# Patient Record
Sex: FEMALE | Race: WHITE | Employment: OTHER | URBAN - METROPOLITAN AREA
[De-identification: names, ages, dates, MRNs, and addresses within clinical notes are randomized per-mention and may not be internally consistent; named-entity substitution may affect disease eponyms.]

---

## 2017-01-11 ENCOUNTER — HOSPITAL ENCOUNTER (OUTPATIENT)
Dept: GENERAL RADIOLOGY | Age: 72
Discharge: HOME | End: 2017-01-11
Admitting: INTERNAL MEDICINE

## 2017-01-11 DIAGNOSIS — Z13.820 ENCOUNTER FOR IMAGING TO ASSESS OSTEOPOROSIS: ICD-10-CM

## 2017-01-11 DIAGNOSIS — Z13.820 ENCOUNTER FOR SCREENING FOR OSTEOPOROSIS: ICD-10-CM

## 2017-05-17 ENCOUNTER — OFFICE VISIT (OUTPATIENT)
Dept: INTERNAL MEDICINE CLINIC | Age: 72
End: 2017-05-17

## 2017-05-17 VITALS
WEIGHT: 293 LBS | HEIGHT: 66 IN | BODY MASS INDEX: 47.09 KG/M2 | RESPIRATION RATE: 16 BRPM | HEART RATE: 80 BPM | SYSTOLIC BLOOD PRESSURE: 124 MMHG | DIASTOLIC BLOOD PRESSURE: 76 MMHG

## 2017-05-17 DIAGNOSIS — I48.91 ATRIAL FIBRILLATION, UNSPECIFIED TYPE (HCC): ICD-10-CM

## 2017-05-17 DIAGNOSIS — M19.90 ARTHRITIS: ICD-10-CM

## 2017-05-17 DIAGNOSIS — I10 ESSENTIAL HYPERTENSION: Primary | ICD-10-CM

## 2017-05-17 LAB
A/G RATIO: 1.6 (ref 1.1–2.2)
ALBUMIN SERPL-MCNC: 4 G/DL (ref 3.4–5)
ALP BLD-CCNC: 120 U/L (ref 40–129)
ALT SERPL-CCNC: 7 U/L (ref 10–40)
ANION GAP SERPL CALCULATED.3IONS-SCNC: 13 MMOL/L (ref 3–16)
AST SERPL-CCNC: 10 U/L (ref 15–37)
BASOPHILS ABSOLUTE: 0.1 K/UL (ref 0–0.2)
BASOPHILS RELATIVE PERCENT: 0.9 %
BILIRUB SERPL-MCNC: 0.5 MG/DL (ref 0–1)
BUN BLDV-MCNC: 22 MG/DL (ref 7–20)
CALCIUM SERPL-MCNC: 9.3 MG/DL (ref 8.3–10.6)
CHLORIDE BLD-SCNC: 104 MMOL/L (ref 99–110)
CO2: 28 MMOL/L (ref 21–32)
CREAT SERPL-MCNC: 1.4 MG/DL (ref 0.6–1.2)
EOSINOPHILS ABSOLUTE: 0.3 K/UL (ref 0–0.6)
EOSINOPHILS RELATIVE PERCENT: 3.7 %
GFR AFRICAN AMERICAN: 45
GFR NON-AFRICAN AMERICAN: 37
GLOBULIN: 2.5 G/DL
GLUCOSE BLD-MCNC: 89 MG/DL (ref 70–99)
HCT VFR BLD CALC: 39.9 % (ref 36–48)
HEMOGLOBIN: 12.4 G/DL (ref 12–16)
LYMPHOCYTES ABSOLUTE: 1.4 K/UL (ref 1–5.1)
LYMPHOCYTES RELATIVE PERCENT: 18.2 %
MCH RBC QN AUTO: 29.3 PG (ref 26–34)
MCHC RBC AUTO-ENTMCNC: 31.1 G/DL (ref 31–36)
MCV RBC AUTO: 94.3 FL (ref 80–100)
MONOCYTES ABSOLUTE: 0.9 K/UL (ref 0–1.3)
MONOCYTES RELATIVE PERCENT: 11 %
NEUTROPHILS ABSOLUTE: 5.2 K/UL (ref 1.7–7.7)
NEUTROPHILS RELATIVE PERCENT: 66.2 %
PDW BLD-RTO: 15.5 % (ref 12.4–15.4)
PLATELET # BLD: 204 K/UL (ref 135–450)
PMV BLD AUTO: 11.5 FL (ref 5–10.5)
POTASSIUM SERPL-SCNC: 4.7 MMOL/L (ref 3.5–5.1)
RBC # BLD: 4.23 M/UL (ref 4–5.2)
SODIUM BLD-SCNC: 145 MMOL/L (ref 136–145)
TOTAL PROTEIN: 6.5 G/DL (ref 6.4–8.2)
TSH SERPL DL<=0.05 MIU/L-ACNC: 3.81 UIU/ML (ref 0.27–4.2)
VITAMIN D 25-HYDROXY: 32 NG/ML
WBC # BLD: 7.8 K/UL (ref 4–11)

## 2017-05-17 PROCEDURE — 99204 OFFICE O/P NEW MOD 45 MIN: CPT | Performed by: INTERNAL MEDICINE

## 2017-05-28 ASSESSMENT — ENCOUNTER SYMPTOMS
ALLERGIC/IMMUNOLOGIC NEGATIVE: 1
RESPIRATORY NEGATIVE: 1
EYES NEGATIVE: 1
GASTROINTESTINAL NEGATIVE: 1

## 2017-09-08 ENCOUNTER — OFFICE VISIT (OUTPATIENT)
Dept: INTERNAL MEDICINE CLINIC | Age: 72
End: 2017-09-08

## 2017-09-08 VITALS
HEART RATE: 94 BPM | SYSTOLIC BLOOD PRESSURE: 128 MMHG | BODY MASS INDEX: 49.68 KG/M2 | RESPIRATION RATE: 14 BRPM | WEIGHT: 293 LBS | DIASTOLIC BLOOD PRESSURE: 78 MMHG

## 2017-09-08 DIAGNOSIS — R07.89 OTHER CHEST PAIN: ICD-10-CM

## 2017-09-08 DIAGNOSIS — I10 ESSENTIAL HYPERTENSION: ICD-10-CM

## 2017-09-08 DIAGNOSIS — R52 PAIN: Primary | ICD-10-CM

## 2017-09-08 DIAGNOSIS — I48.91 ATRIAL FIBRILLATION, UNSPECIFIED TYPE (HCC): ICD-10-CM

## 2017-09-08 LAB
A/G RATIO: 1.4 (ref 1.1–2.2)
ALBUMIN SERPL-MCNC: 3.7 G/DL (ref 3.4–5)
ALP BLD-CCNC: 141 U/L (ref 40–129)
ALT SERPL-CCNC: 7 U/L (ref 10–40)
ANION GAP SERPL CALCULATED.3IONS-SCNC: 12 MMOL/L (ref 3–16)
AST SERPL-CCNC: 11 U/L (ref 15–37)
BASOPHILS ABSOLUTE: 0 K/UL (ref 0–0.2)
BASOPHILS RELATIVE PERCENT: 0.5 %
BILIRUB SERPL-MCNC: 0.6 MG/DL (ref 0–1)
BUN BLDV-MCNC: 22 MG/DL (ref 7–20)
CALCIUM SERPL-MCNC: 9.4 MG/DL (ref 8.3–10.6)
CHLORIDE BLD-SCNC: 104 MMOL/L (ref 99–110)
CHOLESTEROL, TOTAL: 159 MG/DL (ref 0–199)
CO2: 28 MMOL/L (ref 21–32)
CREAT SERPL-MCNC: 1.2 MG/DL (ref 0.6–1.2)
EOSINOPHILS ABSOLUTE: 0.3 K/UL (ref 0–0.6)
EOSINOPHILS RELATIVE PERCENT: 3.4 %
GFR AFRICAN AMERICAN: 54
GFR NON-AFRICAN AMERICAN: 44
GLOBULIN: 2.7 G/DL
GLUCOSE BLD-MCNC: 91 MG/DL (ref 70–99)
HCT VFR BLD CALC: 40.4 % (ref 36–48)
HDLC SERPL-MCNC: 57 MG/DL (ref 40–60)
HEMOGLOBIN: 13.1 G/DL (ref 12–16)
LDL CHOLESTEROL CALCULATED: 90 MG/DL
LYMPHOCYTES ABSOLUTE: 1.2 K/UL (ref 1–5.1)
LYMPHOCYTES RELATIVE PERCENT: 16.3 %
MCH RBC QN AUTO: 30.7 PG (ref 26–34)
MCHC RBC AUTO-ENTMCNC: 32.4 G/DL (ref 31–36)
MCV RBC AUTO: 94.5 FL (ref 80–100)
MONOCYTES ABSOLUTE: 0.7 K/UL (ref 0–1.3)
MONOCYTES RELATIVE PERCENT: 9.3 %
NEUTROPHILS ABSOLUTE: 5.4 K/UL (ref 1.7–7.7)
NEUTROPHILS RELATIVE PERCENT: 70.5 %
PDW BLD-RTO: 15.3 % (ref 12.4–15.4)
PLATELET # BLD: 167 K/UL (ref 135–450)
PMV BLD AUTO: 11.4 FL (ref 5–10.5)
POTASSIUM SERPL-SCNC: 4.7 MMOL/L (ref 3.5–5.1)
RBC # BLD: 4.27 M/UL (ref 4–5.2)
SODIUM BLD-SCNC: 144 MMOL/L (ref 136–145)
TOTAL PROTEIN: 6.4 G/DL (ref 6.4–8.2)
TRIGL SERPL-MCNC: 58 MG/DL (ref 0–150)
VLDLC SERPL CALC-MCNC: 12 MG/DL
WBC # BLD: 7.6 K/UL (ref 4–11)

## 2017-09-08 PROCEDURE — 93000 ELECTROCARDIOGRAM COMPLETE: CPT | Performed by: INTERNAL MEDICINE

## 2017-09-08 PROCEDURE — 99214 OFFICE O/P EST MOD 30 MIN: CPT | Performed by: INTERNAL MEDICINE

## 2017-09-08 RX ORDER — BIOTIN 1 MG
TABLET ORAL
Status: ON HOLD | COMMUNITY
End: 2022-09-13

## 2017-09-08 RX ORDER — TRAMADOL HYDROCHLORIDE 50 MG/1
50 TABLET ORAL EVERY 6 HOURS PRN
Qty: 90 TABLET | Refills: 1 | Status: CANCELLED | OUTPATIENT
Start: 2017-09-08

## 2017-09-08 RX ORDER — LANOLIN ALCOHOL/MO/W.PET/CERES
325 CREAM (GRAM) TOPICAL
Status: ON HOLD | COMMUNITY
End: 2022-09-13

## 2017-09-08 RX ORDER — WARFARIN SODIUM 5 MG/1
TABLET ORAL
Qty: 30 TABLET | Refills: 3 | Status: SHIPPED | OUTPATIENT
Start: 2017-09-08 | End: 2018-01-23 | Stop reason: SDUPTHER

## 2017-09-08 RX ORDER — FUROSEMIDE 20 MG/1
20 TABLET ORAL PRN
COMMUNITY
End: 2018-11-08

## 2017-09-08 RX ORDER — TRAMADOL HYDROCHLORIDE 50 MG/1
50 TABLET ORAL EVERY 6 HOURS PRN
Qty: 120 TABLET | Refills: 2 | Status: SHIPPED | OUTPATIENT
Start: 2017-09-08 | End: 2018-07-10 | Stop reason: SDUPTHER

## 2017-09-09 LAB
ESTIMATED AVERAGE GLUCOSE: 108.3 MG/DL
HBA1C MFR BLD: 5.4 %

## 2017-09-15 DIAGNOSIS — I48.91 ATRIAL FIBRILLATION, UNSPECIFIED TYPE (HCC): Primary | Chronic | ICD-10-CM

## 2017-09-15 LAB
INR BLD: 1.82 (ref 0.85–1.15)
PROTHROMBIN TIME: 20.6 SEC (ref 9.6–13)

## 2017-09-29 RX ORDER — CLONIDINE HYDROCHLORIDE 0.1 MG/1
TABLET ORAL
Qty: 90 TABLET | Refills: 0 | Status: SHIPPED | OUTPATIENT
Start: 2017-09-29 | End: 2018-02-09

## 2017-10-04 ENCOUNTER — TELEPHONE (OUTPATIENT)
Dept: RHEUMATOLOGY | Age: 72
End: 2017-10-04

## 2017-10-04 DIAGNOSIS — Z79.01 ANTICOAGULANT LONG-TERM USE: ICD-10-CM

## 2017-10-04 DIAGNOSIS — I48.91 ATRIAL FIBRILLATION, UNSPECIFIED TYPE (HCC): Primary | Chronic | ICD-10-CM

## 2017-10-04 NOTE — TELEPHONE ENCOUNTER
Pt called the office in response to a letter she received about her protime results. Spoke with  and he advised to come in for a repeat PT INR. The results need to be updated in order to advised on her coumadin dose. She would like to come to the office tomorrow to repeat the lab. She states that if the office is unable to contact her. Please call her Daughter Gal Moyer.  Contact info is in demographic info

## 2017-10-06 DIAGNOSIS — I48.91 ATRIAL FIBRILLATION, UNSPECIFIED TYPE (HCC): Chronic | ICD-10-CM

## 2017-10-06 DIAGNOSIS — Z79.01 ANTICOAGULANT LONG-TERM USE: ICD-10-CM

## 2017-10-06 LAB
INR BLD: 1.52 (ref 0.85–1.15)
PROTHROMBIN TIME: 17.2 SEC (ref 9.6–13)

## 2017-10-30 DIAGNOSIS — I48.91 ATRIAL FIBRILLATION, UNSPECIFIED TYPE (HCC): Chronic | ICD-10-CM

## 2017-10-30 DIAGNOSIS — Z79.01 ANTICOAGULANT LONG-TERM USE: ICD-10-CM

## 2017-10-30 LAB
INR BLD: 1.36 (ref 0.85–1.15)
PROTHROMBIN TIME: 15.4 SEC (ref 9.6–13)

## 2017-11-10 ENCOUNTER — OFFICE VISIT (OUTPATIENT)
Dept: INTERNAL MEDICINE CLINIC | Age: 72
End: 2017-11-10

## 2017-11-10 VITALS
RESPIRATION RATE: 14 BRPM | BODY MASS INDEX: 48.91 KG/M2 | SYSTOLIC BLOOD PRESSURE: 138 MMHG | DIASTOLIC BLOOD PRESSURE: 78 MMHG | OXYGEN SATURATION: 96 % | HEART RATE: 90 BPM | WEIGHT: 293 LBS

## 2017-11-10 DIAGNOSIS — I48.91 ATRIAL FIBRILLATION, UNSPECIFIED TYPE (HCC): Primary | ICD-10-CM

## 2017-11-10 DIAGNOSIS — I10 ESSENTIAL HYPERTENSION: ICD-10-CM

## 2017-11-10 LAB
INR BLD: 1.25 (ref 0.85–1.15)
PROTHROMBIN TIME: 14.1 SEC (ref 9.6–13)

## 2017-11-10 PROCEDURE — 99213 OFFICE O/P EST LOW 20 MIN: CPT | Performed by: INTERNAL MEDICINE

## 2017-11-10 RX ORDER — CHOLECALCIFEROL (VITAMIN D3) 1250 MCG
1 CAPSULE ORAL WEEKLY
Qty: 5 CAPSULE | Refills: 5 | Status: SHIPPED | OUTPATIENT
Start: 2017-11-10 | End: 2018-07-23 | Stop reason: SDUPTHER

## 2017-11-10 RX ORDER — DILTIAZEM HYDROCHLORIDE 240 MG/1
240 CAPSULE, COATED, EXTENDED RELEASE ORAL DAILY
Qty: 30 CAPSULE | Refills: 5 | Status: SHIPPED | OUTPATIENT
Start: 2017-11-10 | End: 2018-05-25 | Stop reason: SDUPTHER

## 2017-11-20 NOTE — PROGRESS NOTES
visit:    Atrial fibrillation, unspecified type ([facility]  -     diltiazem (CARDIZEM CD) 240 MG extended release capsule; Take 1 capsule by mouth daily  -     PROTIME-INR    Essential hypertension    Other orders  -     Cholecalciferol (VITAMIN D3) 75513 units CAPS;  Take 1 capsule by mouth once a week

## 2018-01-16 RX ORDER — DULOXETIN HYDROCHLORIDE 60 MG/1
60 CAPSULE, DELAYED RELEASE ORAL DAILY
Qty: 30 CAPSULE | Refills: 0 | Status: SHIPPED | OUTPATIENT
Start: 2018-01-16 | End: 2018-02-09 | Stop reason: SDUPTHER

## 2018-01-16 RX ORDER — CLONIDINE HYDROCHLORIDE 0.2 MG/1
0.2 TABLET ORAL NIGHTLY
Qty: 30 TABLET | Refills: 0 | Status: SHIPPED | OUTPATIENT
Start: 2018-01-16 | End: 2018-02-09 | Stop reason: SDUPTHER

## 2018-01-16 NOTE — TELEPHONE ENCOUNTER
Patient is a Dr Luciano Humphreys transfer requesting a refill on Clonidine and Cymbalta. She has an appointment next month.

## 2018-01-23 ENCOUNTER — TELEPHONE (OUTPATIENT)
Dept: RHEUMATOLOGY | Age: 73
End: 2018-01-23

## 2018-01-23 DIAGNOSIS — I48.91 ATRIAL FIBRILLATION, UNSPECIFIED TYPE (HCC): ICD-10-CM

## 2018-01-23 RX ORDER — WARFARIN SODIUM 5 MG/1
TABLET ORAL
Qty: 30 TABLET | Refills: 0 | Status: SHIPPED | OUTPATIENT
Start: 2018-01-23 | End: 2018-02-09 | Stop reason: SDUPTHER

## 2018-01-23 RX ORDER — WARFARIN SODIUM 5 MG/1
TABLET ORAL
Qty: 30 TABLET | Refills: 1 | Status: CANCELLED | OUTPATIENT
Start: 2018-01-23

## 2018-02-09 ENCOUNTER — OFFICE VISIT (OUTPATIENT)
Dept: INTERNAL MEDICINE CLINIC | Age: 73
End: 2018-02-09

## 2018-02-09 VITALS
BODY MASS INDEX: 47.09 KG/M2 | HEIGHT: 66 IN | WEIGHT: 293 LBS | DIASTOLIC BLOOD PRESSURE: 84 MMHG | SYSTOLIC BLOOD PRESSURE: 118 MMHG | HEART RATE: 80 BPM

## 2018-02-09 DIAGNOSIS — I10 ESSENTIAL HYPERTENSION: Chronic | ICD-10-CM

## 2018-02-09 DIAGNOSIS — I48.92 ATRIAL FIBRILLATION AND FLUTTER (HCC): ICD-10-CM

## 2018-02-09 DIAGNOSIS — F33.2 SEVERE EPISODE OF RECURRENT MAJOR DEPRESSIVE DISORDER, WITHOUT PSYCHOTIC FEATURES (HCC): Primary | ICD-10-CM

## 2018-02-09 DIAGNOSIS — Z12.11 COLON CANCER SCREENING: ICD-10-CM

## 2018-02-09 DIAGNOSIS — Z79.01 ANTICOAGULATED ON COUMADIN: ICD-10-CM

## 2018-02-09 DIAGNOSIS — Z12.39 BREAST CANCER SCREENING: ICD-10-CM

## 2018-02-09 DIAGNOSIS — I48.91 ATRIAL FIBRILLATION AND FLUTTER (HCC): ICD-10-CM

## 2018-02-09 DIAGNOSIS — F33.8 SEASONAL AFFECTIVE DISORDER (HCC): ICD-10-CM

## 2018-02-09 LAB
INTERNATIONAL NORMALIZATION RATIO, POC: 1.7
PROTHROMBIN TIME, POC: NORMAL

## 2018-02-09 PROCEDURE — 85610 PROTHROMBIN TIME: CPT | Performed by: NURSE PRACTITIONER

## 2018-02-09 PROCEDURE — 99214 OFFICE O/P EST MOD 30 MIN: CPT | Performed by: NURSE PRACTITIONER

## 2018-02-09 RX ORDER — WARFARIN SODIUM 5 MG/1
TABLET ORAL
Qty: 32 TABLET | Refills: 3 | Status: SHIPPED | OUTPATIENT
Start: 2018-02-09 | End: 2018-07-10 | Stop reason: SDUPTHER

## 2018-02-09 RX ORDER — DULOXETIN HYDROCHLORIDE 60 MG/1
60 CAPSULE, DELAYED RELEASE ORAL DAILY
Qty: 30 CAPSULE | Refills: 5 | Status: SHIPPED | OUTPATIENT
Start: 2018-02-09 | End: 2018-07-10 | Stop reason: SDUPTHER

## 2018-02-09 RX ORDER — BUPROPION HYDROCHLORIDE 75 MG/1
75 TABLET ORAL 2 TIMES DAILY
Qty: 60 TABLET | Refills: 3 | Status: SHIPPED | OUTPATIENT
Start: 2018-02-09 | End: 2018-07-10 | Stop reason: SDUPTHER

## 2018-02-09 RX ORDER — CLONIDINE HYDROCHLORIDE 0.2 MG/1
0.2 TABLET ORAL NIGHTLY
Qty: 30 TABLET | Refills: 5 | Status: SHIPPED | OUTPATIENT
Start: 2018-02-09 | End: 2018-02-23 | Stop reason: SDUPTHER

## 2018-02-11 PROBLEM — Z79.01 ANTICOAGULATED ON COUMADIN: Status: ACTIVE | Noted: 2018-02-11

## 2018-02-11 PROBLEM — F33.8 SEASONAL AFFECTIVE DISORDER (HCC): Status: ACTIVE | Noted: 2018-02-11

## 2018-02-11 PROBLEM — F33.2 SEVERE EPISODE OF RECURRENT MAJOR DEPRESSIVE DISORDER, WITHOUT PSYCHOTIC FEATURES (HCC): Status: ACTIVE | Noted: 2018-02-11

## 2018-02-11 ASSESSMENT — ENCOUNTER SYMPTOMS
GASTROINTESTINAL NEGATIVE: 1
RESPIRATORY NEGATIVE: 1
SHORTNESS OF BREATH: 0

## 2018-02-11 NOTE — PROGRESS NOTES
Subjective:      Patient ID: Ulysses Dixon is a 81 y.o. female. CC: Establish new PCP, atrial fibrillation on warfarin, hypertension, depression    HPI: The patient presents to the office today to establish with a new PCP after hers left the office. She has a number of chronic medical problems and is concerned about worsening depression. She has a history of atrial fibrillation and takes warfarin. Her INR is low today at 1.7. Her last INR was some time ago. She tells me she was unaware this should be checked monthly. She has history of hypertension. She denies any chest pain or dyspnea. She is compliant with her medication regimen. She has history of depression. She feels this has been poorly controlled lately with poor mood, not enjoying doing things she used to enjoy. She wonders if weather is playing a role and admits this is not uncommon for her in the winter. She has history of arthritis and chronic back pain. She uses Tramadol occasionally for this. We discussed the risks of this medication and she is agreeable to only using periodically.         Past Medical History:   Diagnosis Date    Arthritis     Hypertension     Kidney disease     Pneumonia     Sleep apnea     no c-pap       Current Outpatient Prescriptions   Medication Sig Dispense Refill    warfarin (COUMADIN) 5 MG tablet One pill daily except Friday take 1.5 tablets 32 tablet 3    DULoxetine (CYMBALTA) 60 MG extended release capsule Take 1 capsule by mouth daily 30 capsule 5    cloNIDine (CATAPRES) 0.2 MG tablet Take 1 tablet by mouth nightly 30 tablet 5    buPROPion (WELLBUTRIN) 75 MG tablet Take 1 tablet by mouth 2 times daily 60 tablet 3    diltiazem (CARDIZEM CD) 240 MG extended release capsule Take 1 capsule by mouth daily 30 capsule 5    Cholecalciferol (VITAMIN D3) 46454 units CAPS Take 1 capsule by mouth once a week 5 capsule 5    Biotin 1000 MCG TABS Take by mouth      furosemide (LASIX) 20 MG tablet

## 2018-02-22 ENCOUNTER — TELEPHONE (OUTPATIENT)
Dept: INTERNAL MEDICINE CLINIC | Age: 73
End: 2018-02-22

## 2018-02-23 DIAGNOSIS — I10 ESSENTIAL HYPERTENSION: Chronic | ICD-10-CM

## 2018-02-23 RX ORDER — CLONIDINE HYDROCHLORIDE 0.2 MG/1
0.2 TABLET ORAL 2 TIMES DAILY
Qty: 60 TABLET | Refills: 5 | Status: SHIPPED | OUTPATIENT
Start: 2018-02-23 | End: 2018-07-10 | Stop reason: SDUPTHER

## 2018-03-09 ENCOUNTER — OFFICE VISIT (OUTPATIENT)
Dept: INTERNAL MEDICINE CLINIC | Age: 73
End: 2018-03-09

## 2018-03-09 VITALS
BODY MASS INDEX: 46.77 KG/M2 | DIASTOLIC BLOOD PRESSURE: 82 MMHG | WEIGHT: 291 LBS | HEART RATE: 68 BPM | SYSTOLIC BLOOD PRESSURE: 114 MMHG | HEIGHT: 66 IN

## 2018-03-09 DIAGNOSIS — I48.91 ATRIAL FIBRILLATION AND FLUTTER (HCC): Chronic | ICD-10-CM

## 2018-03-09 DIAGNOSIS — F33.2 SEVERE EPISODE OF RECURRENT MAJOR DEPRESSIVE DISORDER, WITHOUT PSYCHOTIC FEATURES (HCC): Primary | ICD-10-CM

## 2018-03-09 DIAGNOSIS — Z79.01 ANTICOAGULATED ON COUMADIN: ICD-10-CM

## 2018-03-09 DIAGNOSIS — I48.92 ATRIAL FIBRILLATION AND FLUTTER (HCC): Chronic | ICD-10-CM

## 2018-03-09 LAB
INTERNATIONAL NORMALIZATION RATIO, POC: 3.1
PROTHROMBIN TIME, POC: NORMAL

## 2018-03-09 PROCEDURE — 85610 PROTHROMBIN TIME: CPT | Performed by: NURSE PRACTITIONER

## 2018-03-09 PROCEDURE — 99213 OFFICE O/P EST LOW 20 MIN: CPT | Performed by: NURSE PRACTITIONER

## 2018-03-12 ASSESSMENT — ENCOUNTER SYMPTOMS
RESPIRATORY NEGATIVE: 1
GASTROINTESTINAL NEGATIVE: 1

## 2018-03-12 NOTE — PROGRESS NOTES
well-nourished. No distress. HENT:   Head: Normocephalic and atraumatic. Eyes: Conjunctivae are normal. No scleral icterus. Pulmonary/Chest: Effort normal. No respiratory distress. Neurological: She is alert and oriented to person, place, and time. Skin: Skin is warm and dry. She is not diaphoretic. /82   Pulse 68   Ht 5' 6\" (1.676 m)   Wt 291 lb (132 kg)   BMI 46.97 kg/m²          ASSESSMENT/PLAN:  Franklyn Ambrose was seen today for office visit for anticoagulation management.   Diagnoses and all orders for this visit:    Severe episode of recurrent major depressive disorder, without psychotic features ([address]  - Stable  - Continue current regimen    Atrial fibrillation and flutter ([address]  - Continue anticoagulation    Anticoagulated on Coumadin  - INR 3.1  - She was previously a little low, asked to take 7.5 once but continued this weekly  - Return to 5 mg QD  -     POCT INR        Nicolasa Farfan, CNP

## 2018-04-09 ENCOUNTER — OFFICE VISIT (OUTPATIENT)
Dept: INTERNAL MEDICINE CLINIC | Age: 73
End: 2018-04-09

## 2018-04-09 VITALS
BODY MASS INDEX: 47.09 KG/M2 | SYSTOLIC BLOOD PRESSURE: 130 MMHG | DIASTOLIC BLOOD PRESSURE: 82 MMHG | HEART RATE: 120 BPM | HEIGHT: 66 IN | WEIGHT: 293 LBS

## 2018-04-09 DIAGNOSIS — I48.91 ATRIAL FIBRILLATION AND FLUTTER (HCC): Primary | Chronic | ICD-10-CM

## 2018-04-09 DIAGNOSIS — Z79.01 ANTICOAGULATED ON COUMADIN: ICD-10-CM

## 2018-04-09 DIAGNOSIS — I48.92 ATRIAL FIBRILLATION AND FLUTTER (HCC): Primary | Chronic | ICD-10-CM

## 2018-04-09 DIAGNOSIS — Z23 NEED FOR SHINGLES VACCINE: ICD-10-CM

## 2018-04-09 LAB
INTERNATIONAL NORMALIZATION RATIO, POC: 2.5
PROTHROMBIN TIME, POC: NORMAL

## 2018-04-09 PROCEDURE — 85610 PROTHROMBIN TIME: CPT | Performed by: NURSE PRACTITIONER

## 2018-04-09 PROCEDURE — 99212 OFFICE O/P EST SF 10 MIN: CPT | Performed by: NURSE PRACTITIONER

## 2018-04-10 ASSESSMENT — ENCOUNTER SYMPTOMS
RESPIRATORY NEGATIVE: 1
GASTROINTESTINAL NEGATIVE: 1

## 2018-05-07 ENCOUNTER — OFFICE VISIT (OUTPATIENT)
Dept: INTERNAL MEDICINE CLINIC | Age: 73
End: 2018-05-07

## 2018-05-07 VITALS
HEART RATE: 72 BPM | HEIGHT: 66 IN | WEIGHT: 293 LBS | BODY MASS INDEX: 47.09 KG/M2 | DIASTOLIC BLOOD PRESSURE: 72 MMHG | SYSTOLIC BLOOD PRESSURE: 114 MMHG

## 2018-05-07 DIAGNOSIS — I48.92 ATRIAL FIBRILLATION AND FLUTTER (HCC): Primary | Chronic | ICD-10-CM

## 2018-05-07 DIAGNOSIS — I48.91 ATRIAL FIBRILLATION AND FLUTTER (HCC): Primary | Chronic | ICD-10-CM

## 2018-05-07 DIAGNOSIS — Z79.01 ANTICOAGULATED ON COUMADIN: ICD-10-CM

## 2018-05-07 LAB
INTERNATIONAL NORMALIZATION RATIO, POC: 2.5
PROTHROMBIN TIME, POC: NORMAL

## 2018-05-07 PROCEDURE — 99212 OFFICE O/P EST SF 10 MIN: CPT | Performed by: NURSE PRACTITIONER

## 2018-05-07 PROCEDURE — 85610 PROTHROMBIN TIME: CPT | Performed by: NURSE PRACTITIONER

## 2018-05-07 ASSESSMENT — ENCOUNTER SYMPTOMS
RESPIRATORY NEGATIVE: 1
GASTROINTESTINAL NEGATIVE: 1

## 2018-05-25 DIAGNOSIS — I48.91 ATRIAL FIBRILLATION, UNSPECIFIED TYPE (HCC): ICD-10-CM

## 2018-05-25 RX ORDER — DILTIAZEM HYDROCHLORIDE 240 MG/1
240 CAPSULE, COATED, EXTENDED RELEASE ORAL DAILY
Qty: 90 CAPSULE | Refills: 1 | Status: SHIPPED | OUTPATIENT
Start: 2018-05-25 | End: 2018-12-06 | Stop reason: SDUPTHER

## 2018-06-05 ENCOUNTER — OFFICE VISIT (OUTPATIENT)
Dept: INTERNAL MEDICINE CLINIC | Age: 73
End: 2018-06-05

## 2018-06-05 VITALS
HEART RATE: 84 BPM | BODY MASS INDEX: 47.45 KG/M2 | SYSTOLIC BLOOD PRESSURE: 132 MMHG | WEIGHT: 293 LBS | DIASTOLIC BLOOD PRESSURE: 72 MMHG

## 2018-06-05 DIAGNOSIS — Z79.01 ANTICOAGULATED ON COUMADIN: ICD-10-CM

## 2018-06-05 DIAGNOSIS — I48.92 ATRIAL FIBRILLATION AND FLUTTER (HCC): Primary | ICD-10-CM

## 2018-06-05 DIAGNOSIS — I48.91 ATRIAL FIBRILLATION AND FLUTTER (HCC): Primary | ICD-10-CM

## 2018-06-05 LAB
INTERNATIONAL NORMALIZATION RATIO, POC: 2
PROTHROMBIN TIME, POC: NORMAL

## 2018-06-05 PROCEDURE — 85610 PROTHROMBIN TIME: CPT | Performed by: NURSE PRACTITIONER

## 2018-06-05 PROCEDURE — 99212 OFFICE O/P EST SF 10 MIN: CPT | Performed by: NURSE PRACTITIONER

## 2018-06-05 ASSESSMENT — PATIENT HEALTH QUESTIONNAIRE - PHQ9
1. LITTLE INTEREST OR PLEASURE IN DOING THINGS: 0
SUM OF ALL RESPONSES TO PHQ QUESTIONS 1-9: 1
SUM OF ALL RESPONSES TO PHQ9 QUESTIONS 1 & 2: 1
2. FEELING DOWN, DEPRESSED OR HOPELESS: 1

## 2018-06-05 ASSESSMENT — ENCOUNTER SYMPTOMS
GASTROINTESTINAL NEGATIVE: 1
RESPIRATORY NEGATIVE: 1

## 2018-07-10 ENCOUNTER — OFFICE VISIT (OUTPATIENT)
Dept: INTERNAL MEDICINE CLINIC | Age: 73
End: 2018-07-10

## 2018-07-10 VITALS
SYSTOLIC BLOOD PRESSURE: 120 MMHG | DIASTOLIC BLOOD PRESSURE: 76 MMHG | HEIGHT: 66 IN | BODY MASS INDEX: 47.09 KG/M2 | HEART RATE: 68 BPM | WEIGHT: 293 LBS

## 2018-07-10 DIAGNOSIS — I10 ESSENTIAL HYPERTENSION: Chronic | ICD-10-CM

## 2018-07-10 DIAGNOSIS — Z79.01 ANTICOAGULATED ON COUMADIN: ICD-10-CM

## 2018-07-10 DIAGNOSIS — I48.91 ATRIAL FIBRILLATION AND FLUTTER (HCC): Primary | ICD-10-CM

## 2018-07-10 DIAGNOSIS — I48.92 ATRIAL FIBRILLATION AND FLUTTER (HCC): Primary | ICD-10-CM

## 2018-07-10 DIAGNOSIS — F33.8 SEASONAL AFFECTIVE DISORDER (HCC): ICD-10-CM

## 2018-07-10 DIAGNOSIS — F33.2 SEVERE EPISODE OF RECURRENT MAJOR DEPRESSIVE DISORDER, WITHOUT PSYCHOTIC FEATURES (HCC): ICD-10-CM

## 2018-07-10 DIAGNOSIS — M15.9 PRIMARY OSTEOARTHRITIS INVOLVING MULTIPLE JOINTS: ICD-10-CM

## 2018-07-10 LAB
INTERNATIONAL NORMALIZATION RATIO, POC: 2.9
PROTHROMBIN TIME, POC: NORMAL

## 2018-07-10 PROCEDURE — 85610 PROTHROMBIN TIME: CPT | Performed by: NURSE PRACTITIONER

## 2018-07-10 PROCEDURE — 99214 OFFICE O/P EST MOD 30 MIN: CPT | Performed by: NURSE PRACTITIONER

## 2018-07-10 RX ORDER — BUPROPION HYDROCHLORIDE 75 MG/1
75 TABLET ORAL 2 TIMES DAILY
Qty: 60 TABLET | Refills: 3 | Status: SHIPPED | OUTPATIENT
Start: 2018-07-10 | End: 2018-11-08

## 2018-07-10 RX ORDER — DULOXETIN HYDROCHLORIDE 60 MG/1
60 CAPSULE, DELAYED RELEASE ORAL DAILY
Qty: 30 CAPSULE | Refills: 5 | Status: SHIPPED | OUTPATIENT
Start: 2018-07-10 | End: 2019-03-04 | Stop reason: SDUPTHER

## 2018-07-10 RX ORDER — CLONIDINE HYDROCHLORIDE 0.2 MG/1
0.2 TABLET ORAL 2 TIMES DAILY
Qty: 60 TABLET | Refills: 5 | Status: SHIPPED | OUTPATIENT
Start: 2018-07-10 | End: 2019-03-04 | Stop reason: SDUPTHER

## 2018-07-10 RX ORDER — WARFARIN SODIUM 5 MG/1
5 TABLET ORAL DAILY
Qty: 90 TABLET | Refills: 1 | Status: SHIPPED | OUTPATIENT
Start: 2018-07-10 | End: 2019-02-14 | Stop reason: SDUPTHER

## 2018-07-10 RX ORDER — TRAMADOL HYDROCHLORIDE 50 MG/1
50 TABLET ORAL DAILY PRN
Qty: 30 TABLET | Refills: 2 | Status: SHIPPED | OUTPATIENT
Start: 2018-07-10 | End: 2019-06-10 | Stop reason: SDUPTHER

## 2018-07-13 ASSESSMENT — ENCOUNTER SYMPTOMS
RESPIRATORY NEGATIVE: 1
GASTROINTESTINAL NEGATIVE: 1

## 2018-07-13 NOTE — PROGRESS NOTES
10-14 = Moderate depression, 15-19 = Moderately severe depression, 20-27 = Severe depression        ASSESSMENT/PLAN:  Catherine Cortez was seen today for office visit for anticoagulation management and medication refill. Diagnoses and all orders for this visit:    Atrial fibrillation and flutter (HCC)  -     metoprolol tartrate (LOPRESSOR) 25 MG tablet; Take 2 tablets by mouth 2 times daily  -     warfarin (COUMADIN) 5 MG tablet; Take 1 tablet by mouth daily    Anticoagulated on Coumadin  - INR 2.9  - Conitnue current regimen, see flow sheets  -     warfarin (COUMADIN) 5 MG tablet; Take 1 tablet by mouth daily  -     POCT INR    Primary osteoarthritis involving multiple joints  - Limited choices given chronic renal insufficiency and Tylenol failure  - She has used tramadol in the past chronically. She weaned this down to no more than one daily. -     traMADol (ULTRAM) 50 MG tablet; Take 1 tablet by mouth daily as needed for Pain for up to 30 days. .    Essential hypertension  - Normotensive  - she has met JNC standards.  - Continue current regimen. -     metoprolol tartrate (LOPRESSOR) 25 MG tablet; Take 2 tablets by mouth 2 times daily  -     cloNIDine (CATAPRES) 0.2 MG tablet; Take 1 tablet by mouth 2 times daily    Severe episode of recurrent major depressive disorder, without psychotic features (HCC)  -     buPROPion (WELLBUTRIN) 75 MG tablet; Take 1 tablet by mouth 2 times daily  -     DULoxetine (CYMBALTA) 60 MG extended release capsule; Take 1 capsule by mouth daily    Seasonal affective disorder (HCC)  -     buPROPion (WELLBUTRIN) 75 MG tablet; Take 1 tablet by mouth 2 times daily  -     DULoxetine (CYMBALTA) 60 MG extended release capsule;  Take 1 capsule by mouth daily      MARILEE Lee - CNP

## 2018-07-23 ENCOUNTER — OFFICE VISIT (OUTPATIENT)
Dept: INTERNAL MEDICINE CLINIC | Age: 73
End: 2018-07-23

## 2018-07-23 VITALS
WEIGHT: 286.6 LBS | RESPIRATION RATE: 12 BRPM | SYSTOLIC BLOOD PRESSURE: 130 MMHG | BODY MASS INDEX: 46.26 KG/M2 | TEMPERATURE: 98.4 F | HEART RATE: 110 BPM | DIASTOLIC BLOOD PRESSURE: 74 MMHG

## 2018-07-23 DIAGNOSIS — M79.602 BILATERAL ARM PAIN: ICD-10-CM

## 2018-07-23 DIAGNOSIS — N30.01 ACUTE CYSTITIS WITH HEMATURIA: ICD-10-CM

## 2018-07-23 DIAGNOSIS — R10.9 ABDOMINAL PAIN, UNSPECIFIED ABDOMINAL LOCATION: Primary | ICD-10-CM

## 2018-07-23 DIAGNOSIS — R14.2 BELCHING: ICD-10-CM

## 2018-07-23 DIAGNOSIS — Z91.81 AT HIGH RISK FOR FALLS: ICD-10-CM

## 2018-07-23 DIAGNOSIS — R26.81 UNSTEADY GAIT: ICD-10-CM

## 2018-07-23 DIAGNOSIS — M79.601 BILATERAL ARM PAIN: ICD-10-CM

## 2018-07-23 LAB
BILIRUBIN, POC: ABNORMAL
BLOOD URINE, POC: ABNORMAL
CLARITY, POC: ABNORMAL
COLOR, POC: YELLOW
GLUCOSE URINE, POC: NEGATIVE
KETONES, POC: NEGATIVE
LEUKOCYTE EST, POC: ABNORMAL
NITRITE, POC: NEGATIVE
PH, POC: 5.5
PROTEIN, POC: ABNORMAL
SPECIFIC GRAVITY, POC: >=1.03
UROBILINOGEN, POC: 1

## 2018-07-23 PROCEDURE — 99214 OFFICE O/P EST MOD 30 MIN: CPT | Performed by: INTERNAL MEDICINE

## 2018-07-23 PROCEDURE — 81002 URINALYSIS NONAUTO W/O SCOPE: CPT | Performed by: INTERNAL MEDICINE

## 2018-07-23 RX ORDER — AMOXICILLIN 875 MG/1
875 TABLET, COATED ORAL 2 TIMES DAILY
Qty: 14 TABLET | Refills: 0 | Status: SHIPPED | OUTPATIENT
Start: 2018-07-23 | End: 2018-07-30

## 2018-07-23 RX ORDER — RANITIDINE 150 MG/1
150 TABLET ORAL DAILY
Qty: 30 TABLET | Refills: 0 | Status: SHIPPED | OUTPATIENT
Start: 2018-07-23 | End: 2018-08-10 | Stop reason: SDUPTHER

## 2018-07-23 RX ORDER — CHOLECALCIFEROL (VITAMIN D3) 1250 MCG
1 CAPSULE ORAL WEEKLY
Qty: 5 CAPSULE | Refills: 5 | Status: SHIPPED | OUTPATIENT
Start: 2018-07-23 | End: 2019-03-04 | Stop reason: SDUPTHER

## 2018-07-23 RX ORDER — PHENAZOPYRIDINE HYDROCHLORIDE 200 MG/1
200 TABLET, FILM COATED ORAL 3 TIMES DAILY PRN
Qty: 10 TABLET | Refills: 0 | Status: SHIPPED | OUTPATIENT
Start: 2018-07-23 | End: 2018-07-26

## 2018-07-23 ASSESSMENT — ENCOUNTER SYMPTOMS
BLOOD IN STOOL: 0
DIARRHEA: 0
SHORTNESS OF BREATH: 0
CHEST TIGHTNESS: 0
VOMITING: 0
BACK PAIN: 1
CONSTIPATION: 0
ABDOMINAL PAIN: 1

## 2018-07-23 NOTE — PATIENT INSTRUCTIONS
intake.)  · Avoid drinks that are carbonated or have caffeine. They can irritate the bladder. · Urinate often. Try to empty your bladder each time. · To relieve pain, take a hot bath or lay a heating pad set on low over your lower belly or genital area. Never go to sleep with a heating pad in place. To prevent UTIs  · Drink plenty of water each day. This helps you urinate often, which clears bacteria from your system. (If you have kidney, heart, or liver disease and have to limit fluids, talk with your doctor before you increase your fluid intake.)  · Urinate when you need to. · Urinate right after you have sex. · Change sanitary pads often. · Avoid douches, bubble baths, feminine hygiene sprays, and other feminine hygiene products that have deodorants. · After going to the bathroom, wipe from front to back. When should you call for help? Call your doctor now or seek immediate medical care if:    · Symptoms such as fever, chills, nausea, or vomiting get worse or appear for the first time.     · You have new pain in your back just below your rib cage. This is called flank pain.     · There is new blood or pus in your urine.     · You have any problems with your antibiotic medicine.    Watch closely for changes in your health, and be sure to contact your doctor if:    · You are not getting better after taking an antibiotic for 2 days.     · Your symptoms go away but then come back. Where can you learn more? Go to [email or web address] and sign in to your [employer] account. Enter Y327 in the [facility] box to learn more about \"Urinary Tract Infection in Women: Care Instructions. \"     If you do not have an account, please click on the \"Sign Up Now\" link. Current as of: May 12, 2017  Content Version: 11.6  © 0049-8179 [facility], [employer]. Care instructions adapted under license by [address]  If you have questions about a medical condition or this instruction, always ask

## 2018-07-23 NOTE — PROGRESS NOTES
EC tablet Take 325 mg by mouth 3 times daily (with meals)      aspirin 81 MG tablet Take 81 mg by mouth daily      zoster recombinant adjuvanted vaccine (SHINGRIX) 50 MCG SUSR injection Inject 0.5 mLs into the muscle every 3 months for 2 doses 0.5 mL 1    Cholecalciferol (VITAMIN D3) 83723 units CAPS Take 1 capsule by mouth once a week 5 capsule 5     No facility-administered medications prior to visit. Patient's past medical history, surgical history, family history, medications,  and allergies  were all reviewed and updated as appropriate today. Review of Systems   Constitutional: Negative for appetite change, fatigue and fever. Respiratory: Negative for chest tightness and shortness of breath. Cardiovascular: Negative for chest pain. Gastrointestinal: Positive for abdominal pain. Negative for blood in stool, constipation, diarrhea and vomiting. Genitourinary: Negative for dysuria and frequency. Musculoskeletal: Positive for back pain and gait problem. Skin: Negative for rash. /74 (Site: Left Arm)   Pulse 110   Temp 98.4 °F (36.9 °C) (Oral)   Resp 12   Wt 286 lb 9.6 oz (130 kg)   BMI 46.26 kg/m²   Physical Exam   Constitutional: She appears well-developed and well-nourished. She is cooperative. Non-toxic appearance. HENT:   Head: Normocephalic. Right Ear: Tympanic membrane, external ear and ear canal normal.   Left Ear: Tympanic membrane, external ear and ear canal normal.   Nose: Nose normal.   Mouth/Throat: Oropharynx is clear and moist and mucous membranes are normal.   Eyes: Conjunctivae are normal. Right eye exhibits no discharge. Left eye exhibits no discharge. No scleral icterus. Neck: Carotid bruit is not present. No thyroid mass and no thyromegaly present. Cardiovascular: Normal rate, regular rhythm, normal heart sounds and intact distal pulses. No murmur heard. Pulses:       Dorsalis pedis pulses are 2+ on the right side, and 2+ on the left side. Unsteady gait     Patient has a very unsteady gait. She states that this relates to arthritis in her ankles from an ankle fracture 38 years ago. She does have a cane and a scooter. Declines physical therapy, Rolator walker, or need for assistance for help with falling despite increasing falls. Other Visit Diagnoses     At high risk for falls              Current Outpatient Prescriptions   Medication Sig Dispense Refill    Cholecalciferol (VITAMIN D3) 22067 units CAPS Take 1 capsule by mouth once a week 5 capsule 5    ranitidine (ZANTAC) 150 MG tablet Take 1 tablet by mouth daily 30 tablet 0    amoxicillin (AMOXIL) 875 MG tablet Take 1 tablet by mouth 2 times daily for 7 days 14 tablet 0    phenazopyridine (PYRIDIUM) 200 MG tablet Take 1 tablet by mouth 3 times daily as needed for Pain (abdomina cramping) 10 tablet 0    metoprolol tartrate (LOPRESSOR) 25 MG tablet Take 2 tablets by mouth 2 times daily 120 tablet 5    buPROPion (WELLBUTRIN) 75 MG tablet Take 1 tablet by mouth 2 times daily 60 tablet 3    cloNIDine (CATAPRES) 0.2 MG tablet Take 1 tablet by mouth 2 times daily 60 tablet 5    warfarin (COUMADIN) 5 MG tablet Take 1 tablet by mouth daily 90 tablet 1    DULoxetine (CYMBALTA) 60 MG extended release capsule Take 1 capsule by mouth daily 30 capsule 5    traMADol (ULTRAM) 50 MG tablet Take 1 tablet by mouth daily as needed for Pain for up to 30 days. . 30 tablet 2    diltiazem (CARDIZEM CD) 240 MG extended release capsule Take 1 capsule by mouth daily 90 capsule 1    Biotin 1000 MCG TABS Take by mouth      furosemide (LASIX) 20 MG tablet Take 20 mg by mouth as needed      ferrous sulfate 325 (65 Fe) MG EC tablet Take 325 mg by mouth 3 times daily (with meals)      aspirin 81 MG tablet Take 81 mg by mouth daily       No current facility-administered medications for this visit. Return if symptoms worsen or fail to improve.     On the basis of positive falls risk screening,

## 2018-07-23 NOTE — ASSESSMENT & PLAN NOTE
Likely musculoskeletal related to fall. Exam is unremarkable. Patient reassured. It sounds to be improving.

## 2018-07-23 NOTE — ASSESSMENT & PLAN NOTE
Likely the cause of her lower abdominal pain. Will treat with amoxicillin 875 mg 1 p.o. b.i.d. for 7 days. Will call back if no better within a few days.

## 2018-07-25 LAB — URINE CULTURE, ROUTINE: NORMAL

## 2018-08-06 ENCOUNTER — TELEPHONE (OUTPATIENT)
Dept: INTERNAL MEDICINE CLINIC | Age: 73
End: 2018-08-06

## 2018-08-10 ENCOUNTER — OFFICE VISIT (OUTPATIENT)
Dept: INTERNAL MEDICINE CLINIC | Age: 73
End: 2018-08-10

## 2018-08-10 VITALS
WEIGHT: 290 LBS | SYSTOLIC BLOOD PRESSURE: 132 MMHG | HEART RATE: 64 BPM | HEIGHT: 66 IN | DIASTOLIC BLOOD PRESSURE: 80 MMHG | BODY MASS INDEX: 46.61 KG/M2

## 2018-08-10 DIAGNOSIS — R10.13 DYSPEPSIA: ICD-10-CM

## 2018-08-10 DIAGNOSIS — Z79.01 ANTICOAGULATED ON COUMADIN: ICD-10-CM

## 2018-08-10 DIAGNOSIS — I48.91 ATRIAL FIBRILLATION AND FLUTTER (HCC): Primary | Chronic | ICD-10-CM

## 2018-08-10 DIAGNOSIS — I48.92 ATRIAL FIBRILLATION AND FLUTTER (HCC): Primary | Chronic | ICD-10-CM

## 2018-08-10 DIAGNOSIS — R14.2 BELCHING: ICD-10-CM

## 2018-08-10 LAB
INTERNATIONAL NORMALIZATION RATIO, POC: 2.1
PROTHROMBIN TIME, POC: NORMAL

## 2018-08-10 PROCEDURE — 85610 PROTHROMBIN TIME: CPT | Performed by: NURSE PRACTITIONER

## 2018-08-10 PROCEDURE — 99213 OFFICE O/P EST LOW 20 MIN: CPT | Performed by: NURSE PRACTITIONER

## 2018-08-10 RX ORDER — RANITIDINE 150 MG/1
150 TABLET ORAL DAILY
Qty: 30 TABLET | Refills: 5 | Status: SHIPPED | OUTPATIENT
Start: 2018-08-10 | End: 2019-03-04 | Stop reason: SDUPTHER

## 2018-08-13 ASSESSMENT — ENCOUNTER SYMPTOMS
RESPIRATORY NEGATIVE: 1
BLOOD IN STOOL: 0
ABDOMINAL DISTENTION: 1
NAUSEA: 1
VOMITING: 1
ABDOMINAL PAIN: 1

## 2018-09-07 ENCOUNTER — OFFICE VISIT (OUTPATIENT)
Dept: INTERNAL MEDICINE CLINIC | Age: 73
End: 2018-09-07

## 2018-09-07 VITALS
BODY MASS INDEX: 46.96 KG/M2 | DIASTOLIC BLOOD PRESSURE: 86 MMHG | WEIGHT: 292.2 LBS | SYSTOLIC BLOOD PRESSURE: 110 MMHG | HEIGHT: 66 IN | HEART RATE: 76 BPM

## 2018-09-07 DIAGNOSIS — I10 ESSENTIAL HYPERTENSION: Chronic | ICD-10-CM

## 2018-09-07 DIAGNOSIS — Z79.01 ANTICOAGULATED ON COUMADIN: ICD-10-CM

## 2018-09-07 DIAGNOSIS — R10.13 DYSPEPSIA: ICD-10-CM

## 2018-09-07 DIAGNOSIS — I48.92 ATRIAL FIBRILLATION AND FLUTTER (HCC): Primary | Chronic | ICD-10-CM

## 2018-09-07 DIAGNOSIS — I48.91 ATRIAL FIBRILLATION AND FLUTTER (HCC): Primary | Chronic | ICD-10-CM

## 2018-09-07 DIAGNOSIS — Z12.11 COLON CANCER SCREENING: ICD-10-CM

## 2018-09-07 PROBLEM — N30.01 ACUTE CYSTITIS WITH HEMATURIA: Status: RESOLVED | Noted: 2018-07-23 | Resolved: 2018-09-07

## 2018-09-07 LAB
A/G RATIO: 1.4 (ref 1.1–2.2)
ALBUMIN SERPL-MCNC: 3.9 G/DL (ref 3.4–5)
ALP BLD-CCNC: 114 U/L (ref 40–129)
ALT SERPL-CCNC: 6 U/L (ref 10–40)
ANION GAP SERPL CALCULATED.3IONS-SCNC: 11 MMOL/L (ref 3–16)
AST SERPL-CCNC: 11 U/L (ref 15–37)
BILIRUB SERPL-MCNC: 0.4 MG/DL (ref 0–1)
BUN BLDV-MCNC: 19 MG/DL (ref 7–20)
CALCIUM SERPL-MCNC: 9.2 MG/DL (ref 8.3–10.6)
CHLORIDE BLD-SCNC: 102 MMOL/L (ref 99–110)
CHOLESTEROL, TOTAL: 157 MG/DL (ref 0–199)
CO2: 26 MMOL/L (ref 21–32)
CREAT SERPL-MCNC: 1.5 MG/DL (ref 0.6–1.2)
GFR AFRICAN AMERICAN: 41
GFR NON-AFRICAN AMERICAN: 34
GLOBULIN: 2.7 G/DL
GLUCOSE BLD-MCNC: 101 MG/DL (ref 70–99)
HDLC SERPL-MCNC: 51 MG/DL (ref 40–60)
INTERNATIONAL NORMALIZATION RATIO, POC: 1.6
LDL CHOLESTEROL CALCULATED: 92 MG/DL
POTASSIUM SERPL-SCNC: 5 MMOL/L (ref 3.5–5.1)
PROTHROMBIN TIME, POC: ABNORMAL
SODIUM BLD-SCNC: 139 MMOL/L (ref 136–145)
TOTAL PROTEIN: 6.6 G/DL (ref 6.4–8.2)
TRIGL SERPL-MCNC: 71 MG/DL (ref 0–150)
VLDLC SERPL CALC-MCNC: 14 MG/DL

## 2018-09-07 PROCEDURE — 99213 OFFICE O/P EST LOW 20 MIN: CPT | Performed by: NURSE PRACTITIONER

## 2018-09-07 PROCEDURE — 85610 PROTHROMBIN TIME: CPT | Performed by: NURSE PRACTITIONER

## 2018-09-11 ASSESSMENT — ENCOUNTER SYMPTOMS
TROUBLE SWALLOWING: 1
BLOOD IN STOOL: 0
RESPIRATORY NEGATIVE: 1

## 2018-09-11 NOTE — PROGRESS NOTES
Pt informed through VM.
Negative for blood in stool. Dyspepsia   Genitourinary: Negative. OBJECTIVE:  Physical Exam   Constitutional: She is oriented to person, place, and time. She appears well-developed and well-nourished. No distress. HENT:   Head: Normocephalic and atraumatic. Eyes: Conjunctivae are normal. No scleral icterus. Pulmonary/Chest: Effort normal. No respiratory distress. Neurological: She is alert and oriented to person, place, and time. Skin: Skin is warm and dry. She is not diaphoretic. /86 (Site: Left Upper Arm, Position: Sitting)   Pulse 76   Ht 5' 6\" (1.676 m)   Wt 292 lb 3.2 oz (132.5 kg)   BMI 47.16 kg/m²        Assessment/Plan:  Conor Rodrigues was seen today for 1 month follow-up. Diagnoses and all orders for this visit:    Atrial fibrillation and flutter (HCC)  - Continue anticoagulation    Anticoagulated on Coumadin  - INR low today at 1.6  - See flowsheets  -     POCT INR    Essential hypertension  - Normotensive  - she has met JNC standards.  - Continue current regimen.   -     Comprehensive Metabolic Panel  -     LIPID PANEL    Dyspepsia  - Food feels like it is catching, dyspepsia  - She is due for colonoscopy so EGD at the same time make sense  -     EGD    Colon cancer screening  -     COLONOSCOPY (Screening)      MARILEE Puente - CNP

## 2018-10-08 ENCOUNTER — OFFICE VISIT (OUTPATIENT)
Dept: INTERNAL MEDICINE CLINIC | Age: 73
End: 2018-10-08
Payer: COMMERCIAL

## 2018-10-08 VITALS
DIASTOLIC BLOOD PRESSURE: 84 MMHG | HEART RATE: 68 BPM | WEIGHT: 293 LBS | SYSTOLIC BLOOD PRESSURE: 132 MMHG | HEIGHT: 66 IN | BODY MASS INDEX: 47.09 KG/M2

## 2018-10-08 DIAGNOSIS — I48.92 ATRIAL FIBRILLATION AND FLUTTER (HCC): Primary | Chronic | ICD-10-CM

## 2018-10-08 DIAGNOSIS — Z12.39 BREAST CANCER SCREENING: ICD-10-CM

## 2018-10-08 DIAGNOSIS — Z79.01 ANTICOAGULATED ON COUMADIN: ICD-10-CM

## 2018-10-08 DIAGNOSIS — Z23 NEED FOR INFLUENZA VACCINATION: ICD-10-CM

## 2018-10-08 DIAGNOSIS — I48.91 ATRIAL FIBRILLATION AND FLUTTER (HCC): Primary | Chronic | ICD-10-CM

## 2018-10-08 DIAGNOSIS — Z12.11 COLON CANCER SCREENING: ICD-10-CM

## 2018-10-08 DIAGNOSIS — R13.10 DYSPHAGIA, UNSPECIFIED TYPE: ICD-10-CM

## 2018-10-08 DIAGNOSIS — R11.11 NON-INTRACTABLE VOMITING WITHOUT NAUSEA, UNSPECIFIED VOMITING TYPE: ICD-10-CM

## 2018-10-08 LAB
INTERNATIONAL NORMALIZATION RATIO, POC: 1.7
PROTHROMBIN TIME, POC: NORMAL

## 2018-10-08 PROCEDURE — 99213 OFFICE O/P EST LOW 20 MIN: CPT | Performed by: NURSE PRACTITIONER

## 2018-10-08 PROCEDURE — 90662 IIV NO PRSV INCREASED AG IM: CPT | Performed by: NURSE PRACTITIONER

## 2018-10-08 PROCEDURE — 85610 PROTHROMBIN TIME: CPT | Performed by: NURSE PRACTITIONER

## 2018-10-08 PROCEDURE — G0008 ADMIN INFLUENZA VIRUS VAC: HCPCS | Performed by: NURSE PRACTITIONER

## 2018-10-10 ASSESSMENT — ENCOUNTER SYMPTOMS
ABDOMINAL PAIN: 0
TROUBLE SWALLOWING: 1
BLOOD IN STOOL: 0
RESPIRATORY NEGATIVE: 1
VOMITING: 1

## 2018-10-11 NOTE — PROGRESS NOTES
aspirin 81 MG tablet Take 81 mg by mouth daily       No current facility-administered medications for this visit. Review of Systems   Constitutional: Negative for chills and fever. HENT: Positive for trouble swallowing. Respiratory: Negative. Cardiovascular: Negative. Gastrointestinal: Positive for vomiting. Negative for abdominal pain and blood in stool. Genitourinary: Negative. Skin: Negative. All other systems reviewed and are negative. OBJECTIVE:  Physical Exam   Constitutional: She is oriented to person, place, and time. She appears well-developed and well-nourished. No distress. HENT:   Head: Normocephalic and atraumatic. Eyes: Conjunctivae are normal. No scleral icterus. Neck: Neck supple. No JVD present. Cardiovascular: Normal rate and regular rhythm. Pulmonary/Chest: Effort normal and breath sounds normal. No respiratory distress. She has no wheezes. Abdominal: Soft. She exhibits no distension. There is no tenderness. There is no rebound and no guarding. Musculoskeletal: Normal range of motion. She exhibits no edema. Neurological: She is alert and oriented to person, place, and time. Skin: Skin is warm and dry. She is not diaphoretic. Psychiatric: She has a normal mood and affect. Her behavior is normal. Thought content normal.      /84   Pulse 68   Ht 5' 6\" (1.676 m)   Wt 296 lb (134.3 kg)   BMI 47.78 kg/m²      PHQ Scores 6/5/2018   PHQ2 Score 1   PHQ9 Score 1     Interpretation of Total Score Depression Severity: 1-4 = Minimal depression, 5-9 = Mild depression, 10-14 = Moderate depression, 15-19 = Moderately severe depression, 20-27 =Severe depression      ASSESSMENT/PLAN:  Joan Benites was seen today for office visit for anticoagulation management.   Diagnoses and all orders for this visit:    Atrial fibrillation and flutter ([address]  - Continue antcoagulation    Anticoagulated on Coumadin  - INR 1.7  - Increased warfarin, see flowsheets  -

## 2018-11-06 ENCOUNTER — ANESTHESIA EVENT (OUTPATIENT)
Dept: ENDOSCOPY | Age: 73
End: 2018-11-06
Payer: COMMERCIAL

## 2018-11-06 DIAGNOSIS — F33.2 SEVERE EPISODE OF RECURRENT MAJOR DEPRESSIVE DISORDER, WITHOUT PSYCHOTIC FEATURES (HCC): ICD-10-CM

## 2018-11-06 DIAGNOSIS — F33.8 SEASONAL AFFECTIVE DISORDER (HCC): ICD-10-CM

## 2018-11-06 RX ORDER — BUPROPION HYDROCHLORIDE 75 MG/1
TABLET ORAL
Qty: 60 TABLET | Refills: 2 | Status: SHIPPED | OUTPATIENT
Start: 2018-11-06 | End: 2019-06-01 | Stop reason: SDUPTHER

## 2018-11-08 ENCOUNTER — OFFICE VISIT (OUTPATIENT)
Dept: INTERNAL MEDICINE CLINIC | Age: 73
End: 2018-11-08
Payer: COMMERCIAL

## 2018-11-08 VITALS
DIASTOLIC BLOOD PRESSURE: 62 MMHG | BODY MASS INDEX: 48.1 KG/M2 | WEIGHT: 293 LBS | HEART RATE: 68 BPM | SYSTOLIC BLOOD PRESSURE: 106 MMHG

## 2018-11-08 DIAGNOSIS — I48.92 ATRIAL FIBRILLATION AND FLUTTER (HCC): Primary | Chronic | ICD-10-CM

## 2018-11-08 DIAGNOSIS — I48.91 ATRIAL FIBRILLATION AND FLUTTER (HCC): Primary | Chronic | ICD-10-CM

## 2018-11-08 DIAGNOSIS — Z79.01 ANTICOAGULATED ON COUMADIN: ICD-10-CM

## 2018-11-08 LAB
INTERNATIONAL NORMALIZATION RATIO, POC: 2.6
PROTHROMBIN TIME, POC: NORMAL

## 2018-11-08 PROCEDURE — 99212 OFFICE O/P EST SF 10 MIN: CPT | Performed by: NURSE PRACTITIONER

## 2018-11-08 PROCEDURE — 85610 PROTHROMBIN TIME: CPT | Performed by: NURSE PRACTITIONER

## 2018-11-08 RX ORDER — TRAMADOL HYDROCHLORIDE 50 MG/1
50 TABLET ORAL EVERY 6 HOURS PRN
COMMUNITY
End: 2019-07-09

## 2018-11-11 ASSESSMENT — ENCOUNTER SYMPTOMS
GASTROINTESTINAL NEGATIVE: 1
RESPIRATORY NEGATIVE: 1

## 2018-11-11 NOTE — PROGRESS NOTES
Normocephalic and atraumatic. Eyes: Conjunctivae are normal. No scleral icterus. Pulmonary/Chest: Effort normal. No respiratory distress. Neurological: She is alert and oriented to person, place, and time. Skin: Skin is warm and dry. She is not diaphoretic. /62   Pulse 68   Wt 298 lb (135.2 kg)   BMI 48.10 kg/m²          ASSESSMENT/PLAN:  Lula Smiley was seen today for office visit for anticoagulation management.   Diagnoses and all orders for this visit:    Atrial fibrillation and flutter (HCC)  - Continue anticoagulation    Anticoagulated on Coumadin  - INR 2.6  - Continue current regimen, see flowsheets  -     POCT INR      MARILEE Sharp - CNP

## 2018-11-20 ENCOUNTER — ANESTHESIA (OUTPATIENT)
Dept: ENDOSCOPY | Age: 73
End: 2018-11-20
Payer: COMMERCIAL

## 2018-11-20 ENCOUNTER — HOSPITAL ENCOUNTER (OUTPATIENT)
Age: 73
Setting detail: OUTPATIENT SURGERY
Discharge: HOME | End: 2018-11-20
Attending: INTERNAL MEDICINE | Admitting: INTERNAL MEDICINE
Payer: COMMERCIAL

## 2018-11-20 VITALS
SYSTOLIC BLOOD PRESSURE: 142 MMHG | BODY MASS INDEX: 43.95 KG/M2 | TEMPERATURE: 98.6 F | WEIGHT: 290 LBS | DIASTOLIC BLOOD PRESSURE: 93 MMHG | OXYGEN SATURATION: 98 % | RESPIRATION RATE: 16 BRPM | HEIGHT: 68 IN | HEART RATE: 109 BPM

## 2018-11-20 VITALS
DIASTOLIC BLOOD PRESSURE: 105 MMHG | RESPIRATION RATE: 18 BRPM | SYSTOLIC BLOOD PRESSURE: 139 MMHG | OXYGEN SATURATION: 96 %

## 2018-11-20 LAB
INR BLD: 1.68 (ref 0.86–1.14)
PROTHROMBIN TIME: 19.1 SEC (ref 9.8–13)

## 2018-11-20 PROCEDURE — 6370000000 HC RX 637 (ALT 250 FOR IP): Performed by: INTERNAL MEDICINE

## 2018-11-20 PROCEDURE — 7100000011 HC PHASE II RECOVERY - ADDTL 15 MIN: Performed by: INTERNAL MEDICINE

## 2018-11-20 PROCEDURE — 2500000003 HC RX 250 WO HCPCS: Performed by: NURSE ANESTHETIST, CERTIFIED REGISTERED

## 2018-11-20 PROCEDURE — 6360000002 HC RX W HCPCS: Performed by: NURSE ANESTHETIST, CERTIFIED REGISTERED

## 2018-11-20 PROCEDURE — 3609012400 HC EGD TRANSORAL BIOPSY SINGLE/MULTIPLE: Performed by: INTERNAL MEDICINE

## 2018-11-20 PROCEDURE — 2500000003 HC RX 250 WO HCPCS: Performed by: INTERNAL MEDICINE

## 2018-11-20 PROCEDURE — 2580000003 HC RX 258: Performed by: FAMILY MEDICINE

## 2018-11-20 PROCEDURE — 3609010600 HC COLONOSCOPY POLYPECTOMY SNARE/COLD BIOPSY: Performed by: INTERNAL MEDICINE

## 2018-11-20 PROCEDURE — 88305 TISSUE EXAM BY PATHOLOGIST: CPT

## 2018-11-20 PROCEDURE — 2580000003 HC RX 258: Performed by: NURSE ANESTHETIST, CERTIFIED REGISTERED

## 2018-11-20 PROCEDURE — 85610 PROTHROMBIN TIME: CPT

## 2018-11-20 PROCEDURE — 3700000001 HC ADD 15 MINUTES (ANESTHESIA): Performed by: INTERNAL MEDICINE

## 2018-11-20 PROCEDURE — 6360000002 HC RX W HCPCS: Performed by: ANESTHESIOLOGY

## 2018-11-20 PROCEDURE — 7100000010 HC PHASE II RECOVERY - FIRST 15 MIN: Performed by: INTERNAL MEDICINE

## 2018-11-20 PROCEDURE — 36415 COLL VENOUS BLD VENIPUNCTURE: CPT

## 2018-11-20 PROCEDURE — 3700000000 HC ANESTHESIA ATTENDED CARE: Performed by: INTERNAL MEDICINE

## 2018-11-20 PROCEDURE — 2709999900 HC NON-CHARGEABLE SUPPLY: Performed by: INTERNAL MEDICINE

## 2018-11-20 PROCEDURE — 88342 IMHCHEM/IMCYTCHM 1ST ANTB: CPT

## 2018-11-20 RX ORDER — ESMOLOL HYDROCHLORIDE 10 MG/ML
INJECTION INTRAVENOUS PRN
Status: DISCONTINUED | OUTPATIENT
Start: 2018-11-20 | End: 2018-11-20 | Stop reason: SDUPTHER

## 2018-11-20 RX ORDER — LIDOCAINE HYDROCHLORIDE 20 MG/ML
INJECTION, SOLUTION EPIDURAL; INFILTRATION; INTRACAUDAL; PERINEURAL PRN
Status: DISCONTINUED | OUTPATIENT
Start: 2018-11-20 | End: 2018-11-20 | Stop reason: SDUPTHER

## 2018-11-20 RX ORDER — PROPOFOL 10 MG/ML
INJECTION, EMULSION INTRAVENOUS PRN
Status: DISCONTINUED | OUTPATIENT
Start: 2018-11-20 | End: 2018-11-20 | Stop reason: SDUPTHER

## 2018-11-20 RX ORDER — SODIUM CHLORIDE 0.9 % (FLUSH) 0.9 %
10 SYRINGE (ML) INJECTION EVERY 12 HOURS SCHEDULED
Status: DISCONTINUED | OUTPATIENT
Start: 2018-11-20 | End: 2018-11-20 | Stop reason: HOSPADM

## 2018-11-20 RX ORDER — SODIUM CHLORIDE 9 MG/ML
INJECTION, SOLUTION INTRAVENOUS CONTINUOUS PRN
Status: DISCONTINUED | OUTPATIENT
Start: 2018-11-20 | End: 2018-11-20 | Stop reason: SDUPTHER

## 2018-11-20 RX ORDER — ONDANSETRON 2 MG/ML
4 INJECTION INTRAMUSCULAR; INTRAVENOUS EVERY 6 HOURS PRN
Status: DISCONTINUED | OUTPATIENT
Start: 2018-11-20 | End: 2018-11-20 | Stop reason: HOSPADM

## 2018-11-20 RX ORDER — SODIUM CHLORIDE 0.9 % (FLUSH) 0.9 %
10 SYRINGE (ML) INJECTION PRN
Status: DISCONTINUED | OUTPATIENT
Start: 2018-11-20 | End: 2018-11-20 | Stop reason: HOSPADM

## 2018-11-20 RX ORDER — SODIUM CHLORIDE 9 MG/ML
INJECTION, SOLUTION INTRAVENOUS CONTINUOUS
Status: DISCONTINUED | OUTPATIENT
Start: 2018-11-20 | End: 2018-11-20 | Stop reason: HOSPADM

## 2018-11-20 RX ADMIN — ONDANSETRON HYDROCHLORIDE 4 MG: 2 INJECTION, SOLUTION INTRAMUSCULAR; INTRAVENOUS at 08:27

## 2018-11-20 RX ADMIN — PROPOFOL 200 MG: 10 INJECTION, EMULSION INTRAVENOUS at 09:25

## 2018-11-20 RX ADMIN — ESMOLOL HYDROCHLORIDE 10 MG: 10 INJECTION, SOLUTION INTRAVENOUS at 09:45

## 2018-11-20 RX ADMIN — ESMOLOL HYDROCHLORIDE 10 MG: 10 INJECTION, SOLUTION INTRAVENOUS at 09:43

## 2018-11-20 RX ADMIN — LIDOCAINE HYDROCHLORIDE 25 MG: 20 INJECTION, SOLUTION EPIDURAL; INFILTRATION; INTRACAUDAL; PERINEURAL at 09:25

## 2018-11-20 RX ADMIN — SODIUM CHLORIDE: 9 INJECTION, SOLUTION INTRAVENOUS at 09:27

## 2018-11-20 RX ADMIN — SODIUM CHLORIDE: 9 INJECTION, SOLUTION INTRAVENOUS at 08:27

## 2018-11-20 RX ADMIN — ESMOLOL HYDROCHLORIDE 10 MG: 10 INJECTION, SOLUTION INTRAVENOUS at 09:53

## 2018-11-20 ASSESSMENT — PAIN - FUNCTIONAL ASSESSMENT: PAIN_FUNCTIONAL_ASSESSMENT: 0-10

## 2018-11-20 ASSESSMENT — PAIN SCALES - GENERAL: PAINLEVEL_OUTOF10: 0

## 2018-11-20 NOTE — PROGRESS NOTES
Name:  Kim Wilder  Age:  72 y.o.  CSN:  [id number]            Past Medical History:        Diagnosis Date    Arthritis     Hypertension     Kidney disease     Pneumonia     Sleep apnea     no c-pap       Past Surgical History:      Procedure Laterality Date    FRACTURE SURGERY      ankle rt has pins and rods, and rt elbow    OTHER SURGICAL HISTORY      weight loss surgery    TOTAL KNEE ARTHROPLASTY Bilateral 12/19/2012    TUBAL LIGATION      tubes tied       Medications Prior to Admission:  Prescriptions Prior to Admission: buPROPion (WELLBUTRIN) 75 MG tablet, TAKE 1 TABLET BY MOUTH TWICE DAILY  ranitidine (ZANTAC) 150 MG tablet, Take 1 tablet by mouth daily  Cholecalciferol (VITAMIN D3) 35234 units CAPS, Take 1 capsule by mouth once a week  metoprolol tartrate (LOPRESSOR) 25 MG tablet, Take 2 tablets by mouth 2 times daily  cloNIDine (CATAPRES) 0.2 MG tablet, Take 1 tablet by mouth 2 times daily  DULoxetine (CYMBALTA) 60 MG extended release capsule, Take 1 capsule by mouth daily  diltiazem (CARDIZEM CD) 240 MG extended release capsule, Take 1 capsule by mouth daily  Biotin 1000 MCG TABS, Take by mouth  aspirin 81 MG tablet, Take 81 mg by mouth daily  traMADol (ULTRAM) 50 MG tablet, Take 50 mg by mouth every 6 hours as needed for Pain. .  warfarin (COUMADIN) 5 MG tablet, Take 1 tablet by mouth daily  ferrous sulfate 325 (65 Fe) MG EC tablet, Take 325 mg by mouth daily (with breakfast)     Allergies:  Codeine; Shellfish-derived products;  Shrimp flavor; and Dilaudid [hydromorphone]    Social History:  Social History     Social History    Marital status:      Spouse name: N/A    Number of children: N/A    Years of education: N/A     Occupational History    retired       Social History Main Topics    Smoking status: Never Smoker    Smokeless tobacco: Never Used    Alcohol use No    Drug use: No    Sexual activity: Not on file     Other Topics Concern    Not on file     Social History Narrative    No narrative on file       Family History:  Family History   Problem Relation Age of Onset    Cancer Father         stomach cancer

## 2018-11-20 NOTE — PROGRESS NOTES
Patient present for upper and lower endoscopy for nausea and dysphagia. Teaching / education initiated regarding perioperative experience, expectations, and pain management during stay. Patient verbalized understanding.

## 2018-12-06 ENCOUNTER — HOSPITAL ENCOUNTER (OUTPATIENT)
Dept: WOMENS IMAGING | Age: 73
Discharge: HOME | End: 2018-12-06
Payer: COMMERCIAL

## 2018-12-06 DIAGNOSIS — I48.91 ATRIAL FIBRILLATION, UNSPECIFIED TYPE (HCC): ICD-10-CM

## 2018-12-06 DIAGNOSIS — Z12.31 ENCOUNTER FOR SCREENING MAMMOGRAM FOR BREAST CANCER: ICD-10-CM

## 2018-12-06 PROCEDURE — 77063 BREAST TOMOSYNTHESIS BI: CPT

## 2018-12-07 ENCOUNTER — OFFICE VISIT (OUTPATIENT)
Dept: INTERNAL MEDICINE CLINIC | Age: 73
End: 2018-12-07
Payer: COMMERCIAL

## 2018-12-07 VITALS
WEIGHT: 293 LBS | HEART RATE: 60 BPM | SYSTOLIC BLOOD PRESSURE: 128 MMHG | HEIGHT: 68 IN | BODY MASS INDEX: 44.41 KG/M2 | DIASTOLIC BLOOD PRESSURE: 80 MMHG

## 2018-12-07 DIAGNOSIS — I48.92 ATRIAL FIBRILLATION AND FLUTTER (HCC): Primary | Chronic | ICD-10-CM

## 2018-12-07 DIAGNOSIS — Z79.01 ANTICOAGULATED ON COUMADIN: ICD-10-CM

## 2018-12-07 DIAGNOSIS — I48.91 ATRIAL FIBRILLATION AND FLUTTER (HCC): Primary | Chronic | ICD-10-CM

## 2018-12-07 LAB
INTERNATIONAL NORMALIZATION RATIO, POC: 3.5
PROTHROMBIN TIME, POC: NORMAL

## 2018-12-07 PROCEDURE — 99212 OFFICE O/P EST SF 10 MIN: CPT | Performed by: NURSE PRACTITIONER

## 2018-12-07 PROCEDURE — 85610 PROTHROMBIN TIME: CPT | Performed by: NURSE PRACTITIONER

## 2018-12-07 RX ORDER — DILTIAZEM HYDROCHLORIDE 240 MG/1
240 CAPSULE, EXTENDED RELEASE ORAL DAILY
Qty: 90 CAPSULE | Refills: 1 | Status: SHIPPED | OUTPATIENT
Start: 2018-12-07 | End: 2019-07-01 | Stop reason: SDUPTHER

## 2018-12-07 RX ORDER — PANTOPRAZOLE SODIUM 40 MG/1
40 TABLET, DELAYED RELEASE ORAL 2 TIMES DAILY
COMMUNITY
End: 2019-12-26 | Stop reason: ALTCHOICE

## 2018-12-07 ASSESSMENT — PATIENT HEALTH QUESTIONNAIRE - PHQ9
SUM OF ALL RESPONSES TO PHQ9 QUESTIONS 1 & 2: 2
2. FEELING DOWN, DEPRESSED OR HOPELESS: 0
1. LITTLE INTEREST OR PLEASURE IN DOING THINGS: 2
SUM OF ALL RESPONSES TO PHQ QUESTIONS 1-9: 2
SUM OF ALL RESPONSES TO PHQ QUESTIONS 1-9: 2

## 2019-01-04 ENCOUNTER — OFFICE VISIT (OUTPATIENT)
Dept: INTERNAL MEDICINE CLINIC | Age: 74
End: 2019-01-04
Payer: COMMERCIAL

## 2019-01-04 VITALS
BODY MASS INDEX: 45.68 KG/M2 | DIASTOLIC BLOOD PRESSURE: 86 MMHG | HEART RATE: 68 BPM | SYSTOLIC BLOOD PRESSURE: 132 MMHG | WEIGHT: 293 LBS

## 2019-01-04 DIAGNOSIS — Z79.01 ANTICOAGULATED ON COUMADIN: ICD-10-CM

## 2019-01-04 DIAGNOSIS — I48.91 ATRIAL FIBRILLATION AND FLUTTER (HCC): Primary | Chronic | ICD-10-CM

## 2019-01-04 DIAGNOSIS — I48.92 ATRIAL FIBRILLATION AND FLUTTER (HCC): Primary | Chronic | ICD-10-CM

## 2019-01-04 LAB
INTERNATIONAL NORMALIZATION RATIO, POC: 3
PROTHROMBIN TIME, POC: NORMAL

## 2019-01-04 PROCEDURE — 99212 OFFICE O/P EST SF 10 MIN: CPT | Performed by: NURSE PRACTITIONER

## 2019-01-04 PROCEDURE — 85610 PROTHROMBIN TIME: CPT | Performed by: NURSE PRACTITIONER

## 2019-01-04 ASSESSMENT — ENCOUNTER SYMPTOMS
GASTROINTESTINAL NEGATIVE: 1
RESPIRATORY NEGATIVE: 1

## 2019-01-06 ENCOUNTER — APPOINTMENT (OUTPATIENT)
Dept: GENERAL RADIOLOGY | Age: 74
DRG: 871 | End: 2019-01-06
Payer: COMMERCIAL

## 2019-01-06 ENCOUNTER — HOSPITAL ENCOUNTER (INPATIENT)
Age: 74
LOS: 5 days | Discharge: HOME | DRG: 871 | End: 2019-01-11
Attending: EMERGENCY MEDICINE | Admitting: FAMILY MEDICINE
Payer: COMMERCIAL

## 2019-01-06 DIAGNOSIS — L03.115 CELLULITIS OF RIGHT LOWER EXTREMITY: ICD-10-CM

## 2019-01-06 DIAGNOSIS — Z79.01 ANTICOAGULATED: ICD-10-CM

## 2019-01-06 DIAGNOSIS — R70.0 ELEVATED SED RATE: ICD-10-CM

## 2019-01-06 DIAGNOSIS — Z79.01 ANTICOAGULATED ON COUMADIN: ICD-10-CM

## 2019-01-06 DIAGNOSIS — I48.91 ATRIAL FIBRILLATION, UNSPECIFIED TYPE (HCC): ICD-10-CM

## 2019-01-06 DIAGNOSIS — D72.829 LEUKOCYTOSIS, UNSPECIFIED TYPE: ICD-10-CM

## 2019-01-06 DIAGNOSIS — I48.91 ATRIAL FIBRILLATION AND FLUTTER (HCC): ICD-10-CM

## 2019-01-06 DIAGNOSIS — M25.571 ACUTE RIGHT ANKLE PAIN: ICD-10-CM

## 2019-01-06 DIAGNOSIS — R93.89 ABNORMAL X-RAY: ICD-10-CM

## 2019-01-06 DIAGNOSIS — I48.92 ATRIAL FIBRILLATION AND FLUTTER (HCC): ICD-10-CM

## 2019-01-06 DIAGNOSIS — A41.9 SEPTICEMIA (HCC): Primary | ICD-10-CM

## 2019-01-06 PROBLEM — L03.90 CELLULITIS: Status: ACTIVE | Noted: 2019-01-06

## 2019-01-06 LAB
A/G RATIO: 0.9 (ref 1.1–2.2)
ALBUMIN SERPL-MCNC: 3.6 G/DL (ref 3.4–5)
ALP BLD-CCNC: 108 U/L (ref 40–129)
ALT SERPL-CCNC: 7 U/L (ref 10–40)
ANION GAP SERPL CALCULATED.3IONS-SCNC: 11 MMOL/L (ref 3–16)
AST SERPL-CCNC: 12 U/L (ref 15–37)
BASOPHILS ABSOLUTE: 0 K/UL (ref 0–0.2)
BASOPHILS RELATIVE PERCENT: 0 %
BILIRUB SERPL-MCNC: 1 MG/DL (ref 0–1)
BUN BLDV-MCNC: 14 MG/DL (ref 7–20)
CALCIUM SERPL-MCNC: 9.2 MG/DL (ref 8.3–10.6)
CHLORIDE BLD-SCNC: 102 MMOL/L (ref 99–110)
CO2: 26 MMOL/L (ref 21–32)
CREAT SERPL-MCNC: 1.3 MG/DL (ref 0.6–1.2)
EOSINOPHILS ABSOLUTE: 0 K/UL (ref 0–0.6)
EOSINOPHILS RELATIVE PERCENT: 0 %
GFR AFRICAN AMERICAN: 49
GFR NON-AFRICAN AMERICAN: 40
GLOBULIN: 4 G/DL
GLUCOSE BLD-MCNC: 117 MG/DL (ref 70–99)
HCT VFR BLD CALC: 40.7 % (ref 36–48)
HEMOGLOBIN: 13 G/DL (ref 12–16)
INR BLD: 2.55 (ref 0.86–1.14)
LACTIC ACID, SEPSIS: 0.8 MMOL/L (ref 0.4–1.9)
LYMPHOCYTES ABSOLUTE: 0.8 K/UL (ref 1–5.1)
LYMPHOCYTES RELATIVE PERCENT: 7 %
MCH RBC QN AUTO: 29.3 PG (ref 26–34)
MCHC RBC AUTO-ENTMCNC: 32 G/DL (ref 31–36)
MCV RBC AUTO: 91.7 FL (ref 80–100)
MONOCYTES ABSOLUTE: 1.2 K/UL (ref 0–1.3)
MONOCYTES RELATIVE PERCENT: 10 %
NEUTROPHILS ABSOLUTE: 9.5 K/UL (ref 1.7–7.7)
NEUTROPHILS RELATIVE PERCENT: 83 %
PDW BLD-RTO: 15.5 % (ref 12.4–15.4)
PLATELET # BLD: 164 K/UL (ref 135–450)
PLATELET SLIDE REVIEW: ADEQUATE
PMV BLD AUTO: 11.2 FL (ref 5–10.5)
POTASSIUM REFLEX MAGNESIUM: 4.1 MMOL/L (ref 3.5–5.1)
PROTHROMBIN TIME: 29.1 SEC (ref 9.8–13)
RBC # BLD: 4.44 M/UL (ref 4–5.2)
RBC # BLD: NORMAL 10*6/UL
SEDIMENTATION RATE, ERYTHROCYTE: 75 MM/HR (ref 0–30)
SLIDE REVIEW: ABNORMAL
SODIUM BLD-SCNC: 139 MMOL/L (ref 136–145)
TOTAL PROTEIN: 7.6 G/DL (ref 6.4–8.2)
URIC ACID, SERUM: 5.7 MG/DL (ref 2.6–6)
WBC # BLD: 11.5 K/UL (ref 4–11)

## 2019-01-06 PROCEDURE — 83605 ASSAY OF LACTIC ACID: CPT

## 2019-01-06 PROCEDURE — 85652 RBC SED RATE AUTOMATED: CPT

## 2019-01-06 PROCEDURE — 86140 C-REACTIVE PROTEIN: CPT

## 2019-01-06 PROCEDURE — 85610 PROTHROMBIN TIME: CPT

## 2019-01-06 PROCEDURE — S0028 INJECTION, FAMOTIDINE, 20 MG: HCPCS | Performed by: PHYSICIAN ASSISTANT

## 2019-01-06 PROCEDURE — 96365 THER/PROPH/DIAG IV INF INIT: CPT

## 2019-01-06 PROCEDURE — 93005 ELECTROCARDIOGRAM TRACING: CPT | Performed by: PHYSICIAN ASSISTANT

## 2019-01-06 PROCEDURE — 6370000000 HC RX 637 (ALT 250 FOR IP): Performed by: FAMILY MEDICINE

## 2019-01-06 PROCEDURE — 1200000000 HC SEMI PRIVATE

## 2019-01-06 PROCEDURE — 84550 ASSAY OF BLOOD/URIC ACID: CPT

## 2019-01-06 PROCEDURE — 2580000003 HC RX 258: Performed by: PHYSICIAN ASSISTANT

## 2019-01-06 PROCEDURE — 6370000000 HC RX 637 (ALT 250 FOR IP): Performed by: PHYSICIAN ASSISTANT

## 2019-01-06 PROCEDURE — 96375 TX/PRO/DX INJ NEW DRUG ADDON: CPT

## 2019-01-06 PROCEDURE — 80053 COMPREHEN METABOLIC PANEL: CPT

## 2019-01-06 PROCEDURE — 73610 X-RAY EXAM OF ANKLE: CPT

## 2019-01-06 PROCEDURE — 2580000003 HC RX 258: Performed by: FAMILY MEDICINE

## 2019-01-06 PROCEDURE — 87086 URINE CULTURE/COLONY COUNT: CPT

## 2019-01-06 PROCEDURE — 85025 COMPLETE CBC W/AUTO DIFF WBC: CPT

## 2019-01-06 PROCEDURE — 87040 BLOOD CULTURE FOR BACTERIA: CPT

## 2019-01-06 PROCEDURE — 6360000002 HC RX W HCPCS: Performed by: PHYSICIAN ASSISTANT

## 2019-01-06 PROCEDURE — 6360000002 HC RX W HCPCS: Performed by: FAMILY MEDICINE

## 2019-01-06 PROCEDURE — 71045 X-RAY EXAM CHEST 1 VIEW: CPT

## 2019-01-06 PROCEDURE — 99285 EMERGENCY DEPT VISIT HI MDM: CPT

## 2019-01-06 RX ORDER — SODIUM CHLORIDE 9 MG/ML
INJECTION, SOLUTION INTRAVENOUS CONTINUOUS
Status: DISCONTINUED | OUTPATIENT
Start: 2019-01-06 | End: 2019-01-08

## 2019-01-06 RX ORDER — TRAMADOL HYDROCHLORIDE 50 MG/1
50 TABLET ORAL EVERY 4 HOURS PRN
Status: DISCONTINUED | OUTPATIENT
Start: 2019-01-06 | End: 2019-01-11 | Stop reason: HOSPADM

## 2019-01-06 RX ORDER — LINEZOLID 2 MG/ML
600 INJECTION, SOLUTION INTRAVENOUS EVERY 12 HOURS
Status: DISCONTINUED | OUTPATIENT
Start: 2019-01-06 | End: 2019-01-08

## 2019-01-06 RX ORDER — MORPHINE SULFATE 4 MG/ML
4 INJECTION, SOLUTION INTRAMUSCULAR; INTRAVENOUS ONCE
Status: COMPLETED | OUTPATIENT
Start: 2019-01-06 | End: 2019-01-06

## 2019-01-06 RX ORDER — ACETAMINOPHEN 500 MG
1000 TABLET ORAL ONCE
Status: COMPLETED | OUTPATIENT
Start: 2019-01-06 | End: 2019-01-06

## 2019-01-06 RX ORDER — DULOXETIN HYDROCHLORIDE 60 MG/1
60 CAPSULE, DELAYED RELEASE ORAL NIGHTLY
Status: DISCONTINUED | OUTPATIENT
Start: 2019-01-06 | End: 2019-01-11 | Stop reason: HOSPADM

## 2019-01-06 RX ORDER — DIPHENHYDRAMINE HYDROCHLORIDE 50 MG/ML
25 INJECTION INTRAMUSCULAR; INTRAVENOUS ONCE
Status: COMPLETED | OUTPATIENT
Start: 2019-01-06 | End: 2019-01-06

## 2019-01-06 RX ORDER — CLONIDINE HYDROCHLORIDE 0.1 MG/1
0.2 TABLET ORAL 2 TIMES DAILY
Status: DISCONTINUED | OUTPATIENT
Start: 2019-01-06 | End: 2019-01-11 | Stop reason: HOSPADM

## 2019-01-06 RX ORDER — SODIUM CHLORIDE 0.9 % (FLUSH) 0.9 %
10 SYRINGE (ML) INJECTION PRN
Status: DISCONTINUED | OUTPATIENT
Start: 2019-01-06 | End: 2019-01-11 | Stop reason: HOSPADM

## 2019-01-06 RX ORDER — METHYLPREDNISOLONE SODIUM SUCCINATE 125 MG/2ML
125 INJECTION, POWDER, LYOPHILIZED, FOR SOLUTION INTRAMUSCULAR; INTRAVENOUS ONCE
Status: COMPLETED | OUTPATIENT
Start: 2019-01-06 | End: 2019-01-06

## 2019-01-06 RX ORDER — ASPIRIN 81 MG/1
81 TABLET, CHEWABLE ORAL DAILY
Status: DISCONTINUED | OUTPATIENT
Start: 2019-01-06 | End: 2019-01-11 | Stop reason: HOSPADM

## 2019-01-06 RX ORDER — 0.9 % SODIUM CHLORIDE 0.9 %
30 INTRAVENOUS SOLUTION INTRAVENOUS ONCE
Status: COMPLETED | OUTPATIENT
Start: 2019-01-06 | End: 2019-01-06

## 2019-01-06 RX ORDER — BUPROPION HYDROCHLORIDE 75 MG/1
75 TABLET ORAL 2 TIMES DAILY
Status: DISCONTINUED | OUTPATIENT
Start: 2019-01-06 | End: 2019-01-11 | Stop reason: HOSPADM

## 2019-01-06 RX ORDER — WARFARIN SODIUM 5 MG/1
5 TABLET ORAL DAILY
Status: DISCONTINUED | OUTPATIENT
Start: 2019-01-06 | End: 2019-01-08 | Stop reason: CLARIF

## 2019-01-06 RX ORDER — METOPROLOL TARTRATE 50 MG/1
50 TABLET, FILM COATED ORAL 2 TIMES DAILY
Status: DISCONTINUED | OUTPATIENT
Start: 2019-01-06 | End: 2019-01-11 | Stop reason: HOSPADM

## 2019-01-06 RX ORDER — MORPHINE SULFATE 2 MG/ML
2 INJECTION, SOLUTION INTRAMUSCULAR; INTRAVENOUS EVERY 4 HOURS PRN
Status: DISCONTINUED | OUTPATIENT
Start: 2019-01-06 | End: 2019-01-11 | Stop reason: HOSPADM

## 2019-01-06 RX ORDER — ONDANSETRON 2 MG/ML
4 INJECTION INTRAMUSCULAR; INTRAVENOUS EVERY 6 HOURS PRN
Status: DISCONTINUED | OUTPATIENT
Start: 2019-01-06 | End: 2019-01-11 | Stop reason: HOSPADM

## 2019-01-06 RX ORDER — SODIUM CHLORIDE 0.9 % (FLUSH) 0.9 %
10 SYRINGE (ML) INJECTION EVERY 12 HOURS SCHEDULED
Status: DISCONTINUED | OUTPATIENT
Start: 2019-01-06 | End: 2019-01-11 | Stop reason: HOSPADM

## 2019-01-06 RX ORDER — ONDANSETRON 2 MG/ML
4 INJECTION INTRAMUSCULAR; INTRAVENOUS ONCE
Status: COMPLETED | OUTPATIENT
Start: 2019-01-06 | End: 2019-01-06

## 2019-01-06 RX ORDER — PANTOPRAZOLE SODIUM 40 MG/1
40 TABLET, DELAYED RELEASE ORAL 2 TIMES DAILY
Status: DISCONTINUED | OUTPATIENT
Start: 2019-01-06 | End: 2019-01-11 | Stop reason: HOSPADM

## 2019-01-06 RX ORDER — DILTIAZEM HYDROCHLORIDE 240 MG/1
240 CAPSULE, COATED, EXTENDED RELEASE ORAL NIGHTLY
Status: DISCONTINUED | OUTPATIENT
Start: 2019-01-06 | End: 2019-01-11 | Stop reason: HOSPADM

## 2019-01-06 RX ORDER — TRAMADOL HYDROCHLORIDE 50 MG/1
50 TABLET ORAL EVERY 6 HOURS PRN
Status: DISCONTINUED | OUTPATIENT
Start: 2019-01-06 | End: 2019-01-06 | Stop reason: SDUPTHER

## 2019-01-06 RX ADMIN — FAMOTIDINE 20 MG: 10 INJECTION, SOLUTION INTRAVENOUS at 17:54

## 2019-01-06 RX ADMIN — LINEZOLID 600 MG: 600 INJECTION, SOLUTION INTRAVENOUS at 20:41

## 2019-01-06 RX ADMIN — SODIUM CHLORIDE 1848 ML: 9 INJECTION, SOLUTION INTRAVENOUS at 15:55

## 2019-01-06 RX ADMIN — CEFEPIME HYDROCHLORIDE 2 G: 2 INJECTION, POWDER, FOR SOLUTION INTRAVENOUS at 20:09

## 2019-01-06 RX ADMIN — METHYLPREDNISOLONE SODIUM SUCCINATE 125 MG: 125 INJECTION, POWDER, FOR SOLUTION INTRAMUSCULAR; INTRAVENOUS at 17:54

## 2019-01-06 RX ADMIN — BUPROPION HYDROCHLORIDE 75 MG: 75 TABLET, FILM COATED ORAL at 20:15

## 2019-01-06 RX ADMIN — ONDANSETRON 4 MG: 2 INJECTION INTRAMUSCULAR; INTRAVENOUS at 15:55

## 2019-01-06 RX ADMIN — ACETAMINOPHEN 1000 MG: 500 TABLET, FILM COATED ORAL at 15:55

## 2019-01-06 RX ADMIN — WARFARIN SODIUM 5 MG: 5 TABLET ORAL at 20:15

## 2019-01-06 RX ADMIN — SODIUM CHLORIDE: 9 INJECTION, SOLUTION INTRAVENOUS at 22:02

## 2019-01-06 RX ADMIN — MORPHINE SULFATE 4 MG: 4 INJECTION INTRAVENOUS at 15:55

## 2019-01-06 RX ADMIN — VANCOMYCIN HYDROCHLORIDE 1500 MG: 10 INJECTION, POWDER, LYOPHILIZED, FOR SOLUTION INTRAVENOUS at 16:47

## 2019-01-06 RX ADMIN — METOPROLOL TARTRATE 50 MG: 50 TABLET, FILM COATED ORAL at 20:10

## 2019-01-06 RX ADMIN — DILTIAZEM HYDROCHLORIDE 240 MG: 240 CAPSULE, COATED, EXTENDED RELEASE ORAL at 20:12

## 2019-01-06 RX ADMIN — PANTOPRAZOLE SODIUM 40 MG: 40 TABLET, DELAYED RELEASE ORAL at 20:10

## 2019-01-06 RX ADMIN — DIPHENHYDRAMINE HYDROCHLORIDE 25 MG: 50 INJECTION, SOLUTION INTRAMUSCULAR; INTRAVENOUS at 17:53

## 2019-01-06 RX ADMIN — CLONIDINE HYDROCHLORIDE 0.2 MG: 0.1 TABLET ORAL at 20:12

## 2019-01-06 RX ADMIN — ASPIRIN 81 MG CHEWABLE TABLET 81 MG: 81 TABLET CHEWABLE at 20:12

## 2019-01-06 RX ADMIN — DULOXETINE HYDROCHLORIDE 60 MG: 60 CAPSULE, DELAYED RELEASE ORAL at 20:10

## 2019-01-06 ASSESSMENT — PAIN SCALES - GENERAL
PAINLEVEL_OUTOF10: 0
PAINLEVEL_OUTOF10: 7

## 2019-01-06 ASSESSMENT — ENCOUNTER SYMPTOMS
RESPIRATORY NEGATIVE: 1
VOMITING: 0
BACK PAIN: 0
NAUSEA: 0
CONSTIPATION: 0
ABDOMINAL PAIN: 0
COUGH: 0
COLOR CHANGE: 1
SHORTNESS OF BREATH: 0
DIARRHEA: 0

## 2019-01-06 ASSESSMENT — PAIN DESCRIPTION - PAIN TYPE: TYPE: ACUTE PAIN

## 2019-01-06 ASSESSMENT — PAIN DESCRIPTION - LOCATION: LOCATION: FOOT

## 2019-01-06 ASSESSMENT — PAIN DESCRIPTION - ORIENTATION: ORIENTATION: RIGHT

## 2019-01-07 ENCOUNTER — APPOINTMENT (OUTPATIENT)
Dept: MRI IMAGING | Age: 74
DRG: 871 | End: 2019-01-07
Payer: COMMERCIAL

## 2019-01-07 LAB
BACTERIA: ABNORMAL /HPF
BILIRUBIN URINE: NEGATIVE
BLOOD, URINE: NEGATIVE
C-REACTIVE PROTEIN: 48.7 MG/L (ref 0–5.1)
CLARITY: ABNORMAL
COLOR: ABNORMAL
COMMENT UA: ABNORMAL
EKG ATRIAL RATE: 288 BPM
EKG DIAGNOSIS: NORMAL
EKG Q-T INTERVAL: 278 MS
EKG QRS DURATION: 78 MS
EKG QTC CALCULATION (BAZETT): 383 MS
EKG R AXIS: 56 DEGREES
EKG T AXIS: 64 DEGREES
EKG VENTRICULAR RATE: 114 BPM
EPITHELIAL CELLS, UA: 1 /HPF (ref 0–5)
GLUCOSE URINE: 100 MG/DL
HYALINE CASTS: 2 /LPF (ref 0–8)
INR BLD: 2.68 (ref 0.86–1.14)
KETONES, URINE: NEGATIVE MG/DL
LEUKOCYTE ESTERASE, URINE: ABNORMAL
MICROSCOPIC EXAMINATION: YES
NITRITE, URINE: NEGATIVE
PH UA: 5.5
PROTEIN UA: NEGATIVE MG/DL
PROTHROMBIN TIME: 30.6 SEC (ref 9.8–13)
RBC UA: ABNORMAL /HPF (ref 0–2)
SPECIFIC GRAVITY UA: 1.01
URINE REFLEX TO CULTURE: YES
URINE TYPE: ABNORMAL
UROBILINOGEN, URINE: 1 E.U./DL
WBC UA: 37 /HPF (ref 0–5)

## 2019-01-07 PROCEDURE — A9579 GAD-BASE MR CONTRAST NOS,1ML: HCPCS | Performed by: ORTHOPAEDIC SURGERY

## 2019-01-07 PROCEDURE — 99222 1ST HOSP IP/OBS MODERATE 55: CPT | Performed by: NURSE PRACTITIONER

## 2019-01-07 PROCEDURE — 93971 EXTREMITY STUDY: CPT

## 2019-01-07 PROCEDURE — 1200000000 HC SEMI PRIVATE

## 2019-01-07 PROCEDURE — 85610 PROTHROMBIN TIME: CPT

## 2019-01-07 PROCEDURE — 6360000004 HC RX CONTRAST MEDICATION: Performed by: ORTHOPAEDIC SURGERY

## 2019-01-07 PROCEDURE — 36415 COLL VENOUS BLD VENIPUNCTURE: CPT

## 2019-01-07 PROCEDURE — 81001 URINALYSIS AUTO W/SCOPE: CPT

## 2019-01-07 PROCEDURE — 73723 MRI JOINT LWR EXTR W/O&W/DYE: CPT

## 2019-01-07 PROCEDURE — 2580000003 HC RX 258: Performed by: FAMILY MEDICINE

## 2019-01-07 PROCEDURE — 6370000000 HC RX 637 (ALT 250 FOR IP): Performed by: FAMILY MEDICINE

## 2019-01-07 PROCEDURE — 6360000002 HC RX W HCPCS: Performed by: FAMILY MEDICINE

## 2019-01-07 PROCEDURE — 2580000003 HC RX 258: Performed by: ORTHOPAEDIC SURGERY

## 2019-01-07 PROCEDURE — 93010 ELECTROCARDIOGRAM REPORT: CPT | Performed by: INTERNAL MEDICINE

## 2019-01-07 RX ORDER — SODIUM CHLORIDE 0.9 % (FLUSH) 0.9 %
10 SYRINGE (ML) INJECTION ONCE
Status: COMPLETED | OUTPATIENT
Start: 2019-01-07 | End: 2019-01-07

## 2019-01-07 RX ADMIN — WARFARIN SODIUM 5 MG: 5 TABLET ORAL at 17:39

## 2019-01-07 RX ADMIN — METOPROLOL TARTRATE 50 MG: 50 TABLET, FILM COATED ORAL at 22:44

## 2019-01-07 RX ADMIN — METOPROLOL TARTRATE 50 MG: 50 TABLET, FILM COATED ORAL at 10:28

## 2019-01-07 RX ADMIN — BUPROPION HYDROCHLORIDE 75 MG: 75 TABLET, FILM COATED ORAL at 22:48

## 2019-01-07 RX ADMIN — LINEZOLID 600 MG: 600 INJECTION, SOLUTION INTRAVENOUS at 22:48

## 2019-01-07 RX ADMIN — PANTOPRAZOLE SODIUM 40 MG: 40 TABLET, DELAYED RELEASE ORAL at 22:44

## 2019-01-07 RX ADMIN — LINEZOLID 600 MG: 600 INJECTION, SOLUTION INTRAVENOUS at 11:45

## 2019-01-07 RX ADMIN — CLONIDINE HYDROCHLORIDE 0.2 MG: 0.1 TABLET ORAL at 10:28

## 2019-01-07 RX ADMIN — Medication 10 ML: at 21:15

## 2019-01-07 RX ADMIN — SODIUM CHLORIDE: 9 INJECTION, SOLUTION INTRAVENOUS at 13:51

## 2019-01-07 RX ADMIN — GADOTERIDOL 26 ML: 279.3 INJECTION, SOLUTION INTRAVENOUS at 21:15

## 2019-01-07 RX ADMIN — BUPROPION HYDROCHLORIDE 75 MG: 75 TABLET, FILM COATED ORAL at 10:28

## 2019-01-07 RX ADMIN — PANTOPRAZOLE SODIUM 40 MG: 40 TABLET, DELAYED RELEASE ORAL at 10:28

## 2019-01-07 RX ADMIN — DILTIAZEM HYDROCHLORIDE 240 MG: 240 CAPSULE, COATED, EXTENDED RELEASE ORAL at 22:44

## 2019-01-07 RX ADMIN — DULOXETINE HYDROCHLORIDE 60 MG: 60 CAPSULE, DELAYED RELEASE ORAL at 22:43

## 2019-01-07 RX ADMIN — CLONIDINE HYDROCHLORIDE 0.2 MG: 0.1 TABLET ORAL at 22:44

## 2019-01-07 RX ADMIN — CEFEPIME HYDROCHLORIDE 2 G: 2 INJECTION, POWDER, FOR SOLUTION INTRAVENOUS at 06:12

## 2019-01-07 RX ADMIN — CEFEPIME HYDROCHLORIDE 2 G: 2 INJECTION, POWDER, FOR SOLUTION INTRAVENOUS at 18:44

## 2019-01-07 RX ADMIN — ASPIRIN 81 MG CHEWABLE TABLET 81 MG: 81 TABLET CHEWABLE at 10:28

## 2019-01-07 RX ADMIN — Medication 10 ML: at 22:02

## 2019-01-07 ASSESSMENT — PAIN SCALES - GENERAL
PAINLEVEL_OUTOF10: 0
PAINLEVEL_OUTOF10: 0

## 2019-01-08 LAB
ANION GAP SERPL CALCULATED.3IONS-SCNC: 10 MMOL/L (ref 3–16)
BASOPHILS ABSOLUTE: 0.1 K/UL (ref 0–0.2)
BASOPHILS RELATIVE PERCENT: 0.5 %
BUN BLDV-MCNC: 23 MG/DL (ref 7–20)
CALCIUM SERPL-MCNC: 8.5 MG/DL (ref 8.3–10.6)
CHLORIDE BLD-SCNC: 106 MMOL/L (ref 99–110)
CO2: 25 MMOL/L (ref 21–32)
CREAT SERPL-MCNC: 1.5 MG/DL (ref 0.6–1.2)
EOSINOPHILS ABSOLUTE: 0 K/UL (ref 0–0.6)
EOSINOPHILS RELATIVE PERCENT: 0.1 %
GFR AFRICAN AMERICAN: 41
GFR NON-AFRICAN AMERICAN: 34
GLUCOSE BLD-MCNC: 139 MG/DL (ref 70–99)
HCT VFR BLD CALC: 36.8 % (ref 36–48)
HEMOGLOBIN: 11.5 G/DL (ref 12–16)
INR BLD: 4.12 (ref 0.86–1.14)
LYMPHOCYTES ABSOLUTE: 1 K/UL (ref 1–5.1)
LYMPHOCYTES RELATIVE PERCENT: 8.1 %
MCH RBC QN AUTO: 28.8 PG (ref 26–34)
MCHC RBC AUTO-ENTMCNC: 31.2 G/DL (ref 31–36)
MCV RBC AUTO: 92.3 FL (ref 80–100)
MONOCYTES ABSOLUTE: 1.1 K/UL (ref 0–1.3)
MONOCYTES RELATIVE PERCENT: 8.9 %
NEUTROPHILS ABSOLUTE: 9.9 K/UL (ref 1.7–7.7)
NEUTROPHILS RELATIVE PERCENT: 82.4 %
PDW BLD-RTO: 15.7 % (ref 12.4–15.4)
PLATELET # BLD: 170 K/UL (ref 135–450)
PMV BLD AUTO: 10.7 FL (ref 5–10.5)
POTASSIUM SERPL-SCNC: 4.9 MMOL/L (ref 3.5–5.1)
PROTHROMBIN TIME: 47 SEC (ref 9.8–13)
RBC # BLD: 3.99 M/UL (ref 4–5.2)
SODIUM BLD-SCNC: 141 MMOL/L (ref 136–145)
URINE CULTURE, ROUTINE: NORMAL
WBC # BLD: 12 K/UL (ref 4–11)

## 2019-01-08 PROCEDURE — 2580000003 HC RX 258: Performed by: FAMILY MEDICINE

## 2019-01-08 PROCEDURE — 6370000000 HC RX 637 (ALT 250 FOR IP): Performed by: FAMILY MEDICINE

## 2019-01-08 PROCEDURE — 6360000002 HC RX W HCPCS: Performed by: INTERNAL MEDICINE

## 2019-01-08 PROCEDURE — 85610 PROTHROMBIN TIME: CPT

## 2019-01-08 PROCEDURE — 99223 1ST HOSP IP/OBS HIGH 75: CPT | Performed by: INTERNAL MEDICINE

## 2019-01-08 PROCEDURE — 2580000003 HC RX 258: Performed by: INTERNAL MEDICINE

## 2019-01-08 PROCEDURE — 99024 POSTOP FOLLOW-UP VISIT: CPT | Performed by: NURSE PRACTITIONER

## 2019-01-08 PROCEDURE — 85025 COMPLETE CBC W/AUTO DIFF WBC: CPT

## 2019-01-08 PROCEDURE — 80048 BASIC METABOLIC PNL TOTAL CA: CPT

## 2019-01-08 PROCEDURE — 36415 COLL VENOUS BLD VENIPUNCTURE: CPT

## 2019-01-08 PROCEDURE — 1200000000 HC SEMI PRIVATE

## 2019-01-08 PROCEDURE — 6360000002 HC RX W HCPCS: Performed by: FAMILY MEDICINE

## 2019-01-08 RX ORDER — SODIUM CHLORIDE 9 MG/ML
INJECTION, SOLUTION INTRAVENOUS CONTINUOUS
Status: DISCONTINUED | OUTPATIENT
Start: 2019-01-08 | End: 2019-01-11

## 2019-01-08 RX ORDER — CEFAZOLIN SODIUM 2 G/100ML
2 INJECTION, SOLUTION INTRAVENOUS EVERY 8 HOURS
Status: DISCONTINUED | OUTPATIENT
Start: 2019-01-08 | End: 2019-01-09

## 2019-01-08 RX ADMIN — PANTOPRAZOLE SODIUM 40 MG: 40 TABLET, DELAYED RELEASE ORAL at 23:17

## 2019-01-08 RX ADMIN — PANTOPRAZOLE SODIUM 40 MG: 40 TABLET, DELAYED RELEASE ORAL at 09:57

## 2019-01-08 RX ADMIN — SODIUM CHLORIDE: 9 INJECTION, SOLUTION INTRAVENOUS at 23:52

## 2019-01-08 RX ADMIN — CEFAZOLIN SODIUM 2 G: 2 INJECTION, SOLUTION INTRAVENOUS at 17:27

## 2019-01-08 RX ADMIN — METOPROLOL TARTRATE 50 MG: 50 TABLET, FILM COATED ORAL at 23:16

## 2019-01-08 RX ADMIN — BUPROPION HYDROCHLORIDE 75 MG: 75 TABLET, FILM COATED ORAL at 09:57

## 2019-01-08 RX ADMIN — BUPROPION HYDROCHLORIDE 75 MG: 75 TABLET, FILM COATED ORAL at 23:51

## 2019-01-08 RX ADMIN — DILTIAZEM HYDROCHLORIDE 240 MG: 240 CAPSULE, COATED, EXTENDED RELEASE ORAL at 23:17

## 2019-01-08 RX ADMIN — SODIUM CHLORIDE: 9 INJECTION, SOLUTION INTRAVENOUS at 06:51

## 2019-01-08 RX ADMIN — ASPIRIN 81 MG CHEWABLE TABLET 81 MG: 81 TABLET CHEWABLE at 09:58

## 2019-01-08 RX ADMIN — CLONIDINE HYDROCHLORIDE 0.2 MG: 0.1 TABLET ORAL at 23:17

## 2019-01-08 RX ADMIN — CLONIDINE HYDROCHLORIDE 0.2 MG: 0.1 TABLET ORAL at 09:57

## 2019-01-08 RX ADMIN — Medication 10 ML: at 23:18

## 2019-01-08 RX ADMIN — METOPROLOL TARTRATE 50 MG: 50 TABLET, FILM COATED ORAL at 09:57

## 2019-01-08 RX ADMIN — DULOXETINE HYDROCHLORIDE 60 MG: 60 CAPSULE, DELAYED RELEASE ORAL at 23:18

## 2019-01-08 RX ADMIN — LINEZOLID 600 MG: 600 INJECTION, SOLUTION INTRAVENOUS at 09:58

## 2019-01-08 RX ADMIN — CEFEPIME HYDROCHLORIDE 2 G: 2 INJECTION, POWDER, FOR SOLUTION INTRAVENOUS at 07:21

## 2019-01-08 ASSESSMENT — ENCOUNTER SYMPTOMS
ABDOMINAL PAIN: 0
COUGH: 0
BLOOD IN STOOL: 0
DIARRHEA: 0
TROUBLE SWALLOWING: 0
EYE DISCHARGE: 0
CHEST TIGHTNESS: 0
NAUSEA: 0
PHOTOPHOBIA: 0
STRIDOR: 0
CHOKING: 0
EYE REDNESS: 0
APNEA: 0
COLOR CHANGE: 0
RHINORRHEA: 0
SHORTNESS OF BREATH: 0
FACIAL SWELLING: 0

## 2019-01-08 ASSESSMENT — PAIN SCALES - GENERAL
PAINLEVEL_OUTOF10: 6
PAINLEVEL_OUTOF10: 0

## 2019-01-08 ASSESSMENT — PAIN DESCRIPTION - DESCRIPTORS: DESCRIPTORS: HEADACHE

## 2019-01-08 ASSESSMENT — PAIN DESCRIPTION - PAIN TYPE: TYPE: ACUTE PAIN

## 2019-01-08 ASSESSMENT — PAIN DESCRIPTION - LOCATION: LOCATION: HEAD

## 2019-01-09 LAB
ANION GAP SERPL CALCULATED.3IONS-SCNC: 9 MMOL/L (ref 3–16)
BASOPHILS ABSOLUTE: 0.1 K/UL (ref 0–0.2)
BASOPHILS RELATIVE PERCENT: 0.9 %
BILIRUBIN URINE: NEGATIVE
BLOOD, URINE: NEGATIVE
BUN BLDV-MCNC: 18 MG/DL (ref 7–20)
CALCIUM SERPL-MCNC: 8.8 MG/DL (ref 8.3–10.6)
CHLORIDE BLD-SCNC: 106 MMOL/L (ref 99–110)
CLARITY: CLEAR
CO2: 27 MMOL/L (ref 21–32)
COLOR: YELLOW
CREAT SERPL-MCNC: 1.6 MG/DL (ref 0.6–1.2)
CREATININE URINE: 39.9 MG/DL (ref 28–259)
EOSINOPHILS ABSOLUTE: 0.1 K/UL (ref 0–0.6)
EOSINOPHILS RELATIVE PERCENT: 1.5 %
GFR AFRICAN AMERICAN: 38
GFR NON-AFRICAN AMERICAN: 32
GLUCOSE BLD-MCNC: 108 MG/DL (ref 70–99)
GLUCOSE URINE: NEGATIVE MG/DL
HCT VFR BLD CALC: 36.6 % (ref 36–48)
HEMOGLOBIN: 11.7 G/DL (ref 12–16)
INR BLD: 5.27 (ref 0.86–1.14)
KETONES, URINE: NEGATIVE MG/DL
LEUKOCYTE ESTERASE, URINE: NEGATIVE
LYMPHOCYTES ABSOLUTE: 1.4 K/UL (ref 1–5.1)
LYMPHOCYTES RELATIVE PERCENT: 15.3 %
MCH RBC QN AUTO: 29.5 PG (ref 26–34)
MCHC RBC AUTO-ENTMCNC: 32 G/DL (ref 31–36)
MCV RBC AUTO: 92.2 FL (ref 80–100)
MICROSCOPIC EXAMINATION: NORMAL
MONOCYTES ABSOLUTE: 0.9 K/UL (ref 0–1.3)
MONOCYTES RELATIVE PERCENT: 10.5 %
NEUTROPHILS ABSOLUTE: 6.4 K/UL (ref 1.7–7.7)
NEUTROPHILS RELATIVE PERCENT: 71.8 %
NITRITE, URINE: NEGATIVE
PDW BLD-RTO: 15.9 % (ref 12.4–15.4)
PH UA: 5
PLATELET # BLD: 186 K/UL (ref 135–450)
PMV BLD AUTO: 11.1 FL (ref 5–10.5)
POTASSIUM SERPL-SCNC: 4.5 MMOL/L (ref 3.5–5.1)
POTASSIUM, UR: 9.9 MMOL/L
PROTEIN PROTEIN: 6 MG/DL
PROTEIN UA: NEGATIVE MG/DL
PROTHROMBIN TIME: 60.1 SEC (ref 9.8–13)
RBC # BLD: 3.97 M/UL (ref 4–5.2)
SODIUM BLD-SCNC: 142 MMOL/L (ref 136–145)
SODIUM URINE: 70 MMOL/L
SPECIFIC GRAVITY UA: 1.01
URINE TYPE: NORMAL
UROBILINOGEN, URINE: 0.2 E.U./DL
WBC # BLD: 8.9 K/UL (ref 4–11)

## 2019-01-09 PROCEDURE — 84156 ASSAY OF PROTEIN URINE: CPT

## 2019-01-09 PROCEDURE — 6360000002 HC RX W HCPCS: Performed by: INTERNAL MEDICINE

## 2019-01-09 PROCEDURE — 1200000000 HC SEMI PRIVATE

## 2019-01-09 PROCEDURE — 99233 SBSQ HOSP IP/OBS HIGH 50: CPT | Performed by: INTERNAL MEDICINE

## 2019-01-09 PROCEDURE — 2580000003 HC RX 258: Performed by: HOSPITALIST

## 2019-01-09 PROCEDURE — 85025 COMPLETE CBC W/AUTO DIFF WBC: CPT

## 2019-01-09 PROCEDURE — 6370000000 HC RX 637 (ALT 250 FOR IP): Performed by: INTERNAL MEDICINE

## 2019-01-09 PROCEDURE — 85610 PROTHROMBIN TIME: CPT

## 2019-01-09 PROCEDURE — 84133 ASSAY OF URINE POTASSIUM: CPT

## 2019-01-09 PROCEDURE — 80048 BASIC METABOLIC PNL TOTAL CA: CPT

## 2019-01-09 PROCEDURE — 36415 COLL VENOUS BLD VENIPUNCTURE: CPT

## 2019-01-09 PROCEDURE — 81003 URINALYSIS AUTO W/O SCOPE: CPT

## 2019-01-09 PROCEDURE — 82570 ASSAY OF URINE CREATININE: CPT

## 2019-01-09 PROCEDURE — 83935 ASSAY OF URINE OSMOLALITY: CPT

## 2019-01-09 PROCEDURE — 6370000000 HC RX 637 (ALT 250 FOR IP): Performed by: FAMILY MEDICINE

## 2019-01-09 PROCEDURE — 84300 ASSAY OF URINE SODIUM: CPT

## 2019-01-09 RX ORDER — CEPHALEXIN 250 MG/1
250 CAPSULE ORAL EVERY 6 HOURS SCHEDULED
Status: DISCONTINUED | OUTPATIENT
Start: 2019-01-09 | End: 2019-01-11 | Stop reason: HOSPADM

## 2019-01-09 RX ADMIN — BUPROPION HYDROCHLORIDE 75 MG: 75 TABLET, FILM COATED ORAL at 20:22

## 2019-01-09 RX ADMIN — CEFAZOLIN SODIUM 2 G: 2 INJECTION, SOLUTION INTRAVENOUS at 01:35

## 2019-01-09 RX ADMIN — TRAMADOL HYDROCHLORIDE 50 MG: 50 TABLET, FILM COATED ORAL at 20:22

## 2019-01-09 RX ADMIN — PANTOPRAZOLE SODIUM 40 MG: 40 TABLET, DELAYED RELEASE ORAL at 20:22

## 2019-01-09 RX ADMIN — DILTIAZEM HYDROCHLORIDE 240 MG: 240 CAPSULE, COATED, EXTENDED RELEASE ORAL at 20:22

## 2019-01-09 RX ADMIN — CEFAZOLIN SODIUM 2 G: 2 INJECTION, SOLUTION INTRAVENOUS at 10:13

## 2019-01-09 RX ADMIN — CLONIDINE HYDROCHLORIDE 0.2 MG: 0.1 TABLET ORAL at 20:17

## 2019-01-09 RX ADMIN — SODIUM CHLORIDE: 9 INJECTION, SOLUTION INTRAVENOUS at 16:42

## 2019-01-09 RX ADMIN — CEPHALEXIN 250 MG: 250 CAPSULE ORAL at 23:34

## 2019-01-09 RX ADMIN — METOPROLOL TARTRATE 50 MG: 50 TABLET, FILM COATED ORAL at 20:22

## 2019-01-09 RX ADMIN — CEPHALEXIN 250 MG: 250 CAPSULE ORAL at 16:39

## 2019-01-09 RX ADMIN — PANTOPRAZOLE SODIUM 40 MG: 40 TABLET, DELAYED RELEASE ORAL at 10:14

## 2019-01-09 RX ADMIN — BUPROPION HYDROCHLORIDE 75 MG: 75 TABLET, FILM COATED ORAL at 10:13

## 2019-01-09 RX ADMIN — CLONIDINE HYDROCHLORIDE 0.2 MG: 0.1 TABLET ORAL at 10:14

## 2019-01-09 RX ADMIN — DULOXETINE HYDROCHLORIDE 60 MG: 60 CAPSULE, DELAYED RELEASE ORAL at 20:22

## 2019-01-09 RX ADMIN — ASPIRIN 81 MG CHEWABLE TABLET 81 MG: 81 TABLET CHEWABLE at 10:14

## 2019-01-09 RX ADMIN — METOPROLOL TARTRATE 50 MG: 50 TABLET, FILM COATED ORAL at 10:13

## 2019-01-09 ASSESSMENT — ENCOUNTER SYMPTOMS
ABDOMINAL PAIN: 0
EYE DISCHARGE: 0
APNEA: 0
TROUBLE SWALLOWING: 0
SHORTNESS OF BREATH: 0
RHINORRHEA: 0
FACIAL SWELLING: 0
NAUSEA: 0
COUGH: 0
BLOOD IN STOOL: 0
COLOR CHANGE: 0
CHOKING: 0
STRIDOR: 0
DIARRHEA: 0
CHEST TIGHTNESS: 0
EYE REDNESS: 0
PHOTOPHOBIA: 0

## 2019-01-09 ASSESSMENT — PAIN SCALES - GENERAL
PAINLEVEL_OUTOF10: 8
PAINLEVEL_OUTOF10: 0
PAINLEVEL_OUTOF10: 7

## 2019-01-09 ASSESSMENT — PAIN DESCRIPTION - DESCRIPTORS: DESCRIPTORS: DULL;CRAMPING

## 2019-01-09 ASSESSMENT — PAIN DESCRIPTION - PAIN TYPE: TYPE: ACUTE PAIN

## 2019-01-09 ASSESSMENT — PAIN DESCRIPTION - LOCATION: LOCATION: ABDOMEN

## 2019-01-09 ASSESSMENT — PAIN DESCRIPTION - ORIENTATION: ORIENTATION: RIGHT

## 2019-01-10 ENCOUNTER — APPOINTMENT (OUTPATIENT)
Dept: CT IMAGING | Age: 74
DRG: 871 | End: 2019-01-10
Payer: COMMERCIAL

## 2019-01-10 LAB
ALBUMIN SERPL-MCNC: 3.5 G/DL (ref 3.4–5)
ALP BLD-CCNC: 111 U/L (ref 40–129)
ALT SERPL-CCNC: 18 U/L (ref 10–40)
ANION GAP SERPL CALCULATED.3IONS-SCNC: 8 MMOL/L (ref 3–16)
AST SERPL-CCNC: 13 U/L (ref 15–37)
BASOPHILS ABSOLUTE: 0.1 K/UL (ref 0–0.2)
BASOPHILS RELATIVE PERCENT: 0.8 %
BILIRUB SERPL-MCNC: 0.5 MG/DL (ref 0–1)
BILIRUBIN DIRECT: <0.2 MG/DL (ref 0–0.3)
BILIRUBIN, INDIRECT: ABNORMAL MG/DL (ref 0–1)
BUN BLDV-MCNC: 15 MG/DL (ref 7–20)
CALCIUM SERPL-MCNC: 9 MG/DL (ref 8.3–10.6)
CHLORIDE BLD-SCNC: 106 MMOL/L (ref 99–110)
CO2: 27 MMOL/L (ref 21–32)
CREAT SERPL-MCNC: 1.4 MG/DL (ref 0.6–1.2)
EOSINOPHILS ABSOLUTE: 0.2 K/UL (ref 0–0.6)
EOSINOPHILS RELATIVE PERCENT: 1.9 %
GFR AFRICAN AMERICAN: 45
GFR NON-AFRICAN AMERICAN: 37
GLUCOSE BLD-MCNC: 157 MG/DL (ref 70–99)
HCT VFR BLD CALC: 35.6 % (ref 36–48)
HEMOGLOBIN: 11.6 G/DL (ref 12–16)
INR BLD: 3.79 (ref 0.86–1.14)
LIPASE: 23 U/L (ref 13–60)
LYMPHOCYTES ABSOLUTE: 1.1 K/UL (ref 1–5.1)
LYMPHOCYTES RELATIVE PERCENT: 13 %
MCH RBC QN AUTO: 29.8 PG (ref 26–34)
MCHC RBC AUTO-ENTMCNC: 32.5 G/DL (ref 31–36)
MCV RBC AUTO: 91.7 FL (ref 80–100)
MONOCYTES ABSOLUTE: 1 K/UL (ref 0–1.3)
MONOCYTES RELATIVE PERCENT: 11.3 %
NEUTROPHILS ABSOLUTE: 6.2 K/UL (ref 1.7–7.7)
NEUTROPHILS RELATIVE PERCENT: 73 %
OSMOLALITY URINE: 285 MOSM/KG (ref 390–1070)
PDW BLD-RTO: 15.5 % (ref 12.4–15.4)
PLATELET # BLD: 187 K/UL (ref 135–450)
PMV BLD AUTO: 10.7 FL (ref 5–10.5)
POTASSIUM SERPL-SCNC: 4.4 MMOL/L (ref 3.5–5.1)
PROTHROMBIN TIME: 43.2 SEC (ref 9.8–13)
RBC # BLD: 3.89 M/UL (ref 4–5.2)
SODIUM BLD-SCNC: 141 MMOL/L (ref 136–145)
TOTAL PROTEIN: 6.7 G/DL (ref 6.4–8.2)
WBC # BLD: 8.5 K/UL (ref 4–11)

## 2019-01-10 PROCEDURE — 2580000003 HC RX 258: Performed by: HOSPITALIST

## 2019-01-10 PROCEDURE — 80076 HEPATIC FUNCTION PANEL: CPT

## 2019-01-10 PROCEDURE — 6370000000 HC RX 637 (ALT 250 FOR IP): Performed by: FAMILY MEDICINE

## 2019-01-10 PROCEDURE — 80048 BASIC METABOLIC PNL TOTAL CA: CPT

## 2019-01-10 PROCEDURE — 99222 1ST HOSP IP/OBS MODERATE 55: CPT | Performed by: SURGERY

## 2019-01-10 PROCEDURE — 99232 SBSQ HOSP IP/OBS MODERATE 35: CPT | Performed by: INTERNAL MEDICINE

## 2019-01-10 PROCEDURE — 6360000004 HC RX CONTRAST MEDICATION

## 2019-01-10 PROCEDURE — 2580000003 HC RX 258: Performed by: FAMILY MEDICINE

## 2019-01-10 PROCEDURE — 36415 COLL VENOUS BLD VENIPUNCTURE: CPT

## 2019-01-10 PROCEDURE — 6370000000 HC RX 637 (ALT 250 FOR IP): Performed by: INTERNAL MEDICINE

## 2019-01-10 PROCEDURE — 1200000000 HC SEMI PRIVATE

## 2019-01-10 PROCEDURE — 85610 PROTHROMBIN TIME: CPT

## 2019-01-10 PROCEDURE — 83690 ASSAY OF LIPASE: CPT

## 2019-01-10 PROCEDURE — 74176 CT ABD & PELVIS W/O CONTRAST: CPT

## 2019-01-10 PROCEDURE — 85025 COMPLETE CBC W/AUTO DIFF WBC: CPT

## 2019-01-10 RX ADMIN — DILTIAZEM HYDROCHLORIDE 240 MG: 240 CAPSULE, COATED, EXTENDED RELEASE ORAL at 22:09

## 2019-01-10 RX ADMIN — CEPHALEXIN 250 MG: 250 CAPSULE ORAL at 11:47

## 2019-01-10 RX ADMIN — BUPROPION HYDROCHLORIDE 75 MG: 75 TABLET, FILM COATED ORAL at 22:09

## 2019-01-10 RX ADMIN — TRAMADOL HYDROCHLORIDE 50 MG: 50 TABLET, FILM COATED ORAL at 22:09

## 2019-01-10 RX ADMIN — TRAMADOL HYDROCHLORIDE 50 MG: 50 TABLET, FILM COATED ORAL at 04:31

## 2019-01-10 RX ADMIN — CEPHALEXIN 250 MG: 250 CAPSULE ORAL at 06:13

## 2019-01-10 RX ADMIN — CLONIDINE HYDROCHLORIDE 0.2 MG: 0.1 TABLET ORAL at 08:11

## 2019-01-10 RX ADMIN — SODIUM CHLORIDE: 9 INJECTION, SOLUTION INTRAVENOUS at 22:08

## 2019-01-10 RX ADMIN — CLONIDINE HYDROCHLORIDE 0.2 MG: 0.1 TABLET ORAL at 22:10

## 2019-01-10 RX ADMIN — IOHEXOL 50 ML: 240 INJECTION, SOLUTION INTRATHECAL; INTRAVASCULAR; INTRAVENOUS; ORAL at 12:17

## 2019-01-10 RX ADMIN — METOPROLOL TARTRATE 50 MG: 50 TABLET, FILM COATED ORAL at 22:09

## 2019-01-10 RX ADMIN — BUPROPION HYDROCHLORIDE 75 MG: 75 TABLET, FILM COATED ORAL at 08:11

## 2019-01-10 RX ADMIN — CEPHALEXIN 250 MG: 250 CAPSULE ORAL at 18:23

## 2019-01-10 RX ADMIN — PANTOPRAZOLE SODIUM 40 MG: 40 TABLET, DELAYED RELEASE ORAL at 08:11

## 2019-01-10 RX ADMIN — Medication 10 ML: at 22:09

## 2019-01-10 RX ADMIN — DULOXETINE HYDROCHLORIDE 60 MG: 60 CAPSULE, DELAYED RELEASE ORAL at 22:09

## 2019-01-10 RX ADMIN — PANTOPRAZOLE SODIUM 40 MG: 40 TABLET, DELAYED RELEASE ORAL at 22:10

## 2019-01-10 RX ADMIN — ASPIRIN 81 MG CHEWABLE TABLET 81 MG: 81 TABLET CHEWABLE at 08:11

## 2019-01-10 RX ADMIN — Medication 10 ML: at 08:12

## 2019-01-10 RX ADMIN — METOPROLOL TARTRATE 50 MG: 50 TABLET, FILM COATED ORAL at 08:11

## 2019-01-10 ASSESSMENT — PAIN DESCRIPTION - DESCRIPTORS
DESCRIPTORS: DULL;CRAMPING

## 2019-01-10 ASSESSMENT — ENCOUNTER SYMPTOMS
PHOTOPHOBIA: 0
DIARRHEA: 0
APNEA: 0
SHORTNESS OF BREATH: 0
EYE REDNESS: 0
FACIAL SWELLING: 0
STRIDOR: 0
NAUSEA: 0
COUGH: 0
RHINORRHEA: 0
TROUBLE SWALLOWING: 0
CHOKING: 0
COLOR CHANGE: 0
EYE DISCHARGE: 0
CHEST TIGHTNESS: 0
BLOOD IN STOOL: 0
ABDOMINAL PAIN: 1

## 2019-01-10 ASSESSMENT — PAIN DESCRIPTION - FREQUENCY: FREQUENCY: CONTINUOUS

## 2019-01-10 ASSESSMENT — PAIN DESCRIPTION - PAIN TYPE
TYPE: ACUTE PAIN

## 2019-01-10 ASSESSMENT — PAIN DESCRIPTION - LOCATION
LOCATION: ABDOMEN

## 2019-01-10 ASSESSMENT — PAIN SCALES - GENERAL
PAINLEVEL_OUTOF10: 0
PAINLEVEL_OUTOF10: 0
PAINLEVEL_OUTOF10: 4
PAINLEVEL_OUTOF10: 3
PAINLEVEL_OUTOF10: 0
PAINLEVEL_OUTOF10: 4
PAINLEVEL_OUTOF10: 2
PAINLEVEL_OUTOF10: 9

## 2019-01-10 ASSESSMENT — PAIN DESCRIPTION - ORIENTATION
ORIENTATION: RIGHT
ORIENTATION: RIGHT
ORIENTATION: MID
ORIENTATION: RIGHT

## 2019-01-10 ASSESSMENT — PAIN DESCRIPTION - PROGRESSION: CLINICAL_PROGRESSION: GRADUALLY WORSENING

## 2019-01-11 VITALS
SYSTOLIC BLOOD PRESSURE: 149 MMHG | BODY MASS INDEX: 45.99 KG/M2 | DIASTOLIC BLOOD PRESSURE: 64 MMHG | HEART RATE: 104 BPM | OXYGEN SATURATION: 95 % | WEIGHT: 293 LBS | HEIGHT: 67 IN | TEMPERATURE: 97.8 F | RESPIRATION RATE: 18 BRPM

## 2019-01-11 LAB
ANION GAP SERPL CALCULATED.3IONS-SCNC: 7 MMOL/L (ref 3–16)
BASOPHILS ABSOLUTE: 0 K/UL (ref 0–0.2)
BASOPHILS RELATIVE PERCENT: 0 %
BLOOD CULTURE, ROUTINE: NORMAL
BUN BLDV-MCNC: 11 MG/DL (ref 7–20)
CALCIUM SERPL-MCNC: 9 MG/DL (ref 8.3–10.6)
CHLORIDE BLD-SCNC: 104 MMOL/L (ref 99–110)
CO2: 28 MMOL/L (ref 21–32)
CREAT SERPL-MCNC: 1.1 MG/DL (ref 0.6–1.2)
CULTURE, BLOOD 2: NORMAL
EOSINOPHILS ABSOLUTE: 0 K/UL (ref 0–0.6)
EOSINOPHILS RELATIVE PERCENT: 0 %
GFR AFRICAN AMERICAN: 59
GFR NON-AFRICAN AMERICAN: 49
GLUCOSE BLD-MCNC: 131 MG/DL (ref 70–99)
HCT VFR BLD CALC: 31.3 % (ref 36–48)
HEMOGLOBIN: 10.3 G/DL (ref 12–16)
INR BLD: 2.84 (ref 0.86–1.14)
LYMPHOCYTES ABSOLUTE: 1.4 K/UL (ref 1–5.1)
LYMPHOCYTES RELATIVE PERCENT: 13 %
MCH RBC QN AUTO: 29.9 PG (ref 26–34)
MCHC RBC AUTO-ENTMCNC: 32.9 G/DL (ref 31–36)
MCV RBC AUTO: 90.8 FL (ref 80–100)
MONOCYTES ABSOLUTE: 1.4 K/UL (ref 0–1.3)
MONOCYTES RELATIVE PERCENT: 13 %
NEUTROPHILS ABSOLUTE: 7.9 K/UL (ref 1.7–7.7)
NEUTROPHILS RELATIVE PERCENT: 74 %
PDW BLD-RTO: 15.3 % (ref 12.4–15.4)
PLATELET # BLD: 191 K/UL (ref 135–450)
PLATELET SLIDE REVIEW: ADEQUATE
PMV BLD AUTO: 10.4 FL (ref 5–10.5)
POTASSIUM SERPL-SCNC: 4.2 MMOL/L (ref 3.5–5.1)
PROTHROMBIN TIME: 32.4 SEC (ref 9.8–13)
RBC # BLD: 3.45 M/UL (ref 4–5.2)
RBC # BLD: NORMAL 10*6/UL
SLIDE REVIEW: ABNORMAL
SODIUM BLD-SCNC: 139 MMOL/L (ref 136–145)
WBC # BLD: 10.7 K/UL (ref 4–11)

## 2019-01-11 PROCEDURE — 2580000003 HC RX 258: Performed by: FAMILY MEDICINE

## 2019-01-11 PROCEDURE — 6370000000 HC RX 637 (ALT 250 FOR IP): Performed by: INTERNAL MEDICINE

## 2019-01-11 PROCEDURE — 36415 COLL VENOUS BLD VENIPUNCTURE: CPT

## 2019-01-11 PROCEDURE — APPNB30 APP NON BILLABLE TIME 0-30 MINS: Performed by: NURSE PRACTITIONER

## 2019-01-11 PROCEDURE — APPSS15 APP SPLIT SHARED TIME 0-15 MINUTES: Performed by: NURSE PRACTITIONER

## 2019-01-11 PROCEDURE — 6370000000 HC RX 637 (ALT 250 FOR IP): Performed by: FAMILY MEDICINE

## 2019-01-11 PROCEDURE — 85025 COMPLETE CBC W/AUTO DIFF WBC: CPT

## 2019-01-11 PROCEDURE — 80048 BASIC METABOLIC PNL TOTAL CA: CPT

## 2019-01-11 PROCEDURE — 85610 PROTHROMBIN TIME: CPT

## 2019-01-11 RX ORDER — CEPHALEXIN 250 MG/1
250 CAPSULE ORAL 4 TIMES DAILY
Qty: 23 CAPSULE | Refills: 0 | Status: SHIPPED | OUTPATIENT
Start: 2019-01-11 | End: 2019-01-16

## 2019-01-11 RX ORDER — WARFARIN SODIUM 5 MG/1
5 TABLET ORAL DAILY
Status: DISCONTINUED | OUTPATIENT
Start: 2019-01-11 | End: 2019-01-11

## 2019-01-11 RX ADMIN — PANTOPRAZOLE SODIUM 40 MG: 40 TABLET, DELAYED RELEASE ORAL at 08:38

## 2019-01-11 RX ADMIN — CEPHALEXIN 250 MG: 250 CAPSULE ORAL at 00:25

## 2019-01-11 RX ADMIN — CEPHALEXIN 250 MG: 250 CAPSULE ORAL at 06:32

## 2019-01-11 RX ADMIN — CLONIDINE HYDROCHLORIDE 0.2 MG: 0.1 TABLET ORAL at 08:37

## 2019-01-11 RX ADMIN — ASPIRIN 81 MG CHEWABLE TABLET 81 MG: 81 TABLET CHEWABLE at 08:37

## 2019-01-11 RX ADMIN — BUPROPION HYDROCHLORIDE 75 MG: 75 TABLET, FILM COATED ORAL at 08:40

## 2019-01-11 RX ADMIN — Medication 10 ML: at 08:38

## 2019-01-11 RX ADMIN — METOPROLOL TARTRATE 50 MG: 50 TABLET, FILM COATED ORAL at 08:38

## 2019-01-11 RX ADMIN — CEPHALEXIN 250 MG: 250 CAPSULE ORAL at 11:05

## 2019-01-14 ENCOUNTER — TELEPHONE (OUTPATIENT)
Dept: INTERNAL MEDICINE CLINIC | Age: 74
End: 2019-01-14

## 2019-01-15 ENCOUNTER — OFFICE VISIT (OUTPATIENT)
Dept: INTERNAL MEDICINE CLINIC | Age: 74
End: 2019-01-15
Payer: COMMERCIAL

## 2019-01-15 VITALS
BODY MASS INDEX: 47.3 KG/M2 | HEART RATE: 64 BPM | DIASTOLIC BLOOD PRESSURE: 86 MMHG | SYSTOLIC BLOOD PRESSURE: 132 MMHG | WEIGHT: 293 LBS

## 2019-01-15 DIAGNOSIS — N17.9 ACUTE KIDNEY INJURY (HCC): ICD-10-CM

## 2019-01-15 DIAGNOSIS — Z09 HOSPITAL DISCHARGE FOLLOW-UP: Primary | ICD-10-CM

## 2019-01-15 DIAGNOSIS — S30.1XXA HEMATOMA OF RECTUS SHEATH, INITIAL ENCOUNTER: ICD-10-CM

## 2019-01-15 DIAGNOSIS — L03.115 CELLULITIS OF RIGHT LEG: ICD-10-CM

## 2019-01-15 PROCEDURE — 99496 TRANSJ CARE MGMT HIGH F2F 7D: CPT | Performed by: NURSE PRACTITIONER

## 2019-02-04 ENCOUNTER — OFFICE VISIT (OUTPATIENT)
Dept: INTERNAL MEDICINE CLINIC | Age: 74
End: 2019-02-04
Payer: COMMERCIAL

## 2019-02-04 VITALS
DIASTOLIC BLOOD PRESSURE: 86 MMHG | HEIGHT: 67 IN | WEIGHT: 289 LBS | HEART RATE: 120 BPM | SYSTOLIC BLOOD PRESSURE: 138 MMHG | BODY MASS INDEX: 45.36 KG/M2

## 2019-02-04 DIAGNOSIS — I48.91 ATRIAL FIBRILLATION, UNSPECIFIED TYPE (HCC): Primary | Chronic | ICD-10-CM

## 2019-02-04 DIAGNOSIS — Z79.01 ANTICOAGULATED ON COUMADIN: ICD-10-CM

## 2019-02-04 DIAGNOSIS — S30.1XXD HEMATOMA OF RECTUS SHEATH, SUBSEQUENT ENCOUNTER: ICD-10-CM

## 2019-02-04 LAB
INTERNATIONAL NORMALIZATION RATIO, POC: 1.1
PROTHROMBIN TIME, POC: NORMAL

## 2019-02-04 PROCEDURE — 85610 PROTHROMBIN TIME: CPT | Performed by: NURSE PRACTITIONER

## 2019-02-04 PROCEDURE — 99212 OFFICE O/P EST SF 10 MIN: CPT | Performed by: NURSE PRACTITIONER

## 2019-02-04 ASSESSMENT — ENCOUNTER SYMPTOMS
GASTROINTESTINAL NEGATIVE: 1
RESPIRATORY NEGATIVE: 1

## 2019-02-14 ENCOUNTER — OFFICE VISIT (OUTPATIENT)
Dept: INTERNAL MEDICINE CLINIC | Age: 74
End: 2019-02-14
Payer: COMMERCIAL

## 2019-02-14 VITALS
DIASTOLIC BLOOD PRESSURE: 78 MMHG | HEART RATE: 68 BPM | BODY MASS INDEX: 45.36 KG/M2 | WEIGHT: 289 LBS | HEIGHT: 67 IN | SYSTOLIC BLOOD PRESSURE: 128 MMHG

## 2019-02-14 DIAGNOSIS — I48.91 ATRIAL FIBRILLATION AND FLUTTER (HCC): Primary | ICD-10-CM

## 2019-02-14 DIAGNOSIS — I48.92 ATRIAL FIBRILLATION AND FLUTTER (HCC): Primary | ICD-10-CM

## 2019-02-14 DIAGNOSIS — Z79.01 ANTICOAGULATED ON COUMADIN: ICD-10-CM

## 2019-02-14 LAB
INTERNATIONAL NORMALIZATION RATIO, POC: 1.3
PROTHROMBIN TIME, POC: NORMAL

## 2019-02-14 PROCEDURE — 85610 PROTHROMBIN TIME: CPT | Performed by: NURSE PRACTITIONER

## 2019-02-14 PROCEDURE — 99212 OFFICE O/P EST SF 10 MIN: CPT | Performed by: NURSE PRACTITIONER

## 2019-02-14 RX ORDER — WARFARIN SODIUM 5 MG/1
5 TABLET ORAL DAILY
Qty: 90 TABLET | Refills: 3 | Status: SHIPPED | OUTPATIENT
Start: 2019-02-14 | End: 2020-02-10

## 2019-02-17 ASSESSMENT — ENCOUNTER SYMPTOMS
RESPIRATORY NEGATIVE: 1
GASTROINTESTINAL NEGATIVE: 1

## 2019-03-04 ENCOUNTER — OFFICE VISIT (OUTPATIENT)
Dept: INTERNAL MEDICINE CLINIC | Age: 74
End: 2019-03-04
Payer: COMMERCIAL

## 2019-03-04 VITALS
SYSTOLIC BLOOD PRESSURE: 132 MMHG | BODY MASS INDEX: 45.52 KG/M2 | DIASTOLIC BLOOD PRESSURE: 84 MMHG | HEART RATE: 64 BPM | HEIGHT: 67 IN | WEIGHT: 290 LBS

## 2019-03-04 DIAGNOSIS — F33.8 SEASONAL AFFECTIVE DISORDER (HCC): ICD-10-CM

## 2019-03-04 DIAGNOSIS — I48.92 ATRIAL FIBRILLATION AND FLUTTER (HCC): Primary | ICD-10-CM

## 2019-03-04 DIAGNOSIS — I10 ESSENTIAL HYPERTENSION: Chronic | ICD-10-CM

## 2019-03-04 DIAGNOSIS — I48.91 ATRIAL FIBRILLATION AND FLUTTER (HCC): Primary | ICD-10-CM

## 2019-03-04 DIAGNOSIS — Z79.01 ANTICOAGULATED ON COUMADIN: ICD-10-CM

## 2019-03-04 DIAGNOSIS — F33.2 SEVERE EPISODE OF RECURRENT MAJOR DEPRESSIVE DISORDER, WITHOUT PSYCHOTIC FEATURES (HCC): ICD-10-CM

## 2019-03-04 DIAGNOSIS — R14.2 BELCHING: ICD-10-CM

## 2019-03-04 LAB
INTERNATIONAL NORMALIZATION RATIO, POC: 2.7
PROTHROMBIN TIME, POC: NORMAL

## 2019-03-04 PROCEDURE — 85610 PROTHROMBIN TIME: CPT | Performed by: NURSE PRACTITIONER

## 2019-03-04 PROCEDURE — 99213 OFFICE O/P EST LOW 20 MIN: CPT | Performed by: NURSE PRACTITIONER

## 2019-03-04 RX ORDER — CHOLECALCIFEROL (VITAMIN D3) 1250 MCG
1 CAPSULE ORAL WEEKLY
Qty: 5 CAPSULE | Refills: 5 | Status: SHIPPED | OUTPATIENT
Start: 2019-03-04 | End: 2019-12-26 | Stop reason: SDUPTHER

## 2019-03-04 RX ORDER — CLONIDINE HYDROCHLORIDE 0.2 MG/1
0.2 TABLET ORAL 2 TIMES DAILY
Qty: 60 TABLET | Refills: 5 | Status: SHIPPED | OUTPATIENT
Start: 2019-03-04 | End: 2019-06-01 | Stop reason: SDUPTHER

## 2019-03-04 RX ORDER — RANITIDINE 150 MG/1
150 TABLET ORAL DAILY
Qty: 30 TABLET | Refills: 5 | Status: SHIPPED | OUTPATIENT
Start: 2019-03-04 | End: 2019-12-26 | Stop reason: ALTCHOICE

## 2019-03-04 RX ORDER — DULOXETIN HYDROCHLORIDE 60 MG/1
60 CAPSULE, DELAYED RELEASE ORAL DAILY
Qty: 30 CAPSULE | Refills: 5 | Status: SHIPPED | OUTPATIENT
Start: 2019-03-04 | End: 2019-06-01 | Stop reason: SDUPTHER

## 2019-03-05 ASSESSMENT — ENCOUNTER SYMPTOMS
RESPIRATORY NEGATIVE: 1
GASTROINTESTINAL NEGATIVE: 1

## 2019-03-21 ENCOUNTER — APPOINTMENT (OUTPATIENT)
Dept: GENERAL RADIOLOGY | Age: 74
End: 2019-03-21
Payer: COMMERCIAL

## 2019-03-21 ENCOUNTER — HOSPITAL ENCOUNTER (OUTPATIENT)
Age: 74
Setting detail: OBSERVATION
Discharge: HOME | End: 2019-03-23
Attending: EMERGENCY MEDICINE | Admitting: INTERNAL MEDICINE
Payer: COMMERCIAL

## 2019-03-21 ENCOUNTER — APPOINTMENT (OUTPATIENT)
Dept: CT IMAGING | Age: 74
End: 2019-03-21
Payer: COMMERCIAL

## 2019-03-21 DIAGNOSIS — R55 SYNCOPE AND COLLAPSE: Primary | ICD-10-CM

## 2019-03-21 LAB
A/G RATIO: 1.1 (ref 1.1–2.2)
ALBUMIN SERPL-MCNC: 3.5 G/DL (ref 3.4–5)
ALP BLD-CCNC: 95 U/L (ref 40–129)
ALT SERPL-CCNC: 6 U/L (ref 10–40)
ANION GAP SERPL CALCULATED.3IONS-SCNC: 11 MMOL/L (ref 3–16)
APTT: 32.9 SEC (ref 26–36)
AST SERPL-CCNC: 12 U/L (ref 15–37)
BASOPHILS ABSOLUTE: 0.1 K/UL (ref 0–0.2)
BASOPHILS RELATIVE PERCENT: 0.9 %
BILIRUB SERPL-MCNC: 0.4 MG/DL (ref 0–1)
BUN BLDV-MCNC: 16 MG/DL (ref 7–20)
CALCIUM SERPL-MCNC: 8.9 MG/DL (ref 8.3–10.6)
CHLORIDE BLD-SCNC: 104 MMOL/L (ref 99–110)
CO2: 23 MMOL/L (ref 21–32)
CREAT SERPL-MCNC: 1.3 MG/DL (ref 0.6–1.2)
EOSINOPHILS ABSOLUTE: 0.2 K/UL (ref 0–0.6)
EOSINOPHILS RELATIVE PERCENT: 2.8 %
GFR AFRICAN AMERICAN: 49
GFR NON-AFRICAN AMERICAN: 40
GLOBULIN: 3.1 G/DL
GLUCOSE BLD-MCNC: 95 MG/DL (ref 70–99)
HCT VFR BLD CALC: 40.5 % (ref 36–48)
HEMOGLOBIN: 12.7 G/DL (ref 12–16)
INR BLD: 1.64 (ref 0.86–1.14)
LYMPHOCYTES ABSOLUTE: 1.2 K/UL (ref 1–5.1)
LYMPHOCYTES RELATIVE PERCENT: 17.4 %
MCH RBC QN AUTO: 29.4 PG (ref 26–34)
MCHC RBC AUTO-ENTMCNC: 31.3 G/DL (ref 31–36)
MCV RBC AUTO: 94 FL (ref 80–100)
MONOCYTES ABSOLUTE: 0.7 K/UL (ref 0–1.3)
MONOCYTES RELATIVE PERCENT: 10.4 %
NEUTROPHILS ABSOLUTE: 4.8 K/UL (ref 1.7–7.7)
NEUTROPHILS RELATIVE PERCENT: 68.5 %
PDW BLD-RTO: 16.1 % (ref 12.4–15.4)
PLATELET # BLD: 171 K/UL (ref 135–450)
PMV BLD AUTO: 10.6 FL (ref 5–10.5)
POTASSIUM SERPL-SCNC: 4.5 MMOL/L (ref 3.5–5.1)
PRO-BNP: 1118 PG/ML (ref 0–124)
PROTHROMBIN TIME: 18.7 SEC (ref 9.8–13)
RBC # BLD: 4.31 M/UL (ref 4–5.2)
SODIUM BLD-SCNC: 138 MMOL/L (ref 136–145)
TOTAL PROTEIN: 6.6 G/DL (ref 6.4–8.2)
TROPONIN: <0.01 NG/ML
WBC # BLD: 7 K/UL (ref 4–11)

## 2019-03-21 PROCEDURE — 70450 CT HEAD/BRAIN W/O DYE: CPT

## 2019-03-21 PROCEDURE — G0378 HOSPITAL OBSERVATION PER HR: HCPCS

## 2019-03-21 PROCEDURE — 83880 ASSAY OF NATRIURETIC PEPTIDE: CPT

## 2019-03-21 PROCEDURE — 93005 ELECTROCARDIOGRAM TRACING: CPT | Performed by: EMERGENCY MEDICINE

## 2019-03-21 PROCEDURE — 85610 PROTHROMBIN TIME: CPT

## 2019-03-21 PROCEDURE — 6370000000 HC RX 637 (ALT 250 FOR IP): Performed by: INTERNAL MEDICINE

## 2019-03-21 PROCEDURE — 85730 THROMBOPLASTIN TIME PARTIAL: CPT

## 2019-03-21 PROCEDURE — 85025 COMPLETE CBC W/AUTO DIFF WBC: CPT

## 2019-03-21 PROCEDURE — 99285 EMERGENCY DEPT VISIT HI MDM: CPT

## 2019-03-21 PROCEDURE — 71046 X-RAY EXAM CHEST 2 VIEWS: CPT

## 2019-03-21 PROCEDURE — 84484 ASSAY OF TROPONIN QUANT: CPT

## 2019-03-21 PROCEDURE — 80053 COMPREHEN METABOLIC PANEL: CPT

## 2019-03-21 RX ORDER — WARFARIN SODIUM 5 MG/1
10 TABLET ORAL
Status: COMPLETED | OUTPATIENT
Start: 2019-03-21 | End: 2019-03-22

## 2019-03-21 RX ORDER — ASPIRIN 81 MG/1
81 TABLET ORAL DAILY
Status: DISCONTINUED | OUTPATIENT
Start: 2019-03-22 | End: 2019-03-23 | Stop reason: HOSPADM

## 2019-03-21 RX ORDER — DULOXETIN HYDROCHLORIDE 60 MG/1
60 CAPSULE, DELAYED RELEASE ORAL DAILY
Status: DISCONTINUED | OUTPATIENT
Start: 2019-03-22 | End: 2019-03-23 | Stop reason: HOSPADM

## 2019-03-21 RX ORDER — PANTOPRAZOLE SODIUM 40 MG/1
40 TABLET, DELAYED RELEASE ORAL
Status: DISCONTINUED | OUTPATIENT
Start: 2019-03-22 | End: 2019-03-23 | Stop reason: HOSPADM

## 2019-03-21 RX ORDER — POTASSIUM CHLORIDE 7.45 MG/ML
10 INJECTION INTRAVENOUS PRN
Status: DISCONTINUED | OUTPATIENT
Start: 2019-03-21 | End: 2019-03-23 | Stop reason: HOSPADM

## 2019-03-21 RX ORDER — SODIUM CHLORIDE 0.9 % (FLUSH) 0.9 %
10 SYRINGE (ML) INJECTION PRN
Status: DISCONTINUED | OUTPATIENT
Start: 2019-03-21 | End: 2019-03-23 | Stop reason: HOSPADM

## 2019-03-21 RX ORDER — SODIUM CHLORIDE 0.9 % (FLUSH) 0.9 %
10 SYRINGE (ML) INJECTION EVERY 12 HOURS SCHEDULED
Status: DISCONTINUED | OUTPATIENT
Start: 2019-03-21 | End: 2019-03-23 | Stop reason: HOSPADM

## 2019-03-21 RX ORDER — BUPROPION HYDROCHLORIDE 75 MG/1
75 TABLET ORAL 2 TIMES DAILY
Status: DISCONTINUED | OUTPATIENT
Start: 2019-03-21 | End: 2019-03-23 | Stop reason: HOSPADM

## 2019-03-21 RX ORDER — DILTIAZEM HYDROCHLORIDE 240 MG/1
240 CAPSULE, COATED, EXTENDED RELEASE ORAL DAILY
Status: DISCONTINUED | OUTPATIENT
Start: 2019-03-22 | End: 2019-03-23 | Stop reason: HOSPADM

## 2019-03-21 RX ORDER — CLONIDINE HYDROCHLORIDE 0.1 MG/1
0.2 TABLET ORAL 2 TIMES DAILY
Status: DISCONTINUED | OUTPATIENT
Start: 2019-03-21 | End: 2019-03-23 | Stop reason: HOSPADM

## 2019-03-21 RX ORDER — ONDANSETRON 2 MG/ML
4 INJECTION INTRAMUSCULAR; INTRAVENOUS EVERY 6 HOURS PRN
Status: DISCONTINUED | OUTPATIENT
Start: 2019-03-21 | End: 2019-03-23 | Stop reason: HOSPADM

## 2019-03-21 RX ORDER — METOPROLOL TARTRATE 50 MG/1
50 TABLET, FILM COATED ORAL 2 TIMES DAILY
Status: DISCONTINUED | OUTPATIENT
Start: 2019-03-21 | End: 2019-03-23 | Stop reason: HOSPADM

## 2019-03-21 RX ORDER — WARFARIN SODIUM 5 MG/1
5 TABLET ORAL DAILY
Status: DISCONTINUED | OUTPATIENT
Start: 2019-03-22 | End: 2019-03-23

## 2019-03-21 RX ORDER — MAGNESIUM SULFATE 1 G/100ML
1 INJECTION INTRAVENOUS PRN
Status: DISCONTINUED | OUTPATIENT
Start: 2019-03-21 | End: 2019-03-23 | Stop reason: HOSPADM

## 2019-03-21 RX ADMIN — METOPROLOL TARTRATE 50 MG: 50 TABLET, FILM COATED ORAL at 23:57

## 2019-03-21 ASSESSMENT — ENCOUNTER SYMPTOMS
ABDOMINAL PAIN: 0
DIARRHEA: 0
NAUSEA: 0
SHORTNESS OF BREATH: 0
COUGH: 0
RHINORRHEA: 0
VOMITING: 0

## 2019-03-21 ASSESSMENT — PAIN SCALES - GENERAL: PAINLEVEL_OUTOF10: 0

## 2019-03-22 DIAGNOSIS — I48.0 PAROXYSMAL ATRIAL FIBRILLATION (HCC): Primary | Chronic | ICD-10-CM

## 2019-03-22 LAB
EKG ATRIAL RATE: 241 BPM
EKG DIAGNOSIS: NORMAL
EKG P AXIS: 260 DEGREES
EKG Q-T INTERVAL: 406 MS
EKG QRS DURATION: 80 MS
EKG QTC CALCULATION (BAZETT): 412 MS
EKG R AXIS: 4 DEGREES
EKG T AXIS: 45 DEGREES
EKG VENTRICULAR RATE: 62 BPM
INR BLD: 1.8 (ref 0.86–1.14)
LV EF: 58 %
LVEF MODALITY: NORMAL
PROTHROMBIN TIME: 20.5 SEC (ref 9.8–13)
TSH REFLEX: 1.45 UIU/ML (ref 0.27–4.2)

## 2019-03-22 PROCEDURE — 93306 TTE W/DOPPLER COMPLETE: CPT

## 2019-03-22 PROCEDURE — 6370000000 HC RX 637 (ALT 250 FOR IP): Performed by: INTERNAL MEDICINE

## 2019-03-22 PROCEDURE — G0378 HOSPITAL OBSERVATION PER HR: HCPCS

## 2019-03-22 PROCEDURE — 99223 1ST HOSP IP/OBS HIGH 75: CPT | Performed by: INTERNAL MEDICINE

## 2019-03-22 PROCEDURE — 36415 COLL VENOUS BLD VENIPUNCTURE: CPT

## 2019-03-22 PROCEDURE — 93010 ELECTROCARDIOGRAM REPORT: CPT | Performed by: INTERNAL MEDICINE

## 2019-03-22 PROCEDURE — 85610 PROTHROMBIN TIME: CPT

## 2019-03-22 PROCEDURE — 2580000003 HC RX 258: Performed by: INTERNAL MEDICINE

## 2019-03-22 PROCEDURE — APPNB60 APP NON BILLABLE TIME 46-60 MINS: Performed by: NURSE PRACTITIONER

## 2019-03-22 PROCEDURE — 84443 ASSAY THYROID STIM HORMONE: CPT

## 2019-03-22 RX ADMIN — METOPROLOL TARTRATE 50 MG: 50 TABLET, FILM COATED ORAL at 09:42

## 2019-03-22 RX ADMIN — Medication 10 ML: at 21:41

## 2019-03-22 RX ADMIN — BUPROPION HYDROCHLORIDE 75 MG: 75 TABLET, FILM COATED ORAL at 09:42

## 2019-03-22 RX ADMIN — DULOXETINE HYDROCHLORIDE 60 MG: 60 CAPSULE, DELAYED RELEASE ORAL at 09:42

## 2019-03-22 RX ADMIN — WARFARIN SODIUM 10 MG: 5 TABLET ORAL at 00:01

## 2019-03-22 RX ADMIN — DILTIAZEM HYDROCHLORIDE 240 MG: 240 CAPSULE, COATED, EXTENDED RELEASE ORAL at 09:42

## 2019-03-22 RX ADMIN — Medication 10 ML: at 00:06

## 2019-03-22 RX ADMIN — BUPROPION HYDROCHLORIDE 75 MG: 75 TABLET, FILM COATED ORAL at 21:40

## 2019-03-22 RX ADMIN — BUPROPION HYDROCHLORIDE 75 MG: 75 TABLET, FILM COATED ORAL at 00:01

## 2019-03-22 RX ADMIN — CLONIDINE HYDROCHLORIDE 0.2 MG: 0.1 TABLET ORAL at 09:42

## 2019-03-22 RX ADMIN — WARFARIN SODIUM 5 MG: 5 TABLET ORAL at 18:09

## 2019-03-22 RX ADMIN — PANTOPRAZOLE SODIUM 40 MG: 40 TABLET, DELAYED RELEASE ORAL at 16:37

## 2019-03-22 RX ADMIN — CLONIDINE HYDROCHLORIDE 0.2 MG: 0.1 TABLET ORAL at 00:01

## 2019-03-22 RX ADMIN — Medication 10 ML: at 09:43

## 2019-03-22 RX ADMIN — PANTOPRAZOLE SODIUM 40 MG: 40 TABLET, DELAYED RELEASE ORAL at 06:05

## 2019-03-22 RX ADMIN — ASPIRIN 81 MG: 81 TABLET, COATED ORAL at 09:42

## 2019-03-22 ASSESSMENT — PAIN SCALES - GENERAL
PAINLEVEL_OUTOF10: 0

## 2019-03-23 VITALS
WEIGHT: 281.2 LBS | DIASTOLIC BLOOD PRESSURE: 80 MMHG | RESPIRATION RATE: 16 BRPM | TEMPERATURE: 97 F | HEART RATE: 87 BPM | OXYGEN SATURATION: 95 % | BODY MASS INDEX: 44.13 KG/M2 | SYSTOLIC BLOOD PRESSURE: 130 MMHG | HEIGHT: 67 IN

## 2019-03-23 LAB
INR BLD: 2.91 (ref 0.86–1.14)
PROTHROMBIN TIME: 33.2 SEC (ref 9.8–13)

## 2019-03-23 PROCEDURE — G0378 HOSPITAL OBSERVATION PER HR: HCPCS

## 2019-03-23 PROCEDURE — 2580000003 HC RX 258: Performed by: INTERNAL MEDICINE

## 2019-03-23 PROCEDURE — 6370000000 HC RX 637 (ALT 250 FOR IP): Performed by: INTERNAL MEDICINE

## 2019-03-23 PROCEDURE — 36415 COLL VENOUS BLD VENIPUNCTURE: CPT

## 2019-03-23 PROCEDURE — 85610 PROTHROMBIN TIME: CPT

## 2019-03-23 RX ORDER — WARFARIN SODIUM 5 MG/1
5 TABLET ORAL DAILY
Status: DISCONTINUED | OUTPATIENT
Start: 2019-03-24 | End: 2019-03-23 | Stop reason: HOSPADM

## 2019-03-23 RX ORDER — WARFARIN SODIUM 2.5 MG/1
2.5 TABLET ORAL
Status: DISCONTINUED | OUTPATIENT
Start: 2019-03-23 | End: 2019-03-23 | Stop reason: HOSPADM

## 2019-03-23 RX ADMIN — ASPIRIN 81 MG: 81 TABLET, COATED ORAL at 09:35

## 2019-03-23 RX ADMIN — DILTIAZEM HYDROCHLORIDE 240 MG: 240 CAPSULE, COATED, EXTENDED RELEASE ORAL at 09:36

## 2019-03-23 RX ADMIN — METOPROLOL TARTRATE 50 MG: 50 TABLET, FILM COATED ORAL at 09:36

## 2019-03-23 RX ADMIN — CLONIDINE HYDROCHLORIDE 0.2 MG: 0.1 TABLET ORAL at 09:35

## 2019-03-23 RX ADMIN — DULOXETINE HYDROCHLORIDE 60 MG: 60 CAPSULE, DELAYED RELEASE ORAL at 09:36

## 2019-03-23 RX ADMIN — PANTOPRAZOLE SODIUM 40 MG: 40 TABLET, DELAYED RELEASE ORAL at 08:28

## 2019-03-23 RX ADMIN — Medication 10 ML: at 09:35

## 2019-03-23 RX ADMIN — BUPROPION HYDROCHLORIDE 75 MG: 75 TABLET, FILM COATED ORAL at 09:36

## 2019-03-23 ASSESSMENT — PAIN SCALES - GENERAL: PAINLEVEL_OUTOF10: 0

## 2019-04-04 ENCOUNTER — OFFICE VISIT (OUTPATIENT)
Dept: INTERNAL MEDICINE CLINIC | Age: 74
End: 2019-04-04
Payer: COMMERCIAL

## 2019-04-04 VITALS
SYSTOLIC BLOOD PRESSURE: 116 MMHG | HEIGHT: 67 IN | BODY MASS INDEX: 44.57 KG/M2 | HEART RATE: 64 BPM | DIASTOLIC BLOOD PRESSURE: 82 MMHG | WEIGHT: 284 LBS

## 2019-04-04 DIAGNOSIS — I48.92 ATRIAL FIBRILLATION AND FLUTTER (HCC): ICD-10-CM

## 2019-04-04 DIAGNOSIS — M25.561 ACUTE PAIN OF RIGHT KNEE: ICD-10-CM

## 2019-04-04 DIAGNOSIS — Z79.01 ANTICOAGULATED ON COUMADIN: ICD-10-CM

## 2019-04-04 DIAGNOSIS — Z09 HOSPITAL DISCHARGE FOLLOW-UP: Primary | ICD-10-CM

## 2019-04-04 DIAGNOSIS — I48.91 ATRIAL FIBRILLATION AND FLUTTER (HCC): ICD-10-CM

## 2019-04-04 LAB
INTERNATIONAL NORMALIZATION RATIO, POC: 2.6
PROTHROMBIN TIME, POC: NORMAL

## 2019-04-04 PROCEDURE — 99214 OFFICE O/P EST MOD 30 MIN: CPT | Performed by: NURSE PRACTITIONER

## 2019-04-04 PROCEDURE — 85610 PROTHROMBIN TIME: CPT | Performed by: NURSE PRACTITIONER

## 2019-04-04 NOTE — PROGRESS NOTES
kg/m². Wt Readings from Last 3 Encounters:   04/04/19 284 lb (128.8 kg)   03/23/19 281 lb 3.2 oz (127.6 kg)   03/04/19 290 lb (131.5 kg)     BP Readings from Last 3 Encounters:   04/04/19 116/82   03/23/19 130/80   03/04/19 132/84        Patient was admitted to [facility] from 3/21/19 to 3/23/19 for dizziness, arrhyhtmia. Inpatient course: Discharge summary reviewed- see chart. Current status: Chronic dizziness persists. No syncope. Right knee pain and swelling since falling, some better. Review of Systems:  A comprehensive review of systems was negative except for: Musculoskeletal: positive for arthralgias    Physical Exam:  Gen: WN/WD, no acute distress  Head: NC/AT  Eyes: no icterus, no conjunctival erythema  ENT: Moist mucous membranes  Neck: supple, no cervical lymphadenopathy  CV: RRR, no murmurs, rubs, gallops  Resp: Nl effort, clear to auscultation bilaterally  Abd: +BS, soft, non tender to palpation  MSK: distal extremities warm, no peripheral edema  Skin: warm, dry  Neuro:Alert, oriented, CN intact  Psych: normal mood, affect        Assessment/Plan:  Brandon Alas was seen today for office visit for anticoagulation management.   Diagnoses and all orders for this visit:    Hospital discharge follow-up  [facility] records reviewed    Atrial fibrillation and flutter ([facility]  - Sounds regular  - Holter in place  - continue anticoagulation    Anticoagulated on Coumadin  - INR therapeutic  - Continue current regimen  -     POCT INR    Acute pain of right knee  - Improving  - Tylenol, heat, ice  - XR if not improving        Diagnostic test results reviewed: inpatient labs    Patient risk of morbidity and mortality: high    Medical Decision Making: high complexity

## 2019-04-26 DIAGNOSIS — I48.0 PAROXYSMAL ATRIAL FIBRILLATION (HCC): Chronic | ICD-10-CM

## 2019-04-26 PROCEDURE — 93268 ECG RECORD/REVIEW: CPT | Performed by: INTERNAL MEDICINE

## 2019-05-09 ENCOUNTER — OFFICE VISIT (OUTPATIENT)
Dept: INTERNAL MEDICINE CLINIC | Age: 74
End: 2019-05-09
Payer: COMMERCIAL

## 2019-05-09 VITALS
BODY MASS INDEX: 45.52 KG/M2 | DIASTOLIC BLOOD PRESSURE: 82 MMHG | HEIGHT: 67 IN | SYSTOLIC BLOOD PRESSURE: 132 MMHG | HEART RATE: 64 BPM | WEIGHT: 290 LBS

## 2019-05-09 DIAGNOSIS — I48.91 ATRIAL FIBRILLATION AND FLUTTER (HCC): Primary | ICD-10-CM

## 2019-05-09 DIAGNOSIS — Z79.01 ANTICOAGULATED ON COUMADIN: ICD-10-CM

## 2019-05-09 DIAGNOSIS — I48.92 ATRIAL FIBRILLATION AND FLUTTER (HCC): Primary | ICD-10-CM

## 2019-05-09 LAB
INTERNATIONAL NORMALIZATION RATIO, POC: 2.2
PROTHROMBIN TIME, POC: NORMAL

## 2019-05-09 PROCEDURE — 85610 PROTHROMBIN TIME: CPT | Performed by: NURSE PRACTITIONER

## 2019-05-09 PROCEDURE — 99212 OFFICE O/P EST SF 10 MIN: CPT | Performed by: NURSE PRACTITIONER

## 2019-05-11 ASSESSMENT — ENCOUNTER SYMPTOMS
GASTROINTESTINAL NEGATIVE: 1
RESPIRATORY NEGATIVE: 1

## 2019-05-11 NOTE — PROGRESS NOTES
SUBJECTIVE:    Patient ID: Frandy Oconnor is a 72 y.o. female. CC: Follow-up atrial flutter, INR mgmt    HPI: The patient presents to the office today for follow-up of atrial fibrillation and for INR mgmt. She has a history of atrial fibrillation. She denies any chest pain or shortness of breath. She is here for INR mgmt. she reports she is taking her medication as directed. Her INR is therapeutic today at 2.2. Current Outpatient Medications   Medication Sig Dispense Refill    ranitidine (ZANTAC) 150 MG tablet Take 1 tablet by mouth daily 30 tablet 5    Cholecalciferol (VITAMIN D3) 74034 units CAPS Take 1 capsule by mouth once a week (Patient taking differently: Take 1 capsule by mouth once a week Indications: Takes on Sunday ) 5 capsule 5    metoprolol tartrate (LOPRESSOR) 25 MG tablet Take 2 tablets by mouth 2 times daily 120 tablet 5    cloNIDine (CATAPRES) 0.2 MG tablet Take 1 tablet by mouth 2 times daily 60 tablet 5    DULoxetine (CYMBALTA) 60 MG extended release capsule Take 1 capsule by mouth daily 30 capsule 5    warfarin (COUMADIN) 5 MG tablet Take 1 tablet by mouth daily 90 tablet 3    CARTIA  MG extended release capsule TAKE 1 CAPSULE BY MOUTH DAILY 90 capsule 1    pantoprazole (PROTONIX) 40 MG tablet Take 40 mg by mouth 2 times daily      traMADol (ULTRAM) 50 MG tablet Take 50 mg by mouth every 6 hours as needed for Pain. Ozell Lung buPROPion (WELLBUTRIN) 75 MG tablet TAKE 1 TABLET BY MOUTH TWICE DAILY 60 tablet 2    Biotin 1000 MCG TABS Take by mouth      ferrous sulfate 325 (65 Fe) MG EC tablet Take 325 mg by mouth daily (with breakfast)       aspirin 81 MG tablet Take 81 mg by mouth daily       No current facility-administered medications for this visit. Review of Systems   Constitutional: Negative for chills and fever. Respiratory: Negative. Cardiovascular: Negative. Gastrointestinal: Negative. Genitourinary: Negative.

## 2019-06-01 DIAGNOSIS — F33.2 SEVERE EPISODE OF RECURRENT MAJOR DEPRESSIVE DISORDER, WITHOUT PSYCHOTIC FEATURES (HCC): ICD-10-CM

## 2019-06-01 DIAGNOSIS — F33.8 SEASONAL AFFECTIVE DISORDER (HCC): ICD-10-CM

## 2019-06-01 DIAGNOSIS — I10 ESSENTIAL HYPERTENSION: Chronic | ICD-10-CM

## 2019-06-03 RX ORDER — CLONIDINE HYDROCHLORIDE 0.2 MG/1
TABLET ORAL
Qty: 180 TABLET | Refills: 5 | Status: SHIPPED | OUTPATIENT
Start: 2019-06-03 | End: 2020-08-25

## 2019-06-03 RX ORDER — DULOXETIN HYDROCHLORIDE 60 MG/1
60 CAPSULE, DELAYED RELEASE ORAL DAILY
Qty: 90 CAPSULE | Refills: 5 | Status: SHIPPED | OUTPATIENT
Start: 2019-06-03 | End: 2020-07-06

## 2019-06-03 RX ORDER — BUPROPION HYDROCHLORIDE 75 MG/1
TABLET ORAL
Qty: 180 TABLET | Refills: 0 | Status: SHIPPED | OUTPATIENT
Start: 2019-06-03 | End: 2019-09-23

## 2019-06-10 ENCOUNTER — OFFICE VISIT (OUTPATIENT)
Dept: INTERNAL MEDICINE CLINIC | Age: 74
End: 2019-06-10
Payer: COMMERCIAL

## 2019-06-10 VITALS
BODY MASS INDEX: 44.73 KG/M2 | DIASTOLIC BLOOD PRESSURE: 78 MMHG | WEIGHT: 285 LBS | SYSTOLIC BLOOD PRESSURE: 116 MMHG | HEART RATE: 56 BPM | HEIGHT: 67 IN

## 2019-06-10 DIAGNOSIS — E66.01 MORBID OBESITY WITH BMI OF 40.0-44.9, ADULT (HCC): ICD-10-CM

## 2019-06-10 DIAGNOSIS — I48.91 ATRIAL FIBRILLATION AND FLUTTER (HCC): Primary | ICD-10-CM

## 2019-06-10 DIAGNOSIS — M15.9 PRIMARY OSTEOARTHRITIS INVOLVING MULTIPLE JOINTS: ICD-10-CM

## 2019-06-10 DIAGNOSIS — I48.92 ATRIAL FIBRILLATION AND FLUTTER (HCC): Primary | ICD-10-CM

## 2019-06-10 DIAGNOSIS — Z79.01 ANTICOAGULATED ON COUMADIN: ICD-10-CM

## 2019-06-10 LAB
INTERNATIONAL NORMALIZATION RATIO, POC: 2.2
PROTHROMBIN TIME, POC: NORMAL

## 2019-06-10 PROCEDURE — 99213 OFFICE O/P EST LOW 20 MIN: CPT | Performed by: NURSE PRACTITIONER

## 2019-06-10 PROCEDURE — 85610 PROTHROMBIN TIME: CPT | Performed by: NURSE PRACTITIONER

## 2019-06-10 RX ORDER — TRAMADOL HYDROCHLORIDE 50 MG/1
50 TABLET ORAL DAILY PRN
Qty: 30 TABLET | Refills: 0 | Status: SHIPPED | OUTPATIENT
Start: 2019-06-10 | End: 2020-04-09 | Stop reason: SDUPTHER

## 2019-06-12 ASSESSMENT — ENCOUNTER SYMPTOMS
RESPIRATORY NEGATIVE: 1
GASTROINTESTINAL NEGATIVE: 1

## 2019-06-13 NOTE — PROGRESS NOTES
SUBJECTIVE:    Patient ID: Ghislaine Farley is a 72 y.o. female. CC: Follow-up atrial flutter, INR mgmt, arthritis     HPI: The patient presents to the office today for follow-up of atrial fibrillation and for INR mgmt. She has a history of atrial fibrillation. She denies any chest pain or shortness of breath. She is here for INR mgmt. she reports she is taking her medication as directed. Her INR is therapeutic today at 2.2. She has history of arthritis. More joint pain of late. No known injuries or traumas. She uses Tramadol for breakthrough pain as she is unable to use NSAID. Current Outpatient Medications   Medication Sig Dispense Refill    traMADol (ULTRAM) 50 MG tablet Take 1 tablet by mouth daily as needed for Pain for up to 30 days. 30 tablet 0    DULoxetine (CYMBALTA) 60 MG extended release capsule TAKE 1 CAPSULE BY MOUTH DAILY 90 capsule 5    buPROPion (WELLBUTRIN) 75 MG tablet TAKE 1 TABLET BY MOUTH TWICE DAILY 180 tablet 0    cloNIDine (CATAPRES) 0.2 MG tablet TAKE 1 TABLET BY MOUTH TWICE DAILY 180 tablet 5    ranitidine (ZANTAC) 150 MG tablet Take 1 tablet by mouth daily 30 tablet 5    Cholecalciferol (VITAMIN D3) 05802 units CAPS Take 1 capsule by mouth once a week (Patient taking differently: Take 1 capsule by mouth once a week Indications: Takes on Sunday ) 5 capsule 5    metoprolol tartrate (LOPRESSOR) 25 MG tablet Take 2 tablets by mouth 2 times daily 120 tablet 5    warfarin (COUMADIN) 5 MG tablet Take 1 tablet by mouth daily 90 tablet 3    CARTIA  MG extended release capsule TAKE 1 CAPSULE BY MOUTH DAILY 90 capsule 1    pantoprazole (PROTONIX) 40 MG tablet Take 40 mg by mouth 2 times daily      traMADol (ULTRAM) 50 MG tablet Take 50 mg by mouth every 6 hours as needed for Pain. Angelo Garcia Biotin 1000 MCG TABS Take by mouth      ferrous sulfate 325 (65 Fe) MG EC tablet Take 325 mg by mouth daily (with breakfast)       aspirin 81 MG tablet Take 81 mg by mouth

## 2019-06-25 DIAGNOSIS — I10 ESSENTIAL HYPERTENSION: Chronic | ICD-10-CM

## 2019-06-25 DIAGNOSIS — I48.92 ATRIAL FIBRILLATION AND FLUTTER (HCC): ICD-10-CM

## 2019-06-25 DIAGNOSIS — I48.91 ATRIAL FIBRILLATION AND FLUTTER (HCC): ICD-10-CM

## 2019-07-01 DIAGNOSIS — I48.91 ATRIAL FIBRILLATION, UNSPECIFIED TYPE (HCC): ICD-10-CM

## 2019-07-01 RX ORDER — DILTIAZEM HYDROCHLORIDE 240 MG/1
240 CAPSULE, EXTENDED RELEASE ORAL DAILY
Qty: 90 CAPSULE | Refills: 3 | Status: SHIPPED | OUTPATIENT
Start: 2019-07-01 | End: 2020-07-06

## 2019-07-09 ENCOUNTER — OFFICE VISIT (OUTPATIENT)
Dept: INTERNAL MEDICINE CLINIC | Age: 74
End: 2019-07-09
Payer: COMMERCIAL

## 2019-07-09 VITALS
SYSTOLIC BLOOD PRESSURE: 122 MMHG | HEIGHT: 67 IN | HEART RATE: 72 BPM | WEIGHT: 286 LBS | DIASTOLIC BLOOD PRESSURE: 82 MMHG | BODY MASS INDEX: 44.89 KG/M2

## 2019-07-09 DIAGNOSIS — Z79.01 ANTICOAGULATED ON COUMADIN: ICD-10-CM

## 2019-07-09 DIAGNOSIS — I48.91 ATRIAL FIBRILLATION AND FLUTTER (HCC): Primary | ICD-10-CM

## 2019-07-09 DIAGNOSIS — I48.92 ATRIAL FIBRILLATION AND FLUTTER (HCC): Primary | ICD-10-CM

## 2019-07-09 LAB
INTERNATIONAL NORMALIZATION RATIO, POC: 2
PROTHROMBIN TIME, POC: NORMAL

## 2019-07-09 PROCEDURE — 3288F FALL RISK ASSESSMENT DOCD: CPT | Performed by: NURSE PRACTITIONER

## 2019-07-09 PROCEDURE — 85610 PROTHROMBIN TIME: CPT | Performed by: NURSE PRACTITIONER

## 2019-07-09 PROCEDURE — 99212 OFFICE O/P EST SF 10 MIN: CPT | Performed by: NURSE PRACTITIONER

## 2019-07-09 ASSESSMENT — ENCOUNTER SYMPTOMS
GASTROINTESTINAL NEGATIVE: 1
RESPIRATORY NEGATIVE: 1

## 2019-07-09 NOTE — PROGRESS NOTES
SUBJECTIVE:    Patient ID: Kobe Bryant is a 72 y.o. female. CC: Follow-up atrial flutter, INR mgmt    HPI: The patient presents to the office today for follow-up of atrial fibrillation and for INR mgmt. She has a history of atrial fibrillation. She denies any chest pain or shortness of breath. She is here for INR mgmt. she reports she is taking her medication as directed. Her INR is therapeutic today at 2.0. Current Outpatient Medications   Medication Sig Dispense Refill    CARTIA  MG extended release capsule TAKE 1 CAPSULE BY MOUTH DAILY 90 capsule 3    metoprolol tartrate (LOPRESSOR) 25 MG tablet TAKE 2 TABLETS BY MOUTH TWICE DAILY 360 tablet 3    traMADol (ULTRAM) 50 MG tablet Take 1 tablet by mouth daily as needed for Pain for up to 30 days. 30 tablet 0    DULoxetine (CYMBALTA) 60 MG extended release capsule TAKE 1 CAPSULE BY MOUTH DAILY 90 capsule 5    buPROPion (WELLBUTRIN) 75 MG tablet TAKE 1 TABLET BY MOUTH TWICE DAILY 180 tablet 0    cloNIDine (CATAPRES) 0.2 MG tablet TAKE 1 TABLET BY MOUTH TWICE DAILY 180 tablet 5    ranitidine (ZANTAC) 150 MG tablet Take 1 tablet by mouth daily 30 tablet 5    Cholecalciferol (VITAMIN D3) 67630 units CAPS Take 1 capsule by mouth once a week (Patient taking differently: Take 1 capsule by mouth once a week Indications: Takes on Sunday ) 5 capsule 5    metoprolol tartrate (LOPRESSOR) 25 MG tablet Take 2 tablets by mouth 2 times daily 120 tablet 5    warfarin (COUMADIN) 5 MG tablet Take 1 tablet by mouth daily 90 tablet 3    pantoprazole (PROTONIX) 40 MG tablet Take 40 mg by mouth 2 times daily      Biotin 1000 MCG TABS Take by mouth      ferrous sulfate 325 (65 Fe) MG EC tablet Take 325 mg by mouth daily (with breakfast)       aspirin 81 MG tablet Take 81 mg by mouth daily       No current facility-administered medications for this visit. Review of Systems   Constitutional: Negative for chills and fever.    Respiratory:

## 2019-08-09 ENCOUNTER — OFFICE VISIT (OUTPATIENT)
Dept: INTERNAL MEDICINE CLINIC | Age: 74
End: 2019-08-09
Payer: COMMERCIAL

## 2019-08-09 VITALS
SYSTOLIC BLOOD PRESSURE: 136 MMHG | HEART RATE: 112 BPM | BODY MASS INDEX: 44.42 KG/M2 | DIASTOLIC BLOOD PRESSURE: 88 MMHG | WEIGHT: 283 LBS | HEIGHT: 67 IN

## 2019-08-09 DIAGNOSIS — I48.92 ATRIAL FIBRILLATION AND FLUTTER (HCC): Primary | ICD-10-CM

## 2019-08-09 DIAGNOSIS — I48.91 ATRIAL FIBRILLATION AND FLUTTER (HCC): Primary | ICD-10-CM

## 2019-08-09 DIAGNOSIS — Z79.01 ANTICOAGULATED ON COUMADIN: ICD-10-CM

## 2019-08-09 LAB
INTERNATIONAL NORMALIZATION RATIO, POC: 1.5
PROTHROMBIN TIME, POC: NORMAL

## 2019-08-09 PROCEDURE — 99212 OFFICE O/P EST SF 10 MIN: CPT | Performed by: NURSE PRACTITIONER

## 2019-08-09 PROCEDURE — 85610 PROTHROMBIN TIME: CPT | Performed by: NURSE PRACTITIONER

## 2019-08-09 ASSESSMENT — ENCOUNTER SYMPTOMS
RESPIRATORY NEGATIVE: 1
GASTROINTESTINAL NEGATIVE: 1

## 2019-08-23 ENCOUNTER — OFFICE VISIT (OUTPATIENT)
Dept: INTERNAL MEDICINE CLINIC | Age: 74
End: 2019-08-23
Payer: COMMERCIAL

## 2019-08-23 VITALS
BODY MASS INDEX: 44.57 KG/M2 | HEIGHT: 67 IN | DIASTOLIC BLOOD PRESSURE: 84 MMHG | HEART RATE: 64 BPM | WEIGHT: 284 LBS | SYSTOLIC BLOOD PRESSURE: 118 MMHG

## 2019-08-23 DIAGNOSIS — I48.92 ATRIAL FIBRILLATION AND FLUTTER (HCC): Primary | ICD-10-CM

## 2019-08-23 DIAGNOSIS — Z79.01 ANTICOAGULATED ON COUMADIN: ICD-10-CM

## 2019-08-23 DIAGNOSIS — I48.91 ATRIAL FIBRILLATION AND FLUTTER (HCC): Primary | ICD-10-CM

## 2019-08-23 LAB
INTERNATIONAL NORMALIZATION RATIO, POC: 2.4
PROTHROMBIN TIME, POC: NORMAL

## 2019-08-23 PROCEDURE — 99212 OFFICE O/P EST SF 10 MIN: CPT | Performed by: NURSE PRACTITIONER

## 2019-08-23 PROCEDURE — 85610 PROTHROMBIN TIME: CPT | Performed by: NURSE PRACTITIONER

## 2019-08-23 ASSESSMENT — ENCOUNTER SYMPTOMS
RESPIRATORY NEGATIVE: 1
GASTROINTESTINAL NEGATIVE: 1

## 2019-08-31 DIAGNOSIS — I10 ESSENTIAL HYPERTENSION: Chronic | ICD-10-CM

## 2019-08-31 DIAGNOSIS — I48.91 ATRIAL FIBRILLATION AND FLUTTER (HCC): ICD-10-CM

## 2019-08-31 DIAGNOSIS — I48.92 ATRIAL FIBRILLATION AND FLUTTER (HCC): ICD-10-CM

## 2019-09-23 ENCOUNTER — OFFICE VISIT (OUTPATIENT)
Dept: INTERNAL MEDICINE CLINIC | Age: 74
End: 2019-09-23
Payer: COMMERCIAL

## 2019-09-23 VITALS
HEART RATE: 68 BPM | DIASTOLIC BLOOD PRESSURE: 72 MMHG | HEIGHT: 67 IN | SYSTOLIC BLOOD PRESSURE: 114 MMHG | WEIGHT: 286 LBS | BODY MASS INDEX: 44.89 KG/M2

## 2019-09-23 DIAGNOSIS — Z79.01 ANTICOAGULATED ON COUMADIN: ICD-10-CM

## 2019-09-23 DIAGNOSIS — I48.91 ATRIAL FIBRILLATION AND FLUTTER (HCC): ICD-10-CM

## 2019-09-23 DIAGNOSIS — Z00.00 ROUTINE GENERAL MEDICAL EXAMINATION AT A HEALTH CARE FACILITY: Primary | ICD-10-CM

## 2019-09-23 DIAGNOSIS — F33.2 SEVERE EPISODE OF RECURRENT MAJOR DEPRESSIVE DISORDER, WITHOUT PSYCHOTIC FEATURES (HCC): ICD-10-CM

## 2019-09-23 DIAGNOSIS — I48.92 ATRIAL FIBRILLATION AND FLUTTER (HCC): ICD-10-CM

## 2019-09-23 DIAGNOSIS — F33.8 SEASONAL AFFECTIVE DISORDER (HCC): ICD-10-CM

## 2019-09-23 LAB
INTERNATIONAL NORMALIZATION RATIO, POC: 2.6
PROTHROMBIN TIME, POC: NORMAL

## 2019-09-23 PROCEDURE — 85610 PROTHROMBIN TIME: CPT | Performed by: NURSE PRACTITIONER

## 2019-09-23 PROCEDURE — G0438 PPPS, INITIAL VISIT: HCPCS | Performed by: NURSE PRACTITIONER

## 2019-09-23 RX ORDER — BUPROPION HYDROCHLORIDE 75 MG/1
75 TABLET ORAL 3 TIMES DAILY
Qty: 270 TABLET | Refills: 3 | Status: SHIPPED | OUTPATIENT
Start: 2019-09-23 | End: 2020-01-28 | Stop reason: DRUGHIGH

## 2019-09-23 ASSESSMENT — PATIENT HEALTH QUESTIONNAIRE - PHQ9
SUM OF ALL RESPONSES TO PHQ QUESTIONS 1-9: 2
SUM OF ALL RESPONSES TO PHQ QUESTIONS 1-9: 2

## 2019-09-23 ASSESSMENT — LIFESTYLE VARIABLES: HOW OFTEN DO YOU HAVE A DRINK CONTAINING ALCOHOL: 0

## 2019-09-23 NOTE — PATIENT INSTRUCTIONS
Personalized Preventive Plan for Marlyn Williamson - 9/23/2019  Medicare offers a range of preventive health benefits. Some of the tests and screenings are paid in full while other may be subject to a deductible, co-insurance, and/or copay. Some of these benefits include a comprehensive review of your medical history including lifestyle, illnesses that may run in your family, and various assessments and screenings as appropriate. After reviewing your medical record and screening and assessments performed today your provider may have ordered immunizations, labs, imaging, and/or referrals for you. A list of these orders (if applicable) as well as your Preventive Care list are included within your After Visit Summary for your review. Other Preventive Recommendations:    · A preventive eye exam performed by an eye specialist is recommended every 1-2 years to screen for glaucoma; cataracts, macular degeneration, and other eye disorders. · A preventive dental visit is recommended every 6 months. · Try to get at least 150 minutes of exercise per week or 10,000 steps per day on a pedometer . · Order or download the FREE \"Exercise & Physical Activity: Your Everyday Guide\" from [employer]. Call 4-481.351.9054 or search [employer] online. · You need 8962-7689 mg of calcium and 0114-7061 IU of vitamin D per day. It is possible to meet your calcium requirement with diet alone, but a vitamin D supplement is usually necessary to meet this goal.  · When exposed to the sun, use a sunscreen that protects against both UVA and UVB radiation with an SPF of 30 or greater. Reapply every 2 to 3 hours or after sweating, drying off with a towel, or swimming. · Always wear a seat belt when traveling in a car. Always wear a helmet when riding a bicycle or motorcycle.

## 2019-10-21 ENCOUNTER — OFFICE VISIT (OUTPATIENT)
Dept: INTERNAL MEDICINE CLINIC | Age: 74
End: 2019-10-21
Payer: COMMERCIAL

## 2019-10-21 VITALS
DIASTOLIC BLOOD PRESSURE: 64 MMHG | BODY MASS INDEX: 44.95 KG/M2 | SYSTOLIC BLOOD PRESSURE: 126 MMHG | WEIGHT: 287 LBS | HEART RATE: 84 BPM

## 2019-10-21 DIAGNOSIS — I48.92 ATRIAL FIBRILLATION AND FLUTTER (HCC): Primary | ICD-10-CM

## 2019-10-21 DIAGNOSIS — Z23 NEED FOR INFLUENZA VACCINATION: ICD-10-CM

## 2019-10-21 DIAGNOSIS — I48.91 ATRIAL FIBRILLATION AND FLUTTER (HCC): Primary | ICD-10-CM

## 2019-10-21 DIAGNOSIS — Z79.01 ANTICOAGULATED ON COUMADIN: ICD-10-CM

## 2019-10-21 LAB
INTERNATIONAL NORMALIZATION RATIO, POC: 2.1
PROTHROMBIN TIME, POC: NORMAL

## 2019-10-21 PROCEDURE — 85610 PROTHROMBIN TIME: CPT | Performed by: NURSE PRACTITIONER

## 2019-10-21 PROCEDURE — G0008 ADMIN INFLUENZA VIRUS VAC: HCPCS | Performed by: NURSE PRACTITIONER

## 2019-10-21 PROCEDURE — 90653 IIV ADJUVANT VACCINE IM: CPT | Performed by: NURSE PRACTITIONER

## 2019-10-21 PROCEDURE — 99212 OFFICE O/P EST SF 10 MIN: CPT | Performed by: NURSE PRACTITIONER

## 2019-10-21 ASSESSMENT — ENCOUNTER SYMPTOMS
GASTROINTESTINAL NEGATIVE: 1
RESPIRATORY NEGATIVE: 1

## 2019-11-19 ENCOUNTER — OFFICE VISIT (OUTPATIENT)
Dept: INTERNAL MEDICINE CLINIC | Age: 74
End: 2019-11-19
Payer: COMMERCIAL

## 2019-11-19 VITALS
WEIGHT: 285 LBS | DIASTOLIC BLOOD PRESSURE: 96 MMHG | HEIGHT: 67 IN | SYSTOLIC BLOOD PRESSURE: 146 MMHG | HEART RATE: 72 BPM | BODY MASS INDEX: 44.73 KG/M2

## 2019-11-19 DIAGNOSIS — I48.91 ATRIAL FIBRILLATION AND FLUTTER (HCC): Primary | ICD-10-CM

## 2019-11-19 DIAGNOSIS — I48.92 ATRIAL FIBRILLATION AND FLUTTER (HCC): Primary | ICD-10-CM

## 2019-11-19 DIAGNOSIS — Z79.01 ANTICOAGULATED ON COUMADIN: ICD-10-CM

## 2019-11-19 LAB
INTERNATIONAL NORMALIZATION RATIO, POC: 2
PROTHROMBIN TIME, POC: NORMAL

## 2019-11-19 PROCEDURE — 99212 OFFICE O/P EST SF 10 MIN: CPT | Performed by: NURSE PRACTITIONER

## 2019-11-19 PROCEDURE — 85610 PROTHROMBIN TIME: CPT | Performed by: NURSE PRACTITIONER

## 2019-11-19 ASSESSMENT — ENCOUNTER SYMPTOMS
GASTROINTESTINAL NEGATIVE: 1
RESPIRATORY NEGATIVE: 1

## 2019-12-13 ENCOUNTER — NURSE TRIAGE (OUTPATIENT)
Dept: OTHER | Facility: CLINIC | Age: 74
End: 2019-12-13

## 2019-12-13 ENCOUNTER — OFFICE VISIT (OUTPATIENT)
Dept: INTERNAL MEDICINE CLINIC | Age: 74
End: 2019-12-13
Payer: COMMERCIAL

## 2019-12-13 VITALS
OXYGEN SATURATION: 93 % | SYSTOLIC BLOOD PRESSURE: 150 MMHG | BODY MASS INDEX: 43.7 KG/M2 | DIASTOLIC BLOOD PRESSURE: 90 MMHG | TEMPERATURE: 98.5 F | WEIGHT: 279 LBS | HEART RATE: 81 BPM

## 2019-12-13 DIAGNOSIS — R05.9 COUGH: Primary | ICD-10-CM

## 2019-12-13 DIAGNOSIS — R06.2 WHEEZING: ICD-10-CM

## 2019-12-13 PROCEDURE — 94640 AIRWAY INHALATION TREATMENT: CPT | Performed by: NURSE PRACTITIONER

## 2019-12-13 PROCEDURE — 99213 OFFICE O/P EST LOW 20 MIN: CPT | Performed by: NURSE PRACTITIONER

## 2019-12-13 RX ORDER — AMOXICILLIN AND CLAVULANATE POTASSIUM 875; 125 MG/1; MG/1
1 TABLET, FILM COATED ORAL 2 TIMES DAILY
Qty: 14 TABLET | Refills: 0 | Status: SHIPPED | OUTPATIENT
Start: 2019-12-13 | End: 2019-12-20

## 2019-12-13 RX ORDER — ALBUTEROL SULFATE 2.5 MG/3ML
2.5 SOLUTION RESPIRATORY (INHALATION) ONCE
Status: COMPLETED | OUTPATIENT
Start: 2019-12-13 | End: 2019-12-13

## 2019-12-13 RX ADMIN — ALBUTEROL SULFATE 2.5 MG: 2.5 SOLUTION RESPIRATORY (INHALATION) at 15:03

## 2019-12-13 SDOH — ECONOMIC STABILITY: TRANSPORTATION INSECURITY
IN THE PAST 12 MONTHS, HAS LACK OF TRANSPORTATION KEPT YOU FROM MEETINGS, WORK, OR FROM GETTING THINGS NEEDED FOR DAILY LIVING?: NO

## 2019-12-13 SDOH — ECONOMIC STABILITY: FOOD INSECURITY: WITHIN THE PAST 12 MONTHS, YOU WORRIED THAT YOUR FOOD WOULD RUN OUT BEFORE YOU GOT MONEY TO BUY MORE.: NEVER TRUE

## 2019-12-13 SDOH — ECONOMIC STABILITY: INCOME INSECURITY: HOW HARD IS IT FOR YOU TO PAY FOR THE VERY BASICS LIKE FOOD, HOUSING, MEDICAL CARE, AND HEATING?: NOT HARD AT ALL

## 2019-12-13 SDOH — ECONOMIC STABILITY: TRANSPORTATION INSECURITY
IN THE PAST 12 MONTHS, HAS THE LACK OF TRANSPORTATION KEPT YOU FROM MEDICAL APPOINTMENTS OR FROM GETTING MEDICATIONS?: NO

## 2019-12-13 SDOH — ECONOMIC STABILITY: FOOD INSECURITY: WITHIN THE PAST 12 MONTHS, THE FOOD YOU BOUGHT JUST DIDN'T LAST AND YOU DIDN'T HAVE MONEY TO GET MORE.: NEVER TRUE

## 2019-12-13 ASSESSMENT — ENCOUNTER SYMPTOMS
PHOTOPHOBIA: 0
VOMITING: 0
ABDOMINAL PAIN: 0
RHINORRHEA: 1
TROUBLE SWALLOWING: 0
CONSTIPATION: 0
EYE PAIN: 0
COUGH: 1
SINUS PRESSURE: 1
SINUS PAIN: 0
DIARRHEA: 0
NAUSEA: 0
EYE REDNESS: 0
EYE ITCHING: 0
SHORTNESS OF BREATH: 1
EYE DISCHARGE: 0
WHEEZING: 1
SORE THROAT: 1

## 2019-12-18 ENCOUNTER — HOSPITAL ENCOUNTER (OUTPATIENT)
Dept: MAMMOGRAPHY | Age: 74
Discharge: HOME | End: 2019-12-22
Payer: COMMERCIAL

## 2019-12-18 DIAGNOSIS — Z12.31 VISIT FOR SCREENING MAMMOGRAM: ICD-10-CM

## 2019-12-26 ENCOUNTER — OFFICE VISIT (OUTPATIENT)
Dept: INTERNAL MEDICINE CLINIC | Age: 74
End: 2019-12-26
Payer: COMMERCIAL

## 2019-12-26 VITALS
DIASTOLIC BLOOD PRESSURE: 84 MMHG | SYSTOLIC BLOOD PRESSURE: 132 MMHG | BODY MASS INDEX: 45.04 KG/M2 | WEIGHT: 287 LBS | HEART RATE: 76 BPM | HEIGHT: 67 IN

## 2019-12-26 DIAGNOSIS — I48.92 ATRIAL FIBRILLATION AND FLUTTER (HCC): Primary | ICD-10-CM

## 2019-12-26 DIAGNOSIS — I48.91 ATRIAL FIBRILLATION AND FLUTTER (HCC): Primary | ICD-10-CM

## 2019-12-26 DIAGNOSIS — Z79.01 ANTICOAGULATED ON COUMADIN: ICD-10-CM

## 2019-12-26 LAB
INTERNATIONAL NORMALIZATION RATIO, POC: 1
PROTHROMBIN TIME, POC: ABNORMAL

## 2019-12-26 PROCEDURE — 85610 PROTHROMBIN TIME: CPT | Performed by: NURSE PRACTITIONER

## 2019-12-26 PROCEDURE — 99212 OFFICE O/P EST SF 10 MIN: CPT | Performed by: NURSE PRACTITIONER

## 2019-12-26 RX ORDER — CHOLECALCIFEROL (VITAMIN D3) 1250 MCG
1 CAPSULE ORAL WEEKLY
Qty: 3 CAPSULE | Refills: 3 | Status: SHIPPED | OUTPATIENT
Start: 2019-12-26 | End: 2020-04-20

## 2019-12-26 ASSESSMENT — ENCOUNTER SYMPTOMS
GASTROINTESTINAL NEGATIVE: 1
COUGH: 1

## 2020-01-28 ENCOUNTER — OFFICE VISIT (OUTPATIENT)
Dept: INTERNAL MEDICINE CLINIC | Age: 75
End: 2020-01-28
Payer: COMMERCIAL

## 2020-01-28 VITALS — HEART RATE: 88 BPM | SYSTOLIC BLOOD PRESSURE: 160 MMHG | DIASTOLIC BLOOD PRESSURE: 92 MMHG

## 2020-01-28 LAB
INTERNATIONAL NORMALIZATION RATIO, POC: 1.5
PROTHROMBIN TIME, POC: NORMAL

## 2020-01-28 PROCEDURE — 99214 OFFICE O/P EST MOD 30 MIN: CPT | Performed by: NURSE PRACTITIONER

## 2020-01-28 PROCEDURE — 85610 PROTHROMBIN TIME: CPT | Performed by: NURSE PRACTITIONER

## 2020-01-28 RX ORDER — BUPROPION HYDROCHLORIDE 150 MG/1
150 TABLET, EXTENDED RELEASE ORAL 2 TIMES DAILY
Qty: 60 TABLET | Refills: 3 | Status: SHIPPED | OUTPATIENT
Start: 2020-01-28 | End: 2021-01-05 | Stop reason: SDUPTHER

## 2020-01-28 ASSESSMENT — ENCOUNTER SYMPTOMS
GASTROINTESTINAL NEGATIVE: 1
VOICE CHANGE: 1
RESPIRATORY NEGATIVE: 1

## 2020-01-28 NOTE — PROGRESS NOTES
SUBJECTIVE:    Patient ID: Kelsey Kelsey is a 69 y.o. female. CC: Atrial fibrillation, chronic anticoagulation, hoarseness, depression    HPI: The patient presents to the office today for management of atrial fibrillation and chronic anticoagulation. She also has acute and chronic concerns to discuss today. Atrial fibrillation: She denies chest pain or shortness of breath. She is compliant with her medication regimen. Chronic anticoagulation: She is compliant with warfarin therapy. Her INR is low today at 1.5. It was previously low which was felt to be related to a course of antibiotics she had taken and had her warfarin adjusted while she was taking this. She denies any missed doses. Hoarseness: Patient is noticeably hoarse. She states that this is been present for at least 4-6 weeks. Seems to worsen as the day goes on. She otherwise reports resolution of her respiratory symptoms following antibiotic. Depression: She has significant worsening her depression. She attributes this to family issues. She states her daughter \"is doing drugs and was arrested. \"  She recently bailed her daughter out of [employer]. Because of the issues, she has been raising her 80-year-old granddaughter who is trying to learn how to drive. She has been taking Cymbalta and Wellbutrin.       Past Medical History:   Diagnosis Date    Arthritis     Hypertension     Kidney disease     Pneumonia     Sleep apnea     no c-pap        Current Outpatient Medications   Medication Sig Dispense Refill    buPROPion (WELLBUTRIN SR) 150 MG extended release tablet Take 1 tablet by mouth 2 times daily 60 tablet 3    Cholecalciferol (VITAMIN D3) 1.25 MG (44284 UT) CAPS Take 1 capsule by mouth once a week Indications: Takes on Sunday 3 capsule 3    metoprolol tartrate (LOPRESSOR) 25 MG tablet TAKE 2 TABLETS BY MOUTH TWICE DAILY 360 tablet 3    CARTIA  MG extended release capsule TAKE 1 CAPSULE BY MOUTH DAILY 90 capsule 3  DULoxetine (CYMBALTA) 60 MG extended release capsule TAKE 1 CAPSULE BY MOUTH DAILY 90 capsule 5    cloNIDine (CATAPRES) 0.2 MG tablet TAKE 1 TABLET BY MOUTH TWICE DAILY 180 tablet 5    warfarin (COUMADIN) 5 MG tablet Take 1 tablet by mouth daily 90 tablet 3    Biotin 1000 MCG TABS Take by mouth      ferrous sulfate 325 (65 Fe) MG EC tablet Take 325 mg by mouth daily (with breakfast)       aspirin 81 MG tablet Take 81 mg by mouth daily       No current facility-administered medications for this visit. Review of Systems   Constitutional: Negative for chills. HENT: Positive for voice change. Respiratory: Negative. Cardiovascular: Negative. Gastrointestinal: Negative. Genitourinary: Negative. Musculoskeletal: Negative. Skin: Negative. Psychiatric/Behavioral: Positive for dysphoric mood. OBJECTIVE:  Physical Exam  Vitals signs reviewed. Constitutional:       General: She is not in acute distress. Appearance: She is well-developed. She is not diaphoretic. HENT:      Head: Normocephalic and atraumatic. Eyes:      General: No scleral icterus. Conjunctiva/sclera: Conjunctivae normal.   Neck:      Musculoskeletal: Neck supple. Vascular: No JVD. Cardiovascular:      Rate and Rhythm: Normal rate and regular rhythm. Pulmonary:      Effort: Pulmonary effort is normal. No respiratory distress. Breath sounds: Normal breath sounds. No wheezing. Abdominal:      General: There is no distension. Palpations: Abdomen is soft. Tenderness: There is no abdominal tenderness. There is no guarding or rebound. Musculoskeletal: Normal range of motion. Skin:     General: Skin is warm and dry. Neurological:      Mental Status: She is alert and oriented to person, place, and time. Psychiatric:         Mood and Affect: Mood is depressed. Affect is tearful. Behavior: Behavior normal.         Thought Content:  Thought content normal.        BP (!) 160/92   Pulse 88      PHQ Scores 9/23/2019 12/7/2018 6/5/2018   PHQ2 Score 2 2 1   PHQ9 Score 2 2 1     Interpretation of Total Score Depression Severity: 1-4 = Minimal depression, 5-9 = Mild depression, 10-14 = Moderate depression, 15-19 = Moderately severe depression, 20-27 =Severe depression        ASSESSMENT/PLAN:  Bhavani Ramirez was seen today for office visit for anticoagulation management. Diagnoses and all orders for this visit:    Atrial fibrillation and flutter (HCC)  -Stable  -Continue anticoagulation    Anticoagulated on Coumadin  - INR 1.5  - Extra warfarin today, raise Tuesday dose, see flowsheet  -     POCT INR    Chronic hoarseness  - 4 to 6 weeks of hoarseness  -     Bria Uriarte MD, Otolaryngology, [facility]    Severe episode of recurrent major depressive disorder, without psychotic features ([facility] [address]  - Worsening due to family circumstances. - We can try an increase in her Wellbutrin but we discussed that medication may not be the answer to her family situation. Recommend she see psychology for therapy to discuss her life stressors. -     Ambulatory referral to Psychology  -     buPROPion [facility] SR) 150 MG extended release tablet;  Take 1 tablet by mouth 2 times daily        MARILEE Gray - CNP

## 2020-01-29 ENCOUNTER — OFFICE VISIT (OUTPATIENT)
Dept: PSYCHOLOGY | Age: 75
End: 2020-01-29
Payer: COMMERCIAL

## 2020-01-29 PROCEDURE — 90791 PSYCH DIAGNOSTIC EVALUATION: CPT | Performed by: PSYCHOLOGIST

## 2020-01-29 ASSESSMENT — PATIENT HEALTH QUESTIONNAIRE - PHQ9
5. POOR APPETITE OR OVEREATING: 1
4. FEELING TIRED OR HAVING LITTLE ENERGY: 3
3. TROUBLE FALLING OR STAYING ASLEEP: 3
10. IF YOU CHECKED OFF ANY PROBLEMS, HOW DIFFICULT HAVE THESE PROBLEMS MADE IT FOR YOU TO DO YOUR WORK, TAKE CARE OF THINGS AT HOME, OR GET ALONG WITH OTHER PEOPLE: 2
SUM OF ALL RESPONSES TO PHQ QUESTIONS 1-9: 20
SUM OF ALL RESPONSES TO PHQ9 QUESTIONS 1 & 2: 6
9. THOUGHTS THAT YOU WOULD BE BETTER OFF DEAD, OR OF HURTING YOURSELF: 1
7. TROUBLE CONCENTRATING ON THINGS, SUCH AS READING THE NEWSPAPER OR WATCHING TELEVISION: 1
1. LITTLE INTEREST OR PLEASURE IN DOING THINGS: 3
6. FEELING BAD ABOUT YOURSELF - OR THAT YOU ARE A FAILURE OR HAVE LET YOURSELF OR YOUR FAMILY DOWN: 2
2. FEELING DOWN, DEPRESSED OR HOPELESS: 3
8. MOVING OR SPEAKING SO SLOWLY THAT OTHER PEOPLE COULD HAVE NOTICED. OR THE OPPOSITE, BEING SO FIGETY OR RESTLESS THAT YOU HAVE BEEN MOVING AROUND A LOT MORE THAN USUAL: 3
SUM OF ALL RESPONSES TO PHQ QUESTIONS 1-9: 20

## 2020-01-29 NOTE — PROGRESS NOTES
Behavioral Health Consultation  Nuha Nelson M.A. Behavioral Health Consultant  Psychology Trainee  Maverick Hewitt, Ph.D.  Psychology Supervisor  1/29/2020  11:09 AM      Time spent with Patient: 40 minutes  This is patient's first [facility] appointment. Reason for Consult:  Depression    Pt provided informed consent for the behavioral health program and nature of supervision. Discussed with patient model of service to include the limits of confidentiality (i.e. abuse reporting, suicide intervention, etc.) and short-term intervention focused approach. Pt indicated understanding. Feedback given to PCP. S:  Pt seen per PCP re: depression. Patient reported depressive symptoms, including depressed mood, deactivation, anhedonia, poor self-worth, increased appetite, hypersomnia, fatigue, and poor concentration/focus. Symptoms have been present since November and are exacerbated by family stressors. Pt reports she \"doesn't want to do anything\" and \"doesn't want to leave the house\". Stressors include her daughter who has had recent legal problems with drugs. Lives with daughter, two grandkids (16 & 32), and \"foster\" grandson (32). Gets along well with household; daughter is causing the most stress. Pt has two close friends who encourage her to get out of the house; sees them at least once a week and talks to them daily. Would like to do more activities with friends but financially cannot always do this.         O:  MSE:    Appearance    alert, cooperative  Impulsive behavior No  Speech    spontaneous, normal rate, normal volume, well articulated and high productivity  Mood    Depressed  Affect    depressed affect  Thought Content    intact  Thought Process    linear, goal directed, coherent and tangential  Associations    logical connections  Insight    Fair  Judgment    Intact  Orientation    oriented to person, place, time, and general circumstances  Memory    recent and remote memory intact  Attention/Concentration intact  Morbid ideation Yes  Suicide Assessment    no suicidal ideation    History:    Social History:   Social History     Socioeconomic History    Marital status:      Spouse name: Not on file    Number of children: Not on file    Years of education: Not on file    Highest education level: Not on file   Occupational History    Occupation: retired    Social Needs    Financial resource strain: Not hard at all   Nora-Kenzie insecurity:     Worry: Never true     Inability: Never true    Transportation needs:     Medical: No     Non-medical: No   Tobacco Use    Smoking status: Never Smoker    Smokeless tobacco: Never Used   Substance and Sexual Activity    Alcohol use: No    Drug use: No    Sexual activity: Not on file   Lifestyle    Physical activity:     Days per week: Not on file     Minutes per session: Not on file    Stress: Not on file   Relationships    Social connections:     Talks on phone: Not on file     Gets together: Not on file     Attends Mandaeism service: Not on file     Active member of club or organization: Not on file     Attends meetings of clubs or organizations: Not on file     Relationship status: Not on file    Intimate partner violence:     Fear of current or ex partner: Not on file     Emotionally abused: Not on file     Physically abused: Not on file     Forced sexual activity: Not on file   Other Topics Concern    Not on file   Social History Narrative    Not on file     TOBACCO:   reports that she has never smoked. She has never used smokeless tobacco.  ETOH:   reports no history of alcohol use. A:  Administered PHQ-9 (see below). Patient endorses severe symptoms of depression. Denies SI/HI.    PHQ Scores 1/29/2020 9/23/2019 12/7/2018 6/5/2018   PHQ2 Score 6 2 2 1   PHQ9 Score 20 2 2 1     Interpretation of Total Score Depression Severity: 1-4 = Minimal depression, 5-9 = Mild depression, 10-14 = Moderate depression, 15-19 = Moderately severe depression, 20-27 = Severe depression      Diagnosis:    Major depressive disorder; recurrent, severe and without psychotic features    Plan:  Pt interventions:  Discussed and set plan for behavioral activation, Discussed self-care (sleep, nutrition, rewarding activities, social support, exercise), Established rapport, Supportive techniques and Emphasized self-care as important for managing overall health    Documentation was done using voice recognition dragon software. Every effort was made to ensure accuracy; however, inadvertent, unintentional computerized transcription errors may be present.

## 2020-02-27 ENCOUNTER — OFFICE VISIT (OUTPATIENT)
Dept: INTERNAL MEDICINE CLINIC | Age: 75
End: 2020-02-27
Payer: COMMERCIAL

## 2020-02-27 VITALS
HEART RATE: 66 BPM | BODY MASS INDEX: 44.95 KG/M2 | WEIGHT: 287 LBS | SYSTOLIC BLOOD PRESSURE: 136 MMHG | DIASTOLIC BLOOD PRESSURE: 84 MMHG

## 2020-02-27 LAB
INTERNATIONAL NORMALIZATION RATIO, POC: 1.8
PROTHROMBIN TIME, POC: NORMAL

## 2020-02-27 PROCEDURE — 85610 PROTHROMBIN TIME: CPT | Performed by: NURSE PRACTITIONER

## 2020-02-27 PROCEDURE — 99212 OFFICE O/P EST SF 10 MIN: CPT | Performed by: NURSE PRACTITIONER

## 2020-02-28 ASSESSMENT — ENCOUNTER SYMPTOMS: GASTROINTESTINAL NEGATIVE: 1

## 2020-02-28 NOTE — PROGRESS NOTES
SUBJECTIVE:    Patient ID: Dalia Freire is a 69 y.o. female. CC: Follow-up atrial flutter, INR mgmt    HPI: The patient presents to the office today for follow-up of atrial fibrillation and for INR mgmt. She has a history of atrial fibrillation. She denies any chest pain or shortness of breath. She is here for INR mgmt. she was recently diagnosed with a respiratory infection and given antibiotics. Because of her antibiotic use, she was advised to take one half her normal warfarin dose. Her INR is subtherapeutic today at 1.0. Current Outpatient Medications   Medication Sig Dispense Refill    warfarin (COUMADIN) 5 MG tablet 7.5 mg (5 mg x 1.5) every Tue, Fri; 5 mg (5 mg x 1) all other days 103 tablet 3    buPROPion (WELLBUTRIN SR) 150 MG extended release tablet Take 1 tablet by mouth 2 times daily 60 tablet 3    Cholecalciferol (VITAMIN D3) 1.25 MG (55701 UT) CAPS Take 1 capsule by mouth once a week Indications: Takes on Sunday 3 capsule 3    metoprolol tartrate (LOPRESSOR) 25 MG tablet TAKE 2 TABLETS BY MOUTH TWICE DAILY 360 tablet 3    CARTIA  MG extended release capsule TAKE 1 CAPSULE BY MOUTH DAILY 90 capsule 3    DULoxetine (CYMBALTA) 60 MG extended release capsule TAKE 1 CAPSULE BY MOUTH DAILY 90 capsule 5    cloNIDine (CATAPRES) 0.2 MG tablet TAKE 1 TABLET BY MOUTH TWICE DAILY 180 tablet 5    Biotin 1000 MCG TABS Take by mouth      ferrous sulfate 325 (65 Fe) MG EC tablet Take 325 mg by mouth daily (with breakfast)       aspirin 81 MG tablet Take 81 mg by mouth daily       No current facility-administered medications for this visit. Review of Systems   Constitutional: Negative for chills and fever. Cardiovascular: Negative. Gastrointestinal: Negative. Genitourinary: Negative. OBJECTIVE:  Physical Exam   Constitutional: She is oriented to person, place, and time. She appears well-developed and well-nourished. No distress.    HENT:   Head: Normocephalic and atraumatic. Eyes: Conjunctivae are normal. No scleral icterus. Pulmonary/Chest: Effort normal. No respiratory distress. Neurological: She is alert and oriented to person, place, and time. Skin: Skin is warm and dry. She is not diaphoretic. /84   Pulse 66   Wt 287 lb (130.2 kg)   BMI 44.95 kg/m²          ASSESSMENT/PLAN:  Carmel Goodwin was seen today for office visit for anticoagulation management.   Diagnoses and all orders for this visit:    Atrial fibrillation and flutter (HCC)  - Continue anticoagulation    Anticoagulated on Coumadin  - INR 1.0 --> 1.5 --> 1.8  - Improving, increase warfarin, see flowsheet  -     POCT INR      Logan Lozano, APRN - CNP

## 2020-03-13 ENCOUNTER — OFFICE VISIT (OUTPATIENT)
Dept: INTERNAL MEDICINE CLINIC | Age: 75
End: 2020-03-13
Payer: COMMERCIAL

## 2020-03-13 VITALS — SYSTOLIC BLOOD PRESSURE: 118 MMHG | HEART RATE: 66 BPM | DIASTOLIC BLOOD PRESSURE: 88 MMHG

## 2020-03-13 PROBLEM — R55 SYNCOPE AND COLLAPSE: Status: RESOLVED | Noted: 2019-03-21 | Resolved: 2020-03-13

## 2020-03-13 PROBLEM — L03.90 CELLULITIS: Status: RESOLVED | Noted: 2019-01-06 | Resolved: 2020-03-13

## 2020-03-13 LAB
A/G RATIO: 1.4 (ref 1.1–2.2)
ALBUMIN SERPL-MCNC: 3.8 G/DL (ref 3.4–5)
ALP BLD-CCNC: 119 U/L (ref 40–129)
ALT SERPL-CCNC: 6 U/L (ref 10–40)
ANION GAP SERPL CALCULATED.3IONS-SCNC: 10 MMOL/L (ref 3–16)
AST SERPL-CCNC: 11 U/L (ref 15–37)
BASOPHILS ABSOLUTE: 0 K/UL (ref 0–0.2)
BASOPHILS RELATIVE PERCENT: 0.6 %
BILIRUB SERPL-MCNC: 0.3 MG/DL (ref 0–1)
BUN BLDV-MCNC: 26 MG/DL (ref 7–20)
CALCIUM SERPL-MCNC: 9.4 MG/DL (ref 8.3–10.6)
CHLORIDE BLD-SCNC: 107 MMOL/L (ref 99–110)
CO2: 27 MMOL/L (ref 21–32)
CREAT SERPL-MCNC: 1.5 MG/DL (ref 0.6–1.2)
EOSINOPHILS ABSOLUTE: 0.3 K/UL (ref 0–0.6)
EOSINOPHILS RELATIVE PERCENT: 4.9 %
GFR AFRICAN AMERICAN: 41
GFR NON-AFRICAN AMERICAN: 34
GLOBULIN: 2.7 G/DL
GLUCOSE BLD-MCNC: 93 MG/DL (ref 70–99)
HCT VFR BLD CALC: 36.3 % (ref 36–48)
HEMOGLOBIN: 11.5 G/DL (ref 12–16)
LYMPHOCYTES ABSOLUTE: 1.2 K/UL (ref 1–5.1)
LYMPHOCYTES RELATIVE PERCENT: 17.3 %
MCH RBC QN AUTO: 28.2 PG (ref 26–34)
MCHC RBC AUTO-ENTMCNC: 31.8 G/DL (ref 31–36)
MCV RBC AUTO: 88.6 FL (ref 80–100)
MONOCYTES ABSOLUTE: 0.7 K/UL (ref 0–1.3)
MONOCYTES RELATIVE PERCENT: 10.5 %
NEUTROPHILS ABSOLUTE: 4.7 K/UL (ref 1.7–7.7)
NEUTROPHILS RELATIVE PERCENT: 66.7 %
PDW BLD-RTO: 17 % (ref 12.4–15.4)
PLATELET # BLD: 213 K/UL (ref 135–450)
PMV BLD AUTO: 10.8 FL (ref 5–10.5)
POTASSIUM SERPL-SCNC: 4.6 MMOL/L (ref 3.5–5.1)
RBC # BLD: 4.1 M/UL (ref 4–5.2)
SODIUM BLD-SCNC: 144 MMOL/L (ref 136–145)
TOTAL PROTEIN: 6.5 G/DL (ref 6.4–8.2)
WBC # BLD: 7 K/UL (ref 4–11)

## 2020-03-13 PROCEDURE — 99214 OFFICE O/P EST MOD 30 MIN: CPT | Performed by: NURSE PRACTITIONER

## 2020-03-13 NOTE — PROGRESS NOTES
planned surgery.       Dorinda Chairez, [address] [place] [address],[address] Internal Medicine and Rheumatology  [phone]

## 2020-03-30 ENCOUNTER — OFFICE VISIT (OUTPATIENT)
Dept: INTERNAL MEDICINE CLINIC | Age: 75
End: 2020-03-30
Payer: COMMERCIAL

## 2020-03-30 LAB
INTERNATIONAL NORMALIZATION RATIO, POC: 2.2
PROTHROMBIN TIME, POC: NORMAL

## 2020-03-30 PROCEDURE — 99212 OFFICE O/P EST SF 10 MIN: CPT | Performed by: NURSE PRACTITIONER

## 2020-03-30 PROCEDURE — 85610 PROTHROMBIN TIME: CPT | Performed by: NURSE PRACTITIONER

## 2020-04-09 RX ORDER — TRAMADOL HYDROCHLORIDE 50 MG/1
50 TABLET ORAL DAILY PRN
Qty: 30 TABLET | Refills: 0 | Status: SHIPPED | OUTPATIENT
Start: 2020-04-09 | End: 2020-07-09 | Stop reason: SDUPTHER

## 2020-04-20 RX ORDER — CHOLECALCIFEROL (VITAMIN D3) 1250 MCG
CAPSULE ORAL
Qty: 3 CAPSULE | Refills: 3 | Status: SHIPPED | OUTPATIENT
Start: 2020-04-20 | End: 2020-08-20

## 2020-04-30 ENCOUNTER — OFFICE VISIT (OUTPATIENT)
Dept: INTERNAL MEDICINE CLINIC | Age: 75
End: 2020-04-30
Payer: COMMERCIAL

## 2020-04-30 LAB
INTERNATIONAL NORMALIZATION RATIO, POC: 2
PROTHROMBIN TIME, POC: NORMAL

## 2020-04-30 PROCEDURE — 85610 PROTHROMBIN TIME: CPT | Performed by: NURSE PRACTITIONER

## 2020-04-30 PROCEDURE — 99212 OFFICE O/P EST SF 10 MIN: CPT | Performed by: NURSE PRACTITIONER

## 2020-04-30 NOTE — PROGRESS NOTES
SUBJECTIVE:    Patient ID: Tam Barahona is a 69 y.o. female. CC: Atrial flutter, Coumadin management    HPI: The patient presents to the office today for follow-up of atrial flutter and for INR management. Due to the current coronavirus crisis, the patient presented to the office but did not come into the waiting room. She called upon her arrival and medical staff went outside to check her INR. Her papers were then delivered to her. She never entered the office. She has a history of atrial flutter. She denies any chest pain or shortness of breath. INR today is therapeutic at 2.0. Past Medical History:   Diagnosis Date    Arthritis     Hypertension     Kidney disease     Pneumonia     Sleep apnea     no c-pap        Current Outpatient Medications   Medication Sig Dispense Refill    Cholecalciferol (VITAMIN D3) 1.25 MG (38334 UT) CAPS TAKE ONE CAPSULE BY MOUTH ONCE A WEEK ON SUNDAYS 3 capsule 3    traMADol (ULTRAM) 50 MG tablet Take 1 tablet by mouth daily as needed for Pain for up to 30 days. 30 tablet 0    warfarin (COUMADIN) 5 MG tablet 7.5 mg (5 mg x 1.5) every Tue, Fri; 5 mg (5 mg x 1) all other days 103 tablet 3    buPROPion (WELLBUTRIN SR) 150 MG extended release tablet Take 1 tablet by mouth 2 times daily 60 tablet 3    metoprolol tartrate (LOPRESSOR) 25 MG tablet TAKE 2 TABLETS BY MOUTH TWICE DAILY 360 tablet 3    CARTIA  MG extended release capsule TAKE 1 CAPSULE BY MOUTH DAILY 90 capsule 3    DULoxetine (CYMBALTA) 60 MG extended release capsule TAKE 1 CAPSULE BY MOUTH DAILY 90 capsule 5    cloNIDine (CATAPRES) 0.2 MG tablet TAKE 1 TABLET BY MOUTH TWICE DAILY 180 tablet 5    Biotin 1000 MCG TABS Take by mouth      ferrous sulfate 325 (65 Fe) MG EC tablet Take 325 mg by mouth daily (with breakfast)       aspirin 81 MG tablet Take 81 mg by mouth daily       No current facility-administered medications for this visit.             Review of Systems   All other systems reviewed and are negative. OBJECTIVE:  Physical Exam  Constitutional:       Appearance: Normal appearance. HENT:      Head: Normocephalic and atraumatic. Pulmonary:      Effort: Pulmonary effort is normal. No respiratory distress. Skin:     General: Skin is warm and dry. Neurological:      General: No focal deficit present. Mental Status: She is alert and oriented to person, place, and time. Psychiatric:         Mood and Affect: Mood normal.         Behavior: Behavior normal.        There were no vitals taken for this visit. PHQ Scores 1/29/2020 9/23/2019 12/7/2018 6/5/2018   PHQ2 Score 6 2 2 1   PHQ9 Score 20 2 2 1     Interpretation of Total Score Depression Severity: 1-4 = Minimal depression, 5-9 = Mild depression, 10-14 = Moderate depression, 15-19 = Moderately severe depression, 20-27 =Severe depression        ASSESSMENT/PLAN:  Viviana Martinez was seen today for office visit for anticoagulation management.   Diagnoses and all orders for this visit:    Atrial fibrillation and flutter (HCC)  -Chronic, stable  -Continue anticoagulation    Anticoagulated on Coumadin  - INR 2.0  - Continue current regimen, see flow sheets  -     POCT INR        Wolfgang Antis, APRN - CNP

## 2020-05-04 ENCOUNTER — OFFICE VISIT (OUTPATIENT)
Dept: INTERNAL MEDICINE CLINIC | Age: 75
End: 2020-05-04
Payer: COMMERCIAL

## 2020-05-04 VITALS
BODY MASS INDEX: 44.01 KG/M2 | DIASTOLIC BLOOD PRESSURE: 66 MMHG | HEART RATE: 70 BPM | TEMPERATURE: 98.2 F | WEIGHT: 281 LBS | SYSTOLIC BLOOD PRESSURE: 126 MMHG

## 2020-05-04 PROCEDURE — 99214 OFFICE O/P EST MOD 30 MIN: CPT | Performed by: NURSE PRACTITIONER

## 2020-05-04 PROCEDURE — 93000 ELECTROCARDIOGRAM COMPLETE: CPT | Performed by: NURSE PRACTITIONER

## 2020-05-04 NOTE — PROGRESS NOTES
extended release capsule TAKE 1 CAPSULE BY MOUTH DAILY 90 capsule 5    cloNIDine (CATAPRES) 0.2 MG tablet TAKE 1 TABLET BY MOUTH TWICE DAILY 180 tablet 5    ferrous sulfate 325 (65 Fe) MG EC tablet Take 325 mg by mouth daily (with breakfast)       aspirin 81 MG tablet Take 81 mg by mouth daily      Biotin 1000 MCG TABS Take by mouth       No current facility-administered medications for this visit. Patient Active Problem List   Diagnosis    Atrial fibrillation ([facility] [address]    Essential hypertension    Severe episode of recurrent major depressive disorder, without psychotic features ([facility] [address]    Anticoagulated on Coumadin    Seasonal affective disorder (HCC)    Class 3 obesity due to excess calories with serious comorbidity and body mass index (BMI) of 45.0 to 49.9 in adult    Elevated sed rate    Leukocytosis    Elevated C-reactive protein (CRP)    Obstructive sleep apnea    Morbid obesity ([facility] [address]    Weight loss counseling, encounter for    [facility]       Past Medical History:   Diagnosis Date    Arthritis     Hypertension     Kidney disease     Pneumonia     Sleep apnea     no c-pap       Past Surgical History:   Procedure Laterality Date    COLONOSCOPY N/A 11/20/2018    COLONOSCOPY POLYPECTOMY SNARE/COLD BIOPSY performed by Erick Mendez MD at [address] [place] [address]      ankle rt has pins and rods, and rt elbow    OTHER SURGICAL HISTORY      weight loss surgery    TOTAL KNEE ARTHROPLASTY Bilateral 12/19/2012    TUBAL LIGATION      tubes tied    UPPER GASTROINTESTINAL ENDOSCOPY N/A 11/20/2018    EGD BIOPSY performed by Erick Mendez MD at [address] [place] [address]       Family History   Problem Relation Age of Onset    Cancer Father         stomach cancer         Review of Systems  A comprehensive review of systems was negative except for what was noted in the HPI.        Physical Exam   Vitals:    05/04/20 1636   BP: 126/66   Pulse: 70   Temp: 98.2 °F (36.8 °C) TempSrc: Oral   Weight: 281 lb (127.5 kg)        Constitutional: She is oriented to person, place, and time. She appears well-developed and well-nourished. No distress. HENT:   Head: Normocephalic and atraumatic. Mouth/Throat: Uvula is midline, oropharynx is clear and moist and mucous membranes are normal.   Eyes: Conjunctivae and EOM are normal.   Neck: Trachea normal and normal range of motion. Neck supple. Cardiovascular: Normal rate, regular rhythm, normal heart sounds and intact distal pulses. Pulmonary/Chest: Effort normal and breath sounds normal. No respiratory distress. She has no wheezes. She has no rales. Abdominal: Soft, non-tender. Bowel sounds are normal.   Musculoskeletal: She exhibits no edema and no tenderness. Neurological: She is alert and oriented to person, place, and time. Skin: Skin is warm and dry. No rash noted. No erythema. Psychiatric: She has a normal mood and affect. Her behavior is normal.       EKG Interpretation:  EKG Interpretation:  Atrial flutter 4:1 conduction, unchanged from previous tracings. Lab Review:  Lab Results   Component Value Date     03/13/2020     03/21/2019     01/11/2019    K 4.6 03/13/2020    K 4.5 03/21/2019    K 4.2 01/11/2019    K 4.1 01/06/2019    CO2 27 03/13/2020    CO2 23 03/21/2019    CO2 28 01/11/2019    BUN 26 03/13/2020    BUN 16 03/21/2019    BUN 11 01/11/2019    CREATININE 1.5 03/13/2020    CREATININE 1.3 03/21/2019    CREATININE 1.1 01/11/2019    GLUCOSE 93 03/13/2020    GLUCOSE 95 03/21/2019    GLUCOSE 131 01/11/2019    CALCIUM 9.4 03/13/2020    CALCIUM 8.9 03/21/2019    CALCIUM 9.0 01/11/2019     Lab Results   Component Value Date    WBC 7.0 03/13/2020    HGB 11.5 03/13/2020    HCT 36.3 03/13/2020    MCV 88.6 03/13/2020     03/13/2020         Assessment:     69 y.o. patient with planned surgery as above.     Known risk factors for perioperative complications: Arrhythmia, Bleeding concerns- warfarin, Hypertension, Obstructive sleep apnea     Plan:     1. Preoperative workup as follows: none  2. Change in medication regimen before surgery: Warfarin management- Discontinue warfarin 5 days before surgery and resume ASAP following  3. Prophylaxis for cardiac events with perioperative beta-blockers: Currently taking  metoprolol  4. Deep vein thrombosis prophylaxis: regimen to be chosen by surgical team  5.  No contraindications to planned surgery      Chapin Ferrer, [address] [place] [address],[address] Internal Medicine and Rheumatology  [phone]

## 2020-07-06 ENCOUNTER — TELEPHONE (OUTPATIENT)
Dept: INTERNAL MEDICINE CLINIC | Age: 75
End: 2020-07-06

## 2020-07-09 ENCOUNTER — OFFICE VISIT (OUTPATIENT)
Dept: INTERNAL MEDICINE CLINIC | Age: 75
End: 2020-07-09
Payer: COMMERCIAL

## 2020-07-09 LAB
INTERNATIONAL NORMALIZATION RATIO, POC: 2.2
PROTHROMBIN TIME, POC: NORMAL

## 2020-07-09 PROCEDURE — 85610 PROTHROMBIN TIME: CPT | Performed by: NURSE PRACTITIONER

## 2020-07-09 PROCEDURE — 99213 OFFICE O/P EST LOW 20 MIN: CPT | Performed by: NURSE PRACTITIONER

## 2020-07-09 RX ORDER — TRAMADOL HYDROCHLORIDE 50 MG/1
50 TABLET ORAL DAILY PRN
Qty: 30 TABLET | Refills: 0 | Status: SHIPPED | OUTPATIENT
Start: 2020-07-09 | End: 2020-08-08

## 2020-07-09 ASSESSMENT — ENCOUNTER SYMPTOMS
RESPIRATORY NEGATIVE: 1
GASTROINTESTINAL NEGATIVE: 1

## 2020-07-09 NOTE — PROGRESS NOTES
SUBJECTIVE:    Patient ID: Yolanda Moore is a 69 y.o. female. CC: Atrial flutter, Coumadin management    HPI: The patient presents to the office today for follow-up of atrial flutter and for INR management. Due to the current coronavirus crisis, the patient presented to the office but did not come into the waiting room. She called upon her arrival and medical staff went outside to check her INR. Her papers were then delivered to her. She never entered the office. She has a history of atrial flutter. She denies any chest pain or shortness of breath. INR today is therapeutic at 2.2. Past Medical History:   Diagnosis Date    Arthritis     Hypertension     Kidney disease     Pneumonia     Sleep apnea     no c-pap        Current Outpatient Medications   Medication Sig Dispense Refill    DULoxetine (CYMBALTA) 60 MG extended release capsule TAKE ONE CAPSULE BY MOUTH DAILY 90 capsule 3    CARTIA  MG extended release capsule TAKE 1 CAPSULE BY MOUTH DAILY 90 capsule 3    Cholecalciferol (VITAMIN D3) 1.25 MG (36754 UT) CAPS TAKE ONE CAPSULE BY MOUTH ONCE A WEEK ON SUNDAYS 3 capsule 3    warfarin (COUMADIN) 5 MG tablet 7.5 mg (5 mg x 1.5) every Tue, Fri; 5 mg (5 mg x 1) all other days 103 tablet 3    buPROPion (WELLBUTRIN SR) 150 MG extended release tablet Take 1 tablet by mouth 2 times daily 60 tablet 3    metoprolol tartrate (LOPRESSOR) 25 MG tablet TAKE 2 TABLETS BY MOUTH TWICE DAILY 360 tablet 3    cloNIDine (CATAPRES) 0.2 MG tablet TAKE 1 TABLET BY MOUTH TWICE DAILY 180 tablet 5    Biotin 1000 MCG TABS Take by mouth      ferrous sulfate 325 (65 Fe) MG EC tablet Take 325 mg by mouth daily (with breakfast)       aspirin 81 MG tablet Take 81 mg by mouth daily       No current facility-administered medications for this visit. Review of Systems   Constitutional: Negative. Respiratory: Negative. Cardiovascular: Negative. Gastrointestinal: Negative. Genitourinary: Negative. Musculoskeletal: Negative. All other systems reviewed and are negative. OBJECTIVE:  Physical Exam  Constitutional:       Appearance: Normal appearance. HENT:      Head: Normocephalic and atraumatic. Pulmonary:      Effort: Pulmonary effort is normal. No respiratory distress. Skin:     General: Skin is warm and dry. Neurological:      General: No focal deficit present. Mental Status: She is alert and oriented to person, place, and time. Psychiatric:         Mood and Affect: Mood normal.         Behavior: Behavior normal.        There were no vitals taken for this visit. PHQ Scores 1/29/2020 9/23/2019 12/7/2018 6/5/2018   PHQ2 Score 6 2 2 1   PHQ9 Score 20 2 2 1     Interpretation of Total Score Depression Severity: 1-4 = Minimal depression, 5-9 = Mild depression, 10-14 = Moderate depression, 15-19 = Moderately severe depression, 20-27 =Severe depression        ASSESSMENT/PLAN:  Moshe Klein was seen today for office visit for anticoagulation management. Diagnoses and all orders for this visit:    Atrial fibrillation and flutter (HCC)  -Chronic, stable  -Continue anticoagulation    Anticoagulated on Coumadin  - INR 2.2  - Continue current regimen, see flow sheets  -     POCT INR    Morbid obesity with BMI of 40.0-44.9, adult (HCC)  - Chronic  - Encouraged activity    Addendum: Patient recently fell on her knees. Knees are bruised but otherwise doing okay. She is taking Tylenol without relief. She is used tramadol in the past and is requesting a refill of this given her inability to use anti-inflammatories due to warfarin. Acute pain of both knees  -     traMADol (ULTRAM) 50 MG tablet; Take 1 tablet by mouth daily as needed for Pain for up to 30 days. Primary osteoarthritis involving multiple joints  -     traMADol (ULTRAM) 50 MG tablet; Take 1 tablet by mouth daily as needed for Pain for up to 30 days.     Controlled Substance Monitoring:  Acute and Chronic Pain Monitoring:   RX Monitoring 7/9/2020   Attestation -   Periodic Controlled Substance Monitoring Assessed functional status. ;Obtaining appropriate analgesic effect of treatment. ;No signs of potential drug abuse or diversion identified.    Chronic Pain > 80 MEDD -         MARILEE Spence - CNP

## 2020-08-10 ENCOUNTER — OFFICE VISIT (OUTPATIENT)
Dept: INTERNAL MEDICINE CLINIC | Age: 75
End: 2020-08-10
Payer: COMMERCIAL

## 2020-08-10 LAB
INTERNATIONAL NORMALIZATION RATIO, POC: 3.8
PROTHROMBIN TIME, POC: NORMAL

## 2020-08-10 PROCEDURE — 99212 OFFICE O/P EST SF 10 MIN: CPT | Performed by: NURSE PRACTITIONER

## 2020-08-10 PROCEDURE — 85610 PROTHROMBIN TIME: CPT | Performed by: NURSE PRACTITIONER

## 2020-08-10 ASSESSMENT — ENCOUNTER SYMPTOMS
GASTROINTESTINAL NEGATIVE: 1
RESPIRATORY NEGATIVE: 1

## 2020-08-10 NOTE — PROGRESS NOTES
SUBJECTIVE:    Patient ID: Ifrah Baker is a 69 y.o. female. CC: Atrial flutter, Coumadin management    HPI: The patient presents to the office today for follow-up of atrial flutter and for INR management. Due to the current coronavirus crisis, the patient presented to the office but did not come into the waiting room. She called upon her arrival and medical staff went outside to check her INR. Her papers were then delivered to her. She never entered the office. She has a history of atrial flutter. She denies any chest pain or shortness of breath. INR today is supratherapeutic at 3.8 which is up from previous 2.2. Past Medical History:   Diagnosis Date    Arthritis     Hypertension     Kidney disease     Pneumonia     Sleep apnea     no c-pap        Current Outpatient Medications   Medication Sig Dispense Refill    DULoxetine (CYMBALTA) 60 MG extended release capsule TAKE ONE CAPSULE BY MOUTH DAILY 90 capsule 3    CARTIA  MG extended release capsule TAKE 1 CAPSULE BY MOUTH DAILY 90 capsule 3    Cholecalciferol (VITAMIN D3) 1.25 MG (70039 UT) CAPS TAKE ONE CAPSULE BY MOUTH ONCE A WEEK ON SUNDAYS 3 capsule 3    warfarin (COUMADIN) 5 MG tablet 7.5 mg (5 mg x 1.5) every Tue, Fri; 5 mg (5 mg x 1) all other days 103 tablet 3    buPROPion (WELLBUTRIN SR) 150 MG extended release tablet Take 1 tablet by mouth 2 times daily 60 tablet 3    metoprolol tartrate (LOPRESSOR) 25 MG tablet TAKE 2 TABLETS BY MOUTH TWICE DAILY 360 tablet 3    cloNIDine (CATAPRES) 0.2 MG tablet TAKE 1 TABLET BY MOUTH TWICE DAILY 180 tablet 5    Biotin 1000 MCG TABS Take by mouth      ferrous sulfate 325 (65 Fe) MG EC tablet Take 325 mg by mouth daily (with breakfast)       aspirin 81 MG tablet Take 81 mg by mouth daily       No current facility-administered medications for this visit. Review of Systems   Constitutional: Negative. Respiratory: Negative. Cardiovascular: Negative.

## 2020-08-20 RX ORDER — CHOLECALCIFEROL (VITAMIN D3) 1250 MCG
CAPSULE ORAL
Qty: 3 CAPSULE | Refills: 3 | Status: SHIPPED | OUTPATIENT
Start: 2020-08-20 | End: 2020-09-21 | Stop reason: SDUPTHER

## 2020-08-25 RX ORDER — CLONIDINE HYDROCHLORIDE 0.2 MG/1
TABLET ORAL
Qty: 180 TABLET | Refills: 3 | Status: SHIPPED | OUTPATIENT
Start: 2020-08-25 | End: 2021-11-02

## 2020-09-15 ENCOUNTER — TELEPHONE (OUTPATIENT)
Dept: INTERNAL MEDICINE CLINIC | Age: 75
End: 2020-09-15

## 2020-09-15 NOTE — TELEPHONE ENCOUNTER
----- Message from Darra Scheuermann sent at 9/15/2020  4:49 PM EDT -----  Subject: Appointment Request    Reason for Call: Routine Pre-Op    QUESTIONS  Type of Appointment? Established Patient  Reason for appointment request? Available appointments did not meet   patient need  Additional Information for Provider? Patient needs to have pre-op physical   completed before 10/07/2020   also would like to completed missed INR   please advise  ---------------------------------------------------------------------------  --------------  CALL BACK INFO  What is the best way for the office to contact you? OK to leave message on   voicemail  Preferred Call Back Phone Number? [phone]  ---------------------------------------------------------------------------  --------------  SCRIPT ANSWERS  Relationship to Patient? Self  Appointment reason? Symptomatic  Select script based on patient symptoms? Adult Pre-Op  Do you have question for your provider that need to be answered prior to   scheduling your pre-op appointment? No  Have you been diagnosed with   tested for   or told that you are suspected of having COVID-19 (Coronavirus)? No  Have you had a fever or taken medication to treat a fever within the past   3 days? No  Have you had a cough   shortness of breath or flu-like symptoms within the past 3 days? No  Do you currently have flu-like symptoms including fever or chills   cough   shortness of breath   or difficulty breathing   or new loss of taste or smell? No  (Service Expert  click yes below to proceed with [employer] As Usual   Scheduling)?  Yes

## 2020-09-21 ENCOUNTER — OFFICE VISIT (OUTPATIENT)
Dept: INTERNAL MEDICINE CLINIC | Age: 75
End: 2020-09-21
Payer: COMMERCIAL

## 2020-09-21 VITALS
WEIGHT: 276 LBS | DIASTOLIC BLOOD PRESSURE: 76 MMHG | TEMPERATURE: 97 F | SYSTOLIC BLOOD PRESSURE: 124 MMHG | BODY MASS INDEX: 43.32 KG/M2 | HEIGHT: 67 IN | HEART RATE: 64 BPM

## 2020-09-21 LAB
INTERNATIONAL NORMALIZATION RATIO, POC: 1.3
PROTHROMBIN TIME, POC: NORMAL

## 2020-09-21 PROCEDURE — 99214 OFFICE O/P EST MOD 30 MIN: CPT | Performed by: NURSE PRACTITIONER

## 2020-09-21 PROCEDURE — 85610 PROTHROMBIN TIME: CPT | Performed by: NURSE PRACTITIONER

## 2020-09-21 RX ORDER — METOPROLOL TARTRATE 50 MG/1
50 TABLET, FILM COATED ORAL 2 TIMES DAILY
Qty: 180 TABLET | Refills: 1 | Status: SHIPPED | OUTPATIENT
Start: 2020-09-21 | End: 2022-01-25

## 2020-09-21 RX ORDER — CHOLECALCIFEROL (VITAMIN D3) 1250 MCG
1 CAPSULE ORAL
Qty: 4 CAPSULE | Refills: 3 | Status: SHIPPED | OUTPATIENT
Start: 2020-09-21 | End: 2021-01-05 | Stop reason: SDUPTHER

## 2020-09-21 NOTE — PROGRESS NOTES
Preoperative Consultation      [address]  YOB: 1945    Date of Service:  9/21/2020    This patient presents today at the request of the surgeon for a preoperative consultation.      Surgeon: Dr. Monica Bundy  Indication for surgery: vocal cord paralysis  Scheduled procedure: thyroplasty  Date of surgery: 10/7/20  Location of surgery: [facility]  Indicated/required preoperative testing: H&P  Smoker: No  Previous anesthesia complications: Yes - slot to awaken  Family history of anesthesia complications: No  Loose, capped or false teeth: No  Recent chest pain or SOB: No  Known Bleeding Risk: Anticoagulant therapy- warfarin  Personal or FH of DVT/PE: Yes - PE    Patient objection to receiving blood products: No    Allergies   Allergen Reactions    Codeine Other (See Comments) and Hives     B/P DROPS PASSES OUT  Passed out    Fentanyl And Related Hives     Other reaction(s): Dizziness    Shellfish-Derived Products Other (See Comments)     Syncope/seizures    Shrimp Flavor      Passes out      Bee Venom Swelling    Hydromorphone Rash, Hives and Other (See Comments)     Full rash spreading across chest and both arms from IV hydromorphone  Passed out    Vancomycin Rash       Current Outpatient Medications   Medication Sig Dispense Refill    cloNIDine (CATAPRES) 0.2 MG tablet TAKE 1 TABLET BY MOUTH TWICE DAILY 180 tablet 3    Cholecalciferol (VITAMIN D3) 1.25 MG (05877 UT) CAPS TAKE 1 CAPSULE BY MOUTH ONCE A WEEK ON SUNDAYS 3 capsule 3    DULoxetine (CYMBALTA) 60 MG extended release capsule TAKE ONE CAPSULE BY MOUTH DAILY 90 capsule 3    CARTIA  MG extended release capsule TAKE 1 CAPSULE BY MOUTH DAILY 90 capsule 3    warfarin (COUMADIN) 5 MG tablet 7.5 mg (5 mg x 1.5) every Tue, Fri; 5 mg (5 mg x 1) all other days 103 tablet 3    buPROPion (WELLBUTRIN SR) 150 MG extended release tablet Take 1 tablet by mouth 2 times daily 60 tablet 3    metoprolol tartrate (LOPRESSOR) 25 MG tablet TAKE 2 TABLETS BY MOUTH TWICE DAILY 360 tablet 3    Biotin 1000 MCG TABS Take by mouth      ferrous sulfate 325 (65 Fe) MG EC tablet Take 325 mg by mouth daily (with breakfast)       aspirin 81 MG tablet Take 81 mg by mouth daily       No current facility-administered medications for this visit. Patient Active Problem List   Diagnosis    [facility]    Essential hypertension    Severe episode of recurrent major depressive disorder, without psychotic features ([facility]    Anticoagulated on Coumadin    Seasonal affective disorder (HCC)    Class 3 obesity due to excess calories with serious comorbidity and body mass index (BMI) of 45.0 to 49.9 in adult    Elevated sed rate    Leukocytosis    Elevated C-reactive protein (CRP)    Obstructive sleep apnea    Morbid obesity ([facility]    Weight loss counseling, encounter for    Atypical atrial flutter ([facility]       Past Medical History:   Diagnosis Date    Arthritis     Hypertension     Kidney disease     Pneumonia     Sleep apnea     no c-pap       Past Surgical History:   Procedure Laterality Date    COLONOSCOPY N/A 11/20/2018    COLONOSCOPY POLYPECTOMY SNARE/COLD BIOPSY performed by Mik Jay MD at [address]      ankle rt has pins and rods, and rt elbow    OTHER SURGICAL HISTORY      weight loss surgery    TOTAL KNEE ARTHROPLASTY Bilateral 12/19/2012    TUBAL LIGATION      tubes tied    UPPER GASTROINTESTINAL ENDOSCOPY N/A 11/20/2018    EGD BIOPSY performed by Mik [facility] MD at [facility]       Family History   Problem Relation Age of Onset    Cancer Father         stomach cancer         Review of Systems  A comprehensive review of systems was negative except for what was noted in the HPI.        Physical Exam   Vitals:    09/21/20 1624   BP: 124/76   Pulse: 64   Temp: 97 °F (36.1 °C)   TempSrc: Temporal   Weight: 276 lb (125.2 kg)   Height: 5' 7\" (1.702 m)        Constitutional: She is oriented to person, place, and time. She appears well-developed and well-nourished. No distress. HENT:   Head: Normocephalic and atraumatic. Mouth/Throat: Uvula is midline, oropharynx is clear and moist and mucous membranes are normal.   Eyes: Conjunctivae and EOM are normal.   Neck: Trachea normal and normal range of motion. Neck supple. Cardiovascular: Normal rate, regular rhythm, normal heart sounds and intact distal pulses. Pulmonary/Chest: Effort normal and breath sounds normal. No respiratory distress. She has no wheezes. She has no rales. Abdominal: Soft, non-tender. Bowel sounds are normal.   Musculoskeletal: She exhibits no edema and no tenderness. Neurological: She is alert and oriented to person, place, and time. Skin: Skin is warm and dry. No rash noted. No erythema. Psychiatric: She has a normal mood and affect. Her behavior is normal.       EKG Interpretation:  EKG Interpretation:  Atrial flutter 4:1 conduction, unchanged from previous tracings. Lab Review:  Lab Results   Component Value Date     03/13/2020     03/21/2019     01/11/2019    K 4.6 03/13/2020    K 4.5 03/21/2019    K 4.2 01/11/2019    K 4.1 01/06/2019    CO2 27 03/13/2020    CO2 23 03/21/2019    CO2 28 01/11/2019    BUN 26 03/13/2020    BUN 16 03/21/2019    BUN 11 01/11/2019    CREATININE 1.5 03/13/2020    CREATININE 1.3 03/21/2019    CREATININE 1.1 01/11/2019    GLUCOSE 93 03/13/2020    GLUCOSE 95 03/21/2019    GLUCOSE 131 01/11/2019    CALCIUM 9.4 03/13/2020    CALCIUM 8.9 03/21/2019    CALCIUM 9.0 01/11/2019     Lab Results   Component Value Date    WBC 7.0 03/13/2020    HGB 11.5 03/13/2020    HCT 36.3 03/13/2020    MCV 88.6 03/13/2020     03/13/2020         Assessment:     69 y.o. patient with planned surgery as above. Known risk factors for perioperative complications: Arrhythmia, Bleeding concerns- warfarin, Hypertension, Obstructive sleep apnea     Plan:     1. Preoperative workup as follows: none  2. Change in medication regimen before surgery: Warfarin management- Discontinue warfarin 5 days before surgery and resume ASAP following  3. Prophylaxis for cardiac events with perioperative beta-blockers: Currently taking  metoprolol  4. Deep vein thrombosis prophylaxis: regimen to be chosen by surgical team  5.  No contraindications to planned surgery      Lexis Saunders, [address] [place] [address],[address] Internal Medicine and Rheumatology  [phone]

## 2020-10-01 ENCOUNTER — OFFICE VISIT (OUTPATIENT)
Dept: PRIMARY CARE CLINIC | Age: 75
End: 2020-10-01
Payer: COMMERCIAL

## 2020-10-01 PROCEDURE — 99211 OFF/OP EST MAY X REQ PHY/QHP: CPT | Performed by: NURSE PRACTITIONER

## 2020-10-01 NOTE — PROGRESS NOTES
Modesto Lizarraga received a viral test for COVID-19. They were educated on isolation and quarantine as appropriate. For any symptoms, they were directed to seek care from their PCP, given contact information to establish with a doctor, directed to an urgent care or the emergency room.

## 2020-10-01 NOTE — PATIENT INSTRUCTIONS

## 2020-10-02 ENCOUNTER — TELEPHONE (OUTPATIENT)
Dept: INTERNAL MEDICINE CLINIC | Age: 75
End: 2020-10-02

## 2020-10-02 LAB — SARS-COV-2, NAA: NOT DETECTED

## 2020-10-21 ENCOUNTER — TELEPHONE (OUTPATIENT)
Dept: INTERNAL MEDICINE CLINIC | Age: 75
End: 2020-10-21

## 2020-10-21 NOTE — TELEPHONE ENCOUNTER
----- Message from Ksenia Brittany sent at 10/21/2020  1:24 PM EDT -----  Subject: Hospital Follow Up    QUESTIONS  What hospital was the Patient Discharged from? [facility]  Date of Discharge? 2020-10-19  Discharge Location? Home  Reason for hospitalization as patient stated? complications from throat   surgery on 7th   abscess needing drained and bp high  What question does the patient have   if applicable? Pt has restarted taking warfin as per existing   instructions but wants to follow up with Dr. Raheem Loyd regarding adjusting   dosage as they discussed prior to her surgery. Pt says VM box is full   please keep trying.  ---------------------------------------------------------------------------  --------------  CALL BACK INFO  What is the best way for the office to contact you? OK to leave message on   voicemail  Preferred Call Back Phone Number? [phone]  ---------------------------------------------------------------------------  --------------  SCRIPT ANSWERS  Relationship to Patient? Self  Appointment reason? Well Care/Follow Ups  Select a Well Care/Follow Ups appointment reason? Adult Hospital Follow Up   [facility] Discharge]  (Patient requests to see provider urgently. )? No  (Has the patient been discharged from the hospital within 2 business days   AND does not have a Telephone Encounter  Follow Up From [address] [place] [address]   documented in [address]?)? Yes  Do you have any questions for your primary care provider that need to be   answered prior to your appointment? (Use RN Triage if question pertains to   anything on the red flag list)? No  (Patient needs follow up visit after hospital discharge) Book first   available appointment within 7 days OF DISCHARGE   if no appt   proceed to book the next available time slot within 14 days OF DISCHARGE   AND Send Message to Provider. [address] Follow Up appointment cannot be   booked beyond 14 Days and should result in a Message to Provider. ?  Yes

## 2020-10-27 ENCOUNTER — OFFICE VISIT (OUTPATIENT)
Dept: INTERNAL MEDICINE CLINIC | Age: 75
End: 2020-10-27
Payer: COMMERCIAL

## 2020-10-27 VITALS
SYSTOLIC BLOOD PRESSURE: 120 MMHG | WEIGHT: 268 LBS | DIASTOLIC BLOOD PRESSURE: 72 MMHG | HEART RATE: 60 BPM | BODY MASS INDEX: 41.97 KG/M2 | TEMPERATURE: 96.3 F

## 2020-10-27 LAB
INTERNATIONAL NORMALIZATION RATIO, POC: 1.3
PROTHROMBIN TIME, POC: NORMAL

## 2020-10-27 RX ORDER — PANTOPRAZOLE SODIUM 40 MG/1
40 TABLET, DELAYED RELEASE ORAL DAILY
Status: ON HOLD | COMMUNITY
Start: 2020-10-19 | End: 2022-04-01 | Stop reason: HOSPADM

## 2020-10-27 NOTE — PROGRESS NOTES
(121.6 kg)   09/21/20 276 lb (125.2 kg)   05/04/20 281 lb (127.5 kg)     BP Readings from Last 3 Encounters:   10/27/20 120/72   09/21/20 124/76   05/04/20 126/66        Patient was admitted to [facility] from 10/17/20 to 10/19/20 for abscess of neck. Inpatient course: Discharge summary reviewed:    S/p reconstructive laryngoplasty for vocal cord paralysis 10/7/20   CT 10/17 Fluid air collection seen within the anterior aspect of the neck just superficial to the hyoid bone. Findings could be related to patient's known seroma with postprocedural change. Abscess alternative consideration given stranding and internal air. Bedside aspiration attempted by ENT however no fluid localized. Suspected soft tissue swelling without abscess. Patient to complete medrol dose pack and doxycycline (upset stomach with omnicef) and f/u with ENT in 1 week.     Atrial fibrillation  Presenting with RVR which resolved with diltiazem gtt  Resume warfarin per ENT  Resumed home metoprolol and diltiazem with good control of HR.     CT chest on arrival obtained for dyspnea. It showed no evidence of PE but did demonstrate diffuse scattered groundglass opacities similar to prior study possible chronic interstitial lung disease (seen on prior 02/2020 CT chest).   Pulmonary consulted and obtained bedside PFTs and workup for ILD. She should f/u with pulmonary as outpatient to discuss lab results. Current status:     Review of Systems:  A comprehensive review of systems was negative except for what was noted in the HPI. Physical Exam:  Gen: NAD  Eyes: no icterus, no conjunctival erythema  CV: RRR, no mrgs  Resp: CTAB  Abd: soft, NTTP  Neuro: alert, oriented, answers questions appropriately  MSK: no peripheral edema  Skin: warm, dry  Psych: Normal mood, affect      Initial post-discharge communication occurred between medical assistant and patient on 10/21/20- see documentation in chart: telephone encounter.       Assessment/Plan:  Jodelle Harada was seen today for follow-up from hospital and office visit for anticoagulation management. Diagnoses and all orders for this visit:    Hospital discharge follow-up  -     [place] DISCHARGE MEDS RECONCILED W/ CURRENT OUTPATIENT MED LIST    Abscess of neck  - Resolved  - Continue follow-up ENT    Atrial fibrillation and flutter ([facility]  - Continue anticoagulation    Anticoagulated on Coumadin  - INR 1.3  - Likely low due to recent surgery, hospital asmission  - Extra warfarin today, see flow sheets. - Recheck 2 weeks  -     POCT INR    ILD (interstitial lung disease) ([facility]  -     Abdirahman Craven MD, Pulmonary, [facility]    Abnormal CT of the chest  -     Abdirahman Craven MD, Pulmonary, [facility]    Need for influenza vaccination  -     INFLUENZA, QUADV, ADJUVANTED, 72 YRS =, IM, PF, PREFILL SYR, 0.5ML (FLUAD)        Diagnostic test results reviewed: chest x-ray, CT-neck and CT angiogram-chest    CTA chest:     No evidence of acute pulmonary emboli.         Diffuse scattered groundglass opacities similar to previous study.     Differential diagnosis is vast including chronic interstitial disease and infectious etiology,    drug reaction/hypersensitivity pneumonitis          Patient risk of morbidity and mortality: high    Medical Decision Making: high complexity

## 2020-10-30 ENCOUNTER — TELEPHONE (OUTPATIENT)
Dept: INTERNAL MEDICINE CLINIC | Age: 75
End: 2020-10-30

## 2020-10-30 NOTE — TELEPHONE ENCOUNTER
Patient is requesting a letter/prescription for disability parking placard. She states it is a renewal.  Patient asked if this can be done today.

## 2020-10-30 NOTE — TELEPHONE ENCOUNTER
----- Message from Lisset Garcia sent at 10/30/2020  4:43 PM EDT -----  Subject: Message to Provider    QUESTIONS  Information for Provider? Pt stated needs a new handicap placard   prescription mailed to her. She went to [facility] 10/30 to renew it and [facility] told   her that provider did not sign. Pt will need a new placard script sent to   her again.   ---------------------------------------------------------------------------  --------------  CALL BACK INFO  What is the best way for the office to contact you? OK to leave message on   voicemail  Preferred Call Back Phone Number? [phone]  ---------------------------------------------------------------------------  --------------  SCRIPT ANSWERS  Relationship to Patient?  Self

## 2020-10-30 NOTE — TELEPHONE ENCOUNTER
Called pt and was not able to speak to her or leave vm. I have another script at the front office for pt to .

## 2020-11-03 ENCOUNTER — OFFICE VISIT (OUTPATIENT)
Dept: INTERNAL MEDICINE CLINIC | Age: 75
End: 2020-11-03
Payer: COMMERCIAL

## 2020-11-03 VITALS
BODY MASS INDEX: 43.07 KG/M2 | TEMPERATURE: 97.8 F | SYSTOLIC BLOOD PRESSURE: 116 MMHG | HEART RATE: 74 BPM | WEIGHT: 275 LBS | DIASTOLIC BLOOD PRESSURE: 70 MMHG

## 2020-11-03 LAB
INTERNATIONAL NORMALIZATION RATIO, POC: 1.8
PROTHROMBIN TIME, POC: NORMAL

## 2020-11-03 PROCEDURE — G0439 PPPS, SUBSEQ VISIT: HCPCS | Performed by: NURSE PRACTITIONER

## 2020-11-03 PROCEDURE — 85610 PROTHROMBIN TIME: CPT | Performed by: NURSE PRACTITIONER

## 2020-11-03 ASSESSMENT — PATIENT HEALTH QUESTIONNAIRE - PHQ9
SUM OF ALL RESPONSES TO PHQ QUESTIONS 1-9: 0
SUM OF ALL RESPONSES TO PHQ QUESTIONS 1-9: 0
1. LITTLE INTEREST OR PLEASURE IN DOING THINGS: 0
2. FEELING DOWN, DEPRESSED OR HOPELESS: 0
SUM OF ALL RESPONSES TO PHQ QUESTIONS 1-9: 0
SUM OF ALL RESPONSES TO PHQ9 QUESTIONS 1 & 2: 0

## 2020-11-03 ASSESSMENT — LIFESTYLE VARIABLES: HOW OFTEN DO YOU HAVE A DRINK CONTAINING ALCOHOL: 0

## 2020-11-03 NOTE — PROGRESS NOTES
Medicare Annual Wellness Visit  Name: Vani Mohr Date: 11/3/2020   MRN: [id number] Sex: Female   Age: 70 y.o. Ethnicity: Non-/Non    : 1945 Race: Margy Ward is here for Medicare AWV and Office Visit for Anticoagulation Management (5 mg every Sun, Tue, Thu; 7.5 mg all )    Screenings for behavioral, psychosocial and functional/safety risks, and cognitive dysfunction are all negative except as indicated below. These results, as well as other patient data from the [address] form, are documented in Flowsheets linked to this Encounter. Allergies   Allergen Reactions    Codeine Other (See Comments) and Hives     B/P DROPS PASSES OUT  Passed out    Fentanyl And Related Hives     Other reaction(s): Dizziness    Shellfish-Derived Products Other (See Comments)     Syncope/seizures    Shrimp Flavor      Passes out      Bee Venom Swelling    Hydromorphone Rash, Hives and Other (See Comments)     Full rash spreading across chest and both arms from IV hydromorphone  Passed out    Vancomycin Rash       Prior to Visit Medications    Medication Sig Taking?  Authorizing Provider   pantoprazole (PROTONIX) 40 MG tablet Take 40 mg by mouth daily Yes Historical Provider, MD   metoprolol tartrate (LOPRESSOR) 50 MG tablet Take 1 tablet by mouth 2 times daily Yes MARILEE Gu CNP   Cholecalciferol (VITAMIN D3) 1.25 MG (63819 UT) CAPS Take 1 capsule by mouth every 7 days Yes MARILEE Gu CNP   cloNIDine (CATAPRES) 0.2 MG tablet TAKE 1 TABLET BY MOUTH TWICE DAILY Yes MARILEE Gu CNP   DULoxetine (CYMBALTA) 60 MG extended release capsule TAKE ONE CAPSULE BY MOUTH DAILY Yes MARILEE Gu CNP   CARTIA  MG extended release capsule TAKE 1 CAPSULE BY MOUTH DAILY Yes MARILEE Gu CNP   warfarin (COUMADIN) 5 MG tablet 7.5 mg (5 mg x 1.5) every Tue, Fri; 5 mg (5 mg x 1) all other days Yes Matt So APRN - CNP   buPROPion (WELLBUTRIN SR) 150 MG extended release tablet Take 1 tablet by mouth 2 times daily Yes MARILEE Williamson CNP   Biotin 1000 MCG TABS Take by mouth Yes Historical Provider, MD   ferrous sulfate 325 (65 Fe) MG EC tablet Take 325 mg by mouth daily (with breakfast)  Yes Historical Provider, MD   aspirin 81 MG tablet Take 81 mg by mouth daily Yes Historical Provider, MD       Past Medical History:   Diagnosis Date    Arthritis     Hypertension     Kidney disease     Pneumonia     Sleep apnea     no c-pap       Past Surgical History:   Procedure Laterality Date    COLONOSCOPY N/A 11/20/2018    COLONOSCOPY POLYPECTOMY SNARE/COLD BIOPSY performed by Iesha Khan MD at [address]      ankle rt has pins and rods, and rt elbow    OTHER SURGICAL HISTORY      weight loss surgery    TOTAL KNEE ARTHROPLASTY Bilateral 12/19/2012    TUBAL LIGATION      tubes tied    UPPER GASTROINTESTINAL ENDOSCOPY N/A 11/20/2018    EGD BIOPSY performed by Iesha Khan MD at [facility] [address] History   Problem Relation Age of Onset    Cancer Father         stomach cancer       CareTeam (Including outside providers/suppliers regularly involved in providing care):   Patient Care Team:  MARILEE Williamson CNP as PCP - General (Nurse Practitioner)  MARILEE Williamson CNP as PCP - [facility] Empaneled Provider  Dionne Hernandez MD as Consulting Physician (Sleep Medicine)    Wt Readings from Last 3 Encounters:   11/03/20 275 lb (124.7 kg)   10/27/20 268 lb (121.6 kg)   09/21/20 276 lb (125.2 kg)     Vitals:    11/03/20 1339   BP: 116/70   Pulse: 74   Temp: 97.8 °F (36.6 °C)   TempSrc: Temporal   Weight: 275 lb (124.7 kg)     Body mass index is 43.07 kg/m². Based upon direct observation of the patient, evaluation of cognition reveals recent and remote memory intact.         Patient's complete Health Risk Assessment and screening values have been reviewed and are found in Flowsheets. The following problems were reviewed today and where indicated follow up appointments were made and/or referrals ordered. Positive Risk Factor Screenings with Interventions:     Fall Risk:  2 or more falls in past year?: (!) yes  Fall with injury in past year?: no  Fall Risk Interventions:    · Home safety tips provided    Health Habits/Nutrition:  Health Habits/Nutrition  Do you exercise for at least 20 minutes 2-3 times per week?: (!) No  Have you lost any weight without trying in the past 3 months?: (!) Yes  Do you eat fewer than 2 meals per day?: No  Have you seen a dentist within the past year?: (!) No     Health Habits/Nutrition Interventions:  · Inadequate physical activity:  patient is not ready to increase his/her physical activity level at this time  · Dental exam overdue:  patient encouraged to make appointment with his/her dentist    Hearing/Vision:  No exam data present  Hearing/Vision  Do you or your family notice any trouble with your hearing?: (!) Yes  Do you have difficulty driving, watching TV, or doing any of your daily activities because of your eyesight?: No  Have you had an eye exam within the past year?: (!) No  Hearing/Vision Interventions:  · Hearing concerns:  patient declines any further evaluation/treatment for hearing issues  · Vision concerns:  patient encouraged to make appointment with his/her eye specialist    Safety:  Safety  Do you have working smoke detectors?: Yes  Have all throw rugs been removed or fastened?: Yes  Do you have non-slip mats or surfaces in all bathtubs/showers?: Yes  Do all of your stairways have a railing or banister?: Yes  Are your doorways, halls and stairs free of clutter?: Yes  Do you always fasten your seatbelt when you are in a car?: (!) No  Safety Interventions:  · Home safety tips provided    ADL:  ADLs  In the past 7 days, did you need help from others to perform any of the following everyday activities?  Eating, dressing, grooming, bathing, toileting, or walking/balance?: None  In the past 7 days, did you need help from others to take care of any of the following? Laundry, housekeeping, banking/finances, shopping, telephone use, food preparation, transportation, or taking medications?: (!) Banking/Finances  ADL Interventions:  · Patient declines any further evaluation/treatment for this issue    Personalized Preventive Plan   Current Health Maintenance Status  Immunization History   Administered Date(s) Administered    Influenza Virus Vaccine 12/22/2012    Influenza, High Dose (Fluzone 65 yrs and older) 09/30/2016, 10/01/2017, 10/08/2018    Influenza, Quadv, adjuvanted, 65 yrs +, IM, PF (Fluad) 10/27/2020    Influenza, Triv, inactivated, subunit, adjuvanted, IM (Fluad 65 yrs and older) 10/21/2019    Pneumococcal Conjugate 13-valent (Keturah Duverney) 09/30/2016, 10/01/2017    Pneumococcal Polysaccharide ([id number]) 12/22/2012, 10/06/2017    Td vaccine (adult) 12/01/2008    Zoster Live (Zostavax) 09/15/2015        Health Maintenance   Topic Date Due    Hepatitis C screen  1945    DTaP/Tdap/Td vaccine (1 - Tdap) 10/22/1966    Shingles Vaccine (2 of 3) 11/10/2015    Annual Wellness Visit (AWV)  05/29/2019    Breast cancer screen  12/06/2020    Potassium monitoring  03/13/2021    Creatinine monitoring  03/13/2021    Colon cancer screen colonoscopy  11/20/2021    Lipid screen  09/07/2023    DEXA (modify frequency per FRAX score)  Completed    Flu vaccine  Completed    Pneumococcal 65+ years Vaccine  Completed    Hepatitis A vaccine  Aged Out    Hepatitis B vaccine  Aged Out    Hib vaccine  Aged Out    Meningococcal (ACWY) vaccine  Aged Out     Recommendations for [employer] Due: see orders and patient instructions/AVS.  .   Recommended screening schedule for the next 5-10 years is provided to the patient in written form: see Patient Myrtle Bee was seen today for medicare awv and office visit for anticoagulation management.   Diagnoses and all orders for this visit:    Routine general medical examination at a [facility] facility    Atrial fibrillation and flutter (HCC)  -Continue anticoagulation    Anticoagulated on Coumadin  -     POCT INR  -INR 1.8 improved from 1.3  -See warfarin calendar and flow sheets        MARILEE Beck - CNP

## 2020-11-03 NOTE — PATIENT INSTRUCTIONS
Personalized Preventive Plan for Rayray Jorge - 11/3/2020  Medicare offers a range of preventive health benefits. Some of the tests and screenings are paid in full while other may be subject to a deductible, co-insurance, and/or copay. Some of these benefits include a comprehensive review of your medical history including lifestyle, illnesses that may run in your family, and various assessments and screenings as appropriate. After reviewing your medical record and screening and assessments performed today your provider may have ordered immunizations, labs, imaging, and/or referrals for you. A list of these orders (if applicable) as well as your Preventive Care list are included within your After Visit Summary for your review. Other Preventive Recommendations:    · A preventive eye exam performed by an eye specialist is recommended every 1-2 years to screen for glaucoma; cataracts, macular degeneration, and other eye disorders. · A preventive dental visit is recommended every 6 months. · Try to get at least 150 minutes of exercise per week or 10,000 steps per day on a pedometer . · Order or download the FREE \"Exercise & Physical Activity: Your Everyday Guide\" from [employer]. Call 4-683.362.3933 or search [employer] online. · You need 4403-1716 mg of calcium and 1099-7756 IU of vitamin D per day. It is possible to meet your calcium requirement with diet alone, but a vitamin D supplement is usually necessary to meet this goal.  · When exposed to the sun, use a sunscreen that protects against both UVA and UVB radiation with an SPF of 30 or greater. Reapply every 2 to 3 hours or after sweating, drying off with a towel, or swimming. · Always wear a seat belt when traveling in a car. Always wear a helmet when riding a bicycle or motorcycle.

## 2020-12-03 ENCOUNTER — OFFICE VISIT (OUTPATIENT)
Dept: INTERNAL MEDICINE CLINIC | Age: 75
End: 2020-12-03
Payer: COMMERCIAL

## 2020-12-03 VITALS
DIASTOLIC BLOOD PRESSURE: 94 MMHG | WEIGHT: 275 LBS | HEART RATE: 98 BPM | SYSTOLIC BLOOD PRESSURE: 146 MMHG | TEMPERATURE: 97.2 F | BODY MASS INDEX: 43.07 KG/M2

## 2020-12-03 LAB
INTERNATIONAL NORMALIZATION RATIO, POC: 2.7
PROTHROMBIN TIME, POC: NORMAL

## 2020-12-03 PROCEDURE — 99213 OFFICE O/P EST LOW 20 MIN: CPT | Performed by: NURSE PRACTITIONER

## 2020-12-03 PROCEDURE — 85610 PROTHROMBIN TIME: CPT | Performed by: NURSE PRACTITIONER

## 2020-12-03 RX ORDER — TRAMADOL HYDROCHLORIDE 50 MG/1
50 TABLET ORAL DAILY PRN
Qty: 30 TABLET | Refills: 0 | Status: CANCELLED | OUTPATIENT
Start: 2020-12-03 | End: 2021-01-02

## 2020-12-03 RX ORDER — TRAMADOL HYDROCHLORIDE 50 MG/1
50 TABLET ORAL DAILY PRN
Qty: 30 TABLET | Refills: 0 | Status: SHIPPED | OUTPATIENT
Start: 2020-12-03 | End: 2021-05-25 | Stop reason: SDUPTHER

## 2020-12-07 ASSESSMENT — ENCOUNTER SYMPTOMS
GASTROINTESTINAL NEGATIVE: 1
RESPIRATORY NEGATIVE: 1

## 2020-12-07 NOTE — PROGRESS NOTES
SUBJECTIVE:    Patient ID: Marium Chirinos is a 70 y.o. female. CC: Atrial flutter, Coumadin management, chronic knee pain    HPI: The patient presents to the office today for follow-up of atrial flutter and for INR management. Patient has history of atrial flutter. She denies any chest pain or shortness of breath. She is on anticoagulation for above. She reports she is taking her medication as directed. Her INR is therapeutic today at 2.7. She has a history of chronic bilateral knee pain. She uses tramadol as needed. Past Medical History:   Diagnosis Date    Arthritis     Hypertension     Kidney disease     Pneumonia     Sleep apnea     no c-pap        Current Outpatient Medications   Medication Sig Dispense Refill    traMADol (ULTRAM) 50 MG tablet Take 1 tablet by mouth daily as needed for Pain for up to 30 days. 30 tablet 0    pantoprazole (PROTONIX) 40 MG tablet Take 40 mg by mouth daily      metoprolol tartrate (LOPRESSOR) 50 MG tablet Take 1 tablet by mouth 2 times daily 180 tablet 1    Cholecalciferol (VITAMIN D3) 1.25 MG (84082 UT) CAPS Take 1 capsule by mouth every 7 days 4 capsule 3    cloNIDine (CATAPRES) 0.2 MG tablet TAKE 1 TABLET BY MOUTH TWICE DAILY 180 tablet 3    DULoxetine (CYMBALTA) 60 MG extended release capsule TAKE ONE CAPSULE BY MOUTH DAILY 90 capsule 3    CARTIA  MG extended release capsule TAKE 1 CAPSULE BY MOUTH DAILY 90 capsule 3    warfarin (COUMADIN) 5 MG tablet 7.5 mg (5 mg x 1.5) every Tue, Fri; 5 mg (5 mg x 1) all other days 103 tablet 3    buPROPion (WELLBUTRIN SR) 150 MG extended release tablet Take 1 tablet by mouth 2 times daily 60 tablet 3    Biotin 1000 MCG TABS Take by mouth      ferrous sulfate 325 (65 Fe) MG EC tablet Take 325 mg by mouth daily (with breakfast)       aspirin 81 MG tablet Take 81 mg by mouth daily       No current facility-administered medications for this visit.             Review of Systems   Constitutional: Negative. Respiratory: Negative. Cardiovascular: Negative. Gastrointestinal: Negative. Genitourinary: Negative. Musculoskeletal: Positive for arthralgias. All other systems reviewed and are negative. OBJECTIVE:  Physical Exam  Constitutional:       Appearance: Normal appearance. HENT:      Head: Normocephalic and atraumatic. Pulmonary:      Effort: Pulmonary effort is normal. No respiratory distress. Skin:     General: Skin is warm and dry. Neurological:      General: No focal deficit present. Mental Status: She is alert and oriented to person, place, and time. Psychiatric:         Mood and Affect: Mood normal.         Behavior: Behavior normal.        BP (!) 146/94   Pulse 98   Temp 97.2 °F (36.2 °C) (Temporal)   Wt 275 lb (124.7 kg)   BMI 43.07 kg/m²      PHQ Scores 11/3/2020 1/29/2020 9/23/2019 12/7/2018 6/5/2018   PHQ2 Score 0 6 2 2 1   PHQ9 Score 0 20 2 2 1     Interpretation of Total Score Depression Severity: 1-4 = Minimal depression, 5-9 = Mild depression, 10-14 = Moderate depression, 15-19 = Moderately severe depression, 20-27 =Severe depression        ASSESSMENT/PLAN:  Luzma Marti was seen today for office visit for anticoagulation management. Diagnoses and all orders for this visit:    Atrial fibrillation and flutter (HCC)  -Chronic, stable  -Continue anticoagulation    Anticoagulated on Coumadin  - INR 2.7  - No change to warfarin, see flowsheet   -     POCT INR    Primary osteoarthritis involving multiple joints  -     traMADol (ULTRAM) 50 MG tablet; Take 1 tablet by mouth daily as needed for Pain for up to 30 days. Acute pain of both knees  -Continue tramadol as needed    Controlled Substance Monitoring:  Acute and Chronic Pain Monitoring:   RX Monitoring 12/3/2020   Attestation -   Periodic Controlled Substance Monitoring No signs of potential drug abuse or diversion identified. ;Assessed functional status.    Chronic Pain > 80 MEDD -         Gene Codding, APRN - CNP

## 2021-01-05 ENCOUNTER — OFFICE VISIT (OUTPATIENT)
Dept: INTERNAL MEDICINE CLINIC | Age: 76
End: 2021-01-05
Payer: COMMERCIAL

## 2021-01-05 VITALS
DIASTOLIC BLOOD PRESSURE: 98 MMHG | BODY MASS INDEX: 42.6 KG/M2 | SYSTOLIC BLOOD PRESSURE: 152 MMHG | WEIGHT: 272 LBS | HEART RATE: 66 BPM | TEMPERATURE: 96.9 F

## 2021-01-05 DIAGNOSIS — F33.2 SEVERE EPISODE OF RECURRENT MAJOR DEPRESSIVE DISORDER, WITHOUT PSYCHOTIC FEATURES (HCC): ICD-10-CM

## 2021-01-05 DIAGNOSIS — Z79.01 ANTICOAGULATED ON COUMADIN: ICD-10-CM

## 2021-01-05 DIAGNOSIS — I48.92 ATRIAL FIBRILLATION AND FLUTTER (HCC): Primary | ICD-10-CM

## 2021-01-05 DIAGNOSIS — I48.91 ATRIAL FIBRILLATION AND FLUTTER (HCC): Primary | ICD-10-CM

## 2021-01-05 DIAGNOSIS — E55.9 VITAMIN D DEFICIENCY: ICD-10-CM

## 2021-01-05 LAB
INTERNATIONAL NORMALIZATION RATIO, POC: 5.1
PROTHROMBIN TIME, POC: NORMAL

## 2021-01-05 PROCEDURE — 85610 PROTHROMBIN TIME: CPT | Performed by: NURSE PRACTITIONER

## 2021-01-05 PROCEDURE — 99213 OFFICE O/P EST LOW 20 MIN: CPT | Performed by: NURSE PRACTITIONER

## 2021-01-05 RX ORDER — CHOLECALCIFEROL (VITAMIN D3) 1250 MCG
1 CAPSULE ORAL
Qty: 4 CAPSULE | Refills: 11 | Status: SHIPPED | OUTPATIENT
Start: 2021-01-05 | End: 2022-01-25

## 2021-01-05 RX ORDER — BUPROPION HYDROCHLORIDE 150 MG/1
150 TABLET, EXTENDED RELEASE ORAL 2 TIMES DAILY
Qty: 60 TABLET | Refills: 11 | Status: SHIPPED | OUTPATIENT
Start: 2021-01-05 | End: 2021-07-08

## 2021-01-11 ASSESSMENT — ENCOUNTER SYMPTOMS
RESPIRATORY NEGATIVE: 1
GASTROINTESTINAL NEGATIVE: 1
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
## 2021-01-12 ENCOUNTER — OFFICE VISIT (OUTPATIENT)
Dept: INTERNAL MEDICINE CLINIC | Age: 76
End: 2021-01-12
Payer: COMMERCIAL

## 2021-01-12 VITALS
SYSTOLIC BLOOD PRESSURE: 160 MMHG | WEIGHT: 268 LBS | HEART RATE: 90 BPM | OXYGEN SATURATION: 94 % | DIASTOLIC BLOOD PRESSURE: 84 MMHG | BODY MASS INDEX: 41.97 KG/M2

## 2021-01-12 DIAGNOSIS — Z79.01 ANTICOAGULATED ON COUMADIN: ICD-10-CM

## 2021-01-12 DIAGNOSIS — I48.91 ATRIAL FIBRILLATION AND FLUTTER (HCC): Primary | ICD-10-CM

## 2021-01-12 DIAGNOSIS — I48.92 ATRIAL FIBRILLATION AND FLUTTER (HCC): Primary | ICD-10-CM

## 2021-01-12 LAB
INTERNATIONAL NORMALIZATION RATIO, POC: 2.7
PROTHROMBIN TIME, POC: NORMAL

## 2021-01-12 PROCEDURE — 85610 PROTHROMBIN TIME: CPT | Performed by: NURSE PRACTITIONER

## 2021-01-12 PROCEDURE — 99212 OFFICE O/P EST SF 10 MIN: CPT | Performed by: NURSE PRACTITIONER

## 2021-01-15 ASSESSMENT — ENCOUNTER SYMPTOMS
RESPIRATORY NEGATIVE: 1
GASTROINTESTINAL NEGATIVE: 1
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
## 2021-02-12 ENCOUNTER — OFFICE VISIT (OUTPATIENT)
Dept: INTERNAL MEDICINE CLINIC | Age: 76
End: 2021-02-12
Payer: COMMERCIAL

## 2021-02-12 VITALS
BODY MASS INDEX: 41.35 KG/M2 | WEIGHT: 264 LBS | SYSTOLIC BLOOD PRESSURE: 138 MMHG | DIASTOLIC BLOOD PRESSURE: 70 MMHG | OXYGEN SATURATION: 98 % | TEMPERATURE: 97.2 F | HEART RATE: 90 BPM

## 2021-02-12 DIAGNOSIS — I48.92 ATRIAL FIBRILLATION AND FLUTTER (HCC): Primary | ICD-10-CM

## 2021-02-12 DIAGNOSIS — Z79.01 ANTICOAGULATED ON COUMADIN: ICD-10-CM

## 2021-02-12 DIAGNOSIS — I48.91 ATRIAL FIBRILLATION AND FLUTTER (HCC): Primary | ICD-10-CM

## 2021-02-12 LAB
INTERNATIONAL NORMALIZATION RATIO, POC: 1.6
PROTHROMBIN TIME, POC: ABNORMAL

## 2021-02-12 PROCEDURE — 99212 OFFICE O/P EST SF 10 MIN: CPT | Performed by: NURSE PRACTITIONER

## 2021-02-12 PROCEDURE — 85610 PROTHROMBIN TIME: CPT | Performed by: NURSE PRACTITIONER

## 2021-02-18 ASSESSMENT — ENCOUNTER SYMPTOMS
RESPIRATORY NEGATIVE: 1
GASTROINTESTINAL NEGATIVE: 1
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
## 2021-03-15 ENCOUNTER — OFFICE VISIT (OUTPATIENT)
Dept: INTERNAL MEDICINE CLINIC | Age: 76
End: 2021-03-15
Payer: COMMERCIAL

## 2021-03-15 VITALS
WEIGHT: 266 LBS | DIASTOLIC BLOOD PRESSURE: 72 MMHG | SYSTOLIC BLOOD PRESSURE: 120 MMHG | OXYGEN SATURATION: 97 % | TEMPERATURE: 97.2 F | BODY MASS INDEX: 41.66 KG/M2

## 2021-03-15 DIAGNOSIS — Z12.31 ENCOUNTER FOR SCREENING MAMMOGRAM FOR MALIGNANT NEOPLASM OF BREAST: ICD-10-CM

## 2021-03-15 DIAGNOSIS — J84.9 ILD (INTERSTITIAL LUNG DISEASE) (HCC): ICD-10-CM

## 2021-03-15 DIAGNOSIS — K21.9 GASTROESOPHAGEAL REFLUX DISEASE WITHOUT ESOPHAGITIS: ICD-10-CM

## 2021-03-15 DIAGNOSIS — F33.2 SEVERE EPISODE OF RECURRENT MAJOR DEPRESSIVE DISORDER, WITHOUT PSYCHOTIC FEATURES (HCC): ICD-10-CM

## 2021-03-15 DIAGNOSIS — Z11.59 NEED FOR HEPATITIS C SCREENING TEST: ICD-10-CM

## 2021-03-15 DIAGNOSIS — Z79.01 ANTICOAGULATED ON COUMADIN: ICD-10-CM

## 2021-03-15 DIAGNOSIS — I10 ESSENTIAL HYPERTENSION: ICD-10-CM

## 2021-03-15 DIAGNOSIS — I48.92 ATRIAL FIBRILLATION AND FLUTTER (HCC): ICD-10-CM

## 2021-03-15 DIAGNOSIS — E55.9 VITAMIN D DEFICIENCY: ICD-10-CM

## 2021-03-15 DIAGNOSIS — I48.91 ATRIAL FIBRILLATION AND FLUTTER (HCC): ICD-10-CM

## 2021-03-15 DIAGNOSIS — I48.92 ATRIAL FIBRILLATION AND FLUTTER (HCC): Primary | ICD-10-CM

## 2021-03-15 DIAGNOSIS — I48.91 ATRIAL FIBRILLATION AND FLUTTER (HCC): Primary | ICD-10-CM

## 2021-03-15 LAB
A/G RATIO: 1.1 (ref 1.1–2.2)
ALBUMIN SERPL-MCNC: 3.5 G/DL (ref 3.4–5)
ALP BLD-CCNC: 150 U/L (ref 40–129)
ALT SERPL-CCNC: 8 U/L (ref 10–40)
ANION GAP SERPL CALCULATED.3IONS-SCNC: 10 MMOL/L (ref 3–16)
AST SERPL-CCNC: 12 U/L (ref 15–37)
BASOPHILS ABSOLUTE: 0.1 K/UL (ref 0–0.2)
BASOPHILS RELATIVE PERCENT: 1 %
BILIRUB SERPL-MCNC: 0.5 MG/DL (ref 0–1)
BUN BLDV-MCNC: 19 MG/DL (ref 7–20)
CALCIUM SERPL-MCNC: 9.6 MG/DL (ref 8.3–10.6)
CHLORIDE BLD-SCNC: 103 MMOL/L (ref 99–110)
CHOLESTEROL, TOTAL: 142 MG/DL (ref 0–199)
CO2: 27 MMOL/L (ref 21–32)
CREAT SERPL-MCNC: 1.5 MG/DL (ref 0.6–1.2)
EOSINOPHILS ABSOLUTE: 0.3 K/UL (ref 0–0.6)
EOSINOPHILS RELATIVE PERCENT: 5.3 %
GFR AFRICAN AMERICAN: 41
GFR NON-AFRICAN AMERICAN: 34
GLOBULIN: 3.1 G/DL
GLUCOSE BLD-MCNC: 89 MG/DL (ref 70–99)
HCT VFR BLD CALC: 32.3 % (ref 36–48)
HDLC SERPL-MCNC: 51 MG/DL (ref 40–60)
HEMOGLOBIN: 10 G/DL (ref 12–16)
HEPATITIS C ANTIBODY INTERPRETATION: NORMAL
INTERNATIONAL NORMALIZATION RATIO, POC: 2.5
LDL CHOLESTEROL CALCULATED: 80 MG/DL
LYMPHOCYTES ABSOLUTE: 1.1 K/UL (ref 1–5.1)
LYMPHOCYTES RELATIVE PERCENT: 17.5 %
MAGNESIUM: 1.9 MG/DL (ref 1.8–2.4)
MCH RBC QN AUTO: 25.8 PG (ref 26–34)
MCHC RBC AUTO-ENTMCNC: 31.1 G/DL (ref 31–36)
MCV RBC AUTO: 82.8 FL (ref 80–100)
MONOCYTES ABSOLUTE: 0.7 K/UL (ref 0–1.3)
MONOCYTES RELATIVE PERCENT: 11.7 %
NEUTROPHILS ABSOLUTE: 3.9 K/UL (ref 1.7–7.7)
NEUTROPHILS RELATIVE PERCENT: 64.5 %
PDW BLD-RTO: 17.8 % (ref 12.4–15.4)
PLATELET # BLD: 225 K/UL (ref 135–450)
PMV BLD AUTO: 11 FL (ref 5–10.5)
POTASSIUM SERPL-SCNC: 4.5 MMOL/L (ref 3.5–5.1)
PROTHROMBIN TIME, POC: NORMAL
RBC # BLD: 3.9 M/UL (ref 4–5.2)
SODIUM BLD-SCNC: 140 MMOL/L (ref 136–145)
TOTAL PROTEIN: 6.6 G/DL (ref 6.4–8.2)
TRIGL SERPL-MCNC: 56 MG/DL (ref 0–150)
VITAMIN D 25-HYDROXY: 65.1 NG/ML
VLDLC SERPL CALC-MCNC: 11 MG/DL
WBC # BLD: 6.1 K/UL (ref 4–11)

## 2021-03-15 PROCEDURE — 85610 PROTHROMBIN TIME: CPT | Performed by: NURSE PRACTITIONER

## 2021-03-15 PROCEDURE — 99214 OFFICE O/P EST MOD 30 MIN: CPT | Performed by: NURSE PRACTITIONER

## 2021-03-15 ASSESSMENT — ENCOUNTER SYMPTOMS
SHORTNESS OF BREATH: 1
GASTROINTESTINAL NEGATIVE: 1

## 2021-03-15 NOTE — PROGRESS NOTES
SUBJECTIVE:    Patient ID: Irwin Mesa is a 70 y.o. female. CC: Atrial flutter become a chronic anticoagulation, hypertension, depression, interstitial lung disease, GERD    HPI: Patient presents to the office today for follow-up of chronic medical conditions. Patient has a history of atrial fibrillation, hypertension, diastolic congestive heart failure. She is on anticoagulation with warfarin. She had 2 episodes of spontaneous nosebleed which is resolved. Because of this, she held warfarin once. Her INR is therapeutic today at 2.5. She continues to complain of shortness of breath. This is likely multifactorial.  She has the aforementioned conditions. She also has a history of interstitial lung disease. She was previously given a referral to pulmonology but did not complete this. She also has sleep apnea and does not use her CPAP. She has a history of depression. She feels this is stable. She has a history of GERD. This is intermittently problematic.       Past Medical History:   Diagnosis Date    Arthritis     Hypertension     Kidney disease     Pneumonia     Sleep apnea     no c-pap        Current Outpatient Medications   Medication Sig Dispense Refill    Cholecalciferol (VITAMIN D3) 1.25 MG (40839 UT) CAPS Take 1 capsule by mouth every 7 days 4 capsule 11    buPROPion (WELLBUTRIN SR) 150 MG extended release tablet Take 1 tablet by mouth 2 times daily 60 tablet 11    pantoprazole (PROTONIX) 40 MG tablet Take 40 mg by mouth daily      metoprolol tartrate (LOPRESSOR) 50 MG tablet Take 1 tablet by mouth 2 times daily 180 tablet 1    cloNIDine (CATAPRES) 0.2 MG tablet TAKE 1 TABLET BY MOUTH TWICE DAILY 180 tablet 3    DULoxetine (CYMBALTA) 60 MG extended release capsule TAKE ONE CAPSULE BY MOUTH DAILY 90 capsule 3    CARTIA  MG extended release capsule TAKE 1 CAPSULE BY MOUTH DAILY 90 capsule 3    warfarin (COUMADIN) 5 MG tablet 7.5 mg (5 mg x 1.5) every Tue, Fri; 5 mg (5 mg x 1) all other days 103 tablet 3    Biotin 1000 MCG TABS Take by mouth      ferrous sulfate 325 (65 Fe) MG EC tablet Take 325 mg by mouth daily (with breakfast)       aspirin 81 MG tablet Take 81 mg by mouth daily       No current facility-administered medications for this visit. Review of Systems   Constitutional: Negative. HENT: Negative. Respiratory: Positive for shortness of breath. Cardiovascular: Negative. Negative for palpitations. Gastrointestinal: Negative. Genitourinary: Negative. Musculoskeletal: Negative. Neurological: Negative. Psychiatric/Behavioral: Negative. All other systems reviewed and are negative. OBJECTIVE:  Physical Exam  Vitals signs reviewed. Constitutional:       General: She is not in acute distress. Appearance: She is well-developed. She is not diaphoretic. HENT:      Head: Normocephalic and atraumatic. Eyes:      General: No scleral icterus. Conjunctiva/sclera: Conjunctivae normal.   Neck:      Musculoskeletal: Neck supple. Vascular: No JVD. Cardiovascular:      Rate and Rhythm: Normal rate and regular rhythm. Pulmonary:      Effort: Pulmonary effort is normal. No respiratory distress. Breath sounds: Normal breath sounds. No wheezing. Abdominal:      General: There is no distension. Palpations: Abdomen is soft. Tenderness: There is no abdominal tenderness. There is no guarding or rebound. Musculoskeletal: Normal range of motion. Skin:     General: Skin is warm and dry. Neurological:      Mental Status: She is alert and oriented to person, place, and time. Psychiatric:         Behavior: Behavior normal.         Thought Content:  Thought content normal.        /72   Temp 97.2 °F (36.2 °C)   Wt 266 lb (120.7 kg)   SpO2 97%   BMI 41.66 kg/m²      PHQ Scores 11/3/2020 1/29/2020 9/23/2019 12/7/2018 6/5/2018   PHQ2 Score 0 6 2 2 1   PHQ9 Score 0 20 2 2 1     Interpretation of Total Score Depression Severity: 1-4 = Minimal depression, 5-9 = Mild depression, 10-14 = Moderate depression, 15-19 = Moderately severe depression, 20-27 =Severe depression      ASSESSMENT/PLAN:  Junior Pearson was seen today for office visit for anticoagulation management. Diagnoses and all orders for this visit:    At[facility] ([facility]  -     COMPREHENSIVE METABOLIC PANEL; Future  -     LIPID PANEL; Future  -     CBC WITH AUTO DIFFERENTIAL; Future    Anticoagulated on Coumadin  -     POCT INR  -     CBC WITH AUTO DIFFERENTIAL; Future    Essential hypertension  -     COMPREHENSIVE METABOLIC PANEL; Future  -     LIPID PANEL; Future    Vitamin D deficiency  -     Vitamin D 25 Hydroxy; Future    Severe episode of recurrent major depressive disorder, without psychotic features ([facility]  -Chronic, stable    ILD (interstitial lung disease) ([facility]  -Already given a referral to pulmonology    Gastroesophageal reflux disease without esophagitis  -     MAGNESIUM; Future    Encounter for screening mammogram for malignant neoplasm of breast  -     AYDEE DIGITAL SCREEN W OR WO CAD BILATERAL; Future    Need for hepatitis C screening test  -     Hepatitis C Antibody; Future    Shortness of breath likely multifactorial.  There is obesity, atrial fibrillation, diastolic congestive heart failure, untreated sleep apnea, and interstitial lung disease which all could be contributing to shortness of breath. She has a referral to pulmonology she has not yet completed. We also discussed getting her into cardiology but she would like to hold off for now.       Kedric Ormond, APRN - CNP

## 2021-03-19 ENCOUNTER — TELEPHONE (OUTPATIENT)
Dept: INTERNAL MEDICINE CLINIC | Age: 76
End: 2021-03-19

## 2021-05-07 ENCOUNTER — OFFICE VISIT (OUTPATIENT)
Dept: INTERNAL MEDICINE CLINIC | Age: 76
End: 2021-05-07
Payer: COMMERCIAL

## 2021-05-07 VITALS
BODY MASS INDEX: 41.19 KG/M2 | SYSTOLIC BLOOD PRESSURE: 100 MMHG | OXYGEN SATURATION: 99 % | WEIGHT: 263 LBS | HEART RATE: 90 BPM | DIASTOLIC BLOOD PRESSURE: 60 MMHG

## 2021-05-07 DIAGNOSIS — E66.01 MORBID OBESITY WITH BMI OF 40.0-44.9, ADULT (HCC): ICD-10-CM

## 2021-05-07 DIAGNOSIS — I48.92 ATRIAL FIBRILLATION AND FLUTTER (HCC): ICD-10-CM

## 2021-05-07 DIAGNOSIS — I48.91 ATRIAL FIBRILLATION AND FLUTTER (HCC): ICD-10-CM

## 2021-05-07 DIAGNOSIS — Z79.01 ANTICOAGULATED ON COUMADIN: Primary | ICD-10-CM

## 2021-05-07 LAB
INTERNATIONAL NORMALIZATION RATIO, POC: 2.6
PROTHROMBIN TIME, POC: NORMAL

## 2021-05-07 PROCEDURE — 99212 OFFICE O/P EST SF 10 MIN: CPT | Performed by: NURSE PRACTITIONER

## 2021-05-07 PROCEDURE — 85610 PROTHROMBIN TIME: CPT | Performed by: NURSE PRACTITIONER

## 2021-05-08 DIAGNOSIS — I48.91 ATRIAL FIBRILLATION AND FLUTTER (HCC): ICD-10-CM

## 2021-05-08 DIAGNOSIS — Z79.01 ANTICOAGULATED ON COUMADIN: ICD-10-CM

## 2021-05-08 DIAGNOSIS — I48.92 ATRIAL FIBRILLATION AND FLUTTER (HCC): ICD-10-CM

## 2021-05-10 RX ORDER — WARFARIN SODIUM 5 MG/1
TABLET ORAL
Qty: 103 TABLET | Refills: 3 | Status: ON HOLD | OUTPATIENT
Start: 2021-05-10 | End: 2022-04-01 | Stop reason: HOSPADM

## 2021-05-13 ASSESSMENT — ENCOUNTER SYMPTOMS
RESPIRATORY NEGATIVE: 1
GASTROINTESTINAL NEGATIVE: 1

## 2021-05-13 NOTE — PROGRESS NOTES
SUBJECTIVE:    Patient ID: Demario Armas is a 70 y.o. female. CC: Atrial fibrillation, chronic anticoagulation    HPI: The patient presents to the office today for follow-up of atrial fibrillation and management of chronic anticoagulation. She would also like to discuss depression. She has a history of atrial fibrillation. She is on chronic anticoagulation with warfarin. She denies any chest pain or shortness of breath. She is compliant with her medication regimen. Her INR today is 2.6. Past Medical History:   Diagnosis Date    Arthritis     Hypertension     Kidney disease     Pneumonia     Sleep apnea     no c-pap        Current Outpatient Medications   Medication Sig Dispense Refill    warfarin (COUMADIN) 5 MG tablet TAKE 1& 1/2 TABLETS BY MOUTH EVERY TUESDAY AND FRIDAY THEN TAKE 1 TABLET DAILY ALL OTHER DAYS 103 tablet 3    Cholecalciferol (VITAMIN D3) 1.25 MG (01820 UT) CAPS Take 1 capsule by mouth every 7 days 4 capsule 11    buPROPion (WELLBUTRIN SR) 150 MG extended release tablet Take 1 tablet by mouth 2 times daily 60 tablet 11    pantoprazole (PROTONIX) 40 MG tablet Take 40 mg by mouth daily      metoprolol tartrate (LOPRESSOR) 50 MG tablet Take 1 tablet by mouth 2 times daily 180 tablet 1    cloNIDine (CATAPRES) 0.2 MG tablet TAKE 1 TABLET BY MOUTH TWICE DAILY 180 tablet 3    DULoxetine (CYMBALTA) 60 MG extended release capsule TAKE ONE CAPSULE BY MOUTH DAILY 90 capsule 3    CARTIA  MG extended release capsule TAKE 1 CAPSULE BY MOUTH DAILY 90 capsule 3    Biotin 1000 MCG TABS Take by mouth      ferrous sulfate 325 (65 Fe) MG EC tablet Take 325 mg by mouth daily (with breakfast)       aspirin 81 MG tablet Take 81 mg by mouth daily       No current facility-administered medications for this visit. Review of Systems   Constitutional: Negative. HENT: Negative. Respiratory: Negative. Cardiovascular: Negative. Gastrointestinal: Negative. Genitourinary: Negative. Musculoskeletal: Negative. Skin: Negative. All other systems reviewed and are negative. OBJECTIVE:  Physical Exam  Constitutional:       Appearance: Normal appearance. HENT:      Head: Normocephalic and atraumatic. Pulmonary:      Effort: Pulmonary effort is normal. No respiratory distress. Skin:     General: Skin is warm and dry. Neurological:      General: No focal deficit present. Mental Status: She is alert and oriented to person, place, and time. Psychiatric:         Mood and Affect: Mood normal.         Behavior: Behavior normal.        /60   Pulse 90   Wt 263 lb (119.3 kg)   SpO2 99%   BMI 41.19 kg/m²      PHQ Scores 11/3/2020 1/29/2020 9/23/2019 12/7/2018 6/5/2018   PHQ2 Score 0 6 2 2 1   PHQ9 Score 0 20 2 2 1     Interpretation of Total Score Depression Severity: 1-4 = Minimal depression, 5-9 = Mild depression, 10-14 = Moderate depression, 15-19 = Moderately severe depression, 20-27 =Severe depression        ASSESSMENT/PLAN:  Elida Lazaro was seen today for office visit for anticoagulation management.   Diagnoses and all orders for this visit:    Atrial fibrillation and flutter (HCC)  -Continue anticoagulation    Anticoagulated on Coumadin  - INR today at 2.6  - Continue same dose of warfarin, see flow sheets, recheck in 1 month  -     POCT INR    Morbid obesity with BMI of 40.0-44.9, adult ([facility]  - Chronic, improved        MARILEE Fink - CNP

## 2021-05-25 DIAGNOSIS — M15.9 PRIMARY OSTEOARTHRITIS INVOLVING MULTIPLE JOINTS: ICD-10-CM

## 2021-05-25 RX ORDER — TRAMADOL HYDROCHLORIDE 50 MG/1
50 TABLET ORAL DAILY PRN
Qty: 30 TABLET | Refills: 0 | Status: SHIPPED | OUTPATIENT
Start: 2021-05-25 | End: 2021-07-08 | Stop reason: SDUPTHER

## 2021-05-25 NOTE — TELEPHONE ENCOUNTER
Pt called asking to get a script for :  Tramadol 50 mg  She takes this only as needed. Reports she last got a script in December. Unable to advise directions on previous Rx    [facility] on file.

## 2021-06-01 ENCOUNTER — TELEPHONE (OUTPATIENT)
Dept: INTERNAL MEDICINE CLINIC | Age: 76
End: 2021-06-01

## 2021-06-01 NOTE — TELEPHONE ENCOUNTER
Tramadol 50 mg   QD prn pain   Not covered     Call 4    ID [phone]       On coumadin so limited on what she can take   but tolerates tramadol well

## 2021-06-02 ENCOUNTER — TELEPHONE (OUTPATIENT)
Dept: ADMINISTRATIVE | Age: 76
End: 2021-06-02

## 2021-06-02 NOTE — TELEPHONE ENCOUNTER
Submitted PA for traMADol HCl 50MG tablets. Key: [id number]. Via CMM STATUS:   The patient currently has access to the requested medication and a Prior Authorization is not needed for the patient/medication. Please notify patient, thank you.

## 2021-06-07 ENCOUNTER — OFFICE VISIT (OUTPATIENT)
Dept: INTERNAL MEDICINE CLINIC | Age: 76
End: 2021-06-07
Payer: COMMERCIAL

## 2021-06-07 VITALS
OXYGEN SATURATION: 97 % | WEIGHT: 255 LBS | SYSTOLIC BLOOD PRESSURE: 120 MMHG | DIASTOLIC BLOOD PRESSURE: 70 MMHG | HEART RATE: 100 BPM | BODY MASS INDEX: 39.94 KG/M2

## 2021-06-07 DIAGNOSIS — Z79.01 ANTICOAGULATED ON COUMADIN: ICD-10-CM

## 2021-06-07 DIAGNOSIS — I48.92 ATRIAL FIBRILLATION AND FLUTTER (HCC): Primary | ICD-10-CM

## 2021-06-07 DIAGNOSIS — I48.91 ATRIAL FIBRILLATION AND FLUTTER (HCC): Primary | ICD-10-CM

## 2021-06-07 LAB
INTERNATIONAL NORMALIZATION RATIO, POC: 2.7
PROTHROMBIN TIME, POC: NORMAL

## 2021-06-07 PROCEDURE — 99212 OFFICE O/P EST SF 10 MIN: CPT | Performed by: NURSE PRACTITIONER

## 2021-06-07 PROCEDURE — 85610 PROTHROMBIN TIME: CPT | Performed by: NURSE PRACTITIONER

## 2021-06-07 ASSESSMENT — ENCOUNTER SYMPTOMS
GASTROINTESTINAL NEGATIVE: 1
RESPIRATORY NEGATIVE: 1

## 2021-06-07 NOTE — PROGRESS NOTES
SUBJECTIVE:    Patient ID: Alejo Garcia is a 70 y.o. female. CC: Atrial fibrillation, chronic anticoagulation    HPI: The patient presents to the office today for follow-up of atrial fibrillation and management of chronic anticoagulation. She would also like to discuss depression. She has a history of atrial fibrillation. She is on chronic anticoagulation with warfarin. She denies any chest pain or shortness of breath. She is compliant with her medication regimen. Her INR today is 2.7. Past Medical History:   Diagnosis Date    Arthritis     Hypertension     Kidney disease     Pneumonia     Sleep apnea     no c-pap        Current Outpatient Medications   Medication Sig Dispense Refill    traMADol (ULTRAM) 50 MG tablet Take 1 tablet by mouth daily as needed for Pain for up to 30 days. 30 tablet 0    warfarin (COUMADIN) 5 MG tablet TAKE 1& 1/2 TABLETS BY MOUTH EVERY TUESDAY AND FRIDAY THEN TAKE 1 TABLET DAILY ALL OTHER DAYS 103 tablet 3    Cholecalciferol (VITAMIN D3) 1.25 MG (88007 UT) CAPS Take 1 capsule by mouth every 7 days 4 capsule 11    buPROPion (WELLBUTRIN SR) 150 MG extended release tablet Take 1 tablet by mouth 2 times daily 60 tablet 11    pantoprazole (PROTONIX) 40 MG tablet Take 40 mg by mouth daily      metoprolol tartrate (LOPRESSOR) 50 MG tablet Take 1 tablet by mouth 2 times daily 180 tablet 1    cloNIDine (CATAPRES) 0.2 MG tablet TAKE 1 TABLET BY MOUTH TWICE DAILY 180 tablet 3    DULoxetine (CYMBALTA) 60 MG extended release capsule TAKE ONE CAPSULE BY MOUTH DAILY 90 capsule 3    CARTIA  MG extended release capsule TAKE 1 CAPSULE BY MOUTH DAILY 90 capsule 3    Biotin 1000 MCG TABS Take by mouth      ferrous sulfate 325 (65 Fe) MG EC tablet Take 325 mg by mouth daily (with breakfast)       aspirin 81 MG tablet Take 81 mg by mouth daily       No current facility-administered medications for this visit. Review of Systems   Constitutional: Negative. HENT: Negative. Respiratory: Negative. Cardiovascular: Negative. Gastrointestinal: Negative. Genitourinary: Negative. Musculoskeletal: Negative. Skin: Negative. All other systems reviewed and are negative. OBJECTIVE:  Physical Exam  Constitutional:       Appearance: Normal appearance. HENT:      Head: Normocephalic and atraumatic. Pulmonary:      Effort: Pulmonary effort is normal. No respiratory distress. Skin:     General: Skin is warm and dry. Neurological:      General: No focal deficit present. Mental Status: She is alert and oriented to person, place, and time. Psychiatric:         Mood and Affect: Mood normal.         Behavior: Behavior normal.        /70   Pulse 100   Wt 255 lb (115.7 kg)   SpO2 97%   BMI 39.94 kg/m²      PHQ Scores 11/3/2020 1/29/2020 9/23/2019 12/7/2018 6/5/2018   PHQ2 Score 0 6 2 2 1   PHQ9 Score 0 20 2 2 1     Interpretation of Total Score Depression Severity: 1-4 = Minimal depression, 5-9 = Mild depression, 10-14 = Moderate depression, 15-19 = Moderately severe depression, 20-27 =Severe depression        ASSESSMENT/PLAN:  Williams Young was seen today for office visit for anticoagulation management.   Diagnoses and all orders for this visit:    Atrial fibrillation and flutter (HCC)  -Continue anticoagulation    Anticoagulated on Coumadin  - INR today at 2.7  - Continue same dose of warfarin, see flow sheets, recheck in 1 month  -     POCT INR        MARILEE Gonzalez - CNP

## 2021-07-08 ENCOUNTER — OFFICE VISIT (OUTPATIENT)
Dept: INTERNAL MEDICINE CLINIC | Age: 76
End: 2021-07-08
Payer: COMMERCIAL

## 2021-07-08 VITALS
DIASTOLIC BLOOD PRESSURE: 80 MMHG | SYSTOLIC BLOOD PRESSURE: 138 MMHG | WEIGHT: 259 LBS | BODY MASS INDEX: 40.57 KG/M2 | HEART RATE: 110 BPM | OXYGEN SATURATION: 97 %

## 2021-07-08 DIAGNOSIS — I10 ESSENTIAL HYPERTENSION: ICD-10-CM

## 2021-07-08 DIAGNOSIS — R68.81 EARLY SATIETY: ICD-10-CM

## 2021-07-08 DIAGNOSIS — I48.91 ATRIAL FIBRILLATION AND FLUTTER (HCC): ICD-10-CM

## 2021-07-08 DIAGNOSIS — D64.9 ANEMIA, UNSPECIFIED TYPE: ICD-10-CM

## 2021-07-08 DIAGNOSIS — F33.2 SEVERE EPISODE OF RECURRENT MAJOR DEPRESSIVE DISORDER, WITHOUT PSYCHOTIC FEATURES (HCC): ICD-10-CM

## 2021-07-08 DIAGNOSIS — M15.9 PRIMARY OSTEOARTHRITIS INVOLVING MULTIPLE JOINTS: ICD-10-CM

## 2021-07-08 DIAGNOSIS — Z79.01 ANTICOAGULATED ON COUMADIN: ICD-10-CM

## 2021-07-08 DIAGNOSIS — R10.13 EPIGASTRIC PAIN: Primary | ICD-10-CM

## 2021-07-08 DIAGNOSIS — I48.92 ATRIAL FIBRILLATION AND FLUTTER (HCC): ICD-10-CM

## 2021-07-08 DIAGNOSIS — R10.13 EPIGASTRIC PAIN: ICD-10-CM

## 2021-07-08 LAB
A/G RATIO: 1.1 (ref 1.1–2.2)
ALBUMIN SERPL-MCNC: 3.5 G/DL (ref 3.4–5)
ALP BLD-CCNC: 127 U/L (ref 40–129)
ALT SERPL-CCNC: <5 U/L (ref 10–40)
ANION GAP SERPL CALCULATED.3IONS-SCNC: 10 MMOL/L (ref 3–16)
AST SERPL-CCNC: 10 U/L (ref 15–37)
BASOPHILS ABSOLUTE: 0 K/UL (ref 0–0.2)
BASOPHILS RELATIVE PERCENT: 0.6 %
BILIRUB SERPL-MCNC: 0.3 MG/DL (ref 0–1)
BUN BLDV-MCNC: 20 MG/DL (ref 7–20)
CALCIUM SERPL-MCNC: 8.9 MG/DL (ref 8.3–10.6)
CHLORIDE BLD-SCNC: 107 MMOL/L (ref 99–110)
CO2: 26 MMOL/L (ref 21–32)
CREAT SERPL-MCNC: 1.6 MG/DL (ref 0.6–1.2)
EOSINOPHILS ABSOLUTE: 0.2 K/UL (ref 0–0.6)
EOSINOPHILS RELATIVE PERCENT: 3.2 %
FERRITIN: 16.6 NG/ML (ref 15–150)
GFR AFRICAN AMERICAN: 38
GFR NON-AFRICAN AMERICAN: 32
GLOBULIN: 3.1 G/DL
GLUCOSE BLD-MCNC: 97 MG/DL (ref 70–99)
HCT VFR BLD CALC: 30 % (ref 36–48)
HEMOGLOBIN: 9.5 G/DL (ref 12–16)
INTERNATIONAL NORMALIZATION RATIO, POC: 1.6
IRON SATURATION: 7 % (ref 15–50)
IRON: 30 UG/DL (ref 37–145)
LIPASE: 33 U/L (ref 13–60)
LYMPHOCYTES ABSOLUTE: 1.2 K/UL (ref 1–5.1)
LYMPHOCYTES RELATIVE PERCENT: 19.9 %
MCH RBC QN AUTO: 25.4 PG (ref 26–34)
MCHC RBC AUTO-ENTMCNC: 31.6 G/DL (ref 31–36)
MCV RBC AUTO: 80.1 FL (ref 80–100)
MONOCYTES ABSOLUTE: 0.8 K/UL (ref 0–1.3)
MONOCYTES RELATIVE PERCENT: 12.3 %
NEUTROPHILS ABSOLUTE: 4 K/UL (ref 1.7–7.7)
NEUTROPHILS RELATIVE PERCENT: 64 %
PDW BLD-RTO: 19.2 % (ref 12.4–15.4)
PLATELET # BLD: 202 K/UL (ref 135–450)
PMV BLD AUTO: 10.6 FL (ref 5–10.5)
POTASSIUM SERPL-SCNC: 4.3 MMOL/L (ref 3.5–5.1)
PROTHROMBIN TIME, POC: ABNORMAL
RBC # BLD: 3.74 M/UL (ref 4–5.2)
SODIUM BLD-SCNC: 143 MMOL/L (ref 136–145)
TOTAL IRON BINDING CAPACITY: 407 UG/DL (ref 260–445)
TOTAL PROTEIN: 6.6 G/DL (ref 6.4–8.2)
WBC # BLD: 6.2 K/UL (ref 4–11)

## 2021-07-08 PROCEDURE — 85610 PROTHROMBIN TIME: CPT | Performed by: NURSE PRACTITIONER

## 2021-07-08 PROCEDURE — 99214 OFFICE O/P EST MOD 30 MIN: CPT | Performed by: NURSE PRACTITIONER

## 2021-07-08 RX ORDER — BUPROPION HYDROCHLORIDE 200 MG/1
200 TABLET, EXTENDED RELEASE ORAL 2 TIMES DAILY
Qty: 60 TABLET | Refills: 5 | Status: SHIPPED | OUTPATIENT
Start: 2021-07-08 | End: 2022-09-21

## 2021-07-08 RX ORDER — TRAMADOL HYDROCHLORIDE 50 MG/1
50 TABLET ORAL DAILY PRN
Qty: 30 TABLET | Refills: 0 | Status: SHIPPED | OUTPATIENT
Start: 2021-07-08 | End: 2022-02-21 | Stop reason: SDUPTHER

## 2021-07-08 NOTE — PROGRESS NOTES
SUBJECTIVE:    Patient ID: Gaby Peña is a 70 y.o. female. CC: Atrial fib, INR management, abdominal pain, low blood pressure    HPI: The patient presents today for follow-up of atrial fibrillation and management of chronic anticoagulation but also has other concerns. She reports epigastric abdominal pain. She reports associated early fullness. No changes to bowel or bladder. Symptoms are intermittent. She reports low blood pressure at times. Denies lightheadedness, syncope. INR is low today and she admits she missed dose of warfarin recently. Past Medical History:   Diagnosis Date    Arthritis     Hypertension     Kidney disease     Pneumonia     Sleep apnea     no c-pap        Current Outpatient Medications   Medication Sig Dispense Refill    warfarin (COUMADIN) 5 MG tablet TAKE 1& 1/2 TABLETS BY MOUTH EVERY TUESDAY AND FRIDAY THEN TAKE 1 TABLET DAILY ALL OTHER DAYS 103 tablet 3    Cholecalciferol (VITAMIN D3) 1.25 MG (00023 UT) CAPS Take 1 capsule by mouth every 7 days 4 capsule 11    buPROPion (WELLBUTRIN SR) 150 MG extended release tablet Take 1 tablet by mouth 2 times daily 60 tablet 11    pantoprazole (PROTONIX) 40 MG tablet Take 40 mg by mouth daily      metoprolol tartrate (LOPRESSOR) 50 MG tablet Take 1 tablet by mouth 2 times daily 180 tablet 1    cloNIDine (CATAPRES) 0.2 MG tablet TAKE 1 TABLET BY MOUTH TWICE DAILY 180 tablet 3    DULoxetine (CYMBALTA) 60 MG extended release capsule TAKE ONE CAPSULE BY MOUTH DAILY 90 capsule 3    CARTIA  MG extended release capsule TAKE 1 CAPSULE BY MOUTH DAILY 90 capsule 3    Biotin 1000 MCG TABS Take by mouth      ferrous sulfate 325 (65 Fe) MG EC tablet Take 325 mg by mouth daily (with breakfast)       aspirin 81 MG tablet Take 81 mg by mouth daily       No current facility-administered medications for this visit. Review of Systems   Constitutional: Positive for appetite change. Negative for fever. Respiratory: Negative for shortness of breath. Cardiovascular: Negative. Negative for chest pain. Gastrointestinal: Positive for abdominal pain. Negative for blood in stool, constipation, diarrhea, nausea and vomiting. Genitourinary: Negative. Neurological: Negative for dizziness, syncope and light-headedness. Psychiatric/Behavioral: Positive for dysphoric mood. OBJECTIVE:  Physical Exam  Vitals reviewed. Constitutional:       General: She is not in acute distress. Appearance: She is well-developed. She is not diaphoretic. HENT:      Head: Normocephalic and atraumatic. Eyes:      General: No scleral icterus. Conjunctiva/sclera: Conjunctivae normal.   Neck:      Vascular: No JVD. Cardiovascular:      Rate and Rhythm: Normal rate and regular rhythm. Pulmonary:      Effort: Pulmonary effort is normal. No respiratory distress. Breath sounds: Normal breath sounds. No wheezing. Abdominal:      General: There is no distension. Palpations: Abdomen is soft. Tenderness: There is no abdominal tenderness. There is no guarding or rebound. Musculoskeletal:         General: Normal range of motion. Cervical back: Neck supple. Skin:     General: Skin is warm and dry. Neurological:      Mental Status: She is alert and oriented to person, place, and time. Psychiatric:         Behavior: Behavior normal.         Thought Content: Thought content normal.        /80   Pulse 110   Wt 259 lb (117.5 kg)   SpO2 97%   BMI 40.57 kg/m²      PHQ Scores 11/3/2020 1/29/2020 9/23/2019 12/7/2018 6/5/2018   PHQ2 Score 0 6 2 2 1   PHQ9 Score 0 20 2 2 1     Interpretation of Total Score Depression Severity: 1-4 = Minimal depression, 5-9 = Mild depression, 10-14 = Moderate depression, 15-19 = Moderately severe depression, 20-27 =Severe depression      ASSESSMENT/PLAN:  Yann Barnes was seen today for office visit for anticoagulation management.     Diagnoses and all orders for

## 2021-07-16 ASSESSMENT — ENCOUNTER SYMPTOMS
SHORTNESS OF BREATH: 0
VOMITING: 0
CONSTIPATION: 0
BLOOD IN STOOL: 0
ABDOMINAL PAIN: 1
NAUSEA: 0
DIARRHEA: 0

## 2021-07-19 ENCOUNTER — APPOINTMENT (OUTPATIENT)
Dept: GENERAL RADIOLOGY | Age: 76
End: 2021-07-19
Payer: COMMERCIAL

## 2021-07-19 ENCOUNTER — HOSPITAL ENCOUNTER (EMERGENCY)
Age: 76
Discharge: HOME | End: 2021-07-19
Payer: COMMERCIAL

## 2021-07-19 VITALS
HEART RATE: 65 BPM | SYSTOLIC BLOOD PRESSURE: 157 MMHG | WEIGHT: 260 LBS | RESPIRATION RATE: 16 BRPM | DIASTOLIC BLOOD PRESSURE: 69 MMHG | BODY MASS INDEX: 40.72 KG/M2 | OXYGEN SATURATION: 94 % | TEMPERATURE: 98.2 F

## 2021-07-19 DIAGNOSIS — S93.602A SPRAIN OF LEFT FOOT, INITIAL ENCOUNTER: ICD-10-CM

## 2021-07-19 DIAGNOSIS — S96.912A MUSCLE STRAIN OF LEFT FOOT, INITIAL ENCOUNTER: Primary | ICD-10-CM

## 2021-07-19 PROCEDURE — 73610 X-RAY EXAM OF ANKLE: CPT

## 2021-07-19 PROCEDURE — 73630 X-RAY EXAM OF FOOT: CPT

## 2021-07-19 PROCEDURE — 99283 EMERGENCY DEPT VISIT LOW MDM: CPT

## 2021-07-19 ASSESSMENT — PAIN SCALES - GENERAL: PAINLEVEL_OUTOF10: 5

## 2021-07-19 NOTE — ED NOTES
Reviewed discharge instructions with patient. No questions at this time. No sign of distress. AOx3. Pt taken to ER exit via wheelchair.         Jayson Negron RN  07/19/21 2302

## 2021-07-19 NOTE — ED PROVIDER NOTES
HISTORY     Past Surgical History:   Procedure Laterality Date    COLONOSCOPY N/A 11/20/2018    COLONOSCOPY POLYPECTOMY SNARE/COLD BIOPSY performed by Tameka You MD at [address]      ankle rt has pins and rods, and rt elbow    OTHER SURGICAL HISTORY      weight loss surgery    TOTAL KNEE ARTHROPLASTY Bilateral 12/19/2012    TUBAL LIGATION      tubes tied    UPPER GASTROINTESTINAL ENDOSCOPY N/A 11/20/2018    EGD BIOPSY performed by Tameka You MD at [address] [facility]       Discharge Medication List as of 7/19/2021  9:42 AM      CONTINUE these medications which have NOT CHANGED    Details   buPROPion (WELLBUTRIN SR) 200 MG extended release tablet Take 1 tablet by mouth 2 times daily, Disp-60 tablet, R-5Normal      traMADol (ULTRAM) 50 MG tablet Take 1 tablet by mouth daily as needed for Pain for up to 30 days. , Disp-30 tablet, R-0Normal      warfarin (COUMADIN) 5 MG tablet TAKE 1& 1/2 TABLETS BY MOUTH EVERY TUESDAY AND FRIDAY THEN TAKE 1 TABLET DAILY ALL OTHER DAYS, Disp-103 tablet, R-3Normal      Cholecalciferol (VITAMIN D3) 1.25 MG (61710 UT) CAPS Take 1 capsule by mouth every 7 days, Disp-4 capsule, R-11Normal      pantoprazole (PROTONIX) 40 MG tablet Take 40 mg by mouth dailyHistorical Med      metoprolol tartrate (LOPRESSOR) 50 MG tablet Take 1 tablet by mouth 2 times daily, Disp-180 tablet,R-1**Patient requests 90 days supply**Normal      cloNIDine (CATAPRES) 0.2 MG tablet TAKE 1 TABLET BY MOUTH TWICE DAILY, Disp-180 tablet,R-3Normal      DULoxetine (CYMBALTA) 60 MG extended release capsule TAKE ONE CAPSULE BY MOUTH DAILY, Disp-90 capsule, R-3Normal      CARTIA  MG extended release capsule TAKE 1 CAPSULE BY MOUTH DAILY, Disp-90 capsule, R-3Normal      Biotin 1000 MCG TABS Take by mouthHistorical Med      ferrous sulfate 325 (65 Fe) MG EC tablet Take 325 mg by mouth daily (with breakfast) Historical Med      aspirin 81 MG tablet Take 81 mg by mouth daily               ALLERGIES     Codeine, Fentanyl and related, Shellfish-derived products, Shrimp flavor, Bee venom, Hydromorphone, and Vancomycin    FAMILYHISTORY       Family History   Problem Relation Age of Onset    Cancer Father         stomach cancer          SOCIAL HISTORY       Social History     Tobacco Use    Smoking status: Never Smoker    Smokeless tobacco: Never Used   Substance Use Topics    Alcohol use: No    Drug use: No       SCREENINGS             PHYSICAL EXAM    (up to 7 for level 4, 8 or more for level 5)     ED Triage Vitals   BP Temp Temp src Pulse Resp SpO2 Height Weight   -- -- -- -- -- -- -- --       Physical Exam  Vitals and nursing note reviewed. Constitutional:       Appearance: Normal appearance. She is well-developed. She is obese. Comments: Patient currently in wheelchair. HENT:      Head: Normocephalic and atraumatic. Right Ear: External ear normal.      Left Ear: External ear normal.   Eyes:      General: No scleral icterus. Right eye: No discharge. Left eye: No discharge. Conjunctiva/sclera: Conjunctivae normal.   Cardiovascular:      Rate and Rhythm: Normal rate. Pulmonary:      Effort: Pulmonary effort is normal.   Musculoskeletal:         General: Swelling and tenderness present. No signs of injury. Cervical back: Normal range of motion and neck supple. Comments: Patient with swelling noted. No discoloration. Tenderness mostly about the lateral and dorsal midfoot. Skin:     General: Skin is warm and dry. Neurological:      General: No focal deficit present. Mental Status: She is alert and oriented to person, place, and time. Mental status is at baseline. Psychiatric:         Mood and Affect: Mood normal.         Behavior: Behavior normal.         Thought Content:  Thought content normal.         Judgment: Judgment normal.         DIAGNOSTIC RESULTS   LABS:    Labs Reviewed - No data to display    When ordered only abnormal lab results are displayed. All other labs were within normal range or not returned as of this dictation. EKG: When ordered, EKG's are interpreted by the Emergency Department Physician in the absence of a cardiologist.  Please see their note for interpretation of EKG. RADIOLOGY:   Non-plain film images such as CT, Ultrasound and MRI are read by the radiologist. Plain radiographic images are visualized and preliminarily interpreted by the ED Provider with the below findings:        Interpretation per the Radiologist below, if available at the time of this note:    XR FOOT LEFT (MIN 3 VIEWS)   Final Result   Soft tissue swelling of the left ankle and foot. No underlying skeletal   injury. XR ANKLE LEFT (MIN 3 VIEWS)   Final Result   Soft tissue swelling of the left ankle and foot. No underlying skeletal   injury. No results found. PROCEDURES   Unless otherwise noted below, none     Procedures    CRITICAL CARE TIME   N/A    CONSULTS:  None      EMERGENCY DEPARTMENT COURSE and DIFFERENTIAL DIAGNOSIS/MDM:   Vitals:    Vitals:    07/19/21 0913   BP: (!) 157/69   Pulse: 65   Resp: 16   Temp: 98.2 °F (36.8 °C)   SpO2: 94%   Weight: 260 lb (117.9 kg)       Patient was given the following medications:  Medications - No data to display        Patient with previous injury. X-ray of the foot and ankle are negative today. Orthotic boot placed for support and comfort. This may help her pain. Patient not able to use NSAIDs. I did recommend rest, elevate and ice application. Tylenol 1000 mg 3 times daily can be used for primary pain control. I have asked her to follow-up with podiatry later this week. Information given. The patient did express understanding of her diagnosis and the treatment plan. FINAL IMPRESSION      1. Muscle strain of left foot, initial encounter    2.  Sprain of left foot, initial encounter          DISPOSITION/PLAN   DISPOSITION Decision To Discharge

## 2021-07-28 NOTE — TELEPHONE ENCOUNTER
She is on a very unconventional blood pressure regimen. Clonidine should be twice a day not once a day. Aldomet and clonidine together is not recommended   She's not on some of the most common BP medications (lisinopril, losartan, etc). I suggest she stop Aldomet. Change clonidine to twice daily. MAR changed. If blood pressure running high, will consider adding ACE/ARB and watching renal function cloesly. About her being upset, I suggest she setup her home voice mail or sign up for [employer] to aide with passing messages back and forth more timely. Detail Level: Zone

## 2021-08-10 ENCOUNTER — OFFICE VISIT (OUTPATIENT)
Dept: INTERNAL MEDICINE CLINIC | Age: 76
End: 2021-08-10
Payer: COMMERCIAL

## 2021-08-10 VITALS
SYSTOLIC BLOOD PRESSURE: 138 MMHG | DIASTOLIC BLOOD PRESSURE: 80 MMHG | BODY MASS INDEX: 41.35 KG/M2 | OXYGEN SATURATION: 98 % | HEART RATE: 68 BPM | WEIGHT: 264 LBS

## 2021-08-10 DIAGNOSIS — Z79.01 ANTICOAGULATED ON COUMADIN: ICD-10-CM

## 2021-08-10 DIAGNOSIS — I48.92 ATRIAL FIBRILLATION AND FLUTTER (HCC): Primary | ICD-10-CM

## 2021-08-10 DIAGNOSIS — I48.91 ATRIAL FIBRILLATION AND FLUTTER (HCC): Primary | ICD-10-CM

## 2021-08-10 LAB
INTERNATIONAL NORMALIZATION RATIO, POC: 1.9
PROTHROMBIN TIME, POC: ABNORMAL

## 2021-08-10 PROCEDURE — 85610 PROTHROMBIN TIME: CPT | Performed by: NURSE PRACTITIONER

## 2021-08-10 PROCEDURE — 99212 OFFICE O/P EST SF 10 MIN: CPT | Performed by: NURSE PRACTITIONER

## 2021-09-01 DIAGNOSIS — I48.91 ATRIAL FIBRILLATION, UNSPECIFIED TYPE (HCC): ICD-10-CM

## 2021-09-02 RX ORDER — DILTIAZEM HYDROCHLORIDE 240 MG/1
CAPSULE, COATED, EXTENDED RELEASE ORAL
Qty: 90 CAPSULE | Refills: 3 | Status: SHIPPED | OUTPATIENT
Start: 2021-09-02

## 2021-09-06 DIAGNOSIS — F33.8 SEASONAL AFFECTIVE DISORDER (HCC): ICD-10-CM

## 2021-09-06 DIAGNOSIS — F33.2 SEVERE EPISODE OF RECURRENT MAJOR DEPRESSIVE DISORDER, WITHOUT PSYCHOTIC FEATURES (HCC): ICD-10-CM

## 2021-09-07 RX ORDER — DULOXETIN HYDROCHLORIDE 60 MG/1
CAPSULE, DELAYED RELEASE ORAL
Qty: 90 CAPSULE | Refills: 3 | Status: SHIPPED | OUTPATIENT
Start: 2021-09-07

## 2021-09-17 ENCOUNTER — OFFICE VISIT (OUTPATIENT)
Dept: INTERNAL MEDICINE CLINIC | Age: 76
End: 2021-09-17
Payer: COMMERCIAL

## 2021-09-17 VITALS
DIASTOLIC BLOOD PRESSURE: 70 MMHG | SYSTOLIC BLOOD PRESSURE: 120 MMHG | HEART RATE: 72 BPM | BODY MASS INDEX: 39.78 KG/M2 | OXYGEN SATURATION: 98 % | WEIGHT: 254 LBS

## 2021-09-17 DIAGNOSIS — I48.91 ATRIAL FIBRILLATION, UNSPECIFIED TYPE (HCC): Primary | ICD-10-CM

## 2021-09-17 LAB
INTERNATIONAL NORMALIZATION RATIO, POC: 3.7
PROTHROMBIN TIME, POC: ABNORMAL

## 2021-09-17 PROCEDURE — 85610 PROTHROMBIN TIME: CPT | Performed by: NURSE PRACTITIONER

## 2021-09-17 PROCEDURE — 99212 OFFICE O/P EST SF 10 MIN: CPT | Performed by: NURSE PRACTITIONER

## 2021-09-17 NOTE — PROGRESS NOTES
SUBJECTIVE:    Patient ID: Ernesto Chavez is a 70 y.o. female. CC: Atrial fib, INR management     HPI: The patient presents today for follow-up of atrial fibrillation and management of chronic anticoagulation but also has other concerns. She has a history of atrial fibrillation and is on chronic anticoagulation with warfarin. Previous INR was low at 1.9 so she was asked to take a one-time warfarin increase. Today, INR is elevated at 3.7. She denies any bleeding. Past Medical History:   Diagnosis Date    Arthritis     Hypertension     Kidney disease     Pneumonia     Sleep apnea     no c-pap        Current Outpatient Medications   Medication Sig Dispense Refill    DULoxetine (CYMBALTA) 60 MG extended release capsule TAKE 1 CAPSULE BY MOUTH DAILY 90 capsule 3    dilTIAZem (CARDIZEM CD) 240 MG extended release capsule TAKE 1 CAPSULE BY MOUTH DAILY 90 capsule 3    buPROPion (WELLBUTRIN SR) 200 MG extended release tablet Take 1 tablet by mouth 2 times daily 60 tablet 5    warfarin (COUMADIN) 5 MG tablet TAKE 1& 1/2 TABLETS BY MOUTH EVERY TUESDAY AND FRIDAY THEN TAKE 1 TABLET DAILY ALL OTHER DAYS 103 tablet 3    Cholecalciferol (VITAMIN D3) 1.25 MG (94680 UT) CAPS Take 1 capsule by mouth every 7 days 4 capsule 11    pantoprazole (PROTONIX) 40 MG tablet Take 40 mg by mouth daily      metoprolol tartrate (LOPRESSOR) 50 MG tablet Take 1 tablet by mouth 2 times daily 180 tablet 1    cloNIDine (CATAPRES) 0.2 MG tablet TAKE 1 TABLET BY MOUTH TWICE DAILY 180 tablet 3    Biotin 1000 MCG TABS Take by mouth      ferrous sulfate 325 (65 Fe) MG EC tablet Take 325 mg by mouth daily (with breakfast)       aspirin 81 MG tablet Take 81 mg by mouth daily       No current facility-administered medications for this visit. Review of Systems   Cardiovascular: Negative. Genitourinary: Negative. Musculoskeletal: Negative. Neurological: Negative. Psychiatric/Behavioral: Negative. OBJECTIVE:  Physical Exam  Vitals reviewed. Constitutional:       General: She is not in acute distress. Appearance: She is well-developed. She is not diaphoretic. HENT:      Head: Normocephalic and atraumatic. Neck:      Vascular: No JVD. Pulmonary:      Effort: Pulmonary effort is normal. No respiratory distress. Abdominal:      Palpations: Abdomen is soft. Musculoskeletal:         General: Normal range of motion. Skin:     General: Skin is warm and dry. Neurological:      Mental Status: She is alert and oriented to person, place, and time. Psychiatric:         Behavior: Behavior normal.         Thought Content: Thought content normal.        /70   Pulse 72   Wt 254 lb (115.2 kg)   SpO2 98%   BMI 39.78 kg/m²      PHQ Scores 11/3/2020 1/29/2020 9/23/2019 12/7/2018 6/5/2018   PHQ2 Score 0 6 2 2 1   PHQ9 Score 0 20 2 2 1     Interpretation of Total Score Depression Severity: 1-4 = Minimal depression, 5-9 = Mild depression, 10-14 = Moderate depression, 15-19 = Moderately severe depression, 20-27 =Severe depression      Assessment/Plan:  Silke Henriquez was seen today for office visit for anticoagulation management.   Diagnoses and all orders for this visit:    Atrial fibrillation and flutter (HCC)  - Continue anticoagulation  -     POCT INR    Anticoagulated on Coumadin  - INR 1.9 --> 3.7  - Hold warfarin x2 days, then resume normal dose, see warfarin flow sheets  -     POCT INR      MARILEE Stroud - CNP

## 2021-10-18 ENCOUNTER — OFFICE VISIT (OUTPATIENT)
Dept: INTERNAL MEDICINE CLINIC | Age: 76
End: 2021-10-18
Payer: COMMERCIAL

## 2021-10-18 VITALS
WEIGHT: 257 LBS | HEART RATE: 66 BPM | OXYGEN SATURATION: 98 % | SYSTOLIC BLOOD PRESSURE: 130 MMHG | BODY MASS INDEX: 40.34 KG/M2 | DIASTOLIC BLOOD PRESSURE: 62 MMHG | HEIGHT: 67 IN

## 2021-10-18 DIAGNOSIS — Z79.01 ANTICOAGULATED ON COUMADIN: Primary | ICD-10-CM

## 2021-10-18 LAB
INTERNATIONAL NORMALIZATION RATIO, POC: 5
PROTHROMBIN TIME, POC: ABNORMAL

## 2021-10-18 PROCEDURE — 99212 OFFICE O/P EST SF 10 MIN: CPT | Performed by: NURSE PRACTITIONER

## 2021-10-18 PROCEDURE — 85610 PROTHROMBIN TIME: CPT | Performed by: NURSE PRACTITIONER

## 2021-10-25 NOTE — PROGRESS NOTES
SUBJECTIVE:    Patient ID: Shea Diaz is a 78 y.o. female. CC: Atrial fib, INR management     HPI: The patient presents today for follow-up of atrial fibrillation and management of chronic anticoagulation but also has other concerns. She has a history of atrial fibrillation and is on chronic anticoagulation with warfarin. Today, INR is elevated at 5.0, up from 3.7 despite holding warfarin. She denies any bleeding. Past Medical History:   Diagnosis Date    Arthritis     Hypertension     Kidney disease     Pneumonia     Sleep apnea     no c-pap        Current Outpatient Medications   Medication Sig Dispense Refill    DULoxetine (CYMBALTA) 60 MG extended release capsule TAKE 1 CAPSULE BY MOUTH DAILY 90 capsule 3    dilTIAZem (CARDIZEM CD) 240 MG extended release capsule TAKE 1 CAPSULE BY MOUTH DAILY 90 capsule 3    buPROPion (WELLBUTRIN SR) 200 MG extended release tablet Take 1 tablet by mouth 2 times daily 60 tablet 5    warfarin (COUMADIN) 5 MG tablet TAKE 1& 1/2 TABLETS BY MOUTH EVERY TUESDAY AND FRIDAY THEN TAKE 1 TABLET DAILY ALL OTHER DAYS 103 tablet 3    Cholecalciferol (VITAMIN D3) 1.25 MG (49993 UT) CAPS Take 1 capsule by mouth every 7 days 4 capsule 11    pantoprazole (PROTONIX) 40 MG tablet Take 40 mg by mouth daily      metoprolol tartrate (LOPRESSOR) 50 MG tablet Take 1 tablet by mouth 2 times daily 180 tablet 1    cloNIDine (CATAPRES) 0.2 MG tablet TAKE 1 TABLET BY MOUTH TWICE DAILY 180 tablet 3    Biotin 1000 MCG TABS Take by mouth      ferrous sulfate 325 (65 Fe) MG EC tablet Take 325 mg by mouth daily (with breakfast)       aspirin 81 MG tablet Take 81 mg by mouth daily       No current facility-administered medications for this visit. Review of Systems   Cardiovascular: Negative. Genitourinary: Negative. Musculoskeletal: Negative. Neurological: Negative. Psychiatric/Behavioral: Negative. OBJECTIVE:  Physical Exam  Vitals reviewed. Constitutional:       General: She is not in acute distress. Appearance: She is well-developed. She is not diaphoretic. HENT:      Head: Normocephalic and atraumatic. Neck:      Vascular: No JVD. Pulmonary:      Effort: Pulmonary effort is normal. No respiratory distress. Abdominal:      Palpations: Abdomen is soft. Musculoskeletal:         General: Normal range of motion. Skin:     General: Skin is warm and dry. Neurological:      Mental Status: She is alert and oriented to person, place, and time. Psychiatric:         Behavior: Behavior normal.         Thought Content: Thought content normal.        /62   Pulse 66   Ht 5' 7\" (1.702 m)   Wt 257 lb (116.6 kg)   SpO2 98%   BMI 40.25 kg/m²      PHQ Scores 11/3/2020 1/29/2020 9/23/2019 12/7/2018 6/5/2018   PHQ2 Score 0 6 2 2 1   PHQ9 Score 0 20 2 2 1     Interpretation of Total Score Depression Severity: 1-4 = Minimal depression, 5-9 = Mild depression, 10-14 = Moderate depression, 15-19 = Moderately severe depression, 20-27 =Severe depression      Assessment/Plan:  Tracee Del Toro was seen today for office visit for anticoagulation management.   Diagnoses and all orders for this visit:    Atrial fibrillation and flutter (HCC)  - Continue anticoagulation  -     POCT INR    Anticoagulated on Coumadin  - INR 1.9 --> 3.7 --> 5.0  - Hold warfarin x2 days, reduce weekly dose, see warfarin flow sheets  -     POCT INR      MARILEE Stover - CNP 75% of the time

## 2021-11-02 DIAGNOSIS — I10 ESSENTIAL HYPERTENSION: Chronic | ICD-10-CM

## 2021-11-02 RX ORDER — CLONIDINE HYDROCHLORIDE 0.2 MG/1
TABLET ORAL
Qty: 180 TABLET | Refills: 3 | Status: ON HOLD | OUTPATIENT
Start: 2021-11-02 | End: 2022-09-20 | Stop reason: HOSPADM

## 2021-12-03 ENCOUNTER — TELEPHONE (OUTPATIENT)
Dept: INTERNAL MEDICINE CLINIC | Age: 76
End: 2021-12-03

## 2021-12-03 ENCOUNTER — OFFICE VISIT (OUTPATIENT)
Dept: INTERNAL MEDICINE CLINIC | Age: 76
End: 2021-12-03
Payer: COMMERCIAL

## 2021-12-03 VITALS
WEIGHT: 254 LBS | SYSTOLIC BLOOD PRESSURE: 138 MMHG | DIASTOLIC BLOOD PRESSURE: 80 MMHG | OXYGEN SATURATION: 94 % | HEART RATE: 100 BPM | BODY MASS INDEX: 39.78 KG/M2

## 2021-12-03 DIAGNOSIS — Z79.01 ANTICOAGULATED ON COUMADIN: ICD-10-CM

## 2021-12-03 DIAGNOSIS — I48.91 ATRIAL FIBRILLATION, UNSPECIFIED TYPE (HCC): Primary | ICD-10-CM

## 2021-12-03 LAB
INTERNATIONAL NORMALIZATION RATIO, POC: 1.6
PROTHROMBIN TIME, POC: NORMAL

## 2021-12-03 PROCEDURE — 99212 OFFICE O/P EST SF 10 MIN: CPT | Performed by: NURSE PRACTITIONER

## 2021-12-03 PROCEDURE — 85610 PROTHROMBIN TIME: CPT | Performed by: NURSE PRACTITIONER

## 2021-12-03 NOTE — PROGRESS NOTES
SUBJECTIVE:    Patient ID: Simba Maxwell is a 78 y.o. female. CC: Atrial fib, INR management     HPI: The patient presents today for follow-up of atrial fibrillation and management of chronic anticoagulation but also has other concerns. She has a history of atrial fibrillation and is on chronic anticoagulation with warfarin. Today, INR is low at 1.6. She was previous elevated at 5.0. She was post return for an early recheck given the coagulopathy but missed her follow-up appointment. She has been taking the new dose of Coumadin is now slightly low. Past Medical History:   Diagnosis Date    Arthritis     Hypertension     Kidney disease     Pneumonia     Sleep apnea     no c-pap        Current Outpatient Medications   Medication Sig Dispense Refill    cloNIDine (CATAPRES) 0.2 MG tablet TAKE 1 TABLET BY MOUTH TWICE DAILY 180 tablet 3    DULoxetine (CYMBALTA) 60 MG extended release capsule TAKE 1 CAPSULE BY MOUTH DAILY 90 capsule 3    dilTIAZem (CARDIZEM CD) 240 MG extended release capsule TAKE 1 CAPSULE BY MOUTH DAILY 90 capsule 3    buPROPion (WELLBUTRIN SR) 200 MG extended release tablet Take 1 tablet by mouth 2 times daily 60 tablet 5    warfarin (COUMADIN) 5 MG tablet TAKE 1& 1/2 TABLETS BY MOUTH EVERY TUESDAY AND FRIDAY THEN TAKE 1 TABLET DAILY ALL OTHER DAYS 103 tablet 3    Cholecalciferol (VITAMIN D3) 1.25 MG (99994 UT) CAPS Take 1 capsule by mouth every 7 days 4 capsule 11    pantoprazole (PROTONIX) 40 MG tablet Take 40 mg by mouth daily      metoprolol tartrate (LOPRESSOR) 50 MG tablet Take 1 tablet by mouth 2 times daily 180 tablet 1    Biotin 1000 MCG TABS Take by mouth      ferrous sulfate 325 (65 Fe) MG EC tablet Take 325 mg by mouth daily (with breakfast)       aspirin 81 MG tablet Take 81 mg by mouth daily       No current facility-administered medications for this visit. Review of Systems   Cardiovascular: Negative. Genitourinary: Negative. Musculoskeletal: Negative. Neurological: Negative. Psychiatric/Behavioral: Negative. OBJECTIVE:  Physical Exam  Vitals reviewed. Constitutional:       General: She is not in acute distress. Appearance: She is well-developed. She is not diaphoretic. HENT:      Head: Normocephalic and atraumatic. Neck:      Vascular: No JVD. Pulmonary:      Effort: Pulmonary effort is normal. No respiratory distress. Abdominal:      Palpations: Abdomen is soft. Musculoskeletal:         General: Normal range of motion. Skin:     General: Skin is warm and dry. Neurological:      Mental Status: She is alert and oriented to person, place, and time. Psychiatric:         Behavior: Behavior normal.         Thought Content: Thought content normal.        /80   Pulse 100   Wt 254 lb (115.2 kg)   SpO2 94%   BMI 39.78 kg/m²      PHQ Scores 11/3/2020 1/29/2020 9/23/2019 12/7/2018 6/5/2018   PHQ2 Score 0 6 2 2 1   PHQ9 Score 0 20 2 2 1     Interpretation of Total Score Depression Severity: 1-4 = Minimal depression, 5-9 = Mild depression, 10-14 = Moderate depression, 15-19 = Moderately severe depression, 20-27 =Severe depression      Assessment/Plan:  Marlene Dugan was seen today for office visit for anticoagulation management. Diagnoses and all orders for this visit:    Atrial fibrillation and flutter (HCC)  - Continue anticoagulation  -     POCT INR    Anticoagulated on Coumadin  - INR 1.9 --> 3.7 --> 5.0 --> 1.6  - Likely overcorrection from previous coagulapathy, she missed short follow-up  -  Add back 1/2 tab weekly.   Recheck in 2 weeks  -     POCT INR      MARILEE Tracy - CNP

## 2021-12-16 ENCOUNTER — NURSE ONLY (OUTPATIENT)
Dept: INTERNAL MEDICINE CLINIC | Age: 76
End: 2021-12-16
Payer: COMMERCIAL

## 2021-12-16 ENCOUNTER — OFFICE VISIT (OUTPATIENT)
Dept: INTERNAL MEDICINE CLINIC | Age: 76
End: 2021-12-16
Payer: COMMERCIAL

## 2021-12-16 VITALS
HEART RATE: 100 BPM | DIASTOLIC BLOOD PRESSURE: 80 MMHG | SYSTOLIC BLOOD PRESSURE: 120 MMHG | BODY MASS INDEX: 39.78 KG/M2 | OXYGEN SATURATION: 98 % | WEIGHT: 254 LBS

## 2021-12-16 VITALS
BODY MASS INDEX: 39.78 KG/M2 | OXYGEN SATURATION: 98 % | HEART RATE: 100 BPM | WEIGHT: 254 LBS | DIASTOLIC BLOOD PRESSURE: 80 MMHG | SYSTOLIC BLOOD PRESSURE: 120 MMHG

## 2021-12-16 DIAGNOSIS — Z79.01 ANTICOAGULATED ON COUMADIN: Primary | ICD-10-CM

## 2021-12-16 DIAGNOSIS — I48.91 ATRIAL FIBRILLATION, UNSPECIFIED TYPE (HCC): Primary | ICD-10-CM

## 2021-12-16 DIAGNOSIS — Z79.01 ANTICOAGULATED ON COUMADIN: ICD-10-CM

## 2021-12-16 LAB
INTERNATIONAL NORMALIZATION RATIO, POC: 1.6
PROTHROMBIN TIME, POC: NORMAL

## 2021-12-16 PROCEDURE — 99212 OFFICE O/P EST SF 10 MIN: CPT | Performed by: NURSE PRACTITIONER

## 2021-12-16 PROCEDURE — 85610 PROTHROMBIN TIME: CPT | Performed by: NURSE PRACTITIONER

## 2021-12-17 NOTE — PROGRESS NOTES
SUBJECTIVE:    Patient ID: Yair Kathleen is a 78 y.o. female. CC: Atrial fib, INR management     HPI: The patient presents today for follow-up of atrial fibrillation and management of chronic anticoagulation but also has other concerns. She arrived quite late for her appointment today due to an issue with transportation but we were able to fit her in. She has a history of atrial fibrillation and is on chronic anticoagulation with warfarin. Today, INR is low at 1.6. She was previous elevated at 5.0. She was post return for an early recheck given the coagulopathy but missed her follow-up appointment. She has been taking the new dose of Coumadin is now slightly low.       Past Medical History:   Diagnosis Date    Arthritis     Hypertension     Kidney disease     Pneumonia     Sleep apnea     no c-pap        Current Outpatient Medications   Medication Sig Dispense Refill    cloNIDine (CATAPRES) 0.2 MG tablet TAKE 1 TABLET BY MOUTH TWICE DAILY 180 tablet 3    DULoxetine (CYMBALTA) 60 MG extended release capsule TAKE 1 CAPSULE BY MOUTH DAILY 90 capsule 3    dilTIAZem (CARDIZEM CD) 240 MG extended release capsule TAKE 1 CAPSULE BY MOUTH DAILY 90 capsule 3    buPROPion (WELLBUTRIN SR) 200 MG extended release tablet Take 1 tablet by mouth 2 times daily 60 tablet 5    warfarin (COUMADIN) 5 MG tablet TAKE 1& 1/2 TABLETS BY MOUTH EVERY TUESDAY AND FRIDAY THEN TAKE 1 TABLET DAILY ALL OTHER DAYS 103 tablet 3    Cholecalciferol (VITAMIN D3) 1.25 MG (80432 UT) CAPS Take 1 capsule by mouth every 7 days 4 capsule 11    pantoprazole (PROTONIX) 40 MG tablet Take 40 mg by mouth daily      metoprolol tartrate (LOPRESSOR) 50 MG tablet Take 1 tablet by mouth 2 times daily 180 tablet 1    Biotin 1000 MCG TABS Take by mouth      ferrous sulfate 325 (65 Fe) MG EC tablet Take 325 mg by mouth daily (with breakfast)       aspirin 81 MG tablet Take 81 mg by mouth daily       No current facility-administered medications for this visit. Review of Systems   Cardiovascular: Negative. Genitourinary: Negative. Musculoskeletal: Negative. Neurological: Negative. Psychiatric/Behavioral: Negative. OBJECTIVE:  Physical Exam  Vitals reviewed. Constitutional:       General: She is not in acute distress. Appearance: She is well-developed. She is not diaphoretic. HENT:      Head: Normocephalic and atraumatic. Neck:      Vascular: No JVD. Pulmonary:      Effort: Pulmonary effort is normal. No respiratory distress. Abdominal:      Palpations: Abdomen is soft. Musculoskeletal:         General: Normal range of motion. Skin:     General: Skin is warm and dry. Neurological:      Mental Status: She is alert and oriented to person, place, and time. Psychiatric:         Behavior: Behavior normal.         Thought Content: Thought content normal.        /80   Pulse 100   Wt 254 lb (115.2 kg)   SpO2 98%   BMI 39.78 kg/m²      PHQ Scores 11/3/2020 1/29/2020 9/23/2019 12/7/2018 6/5/2018   PHQ2 Score 0 6 2 2 1   PHQ9 Score 0 20 2 2 1     Interpretation of Total Score Depression Severity: 1-4 = Minimal depression, 5-9 = Mild depression, 10-14 = Moderate depression, 15-19 = Moderately severe depression, 20-27 =Severe depression      Assessment/Plan:  Manisha Kruse was seen today for office visit for anticoagulation management. Diagnoses and all orders for this visit:    Atrial fibrillation and flutter (HCC)  - Continue anticoagulation  -     POCT INR    Anticoagulated on Coumadin  - INR 1.9 --> 3.7 --> 5.0 --> 1.6 --> 1.6  - Likely overcorrection from previous coagulapathy, she missed short follow-up  -  Add back another 1/2 tab weekly.   Recheck in 4 weeks  -     POCT INR      MARILEE Morton - CNP

## 2022-01-07 ENCOUNTER — OFFICE VISIT (OUTPATIENT)
Dept: INTERNAL MEDICINE CLINIC | Age: 77
End: 2022-01-07
Payer: COMMERCIAL

## 2022-01-07 VITALS
OXYGEN SATURATION: 98 % | HEART RATE: 84 BPM | BODY MASS INDEX: 38.84 KG/M2 | DIASTOLIC BLOOD PRESSURE: 60 MMHG | SYSTOLIC BLOOD PRESSURE: 120 MMHG | WEIGHT: 248 LBS

## 2022-01-07 DIAGNOSIS — Z79.01 ANTICOAGULATED ON COUMADIN: Primary | ICD-10-CM

## 2022-01-07 LAB
INTERNATIONAL NORMALIZATION RATIO, POC: 2.5
PROTHROMBIN TIME, POC: NORMAL

## 2022-01-07 PROCEDURE — 99212 OFFICE O/P EST SF 10 MIN: CPT | Performed by: NURSE PRACTITIONER

## 2022-01-07 PROCEDURE — 85610 PROTHROMBIN TIME: CPT | Performed by: NURSE PRACTITIONER

## 2022-01-07 ASSESSMENT — PATIENT HEALTH QUESTIONNAIRE - PHQ9
SUM OF ALL RESPONSES TO PHQ9 QUESTIONS 1 & 2: 0
SUM OF ALL RESPONSES TO PHQ QUESTIONS 1-9: 0
2. FEELING DOWN, DEPRESSED OR HOPELESS: 0
1. LITTLE INTEREST OR PLEASURE IN DOING THINGS: 0
SUM OF ALL RESPONSES TO PHQ QUESTIONS 1-9: 0

## 2022-01-20 NOTE — PROGRESS NOTES
SUBJECTIVE:    Patient ID: Aundrea Campos is a 78 y.o. female. CC: Atrial fib, INR management     HPI: The patient presents today for follow-up of atrial fibrillation and management of chronic anticoagulation but also has other concerns. She has a history of atrial fibrillation and is on chronic anticoagulation with warfarin. Today, INR is therapeutic at 2.5. Past Medical History:   Diagnosis Date    Arthritis     Hypertension     Kidney disease     Pneumonia     Sleep apnea     no c-pap        Current Outpatient Medications   Medication Sig Dispense Refill    cloNIDine (CATAPRES) 0.2 MG tablet TAKE 1 TABLET BY MOUTH TWICE DAILY 180 tablet 3    DULoxetine (CYMBALTA) 60 MG extended release capsule TAKE 1 CAPSULE BY MOUTH DAILY 90 capsule 3    dilTIAZem (CARDIZEM CD) 240 MG extended release capsule TAKE 1 CAPSULE BY MOUTH DAILY 90 capsule 3    buPROPion (WELLBUTRIN SR) 200 MG extended release tablet Take 1 tablet by mouth 2 times daily 60 tablet 5    warfarin (COUMADIN) 5 MG tablet TAKE 1& 1/2 TABLETS BY MOUTH EVERY TUESDAY AND FRIDAY THEN TAKE 1 TABLET DAILY ALL OTHER DAYS 103 tablet 3    Cholecalciferol (VITAMIN D3) 1.25 MG (09889 UT) CAPS Take 1 capsule by mouth every 7 days 4 capsule 11    pantoprazole (PROTONIX) 40 MG tablet Take 40 mg by mouth daily      metoprolol tartrate (LOPRESSOR) 50 MG tablet Take 1 tablet by mouth 2 times daily 180 tablet 1    Biotin 1000 MCG TABS Take by mouth      ferrous sulfate 325 (65 Fe) MG EC tablet Take 325 mg by mouth daily (with breakfast)       aspirin 81 MG tablet Take 81 mg by mouth daily       No current facility-administered medications for this visit. Review of Systems   Cardiovascular: Negative. Genitourinary: Negative. Musculoskeletal: Negative. Neurological: Negative. Psychiatric/Behavioral: Negative. OBJECTIVE:  Physical Exam  Vitals reviewed. Constitutional:       General: She is not in acute distress. Appearance: She is well-developed. She is not diaphoretic. HENT:      Head: Normocephalic and atraumatic. Neck:      Vascular: No JVD. Pulmonary:      Effort: Pulmonary effort is normal. No respiratory distress. Abdominal:      Palpations: Abdomen is soft. Musculoskeletal:         General: Normal range of motion. Skin:     General: Skin is warm and dry. Neurological:      Mental Status: She is alert and oriented to person, place, and time. Psychiatric:         Behavior: Behavior normal.         Thought Content: Thought content normal.        /60   Pulse 84   Wt 248 lb (112.5 kg)   SpO2 98%   BMI 38.84 kg/m²      PHQ Scores 1/7/2022 11/3/2020 1/29/2020 9/23/2019 12/7/2018 6/5/2018   PHQ2 Score 0 0 6 2 2 1   PHQ9 Score 0 0 20 2 2 1     Interpretation of Total Score Depression Severity: 1-4 = Minimal depression, 5-9 = Mild depression, 10-14 = Moderate depression, 15-19 = Moderately severe depression, 20-27 =Severe depression      Assessment/Plan:  Theodora Faye was seen today for office visit for anticoagulation management. Diagnoses and all orders for this visit:    Atrial fibrillation and flutter (HCC)  - Continue anticoagulation  -     POCT INR    Anticoagulated on Coumadin  - INR 1.9 --> 3.7 --> 5.0 --> 1.6 --> 1.6 --> 2.5  - Therapeutic.   Continue current regimen, see flow sheets  -     POCT INR      MARILEE Burden - CNP

## 2022-01-22 DIAGNOSIS — I48.92 ATRIAL FIBRILLATION AND FLUTTER (HCC): ICD-10-CM

## 2022-01-22 DIAGNOSIS — I48.91 ATRIAL FIBRILLATION AND FLUTTER (HCC): ICD-10-CM

## 2022-01-22 DIAGNOSIS — E55.9 VITAMIN D DEFICIENCY: ICD-10-CM

## 2022-01-22 DIAGNOSIS — I10 ESSENTIAL HYPERTENSION: Chronic | ICD-10-CM

## 2022-01-25 RX ORDER — METOPROLOL TARTRATE 50 MG/1
TABLET, FILM COATED ORAL
Qty: 180 TABLET | Refills: 1 | Status: ON HOLD | OUTPATIENT
Start: 2022-01-25 | End: 2022-04-01 | Stop reason: HOSPADM

## 2022-01-25 RX ORDER — CHOLECALCIFEROL (VITAMIN D3) 1250 MCG
1 CAPSULE ORAL
Qty: 4 CAPSULE | Refills: 11 | Status: SHIPPED | OUTPATIENT
Start: 2022-01-25 | End: 2022-09-21

## 2022-02-11 ENCOUNTER — OFFICE VISIT (OUTPATIENT)
Dept: INTERNAL MEDICINE CLINIC | Age: 77
End: 2022-02-11
Payer: COMMERCIAL

## 2022-02-11 VITALS
BODY MASS INDEX: 38.84 KG/M2 | DIASTOLIC BLOOD PRESSURE: 78 MMHG | WEIGHT: 248 LBS | OXYGEN SATURATION: 96 % | HEART RATE: 76 BPM | SYSTOLIC BLOOD PRESSURE: 138 MMHG

## 2022-02-11 DIAGNOSIS — I48.91 ATRIAL FIBRILLATION AND FLUTTER (HCC): Primary | ICD-10-CM

## 2022-02-11 DIAGNOSIS — I48.92 ATRIAL FIBRILLATION AND FLUTTER (HCC): Primary | ICD-10-CM

## 2022-02-11 DIAGNOSIS — Z12.11 COLON CANCER SCREENING: ICD-10-CM

## 2022-02-11 DIAGNOSIS — Z79.01 ANTICOAGULATED ON COUMADIN: ICD-10-CM

## 2022-02-11 LAB
INTERNATIONAL NORMALIZATION RATIO, POC: 3
PROTHROMBIN TIME, POC: NORMAL

## 2022-02-11 PROCEDURE — 85610 PROTHROMBIN TIME: CPT | Performed by: NURSE PRACTITIONER

## 2022-02-11 PROCEDURE — 99212 OFFICE O/P EST SF 10 MIN: CPT | Performed by: NURSE PRACTITIONER

## 2022-02-11 NOTE — PROGRESS NOTES
SUBJECTIVE:    Patient ID: Mali Almaguer is a 78 y.o. female. CC: Atrial fib, INR management     HPI: The patient presents today for follow-up of atrial fibrillation and management of chronic anticoagulation but also has other concerns. She has a history of atrial fibrillation and is on chronic anticoagulation with warfarin. Today, INR is therapeutic at 3.0. Past Medical History:   Diagnosis Date    Arthritis     Hypertension     Kidney disease     Pneumonia     Sleep apnea     no c-pap        Current Outpatient Medications   Medication Sig Dispense Refill    Cholecalciferol (VITAMIN D3) 1.25 MG (99970 UT) CAPS TAKE 1 CAPSULE BY MOUTH EVERY 7 DAYS 4 capsule 11    metoprolol tartrate (LOPRESSOR) 50 MG tablet TAKE 1 TABLET BY MOUTH TWICE DAILY 180 tablet 1    cloNIDine (CATAPRES) 0.2 MG tablet TAKE 1 TABLET BY MOUTH TWICE DAILY 180 tablet 3    DULoxetine (CYMBALTA) 60 MG extended release capsule TAKE 1 CAPSULE BY MOUTH DAILY 90 capsule 3    dilTIAZem (CARDIZEM CD) 240 MG extended release capsule TAKE 1 CAPSULE BY MOUTH DAILY 90 capsule 3    buPROPion (WELLBUTRIN SR) 200 MG extended release tablet Take 1 tablet by mouth 2 times daily 60 tablet 5    warfarin (COUMADIN) 5 MG tablet TAKE 1& 1/2 TABLETS BY MOUTH EVERY TUESDAY AND FRIDAY THEN TAKE 1 TABLET DAILY ALL OTHER DAYS 103 tablet 3    pantoprazole (PROTONIX) 40 MG tablet Take 40 mg by mouth daily      Biotin 1000 MCG TABS Take by mouth      ferrous sulfate 325 (65 Fe) MG EC tablet Take 325 mg by mouth daily (with breakfast)       aspirin 81 MG tablet Take 81 mg by mouth daily       No current facility-administered medications for this visit. Review of Systems   Cardiovascular: Negative. Genitourinary: Negative. Musculoskeletal: Negative. Neurological: Negative. Psychiatric/Behavioral: Negative. OBJECTIVE:  Physical Exam  Vitals reviewed. Constitutional:       General: She is not in acute distress. Appearance: She is well-developed. She is not diaphoretic. HENT:      Head: Normocephalic and atraumatic. Neck:      Vascular: No JVD. Pulmonary:      Effort: Pulmonary effort is normal. No respiratory distress. Abdominal:      Palpations: Abdomen is soft. Musculoskeletal:         General: Normal range of motion. Skin:     General: Skin is warm and dry. Neurological:      Mental Status: She is alert and oriented to person, place, and time. Psychiatric:         Behavior: Behavior normal.         Thought Content: Thought content normal.        /78   Pulse 76   Wt 248 lb (112.5 kg)   SpO2 96%   BMI 38.84 kg/m²      PHQ Scores 1/7/2022 11/3/2020 1/29/2020 9/23/2019 12/7/2018 6/5/2018   PHQ2 Score 0 0 6 2 2 1   PHQ9 Score 0 0 20 2 2 1     Interpretation of Total Score Depression Severity: 1-4 = Minimal depression, 5-9 = Mild depression, 10-14 = Moderate depression, 15-19 = Moderately severe depression, 20-27 =Severe depression      Assessment/Plan:  Sarah Pineda was seen today for office visit for anticoagulation management. Diagnoses and all orders for this visit:    Atrial fibrillation and flutter (HCC)  - Continue anticoagulation  -     POCT INR    Anticoagulated on Coumadin  - INR 1.9 --> 3.7 --> 5.0 --> 1.6 --> 1.6 --> 2.5 --> 3.0  - Therapeutic.   Continue current regimen, see flow sheets  -     POCT INR    Colon cancer screening  -     [facility] - Derrick Pelayo MD, Gastroenterology, [facility] MARILEE Oneill - CNP

## 2022-02-21 ENCOUNTER — TELEPHONE (OUTPATIENT)
Dept: INTERNAL MEDICINE CLINIC | Age: 77
End: 2022-02-21

## 2022-02-21 DIAGNOSIS — M15.9 PRIMARY OSTEOARTHRITIS INVOLVING MULTIPLE JOINTS: ICD-10-CM

## 2022-02-21 RX ORDER — TRAMADOL HYDROCHLORIDE 50 MG/1
50 TABLET ORAL DAILY PRN
Qty: 30 TABLET | Refills: 0 | Status: SHIPPED | OUTPATIENT
Start: 2022-02-21 | End: 2022-03-23

## 2022-02-21 NOTE — TELEPHONE ENCOUNTER
Pt calling requesting refill of traMADol (ULTRAM) 50 MG tablet     Last filled: 7/8/21   Last OV: 2/11/22  Next OV: 3/11/22     Please send to     [facility] [address] [place] [address] [phone] - F [phone]   [address] [facility] [address]   Phone:  [phone]  Fax:  [phone]

## 2022-02-23 ENCOUNTER — TELEPHONE (OUTPATIENT)
Dept: INTERNAL MEDICINE CLINIC | Age: 77
End: 2022-02-23

## 2022-02-23 NOTE — TELEPHONE ENCOUNTER
Was received by pharm 2/21/22 but now getting note that it needs prior auth     Will send to PA dept but will advise pt her ins requires PA and may have to pay out of pocket if urgent need

## 2022-02-23 NOTE — TELEPHONE ENCOUNTER
----- Message from Yeyo Gonzales sent at 2/23/2022  8:31 AM EST -----  Subject: Message to Provider    QUESTIONS  Information for Provider? Pt would like to see if another script for   traMADol (ULTRAM) 50 MG tablet can be called in. Pt went to the pharmacy   last night to pick it up and they didnt have anything from the doctor. ---------------------------------------------------------------------------  --------------  Hilario MORROW  What is the best way for the office to contact you? OK to leave message on   voicemail  Preferred Call Back Phone Number? [phone]  ---------------------------------------------------------------------------  --------------  SCRIPT ANSWERS  Relationship to Patient?  Self

## 2022-02-23 NOTE — TELEPHONE ENCOUNTER
RACHEL jennings    PT ID   [id number]  [phone]  [facility]  Osteoarthritis

## 2022-02-23 NOTE — TELEPHONE ENCOUNTER
Submitted PA for traMADol HCl 50MG tablets. Key: [id number]. Via CM STATUS: APPROVED 11/25/2021 - 02/23/2023. Letter attached. If this requires a response please respond to the pool ( P [facility] [address]). Thank you please advise patient.

## 2022-03-24 ENCOUNTER — OFFICE VISIT (OUTPATIENT)
Dept: INTERNAL MEDICINE CLINIC | Age: 77
End: 2022-03-24
Payer: COMMERCIAL

## 2022-03-24 VITALS
SYSTOLIC BLOOD PRESSURE: 136 MMHG | OXYGEN SATURATION: 97 % | HEIGHT: 67 IN | HEART RATE: 58 BPM | BODY MASS INDEX: 41.75 KG/M2 | DIASTOLIC BLOOD PRESSURE: 80 MMHG | WEIGHT: 266 LBS

## 2022-03-24 DIAGNOSIS — Z00.00 MEDICARE ANNUAL WELLNESS VISIT, SUBSEQUENT: Primary | ICD-10-CM

## 2022-03-24 DIAGNOSIS — J84.9 ILD (INTERSTITIAL LUNG DISEASE) (HCC): ICD-10-CM

## 2022-03-24 DIAGNOSIS — I48.91 ATRIAL FIBRILLATION AND FLUTTER (HCC): ICD-10-CM

## 2022-03-24 DIAGNOSIS — E66.01 OBESITY, CLASS III, BMI 40-49.9 (MORBID OBESITY) (HCC): ICD-10-CM

## 2022-03-24 DIAGNOSIS — Z79.01 ANTICOAGULATED ON COUMADIN: ICD-10-CM

## 2022-03-24 DIAGNOSIS — F33.2 SEVERE EPISODE OF RECURRENT MAJOR DEPRESSIVE DISORDER, WITHOUT PSYCHOTIC FEATURES (HCC): ICD-10-CM

## 2022-03-24 DIAGNOSIS — I48.92 ATRIAL FIBRILLATION AND FLUTTER (HCC): ICD-10-CM

## 2022-03-24 LAB
INTERNATIONAL NORMALIZATION RATIO, POC: 2.6
PROTHROMBIN TIME, POC: NORMAL

## 2022-03-24 PROCEDURE — 85610 PROTHROMBIN TIME: CPT | Performed by: NURSE PRACTITIONER

## 2022-03-24 PROCEDURE — G0439 PPPS, SUBSEQ VISIT: HCPCS | Performed by: NURSE PRACTITIONER

## 2022-03-24 PROCEDURE — 99213 OFFICE O/P EST LOW 20 MIN: CPT | Performed by: NURSE PRACTITIONER

## 2022-03-24 ASSESSMENT — PATIENT HEALTH QUESTIONNAIRE - PHQ9
3. TROUBLE FALLING OR STAYING ASLEEP: 2
9. THOUGHTS THAT YOU WOULD BE BETTER OFF DEAD, OR OF HURTING YOURSELF: 0
8. MOVING OR SPEAKING SO SLOWLY THAT OTHER PEOPLE COULD HAVE NOTICED. OR THE OPPOSITE, BEING SO FIGETY OR RESTLESS THAT YOU HAVE BEEN MOVING AROUND A LOT MORE THAN USUAL: 0
SUM OF ALL RESPONSES TO PHQ QUESTIONS 1-9: 10
7. TROUBLE CONCENTRATING ON THINGS, SUCH AS READING THE NEWSPAPER OR WATCHING TELEVISION: 0
5. POOR APPETITE OR OVEREATING: 0
SUM OF ALL RESPONSES TO PHQ QUESTIONS 1-9: 10
6. FEELING BAD ABOUT YOURSELF - OR THAT YOU ARE A FAILURE OR HAVE LET YOURSELF OR YOUR FAMILY DOWN: 0
SUM OF ALL RESPONSES TO PHQ9 QUESTIONS 1 & 2: 6
SUM OF ALL RESPONSES TO PHQ QUESTIONS 1-9: 10
10. IF YOU CHECKED OFF ANY PROBLEMS, HOW DIFFICULT HAVE THESE PROBLEMS MADE IT FOR YOU TO DO YOUR WORK, TAKE CARE OF THINGS AT HOME, OR GET ALONG WITH OTHER PEOPLE: 2
2. FEELING DOWN, DEPRESSED OR HOPELESS: 3
4. FEELING TIRED OR HAVING LITTLE ENERGY: 2
SUM OF ALL RESPONSES TO PHQ QUESTIONS 1-9: 10
1. LITTLE INTEREST OR PLEASURE IN DOING THINGS: 3

## 2022-03-24 ASSESSMENT — COLUMBIA-SUICIDE SEVERITY RATING SCALE - C-SSRS
1. WITHIN THE PAST MONTH, HAVE YOU WISHED YOU WERE DEAD OR WISHED YOU COULD GO TO SLEEP AND NOT WAKE UP?: NO
6. HAVE YOU EVER DONE ANYTHING, STARTED TO DO ANYTHING, OR PREPARED TO DO ANYTHING TO END YOUR LIFE?: NO
2. HAVE YOU ACTUALLY HAD ANY THOUGHTS OF KILLING YOURSELF?: NO

## 2022-03-24 ASSESSMENT — LIFESTYLE VARIABLES: HOW OFTEN DO YOU HAVE A DRINK CONTAINING ALCOHOL: NEVER

## 2022-03-24 NOTE — PROGRESS NOTES
Medicare Annual Wellness Visit    Wilbur Alston is here for Medicare AWV and Other (INR  2.6       - on 7.5 mg M W F , 5 mg rest )    Assessment & Plan   Medicare annual wellness visit, subsequent        Recommendations for Preventive Services Due: see orders and patient instructions/AVS.  Recommended screening schedule for the next 5-10 years is provided to the patient in written form: see Patient Instructions/AVS.     Return for Medicare Annual Wellness Visit in 1 year. Subjective   Atrial fibrillation and flutter (HCC)  - Chronic, stable  - Continue current regimen    Anticoagulated on Coumadin  - INR 2.6  - Continue current regimen, see flow sheets  -     POCT INR    Obesity, Class III, BMI 40-49.9 (morbid obesity) (HCC)  - Chronic, stable  - Continue current regimen    ILD (interstitial lung disease) (HCC)  - Chronic, stable  - Continue current regimen    Severe episode of recurrent major depressive disorder, without psychotic features ([facility] [address]  - Chronic, stable  - Continue current regimen    Patient's complete Health Risk Assessment and screening values have been reviewed and are found in Flowsheets. The following problems were reviewed today and where indicated follow up appointments were made and/or referrals ordered. Positive Risk Factor Screenings with Interventions:    Fall Risk:  Do you feel unsteady or are you worried about falling? : (!) yes  2 or more falls in past year?: (!) yes  Fall with injury in past year?: no     Fall Risk Interventions:    · Home safety tips provided     Depression:  PHQ-2 Score: 6  PHQ-9 Total Score: 10    Severity:1-4 = minimal depression, 5-9 = mild depression, 10-14 = moderate depression, 15-19 = moderately severe depression, 20-27 = severe depression    Depression Interventions:  · Medication prescribed- Cymbalta, wellbutrin            Opioid Risk: (Low risk score <55) Opioid risk score: 5    Patient is low risk for opioid use disorder or overdose.   Last PDMP Yovany Pizano as Reviewed:  Review User Review Instant Review Result   Ion Little 2/21/2022  5:47 PM Reviewed PDMP [1]     Last Controlled Substance Monitoring Documentation      Telephone from 2/21/2022 in [facility]   Periodic Controlled Substance Monitoring No signs of potential drug abuse or diversion identified.  filed at 02/21/2022 [address] and ACP:  General  In general, how would you say your health is?: Fair  Select all that apply: (!) New or Increased Pain  Do you get the social and emotional support that you need?: (!) No  Do you have a Living Will?: Yes    Advance Directives     Power of [facility] Will ACP-Advance Directive ACP-Power of     Not on File Filed on 01/28/16 Filed Not on File      General Health Risk Interventions:  · Social isolation: patient declines any further intervention for this issue    Health Habits/Nutrition:     Physical Activity: Inactive    Days of Exercise per Week: 0 days    Minutes of Exercise per Session: 0 min     Have you lost any weight without trying in the past 3 months?: No  Body mass index: (!) 41.66  Have you seen the dentist within the past year?: (!) No    Health Habits/Nutrition Interventions:  · Dental exam overdue:  patient encouraged to make appointment with his/her dentist    Hearing/Vision:  Do you or your family notice any trouble with your hearing that hasn't been managed with hearing aids?: (!) Yes  Do you have difficulty driving, watching TV, or doing any of your daily activities because of your eyesight?: No  Have you had an eye exam within the past year?: Yes  No exam data present    Hearing/Vision Interventions:  · Hearing concerns:  patient declines any further evaluation/treatment for hearing issues    Safety:  Do you have working smoke detectors?: Yes  Do you have any tripping hazards - loose or unsecured carpets or rugs?: No  Do you have any tripping hazards - clutter in doorways, halls, or stairs?: No  Do you have either shower bars, grab bars, non-slip mats or non-slip surfaces in your shower or bathtub?: Yes  Do all of your stairways have a railing or banister?: (!) No  Do you always fasten your seatbelt when you are in a car?: (!) No    Safety Interventions:  · Home safety tips provided    ADLs:  In the past 7 days, did you need help from others to perform any of the following everyday activities: Eating, dressing, grooming, bathing, toileting, or walking/balance?: (!) Yes  Select all that apply: (!) Walking/Balance  Select all that apply: (!) Laundry,Housekeeping,Banking/Finances,Shopping,Transportation    ADL Interventions:  · Patient declines any further evaluation/treatment for this issue          Objective   Vitals:    03/24/22 1505   BP: 136/80   Pulse: 58   SpO2: 97%   Weight: 266 lb (120.7 kg)   Height: 5' 7\" (1.702 m)      Body mass index is 41.66 kg/m². Gen: NAD  Eyes: no icterus, no conjunctival erythema  CV: RRR, no mrgs  Resp: CTAB  Abd: soft, NTTP  Neuro: alert, oriented, answers questions appropriately  MSK: no peripheral edema  Skin: warm, dry  Psych: Normal mood, affect        Allergies   Allergen Reactions    Codeine Other (See Comments) and Hives     B/P DROPS PASSES OUT  Passed out    Fentanyl And Related Hives     Other reaction(s): Dizziness    Shellfish-Derived Products Other (See Comments)     Syncope/seizures    Shrimp Flavor      Passes out      Bee Venom Swelling    Hydromorphone Rash, Hives and Other (See Comments)     Full rash spreading across chest and both arms from IV hydromorphone  Passed out    Vancomycin Rash     Prior to Visit Medications    Medication Sig Taking?  Authorizing Provider   Cholecalciferol (VITAMIN D3) 1.25 MG (90132 UT) CAPS TAKE 1 CAPSULE BY MOUTH EVERY 7 DAYS  MARILEE Keith CNP   metoprolol tartrate (LOPRESSOR) 50 MG tablet TAKE 1 TABLET BY MOUTH TWICE DAILY  MARILEE Keith - CNP   cloNIDine (CATAPRES) 0.2 MG tablet TAKE 1 TABLET BY MOUTH TWICE DAILY  MARILEE Nunn CNP   DULoxetine (CYMBALTA) 60 MG extended release capsule TAKE 1 CAPSULE BY MOUTH DAILY  MARILEE Nunn CNP   dilTIAZem (CARDIZEM CD) 240 MG extended release capsule TAKE 1 CAPSULE BY MOUTH DAILY  MARILEE Nunn CNP   buPROPion (WELLBUTRIN SR) 200 MG extended release tablet Take 1 tablet by mouth 2 times daily  MARILEE Nunn CNP   warfarin (COUMADIN) 5 MG tablet TAKE 1& 1/2 TABLETS BY MOUTH EVERY TUESDAY AND FRIDAY THEN TAKE 1 TABLET DAILY ALL OTHER DAYS  MARILEE Nunn CNP   pantoprazole (PROTONIX) 40 MG tablet Take 40 mg by mouth daily  Historical Provider, MD   Biotin 1000 MCG TABS Take by mouth  Historical Provider, MD   ferrous sulfate 325 (65 Fe) MG EC tablet Take 325 mg by mouth daily (with breakfast)   Historical Provider, MD   aspirin 81 MG tablet Take 81 mg by mouth daily  Historical Provider, MD       [facility] (Including outside providers/suppliers regularly involved in providing care):   Patient Care Team:  MARILEE Nunn CNP as PCP - General (Nurse Practitioner)  MARILEE Nunn CNP as PCP - [facility] Empaneled Provider  Colten Read MD as Consulting Physician (Sleep Medicine)    Reviewed and updated this visit:  Tobacco  Allergies  Meds  Med Hx  Surg Hx  Soc Hx  Fam Hx

## 2022-03-24 NOTE — PATIENT INSTRUCTIONS
Personalized Preventive Plan for Keshav Wagner - 3/24/2022  Medicare offers a range of preventive health benefits. Some of the tests and screenings are paid in full while other may be subject to a deductible, co-insurance, and/or copay. Some of these benefits include a comprehensive review of your medical history including lifestyle, illnesses that may run in your family, and various assessments and screenings as appropriate. After reviewing your medical record and screening and assessments performed today your provider may have ordered immunizations, labs, imaging, and/or referrals for you. A list of these orders (if applicable) as well as your Preventive Care list are included within your After Visit Summary for your review. Other Preventive Recommendations:    · A preventive eye exam performed by an eye specialist is recommended every 1-2 years to screen for glaucoma; cataracts, macular degeneration, and other eye disorders. · A preventive dental visit is recommended every 6 months. · Try to get at least 150 minutes of exercise per week or 10,000 steps per day on a pedometer . · Order or download the FREE \"Exercise & Physical Activity: Your Everyday Guide\" from [employer]. Call 9-940.871.7790 or search [employer] online. · You need 2049-6894 mg of calcium and 2008-0082 IU of vitamin D per day. It is possible to meet your calcium requirement with diet alone, but a vitamin D supplement is usually necessary to meet this goal.  · When exposed to the sun, use a sunscreen that protects against both UVA and UVB radiation with an SPF of 30 or greater. Reapply every 2 to 3 hours or after sweating, drying off with a towel, or swimming. · Always wear a seat belt when traveling in a car. Always wear a helmet when riding a bicycle or motorcycle.

## 2022-03-24 NOTE — PROGRESS NOTES
Patient ___ reached   ___X__not reached-preop instructions left on voice ffzs____[phone]_________      JAGP____[phone]____ TIME____1100____ARRIVAL___0930______      Nothing to eat or drink after midnight the night before,except for what the prep instructions call for. If you do not have the instructions or do not understand them please contact your doctors office. Follow any instructions your doctors office has given you including what medications to take the AM of your procedure and which ones to hold. You may use your inhalers. If you take a long acting insulin the janay prior please cut the dose in half and take no diabetic medications that AM.Follow specific doctors office instructions regarding blood thinners and if they want you to hold and for how long. If you are on a blood thinner and have no instructions please contact the office and ask. Dress comfortably,bring your insurance card,picture ID,and a complete list of medications, including supplements. You must have a responsible adult to stay with you during the procedure,drive you home and stay with you. [facility] phone number [phone] for any questions. VISITOR POLICY(subject to change)    There is a one visitor policy at [address] [place] [address] for all surgeries and endoscopies. Whether the visitor can stay or will be asked to wait in the car will depend on the current policy and if social distancing can be maintained. The policy is subject to change at any time. Please make sure the visitor has a cell phone that is on,charged and able to accept calls, as this may be the way that the staff communicates with them. Pain management is NO VISITOR policyThe patients ride is expected to remain in the car with a cell phone for communication. If the ride is leaving the hospital grounds please make sure they are back in time for pickup. Have the patient inform the staff on arrival what their rides plans are while the patient is in the facility. At the [facility] there is one visitor allowed. Please note that the visitor policy is subject to change.

## 2022-03-30 ENCOUNTER — HOSPITAL ENCOUNTER (INPATIENT)
Age: 77
LOS: 2 days | Discharge: HOME | DRG: 308 | End: 2022-04-01
Attending: INTERNAL MEDICINE | Admitting: INTERNAL MEDICINE
Payer: COMMERCIAL

## 2022-03-30 ENCOUNTER — APPOINTMENT (OUTPATIENT)
Dept: GENERAL RADIOLOGY | Age: 77
DRG: 308 | End: 2022-03-30
Attending: INTERNAL MEDICINE
Payer: COMMERCIAL

## 2022-03-30 ENCOUNTER — ANESTHESIA EVENT (OUTPATIENT)
Dept: ENDOSCOPY | Age: 77
DRG: 308 | End: 2022-03-30
Payer: COMMERCIAL

## 2022-03-30 ENCOUNTER — ANESTHESIA (OUTPATIENT)
Dept: ENDOSCOPY | Age: 77
DRG: 308 | End: 2022-03-30
Payer: COMMERCIAL

## 2022-03-30 DIAGNOSIS — Z79.01 ANTICOAGULATED ON COUMADIN: Primary | ICD-10-CM

## 2022-03-30 DIAGNOSIS — I10 ESSENTIAL HYPERTENSION: Chronic | ICD-10-CM

## 2022-03-30 PROBLEM — I48.0 PAF (PAROXYSMAL ATRIAL FIBRILLATION) (HCC): Status: ACTIVE | Noted: 2022-03-30

## 2022-03-30 LAB
A/G RATIO: 1 (ref 1.1–2.2)
ALBUMIN SERPL-MCNC: 3.7 G/DL (ref 3.4–5)
ALP BLD-CCNC: 122 U/L (ref 40–129)
ALT SERPL-CCNC: 7 U/L (ref 10–40)
ANION GAP SERPL CALCULATED.3IONS-SCNC: 15 MMOL/L (ref 3–16)
APTT: 33.4 SEC (ref 26.2–38.6)
AST SERPL-CCNC: 13 U/L (ref 15–37)
BASOPHILS ABSOLUTE: 0.1 K/UL (ref 0–0.2)
BASOPHILS RELATIVE PERCENT: 0.9 %
BILIRUB SERPL-MCNC: 0.6 MG/DL (ref 0–1)
BUN BLDV-MCNC: 18 MG/DL (ref 7–20)
CALCIUM SERPL-MCNC: 9.5 MG/DL (ref 8.3–10.6)
CHLORIDE BLD-SCNC: 105 MMOL/L (ref 99–110)
CO2: 20 MMOL/L (ref 21–32)
CREAT SERPL-MCNC: 1.4 MG/DL (ref 0.6–1.2)
EKG ATRIAL RATE: 120 BPM
EKG DIAGNOSIS: NORMAL
EKG Q-T INTERVAL: 346 MS
EKG QRS DURATION: 82 MS
EKG QTC CALCULATION (BAZETT): 472 MS
EKG R AXIS: 22 DEGREES
EKG T AXIS: 49 DEGREES
EKG VENTRICULAR RATE: 112 BPM
EOSINOPHILS ABSOLUTE: 0.1 K/UL (ref 0–0.6)
EOSINOPHILS RELATIVE PERCENT: 1.1 %
FERRITIN: 28.2 NG/ML (ref 15–150)
GFR AFRICAN AMERICAN: 44
GFR NON-AFRICAN AMERICAN: 37
GLUCOSE BLD-MCNC: 99 MG/DL (ref 70–99)
HCT VFR BLD CALC: 28.8 % (ref 36–48)
HEMOGLOBIN: 8.8 G/DL (ref 12–16)
INR BLD: 1.45 (ref 0.88–1.12)
IRON SATURATION: 8 % (ref 15–50)
IRON: 32 UG/DL (ref 37–145)
LV EF: 63 %
LVEF MODALITY: NORMAL
LYMPHOCYTES ABSOLUTE: 0.6 K/UL (ref 1–5.1)
LYMPHOCYTES RELATIVE PERCENT: 8.8 %
MCH RBC QN AUTO: 23.6 PG (ref 26–34)
MCHC RBC AUTO-ENTMCNC: 30.8 G/DL (ref 31–36)
MCV RBC AUTO: 76.7 FL (ref 80–100)
MONOCYTES ABSOLUTE: 0.6 K/UL (ref 0–1.3)
MONOCYTES RELATIVE PERCENT: 8.3 %
NEUTROPHILS ABSOLUTE: 5.5 K/UL (ref 1.7–7.7)
NEUTROPHILS RELATIVE PERCENT: 80.9 %
PDW BLD-RTO: 20.8 % (ref 12.4–15.4)
PLATELET # BLD: 235 K/UL (ref 135–450)
PMV BLD AUTO: 9.3 FL (ref 5–10.5)
POTASSIUM REFLEX MAGNESIUM: 4.1 MMOL/L (ref 3.5–5.1)
PRO-BNP: 3868 PG/ML (ref 0–449)
PROTHROMBIN TIME: 16.7 SEC (ref 9.9–12.7)
RBC # BLD: 3.75 M/UL (ref 4–5.2)
SODIUM BLD-SCNC: 140 MMOL/L (ref 136–145)
TOTAL IRON BINDING CAPACITY: 406 UG/DL (ref 260–445)
TOTAL PROTEIN: 7.3 G/DL (ref 6.4–8.2)
TROPONIN: <0.01 NG/ML
TSH SERPL DL<=0.05 MIU/L-ACNC: 2.81 UIU/ML (ref 0.27–4.2)
WBC # BLD: 6.8 K/UL (ref 4–11)

## 2022-03-30 PROCEDURE — 2060000000 HC ICU INTERMEDIATE R&B

## 2022-03-30 PROCEDURE — 84443 ASSAY THYROID STIM HORMONE: CPT

## 2022-03-30 PROCEDURE — 93010 ELECTROCARDIOGRAM REPORT: CPT | Performed by: INTERNAL MEDICINE

## 2022-03-30 PROCEDURE — 82607 VITAMIN B-12: CPT

## 2022-03-30 PROCEDURE — 6360000002 HC RX W HCPCS: Performed by: NURSE PRACTITIONER

## 2022-03-30 PROCEDURE — 83550 IRON BINDING TEST: CPT

## 2022-03-30 PROCEDURE — 36415 COLL VENOUS BLD VENIPUNCTURE: CPT

## 2022-03-30 PROCEDURE — 85025 COMPLETE CBC W/AUTO DIFF WBC: CPT

## 2022-03-30 PROCEDURE — 85730 THROMBOPLASTIN TIME PARTIAL: CPT

## 2022-03-30 PROCEDURE — 2580000003 HC RX 258: Performed by: ANESTHESIOLOGY

## 2022-03-30 PROCEDURE — 6360000002 HC RX W HCPCS: Performed by: INTERNAL MEDICINE

## 2022-03-30 PROCEDURE — 6370000000 HC RX 637 (ALT 250 FOR IP): Performed by: INTERNAL MEDICINE

## 2022-03-30 PROCEDURE — 93306 TTE W/DOPPLER COMPLETE: CPT

## 2022-03-30 PROCEDURE — 82728 ASSAY OF FERRITIN: CPT

## 2022-03-30 PROCEDURE — 84484 ASSAY OF TROPONIN QUANT: CPT

## 2022-03-30 PROCEDURE — 99222 1ST HOSP IP/OBS MODERATE 55: CPT | Performed by: NURSE PRACTITIONER

## 2022-03-30 PROCEDURE — 83880 ASSAY OF NATRIURETIC PEPTIDE: CPT

## 2022-03-30 PROCEDURE — 93005 ELECTROCARDIOGRAM TRACING: CPT | Performed by: ANESTHESIOLOGY

## 2022-03-30 PROCEDURE — 80053 COMPREHEN METABOLIC PANEL: CPT

## 2022-03-30 PROCEDURE — 71045 X-RAY EXAM CHEST 1 VIEW: CPT

## 2022-03-30 PROCEDURE — 83540 ASSAY OF IRON: CPT

## 2022-03-30 PROCEDURE — 82746 ASSAY OF FOLIC ACID SERUM: CPT

## 2022-03-30 PROCEDURE — 85610 PROTHROMBIN TIME: CPT

## 2022-03-30 PROCEDURE — 6370000000 HC RX 637 (ALT 250 FOR IP): Performed by: PHYSICIAN ASSISTANT

## 2022-03-30 RX ORDER — DILTIAZEM HYDROCHLORIDE 240 MG/1
240 CAPSULE, COATED, EXTENDED RELEASE ORAL DAILY
Status: DISCONTINUED | OUTPATIENT
Start: 2022-03-30 | End: 2022-04-01 | Stop reason: HOSPADM

## 2022-03-30 RX ORDER — DULOXETIN HYDROCHLORIDE 60 MG/1
60 CAPSULE, DELAYED RELEASE ORAL DAILY
Status: DISCONTINUED | OUTPATIENT
Start: 2022-03-30 | End: 2022-04-01 | Stop reason: HOSPADM

## 2022-03-30 RX ORDER — DILTIAZEM HCL/D5W 125 MG/125
2.5-15 PLASTIC BAG, INJECTION (ML) INTRAVENOUS CONTINUOUS
Status: DISCONTINUED | OUTPATIENT
Start: 2022-03-30 | End: 2022-03-31

## 2022-03-30 RX ORDER — PANTOPRAZOLE SODIUM 40 MG/1
40 TABLET, DELAYED RELEASE ORAL DAILY
Status: DISCONTINUED | OUTPATIENT
Start: 2022-03-30 | End: 2022-03-31

## 2022-03-30 RX ORDER — SODIUM CHLORIDE 9 MG/ML
INJECTION, SOLUTION INTRAVENOUS CONTINUOUS
Status: DISCONTINUED | OUTPATIENT
Start: 2022-03-30 | End: 2022-03-30

## 2022-03-30 RX ORDER — CLONIDINE HYDROCHLORIDE 0.1 MG/1
0.2 TABLET ORAL 2 TIMES DAILY
Status: DISCONTINUED | OUTPATIENT
Start: 2022-03-30 | End: 2022-04-01 | Stop reason: HOSPADM

## 2022-03-30 RX ORDER — METOPROLOL TARTRATE 50 MG/1
50 TABLET, FILM COATED ORAL 2 TIMES DAILY
Status: DISCONTINUED | OUTPATIENT
Start: 2022-03-30 | End: 2022-04-01

## 2022-03-30 RX ORDER — FUROSEMIDE 10 MG/ML
20 INJECTION INTRAMUSCULAR; INTRAVENOUS ONCE
Status: COMPLETED | OUTPATIENT
Start: 2022-03-30 | End: 2022-03-30

## 2022-03-30 RX ADMIN — METOPROLOL TARTRATE 50 MG: 50 TABLET, FILM COATED ORAL at 14:40

## 2022-03-30 RX ADMIN — DILTIAZEM HYDROCHLORIDE 240 MG: 240 CAPSULE, COATED, EXTENDED RELEASE ORAL at 14:40

## 2022-03-30 RX ADMIN — DULOXETINE HYDROCHLORIDE 60 MG: 60 CAPSULE, DELAYED RELEASE ORAL at 14:40

## 2022-03-30 RX ADMIN — FUROSEMIDE 20 MG: 10 INJECTION, SOLUTION INTRAMUSCULAR; INTRAVENOUS at 22:48

## 2022-03-30 RX ADMIN — CLONIDINE HYDROCHLORIDE 0.2 MG: 0.1 TABLET ORAL at 20:55

## 2022-03-30 RX ADMIN — POLYETHYLENE GLYCOL 3350, SODIUM SULFATE ANHYDROUS, SODIUM BICARBONATE, SODIUM CHLORIDE, POTASSIUM CHLORIDE 2000 ML: 236; 22.74; 6.74; 5.86; 2.97 POWDER, FOR SOLUTION ORAL at 20:05

## 2022-03-30 RX ADMIN — PANTOPRAZOLE SODIUM 40 MG: 40 TABLET, DELAYED RELEASE ORAL at 14:40

## 2022-03-30 RX ADMIN — ENOXAPARIN SODIUM 30 MG: 100 INJECTION SUBCUTANEOUS at 14:40

## 2022-03-30 RX ADMIN — SODIUM CHLORIDE: 9 INJECTION, SOLUTION INTRAVENOUS at 19:16

## 2022-03-30 ASSESSMENT — PAIN - FUNCTIONAL ASSESSMENT: PAIN_FUNCTIONAL_ASSESSMENT: 0-10

## 2022-03-30 NOTE — ANESTHESIA PRE PROCEDURE
Department of Anesthesiology  Preprocedure Note       Name:  Tania Vigil   Age:  78 y.o.  :  1945                                          MRN:  [id number]         Date:  3/30/2022      Surgeon: Josafat Wagoner):  Verónica De Leon MD    Procedure: Procedure(s):  COLONOSCOPY DIAGNOSTIC  EGD DIAGNOSTIC ONLY    Medications prior to admission:   Prior to Admission medications    Medication Sig Start Date End Date Taking? Authorizing Provider   Cholecalciferol (VITAMIN D3) 1.25 MG (44340 UT) CAPS TAKE 1 CAPSULE BY MOUTH EVERY 7 DAYS 22   MARILEE Fink CNP   metoprolol tartrate (LOPRESSOR) 50 MG tablet TAKE 1 TABLET BY MOUTH TWICE DAILY 22   MARILEE Fink CNP   cloNIDine (CATAPRES) 0.2 MG tablet TAKE 1 TABLET BY MOUTH TWICE DAILY 21   MARILEE Fink CNP   DULoxetine (CYMBALTA) 60 MG extended release capsule TAKE 1 CAPSULE BY MOUTH DAILY 21   MARILEE Fink CNP   dilTIAZem (CARDIZEM CD) 240 MG extended release capsule TAKE 1 CAPSULE BY MOUTH DAILY 21   MARILEE Fink CNP   buPROPion [facility] SR) 200 MG extended release tablet Take 1 tablet by mouth 2 times daily 21   MARILEE Fink CNP   warfarin (COUMADIN) 5 MG tablet TAKE 1& 1/2 TABLETS BY MOUTH EVERY TUESDAY AND FRIDAY THEN TAKE 1 TABLET DAILY ALL OTHER DAYS 5/10/21   MARILEE Fink CNP   pantoprazole (PROTONIX) 40 MG tablet Take 40 mg by mouth daily 10/19/20   Historical Provider, MD   Biotin 1000 MCG TABS Take by mouth    Historical Provider, MD   ferrous sulfate 325 (65 Fe) MG EC tablet Take 325 mg by mouth daily (with breakfast)     Historical Provider, MD   aspirin 81 MG tablet Take 81 mg by mouth daily    Historical Provider, MD       Current medications:    No current facility-administered medications for this encounter. Allergies:     Allergies   Allergen Reactions    Codeine Other (See Comments) and Hives     B/P DROPS PASSES OUT  Passed out    Fentanyl And Related Hives     Other reaction(s): Dizziness    Shellfish-Derived Products Other (See Comments)     Syncope/seizures    Shrimp Flavor      Passes out      Bee Venom Swelling    Hydromorphone Rash, Hives and Other (See Comments)     Full rash spreading across chest and both arms from IV hydromorphone  Passed out    Vancomycin Rash       Problem List:    Patient Active Problem List   Diagnosis Code    Atrial fibrillation (HCC) I48.91    Essential hypertension I10    Severe episode of recurrent major depressive disorder, without psychotic features ([facility] [address] F33.2    Anticoagulated on Coumadin Z79.01    Seasonal affective disorder (HCC) F33.8    Class 3 obesity due to excess calories with serious comorbidity and body mass index (BMI) of 45.0 to 49.9 in adult [id number]    Elevated sed rate R70.0    Leukocytosis D72.829    Elevated C-reactive protein (CRP) R79.82    Obstructive sleep apnea G47.33    Morbid obesity (HCC) E66.01    Weight loss counseling, encounter for Z71.3    Atypical atrial flutter (HCC) 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       Past Medical History:        Diagnosis Date    Arthritis     Hypertension     Kidney disease     Pneumonia     Sleep apnea     no c-pap       Past Surgical History:        Procedure Laterality Date    COLONOSCOPY N/A 11/20/2018    COLONOSCOPY POLYPECTOMY SNARE/COLD BIOPSY performed by Karly Musa MD at [address]      ankle rt has pins and rods, and rt elbow    OTHER SURGICAL HISTORY      weight loss surgery    TOTAL KNEE ARTHROPLASTY Bilateral 12/19/2012    TUBAL LIGATION      tubes tied    UPPER GASTROINTESTINAL ENDOSCOPY N/A 11/20/2018    EGD BIOPSY performed by Karly Musa MD at [address] [facility],  [address] History:    Social History     Tobacco Use    Smoking status: Never Smoker    Smokeless tobacco: Never Used   Substance Use Topics    Alcohol use:  No                                Counseling given: Not Answered      Vital Signs (Current): There were no vitals filed for this visit. BP Readings from Last 3 Encounters:   03/24/22 136/80   02/11/22 138/78   01/07/22 120/60       NPO Status:  before mn                                                                                BMI:   Wt Readings from Last 3 Encounters:   03/24/22 266 lb (120.7 kg)   02/11/22 248 lb (112.5 kg)   01/07/22 248 lb (112.5 kg)     There is no height or weight on file to calculate BMI.    CBC:   Lab Results   Component Value Date    WBC 6.2 07/08/2021    RBC 3.74 07/08/2021    HGB 9.5 07/08/2021    HCT 30.0 07/08/2021    MCV 80.1 07/08/2021    RDW 19.2 07/08/2021     07/08/2021       CMP:   Lab Results   Component Value Date     07/08/2021    K 4.3 07/08/2021    K 4.1 01/06/2019     07/08/2021    CO2 26 07/08/2021    BUN 20 07/08/2021    CREATININE 1.6 07/08/2021    GFRAA 38 07/08/2021    GFRAA 45 03/27/2013    AGRATIO 1.1 07/08/2021    LABGLOM 32 07/08/2021    GLUCOSE 97 07/08/2021    PROT 6.6 07/08/2021    CALCIUM 8.9 07/08/2021    BILITOT 0.3 07/08/2021    ALKPHOS 127 07/08/2021    AST 10 07/08/2021    ALT <5 07/08/2021       POC Tests: No results for input(s): POCGLU, POCNA, POCK, POCCL, POCBUN, POCHEMO, POCHCT in the last 72 hours.     Coags:   Lab Results   Component Value Date    PROTIME 33.2 03/23/2019    INR 2.6 03/24/2022    INR 2.91 03/23/2019    APTT 32.9 03/21/2019       HCG (If Applicable): No results found for: PREGTESTUR, PREGSERUM, HCG, HCGQUANT     ABGs: No results found for: PHART, PO2ART, PCT8HJR, QTI5RQM, BEART, G0PDEEMO     Type & Screen (If Applicable):  Lab Results   Component Value Date    LABABO O 12/10/2012    [address] Positive 12/10/2012       Drug/Infectious Status (If Applicable):  No results found for: HIV, HEPCAB    COVID-19 Screening (If Applicable):   Lab Results   Component Value Date    COVID19 NOT DETECTED 10/01/2020           Anesthesia Evaluation  Patient summary reviewed and Nursing notes reviewed  Airway: Mallampati: II  TM distance: >3 FB   Neck ROM: full  Mouth opening: > = 3 FB Dental: normal exam         Pulmonary:normal exam  breath sounds clear to auscultation  (+) pneumonia:  sleep apnea: on CPAP,                             Cardiovascular:  Exercise tolerance: poor (<4 METS),   (+) hypertension:,       ECG reviewed  Rhythm: regular  Rate: normal  Echocardiogram reviewed               ROS comment: 2019   -Normal left ventricle size and systolic function with an estimated ejection   fraction of 55-60%. Mild concentric left ventricular hypertrophy. No regional wall motion abnormalities are seen. E/e\"= 14.9 .   -Grade III diastolic dysfunction with elevated LV filling pressures. -Mild mitral and tricuspid regurgitation.   -Biatrial enlargement. -Right ventricular systolic function is reduced.   -Estimated pulmonary artery systolic pressure is normal at 35 mmHg assuming   a right atrial pressure of 3 mmHg     Neuro/Psych:   (+) psychiatric history:            GI/Hepatic/Renal:             Endo/Other: Negative Endo/Other ROS                    Abdominal:   (+) obese,           Vascular: Other Findings:             Anesthesia Plan      MAC     ASA 3     (Pt heart rate not controlled with Cardizem and metoprolol ,spoke to Dr Desean Mckee and pt ,decision made to admit pt for rate control and plans to preform the procedure tomorrow after consulting hospitalist and possible cardi logy. )  Induction: intravenous. Anesthetic plan and risks discussed with patient. Plan discussed with CRNA.     Attending anesthesiologist reviewed and agrees with Yohannes Wolfe MD   3/30/2022

## 2022-03-30 NOTE — ACP (ADVANCE CARE PLANNING)
Advanced Care Planning Note.     Purpose of Encounter: Advanced care planning in light of hospitalization  Parties In Attendance: Patient,    Decisional Capacity: Yes  Subjective: Patient  understand that this conversation is to address long term care goal  Objective:  to hospital with paroxysmal A. fib with RVR  Goals of Care Determination: Patient pursue CPR and intubation if required unsure about long-term ventilation or tracheostomy would want family to make decision at the time  Code Status: full code  Time spent on Advanced care Plannin minutes  Advanced Care Planning Documents: documented patient's wishes, would like Daughter Jonna Kawasaki to make medical decisions if unable to make decisions

## 2022-03-30 NOTE — PROGRESS NOTES
Patient transported to room 3382 via stretcher with tele and accompanied by this writer. Report given to Jenae Velazquez RN.

## 2022-03-30 NOTE — H&P
gastrointestinal endoscopy (N/A, 11/20/2018); Cardioversion; Gastric bypass surgery; and Larynx surgery. Medications:  No current facility-administered medications on file prior to encounter. Current Outpatient Medications on File Prior to Encounter   Medication Sig Dispense Refill    Calcium Carbonate Antacid (TUMS PO) Take by mouth as needed      Cholecalciferol (VITAMIN D3) 1.25 MG (00200 UT) CAPS TAKE 1 CAPSULE BY MOUTH EVERY 7 DAYS 4 capsule 11    metoprolol tartrate (LOPRESSOR) 50 MG tablet TAKE 1 TABLET BY MOUTH TWICE DAILY 180 tablet 1    cloNIDine (CATAPRES) 0.2 MG tablet TAKE 1 TABLET BY MOUTH TWICE DAILY 180 tablet 3    DULoxetine (CYMBALTA) 60 MG extended release capsule TAKE 1 CAPSULE BY MOUTH DAILY 90 capsule 3    dilTIAZem (CARDIZEM CD) 240 MG extended release capsule TAKE 1 CAPSULE BY MOUTH DAILY 90 capsule 3    buPROPion (WELLBUTRIN SR) 200 MG extended release tablet Take 1 tablet by mouth 2 times daily 60 tablet 5    warfarin (COUMADIN) 5 MG tablet TAKE 1& 1/2 TABLETS BY MOUTH EVERY TUESDAY AND FRIDAY THEN TAKE 1 TABLET DAILY ALL OTHER DAYS 103 tablet 3    pantoprazole (PROTONIX) 40 MG tablet Take 40 mg by mouth daily (Patient not taking: Reported on 3/30/2022)      Biotin 1000 MCG TABS Take by mouth      ferrous sulfate 325 (65 Fe) MG EC tablet Take 325 mg by mouth daily (with breakfast)  (Patient not taking: Reported on 3/30/2022)      aspirin 81 MG tablet Take 81 mg by mouth daily         Allergies:   Allergies   Allergen Reactions    Codeine Other (See Comments) and Hives     B/P DROPS PASSES OUT  Passed out    Fentanyl And Related Hives     Other reaction(s): Dizziness    Shellfish-Derived Products Other (See Comments)     Syncope/seizures    Shrimp Flavor      Passes out      Bee Venom Swelling    Hydromorphone Rash, Hives and Other (See Comments)     Full rash spreading across chest and both arms from IV hydromorphone  Passed out    Vancomycin Rash        Social History:  Patient Lives at home   reports that she has never smoked. She has never used smokeless tobacco. She reports that she does not drink alcohol and does not use drugs. Family History:  family history includes Cancer in her father. ,     Physical Exam:  BP (!) 144/69   Pulse 118   Temp 97.4 °F (36.3 °C) (Temporal)   Resp 20   Ht 5' 7\" (1.702 m)   Wt 255 lb (115.7 kg)   SpO2 94%   BMI 39.94 kg/m²     General appearance:  Appears comfortable. AAOx3  HEENT: atraumatic, Pupils equal, muscous membranes moist, no masses appreciated  Cardiovascular: irregulary irregular no murmurs appreciated  Respiratory: CTAB no wheezing  Gastrointestinal: Abdomen soft, non-tender, BS+  EXT: no edema  Neurology: no gross focal deficts  Psychiatry: Appropriate affect. Not agitated  Skin: Warm, dry, no rashes appreciated    Labs:  CBC:   Lab Results   Component Value Date    WBC 6.8 03/30/2022    RBC 3.75 03/30/2022    HGB 8.8 03/30/2022    HCT 28.8 03/30/2022    MCV 76.7 03/30/2022    MCH 23.6 03/30/2022    MCHC 30.8 03/30/2022    RDW 20.8 03/30/2022     03/30/2022    MPV 9.3 03/30/2022     BMP:    Lab Results   Component Value Date     03/30/2022    K 4.1 03/30/2022     03/30/2022    CO2 20 03/30/2022    BUN 18 03/30/2022    CREATININE 1.4 03/30/2022    CALCIUM 9.5 03/30/2022    GFRAA 44 03/30/2022    GFRAA 45 03/27/2013    LABGLOM 37 03/30/2022    GLUCOSE 99 03/30/2022     No orders to display       Recent imaging reviewed    Problem List  Principal Problem:    PAF (paroxysmal atrial fibrillation) ([facility] [address]  Resolved Problems:    * No resolved hospital problems.  *        Assessment/Plan:   PAF with rvr  - HR up to 130s  - will start dilt gtt   - check echo  - tsh  - troponin  - cards consult  - ivf    Ckd3: monitor    Htn: home meds          DVT prophylaxis lovenox  Code status full code    I spent 35  minutes providing critical care services to this patient thus far today        Admit as inpatient I anticipate hospitalization spanning more than two midnights for investigation and treatment of the above medically necessary diagnoses. Please note that some part of this chart was generated using Dragon dictation software. Although every effort was made to ensure the accuracy of this automated transcription, some errors in transcription may have occurred inadvertently. If you may need any clarification, please do not hesitate to contact me through [place] [phone].        Olga Rubin MD    3/30/2022 12:31 PM

## 2022-03-30 NOTE — CONSULTS
[address]   Electrophysiology Nurse Practitioner  Consult Note  Date: 3/30/2022   Date of admission: 3/30/2022 10:11 AM  Reason for Admission: PAF (paroxysmal atrial fibrillation) ([facility] [I48.0]    Consult Requesting Physician: Katarina Carcamo MD    -Reason for Consultation: Atrial Fibrillation RVR    CC: Atrial Fibrillation  HISTORY OF PRESENT ILLNESS: History obtained from patient and medical record. Chidi Canales is a 78 y.o. female with a past medical history of hypertension, CKD, GIANNA, diastolic dysfunction, and chronic atrial fibrillation. Hx of DCCV 2016 with Dr. Misha Pizano. At that time she had LHC showed normal coronaries (1/2016). Echo 2019 showed normal EF with GIIIDD. Admitted 3/2019 with syncope felt to be vasovagal.  She was seen by Dr. Katarina Carcamo at that time for AF and CV was to be considered as OP, however there was no follow up with cardiology. Presented for OP routine colonoscopy and ECG for postprandial N/V. She was noted to be tachycardia and ECG revealed AF/RVR. SHe was admitted for further workup and control of AF. She did not take her morning medications. Admits to chronic SOB that seems to have worsened over the last several months. Admits to being deconditioned and sedentary. Denies any CP, palpitations or swelling. Reports RLE redness. Last SR ECG 5/2016. Patient seen and examined. Clinical notes reviewed. Telemetry reviewed. Denies having chest pain, palpitations, orthopnea, or dizziness at the time of this visit. Assessment and Plan:   Chronic Persistent Atrial Fibrillation  - Atrial fibrillation 's on telemetry, HR was up to 130's in endoscopy per nursing  - Give metoprolol and diltiazem, reports that she did not taker her morning medications  - Hold diltiazem gtt  - [id number] score: 3 (hypertension, gender age) ; [id number] Vasc score and anticoagulation discussed. High risk for stroke and thromboembolism. Anticoagulation is recommended. ~ [facility] with warfarin, managed by PCP; Has been held for procedure  - Afib risk factors including age, HTN, obesity, inactivity and GIANNA were discussed with patient. Risk factor modification recommended   ~ TSH 1.45 (3/22/2019);can repeat   - Hx of DCCV 2016   - Can consider DCCV as OP at this point she has chronic AF since 2017 and seems asymptomatic. Maintaining SR may be difficult to maintain due to multiple comorbidities and longstanding nature of AF. SOB   - Etiology? - Likely multifactorial including deconditioning and obesity; Will get echo, BNP and CXR to rule out dCHF and other lung process   -  Lungs clear, no overt s/s of fluid overload on exam  Hypertension   - Uncontrolled, resume home medications and follow  GIANNA   - Untreated; CPAP recommended  Obesity: Body mass index is 40.78 kg/m². - Excessive weight is complicating assessment treatment. It is placing patient at risk for multiple co-morbidities as well as early death contributing to the patient's presentations. - Discussed weight loss and lifestyle modifications. CKD   - Stable   Chronic Anemia   - MIRIAM   - On Iron supplement, however reports she is not taking     - Atrial Fibrillation is chronic and appears controlled with medications. Will get CXR, BNP and echocardiogram today and if all OK then can proceed with endoscopy procedures tomorrow. Discussed with Dr. Jennifer Fleming    - Hold diltaizem gtt or now and will give PO medications, if HR remains >120 then OK to start dilt gtt    All pertinent information and plan of care discussed with the EP physician. All questions and concerns were addressed to the patient/family. Alternatives to my treatment were discussed. I have discussed the above stated plan and the patient verbalized understanding and agreed with the plan. Discussed plan with patient and nurse.   Problem List:   Patient Active Problem List    Diagnosis Date Noted    PAF (paroxysmal atrial fibrillation) ([facility] 03/30/2022    Atypical atrial flutter (HCC)     Weight loss counseling, encounter for     Elevated sed rate     Leukocytosis     Elevated C-reactive protein (CRP)     Obstructive sleep apnea     Morbid obesity (HCC)     Severe episode of recurrent major depressive disorder, without psychotic features ([facility] 02/11/2018    Anticoagulated on Coumadin 02/11/2018    Seasonal affective disorder ([facility] 02/11/2018    Class 3 obesity due to excess calories with serious comorbidity and body mass index (BMI) of 45.0 to 49.9 in adult 02/11/2018    Atrial fibrillation ([facility] 01/21/2016    Essential hypertension 01/21/2016      Allergies: Allergies   Allergen Reactions    Codeine Other (See Comments) and Hives     B/P DROPS PASSES OUT  Passed out    Fentanyl And Related Hives     Other reaction(s): Dizziness    Shellfish-Derived Products Other (See Comments)     Syncope/seizures    Shrimp Flavor      Passes out      Bee Venom Swelling    Hydromorphone Rash, Hives and Other (See Comments)     Full rash spreading across chest and both arms from IV hydromorphone  Passed out    Vancomycin Rash     Home Meds:  Prior to Visit Medications    Medication Sig Taking?  Authorizing Provider   Calcium Carbonate Antacid (TUMS PO) Take by mouth as needed Yes Historical Provider, MD   Cholecalciferol (VITAMIN D3) 1.25 MG (70871 UT) CAPS TAKE 1 CAPSULE BY MOUTH EVERY 7 DAYS  Brea Ceja APRN - CNP   metoprolol tartrate (LOPRESSOR) 50 MG tablet TAKE 1 TABLET BY MOUTH TWICE DAILY  Brea Ceja APRN - CNP   cloNIDine (CATAPRES) 0.2 MG tablet TAKE 1 TABLET BY MOUTH TWICE DAILY  Brea Ceja APRN - CNP   DULoxetine (CYMBALTA) 60 MG extended release capsule TAKE 1 CAPSULE BY MOUTH DAILY  Brea Ceja APRN - CNP   dilTIAZem (CARDIZEM CD) 240 MG extended release capsule TAKE 1 CAPSULE BY MOUTH DAILY  Brea Ceja APRN - ROBBY   buPROPion [facility]) 200 MG extended release tablet Take 1 tablet by mouth 2 times daily  Serenity Navarro MARILEE Hummel CNP   warfarin (COUMADIN) 5 MG tablet TAKE 1& 1/2 TABLETS BY MOUTH EVERY TUESDAY AND FRIDAY THEN TAKE 1 TABLET DAILY ALL OTHER DAYS  MARILEE Yi CNP   pantoprazole (PROTONIX) 40 MG tablet Take 40 mg by mouth daily  Patient not taking: Reported on 3/30/2022  Historical Provider, MD   Biotin 1000 MCG TABS Take by mouth  Historical Provider, MD   ferrous sulfate 325 (65 Fe) MG EC tablet Take 325 mg by mouth daily (with breakfast)   Patient not taking: Reported on 3/30/2022  Historical Provider, MD   aspirin 81 MG tablet Take 81 mg by mouth daily  Historical Provider, MD      Past Medical History:  Past Medical History:   Diagnosis Date    Arthritis     Atrial fibrillation and flutter ([facility]     Hypertension     Kidney disease     Pneumonia     Sleep apnea     no c-pap      Past Surgical History:    has a past surgical history that includes Tubal ligation; Total knee arthroplasty (Bilateral, 12/19/2012); fracture surgery; Colonoscopy (N/A, 11/20/2018); Upper gastrointestinal endoscopy (N/A, 11/20/2018); Cardioversion; Gastric bypass surgery; and Larynx surgery. Social History:  Reviewed. reports that she has never smoked. She has never used smokeless tobacco. She reports that she does not drink alcohol and does not use drugs. Family History:  Reviewed. family history includes Cancer in her father. Denies family history of sudden cardiac death, arrhythmia, premature CAD    Review of System:  · Constitutional: Negative for fever, weight changes, or weakness  · Skin: Negative for rash, bruising, bleeding, blood clots, or changes in skin pigment  · HEENT: Negative for vision changes  · Respiratory: Reviewed in HPI  · Cardiovascular: Reviewed in HPI  · Gastrointestinal: Negative for abdominal pain or black/tarry stools  · Genito-Urinary: Negative for hematuria  · Musculoskeletal: No focal weakness  · Neurological/Psych: Negative for confusion or TIA-like symptoms.  No anxiety, depression, or insomnia    Physical Examination:  Vitals:    03/30/22 1300   BP: (!) 158/100   Pulse: 99   Resp: 18   Temp:    SpO2: 92%      No intake/output data recorded. Wt Readings from Last 3 Encounters:   03/30/22 255 lb (115.7 kg)   03/24/22 266 lb (120.7 kg)   02/11/22 248 lb (112.5 kg)     Telemetry: Atrial fibrillation rates 's   Constitutional: Cooperative and in no apparent distress, and appears well nourished   Skin: Warm and pink; no cyanosis or bruising + bear BLE   HEENT: Symmetric and normocephalic. Conjunctiva pink with clear sclera. Mucus membranes pink and moist. No visible masses/goiter   Respiratory: Respirations symmetric and unlabored. Lungs clear to auscultation bilaterally, no wheezing, rhonchi, or crackles.  Cardiovascular:  irregular rate and rhythm. S1 & S2 present, negative for murmur. negative elevation of JVP. No significant edema.  Musculoskeletal:  No focal weakness.  Neurological/Psych: Awake and orientated to person, place and time. Calm affect, appropriate mood. Pertinent labs, diagnostic, device, and imaging results reviewed as a part of this visit     Labs:  BMP:   Recent Labs     03/30/22  1150      K 4.1      CO2 20*   BUN 18   CREATININE 1.4*     Estimated Creatinine Clearance: 46 mL/min (A) (based on SCr of 1.4 mg/dL (H)).    CBC:   Recent Labs     03/30/22  1150   WBC 6.8   HGB 8.8*   HCT 28.8*   MCV 76.7*        Thyroid:   Lab Results   Component Value Date    TSH 3.81 05/17/2017     Lipids:  Lab Results   Component Value Date    CHOL 142 03/15/2021    HDL 51 03/15/2021    TRIG 56 03/15/2021     LFTS:   Lab Results   Component Value Date    ALT 7 03/30/2022    AST 13 03/30/2022    ALKPHOS 122 03/30/2022    PROT 7.3 03/30/2022    AGRATIO 1.0 03/30/2022    BILITOT 0.6 03/30/2022     Cardiac Enzymes:   Lab Results   Component Value Date    TROPONINI <0.01 03/30/2022    TROPONINI <0.01 03/21/2019    TROPONINI 0.00 03/27/2013 Coags:   Lab Results   Component Value Date    PROTIME 16.7 03/30/2022    INR 1.45 03/30/2022     ECG: 3/30/2022: Atrial Fibrillation RVR    ECHO:  3/22/2019   Summary   -Normal left ventricle size and systolic function with an estimated ejection   fraction of 55-60%. Mild concentric left ventricular hypertrophy. No regional wall motion abnormalities are seen. E/e\"= 14.9 .   -Grade III diastolic dysfunction with elevated LV filling pressures. -Mild mitral and tricuspid regurgitation.   -Biatrial enlargement. -Right ventricular systolic function is reduced.   -Estimated pulmonary artery systolic pressure is normal at 35 mmHg assuming a right atrial pressure of 3 mmHg. Stress Test: 1/19/2016  Summary    Small-moderate sized anterior completely reversible defect consistent with    ischemia in the territory of the mid and distal LAD .    Normal LV function.        Recommendation    Cardiac cath possible PCI arranged with Dr. Bobby Yancey.   Kailee Lawrence 2 days prior.    Pretreated for possible dye allergy. Cath: 1/27/2016  [facility] SUMMARY  ~LM     normal  ~LAD   normal  ~Cx      normal  ~RCA   normal  ~LVG   55%     Impression  ~Angiography w/ normal cors   ~LVEDP 20  ~LVG with normal EF     Intervention  ~None     Recommendation  ~Aggressive medical treatment and risk factor modification     Thank you for allowing to us to participate in the care of [address].     Jam Haro, MARILEE-CNP  [address]   Office: [phone]

## 2022-03-30 NOTE — CONSULTS
Gastroenterology Consult Note        Patient: Neo Riley  : 1945  Acct#:      Date:  3/30/2022    Subjective:       History of Present Illness  Patient is a 78 y.o.   female admitted with PAF (paroxysmal atrial fibrillation) ([facility] [I48.0] who is seen in consult for intermittent postprandial vomiting. History of A. fib on Coumadin. Patient came in for routine subsequent screening colonoscopy as well as EGD for intermittent postprandial nausea and vomiting. She was found to be in A. fib with RVR and procedure was canceled. Patient reports intermittent nausea and vomiting with p.o. intake. Happens more so with certain foods such as meats. Occurs about 4 times a week. Had same symptoms when had EGD in 2018. Took a medication and symptoms improved. Patient unsure what med this was. No longer taking that medication and then symptoms returned over the past few years. She reports a lot of indigestion at night and has been taking TUms. Not on PPI. No abdominal pain, melena, hematochezia. Past Medical History:   Diagnosis Date    Arthritis     Atrial fibrillation and flutter ([facility]     Hypertension     Kidney disease     Pneumonia     Sleep apnea     no c-pap      Past Surgical History:   Procedure Laterality Date    CARDIOVERSION      COLONOSCOPY N/A 2018    COLONOSCOPY POLYPECTOMY SNARE/COLD BIOPSY performed by Jason Marshall MD at [address] [place] [address]      ankle rt has pins and rods, and rt elbow    GASTRIC BYPASS SURGERY      LARYNX SURGERY      TOTAL KNEE ARTHROPLASTY Bilateral 2012    TUBAL LIGATION      tubes tied    UPPER GASTROINTESTINAL ENDOSCOPY N/A 2018    EGD BIOPSY performed by Jason Marshall MD at [address] [place] [address]      Past Endoscopic History  EGD and colonoscopy in 2018 with Dr. Gm Noguera for dysphagia, nausea, vomiting and colon cancer screening. EGD with gastric pouch with gastritis, 8 mm anastomotic ulcer (GJ anastomosis).   6 mm polyp, sigmoid diverticulosis, hemorrhoids. Repeat colonoscopy in 3 years recommended due to fair prep. Admission Meds  No current facility-administered medications on file prior to encounter.      Current Outpatient Medications on File Prior to Encounter   Medication Sig Dispense Refill    Calcium Carbonate Antacid (TUMS PO) Take by mouth as needed      Cholecalciferol (VITAMIN D3) 1.25 MG (42441 UT) CAPS TAKE 1 CAPSULE BY MOUTH EVERY 7 DAYS 4 capsule 11    metoprolol tartrate (LOPRESSOR) 50 MG tablet TAKE 1 TABLET BY MOUTH TWICE DAILY 180 tablet 1    cloNIDine (CATAPRES) 0.2 MG tablet TAKE 1 TABLET BY MOUTH TWICE DAILY 180 tablet 3    DULoxetine (CYMBALTA) 60 MG extended release capsule TAKE 1 CAPSULE BY MOUTH DAILY 90 capsule 3    dilTIAZem (CARDIZEM CD) 240 MG extended release capsule TAKE 1 CAPSULE BY MOUTH DAILY 90 capsule 3    buPROPion (WELLBUTRIN SR) 200 MG extended release tablet Take 1 tablet by mouth 2 times daily 60 tablet 5    warfarin (COUMADIN) 5 MG tablet TAKE 1& 1/2 TABLETS BY MOUTH EVERY TUESDAY AND FRIDAY THEN TAKE 1 TABLET DAILY ALL OTHER DAYS 103 tablet 3    pantoprazole (PROTONIX) 40 MG tablet Take 40 mg by mouth daily (Patient not taking: Reported on 3/30/2022)      Biotin 1000 MCG TABS Take by mouth      ferrous sulfate 325 (65 Fe) MG EC tablet Take 325 mg by mouth daily (with breakfast)  (Patient not taking: Reported on 3/30/2022)      aspirin 81 MG tablet Take 81 mg by mouth daily              Allergies  Allergies   Allergen Reactions    Codeine Other (See Comments) and Hives     B/P DROPS PASSES OUT  Passed out    Fentanyl And Related Hives     Other reaction(s): Dizziness    Shellfish-Derived Products Other (See Comments)     Syncope/seizures    Shrimp Flavor      Passes out      Bee Venom Swelling    Hydromorphone Rash, Hives and Other (See Comments)     Full rash spreading across chest and both arms from IV hydromorphone  Passed out    Vancomycin Rash Social   Social History     Tobacco Use    Smoking status: Never Smoker    Smokeless tobacco: Never Used   Substance Use Topics    Alcohol use: No        Family History   Problem Relation Age of Onset    Cancer Father         stomach cancer           Review of Systems  Constitutional: negative for fevers, chills, sweats    Ears, nose, mouth, throat, and face: negative for nasal congestion and sore throat   Respiratory: negative for cough and shortness of breath   Cardiovascular: negative for chest pain and dyspnea   Gastrointestinal: see hpi   Genitourinary:negative for dysuria and frequency   Integument/breast: negative for pruritus and rash   Hematologic/lymphatic: negative for bleeding and easy bruising   Musculoskeletal:negative for arthralgias and myalgias   Neurological: negative for dizziness and weakness         Physical Exam  Blood pressure (!) 158/100, pulse 99, temperature 97.4 °F (36.3 °C), temperature source Temporal, resp. rate 18, height 5' 7\" (1.702 m), weight 255 lb (115.7 kg), SpO2 92 %, not currently breastfeeding. General appearance: alert, cooperative, no distress, appears stated age  Eyes: Anicteric  Head: Normocephalic, without obvious abnormality  Lungs: clear to auscultation bilaterally, Normal Effort  Heart: irregularly irregular, no murmurs or rubs  Abdomen: soft, non-tender. Bowel sounds normal. No masses,  no organomegaly. Extremities: atraumatic, no cyanosis or edema  Skin: warm and dry, no jaundice  Neuro: Grossly intact, A&OX3  Musculoskeletal: 5/5  strength BUE      Data Review:    Recent Labs     03/30/22  1150   WBC 6.8   HGB 8.8*   HCT 28.8*   MCV 76.7*        Recent Labs     03/30/22  1150      K 4.1      CO2 20*   BUN 18   CREATININE 1.4*     Recent Labs     03/30/22  1150   AST 13*   ALT 7*   BILITOT 0.6   ALKPHOS 122     No results for input(s): LIPASE, AMYLASE in the last 72 hours.   Recent Labs     03/30/22  1150   PROTIME 16.7*   INR 1.45* No results for input(s): PTT in the last 72 hours. No results for input(s): OCCULTBLD in the last 72 hours. Assessment:     Principal Problem:    PAF (paroxysmal atrial fibrillation) (HCC)  Resolved Problems:    * No resolved hospital problems. *    Postprandial nausea and vomiting -intermittent. Prior history of ulcer at [address] anastomosis with similar symptoms then. Had ulcer then and symptoms improved on PPI. No longer taking PPI. Microcytic anemia. Likely iron deficiency. This may be due to decreased absorption status post gastric bypass surgery. A. fib with RVR -rate now improved. EP seeing and reports A. fib is chronic and patient is okay for endoscopy. Recommendations:   -Clear liquids today  -2 additional liters GoLYTELY tonight  -N.p.o. midnight  - plan EGD and colonoscopy tomorrow    -Discussed with Dr. You Torres, PA-C  [address]    I have personally performed a face to face diagnostic evaluation on this patient. I have interviewed and examined the patient and I agree with the findings and recommended plan of care. In summary, my findings and plan are the followin/F with h/o remote gastric bypass. She had an OP EGD and colonoscopy with me in 2018. Her EGD then showed an 8 mm anastomotic ulcer and colonoscopy with 6 mm DC polyp hot snared, diverticulosis and hemorrhoids. She was then lost to follow up. Her PCP sent her for repeat EGD and colonoscopy today. She has been having intermittent abdominal pain and early satiety, especially after eating. Feels that food get stuck in her epigastrium. In pre-op, her HR was noted to be in the 120s. She has SOB with activity. She has been off coumadin since the weekend. H/o AF that required cardioversion in the past. She did not take her AF medications this morning in preparation for her endoscopies. Anesthesia was concerned about proceeding with her procedures today.  She was then admitted to the hospitalist service for medical optimization before endoscopic evaluation. VSS IRRR, abdomen obese, soft and non tender. Hb today 8.8  A/P>   1. chronic AF, in RVR. Cardiology/EP just cleared patient for endoscopy tomorrow. Her PO medications were given and diltiazem gtt discontinued. Proceed with EGD/colonoscopy with Dr. Yue Maldonado tomorrow. 2. Early satiety, likely anastomotic stricture. Oral PPI. EGD with dilation  3. Since patient is anemic, will check iron studies, Vitamin B12 and folate.       Iesha Khan MD, MSc  [facility]  03/30/22

## 2022-03-30 NOTE — PROGRESS NOTES
BRIEF GI NOTE    Patient is scheduled for OP EGD/Colonoscopy. She has h/o gastric bypass with anastomotic ulcer in 2018. She is now in AF in RVR (120s) and gets SOB with exertion.  We will attempt to admit her to the hospitalist for rate control and medically optimize before we proceed with endoscopic evaluations

## 2022-03-30 NOTE — PROGRESS NOTES
Atrial Fibrillation with heart rate 115-120's on tele upon admission to endoscopy. SOB with exertion. Dr. Daniela Diallo and Dr. Mary Burgess notified and evaluated patient. EKG complete as ordered and results given to aforementioned physicians. Procedure canceled and patient to be admitted to hospital.  Dr. Mario Ge evaluated patient and admission orders received. Awaiting inpatient bed to become available. Patient denies any c/o distress. Will monitor closely.

## 2022-03-31 ENCOUNTER — ANESTHESIA EVENT (OUTPATIENT)
Dept: ENDOSCOPY | Age: 77
DRG: 308 | End: 2022-03-31
Payer: COMMERCIAL

## 2022-03-31 ENCOUNTER — ANESTHESIA (OUTPATIENT)
Dept: ENDOSCOPY | Age: 77
DRG: 308 | End: 2022-03-31
Payer: COMMERCIAL

## 2022-03-31 VITALS — SYSTOLIC BLOOD PRESSURE: 101 MMHG | OXYGEN SATURATION: 100 % | DIASTOLIC BLOOD PRESSURE: 59 MMHG

## 2022-03-31 LAB
ANION GAP SERPL CALCULATED.3IONS-SCNC: 9 MMOL/L (ref 3–16)
BASOPHILS ABSOLUTE: 0.1 K/UL (ref 0–0.2)
BASOPHILS RELATIVE PERCENT: 1 %
BUN BLDV-MCNC: 13 MG/DL (ref 7–20)
CALCIUM SERPL-MCNC: 9 MG/DL (ref 8.3–10.6)
CHLORIDE BLD-SCNC: 106 MMOL/L (ref 99–110)
CO2: 24 MMOL/L (ref 21–32)
CREAT SERPL-MCNC: 1.3 MG/DL (ref 0.6–1.2)
EOSINOPHILS ABSOLUTE: 0.1 K/UL (ref 0–0.6)
EOSINOPHILS RELATIVE PERCENT: 2.5 %
FOLATE: 18.93 NG/ML (ref 4.78–24.2)
GFR AFRICAN AMERICAN: 48
GFR NON-AFRICAN AMERICAN: 40
GLUCOSE BLD-MCNC: 93 MG/DL (ref 70–99)
HCT VFR BLD CALC: 23.7 % (ref 36–48)
HEMOGLOBIN: 7.2 G/DL (ref 12–16)
LYMPHOCYTES ABSOLUTE: 0.7 K/UL (ref 1–5.1)
LYMPHOCYTES RELATIVE PERCENT: 13.5 %
MAGNESIUM: 1.6 MG/DL (ref 1.8–2.4)
MCH RBC QN AUTO: 23.1 PG (ref 26–34)
MCHC RBC AUTO-ENTMCNC: 30.3 G/DL (ref 31–36)
MCV RBC AUTO: 76.3 FL (ref 80–100)
MONOCYTES ABSOLUTE: 0.6 K/UL (ref 0–1.3)
MONOCYTES RELATIVE PERCENT: 11.3 %
NEUTROPHILS ABSOLUTE: 3.9 K/UL (ref 1.7–7.7)
NEUTROPHILS RELATIVE PERCENT: 71.7 %
PDW BLD-RTO: 21.4 % (ref 12.4–15.4)
PLATELET # BLD: 205 K/UL (ref 135–450)
PMV BLD AUTO: 8.9 FL (ref 5–10.5)
POTASSIUM SERPL-SCNC: 4.4 MMOL/L (ref 3.5–5.1)
RBC # BLD: 3.11 M/UL (ref 4–5.2)
SODIUM BLD-SCNC: 139 MMOL/L (ref 136–145)
VITAMIN B-12: <150 PG/ML (ref 211–911)
WBC # BLD: 5.4 K/UL (ref 4–11)

## 2022-03-31 PROCEDURE — 2060000000 HC ICU INTERMEDIATE R&B

## 2022-03-31 PROCEDURE — 97161 PT EVAL LOW COMPLEX 20 MIN: CPT

## 2022-03-31 PROCEDURE — 0DBK8ZZ EXCISION OF ASCENDING COLON, VIA NATURAL OR ARTIFICIAL OPENING ENDOSCOPIC: ICD-10-PCS | Performed by: INTERNAL MEDICINE

## 2022-03-31 PROCEDURE — 3700000000 HC ANESTHESIA ATTENDED CARE: Performed by: INTERNAL MEDICINE

## 2022-03-31 PROCEDURE — 7100000001 HC PACU RECOVERY - ADDTL 15 MIN: Performed by: INTERNAL MEDICINE

## 2022-03-31 PROCEDURE — 97535 SELF CARE MNGMENT TRAINING: CPT

## 2022-03-31 PROCEDURE — 85025 COMPLETE CBC W/AUTO DIFF WBC: CPT

## 2022-03-31 PROCEDURE — 2580000003 HC RX 258: Performed by: ANESTHESIOLOGY

## 2022-03-31 PROCEDURE — 2709999900 HC NON-CHARGEABLE SUPPLY: Performed by: INTERNAL MEDICINE

## 2022-03-31 PROCEDURE — 36415 COLL VENOUS BLD VENIPUNCTURE: CPT

## 2022-03-31 PROCEDURE — 0DB68ZX EXCISION OF STOMACH, VIA NATURAL OR ARTIFICIAL OPENING ENDOSCOPIC, DIAGNOSTIC: ICD-10-PCS | Performed by: INTERNAL MEDICINE

## 2022-03-31 PROCEDURE — 83735 ASSAY OF MAGNESIUM: CPT

## 2022-03-31 PROCEDURE — 7100000000 HC PACU RECOVERY - FIRST 15 MIN: Performed by: INTERNAL MEDICINE

## 2022-03-31 PROCEDURE — 6370000000 HC RX 637 (ALT 250 FOR IP): Performed by: INTERNAL MEDICINE

## 2022-03-31 PROCEDURE — 3609012400 HC EGD TRANSORAL BIOPSY SINGLE/MULTIPLE: Performed by: INTERNAL MEDICINE

## 2022-03-31 PROCEDURE — 80048 BASIC METABOLIC PNL TOTAL CA: CPT

## 2022-03-31 PROCEDURE — 6360000002 HC RX W HCPCS: Performed by: NURSE PRACTITIONER

## 2022-03-31 PROCEDURE — 0DBN8ZZ EXCISION OF SIGMOID COLON, VIA NATURAL OR ARTIFICIAL OPENING ENDOSCOPIC: ICD-10-PCS | Performed by: INTERNAL MEDICINE

## 2022-03-31 PROCEDURE — 2500000003 HC RX 250 WO HCPCS: Performed by: NURSE ANESTHETIST, CERTIFIED REGISTERED

## 2022-03-31 PROCEDURE — 88305 TISSUE EXAM BY PATHOLOGIST: CPT

## 2022-03-31 PROCEDURE — 3609010600 HC COLONOSCOPY POLYPECTOMY SNARE/COLD BIOPSY: Performed by: INTERNAL MEDICINE

## 2022-03-31 PROCEDURE — C1726 CATH, BAL DIL, NON-VASCULAR: HCPCS | Performed by: INTERNAL MEDICINE

## 2022-03-31 PROCEDURE — 6360000002 HC RX W HCPCS: Performed by: INTERNAL MEDICINE

## 2022-03-31 PROCEDURE — 0D768ZZ DILATION OF STOMACH, VIA NATURAL OR ARTIFICIAL OPENING ENDOSCOPIC: ICD-10-PCS | Performed by: INTERNAL MEDICINE

## 2022-03-31 PROCEDURE — 3700000001 HC ADD 15 MINUTES (ANESTHESIA): Performed by: INTERNAL MEDICINE

## 2022-03-31 PROCEDURE — 3609017700 HC EGD DILATION GASTRIC/DUODENAL STRICTURE: Performed by: INTERNAL MEDICINE

## 2022-03-31 PROCEDURE — 97530 THERAPEUTIC ACTIVITIES: CPT

## 2022-03-31 PROCEDURE — 0D7A8ZZ DILATION OF JEJUNUM, VIA NATURAL OR ARTIFICIAL OPENING ENDOSCOPIC: ICD-10-PCS | Performed by: INTERNAL MEDICINE

## 2022-03-31 PROCEDURE — 0DBL8ZZ EXCISION OF TRANSVERSE COLON, VIA NATURAL OR ARTIFICIAL OPENING ENDOSCOPIC: ICD-10-PCS | Performed by: INTERNAL MEDICINE

## 2022-03-31 PROCEDURE — 97165 OT EVAL LOW COMPLEX 30 MIN: CPT

## 2022-03-31 PROCEDURE — 99233 SBSQ HOSP IP/OBS HIGH 50: CPT | Performed by: NURSE PRACTITIONER

## 2022-03-31 PROCEDURE — 87338 HPYLORI STOOL AG IA: CPT

## 2022-03-31 PROCEDURE — 6360000002 HC RX W HCPCS: Performed by: NURSE ANESTHETIST, CERTIFIED REGISTERED

## 2022-03-31 RX ORDER — PANTOPRAZOLE SODIUM 40 MG/1
40 TABLET, DELAYED RELEASE ORAL
Status: DISCONTINUED | OUTPATIENT
Start: 2022-03-31 | End: 2022-04-01 | Stop reason: HOSPADM

## 2022-03-31 RX ORDER — PROPOFOL 10 MG/ML
INJECTION, EMULSION INTRAVENOUS PRN
Status: DISCONTINUED | OUTPATIENT
Start: 2022-03-31 | End: 2022-03-31 | Stop reason: SDUPTHER

## 2022-03-31 RX ORDER — LIDOCAINE HYDROCHLORIDE 20 MG/ML
INJECTION, SOLUTION EPIDURAL; INFILTRATION; INTRACAUDAL; PERINEURAL PRN
Status: DISCONTINUED | OUTPATIENT
Start: 2022-03-31 | End: 2022-03-31 | Stop reason: SDUPTHER

## 2022-03-31 RX ORDER — KETAMINE HCL IN NACL, ISO-OSM 100MG/10ML
SYRINGE (ML) INJECTION PRN
Status: DISCONTINUED | OUTPATIENT
Start: 2022-03-31 | End: 2022-03-31 | Stop reason: SDUPTHER

## 2022-03-31 RX ORDER — MAGNESIUM SULFATE IN WATER 40 MG/ML
2000 INJECTION, SOLUTION INTRAVENOUS ONCE
Status: COMPLETED | OUTPATIENT
Start: 2022-03-31 | End: 2022-03-31

## 2022-03-31 RX ORDER — SODIUM CHLORIDE 9 MG/ML
INJECTION, SOLUTION INTRAVENOUS CONTINUOUS
Status: DISCONTINUED | OUTPATIENT
Start: 2022-03-31 | End: 2022-03-31

## 2022-03-31 RX ORDER — FUROSEMIDE 10 MG/ML
20 INJECTION INTRAMUSCULAR; INTRAVENOUS 2 TIMES DAILY
Status: DISCONTINUED | OUTPATIENT
Start: 2022-03-31 | End: 2022-04-01

## 2022-03-31 RX ORDER — FUROSEMIDE 10 MG/ML
40 INJECTION INTRAMUSCULAR; INTRAVENOUS ONCE
Status: DISCONTINUED | OUTPATIENT
Start: 2022-03-31 | End: 2022-03-31

## 2022-03-31 RX ADMIN — PANTOPRAZOLE SODIUM 40 MG: 40 TABLET, DELAYED RELEASE ORAL at 18:05

## 2022-03-31 RX ADMIN — PROPOFOL 20 MG: 10 INJECTION, EMULSION INTRAVENOUS at 08:38

## 2022-03-31 RX ADMIN — PROPOFOL 50 MG: 10 INJECTION, EMULSION INTRAVENOUS at 08:07

## 2022-03-31 RX ADMIN — CLONIDINE HYDROCHLORIDE 0.2 MG: 0.1 TABLET ORAL at 22:15

## 2022-03-31 RX ADMIN — METOPROLOL TARTRATE 50 MG: 50 TABLET, FILM COATED ORAL at 09:51

## 2022-03-31 RX ADMIN — DULOXETINE HYDROCHLORIDE 60 MG: 60 CAPSULE, DELAYED RELEASE ORAL at 09:51

## 2022-03-31 RX ADMIN — ENOXAPARIN SODIUM 30 MG: 100 INJECTION SUBCUTANEOUS at 22:15

## 2022-03-31 RX ADMIN — PROPOFOL 20 MG: 10 INJECTION, EMULSION INTRAVENOUS at 08:30

## 2022-03-31 RX ADMIN — DILTIAZEM HYDROCHLORIDE 240 MG: 240 CAPSULE, COATED, EXTENDED RELEASE ORAL at 09:51

## 2022-03-31 RX ADMIN — CLONIDINE HYDROCHLORIDE 0.2 MG: 0.1 TABLET ORAL at 09:51

## 2022-03-31 RX ADMIN — SODIUM CHLORIDE: 9 INJECTION, SOLUTION INTRAVENOUS at 07:40

## 2022-03-31 RX ADMIN — PROPOFOL 50 MG: 10 INJECTION, EMULSION INTRAVENOUS at 08:14

## 2022-03-31 RX ADMIN — PROPOFOL 50 MG: 10 INJECTION, EMULSION INTRAVENOUS at 08:04

## 2022-03-31 RX ADMIN — FUROSEMIDE 20 MG: 10 INJECTION, SOLUTION INTRAMUSCULAR; INTRAVENOUS at 18:05

## 2022-03-31 RX ADMIN — PROPOFOL 50 MG: 10 INJECTION, EMULSION INTRAVENOUS at 07:59

## 2022-03-31 RX ADMIN — Medication 10 MG: at 08:04

## 2022-03-31 RX ADMIN — LIDOCAINE HYDROCHLORIDE 100 MG: 20 INJECTION, SOLUTION EPIDURAL; INFILTRATION; INTRACAUDAL; PERINEURAL at 07:54

## 2022-03-31 RX ADMIN — PROPOFOL 20 MG: 10 INJECTION, EMULSION INTRAVENOUS at 08:35

## 2022-03-31 RX ADMIN — PROPOFOL 20 MG: 10 INJECTION, EMULSION INTRAVENOUS at 08:33

## 2022-03-31 RX ADMIN — PROPOFOL 20 MG: 10 INJECTION, EMULSION INTRAVENOUS at 08:25

## 2022-03-31 RX ADMIN — FUROSEMIDE 20 MG: 10 INJECTION, SOLUTION INTRAMUSCULAR; INTRAVENOUS at 13:53

## 2022-03-31 RX ADMIN — PROPOFOL 20 MG: 10 INJECTION, EMULSION INTRAVENOUS at 08:41

## 2022-03-31 RX ADMIN — PROPOFOL 50 MG: 10 INJECTION, EMULSION INTRAVENOUS at 07:54

## 2022-03-31 RX ADMIN — PANTOPRAZOLE SODIUM 40 MG: 40 TABLET, DELAYED RELEASE ORAL at 09:54

## 2022-03-31 RX ADMIN — ENOXAPARIN SODIUM 30 MG: 100 INJECTION SUBCUTANEOUS at 09:51

## 2022-03-31 RX ADMIN — PROPOFOL 50 MG: 10 INJECTION, EMULSION INTRAVENOUS at 08:21

## 2022-03-31 RX ADMIN — Medication 20 MG: at 07:54

## 2022-03-31 RX ADMIN — MAGNESIUM SULFATE HEPTAHYDRATE 2000 MG: 40 INJECTION, SOLUTION INTRAVENOUS at 13:54

## 2022-03-31 RX ADMIN — PROPOFOL 20 MG: 10 INJECTION, EMULSION INTRAVENOUS at 08:27

## 2022-03-31 ASSESSMENT — PAIN SCALES - GENERAL
PAINLEVEL_OUTOF10: 0
PAINLEVEL_OUTOF10: 0
PAINLEVEL_OUTOF10: 8
PAINLEVEL_OUTOF10: 2
PAINLEVEL_OUTOF10: 8
PAINLEVEL_OUTOF10: 5
PAINLEVEL_OUTOF10: 0
PAINLEVEL_OUTOF10: 2

## 2022-03-31 ASSESSMENT — PAIN DESCRIPTION - PAIN TYPE
TYPE: ACUTE PAIN;CHRONIC PAIN
TYPE: CHRONIC PAIN
TYPE: ACUTE PAIN;CHRONIC PAIN
TYPE: CHRONIC PAIN

## 2022-03-31 ASSESSMENT — PAIN DESCRIPTION - LOCATION
LOCATION: BACK

## 2022-03-31 ASSESSMENT — PULMONARY FUNCTION TESTS
PIF_VALUE: 1
PIF_VALUE: 0
PIF_VALUE: 1

## 2022-03-31 ASSESSMENT — PAIN DESCRIPTION - DESCRIPTORS
DESCRIPTORS: SHARP
DESCRIPTORS: SHARP

## 2022-03-31 ASSESSMENT — PAIN - FUNCTIONAL ASSESSMENT: PAIN_FUNCTIONAL_ASSESSMENT: 0-10

## 2022-03-31 ASSESSMENT — PAIN DESCRIPTION - FREQUENCY
FREQUENCY: INTERMITTENT
FREQUENCY: INTERMITTENT

## 2022-03-31 ASSESSMENT — PAIN DESCRIPTION - ORIENTATION
ORIENTATION: LOWER
ORIENTATION: LOWER

## 2022-03-31 NOTE — PROGRESS NOTES
Pt transferred to room 382 at this time. A&O with no signs of distress. Tele in place, with visual on monitor, HR of 60s. Report given to [facility] RN. V/u and denies questions or further needs at this time.

## 2022-03-31 NOTE — CARE COORDINATION
Discharge Planning Assessment  Readmission Risk: 13%  RN discharge planner met with patient/ (and family member) to discuss reason for admission, current living situation, and potential needs at the time of discharge    Demographics/Insurance verified Yes - [place]    Current type of dwelling: - Private home, single level, 3ste    Patient from ECF/ confirmed with: - N/A    Living arrangements: - Lives With: Héctor Valverde (daughter and 2 adult grandchildren, 7 month old grandchild, foster child, son)    Level of function/Support: - Independent    PCP:- Ofe HUNT    Last Visit to PCP: - 03/24/2022- office visit    DME: - Cane and power scooter    Active with any community resources/agencies/skilled home care: - No    Medication compliance issues: - None    Financial issues that could impact healthcare: None identified        Tentative discharge plan:  Home w/no needs - PT/OT eval & DC'd    Discussed and provided facilities of choice if transition to a skilled nursing facility is required at the time of discharge - No      Discussed with patient and/or family that on the day of discharge home tentative time of discharge will be between 10 AM and noon.     Transportation at the time of discharge: Daughter    Electronically signed by Estephania Beatty RN on 3/31/2022 at 4:08 PM

## 2022-03-31 NOTE — PROGRESS NOTES
Occupational Therapy   Occupational Therapy Initial Assessment/Discharge summary  Date: 3/31/2022   Patient Name: Loren Fajardo  MRN: [id number]     : 1945    Date of Service: 3/31/2022    Discharge Recommendations: Loren Fajardo scored a 22/24 on the [facility] ADL Inpatient form. At this time, no further OT is recommended upon discharge due to presenting at baseline function. Recommend patient returns to prior setting with prior services. OT Equipment Recommendations  Equipment Needed: Yes  Mobility Devices: ADL Assistive Devices  ADL Assistive Devices: Grab Bars - toilet;Grab Bars - shower; Toileting - 3-in-1 Commode    Assessment   Performance deficits / Impairments: Decreased functional mobility ; Decreased strength;Decreased safe awareness;Decreased high-level IADLs;Decreased cognition  Assessment: Pt presents with the above deficits which patient's baseline status. Continued skilled OT not recommended at this time. Treatment Diagnosis: above deficits and PAF  Prognosis: Good  Decision Making: Low Complexity  History: arthritis, HTN, sleep apnea, kidney disease, PNA, Afib  Exam: as above  Assistance / Modification: Supevision ambulation with SPC  OT Education: Plan of Care;OT Role;Equipment;Precautions;Transfer Training  Patient Education: Pt educated in the above areas. Pt reported using a bucket beside her lift chair, in which she sleeps, to urinate after standing. Educated about using a bedside commode but not receptive to this suggestion. Barriers to Learning: cognition  No Skilled OT: At baseline function; Safe to return home; No OT goals identified  REQUIRES OT FOLLOW UP: No  Activity Tolerance  Activity Tolerance: Patient Tolerated treatment well  Safety Devices  Safety Devices in place: Yes  Type of devices: Left in bed;Bed alarm in place; All fall risk precautions in place;Call light within reach;Gait belt;Patient at risk for falls;Nurse notified  Restraints  Initially in place: No Patient Diagnosis(es): There were no encounter diagnoses. has a past medical history of Arthritis, Atrial fibrillation and flutter ([address] Hypertension, Kidney disease, Pneumonia, and Sleep apnea. has a past surgical history that includes Tubal ligation; Total knee arthroplasty (Bilateral, 12/19/2012); fracture surgery; Colonoscopy (N/A, 11/20/2018); Upper gastrointestinal endoscopy (N/A, 11/20/2018); Cardioversion; Gastric bypass surgery; Larynx surgery; Upper gastrointestinal endoscopy (N/A, 3/31/2022); Colonoscopy (N/A, 3/31/2022); and Upper gastrointestinal endoscopy (N/A, 3/31/2022). Treatment Diagnosis: above deficits and PAF      Restrictions  Restrictions/Precautions  Restrictions/Precautions: Fall Risk (moderate fall risk)  Required Braces or Orthoses?: No  Position Activity Restriction  Other position/activity restrictions: Peggy Magana is a 78 y.o. female scheduled for colonoscopy. Was found to be in A. fib with RVR thus hospital service was consulted to admission and colonoscopy was canceled. Patient's been having difficulty with nausea vomiting and passing stool for the last little while denies any chest pain shortness of breath palpitations at home no fevers or chills no sick contacts does have a history of A. fib with cardioversion many years ago at that time also had a PE and is on Coumadin at home for anticoagulation which has been held for this procedure    Subjective   General  Chart Reviewed: Yes  Patient assessed for rehabilitation services?: Yes  Additional Pertinent Hx: arthritis, HTN, sleep apnea, kidney disease, PNA, Afib  Family / Caregiver Present: No  Diagnosis: paroxysmal Afib  Subjective  Subjective: Pt supine in bed upon entry, pleasant and agreeable to session. General Comment  Comments: Pt supine in bed at EOS.   Patient Currently in Pain: Yes  Pain Assessment  Pain Assessment: 0-10  Pain Level: 8-RN made aware  Pain Type: Chronic pain  Pain Location: Back  Pain Orientation: Lower  Pain Descriptors: Sharp  Pain Frequency: Intermittent  Vital Signs  Temp: 97.5 °F (36.4 °C)  Temp Source: Oral  Pulse: 67  Heart Rate Source: Monitor  Resp: 17  BP: 119/76  BP Location: Right upper arm  Patient Position: Semi fowlers  Level of Consciousness: Alert (0)  MEWS Score: 1  Patient Currently in Pain: Yes  Oxygen Therapy  SpO2: 98 %  Pulse Oximeter Device Mode: Intermittent  Pulse Oximeter Device Location: Finger  O2 Device: None (Room air)  Social/Functional History  Social/Functional History  Lives With: Daughter,Family (daughter and 2 adult grandchildren, 7 month old grandchild, foster child, son)  Type of Home: House  Home Layout: One level,Performs ADL's on one level,Able to Live on Main level with bedroom/bathroom  Home Access: Stairs to enter without rails  Entrance Stairs - Number of Steps: 3 CAREY  Bathroom Shower/Tub: Walk-in shower  Bathroom Toilet: Handicap height  Bathroom Equipment: Hand-held shower,Built-in shower seat  Home Equipment: Cane,Electric scooter (uses cane at home and in community. Uses electric scooter around neighborhood, use it for gardeningm, or when walking long distances)  Receives Help From: Family  ADL Assistance: Independent  Homemaking Assistance: Needs assistance (pt does some cooking, daughter does laundry in basement, family does cleaning.)  Homemaking Responsibilities: Yes  Ambulation Assistance: Independent  Transfer Assistance: Independent (sleeps in lift chair)  Active : Yes  Patient's  Info: does not drive very often, family mostly drives patient to appointments.   Additional Comments: ~2 falls in the past 6 months (dizzy in living room and fell and hit head, other time trying to step off of curb and fell)       Objective   Vision: Impaired  Vision Exceptions: Wears glasses at all times  Hearing: Within functional limits    Orientation  Overall Orientation Status: Within Functional Limits  Observation/Palpation  Posture: Timed Code Treatment Minutes: 16 Minutes     Timed Code Treatment Minutes:   16 Minutes     Total Treatment Minutes:  [address]/OT   Leslye Lemos, OTR/L [id number] (I have reviewed and agree with the above documentation.  I provided supervision and guidance in decision making throughout session)

## 2022-03-31 NOTE — PROGRESS NOTES
[facility]    3/31/2022 11:57 AM        Name: Ld García . Admitted: 3/30/2022  Primary Care Provider: MARILEE Martinez CNP (Tel: [phone])      Subjective: In bed no chest pain no fevers or chills not in A. fib with RVR    Reviewed interval ancillary notes    Current Medications  pantoprazole (PROTONIX) tablet 40 mg, BID AC  perflutren lipid microspheres (DEFINITY) injection 1.65 mg, ONCE PRN  enoxaparin (LOVENOX) injection 30 mg, BID  dilTIAZem (CARDIZEM CD) extended release capsule 240 mg, Daily  cloNIDine (CATAPRES) tablet 0.2 mg, BID  metoprolol tartrate (LOPRESSOR) tablet 50 mg, BID  DULoxetine (CYMBALTA) extended release capsule 60 mg, Daily        Objective:  /66   Pulse 67   Temp 97.8 °F (36.6 °C) (Oral)   Resp 20   Ht 5' 7\" (1.702 m)   Wt 260 lb 6.4 oz (118.1 kg)   SpO2 98%   BMI 40.78 kg/m²     Intake/Output Summary (Last 24 hours) at 3/31/2022 1157  Last data filed at 3/31/2022 0910  Gross per 24 hour   Intake 300 ml   Output 650 ml   Net -350 ml      Wt Readings from Last 3 Encounters:   03/30/22 260 lb 6.4 oz (118.1 kg)   03/24/22 266 lb (120.7 kg)   02/11/22 248 lb (112.5 kg)       General appearance:  Appears comfortable. AAOx3  HEENT: atraumatic, Pupils equal, muscous membranes moist, no masses appreciated  Cardiovascular: irregulary irergular no murmurs appreciated  Respiratory: CTAB no wheezing  Gastrointestinal: Abdomen soft, non-tender, BS+  EXT: no edema  Neurology: no gross focal deficts  Psychiatry: Appropriate affect.  Not agitated  Skin: Warm, dry, no rashes appreciated    Labs and Tests:  CBC:   Recent Labs     03/30/22  1150   WBC 6.8   HGB 8.8*        BMP:    Recent Labs     03/30/22  1150      K 4.1      CO2 20*   BUN 18   CREATININE 1.4*   GLUCOSE 99     Hepatic:   Recent Labs     03/30/22  1150   AST 13*   ALT 7* BILITOT 0.6   ALKPHOS 122     XR CHEST PORTABLE   Final Result   Cardiomegaly with interstitial pulmonary edema. Recent imaging reviewed    Problem List  Principal Problem:    PAF (paroxysmal atrial fibrillation) ([address]  Resolved Problems:    * No resolved hospital problems. *       Assessment/Plan:   PAF with rvr  Improved  - on oral dilt, ac on hold per gi      Acute on Chronic diastolic heart failure exacerbation IV Lasix for now     Ckd3: monitor     Htn: home meds     egd with anastomotic stenosis and ulcers/ mild balloon dilation performed, colon with 3 polyps removed by cold snare/tics/hemorrhoids.   ]-Protonix 40 mg twice daily        DVT prophylaxis lovenox  Code status full code      Olga Rubin MD   3/31/2022 11:57 AM

## 2022-03-31 NOTE — ANESTHESIA POSTPROCEDURE EVALUATION
Department of Anesthesiology  Postprocedure Note    Patient: Jimmy Wasserman  MRN: [id number]  YOB: 1945  Date of evaluation: 3/31/2022  Time:  9:37 AM     Procedure Summary     Date: 03/31/22 Room / Location: [address] [place] [address]    Anesthesia Start: 0750 Anesthesia Stop: 2229    Procedures:       EGD DILATION BALLOON (N/A Abdomen)      COLONOSCOPY POLYPECTOMY SNARE/COLD BIOPSY (N/A )      EGD BIOPSY (N/A Abdomen) Diagnosis: (Anemia)    Surgeons: Constantin Hoang MD Responsible Provider: Yumiko Earl MD    Anesthesia Type: MAC ASA Status: 3          Anesthesia Type: MAC    Carl Phase I: Carl Score: 10    Carl Phase II:      Last vitals: Reviewed and per EMR flowsheets.        Anesthesia Post Evaluation    Patient location during evaluation: PACU  Patient participation: complete - patient participated  Level of consciousness: awake  Airway patency: patent  Nausea & Vomiting: no nausea and no vomiting  Complications: no  Cardiovascular status: blood pressure returned to baseline  Respiratory status: acceptable  Hydration status: stable

## 2022-03-31 NOTE — ANESTHESIA PRE PROCEDURE
Department of Anesthesiology  Preprocedure Note       Name:  Althea Miguel   Age:  78 y.o.  :  1945                                          MRN:  [id number]         Date:  3/31/2022      Surgeon: Corinthia Goodpasture):  Zbigniew Farfan MD    Procedure: Procedure(s):  EGD DIAGNOSTIC ONLY  COLONOSCOPY DIAGNOSTIC    Medications prior to admission:   Prior to Admission medications    Medication Sig Start Date End Date Taking?  Authorizing Provider   Calcium Carbonate Antacid (TUMS PO) Take by mouth as needed   Yes Historical Provider, MD   Cholecalciferol (VITAMIN D3) 1.25 MG (37313 UT) CAPS TAKE 1 CAPSULE BY MOUTH EVERY 7 DAYS 22   MARILEE Giang CNP   metoprolol tartrate (LOPRESSOR) 50 MG tablet TAKE 1 TABLET BY MOUTH TWICE DAILY 22   MARILEE Giang CNP   cloNIDine (CATAPRES) 0.2 MG tablet TAKE 1 TABLET BY MOUTH TWICE DAILY 21   MARILEE Giang CNP   DULoxetine (CYMBALTA) 60 MG extended release capsule TAKE 1 CAPSULE BY MOUTH DAILY 21   MARILEE Giang CNP   dilTIAZem (CARDIZEM CD) 240 MG extended release capsule TAKE 1 CAPSULE BY MOUTH DAILY 21   MARILEE Giang CNP   buPROPion [facility]) 200 MG extended release tablet Take 1 tablet by mouth 2 times daily 21   MARILEE Giang CNP   warfarin (COUMADIN) 5 MG tablet TAKE 1& 1/2 TABLETS BY MOUTH EVERY TUESDAY AND FRIDAY THEN TAKE 1 TABLET DAILY ALL OTHER DAYS 5/10/21   MARILEE Giang CNP   pantoprazole (PROTONIX) 40 MG tablet Take 40 mg by mouth daily  Patient not taking: Reported on 3/30/2022 10/19/20   Historical Provider, MD   Biotin 1000 MCG TABS Take by mouth    Historical Provider, MD   ferrous sulfate 325 (65 Fe) MG EC tablet Take 325 mg by mouth daily (with breakfast)   Patient not taking: Reported on 3/30/2022    Historical Provider, MD   aspirin 81 MG tablet Take 81 mg by mouth daily    Historical Provider, MD       Current medications:    Current Facility-Administered Medications   Medication Dose Route Frequency Provider Last Rate Last Admin    dilTIAZem (CARDIZEM) 125 mg in dextrose 5% 125 mL infusion (premix)  2.5-15 mg/hr IntraVENous Continuous Mora Angeles MD        perflutren lipid microspheres (DEFINITY) injection 1.65 mg  1.5 mL IntraVENous ONCE PRN Mora Angeles MD        enoxaparin (LOVENOX) injection 30 mg  30 mg SubCUTAneous BID Mora Angeles MD   30 mg at 03/30/22 1440    pantoprazole (PROTONIX) tablet 40 mg  40 mg Oral Daily Mora Angeles MD   40 mg at 03/30/22 1440    dilTIAZem (CARDIZEM CD) extended release capsule 240 mg  240 mg Oral Daily Mora Angeles MD   240 mg at 03/30/22 1440    cloNIDine (CATAPRES) tablet 0.2 mg  0.2 mg Oral BID Mora Angeles MD   0.2 mg at 03/30/22 2055    metoprolol tartrate (LOPRESSOR) tablet 50 mg  50 mg Oral BID Mora Angeles MD   50 mg at 03/30/22 1440    DULoxetine (CYMBALTA) extended release capsule 60 mg  60 mg Oral Daily Mora Angeles MD   60 mg at 03/30/22 1440       Allergies:     Allergies   Allergen Reactions    Bee Venom Swelling and Angioedema    Shellfish-Derived Products Other (See Comments)     Syncope/seizures    Shrimp Flavor      Passes out      Codeine Hives and Other (See Comments)     B/P DROPS PASSES OUT  Passed out    Fentanyl And Related Hives     Other reaction(s): Dizziness    Hydromorphone Rash, Hives and Other (See Comments)     Full rash spreading across chest and both arms from IV hydromorphone  Passed out    Vancomycin Rash       Problem List:    Patient Active Problem List   Diagnosis Code    Atrial fibrillation (HCC) I48.91    Essential hypertension I10    Severe episode of recurrent major depressive disorder, without psychotic features (HCC) F33.2    Anticoagulated on Coumadin Z79.01    Seasonal affective disorder (HCC) F33.8    Class 3 obesity due to excess calories with serious comorbidity and body mass index (BMI) of 45.0 to 49.9 in adult [id number]    Elevated sed rate R70.0    Leukocytosis D72.829    Elevated C-reactive protein (CRP) R79.82    Obstructive sleep apnea G47.33    Morbid obesity (HCC) E66.01    Weight loss counseling, encounter for Z71.3    Atypical atrial flutter (HCC) I48.4    PAF (paroxysmal atrial fibrillation) (HCC) I48.0       Past Medical History:        Diagnosis Date    Arthritis     Atrial fibrillation and flutter ([address]     Hypertension     Kidney disease     Pneumonia     Sleep apnea     no c-pap       Past Surgical History:        Procedure Laterality Date    CARDIOVERSION      COLONOSCOPY N/A 11/20/2018    COLONOSCOPY POLYPECTOMY SNARE/COLD BIOPSY performed by Clint Perez MD at [address]      ankle rt has pins and rods, and rt elbow    GASTRIC BYPASS SURGERY      LARYNX SURGERY      TOTAL KNEE ARTHROPLASTY Bilateral 12/19/2012    TUBAL LIGATION      tubes tied    UPPER GASTROINTESTINAL ENDOSCOPY N/A 11/20/2018    EGD BIOPSY performed by Clint Perez MD at [address] History:    Social History     Tobacco Use    Smoking status: Never Smoker    Smokeless tobacco: Never Used   Substance Use Topics    Alcohol use:  No                                Counseling given: Not Answered      Vital Signs (Current):   Vitals:    03/30/22 1930 03/30/22 2055 03/31/22 0000 03/31/22 0400   BP: (!) 150/81  (!) 144/89 136/82   Pulse: 60 57 80 59   Resp: 18  20 16   Temp: 97.9 °F (36.6 °C)  97.6 °F (36.4 °C) 97.8 °F (36.6 °C)   TempSrc: Oral  Oral Oral   SpO2: 95%  95% 95%   Weight:       Height:                                                  BP Readings from Last 3 Encounters:   03/31/22 136/82   03/24/22 136/80   02/11/22 138/78       NPO Status:  before mn                                                                                BMI:   Wt Readings from Last 3 Encounters:   03/30/22 260 lb 6.4 oz (118.1 kg)   03/24/22 266 lb (120.7 kg)   02/11/22 248 lb (112.5 kg)     Body mass index is 40.78 kg/m². CBC:   Lab Results   Component Value Date    WBC 6.8 03/30/2022    RBC 3.75 03/30/2022    HGB 8.8 03/30/2022    HCT 28.8 03/30/2022    MCV 76.7 03/30/2022    RDW 20.8 03/30/2022     03/30/2022       CMP:   Lab Results   Component Value Date     03/30/2022    K 4.1 03/30/2022     03/30/2022    CO2 20 03/30/2022    BUN 18 03/30/2022    CREATININE 1.4 03/30/2022    GFRAA 44 03/30/2022    GFRAA 45 03/27/2013    AGRATIO 1.0 03/30/2022    LABGLOM 37 03/30/2022    GLUCOSE 99 03/30/2022    PROT 7.3 03/30/2022    CALCIUM 9.5 03/30/2022    BILITOT 0.6 03/30/2022    ALKPHOS 122 03/30/2022    AST 13 03/30/2022    ALT 7 03/30/2022       POC Tests: No results for input(s): POCGLU, POCNA, POCK, POCCL, POCBUN, POCHEMO, POCHCT in the last 72 hours.     Coags:   Lab Results   Component Value Date    PROTIME 16.7 03/30/2022    INR 1.45 03/30/2022    APTT 33.4 03/30/2022       HCG (If Applicable): No results found for: PREGTESTUR, PREGSERUM, HCG, HCGQUANT     ABGs: No results found for: PHART, PO2ART, EZH7GOF, EWP5NXG, BEART, O0APAHNU     Type & Screen (If Applicable):  Lab Results   Component Value Date    LABABO O 12/10/2012    [address] Positive 12/10/2012       Drug/Infectious Status (If Applicable):  No results found for: HIV, HEPCAB    COVID-19 Screening (If Applicable):   Lab Results   Component Value Date    COVID19 NOT DETECTED 10/01/2020           Anesthesia Evaluation  Patient summary reviewed  Airway: Mallampati: II  TM distance: >3 FB   Neck ROM: full  Mouth opening: > = 3 FB Dental: normal exam         Pulmonary:normal exam  breath sounds clear to auscultation  (+) pneumonia:  sleep apnea:                             Cardiovascular:    (+) hypertension:, dysrhythmias: atrial fibrillation and atrial flutter,       ECG reviewed  Rhythm: regular  Rate: normal      Cleared by cardiology              Neuro/Psych:   (+) psychiatric history: GI/Hepatic/Renal:             Endo/Other: Negative Endo/Other ROS                    Abdominal:             Vascular: Other Findings:             Anesthesia Plan      MAC     ASA 3     (Pt was admitted for rate control ,cardiology was consulted  Rate controlled )  Induction: intravenous. Anesthetic plan and risks discussed with patient. Plan discussed with CRNA.     Attending anesthesiologist reviewed and agrees with Teto Flores MD   3/31/2022

## 2022-03-31 NOTE — PROGRESS NOTES
Nutrition Note    RECOMMENDATIONS  1. PO Diet: Continue current diet  2. ONS: Ordered 5 Ensure Enlive supplements daily to provide estimated energy needs    NUTRITION ASSESSMENT   Pt triggered for consult for supplements to meet nutritional needs on full liquid diet due to anastomotic stricture and ulcers. Hx of gastric bypass. Per Dr. Yue Maldonado note, pt to follow full liquid diet with supplements and only very very soft po until repeat EGD. Upon visiting, pt was very sleepy and couldn't keep eyes open. She was wondering how long she needs to follow this diet. Informed pt that 5 Ensure Enlive supplements per day will meet estimated energy needs. 5 Ensure Enlive supplements provides 1750 kcal and 100 grams of protein per day. Provided pt with written information on full liquid diet and Ensure coupons. RD will continue to monitor and remains available to answer any questions.  Nutrition Related Findings: LBM 3/31, BS hypoactive. +1 BLE edema.  Wounds: None   Nutrition Education:  Education not indicated    Nutrition Goals: Pt to consume 100% of ensure supplements     MALNUTRITION ASSESSMENT   Context of Malnutrition: Acute Illness   Malnutrition Status: At risk for malnutrition (Comment)    NUTRITION DIAGNOSIS   · Inadequate protein-energy intake,Altered GI function related to altered GI structure as evidenced by other (comment) (need for full liquid diet)    CURRENT NUTRITION THERAPIES  ADULT DIET;  Full Liquid     PO Intake: Unable to assess   PO Supplement Intake:None Ordered    ANTHROPOMETRICS   Current Height: 5' 7\" (170.2 cm)   Current Weight: 260 lb 6.4 oz (118.1 kg)     Admission weight: 255 lb (115.7 kg) (stated)   Ideal Body Weight (IBW): 135 lbs  (61 kg)     Usual Bodyweight 257 lb (116.6 kg) (average wt over the last year per EMR wt hx)       BMI: 40.7    COMPARATIVE STANDARDS  Energy (kcal):  936-1755     Protein (g):         Fluid (ml/day):  819-3822    The patient will be monitored per nutrition standards of care. Consult dietitian if additional nutrition interventions are needed prior to RD reassessment.      Tricia Davis RD, LD    Contact: [phone]

## 2022-03-31 NOTE — PROGRESS NOTES
See reports on chart, egd with anastomotic stenosis and ulcers/ mild balloon dilation performed, colon with 3 polyps removed by cold snare/tics/hemorrhoids. rec  protonix 40 mg po bid  Check serum mg and replete if low  Follow creat with pcp  Stool h pylori sent from endoscopy  Full liquid diet and supplements and only very very soft po until repeat egd  Dietician consult for nutritional supplements  Call office 1 week for biopsy and stool h pylori results  Repeat egd 4 weeks with dr Yanira Cole at [facility] to further dilate anastomotic stenosis if needed as ulcers heal  Avoid nonessential nsaids (cardiac asa ok if needed)  F/u iron, b12, folate with primary team and replete if low  Check EP about coumadin-- has anastomotic ulcers but clean based/no active bleeding, if can hold coumadin 1-2 weeks to allow some healing then hold, but if felt high risk off couamdin then can resume tomorrow with no loading dose  Repeat colonoscpoy one year with two day prep due to some prep limits today  Otherwise will sign off, call if needed.

## 2022-03-31 NOTE — PLAN OF CARE
Nutrition Problem #1: Altered GI function  Intervention: Food and/or Nutrient Delivery: Continue Current Diet,Start Oral Nutrition Supplement  Nutritional Goals: Pt to consume 100% of ensure supplements

## 2022-03-31 NOTE — PROGRESS NOTES
Pt arrived from Endo to PACU bay 1. Reported received from Endo staff. Pt arousable to voice. Pt on 6L simple mask, Atrial fib, and VSS. Will continue to monitor.

## 2022-03-31 NOTE — PROGRESS NOTES
[facility]   Electrophysiology Progress Note  Date: 3/31/2022   Date of admission: 3/30/2022 10:11 AM  Reason for Admission: PAF (paroxysmal atrial fibrillation) ([facility] [I48.0]    Consult Requesting Physician: Pancho Berkowitz MD    -Reason for Follow up: Atrial Fibrillation RVR    CC: Atrial Fibrillation  HISTORY OF PRESENT ILLNESS: History obtained from patient and medical record. Pablo Moffett is a 78 y.o. female with a past medical history of hypertension, CKD, GIANNA, diastolic dysfunction, and chronic atrial fibrillation. Hx of DCCV 2016 with Dr. Vernon Horton. At that time she had LHC showed normal coronaries (1/2016). Echo 2019 showed normal EF with GIIIDD. Admitted 3/2019 with syncope felt to be vasovagal.  She was seen by Dr. Pancho Berkowitz at that time for AF and CV was to be considered as OP, however there was no follow up with cardiology. Presented for OP routine colonoscopy and ECG for postprandial N/V. She was noted to be tachycardia and ECG revealed AF/RVR. SHe was admitted for further workup and control of AF. She did not take her morning medications. Admits to chronic SOB that seems to have worsened over the last several months. Admits to being deconditioned and sedentary. Denies any CP, palpitations or swelling. Reports RLE redness. Last SR ECG 5/2016. Interval Hx: Today, she is being seen for follow up. Aflutter 60's on telemetry. Continues with SOB  No new complaints today. No major events overnight. Denies having chest pain, palpitations, or dizziness. Patient seen and examined. Clinical notes reviewed. Telemetry reviewed.      Assessment and Plan:   Chronic Persistent Atrial Fibrillation  - Atrial fibrillation 's on telemetry, HR was up to 130's in endoscopy per nursing  - Give metoprolol and diltiazem, reports that she did not taker her morning medications  - Hold diltiazem gtt  - [id number] score: 3 (hypertension, gender age) ; [id number] Vasc score and anticoagulation discussed. High risk for stroke and thromboembolism. Anticoagulation is recommended. ~ Warfarin on hold for procedure, resume when OK with GI  - Afib risk factors including age, HTN, obesity, inactivity and GIANNA were discussed with patient. Risk factor modification recommended   ~ TSH 1.45 (3/22/2019);can repeat   - Hx of DCCV 2016   - Can consider DCCV as OP at this point she has chronic AF since 2017 and seems asymptomatic. Maintaining SR may be difficult to maintain due to multiple comorbidities and longstanding nature of AF. SOB/Diastolic Dysfunction   - BNP elevated 3,868; CXR showed pulmonary edema and she has GIIIDD on prior echocardiogram   - Will give IV lasix today when BMP comes back   Hypertension   - Controlled. Continues current medications  GIANNA   - Untreated; CPAP recommended  Obesity: Body mass index is 40.78 kg/m². - Excessive weight is complicating assessment treatment. It is placing patient at risk for multiple co-morbidities as well as early death contributing to the patient's presentations. - Discussed weight loss and lifestyle modifications. CKD   - Stable   Chronic Anemia   - Acute on chronic   - Hgb 8.8-->7.2    - BMP/CBC today  - Continues to be SOB with any exertion, I think she would benefit from diuresis, will CKD will check labs first   - Discussed with Dr. Whitney Rivers, recommend holding discharge today for diuresis and monitoring of anemia prior to deciding on [facility]. In the setting of anastomotic ulcer and acute anemia will continues to hold [facility], preferably the least time off [facility] would be best, however given her current situation would be reasonable to hold [facility] for 1 week. All pertinent information and plan of care discussed with the EP physician. All questions and concerns were addressed to the patient/family. Alternatives to my treatment were discussed.  I have discussed the above stated plan and the patient verbalized understanding and agreed with the plan.    Discussed plan with patient and nurse. Problem List:   Patient Active Problem List    Diagnosis Date Noted    PAF (paroxysmal atrial fibrillation) ([facility] 03/30/2022    Atypical atrial flutter (HCC)     Weight loss counseling, encounter for     Elevated sed rate     Leukocytosis     Elevated C-reactive protein (CRP)     Obstructive sleep apnea     Morbid obesity ([facility]     Severe episode of recurrent major depressive disorder, without psychotic features ([facility] 02/11/2018    Anticoagulated on Coumadin 02/11/2018    Seasonal affective disorder ([facility] 02/11/2018    Class 3 obesity due to excess calories with serious comorbidity and body mass index (BMI) of 45.0 to 49.9 in adult 02/11/2018    Atrial fibrillation ([facility] 01/21/2016    Essential hypertension 01/21/2016      Allergies: Allergies   Allergen Reactions    Bee Venom Swelling and Angioedema    Shellfish-Derived Products Other (See Comments)     Syncope/seizures    Shrimp Flavor      Passes out      Codeine Hives and Other (See Comments)     B/P DROPS PASSES OUT  Passed out    Fentanyl And Related Hives     Other reaction(s): Dizziness    Hydromorphone Rash, Hives and Other (See Comments)     Full rash spreading across chest and both arms from IV hydromorphone  Passed out    Vancomycin Rash     Home Meds:  Prior to Visit Medications    Medication Sig Taking?  Authorizing Provider   Calcium Carbonate Antacid (TUMS PO) Take by mouth as needed Yes Historical Provider, MD   Cholecalciferol (VITAMIN D3) 1.25 MG (87690 UT) CAPS TAKE 1 CAPSULE BY MOUTH EVERY 7 DAYS  [place] Purple, APRN - CNP   metoprolol tartrate (LOPRESSOR) 50 MG tablet TAKE 1 TABLET BY MOUTH TWICE DAILY  Eileen Purple, APRN - CNP   cloNIDine (CATAPRES) 0.2 MG tablet TAKE 1 TABLET BY MOUTH TWICE DAILY  [place] Purple, APRN - CNP   DULoxetine (CYMBALTA) 60 MG extended release capsule TAKE 1 CAPSULE BY MOUTH DAILY  [place] Purple, APRN - CNP   dilTIAZem (CARDIZEM CD) 240 MG extended release capsule TAKE 1 CAPSULE BY MOUTH DAILY  MARILEE Francois CNP   buPROPion (WELLBUTRIN SR) 200 MG extended release tablet Take 1 tablet by mouth 2 times daily  MARILEE Francois CNP   warfarin (COUMADIN) 5 MG tablet TAKE 1& 1/2 TABLETS BY MOUTH EVERY TUESDAY AND FRIDAY THEN TAKE 1 TABLET DAILY ALL OTHER DAYS  MARILEE Francois CNP   pantoprazole (PROTONIX) 40 MG tablet Take 40 mg by mouth daily  Patient not taking: Reported on 3/30/2022  Historical Provider, MD   Biotin 1000 MCG TABS Take by mouth  Historical Provider, MD   ferrous sulfate 325 (65 Fe) MG EC tablet Take 325 mg by mouth daily (with breakfast)   Patient not taking: Reported on 3/30/2022  Historical Provider, MD   aspirin 81 MG tablet Take 81 mg by mouth daily  Historical Provider, MD      Past Medical History:  Past Medical History:   Diagnosis Date    Arthritis     Atrial fibrillation and flutter ([facility]     Hypertension     Kidney disease     Pneumonia     Sleep apnea     no c-pap      Past Surgical History:    has a past surgical history that includes Tubal ligation; Total knee arthroplasty (Bilateral, 12/19/2012); fracture surgery; Colonoscopy (N/A, 11/20/2018); Upper gastrointestinal endoscopy (N/A, 11/20/2018); Cardioversion; Gastric bypass surgery; and Larynx surgery. Social History:  Reviewed. reports that she has never smoked. She has never used smokeless tobacco. She reports that she does not drink alcohol and does not use drugs. Family History:  Reviewed. family history includes Cancer in her father.    Denies family history of sudden cardiac death, arrhythmia, premature CAD    Review of System:  · Constitutional: Negative for fever or weakness  · Skin: Negative for rash, bruising, bleeding, blood clots, or changes in skin pigment  · HEENT: Negative for vision changes  · Respiratory: Reviewed in HPI  · Cardiovascular: Reviewed in HPI  · Gastrointestinal: Negative for abdominal pain or black/tarry stools  · Genito-Urinary: Negative for hematuria  · Musculoskeletal: No focal weakness  · Neurological/Psych: Negative for confusion or TIA-like symptoms. No anxiety, depression, or insomnia    Physical Examination:  Vitals:    03/31/22 0910   BP: 119/66   Pulse: 67   Resp: 20   Temp:    SpO2: 98%      In: 300 [I.V.:300]  Out: 650    Wt Readings from Last 3 Encounters:   03/30/22 260 lb 6.4 oz (118.1 kg)   03/24/22 266 lb (120.7 kg)   02/11/22 248 lb (112.5 kg)     Telemetry: Atrial fibrillation CVR   Constitutional: Cooperative and in no apparent distress, and appears well nourished   Skin: Warm and pink; no cyanosis or bruising + bear BLE   HEENT: Symmetric and normocephalic. Conjunctiva pink with clear sclera. Mucus membranes pink and moist. No visible masses/goiter   Respiratory: Respirations symmetric and unlabored. Lungs clear to auscultation bilaterally, no wheezing, rhonchi, or crackles.  Cardiovascular: Iirregular rate and rhythm. S1 & S2 present, negative for murmur. negative elevation of JVP. + trace BLE edema, nonpitting   Musculoskeletal:  No focal weakness.  Neurological/Psych: Awake and orientated to person, place and time. Calm affect, appropriate mood. Pertinent labs, diagnostic, device, and imaging results reviewed as a part of this visit     Labs:  BMP:   Recent Labs     03/30/22  1150      K 4.1      CO2 20*   BUN 18   CREATININE 1.4*     Estimated Creatinine Clearance: 47 mL/min (A) (based on SCr of 1.4 mg/dL (H)).    CBC:   Recent Labs     03/30/22  1150   WBC 6.8   HGB 8.8*   HCT 28.8*   MCV 76.7*        Thyroid:   Lab Results   Component Value Date    TSH 2.81 03/30/2022     Lipids:  Lab Results   Component Value Date    CHOL 142 03/15/2021    HDL 51 03/15/2021    TRIG 56 03/15/2021     LFTS:   Lab Results   Component Value Date    ALT 7 03/30/2022    AST 13 03/30/2022    ALKPHOS 122 03/30/2022    PROT 7.3 03/30/2022    AGRATIO 1.0 03/30/2022 BILITOT 0.6 03/30/2022     Cardiac Enzymes:   Lab Results   Component Value Date    TROPONINI <0.01 03/30/2022    TROPONINI <0.01 03/21/2019    TROPONINI 0.00 03/27/2013     Coags:   Lab Results   Component Value Date    PROTIME 16.7 03/30/2022    INR 1.45 03/30/2022     ECG: 3/30/2022: Atrial Fibrillation RVR    ECHO:  3/22/2019   Summary   -Normal left ventricle size and systolic function with an estimated ejection   fraction of 55-60%. Mild concentric left ventricular hypertrophy. No regional wall motion abnormalities are seen. E/e\"= 14.9 .   -Grade III diastolic dysfunction with elevated LV filling pressures. -Mild mitral and tricuspid regurgitation.   -Biatrial enlargement. -Right ventricular systolic function is reduced.   -Estimated pulmonary artery systolic pressure is normal at 35 mmHg assuming a right atrial pressure of 3 mmHg. Stress Test: 1/19/2016  Summary    Small-moderate sized anterior completely reversible defect consistent with    ischemia in the territory of the mid and distal LAD .    Normal LV function.        Recommendation    Cardiac cath possible PCI arranged with Dr. Ottoniel Hernandez.   Guerita Reyes 2 days prior.    Pretreated for possible dye allergy. Cath: 1/27/2016  [facility] SUMMARY  ~LM     normal  ~LAD   normal  ~Cx      normal  ~RCA   normal  ~LVG   55%     Impression  ~Angiography w/ normal cors   ~LVEDP 20  ~LVG with normal EF     Intervention  ~None     Recommendation  ~Aggressive medical treatment and risk factor modification     Thank you for allowing to us to participate in the care of Daniella Uriarte.     Jack Garcia, APRN-CNP  [address]   Office: [phone]

## 2022-03-31 NOTE — PROGRESS NOTES
Pt stable and able to be transferred from PACU to room 3382. A&O , VSS, with no complaints at this time. Floor called and notified that pt is being transferred out of PACU and to room.

## 2022-03-31 NOTE — PROGRESS NOTES
Shift assessment complete. VSS. HR 57, sinus pratibha on monitor. Metoprolol held. Bowel prep started at 2005. Will continue to monitor.

## 2022-03-31 NOTE — PROGRESS NOTES
Pt back from EGD and Colonoscopy, per post op nurse, 3 polyps removed and dilation done that will need to be repeated in 4 wks. Pt resting in bed, denies pain, VSS. Morning meds given.

## 2022-03-31 NOTE — PROGRESS NOTES
Physical Therapy    Facility/Department: [address]  Initial Assessment/Discharge Summary    NAME: Rocio Bautista  : 1945  MRN: [id number]    Date of Service: 3/31/2022    Discharge Recommendations: Rocio Bautista scored a 20/24 on the AM-PAC short mobility form. At this time, no further PT is recommended upon discharge due to pt being near baseline functional mobility. Recommend patient returns to prior setting with prior services. PT Equipment Recommendations  Equipment Needed: No    Assessment   Assessment: Pt is presenting near baseline functional mobility and does not require skilled PT services at this time. As such, pt is being discharged from acute PT caseload. Prognosis: Good  Decision Making: Low Complexity  Clinical Presentation: stable  PT Education: PT Role;General Safety  Patient Education: d/c recommendations--pt verbalizing understanding  Barriers to Learning: none  REQUIRES PT FOLLOW UP: No  Activity Tolerance  Activity Tolerance: Patient Tolerated treatment well       Patient Diagnosis(es): There were no encounter diagnoses. has a past medical history of Arthritis, Atrial fibrillation and flutter ([address] Hypertension, Kidney disease, Pneumonia, and Sleep apnea. has a past surgical history that includes Tubal ligation; Total knee arthroplasty (Bilateral, 2012); fracture surgery; Colonoscopy (N/A, 2018); Upper gastrointestinal endoscopy (N/A, 2018); Cardioversion; Gastric bypass surgery; Larynx surgery; Upper gastrointestinal endoscopy (N/A, 3/31/2022); Colonoscopy (N/A, 3/31/2022); and Upper gastrointestinal endoscopy (N/A, 3/31/2022). Restrictions  Restrictions/Precautions  Restrictions/Precautions: Fall Risk (moderate fall risk)  Required Braces or Orthoses?: No  Position Activity Restriction  Other position/activity restrictions: Rocio Bautista is a 78 y.o. female scheduled for colonoscopy.   Was found to be in A. fib with RVR thus hospital service was consulted to admission and colonoscopy was canceled. Patient's been having difficulty with nausea vomiting and passing stool for the last little while denies any chest pain shortness of breath palpitations at home no fevers or chills no sick contacts does have a history of A. fib with cardioversion many years ago at that time also had a PE and is on Coumadin at home for anticoagulation which has been held for this procedure     Vision/Hearing  Vision: Impaired  Vision Exceptions: Wears glasses at all times  Hearing: Within functional limits       Subjective  General  Chart Reviewed: Yes  Patient assessed for rehabilitation services?: Yes  Response To Previous Treatment: Not applicable  Family / Caregiver Present: No  Diagnosis: PAF  Follows Commands: Within Functional Limits  General Comment  Comments: Pt supine in bed upon arrival.  Subjective  Subjective: Pt agreeable to PT/OT eric, reporting 8/10 chronic back pain--RN aware. Pain Screening  Patient Currently in Pain: Yes  Vital Signs  Patient Currently in Pain: Yes       Orientation  Orientation  Overall Orientation Status: Within Functional Limits     Social/Functional History  Social/Functional History  Lives With: Rei Lauren (daughter and 2 adult grandchildren, 7 month old grandchild, foster child, son)  Type of Home: House  Home Layout: One level,Performs ADL's on one level,Able to Live on Main level with bedroom/bathroom  Home Access: Stairs to enter without rails  Entrance Stairs - Number of Steps: 3 CAREY  Bathroom Shower/Tub: Walk-in shower  Bathroom Toilet: Handicap height  Bathroom Equipment: Hand-held shower,Built-in shower seat  Home Equipment: Cane,Electric scooter (uses cane at home and in community.  Uses electric scooter around neighborhood, use it for gardeningm, or when walking long distances)  Receives Help From: Family  ADL Assistance: Independent  Homemaking Assistance: Needs assistance (pt does some cooking, daughter does laundry in basement, family does cleaning.)  Homemaking Responsibilities: Yes  Ambulation Assistance: Independent  Transfer Assistance: Independent (sleeps in lift chair)  Active : Yes  Patient's  Info: does not drive very often, family mostly drives patient to appointments. Additional Comments: ~2 falls in the past 6 months (dizzy in living room and fell and hit head, other time trying to step off of curb and fell)     Cognition   Cognition  Overall Cognitive Status: Exceptions  Arousal/Alertness: Appropriate responses to stimuli  Following Commands: Follows all commands without difficulty; Follows one step commands consistently  Attention Span: Difficulty attending to directions; Attends with cues to redirect  Memory: Appears intact  Safety Judgement: Decreased awareness of need for safety  Problem Solving: Assistance required to implement solutions;Assistance required to identify errors made  Insights: Decreased awareness of deficits  Initiation: Does not require cues  Sequencing: Does not require cues  Cognition Comment: Pt demonstrated poor safety awareness. Objective  Observation/Palpation  Posture: Good  Edema: swelling in BLE    AROM RLE (degrees)  RLE AROM: WFL  AROM LLE (degrees)  LLE AROM : WFL  Strength RLE  Strength RLE: WFL  Strength LLE  Strength LLE: WFL  Tone RLE  RLE Tone: Normotonic  Tone LLE  LLE Tone: Normotonic  Motor Control  Gross Motor?: WFL  Sensation  Overall Sensation Status: WFL  Bed mobility  Supine to Sit: Independent  Sit to Supine: Independent  Scooting: Independent  Transfers  Sit to Stand: Supervision (x1 EOB)  Stand to sit: Supervision (x1 EOB)  Ambulation  Ambulation?: Yes  Ambulation 1  Surface: level tile  Device: Single point cane  Assistance: Supervision  Quality of Gait: wide Mely, decreased clara, increased medial-lateral sway noted  Distance: 12'+12'  Comments: Pt able to negotiate obstacles without assist, no LOB.  Pt reaching out for door frame to assist with balance. Stairs/Curb  Stairs?: No     Balance  Posture: Fair  Sitting - Static: Good  Sitting - Dynamic: Good (independent seated EOB doffing/donning socks)  Standing - Static: Good;- (Supervision)  Standing - Dynamic: Good;- (Supervision standing at sink for ADLs ~5-8 minutes total)        Plan   Plan  Times per week: discharge  Safety Devices  Type of devices:  All fall risk precautions in place,Call light within reach,Gait belt,Patient at risk for falls,Left in bed,Nurse notified,Bed alarm in place  Restraints  Initially in place: No    G-Code       OutComes Score       AM-PAC Score  AM-PAC Inpatient Mobility Raw Score : 20 (03/31/22 1454)  AM-PAC Inpatient T-Scale Score : 47.67 (03/31/22 1454)  Mobility Inpatient CMS 0-100% Score: 35.83 (03/31/22 1454)  Mobility Inpatient CMS G-Code Modifier : CJ (03/31/22 1454)          Goals  Short term goals  Time Frame for Short term goals: none, discharge from acute PT caseload       Therapy Time   Individual Concurrent Group Co-treatment   Time In 1408         Time Out 1439         Minutes 31              Timed Code Treatment Minutes:   16    Total Treatment Minutes:  [address] [facility], [address] [facility],[address] [place] [address] [id number]

## 2022-04-01 VITALS
WEIGHT: 254.1 LBS | HEIGHT: 67 IN | RESPIRATION RATE: 18 BRPM | TEMPERATURE: 98 F | SYSTOLIC BLOOD PRESSURE: 141 MMHG | BODY MASS INDEX: 39.88 KG/M2 | DIASTOLIC BLOOD PRESSURE: 81 MMHG | HEART RATE: 57 BPM | OXYGEN SATURATION: 93 %

## 2022-04-01 LAB
ANION GAP SERPL CALCULATED.3IONS-SCNC: 10 MMOL/L (ref 3–16)
ANISOCYTOSIS: ABNORMAL
BANDED NEUTROPHILS RELATIVE PERCENT: 2 % (ref 0–7)
BASOPHILS ABSOLUTE: 0 K/UL (ref 0–0.2)
BASOPHILS RELATIVE PERCENT: 0 %
BUN BLDV-MCNC: 13 MG/DL (ref 7–20)
CALCIUM SERPL-MCNC: 9.2 MG/DL (ref 8.3–10.6)
CHLORIDE BLD-SCNC: 101 MMOL/L (ref 99–110)
CO2: 27 MMOL/L (ref 21–32)
CREAT SERPL-MCNC: 1.6 MG/DL (ref 0.6–1.2)
EOSINOPHILS ABSOLUTE: 0.4 K/UL (ref 0–0.6)
EOSINOPHILS RELATIVE PERCENT: 6 %
GFR AFRICAN AMERICAN: 38
GFR NON-AFRICAN AMERICAN: 31
GLUCOSE BLD-MCNC: 73 MG/DL (ref 70–99)
HCT VFR BLD CALC: 24.3 % (ref 36–48)
HEMOGLOBIN: 7.4 G/DL (ref 12–16)
HYPOCHROMIA: ABNORMAL
LYMPHOCYTES ABSOLUTE: 1.2 K/UL (ref 1–5.1)
LYMPHOCYTES RELATIVE PERCENT: 17 %
MCH RBC QN AUTO: 23.1 PG (ref 26–34)
MCHC RBC AUTO-ENTMCNC: 30.3 G/DL (ref 31–36)
MCV RBC AUTO: 76.2 FL (ref 80–100)
MICROCYTES: ABNORMAL
MONOCYTES ABSOLUTE: 0.4 K/UL (ref 0–1.3)
MONOCYTES RELATIVE PERCENT: 6 %
NEUTROPHILS ABSOLUTE: 5 K/UL (ref 1.7–7.7)
NEUTROPHILS RELATIVE PERCENT: 69 %
OVALOCYTES: ABNORMAL
PDW BLD-RTO: 20.8 % (ref 12.4–15.4)
PLATELET # BLD: 228 K/UL (ref 135–450)
PLATELET SLIDE REVIEW: ADEQUATE
PMV BLD AUTO: 9.2 FL (ref 5–10.5)
POTASSIUM REFLEX MAGNESIUM: 4.3 MMOL/L (ref 3.5–5.1)
PRO-BNP: 4054 PG/ML (ref 0–449)
RBC # BLD: 3.18 M/UL (ref 4–5.2)
SLIDE REVIEW: ABNORMAL
SODIUM BLD-SCNC: 138 MMOL/L (ref 136–145)
STOMATOCYTES: ABNORMAL
TOXIC GRANULATION: PRESENT
WBC # BLD: 7 K/UL (ref 4–11)

## 2022-04-01 PROCEDURE — 6360000002 HC RX W HCPCS: Performed by: INTERNAL MEDICINE

## 2022-04-01 PROCEDURE — 6370000000 HC RX 637 (ALT 250 FOR IP): Performed by: INTERNAL MEDICINE

## 2022-04-01 PROCEDURE — 83880 ASSAY OF NATRIURETIC PEPTIDE: CPT

## 2022-04-01 PROCEDURE — 99233 SBSQ HOSP IP/OBS HIGH 50: CPT | Performed by: NURSE PRACTITIONER

## 2022-04-01 PROCEDURE — 85025 COMPLETE CBC W/AUTO DIFF WBC: CPT

## 2022-04-01 PROCEDURE — 36415 COLL VENOUS BLD VENIPUNCTURE: CPT

## 2022-04-01 PROCEDURE — 80048 BASIC METABOLIC PNL TOTAL CA: CPT

## 2022-04-01 PROCEDURE — 6360000002 HC RX W HCPCS: Performed by: NURSE PRACTITIONER

## 2022-04-01 RX ORDER — FUROSEMIDE 20 MG/1
20 TABLET ORAL DAILY
Qty: 30 TABLET | Refills: 0 | Status: SHIPPED | OUTPATIENT
Start: 2022-04-01

## 2022-04-01 RX ORDER — PANTOPRAZOLE SODIUM 40 MG/1
40 TABLET, DELAYED RELEASE ORAL
Qty: 30 TABLET | Refills: 3 | Status: SHIPPED | OUTPATIENT
Start: 2022-04-01

## 2022-04-01 RX ADMIN — FUROSEMIDE 20 MG: 10 INJECTION, SOLUTION INTRAMUSCULAR; INTRAVENOUS at 09:20

## 2022-04-01 RX ADMIN — ENOXAPARIN SODIUM 30 MG: 100 INJECTION SUBCUTANEOUS at 09:20

## 2022-04-01 RX ADMIN — DILTIAZEM HYDROCHLORIDE 240 MG: 240 CAPSULE, COATED, EXTENDED RELEASE ORAL at 09:20

## 2022-04-01 RX ADMIN — PANTOPRAZOLE SODIUM 40 MG: 40 TABLET, DELAYED RELEASE ORAL at 07:24

## 2022-04-01 RX ADMIN — DULOXETINE HYDROCHLORIDE 60 MG: 60 CAPSULE, DELAYED RELEASE ORAL at 09:20

## 2022-04-01 RX ADMIN — METOPROLOL TARTRATE 50 MG: 50 TABLET, FILM COATED ORAL at 09:20

## 2022-04-01 RX ADMIN — CLONIDINE HYDROCHLORIDE 0.2 MG: 0.1 TABLET ORAL at 09:20

## 2022-04-01 ASSESSMENT — PAIN SCALES - GENERAL
PAINLEVEL_OUTOF10: 0

## 2022-04-01 NOTE — PROGRESS NOTES
Eliquis Counseling Note    Poolmere Menchaca was counseled on Eliquis for Afib. Duration of therapy: indefinitely    Indication, duration of therapy, and signs/symptoms related to bleeding were discussed. Discussed discontinuing warfarin and holding blood thinners for a week before initiating Eliquis. Scooby Menchaca had opportunity to ask questions. No barriers to education were noted.     Bosie Epley, PharmD  4/1/2022 1:58 PM

## 2022-04-01 NOTE — PROGRESS NOTES
Data- discharge order received, pt verbalized agreement to discharge, disposition to previous residence, no needs for HHC/DME. Action- discharge instructions prepared and given to patient, pt verbalized understanding. Medication information packet given r/t NEW and/or CHANGED prescriptions emphasizing name/purpose/side effects, pt verbalized understanding. Discharge instruction summary: Diet- general, Activity- as tolerated, Primary Care Physician as follows: MARILEE Owusu - [phone] f/u appointment within one week, prescription medications filled [facility] outpatient pharmacy. Inpatient surgical procedure precautions reviewed: none CHF Education reviewed. Pt/ Family has had a total of 60 minutes CHF education this admission encounter. 1. WEIGHT: Admit Weight: 255 lb (115.7 kg) (03/30/22 1113)        Today  Weight: 254 lb 1.6 oz (115.3 kg) (04/01/22 0600)       2. O2 SAT.: SpO2: 93 % (04/01/22 1500)    Response- Pt belongings gathered, IV removed. Disposition is home (no HHC/DME needs), transported with personal belongings, taken to lobby via w/c, no complications.

## 2022-04-01 NOTE — PROGRESS NOTES
VSS.  Pt reports slept well and up 2 times to urinate. No BM's and no nausea yesterday or overnight.

## 2022-04-01 NOTE — DISCHARGE INSTR - COC
Continuity of Care Form    Patient Name: Glynn Becerril   :  1945  MRN:  [id number]    Admit date:  3/30/2022  Discharge date:  ***    Code Status Order: Full Code   Advance Directives:   Advance Care Flowsheet Documentation       Date/Time Healthcare Directive Type of Healthcare Directive Copy in [address] Agent's Name Healthcare Agent's Phone Number    [phone] No, patient does not have an advance directive for healthcare treatment -- -- -- -- --            Admitting Physician:  No admitting provider for patient encounter. PCP: MARILEE Red CNP    Discharging Nurse: [facility] Unit/Room#: 3HR-3848/5016-59  Discharging Unit Phone Number: ***    Emergency Contact:   Extended Emergency Contact Information  Primary Emergency Contact: Danielle Zuñigadoreen  Address: [address] [place] [address], [address] [place] [address] Phone: [phone]  Work Phone: [phone]  Relation: Child  Secondary Emergency Contact: [address] [place] [address] Phone: [phone]  Mobile Phone: [phone]  Relation: Other   needed?  No    Past Surgical History:  Past Surgical History:   Procedure Laterality Date    CARDIOVERSION      COLONOSCOPY N/A 2018    COLONOSCOPY POLYPECTOMY SNARE/COLD BIOPSY performed by Maximino Rodríguez MD at [place] [address] N/A 3/31/2022    COLONOSCOPY POLYPECTOMY SNARE/COLD BIOPSY performed by Rodrigo Andre MD at [address] [place] [address]      ankle rt has pins and rods, and rt elbow    GASTRIC BYPASS SURGERY      LARYNX SURGERY      TOTAL KNEE ARTHROPLASTY Bilateral 2012    TUBAL LIGATION      tubes tied    UPPER GASTROINTESTINAL ENDOSCOPY N/A 2018    EGD BIOPSY performed by Maximino Rod[facility]guez MD at [facility] N/A 3/31/2022    EGD DILATION BALLOON performed by Rodrigo Andre MD at [place] [address] N/A 3/31/2022    EGD BIOPSY performed by Ian Antonio MD at [facility]       Immunization History:   Immunization History   Administered Date(s) Administered    COVID-19, Pfizer Purple top, DILUTE for use, 12+ yrs, 30mcg/0.3mL dose 09/08/2021, 10/15/2021    Influenza Virus Vaccine 12/22/2012    Influenza, High Dose (Fluzone 65 yrs and older) 09/30/2016, 10/01/2017, 10/08/2018    Influenza, Quadv, adjuvanted, 65 yrs +, IM, PF (Fluad) 10/27/2020, 10/15/2021    Influenza, Triv, inactivated, subunit, adjuvanted, IM (Fluad 65 yrs and older) 10/21/2019    Pneumococcal Conjugate 13-valent (Pollyann Mono) 09/30/2016, 10/01/2017    Pneumococcal Polysaccharide (Wdrzqofne50) 12/22/2012, 10/06/2017    Td vaccine (adult) 12/01/2008    Zoster Live (Zostavax) 09/15/2015       Active Problems:  Patient Active Problem List   Diagnosis Code    Atrial fibrillation ([facility] I48.91    Essential hypertension I10    Severe episode of recurrent major depressive disorder, without psychotic features ([facility] F33.2    Anticoagulated on Coumadin Z79.01    Seasonal affective disorder (HCC) F33.8    Class 3 obesity due to excess calories with serious comorbidity and body mass index (BMI) of 45.0 to 49.9 in adult [id number]    Elevated sed rate R70.0    Leukocytosis D72.829    Elevated C-reactive protein (CRP) R79.82    Obstructive sleep apnea G47.33    Morbid obesity (HCC) E66.01    Weight loss counseling, encounter for Z71.3    Atypical atrial flutter (HCC) I48.4    PAF (paroxysmal atrial fibrillation) (HCC) I48.0       Isolation/Infection:   Isolation            No Isolation          Patient Infection Status       None to display            Nurse Assessment:  Last Vital Signs: /84   Pulse 58   Temp 98.3 °F (36.8 °C) (Oral)   Resp 18   Ht 5' 7\" (1.702 m)   Wt 254 lb 1.6 oz (115.3 kg)   SpO2 93%   BMI 39.80 kg/m²     Last documented pain score (0-10 scale): Pain Level: 0  Last Weight:   Wt Readings from Last 1 Encounters:   04/01/22 254 lb 1.6 oz (115.3 kg)     Mental Status:  {IP PT MENTAL STATUS:27496}    IV Access:  { CHOLO IV ACCESS:119860340}    Nursing Mobility/ADLs:  Walking   {P DME EUCZ:538373957}  Transfer  {P DME BPWE:984252996}  Bathing  {CHP DME CGVB:737090410}  Dressing  {CHP DME EKAH:482702026}  Toileting  {CHP DME QLHO:996634566}  Feeding  {P DME RVPU:769905010}  Med Admin  {P DME ZSEB:217668951}  Med Delivery   { CHOLO MED Delivery:156399628}    Wound Care Documentation and Therapy:  Incision 12 Knee Left (Active)   Number of days: 4971        Elimination:  Continence: Bowel: {YES / DY:79310}  Bladder: {YES / YF:42234}  Urinary Catheter: {Urinary Catheter:468385875}   Colostomy/Ileostomy/Ileal Conduit: {YES / YP:65104}       Date of Last BM: ***    Intake/Output Summary (Last 24 hours) at 2022 1539  Last data filed at 2022 0930  Gross per 24 hour   Intake --   Output 2900 ml   Net -2900 ml     I/O last 3 completed shifts:   In: 300 [I.V.:300]  Out: 2550 [Urine:2550]    Safety Concerns:     [address] [employer] Safety Concerns:123625607}    Impairments/Disabilities:      [address] Impairments/Disabilities:450610363}    Nutrition Therapy:  Current Nutrition Therapy:   [address] [employer] Diet List:521554182}    Routes of Feeding: {P DME Other Feedings:147769360}  Liquids: {Slp liquid thickness:48998}  Daily Fluid Restriction: {P DME Yes amt example:810721139}  Last Modified Barium Swallow with Video (Video Swallowing Test): {Done Not Done YLIP:038222138}    Treatments at the Time of Hospital Discharge:   Respiratory Treatments: ***  Oxygen Therapy:  {Therapy; copd oxygen:21881}  Ventilator:    {[facility] Vent VLPR:942184525}    Rehab Therapies: {THERAPEUTIC INTERVENTION:4350286701}  Weight Bearing Status/Restrictions: [facility] Weight Bearin}  Other Medical Equipment (for information only, NOT a DME order):  {EQUIPMENT:384621372}  Other Treatments: ***    Patient's personal belongings (please select all that are sent with patient):  {CHP DME Belongings:295995160}    RN SIGNATURE:  {Esignature:658649845}    CASE MANAGEMENT/SOCIAL WORK SECTION    Inpatient Status Date: ***    Readmission Risk Assessment Score:  Readmission Risk              Risk of Unplanned Readmission:  11           Discharging to Facility/ Agency   Name:   Address:  Phone:  Fax:    Dialysis Facility (if applicable)   Name:  Address:  Dialysis Schedule:  Phone:  Fax:    / signature: {Esignature:607987724}    PHYSICIAN SECTION    Prognosis: {Prognosis:7464471496}    Condition at Discharge: [address] [place] [address] Patient Condition:129892816}    Rehab Potential (if transferring to Rehab): {Prognosis:0726735954}    Recommended Labs or Other Treatments After Discharge: ***    Physician Certification: I certify the above information and transfer of Archie Ashton  is necessary for the continuing treatment of the diagnosis listed and that she requires {Admit to Appropriate Level of Care:84455} for {GREATER/LESS:563534928} 30 days.      Update Admission H&P: {CHP DME Changes in WSURV:850500822}    PHYSICIAN SIGNATURE:  {Esignature:613024268}

## 2022-04-01 NOTE — CONSULTS
Isaiah Ly 1945    History:  Past Medical History:   Diagnosis Date    Arthritis     Atrial fibrillation and flutter ([address]     Hypertension     Kidney disease     Pneumonia     Sleep apnea     no c-pap       ECHO:     Summary   -Left ventricular cavity size is normal with mild concentric left   ventricular hypertrophy.   -Overall left ventricular systolic function appears normal. Ejection   fraction is visually estimated to be 60-65%. -No regional wall motion abnormalities are noted. -Cannot grade diastology due to atrial fibrillation.   -The right ventricle is mildly enlarged. Right ventricular systolic function   is normal.   -The right atrium is moderately dilated. -Mitral valve leaflets appear mildly thickened. Mild mitral regurgitation. Mild mitral annular calcification.   -Trivial pulmonic regurgitation.   -Moderate tricuspid regurgitation with a PASP of 56 mmHg. Signature      ------------------------------------------------------------------   Electronically signed by Emmy Alfonso MD, [facility] (Interpreting   physician) on 03/30/2022 at 04:34 PM   ------------------------------------------------------------------    ACE/ARB:  No ace/arb  BB: Metoprolol Tartrate 25 mg bid  Aldosterone Antagonist:  No aldosterone antagonist  SGLT2: No SGLT2 inhibitor    History of sleep apnea: yes  Equipment used at home: CPAP  Jewell Screen ordered: No    DM History: No  Consult for DM educator: No      Last Hospital Admission: 10/20 for atrial fibrillation   Code Status: Full   Discharge plans: Patient to return home. Lives independently    Family Present: No    Ms. Jessica Aragon was seen for heart failure which is not new for her. She was supposed to be having a colonoscopy and was found to be in atrial fibrillation and was admitted. She had swelling and shortness of breath, but did not call anyone.  She knows she is supposed to follow a 2,000 mg sodium diet, but does not always do so. She goes over 64 ounces of fluid a day, and states she drinks way to much pop. She does not weigh daily, weighs once a month a the doctor. Has a scale at home. Patient provided with both written and verbal education on CHF signs/ symptoms, causes, discharge medications, daily weights, low sodium diet, activity, and follow-up. Pt to call if gains 3 pounds in one day or 5 pounds in one week. Mutually agreed upon goals were discussed such as calling the MD as soon as they recognize symptoms and weight gain, maintaining proper diet, taking medications as prescribed, joining cardiac rehab when able. Also reviewed importance of risk factor reduction. Patient provided with CHF Zone Management tool and CHF symptoms magnet. Discussed importance of lifestyle changes: compliance with daily weights, low sodium and fluid restriction. PATIENT/CAREGIVER TEACHING:    Level of patient/caregiver understanding able to:   [x ] Verbalize understanding [ ] Demonstrate understanding [ ] Teach back   [ ] Needs reinforcement [ ] Other:       Time spent teachin minutes    1. WEIGHT: Admit Weight: 255 lb (115.7 kg)      Today  Weight: 254 lb 1.6 oz (115.3 kg)   2. I/O     Intake/Output Summary (Last 24 hours) at 2022 1342  Last data filed at 2022 0930  Gross per 24 hour   Intake --   Output 2900 ml   Net -2900 ml       Recommendations:   1. Patient has a follow up appointment scheduled  2. Educate further on fluid restriction 48 oz- 64 oz during inpatient stay so he can understand how to measure intake at home. 3. Continue to educate on S/S.   4. Emphasize daily weights, diet, and knowing when and who to call  5. Provided patient with CHF Resource Line for questions and concerns.        Jenna Early RN 2022 1:42 PM

## 2022-04-01 NOTE — DISCHARGE SUMMARY
Hospital Medicine Discharge Summary    Patient: Wilbur Alston     Gender: female  : 1945   Age: 78 y.o. MRN: [id number]    Admitting Physician: No admitting provider for patient encounter. Discharge Physician: Viki Doty MD     Code Status: Full Code     Admit Date: 3/30/2022   Discharge Date:   2022    Disposition:  Home  Time spent arranging discharge: 35 minutes    Discharge Diagnoses: Active Hospital Problems    Diagnosis Date Noted    PAF (paroxysmal atrial fibrillation) ([facility] [I48.0] 2022   Acute on Chronic diastolic heart failure exacerbation       Condition at Discharge:  Stable    Hospital Course:   Admitted to hospital with proximal A. fib with RVR started on diltiazem drip transition to oral dill. Patient did undergo EGD and colonoscopy which showed    anastomotic stenosis and ulcers/ mild balloon dilation performed, colon with 3 polyps removed by cold snare/tics/hemorrhoids. With cardiology and GI will hold anticoagulants for 1 week and start patient on Eliquis will follow with cardiology as outpatient. Patient did have acute on chronic diastolic heart failure-patient diuresed with IV Lasix discharged on 20 mg Lasix daily will follow up with cardiology as outpatient    Discharge Exam:    /84   Pulse 58   Temp 98.3 °F (36.8 °C) (Oral)   Resp 18   Ht 5' 7\" (1.702 m)   Wt 254 lb 1.6 oz (115.3 kg)   SpO2 93%   BMI 39.80 kg/m²   General appearance:  Appears comfortable. AAOx3  HEENT: atraumatic, Pupils equal, muscous membranes moist, no masses appreciated  Cardiovascular: irregulary irregular no murmurs appreciated  Respiratory: CTAB no wheezing  Gastrointestinal: Abdomen soft, non-tender, BS+  EXT: no edema  Neurology: no gross focal deficts  Psychiatry: Appropriate affect.  Not agitated  Skin: Warm, dry, no rashes appreciated    Discharge Medications:   Current Discharge Medication List      START taking these medications    Details   apixaban (ELIQUIS) 5 MG TABS tablet Take 1 tablet by mouth 2 times daily  Qty: 180 tablet, Refills: 1           Current Discharge Medication List      CONTINUE these medications which have CHANGED    Details   metoprolol tartrate (LOPRESSOR) 25 MG tablet Take 1 tablet by mouth 2 times daily  Qty: 60 tablet, Refills: 3      pantoprazole (PROTONIX) 40 MG tablet Take 1 tablet by mouth 2 times daily (before meals)  Qty: 30 tablet, Refills: 3           Current Discharge Medication List      CONTINUE these medications which have NOT CHANGED    Details   Calcium Carbonate Antacid (TUMS PO) Take by mouth as needed      Cholecalciferol (VITAMIN D3) 1.25 MG (06324 UT) CAPS TAKE 1 CAPSULE BY MOUTH EVERY 7 DAYS  Qty: 4 capsule, Refills: 11    Associated Diagnoses: Vitamin D deficiency      cloNIDine (CATAPRES) 0.2 MG tablet TAKE 1 TABLET BY MOUTH TWICE DAILY  Qty: 180 tablet, Refills: 3    Associated Diagnoses: Essential hypertension      DULoxetine (CYMBALTA) 60 MG extended release capsule TAKE 1 CAPSULE BY MOUTH DAILY  Qty: 90 capsule, Refills: 3    Associated Diagnoses: Severe episode of recurrent major depressive disorder, without psychotic features ([facility]  Seasonal affective disorder (HCC)      dilTIAZem (CARDIZEM CD) 240 MG extended release capsule TAKE 1 CAPSULE BY MOUTH DAILY  Qty: 90 capsule, Refills: 3    Associated Diagnoses: Atrial fibrillation, unspecified type (HCC)      buPROPion (WELLBUTRIN SR) 200 MG extended release tablet Take 1 tablet by mouth 2 times daily  Qty: 60 tablet, Refills: 5    Associated Diagnoses: Severe episode of recurrent major depressive disorder, without psychotic features (HCC)      Biotin 1000 MCG TABS Take by mouth      ferrous sulfate 325 (65 Fe) MG EC tablet Take 325 mg by mouth daily (with breakfast)       aspirin 81 MG tablet Take 81 mg by mouth daily           Current Discharge Medication List      STOP taking these medications       warfarin (COUMADIN) 5 MG tablet Comments:   Reason for Stopping:               Labs: For convenience and continuity at follow-up the following most recent labs are provided:    Lab Results   Component Value Date    WBC 7.0 04/01/2022    HGB 7.4 04/01/2022    HCT 24.3 04/01/2022    MCV 76.2 04/01/2022     04/01/2022     04/01/2022    K 4.3 04/01/2022     04/01/2022    CO2 27 04/01/2022    BUN 13 04/01/2022    CREATININE 1.6 04/01/2022    CALCIUM 9.2 04/01/2022    ALKPHOS 122 03/30/2022    ALT 7 03/30/2022    AST 13 03/30/2022    BILITOT 0.6 03/30/2022    BILIDIR <0.2 01/10/2019    LABALBU 3.7 03/30/2022    LDLCALC 80 03/15/2021    TRIG 56 03/15/2021     Lab Results   Component Value Date    INR 1.45 (H) 03/30/2022    INR 2.6 03/24/2022    INR 3.0 02/11/2022       Radiology:  Echo Complete    Result Date: 3/30/2022  Transthoracic Echocardiography Report (TTE)  Demographics   Patient Name       Randol Blow   Date of Study      03/30/2022         Gender              Female   Patient Number     [id number]         Date of Birth       1945   Visit Number       [id number]          Age                 78 year(s)   Accession Number   [id number]         Room Number         [facility]   Corporate ID       [id number]           Sonographer         Dahlia Rodriguez RVT, EVELYN   Ordering Physician Bar Morataya, Interpreting        Joby Pagan MD                 Physician           MD, [facility]  Procedure Type of Study   TTE procedure:ECHOCARDIOGRAM COMPLETE 2D W DOPPLER W COLOR. Procedure Date Date: 03/30/2022 Start: 03:11 PM Study Location: [facility] - Echo Lab Technical Quality: Adequate visualization Indications:Atrial fibrillation.  Patient Status: Routine Height: 67 inches Weight: 255.01 pounds BSA: 2.24 m2 BMI: 39.94 kg/m2 HR: 92 bpm BP: 158/100 mmHg  Conclusions   Summary  -Left ventricular cavity size is normal with mild concentric left  ventricular hypertrophy.  -Overall left ventricular systolic function appears normal. Ejection  fraction is visually estimated to be 60-65%. -No regional wall motion abnormalities are noted. -Cannot grade diastology due to atrial fibrillation.  -The right ventricle is mildly enlarged. Right ventricular systolic function  is normal.  -The right atrium is moderately dilated. -Mitral valve leaflets appear mildly thickened. Mild mitral regurgitation. Mild mitral annular calcification.  -Trivial pulmonic regurgitation.  -Moderate tricuspid regurgitation with a PASP of 56 mmHg. Signature   ------------------------------------------------------------------  Electronically signed by Teto Corona MD, [facility] (Interpreting  physician) on 03/30/2022 at 04:34 PM  ------------------------------------------------------------------   Findings   Left Ventricle  Left ventricular cavity size is normal with mild concentric left ventricular  hypertrophy. Overall left ventricular systolic function appears normal.  Ejection fraction is visually estimated to be 60-65%. No regional wall motion abnormalities are noted. Average E/e': 12.0. Cannot grade diastology due to atrial fibrillation. Mitral Valve  Mitral valve leaflets appear mildly thickened. Mild mitral regurgitation. Mild mitral annular calcification. Left Atrium  The left atrium is moderately dilated. Aortic Valve  The aortic valve is structurally normal. There is no significant aortic  valve regurgitation or stenosis. Tricuspid aortic valve. Aorta  The aortic root is normal in size. The ascending aorta is normal in size. Right Ventricle  The right ventricle is mildly enlarged. Right ventricular systolic function is normal.  TAPSE: 1.8 cm. RV s' velocity: 14.4 cm/s. Tricuspid Valve  The tricuspid valve is normal in structure and function. Moderate tricuspid regurgitation with a PASP of 56 mmHg. Right Atrium  The right atrium is moderately dilated.    Pulmonic

## 2022-04-01 NOTE — PROGRESS NOTES
Pharmacy to check patient copays for Eliquis/Xarelto:    Copay for patient will be: $45/month for either      Pharmacy will continue to follow the decision for anticoagulation and  the patient if appropriate.        Sherri Gillespie PharmHERBIE  4/1/2022 11:09 AM

## 2022-04-01 NOTE — PROGRESS NOTES
[address]   Electrophysiology Progress Note  Date: 4/1/2022   Date of admission: 3/30/2022 10:11 AM  Reason for Admission: PAF (paroxysmal atrial fibrillation) ([facility] [address] [I48.0]    Consult Requesting Physician: Fouzia Marshall MD    -Reason for Follow up: Atrial Fibrillation RVR    CC: Atrial Fibrillation  HISTORY OF PRESENT ILLNESS: History obtained from patient and medical record. Scooby Menchaca is a 78 y.o. female with a past medical history of hypertension, CKD, GIANNA, diastolic dysfunction, and chronic atrial fibrillation. Hx of DCCV 2016 with Dr. Ethan Hanks. At that time she had LHC showed normal coronaries (1/2016). Echo 2019 showed normal EF with GIIIDD. Admitted 3/2019 with syncope felt to be vasovagal.  She was seen by Dr. Fouzia Marshall at that time for AF and CV was to be considered as OP, however there was no follow up with cardiology. Presented for OP routine colonoscopy and ECG for postprandial N/V. She was noted to be tachycardia and ECG revealed AF/RVR. SHe was admitted for further workup and control of AF. She did not take her morning medications. Admits to chronic SOB that seems to have worsened over the last several months. Admits to being deconditioned and sedentary. Denies any CP, palpitations or swelling. Reports RLE redness. Last SR ECG 5/2016. Interval Hx: Today, she is being seen for follow up. Down 2 L and 6 lbs. SOB better, she has not ambulated much. No new complaints today. No major events overnight. Denies having chest pain, palpitations, shortness of breath, orthopnea or dizziness. Patient seen and examined. Clinical notes reviewed. Telemetry reviewed. Assessment and Plan:   Chronic Persistent Atrial Fibrillation  - Atrial fibrillation 60's   - On metoprolol 50 mg bid and diltiazem 240 mg daily   - [id number] score: 3 (hypertension, gender age) ; [id number] Vasc score and anticoagulation discussed. High risk for stroke and thromboembolism. Anticoagulation is recommended. ~ Warfarin on hold for ulcer; discussed interim use of Plavix with GI and poor option  ~ Discussed Eliquis with patient. It has been difficult to afford in the past, will get her financial assistance paperwork and 30 day free trial.  She can transition back to warfarin if becomes a problem in the future. She is agreeable. - Afib risk factors including age, HTN, obesity, inactivity and GIANNA were discussed with patient. Risk factor modification recommended   ~ TSH 1.45 (3/22/2019);can repeat   - Hx of DCCV 2016   - Can consider DCCV as OP at this point she has chronic AF since 2017 and seems asymptomatic. Maintaining SR may be difficult to maintain due to multiple comorbidities and longstanding nature of AF. SOB/Diastolic Dysfunction   - BNP elevated 3,868; CXR showed pulmonary edema and she has GIIIDD on prior echocardiogram   - Stop IV lasix    - Will give prn lasix at discharge 20 mg daily   Hypertension   - Controlled. Continues current medications  GIANNA   - Untreated; CPAP recommended  Obesity: Body mass index is 39.8 kg/m². - Excessive weight is complicating assessment treatment. It is placing patient at risk for multiple co-morbidities as well as early death contributing to the patient's presentations. - Discussed weight loss and lifestyle modifications. CKD   - Stable   Chronic Anemia   - Acute on chronic   - Hgb 8.8-->7.2-->7.4    - Needs OP sleep study  - Reduce metoprolol due to bradycardia 25 mg bid  - Lasix 20 mg prn for weight gain or SOB, discussed with patient  - Needs OP follow up with HF  - Start Eliquis 4/5 or 4/6 if CBC OK   - OK for discharge after seen by HF educator    All pertinent information and plan of care discussed with the EP physician. All questions and concerns were addressed to the patient/family. Alternatives to my treatment were discussed.  I have discussed the above stated plan and the patient verbalized understanding and agreed with the plan. Discussed plan with patient and nurse. Problem List:   Patient Active Problem List    Diagnosis Date Noted    PAF (paroxysmal atrial fibrillation) ([facility] 03/30/2022    Atypical atrial flutter (HCC)     Weight loss counseling, encounter for     Elevated sed rate     Leukocytosis     Elevated C-reactive protein (CRP)     Obstructive sleep apnea     Morbid obesity ([facility]     Severe episode of recurrent major depressive disorder, without psychotic features ([facility] 02/11/2018    Anticoagulated on Coumadin 02/11/2018    Seasonal affective disorder ([facility] 02/11/2018    Class 3 obesity due to excess calories with serious comorbidity and body mass index (BMI) of 45.0 to 49.9 in adult 02/11/2018    Atrial fibrillation ([facility] 01/21/2016    Essential hypertension 01/21/2016      Allergies: Allergies   Allergen Reactions    Bee Venom Swelling and Angioedema    Shellfish-Derived Products Other (See Comments)     Syncope/seizures    Shrimp Flavor      Passes out      Codeine Hives and Other (See Comments)     B/P DROPS PASSES OUT  Passed out    Fentanyl And Related Hives     Other reaction(s): Dizziness    Hydromorphone Rash, Hives and Other (See Comments)     Full rash spreading across chest and both arms from IV hydromorphone  Passed out    Vancomycin Rash     Home Meds:  Prior to Visit Medications    Medication Sig Taking?  Authorizing Provider   Calcium Carbonate Antacid (TUMS PO) Take by mouth as needed Yes Historical Provider, MD   Cholecalciferol (VITAMIN D3) 1.25 MG (74138 UT) CAPS TAKE 1 CAPSULE BY MOUTH EVERY 7 DAYS  MARILEE Coleman CNP   metoprolol tartrate (LOPRESSOR) 50 MG tablet TAKE 1 TABLET BY MOUTH TWICE DAILY  MARILEE Coleman CNP   cloNIDine (CATAPRES) 0.2 MG tablet TAKE 1 TABLET BY MOUTH TWICE DAILY  MARILEE Coleman CNP   DULoxetine (CYMBALTA) 60 MG extended release capsule TAKE 1 CAPSULE BY MOUTH DAILY  MARILEE Coleman CNP   dilTIAZem (CARDIZEM CD) 240 MG extended release capsule TAKE 1 CAPSULE BY MOUTH DAILY  Britt Baum APRN - CNP   buPROPion (WELLBUTRIN SR) 200 MG extended release tablet Take 1 tablet by mouth 2 times daily  MARILEE Montgomery - CNP   warfarin (COUMADIN) 5 MG tablet TAKE 1& 1/2 TABLETS BY MOUTH EVERY TUESDAY AND FRIDAY THEN TAKE 1 TABLET DAILY ALL OTHER DAYS  Britt Baum APRN - CNP   pantoprazole (PROTONIX) 40 MG tablet Take 40 mg by mouth daily  Patient not taking: Reported on 3/30/2022  Historical Provider, MD   Biotin 1000 MCG TABS Take by mouth  Historical Provider, MD   ferrous sulfate 325 (65 Fe) MG EC tablet Take 325 mg by mouth daily (with breakfast)   Patient not taking: Reported on 3/30/2022  Historical Provider, MD   aspirin 81 MG tablet Take 81 mg by mouth daily  Historical Provider, MD      Past Medical History:  Past Medical History:   Diagnosis Date    Arthritis     Atrial fibrillation and flutter ([facility] [address]     Hypertension     Kidney disease     Pneumonia     Sleep apnea     no c-pap      Past Surgical History:    has a past surgical history that includes Tubal ligation; Total knee arthroplasty (Bilateral, 12/19/2012); fracture surgery; Colonoscopy (N/A, 11/20/2018); Upper gastrointestinal endoscopy (N/A, 11/20/2018); Cardioversion; Gastric bypass surgery; Larynx surgery; Upper gastrointestinal endoscopy (N/A, 3/31/2022); Colonoscopy (N/A, 3/31/2022); and Upper gastrointestinal endoscopy (N/A, 3/31/2022). Social History:  Reviewed. reports that she has never smoked. She has never used smokeless tobacco. She reports that she does not drink alcohol and does not use drugs. Family History:  Reviewed. family history includes Cancer in her father.    Denies family history of sudden cardiac death, arrhythmia, premature CAD    Review of System:  · Constitutional: Negative for fever or weakness  · Skin: Negative for rash, bruising, bleeding, blood clots, or changes in skin pigment  · HEENT: Negative for vision changes  · Respiratory: Reviewed in HPI  · Cardiovascular: Reviewed in HPI  · Gastrointestinal: Negative for abdominal pain or black/tarry stools  · Genito-Urinary: Negative for hematuria  · Musculoskeletal: No focal weakness  · Neurological/Psych: Negative for confusion or TIA-like symptoms. No anxiety, depression, or insomnia    Physical Examination:  Vitals:    04/01/22 0715   BP: 116/69   Pulse: 57   Resp: 18   Temp: 97.9 °F (36.6 °C)   SpO2: 96%      In: 300 [I.V.:300]  Out: 1900    Wt Readings from Last 3 Encounters:   04/01/22 254 lb 1.6 oz (115.3 kg)   03/24/22 266 lb (120.7 kg)   02/11/22 248 lb (112.5 kg)     Telemetry: Atrial fibrillation CVR   Constitutional: Cooperative and in no apparent distress, and appears well nourished   Skin: Warm and pink; no cyanosis or bruising + bear BLE   HEENT: Symmetric and normocephalic. Conjunctiva pink with clear sclera. Mucus membranes pink and moist. No visible masses/goiter   Respiratory: Respirations symmetric and unlabored. Lungs clear to auscultation bilaterally, no wheezing, rhonchi, or crackles.  Cardiovascular: Iirregular rate and rhythm. S1 & S2 present, negative for murmur. negative elevation of JVP. + trace BLE edema, nonpitting   Musculoskeletal:  No focal weakness.  Neurological/Psych: Awake and orientated to person, place and time. Calm affect, appropriate mood. Pertinent labs, diagnostic, device, and imaging results reviewed as a part of this visit     Labs:  BMP:   Recent Labs     03/30/22  1150 03/31/22  1159    139   K 4.1 4.4    106   CO2 20* 24   BUN 18 13   CREATININE 1.4* 1.3*   MG  --  1.60*     Estimated Creatinine Clearance: 50 mL/min (A) (based on SCr of 1.3 mg/dL (H)).    CBC:   Recent Labs     03/30/22  1150 03/31/22  1159   WBC 6.8 5.4   HGB 8.8* 7.2*   HCT 28.8* 23.7*   MCV 76.7* 76.3*    205     Thyroid:   Lab Results   Component Value Date    TSH 2.81 03/30/2022     Lipids:  Lab Results   Component Value Date    CHOL 142 03/15/2021    HDL 51 03/15/2021    TRIG 56 03/15/2021     LFTS:   Lab Results   Component Value Date    ALT 7 03/30/2022    AST 13 03/30/2022    ALKPHOS 122 03/30/2022    PROT 7.3 03/30/2022    AGRATIO 1.0 03/30/2022    BILITOT 0.6 03/30/2022     Cardiac Enzymes:   Lab Results   Component Value Date    TROPONINI <0.01 03/30/2022    TROPONINI <0.01 03/21/2019    TROPONINI 0.00 03/27/2013     Coags:   Lab Results   Component Value Date    PROTIME 16.7 03/30/2022    INR 1.45 03/30/2022     ECG: 3/30/2022: Atrial Fibrillation RVR    ECHO:  3/22/2019   Summary   -Normal left ventricle size and systolic function with an estimated ejection   fraction of 55-60%. Mild concentric left ventricular hypertrophy. No regional wall motion abnormalities are seen. E/e\"= 14.9 .   -Grade III diastolic dysfunction with elevated LV filling pressures. -Mild mitral and tricuspid regurgitation.   -Biatrial enlargement. -Right ventricular systolic function is reduced.   -Estimated pulmonary artery systolic pressure is normal at 35 mmHg assuming a right atrial pressure of 3 mmHg. Stress Test: 1/19/2016  Summary    Small-moderate sized anterior completely reversible defect consistent with    ischemia in the territory of the mid and distal LAD .    Normal LV function.        Recommendation    Cardiac cath possible PCI arranged with Dr. Taylor Sterling.   Riky Huddleston 2 days prior.    Pretreated for possible dye allergy. Cath: 1/27/2016  [facility] SUMMARY  ~LM     normal  ~LAD   normal  ~Cx      normal  ~RCA   normal  ~LVG   55%     Impression  ~Angiography w/ normal cors   ~LVEDP 20  ~LVG with normal EF     Intervention  ~None     Recommendation  ~Aggressive medical treatment and risk factor modification     Thank you for allowing to us to participate in the care of [address]Aneesh Uriarte.     Annalisa Martin, APRN-CNP  [address]   Office: [phone]

## 2022-04-01 NOTE — PROGRESS NOTES
Pt tolerating full liquid diet for lunch and dinner. Pt up to commode x4 since EGD/ Colonoscopy. VSS.

## 2022-04-01 NOTE — PROGRESS NOTES
Assessment complete. Vitals:    03/31/22 2001   BP: (!) 107/58   Pulse: 57   Resp: 16   Temp: 98.4 °F (36.9 °C)   SpO2: 91%      no SOB or any distress noted. Pt up to the [facility] to void. No pain at this time. Fine crackles noted in left base of lung. POC discussed and agreed upon. Call light within reach, pt encouraged to call if any needs arise. No further requests at this time. Will continue to monitor.

## 2022-04-01 NOTE — PROGRESS NOTES
CLINICAL PHARMACY NOTE: MEDS TO BEDS    Total # of Prescriptions Filled: 4   The following medications were delivered to the patient:  · Metoprolol tart 25  · Furosemide 20  · Pantoprazole 40  · eliquis 5    Additional Documentation:    Daughter Jennifer Barrera signed for scripts in op pharmacy

## 2022-04-03 LAB — H PYLORI ANTIGEN STOOL: NEGATIVE

## 2022-04-04 ENCOUNTER — TELEPHONE (OUTPATIENT)
Dept: OTHER | Age: 77
End: 2022-04-04

## 2022-04-04 ENCOUNTER — CARE COORDINATION (OUTPATIENT)
Dept: CASE MANAGEMENT | Age: 77
End: 2022-04-04

## 2022-04-04 NOTE — TELEPHONE ENCOUNTER
[address] FAILURE PROGRAM  TELEPHONE ENCOUNTER FORM    Yolanda Sharma 1945    Attempted to call patient for HF follow-up. No answer at this time.       Next MD/ Clinic appointment: 4/7 with Kathy Rodriguez RN 4/4/2022 4:20 PM

## 2022-04-04 NOTE — TELEPHONE ENCOUNTER
[address] FAILURE PROGRAM  TELEPHONE ENCOUNTER FORM    Kely Argueta 1945    Attempted to call patient for HF follow-up. No answer at this time.       Next MD/ Clinic appointment: 4/7 with Anette Goel RN 4/4/2022 11:45 AM

## 2022-04-04 NOTE — CARE COORDINATION
[address] Transitions Initial Follow Up Call    Call within 2 business days of discharge: Yes    Patient: Glendora Dance Patient : 1945   MRN: [id number]  Reason for Admission:   Discharge Date: 22 RARS: Readmission Risk Score: 14.5 ( )      Last Discharge [facility]       Complaint Diagnosis Description Type Department Provider    3/30/22  Anticoagulated on Coumadin . .. Admission (Discharged) Natasha Jade MD           Initial 24 hr call attempted, contact info left on vm.       Follow Up  Future Appointments   Date Time Provider Inge Van   2022  3:15 PM MARILEE Saenz CNP Cardio [facility]   4/15/2022 10:20 AM Gene Codding, APRN - CNP [facility] [place] [facility]   2022  3:00 PM MD BETH Bang Card[facility]       Ahsan Forrester RN

## 2022-04-05 ENCOUNTER — CARE COORDINATION (OUTPATIENT)
Dept: CASE MANAGEMENT | Age: 77
End: 2022-04-05

## 2022-04-05 PROBLEM — J84.9 ILD (INTERSTITIAL LUNG DISEASE) (HCC): Status: ACTIVE | Noted: 2022-04-05

## 2022-04-05 NOTE — CARE COORDINATION
[address] Transitions Initial Follow Up Call    Call within 2 business days of discharge: Yes    Patient: Jeaneth Barillas Patient : 1945   MRN: [id number]  Reason for Admission:   Discharge Date: 22 RARS: Readmission Risk Score: 14.5 ( )      Last Discharge [facility]       Complaint Diagnosis Description Type Department Provider    3/30/22  Anticoagulated on Coumadin . .. Admission (Discharged) Flako Lo MD           2nd and final attempt at an initial 24 hour call, contact info left on vm. F/U appts listed below.         Follow Up  Future Appointments   Date Time Provider Inge Van   2022  3:15 PM MARILEE Valverde CNP Cardio [facility]   4/15/2022 10:20 AM Saqib Else, APRN - CNP [facility] [place] [facility]   2022  3:00 PM Collette Squires, MD FF [facility]       Eduar Tanner RN

## 2022-04-07 ENCOUNTER — TELEPHONE (OUTPATIENT)
Dept: NON INVASIVE DIAGNOSTICS | Age: 77
End: 2022-04-07

## 2022-04-07 NOTE — TELEPHONE ENCOUNTER
----- Message from MARILEE Barrientos CNP sent at 4/1/2022  2:55 PM EDT -----  CBC in 1 week, resume ELiquis

## 2022-04-08 ENCOUNTER — TELEPHONE (OUTPATIENT)
Dept: CARDIOLOGY CLINIC | Age: 77
End: 2022-04-08

## 2022-04-08 ENCOUNTER — OFFICE VISIT (OUTPATIENT)
Dept: CARDIOLOGY CLINIC | Age: 77
End: 2022-04-08
Payer: COMMERCIAL

## 2022-04-08 VITALS
HEIGHT: 67 IN | OXYGEN SATURATION: 96 % | DIASTOLIC BLOOD PRESSURE: 70 MMHG | BODY MASS INDEX: 38.61 KG/M2 | SYSTOLIC BLOOD PRESSURE: 116 MMHG | WEIGHT: 246 LBS | HEART RATE: 60 BPM

## 2022-04-08 DIAGNOSIS — I10 ESSENTIAL HYPERTENSION: Chronic | ICD-10-CM

## 2022-04-08 DIAGNOSIS — I50.31 ACUTE DIASTOLIC CONGESTIVE HEART FAILURE (HCC): Primary | ICD-10-CM

## 2022-04-08 DIAGNOSIS — I48.91 ATRIAL FIBRILLATION, UNSPECIFIED TYPE (HCC): Chronic | ICD-10-CM

## 2022-04-08 DIAGNOSIS — D50.0 IRON DEFICIENCY ANEMIA DUE TO CHRONIC BLOOD LOSS: ICD-10-CM

## 2022-04-08 PROCEDURE — 1111F DSCHRG MED/CURRENT MED MERGE: CPT | Performed by: NURSE PRACTITIONER

## 2022-04-08 PROCEDURE — 99496 TRANSJ CARE MGMT HIGH F2F 7D: CPT | Performed by: NURSE PRACTITIONER

## 2022-04-08 ASSESSMENT — ENCOUNTER SYMPTOMS
GASTROINTESTINAL NEGATIVE: 1
SHORTNESS OF BREATH: 1

## 2022-04-08 NOTE — TELEPHONE ENCOUNTER
Pt seen today by Antione Broderick and needs appt 4-6 weeks w/NPAL. Can she be worked in sometime in May as there is nothing available all of May or June? Please call to advise. Thank you.

## 2022-04-08 NOTE — PROGRESS NOTES
[address]   Congestive Heart Failure    Primary Care Doctor:  MARILEE Martinez - CNP  Primary Cardiologist: Salome Vega    Last/Next OV: July 2022    Chief Complaint:  SOB    History of Present Illness:  Ld García is a 78 y.o. female with PMH who presents today for hospital f/u. Ld García was admitted 3/30/22 and discharged 4/1/22. Hospital course: Admitted to hospital with proximal A. fib with RVR started on diltiazem drip transition to oral dill. Patient did undergo EGD and colonoscopy which showed anastomotic stenosis and ulcers/ mild balloon dilation performed, colon with 3 polyps removed by cold snare/tics/hemorrhoids. With cardiology and GI will hold anticoagulants for 1 week and start patient on Eliquis will follow with cardiology as outpatient. Patient did have acute on chronic diastolic heart failurepatient diuresed with IV Lasix discharged on 20 mg Lasix prn     Since hospitalization she reports dyspnea that is at baseline, fatigue and denies chest pain, palpitations, orthopnea, PND, exertional chest pressure/discomfort, early saiety, edema, syncope. She is not weighing herself but has taken lasix once since hospital d/c for edema. She is also over on fluid intake and most likely sodium with her smith's sausage biscuit that she adds salt to.      hospital weight on d/c was 254lb and discharge home weight was no home wts- educated today    F/U Phone Call date: 4/5/22    Baseline Weight: 245  Wt Readings from Last 3 Encounters:   04/08/22 246 lb (111.6 kg)   04/01/22 254 lb 1.6 oz (115.3 kg)   03/24/22 266 lb (120.7 kg)      Admit BNP: 3868   Discharge BNP: 4054      EF: 60-65%  Cardiac Imaging: Echo 3/30/22:  Summary   -Left ventricular cavity size is normal with mild concentric left   ventricular hypertrophy.   -Overall left ventricular systolic function appears normal. Ejection   fraction is visually estimated to be 60-65%.    -No regional wall motion abnormalities are noted.   -Cannot grade diastology due to atrial fibrillation.   -The right ventricle is mildly enlarged. Right ventricular systolic function   is normal.   -The right atrium is moderately dilated. -Mitral valve leaflets appear mildly thickened. Mild mitral regurgitation. Mild mitral annular calcification.   -Trivial pulmonic regurgitation.   -Moderate tricuspid regurgitation with a PASP of 56 mmHg. Activity: at baseline  Can you walk 1-2 blocks or do a moderate amount of house/yard work? No  Cardiac Rehab Referral: No     NYHA Class: III       Sodium Restrictions: 3g  Fluid Restrictions: 48-64 oz/day  Sodium and fluid restriction compliance: poor    Pt Education: The patient has received education on the following topics: dietary sodium restriction, heart failure medications, the importance of physical activity, symptom management and weight monitoring     GIANNA Yes Treated: No  Referral:  No        Past Medical History:   has a past medical history of Arthritis, Atrial fibrillation and flutter ([address] Hypertension, Kidney disease, Pneumonia, and Sleep apnea. Surgical History:   has a past surgical history that includes Tubal ligation; Total knee arthroplasty (Bilateral, 12/19/2012); fracture surgery; Colonoscopy (N/A, 11/20/2018); Upper gastrointestinal endoscopy (N/A, 11/20/2018); Cardioversion; Gastric bypass surgery; Larynx surgery; Upper gastrointestinal endoscopy (N/A, 3/31/2022); Colonoscopy (N/A, 3/31/2022); and Upper gastrointestinal endoscopy (N/A, 3/31/2022). Social History:   reports that she has never smoked. She has never used smokeless tobacco. She reports that she does not drink alcohol and does not use drugs. Family History:   Family History   Problem Relation Age of Onset    Cancer Father         stomach cancer       Home Medications:  Prior to Admission medications    Medication Sig Start Date End Date Taking?  Authorizing Provider   metoprolol tartrate (LOPRESSOR) 25 MG tablet Take 1 tablet by mouth 2 times daily 4/1/22   Maryuri Villarreal MD   pantoprazole (PROTONIX) 40 MG tablet Take 1 tablet by mouth 2 times daily (before meals) 4/1/22   Maryuri Villarreal MD   apixaban (ELIQUIS) 5 MG TABS tablet Take 1 tablet by mouth 2 times daily 4/7/22   Maryuri Villarreal MD   furosemide (LASIX) 20 MG tablet Take 1 tablet by mouth daily 4/1/22   Maryuri Villarreal MD   Calcium Carbonate Antacid (TUMS PO) Take by mouth as needed    Historical Provider, MD   Cholecalciferol (VITAMIN D3) 1.25 MG (60654 UT) CAPS TAKE 1 CAPSULE BY MOUTH EVERY 7 DAYS 1/25/22   MARILEE Nunn CNP   cloNIDine (CATAPRES) 0.2 MG tablet TAKE 1 TABLET BY MOUTH TWICE DAILY 11/2/21   MARILEE Nunn - CNP   DULoxetine (CYMBALTA) 60 MG extended release capsule TAKE 1 CAPSULE BY MOUTH DAILY 9/7/21   MARILEE Nunn - CNP   dilTIAZem (CARDIZEM CD) 240 MG extended release capsule TAKE 1 CAPSULE BY MOUTH DAILY 9/2/21   Mik Mota, MARILEE - CNP   buPROPion [facility]) 200 MG extended release tablet Take 1 tablet by mouth 2 times daily 7/8/21   MARILEE Nunn CNP   Biotin 1000 MCG TABS Take by mouth    Historical Provider, MD   ferrous sulfate 325 (65 Fe) MG EC tablet Take 325 mg by mouth daily (with breakfast)   Patient not taking: Reported on 3/30/2022    Historical Provider, MD   aspirin 81 MG tablet Take 81 mg by mouth daily    Historical Provider, MD        Allergies:  Bee venom, Shellfish-derived products, Shrimp flavor, Codeine, Fentanyl and related, Hydromorphone, and Vancomycin     ROS:   Review of Systems   Constitutional: Positive for fatigue. Respiratory: Positive for shortness of breath. Cardiovascular: Negative. Gastrointestinal: Negative. Genitourinary: Negative. Musculoskeletal: Negative. Skin: Negative. Neurological: Negative. Hematological: Negative. Psychiatric/Behavioral: Negative.         Physical Examination:    Vitals:    04/08/22 1443   BP: 116/70   Site: Left Upper Arm   Position: Sitting   Cuff Size: Large Adult   Pulse: 60   SpO2: 96%   Weight: 246 lb (111.6 kg)   Height: 5' 7\" (1.702 m)           Physical Exam  Constitutional:       Appearance: Normal appearance. She is obese. Eyes:      Extraocular Movements: Extraocular movements intact. Pupils: Pupils are equal, round, and reactive to light. Cardiovascular:      Rate and Rhythm: Normal rate. Rhythm irregular. Pulses: Normal pulses. Heart sounds: Normal heart sounds. Pulmonary:      Effort: Pulmonary effort is normal.      Breath sounds: Wheezing present. Abdominal:      Palpations: Abdomen is soft. Musculoskeletal:         General: Normal range of motion. Cervical back: Normal range of motion and neck supple. Skin:     General: Skin is warm and dry. Neurological:      General: No focal deficit present. Mental Status: She is alert and oriented to person, place, and time. Mental status is at baseline. Psychiatric:         Mood and Affect: Mood normal.         Behavior: Behavior normal.         Thought Content:  Thought content normal.         Judgment: Judgment normal.         Lab Data:    CBC:   Lab Results   Component Value Date    WBC 7.0 04/01/2022    WBC 5.4 03/31/2022    WBC 6.8 03/30/2022    RBC 3.18 04/01/2022    RBC 3.11 03/31/2022    RBC 3.75 03/30/2022    HGB 7.4 04/01/2022    HGB 7.2 03/31/2022    HGB 8.8 03/30/2022    HCT 24.3 04/01/2022    HCT 23.7 03/31/2022    HCT 28.8 03/30/2022    MCV 76.2 04/01/2022    MCV 76.3 03/31/2022    MCV 76.7 03/30/2022    RDW 20.8 04/01/2022    RDW 21.4 03/31/2022    RDW 20.8 03/30/2022     04/01/2022     03/31/2022     03/30/2022     BMP:  Lab Results   Component Value Date     04/01/2022     03/31/2022     03/30/2022    K 4.3 04/01/2022    K 4.4 03/31/2022    K 4.1 03/30/2022    K 4.3 07/08/2021    K 4.5 03/15/2021    K 4.1 01/06/2019     04/01/2022     03/31/2022     03/30/2022    CO2 27 04/01/2022    CO2 24 03/31/2022    CO2 20 03/30/2022    BUN 13 04/01/2022    BUN 13 03/31/2022    BUN 18 03/30/2022    CREATININE 1.6 04/01/2022    CREATININE 1.3 03/31/2022    CREATININE 1.4 03/30/2022     BNP:   Lab Results   Component Value Date    PROBNP 4,054 04/01/2022    PROBNP 3,868 03/30/2022    PROBNP 1,118 03/21/2019     Iron Studies:    Lab Results   Component Value Date    FERRITIN 28.2 03/30/2022    FERRITIN 16.6 07/08/2021     Lab Results   Component Value Date    IRON 32 (L) 03/30/2022    TIBC 406 03/30/2022    FERRITIN 28.2 03/30/2022      Iron Deficiency Anemia:  Yes IV Iron Therapy:  No  2017 ACC/AHA HF Guidelines:   intravenous iron replacement in patients with New York Heart Association (NYHA) class II and III HF and iron deficiency(ferritin <100 ng/ml or 100-300 ng/ml if transferrin saturation <20%), to improve functional status and QoL. Assessment/Plan:    1. Acute diastolic congestive heart failure ([facility] [address] - compensated, needs to start daily wts and use wt for prn lasix, decrease sodium and fluid intake, labs next week   2. Atrial fibrillation, unspecified type ([facility] [address] - continue BB, cardizem and AC, CBC next week, f/u with EP   3. Essential hypertension - controlled   4. Iron deficiency anemia due to chronic blood loss - rec IV iron, pt declines at this time       Time   30-39minutes spent preparing to see patient including reviewing patient history/prior tests/prior consults, performing a medical exam, counseling and educating patient/family/caregiver, ordering medications/tests/procedures, referring and communicating with PCPs and other pertinent consultants, documenting information in the EMR, independently interpreting results and communicating to family and coordination of patient care. Instructions:   1. Medications: continue current medications, start daily weights and take furosemide if your wt increases 3lb in one day or 5lb in one week  2.  Labs: next week  3. Lifestyle Recommendations:  Limit sodium to 3000mg/day and fluids to 2L or 64oz/day. 4. Follow up: with Sadaf Glasgow in 4-6 weeks        [place] CHF Resource Line: [phone]    Heart Failure Symptoms  When the heart is weak or stiff, it cannot pump enough blood to the body tissues. Often, fluid can build up in different areas of your body, including your lungs. Some symptoms of heart failure you might be able to see, while others you may feel. It is important you pay attention to your body. Monitor for all symptoms. Call the doctor when you notice something is wrong. By calling the doctor early, you may feel better sooner and stay well at home. If your symptoms are severe, the doctor might see you in the office or tell you to go to the hospital.   Trouble Breathing   Feeling short of breath when doing everyday activities or even at rest   Waking up feeling like you need to sit up to breathe   Trouble breathing when lying flat or feeling more comfortable sitting upright to sleep  Cough or Wheezing   Dry, hacking cough that is worse when you lie down  Swelling   Swelling in your feet, ankles or legs   Jewelry, clothing or shoes fitting tighter   Swelling in abdomen  Weight Gain   Rapid weight gain of 3 pounds or more in 1-7 days   Work with your doctor to find the ideal weight range for you to compare weight gain or loss    Tired Feeling   Feeling tired doing normal activities, like walking, bathing and shopping  Nausea, Bloating or Lack of Appetite   Feeling full or sick to your stomach after eating small meals or more so than usual   Abdominal bloating  Confusion or Impaired Thinking   Memory loss, a foggy feeling or confusion   This might be better observed by friends or family      Heart Failure Websites:   www.heart. org  Www.cardiosmart. org    Websites with Low Sodium Recipes:   www.mayoclinic.org/healthy-lifestyle/recipes/low-sodium-recipes  www.[employer]/recipes    Smartphone garrett's that can help you keep track of symptoms, weights, and medications:  HF Storylines  HF Path  My HF  CareZone  Point of Care Heart Failure  WOWME  H2O Overload    Nutrition Garrett to track calories, carbohydrates and sodium content of food:   CalorieKing  MyFitnessPal    Low Sodium Meal Delivery  Top  Meals - offers senior discount  Meal Pro  Magic Kitchen  Mom's Meals - some insurance pays for  [place] for 50  and over, AARP 10% discount     Balance for under 50  Metabolic Meals       I appreciate the opportunity of cooperating in the care of this individual.    MARILEE Sharma - CNP, CNP, 4/8/2022, 8:29 AM

## 2022-04-11 NOTE — TELEPHONE ENCOUNTER
Left msg for pt to call and move RMM appt from July up to June instead of having appointment w/[facility] per [facility] notes.

## 2022-04-15 ENCOUNTER — TELEPHONE (OUTPATIENT)
Dept: NON INVASIVE DIAGNOSTICS | Age: 77
End: 2022-04-15

## 2022-04-15 ENCOUNTER — OFFICE VISIT (OUTPATIENT)
Dept: INTERNAL MEDICINE CLINIC | Age: 77
End: 2022-04-15
Payer: COMMERCIAL

## 2022-04-15 VITALS
BODY MASS INDEX: 38.22 KG/M2 | OXYGEN SATURATION: 97 % | DIASTOLIC BLOOD PRESSURE: 70 MMHG | WEIGHT: 244 LBS | SYSTOLIC BLOOD PRESSURE: 126 MMHG

## 2022-04-15 DIAGNOSIS — K91.89 ANASTOMOTIC STENOSIS OF GASTROJEJUNOSTOMY: ICD-10-CM

## 2022-04-15 DIAGNOSIS — I48.91 RAPID ATRIAL FIBRILLATION (HCC): ICD-10-CM

## 2022-04-15 DIAGNOSIS — K63.5 POLYP OF COLON, UNSPECIFIED PART OF COLON, UNSPECIFIED TYPE: ICD-10-CM

## 2022-04-15 DIAGNOSIS — Z78.0 POSTMENOPAUSAL: ICD-10-CM

## 2022-04-15 DIAGNOSIS — D50.9 IRON DEFICIENCY ANEMIA, UNSPECIFIED IRON DEFICIENCY ANEMIA TYPE: ICD-10-CM

## 2022-04-15 DIAGNOSIS — Z12.31 ENCOUNTER FOR SCREENING MAMMOGRAM FOR MALIGNANT NEOPLASM OF BREAST: ICD-10-CM

## 2022-04-15 DIAGNOSIS — Z09 HOSPITAL DISCHARGE FOLLOW-UP: Primary | ICD-10-CM

## 2022-04-15 PROCEDURE — 99214 OFFICE O/P EST MOD 30 MIN: CPT | Performed by: NURSE PRACTITIONER

## 2022-04-15 PROCEDURE — 1111F DSCHRG MED/CURRENT MED MERGE: CPT | Performed by: NURSE PRACTITIONER

## 2022-04-15 NOTE — TELEPHONE ENCOUNTER
Please call patient and see if she got her blood work? She needs to check her blood counts in the next week please.   Looks like she resumed the Eliquis on 4/7    RENE Guerra

## 2022-04-18 NOTE — PROGRESS NOTES
Post-Discharge Transitional Care Management Services      Juanita Ly   YOB: 1945    Date of Visit:  4/15/2022    Allergies   Allergen Reactions    Bee Venom Swelling and Angioedema    Shellfish-Derived Products Other (See Comments)     Syncope/seizures    Shrimp Flavor      Passes out      Codeine Hives and Other (See Comments)     B/P DROPS PASSES OUT  Passed out    Fentanyl And Related Hives     Other reaction(s): Dizziness    Hydromorphone Rash, Hives and Other (See Comments)     Full rash spreading across chest and both arms from IV hydromorphone  Passed out    Vancomycin Rash     No outpatient medications have been marked as taking for the 4/15/22 encounter (Office Visit) with MARILEE Shin CNP. Vitals:    04/15/22 1034   BP: 126/70   SpO2: 97%   Weight: 244 lb (110.7 kg)     Body mass index is 38.22 kg/m². Wt Readings from Last 3 Encounters:   04/15/22 244 lb (110.7 kg)   04/08/22 246 lb (111.6 kg)   04/01/22 254 lb 1.6 oz (115.3 kg)     BP Readings from Last 3 Encounters:   04/15/22 126/70   04/08/22 116/70   04/01/22 (!) 141/81        Patient was admitted to [facility] from 3/30/22 to 4/1/22 for paroxysmal atrial fibrillation with rapid ventricular response, anastomotic stenosis and ulcers/ mild balloon dilation performed, colon with 3 polyps removed by cold snare/tics/hemorrhoids. Inpatient course: Discharge summary reviewed- see chart. Current status: Overall, the patient reports she is doing okay. She has no specific new acute complaint today. Review of Systems:  A comprehensive review of systems was negative except for what was noted in the HPI.     Physical Exam:  Gen: NAD  Eyes: no icterus, no conjunctival erythema  CV: RRR, no mrgs  Resp: CTAB  Abd: soft, NTTP  Neuro: alert, oriented, answers questions appropriately  MSK: no peripheral edema  Skin: warm, dry  Psych: Normal mood, affect      Initial post-discharge communication occurred between nurse and patient on 4/4/22, 4/5/22- see documentation in chart: telephone encounter. Assessment/Plan:  Linda Dunn was seen today for follow-up from hospital.  Diagnoses and all orders for this visit:    Hospital discharge follow-up  -     CA DISCHARGE MEDS RECONCILED W/ CURRENT OUTPATIENT MED LIST    Rapid atrial fibrillation [facility])  [facility] records reviewed  -She was transitioned off warfarin for NOAC    Anastomotic stenosis of gastrojejunostomy  -EGD while hospitalized  -Plan per GI    Polyp of colon, unspecified part of colon, unspecified type  -Colonoscopy while hospitalized  -Plan per GI    Iron deficiency anemia, unspecified iron deficiency anemia type  -History of iron deficiency anemia.   She admits to noncompliance with iron supplementation  -She is scheduled for iron infusion per cardiology  -Recommended she resume oral iron upon completion    Postmenopausal  -     DEXA BONE DENSITY AXIAL SKELETON; Future    Encounter for screening mammogram for malignant neoplasm of breast  -     AYDEE DIGITAL SCREEN W OR WO CAD BILATERAL          Diagnostic test results reviewed: inpatient labs    Patient risk of morbidity and mortality: moderate    Medical Decision Making: moderate complexity

## 2022-04-22 DIAGNOSIS — D50.9 IRON DEFICIENCY ANEMIA, UNSPECIFIED IRON DEFICIENCY ANEMIA TYPE: ICD-10-CM

## 2022-04-22 RX ORDER — SODIUM CHLORIDE 0.9 % (FLUSH) 0.9 %
5-40 SYRINGE (ML) INJECTION ONCE
OUTPATIENT
Start: 2022-04-22 | End: 2022-04-22

## 2022-04-22 RX ORDER — SODIUM CHLORIDE 9 MG/ML
INJECTION, SOLUTION INTRAVENOUS CONTINUOUS
OUTPATIENT
Start: 2022-04-22

## 2022-05-05 ENCOUNTER — HOSPITAL ENCOUNTER (OUTPATIENT)
Dept: GENERAL RADIOLOGY | Age: 77
Discharge: HOME | End: 2022-05-05
Payer: COMMERCIAL

## 2022-05-05 DIAGNOSIS — Z78.0 POSTMENOPAUSAL: ICD-10-CM

## 2022-05-05 PROCEDURE — 77080 DXA BONE DENSITY AXIAL: CPT

## 2022-05-09 DIAGNOSIS — M81.0 AGE-RELATED OSTEOPOROSIS WITHOUT CURRENT PATHOLOGICAL FRACTURE: Primary | ICD-10-CM

## 2022-05-09 RX ORDER — ALENDRONATE SODIUM 70 MG/1
70 TABLET ORAL
Qty: 12 TABLET | Refills: 1 | Status: SHIPPED | OUTPATIENT
Start: 2022-05-09 | End: 2022-08-15 | Stop reason: SDUPTHER

## 2022-05-18 ENCOUNTER — HOSPITAL ENCOUNTER (OUTPATIENT)
Dept: ONCOLOGY | Age: 77
Setting detail: INFUSION SERIES
End: 2022-05-18

## 2022-06-01 ENCOUNTER — TELEPHONE (OUTPATIENT)
Dept: INTERNAL MEDICINE CLINIC | Age: 77
End: 2022-06-01

## 2022-06-01 NOTE — TELEPHONE ENCOUNTER
Patient states when she was recently in hospital metoprolol tartrate 50 mg was decreased to 25 mg. She is requesting that Farheen Weber send new prescription to [facility] on [address]. Patient also states she was prescribed apixaban (ELIQUIS) 5 MG TABS tablet while in hospital and she does not think she will be able to afford this medication. She wanted to ask about going back on coumadin. I did let her know that Farheen Weber is not in the office this afternoon.

## 2022-06-06 NOTE — TELEPHONE ENCOUNTER
Medication refilled.     If she wants to switch back to warfarin would need to see her to discuss conversion

## 2022-06-10 PROBLEM — R55 VASOVAGAL SYNCOPE: Status: ACTIVE | Noted: 2022-06-10

## 2022-06-14 ENCOUNTER — TELEPHONE (OUTPATIENT)
Dept: INTERNAL MEDICINE CLINIC | Age: 77
End: 2022-06-14

## 2022-06-14 NOTE — TELEPHONE ENCOUNTER
Pt and [facility] together calling requesting refill of Eliquis   90 day fill    Last written 4/7/22  Last OV 4/15/22  Next OV 8/15/22  Last recommended OV NA                               NEW PHARMACY  Please send to [facility]

## 2022-08-15 ENCOUNTER — OFFICE VISIT (OUTPATIENT)
Dept: INTERNAL MEDICINE CLINIC | Age: 77
End: 2022-08-15
Payer: COMMERCIAL

## 2022-08-15 VITALS
SYSTOLIC BLOOD PRESSURE: 124 MMHG | HEIGHT: 67 IN | OXYGEN SATURATION: 96 % | HEART RATE: 69 BPM | BODY MASS INDEX: 38.22 KG/M2 | DIASTOLIC BLOOD PRESSURE: 68 MMHG

## 2022-08-15 DIAGNOSIS — D50.9 IRON DEFICIENCY ANEMIA, UNSPECIFIED IRON DEFICIENCY ANEMIA TYPE: ICD-10-CM

## 2022-08-15 DIAGNOSIS — E55.9 VITAMIN D DEFICIENCY: ICD-10-CM

## 2022-08-15 DIAGNOSIS — I10 ESSENTIAL HYPERTENSION: ICD-10-CM

## 2022-08-15 DIAGNOSIS — N18.32 STAGE 3B CHRONIC KIDNEY DISEASE (HCC): ICD-10-CM

## 2022-08-15 DIAGNOSIS — E53.8 B12 DEFICIENCY: ICD-10-CM

## 2022-08-15 DIAGNOSIS — I48.91 ATRIAL FIBRILLATION AND FLUTTER (HCC): ICD-10-CM

## 2022-08-15 DIAGNOSIS — I48.92 ATRIAL FIBRILLATION AND FLUTTER (HCC): ICD-10-CM

## 2022-08-15 DIAGNOSIS — M81.0 AGE-RELATED OSTEOPOROSIS WITHOUT CURRENT PATHOLOGICAL FRACTURE: ICD-10-CM

## 2022-08-15 DIAGNOSIS — I10 ESSENTIAL HYPERTENSION: Primary | ICD-10-CM

## 2022-08-15 DIAGNOSIS — I50.32 CHRONIC DIASTOLIC CONGESTIVE HEART FAILURE (HCC): ICD-10-CM

## 2022-08-15 LAB
ALBUMIN SERPL-MCNC: 3.4 G/DL (ref 3.4–5)
ANION GAP SERPL CALCULATED.3IONS-SCNC: 10 MMOL/L (ref 3–16)
BASOPHILS ABSOLUTE: 0 K/UL (ref 0–0.2)
BASOPHILS RELATIVE PERCENT: 0.6 %
BUN BLDV-MCNC: 25 MG/DL (ref 7–20)
CALCIUM SERPL-MCNC: 9 MG/DL (ref 8.3–10.6)
CHLORIDE BLD-SCNC: 106 MMOL/L (ref 99–110)
CO2: 25 MMOL/L (ref 21–32)
CREAT SERPL-MCNC: 1.7 MG/DL (ref 0.6–1.2)
EOSINOPHILS ABSOLUTE: 0.2 K/UL (ref 0–0.6)
EOSINOPHILS RELATIVE PERCENT: 2.1 %
FERRITIN: 77.3 NG/ML (ref 15–150)
GFR AFRICAN AMERICAN: 35
GFR NON-AFRICAN AMERICAN: 29
GLUCOSE BLD-MCNC: 83 MG/DL (ref 70–99)
HCT VFR BLD CALC: 33 % (ref 36–48)
HEMOGLOBIN: 10.3 G/DL (ref 12–16)
IRON SATURATION: 11 % (ref 15–50)
IRON: 38 UG/DL (ref 37–145)
LYMPHOCYTES ABSOLUTE: 0.8 K/UL (ref 1–5.1)
LYMPHOCYTES RELATIVE PERCENT: 10.2 %
MCH RBC QN AUTO: 27.1 PG (ref 26–34)
MCHC RBC AUTO-ENTMCNC: 31.2 G/DL (ref 31–36)
MCV RBC AUTO: 86.8 FL (ref 80–100)
MONOCYTES ABSOLUTE: 0.9 K/UL (ref 0–1.3)
MONOCYTES RELATIVE PERCENT: 11.7 %
NEUTROPHILS ABSOLUTE: 5.7 K/UL (ref 1.7–7.7)
NEUTROPHILS RELATIVE PERCENT: 75.4 %
PDW BLD-RTO: 21.1 % (ref 12.4–15.4)
PHOSPHORUS: 4.2 MG/DL (ref 2.5–4.9)
PLATELET # BLD: 243 K/UL (ref 135–450)
PMV BLD AUTO: 10 FL (ref 5–10.5)
POTASSIUM SERPL-SCNC: 4.9 MMOL/L (ref 3.5–5.1)
RBC # BLD: 3.81 M/UL (ref 4–5.2)
SODIUM BLD-SCNC: 141 MMOL/L (ref 136–145)
TOTAL IRON BINDING CAPACITY: 340 UG/DL (ref 260–445)
WBC # BLD: 7.6 K/UL (ref 4–11)

## 2022-08-15 PROCEDURE — 99214 OFFICE O/P EST MOD 30 MIN: CPT | Performed by: NURSE PRACTITIONER

## 2022-08-15 PROCEDURE — 1123F ACP DISCUSS/DSCN MKR DOCD: CPT | Performed by: NURSE PRACTITIONER

## 2022-08-15 RX ORDER — ALENDRONATE SODIUM 70 MG/1
70 TABLET ORAL
Qty: 12 TABLET | Refills: 3 | Status: SHIPPED | OUTPATIENT
Start: 2022-08-15

## 2022-08-15 NOTE — PROGRESS NOTES
SUBJECTIVE:    Patient ID: Fuentes Parisi is a 78 y.o. female. CC: Atrial fib, hypertension, CKD, CHF, B12 deficiency, vitamin D deficiency, anemia    HPI: Present for routine visit for chronic medical problems. No specific new complaint today. She admits to not exercising or moving around much. She had trouble walking. She is staying home a lot. History of atrial fib, hypertension, and diastolic CHF. She is under the care of cardiology. She reports compliance with medication regimen. No chest pain. Shortness of breath with exertion. History of vitamin D and B12 deficiency. History of anemia. Denies any bleeding. Past Medical History:   Diagnosis Date    Arthritis     Atrial fibrillation and flutter (HCC)     Hypertension     Kidney disease     Pneumonia     Sleep apnea     no c-pap        Current Outpatient Medications   Medication Sig Dispense Refill    apixaban (ELIQUIS) 5 MG TABS tablet Take 1 tablet by mouth 2 times daily 180 tablet 3    metoprolol tartrate (LOPRESSOR) 25 MG tablet Take 1 tablet by mouth 2 times daily 180 tablet 3    alendronate (FOSAMAX) 70 MG tablet Take 1 tablet by mouth every 7 days 12 tablet 1    pantoprazole (PROTONIX) 40 MG tablet Take 1 tablet by mouth 2 times daily (before meals) 30 tablet 3    furosemide (LASIX) 20 MG tablet Take 1 tablet by mouth daily (Patient taking differently: Take 20 mg by mouth in the morning. Indications: taking PRN. ) 30 tablet 0    Cholecalciferol (VITAMIN D3) 1.25 MG (69642 UT) CAPS TAKE 1 CAPSULE BY MOUTH EVERY 7 DAYS 4 capsule 11    cloNIDine (CATAPRES) 0.2 MG tablet TAKE 1 TABLET BY MOUTH TWICE DAILY 180 tablet 3    DULoxetine (CYMBALTA) 60 MG extended release capsule TAKE 1 CAPSULE BY MOUTH DAILY 90 capsule 3    dilTIAZem (CARDIZEM CD) 240 MG extended release capsule TAKE 1 CAPSULE BY MOUTH DAILY 90 capsule 3    buPROPion (WELLBUTRIN SR) 200 MG extended release tablet Take 1 tablet by mouth 2 times daily 60 tablet 5 Biotin 1000 MCG TABS Take by mouth      aspirin 81 MG tablet Take 81 mg by mouth daily      Calcium Carbonate Antacid (TUMS PO) Take by mouth as needed (Patient not taking: No sig reported)      ferrous sulfate 325 (65 Fe) MG EC tablet Take 325 mg by mouth daily (with breakfast)  (Patient not taking: No sig reported)       No current facility-administered medications for this visit. Review of Systems   Constitutional:  Positive for fatigue. HENT: Negative. Respiratory:  Positive for shortness of breath. Cardiovascular: Negative. Genitourinary: Negative. Musculoskeletal:  Positive for arthralgias and gait problem. Skin: Negative. Psychiatric/Behavioral: Negative. OBJECTIVE:  Physical Exam  Vitals reviewed. Constitutional:       General: She is not in acute distress. Appearance: She is well-developed. She is not diaphoretic. HENT:      Head: Normocephalic and atraumatic. Eyes:      General: No scleral icterus. Conjunctiva/sclera: Conjunctivae normal.   Neck:      Vascular: No JVD. Cardiovascular:      Rate and Rhythm: Normal rate and regular rhythm. Pulmonary:      Effort: Pulmonary effort is normal. No respiratory distress. Breath sounds: Normal breath sounds. No wheezing. Abdominal:      General: There is no distension. Palpations: Abdomen is soft. Tenderness: There is no abdominal tenderness. There is no guarding or rebound. Musculoskeletal:         General: Normal range of motion. Cervical back: Neck supple. Skin:     General: Skin is warm and dry. Neurological:      Mental Status: She is alert and oriented to person, place, and time. Psychiatric:         Behavior: Behavior normal.         Thought Content:  Thought content normal.      /68   Pulse 69   Ht 5' 7\" (1.702 m)   SpO2 96%   BMI 38.22 kg/m²      PHQ Scores 3/24/2022 1/7/2022 11/3/2020 1/29/2020 9/23/2019 12/7/2018 6/5/2018   PHQ2 Score 6 0 0 6 2 2 1   PHQ9 Score 10 0 0 20 2 2 1     Interpretation of Total Score Depression Severity: 1-4 = Minimal depression, 5-9 = Mild depression, 10-14 = Moderate depression, 15-19 = Moderately severe depression, 20-27 =Severe depression        ASSESSMENT/PLAN:  Roseline Reyez was seen today for follow-up. Diagnoses and all orders for this visit:    Essential hypertension  - Normotensive  - she has met JNC standards.  - Continue current regimen.  -     Renal Function Panel; Future    Atrial fibrillation and flutter (HCC)  - Chronic, stable  - Continue current regimen    Chronic diastolic congestive heart failure (HCC)  - Chronic, stable  - Continue current regimen    Stage 3b chronic kidney disease ([facility] [address]  -     Renal Function Panel; Future    B12 deficiency  -     CBC with Auto Differential; Future  -     Vitamin B12 & Folate; Future    Vitamin D deficiency  -     CBC with Auto Differential; Future  -     Vitamin D 25 Hydroxy; Future    Iron deficiency anemia, unspecified iron deficiency anemia type  -     CBC with Auto Differential; Future  -     Iron and TIBC; Future  -     Ferritin; Future    Age-related osteoporosis without current pathological fracture  -     alendronate (FOSAMAX) 70 MG tablet;  Take 1 tablet by mouth every 7 days        MARILEE Anderson - CNP

## 2022-08-16 DIAGNOSIS — E53.8 B12 DEFICIENCY: Primary | ICD-10-CM

## 2022-08-16 LAB
FOLATE: 13.16 NG/ML (ref 4.78–24.2)
VITAMIN B-12: 197 PG/ML (ref 211–911)
VITAMIN D 25-HYDROXY: 79.3 NG/ML

## 2022-08-16 RX ORDER — UBIDECARENONE 75 MG
100 CAPSULE ORAL DAILY
Qty: 90 TABLET | Refills: 3 | Status: SHIPPED | OUTPATIENT
Start: 2022-08-16 | End: 2023-08-16

## 2022-08-29 PROBLEM — E55.9 VITAMIN D DEFICIENCY: Status: ACTIVE | Noted: 2022-08-29

## 2022-08-29 PROBLEM — N18.32 STAGE 3B CHRONIC KIDNEY DISEASE (HCC): Status: ACTIVE | Noted: 2022-08-29

## 2022-08-29 PROBLEM — E53.8 B12 DEFICIENCY: Status: ACTIVE | Noted: 2022-08-29

## 2022-08-29 PROBLEM — I50.32 CHRONIC DIASTOLIC CONGESTIVE HEART FAILURE (HCC): Status: ACTIVE | Noted: 2022-08-29

## 2022-08-29 PROBLEM — N18.4 CKD (CHRONIC KIDNEY DISEASE) STAGE 4, GFR 15-29 ML/MIN (HCC): Status: ACTIVE | Noted: 2022-08-29

## 2022-08-29 ASSESSMENT — ENCOUNTER SYMPTOMS: SHORTNESS OF BREATH: 1

## 2022-09-13 ENCOUNTER — HOSPITAL ENCOUNTER (INPATIENT)
Age: 77
LOS: 7 days | Discharge: HOME HEALTH | DRG: 193 | End: 2022-09-20
Attending: EMERGENCY MEDICINE | Admitting: INTERNAL MEDICINE
Payer: COMMERCIAL

## 2022-09-13 ENCOUNTER — APPOINTMENT (OUTPATIENT)
Dept: GENERAL RADIOLOGY | Age: 77
DRG: 193 | End: 2022-09-13
Payer: COMMERCIAL

## 2022-09-13 ENCOUNTER — TELEMEDICINE (OUTPATIENT)
Dept: INTERNAL MEDICINE CLINIC | Age: 77
End: 2022-09-13
Payer: COMMERCIAL

## 2022-09-13 DIAGNOSIS — J18.9 PNEUMONIA OF BOTH LUNGS DUE TO INFECTIOUS ORGANISM, UNSPECIFIED PART OF LUNG: Primary | ICD-10-CM

## 2022-09-13 DIAGNOSIS — R06.02 SHORTNESS OF BREATH: Primary | ICD-10-CM

## 2022-09-13 DIAGNOSIS — I10 ESSENTIAL HYPERTENSION: Chronic | ICD-10-CM

## 2022-09-13 DIAGNOSIS — J96.01 ACUTE RESPIRATORY FAILURE WITH HYPOXIA (HCC): ICD-10-CM

## 2022-09-13 PROBLEM — J96.00 ACUTE RESPIRATORY FAILURE (HCC): Status: ACTIVE | Noted: 2022-09-13

## 2022-09-13 LAB
A/G RATIO: 0.7 (ref 1.1–2.2)
ALBUMIN SERPL-MCNC: 3.1 G/DL (ref 3.4–5)
ALP BLD-CCNC: 176 U/L (ref 40–129)
ALT SERPL-CCNC: 9 U/L (ref 10–40)
ANION GAP SERPL CALCULATED.3IONS-SCNC: 11 MMOL/L (ref 3–16)
AST SERPL-CCNC: 12 U/L (ref 15–37)
BASE EXCESS VENOUS: 3.1 MMOL/L (ref -3–3)
BILIRUB SERPL-MCNC: 1.8 MG/DL (ref 0–1)
BILIRUB SERPL-MCNC: 1.9 MG/DL (ref 0–1)
BILIRUBIN DIRECT: 1 MG/DL (ref 0–0.3)
BILIRUBIN, INDIRECT: 0.9 MG/DL (ref 0–1)
BUN BLDV-MCNC: 25 MG/DL (ref 7–20)
CALCIUM SERPL-MCNC: 8.7 MG/DL (ref 8.3–10.6)
CARBOXYHEMOGLOBIN: 2.4 % (ref 0–1.5)
CHLORIDE BLD-SCNC: 101 MMOL/L (ref 99–110)
CO2: 27 MMOL/L (ref 21–32)
CREAT SERPL-MCNC: 1.5 MG/DL (ref 0.6–1.2)
GFR AFRICAN AMERICAN: 41
GFR NON-AFRICAN AMERICAN: 34
GLUCOSE BLD-MCNC: 122 MG/DL (ref 70–99)
HCO3 VENOUS: 28.6 MMOL/L (ref 23–29)
INR BLD: 1.49 (ref 0.87–1.14)
LACTIC ACID, SEPSIS: 1.4 MMOL/L (ref 0.4–1.9)
LACTIC ACID, SEPSIS: 1.8 MMOL/L (ref 0.4–1.9)
MAGNESIUM: 1.9 MG/DL (ref 1.8–2.4)
METHEMOGLOBIN VENOUS: 1.1 %
O2 CONTENT, VEN: 14 VOL %
O2 SAT, VEN: 98 %
O2 THERAPY: ABNORMAL
PCO2, VEN: 47.5 MMHG (ref 40–50)
PH VENOUS: 7.39 (ref 7.35–7.45)
PO2, VEN: 136 MMHG (ref 25–40)
POTASSIUM REFLEX MAGNESIUM: 4.4 MMOL/L (ref 3.5–5.1)
PRO-BNP: 7872 PG/ML (ref 0–449)
PROCALCITONIN: 0.33 NG/ML (ref 0–0.15)
PROTHROMBIN TIME: 18 SEC (ref 11.7–14.5)
RAPID INFLUENZA  B AGN: NEGATIVE
RAPID INFLUENZA A AGN: NEGATIVE
SODIUM BLD-SCNC: 139 MMOL/L (ref 136–145)
TCO2 CALC VENOUS: 67 MMOL/L
TOTAL PROTEIN: 7.7 G/DL (ref 6.4–8.2)
TROPONIN: <0.01 NG/ML

## 2022-09-13 PROCEDURE — 93005 ELECTROCARDIOGRAM TRACING: CPT | Performed by: EMERGENCY MEDICINE

## 2022-09-13 PROCEDURE — 1123F ACP DISCUSS/DSCN MKR DOCD: CPT | Performed by: INTERNAL MEDICINE

## 2022-09-13 PROCEDURE — 2580000003 HC RX 258: Performed by: PHYSICIAN ASSISTANT

## 2022-09-13 PROCEDURE — 6370000000 HC RX 637 (ALT 250 FOR IP): Performed by: INTERNAL MEDICINE

## 2022-09-13 PROCEDURE — 87641 MR-STAPH DNA AMP PROBE: CPT

## 2022-09-13 PROCEDURE — 87804 INFLUENZA ASSAY W/OPTIC: CPT

## 2022-09-13 PROCEDURE — 71045 X-RAY EXAM CHEST 1 VIEW: CPT

## 2022-09-13 PROCEDURE — 83880 ASSAY OF NATRIURETIC PEPTIDE: CPT

## 2022-09-13 PROCEDURE — 6370000000 HC RX 637 (ALT 250 FOR IP): Performed by: EMERGENCY MEDICINE

## 2022-09-13 PROCEDURE — 84484 ASSAY OF TROPONIN QUANT: CPT

## 2022-09-13 PROCEDURE — 2700000000 HC OXYGEN THERAPY PER DAY

## 2022-09-13 PROCEDURE — 83605 ASSAY OF LACTIC ACID: CPT

## 2022-09-13 PROCEDURE — 85025 COMPLETE CBC W/AUTO DIFF WBC: CPT

## 2022-09-13 PROCEDURE — 1200000000 HC SEMI PRIVATE

## 2022-09-13 PROCEDURE — 85610 PROTHROMBIN TIME: CPT

## 2022-09-13 PROCEDURE — 84145 PROCALCITONIN (PCT): CPT

## 2022-09-13 PROCEDURE — 82248 BILIRUBIN DIRECT: CPT

## 2022-09-13 PROCEDURE — 82803 BLOOD GASES ANY COMBINATION: CPT

## 2022-09-13 PROCEDURE — 87040 BLOOD CULTURE FOR BACTERIA: CPT

## 2022-09-13 PROCEDURE — 82247 BILIRUBIN TOTAL: CPT

## 2022-09-13 PROCEDURE — 6360000002 HC RX W HCPCS: Performed by: PHYSICIAN ASSISTANT

## 2022-09-13 PROCEDURE — 83735 ASSAY OF MAGNESIUM: CPT

## 2022-09-13 PROCEDURE — 94761 N-INVAS EAR/PLS OXIMETRY MLT: CPT

## 2022-09-13 PROCEDURE — 80053 COMPREHEN METABOLIC PANEL: CPT

## 2022-09-13 PROCEDURE — 96375 TX/PRO/DX INJ NEW DRUG ADDON: CPT

## 2022-09-13 PROCEDURE — 6360000002 HC RX W HCPCS: Performed by: INTERNAL MEDICINE

## 2022-09-13 PROCEDURE — 2580000003 HC RX 258: Performed by: INTERNAL MEDICINE

## 2022-09-13 PROCEDURE — 96365 THER/PROPH/DIAG IV INF INIT: CPT

## 2022-09-13 PROCEDURE — 36415 COLL VENOUS BLD VENIPUNCTURE: CPT

## 2022-09-13 PROCEDURE — U0005 INFEC AGEN DETEC AMPLI PROBE: HCPCS

## 2022-09-13 PROCEDURE — U0003 INFECTIOUS AGENT DETECTION BY NUCLEIC ACID (DNA OR RNA); SEVERE ACUTE RESPIRATORY SYNDROME CORONAVIRUS 2 (SARS-COV-2) (CORONAVIRUS DISEASE [COVID-19]), AMPLIFIED PROBE TECHNIQUE, MAKING USE OF HIGH THROUGHPUT TECHNOLOGIES AS DESCRIBED BY CMS-2020-01-R: HCPCS

## 2022-09-13 PROCEDURE — 99214 OFFICE O/P EST MOD 30 MIN: CPT | Performed by: INTERNAL MEDICINE

## 2022-09-13 PROCEDURE — 99285 EMERGENCY DEPT VISIT HI MDM: CPT

## 2022-09-13 RX ORDER — SODIUM CHLORIDE, SODIUM LACTATE, POTASSIUM CHLORIDE, CALCIUM CHLORIDE 600; 310; 30; 20 MG/100ML; MG/100ML; MG/100ML; MG/100ML
500 INJECTION, SOLUTION INTRAVENOUS ONCE
Status: COMPLETED | OUTPATIENT
Start: 2022-09-13 | End: 2022-09-13

## 2022-09-13 RX ORDER — FERROUS SULFATE 325(65) MG
325 TABLET ORAL
Status: DISCONTINUED | OUTPATIENT
Start: 2022-09-14 | End: 2022-09-20 | Stop reason: HOSPADM

## 2022-09-13 RX ORDER — DILTIAZEM HYDROCHLORIDE 120 MG/1
240 CAPSULE, COATED, EXTENDED RELEASE ORAL DAILY
Status: DISCONTINUED | OUTPATIENT
Start: 2022-09-14 | End: 2022-09-20 | Stop reason: HOSPADM

## 2022-09-13 RX ORDER — ACETAMINOPHEN 500 MG
1000 TABLET ORAL ONCE
Status: COMPLETED | OUTPATIENT
Start: 2022-09-13 | End: 2022-09-13

## 2022-09-13 RX ORDER — SODIUM CHLORIDE 9 MG/ML
1000 INJECTION, SOLUTION INTRAVENOUS CONTINUOUS
Status: ACTIVE | OUTPATIENT
Start: 2022-09-13 | End: 2022-09-14

## 2022-09-13 RX ORDER — ACETAMINOPHEN 325 MG/1
650 TABLET ORAL EVERY 6 HOURS PRN
Status: DISCONTINUED | OUTPATIENT
Start: 2022-09-13 | End: 2022-09-20 | Stop reason: HOSPADM

## 2022-09-13 RX ORDER — FUROSEMIDE 10 MG/ML
20 INJECTION INTRAMUSCULAR; INTRAVENOUS 2 TIMES DAILY
Status: DISCONTINUED | OUTPATIENT
Start: 2022-09-13 | End: 2022-09-16

## 2022-09-13 RX ORDER — SODIUM CHLORIDE 0.9 % (FLUSH) 0.9 %
10 SYRINGE (ML) INJECTION PRN
Status: DISCONTINUED | OUTPATIENT
Start: 2022-09-13 | End: 2022-09-20 | Stop reason: HOSPADM

## 2022-09-13 RX ORDER — ACETAMINOPHEN 650 MG/1
650 SUPPOSITORY RECTAL EVERY 6 HOURS PRN
Status: DISCONTINUED | OUTPATIENT
Start: 2022-09-13 | End: 2022-09-20 | Stop reason: HOSPADM

## 2022-09-13 RX ORDER — MAGNESIUM SULFATE IN WATER 40 MG/ML
2000 INJECTION, SOLUTION INTRAVENOUS PRN
Status: DISCONTINUED | OUTPATIENT
Start: 2022-09-13 | End: 2022-09-20 | Stop reason: HOSPADM

## 2022-09-13 RX ORDER — SODIUM CHLORIDE 0.9 % (FLUSH) 0.9 %
10 SYRINGE (ML) INJECTION EVERY 12 HOURS SCHEDULED
Status: DISCONTINUED | OUTPATIENT
Start: 2022-09-13 | End: 2022-09-20 | Stop reason: HOSPADM

## 2022-09-13 RX ORDER — PANTOPRAZOLE SODIUM 40 MG/1
40 TABLET, DELAYED RELEASE ORAL
Status: DISCONTINUED | OUTPATIENT
Start: 2022-09-14 | End: 2022-09-20 | Stop reason: HOSPADM

## 2022-09-13 RX ORDER — PROMETHAZINE HYDROCHLORIDE 25 MG/1
12.5 TABLET ORAL EVERY 6 HOURS PRN
Status: DISCONTINUED | OUTPATIENT
Start: 2022-09-13 | End: 2022-09-20 | Stop reason: HOSPADM

## 2022-09-13 RX ORDER — POTASSIUM CHLORIDE 7.45 MG/ML
10 INJECTION INTRAVENOUS PRN
Status: DISCONTINUED | OUTPATIENT
Start: 2022-09-13 | End: 2022-09-20 | Stop reason: HOSPADM

## 2022-09-13 RX ORDER — ASPIRIN 81 MG/1
81 TABLET, CHEWABLE ORAL DAILY
Status: DISCONTINUED | OUTPATIENT
Start: 2022-09-14 | End: 2022-09-20 | Stop reason: HOSPADM

## 2022-09-13 RX ORDER — ONDANSETRON 2 MG/ML
4 INJECTION INTRAMUSCULAR; INTRAVENOUS EVERY 6 HOURS PRN
Status: DISCONTINUED | OUTPATIENT
Start: 2022-09-13 | End: 2022-09-20 | Stop reason: HOSPADM

## 2022-09-13 RX ORDER — SODIUM CHLORIDE 9 MG/ML
INJECTION, SOLUTION INTRAVENOUS PRN
Status: DISCONTINUED | OUTPATIENT
Start: 2022-09-13 | End: 2022-09-20 | Stop reason: HOSPADM

## 2022-09-13 RX ADMIN — FUROSEMIDE 20 MG: 10 INJECTION, SOLUTION INTRAMUSCULAR; INTRAVENOUS at 22:17

## 2022-09-13 RX ADMIN — DEXTROSE MONOHYDRATE 500 MG: 50 INJECTION, SOLUTION INTRAVENOUS at 19:34

## 2022-09-13 RX ADMIN — APIXABAN 5 MG: 5 TABLET, FILM COATED ORAL at 22:17

## 2022-09-13 RX ADMIN — SODIUM CHLORIDE, POTASSIUM CHLORIDE, SODIUM LACTATE AND CALCIUM CHLORIDE 500 ML: 600; 310; 30; 20 INJECTION, SOLUTION INTRAVENOUS at 20:14

## 2022-09-13 RX ADMIN — SODIUM CHLORIDE 1000 ML: 9 INJECTION, SOLUTION INTRAVENOUS at 22:17

## 2022-09-13 RX ADMIN — Medication 1000 MG: at 19:25

## 2022-09-13 RX ADMIN — SODIUM CHLORIDE, PRESERVATIVE FREE 10 ML: 5 INJECTION INTRAVENOUS at 22:17

## 2022-09-13 RX ADMIN — METOPROLOL TARTRATE 25 MG: 25 TABLET, FILM COATED ORAL at 22:17

## 2022-09-13 RX ADMIN — ACETAMINOPHEN 1000 MG: 500 TABLET ORAL at 19:24

## 2022-09-13 ASSESSMENT — ENCOUNTER SYMPTOMS
ABDOMINAL PAIN: 0
WHEEZING: 1
ORTHOPNEA: 0
DIARRHEA: 0
SHORTNESS OF BREATH: 1
SORE THROAT: 0
COUGH: 1
SWOLLEN GLANDS: 0
COLOR CHANGE: 0
VOMITING: 0
NAUSEA: 0
RHINORRHEA: 0
SPUTUM PRODUCTION: 1
COUGH: 1
BACK PAIN: 0
CONSTIPATION: 0
HEMOPTYSIS: 0
VOMITING: 0
SHORTNESS OF BREATH: 1
CHEST TIGHTNESS: 1

## 2022-09-13 NOTE — ED PROVIDER NOTES
[address]        Pt Name: Milton Arciniega  MRN: [id number]  [place] 1945  Date of evaluation: 9/13/2022  Provider: RACHEL Espraza  PCP: MARILEE Choi - CNP  Note Started: 7:21 PM EDT        I have seen and evaluated this patient with my supervising physician Kiersten Cabrera, [facility]       Chief Complaint   Patient presents with    Shortness of Breath     Pt reports shortness of breath x2 days, slight chest pain and cough x2 days. Spo2 81% RA in triage. HISTORY OF PRESENT ILLNESS   (Location, Timing/Onset, Context/Setting, Quality, Duration, Modifying Factors, Severity, Associated Signs and Symptoms)  Note limiting factors. Chief Complaint: CLAYTON Arciniega is a 78 y.o. female with past medical history of atrial fibrillation/flutter anticoagulated on Eliquis, arthritis, hypertension and sleep apnea who presents to the ED with complaint of shortness of breath. Patient states for the past 2 to 3 days she has been short of breath. She states she has some chest discomfort with a cough productive of clear sputum. Denies any hemoptysis. Denies any pleuritic pain, orthopnea, pedal edema or calf tenderness. Denies abdominal pain, nausea/vomiting, urinary symptoms or change in bowel moods. Has had subjective fever and chills but has not checked her temperature. Denies headache, lightheadedness/dizziness, diaphoresis, syncope or near syncope. Denies any sick contacts or recent travel. Felt worse today and took home COVID test which was negative. Became concerned and came to the ED for further evaluation and treatment. Nursing Notes were all reviewed and agreed with or any disagreements were addressed in the HPI. REVIEW OF SYSTEMS    (2-9 systems for level 4, 10 or more for level 5)     Review of Systems   Constitutional:  Positive for activity change, fatigue and fever.  Negative for appetite change, chills and diaphoresis. Respiratory:  Positive for cough, chest tightness and shortness of breath. Cardiovascular: Negative. Negative for chest pain, palpitations and leg swelling. Gastrointestinal:  Negative for abdominal pain, constipation, diarrhea, nausea and vomiting. Genitourinary:  Negative for decreased urine volume, difficulty urinating, dysuria, flank pain, hematuria and urgency. Musculoskeletal:  Negative for arthralgias, back pain, myalgias, neck pain and neck stiffness. Skin:  Negative for color change, pallor, rash and wound. Neurological:  Negative for dizziness, light-headedness and headaches. Positives and Pertinent negatives as per HPI. Except as noted above in the ROS, all other systems were reviewed and negative.        PAST MEDICAL HISTORY     Past Medical History:   Diagnosis Date    Arthritis     Atrial fibrillation and flutter ([facility] [address]     Hypertension     Kidney disease     Pneumonia     Sleep apnea     no c-pap         SURGICAL HISTORY     Past Surgical History:   Procedure Laterality Date    CARDIOVERSION      COLONOSCOPY N/A 11/20/2018    COLONOSCOPY POLYPECTOMY SNARE/COLD BIOPSY performed by Kurt Emanuel MD at [facility] N/A 3/31/2022    COLONOSCOPY POLYPECTOMY SNARE/COLD BIOPSY performed by Sravanthi Aponte MD at [facility]      ankle rt has pins and rods, and rt elbow    GASTRIC BYPASS SURGERY      LARYNX SURGERY      TOTAL KNEE ARTHROPLASTY Bilateral 12/19/2012    TUBAL LIGATION      tubes tied    UPPER GASTROINTESTINAL ENDOSCOPY N/A 11/20/2018    EGD BIOPSY performed by Kurt Emanuel MD at [facility] N/A 3/31/2022    EGD DILATION BALLOON performed by Sravanthi Aponte MD at [facility] N/A 3/31/2022    EGD BIOPSY performed by Sravanthi Aponte MD at [address]       Previous Medications ALENDRONATE (FOSAMAX) 70 MG TABLET    Take 1 tablet by mouth every 7 days    APIXABAN (ELIQUIS) 5 MG TABS TABLET    Take 1 tablet by mouth 2 times daily    ASPIRIN 81 MG TABLET    Take 81 mg by mouth daily    BIOTIN 1000 MCG TABS    Take by mouth    BUPROPION (WELLBUTRIN SR) 200 MG EXTENDED RELEASE TABLET    Take 1 tablet by mouth 2 times daily    CALCIUM CARBONATE ANTACID (TUMS PO)    Take by mouth as needed    CHOLECALCIFEROL (VITAMIN D3) 1.25 MG (95486 UT) CAPS    TAKE 1 CAPSULE BY MOUTH EVERY 7 DAYS    CLONIDINE (CATAPRES) 0.2 MG TABLET    TAKE 1 TABLET BY MOUTH TWICE DAILY    DILTIAZEM (CARDIZEM CD) 240 MG EXTENDED RELEASE CAPSULE    TAKE 1 CAPSULE BY MOUTH DAILY    DULOXETINE (CYMBALTA) 60 MG EXTENDED RELEASE CAPSULE    TAKE 1 CAPSULE BY MOUTH DAILY    FERROUS SULFATE 325 (65 FE) MG EC TABLET    Take 325 mg by mouth daily (with breakfast)    FUROSEMIDE (LASIX) 20 MG TABLET    Take 1 tablet by mouth daily    METOPROLOL TARTRATE (LOPRESSOR) 25 MG TABLET    Take 1 tablet by mouth 2 times daily    PANTOPRAZOLE (PROTONIX) 40 MG TABLET    Take 1 tablet by mouth 2 times daily (before meals)    VITAMIN B-12 (CYANOCOBALAMIN) 100 MCG TABLET    Take 1 tablet by mouth in the morning.          ALLERGIES     Bee venom, Shellfish-derived products, Shrimp flavor, Codeine, Fentanyl and related, Hydromorphone, and Vancomycin    FAMILYHISTORY       Family History   Problem Relation Age of Onset    Cancer Father         stomach cancer          SOCIAL HISTORY       Social History     Tobacco Use    Smoking status: Never    Smokeless tobacco: Never   Vaping Use    Vaping Use: Never used   Substance Use Topics    Alcohol use: No    Drug use: No       SCREENINGS             PHYSICAL EXAM    (up to 7 for level 4, 8 or more for level 5)     ED Triage Vitals   BP Temp Temp Source Heart Rate Resp SpO2 Height Weight   09/13/22 1739 09/13/22 1733 09/13/22 1733 09/13/22 1733 09/13/22 1733 09/13/22 1733 -- 09/13/22 1733   (!) 169/94 99.8 °F (37.7 °C) Oral (!) 135 20 (!) 81 %  250 lb (113.4 kg)       Physical Exam  Constitutional:       General: She is not in acute distress. Appearance: She is well-developed. She is not ill-appearing, toxic-appearing or diaphoretic. HENT:      Head: Normocephalic and atraumatic. Right Ear: External ear normal.      Left Ear: External ear normal.   Eyes:      General:         Right eye: No discharge. Left eye: No discharge. Conjunctiva/sclera: Conjunctivae normal.   Cardiovascular:      Rate and Rhythm: Regular rhythm. Tachycardia present. Pulses: Normal pulses. Heart sounds: Normal heart sounds. No murmur heard. No friction rub. No gallop. Comments: 2+ radial pulses bilaterally. No pitting pedal edema. No calf tenderness. No JVD. Pulmonary:      Effort: Respiratory distress present. Breath sounds: No stridor. Rhonchi present. No wheezing or rales. Comments: Hypoxic on room air at 81%. Tachypnea noted. Conversational dyspnea noted. Rhonchorous lung sounds throughout. Chest:      Chest wall: No tenderness. Abdominal:      General: Abdomen is flat. There is no distension. Palpations: Abdomen is soft. There is no mass. Tenderness: There is no abdominal tenderness. There is no right CVA tenderness, left CVA tenderness, guarding or rebound. Hernia: No hernia is present. Musculoskeletal:         General: Normal range of motion. Cervical back: Normal range of motion and neck supple. No rigidity or tenderness. Lymphadenopathy:      Cervical: No cervical adenopathy. Skin:     General: Skin is warm and dry. Coloration: Skin is not pale. Findings: No erythema or rash. Neurological:      Mental Status: She is alert and oriented to person, place, and time.    Psychiatric:         Behavior: Behavior normal.       DIAGNOSTIC RESULTS   LABS:    Labs Reviewed   CBC WITH AUTO DIFFERENTIAL - Abnormal; Notable for the following components: Result Value    Hemoglobin 11.9 (*)     RDW 18.2 (*)     Neutrophils Absolute 7.8 (*)     Lymphocytes Absolute 0.7 (*)     Monocytes Absolute 1.7 (*)     All other components within normal limits   COMPREHENSIVE METABOLIC PANEL W/ REFLEX TO MG FOR LOW K - Abnormal; Notable for the following components:    Glucose 122 (*)     BUN 25 (*)     Creatinine 1.5 (*)     GFR Non- 34 (*)     GFR  41 (*)     Albumin 3.1 (*)     Albumin/Globulin Ratio 0.7 (*)     Total Bilirubin 1.8 (*)     Alkaline Phosphatase 176 (*)     ALT 9 (*)     AST 12 (*)     All other components within normal limits   BRAIN NATRIURETIC PEPTIDE - Abnormal; Notable for the following components:    Pro-BNP 7,872 (*)     All other components within normal limits   PROCALCITONIN - Abnormal; Notable for the following components:    Procalcitonin 0.33 (*)     All other components within normal limits   PROTIME-INR - Abnormal; Notable for the following components:    Protime 18.0 (*)     INR 1.49 (*)     All other components within normal limits   RAPID INFLUENZA A/B ANTIGENS   CULTURE, BLOOD 1   CULTURE, BLOOD 2   TROPONIN   LACTATE, SEPSIS   LACTATE, SEPSIS   COVID-19       When ordered only abnormal lab results are displayed. All other labs were within normal range or not returned as of this dictation. EKG: When ordered, EKG's are interpreted by the Emergency Department Physician in the absence of a cardiologist.  Please see their note for interpretation of EKG. RADIOLOGY:   Non-plain film images such as CT, Ultrasound and MRI are read by the radiologist. Plain radiographic images are visualized and preliminarily interpreted by the ED Provider with the below findings:        Interpretation per the Radiologist below, if available at the time of this note:    XR CHEST PORTABLE   Final Result   New bilateral perihilar airspace opacities are more prominent on the right. Pneumonia is favored over edema. Radiographic follow-up is suggested. XR CHEST PORTABLE    Result Date: 9/13/2022  EXAMINATION: ONE XRAY VIEW OF THE CHEST 9/13/2022 4:46 pm COMPARISON: 03/21/2019 and 03/30/2022. HISTORY: ORDERING SYSTEM PROVIDED HISTORY: sob TECHNOLOGIST PROVIDED HISTORY: Reason for exam:->sob Reason for Exam: sob FINDINGS: There are new bilateral perihilar opacities which are more prominent on the right. The costophrenic angles are sharp. Mild cardiomegaly is unchanged. There is no vascular congestion or interstitial prominence. New bilateral perihilar airspace opacities are more prominent on the right. Pneumonia is favored over edema. Radiographic follow-up is suggested. PROCEDURES   Unless otherwise noted below, none     Procedures    CRITICAL CARE TIME   There was a high probability of life-threatening clinical deterioration in the patient's condition requiring my urgent intervention. I personally saw the patient and independently provided 35 minutes of non-concurrent critical care out of the total shared critical care time provided, excluding separately reportable procedures.         CONSULTS:  None      EMERGENCY DEPARTMENT COURSE and DIFFERENTIAL DIAGNOSIS/MDM:   Vitals:    Vitals:    09/13/22 1733 09/13/22 1739 09/13/22 1845   BP:  (!) 169/94 (!) 168/157   Pulse: (!) 135  (!) 132   Resp: 20  28   Temp: 99.8 °F (37.7 °C)     TempSrc: Oral     SpO2: (!) 81% 95% 100%   Weight: 250 lb (113.4 kg)         Patient was given the following medications:  Medications   cefTRIAXone (ROCEPHIN) 1000 mg in sterile water 10 mL IV syringe (has no administration in time range)     And   azithromycin (ZITHROMAX) 500 mg in D5W 250ml Vial Mate (has no administration in time range)   acetaminophen (TYLENOL) tablet 1,000 mg (has no administration in time range)   lactated ringers infusion 500 mL (has no administration in time range)         Is this patient to be included in the SEP-1 Core Measure due to severe sepsis or septic shock? No   Exclusion criteria - the patient is NOT to be included for SEP-1 Core Measure due to:  May have criteria for sepsis, but does not meet criteria for severe sepsis or septic shock    Patient is a 68-year-old female who the ED with complaint of shortness of breath. Upon arrival patient noted to be tachycardic with heart rate in the 130s and hypoxic on room air at 81%. Currently on 3 L nasal cannula oxygen with oxygen saturation in the upper 90s. Tachypneic and still tachycardic. IV was established and blood work obtained. CBC showed normal white count and platelets. Hemoglobin 11.9. Lymphocytes 0.7. CMP showed creatinine of 1.5 but otherwise relatively unremarkable. Troponin was normal.  BNP 7800. Lactic acid was normal.  Blood cultures x2 obtained and pending at this time. Procalcitonin 0.33. Flu swab was negative. Given her hypoxia with cough did obtain COVID PCR testing. She reported home COVID test was negative this morning. Repeated COVID PCR testing given hypoxia and admission to the hospital anticipated. Chest x-ray showed new bilateral perihilar airspace opacities more prominent on the right pneumonia favored over edema. On arrival she is tachycardic with temp of 99.8 and reported cough. She had echo back in March 2022 which showed EF of 60 to 65%. Doubt hypervolemia/overload at this time. Suspect pneumonia with acute respiratory failure/hypoxia. Was ordered Rocephin and azithromycin. Given Tylenol. Given 500 cc bolus of fluids at this time. Does not appear to be septic shock or severe sepsis and do not believe 30 mL/kg fluid bolus indicated at this time. Will require admission. Case discussed with hospital service for admission. She is anticoagulated on Eliquis and has been taking as prescribed and doubt PE. FINAL IMPRESSION      1. Pneumonia of both lungs due to infectious organism, unspecified part of lung    2.  Acute respiratory failure with hypoxia ([address] DISPOSITION/PLAN   DISPOSITION Decision To Admit 09/13/2022 07:17:21 PM      PATIENT REFERRED TO:  No follow-up provider specified.     DISCHARGE MEDICATIONS:  New Prescriptions    No medications on file       DISCONTINUED MEDICATIONS:  Discontinued Medications    No medications on file              (Please note that portions of this note were completed with a voice recognition program.  Efforts were made to edit the dictations but occasionally words are mis-transcribed.)    RACHEL Rios (electronically signed)          RACHEL Coyle  09/13/22 2014 [place], [place]  09/13/22 [id number]

## 2022-09-13 NOTE — PROGRESS NOTES
[address] Uriarte (:  1945) is a Established patient, here for evaluation of the following:    Assessment & Plan   Below is the assessment and plan developed based on review of pertinent history, physical exam, labs, studies, and medications. 1. Shortness of breath    Patient is clearly an acute shortness of breath she has been progressively getting worse given her age and multiple risk factors I did explain to the patient that this could not be treated over the phone and she has to get to the emergency room as soon as she can either by calling 911 or going with her daughter which she prefers but at this stage and given the multiple comorbid conditions she is an extremely high risk to be treated as outpatient  Return if symptoms worsen or fail to improve. Subjective   Shortness of Breath  This is a new problem. The current episode started in the past 7 days. The problem occurs constantly. The problem has been gradually worsening. Associated symptoms include chest pain, sputum production and wheezing. Pertinent negatives include no coryza, ear pain, fever, hemoptysis, leg pain, leg swelling, neck pain, orthopnea, rhinorrhea, sore throat, swollen glands, syncope or vomiting. Cough  This is a new problem. The current episode started in the past 7 days. The problem has been gradually worsening. The cough is Productive of purulent sputum and productive of sputum. Associated symptoms include chest pain, shortness of breath and wheezing. Pertinent negatives include no ear pain, fever, hemoptysis, rhinorrhea or sore throat. Review of Systems   Constitutional:  Negative for fever. HENT:  Negative for ear pain, rhinorrhea and sore throat. Respiratory:  Positive for cough, sputum production, shortness of breath and wheezing. Negative for hemoptysis. Cardiovascular:  Positive for chest pain. Negative for orthopnea, leg swelling and syncope. Gastrointestinal:  Negative for vomiting. Musculoskeletal:  Negative for neck pain. Objective   Patient-Reported Vitals  Patient-Reported Weight: 250lb       Physical Exam  Constitutional:       General: She is in acute distress. Appearance: Normal appearance. She is obese. She is not diaphoretic. Pulmonary:      Effort: Respiratory distress present. Breath sounds: No rhonchi. Neurological:      Mental Status: She is alert. Psychiatric:         Mood and Affect: Mood normal.         Behavior: Behavior normal.         Thought Content:  Thought content normal.         Judgment: Judgment normal.     [INSTRUCTIONS:  \"[x]\" Indicates a positive item  \"[]\" Indicates a negative item  -- DELETE ALL ITEMS NOT EXAMINED]    Constitutional: [x] Appears well-developed and well-nourished [x] No apparent distress      [] Abnormal -     Mental status: [x] Alert and awake  [x] Oriented to person/place/time [x] Able to follow commands    [] Abnormal -     Eyes:   EOM    [x]  Normal    [] Abnormal -   Sclera  [x]  Normal    [] Abnormal -          Discharge [x]  None visible   [] Abnormal -     HENT: [x] Normocephalic, atraumatic  [] Abnormal -   [x] Mouth/Throat: Mucous membranes are moist    External Ears [x] Normal  [] Abnormal -    Neck: [x] No visualized mass [] Abnormal -     Pulmonary/Chest: [x] Respiratory effort normal   [x] No visualized signs of difficulty breathing or respiratory distress        [] Abnormal -      Musculoskeletal:   [x] Normal gait with no signs of ataxia         [x] Normal range of motion of neck        [] Abnormal -     Neurological:        [x] No Facial Asymmetry (Cranial nerve 7 motor function) (limited exam due to video visit)          [x] No gaze palsy        [] Abnormal -          Skin:        [x] No significant exanthematous lesions or discoloration noted on facial skin         [] Abnormal -            Psychiatric:       [x] Normal Affect [] Abnormal -        [x] No Hallucinations    Other pertinent observable physical exam findings:-         On this date 9/13/2022 I have spent 35 minutes reviewing previous notes, test results and face to face (virtual) with the patient discussing the diagnosis and importance of compliance with the treatment plan as well as documenting on the day of the visit. Brenda Roth, was evaluated through a synchronous (real-time) audio-video encounter. The patient (or guardian if applicable) is aware that this is a billable service, which includes applicable co-pays. This Virtual Visit was conducted with patient's (and/or legal guardian's) consent. The visit was conducted pursuant to the emergency declaration under the [facility], [address] [place] [address] authority and the [employer] General Act. Patient identification was verified, and a caregiver was present when appropriate. The patient was located at Home: [address] [place] [address].    Provider was located at [facility] ([address] [place], [place] [address]t): [address] [place] [address],  [address].        --Nan Almaraz MD

## 2022-09-14 LAB
A/G RATIO: 0.6 (ref 1.1–2.2)
ALBUMIN SERPL-MCNC: 2.5 G/DL (ref 3.4–5)
ALP BLD-CCNC: 140 U/L (ref 40–129)
ALT SERPL-CCNC: <5 U/L (ref 10–40)
ANION GAP SERPL CALCULATED.3IONS-SCNC: 11 MMOL/L (ref 3–16)
AST SERPL-CCNC: 12 U/L (ref 15–37)
BASOPHILS ABSOLUTE: 0 K/UL (ref 0–0.2)
BASOPHILS ABSOLUTE: 0 K/UL (ref 0–0.2)
BASOPHILS RELATIVE PERCENT: 0.3 %
BASOPHILS RELATIVE PERCENT: 0.5 %
BILIRUB SERPL-MCNC: 0.9 MG/DL (ref 0–1)
BILIRUBIN URINE: NEGATIVE
BLOOD, URINE: NEGATIVE
BUN BLDV-MCNC: 27 MG/DL (ref 7–20)
CALCIUM SERPL-MCNC: 8.6 MG/DL (ref 8.3–10.6)
CHLORIDE BLD-SCNC: 102 MMOL/L (ref 99–110)
CLARITY: CLEAR
CO2: 26 MMOL/L (ref 21–32)
COLOR: YELLOW
CREAT SERPL-MCNC: 1.3 MG/DL (ref 0.6–1.2)
EKG ATRIAL RATE: 241 BPM
EKG DIAGNOSIS: NORMAL
EKG Q-T INTERVAL: 320 MS
EKG QRS DURATION: 80 MS
EKG QTC CALCULATION (BAZETT): 480 MS
EKG R AXIS: -16 DEGREES
EKG T AXIS: 48 DEGREES
EKG VENTRICULAR RATE: 135 BPM
EOSINOPHILS ABSOLUTE: 0 K/UL (ref 0–0.6)
EOSINOPHILS ABSOLUTE: 0.1 K/UL (ref 0–0.6)
EOSINOPHILS RELATIVE PERCENT: 0.2 %
EOSINOPHILS RELATIVE PERCENT: 0.7 %
GFR AFRICAN AMERICAN: 48
GFR NON-AFRICAN AMERICAN: 40
GLUCOSE BLD-MCNC: 92 MG/DL (ref 70–99)
GLUCOSE URINE: NEGATIVE MG/DL
HCT VFR BLD CALC: 36.8 % (ref 36–48)
HCT VFR BLD CALC: 38 % (ref 36–48)
HEMATOLOGY PATH CONSULT: NORMAL
HEMATOLOGY PATH CONSULT: YES
HEMOGLOBIN: 11 G/DL (ref 12–16)
HEMOGLOBIN: 11.9 G/DL (ref 12–16)
KETONES, URINE: NEGATIVE MG/DL
LEUKOCYTE ESTERASE, URINE: NEGATIVE
LYMPHOCYTES ABSOLUTE: 0.7 K/UL (ref 1–5.1)
LYMPHOCYTES ABSOLUTE: 0.7 K/UL (ref 1–5.1)
LYMPHOCYTES RELATIVE PERCENT: 7.2 %
LYMPHOCYTES RELATIVE PERCENT: 7.5 %
MAGNESIUM: 1.9 MG/DL (ref 1.8–2.4)
MCH RBC QN AUTO: 26.5 PG (ref 26–34)
MCH RBC QN AUTO: 26.8 PG (ref 26–34)
MCHC RBC AUTO-ENTMCNC: 29.9 G/DL (ref 31–36)
MCHC RBC AUTO-ENTMCNC: 31.2 G/DL (ref 31–36)
MCV RBC AUTO: 85.9 FL (ref 80–100)
MCV RBC AUTO: 88.7 FL (ref 80–100)
MICROSCOPIC EXAMINATION: NORMAL
MONOCYTES ABSOLUTE: 1.5 K/UL (ref 0–1.3)
MONOCYTES ABSOLUTE: 1.7 K/UL (ref 0–1.3)
MONOCYTES RELATIVE PERCENT: 16 %
MONOCYTES RELATIVE PERCENT: 16.9 %
NEUTROPHILS ABSOLUTE: 7 K/UL (ref 1.7–7.7)
NEUTROPHILS ABSOLUTE: 7.8 K/UL (ref 1.7–7.7)
NEUTROPHILS RELATIVE PERCENT: 75.3 %
NEUTROPHILS RELATIVE PERCENT: 75.4 %
NITRITE, URINE: NEGATIVE
PDW BLD-RTO: 18.2 % (ref 12.4–15.4)
PDW BLD-RTO: 18.3 % (ref 12.4–15.4)
PH UA: 5 (ref 5–8)
PLATELET # BLD: 243 K/UL (ref 135–450)
PLATELET # BLD: 292 K/UL (ref 135–450)
PMV BLD AUTO: 10 FL (ref 5–10.5)
PMV BLD AUTO: 10.3 FL (ref 5–10.5)
POTASSIUM SERPL-SCNC: 4.3 MMOL/L (ref 3.5–5.1)
PROTEIN UA: NEGATIVE MG/DL
RBC # BLD: 4.15 M/UL (ref 4–5.2)
RBC # BLD: 4.42 M/UL (ref 4–5.2)
SARS-COV-2: NOT DETECTED
SODIUM BLD-SCNC: 139 MMOL/L (ref 136–145)
SPECIFIC GRAVITY UA: 1.01 (ref 1–1.03)
TOTAL PROTEIN: 6.8 G/DL (ref 6.4–8.2)
TROPONIN: <0.01 NG/ML
URINE TYPE: NORMAL
UROBILINOGEN, URINE: 1 E.U./DL
WBC # BLD: 10.3 K/UL (ref 4–11)
WBC # BLD: 9.3 K/UL (ref 4–11)

## 2022-09-14 PROCEDURE — 2580000003 HC RX 258: Performed by: INTERNAL MEDICINE

## 2022-09-14 PROCEDURE — 6360000002 HC RX W HCPCS: Performed by: INTERNAL MEDICINE

## 2022-09-14 PROCEDURE — 87205 SMEAR GRAM STAIN: CPT

## 2022-09-14 PROCEDURE — 97116 GAIT TRAINING THERAPY: CPT

## 2022-09-14 PROCEDURE — 83735 ASSAY OF MAGNESIUM: CPT

## 2022-09-14 PROCEDURE — 97530 THERAPEUTIC ACTIVITIES: CPT

## 2022-09-14 PROCEDURE — 84484 ASSAY OF TROPONIN QUANT: CPT

## 2022-09-14 PROCEDURE — 36415 COLL VENOUS BLD VENIPUNCTURE: CPT

## 2022-09-14 PROCEDURE — 94761 N-INVAS EAR/PLS OXIMETRY MLT: CPT

## 2022-09-14 PROCEDURE — 80053 COMPREHEN METABOLIC PANEL: CPT

## 2022-09-14 PROCEDURE — 6370000000 HC RX 637 (ALT 250 FOR IP): Performed by: INTERNAL MEDICINE

## 2022-09-14 PROCEDURE — 93010 ELECTROCARDIOGRAM REPORT: CPT | Performed by: INTERNAL MEDICINE

## 2022-09-14 PROCEDURE — 87449 NOS EACH ORGANISM AG IA: CPT

## 2022-09-14 PROCEDURE — 97161 PT EVAL LOW COMPLEX 20 MIN: CPT

## 2022-09-14 PROCEDURE — 85025 COMPLETE CBC W/AUTO DIFF WBC: CPT

## 2022-09-14 PROCEDURE — 94640 AIRWAY INHALATION TREATMENT: CPT

## 2022-09-14 PROCEDURE — 87070 CULTURE OTHR SPECIMN AEROBIC: CPT

## 2022-09-14 PROCEDURE — 1200000000 HC SEMI PRIVATE

## 2022-09-14 PROCEDURE — 2700000000 HC OXYGEN THERAPY PER DAY

## 2022-09-14 PROCEDURE — 97166 OT EVAL MOD COMPLEX 45 MIN: CPT

## 2022-09-14 PROCEDURE — 97535 SELF CARE MNGMENT TRAINING: CPT

## 2022-09-14 PROCEDURE — 6360000002 HC RX W HCPCS

## 2022-09-14 PROCEDURE — 81003 URINALYSIS AUTO W/O SCOPE: CPT

## 2022-09-14 PROCEDURE — 94669 MECHANICAL CHEST WALL OSCILL: CPT

## 2022-09-14 PROCEDURE — 99223 1ST HOSP IP/OBS HIGH 75: CPT | Performed by: INTERNAL MEDICINE

## 2022-09-14 RX ORDER — IPRATROPIUM BROMIDE AND ALBUTEROL SULFATE 2.5; .5 MG/3ML; MG/3ML
1 SOLUTION RESPIRATORY (INHALATION)
Status: DISCONTINUED | OUTPATIENT
Start: 2022-09-14 | End: 2022-09-17

## 2022-09-14 RX ORDER — ARFORMOTEROL TARTRATE 15 UG/2ML
15 SOLUTION RESPIRATORY (INHALATION) 2 TIMES DAILY
Status: DISCONTINUED | OUTPATIENT
Start: 2022-09-14 | End: 2022-09-20 | Stop reason: HOSPADM

## 2022-09-14 RX ORDER — BUDESONIDE 0.5 MG/2ML
0.5 INHALANT ORAL 2 TIMES DAILY
Status: DISCONTINUED | OUTPATIENT
Start: 2022-09-14 | End: 2022-09-20 | Stop reason: HOSPADM

## 2022-09-14 RX ORDER — BUDESONIDE 0.5 MG/2ML
INHALANT ORAL
Status: COMPLETED
Start: 2022-09-14 | End: 2022-09-14

## 2022-09-14 RX ADMIN — SODIUM CHLORIDE, PRESERVATIVE FREE 10 ML: 5 INJECTION INTRAVENOUS at 08:32

## 2022-09-14 RX ADMIN — DILTIAZEM HYDROCHLORIDE 240 MG: 120 CAPSULE, COATED, EXTENDED RELEASE ORAL at 08:31

## 2022-09-14 RX ADMIN — IPRATROPIUM BROMIDE AND ALBUTEROL SULFATE 1 AMPULE: 2.5; .5 SOLUTION RESPIRATORY (INHALATION) at 12:49

## 2022-09-14 RX ADMIN — FUROSEMIDE 20 MG: 10 INJECTION, SOLUTION INTRAMUSCULAR; INTRAVENOUS at 18:17

## 2022-09-14 RX ADMIN — BUDESONIDE 500 MCG: 0.5 SUSPENSION RESPIRATORY (INHALATION) at 19:35

## 2022-09-14 RX ADMIN — BUDESONIDE 500 MCG: 0.5 SUSPENSION RESPIRATORY (INHALATION) at 12:50

## 2022-09-14 RX ADMIN — IPRATROPIUM BROMIDE AND ALBUTEROL SULFATE 1 AMPULE: 2.5; .5 SOLUTION RESPIRATORY (INHALATION) at 19:34

## 2022-09-14 RX ADMIN — ARFORMOTEROL TARTRATE 15 MCG: 15 SOLUTION RESPIRATORY (INHALATION) at 19:47

## 2022-09-14 RX ADMIN — METOPROLOL TARTRATE 25 MG: 25 TABLET, FILM COATED ORAL at 21:03

## 2022-09-14 RX ADMIN — IPRATROPIUM BROMIDE AND ALBUTEROL SULFATE 1 AMPULE: 2.5; .5 SOLUTION RESPIRATORY (INHALATION) at 16:20

## 2022-09-14 RX ADMIN — AZITHROMYCIN DIHYDRATE 500 MG: 500 INJECTION, POWDER, LYOPHILIZED, FOR SOLUTION INTRAVENOUS at 18:36

## 2022-09-14 RX ADMIN — FUROSEMIDE 20 MG: 10 INJECTION, SOLUTION INTRAMUSCULAR; INTRAVENOUS at 08:31

## 2022-09-14 RX ADMIN — ARFORMOTEROL TARTRATE 15 MCG: 15 SOLUTION RESPIRATORY (INHALATION) at 12:50

## 2022-09-14 RX ADMIN — APIXABAN 5 MG: 5 TABLET, FILM COATED ORAL at 08:31

## 2022-09-14 RX ADMIN — SODIUM CHLORIDE, PRESERVATIVE FREE 10 ML: 5 INJECTION INTRAVENOUS at 21:06

## 2022-09-14 RX ADMIN — METOPROLOL TARTRATE 25 MG: 25 TABLET, FILM COATED ORAL at 08:31

## 2022-09-14 RX ADMIN — Medication 1000 MG: at 21:04

## 2022-09-14 RX ADMIN — PANTOPRAZOLE SODIUM 40 MG: 40 TABLET, DELAYED RELEASE ORAL at 06:23

## 2022-09-14 RX ADMIN — FERROUS SULFATE TAB 325 MG (65 MG ELEMENTAL FE) 325 MG: 325 (65 FE) TAB at 08:31

## 2022-09-14 RX ADMIN — ASPIRIN 81 MG 81 MG: 81 TABLET ORAL at 08:31

## 2022-09-14 RX ADMIN — PANTOPRAZOLE SODIUM 40 MG: 40 TABLET, DELAYED RELEASE ORAL at 16:12

## 2022-09-14 RX ADMIN — APIXABAN 5 MG: 5 TABLET, FILM COATED ORAL at 21:03

## 2022-09-14 ASSESSMENT — PAIN SCALES - GENERAL
PAINLEVEL_OUTOF10: 0
PAINLEVEL_OUTOF10: 0

## 2022-09-14 NOTE — CARE COORDINATION
Discharge Planning:     (CM) reviewed the patient's chart to assess needs. Patient's Readmission Risk Score is 14%. Patient's medical insurance is [employer]. Patient's PCP is Dr. Migue Dangelo. No needs anticipated, at this time. CM team to follow. Staff to inform CM if additional discharge needs arise.       Paris LEO, BRODERICK, [facility] -   [phone]    Electronically signed by FELIPA Fraser on 9/14/2022 at 7:51 AM
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
## 2022-09-14 NOTE — PROGRESS NOTES
Assessment completed. VSS. Patient awake, alert, and in bed. Patient on 2LNC at 96%. Bolus continued. MRSA swab sent to lab. 4 eyes completed by two RN's. Patient educated on being NPO at midnight. Still waiting to get sputum and urine sample. No complaints of pain or discomfort at this time. Safety and isolation precautions in place. Call light within reach. Will continue to monitor.

## 2022-09-14 NOTE — PLAN OF CARE
Problem: Discharge Planning  Goal: Discharge to home or other facility with appropriate resources  Outcome: Progressing  Flowsheets (Taken 9/13/2022 2239 by Siegfried Brunner, RN)  Discharge to home or other facility with appropriate resources: Identify barriers to discharge with patient and caregiver     Problem: Safety - Adult  Goal: Free from fall injury  Outcome: Progressing     Problem: ABCDS Injury Assessment  Goal: Absence of physical injury  Outcome: Progressing

## 2022-09-14 NOTE — DISCHARGE INSTR - DIET
For nutrition questions after discharge please call the Registered Dietitian at [phone]. Heart Failure Nutrition Therapy  This diet will help you feel better and support your heart by reducing symptoms of fluid retention, shortness of breath and swelling. You should focus on:  Limiting sodium in your diet by reading labels and limiting foods high in sodium. Limit your daily sodium intake to 2,000 mg per day. Select foods with 140 mg of sodium or less per serving. Foods with more than 300 mg of sodium per serving may not fit into a reduced-sodium meal plan. Check serving sizes. If you eat more than 1 serving, you will get more sodium than the amount listed. Limiting fluid in your diet. Ask your doctor how much fluid you can have per day  Remember foods that are liquid at room temperature such as popsicles, soup, ice cream and Jell-O are fluids. Checking your weight to make sure you're not retaining too much fluid. Weigh yourself every morning. If you gain 3 or more pounds in one day or 5 pounds within 1 week, call your doctor. Foods to choose and avoid:  Avoid processed foods. Eat more fresh foods. Fresh and frozen fruits and vegetables are good choices. Choose fresh meats. Avoid processed meats such as castro, sausage and hot dogs. Do not add salt to your food while cooking or at the table. Try dry or fresh herbs, pepper, lemon juice, or a sodium-free seasoning blend such as Mrs. Dash to add flavor to food. Do not use a salt substitute. Use caution at [employer] foods are high in sodium. Ask for your food to be cooked without salt and request sauces and dressing to come on the side. 

## 2022-09-14 NOTE — ED PROVIDER NOTES
In addition to the advanced practice provider, I personally saw Tellis Cockayne and performed a substantive portion of the visit including all aspects of the medical decision making. Briefly, this is a 78 y.o. female here for shortness of breath worsening over the past 2 days. Associated with cough productive of greenish sputum. On arrival to the emergency department, patient was noted to be hypoxic in the low 80s. No supplemental oxygen use at baseline. On exam, patient febrile and ill-appearing. She is in respiratory distress. Heart tachycardic, irregular rhythm. Lungs with diffuse rhonchi. Abdomen soft, nondistended, nontender to palpation in all quadrants. EKG  EKG was reviewed by emergency department physician in the absence of a cardiologist    Narrow complex irregular rhythm, rate 135, normal axis, normal QRS intervals, normal Qtc, no specific ST elevations or depressions, normal t-wave morphology, impression atrial flutter with frequent PVCs, no STEMI      Screenings   [place] Coma Scale  Eye Opening: Spontaneous  Best Verbal Response: Oriented  Best Motor Response: Obeys commands  Yul[place] Coma Scale Score: 15      Is this patient to be included in the SEP-1 Core Measure due to severe sepsis or septic shock? No   Exclusion criteria - the patient is NOT to be included for SEP-1 Core Measure due to:  May have criteria for sepsis, but does not meet criteria for severe sepsis or septic shock      MDM    Patient arrives to the emergency department febrile and ill-appearing. She is hypoxic and in respiratory distress. Her distress and hypoxia was corrected with supplemental oxygen by nasal cannula. EKG with atrial flutter, no STEMI, troponin normal, ACS is not immediately evident. CXR with likely multifocal pneumonia. COVID-19 testing obtained. Given concern for bacterial pneumonia, patient did receive empiric treatment with broad-spectrum antibiotics.   She is not hypotensive, not in shock.  Given patient's ongoing infection, will defer aggressive rate control of atrial flutter at this time. Case discussed with internal medicine team who will admit for further evaluation and care. Patient alert, hemodynamically stable with corrected hypoxia at time of admission    I Dr. Valentina Mcneil am the primary clinician of record. Critical Care:    I have discussed the case with the advanced practice provider. I have personally performed a history, physical exam, and my own medical decision making. I have reviewed the note and agree with the findings and plan. Upon my evaluation, this patient had a high probability of imminent or life-threatening deterioration due to acute hypoxic respiratory failure which required my direct attention, intervention, and personal management. I personally saw the patient and independently provided 34 minutes of non-concurrent critical care out of the total shared critical care time provided. The critical care time spent while evaluating and treating this patient was exclusive of any time spent doing separately billable procedures. This critical care time includes time at the bedside, data interpretation, medication management, monitoring for potential decompensation and physician consultation. Specifics of interventions taken and potentially life-threatening diagnostic considerations are listed above in the medical decision making. Patient Referrals:  No follow-up provider specified. Discharge Medications:  Current Discharge Medication List          FINAL IMPRESSION  1. Pneumonia of both lungs due to infectious organism, unspecified part of lung    2. Acute respiratory failure with hypoxia (HCC)        Blood pressure (!) 153/77, pulse 98, temperature 97.7 °F (36.5 °C), temperature source Temporal, resp. rate 20, height 5' 7\" (1.702 m), weight 238 lb 4.8 oz (108.1 kg), SpO2 98 %, not currently breastfeeding.      For further details of [address] [place] [address]

## 2022-09-14 NOTE — PROGRESS NOTES
Physical Therapy    [address] [facility] Department   Phone: [phone]    Physical Therapy    [x] Initial Evaluation            [] Daily Treatment Note         [] Discharge Summary      Patient: Neri Easley   : 1945   MRN: [id number]   Date of Service:  2022  Admitting Diagnosis: Acute respiratory failure [facility])  Current Admission Summary: Neri Easley is a 78 y.o. female with past medical history of atrial fibrillation/flutter anticoagulated on Eliquis, arthritis, hypertension and sleep apnea who presents to the ED with complaint of shortness of breath. Patient states for the past 2 to 3 days she has been short of breath. She states she has some chest discomfort with a cough productive of clear sputum. Denies any hemoptysis. Denies any pleuritic pain, orthopnea, pedal edema or calf tenderness. Denies abdominal pain, nausea/vomiting, urinary symptoms or change in bowel moods. Has had subjective fever and chills but has not checked her temperature. Denies headache, lightheadedness/dizziness, diaphoresis, syncope or near syncope. Denies any sick contacts or recent travel. Felt worse today and took home COVID test which was negative. Became concerned and came to the ED for further evaluation and treatment. Past Medical History:  has a past medical history of Arthritis, Atrial fibrillation and flutter ([address] Hypertension, Kidney disease, Pneumonia, and Sleep apnea. Past Surgical History:  has a past surgical history that includes Tubal ligation; Total knee arthroplasty (Bilateral, 2012); fracture surgery; Colonoscopy (N/A, 2018); Upper gastrointestinal endoscopy (N/A, 2018); Cardioversion; Gastric bypass surgery; Larynx surgery; Upper gastrointestinal endoscopy (N/A, 3/31/2022); Colonoscopy (N/A, 3/31/2022); and Upper gastrointestinal endoscopy (N/A, 3/31/2022).   Discharge Recommendations: Neri Easley scored a 18/24 on the AM-PAC short mobility form. Current research shows that an AM-PAC score of 18 or greater is typically associated with a discharge to the patient's home setting. Based on the patient's AM-PAC score and their current functional mobility deficits, it is recommended that the patient have 2-3 sessions per week of Physical Therapy at d/c to increase the patient's independence. At this time, this patient demonstrates the endurance and safety to discharge home with HHPT and a follow up treatment frequency of 2-3x/wk. Please see assessment section for further patient specific details. If patient discharges prior to next session this note will serve as a discharge summary. Please see below for the latest assessment towards goals. DME Required For Discharge: patient has all required DME for discharge  Precautions/Restrictions: high fall risk, Isolation precautions COVID RULE OUT   Weight Bearing Restrictions: no restrictions  [] Right Upper Extremity  [] Left Upper Extremity [] Right Lower Extremity  [] Left Lower Extremity     Required Braces/Orthotics: no braces required   [] Right  [] Left  Positional Restrictions:no positional restrictions    Social/Functional History  Lives With: Natasha Davidson (daughter and 2 adult grandchildren, 16 month old grandchild, foster child, son)  Type of Home: House  Home Layout: One level,Performs ADL's on one level,Able to Live on Main level with bedroom/bathroom  Home Access: Stairs to enter without rails  Entrance Stairs - Number of Steps: 3 CAREY  Bathroom Shower/Tub: Walk-in shower  Bathroom Toilet: Handicap height  Bathroom Equipment: Hand-held shower,Built-in shower seat  Home Equipment: Cane,Electric scooter (uses cane at home and in community.  Uses electric scooter around neighborhood, use it for gardeningm, or when walking long distances)  Receives Help From: Family  ADL Assistance: Independent  Homemaking Assistance: Needs assistance (pt does some cooking, daughter does laundry in basement, family does cleaning.)  Homemaking Responsibilities: Yes  Ambulation Assistance: Independent  Transfer Assistance: Independent (sleeps in lift chair)  Active : Yes (but has been having hard time getting in and out of car)   Patient's  Info: does not drive very often, family mostly drives patient to appointments. Additional Comments: ~2 falls in the past 6 months (dizzy in living room and fell and hit head, other time trying to step off of curb and fell)     Examination   Vision:   Vision Gross Assessment: Impaired and Vision Corrective Device: wears glasses at all times  Hearing:   WFL  Observation:   Wet cough throughout session  Pt reports pink eye in both eyes   Posture: Forward head, rounded shoulders   Sensation:   Pt reports numbness in feet>hands   ROM:   (B) LE AROM WFL  Strength:   (B) LE strength grossly WFL  Decision Making: low complexity  Clinical Presentation: stable      Subjective  General: Pt supine in bed upon arrival. Pt agreeable to PT/OT eval. Pt on 2L O2 resting at Spo2 99%  Pain: 0/10  Pain Interventions: not applicable       Functional Mobility  Bed Mobility  Supine to Sit: stand by assistance  Sit to Supine: stand by assistance  Comments:  Transfers  Sit to stand transfer: contact guard assistance  Stand to sit transfer: contact guard assistance  Comments: transfers from EOB   Ambulation  Surface:level surface  Assistive Device: single point cane  Assistance: contact guard assistance  Distance: 10ft x2  Gait Mechanics: medial/lateral sway, decreased step length, decreased step height, decreased clara, fatigues quickly   Comments:  Pt ambulates 10ft x2 in room and declines further ambulation this date due to feeling tired. Reports she does not walk far distances at home   Stair Mobility  Stair mobility not completed on this date. Comments:  Wheelchair Mobility:  No w/c mobility completed on this date.   Comments:  Balance  Static Sitting Balance: fair (+): maintains balance at SBA/supervision without use of UE support  Dynamic Sitting Balance: fair (+): maintains balance at SBA/supervision without use of UE support  Static Standing Balance: fair (-): maintains balance at CGA with use of UE support  Dynamic Standing Balance: fair (-): maintains balance at CGA with use of UE support  Comments: Pt sits at EOB ~5 mins with SBA while OT assists with facial cleansing (see OT notes)     Other Therapeutic Interventions    Functional Outcomes  AM-PAC Inpatient Mobility Raw Score : 18              Cognition  WFL  Orientation:    alert and oriented x 4  Command Following:   [facility]    Education  Barriers To Learning: none  Patient Education: patient educated on goals, PT role and benefits, plan of care, discharge recommendations  Learning Assessment:  patient verbalizes and demonstrates understanding    Assessment  Activity Tolerance: Pt on 2L O2 with Spo2 >96% throughout session. Pt does have decreased endurance and fatigues quickly with ambulation. Impairments Requiring Therapeutic Intervention: decreased functional mobility, decreased strength, decreased endurance, decreased balance  Prognosis: good  Clinical Assessment: Pt presents to hospital with SOB and productive cough. Pt reports prior level of mod (I) with SPC and lives with family in one level home. At this time, pt completes functional transfers with CGA and can only tolerate ambulating short distances with SPC CGA.  Pt would continue to benefit from skilled PT to address above deficits and safely return to PLOF   Safety Interventions: patient left in bed, bed alarm in place, call light within reach, gait belt, patient at risk for falls, and nurse notified (pt encouraged and educated to sit up in chair at this time however pt politely declines)     Plan  Frequency: 3-5 x/per week  Current Treatment Recommendations: strengthening, balance training, functional mobility training, transfer training, gait training, stair training, endurance training, and safety education    Goals  Patient Goals: none stated    Short Term Goals:  Time Frame: upon discharge   Patient will complete bed mobility at Independent   Patient will complete transfers at Independent   Patient will ambulate 50 ft with use of single point cane at modified independent  Patient will ascend/descend 3 stairs without use of HR at supervision    Therapy Session Time      Individual Group Co-treatment   Time In     1031   Time Out     1109   Minutes     38     Timed Code Treatment Minutes:   23  Total Treatment Minutes:  38       Electronically Signed By: Maryhelen Mohs, [place] [facility], [address] [place] [address]

## 2022-09-14 NOTE — CONSULTS
PULMONARY AND CRITICAL CARE MEDICINE CONSULTATION NOTE    CONSULTING PHYSICIAN:  Walter Jett MD    ADMISSION DATE: 9/13/2022  ADMISSION DIAGNOSIS: Acute respiratory failure (HCC) [J96.00]  Acute respiratory failure with hypoxia (HCC) [J96.01]  Pneumonia of both lungs due to infectious organism, unspecified part of lung [J18.9]    REASON FOR CONSULT:   Chief Complaint   Patient presents with    Shortness of Breath     Pt reports shortness of breath x2 days, slight chest pain and cough x2 days. Spo2 81% RA in triage. DATE OF CONSULT: 9/13/2022    HISTORY OF PRESENT ILLNESS: 78y.o. year old female with a past medical history significant for chronic arthritis, atrial fibrillation, hypertension, chronic kidney disease, GIANNA not on CPAP presented to the hospital with worsening shortness of breath and cough. Patient reports that for the last 2 days she has been having increased shortness of breath associated with wheezing and cough. Cough is nonproductive. She feels that mucus is stuck inside her chest and she is unable to expectorate. No established pulmonary diagnosis in the past.  Lifelong non-smoker. Has a strong history of allergies in the past.  She was tested several years ago and was found to be allergic to several antigens. Her total IgE level done on 10/19/2020 was found to be elevated at 344. She does not take any inhaler therapy. Never has required oxygen in the past.  Worked as a  and denies any occupational exposures to organic/inorganic dust.  Has never handled any livestock or birds. No family history of lung disease. Does get exposed to secondhand smoking through her daughter who lives with her at home. She has been reporting bilateral pedal edema for which she is supposed to take Lasix at home but stopped taking it as it makes her urinate extensively and she is unable to make it to the toilet.     At the time of interview, patient was lying in bed in no apparent respiratory distress. Reports that her breathing is improved slightly. Denies any chest pain or chest tightness. No orthopnea or paroxysmal nocturnal dyspnea. REVIEW OF SYSTEMS:     CONSTITUTIONAL SYMPTOMS: The patient denies fever, fatigue, night sweats, weight loss or weight gain. HEENT: No vision changes. No tinnitus, Denies sinus pain. No hoarseness, or dysphagia. NECK: Patient denies swelling in the neck. CARDIOVASCULAR: Denies chest pain, palpitation, syncope. RESPIRATORY: As per HPI. GASTROINTESTINAL: Denies nausea, abdominal pain or change in bowel function. GENITOURINARY: Denies obstructive symptoms. No history of incontinence. BREASTS: No masses or lumps in the breasts. SKIN: No rashes or itching. MUSCULOSKELETAL: Denies weakness or bone pain. NEUROLOGICAL: No headaches or seizures. PSYCHIATRIC: Denies mood swings or depression. ENDOCRINE: Denies heat or cold intolerance or excessive thirst.  HEMATOLOGIC/LYMPHATIC: Denies easy bruising or lymph node swelling. ALLERGIC/IMMUNOLOGIC: No environmental allergies.     PAST MEDICAL HISTORY:   Past Medical History:   Diagnosis Date    Arthritis     Atrial fibrillation and flutter ([facility] [address]     Hypertension     Kidney disease     Pneumonia     Sleep apnea     no c-pap       PAST SURGICAL HISTORY:   Past Surgical History:   Procedure Laterality Date    CARDIOVERSION      COLONOSCOPY N/A 11/20/2018    COLONOSCOPY POLYPECTOMY SNARE/COLD BIOPSY performed by Uma Knowles MD at [facility] N/A 3/31/2022    COLONOSCOPY POLYPECTOMY SNARE/COLD BIOPSY performed by Bear Dudley MD at [facility]      ankle rt has pins and rods, and rt elbow    GASTRIC BYPASS SURGERY      LARYNX SURGERY      TOTAL KNEE ARTHROPLASTY Bilateral 12/19/2012    TUBAL LIGATION      tubes tied    UPPER GASTROINTESTINAL ENDOSCOPY N/A 11/20/2018    EGD BIOPSY performed by Uma Knowles MD at [facility] ENDOSCOPY N/A 3/31/2022    EGD DILATION BALLOON performed by Violetta Bailey MD at [address] [facility] [address] N/A 3/31/2022    EGD BIOPSY performed by Violetta Bailey MD at [facility]:   Social History     Tobacco Use    Smoking status: Never    Smokeless tobacco: Never   Vaping Use    Vaping Use: Never used   Substance Use Topics    Alcohol use: No    Drug use: No       FAMILY HISTORY:   Family History   Problem Relation Age of Onset    Cancer Father         stomach cancer       MEDICATIONS:     No current facility-administered medications on file prior to encounter. Current Outpatient Medications on File Prior to Encounter   Medication Sig Dispense Refill    vitamin B-12 (CYANOCOBALAMIN) 100 MCG tablet Take 1 tablet by mouth in the morning.  90 tablet 3    alendronate (FOSAMAX) 70 MG tablet Take 1 tablet by mouth every 7 days 12 tablet 3    apixaban (ELIQUIS) 5 MG TABS tablet Take 1 tablet by mouth 2 times daily (Patient taking differently: Take 5 mg by mouth daily) 180 tablet 3    metoprolol tartrate (LOPRESSOR) 25 MG tablet Take 1 tablet by mouth 2 times daily 180 tablet 3    pantoprazole (PROTONIX) 40 MG tablet Take 1 tablet by mouth 2 times daily (before meals) (Patient taking differently: Take 40 mg by mouth 2 times daily as needed) 30 tablet 3    furosemide (LASIX) 20 MG tablet Take 1 tablet by mouth daily (Patient taking differently: Take 20 mg by mouth daily Indications: taking PRN) 30 tablet 0    Cholecalciferol (VITAMIN D3) 1.25 MG (79890 UT) CAPS TAKE 1 CAPSULE BY MOUTH EVERY 7 DAYS 4 capsule 11    cloNIDine (CATAPRES) 0.2 MG tablet TAKE 1 TABLET BY MOUTH TWICE DAILY (Patient taking differently: daily) 180 tablet 3    DULoxetine (CYMBALTA) 60 MG extended release capsule TAKE 1 CAPSULE BY MOUTH DAILY 90 capsule 3    dilTIAZem (CARDIZEM CD) 240 MG extended release capsule TAKE 1 CAPSULE BY MOUTH DAILY 90 capsule 3    buPROPion (WELLBUTRIN SR) 200 MG extended release tablet Take 1 tablet by mouth 2 times daily (Patient taking differently: Take 200 mg by mouth daily) 60 tablet 5    aspirin 81 MG tablet Take 81 mg by mouth daily          sodium chloride flush  10 mL IntraVENous 2 times per day    apixaban  5 mg Oral BID    aspirin  81 mg Oral Daily    dilTIAZem  240 mg Oral Daily    ferrous sulfate  325 mg Oral Daily with breakfast    metoprolol tartrate  25 mg Oral BID    pantoprazole  40 mg Oral BID AC    furosemide  20 mg IntraVENous BID      sodium chloride       sodium chloride flush, sodium chloride, potassium chloride, magnesium sulfate, promethazine **OR** ondansetron, acetaminophen **OR** acetaminophen      ALLERGIES:   Allergies as of 09/13/2022 - Fully Reviewed 09/13/2022   Allergen Reaction Noted    Bee venom Swelling and Angioedema 06/17/2013    Shellfish-derived products Other (See Comments) 05/17/2017    Shrimp flavor  11/03/2011    Codeine Hives and Other (See Comments) 11/03/2011    Fentanyl and related Hives 06/17/2013    Hydromorphone Rash, Hives, and Other (See Comments) 12/19/2012    Vancomycin Rash 01/06/2019      OBJECTIVE:   height is 5' 7\" (1.702 m) and weight is 238 lb 3.2 oz (108 kg). Her temporal temperature is 98.3 °F (36.8 °C). Her blood pressure is 156/109 (abnormal) and her pulse is 113 (abnormal). Her respiration is 20 and oxygen saturation is 100%. No intake/output data recorded. PHYSICAL EXAM:    CONSTITUTIONAL: She is a 78y.o.-year-old who appears her stated age. She is alert and oriented x 3 and in no acute distress. HEENT: PERRLA, EOMI. No scleral icterus. No thrush, atraumatic, normocephalic. NECK: Supple, without cervical or supraclavicular lymphadenopathy:  CARDIOVASCULAR: S1 S2 RRR. Without murmer. No JVD or hepatojugular reflux seen. RESPIRATORY & CHEST: Bilateral scattered crackles heard. No wheezing heard.   GASTROINTESTINAL & ABDOMEN: Soft, nontender, positive bowel sounds in all quadrants, non-distended, without hepatosplenomegaly. GENITOURINARY: No gross anatomical abnormalities seen. MUSCULOSKELETAL: No tenderness to palpation of the axial skeleton. There is no clubbing. No cyanosis. 1+ pitting edema with chronic dermatitis changes seen in bilateral lower extremities. SKIN OF BODY: No rash or jaundice. PSYCHIATRIC EVALUATION: Normal affect. Patient answers questions appropriately. HEMATOLOGIC/LYMPHATIC/ IMMUNOLOGIC: No palpable lymphadenopathy. NEUROLOGIC: Alert and oriented x 3. Groslly non-focal. Motor strength is 5+/5 in all muscle groups. The patient has a normal sensorium globally. LABS:  Recent Labs     09/13/22 1801 09/14/22 0426   WBC 10.3 9.3   HGB 11.9* 11.0*   HCT 38.0 36.8    243   ALT 9* <5*   AST 12* 12*    139   K 4.4 4.3    102   CREATININE 1.5* 1.3*   BUN 25* 27*   CO2 27 26   INR 1.49*  --        Recent Labs     09/13/22 1801 09/14/22 0426   GLUCOSE 122* 92   CALCIUM 8.7 8.6    139   K 4.4 4.3   CO2 27 26    102   BUN 25* 27*   CREATININE 1.5* 1.3*       No results for input(s): PHART, XEC4ROK, PO2ART, OFV0EWH, E8CLRDNS, BEART, T0WXOUFZ in the last 72 hours.     Lab Results   Component Value Date    INR 1.49 (H) 09/13/2022    INR 1.45 (H) 03/30/2022    INR 2.6 03/24/2022    PROTIME 18.0 (H) 09/13/2022    PROTIME 16.7 (H) 03/30/2022    PROTIME 33.2 (H) 03/23/2019     No results found for: AMYLASE   Lab Results   Component Value Date    LABA1C 5.4 09/08/2017     Lab Results   Component Value Date    .3 09/08/2017     Lab Results   Component Value Date    TSH 2.81 03/30/2022     Lab Results   Component Value Date    TROPONINI <0.01 09/14/2022      Lab Results   Component Value Date    CRP 48.7 (H) 01/06/2019      No results found for: BNP   No results found for: DDIMER   Lab Results   Component Value Date    FERRITIN 77.3 08/15/2022      No results found for: LACTA        IMAGING:    Narrative   EXAMINATION:   ONE XRAY VIEW OF THE CHEST       9/13/2022 4:46 pm           FINDINGS:   There are new bilateral perihilar opacities which are more prominent on the   right. The costophrenic angles are sharp. Mild cardiomegaly is unchanged. There is no vascular congestion or interstitial prominence. Impression   New bilateral perihilar airspace opacities are more prominent on the right. Pneumonia is favored over edema. Radiographic follow-up is suggested. IMPRESSION:     Acute respiratory distress  Acute hypoxic respiratory failure  Pulmonary edema  Heart failure with preserved ejection fraction  Severe environmental allergies  COVID-19 rule out  Obesity    RECOMMENDATION:     Patient has presented to the hospital with acute shortness of breath, cough and found to be in acute hypoxic respiratory failure. Chest x-ray was personally reviewed by me and it shows bilateral prominent bronchovascular markings with perihilar prominence. There is a focal increased density in the right mid zone. Findings are suggestive of a combined pulmonary edema with the possibility of community-acquired pneumonia. I reviewed her previous CT chest scans done in the past.  They had shown bilateral mosaic opacities. She has a history of severe environmental allergies with elevated IgE in the past.  No exposure to organic or inorganic dust.  Strongly suspect that she has developed asthmatic bronchitis due to her previous history of allergies. Low suspicion for interstitial lung disease or hypersensitivity pneumonitis. I will get a repeat high-resolution CT chest done without contrast today. In the meanwhile, continue with Lasix 20 mg IV twice a day  I will add Pulmicort, Brovana and DuoNeb therapy to the regimen. Minimal wheezing heard. Currently will hold off on steroids. We will also start her on guaifenesin antitussives to help her expectorate. Oxygen supplementation to maintain SPO2 above 90%.   Out of bed into chair.  Encourage incentive spirometry. Thank you for your consultation. We will continue to follow the patient. Bertha Juan MD  Pulmonary Critical Care and Sleep Medicine  9/13/2022, 11:00 AM    This note was completed using dragon medical speech recognition software. Grammatical errors, random word insertions, pronoun errors and incomplete sentences are occasional consequences of this technology due to software limitations. If there are questions or concerns about the content of this note of information contained within the body of this dictation they should be addressed with the provider for clarification.

## 2022-09-14 NOTE — RT PROTOCOL NOTE
RT Inhaler-Nebulizer Bronchodilator Protocol Note    There is a bronchodilator order in the chart from a provider indicating to follow the RT Bronchodilator Protocol and there is an Initiate RT Inhaler-Nebulizer Bronchodilator Protocol order as well (see protocol at bottom of note). CXR Findings:  XR CHEST PORTABLE    Result Date: 9/13/2022  New bilateral perihilar airspace opacities are more prominent on the right. Pneumonia is favored over edema. Radiographic follow-up is suggested. The findings from the last RT Protocol Assessment were as follows:   History Pulmonary Disease: None or smoker <15 pack years  Respiratory Pattern: Mild dyspnea at rest, irregular pattern, or RR 21-25 bpm  Breath Sounds: Inspiratory and expiratory or bilateral wheezing and/or rhonchi  Cough: Strong, spontaneous, non-productive  Indication for Bronchodilator Therapy:    Bronchodilator Assessment Score: 10    Aerosolized bronchodilator medication orders have been revised according to the RT Inhaler-Nebulizer Bronchodilator Protocol below. Respiratory Therapist to perform RT Therapy Protocol Assessment initially then follow the protocol. Repeat RT Therapy Protocol Assessment PRN for score 0-3 or on second treatment, BID, and PRN for scores above 3. No Indications - adjust the frequency to every 6 hours PRN wheezing or bronchospasm, if no treatments needed after 48 hours then discontinue using Per Protocol order mode. If indication present, adjust the RT bronchodilator orders based on the Bronchodilator Assessment Score as indicated below. Use Inhaler orders unless patient has one or more of the following: on home nebulizer, not able to hold breath for 10 seconds, is not alert and oriented, cannot activate and use MDI correctly, or respiratory rate 25 breaths per minute or more, then use the equivalent nebulizer order(s) with same Frequency and PRN reasons based on the score.   If a patient is on this medication at home then do not decrease Frequency below that used at home. 0-3 - enter or revise RT bronchodilator order(s) to equivalent RT Bronchodilator order with Frequency of every 4 hours PRN for wheezing or increased work of breathing using Per Protocol order mode. 4-6 - enter or revise RT Bronchodilator order(s) to two equivalent RT bronchodilator orders with one order with BID Frequency and one order with Frequency of every 4 hours PRN wheezing or increased work of breathing using Per Protocol order mode. 7-10 - enter or revise RT Bronchodilator order(s) to two equivalent RT bronchodilator orders with one order with TID Frequency and one order with Frequency of every 4 hours PRN wheezing or increased work of breathing using Per Protocol order mode. 11-13 - enter or revise RT Bronchodilator order(s) to one equivalent RT bronchodilator order with QID Frequency and an Albuterol order with Frequency of every 4 hours PRN wheezing or increased work of breathing using Per Protocol order mode. Greater than 13 - enter or revise RT Bronchodilator order(s) to one equivalent RT bronchodilator order with every 4 hours Frequency and an Albuterol order with Frequency of every 2 hours PRN wheezing or increased work of breathing using Per Protocol order mode. RT to enter RT Home Evaluation for COPD & MDI Assessment order using Per Protocol order mode.     Electronically signed by Renan Prabhakar RCP on 9/14/2022 at 12:55 PM

## 2022-09-14 NOTE — PROGRESS NOTES
4 Eyes Skin Assessment     NAME:  Saundra Vidales  YOB: 1945  MEDICAL RECORD NUMBER:  [id number]    The patient is being assess for  Admission    I agree that 2 RN's have performed a thorough Head to Toe Skin Assessment on the patient. ALL assessment sites listed below have been assessed. Areas assessed by both nurses:    Head, Face, Ears, Shoulders, Back, Chest, Arms, Elbows, Hands, Sacrum. Buttock, Coccyx, Ischium, and Legs. Feet and Heels        Does the Patient have a Wound?  No noted wound(s)       Hussain Prevention initiated:  No   Wound Care Orders initiated:  No    Pressure Injury (Stage 3,4, Unstageable, DTI, NWPT, and Complex wounds) if present place consult order under [de-identified] No    New and Established Ostomies if present place consult order under : No      Nurse 1 eSignature: Electronically signed by Jules Reza RN on 9/13/22 at 11:17 PM EDT    **SHARE this note so that the co-signing nurse is able to place an eSignature**    Nurse 2 eSignature: Electronically signed by Ariana Mueller RN on 9/14/22 at 3:55 AM EDT

## 2022-09-14 NOTE — PROGRESS NOTES
Pt to [id number], placed on monitors, Vss, afebrile, denies any pain at this time. Pt has audle wheezes with and without auscultation, notified MD for respiratory orders. Pt is alert and oriented x 4. Oriented to room, Call light within reach, encouraged to call for nurse as needed throughout the shift.

## 2022-09-14 NOTE — PROGRESS NOTES
[facility]   Phone: [phone]    Occupational Therapy    [x] Initial Evaluation            [] Daily Treatment Note         [] Discharge Summary      Patient: Simba Maxwell   : 1945   MRN: [id number]   Date of Service:  2022    Admitting Diagnosis:  Acute respiratory failure [facility])  Current Admission Summary: 78 y.o. female who presented to [facility] with past medical history of arthritis, hypertension, sleep apnea not on CPAP, hypertension, atrial fibrillation on Eliquis presented to the ED due to shortness of breath. Past Medical History:  has a past medical history of Arthritis, Atrial fibrillation and flutter ([address] Hypertension, Kidney disease, Pneumonia, and Sleep apnea. Past Surgical History:  has a past surgical history that includes Tubal ligation; Total knee arthroplasty (Bilateral, 2012); fracture surgery; Colonoscopy (N/A, 2018); Upper gastrointestinal endoscopy (N/A, 2018); Cardioversion; Gastric bypass surgery; Larynx surgery; Upper gastrointestinal endoscopy (N/A, 3/31/2022); Colonoscopy (N/A, 3/31/2022); and Upper gastrointestinal endoscopy (N/A, 3/31/2022). Discharge Recommendations: Simba Maxwell scored a 14/24 on the AM-PAC ADL Inpatient form. Current research shows that an AM-PAC score of 18 or greater is typically associated with a discharge to the patient's home setting. Based on the patient's AM-PAC score, and their current ADL deficits, it is recommended that the patient have 2-3 sessions per week of Occupational Therapy at d/c to increase the patient's independence. At this time, this patient demonstrates the endurance and safety to discharge home with [facility] and a follow up treatment frequency of 2-3x/wk. Please see assessment section for further patient specific details.   HOME HEALTH CARE: LEVEL 1 STANDARD    - Initial home health evaluation to occur within 24-48 hours, in patient home   - Therapy to evaluate with goal of regaining prior level of functioning   - Therapy to evaluate if patient has [id number] needs for personal care      If patient discharges prior to next session this note will serve as a discharge summary. Please see below for the latest assessment towards goals. DME Required For Discharge: DME to be determined pending patient progress    Precautions/Restrictions: high fall risk  Weight Bearing Restrictions: no restrictions  [] Right Upper Extremity  [] Left Upper Extremity [] Right Lower Extremity  [] Left Lower Extremity     Required Braces/Orthotics: no braces required   [] Right  [] Left  Positional Restrictions:no positional restrictions    Social/Functional History  Lives With: Kennedi Mishra (daughter and 2 adult grandchildren, 16 month old grandchild, foster child, son)  Type of Home: House  Home Layout: One level,Performs ADL's on one level,Able to Live on Main level with bedroom/bathroom  Home Access: Stairs to enter without rails  Entrance Stairs - Number of Steps: 3 CAREY  Bathroom Shower/Tub: Walk-in shower  Bathroom Toilet: Handicap height  Bathroom Equipment: Hand-held shower,Built-in shower seat  Home Equipment: Cane,Electric scooter (uses cane at home and in community. Uses electric scooter around neighborhood, use it for gardeningm, or when walking long distances)  Receives Help From: Family  ADL Assistance: Independent  Homemaking Assistance: Needs assistance (pt does some cooking, daughter does laundry in basement, family does cleaning.)  Homemaking Responsibilities: Yes  Ambulation Assistance: Independent  Transfer Assistance: Independent (sleeps in lift chair)  Active : Yes (but has been having hard time getting in and out of car)   Patient's  Info: does not drive very often, family mostly drives patient to appointments.   Additional Comments: ~2 falls in the past 6 months (dizzy in living room and fell and hit head, other time trying to step off of curb and fell)     Examination   Vision:   Vision Corrective Device: wears glasses at all times  Hearing:   [facility]  Perception:   WFL  Observation:   General Observation:  Pt observed this date with good demeanor and unproductive cough. Posture: Forward, rounded shoulders  Sensation:   reports numbness and tingling in all extremities (pt reports feet with greater numbness/tingling than hands)  Proprioception:    WFL  Tone:   Normotonic  Coordination Testing:   WFL    ROM:   (B) UE AROM WFL  Strength:   (B) UE strength grossly WFL    Decision Making: low complexity  Clinical Presentation: stable      Subjective  General: Pt supine in bed on arrival and agreeable for session. Pain: 0/10  Pain Interventions: not applicable. Pt reporting R flank pain but unable to rate pain. Heated blanket provided and RN notified. Activities of Daily Living  Basic Activities of Daily Living  Feeding Comments: NPO per orders  Grooming: setup assistance  Grooming Comments: pt completed face washing while seated EOB. Toileting: dependent. Toileting Equipment: none  Toileting Comments: Pt with Pure Ardeen Rack in place upon arrival and exit. Instrumental Activities of Daily Living  No IADL completed on this date. Functional Mobility  Bed Mobility  Supine to Sit: stand by assistance  Sit to Supine: stand by assistance  Scooting: stand by assistance  Comments:Pt tolerated sitting EOB with no c/o dizziness or pain. Pt with unproductive cough while seated EOB. Transfers  Sit to stand transfer:contact guard assistance  Stand to sit transfer: contact guard assistance  Stand step transfer: contact guard assistance  Comments:Pt declined bed to chair transfer at time of evaluation secondary to fatigue. Functional Mobility:  Sitting Balance: supervision. Standing Balance: contact guard assistance.     Functional Mobility: .  contact guard assistance  Functional Mobility Activity: Functional mobility in room x 10' x 2 intervals  Functional Mobility Device Use: SPC  Functional Mobility Comment: Pt ambulated in room using SPC with CGA. Steady gait and no LOB. Pt required 2 seated rest breaks x 5 minutes. PO on 2L O2 97-99% with activity. . No c/o dizziness or pain. Other Therapeutic Interventions    Functional Outcomes  AM-PAC Inpatient Daily Activity Raw Score: 14    Cognition  WFL  Orientation:    alert and oriented x 4  Command Following:   [facility]     Education  Barriers To Learning: none  Patient Education: patient educated on goals, OT role and benefits, plan of care, precautions, energy conservation, discharge recommendations  Learning Assessment:  patient verbalizes and demonstrates understanding    Assessment  Activity Tolerance: Good  Impairments Requiring Therapeutic Intervention: decreased functional mobility, decreased ADL status, decreased endurance, decreased posture  Prognosis: good  Clinical Assessment: Pt presents with the above deficits impacting daily occupational performance and would benefit from continued skilled OT services.    Safety Interventions: patient left in bed, bed alarm in place, call light within reach, gait belt, patient at risk for falls, and nurse notified    Plan  Frequency: 3-5 x/per week  Current Treatment Recommendations: functional mobility training, transfer training, endurance training, patient/caregiver education, and IADL training    Goals  Patient Goals: Safe Discharge   Short Term Goals:  Time Frame: Discharge  Patient will complete upper body ADL at modified independent   Patient will complete lower body ADL at modified independent   Patient will complete toileting at modified independent   Patient will complete functional transfers at modified independent   Patient will complete functional mobility at modified independent   Patient will complete bed mobility at modified independent     Therapy Session Time     Individual Group Co-treatment   Time In    1031   Time Out 1110   Minutes    39        Timed Code Treatment Minutes:   24 minutes  Total Treatment Minutes:  39 minutes       Electronically Signed By: Cathy Reagan, [address], OTR/L [id number].
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
## 2022-09-15 ENCOUNTER — APPOINTMENT (OUTPATIENT)
Dept: CT IMAGING | Age: 77
DRG: 193 | End: 2022-09-15
Payer: COMMERCIAL

## 2022-09-15 LAB
A/G RATIO: 0.6 (ref 1.1–2.2)
ALBUMIN SERPL-MCNC: 2.4 G/DL (ref 3.4–5)
ALP BLD-CCNC: 113 U/L (ref 40–129)
ALT SERPL-CCNC: <5 U/L (ref 10–40)
ANION GAP SERPL CALCULATED.3IONS-SCNC: 9 MMOL/L (ref 3–16)
AST SERPL-CCNC: 9 U/L (ref 15–37)
BASOPHILS ABSOLUTE: 0.1 K/UL (ref 0–0.2)
BASOPHILS RELATIVE PERCENT: 0.5 %
BILIRUB SERPL-MCNC: 0.5 MG/DL (ref 0–1)
BUN BLDV-MCNC: 26 MG/DL (ref 7–20)
CALCIUM SERPL-MCNC: 8.4 MG/DL (ref 8.3–10.6)
CHLORIDE BLD-SCNC: 98 MMOL/L (ref 99–110)
CO2: 31 MMOL/L (ref 21–32)
CREAT SERPL-MCNC: 1.2 MG/DL (ref 0.6–1.2)
EOSINOPHILS ABSOLUTE: 0.2 K/UL (ref 0–0.6)
EOSINOPHILS RELATIVE PERCENT: 2.3 %
FERRITIN: 182.5 NG/ML (ref 15–150)
FOLATE: 11.68 NG/ML (ref 4.78–24.2)
GFR AFRICAN AMERICAN: 53
GFR NON-AFRICAN AMERICAN: 44
GLUCOSE BLD-MCNC: 112 MG/DL (ref 70–99)
HCT VFR BLD CALC: 33.8 % (ref 36–48)
HEMATOLOGY PATH CONSULT: NO
HEMOGLOBIN: 10.6 G/DL (ref 12–16)
IRON SATURATION: 10 % (ref 15–50)
IRON: 18 UG/DL (ref 37–145)
L. PNEUMOPHILA SEROGP 1 UR AG: NORMAL
LYMPHOCYTES ABSOLUTE: 0.9 K/UL (ref 1–5.1)
LYMPHOCYTES RELATIVE PERCENT: 8.2 %
MCH RBC QN AUTO: 26.7 PG (ref 26–34)
MCHC RBC AUTO-ENTMCNC: 31.3 G/DL (ref 31–36)
MCV RBC AUTO: 85.1 FL (ref 80–100)
MONOCYTES ABSOLUTE: 1.7 K/UL (ref 0–1.3)
MONOCYTES RELATIVE PERCENT: 16 %
MRSA SCREEN RT-PCR: NORMAL
NEUTROPHILS ABSOLUTE: 7.8 K/UL (ref 1.7–7.7)
NEUTROPHILS RELATIVE PERCENT: 73 %
PDW BLD-RTO: 18.2 % (ref 12.4–15.4)
PLATELET # BLD: 271 K/UL (ref 135–450)
PMV BLD AUTO: 10 FL (ref 5–10.5)
POTASSIUM REFLEX MAGNESIUM: 3.7 MMOL/L (ref 3.5–5.1)
RBC # BLD: 3.97 M/UL (ref 4–5.2)
SODIUM BLD-SCNC: 138 MMOL/L (ref 136–145)
STREP PNEUMONIAE ANTIGEN, URINE: NORMAL
TOTAL IRON BINDING CAPACITY: 187 UG/DL (ref 260–445)
TOTAL PROTEIN: 6.3 G/DL (ref 6.4–8.2)
VITAMIN B-12: 278 PG/ML (ref 211–911)
WBC # BLD: 10.6 K/UL (ref 4–11)

## 2022-09-15 PROCEDURE — 6360000002 HC RX W HCPCS: Performed by: INTERNAL MEDICINE

## 2022-09-15 PROCEDURE — 82746 ASSAY OF FOLIC ACID SERUM: CPT

## 2022-09-15 PROCEDURE — 99233 SBSQ HOSP IP/OBS HIGH 50: CPT | Performed by: INTERNAL MEDICINE

## 2022-09-15 PROCEDURE — 80053 COMPREHEN METABOLIC PANEL: CPT

## 2022-09-15 PROCEDURE — 2580000003 HC RX 258: Performed by: INTERNAL MEDICINE

## 2022-09-15 PROCEDURE — 85025 COMPLETE CBC W/AUTO DIFF WBC: CPT

## 2022-09-15 PROCEDURE — 94761 N-INVAS EAR/PLS OXIMETRY MLT: CPT

## 2022-09-15 PROCEDURE — 71250 CT THORAX DX C-: CPT

## 2022-09-15 PROCEDURE — 82607 VITAMIN B-12: CPT

## 2022-09-15 PROCEDURE — 2700000000 HC OXYGEN THERAPY PER DAY

## 2022-09-15 PROCEDURE — 93308 TTE F-UP OR LMTD: CPT

## 2022-09-15 PROCEDURE — 94669 MECHANICAL CHEST WALL OSCILL: CPT

## 2022-09-15 PROCEDURE — 97530 THERAPEUTIC ACTIVITIES: CPT

## 2022-09-15 PROCEDURE — 6370000000 HC RX 637 (ALT 250 FOR IP): Performed by: INTERNAL MEDICINE

## 2022-09-15 PROCEDURE — 1200000000 HC SEMI PRIVATE

## 2022-09-15 PROCEDURE — 36415 COLL VENOUS BLD VENIPUNCTURE: CPT

## 2022-09-15 PROCEDURE — 94640 AIRWAY INHALATION TREATMENT: CPT

## 2022-09-15 PROCEDURE — 82728 ASSAY OF FERRITIN: CPT

## 2022-09-15 PROCEDURE — 83550 IRON BINDING TEST: CPT

## 2022-09-15 PROCEDURE — 83540 ASSAY OF IRON: CPT

## 2022-09-15 RX ORDER — METHYLPREDNISOLONE SODIUM SUCCINATE 40 MG/ML
40 INJECTION, POWDER, LYOPHILIZED, FOR SOLUTION INTRAMUSCULAR; INTRAVENOUS DAILY
Status: DISCONTINUED | OUTPATIENT
Start: 2022-09-15 | End: 2022-09-17

## 2022-09-15 RX ADMIN — SODIUM CHLORIDE, PRESERVATIVE FREE 10 ML: 5 INJECTION INTRAVENOUS at 08:36

## 2022-09-15 RX ADMIN — METHYLPREDNISOLONE SODIUM SUCCINATE 40 MG: 40 INJECTION, POWDER, FOR SOLUTION INTRAMUSCULAR; INTRAVENOUS at 10:24

## 2022-09-15 RX ADMIN — DILTIAZEM HYDROCHLORIDE 240 MG: 120 CAPSULE, COATED, EXTENDED RELEASE ORAL at 08:36

## 2022-09-15 RX ADMIN — Medication 1000 MG: at 20:54

## 2022-09-15 RX ADMIN — METOPROLOL TARTRATE 25 MG: 25 TABLET, FILM COATED ORAL at 08:36

## 2022-09-15 RX ADMIN — FERROUS SULFATE TAB 325 MG (65 MG ELEMENTAL FE) 325 MG: 325 (65 FE) TAB at 08:36

## 2022-09-15 RX ADMIN — ASPIRIN 81 MG 81 MG: 81 TABLET ORAL at 08:36

## 2022-09-15 RX ADMIN — FUROSEMIDE 20 MG: 10 INJECTION, SOLUTION INTRAMUSCULAR; INTRAVENOUS at 08:36

## 2022-09-15 RX ADMIN — IPRATROPIUM BROMIDE AND ALBUTEROL SULFATE 1 AMPULE: 2.5; .5 SOLUTION RESPIRATORY (INHALATION) at 08:38

## 2022-09-15 RX ADMIN — ARFORMOTEROL TARTRATE 15 MCG: 15 SOLUTION RESPIRATORY (INHALATION) at 20:43

## 2022-09-15 RX ADMIN — PANTOPRAZOLE SODIUM 40 MG: 40 TABLET, DELAYED RELEASE ORAL at 18:00

## 2022-09-15 RX ADMIN — BUDESONIDE 500 MCG: 0.5 SUSPENSION RESPIRATORY (INHALATION) at 08:38

## 2022-09-15 RX ADMIN — METOPROLOL TARTRATE 25 MG: 25 TABLET, FILM COATED ORAL at 20:54

## 2022-09-15 RX ADMIN — ARFORMOTEROL TARTRATE 15 MCG: 15 SOLUTION RESPIRATORY (INHALATION) at 08:38

## 2022-09-15 RX ADMIN — APIXABAN 5 MG: 5 TABLET, FILM COATED ORAL at 08:36

## 2022-09-15 RX ADMIN — FUROSEMIDE 20 MG: 10 INJECTION, SOLUTION INTRAMUSCULAR; INTRAVENOUS at 18:00

## 2022-09-15 RX ADMIN — IPRATROPIUM BROMIDE AND ALBUTEROL SULFATE 1 AMPULE: 2.5; .5 SOLUTION RESPIRATORY (INHALATION) at 20:43

## 2022-09-15 RX ADMIN — BUDESONIDE 500 MCG: 0.5 SUSPENSION RESPIRATORY (INHALATION) at 20:42

## 2022-09-15 RX ADMIN — APIXABAN 5 MG: 5 TABLET, FILM COATED ORAL at 20:54

## 2022-09-15 RX ADMIN — IPRATROPIUM BROMIDE AND ALBUTEROL SULFATE 1 AMPULE: 2.5; .5 SOLUTION RESPIRATORY (INHALATION) at 16:30

## 2022-09-15 RX ADMIN — AZITHROMYCIN DIHYDRATE 500 MG: 500 INJECTION, POWDER, LYOPHILIZED, FOR SOLUTION INTRAVENOUS at 18:04

## 2022-09-15 RX ADMIN — PANTOPRAZOLE SODIUM 40 MG: 40 TABLET, DELAYED RELEASE ORAL at 06:16

## 2022-09-15 RX ADMIN — SODIUM CHLORIDE, PRESERVATIVE FREE 10 ML: 5 INJECTION INTRAVENOUS at 20:54

## 2022-09-15 ASSESSMENT — PAIN SCALES - GENERAL
PAINLEVEL_OUTOF10: 0
PAINLEVEL_OUTOF10: 0

## 2022-09-15 NOTE — CARE COORDINATION
SW met with patient to discuss PT/OT recommendations for [place] [address]. Patient was agreeable to this service. SW provided patient with list.  Patient has not had this service before. She wants to review the list and get back to DYLAN on her choice. SW will follow up tomorrow with choice. Patient still on 2L O2 today and may need home oxygen at discharge. Will continue to follow this as well.     Electronically signed by FELIPA Hardy LSW on 9/15/2022 at 4:45 PM

## 2022-09-15 NOTE — PROGRESS NOTES
Physical Therapy    [facility]   Phone: [phone]    Physical Therapy    [] Initial Evaluation            [x] Daily Treatment Note         [] Discharge Summary      Patient: Rukhsana Pearson   : 1945   MRN: [id number]   Date of Service:  9/15/2022  Admitting Diagnosis: Acute respiratory failure [facility])  Current Admission Summary: Rukhsana Pearson is a 78 y.o. female with past medical history of atrial fibrillation/flutter anticoagulated on Eliquis, arthritis, hypertension and sleep apnea who presents to the ED with complaint of shortness of breath. Patient states for the past 2 to 3 days she has been short of breath. She states she has some chest discomfort with a cough productive of clear sputum. Denies any hemoptysis. Denies any pleuritic pain, orthopnea, pedal edema or calf tenderness. Denies abdominal pain, nausea/vomiting, urinary symptoms or change in bowel moods. Has had subjective fever and chills but has not checked her temperature. Denies headache, lightheadedness/dizziness, diaphoresis, syncope or near syncope. Denies any sick contacts or recent travel. Felt worse today and took home COVID test which was negative. Became concerned and came to the ED for further evaluation and treatment. Past Medical History:  has a past medical history of Arthritis, Atrial fibrillation and flutter ([address] Hypertension, Kidney disease, Pneumonia, and Sleep apnea. Past Surgical History:  has a past surgical history that includes Tubal ligation; Total knee arthroplasty (Bilateral, 2012); fracture surgery; Colonoscopy (N/A, 2018); Upper gastrointestinal endoscopy (N/A, 2018); Cardioversion; Gastric bypass surgery; Larynx surgery; Upper gastrointestinal endoscopy (N/A, 3/31/2022); Colonoscopy (N/A, 3/31/2022); and Upper gastrointestinal endoscopy (N/A, 3/31/2022).   Discharge Recommendations: Rukhsana Pearson scored a 18/24 on the AM-PAC short mobility form. Current research shows that an AM-PAC score of 18 or greater is typically associated with a discharge to the patient's home setting. Based on the patient's AM-PAC score and their current functional mobility deficits, it is recommended that the patient have 2-3 sessions per week of Physical Therapy at d/c to increase the patient's independence. At this time, this patient demonstrates the endurance and safety to discharge home with HHPT and a follow up treatment frequency of 2-3x/wk. Please see assessment section for further patient specific details. If patient discharges prior to next session this note will serve as a discharge summary. Please see below for the latest assessment towards goals. DME Required For Discharge: patient has all required DME for discharge  Precautions/Restrictions: high fall risk   Weight Bearing Restrictions: no restrictions  [] Right Upper Extremity  [] Left Upper Extremity [] Right Lower Extremity  [] Left Lower Extremity     Required Braces/Orthotics: no braces required   [] Right  [] Left  Positional Restrictions:no positional restrictions    Social/Functional History  Lives With: Marii Ram (daughter and 2 adult grandchildren, 16 month old grandchild, foster child, son)  Type of Home: House  Home Layout: One level,Performs ADL's on one level,Able to Live on Main level with bedroom/bathroom  Home Access: Stairs to enter without rails  Entrance Stairs - Number of Steps: 3 CAREY  Bathroom Shower/Tub: Walk-in shower  Bathroom Toilet: Handicap height  Bathroom Equipment: Hand-held shower,Built-in shower seat  Home Equipment: Cane,Electric scooter (uses cane at home and in community.  Uses electric scooter around neighborhood, use it for gardeningm, or when walking long distances)  Receives Help From: Family  ADL Assistance: Independent  Homemaking Assistance: Needs assistance (pt does some cooking, daughter does laundry in basement, family does cleaning.)  Homemaking Responsibilities: Yes  Ambulation Assistance: Independent  Transfer Assistance: Independent (sleeps in lift chair)  Active : Yes (but has been having hard time getting in and out of car)   Patient's  Info: does not drive very often, family mostly drives patient to appointments. Additional Comments: ~2 falls in the past 6 months (dizzy in living room and fell and hit head, other time trying to step off of curb and fell)         Subjective  General: Pt seated in chair at arrival. Pt on 2L O2 with saturation at 96% at rest. Agreed to therapy session. Pain: 0/10  Pain Interventions: not applicable       Functional Mobility  Bed Mobility  Bed mobility not completed on this date. Comments:pt in chair at start and end of session   Transfers  Sit to stand transfer: minimal assistance  Stand to sit transfer: contact guard assistance  Toilet transfer: minimal assistance  Comments: Use of momentum to complete transfers   Ambulation  Surface:level surface  Assistive Device: single point cane  Assistance: contact guard assistance  Distance: 10ft x2 (to/from bathroom),  25 ft x 2   Gait Mechanics: medial/lateral sway, decreased step length, decreased step height, decreased clara, fatigues quickly   Comments: SpO2 91% with mobility   Stair Mobility  Stair mobility not completed on this date. Comments:  Wheelchair Mobility:  No w/c mobility completed on this date. Comments:  Balance  Static Sitting Balance: fair (+): maintains balance at SBA/supervision without use of UE support  Dynamic Sitting Balance: fair (+): maintains balance at SBA/supervision without use of UE support  Static Standing Balance: fair (-): maintains balance at CGA with use of UE support  Dynamic Standing Balance: fair (-): maintains balance at CGA with use of UE support  Comments: Pt stood at sink for a few minutes brushing teeth. Leaning fwd with forearms resting on sink.      Other Therapeutic

## 2022-09-15 NOTE — PROGRESS NOTES
Assessment completed. VSS. Patient awake, alert, and in chair. Medications given per MAR. Safety precautions in place. Call light within reach. Will continue to monitor.

## 2022-09-15 NOTE — PROGRESS NOTES
PULMONARY AND CRITICAL CARE MEDICINE PROGRESS NOTE    Subjective: Patient lying in bed in no apparent respiratory distress. Continues to report intermittent cough which is minimally productive of clear expectoration. Shortness of breath has improved. Currently on 2 L/min of oxygen supplementation saturating in the low to mid 90s. REVIEW OF SYSTEMS:   8 point review of system is negative except that mentioned in the subjective portion.     PAST MEDICAL HISTORY:   Past Medical History:   Diagnosis Date    Arthritis     Atrial fibrillation and flutter ([address]     Hypertension     Kidney disease     Pneumonia     Sleep apnea     no c-pap       PAST SURGICAL HISTORY:   Past Surgical History:   Procedure Laterality Date    CARDIOVERSION      COLONOSCOPY N/A 11/20/2018    COLONOSCOPY POLYPECTOMY SNARE/COLD BIOPSY performed by Ofelia Wesley MD at [place] [address] N/A 3/31/2022    COLONOSCOPY POLYPECTOMY SNARE/COLD BIOPSY performed by Bety Tenorio MD at [address] [place] [address]      ankle rt has pins and rods, and rt elbow    GASTRIC BYPASS SURGERY      LARYNX SURGERY      TOTAL KNEE ARTHROPLASTY Bilateral 12/19/2012    TUBAL LIGATION      tubes tied    UPPER GASTROINTESTINAL ENDOSCOPY N/A 11/20/2018    EGD BIOPSY performed by Ofelia Wesley MD at [address] [place] [address] N/A 3/31/2022    EGD DILATION BALLOON performed by Bety Tenorio MD at [address] [place] [address] N/A 3/31/2022    EGD BIOPSY performed by Bety Tenorio MD at [facility]:   Social History     Tobacco Use    Smoking status: Never    Smokeless tobacco: Never   Vaping Use    Vaping Use: Never used   Substance Use Topics    Alcohol use: No    Drug use: No       FAMILY HISTORY:   Family History   Problem Relation Age of Onset    Cancer Father         stomach cancer       MEDICATIONS:     No current facility-administered medications on file prior to encounter. Current Outpatient Medications on File Prior to Encounter   Medication Sig Dispense Refill    vitamin B-12 (CYANOCOBALAMIN) 100 MCG tablet Take 1 tablet by mouth in the morning.  90 tablet 3    alendronate (FOSAMAX) 70 MG tablet Take 1 tablet by mouth every 7 days 12 tablet 3    apixaban (ELIQUIS) 5 MG TABS tablet Take 1 tablet by mouth 2 times daily (Patient taking differently: Take 5 mg by mouth daily) 180 tablet 3    metoprolol tartrate (LOPRESSOR) 25 MG tablet Take 1 tablet by mouth 2 times daily 180 tablet 3    pantoprazole (PROTONIX) 40 MG tablet Take 1 tablet by mouth 2 times daily (before meals) (Patient taking differently: Take 40 mg by mouth 2 times daily as needed) 30 tablet 3    furosemide (LASIX) 20 MG tablet Take 1 tablet by mouth daily (Patient taking differently: Take 20 mg by mouth daily Indications: taking PRN) 30 tablet 0    Cholecalciferol (VITAMIN D3) 1.25 MG (54364 UT) CAPS TAKE 1 CAPSULE BY MOUTH EVERY 7 DAYS 4 capsule 11    cloNIDine (CATAPRES) 0.2 MG tablet TAKE 1 TABLET BY MOUTH TWICE DAILY (Patient taking differently: daily) 180 tablet 3    DULoxetine (CYMBALTA) 60 MG extended release capsule TAKE 1 CAPSULE BY MOUTH DAILY 90 capsule 3    dilTIAZem (CARDIZEM CD) 240 MG extended release capsule TAKE 1 CAPSULE BY MOUTH DAILY 90 capsule 3    buPROPion (WELLBUTRIN SR) 200 MG extended release tablet Take 1 tablet by mouth 2 times daily (Patient taking differently: Take 200 mg by mouth daily) 60 tablet 5    aspirin 81 MG tablet Take 81 mg by mouth daily          budesonide  0.5 mg Nebulization BID    Arformoterol Tartrate  15 mcg Nebulization BID    ipratropium-albuterol  1 ampule Inhalation Q4H WA    cefTRIAXone (ROCEPHIN) IV  1,000 mg IntraVENous Q24H    And    azithromycin  500 mg IntraVENous Q24H    sodium chloride flush  10 mL IntraVENous 2 times per day    apixaban  5 mg Oral BID    aspirin  81 mg Oral Daily    dilTIAZem  240 mg Oral Daily    ferrous sulfate  325 mg Oral Daily with breakfast    metoprolol tartrate  25 mg Oral BID    pantoprazole  40 mg Oral BID AC    furosemide  20 mg IntraVENous BID      sodium chloride       sodium chloride flush, sodium chloride, potassium chloride, magnesium sulfate, promethazine **OR** ondansetron, acetaminophen **OR** acetaminophen      ALLERGIES:   Allergies as of 09/13/2022 - Fully Reviewed 09/13/2022   Allergen Reaction Noted    Bee venom Swelling and Angioedema 06/17/2013    Shellfish-derived products Other (See Comments) 05/17/2017    Shrimp flavor  11/03/2011    Codeine Hives and Other (See Comments) 11/03/2011    Fentanyl and related Hives 06/17/2013    Hydromorphone Rash, Hives, and Other (See Comments) 12/19/2012    Vancomycin Rash 01/06/2019      OBJECTIVE:   height is 5' 7\" (1.702 m) and weight is 238 lb 6.4 oz (108.1 kg). Her temporal temperature is 97.9 °F (36.6 °C). Her blood pressure is 142/94 (abnormal) and her pulse is 116 (abnormal). Her respiration is 16 and oxygen saturation is 90%. No intake/output data recorded. PHYSICAL EXAM:    CONSTITUTIONAL: She is a 78y.o.-year-old who appears her stated age. She is alert and oriented x 3 and in no acute distress. HEENT: PERRLA, EOMI. No scleral icterus. No thrush, atraumatic, normocephalic. NECK: Supple, without cervical or supraclavicular lymphadenopathy:  CARDIOVASCULAR: S1 S2 RRR. Without murmer. No JVD or hepatojugular reflux seen. RESPIRATORY & CHEST: Bilateral scattered crackles and wheezing heard. GASTROINTESTINAL & ABDOMEN: Soft, nontender, positive bowel sounds in all quadrants, non-distended, without hepatosplenomegaly. GENITOURINARY: No gross anatomical abnormalities seen. MUSCULOSKELETAL: No tenderness to palpation of the axial skeleton. There is no clubbing. No cyanosis. 1+ pitting edema with chronic dermatitis changes seen in bilateral lower extremities. SKIN OF BODY: No rash or jaundice. PSYCHIATRIC EVALUATION: Normal affect.  Patient answers questions appropriately. HEMATOLOGIC/LYMPHATIC/ IMMUNOLOGIC: No palpable lymphadenopathy. NEUROLOGIC: Alert and oriented x 3. Groslly non-focal. Motor strength is 5+/5 in all muscle groups. The patient has a normal sensorium globally. LABS:  Recent Labs     09/13/22  1801 09/14/22  0426 09/15/22  0419   WBC 10.3 9.3 10.6   HGB 11.9* 11.0* 10.6*   HCT 38.0 36.8 33.8*    243 271   ALT 9* <5* <5*   AST 12* 12* 9*    139 138   K 4.4 4.3 3.7    102 98*   CREATININE 1.5* 1.3* 1.2   BUN 25* 27* 26*   CO2 27 26 31   INR 1.49*  --   --        Recent Labs     09/13/22  1801 09/14/22  0426 09/15/22  0419   GLUCOSE 122* 92 112*   CALCIUM 8.7 8.6 8.4    139 138   K 4.4 4.3 3.7   CO2 27 26 31    102 98*   BUN 25* 27* 26*   CREATININE 1.5* 1.3* 1.2       No results for input(s): PHART, QLI6PQG, PO2ART, KHB0ONM, S6WAJPAC, BEART, P4ZOHWOA in the last 72 hours. Lab Results   Component Value Date    INR 1.49 (H) 09/13/2022    INR 1.45 (H) 03/30/2022    INR 2.6 03/24/2022    PROTIME 18.0 (H) 09/13/2022    PROTIME 16.7 (H) 03/30/2022    PROTIME 33.2 (H) 03/23/2019     No results found for: AMYLASE   Lab Results   Component Value Date    LABA1C 5.4 09/08/2017     Lab Results   Component Value Date    .3 09/08/2017     Lab Results   Component Value Date    TSH 2.81 03/30/2022     Lab Results   Component Value Date    TROPONINI <0.01 09/14/2022      Lab Results   Component Value Date    CRP 48.7 (H) 01/06/2019      No results found for: BNP   No results found for: DDIMER   Lab Results   Component Value Date    FERRITIN 77.3 08/15/2022      No results found for: LACTA        IMAGING:    Narrative   EXAMINATION:   ONE XRAY VIEW OF THE CHEST       9/13/2022 4:46 pm           FINDINGS:   There are new bilateral perihilar opacities which are more prominent on the   right. The costophrenic angles are sharp. Mild cardiomegaly is unchanged.    There is no vascular congestion or interstitial prominence. Impression   New bilateral perihilar airspace opacities are more prominent on the right. Pneumonia is favored over edema. Radiographic follow-up is suggested. IMPRESSION:     Acute respiratory distress  Acute hypoxic respiratory failure, stable  Pulmonary edema  Heart failure with preserved ejection fraction  Severe environmental allergies  COVID-19 rule out  Obesity    RECOMMENDATION:     Patient's respiratory status is slowly stabilizing. I strongly suspect that she is suffering with a mix of pulm edema versus community-acquired pneumonia. So far all her cultures and microbiological testing has come back as negative. COVID 19 PCR is also negative. For now continue her on Lasix 20 g IV twice a day. She is also on Pulmicort, Brovana and DuoNeb therapy around-the-clock. I will get a high-resolution CT chest today in order to assess the lung anatomy and ongoing pathology in detail. She does have some wheezing today. I will start her on low-dose steroids. Oxygen supplementation to maintain SPO2 above 90%. Out of bed into chair. Encourage incentive spirometry. We will continue to follow the patient. Camille Lewis MD  Pulmonary Critical Care and Sleep Medicine  9/13/2022, 9:38 AM    This note was completed using dragon medical speech recognition software. Grammatical errors, random word insertions, pronoun errors and incomplete sentences are occasional consequences of this technology due to software limitations. If there are questions or concerns about the content of this note of information contained within the body of this dictation they should be addressed with the provider for clarification.

## 2022-09-15 NOTE — CONSULTS
Isaiah Ly 1945    History:  Past Medical History:   Diagnosis Date    Arthritis     Atrial fibrillation and flutter ([address]     Hypertension     Kidney disease     Pneumonia     Sleep apnea     no c-pap       ECHO:    Repeat echo pending    Summary   -Left ventricular cavity size is normal with mild concentric left   ventricular hypertrophy.   -Overall left ventricular systolic function appears normal. Ejection   fraction is visually estimated to be 60-65%. -No regional wall motion abnormalities are noted. -Cannot grade diastology due to atrial fibrillation.   -The right ventricle is mildly enlarged. Right ventricular systolic function   is normal.   -The right atrium is moderately dilated. -Mitral valve leaflets appear mildly thickened. Mild mitral regurgitation. Mild mitral annular calcification.   -Trivial pulmonic regurgitation.   -Moderate tricuspid regurgitation with a PASP of 56 mmHg. Signature      ------------------------------------------------------------------   Electronically signed by Randall Carrasco MD, [facility] (Interpreting   physician) on 03/30/2022 at 04:34 PM   ------------------------------------------------------------------    ACE/ARB/ARNi: No ACE/ARB/ARNI  BB: Metoprolol Tartrate 25 mg bid  Aldosterone Antagonist: No aldosterone antagonist   SGLT2: No SGLT2 inhibitor    History of sleep apnea: Yes    Equipment used at home: Has CPAP at home  [place] Screen ordered: Yes. Not compliant with CPAP. Its too restrictive    DM History: No    Last Hospital Admission:3/30/22 for paroxysmal atrial fibrillation   Code Status: Full   Discharge plans:Patient to return home with home health care    Family Present: No    Ms. Dorota Eaton was admitted with complaints of shortness of breath that began two days ago, with chest pain and a cough. History of atrial fibrillation-on Eliquis, arthritis,  hypertension and sleep apnea.  Does not where a CPAP. She does not weigh daily. She does not weigh daily, she is losing weight. She does not own a scale, but states she can purchase one. Uses salt to cook and does not follow a low sodium diet. Does not buy low sodium products and buys canned goods. Unsure if she goes over 64 ounces of fluid a day. Compliant with medications and follow up . Patient provided with both written and verbal education on CHF signs/ symptoms, causes, discharge medications, daily weights, low sodium diet, activity, and follow-up. Pt to call if gains 3 pounds in one day or 5 pounds in one week. Mutually agreed upon goals were discussed such as calling the MD as soon as they recognize symptoms and weight gain, maintaining proper diet, taking medications as prescribed, joining cardiac rehab when able. Also reviewed importance of risk factor reduction. Patient provided with CHF Zone Management tool and CHF symptoms magnet. Discussed importance of lifestyle changes: daily weights, low sodium and fluid restriction diet    PATIENT/CAREGIVER TEACHING:    Level of patient/caregiver understanding able to:   [ x] Verbalize understanding [ ] Demonstrate understanding [ ] Teach back   [ ] Needs reinforcement [ ] Other:       Time spent teachin minutes     1. WEIGHT: Admit Weight: 250 lb (113.4 kg)      Today  Weight: 238 lb 6.4 oz (108.1 kg)   2. I/O   Intake/Output Summary (Last 24 hours) at 9/15/2022 1404  Last data filed at 9/15/2022 0957  Gross per 24 hour   Intake 490 ml   Output 800 ml   Net -310 ml       Recommendations:   1. Patient will need a follow appointment  2. Educate further on fluid restriction 48 oz- 64 oz during inpatient stay so he can understand how to measure intake at home. 3. Continue to educate on S/S.   4. Emphasize daily weights, diet, and knowing when and who to call  5. Provided patient with CHF Resource Line for questions and concerns. 6. Patient would benefit from cardiac rehab as an outpatient.  Flyer provided.        Ganesh Blanco RN 9/15/2022 2:04 PM

## 2022-09-15 NOTE — PROGRESS NOTES
IntraVENous Q24H    sodium chloride flush  10 mL IntraVENous 2 times per day    apixaban  5 mg Oral BID    aspirin  81 mg Oral Daily    dilTIAZem  240 mg Oral Daily    ferrous sulfate  325 mg Oral Daily with breakfast    metoprolol tartrate  25 mg Oral BID    pantoprazole  40 mg Oral BID AC    furosemide  20 mg IntraVENous BID     PRN Meds: sodium chloride flush, sodium chloride, potassium chloride, magnesium sulfate, promethazine **OR** ondansetron, acetaminophen **OR** acetaminophen      Intake/Output Summary (Last 24 hours) at 9/15/2022 1107  Last data filed at 9/15/2022 0957  Gross per 24 hour   Intake 490 ml   Output 1900 ml   Net -1410 ml       Physical Exam Performed:    BP (!) 154/104   Pulse (!) 116   Temp 97.5 °F (36.4 °C) (Temporal)   Resp 18   Ht 5' 7\" (1.702 m)   Wt 238 lb 6.4 oz (108.1 kg)   SpO2 90%   BMI 37.34 kg/m²     General appearance: No apparent distress, appears stated age and cooperative. HEENT: Pupils equal, round, and reactive to light. Conjunctivae/corneas clear. Neck: Supple, with full range of motion. No jugular venous distention. Trachea midline. Respiratory:  Normal respiratory effort. Bilateral wheezing with Rales. Cardiovascular: Irregularly irregular rate and rhythm with normal S1/S2 without murmurs, rubs or gallops. Abdomen: Soft, non-tender, non-distended with normal bowel sounds. Musculoskeletal: No clubbing, cyanosis. Mild bipedal edema with chronic stasis dermatitis. .  Skin: Skin color, texture, turgor normal.  No rashes or lesions. Neurologic:  Neurovascularly intact without any focal sensory/motor deficits.  Cranial nerves: II-XII intact, grossly non-focal.  Psychiatric: Alert and oriented, thought content appropriate, normal insight  Capillary Refill: Brisk, 3 seconds, normal   Peripheral Pulses: +2 palpable, equal bilaterally       Labs:   Recent Labs     09/13/22  1801 09/14/22  0426 09/15/22  0419   WBC 10.3 9.3 10.6   HGB 11.9* 11.0* 10.6*   HCT 38.0 36.8 33.8*    243 271     Recent Labs     09/13/22  1801 09/14/22  0426 09/15/22  0419    139 138   K 4.4 4.3 3.7    102 98*   CO2 27 26 31   BUN 25* 27* 26*   CREATININE 1.5* 1.3* 1.2   CALCIUM 8.7 8.6 8.4     Recent Labs     09/13/22  1756 09/13/22  1801 09/14/22  0426 09/15/22  0419   AST  --  12* 12* 9*   ALT  --  9* <5* <5*   BILIDIR 1.0*  --   --   --    BILITOT 1.9* 1.8* 0.9 0.5   ALKPHOS  --  176* 140* 113     Recent Labs     09/13/22 1801   INR 1.49*     Recent Labs     09/13/22  1801 09/14/22 0426   TROPONINI <0.01 <0.01       Urinalysis:      Lab Results   Component Value Date/Time    NITRU Negative 09/14/2022 06:27 AM    WBCUA 37 01/07/2019 12:38 AM    BACTERIA RARE 01/07/2019 12:38 AM    RBCUA 3-5 01/07/2019 12:38 AM    BLOODU Negative 09/14/2022 06:27 AM    SPECGRAV 1.010 09/14/2022 06:27 AM    GLUCOSEU Negative 09/14/2022 06:27 AM       Radiology:  CT CHEST HIGH RESOLUTION   Final Result   Multifocal pneumonia, greatest in the lung bases. No pulmonary fibrosis   noted. Main pulmonary artery is enlarged, raising the question of underlying   pulmonary arterial hypertension      Coronary artery calcification. Fluid is seen in the esophagus, suggesting   reflux         XR CHEST PORTABLE   Final Result   New bilateral perihilar airspace opacities are more prominent on the right. Pneumonia is favored over edema. Radiographic follow-up is suggested. Assessment/Plan:    Active Hospital Problems    Diagnosis     Acute respiratory failure ([facility] [facility] [J96.00]      Priority: Medium     Acute hypoxic respiratory failure due to CAP and acute on chronic HFpEF:  COVID-19 negative. Continue CHF protocol with IV Lasix. Follow-up CT chest.  Continue IV antibiotics. Pulm consulted: On low dose steroids, Pulmicort, Brovana & Duonebs. Continue supplemental oxygen to maintain saturation above 90%. Wean off oxygen as tolerated.      Pneumonia:   Continue Rocephin and

## 2022-09-16 LAB
ANION GAP SERPL CALCULATED.3IONS-SCNC: 9 MMOL/L (ref 3–16)
BUN BLDV-MCNC: 27 MG/DL (ref 7–20)
CALCIUM SERPL-MCNC: 9 MG/DL (ref 8.3–10.6)
CHLORIDE BLD-SCNC: 99 MMOL/L (ref 99–110)
CO2: 32 MMOL/L (ref 21–32)
CREAT SERPL-MCNC: 1.4 MG/DL (ref 0.6–1.2)
GFR AFRICAN AMERICAN: 44
GFR NON-AFRICAN AMERICAN: 37
GLUCOSE BLD-MCNC: 174 MG/DL (ref 70–99)
MAGNESIUM: 1.9 MG/DL (ref 1.8–2.4)
POTASSIUM SERPL-SCNC: 4.1 MMOL/L (ref 3.5–5.1)
PRO-BNP: 2867 PG/ML (ref 0–449)
SODIUM BLD-SCNC: 140 MMOL/L (ref 136–145)

## 2022-09-16 PROCEDURE — 83880 ASSAY OF NATRIURETIC PEPTIDE: CPT

## 2022-09-16 PROCEDURE — 97535 SELF CARE MNGMENT TRAINING: CPT

## 2022-09-16 PROCEDURE — 2700000000 HC OXYGEN THERAPY PER DAY

## 2022-09-16 PROCEDURE — 6370000000 HC RX 637 (ALT 250 FOR IP): Performed by: INTERNAL MEDICINE

## 2022-09-16 PROCEDURE — 6360000002 HC RX W HCPCS: Performed by: INTERNAL MEDICINE

## 2022-09-16 PROCEDURE — 94669 MECHANICAL CHEST WALL OSCILL: CPT

## 2022-09-16 PROCEDURE — 97530 THERAPEUTIC ACTIVITIES: CPT

## 2022-09-16 PROCEDURE — 99233 SBSQ HOSP IP/OBS HIGH 50: CPT | Performed by: INTERNAL MEDICINE

## 2022-09-16 PROCEDURE — 1200000000 HC SEMI PRIVATE

## 2022-09-16 PROCEDURE — 2580000003 HC RX 258: Performed by: INTERNAL MEDICINE

## 2022-09-16 PROCEDURE — 94761 N-INVAS EAR/PLS OXIMETRY MLT: CPT

## 2022-09-16 PROCEDURE — 94640 AIRWAY INHALATION TREATMENT: CPT

## 2022-09-16 PROCEDURE — 83735 ASSAY OF MAGNESIUM: CPT

## 2022-09-16 PROCEDURE — 36415 COLL VENOUS BLD VENIPUNCTURE: CPT

## 2022-09-16 PROCEDURE — 80048 BASIC METABOLIC PNL TOTAL CA: CPT

## 2022-09-16 RX ORDER — FUROSEMIDE 10 MG/ML
20 INJECTION INTRAMUSCULAR; INTRAVENOUS 2 TIMES DAILY
Status: DISCONTINUED | OUTPATIENT
Start: 2022-09-16 | End: 2022-09-16

## 2022-09-16 RX ORDER — FUROSEMIDE 10 MG/ML
20 INJECTION INTRAMUSCULAR; INTRAVENOUS DAILY
Status: DISCONTINUED | OUTPATIENT
Start: 2022-09-16 | End: 2022-09-16

## 2022-09-16 RX ORDER — FUROSEMIDE 10 MG/ML
20 INJECTION INTRAMUSCULAR; INTRAVENOUS DAILY
Status: DISCONTINUED | OUTPATIENT
Start: 2022-09-17 | End: 2022-09-17

## 2022-09-16 RX ORDER — FUROSEMIDE 20 MG/1
20 TABLET ORAL DAILY
Status: DISCONTINUED | OUTPATIENT
Start: 2022-09-17 | End: 2022-09-16

## 2022-09-16 RX ADMIN — FERROUS SULFATE TAB 325 MG (65 MG ELEMENTAL FE) 325 MG: 325 (65 FE) TAB at 08:33

## 2022-09-16 RX ADMIN — METOPROLOL TARTRATE 25 MG: 25 TABLET, FILM COATED ORAL at 08:33

## 2022-09-16 RX ADMIN — APIXABAN 5 MG: 5 TABLET, FILM COATED ORAL at 08:33

## 2022-09-16 RX ADMIN — BUDESONIDE 500 MCG: 0.5 SUSPENSION RESPIRATORY (INHALATION) at 20:54

## 2022-09-16 RX ADMIN — AZITHROMYCIN DIHYDRATE 500 MG: 500 INJECTION, POWDER, LYOPHILIZED, FOR SOLUTION INTRAVENOUS at 22:13

## 2022-09-16 RX ADMIN — SODIUM CHLORIDE, PRESERVATIVE FREE 10 ML: 5 INJECTION INTRAVENOUS at 08:33

## 2022-09-16 RX ADMIN — ARFORMOTEROL TARTRATE 15 MCG: 15 SOLUTION RESPIRATORY (INHALATION) at 08:26

## 2022-09-16 RX ADMIN — ASPIRIN 81 MG 81 MG: 81 TABLET ORAL at 08:33

## 2022-09-16 RX ADMIN — Medication 1000 MG: at 22:02

## 2022-09-16 RX ADMIN — FUROSEMIDE 20 MG: 10 INJECTION, SOLUTION INTRAMUSCULAR; INTRAVENOUS at 08:33

## 2022-09-16 RX ADMIN — BUDESONIDE 500 MCG: 0.5 SUSPENSION RESPIRATORY (INHALATION) at 08:26

## 2022-09-16 RX ADMIN — SODIUM CHLORIDE: 9 INJECTION, SOLUTION INTRAVENOUS at 22:10

## 2022-09-16 RX ADMIN — IPRATROPIUM BROMIDE AND ALBUTEROL SULFATE 1 AMPULE: 2.5; .5 SOLUTION RESPIRATORY (INHALATION) at 16:32

## 2022-09-16 RX ADMIN — PANTOPRAZOLE SODIUM 40 MG: 40 TABLET, DELAYED RELEASE ORAL at 06:36

## 2022-09-16 RX ADMIN — IPRATROPIUM BROMIDE AND ALBUTEROL SULFATE 1 AMPULE: 2.5; .5 SOLUTION RESPIRATORY (INHALATION) at 20:54

## 2022-09-16 RX ADMIN — METHYLPREDNISOLONE SODIUM SUCCINATE 40 MG: 40 INJECTION, POWDER, FOR SOLUTION INTRAMUSCULAR; INTRAVENOUS at 08:33

## 2022-09-16 RX ADMIN — IPRATROPIUM BROMIDE AND ALBUTEROL SULFATE 1 AMPULE: 2.5; .5 SOLUTION RESPIRATORY (INHALATION) at 11:45

## 2022-09-16 RX ADMIN — ARFORMOTEROL TARTRATE 15 MCG: 15 SOLUTION RESPIRATORY (INHALATION) at 20:54

## 2022-09-16 RX ADMIN — APIXABAN 5 MG: 5 TABLET, FILM COATED ORAL at 22:02

## 2022-09-16 RX ADMIN — SODIUM CHLORIDE, PRESERVATIVE FREE 10 ML: 5 INJECTION INTRAVENOUS at 22:14

## 2022-09-16 RX ADMIN — PANTOPRAZOLE SODIUM 40 MG: 40 TABLET, DELAYED RELEASE ORAL at 16:31

## 2022-09-16 RX ADMIN — IPRATROPIUM BROMIDE AND ALBUTEROL SULFATE 1 AMPULE: 2.5; .5 SOLUTION RESPIRATORY (INHALATION) at 08:26

## 2022-09-16 RX ADMIN — METOPROLOL TARTRATE 25 MG: 25 TABLET, FILM COATED ORAL at 22:02

## 2022-09-16 RX ADMIN — DILTIAZEM HYDROCHLORIDE 240 MG: 120 CAPSULE, COATED, EXTENDED RELEASE ORAL at 08:33

## 2022-09-16 NOTE — PROGRESS NOTES
[facility]   Phone: [phone]    Occupational Therapy    [] Initial Evaluation            [x] Daily Treatment Note         [] Discharge Summary      Patient: Shailesh Álvarez   : 1945   MRN: [id number]   Date of Service:  2022    Admitting Diagnosis:  Acute respiratory failure [facility])  Current Admission Summary: 78 y.o. female who presented to [facility] with past medical history of arthritis, hypertension, sleep apnea not on CPAP, hypertension, atrial fibrillation on Eliquis presented to the ED due to shortness of breath. Past Medical History:  has a past medical history of Arthritis, Atrial fibrillation and flutter ([address] Hypertension, Kidney disease, Pneumonia, and Sleep apnea. Past Surgical History:  has a past surgical history that includes Tubal ligation; Total knee arthroplasty (Bilateral, 2012); fracture surgery; Colonoscopy (N/A, 2018); Upper gastrointestinal endoscopy (N/A, 2018); Cardioversion; Gastric bypass surgery; Larynx surgery; Upper gastrointestinal endoscopy (N/A, 3/31/2022); Colonoscopy (N/A, 3/31/2022); and Upper gastrointestinal endoscopy (N/A, 3/31/2022). Discharge Recommendations: Shailesh Álvarez scored a 19/24 on the AM-PAC ADL Inpatient form. Current research shows that an AM-PAC score of 18 or greater is typically associated with a discharge to the patient's home setting. Based on the patient's AM-PAC score, and their current ADL deficits, it is recommended that the patient have 2-3 sessions per week of Occupational Therapy at d/c to increase the patient's independence. At this time, this patient demonstrates the endurance and safety to discharge home with [facility] and a follow up treatment frequency of 2-3x/wk. Please see assessment section for further patient specific details.   HOME HEALTH CARE: LEVEL 1 STANDARD    - Initial home health evaluation to occur within 24-48 hours, in patient home   - step off of curb and fell)         Subjective  General: Pt seated in recliner on OT arrival. Pleasant and agreeable to OT tx. Pt agreeable to bathing, grooming, and toileting. Pain: 2/10. Location: chest when coughing, 1-2, intermittent  Pain Interventions: not applicable. Activities of Daily Living  Basic Activities of Daily Living  Grooming: setup assistance stand by assistance . Comment: in stance at sink to wash face and hands, and perform oral care  Upper Extremity Bathing: supervision . Comment: seated on BSC in bathroom, used wipes  Lower Extremity Bathing: stand by assistance . Comment: for jaki hygiene in stance; bathing LEs to ankles   Upper Extremity Dressing: minimal assistance . Comment: gown  Lower Extremity Dressing: stand by assistance . Comment: SBA don/doff pullups. Educated pt re: sitting to remove, instead of stepping out of them. I don/doff socks by placing each LE in no. 4 position, pulling on each sock to raise LE across knee  Toileting: stand by assistance. Toileting Equipment: none  Toileting Comments: Pt with Purewick in place upon arrival and replaced prior to exit. Pt educated re: avoiding wiping forward after BM to anterior jaki area. Also educated re: available toilet hygiene aids, if needed. Pt able to stand and wipe jaki area. Instrumental Activities of Daily Living  No IADL completed on this date. Functional Mobility  Bed Mobility  Bed mobility not completed on this date. Comments:  Transfers  Sit to stand transfer:stand by assistance, . Comment from recliner, from toilet  Stand to sit transfer: stand by assistance, .   Comment to toilet, to recliner  Stand step transfer: stand by assistance  Toilet transfer: stand by assistance  Toilet transfer equipment: standard toilet, grab bars  Comments:  Functional Mobility:  Sitting Balance: supervision (on toilet)  Standing Balance: contact guard assistance/SBA    Functional Mobility: .  contact guard with the above deficits impacting daily occupational performance; she is at a SBA level for standing ADLs and grooming, and toileting, and needs CGA for functional mobility with AD. Pt would benefit from continued skilled OT services to maximize her safety and independence.   Safety Interventions: patient left in bed, bed alarm in place, call light within reach, gait belt, patient at risk for falls, and nurse notified    Plan  Frequency: 3-5 x/per week  Current Treatment Recommendations: functional mobility training, transfer training, endurance training, patient/caregiver education, and IADL training    Goals  Patient Goals: Safe Discharge   Short Term Goals:  Time Frame: Discharge  Patient will complete upper body ADL at [facility] --S/SBA 9/16; ongoing  Patient will complete lower body ADL at [facility] --SBA 9/16; ongoing  Patient will complete toileting at [facility] --SBA 9/16; ongoing  Patient will complete functional transfers at [facility] --SBA 9/16; ongoing  Patient will complete functional mobility at modified independent --CGA without AD 9/16; ongoing  Patient will complete bed mobility at [facility] --Not addressed 9/16; ongoing  NEW GOAL: Patient will complete home mgt activity with Mod I    Therapy Session Time     Individual Group Co-treatment   Time In 1416     Time Out 1535     Minutes 79          Timed Code Treatment Minutes:   79 minutes  Total Treatment Minutes:  79 minutes       Electronically Signed By: JONNY Martinez, OTR/L, [id number]

## 2022-09-16 NOTE — PROGRESS NOTES
PULMONARY AND CRITICAL CARE MEDICINE PROGRESS NOTE    Subjective: Patient lying in bed in no apparent respiratory distress. Apart from intermittent cough with inability to expectorate, her respiratory symptoms have improved. Oxygen requirement stable at 1 to 2 L/min. REVIEW OF SYSTEMS:   8 point review of system is negative except that mentioned in the subjective portion. PAST MEDICAL HISTORY:   Past Medical History:   Diagnosis Date    Arthritis     Atrial fibrillation and flutter ([address]     Hypertension     Kidney disease     Pneumonia     Sleep apnea     no c-pap       PAST SURGICAL HISTORY:   Past Surgical History:   Procedure Laterality Date    CARDIOVERSION      COLONOSCOPY N/A 11/20/2018    COLONOSCOPY POLYPECTOMY SNARE/COLD BIOPSY performed by Torie Breen MD at [facility] N/A 3/31/2022    COLONOSCOPY POLYPECTOMY SNARE/COLD BIOPSY performed by Sera Soares MD at [address] [place] [address]      ankle rt has pins and rods, and rt elbow    GASTRIC BYPASS SURGERY      LARYNX SURGERY      TOTAL KNEE ARTHROPLASTY Bilateral 12/19/2012    TUBAL LIGATION      tubes tied    UPPER GASTROINTESTINAL ENDOSCOPY N/A 11/20/2018    EGD BIOPSY performed by Torie Breen MD at [address] [place] [address] N/A 3/31/2022    EGD DILATION BALLOON performed by Sera Soares MD at [address] [place] [address] N/A 3/31/2022    EGD BIOPSY performed by Sera Soares MD at [facility]:   Social History     Tobacco Use    Smoking status: Never    Smokeless tobacco: Never   Vaping Use    Vaping Use: Never used   Substance Use Topics    Alcohol use: No    Drug use: No       FAMILY HISTORY:   Family History   Problem Relation Age of Onset    Cancer Father         stomach cancer       MEDICATIONS:     No current facility-administered medications on file prior to encounter.      Current Outpatient Medications on File Prior to Encounter   Medication Sig Dispense Refill    vitamin B-12 (CYANOCOBALAMIN) 100 MCG tablet Take 1 tablet by mouth in the morning.  90 tablet 3    alendronate (FOSAMAX) 70 MG tablet Take 1 tablet by mouth every 7 days 12 tablet 3    apixaban (ELIQUIS) 5 MG TABS tablet Take 1 tablet by mouth 2 times daily (Patient taking differently: Take 5 mg by mouth daily) 180 tablet 3    metoprolol tartrate (LOPRESSOR) 25 MG tablet Take 1 tablet by mouth 2 times daily 180 tablet 3    pantoprazole (PROTONIX) 40 MG tablet Take 1 tablet by mouth 2 times daily (before meals) (Patient taking differently: Take 40 mg by mouth 2 times daily as needed) 30 tablet 3    furosemide (LASIX) 20 MG tablet Take 1 tablet by mouth daily (Patient taking differently: Take 20 mg by mouth daily Indications: taking PRN) 30 tablet 0    Cholecalciferol (VITAMIN D3) 1.25 MG (70985 UT) CAPS TAKE 1 CAPSULE BY MOUTH EVERY 7 DAYS 4 capsule 11    cloNIDine (CATAPRES) 0.2 MG tablet TAKE 1 TABLET BY MOUTH TWICE DAILY (Patient taking differently: daily) 180 tablet 3    DULoxetine (CYMBALTA) 60 MG extended release capsule TAKE 1 CAPSULE BY MOUTH DAILY 90 capsule 3    dilTIAZem (CARDIZEM CD) 240 MG extended release capsule TAKE 1 CAPSULE BY MOUTH DAILY 90 capsule 3    buPROPion (WELLBUTRIN SR) 200 MG extended release tablet Take 1 tablet by mouth 2 times daily (Patient taking differently: Take 200 mg by mouth daily) 60 tablet 5    aspirin 81 MG tablet Take 81 mg by mouth daily          [START ON 9/17/2022] furosemide  20 mg Oral Daily    methylPREDNISolone  40 mg IntraVENous Daily    budesonide  0.5 mg Nebulization BID    Arformoterol Tartrate  15 mcg Nebulization BID    ipratropium-albuterol  1 ampule Inhalation Q4H WA    cefTRIAXone (ROCEPHIN) IV  1,000 mg IntraVENous Q24H    And    azithromycin  500 mg IntraVENous Q24H    sodium chloride flush  10 mL IntraVENous 2 times per day    apixaban  5 mg Oral BID    aspirin  81 mg Oral Daily    dilTIAZem  240 mg Oral Daily    ferrous sulfate  325 mg Oral Daily with breakfast    metoprolol tartrate  25 mg Oral BID    pantoprazole  40 mg Oral BID AC      sodium chloride       perflutren lipid microspheres, sodium chloride flush, sodium chloride, potassium chloride, magnesium sulfate, promethazine **OR** ondansetron, acetaminophen **OR** acetaminophen      ALLERGIES:   Allergies as of 09/13/2022 - Fully Reviewed 09/13/2022   Allergen Reaction Noted    Bee venom Swelling and Angioedema 06/17/2013    Shellfish-derived products Other (See Comments) 05/17/2017    Shrimp flavor  11/03/2011    Codeine Hives and Other (See Comments) 11/03/2011    Fentanyl and related Hives 06/17/2013    Hydromorphone Rash, Hives, and Other (See Comments) 12/19/2012    Vancomycin Rash 01/06/2019      OBJECTIVE:   height is 5' 7\" (1.702 m) and weight is 231 lb 6.4 oz (105 kg). Her temporal temperature is 97.1 °F (36.2 °C). Her blood pressure is 146/88 (abnormal) and her pulse is 107 (abnormal). Her respiration is 16 and oxygen saturation is 87% (abnormal). I/O this shift: In: 18 [P.O.:480]  Out: -      PHYSICAL EXAM:    CONSTITUTIONAL: She is a 78y.o.-year-old who appears her stated age. She is alert and oriented x 3 and in no acute distress. HEENT: PERRLA, EOMI. No scleral icterus. No thrush, atraumatic, normocephalic. NECK: Supple, without cervical or supraclavicular lymphadenopathy:  CARDIOVASCULAR: S1 S2 RRR. Without murmer. No JVD or hepatojugular reflux seen. RESPIRATORY & CHEST: Bilateral scattered crackles and wheezing heard. GASTROINTESTINAL & ABDOMEN: Soft, nontender, positive bowel sounds in all quadrants, non-distended, without hepatosplenomegaly. GENITOURINARY: No gross anatomical abnormalities seen. MUSCULOSKELETAL: No tenderness to palpation of the axial skeleton. There is no clubbing. No cyanosis. 1+ pitting edema with chronic dermatitis changes seen in bilateral lower extremities. SKIN OF BODY: No rash or jaundice. PSYCHIATRIC EVALUATION: Normal affect. Patient answers questions appropriately. HEMATOLOGIC/LYMPHATIC/ IMMUNOLOGIC: No palpable lymphadenopathy. NEUROLOGIC: Alert and oriented x 3. Groslly non-focal. Motor strength is 5+/5 in all muscle groups. The patient has a normal sensorium globally. LABS:  Recent Labs     09/13/22  1801 09/14/22  0426 09/15/22  0419 09/16/22  0454   WBC 10.3 9.3 10.6  --    HGB 11.9* 11.0* 10.6*  --    HCT 38.0 36.8 33.8*  --     243 271  --    ALT 9* <5* <5*  --    AST 12* 12* 9*  --     139 138 140   K 4.4 4.3 3.7 4.1    102 98* 99   CREATININE 1.5* 1.3* 1.2 1.4*   BUN 25* 27* 26* 27*   CO2 27 26 31 32   INR 1.49*  --   --   --        Recent Labs     09/14/22  0426 09/15/22  0419 09/16/22  0454   GLUCOSE 92 112* 174*   CALCIUM 8.6 8.4 9.0    138 140   K 4.3 3.7 4.1   CO2 26 31 32    98* 99   BUN 27* 26* 27*   CREATININE 1.3* 1.2 1.4*       No results for input(s): PHART, PMY5XIU, PO2ART, UXP2FCL, M8XUAJCN, BEART, O4LYZFPV in the last 72 hours. Lab Results   Component Value Date    INR 1.49 (H) 09/13/2022    INR 1.45 (H) 03/30/2022    INR 2.6 03/24/2022    PROTIME 18.0 (H) 09/13/2022    PROTIME 16.7 (H) 03/30/2022    PROTIME 33.2 (H) 03/23/2019     No results found for: AMYLASE   Lab Results   Component Value Date    LABA1C 5.4 09/08/2017     Lab Results   Component Value Date    .3 09/08/2017     Lab Results   Component Value Date    TSH 2.81 03/30/2022     Lab Results   Component Value Date    TROPONINI <0.01 09/14/2022      Lab Results   Component Value Date    CRP 48.7 (H) 01/06/2019      No results found for: BNP   No results found for: DDIMER   Lab Results   Component Value Date    FERRITIN 182.5 (H) 09/15/2022      No results found for: LACTA        IMAGING:    Narrative   EXAMINATION:   CT IMAGES OF THE CHEST WITHOUT CONTRAST, HIGH RESOLUTION 9/15/2022           FINDINGS:   Mediastinum: Thyroid gland is unremarkable.   No pericardial effusion is seen. Scattered small mediastinal and hilar nodes are noted. Postsurgical change   is seen in the stomach from gastric bypass surgery. Hiatal hernia is seen. Fluid is seen in the esophagus, suggesting reflux. .. Coronary artery   calcification is seen. Main pulmonary artery is enlarged measuring 4.3 cm       HRCT Findings/Lungs/pleura: On the left, hazy ground-glass opacity and parenchymal nodularity is seen in   the left upper lobe. More focal consolidative changes seen posteriorly in   the left lower lobe with scattered ground-glass opacity and tree-in-bud   nodularity. On the right patchy consolidative changes seen posteriorly in the right upper   lobe. Scattered ground-glass opacity and parenchymal nodularity seen in the   right upper lobe. Scattered ground-glass opacity in tree-in-bud nodularity   seen in the right middle lobe. More focal confluent parenchymal opacity is   seen in the right lung base. No significant septal thickening. No cystic   lung disease. No fibrosis noted. Upper Abdomen: Right adrenal gland is normal.  There is mild thickening and   hypodensity seen in left adrenal gland. Atherosclerotic change seen in   abdominal aorta. Soft Tissues/Bones: Spurring is seen in the spine. Spurring is seen in the   shoulder joints. Impression   Multifocal pneumonia, greatest in the lung bases. No pulmonary fibrosis   noted. Main pulmonary artery is enlarged, raising the question of underlying   pulmonary arterial hypertension       Coronary artery calcification. Fluid is seen in the esophagus, suggesting   reflux           IMPRESSION:     Acute respiratory distress  Acute hypoxic respiratory failure, stable  Pulmonary edema  Heart failure with preserved ejection fraction  Severe environmental allergies  Obesity    RECOMMENDATION:     Patient's respiratory status is slowly stabilizing.     I strongly suspect that she is suffering with a mix of pulm edema versus community-acquired pneumonia. So far all her cultures and microbiological testing has come back as negative. COVID 19 PCR is also negative. For now continue her on Lasix 20 g IV twice a day. proBNP levels are decreasing. She is also on Pulmicort, Brovana and DuoNeb therapy around-the-clock. High-resolution CT chest done yesterday did not show any pulmonary fibrosis. Mosaic pattern with bibasilar consolidation changes were seen. This suggest a mix of pulm edema, small airway disease and pulm edema. Continue her on low-dose steroids. I will add MetaNeb to the therapy, to help her expectorate. If this does not help she may need a bronchoscopic pulmonary toilet, early next week. Oxygen supplementation to maintain SPO2 above 90%. Out of bed into chair. Encourage incentive spirometry. We will continue to follow the patient. Christina Garces MD  Pulmonary Critical Care and Sleep Medicine  9/13/2022, 10:37 AM    This note was completed using dragon medical speech recognition software. Grammatical errors, random word insertions, pronoun errors and incomplete sentences are occasional consequences of this technology due to software limitations. If there are questions or concerns about the content of this note of information contained within the body of this dictation they should be addressed with the provider for clarification.

## 2022-09-16 NOTE — PROGRESS NOTES
Nutrition Education    Provided heart failure diet education. Education included low sodium diet guidelines (3-4 gm Na+/day) and fluid restriction (64 oz/day). Reviewed foods to choose and foods to avoid, along with label reading and ways to add flavor to food. Pt currently tries to follow a low sodium diet at home and does not add salt to food. Pt states understanding of education. Time spent with patient: 5 minutes. Learners: Pt  Readiness: Acceptance  Method: Explanation and Handout  Response: Verbalizes Understanding  Contact name and number provided.     Chavo Atwood RD, LD  Contact Number: [phone]

## 2022-09-16 NOTE — PROGRESS NOTES
Shift assessment completed ( See flow sheets for details). Pt alert and oriented X 4. Able to follow commands. Denies any pain and discomfort at the moment. Stays on 2 L with oxygen saturation on low 90's. Plan of the day discussed with pt. All safety measures stays active. Will continue to monitor.

## 2022-09-16 NOTE — PROGRESS NOTES
Assessment completed. VSS. O2 at 96% on 2L NC. Patient awake, alert, and in bed. Medications given per MAR. No complaints of pain or discomfort at this time. Safety precautions in place. Call light within reach. Will continue to monitor.
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
## 2022-09-16 NOTE — PLAN OF CARE
Problem: Discharge Planning  Goal: Discharge to home or other facility with appropriate resources  9/16/2022 0619 by Cordell Prather RN  Outcome: Progressing  9/16/2022 0454 by Cordell Prather RN  Outcome: Progressing     Problem: Safety - Adult  Goal: Free from fall injury  9/16/2022 0619 by Cordell Prather RN  Outcome: Progressing  9/16/2022 0454 by Cordell Prather RN  Outcome: Progressing     Problem: ABCDS Injury Assessment  Goal: Absence of physical injury  9/16/2022 0619 by Cordell Prather RN  Outcome: Progressing  9/16/2022 0454 by Cordell Prather RN  Outcome: Progressing     Problem: Pain  Goal: Verbalizes/displays adequate comfort level or baseline comfort level  9/16/2022 0619 by Cordell Prather RN  Outcome: Progressing  9/16/2022 0454 by Cordell Prather RN  Outcome: Progressing     Problem: Chronic Conditions and Co-morbidities  Goal: Patient's chronic conditions and co-morbidity symptoms are monitored and maintained or improved  9/16/2022 0619 by Cordell Prather RN  Outcome: Progressing  9/16/2022 0454 by Cordell Prather RN  Outcome: Progressing

## 2022-09-17 LAB
ANION GAP SERPL CALCULATED.3IONS-SCNC: 9 MMOL/L (ref 3–16)
BLOOD CULTURE, ROUTINE: NORMAL
BUN BLDV-MCNC: 34 MG/DL (ref 7–20)
CALCIUM SERPL-MCNC: 9.3 MG/DL (ref 8.3–10.6)
CHLORIDE BLD-SCNC: 96 MMOL/L (ref 99–110)
CO2: 31 MMOL/L (ref 21–32)
CREAT SERPL-MCNC: 1.4 MG/DL (ref 0.6–1.2)
CULTURE, BLOOD 2: NORMAL
CULTURE, RESPIRATORY: NORMAL
GFR AFRICAN AMERICAN: 44
GFR NON-AFRICAN AMERICAN: 37
GLUCOSE BLD-MCNC: 129 MG/DL (ref 70–99)
GRAM STAIN RESULT: NORMAL
MAGNESIUM: 2 MG/DL (ref 1.8–2.4)
POTASSIUM SERPL-SCNC: 4 MMOL/L (ref 3.5–5.1)
SODIUM BLD-SCNC: 136 MMOL/L (ref 136–145)

## 2022-09-17 PROCEDURE — 36415 COLL VENOUS BLD VENIPUNCTURE: CPT

## 2022-09-17 PROCEDURE — 1200000000 HC SEMI PRIVATE

## 2022-09-17 PROCEDURE — 6370000000 HC RX 637 (ALT 250 FOR IP): Performed by: INTERNAL MEDICINE

## 2022-09-17 PROCEDURE — 94669 MECHANICAL CHEST WALL OSCILL: CPT

## 2022-09-17 PROCEDURE — 94761 N-INVAS EAR/PLS OXIMETRY MLT: CPT

## 2022-09-17 PROCEDURE — 6360000002 HC RX W HCPCS: Performed by: INTERNAL MEDICINE

## 2022-09-17 PROCEDURE — 6370000000 HC RX 637 (ALT 250 FOR IP): Performed by: PEDIATRICS

## 2022-09-17 PROCEDURE — 2700000000 HC OXYGEN THERAPY PER DAY

## 2022-09-17 PROCEDURE — 2580000003 HC RX 258: Performed by: INTERNAL MEDICINE

## 2022-09-17 PROCEDURE — 83735 ASSAY OF MAGNESIUM: CPT

## 2022-09-17 PROCEDURE — 99232 SBSQ HOSP IP/OBS MODERATE 35: CPT | Performed by: INTERNAL MEDICINE

## 2022-09-17 PROCEDURE — 80048 BASIC METABOLIC PNL TOTAL CA: CPT

## 2022-09-17 PROCEDURE — 94640 AIRWAY INHALATION TREATMENT: CPT

## 2022-09-17 RX ORDER — IPRATROPIUM BROMIDE AND ALBUTEROL SULFATE 2.5; .5 MG/3ML; MG/3ML
1 SOLUTION RESPIRATORY (INHALATION) 3 TIMES DAILY
Status: DISCONTINUED | OUTPATIENT
Start: 2022-09-17 | End: 2022-09-20

## 2022-09-17 RX ORDER — FUROSEMIDE 20 MG/1
20 TABLET ORAL DAILY
Status: DISCONTINUED | OUTPATIENT
Start: 2022-09-18 | End: 2022-09-20 | Stop reason: HOSPADM

## 2022-09-17 RX ORDER — PREDNISONE 20 MG/1
20 TABLET ORAL DAILY
Status: DISCONTINUED | OUTPATIENT
Start: 2022-09-18 | End: 2022-09-20 | Stop reason: HOSPADM

## 2022-09-17 RX ORDER — GUAIFENESIN/DEXTROMETHORPHAN 100-10MG/5
10 SYRUP ORAL EVERY 4 HOURS PRN
Status: DISCONTINUED | OUTPATIENT
Start: 2022-09-17 | End: 2022-09-20 | Stop reason: HOSPADM

## 2022-09-17 RX ADMIN — BUDESONIDE 500 MCG: 0.5 SUSPENSION RESPIRATORY (INHALATION) at 09:35

## 2022-09-17 RX ADMIN — IRON SUCROSE 300 MG: 20 INJECTION, SOLUTION INTRAVENOUS at 15:03

## 2022-09-17 RX ADMIN — Medication 1000 MG: at 22:59

## 2022-09-17 RX ADMIN — FERROUS SULFATE TAB 325 MG (65 MG ELEMENTAL FE) 325 MG: 325 (65 FE) TAB at 08:47

## 2022-09-17 RX ADMIN — SODIUM CHLORIDE, PRESERVATIVE FREE 10 ML: 5 INJECTION INTRAVENOUS at 22:59

## 2022-09-17 RX ADMIN — APIXABAN 5 MG: 5 TABLET, FILM COATED ORAL at 22:59

## 2022-09-17 RX ADMIN — METOPROLOL TARTRATE 25 MG: 25 TABLET, FILM COATED ORAL at 22:59

## 2022-09-17 RX ADMIN — METOPROLOL TARTRATE 25 MG: 25 TABLET, FILM COATED ORAL at 08:47

## 2022-09-17 RX ADMIN — AZITHROMYCIN DIHYDRATE 500 MG: 500 INJECTION, POWDER, LYOPHILIZED, FOR SOLUTION INTRAVENOUS at 23:09

## 2022-09-17 RX ADMIN — DILTIAZEM HYDROCHLORIDE 240 MG: 120 CAPSULE, COATED, EXTENDED RELEASE ORAL at 08:46

## 2022-09-17 RX ADMIN — IPRATROPIUM BROMIDE AND ALBUTEROL SULFATE 1 AMPULE: 2.5; .5 SOLUTION RESPIRATORY (INHALATION) at 09:35

## 2022-09-17 RX ADMIN — APIXABAN 5 MG: 5 TABLET, FILM COATED ORAL at 08:47

## 2022-09-17 RX ADMIN — BUDESONIDE 500 MCG: 0.5 SUSPENSION RESPIRATORY (INHALATION) at 19:44

## 2022-09-17 RX ADMIN — SODIUM CHLORIDE, PRESERVATIVE FREE 10 ML: 5 INJECTION INTRAVENOUS at 08:48

## 2022-09-17 RX ADMIN — METHYLPREDNISOLONE SODIUM SUCCINATE 40 MG: 40 INJECTION, POWDER, FOR SOLUTION INTRAMUSCULAR; INTRAVENOUS at 08:47

## 2022-09-17 RX ADMIN — ARFORMOTEROL TARTRATE 15 MCG: 15 SOLUTION RESPIRATORY (INHALATION) at 09:35

## 2022-09-17 RX ADMIN — IPRATROPIUM BROMIDE AND ALBUTEROL SULFATE 1 AMPULE: 2.5; .5 SOLUTION RESPIRATORY (INHALATION) at 12:46

## 2022-09-17 RX ADMIN — ASPIRIN 81 MG 81 MG: 81 TABLET ORAL at 08:47

## 2022-09-17 RX ADMIN — PANTOPRAZOLE SODIUM 40 MG: 40 TABLET, DELAYED RELEASE ORAL at 16:25

## 2022-09-17 RX ADMIN — GUAIFENESIN AND DEXTROMETHORPHAN 10 ML: 100; 10 SYRUP ORAL at 22:59

## 2022-09-17 RX ADMIN — IPRATROPIUM BROMIDE AND ALBUTEROL SULFATE 1 AMPULE: 2.5; .5 SOLUTION RESPIRATORY (INHALATION) at 19:44

## 2022-09-17 RX ADMIN — FUROSEMIDE 20 MG: 10 INJECTION, SOLUTION INTRAMUSCULAR; INTRAVENOUS at 08:47

## 2022-09-17 RX ADMIN — ARFORMOTEROL TARTRATE 15 MCG: 15 SOLUTION RESPIRATORY (INHALATION) at 19:43

## 2022-09-17 RX ADMIN — PANTOPRAZOLE SODIUM 40 MG: 40 TABLET, DELAYED RELEASE ORAL at 08:47
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
## 2022-09-17 NOTE — PROGRESS NOTES
09/17/22 1600   RT Protocol   History Pulmonary Disease 0   Respiratory pattern 2   Breath sounds 4   Cough 0   Bronchodilator Assessment Score 6

## 2022-09-18 LAB
ANION GAP SERPL CALCULATED.3IONS-SCNC: 8 MMOL/L (ref 3–16)
ANISOCYTOSIS: ABNORMAL
BASOPHILS ABSOLUTE: 0 K/UL (ref 0–0.2)
BASOPHILS RELATIVE PERCENT: 0 %
BUN BLDV-MCNC: 38 MG/DL (ref 7–20)
CALCIUM SERPL-MCNC: 9.2 MG/DL (ref 8.3–10.6)
CHLORIDE BLD-SCNC: 100 MMOL/L (ref 99–110)
CO2: 35 MMOL/L (ref 21–32)
CREAT SERPL-MCNC: 1.4 MG/DL (ref 0.6–1.2)
EOSINOPHILS ABSOLUTE: 0 K/UL (ref 0–0.6)
EOSINOPHILS RELATIVE PERCENT: 0 %
GFR AFRICAN AMERICAN: 44
GFR NON-AFRICAN AMERICAN: 37
GLUCOSE BLD-MCNC: 123 MG/DL (ref 70–99)
HCT VFR BLD CALC: 35.6 % (ref 36–48)
HEMATOLOGY PATH CONSULT: NO
HEMOGLOBIN: 10.9 G/DL (ref 12–16)
LYMPHOCYTES ABSOLUTE: 1.6 K/UL (ref 1–5.1)
LYMPHOCYTES RELATIVE PERCENT: 13 %
MAGNESIUM: 2 MG/DL (ref 1.8–2.4)
MCH RBC QN AUTO: 26.3 PG (ref 26–34)
MCHC RBC AUTO-ENTMCNC: 30.5 G/DL (ref 31–36)
MCV RBC AUTO: 86.1 FL (ref 80–100)
METAMYELOCYTES RELATIVE PERCENT: 2 %
MONOCYTES ABSOLUTE: 1 K/UL (ref 0–1.3)
MONOCYTES RELATIVE PERCENT: 8 %
NEUTROPHILS ABSOLUTE: 9.9 K/UL (ref 1.7–7.7)
NEUTROPHILS RELATIVE PERCENT: 77 %
PDW BLD-RTO: 17.6 % (ref 12.4–15.4)
PLATELET # BLD: 337 K/UL (ref 135–450)
PLATELET SLIDE REVIEW: ADEQUATE
PMV BLD AUTO: 9.9 FL (ref 5–10.5)
POTASSIUM SERPL-SCNC: 3.6 MMOL/L (ref 3.5–5.1)
RBC # BLD: 4.13 M/UL (ref 4–5.2)
SLIDE REVIEW: ABNORMAL
SODIUM BLD-SCNC: 143 MMOL/L (ref 136–145)
WBC # BLD: 12.5 K/UL (ref 4–11)

## 2022-09-18 PROCEDURE — 2580000003 HC RX 258: Performed by: INTERNAL MEDICINE

## 2022-09-18 PROCEDURE — 6370000000 HC RX 637 (ALT 250 FOR IP): Performed by: PEDIATRICS

## 2022-09-18 PROCEDURE — 1200000000 HC SEMI PRIVATE

## 2022-09-18 PROCEDURE — 85025 COMPLETE CBC W/AUTO DIFF WBC: CPT

## 2022-09-18 PROCEDURE — 94761 N-INVAS EAR/PLS OXIMETRY MLT: CPT

## 2022-09-18 PROCEDURE — 36415 COLL VENOUS BLD VENIPUNCTURE: CPT

## 2022-09-18 PROCEDURE — 94640 AIRWAY INHALATION TREATMENT: CPT

## 2022-09-18 PROCEDURE — 6360000002 HC RX W HCPCS: Performed by: INTERNAL MEDICINE

## 2022-09-18 PROCEDURE — 6370000000 HC RX 637 (ALT 250 FOR IP): Performed by: INTERNAL MEDICINE

## 2022-09-18 PROCEDURE — 80048 BASIC METABOLIC PNL TOTAL CA: CPT

## 2022-09-18 PROCEDURE — 83735 ASSAY OF MAGNESIUM: CPT

## 2022-09-18 PROCEDURE — 94669 MECHANICAL CHEST WALL OSCILL: CPT

## 2022-09-18 PROCEDURE — 99232 SBSQ HOSP IP/OBS MODERATE 35: CPT | Performed by: INTERNAL MEDICINE

## 2022-09-18 RX ORDER — GUAIFENESIN 600 MG/1
600 TABLET, EXTENDED RELEASE ORAL 2 TIMES DAILY
Status: DISCONTINUED | OUTPATIENT
Start: 2022-09-18 | End: 2022-09-20 | Stop reason: HOSPADM

## 2022-09-18 RX ADMIN — BUDESONIDE 500 MCG: 0.5 SUSPENSION RESPIRATORY (INHALATION) at 08:27

## 2022-09-18 RX ADMIN — ARFORMOTEROL TARTRATE 15 MCG: 15 SOLUTION RESPIRATORY (INHALATION) at 19:45

## 2022-09-18 RX ADMIN — ASPIRIN 81 MG 81 MG: 81 TABLET ORAL at 10:21

## 2022-09-18 RX ADMIN — SODIUM CHLORIDE, PRESERVATIVE FREE 10 ML: 5 INJECTION INTRAVENOUS at 10:22

## 2022-09-18 RX ADMIN — METOPROLOL TARTRATE 25 MG: 25 TABLET, FILM COATED ORAL at 20:51

## 2022-09-18 RX ADMIN — APIXABAN 5 MG: 5 TABLET, FILM COATED ORAL at 20:51

## 2022-09-18 RX ADMIN — AZITHROMYCIN DIHYDRATE 500 MG: 500 INJECTION, POWDER, LYOPHILIZED, FOR SOLUTION INTRAVENOUS at 21:03

## 2022-09-18 RX ADMIN — GUAIFENESIN 600 MG: 600 TABLET, EXTENDED RELEASE ORAL at 20:51

## 2022-09-18 RX ADMIN — Medication 1000 MG: at 20:49

## 2022-09-18 RX ADMIN — PANTOPRAZOLE SODIUM 40 MG: 40 TABLET, DELAYED RELEASE ORAL at 17:39

## 2022-09-18 RX ADMIN — DILTIAZEM HYDROCHLORIDE 240 MG: 120 CAPSULE, COATED, EXTENDED RELEASE ORAL at 10:21

## 2022-09-18 RX ADMIN — BUDESONIDE 500 MCG: 0.5 SUSPENSION RESPIRATORY (INHALATION) at 19:45

## 2022-09-18 RX ADMIN — METOPROLOL TARTRATE 25 MG: 25 TABLET, FILM COATED ORAL at 10:21

## 2022-09-18 RX ADMIN — FERROUS SULFATE TAB 325 MG (65 MG ELEMENTAL FE) 325 MG: 325 (65 FE) TAB at 10:25

## 2022-09-18 RX ADMIN — GUAIFENESIN AND DEXTROMETHORPHAN 10 ML: 100; 10 SYRUP ORAL at 02:56

## 2022-09-18 RX ADMIN — IPRATROPIUM BROMIDE AND ALBUTEROL SULFATE 1 AMPULE: 2.5; .5 SOLUTION RESPIRATORY (INHALATION) at 08:26

## 2022-09-18 RX ADMIN — SODIUM CHLORIDE, PRESERVATIVE FREE 10 ML: 5 INJECTION INTRAVENOUS at 21:04

## 2022-09-18 RX ADMIN — FUROSEMIDE 20 MG: 20 TABLET ORAL at 10:21

## 2022-09-18 RX ADMIN — ARFORMOTEROL TARTRATE 15 MCG: 15 SOLUTION RESPIRATORY (INHALATION) at 08:27

## 2022-09-18 RX ADMIN — PANTOPRAZOLE SODIUM 40 MG: 40 TABLET, DELAYED RELEASE ORAL at 10:25

## 2022-09-18 RX ADMIN — IPRATROPIUM BROMIDE AND ALBUTEROL SULFATE 1 AMPULE: 2.5; .5 SOLUTION RESPIRATORY (INHALATION) at 19:45

## 2022-09-18 RX ADMIN — GUAIFENESIN 600 MG: 600 TABLET, EXTENDED RELEASE ORAL at 17:39

## 2022-09-18 RX ADMIN — IPRATROPIUM BROMIDE AND ALBUTEROL SULFATE 1 AMPULE: 2.5; .5 SOLUTION RESPIRATORY (INHALATION) at 15:52

## 2022-09-18 RX ADMIN — PREDNISONE 20 MG: 20 TABLET ORAL at 10:21

## 2022-09-18 RX ADMIN — SODIUM CHLORIDE: 9 INJECTION, SOLUTION INTRAVENOUS at 21:00

## 2022-09-18 RX ADMIN — APIXABAN 5 MG: 5 TABLET, FILM COATED ORAL at 10:21

## 2022-09-18 NOTE — PROGRESS NOTES
[facility] Pulmonary/CCM Progress note      Admit Date: 9/13/2022    Chief Complaint: Shortness of breath    Subjective: Interval History: States that she has a cough, unable to bring up phlegm. Has chest congestion, currently on room air.     Scheduled Meds:   guaiFENesin  600 mg Oral BID    furosemide  20 mg Oral Daily    predniSONE  20 mg Oral Daily    ipratropium-albuterol  1 ampule Inhalation TID    budesonide  0.5 mg Nebulization BID    Arformoterol Tartrate  15 mcg Nebulization BID    cefTRIAXone (ROCEPHIN) IV  1,000 mg IntraVENous Q24H    And    azithromycin  500 mg IntraVENous Q24H    sodium chloride flush  10 mL IntraVENous 2 times per day    apixaban  5 mg Oral BID    aspirin  81 mg Oral Daily    dilTIAZem  240 mg Oral Daily    ferrous sulfate  325 mg Oral Daily with breakfast    metoprolol tartrate  25 mg Oral BID    pantoprazole  40 mg Oral BID AC     Continuous Infusions:   sodium chloride 10 mL/hr at 09/16/22 2210     PRN Meds:guaiFENesin-dextromethorphan, perflutren lipid microspheres, sodium chloride flush, sodium chloride, potassium chloride, magnesium sulfate, promethazine **OR** ondansetron, acetaminophen **OR** acetaminophen    Review of Systems  Constitutional: negative for fatigue, fevers, malaise and weight loss  Ears, nose, mouth, throat: negative for ear drainage, epistaxis, hoarseness, nasal congestion, sore throat and voice change  Respiratory: negative except for cough and shortness of breath  Cardiovascular: negative for chest pain, chest pressure/discomfort, irregular heart beat, lower extremity edema and palpitations  Gastrointestinal: negative for abdominal pain, constipation, diarrhea, jaundice, melena, odynophagia, reflux symptoms and vomiting  Hematologic/lymphatic: negative for bleeding, easy bruising, lymphadenopathy and petechiae  Musculoskeletal:negative for arthralgias, bone pain, muscle weakness, neck pain and stiff joints  Neurological: negative for dizziness, gait problems, headaches, seizures, speech problems, tremors and weakness  Behavioral/Psych: negative for anxiety, behavior problems, depression, fatigue and sleep disturbance  Endocrine: negative for diabetic symptoms including none, neuropathy, polyphagia, polyuria, polydipsia, vomiting and diarrhea and temperature intolerance  Allergic/Immunologic: negative for anaphylaxis, angioedema, hay fever and urticaria    Objective:     Patient Vitals for the past 8 hrs:   BP Temp Temp src Pulse Resp SpO2   09/18/22 1325 (!) 142/71 97.8 °F (36.6 °C) Temporal -- -- --   09/18/22 1019 113/68 97.5 °F (36.4 °C) Temporal 94 18 93 %   09/18/22 0827 -- -- -- 73 18 97 %       I/O last 3 completed shifts: In: 240 [P.O.:240]  Out: 1600 [Urine:1600]  No intake/output data recorded.     General Appearance: alert and oriented to person, place and time, well developed and well- nourished, in no acute distress  Skin: warm and dry, no rash or erythema  Head: normocephalic and atraumatic  Eyes: pupils equal, round, and reactive to light, extraocular eye movements intact, conjunctivae normal  ENT: external ear and ear canal normal bilaterally, nose without deformity, nasal mucosa and turbinates normal  Neck: supple and non-tender without mass, no cervical lymphadenopathy  Pulmonary/Chest: clear to auscultation bilaterally- no wheezes, rales or rhonchi, normal air movement, no respiratory distress  Cardiovascular: normal rate, regular rhythm,  no murmurs, rubs, distal pulses intact, no carotid bruits  Abdomen: soft, non-tender, non-distended, normal bowel sounds, no masses or organomegaly  Lymph Nodes: Cervical, supraclavicular normal  Extremities: no cyanosis, clubbing or edema  Musculoskeletal: normal range of motion, no joint swelling, deformity or tenderness  Neurologic: alert, no focal neurologic deficits    Data Review:  CBC:   Lab Results   Component Value Date/Time    WBC 12.5 09/18/2022 10:34 AM    RBC 4.13 09/18/2022 10:34 AM     BMP:   Lab Results   Component Value Date/Time    GLUCOSE 123 09/18/2022 10:34 AM    CO2 35 09/18/2022 10:34 AM    BUN 38 09/18/2022 10:34 AM    CREATININE 1.4 09/18/2022 10:34 AM    CALCIUM 9.2 09/18/2022 10:34 AM     ABG: No results found for: KRT4YUH, BEART, C7DXIULP, PHART, THGBART, NBA1SLF, PO2ART, IWV2CYU    Radiology: All pertinent images / reports were reviewed as a part of this visit. Narrative   EXAMINATION:   CT IMAGES OF THE CHEST WITHOUT CONTRAST, HIGH RESOLUTION 9/15/2022       TECHNIQUE:   CT of the chest was performed without the administration of intravenous   contrast. High resolution CT imaging was performed of the lungs. Multiplanar   reformatted images are provided for review. Automated exposure control,   iterative reconstruction, and/or weight based adjustment of the mA/kV was   utilized to reduce the radiation dose to as low as reasonably achievable. High resolution CT images were performed in the supine inspiration, supine   expiration, and prone inspiration positions. COMPARISON:   None. HISTORY:   ORDERING SYSTEM PROVIDED HISTORY: ILD? TECHNOLOGIST PROVIDED HISTORY:   Reason for exam:->ILD? Reason for Exam: ILD? FINDINGS:   Mediastinum: Thyroid gland is unremarkable. No pericardial effusion is seen. Scattered small mediastinal and hilar nodes are noted. Postsurgical change   is seen in the stomach from gastric bypass surgery. Hiatal hernia is seen. Fluid is seen in the esophagus, suggesting reflux. .. Coronary artery   calcification is seen. Main pulmonary artery is enlarged measuring 4.3 cm       HRCT Findings/Lungs/pleura: On the left, hazy ground-glass opacity and parenchymal nodularity is seen in   the left upper lobe. More focal consolidative changes seen posteriorly in   the left lower lobe with scattered ground-glass opacity and tree-in-bud   nodularity. On the right patchy consolidative changes seen posteriorly in the right upper   lobe.

## 2022-09-18 NOTE — PLAN OF CARE
Problem: Safety - Adult  Goal: Free from fall injury  Outcome: Progressing  Note: Fall risk band on patient. Non skid footwear in place. Alarms used appropriately. Patient instructed to call and wait for staff before getting up. Rounding done to anticipate needs. Appropriate safety devices used for transfers. Bedside table within reach. Call light within reach. Pt denies further needs at this time. Nursing will continue to monitor for changes.

## 2022-09-18 NOTE — PROGRESS NOTES
[facility] Pulmonary/CCM Progress note      Admit Date: 9/13/2022    Chief Complaint: Shortness of breath    Subjective: Interval History: Improved dyspnea, still has a dry cough, on 3 L O2.     Scheduled Meds:   [START ON 9/18/2022] furosemide  20 mg Oral Daily    [START ON 9/18/2022] predniSONE  20 mg Oral Daily    ipratropium-albuterol  1 ampule Inhalation TID    budesonide  0.5 mg Nebulization BID    Arformoterol Tartrate  15 mcg Nebulization BID    cefTRIAXone (ROCEPHIN) IV  1,000 mg IntraVENous Q24H    And    azithromycin  500 mg IntraVENous Q24H    sodium chloride flush  10 mL IntraVENous 2 times per day    apixaban  5 mg Oral BID    aspirin  81 mg Oral Daily    dilTIAZem  240 mg Oral Daily    ferrous sulfate  325 mg Oral Daily with breakfast    metoprolol tartrate  25 mg Oral BID    pantoprazole  40 mg Oral BID AC     Continuous Infusions:   sodium chloride 10 mL/hr at 09/16/22 2210     PRN Meds:guaiFENesin-dextromethorphan, perflutren lipid microspheres, sodium chloride flush, sodium chloride, potassium chloride, magnesium sulfate, promethazine **OR** ondansetron, acetaminophen **OR** acetaminophen    Review of Systems  Constitutional: negative for fatigue, fevers, malaise and weight loss  Ears, nose, mouth, throat: negative for ear drainage, epistaxis, hoarseness, nasal congestion, sore throat and voice change  Respiratory: negative except for cough and shortness of breath  Cardiovascular: negative for chest pain, chest pressure/discomfort, irregular heart beat, lower extremity edema and palpitations  Gastrointestinal: negative for abdominal pain, constipation, diarrhea, jaundice, melena, odynophagia, reflux symptoms and vomiting  Hematologic/lymphatic: negative for bleeding, easy bruising, lymphadenopathy and petechiae  Musculoskeletal:negative for arthralgias, bone pain, muscle weakness, neck pain and stiff joints  Neurological: negative for dizziness, gait problems, headaches, seizures, speech problems, tremors and weakness  Behavioral/Psych: negative for anxiety, behavior problems, depression, fatigue and sleep disturbance  Endocrine: negative for diabetic symptoms including none, neuropathy, polyphagia, polyuria, polydipsia, vomiting and diarrhea and temperature intolerance  Allergic/Immunologic: negative for anaphylaxis, angioedema, hay fever and urticaria    Objective:     Patient Vitals for the past 8 hrs:   BP Temp Temp src Pulse SpO2   09/17/22 1624 119/62 97.7 °F (36.5 °C) Temporal 80 95 %   09/17/22 1329 123/74 97.2 °F (36.2 °C) Temporal 95 93 %   09/17/22 1247 -- -- -- 95 --     I/O last 3 completed shifts: In: 1210 [P.O.:1200; I.V.:10]  Out: 1600 [Urine:1600]  No intake/output data recorded.     General Appearance: alert and oriented to person, place and time, well developed and well- nourished, in no acute distress  Skin: warm and dry, no rash or erythema  Head: normocephalic and atraumatic  Eyes: pupils equal, round, and reactive to light, extraocular eye movements intact, conjunctivae normal  ENT: external ear and ear canal normal bilaterally, nose without deformity, nasal mucosa and turbinates normal  Neck: supple and non-tender without mass, no cervical lymphadenopathy  Pulmonary/Chest: clear to auscultation bilaterally- no wheezes, rales or rhonchi, normal air movement, no respiratory distress  Cardiovascular: normal rate, regular rhythm,  no murmurs, rubs, distal pulses intact, no carotid bruits  Abdomen: soft, non-tender, non-distended, normal bowel sounds, no masses or organomegaly  Lymph Nodes: Cervical, supraclavicular normal  Extremities: no cyanosis, clubbing or edema  Musculoskeletal: normal range of motion, no joint swelling, deformity or tenderness  Neurologic: alert, no focal neurologic deficits    Data Review:  CBC:   Lab Results   Component Value Date/Time    WBC 10.6 09/15/2022 04:19 AM    RBC 3.97 09/15/2022 04:19 AM     BMP:   Lab Results   Component Value Date/Time    GLUCOSE 129 09/17/2022 05:11 AM    CO2 31 09/17/2022 05:11 AM    BUN 34 09/17/2022 05:11 AM    CREATININE 1.4 09/17/2022 05:11 AM    CALCIUM 9.3 09/17/2022 05:11 AM     ABG: No results found for: PIG5JUC, BEART, G7UPULTB, PHART, THGBART, XVM9OCP, PO2ART, DKY8FPR    Radiology: All pertinent images / reports were reviewed as a part of this visit. Narrative   EXAMINATION:   CT IMAGES OF THE CHEST WITHOUT CONTRAST, HIGH RESOLUTION 9/15/2022       TECHNIQUE:   CT of the chest was performed without the administration of intravenous   contrast. High resolution CT imaging was performed of the lungs. Multiplanar   reformatted images are provided for review. Automated exposure control,   iterative reconstruction, and/or weight based adjustment of the mA/kV was   utilized to reduce the radiation dose to as low as reasonably achievable. High resolution CT images were performed in the supine inspiration, supine   expiration, and prone inspiration positions. COMPARISON:   None. HISTORY:   ORDERING SYSTEM PROVIDED HISTORY: ILD? TECHNOLOGIST PROVIDED HISTORY:   Reason for exam:->ILD? Reason for Exam: ILD? FINDINGS:   Mediastinum: Thyroid gland is unremarkable. No pericardial effusion is seen. Scattered small mediastinal and hilar nodes are noted. Postsurgical change   is seen in the stomach from gastric bypass surgery. Hiatal hernia is seen. Fluid is seen in the esophagus, suggesting reflux. .. Coronary artery   calcification is seen. Main pulmonary artery is enlarged measuring 4.3 cm       HRCT Findings/Lungs/pleura: On the left, hazy ground-glass opacity and parenchymal nodularity is seen in   the left upper lobe. More focal consolidative changes seen posteriorly in   the left lower lobe with scattered ground-glass opacity and tree-in-bud   nodularity. On the right patchy consolidative changes seen posteriorly in the right upper   lobe.   Scattered ground-glass opacity and parenchymal nodularity seen in the   right upper lobe. Scattered ground-glass opacity in tree-in-bud nodularity   seen in the right middle lobe. More focal confluent parenchymal opacity is   seen in the right lung base. No significant septal thickening. No cystic   lung disease. No fibrosis noted. Upper Abdomen: Right adrenal gland is normal.  There is mild thickening and   hypodensity seen in left adrenal gland. Atherosclerotic change seen in   abdominal aorta. Soft Tissues/Bones: Spurring is seen in the spine. Spurring is seen in the   shoulder joints. Impression   Multifocal pneumonia, greatest in the lung bases. No pulmonary fibrosis   noted. Main pulmonary artery is enlarged, raising the question of underlying   pulmonary arterial hypertension       Coronary artery calcification. Fluid is seen in the esophagus, suggesting   reflux       Problem List:   Acute hypoxic respiratory failure  Community-acquired pneumonia with bibasal infiltrates on CT      Assessment/Plan:     Improved dyspnea, HR CT chest is compatible with bibasal consolidations. Continue with Rocephin and Zithromax. Check CBC in a.m. History of COPD, currently not in exacerbation, continue on low-dose prednisone. Continue Pulmicort, Brovana and DuoNeb breathing treatments. Currently on 3 L O2.     Pulmonary will follow    Babs Castorena MD

## 2022-09-19 LAB
ANION GAP SERPL CALCULATED.3IONS-SCNC: 11 MMOL/L (ref 3–16)
BUN BLDV-MCNC: 39 MG/DL (ref 7–20)
CALCIUM SERPL-MCNC: 8.8 MG/DL (ref 8.3–10.6)
CHLORIDE BLD-SCNC: 99 MMOL/L (ref 99–110)
CO2: 30 MMOL/L (ref 21–32)
CREAT SERPL-MCNC: 1.5 MG/DL (ref 0.6–1.2)
GFR AFRICAN AMERICAN: 41
GFR NON-AFRICAN AMERICAN: 34
GLUCOSE BLD-MCNC: 91 MG/DL (ref 70–99)
HCT VFR BLD CALC: 36.6 % (ref 36–48)
HEMOGLOBIN: 11.4 G/DL (ref 12–16)
MCH RBC QN AUTO: 26.5 PG (ref 26–34)
MCHC RBC AUTO-ENTMCNC: 31.2 G/DL (ref 31–36)
MCV RBC AUTO: 85.1 FL (ref 80–100)
PDW BLD-RTO: 18.3 % (ref 12.4–15.4)
PLATELET # BLD: 305 K/UL (ref 135–450)
PMV BLD AUTO: 9.8 FL (ref 5–10.5)
POTASSIUM SERPL-SCNC: 3.6 MMOL/L (ref 3.5–5.1)
PRO-BNP: 1710 PG/ML (ref 0–449)
RBC # BLD: 4.3 M/UL (ref 4–5.2)
SODIUM BLD-SCNC: 140 MMOL/L (ref 136–145)
WBC # BLD: 12.8 K/UL (ref 4–11)

## 2022-09-19 PROCEDURE — 2580000003 HC RX 258: Performed by: INTERNAL MEDICINE

## 2022-09-19 PROCEDURE — 1200000000 HC SEMI PRIVATE

## 2022-09-19 PROCEDURE — 97110 THERAPEUTIC EXERCISES: CPT

## 2022-09-19 PROCEDURE — 94680 O2 UPTK RST&XERS DIR SIMPLE: CPT

## 2022-09-19 PROCEDURE — 6370000000 HC RX 637 (ALT 250 FOR IP): Performed by: INTERNAL MEDICINE

## 2022-09-19 PROCEDURE — 94640 AIRWAY INHALATION TREATMENT: CPT

## 2022-09-19 PROCEDURE — 83880 ASSAY OF NATRIURETIC PEPTIDE: CPT

## 2022-09-19 PROCEDURE — 94669 MECHANICAL CHEST WALL OSCILL: CPT

## 2022-09-19 PROCEDURE — 99233 SBSQ HOSP IP/OBS HIGH 50: CPT | Performed by: INTERNAL MEDICINE

## 2022-09-19 PROCEDURE — 6360000002 HC RX W HCPCS: Performed by: INTERNAL MEDICINE

## 2022-09-19 PROCEDURE — 36415 COLL VENOUS BLD VENIPUNCTURE: CPT

## 2022-09-19 PROCEDURE — 94761 N-INVAS EAR/PLS OXIMETRY MLT: CPT

## 2022-09-19 PROCEDURE — 2700000000 HC OXYGEN THERAPY PER DAY

## 2022-09-19 PROCEDURE — 85027 COMPLETE CBC AUTOMATED: CPT

## 2022-09-19 PROCEDURE — 80048 BASIC METABOLIC PNL TOTAL CA: CPT

## 2022-09-19 RX ADMIN — ARFORMOTEROL TARTRATE 15 MCG: 15 SOLUTION RESPIRATORY (INHALATION) at 21:02

## 2022-09-19 RX ADMIN — DILTIAZEM HYDROCHLORIDE 240 MG: 120 CAPSULE, COATED, EXTENDED RELEASE ORAL at 09:33

## 2022-09-19 RX ADMIN — METOPROLOL TARTRATE 25 MG: 25 TABLET, FILM COATED ORAL at 20:37

## 2022-09-19 RX ADMIN — GUAIFENESIN 600 MG: 600 TABLET, EXTENDED RELEASE ORAL at 20:37

## 2022-09-19 RX ADMIN — AZITHROMYCIN DIHYDRATE 500 MG: 500 INJECTION, POWDER, LYOPHILIZED, FOR SOLUTION INTRAVENOUS at 21:31

## 2022-09-19 RX ADMIN — METOPROLOL TARTRATE 25 MG: 25 TABLET, FILM COATED ORAL at 09:34

## 2022-09-19 RX ADMIN — ASPIRIN 81 MG 81 MG: 81 TABLET ORAL at 09:34

## 2022-09-19 RX ADMIN — IPRATROPIUM BROMIDE AND ALBUTEROL SULFATE 1 AMPULE: 2.5; .5 SOLUTION RESPIRATORY (INHALATION) at 09:07

## 2022-09-19 RX ADMIN — FUROSEMIDE 20 MG: 20 TABLET ORAL at 09:33

## 2022-09-19 RX ADMIN — APIXABAN 5 MG: 5 TABLET, FILM COATED ORAL at 20:37

## 2022-09-19 RX ADMIN — IPRATROPIUM BROMIDE AND ALBUTEROL SULFATE 1 AMPULE: 2.5; .5 SOLUTION RESPIRATORY (INHALATION) at 21:02

## 2022-09-19 RX ADMIN — BUDESONIDE 500 MCG: 0.5 SUSPENSION RESPIRATORY (INHALATION) at 09:07

## 2022-09-19 RX ADMIN — APIXABAN 5 MG: 5 TABLET, FILM COATED ORAL at 09:33

## 2022-09-19 RX ADMIN — GUAIFENESIN 600 MG: 600 TABLET, EXTENDED RELEASE ORAL at 09:34

## 2022-09-19 RX ADMIN — SODIUM CHLORIDE, PRESERVATIVE FREE 10 ML: 5 INJECTION INTRAVENOUS at 09:34

## 2022-09-19 RX ADMIN — PANTOPRAZOLE SODIUM 40 MG: 40 TABLET, DELAYED RELEASE ORAL at 09:33

## 2022-09-19 RX ADMIN — BUDESONIDE 500 MCG: 0.5 SUSPENSION RESPIRATORY (INHALATION) at 21:02

## 2022-09-19 RX ADMIN — FERROUS SULFATE TAB 325 MG (65 MG ELEMENTAL FE) 325 MG: 325 (65 FE) TAB at 09:33

## 2022-09-19 RX ADMIN — PANTOPRAZOLE SODIUM 40 MG: 40 TABLET, DELAYED RELEASE ORAL at 17:24

## 2022-09-19 RX ADMIN — SODIUM CHLORIDE, PRESERVATIVE FREE 10 ML: 5 INJECTION INTRAVENOUS at 22:46

## 2022-09-19 RX ADMIN — PREDNISONE 20 MG: 20 TABLET ORAL at 09:33

## 2022-09-19 RX ADMIN — Medication 1000 MG: at 22:44

## 2022-09-19 ASSESSMENT — PAIN SCALES - GENERAL
PAINLEVEL_OUTOF10: 0
PAINLEVEL_OUTOF10: 0

## 2022-09-19 NOTE — CARE COORDINATION
Discharge Planning. The SW sent a referral to [facility] at the patient's request. The SW spoke with [place] the Admission coordinator at [address] who stated they are able to accept the patient at discharge.       [facility]  Phone: [phone]  Fax: [phone]     Electronically signed by FELIPA Silverman on 9/19/2022 at 11:28 AM

## 2022-09-19 NOTE — PROGRESS NOTES
Physical Therapy    [facility] [address]   Phone: [phone]    Physical Therapy    [] Initial Evaluation            [x] Daily Treatment Note         [] Discharge Summary      Patient: Tellis Cockayne   : 1945   MRN: [id number]   Date of Service:  2022  Admitting Diagnosis: Acute respiratory failure [facility])  Current Admission Summary: Tellis Cockayne is a 78 y.o. female with past medical history of atrial fibrillation/flutter anticoagulated on Eliquis, arthritis, hypertension and sleep apnea who presents to the ED with complaint of shortness of breath. Patient states for the past 2 to 3 days she has been short of breath. She states she has some chest discomfort with a cough productive of clear sputum. Denies any hemoptysis. Denies any pleuritic pain, orthopnea, pedal edema or calf tenderness. Denies abdominal pain, nausea/vomiting, urinary symptoms or change in bowel moods. Has had subjective fever and chills but has not checked her temperature. Denies headache, lightheadedness/dizziness, diaphoresis, syncope or near syncope. Denies any sick contacts or recent travel. Felt worse today and took home COVID test which was negative. Became concerned and came to the ED for further evaluation and treatment. Past Medical History:  has a past medical history of Arthritis, Atrial fibrillation and flutter ([address] Hypertension, Kidney disease, Pneumonia, and Sleep apnea. Past Surgical History:  has a past surgical history that includes Tubal ligation; Total knee arthroplasty (Bilateral, 2012); fracture surgery; Colonoscopy (N/A, 2018); Upper gastrointestinal endoscopy (N/A, 2018); Cardioversion; Gastric bypass surgery; Larynx surgery; Upper gastrointestinal endoscopy (N/A, 3/31/2022); Colonoscopy (N/A, 3/31/2022); and Upper gastrointestinal endoscopy (N/A, 3/31/2022).   Discharge Recommendations: Tellis Cockayne scored a 18/24 on the AM-PAC short mobility form. Current research shows that an AM-PAC score of 18 or greater is typically associated with a discharge to the patient's home setting. Based on the patient's AM-PAC score and their current functional mobility deficits, it is recommended that the patient have 2-3 sessions per week of Physical Therapy at d/c to increase the patient's independence. At this time, this patient demonstrates the endurance and safety to discharge home with HHPT and a follow up treatment frequency of 2-3x/wk. Please see assessment section for further patient specific details. If patient discharges prior to next session this note will serve as a discharge summary. Please see below for the latest assessment towards goals. DME Required For Discharge: patient has all required DME for discharge  Precautions/Restrictions: high fall risk   Weight Bearing Restrictions: no restrictions  [] Right Upper Extremity  [] Left Upper Extremity [] Right Lower Extremity  [] Left Lower Extremity     Required Braces/Orthotics: no braces required   [] Right  [] Left  Positional Restrictions:no positional restrictions    Social/Functional History  Lives With: Ivan Sales (daughter and 2 adult grandchildren, 16 month old grandchild, foster child, son)  Type of Home: House  Home Layout: One level,Performs ADL's on one level,Able to Live on Main level with bedroom/bathroom  Home Access: Stairs to enter without rails  Entrance Stairs - Number of Steps: 3 CAREY  Bathroom Shower/Tub: Walk-in shower  Bathroom Toilet: Handicap height  Bathroom Equipment: Hand-held shower,Built-in shower seat  Home Equipment: Cane,Electric scooter (uses cane at home and in community.  Uses electric scooter around neighborhood, use it for gardeningm, or when walking long distances)  Receives Help From: Family  ADL Assistance: Independent  Homemaking Assistance: Needs assistance (pt does some cooking, daughter does laundry in basement, family does cleaning.)  Homemaking Responsibilities: Yes  Ambulation Assistance: Independent  Transfer Assistance: Independent (sleeps in lift chair)  Active : Yes (but has been having hard time getting in and out of car)   Patient's  Info: does not drive very often, family mostly drives patient to appointments. Additional Comments: ~2 falls in the past 6 months (dizzy in living room and fell and hit head, other time trying to step off of curb and fell)         Subjective  General: Pt seated in chair at arrival. Pt is not on O2 upon arrival, per pt she only wears it when up and walking. 90% on room air. Pt notes headache this date and slight dizziness, agreeable to seated exercises. Agreed to therapy session. Pain: 0/10  Pain Interventions: not applicable     Functional Mobility  Bed Mobility  Bed mobility not completed on this date. Comments:pt in chair at start and end of session   Transfers  No transfers completed on this date. Comments: Pt declines to complete this date. Ambulation  Ambulation not tested on this date. Distance:   Gait Mechanics:   Comments:   Stair Mobility  Stair mobility not completed on this date. Comments:  Wheelchair Mobility:  No w/c mobility completed on this date.   Comments:  Balance  Static Sitting Balance: fair (+): maintains balance at SBA/supervision without use of UE support  Dynamic Sitting Balance: fair (+): maintains balance at SBA/supervision without use of UE support  Comments:     Other Therapeutic Interventions  Seated B LE exercise: x 20 ankle pumps, x 20 LAQs, x 20 marches, x 20 adduction squeeze, x 20 glut squeeze, x 10 hip abduction  Seated B UE exercise: x 15 punches, x 15 shoulder fwd flexion, x 10 shoulder press  Functional Outcomes                 Cognition  WFL  Orientation:    alert and oriented x 4  Command Following:   [facility]    Education  Barriers To Learning: none  Patient Education: patient educated on goals, PT role and benefits, plan of care, discharge recommendations  Learning Assessment:  patient verbalizes and demonstrates understanding    Assessment  Activity Tolerance: tolerated well though decreased endurance. Pt on room air upon arrival. PT SpO2 dropped to 87% during seated B LE exercise, pt able to recover with rest and a couple deep breaths. Impairments Requiring Therapeutic Intervention: decreased functional mobility, decreased strength, decreased endurance, decreased balance  Prognosis: good  Clinical Assessment: Pt declined standing activities this date due to reports of headache and dizziness. Pt tolerated seated B UE and B LE exercise while maintaining SpO2 >90%, with 1 exception SpO2 dropped to 87%. Continued skilled PT to promote return towards PLOF.   Safety Interventions: patient left in chair, chair alarm in place, call light within reach, gait belt, patient at risk for falls, and nurse notified     Plan  Frequency: 3-5 x/per week  Current Treatment Recommendations: strengthening, balance training, functional mobility training, transfer training, gait training, stair training, endurance training, and safety education    Goals  Patient Goals: none stated    Short Term Goals:  Time Frame: upon discharge   Patient will complete bed mobility at Independent   Patient will complete transfers at Independent   Patient will ambulate 50 ft with use of single point cane at [facility] independent  Patient will ascend/descend 3 stairs without use of HR at supervision    No goals met 9/19     Therapy Session Time      Individual Group Co-treatment   Time In 1247       Time Out 1314       Minutes 27         Timed Code Treatment Minutes:  27 Minutes  Total Treatment Minutes:  27       Electronically Signed By: Kati Rocha, PT  Kati Rocha, PT, DPT PT [id number]

## 2022-09-19 NOTE — PROGRESS NOTES
09/19/22 0920   Resting (Room Air)   SpO2 91   During Walk (Room Air)   SpO2 84   Walk/Assistance Device Cane   Rate of Dyspnea 1   Symptoms Shortness of breath; Fatigue   During Walk (On O2)   SpO2 87   O2 Device Nasal cannula   O2 Flow Rate (l/min) 1 l/min   Need Additional O2 Flow Rate Rows Yes   O2 Flow Rate (l/min) 2 l/min   O2 Saturation 93   Walk/Assistance Device Cane   Rate of Dyspnea 2   Symptoms Fatigue; Shortness of breath   After Walk   SpO2 98   O2 Device Nasal cannula   O2 Flow Rate (l/min) 2 l/min   Rate of Dyspnea 0   Does the Patient Qualify for Home O2 Yes   Liter Flow at Rest  --    Liter Flow on Exertion 2   Does the Patient Need Portable Oxygen Tanks Yes

## 2022-09-19 NOTE — PROGRESS NOTES
Hospital Medicine Progress Note     Date:  9/18/2022    PCP: MARILEE Latif CNP (Tel: [phone])    Date of Admission: 9/13/2022    Subjective  Pt tired this am, feels better, feels closer to being ready for dc. Objective  Physical exam:  Vitals: BP (!) 107/57   Pulse 64   Temp 97.8 °F (36.6 °C) (Oral)   Resp 18   Ht 5' 7\" (1.702 m)   Wt 228 lb 1.6 oz (103.5 kg)   SpO2 91%   BMI 35.73 kg/m²   Gen: Not in distress. Alert. Head: Normocephalic. Atraumatic. Eyes: EOMI. Good acuity. ENT: Oral mucosa moist  Neck: No JVD. No obvious thyromegaly. CVS: Nml S1S2, no MRG, RRR  Pulmomary: Clear bilaterally. No crackles. No wheezes. Gastrointestinal: Soft, NT/ND. Positive bowel sounds. Musculoskeletal: No edema. Warm  Neuro: No focal deficit. Moves extremity spontaneously. Psychiatry: Appropriate affect. Not agitated. Skin: Warm, dry with normal turgor. No rash      24HR INTAKE/OUTPUT:  No intake or output data in the 24 hours ending 09/18/22 2224  I/O last 3 completed shifts: In: 240 [P.O.:240]  Out: 700 [Urine:700]  No intake/output data recorded.       Meds:    guaiFENesin  600 mg Oral BID    furosemide  20 mg Oral Daily    predniSONE  20 mg Oral Daily    ipratropium-albuterol  1 ampule Inhalation TID    budesonide  0.5 mg Nebulization BID    Arformoterol Tartrate  15 mcg Nebulization BID    cefTRIAXone (ROCEPHIN) IV  1,000 mg IntraVENous Q24H    And    azithromycin  500 mg IntraVENous Q24H    sodium chloride flush  10 mL IntraVENous 2 times per day    apixaban  5 mg Oral BID    aspirin  81 mg Oral Daily    dilTIAZem  240 mg Oral Daily    ferrous sulfate  325 mg Oral Daily with breakfast    metoprolol tartrate  25 mg Oral BID    pantoprazole  40 mg Oral BID AC       Infusions:    sodium chloride 10 mL/hr at 09/18/22 2100         PRN Meds: guaiFENesin-dextromethorphan, perflutren lipid microspheres, sodium chloride flush, sodium chloride, potassium chloride, magnesium sulfate, promethazine **OR** ondansetron, acetaminophen **OR** acetaminophen    Labs/imaging:  CBC:   Recent Labs     09/18/22  1034   WBC 12.5*   HGB 10.9*            BMP:    Recent Labs     09/16/22  0454 09/17/22  0511 09/18/22  1034    136 143   K 4.1 4.0 3.6   CL 99 96* 100   CO2 32 31 35*   BUN 27* 34* 38*   CREATININE 1.4* 1.4* 1.4*   GLUCOSE 174* 129* 123*         Hepatic: No results for input(s): AST, ALT, ALB, BILITOT, ALKPHOS in the last 72 hours. Troponin: No results for input(s): TROPONINI in the last 72 hours. BNP: No results for input(s): BNP in the last 72 hours. INR: No results for input(s): INR in the last 72 hours. Reviewed imaging and reports noted      Assessment:  Principal Problem:    Acute respiratory failure ([address]  Resolved Problems:    * No resolved hospital problems. *        Plan:  Acute hypoxic respiratory failure due to CAP and acute on chronic HFpEF:  COVID-19 negative. Continue CHF protocol with IV Lasix. CT chest concerning for multifocal pneumonia. Limited echo 9/15/2022 with LVEF 55%. Continue IV antibiotics. Pulm consulted: Appreciate input. Recommended continuing low dose steroids, Pulmicort, Brovana & Duonebs. Continue supplemental oxygen to maintain saturation above 90%. Wean off oxygen as tolerated. Will need home oxygen evaluation due to desaturation on exertion. CHF coordinator consult. Pneumonia:   Continue Rocephin and azithromycin. COVID-19 negative. Respiratory cultures showed normal respiratory robert. MRSA, Legionella, strep negative. CKD stage III: Stable renal function. Avoid nephrotoxic medications      Paroxysmal Atrial fibrilliation:   Rate controlled on Cardizem and metoprolol. Anticoagulated on Eliquis. GIANNA: Deferred CPAP. Essential Hypertension: Continue home medication. Class III obesity:Complicating assessment and treatment.  Placing patient at risk for multiple co-morbidities as well as early death and contributing to the patient's presentation. Education, and counseling        Diet: ADULT DIET;  Regular; Low Fat/Low Chol/High Fiber/2 gm Na    Activity: up with assist  Prophylaxis: eliquis    Code status: Full Code     ----------        Vivian Benites MD  -------------------------------  Rounding hospitalist
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
## 2022-09-19 NOTE — PROGRESS NOTES
PULMONARY AND CRITICAL CARE MEDICINE PROGRESS NOTE      SUBJECTIVE: Patient states improved shortness of breath and cough. She is able to bring up some phlegm. Maintaining sats on room air. Desaturates with exertion. REVIEW OF SYSTEMS:   CONSTITUTIONAL SYMPTOMS: The patient denies fever, fatigue, night sweats, weight loss or weight gain. HEENT: No vision changes. No tinnitus, Denies sinus pain. No hoarseness, or dysphagia. CARDIOVASCULAR: Denies chest pain, palpitation, syncope. RESPIRATORY: See above. GASTROINTESTINAL: Denies nausea, abdominal pain or change in bowel function. SKIN: No rashes or itching. MUSCULOSKELETAL: Denies weakness or bone pain. NEUROLOGICAL: No headaches or seizures.      MEDICATIONS:      guaiFENesin  600 mg Oral BID    furosemide  20 mg Oral Daily    predniSONE  20 mg Oral Daily    ipratropium-albuterol  1 ampule Inhalation TID    budesonide  0.5 mg Nebulization BID    Arformoterol Tartrate  15 mcg Nebulization BID    cefTRIAXone (ROCEPHIN) IV  1,000 mg IntraVENous Q24H    And    azithromycin  500 mg IntraVENous Q24H    sodium chloride flush  10 mL IntraVENous 2 times per day    apixaban  5 mg Oral BID    aspirin  81 mg Oral Daily    dilTIAZem  240 mg Oral Daily    ferrous sulfate  325 mg Oral Daily with breakfast    metoprolol tartrate  25 mg Oral BID    pantoprazole  40 mg Oral BID AC      sodium chloride 10 mL/hr at 09/18/22 2100     guaiFENesin-dextromethorphan, perflutren lipid microspheres, sodium chloride flush, sodium chloride, potassium chloride, magnesium sulfate, promethazine **OR** ondansetron, acetaminophen **OR** acetaminophen     ALLERGIES:   Allergies as of 09/13/2022 - Fully Reviewed 09/13/2022   Allergen Reaction Noted    Bee venom Swelling and Angioedema 06/17/2013    Shellfish-derived products Other (See Comments) 05/17/2017    Shrimp flavor  11/03/2011    Codeine Hives and Other (See Comments) 11/03/2011    Fentanyl and related Hives 06/17/2013 Hydromorphone Rash, Hives, and Other (See Comments) 12/19/2012    Vancomycin Rash 01/06/2019        OBJECTIVE:   height is 5' 7\" (1.702 m) and weight is 233 lb 14.4 oz (106.1 kg). Her temporal temperature is 98 °F (36.7 °C). Her blood pressure is 137/79 and her pulse is 76. Her respiration is 18 and oxygen saturation is 95%. No intake/output data recorded. PHYSICAL EXAM:  CONSTITUTIONAL: She is a 78y.o.-year-old who appears her stated age. She is alert and oriented x 3 and in no acute distress. CARDIOVASCULAR: S1 S2 RRR. Without murmer  RESPIRATORY & CHEST: Bibasilar crackles. No wheezing. Good air movement  GASTROINTESTINAL & ABDOMEN: Soft, nontender, positive bowel sounds in all quadrants, non-distended, without hepatosplenomegaly. GENITOURINARY: Deferred. MUSCULOSKELETAL: No tenderness to palpation of the axial skeleton. There is no clubbing. No cyanosis. No edema of the lower extremities. SKIN OF BODY: No rash or jaundice. PSYCHIATRIC EVALUATION: Normal affect. Patient answers questions appropriately. HEMATOLOGIC/LYMPHATIC/ IMMUNOLOGIC: No palpable lymphadenopathy. NEUROLOGIC: Alert and oriented x 3. Groslly non-focal. Motor strength is 5+/5 in all muscle groups. The patient has a normal sensorium globally.       LABS:   Lab Results   Component Value Date    WBC 12.8 (H) 09/19/2022    HGB 11.4 (L) 09/19/2022    HCT 36.6 09/19/2022     09/19/2022    CHOL 142 03/15/2021    TRIG 56 03/15/2021    HDL 51 03/15/2021    ALT <5 (L) 09/15/2022    AST 9 (L) 09/15/2022     09/19/2022    K 3.6 09/19/2022    CL 99 09/19/2022    CREATININE 1.5 (H) 09/19/2022    BUN 39 (H) 09/19/2022    CO2 30 09/19/2022    TSH 2.81 03/30/2022    INR 1.49 (H) 09/13/2022       Lab Results   Component Value Date    GLUCOSE 91 09/19/2022    CALCIUM 8.8 09/19/2022     09/19/2022    K 3.6 09/19/2022    CO2 30 09/19/2022    CL 99 09/19/2022    BUN 39 (H) 09/19/2022    CREATININE 1.5 (H) 09/19/2022       Lab Results   Component Value Date    GLUCOSE 91 09/19/2022    CALCIUM 8.8 09/19/2022     09/19/2022    K 3.6 09/19/2022    CO2 30 09/19/2022    CL 99 09/19/2022    BUN 39 (H) 09/19/2022    CREATININE 1.5 (H) 09/19/2022       IMAGING:   I reviewed the CT chest from 9/15/2022 and my interpretation is as follows. Bilateral multifocal infiltrates more pronounced in bilateral lower lobes. IMPRESSION:   Acute hypoxic respiratory failure  Multifocal pneumonia      RECOMMENDATION:   Patient was noted to have multifocal pneumonia which more pronounced infiltrates in bilateral lower lobe. Currently on Rocephin and Zithromax, day 7. All cultures remain negative. Pulm status has improved with I-S, Acapella and chest vest therapy. Continue the same. Titrate FiO2 as tolerated. Patient is a lifelong non-smoker. She does not have COPD. Taper prednisone. Continue bronchodilators. Florin Almaraz MD  Pulmonary Critical Care and Sleep Medicine  9/19/2022, 12:40 PM    This note was completed using dragon medical speech recognition software. Grammatical errors, random word insertions, pronoun errors and incomplete sentences are occasional consequences of this technology due to software limitations. If there are questions or concerns about the content of this note of information contained within the body of this dictation they should be addressed with the provider for clarification.

## 2022-09-20 VITALS
SYSTOLIC BLOOD PRESSURE: 122 MMHG | HEIGHT: 67 IN | OXYGEN SATURATION: 93 % | BODY MASS INDEX: 36.41 KG/M2 | TEMPERATURE: 97.6 F | HEART RATE: 73 BPM | RESPIRATION RATE: 18 BRPM | WEIGHT: 232 LBS | DIASTOLIC BLOOD PRESSURE: 67 MMHG

## 2022-09-20 LAB
ANION GAP SERPL CALCULATED.3IONS-SCNC: 9 MMOL/L (ref 3–16)
ANISOCYTOSIS: ABNORMAL
BANDED NEUTROPHILS RELATIVE PERCENT: 2 % (ref 0–7)
BASOPHILS ABSOLUTE: 0 K/UL (ref 0–0.2)
BASOPHILS RELATIVE PERCENT: 0 %
BUN BLDV-MCNC: 42 MG/DL (ref 7–20)
CALCIUM SERPL-MCNC: 8.5 MG/DL (ref 8.3–10.6)
CHLORIDE BLD-SCNC: 99 MMOL/L (ref 99–110)
CO2: 32 MMOL/L (ref 21–32)
CREAT SERPL-MCNC: 1.6 MG/DL (ref 0.6–1.2)
EOSINOPHILS ABSOLUTE: 0.1 K/UL (ref 0–0.6)
EOSINOPHILS RELATIVE PERCENT: 1 %
GFR AFRICAN AMERICAN: 38
GFR NON-AFRICAN AMERICAN: 31
GLUCOSE BLD-MCNC: 88 MG/DL (ref 70–99)
HCT VFR BLD CALC: 35.1 % (ref 36–48)
HEMOGLOBIN: 10.8 G/DL (ref 12–16)
LYMPHOCYTES ABSOLUTE: 2.5 K/UL (ref 1–5.1)
LYMPHOCYTES RELATIVE PERCENT: 18 %
MCH RBC QN AUTO: 26.4 PG (ref 26–34)
MCHC RBC AUTO-ENTMCNC: 30.8 G/DL (ref 31–36)
MCV RBC AUTO: 85.6 FL (ref 80–100)
MONOCYTES ABSOLUTE: 1 K/UL (ref 0–1.3)
MONOCYTES RELATIVE PERCENT: 7 %
NEUTROPHILS ABSOLUTE: 10.4 K/UL (ref 1.7–7.7)
NEUTROPHILS RELATIVE PERCENT: 72 %
PDW BLD-RTO: 17.8 % (ref 12.4–15.4)
PLATELET # BLD: 286 K/UL (ref 135–450)
PMV BLD AUTO: 9.7 FL (ref 5–10.5)
POTASSIUM SERPL-SCNC: 3.7 MMOL/L (ref 3.5–5.1)
RBC # BLD: 4.1 M/UL (ref 4–5.2)
SODIUM BLD-SCNC: 140 MMOL/L (ref 136–145)
WBC # BLD: 14.1 K/UL (ref 4–11)

## 2022-09-20 PROCEDURE — 6370000000 HC RX 637 (ALT 250 FOR IP): Performed by: INTERNAL MEDICINE

## 2022-09-20 PROCEDURE — 85025 COMPLETE CBC W/AUTO DIFF WBC: CPT

## 2022-09-20 PROCEDURE — 94680 O2 UPTK RST&XERS DIR SIMPLE: CPT

## 2022-09-20 PROCEDURE — 94669 MECHANICAL CHEST WALL OSCILL: CPT

## 2022-09-20 PROCEDURE — 94761 N-INVAS EAR/PLS OXIMETRY MLT: CPT

## 2022-09-20 PROCEDURE — 80048 BASIC METABOLIC PNL TOTAL CA: CPT

## 2022-09-20 PROCEDURE — 6360000002 HC RX W HCPCS: Performed by: INTERNAL MEDICINE

## 2022-09-20 PROCEDURE — 97530 THERAPEUTIC ACTIVITIES: CPT

## 2022-09-20 PROCEDURE — 94640 AIRWAY INHALATION TREATMENT: CPT

## 2022-09-20 PROCEDURE — 99233 SBSQ HOSP IP/OBS HIGH 50: CPT | Performed by: INTERNAL MEDICINE

## 2022-09-20 PROCEDURE — 97110 THERAPEUTIC EXERCISES: CPT

## 2022-09-20 PROCEDURE — 36415 COLL VENOUS BLD VENIPUNCTURE: CPT

## 2022-09-20 PROCEDURE — 2580000003 HC RX 258: Performed by: INTERNAL MEDICINE

## 2022-09-20 RX ORDER — BUDESONIDE AND FORMOTEROL FUMARATE DIHYDRATE 160; 4.5 UG/1; UG/1
2 AEROSOL RESPIRATORY (INHALATION) 2 TIMES DAILY
Qty: 30.6 G | Refills: 1 | Status: SHIPPED | OUTPATIENT
Start: 2022-09-20

## 2022-09-20 RX ORDER — FERROUS SULFATE 325(65) MG
325 TABLET ORAL
Qty: 30 TABLET | Refills: 3 | Status: SHIPPED | OUTPATIENT
Start: 2022-09-21

## 2022-09-20 RX ORDER — PREDNISONE 20 MG/1
20 TABLET ORAL DAILY
Qty: 2 TABLET | Refills: 0 | Status: SHIPPED | OUTPATIENT
Start: 2022-09-21 | End: 2022-09-23

## 2022-09-20 RX ORDER — IPRATROPIUM BROMIDE AND ALBUTEROL SULFATE 2.5; .5 MG/3ML; MG/3ML
1 SOLUTION RESPIRATORY (INHALATION) 2 TIMES DAILY
Status: DISCONTINUED | OUTPATIENT
Start: 2022-09-20 | End: 2022-09-20 | Stop reason: HOSPADM

## 2022-09-20 RX ADMIN — FERROUS SULFATE TAB 325 MG (65 MG ELEMENTAL FE) 325 MG: 325 (65 FE) TAB at 09:20

## 2022-09-20 RX ADMIN — ARFORMOTEROL TARTRATE 15 MCG: 15 SOLUTION RESPIRATORY (INHALATION) at 08:48

## 2022-09-20 RX ADMIN — APIXABAN 5 MG: 5 TABLET, FILM COATED ORAL at 09:17

## 2022-09-20 RX ADMIN — SODIUM CHLORIDE, PRESERVATIVE FREE 10 ML: 5 INJECTION INTRAVENOUS at 09:23

## 2022-09-20 RX ADMIN — FUROSEMIDE 20 MG: 20 TABLET ORAL at 09:17

## 2022-09-20 RX ADMIN — BUDESONIDE 500 MCG: 0.5 SUSPENSION RESPIRATORY (INHALATION) at 08:50

## 2022-09-20 RX ADMIN — ASPIRIN 81 MG 81 MG: 81 TABLET ORAL at 09:17

## 2022-09-20 RX ADMIN — PANTOPRAZOLE SODIUM 40 MG: 40 TABLET, DELAYED RELEASE ORAL at 17:07

## 2022-09-20 RX ADMIN — PANTOPRAZOLE SODIUM 40 MG: 40 TABLET, DELAYED RELEASE ORAL at 09:19

## 2022-09-20 RX ADMIN — GUAIFENESIN 600 MG: 600 TABLET, EXTENDED RELEASE ORAL at 09:17

## 2022-09-20 RX ADMIN — PREDNISONE 20 MG: 20 TABLET ORAL at 09:17

## 2022-09-20 RX ADMIN — DILTIAZEM HYDROCHLORIDE 240 MG: 120 CAPSULE, COATED, EXTENDED RELEASE ORAL at 09:46

## 2022-09-20 RX ADMIN — IPRATROPIUM BROMIDE AND ALBUTEROL SULFATE 1 AMPULE: 2.5; .5 SOLUTION RESPIRATORY (INHALATION) at 08:43

## 2022-09-20 ASSESSMENT — PAIN SCALES - GENERAL: PAINLEVEL_OUTOF10: 0

## 2022-09-20 NOTE — PROGRESS NOTES
09/20/22 0922   RT Protocol   History Pulmonary Disease 0   Respiratory pattern 2   Breath sounds 2   Cough 1   Indications for Bronchodilator Therapy Decreased or absent breath sounds   Bronchodilator Assessment Score 5

## 2022-09-20 NOTE — PLAN OF CARE
Problem: Discharge Planning  Goal: Discharge to home or other facility with appropriate resources  9/20/2022 0941 by Prosper Lynch RN  Outcome: Progressing     Problem: Safety - Adult  Goal: Free from fall injury  9/20/2022 0941 by Prosper Lynch RN  Outcome: Progressing     Problem: ABCDS Injury Assessment  Goal: Absence of physical injury  9/20/2022 0941 by Prosper Lynch RN  Outcome: Progressing  Problem: Pain  Goal: Verbalizes/displays adequate comfort level or baseline comfort level  9/20/2022 0941 by Prosper Lynch RN  Outcome: Progressing     Problem: Chronic Conditions and Co-morbidities  Goal: Patient's chronic conditions and co-morbidity symptoms are monitored and maintained or improved  9/20/2022 0941 by Prosper Lynch RN  Outcome: Progressing     Problem: Skin/Tissue Integrity  Goal: Absence of new skin breakdown  Description: 1. Monitor for areas of redness and/or skin breakdown  2. Assess vascular access sites hourly  3. Every 4-6 hours minimum:  Change oxygen saturation probe site  4. Every 4-6 hours:  If on nasal continuous positive airway pressure, respiratory therapy assess nares and determine need for appliance change or resting period.   9/20/2022 0941 by Prosper Lynch RN  Outcome: Progressing

## 2022-09-20 NOTE — RT PROTOCOL NOTE
RT Inhaler-Nebulizer Bronchodilator Protocol Note    There is a bronchodilator order in the chart from a provider indicating to follow the RT Bronchodilator Protocol and there is an Initiate RT Inhaler-Nebulizer Bronchodilator Protocol order as well (see protocol at bottom of note). CXR Findings:  No results found. The findings from the last RT Protocol Assessment were as follows:   History Pulmonary Disease: None or smoker <15 pack years  Respiratory Pattern: Dyspnea on exertion or RR 21-25 bpm  Breath Sounds: Slightly diminished and/or crackles  Cough: Strong, productive  Indication for Bronchodilator Therapy: Decreased or absent breath sounds  Bronchodilator Assessment Score: 5    Aerosolized bronchodilator medication orders have been revised according to the RT Inhaler-Nebulizer Bronchodilator Protocol below. Respiratory Therapist to perform RT Therapy Protocol Assessment initially then follow the protocol. Repeat RT Therapy Protocol Assessment PRN for score 0-3 or on second treatment, BID, and PRN for scores above 3. No Indications - adjust the frequency to every 6 hours PRN wheezing or bronchospasm, if no treatments needed after 48 hours then discontinue using Per Protocol order mode. If indication present, adjust the RT bronchodilator orders based on the Bronchodilator Assessment Score as indicated below. Use Inhaler orders unless patient has one or more of the following: on home nebulizer, not able to hold breath for 10 seconds, is not alert and oriented, cannot activate and use MDI correctly, or respiratory rate 25 breaths per minute or more, then use the equivalent nebulizer order(s) with same Frequency and PRN reasons based on the score. If a patient is on this medication at home then do not decrease Frequency below that used at home.     0-3 - enter or revise RT bronchodilator order(s) to equivalent RT Bronchodilator order with Frequency of every 4 hours PRN for wheezing or increased work of breathing using Per Protocol order mode. 4-6 - enter or revise RT Bronchodilator order(s) to two equivalent RT bronchodilator orders with one order with BID Frequency and one order with Frequency of every 4 hours PRN wheezing or increased work of breathing using Per Protocol order mode. 7-10 - enter or revise RT Bronchodilator order(s) to two equivalent RT bronchodilator orders with one order with TID Frequency and one order with Frequency of every 4 hours PRN wheezing or increased work of breathing using Per Protocol order mode. 11-13 - enter or revise RT Bronchodilator order(s) to one equivalent RT bronchodilator order with QID Frequency and an Albuterol order with Frequency of every 4 hours PRN wheezing or increased work of breathing using Per Protocol order mode. Greater than 13 - enter or revise RT Bronchodilator order(s) to one equivalent RT bronchodilator order with every 4 hours Frequency and an Albuterol order with Frequency of every 2 hours PRN wheezing or increased work of breathing using Per Protocol order mode. RT to enter RT Home Evaluation for COPD & MDI Assessment order using Per Protocol order mode.     Electronically signed by Vanessa Shaffer RCP on 9/20/2022 at 9:23 AM

## 2022-09-20 NOTE — PROGRESS NOTES
Pt shift assessment completed see flowsheets for full details. Pt alert and oriented, VSS, controled Afib on tele monitor, on room air, denies SOB at this time, Tolerating diet well, denies any need at this time, will continue to monitor.

## 2022-09-20 NOTE — PLAN OF CARE
Problem: Discharge Planning  Goal: Discharge to home or other facility with appropriate resources  Outcome: Progressing     Problem: Safety - Adult  Goal: Free from fall injury  Outcome: Progressing     Problem: ABCDS Injury Assessment  Goal: Absence of physical injury  Outcome: Progressing     Problem: Pain  Goal: Verbalizes/displays adequate comfort level or baseline comfort level  Outcome: Progressing     Problem: Chronic Conditions and Co-morbidities  Goal: Patient's chronic conditions and co-morbidity symptoms are monitored and maintained or improved  Outcome: Progressing     Problem: Skin/Tissue Integrity  Goal: Absence of new skin breakdown  Description: 1. Monitor for areas of redness and/or skin breakdown  2. Assess vascular access sites hourly  3. Every 4-6 hours minimum:  Change oxygen saturation probe site  4. Every 4-6 hours:  If on nasal continuous positive airway pressure, respiratory therapy assess nares and determine need for appliance change or resting period.   Outcome: Progressing

## 2022-09-20 NOTE — NURSE NAVIGATOR
Pca was transferring patient from chair to bed. Pt had purewick. Pt stepped on purewick and slided down to floor slowly. Pt stated that she has no pain. She had a fall with witness and she didn't hit her head. Skin assessment completed. No injuries noted. VS stable. MD notitied. No new orders given at this time. Pt is wathing TV on bed, will keep monitoring.

## 2022-09-20 NOTE — DISCHARGE INSTR - COC
Continuity of Care Form    Patient Name: Janett Borden   :  1945  MRN:  [id number]    Admit date:  2022  Discharge date:  ***    Code Status Order: Full Code   Advance Directives:     Admitting Physician:  Hawk Rodriguez DO  PCP: MARILEE Kaufman CNP    Discharging Nurse: [facility] Unit/Room#: [id number]  Discharging Unit Phone Number: ***    Emergency Contact:   Extended Emergency Contact Information  Primary Emergency Contact: Prateek Suarez  Address: [address] [facility], [address] [place] [address] Phone: [phone]  Work Phone: [phone]  Relation: Child  Secondary Emergency Contact: [address] Phone: [phone]  Mobile Phone: [phone]  Relation: Other   needed?  No    Past Surgical History:  Past Surgical History:   Procedure Laterality Date    CARDIOVERSION      COLONOSCOPY N/A 2018    COLONOSCOPY POLYPECTOMY SNARE/COLD BIOPSY performed by Kelsie Faustin MD at [facility] N/A 3/31/2022    COLONOSCOPY POLYPECTOMY SNARE/COLD BIOPSY performed by Bobbi Rodriguez MD at [address] [place] [address]      ankle rt has pins and rods, and rt elbow    GASTRIC BYPASS SURGERY      LARYNX SURGERY      TOTAL KNEE ARTHROPLASTY Bilateral 2012    TUBAL LIGATION      tubes tied    UPPER GASTROINTESTINAL ENDOSCOPY N/A 2018    EGD BIOPSY performed by Kelsie Faustin MD at [address] [place] [address] N/A 3/31/2022    EGD DILATION BALLOON performed by Bobbi Rodriguez MD at [address] [place] [address] N/A 3/31/2022    EGD BIOPSY performed by Bobbi Rodriguez MD at [address] [place] [address]       Immunization History:   Immunization History   Administered Date(s) Administered    COVID-19, PFIZER Bivalent BOOSTER, (age 12y+), IM, 30 mcg/0.3 mL dose 2022    COVID-19, PFIZER GRAY top, DO NOT Dilute, (age 15 y+), IM, 30 mcg/0.3 mL 05/20/2022    COVID-19, PFIZER PURPLE top, DILUTE for use, (age 15 y+), 30mcg/0.3mL 09/08/2021, 10/15/2021    Influenza Virus Vaccine 12/22/2012    Influenza, FLUAD, (age 72 y+), Adjuvanted, 0.5mL 10/27/2020, 10/15/2021, 09/09/2022    Influenza, High Dose (Fluzone 65 yrs and older) 09/30/2016, 10/01/2017, 10/08/2018    Influenza, Triv, inactivated, subunit, adjuvanted, IM (Fluad 65 yrs and older) 10/21/2019    Pneumococcal Conjugate 13-valent ([place]) 09/30/2016, 10/01/2017    Pneumococcal Polysaccharide (Iehwyxzcs08) 12/22/2012, 10/06/2017    Td vaccine (adult) 12/01/2008    Zoster Live (Zostavax) 09/15/2015       Active Problems:  Patient Active Problem List   Diagnosis Code    Atrial fibrillation and flutter (HCC) I48.91, I48.92    Essential hypertension I10    Severe episode of recurrent major depressive disorder, without psychotic features ([facility] [address] F33.2    Anticoagulated on Coumadin Z79.01    Seasonal affective disorder (HCC) F33.8    Class 3 obesity due to excess calories with serious comorbidity and body mass index (BMI) of 45.0 to 49.9 in adult [id number]    Elevated sed rate R70.0    Leukocytosis D72.829    Elevated C-reactive protein (CRP) R79.82    Obstructive sleep apnea G47.33    Morbid obesity (HCC) E66.01    Weight loss counseling, encounter for Z71.3    Atypical atrial flutter (HCC) I48.4    PAF (paroxysmal atrial fibrillation) ([facility]) I48.0    ILD (interstitial lung disease) ([facility]) J84.9    Iron deficiency anemia due to chronic blood loss D50.0    Iron deficiency anemia, unspecified D50.9    Age-related osteoporosis without current pathological fracture M81.0    Vasovagal syncope R55    CKD (chronic kidney disease) stage 4, GFR 15-29 ml/min ([facility]) N18.4    Chronic diastolic congestive heart failure (HCC) I50.32    Stage 3b chronic kidney disease (HCC) N18.32    B12 deficiency E53.8    Vitamin D deficiency E55.9    Acute respiratory failure (HCC) J96.00       Isolation/Infection:   Isolation            No Isolation          Patient Infection Status       Infection Onset Added Last Indicated Last Indicated By Review Planned Expiration Resolved Resolved By    None active    Resolved    COVID-19 (Rule Out) 09/13/22 09/13/22 09/13/22 COVID-19 (Ordered)   09/14/22 Rule-Out Test Resulted            Nurse Assessment:  Last Vital Signs: /67   Pulse 73   Temp 96.9 °F (36.1 °C) (Temporal)   Resp 18   Ht 5' 7\" (1.702 m)   Wt 232 lb (105.2 kg)   SpO2 93%   BMI 36.34 kg/m²     Last documented pain score (0-10 scale): Pain Level: 0  Last Weight:   Wt Readings from Last 1 Encounters:   09/20/22 232 lb (105.2 kg)     Mental Status:  oriented and alert    IV Access:  - None    Nursing Mobility/ADLs:  Walking   Assisted  Transfer  Assisted  Bathing  Assisted  Dressing  Assisted  Toileting  Assisted  Feeding  Independent  Med Admin  Assisted  Med Delivery   whole    Wound Care Documentation and Therapy:  Incision 12/19/12 Knee Left (Active)   Number of days: 2679        Elimination:  Continence: Bowel: Yes  Bladder: No  Urinary Catheter: None   Colostomy/Ileostomy/Ileal Conduit: No       Date of Last BM: 9/20/22    Intake/Output Summary (Last 24 hours) at 9/20/2022 1250  Last data filed at 9/20/2022 0408  Gross per 24 hour   Intake --   Output 3100 ml   Net -3100 ml     I/O last 3 completed shifts:  In: -   Out: [address] [Urine:3100]    Safety Concerns: At Risk for Falls    Impairments/Disabilities:      None    Nutrition Therapy:  Current Nutrition Therapy:   - Oral Diet:  General    Routes of Feeding: Oral  Liquids: Thin Liquids  Daily Fluid Restriction: no  Last Modified Barium Swallow with Video (Video Swallowing Test): not done    Treatments at the Time of Hospital Discharge:   Respiratory Treatments:  Oxygen Therapy:  is not on home oxygen therapy.   Ventilator:    - No ventilator support    Rehab Therapies: Physical Therapy and Occupational Therapy  Weight Bearing Status/Restrictions: No weight bearing restrictions  Other Medical Equipment (for information only, NOT a DME order):  cane and walker  Other Treatments:       Patient's personal belongings (please select all that are sent with patient):  phone    RN SIGNATURE:  Electronically signed by Karlee Rodarte RN on 9/20/22 at 3:07 PM EDT    CASE MANAGEMENT/SOCIAL WORK SECTION    Inpatient Status Date: 9/13/2022    Readmission Risk Assessment Score:  Readmission Risk              Risk of Unplanned Readmission:  20           Discharging to Facility/ [address]  Phone: [phone]  Fax: [phone]     / signature: Electronically signed by FELIPA Moya on 9/20/22 at 3:31 PM EDT    PHYSICIAN SECTION    Prognosis: Good    Condition at Discharge: Stable    Rehab Potential (if transferring to Rehab): Good    Recommended Labs or Other Treatments After Discharge: none     Physician Certification: I certify the above information and transfer of Shelly Bernstein  is necessary for the continuing treatment of the diagnosis listed and that she requires Home Care for greater 30 days.      Update Admission H&P: No change in H&P    PHYSICIAN SIGNATURE:  Electronically signed by Jenny Rodarte MD on 9/20/22 at 12:50 PM EDT

## 2022-09-20 NOTE — PROGRESS NOTES
Physical Therapy    [facility] [address]   Phone: [phone]    Physical Therapy    [] Initial Evaluation            [x] Daily Treatment Note         [] Discharge Summary      Patient: Brenda Roth   : 1945   MRN: [id number]   Date of Service:  2022  Admitting Diagnosis: Acute respiratory failure [facility])  Current Admission Summary: Brenda Roth is a 78 y.o. female with past medical history of atrial fibrillation/flutter anticoagulated on Eliquis, arthritis, hypertension and sleep apnea who presents to the ED with complaint of shortness of breath. Patient states for the past 2 to 3 days she has been short of breath. She states she has some chest discomfort with a cough productive of clear sputum. Denies any hemoptysis. Denies any pleuritic pain, orthopnea, pedal edema or calf tenderness. Denies abdominal pain, nausea/vomiting, urinary symptoms or change in bowel moods. Has had subjective fever and chills but has not checked her temperature. Denies headache, lightheadedness/dizziness, diaphoresis, syncope or near syncope. Denies any sick contacts or recent travel. Felt worse today and took home COVID test which was negative. Became concerned and came to the ED for further evaluation and treatment. Past Medical History:  has a past medical history of Arthritis, Atrial fibrillation and flutter ([address] Hypertension, Kidney disease, Pneumonia, and Sleep apnea. Past Surgical History:  has a past surgical history that includes Tubal ligation; Total knee arthroplasty (Bilateral, 2012); fracture surgery; Colonoscopy (N/A, 2018); Upper gastrointestinal endoscopy (N/A, 2018); Cardioversion; Gastric bypass surgery; Larynx surgery; Upper gastrointestinal endoscopy (N/A, 3/31/2022); Colonoscopy (N/A, 3/31/2022); and Upper gastrointestinal endoscopy (N/A, 3/31/2022).   Discharge Recommendations: Brenda Roth scored a 18/24 on the AM-PAC short mobility form. Current research shows that an AM-PAC score of 18 or greater is typically associated with a discharge to the patient's home setting. Based on the patient's AM-PAC score and their current functional mobility deficits, it is recommended that the patient have 2-3 sessions per week of Physical Therapy at d/c to increase the patient's independence. At this time, this patient demonstrates the endurance and safety to discharge home with HHPT and a follow up treatment frequency of 2-3x/wk. Please see assessment section for further patient specific details. If patient discharges prior to next session this note will serve as a discharge summary. Please see below for the latest assessment towards goals. DME Required For Discharge: patient has all required DME for discharge  Precautions/Restrictions: high fall risk, Isolation precautions COVID RULE OUT   Weight Bearing Restrictions: no restrictions  [] Right Upper Extremity  [] Left Upper Extremity [] Right Lower Extremity  [] Left Lower Extremity     Required Braces/Orthotics: no braces required   [] Right  [] Left  Positional Restrictions:no positional restrictions    Social/Functional History  Lives With: Maribel Dorsey (daughter and 2 adult grandchildren, 16 month old grandchild, foster child, son)  Type of Home: House  Home Layout: One level,Performs ADL's on one level,Able to Live on Main level with bedroom/bathroom  Home Access: Stairs to enter without rails  Entrance Stairs - Number of Steps: 3 CAREY  Bathroom Shower/Tub: Walk-in shower  Bathroom Toilet: Handicap height  Bathroom Equipment: Hand-held shower,Built-in shower seat  Home Equipment: Cane,Electric scooter (uses cane at home and in community.  Uses electric scooter around neighborhood, use it for gardeningm, or when walking long distances)  Receives Help From: Family  ADL Assistance: Independent  Homemaking Assistance: Needs assistance (pt does some cooking, daughter does laundry in basement, family does cleaning.)  Homemaking Responsibilities: Yes  Ambulation Assistance: Independent  Transfer Assistance: Independent (sleeps in lift chair)  Active : Yes (but has been having hard time getting in and out of car)   Patient's  Info: does not drive very often, family mostly drives patient to appointments. Additional Comments: ~2 falls in the past 6 months (dizzy in living room and fell and hit head, other time trying to step off of curb and fell)     Examination   Vision:   Vision Gross Assessment: Impaired and Vision Corrective Device: wears glasses at all times  Hearing:   WFL  Observation:   Wet cough throughout session  Pt reports pink eye in both eyes   Posture: Forward head, rounded shoulders   Sensation:   Pt reports numbness in feet>hands   ROM:   (B) LE AROM WFL  Strength:   (B) LE strength grossly WFL  Decision Making: low complexity  Clinical Presentation: stable      Subjective  General: Pt sitting in recliner chair upon arrival. Patient reports having a fall last night when transitioning to her bed. Patient states she is agreeable to PT. Pain: 0/10  Pain Interventions: not applicable       Functional Mobility  Bed Mobility  Bed mobility not completed on this date. Comments: Patient sitting in recliner chair upon arrival and remained in recliner chair upon completion of the session. Transfers  Sit to stand transfer: contact guard assistance  Stand to sit transfer: contact guard assistance  Comments: Performed sit to stand from recliner chair with RW placed in front 2x. Vitals were assessed during second sit to stand due to dizziness and complaint of \"tingling in legs\" with first stand. Orthostatic vitals can be found below. Ambulation  Ambulation not tested on this date. Distance:   Gait Mechanics:   Comments:    Stair Mobility  Stair mobility not completed on this date.   Comments:  Wheelchair Mobility:  No w/c mobility completed on this date.  Comments:  Balance  Static Sitting Balance: fair (+): maintains balance at SBA/supervision without use of UE support  Dynamic Sitting Balance: fair (+): maintains balance at SBA/supervision without use of UE support  Static Standing Balance: fair (-): maintains balance at CGA with use of UE support  Comments: Patient sat edge of recliner for ~20 minutes with no signs of LOB. Other Therapeutic Interventions  Orthostatics:  Recliner chair (semi reclined): 122/76, pulse 82  Seated recliner chair: 109/67, pulse 81  Standin/23, pulse 42  Exercises: Ankle pumps 30x ea   Vestibular:   Smooth pursuits: No nystagmus    VOR cancellation: No nystagmus    Head thrust: Normal  Functional Outcomes  AM-PAC Inpatient Mobility Raw Score : 18              Cognition  WFL  Orientation:    alert and oriented x 4  Command Following:   [facility]    Education  Barriers To Learning: none  Patient Education: patient educated on goals, PT role and benefits, plan of care, discharge recommendations  Learning Assessment:  patient verbalizes and demonstrates understanding    Assessment  Activity Tolerance: Fair (-); patient limited by SOB and fatigue  Impairments Requiring Therapeutic Intervention: decreased functional mobility, decreased strength, decreased endurance, decreased balance  Prognosis: good  Clinical Assessment: Patient is 79 y/o female who presents to hospital with SOB and productive cough. Patient was limited this visit due to blood pressure dropping with activity. Vestibular assessment occurred to rule out vestibular dysfunction due to onset of dizziness with movement. During sit to stand, patient's blood pressure dropped to 81/23 with symptoms involving tingling in BLE, following a quick return to the recliner chair and reclined back with ankle pumps, patient quickly returned to 122/67 and reported no more tingling. Patient will continue benefit from skilled PT in order to return to OF with all functional mobility. Safety Interventions: patient left in chair, chair alarm in place, call light within reach, gait belt, patient at risk for falls, and nurse notified     Plan  Frequency: 3-5 x/per week  Current Treatment Recommendations: strengthening, balance training, functional mobility training, transfer training, gait training, stair training, endurance training, and safety education    Goals  Patient Goals: none stated    Short Term Goals:  Time Frame: upon discharge   Patient will complete bed mobility at Independent   Patient will complete transfers at Independent   Patient will ambulate 50 ft with use of single point cane at [facility]  Patient will ascend/descend 3 stairs without use of HR at supervision  9/20: NO GOALS MET THIS VISIT    Therapy Session Time      Individual Group Co-treatment   Time In 1103       Time Out 1141       Minutes 38         Timed Code Treatment Minutes:  38 Minutes  Total Treatment Minutes:  38 Minutes       Electronically Signed By: Yarely Pitts, PT  Yarely Pitts PT, DPT, [id number]

## 2022-09-20 NOTE — PROGRESS NOTES
This RN ambulate with pt, pt with mild SOB, oxygen sat 89-93% on room air, pt , pt c/o knees and leg feels weak, assisted back to chair, will continue to monitor.

## 2022-09-20 NOTE — PROGRESS NOTES
PULMONARY AND CRITICAL CARE MEDICINE PROGRESS NOTE      SUBJECTIVE: Patient has improved clinically. She is saturating at 100% on room air at rest.  Able to bring up phlegm. Shortness of breath has improved. Not requiring oxygen. REVIEW OF SYSTEMS:   CONSTITUTIONAL SYMPTOMS: The patient denies fever, fatigue, night sweats, weight loss or weight gain. HEENT: No vision changes. No tinnitus, Denies sinus pain. No hoarseness, or dysphagia. CARDIOVASCULAR: Denies chest pain, palpitation, syncope. RESPIRATORY: See above. GASTROINTESTINAL: Denies nausea, abdominal pain or change in bowel function. SKIN: No rashes or itching. MUSCULOSKELETAL: Denies weakness or bone pain. NEUROLOGICAL: No headaches or seizures.      MEDICATIONS:      ipratropium-albuterol  1 ampule Inhalation BID    guaiFENesin  600 mg Oral BID    furosemide  20 mg Oral Daily    predniSONE  20 mg Oral Daily    budesonide  0.5 mg Nebulization BID    Arformoterol Tartrate  15 mcg Nebulization BID    cefTRIAXone (ROCEPHIN) IV  1,000 mg IntraVENous Q24H    And    azithromycin  500 mg IntraVENous Q24H    sodium chloride flush  10 mL IntraVENous 2 times per day    apixaban  5 mg Oral BID    aspirin  81 mg Oral Daily    dilTIAZem  240 mg Oral Daily    ferrous sulfate  325 mg Oral Daily with breakfast    metoprolol tartrate  25 mg Oral BID    pantoprazole  40 mg Oral BID AC      sodium chloride 10 mL/hr at 09/18/22 2100     guaiFENesin-dextromethorphan, perflutren lipid microspheres, sodium chloride flush, sodium chloride, potassium chloride, magnesium sulfate, promethazine **OR** ondansetron, acetaminophen **OR** acetaminophen     ALLERGIES:   Allergies as of 09/13/2022 - Fully Reviewed 09/13/2022   Allergen Reaction Noted    Bee venom Swelling and Angioedema 06/17/2013    Shellfish-derived products Other (See Comments) 05/17/2017    Shrimp flavor  11/03/2011    Codeine Hives and Other (See Comments) 11/03/2011    Fentanyl and related Hives 06/17/2013    Hydromorphone Rash, Hives, and Other (See Comments) 12/19/2012    Vancomycin Rash 01/06/2019        OBJECTIVE:   height is 5' 7\" (1.702 m) and weight is 232 lb (105.2 kg). Her temporal temperature is 97.5 °F (36.4 °C). Her blood pressure is 111/70 and her pulse is 72. Her respiration is 18 and oxygen saturation is 92%. No intake/output data recorded. PHYSICAL EXAM:  CONSTITUTIONAL: She is a 78y.o.-year-old who appears her stated age. She is alert and oriented x 3 and in no acute distress. CARDIOVASCULAR: S1 S2 RRR. Without murmer  RESPIRATORY & CHEST: Bibasilar crackles noted. No wheezing. Good air movement  GASTROINTESTINAL & ABDOMEN: Soft, nontender, positive bowel sounds in all quadrants, non-distended, without hepatosplenomegaly. GENITOURINARY: Deferred. MUSCULOSKELETAL: No tenderness to palpation of the axial skeleton. There is no clubbing. No cyanosis. No edema of the lower extremities. SKIN OF BODY: No rash or jaundice. PSYCHIATRIC EVALUATION: Normal affect. Patient answers questions appropriately. HEMATOLOGIC/LYMPHATIC/ IMMUNOLOGIC: No palpable lymphadenopathy. NEUROLOGIC: Alert and oriented x 3. Groslly non-focal. Motor strength is 5+/5 in all muscle groups. The patient has a normal sensorium globally.       LABS:   Lab Results   Component Value Date    WBC 14.1 (H) 09/20/2022    HGB 10.8 (L) 09/20/2022    HCT 35.1 (L) 09/20/2022     09/20/2022    CHOL 142 03/15/2021    TRIG 56 03/15/2021    HDL 51 03/15/2021    ALT <5 (L) 09/15/2022    AST 9 (L) 09/15/2022     09/20/2022    K 3.7 09/20/2022    CL 99 09/20/2022    CREATININE 1.6 (H) 09/20/2022    BUN 42 (H) 09/20/2022    CO2 32 09/20/2022    TSH 2.81 03/30/2022    INR 1.49 (H) 09/13/2022       Lab Results   Component Value Date    GLUCOSE 88 09/20/2022    CALCIUM 8.5 09/20/2022     09/20/2022    K 3.7 09/20/2022    CO2 32 09/20/2022    CL 99 09/20/2022    BUN 42 (H) 09/20/2022    CREATININE 1.6 (H) 09/20/2022       Lab Results   Component Value Date    GLUCOSE 88 09/20/2022    CALCIUM 8.5 09/20/2022     09/20/2022    K 3.7 09/20/2022    CO2 32 09/20/2022    CL 99 09/20/2022    BUN 42 (H) 09/20/2022    CREATININE 1.6 (H) 09/20/2022       IMAGING:   I reviewed the CT chest from 9/15/2022 and my interpretation is as follows. Bilateral multifocal infiltrates more pronounced in bilateral lower lobes. IMPRESSION:   Acute hypoxic respiratory failure  Multifocal pneumonia        RECOMMENDATION:   Patient has multifocal pneumonia with more pronounced infiltrates in bilateral lower lobe. Has received 7 days of Rocephin and Zithromax. Antibiotics can be discontinued today. All cultures remain negative. Pulmonary status has improved with I-S, Acapella and chest vest therapy. Continue the same. Patient is a lifelong non-smoker. She does not have COPD. Taper prednisone. Continue bronchodilators. Patient is maintaining saturation on room air. Obtain home O2 evaluation. Nick Mustafa MD  Pulmonary Critical Care and Sleep Medicine  9/20/2022, 11:21 AM    This note was completed using dragon medical speech recognition software. Grammatical errors, random word insertions, pronoun errors and incomplete sentences are occasional consequences of this technology due to software limitations. If there are questions or concerns about the content of this note of information contained within the body of this dictation they should be addressed with the provider for clarification.

## 2022-09-20 NOTE — PROGRESS NOTES
Pt up in chair, felt dizzy standing up b/p 81/23. Will reassess, and ambulate pt when appropriate. Will continue to monitor.

## 2022-09-20 NOTE — DISCHARGE SUMMARY
Patient: Rey Mckinley     Gender: female  : 1945   Age: 78 y.o. MRN: [id number]    Admitting Physician: Mayuri Renner DO  Discharge Physician: Jhon Cintron MD     Code Status: Full Code     Admit Date: 2022   Discharge Date:   2022    Disposition:  Home    Discharge Diagnoses:  Acute hypoxic respiratory failure secondary to gram-positive pneumonia with acute on chronic diastolic heart failure all treated  CKD stage III  Paroxysmal atrial fibrillation    Active Hospital Problems    Diagnosis Date Noted    Acute respiratory failure ([address] [J96.00] 2022     Priority: Medium       Follow-up appointments:  one week    Outpatient to do list: Follow-up with pulmonary in 1 week     Condition at Discharge:  [address] Course: 82-year-old admitted with dyspnea  Acute hypoxic respiratory failure due to pneumonia gram-positive and acute on chronic diastolic heart failure  [id number] negative. Diuresed with IV Lasix currently seems euvolemic  CT chest concerning for multifocal pneumonia  Limited echo 9/15 showed EF of 55%  Pleated a 7-day course of antibiotics  Also was on prednisone which has been slowly tapered  Seen by pulmonary  MRSA urine strep and urine Legionella all negative  By time of discharge her breathing is considerably improved  No further intervention was undertaken  She is being discharged on some inhalers and will stop prednisone and 2 more days time  Home oxygen eval was also done      CKD stage III: Stable renal function. Avoid nephrotoxic medications      Paroxysmal Atrial fibrilliation:   Rate controlled on Cardizem and metoprolol. Anticoagulated on Eliquis. GIANNA: Deferred CPAP. Essential Hypertension: Continue home medication. Class III obesity:Complicating assessment and treatment. Placing patient at risk for multiple co-morbidities as well as early death and contributing to the patient's presentation.  Education, and counseling    Discharge Medications:   Current Discharge Medication List        START taking these medications    Details   predniSONE (DELTASONE) 20 MG tablet Take 1 tablet by mouth daily for 2 days  Qty: 2 tablet, Refills: 0      ferrous sulfate (IRON 325) 325 (65 Fe) MG tablet Take 1 tablet by mouth daily (with breakfast)  Qty: 30 tablet, Refills: 3      budesonide-formoterol (SYMBICORT) 160-4.5 MCG/ACT AERO Inhale 2 puffs into the lungs 2 times daily  Qty: 30.6 g, Refills: 1      albuterol-ipratropium (COMBIVENT RESPIMAT)  MCG/ACT AERS inhaler Inhale 1 puff into the lungs in the morning and 1 puff at noon and 1 puff in the evening and 1 puff before bedtime. Qty: 1 each, Refills: 1           Current Discharge Medication List        Current Discharge Medication List        CONTINUE these medications which have NOT CHANGED    Details   vitamin B-12 (CYANOCOBALAMIN) 100 MCG tablet Take 1 tablet by mouth in the morning.   Qty: 90 tablet, Refills: 3    Associated Diagnoses: B12 deficiency      alendronate (FOSAMAX) 70 MG tablet Take 1 tablet by mouth every 7 days  Qty: 12 tablet, Refills: 3    Associated Diagnoses: Age-related osteoporosis without current pathological fracture      apixaban (ELIQUIS) 5 MG TABS tablet Take 1 tablet by mouth 2 times daily  Qty: 180 tablet, Refills: 3      metoprolol tartrate (LOPRESSOR) 25 MG tablet Take 1 tablet by mouth 2 times daily  Qty: 180 tablet, Refills: 3      pantoprazole (PROTONIX) 40 MG tablet Take 1 tablet by mouth 2 times daily (before meals)  Qty: 30 tablet, Refills: 3      furosemide (LASIX) 20 MG tablet Take 1 tablet by mouth daily  Qty: 30 tablet, Refills: 0      Cholecalciferol (VITAMIN D3) 1.25 MG (88800 UT) CAPS TAKE 1 CAPSULE BY MOUTH EVERY 7 DAYS  Qty: 4 capsule, Refills: 11    Associated Diagnoses: Vitamin D deficiency      DULoxetine (CYMBALTA) 60 MG extended release capsule TAKE 1 CAPSULE BY MOUTH DAILY  Qty: 90 capsule, Refills: 3    Associated Diagnoses: Severe episode of recurrent major depressive disorder, without psychotic features ([facility] Seasonal affective disorder (HCC)      dilTIAZem (CARDIZEM CD) 240 MG extended release capsule TAKE 1 CAPSULE BY MOUTH DAILY  Qty: 90 capsule, Refills: 3    Associated Diagnoses: Atrial fibrillation, unspecified type (HCC)      buPROPion (WELLBUTRIN SR) 200 MG extended release tablet Take 1 tablet by mouth 2 times daily  Qty: 60 tablet, Refills: 5    Associated Diagnoses: Severe episode of recurrent major depressive disorder, without psychotic features (HCC)      aspirin 81 MG tablet Take 81 mg by mouth daily           Current Discharge Medication List        STOP taking these medications       Calcium Carbonate Antacid (TUMS PO) Comments:   Reason for Stopping:         cloNIDine (CATAPRES) 0.2 MG tablet Comments:   Reason for Stopping:         Biotin 1000 MCG TABS Comments:   Reason for Stopping:         ferrous sulfate 325 (65 Fe) MG EC tablet Comments:   Reason for Stopping:               Discharge ROS:  A complete review of systems was asked and negative     Discharge Exam:    /67   Pulse 73   Temp 96.9 °F (36.1 °C) (Temporal)   Resp 18   Ht 5' 7\" (1.702 m)   Wt 232 lb (105.2 kg)   SpO2 93%   BMI 36.34 kg/m²   General appearance:  NAD  HEENT:   Normal cephalic, atraumatic, moist mucous membranes, no oropharyngeal erythema or exudate  Heart[de-identified] Normal s1/s2, RRR, no murmurs, gallops, or rubs. no leg edema  Lungs:  Normal respiratory effort. Clear to auscultation, bilaterally without Rales/Wheezes/Rhonchi. Abdomen: Soft, non-tender, non-distended, bowel sounds present, no masses  Musculoskeletal:  No clubbing, no cyanosis, *  Neurologic:  Neurovascularly intact without any focal sensory/motor deficits. Cranial nerves: II-XII intact, grossly non-focal.    Labs:  For convenience and continuity at follow-up the following most recent labs are provided:    Lab Results   Component Value Date/Time    WBC 14.1 09/20/2022 04:14 AM    HGB 10.8

## 2022-09-20 NOTE — PROGRESS NOTES
09/20/22 1705   Resting (Room Air)   SpO2 98   During Walk (Room Air)   SpO2 91   Walk/Assistance Device Walker   Rate of Dyspnea 0   After Walk   SpO2 98   Does the Patient Qualify for Home O2 No   Does the Patient Need Portable Oxygen Tanks No

## 2022-09-20 NOTE — PROGRESS NOTES
Nutrition Note    RECOMMENDATIONS  PO Diet: Cardiac, low Na     NUTRITION ASSESSMENT   Pt is eating well on a cardiac, Low Na diet, with po intake greater than 50% of meals. Pt reports is eating fairly well. Has lost 28 lb in past 6 months per EMR, as pt was prescribed a full liquid diet for an anastomotic stricture & ulcers (documented 3/2022). Nutrition status is stable @ this time. Will con't to follow for further needs as identified. Nutrition Related Findings: LBM 9/16; BUN 42, creat 1.6; -5.6L with diuresis  Wounds: None  Nutrition Education:  Education completed   Nutrition Goals: PO intake 50% or greater     MALNUTRITION ASSESSMENT   Chronic Illness  Malnutrition Status: No malnutrition    NUTRITION DIAGNOSIS   No nutrition diagnosis at this time     CURRENT NUTRITION THERAPIES  ADULT DIET; Regular; Low Fat/Low Chol/High Fiber/2 gm Na     PO Intake: 51-75%, %   PO Supplement Intake:None Ordered      ANTHROPOMETRICS  Current Height: 5' 7\" (170.2 cm)  Current Weight: 232 lb (105.2 kg)    Ideal Body Weight (IBW): 135 lbs  (61 kg)    Usual Bodyweight 260 lb (117.9 kg)       BMI: 36.3    The patient will be monitored per nutrition standards of care. Consult dietitian if additional nutrition interventions are needed prior to RD reassessment.      Lara Cao RD, LD    Contact: [phone]

## 2022-09-20 NOTE — CARE COORDINATION
Discharge note:      PRISCILA/DYLAN has been notified of discharge. Patient noted to have the following needs at discharge. PRISCILA/DYLAN has coordinated the following services: [address]  Phone: [phone]  Fax: [phone]       Discharge Destination: Home  Transportation: Family        All PRISCILA/DYLAN needs met, will sign off.      Electronically signed by FELIPA Silver on 9/20/2022 at 3:31 PM

## 2022-09-20 NOTE — PROGRESS NOTES
CLINICAL PHARMACY NOTE: MEDS TO BEDS    Total # of Prescriptions Filled: 2   The following medications were delivered to the patient:  Ferosul 325 mg  Prednisone 20 mg    Additional Documentation:'  Delivered to patient=signed  Ok'd by Roma Hernandez CPhT

## 2022-09-21 ENCOUNTER — TELEPHONE (OUTPATIENT)
Dept: OTHER | Age: 77
End: 2022-09-21

## 2022-09-21 ENCOUNTER — CARE COORDINATION (OUTPATIENT)
Dept: CASE MANAGEMENT | Age: 77
End: 2022-09-21

## 2022-09-21 ENCOUNTER — TELEPHONE (OUTPATIENT)
Dept: INTERNAL MEDICINE CLINIC | Age: 77
End: 2022-09-21

## 2022-09-21 DIAGNOSIS — I50.32 CHRONIC DIASTOLIC CONGESTIVE HEART FAILURE (HCC): Primary | ICD-10-CM

## 2022-09-21 DIAGNOSIS — E55.9 VITAMIN D DEFICIENCY: ICD-10-CM

## 2022-09-21 DIAGNOSIS — F33.2 SEVERE EPISODE OF RECURRENT MAJOR DEPRESSIVE DISORDER, WITHOUT PSYCHOTIC FEATURES (HCC): ICD-10-CM

## 2022-09-21 PROCEDURE — 1111F DSCHRG MED/CURRENT MED MERGE: CPT | Performed by: NURSE PRACTITIONER

## 2022-09-21 RX ORDER — CHOLECALCIFEROL (VITAMIN D3) 1250 MCG
1 CAPSULE ORAL
Qty: 4 CAPSULE | Refills: 11 | Status: SHIPPED | OUTPATIENT
Start: 2022-09-21

## 2022-09-21 RX ORDER — BUPROPION HYDROCHLORIDE 200 MG/1
TABLET, EXTENDED RELEASE ORAL
Qty: 60 TABLET | Refills: 5 | Status: SHIPPED | OUTPATIENT
Start: 2022-09-21

## 2022-09-21 NOTE — TELEPHONE ENCOUNTER
Can you check if there are any less expensive alternates and I can change her medications tomorrow when I am back

## 2022-09-21 NOTE — CARE COORDINATION
[facility] Transitions Initial Follow Up Call    Call within 2 business days of discharge: Yes    Patient: Isidoro Panda Patient : 1945   MRN: [id number]  Reason for Admission:   Discharge Date: 22 RARS: Readmission Risk Score: 13.8      Last Discharge [facility]       Date Complaint Diagnosis Description Type Department Provider    22 Shortness of Breath Pneumonia of both lungs due to infectious organism, unspecified part of lung . .. ED to Hosp-Admission (Discharged) (ADMITTED) Megan Conklin MD; [address] [place] [address],. .. Spoke with: Isidoro Panda    Non-face-to-face services provided:  Obtained and reviewed discharge summary and/or continuity of care documents  [id number] completed    Transitions of Care Initial Call    Was this an external facility discharge? No Discharge Facility:     Challenges to be reviewed by the provider   Additional needs identified to be addressed with provider: No  none             Method of communication with provider : none    Advance Care Planning:   Does patient have an Advance Directive: reviewed and current. Care Transition Nurse contacted the patient by telephone to perform post hospital discharge assessment. Verified name and  with patient as identifiers. Provided introduction to self, and explanation of the CTN role. CTN reviewed discharge instructions, medical action plan and red flags with patient who verbalized understanding. Patient given an opportunity to ask questions and does not have any further questions or concerns at this time. Were discharge instructions available to patient? Yes. Reviewed appropriate site of care based on symptoms and resources available to patient including: When to call 911. The patient agrees to contact the PCP office for questions related to their healthcare. Medication reconciliation was performed with patient, who verbalizes understanding of administration of home medications.  Advised obtaining a 90-day supply of all daily and as-needed medications. Was patient discharged with a pulse oximeter? no    Pt states doing well, no issues or concerns. Denies SOB, CP, cough, fever. States she did not weigh herself today but usually does. HC will be calling back with a time they will be coming out. F/U appt listed below. Agreed to more CTC f/u calls. CTN provided contact information. Plan for follow-up call in 5-7 days based on severity of symptoms and risk factors.   Plan for next call: self management-PNA; CHF , HC, f/u appts       Care Transitions 24 Hour Call    Do you have a copy of your discharge instructions?: Yes  Do you have all of your prescriptions and are they filled?: Yes  Have you been contacted by a [address]?: No  Have you scheduled your follow up appointment?: Yes  How are you going to get to your appointment?: Car - family or friend to transport  Do you have support at home?: Alone  Do you feel like you have everything you need to keep you well at home?: Yes  Are you an active caregiver in your home?: No  Care Transitions Interventions  No Identified Needs         Follow Up  Future Appointments   Date Time Provider Inge Van   9/23/2022 10:45 AM MARILEE Hale - CNP [facility]   12/15/2022  2:40 PM MARILEE Shook - [address], RN

## 2022-09-21 NOTE — TELEPHONE ENCOUNTER
[facility] FAILURE PROGRAM  TELEPHONE ENCOUNTER FORM    Milton Arciniega 1945    ASSESSMENT:   Baseline weight: 232 lbs  Current weight:  lbs Needs to purchase a scale  Patient weighing daily: No  What are your symptoms of heart failure: dyspnea, edema  Are you having any symptoms:  No  Patient knows who to call with symptoms: Yes  Diet history:      Patient states sodium limitation is : 2000 mg a day       Patient states fluid limitation is 64 ounces of fluid   Patient following diet as instructed: Yes  Medication history: taking medications as instructed Yes; medication side effects noted No  Patient is being active at home: Yes  Patient knows date and time of follow up appointment: Yes   Patient has transportation to appointments: Yes    RECOMMENDATIONS:   Medication: Unable to afford new inhalers. Instructed to call Mónica Pearson for samples or more affordable medications  Diet: low sodium diet   MD/ Clinic appointment: 9/23 with NP TAVARES  Other: Patient needs to buy a scale.

## 2022-09-21 NOTE — TELEPHONE ENCOUNTER
Patient was released from [facility] 9/21 and was given prescriptions for budesonide-formoterol (SYMBICORT) 160-4.5 MCG/ACT AERO & albuterol-ipratropium (COMBIVENT RESPIMAT)  MCG/ACT AERS inhaler. She states she is unable to afford these medications and asked if office has any suggestion on something cheaper. She is scheduled to come in for HFU on 9/26 with Taj Haas and states she just will not use the medication. I did let patient know that Taj Haas is not in the office this afternoon.

## 2022-09-26 ENCOUNTER — OFFICE VISIT (OUTPATIENT)
Dept: INTERNAL MEDICINE CLINIC | Age: 77
End: 2022-09-26
Payer: COMMERCIAL

## 2022-09-26 VITALS
SYSTOLIC BLOOD PRESSURE: 126 MMHG | TEMPERATURE: 97.7 F | HEART RATE: 96 BPM | WEIGHT: 231 LBS | OXYGEN SATURATION: 94 % | DIASTOLIC BLOOD PRESSURE: 76 MMHG | BODY MASS INDEX: 36.26 KG/M2 | HEIGHT: 67 IN

## 2022-09-26 DIAGNOSIS — I50.32 CHRONIC DIASTOLIC CONGESTIVE HEART FAILURE (HCC): ICD-10-CM

## 2022-09-26 DIAGNOSIS — I48.91 ATRIAL FIBRILLATION AND FLUTTER (HCC): ICD-10-CM

## 2022-09-26 DIAGNOSIS — I48.92 ATRIAL FIBRILLATION AND FLUTTER (HCC): ICD-10-CM

## 2022-09-26 DIAGNOSIS — J18.9 MULTIFOCAL PNEUMONIA: ICD-10-CM

## 2022-09-26 DIAGNOSIS — N18.4 CKD (CHRONIC KIDNEY DISEASE) STAGE 4, GFR 15-29 ML/MIN (HCC): ICD-10-CM

## 2022-09-26 DIAGNOSIS — I10 ESSENTIAL HYPERTENSION: Chronic | ICD-10-CM

## 2022-09-26 DIAGNOSIS — Z09 HOSPITAL DISCHARGE FOLLOW-UP: Primary | ICD-10-CM

## 2022-09-26 DIAGNOSIS — J96.01 ACUTE RESPIRATORY FAILURE WITH HYPOXIA (HCC): ICD-10-CM

## 2022-09-26 PROCEDURE — 99496 TRANSJ CARE MGMT HIGH F2F 7D: CPT | Performed by: NURSE PRACTITIONER

## 2022-09-26 PROCEDURE — 1111F DSCHRG MED/CURRENT MED MERGE: CPT | Performed by: NURSE PRACTITIONER

## 2022-09-26 SDOH — ECONOMIC STABILITY: FOOD INSECURITY: WITHIN THE PAST 12 MONTHS, YOU WORRIED THAT YOUR FOOD WOULD RUN OUT BEFORE YOU GOT MONEY TO BUY MORE.: NEVER TRUE

## 2022-09-26 SDOH — ECONOMIC STABILITY: FOOD INSECURITY: WITHIN THE PAST 12 MONTHS, THE FOOD YOU BOUGHT JUST DIDN'T LAST AND YOU DIDN'T HAVE MONEY TO GET MORE.: NEVER TRUE

## 2022-09-26 ASSESSMENT — SOCIAL DETERMINANTS OF HEALTH (SDOH): HOW HARD IS IT FOR YOU TO PAY FOR THE VERY BASICS LIKE FOOD, HOUSING, MEDICAL CARE, AND HEATING?: NOT HARD AT ALL

## 2022-09-26 NOTE — PROGRESS NOTES
0    DULoxetine (CYMBALTA) 60 MG extended release capsule TAKE 1 CAPSULE BY MOUTH DAILY 90 capsule 3    dilTIAZem (CARDIZEM CD) 240 MG extended release capsule TAKE 1 CAPSULE BY MOUTH DAILY 90 capsule 3    aspirin 81 MG tablet Take 81 mg by mouth daily           Vitals:    09/26/22 1449   BP: 126/76   Pulse: 96   Temp: 97.7 °F (36.5 °C)   SpO2: 94%   Weight: 231 lb (104.8 kg)   Height: 5' 7\" (1.702 m)     Body mass index is 36.18 kg/m². Wt Readings from Last 3 Encounters:   09/26/22 231 lb (104.8 kg)   09/20/22 232 lb (105.2 kg)   04/15/22 244 lb (110.7 kg)     BP Readings from Last 3 Encounters:   09/26/22 126/76   09/20/22 122/67   08/15/22 124/68        Patient was admitted to [facility] from 9/13/22 to 9/20/22 for acute hypoxic respiratory failure secondary to pneumonia, acute on chronic diastolic heart failure. Inpatient course: Discharge summary reviewed- see chart. Acute hypoxic respiratory failure due to pneumonia gram-positive and acute on chronic diastolic heart failure  TNYDH-06 negative. Diuresed with IV Lasix currently seems euvolemic  CT chest concerning for multifocal pneumonia  Limited echo 9/15 showed EF of 55%  Pleated a 7-day course of antibiotics  Also was on prednisone which has been slowly tapered  Seen by pulmonary  MRSA urine strep and urine Legionella all negative  By time of discharge her breathing is considerably improved  No further intervention was undertaken  She is being discharged on some inhalers and will stop prednisone and 2 more days time  Home oxygen eval was also done     Current status: She was doing better. She reports her breathing has improved and returned to her baseline. She denies any ongoing fevers or chills. She denies any productive cough. She is having difficulty affording the inhalers that she was discharged on. Office nurse has contacted .       Review of Systems:  A comprehensive review of systems was negative except for what was noted in the HPI. Physical Exam:  Gen: NAD  Eyes: no icterus, no conjunctival erythema  CV: RRR, no mrgs  Resp: CTAB  Abd: soft, NTTP  Neuro: alert, oriented, answers questions appropriately  MSK: no peripheral edema  Skin: warm, dry  Psych: Normal mood, affect      Initial post-discharge communication occurred between nurse and patient on 9/21/22- see documentation in chart: telephone encounter. Assessment/Plan:  Letitia Caicedo was seen today for follow-up from hospital.  Diagnoses and all orders for this visit:    Hospital discharge follow-up  -     GA DISCHARGE MEDS RECONCILED W/ CURRENT OUTPATIENT MED LIST    Multifocal pneumonia  - She is improving. No ongoing fever, cough, shortness of breath  - Follow-up chest x-ray in about 1 month  -     XR CHEST (2 VW); Future    Acute respiratory failure with hypoxia (HCC)  - Resolved    CKD (chronic kidney disease) stage 4, GFR 15-29 ml/min (HCC)  - Chronic, stable  - Continue current regimen    Chronic diastolic congestive heart failure (HCC)  - Chronic, stable  - Continue current regimen    Atrial fibrillation and flutter (HCC)  - Chronic, stable  - Continue current regimen    Essential hypertension    - Normotensive  - she has met JNC standards.  - Continue current regimen.     Diagnostic test results reviewed: inpatient labs, chest x-ray, and CT-chest    Patient risk of morbidity and mortality: high    Medical Decision Making: high complexity

## 2022-09-27 ENCOUNTER — TELEPHONE (OUTPATIENT)
Dept: INTERNAL MEDICINE CLINIC | Age: 77
End: 2022-09-27

## 2022-09-27 NOTE — TELEPHONE ENCOUNTER
Pt calling saw you yesterday for her HFU---calling about meds she can't afford and you were going to call in something different to take the place of ones she can't afford--she could not tell me names of any of them. Please call the pt. Thanks.

## 2022-09-27 NOTE — TELEPHONE ENCOUNTER
Left message for patient about different things we are trying to get financial aid for medication coverage.  Patient advised about reapplication for [employer] Search a signature for another possible source and given number for [phone] [facility].

## 2022-09-28 ENCOUNTER — CARE COORDINATION (OUTPATIENT)
Dept: CASE MANAGEMENT | Age: 77
End: 2022-09-28

## 2022-09-28 ENCOUNTER — CARE COORDINATION (OUTPATIENT)
Dept: CARE COORDINATION | Age: 77
End: 2022-09-28

## 2022-09-28 NOTE — CARE COORDINATION
[facility] Care Transitions Follow Up Call    Patient: Saundra Vidales  Patient : 1945   MRN: [id number]  Reason for Admission:   Discharge Date: 22 RARS: Readmission Risk Score: 13.8    Per Epic, DYLAN reached out to pt today, waiting on a call back. This nurse will follow up at a later date.    Follow Up  Future Appointments   Date Time Provider Inge Van   12/15/2022  2:40 PM MARILEE Anderson - ROBBY Hale RN

## 2022-09-28 NOTE — CARE COORDINATION
Call to pt to initiate SW referral for financial concerns. Unable to reach pt. LVM requesting call back.

## 2022-09-30 ENCOUNTER — CARE COORDINATION (OUTPATIENT)
Dept: CASE MANAGEMENT | Age: 77
End: 2022-09-30

## 2022-09-30 NOTE — CARE COORDINATION
[facility] Care Transitions Follow Up Call    Patient: Milton Arciniega  Patient : 1945   MRN: [id number]  Reason for Admission:   Discharge Date: 22 RARS: Readmission Risk Score: 13.8      Follow up call attempted, left contact info on vm.       Follow Up  Future Appointments   Date Time Provider Inge Van   12/15/2022  2:40 PM MARILEE Choi - ROBBY Levin RN

## 2022-10-04 ENCOUNTER — CARE COORDINATION (OUTPATIENT)
Dept: CASE MANAGEMENT | Age: 77
End: 2022-10-04

## 2022-10-04 NOTE — CARE COORDINATION
[facility] Care Transitions Follow Up Call      Patient: Phoebe Neff  Patient : 1945   MRN: [id number]  Reason for Admission:   Discharge Date: 22 RARS: Readmission Risk Score: 13.8    2nd and final attempt at a Follow up call, left contact info on .       Follow Up  Future Appointments   Date Time Provider Inge Van   12/15/2022  2:40 PM MARILEE Cunningham - CNP [place]  Abdias Roberto, RN

## 2022-10-10 RX ORDER — DILTIAZEM HYDROCHLORIDE 240 MG/1
CAPSULE, EXTENDED RELEASE ORAL
Qty: 90 CAPSULE | Refills: 0 | Status: SHIPPED | OUTPATIENT
Start: 2022-10-10

## 2022-12-15 ENCOUNTER — OFFICE VISIT (OUTPATIENT)
Dept: INTERNAL MEDICINE CLINIC | Age: 77
End: 2022-12-15
Payer: COMMERCIAL

## 2022-12-15 VITALS
SYSTOLIC BLOOD PRESSURE: 112 MMHG | DIASTOLIC BLOOD PRESSURE: 70 MMHG | WEIGHT: 235 LBS | HEART RATE: 67 BPM | BODY MASS INDEX: 36.88 KG/M2 | OXYGEN SATURATION: 91 % | TEMPERATURE: 98.1 F | HEIGHT: 67 IN

## 2022-12-15 DIAGNOSIS — J44.9 CHRONIC OBSTRUCTIVE PULMONARY DISEASE, UNSPECIFIED COPD TYPE (HCC): ICD-10-CM

## 2022-12-15 DIAGNOSIS — J84.9 ILD (INTERSTITIAL LUNG DISEASE) (HCC): ICD-10-CM

## 2022-12-15 DIAGNOSIS — I10 ESSENTIAL HYPERTENSION: ICD-10-CM

## 2022-12-15 DIAGNOSIS — J10.1 INFLUENZA A: Primary | ICD-10-CM

## 2022-12-15 DIAGNOSIS — D50.9 IRON DEFICIENCY ANEMIA, UNSPECIFIED IRON DEFICIENCY ANEMIA TYPE: ICD-10-CM

## 2022-12-15 DIAGNOSIS — E55.9 VITAMIN D DEFICIENCY: ICD-10-CM

## 2022-12-15 DIAGNOSIS — J96.21 ACUTE ON CHRONIC RESPIRATORY FAILURE WITH HYPOXIA (HCC): ICD-10-CM

## 2022-12-15 LAB
INFLUENZA A ANTIBODY: POSITIVE
INFLUENZA B ANTIBODY: NEGATIVE

## 2022-12-15 PROCEDURE — 1123F ACP DISCUSS/DSCN MKR DOCD: CPT | Performed by: NURSE PRACTITIONER

## 2022-12-15 PROCEDURE — 99213 OFFICE O/P EST LOW 20 MIN: CPT | Performed by: NURSE PRACTITIONER

## 2022-12-15 PROCEDURE — 3078F DIAST BP <80 MM HG: CPT | Performed by: NURSE PRACTITIONER

## 2022-12-15 PROCEDURE — 87804 INFLUENZA ASSAY W/OPTIC: CPT | Performed by: NURSE PRACTITIONER

## 2022-12-15 PROCEDURE — 3074F SYST BP LT 130 MM HG: CPT | Performed by: NURSE PRACTITIONER

## 2022-12-15 RX ORDER — CHOLECALCIFEROL (VITAMIN D3) 1250 MCG
1 CAPSULE ORAL
Qty: 4 CAPSULE | Refills: 11 | Status: SHIPPED | OUTPATIENT
Start: 2022-12-15

## 2022-12-15 RX ORDER — METHYLPREDNISOLONE 4 MG/1
TABLET ORAL
Qty: 1 KIT | Refills: 0 | Status: ON HOLD | OUTPATIENT
Start: 2022-12-15 | End: 2022-12-20

## 2022-12-15 RX ORDER — FUROSEMIDE 20 MG/1
20 TABLET ORAL DAILY
Qty: 30 TABLET | Refills: 11 | Status: SHIPPED | OUTPATIENT
Start: 2022-12-15

## 2022-12-15 RX ORDER — FERROUS SULFATE 325(65) MG
325 TABLET ORAL
Qty: 30 TABLET | Refills: 11 | Status: SHIPPED | OUTPATIENT
Start: 2022-12-15

## 2022-12-15 RX ORDER — TRAMADOL HYDROCHLORIDE 50 MG/1
50 TABLET ORAL EVERY 6 HOURS PRN
COMMUNITY

## 2022-12-15 RX ORDER — BUDESONIDE AND FORMOTEROL FUMARATE DIHYDRATE 160; 4.5 UG/1; UG/1
2 AEROSOL RESPIRATORY (INHALATION) 2 TIMES DAILY
Qty: 30.6 G | Refills: 1 | Status: ON HOLD | OUTPATIENT
Start: 2022-12-15 | End: 2022-12-24 | Stop reason: HOSPADM

## 2022-12-15 RX ORDER — ALBUTEROL SULFATE 90 UG/1
2 AEROSOL, METERED RESPIRATORY (INHALATION) EVERY 4 HOURS PRN
Qty: 18 G | Refills: 5 | Status: SHIPPED | OUTPATIENT
Start: 2022-12-15

## 2022-12-15 RX ORDER — OSELTAMIVIR PHOSPHATE 75 MG/1
75 CAPSULE ORAL 2 TIMES DAILY
Qty: 10 CAPSULE | Refills: 0 | Status: ON HOLD | OUTPATIENT
Start: 2022-12-15 | End: 2022-12-24 | Stop reason: HOSPADM

## 2022-12-15 NOTE — PROGRESS NOTES
SUBJECTIVE:    Patient ID: Janny Peralta is a 78 y.o. female. CC: illness    HPI: The patient presents to the office for an acute visit. Sinus drainage, cough, shortness of breath  Headache, sore throat better    Dry cough. Symptoms 3-4 days  No fever. She is using Vicks cold and flu. Grandchild sick at home. Current Outpatient Medications   Medication Sig Dispense Refill    traMADol (ULTRAM) 50 MG tablet Take 50 mg by mouth every 6 hours as needed for Pain.      dilTIAZem (TIAZAC) 240 MG extended release capsule TAKE 1 CAPSULE DAILY 90 capsule 0    buPROPion (WELLBUTRIN SR) 200 MG extended release tablet TAKE 1 TABLET BY MOUTH TWICE DAILY 60 tablet 5    Cholecalciferol (VITAMIN D3) 1.25 MG (95523 UT) CAPS TAKE 1 CAPSULE BY MOUTH EVERY 7 DAYS 4 capsule 11    ferrous sulfate (IRON 325) 325 (65 Fe) MG tablet Take 1 tablet by mouth daily (with breakfast) 30 tablet 3    vitamin B-12 (CYANOCOBALAMIN) 100 MCG tablet Take 1 tablet by mouth in the morning. 90 tablet 3    alendronate (FOSAMAX) 70 MG tablet Take 1 tablet by mouth every 7 days 12 tablet 3    apixaban (ELIQUIS) 5 MG TABS tablet Take 1 tablet by mouth 2 times daily 180 tablet 3    metoprolol tartrate (LOPRESSOR) 25 MG tablet Take 1 tablet by mouth 2 times daily 180 tablet 3    pantoprazole (PROTONIX) 40 MG tablet Take 1 tablet by mouth 2 times daily (before meals) 30 tablet 3    furosemide (LASIX) 20 MG tablet Take 1 tablet by mouth daily 30 tablet 0    aspirin 81 MG tablet Take 81 mg by mouth daily      budesonide-formoterol (SYMBICORT) 160-4.5 MCG/ACT AERO Inhale 2 puffs into the lungs 2 times daily (Patient not taking: Reported on 12/15/2022) 30.6 g 1    albuterol-ipratropium (COMBIVENT RESPIMAT)  MCG/ACT AERS inhaler Inhale 1 puff into the lungs in the morning and 1 puff at noon and 1 puff in the evening and 1 puff before bedtime.  (Patient not taking: Reported on 12/15/2022) 1 each 1    DULoxetine (CYMBALTA) 60 MG extended release capsule TAKE 1 CAPSULE BY MOUTH DAILY 90 capsule 3    dilTIAZem (CARDIZEM CD) 240 MG extended release capsule TAKE 1 CAPSULE BY MOUTH DAILY 90 capsule 3     No current facility-administered medications for this visit. Review of Systems   Constitutional:  Positive for fatigue. Negative for fever. HENT:  Positive for congestion, postnasal drip and sore throat. Respiratory:  Positive for cough. Neurological:  Positive for headaches. OBJECTIVE:  Physical Exam  Constitutional:       Appearance: She is ill-appearing. She is not toxic-appearing or diaphoretic. HENT:      Head: Normocephalic and atraumatic. Pulmonary:      Effort: No respiratory distress. Breath sounds: Decreased air movement present. Decreased breath sounds and wheezing present. Skin:     General: Skin is warm. Neurological:      General: No focal deficit present. Mental Status: She is alert and oriented to person, place, and time. Psychiatric:         Mood and Affect: Mood normal.         Behavior: Behavior normal.      /70   Pulse 67   Temp 98.1 °F (36.7 °C) (Infrared)   Ht 5' 7\" (1.702 m)   Wt 235 lb (106.6 kg)   SpO2 91%   BMI 36.81 kg/m²      PHQ Scores 3/24/2022 1/7/2022 11/3/2020 1/29/2020 9/23/2019 12/7/2018 6/5/2018   PHQ2 Score 6 0 0 6 2 2 1   PHQ9 Score 10 0 0 20 2 2 1     Interpretation of Total Score Depression Severity: 1-4 = Minimal depression, 5-9 = Mild depression, 10-14 = Moderate depression, 15-19 = Moderately severe depression, 20-27 =Severe depression        Wt Readings from Last 3 Encounters:   12/15/22 235 lb (106.6 kg)   09/26/22 231 lb (104.8 kg)   09/20/22 232 lb (105.2 kg)           ASSESSMENT/PLAN:  Suzan Walker was seen today for follow-up, cough and head congestion. Diagnoses and all orders for this visit:    Influenza A  -     Discontinue: oseltamivir (TAMIFLU) 75 MG capsule;  Take 1 capsule by mouth 2 times daily for 5 days  -     Discontinue: methylPREDNISolone (MEDROL, JENI,) 4 MG tablet; Take as directed  -     Discontinue: budesonide-formoterol (SYMBICORT) 160-4.5 MCG/ACT AERO; Inhale 2 puffs into the lungs 2 times daily (Patient not taking: Reported on 12/20/2022)  -     albuterol sulfate HFA (PROVENTIL HFA) 108 (90 Base) MCG/ACT inhaler; Inhale 2 puffs into the lungs every 4 hours as needed for Wheezing  -     POCT Influenza A/B    Vitamin D deficiency  -     Cholecalciferol (VITAMIN D3) 1.25 MG (25181 UT) CAPS; Take 1 capsule by mouth every 7 days    Acute on chronic respiratory failure with hypoxia (HCC)  -     Discontinue: methylPREDNISolone (MEDROLJENI,) 4 MG tablet; Take as directed  -     Discontinue: budesonide-formoterol (SYMBICORT) 160-4.5 MCG/ACT AERO; Inhale 2 puffs into the lungs 2 times daily (Patient not taking: Reported on 12/20/2022)  -     albuterol sulfate HFA (PROVENTIL HFA) 108 (90 Base) MCG/ACT inhaler; Inhale 2 puffs into the lungs every 4 hours as needed for Wheezing    ILD (interstitial lung disease) ([facility] [address]  -     Discontinue: methylPREDNISolone (MEDROLJENI,) 4 MG tablet; Take as directed  -     Discontinue: budesonide-formoterol (SYMBICORT) 160-4.5 MCG/ACT AERO; Inhale 2 puffs into the lungs 2 times daily (Patient not taking: Reported on 12/20/2022)  -     albuterol sulfate HFA (PROVENTIL HFA) 108 (90 Base) MCG/ACT inhaler; Inhale 2 puffs into the lungs every 4 hours as needed for Wheezing    Essential hypertension  -     furosemide (LASIX) 20 MG tablet; Take 1 tablet by mouth daily (Patient not taking: Reported on 12/27/2022)    Iron deficiency anemia, unspecified iron deficiency anemia type  -     ferrous sulfate (IRON 325) 325 (65 Fe) MG tablet; Take 1 tablet by mouth daily (with breakfast)    Chronic obstructive pulmonary disease, unspecified COPD type ([facility])  -     albuterol sulfate HFA (PROVENTIL HFA) 108 (90 Base) MCG/ACT inhaler;  Inhale 2 puffs into the lungs every 4 hours as needed for Wheezing      - Ill patient with viral s/s  - Flu +  - Start Tamiflu  - COPD medications, controller and rescue  - Steroids  - Advised to go to hospital if worsening      MARILEE Dave - CNP

## 2022-12-20 ENCOUNTER — APPOINTMENT (OUTPATIENT)
Dept: GENERAL RADIOLOGY | Age: 77
DRG: 872 | End: 2022-12-20
Payer: COMMERCIAL

## 2022-12-20 ENCOUNTER — HOSPITAL ENCOUNTER (INPATIENT)
Age: 77
LOS: 4 days | Discharge: HOME HEALTH | DRG: 872 | End: 2022-12-24
Attending: EMERGENCY MEDICINE | Admitting: INTERNAL MEDICINE
Payer: COMMERCIAL

## 2022-12-20 DIAGNOSIS — L03.115 CELLULITIS OF RIGHT LEG: Primary | ICD-10-CM

## 2022-12-20 PROBLEM — A41.9 SEPSIS (HCC): Status: ACTIVE | Noted: 2022-12-20

## 2022-12-20 LAB
A/G RATIO: 1 (ref 1.1–2.2)
ALBUMIN SERPL-MCNC: 3.3 G/DL (ref 3.4–5)
ALP BLD-CCNC: 85 U/L (ref 40–129)
ALT SERPL-CCNC: 12 U/L (ref 10–40)
ANION GAP SERPL CALCULATED.3IONS-SCNC: 9 MMOL/L (ref 3–16)
AST SERPL-CCNC: 16 U/L (ref 15–37)
BASOPHILS ABSOLUTE: 0 K/UL (ref 0–0.2)
BASOPHILS RELATIVE PERCENT: 0.2 %
BILIRUB SERPL-MCNC: 0.7 MG/DL (ref 0–1)
BUN BLDV-MCNC: 31 MG/DL (ref 7–20)
C-REACTIVE PROTEIN: 14.1 MG/L (ref 0–5.1)
CALCIUM SERPL-MCNC: 8.8 MG/DL (ref 8.3–10.6)
CHLORIDE BLD-SCNC: 108 MMOL/L (ref 99–110)
CO2: 26 MMOL/L (ref 21–32)
CREAT SERPL-MCNC: 1.4 MG/DL (ref 0.6–1.2)
EKG ATRIAL RATE: 271 BPM
EKG DIAGNOSIS: NORMAL
EKG Q-T INTERVAL: 316 MS
EKG QRS DURATION: 70 MS
EKG QTC CALCULATION (BAZETT): 433 MS
EKG R AXIS: 28 DEGREES
EKG T AXIS: 46 DEGREES
EKG VENTRICULAR RATE: 113 BPM
EOSINOPHILS ABSOLUTE: 0 K/UL (ref 0–0.6)
EOSINOPHILS RELATIVE PERCENT: 0.1 %
GFR SERPL CREATININE-BSD FRML MDRD: 39 ML/MIN/{1.73_M2}
GLUCOSE BLD-MCNC: 99 MG/DL (ref 70–99)
HCT VFR BLD CALC: 39.3 % (ref 36–48)
HEMOGLOBIN: 12.3 G/DL (ref 12–16)
LACTIC ACID: 1.6 MMOL/L (ref 0.4–2)
LYMPHOCYTES ABSOLUTE: 0.6 K/UL (ref 1–5.1)
LYMPHOCYTES RELATIVE PERCENT: 3.6 %
MCH RBC QN AUTO: 28.7 PG (ref 26–34)
MCHC RBC AUTO-ENTMCNC: 31.3 G/DL (ref 31–36)
MCV RBC AUTO: 91.6 FL (ref 80–100)
MONOCYTES ABSOLUTE: 1.3 K/UL (ref 0–1.3)
MONOCYTES RELATIVE PERCENT: 8.2 %
NEUTROPHILS ABSOLUTE: 14.1 K/UL (ref 1.7–7.7)
NEUTROPHILS RELATIVE PERCENT: 87.9 %
PDW BLD-RTO: 18.2 % (ref 12.4–15.4)
PLATELET # BLD: 172 K/UL (ref 135–450)
PMV BLD AUTO: 10.3 FL (ref 5–10.5)
POTASSIUM REFLEX MAGNESIUM: 3.8 MMOL/L (ref 3.5–5.1)
PRO-BNP: 5820 PG/ML (ref 0–449)
PROCALCITONIN: 1.45 NG/ML (ref 0–0.15)
RBC # BLD: 4.29 M/UL (ref 4–5.2)
REPORT: NORMAL
SARS-COV-2, NAAT: NOT DETECTED
SEDIMENTATION RATE, ERYTHROCYTE: 41 MM/HR (ref 0–30)
SODIUM BLD-SCNC: 143 MMOL/L (ref 136–145)
TOTAL PROTEIN: 6.5 G/DL (ref 6.4–8.2)
TROPONIN: 0.01 NG/ML
TSH REFLEX: 3.59 UIU/ML (ref 0.27–4.2)
WBC # BLD: 16 K/UL (ref 4–11)

## 2022-12-20 PROCEDURE — 96365 THER/PROPH/DIAG IV INF INIT: CPT

## 2022-12-20 PROCEDURE — 83880 ASSAY OF NATRIURETIC PEPTIDE: CPT

## 2022-12-20 PROCEDURE — 93005 ELECTROCARDIOGRAM TRACING: CPT | Performed by: EMERGENCY MEDICINE

## 2022-12-20 PROCEDURE — 73610 X-RAY EXAM OF ANKLE: CPT

## 2022-12-20 PROCEDURE — 99223 1ST HOSP IP/OBS HIGH 75: CPT | Performed by: INTERNAL MEDICINE

## 2022-12-20 PROCEDURE — 83605 ASSAY OF LACTIC ACID: CPT

## 2022-12-20 PROCEDURE — 6370000000 HC RX 637 (ALT 250 FOR IP): Performed by: INTERNAL MEDICINE

## 2022-12-20 PROCEDURE — 94640 AIRWAY INHALATION TREATMENT: CPT

## 2022-12-20 PROCEDURE — 93010 ELECTROCARDIOGRAM REPORT: CPT | Performed by: INTERNAL MEDICINE

## 2022-12-20 PROCEDURE — 87077 CULTURE AEROBIC IDENTIFY: CPT

## 2022-12-20 PROCEDURE — 86140 C-REACTIVE PROTEIN: CPT

## 2022-12-20 PROCEDURE — 85652 RBC SED RATE AUTOMATED: CPT

## 2022-12-20 PROCEDURE — 36415 COLL VENOUS BLD VENIPUNCTURE: CPT

## 2022-12-20 PROCEDURE — 84145 PROCALCITONIN (PCT): CPT

## 2022-12-20 PROCEDURE — 2580000003 HC RX 258: Performed by: INTERNAL MEDICINE

## 2022-12-20 PROCEDURE — 71045 X-RAY EXAM CHEST 1 VIEW: CPT

## 2022-12-20 PROCEDURE — 2580000003 HC RX 258: Performed by: EMERGENCY MEDICINE

## 2022-12-20 PROCEDURE — 93971 EXTREMITY STUDY: CPT

## 2022-12-20 PROCEDURE — 96361 HYDRATE IV INFUSION ADD-ON: CPT

## 2022-12-20 PROCEDURE — 6370000000 HC RX 637 (ALT 250 FOR IP): Performed by: EMERGENCY MEDICINE

## 2022-12-20 PROCEDURE — 84484 ASSAY OF TROPONIN QUANT: CPT

## 2022-12-20 PROCEDURE — 87040 BLOOD CULTURE FOR BACTERIA: CPT

## 2022-12-20 PROCEDURE — 6360000002 HC RX W HCPCS: Performed by: EMERGENCY MEDICINE

## 2022-12-20 PROCEDURE — 99285 EMERGENCY DEPT VISIT HI MDM: CPT

## 2022-12-20 PROCEDURE — 2060000000 HC ICU INTERMEDIATE R&B

## 2022-12-20 PROCEDURE — 6360000002 HC RX W HCPCS: Performed by: INTERNAL MEDICINE

## 2022-12-20 PROCEDURE — 85025 COMPLETE CBC W/AUTO DIFF WBC: CPT

## 2022-12-20 PROCEDURE — 84443 ASSAY THYROID STIM HORMONE: CPT

## 2022-12-20 PROCEDURE — 87635 SARS-COV-2 COVID-19 AMP PRB: CPT

## 2022-12-20 PROCEDURE — 94761 N-INVAS EAR/PLS OXIMETRY MLT: CPT

## 2022-12-20 PROCEDURE — 87150 DNA/RNA AMPLIFIED PROBE: CPT

## 2022-12-20 PROCEDURE — 80053 COMPREHEN METABOLIC PANEL: CPT

## 2022-12-20 RX ORDER — ACETAMINOPHEN 325 MG/1
650 TABLET ORAL EVERY 6 HOURS PRN
Status: DISCONTINUED | OUTPATIENT
Start: 2022-12-20 | End: 2022-12-24 | Stop reason: HOSPADM

## 2022-12-20 RX ORDER — MAGNESIUM SULFATE IN WATER 40 MG/ML
2000 INJECTION, SOLUTION INTRAVENOUS PRN
Status: DISCONTINUED | OUTPATIENT
Start: 2022-12-20 | End: 2022-12-24 | Stop reason: HOSPADM

## 2022-12-20 RX ORDER — IPRATROPIUM BROMIDE AND ALBUTEROL SULFATE 2.5; .5 MG/3ML; MG/3ML
1 SOLUTION RESPIRATORY (INHALATION)
Status: DISCONTINUED | OUTPATIENT
Start: 2022-12-20 | End: 2022-12-20

## 2022-12-20 RX ORDER — ASPIRIN 81 MG/1
81 TABLET, CHEWABLE ORAL DAILY
Status: DISCONTINUED | OUTPATIENT
Start: 2022-12-20 | End: 2022-12-24 | Stop reason: HOSPADM

## 2022-12-20 RX ORDER — PANTOPRAZOLE SODIUM 40 MG/1
40 TABLET, DELAYED RELEASE ORAL
Status: DISCONTINUED | OUTPATIENT
Start: 2022-12-20 | End: 2022-12-24 | Stop reason: HOSPADM

## 2022-12-20 RX ORDER — SODIUM CHLORIDE 0.9 % (FLUSH) 0.9 %
10 SYRINGE (ML) INJECTION EVERY 12 HOURS SCHEDULED
Status: DISCONTINUED | OUTPATIENT
Start: 2022-12-20 | End: 2022-12-24 | Stop reason: HOSPADM

## 2022-12-20 RX ORDER — IPRATROPIUM BROMIDE AND ALBUTEROL SULFATE 2.5; .5 MG/3ML; MG/3ML
1 SOLUTION RESPIRATORY (INHALATION) EVERY 6 HOURS
Status: DISCONTINUED | OUTPATIENT
Start: 2022-12-20 | End: 2022-12-22

## 2022-12-20 RX ORDER — IPRATROPIUM BROMIDE AND ALBUTEROL SULFATE 2.5; .5 MG/3ML; MG/3ML
1 SOLUTION RESPIRATORY (INHALATION) EVERY 4 HOURS PRN
Status: DISCONTINUED | OUTPATIENT
Start: 2022-12-20 | End: 2022-12-20

## 2022-12-20 RX ORDER — ACETAMINOPHEN 650 MG/1
650 SUPPOSITORY RECTAL EVERY 6 HOURS PRN
Status: DISCONTINUED | OUTPATIENT
Start: 2022-12-20 | End: 2022-12-24 | Stop reason: HOSPADM

## 2022-12-20 RX ORDER — BUPROPION HYDROCHLORIDE 100 MG/1
200 TABLET, EXTENDED RELEASE ORAL 2 TIMES DAILY
Status: DISCONTINUED | OUTPATIENT
Start: 2022-12-20 | End: 2022-12-24 | Stop reason: HOSPADM

## 2022-12-20 RX ORDER — OSELTAMIVIR PHOSPHATE 30 MG/1
30 CAPSULE ORAL 2 TIMES DAILY
Status: DISCONTINUED | OUTPATIENT
Start: 2022-12-20 | End: 2022-12-21

## 2022-12-20 RX ORDER — SODIUM CHLORIDE 9 MG/ML
INJECTION, SOLUTION INTRAVENOUS PRN
Status: DISCONTINUED | OUTPATIENT
Start: 2022-12-20 | End: 2022-12-24 | Stop reason: HOSPADM

## 2022-12-20 RX ORDER — ONDANSETRON 2 MG/ML
4 INJECTION INTRAMUSCULAR; INTRAVENOUS EVERY 6 HOURS PRN
Status: DISCONTINUED | OUTPATIENT
Start: 2022-12-20 | End: 2022-12-24 | Stop reason: HOSPADM

## 2022-12-20 RX ORDER — PREDNISONE 20 MG/1
40 TABLET ORAL DAILY
Status: DISCONTINUED | OUTPATIENT
Start: 2022-12-20 | End: 2022-12-20

## 2022-12-20 RX ORDER — 0.9 % SODIUM CHLORIDE 0.9 %
1000 INTRAVENOUS SOLUTION INTRAVENOUS ONCE
Status: COMPLETED | OUTPATIENT
Start: 2022-12-20 | End: 2022-12-20

## 2022-12-20 RX ORDER — SODIUM CHLORIDE 0.9 % (FLUSH) 0.9 %
10 SYRINGE (ML) INJECTION PRN
Status: DISCONTINUED | OUTPATIENT
Start: 2022-12-20 | End: 2022-12-24 | Stop reason: HOSPADM

## 2022-12-20 RX ORDER — ACETAMINOPHEN 325 MG/1
650 TABLET ORAL ONCE
Status: COMPLETED | OUTPATIENT
Start: 2022-12-20 | End: 2022-12-20

## 2022-12-20 RX ORDER — PROMETHAZINE HYDROCHLORIDE 25 MG/1
12.5 TABLET ORAL EVERY 6 HOURS PRN
Status: DISCONTINUED | OUTPATIENT
Start: 2022-12-20 | End: 2022-12-24 | Stop reason: HOSPADM

## 2022-12-20 RX ORDER — DULOXETIN HYDROCHLORIDE 60 MG/1
60 CAPSULE, DELAYED RELEASE ORAL DAILY
Status: DISCONTINUED | OUTPATIENT
Start: 2022-12-20 | End: 2022-12-20

## 2022-12-20 RX ORDER — OXYCODONE HYDROCHLORIDE AND ACETAMINOPHEN 5; 325 MG/1; MG/1
1 TABLET ORAL EVERY 4 HOURS PRN
Status: DISCONTINUED | OUTPATIENT
Start: 2022-12-20 | End: 2022-12-24 | Stop reason: HOSPADM

## 2022-12-20 RX ORDER — 0.9 % SODIUM CHLORIDE 0.9 %
1000 INTRAVENOUS SOLUTION INTRAVENOUS ONCE
Status: DISCONTINUED | OUTPATIENT
Start: 2022-12-20 | End: 2022-12-21

## 2022-12-20 RX ORDER — IPRATROPIUM BROMIDE AND ALBUTEROL SULFATE 2.5; .5 MG/3ML; MG/3ML
1 SOLUTION RESPIRATORY (INHALATION) 3 TIMES DAILY
Status: DISCONTINUED | OUTPATIENT
Start: 2022-12-20 | End: 2022-12-21

## 2022-12-20 RX ORDER — POTASSIUM CHLORIDE 7.45 MG/ML
10 INJECTION INTRAVENOUS PRN
Status: DISCONTINUED | OUTPATIENT
Start: 2022-12-20 | End: 2022-12-24 | Stop reason: HOSPADM

## 2022-12-20 RX ADMIN — CEFEPIME 2000 MG: 2 INJECTION, POWDER, FOR SOLUTION INTRAVENOUS at 17:00

## 2022-12-20 RX ADMIN — PIPERACILLIN AND TAZOBACTAM 3375 MG: 3; .375 INJECTION, POWDER, FOR SOLUTION INTRAVENOUS at 04:27

## 2022-12-20 RX ADMIN — AMIODARONE HYDROCHLORIDE 0.5 MG/MIN: 50 INJECTION, SOLUTION INTRAVENOUS at 16:51

## 2022-12-20 RX ADMIN — ACETAMINOPHEN 650 MG: 325 TABLET ORAL at 22:08

## 2022-12-20 RX ADMIN — ACETAMINOPHEN 650 MG: 325 TABLET ORAL at 04:03

## 2022-12-20 RX ADMIN — IPRATROPIUM BROMIDE AND ALBUTEROL SULFATE 1 AMPULE: .5; 3 SOLUTION RESPIRATORY (INHALATION) at 12:30

## 2022-12-20 RX ADMIN — OSELTAMIVIR PHOSPHATE 30 MG: 30 CAPSULE ORAL at 22:04

## 2022-12-20 RX ADMIN — Medication 10 ML: at 22:05

## 2022-12-20 RX ADMIN — IPRATROPIUM BROMIDE AND ALBUTEROL SULFATE 1 AMPULE: .5; 3 SOLUTION RESPIRATORY (INHALATION) at 20:05

## 2022-12-20 RX ADMIN — VANCOMYCIN HYDROCHLORIDE 1500 MG: 10 INJECTION, POWDER, LYOPHILIZED, FOR SOLUTION INTRAVENOUS at 07:32

## 2022-12-20 RX ADMIN — AMIODARONE HYDROCHLORIDE 150 MG: 50 INJECTION, SOLUTION INTRAVENOUS at 07:40

## 2022-12-20 RX ADMIN — AMIODARONE HYDROCHLORIDE 1 MG/MIN: 50 INJECTION, SOLUTION INTRAVENOUS at 07:55

## 2022-12-20 RX ADMIN — BUPROPION HYDROCHLORIDE 200 MG: 100 TABLET, FILM COATED, EXTENDED RELEASE ORAL at 22:04

## 2022-12-20 RX ADMIN — CEFEPIME 2000 MG: 2 INJECTION, POWDER, FOR SOLUTION INTRAVENOUS at 06:35

## 2022-12-20 RX ADMIN — APIXABAN 5 MG: 5 TABLET, FILM COATED ORAL at 22:04

## 2022-12-20 RX ADMIN — SODIUM CHLORIDE 1000 ML: 9 INJECTION, SOLUTION INTRAVENOUS at 04:03

## 2022-12-20 RX ADMIN — IPRATROPIUM BROMIDE AND ALBUTEROL SULFATE 1 AMPULE: .5; 3 SOLUTION RESPIRATORY (INHALATION) at 15:29

## 2022-12-20 RX ADMIN — PANTOPRAZOLE SODIUM 40 MG: 40 TABLET, DELAYED RELEASE ORAL at 16:59

## 2022-12-20 RX ADMIN — SODIUM CHLORIDE 1000 ML: 9 INJECTION, SOLUTION INTRAVENOUS at 21:55

## 2022-12-20 RX ADMIN — METOPROLOL TARTRATE 25 MG: 25 TABLET, FILM COATED ORAL at 22:04

## 2022-12-20 ASSESSMENT — LIFESTYLE VARIABLES
HOW MANY STANDARD DRINKS CONTAINING ALCOHOL DO YOU HAVE ON A TYPICAL DAY: PATIENT DOES NOT DRINK
HOW OFTEN DO YOU HAVE A DRINK CONTAINING ALCOHOL: NEVER
HOW MANY STANDARD DRINKS CONTAINING ALCOHOL DO YOU HAVE ON A TYPICAL DAY: PATIENT DOES NOT DRINK
HOW OFTEN DO YOU HAVE A DRINK CONTAINING ALCOHOL: NEVER

## 2022-12-20 ASSESSMENT — PAIN DESCRIPTION - DESCRIPTORS
DESCRIPTORS: ACHING
DESCRIPTORS: STABBING
DESCRIPTORS: THROBBING

## 2022-12-20 ASSESSMENT — PAIN - FUNCTIONAL ASSESSMENT: PAIN_FUNCTIONAL_ASSESSMENT: ACTIVITIES ARE NOT PREVENTED

## 2022-12-20 ASSESSMENT — PAIN DESCRIPTION - FREQUENCY: FREQUENCY: CONTINUOUS

## 2022-12-20 ASSESSMENT — PAIN DESCRIPTION - ORIENTATION
ORIENTATION: RIGHT

## 2022-12-20 ASSESSMENT — PAIN DESCRIPTION - LOCATION
LOCATION: LEG

## 2022-12-20 ASSESSMENT — PAIN SCALES - GENERAL
PAINLEVEL_OUTOF10: 5
PAINLEVEL_OUTOF10: 8
PAINLEVEL_OUTOF10: 2
PAINLEVEL_OUTOF10: 3

## 2022-12-20 ASSESSMENT — PAIN SCALES - WONG BAKER: WONGBAKER_NUMERICALRESPONSE: 2

## 2022-12-20 ASSESSMENT — PAIN DESCRIPTION - PAIN TYPE
TYPE: ACUTE PAIN
TYPE: ACUTE PAIN

## 2022-12-20 NOTE — H&P
HOSPITALISTS HISTORY AND PHYSICAL    12/20/2022 11:26 AM    Patient Information:  Anabella Latham is a 78 y.o. female [id number]  PCP:  MARILEE Swain CNP (Tel: [phone] )    Chief complaint:    Chief Complaint   Patient presents with    Leg Pain     Pt arrived in the ED from home via [facility]   Pt complains of right leg pain that started a few days ago  Pt right leg is leaking fluid from wound, leg is red and swelled up   Pt was diagnosed with the flu on Thursday   Pt stated she has no cardiac hx     History of Present Illness:  Janny Peralta is a 78 y.o. female had fevers chills and cough on Thursday was diagnosed with the flu has been on Tamiflu and methylprednisone last couple days also noticed right leg erythema and drainage. No trauma to the region  Does have hx of ortho surgery and rods in the leg per patient  REVIEW OF SYSTEMS:   Constitutional: see jeramy  ENT: Negative for rhinorrhea, epistaxis, hoarseness, sore throat. Respiratory: see above  Cardiovascular: Negative for chest pain, palpitations   Gastrointestinal: Negative for nausea, vomiting, diarrhea  Genitourinary: Negative for polyuria, dysuria   Hematologic/Lymphatic: Negative for bleeding tendency, easy bruising  Musculoskeletal: Negative for myalgias and arthralgias  Neurologic: Negative for confusion,dysarthria. Minus Must  Psychiatric: Negative for depression,anxiety, agitation. Endocrine: Negative for polydipsia,polyuria,heat /cold intolerance. Past Medical History:   has a past medical history of Arthritis, Atrial fibrillation and flutter ([address] Hypertension, Kidney disease, Pneumonia, and Sleep apnea. Past Surgical History:   has a past surgical history that includes Tubal ligation; Total knee arthroplasty (Bilateral, 12/19/2012); fracture surgery; Colonoscopy (N/A, 11/20/2018);  Upper gastrointestinal endoscopy (N/A, 11/20/2018); Cardioversion; Gastric bypass surgery; Larynx surgery; Upper gastrointestinal endoscopy (N/A, 3/31/2022); Colonoscopy (N/A, 3/31/2022); and Upper gastrointestinal endoscopy (N/A, 3/31/2022). Medications:  No current facility-administered medications on file prior to encounter. Current Outpatient Medications on File Prior to Encounter   Medication Sig Dispense Refill    traMADol (ULTRAM) 50 MG tablet Take 50 mg by mouth every 6 hours as needed for Pain. ferrous sulfate (IRON 325) 325 (65 Fe) MG tablet Take 1 tablet by mouth daily (with breakfast) 30 tablet 11    Cholecalciferol (VITAMIN D3) 1.25 MG (12999 UT) CAPS Take 1 capsule by mouth every 7 days 4 capsule 11    furosemide (LASIX) 20 MG tablet Take 1 tablet by mouth daily 30 tablet 11    oseltamivir (TAMIFLU) 75 MG capsule Take 1 capsule by mouth 2 times daily for 5 days 10 capsule 0    methylPREDNISolone (MEDROL, JENI,) 4 MG tablet Take as directed 1 kit 0    budesonide-formoterol (SYMBICORT) 160-4.5 MCG/ACT AERO Inhale 2 puffs into the lungs 2 times daily (Patient not taking: Reported on 12/20/2022) 30.6 g 1    albuterol sulfate HFA (PROVENTIL HFA) 108 (90 Base) MCG/ACT inhaler Inhale 2 puffs into the lungs every 4 hours as needed for Wheezing 18 g 5    dilTIAZem (TIAZAC) 240 MG extended release capsule TAKE 1 CAPSULE DAILY 90 capsule 0    buPROPion (WELLBUTRIN SR) 200 MG extended release tablet TAKE 1 TABLET BY MOUTH TWICE DAILY 60 tablet 5    albuterol-ipratropium (COMBIVENT RESPIMAT)  MCG/ACT AERS inhaler Inhale 1 puff into the lungs in the morning and 1 puff at noon and 1 puff in the evening and 1 puff before bedtime. (Patient not taking: No sig reported) 1 each 1    vitamin B-12 (CYANOCOBALAMIN) 100 MCG tablet Take 1 tablet by mouth in the morning.  90 tablet 3    alendronate (FOSAMAX) 70 MG tablet Take 1 tablet by mouth every 7 days 12 tablet 3    apixaban (ELIQUIS) 5 MG TABS tablet Take 1 tablet by mouth 2 times daily 180 tablet 3    metoprolol tartrate (LOPRESSOR) 25 MG tablet Take 1 tablet by mouth 2 times daily 180 tablet 3    pantoprazole (PROTONIX) 40 MG tablet Take 1 tablet by mouth 2 times daily (before meals) (Patient taking differently: Take 40 mg by mouth 2 times daily (before meals) Takes only when her stomach is upset) 30 tablet 3    [DISCONTINUED] cloNIDine (CATAPRES) 0.2 MG tablet TAKE 1 TABLET BY MOUTH TWICE DAILY (Patient taking differently: daily) 180 tablet 3    DULoxetine (CYMBALTA) 60 MG extended release capsule TAKE 1 CAPSULE BY MOUTH DAILY 90 capsule 3    dilTIAZem (CARDIZEM CD) 240 MG extended release capsule TAKE 1 CAPSULE BY MOUTH DAILY 90 capsule 3    aspirin 81 MG tablet Take 81 mg by mouth daily         Allergies: Allergies   Allergen Reactions    Bee Venom Swelling and Angioedema    Shellfish-Derived Products Other (See Comments)     Syncope/seizures    Shrimp Flavor      Passes out      Codeine Hives and Other (See Comments)     B/P DROPS PASSES OUT  Passed out    Fentanyl And Related Hives     Other reaction(s): Dizziness    Hydromorphone Rash, Hives and Other (See Comments)     Full rash spreading across chest and both arms from IV hydromorphone  Passed out    Vancomycin Rash     Tolerated 12/20/22        Social History:  Patient Lives at home   reports that she has never smoked. She has never used smokeless tobacco. She reports that she does not drink alcohol and does not use drugs. Family History:  family history includes Cancer in her father. ,    Physical Exam:  /84   Pulse (!) 124   Temp 97.9 °F (36.6 °C) (Oral)   Resp 18   Wt 234 lb 8 oz (106.4 kg)   SpO2 95%   BMI 36.73 kg/m²     General appearance:  Appears comfortable.  AAOx3  HEENT: atraumatic, Pupils equal, muscous membranes moist, no masses appreciated  Cardiovascular: irregulary irregular no murmurs appreciated  Respiratory:+ wheezing  Gastrointestinal: Abdomen soft, non-tender, BS+  EXT: no edema  Neurology: no gross focal deficts  Psychiatry: Appropriate affect. Not agitated  Skin:right leg erythema and tenderness to palpation    Labs:  CBC:   Lab Results   Component Value Date/Time    WBC 16.0 12/20/2022 04:00 AM    RBC 4.29 12/20/2022 04:00 AM    HGB 12.3 12/20/2022 04:00 AM    HCT 39.3 12/20/2022 04:00 AM    MCV 91.6 12/20/2022 04:00 AM    MCH 28.7 12/20/2022 04:00 AM    MCHC 31.3 12/20/2022 04:00 AM    RDW 18.2 12/20/2022 04:00 AM     12/20/2022 04:00 AM    MPV 10.3 12/20/2022 04:00 AM     BMP:    Lab Results   Component Value Date/Time     12/20/2022 04:00 AM    K 3.8 12/20/2022 04:00 AM     12/20/2022 04:00 AM    CO2 26 12/20/2022 04:00 AM    BUN 31 12/20/2022 04:00 AM    CREATININE 1.4 12/20/2022 04:00 AM    CALCIUM 8.8 12/20/2022 04:00 AM    GFRAA 38 09/20/2022 04:14 AM    GFRAA 45 03/27/2013 01:36 PM    LABGLOM 39 12/20/2022 04:00 AM    GLUCOSE 99 12/20/2022 04:00 AM     XR CHEST PORTABLE   Final Result   Cardiac silhouette and central vasculature are prominent suggesting a mild   CHF with vascular congestion and interstitial edema. Decrease of the more   localized opacities on the old exam.      However the general malgorzata are prominent and underlying adenopathy is not   excluded by chest x-ray. Previous CT had enlarged pulmonary arteries rather   than overt adenopathy. XR ANKLE RIGHT (MIN 3 VIEWS)   Final Result   Stable appearance of the medial malleolar pins and the intramedullary nail at   the distal fibula with the proximal portion of the intramedullary nail   outside bone. Fusion of the distal interosseous membrane bridging the tibia and fibula with   old healed bony deformities. Progressive loss of the tibiotalar joint and   subtalar joint with sclerosis of the margins. Edema of the soft tissues with scattered soft tissue calcifications. No soft   tissue emphysema or area of focal bony erosion.              Recent imaging reviewed    Problem List  Principal Problem:    Sepsis ([facility] [address]  Resolved Problems:    * No resolved hospital problems. *        Assessment/Plan:   Sepsis secondary to RLE celluliits  - iv atbx  -= cutlures  - id consult    Aflutter with rvr  - eliquis  - amio gtt  - tsh cards consult    Influenza infection  Duonebs  - steroids  - finish tamiflu      DVT prophylaxis eliquis  Code status full code    I spent 32 minutes providing critical care services to this patient thus far today        Admit as inpatient I anticipate hospitalization spanning more than two midnights for investigation and treatment of the above medically necessary diagnoses. Please note that some part of this chart was generated using Dragon dictation software. Although every effort was made to ensure the accuracy of this automated transcription, some errors in transcription may have occurred inadvertently. If you may need any clarification, please do not hesitate to contact me through [facility].        Jennifer Esquivel MD    12/20/2022 11:26 AM

## 2022-12-20 NOTE — ACP (ADVANCE CARE PLANNING)
Advanced Care Planning Note. Purpose of Encounter: Advanced care planning in light of hospitalization  Parties In Attendance: Patient,    Decisional Capacity: Yes  Subjective: Patient  understand that this conversation is to address long term care goal  Objective: to hospital with right lower extremity cellulitis and a flutter with RVR  Goals of Care Determination: Patient would pursue CPR and Intubation if required.   Code Status: full code  Time spent on Advanced care Plannin minutes  Advanced Care Planning Documents: documented patient's wishes, would like Alesha Xie to make medical decisions if unable to make decisions

## 2022-12-20 NOTE — PROGRESS NOTES
Patient seen in ED, room 8. Admission completed except for: 4 Eyes Assessment, Immunizations, Covid Vaccines, Rights and Responsibilities, Orientation to room, Plan of Care, education, white board, height and weight, pain assessment and head to toe assessment. Patient is alert and oriented X 4. Patient lives in a single story, ranch style house with many family members and is being admitted for sepsis. Home Medication Reconciliation has been verbally reviewed with patient and updated as appropriate. It continues to be marked as \"In process\" as the verification with pharmacy has not been completed. Plan of care updated if indicated. All questions answered.

## 2022-12-20 NOTE — PROGRESS NOTES
12/20/22 1243   RT Protocol   History Pulmonary Disease 0   Respiratory pattern 2   Breath sounds 4   Cough 3   Bronchodilator Assessment Score 9

## 2022-12-20 NOTE — PLAN OF CARE
Patient admitted to the floor this shift in bed with call light and overbed table within reach, oriented to her room, educated on TV remote, lights and nurse call button. Patient appears to be comfortable in bed.

## 2022-12-20 NOTE — CONSULTS
[address] 83   Electrophysiology Consultation   Date: 12/20/2022  Reason for Consultation: Atrial fibrillation   Consult Requesting Physician: Junior Portillo MD   Chief Complaint   Patient presents with    Leg Pain     Pt arrived in the ED from home via [facility]   Pt complains of right leg pain that started a few days ago  Pt right leg is leaking fluid from wound, leg is red and swelled up   Pt was diagnosed with the flu on Thursday   Pt stated she has no cardiac hx     CC:  LE swelling and pain  HPI: Maryuri Jimenez is a 78 y.o. female who has presented to hospital with right lower extremity pain, swelling and redness. She has been admitted for sepsis and cellulitis. She was found to be tachycardic and EP has been consulted. Patient has history of chronic persistent atrial fibrillation/flutter since 2017. Atrial flutter/fibrillation diagnosed in 2016 and she had cardioversion on 4/13/2016. Her atrial fibrillation flutter recurred in 2017 and it appears that she has been in atrial fibrillation/flutter since then. She declined cardioversion and ablation on her last hospitalization in 2019 when she was seen by EP. Reports no palpitation or chest pressure. Heart rate remains elevated. On telemetry she is in atrial flutter. She has been started on amiodarone by the primary team.      Assessment:   Atrial flutter/fibrillation with rapid ventricular response  Sepsis  Cellulitis of lower extremity  HTN  GIANNA, not using her CPAP  History of rectus sheath hematoma in the past  CKD  HLD    Plan:   I discussed with her treatment options including cardioversion and/or ablation. Ablation is not recommended in the setting of acute sepsis/active infection. Continue loading with IV amiodarone today. Plan to switch to oral amiodarone. After discussing risk and benefit of rhythm control versus rate control she is now agreeable to proceed with cardioversion/ablation.     If rate remains uncontrolled despite antiarrhythmic therapy will consider cardioversion in this hospitalization followed by ablation as outpatient. High [id number] score and high risk for thromboembolism. Continue with Eliquis    Added p.o. metoprolol for better rate control. IV antibiotics therapy for cellulitis per primary team.  Discussed with nursing staff. Active Hospital Problems    Diagnosis Date Noted    Sepsis ([facility] [address] [A41.9] 12/20/2022     Priority: Medium       Diagnostic studies:   ECG: Atrial flutter   Pro-Calcitonin: 1.45   Pro-BNP: 5820   WBC: 16     Cr = 1.4   Echo: 3/2022:    -Left ventricular cavity size is normal with mild concentric left   ventricular hypertrophy.   -Overall left ventricular systolic function appears normal. Ejection   fraction is visually estimated to be 60-65%. -No regional wall motion abnormalities are noted. -Cannot grade diastology due to atrial fibrillation.   -The right ventricle is mildly enlarged. Right ventricular systolic function   is normal.   -The right atrium is moderately dilated. -Mitral valve leaflets appear mildly thickened. Mild mitral regurgitation. Mild mitral annular calcification.   -Trivial pulmonic regurgitation.   -Moderate tricuspid regurgitation with a PASP of 56 mmHg. ECG: Atrial flutter with controlled rate  BIBI 4/2016  Summary   -Normal left ventricle size, wall thickness and systolic function with an   estimated ejection fraction of 55%. -Mild mitral regurgitation is present.   -Left atrium is enlarged with no obvious mass or thrombus. [facility] 1/2016  ~LM     normal  ~LAD   normal  ~Cx      normal  ~RCA   normal  ~LVG   55%  Impression  ~Angiography w/ normal cors   ~LVEDP 20  ~LVG with normal EF  Intervention  ~None  Recommendation  ~Aggressive medical treatment and risk factor modification     I independently reviewed the cardiac diagnostic studies, ECG and relevant imaging studies.      Lab Results   Component Value Date    LVEF 55 09/15/2022     Lab Results   Component Value Date    TSH 2.81 2022       Physical Examination:  Vitals:    22 1048   BP:    Pulse:    Resp:    Temp:    SpO2: 95%      No intake/output data recorded. Wt Readings from Last 3 Encounters:   22 234 lb 8 oz (106.4 kg)   12/15/22 235 lb (106.6 kg)   22 231 lb (104.8 kg)     Temp  Av.5 °F (37.5 °C)  Min: 97.8 °F (36.6 °C)  Max: 101.8 °F (38.8 °C)  Pulse  Av.9  Min: 99  Max: 138  BP  Min: 99/68  Max: 142/70  SpO2  Av.8 %  Min: 92 %  Max: 100 %  No intake or output data in the 24 hours ending 22 1350      I independently reviewed all cardiac tracing from cardiac telemetry. Constitutional: Oriented. No distress. Head: Normocephalic and atraumatic. Mouth/Throat: Oropharynx is clear and moist.   Eyes: Conjunctivae normal. EOM are normal.   Neck: Neck supple. No JVD present. Cardiovascular: Tachycardic rate, regular rhythm, S1&S2. Pulmonary/Chest: Bilateral respiratory sounds. No rhonchi. Abdominal: Soft. No tenderness. Musculoskeletal: No tenderness. No edema    Lymphadenopathy: Has no cervical adenopathy. Neurological: Alert and oriented.  Follows command, No Gross deficit   Skin: Skin is warm, + LE swelling and erythema   Psychiatric: Has a normal behavior       Scheduled Meds:   sodium chloride flush  10 mL IntraVENous 2 times per day    apixaban  5 mg Oral BID    aspirin  81 mg Oral Daily    buPROPion  200 mg Oral BID    pantoprazole  40 mg Oral BID AC    cefepime  2,000 mg IntraVENous Q12H    [START ON 2022] vancomycin  1,250 mg IntraVENous Q24H    ipratropium-albuterol  1 ampule Inhalation Q4H WA     Continuous Infusions:   sodium chloride      [Held by provider] furosemide      amiodarone       PRN Meds:.sodium chloride flush, sodium chloride, potassium chloride, magnesium sulfate, promethazine **OR** ondansetron, acetaminophen **OR** acetaminophen, oxyCODONE-acetaminophen     Review of System:  [x] Full ROS obtained and negative except as mentioned in HPI    Prior to Admission medications    Medication Sig Start Date End Date Taking? Authorizing Provider   traMADol (ULTRAM) 50 MG tablet Take 50 mg by mouth every 6 hours as needed for Pain. Historical Provider, MD   ferrous sulfate (IRON 325) 325 (65 Fe) MG tablet Take 1 tablet by mouth daily (with breakfast) 12/15/22   MARILEE Velasquez CNP   Cholecalciferol (VITAMIN D3) 1.25 MG (84956 UT) CAPS Take 1 capsule by mouth every 7 days 12/15/22   MARILEE Velasquez CNP   furosemide (LASIX) 20 MG tablet Take 1 tablet by mouth daily 12/15/22   MARILEE Velasquez CNP   oseltamivir (TAMIFLU) 75 MG capsule Take 1 capsule by mouth 2 times daily for 5 days 12/15/22 12/20/22  MARILEE Velasquez CNP   budesonide-formoterol [facility]) 160-4.5 MCG/ACT AERO Inhale 2 puffs into the lungs 2 times daily  Patient not taking: Reported on 12/20/2022 12/15/22   MARILEE Velasquez CNP   albuterol sulfate HFA (PROVENTIL HFA) 108 (90 Base) MCG/ACT inhaler Inhale 2 puffs into the lungs every 4 hours as needed for Wheezing 12/15/22   MARILEE Velasquez CNP   dilTIAZem [facility]) 240 MG extended release capsule TAKE 1 CAPSULE DAILY 10/10/22   MARILEE Velasquez CNP   buPROPion [facility]) 200 MG extended release tablet TAKE 1 TABLET BY MOUTH TWICE DAILY 9/21/22   MARILEE Velasquez CNP   albuterol-ipratropium (COMBIVENT RESPIMAT)  MCG/ACT AERS inhaler Inhale 1 puff into the lungs in the morning and 1 puff at noon and 1 puff in the evening and 1 puff before bedtime. Patient not taking: No sig reported 9/20/22   Rachael Guan MD   vitamin B-12 (CYANOCOBALAMIN) 100 MCG tablet Take 1 tablet by mouth in the morning.  8/16/22 8/16/23  MARILEE Velasquez CNP   alendronate (FOSAMAX) 70 MG tablet Take 1 tablet by mouth every 7 days 8/15/22   MARILEE Velasquez CNP   apixaban (ELIQUIS) 5 MG TABS tablet Take 1 tablet by mouth 2 times daily 6/14/22   Elizabeth Early APRN - ROBBY   metoprolol tartrate (LOPRESSOR) 25 MG tablet Take 1 tablet by mouth 2 times daily 6/2/22   MARILEE Swain - CNP   pantoprazole (PROTONIX) 40 MG tablet Take 1 tablet by mouth 2 times daily (before meals)  Patient taking differently: Take 40 mg by mouth 2 times daily (before meals) Takes only when her stomach is upset 4/1/22   Shawn Fleming MD   cloNIDine (CATAPRES) 0.2 MG tablet TAKE 1 TABLET BY MOUTH TWICE DAILY  Patient taking differently: daily 11/2/21 9/20/22  MARILEE Swain - CNP   DULoxetine (CYMBALTA) 60 MG extended release capsule TAKE 1 CAPSULE BY MOUTH DAILY 9/7/21   MARILEE Swain - ROBBY   dilTIAZem (CARDIZEM CD) 240 MG extended release capsule TAKE 1 CAPSULE BY MOUTH DAILY 9/2/21   MARILEE Swain - CNP   aspirin 81 MG tablet Take 81 mg by mouth daily    Historical Provider, MD       Past Medical History:   Diagnosis Date    Arthritis     Atrial fibrillation and flutter ([address]     Hypertension     Kidney disease     Pneumonia     Sleep apnea     no c-pap        Past Surgical History:   Procedure Laterality Date    CARDIOVERSION      COLONOSCOPY N/A 11/20/2018    COLONOSCOPY POLYPECTOMY SNARE/COLD BIOPSY performed by Lana Bautista MD at [facility] 74 N/A 3/31/2022    COLONOSCOPY POLYPECTOMY SNARE/COLD BIOPSY performed by Tyrell Wilkes MD at [address] [place] [address]      ankle rt has pins and rods, and rt elbow    GASTRIC BYPASS SURGERY      LARYNX SURGERY      TOTAL KNEE ARTHROPLASTY Bilateral 12/19/2012    TUBAL LIGATION      tubes tied    UPPER GASTROINTESTINAL ENDOSCOPY N/A 11/20/2018    EGD BIOPSY performed by Lana Bautista MD at [facility] 88 N/A 3/31/2022    EGD DILATION BALLOON performed by Tyrell Wilkes MD at [facility] 86 N/A 3/31/2022    EGD BIOPSY performed by Tyrell Wilkes MD at [address] [place] [address] ENDOSCOPY       Allergies   Allergen Reactions    Bee Venom Swelling and Angioedema    Shellfish-Derived Products Other (See Comments)     Syncope/seizures    Shrimp Flavor      Passes out      Codeine Hives and Other (See Comments)     B/P DROPS PASSES OUT  Passed out    Fentanyl And Related Hives     Other reaction(s): Dizziness    Hydromorphone Rash, Hives and Other (See Comments)     Full rash spreading across chest and both arms from IV hydromorphone  Passed out    Vancomycin Rash     Tolerated 12/20/22       Social History:  Reviewed. reports that she has never smoked. She has never used smokeless tobacco. She reports that she does not drink alcohol and does not use drugs. Family History:  Reviewed. Reviewed. No family history of SCD. Relevant and available labs, and cardiovascular diagnostics reviewed. Reviewed. Recent Labs     12/20/22  0400      K 3.8      CO2 26   BUN 31*   CREATININE 1.4*     Recent Labs     12/20/22  0400   WBC 16.0*   HGB 12.3   HCT 39.3   MCV 91.6        Estimated Creatinine Clearance: 44 mL/min (A) (based on SCr of 1.4 mg/dL (H)). No results found for: BNP    I independently reviewed all cardiac tracing from cardiac telemetry. I independently reviewed relevant and available cardiac diagnostic tests ECG, CXR, Echo, Stress test, Device interrogation, Holter, CT scan. Outside medical records via Care everywhere reviewed and summarized in H&P above. Complex medical condition with multiple medical problems affecting prognosis and outcome of EP interventions  Severe exacerbation of underlying medical condition requiring hospitalization and at risk of decompensation. All questions and concerns were addressed to the patient/family. Alternatives to my treatment were discussed. I have discussed the above stated plan and the patient verbalized understanding and agreed with the plan.     NOTE: This report was transcribed using voice recognition software. Every effort was made to ensure accuracy, however, inadvertent computerized transcription errors may be present.      Jj Mena MD, MPH  [place] [address]   Office: [phone]  Fax: [phone]

## 2022-12-20 NOTE — PROGRESS NOTES
Clinical Pharmacy Note: Pharmacy to Dose Vancomycin    Reji Kerr is a 78 y.o. female started on Vancomycin for sepsis; consult received from Dr. Vee Anton to manage therapy. Also receiving the following antibiotics: cefepime. Goal AUC: 400-600 mg/L*hr  Goal Trough Level: 15-20 mcg/mL    Assessment/Plan:  A 1500 mg loading dose was given on 12/20 at 0732  Initiate vancomycin 1250 mg IV every 24 hours. Bayesian modeling predicts an AUC of 482 mg/L*hr and a trough of 15.4 mcg/mL at steady state concentration. A vancomycin random level has been ordered for 12/21 at 0600  Changes in regimen will be determined based on culture results, renal function, and clinical response. Pharmacy will continue to monitor and adjust regimen as necessary. Allergies:  Bee venom, Shellfish-derived products, Shrimp flavor, Codeine, Fentanyl and related, Hydromorphone, and Vancomycin     Recent Labs     12/20/22  0400   CREATININE 1.4*       Recent Labs     12/20/22  0400   WBC 16.0*       Ht Readings from Last 1 Encounters:   12/15/22 5' 7\" (1.702 m)        Wt Readings from Last 1 Encounters:   12/15/22 235 lb (106.6 kg)         Estimated Creatinine Clearance: 44 mL/min (A) (based on SCr of 1.4 mg/dL (H)).       Thank you for the consult,    Adolph Tsang, PharmD, BCCCP

## 2022-12-20 NOTE — ED PROVIDER NOTES
[address] [facility]  eMERGENCY dEPARTMENT eNCOUnter        Pt Name: Harlan Krueger  MRN: [id number]  [place] 1945  Date of evaluation: 12/20/2022  Provider: Adri Jones MD  PCP: MARILEE Salcido CNP      CHIEF COMPLAINT       Chief Complaint   Patient presents with    Leg Pain     Pt arrived in the ED from home via [facility]   Pt complains of right leg pain that started a few days ago  Pt right leg is leaking fluid from wound, leg is red and swelled up   Pt was diagnosed with the flu on Thursday   Pt stated she has no cardiac hx       HISTORY OFPRESENT ILLNESS   (Location/Symptom, Timing/Onset, Context/Setting, Quality, Duration, Modifying Factors,Severity)  Note limiting factors. Harlan Krueger is a 78 y.o. female patient was diagnosed with the flu on Thursday over the last days she has noticed redness and swelling and drainage from the right leg where it is weeping with increased pain patient's had previous osteomyelitis of the right ankle where she  has  redness and swelling now    Nursing Notes were all reviewed and agreed with or any disagreements were addressed  in the HPI. REVIEW OF SYSTEMS    (2-9 systems for level 4, 10 or more for level 5)       REVIEW OF SYSTEMS    Constitutional:  fever, chills,  weakness   Eyes:  Denies vision changes  HENT:  Denies sore throat or neck pain   Respiratory:  Denies cough or shortness of breath   Cardiovascular:  Denies chest pain  GI:  Denies abdominal pain, nausea, vomiting, or diarrhea   Musculoskeletal:  Denies back pain   Skin rash no  vesicles   Neurologic:  no headache weakness focal    Lymphatic:  no swollen  nodes   Psychiatric: no si or hs thoughts     All systems negative except as marked. Positives and Pertinent negatives as per HPI. Except as noted above in the ROS, all other systems were reviewed andnegative.        PASTMEDICAL HISTORY     Past Medical History:   Diagnosis Date Arthritis     Atrial fibrillation and flutter ([facility] [address]     Hypertension     Kidney disease     Pneumonia     Sleep apnea     no c-pap         SURGICAL HISTORY       Past Surgical History:   Procedure Laterality Date    CARDIOVERSION      COLONOSCOPY N/A 11/20/2018    COLONOSCOPY POLYPECTOMY SNARE/COLD BIOPSY performed by Syed Kelsey MD at [facility] N/A 3/31/2022    COLONOSCOPY POLYPECTOMY SNARE/COLD BIOPSY performed by Bam Mello MD at [address] [place] [address]      ankle rt has pins and rods, and rt elbow    GASTRIC BYPASS SURGERY      LARYNX SURGERY      TOTAL KNEE ARTHROPLASTY Bilateral 12/19/2012    TUBAL LIGATION      tubes tied    UPPER GASTROINTESTINAL ENDOSCOPY N/A 11/20/2018    EGD BIOPSY performed by Syed Kelsey MD at [address] [place] [address] 3/31/2022    EGD DILATION BALLOON performed by Bam Mello MD at [address] [place] [address] 3/31/2022    EGD BIOPSY performed by Bam Mello MD at [facility]       Previous Medications    ALBUTEROL SULFATE HFA (PROVENTIL HFA) 108 (90 BASE) MCG/ACT INHALER    Inhale 2 puffs into the lungs every 4 hours as needed for Wheezing    ALBUTEROL-IPRATROPIUM (COMBIVENT RESPIMAT)  MCG/ACT AERS INHALER    Inhale 1 puff into the lungs in the morning and 1 puff at noon and 1 puff in the evening and 1 puff before bedtime.     ALENDRONATE (FOSAMAX) 70 MG TABLET    Take 1 tablet by mouth every 7 days    APIXABAN (ELIQUIS) 5 MG TABS TABLET    Take 1 tablet by mouth 2 times daily    ASPIRIN 81 MG TABLET    Take 81 mg by mouth daily    BUDESONIDE-FORMOTEROL (SYMBICORT) 160-4.5 MCG/ACT AERO    Inhale 2 puffs into the lungs 2 times daily    BUPROPION (WELLBUTRIN SR) 200 MG EXTENDED RELEASE TABLET    TAKE 1 TABLET BY MOUTH TWICE DAILY    CHOLECALCIFEROL (VITAMIN D3) 1.25 MG (64355 UT) CAPS    Take 1 capsule by mouth every 7 days DILTIAZEM (CARDIZEM CD) 240 MG EXTENDED RELEASE CAPSULE    TAKE 1 CAPSULE BY MOUTH DAILY    DILTIAZEM (TIAZAC) 240 MG EXTENDED RELEASE CAPSULE    TAKE 1 CAPSULE DAILY    DULOXETINE (CYMBALTA) 60 MG EXTENDED RELEASE CAPSULE    TAKE 1 CAPSULE BY MOUTH DAILY    FERROUS SULFATE (IRON 325) 325 (65 FE) MG TABLET    Take 1 tablet by mouth daily (with breakfast)    FUROSEMIDE (LASIX) 20 MG TABLET    Take 1 tablet by mouth daily    METHYLPREDNISOLONE (MEDROL, JENI,) 4 MG TABLET    Take as directed    METOPROLOL TARTRATE (LOPRESSOR) 25 MG TABLET    Take 1 tablet by mouth 2 times daily    OSELTAMIVIR (TAMIFLU) 75 MG CAPSULE    Take 1 capsule by mouth 2 times daily for 5 days    PANTOPRAZOLE (PROTONIX) 40 MG TABLET    Take 1 tablet by mouth 2 times daily (before meals)    TRAMADOL (ULTRAM) 50 MG TABLET    Take 50 mg by mouth every 6 hours as needed for Pain. VITAMIN B-12 (CYANOCOBALAMIN) 100 MCG TABLET    Take 1 tablet by mouth in the morning.        ALLERGIES     Bee venom, Shellfish-derived products, Shrimp flavor, Codeine, Fentanyl and related, Hydromorphone, and Vancomycin    FAMILY HISTORY       Family History   Problem Relation Age of Onset    Cancer Father         stomach cancer          SOCIAL HISTORY       Social History     Socioeconomic History    Marital status:      Spouse name: None    Number of children: None    Years of education: None    Highest education level: None   Occupational History    Occupation: retired    Tobacco Use    Smoking status: Never    Smokeless tobacco: Never   Vaping Use    Vaping Use: Never used   Substance and Sexual Activity    Alcohol use: No    Drug use: No     Social Determinants of Health     Financial Resource Strain: Low Risk     Difficulty of Paying Living Expenses: Not hard at all   Food Insecurity: No Food Insecurity    Worried About [facility] in the Last Year: Never true    [address] [facility] [address] in the Last Year: Never true   Physical Activity: Inactive Days of Exercise per Week: 0 days    Minutes of Exercise per Session: 0 min       SCREENINGS    [place] Coma Scale  Eye Opening: Spontaneous  Best Verbal Response: Oriented  Best Motor Response: Obeys commands  [place] Coma Scale Score: 15        PHYSICAL EXAM    (up to 7 for level 4, 8 or more for level 5)     ED Triage Vitals [12/20/22 0354]   BP Temp Temp Source Heart Rate Resp SpO2 Height Weight   -- 100.4 °F (38 °C) Oral (!) 137 -- -- -- --           General Appearance:  Alert, cooperative, no distress, appears stated age. Head:  Normocephalic, without obvious abnormality, atraumatic. Eyes:  conjunctiva/corneas clear, EOM's intact. Sclera anicteric. ENT: Mucous membranes moist.   Neck: Supple, symmetrical, trachea midline, no adenopathy. No jugular venous distention. Lungs:   No Respiratory Distress. no rales  rhonchi rub   Chest Wall:  Nontender  no deformity   Heart:  Tachycardia no murmer gallop    Abdomen:   Soft nontender no organomegally    Extremities:  Full range of motion. no deformity   Pulses: Equal  upper and lower    Skin:  No rashes or lesions to exposed skin. Neurologic: Alert and oriented X 3. Motor grossly normal.  Speech clear.  Cr n 2-12 intact   swelling and redness to the right lower leg over the shin and ankle some weeping of the skin    DIAGNOSTIC RESULTS   LABS:    Labs Reviewed   CBC WITH AUTO DIFFERENTIAL - Abnormal; Notable for the following components:       Result Value    WBC 16.0 (*)     RDW 18.2 (*)     Neutrophils Absolute 14.1 (*)     Lymphocytes Absolute 0.6 (*)     All other components within normal limits   COMPREHENSIVE METABOLIC PANEL W/ REFLEX TO MG FOR LOW K - Abnormal; Notable for the following components:    BUN 31 (*)     Creatinine 1.4 (*)     Est, Glom Filt Rate 39 (*)     Albumin 3.3 (*)     Albumin/Globulin Ratio 1.0 (*)     All other components within normal limits   SEDIMENTATION RATE - Abnormal; Notable for the following components:    Sed Rate 41 (*)     All other components within normal limits   C-REACTIVE PROTEIN - Abnormal; Notable for the following components:    CRP 14.1 (*)     All other components within normal limits   COVID-19, RAPID   CULTURE, BLOOD 1   CULTURE, BLOOD 2   LACTIC ACID   URINALYSIS WITH REFLEX TO CULTURE   PROCALCITONIN       All other labs were within normal range or not returned as of thisdictation. EKG: All EKG's are interpreted by the Emergency Department Physician who either signs or Co-signs this chart in the absence of a cardiologist.    EKG Interpretation    Interpreted by emergency department physician    Rhythm: atrial flutter  Rate: 113  Axis: normal  Ectopy: none  Conduction: normal  ST Segments: no acute change  T Waves: no acute change  Q Waves: none    Clinical Impression: Atrial flutter    Sukhjinder Ge MD      RADIOLOGY:   Non-plain film images such as CT, Ultrasound and MRI are read by the radiologist. Plainradiographic images are visualized and preliminarily interpreted by the  ED Provider with the belowfindings:        Interpretation per the Radiologist below, if available at the time of this note:    XR CHEST PORTABLE    (Results Pending)   XR ANKLE RIGHT (MIN 3 VIEWS)    (Results Pending)         PROCEDURES   Unless otherwise noted below, none     Procedures    CRITICAL CARE TIME   Total Critical Care time was 30 minutes, excluding separately reportable procedures.       CONSULTS:  None    EMERGENCY DEPARTMENT COURSE and DIFFERENTIAL DIAGNOSIS/MDM:   Vitals:    Vitals:    12/20/22 0359 12/20/22 0400 12/20/22 0419 12/20/22 0430   BP:  (!) 113/55 99/68 (!) 116/55   Pulse:  (!) 115 (!) 138 (!) 134   Resp:  19 24 21   Temp: (!) 101.8 °F (38.8 °C)      TempSrc: Axillary      SpO2:  96% 100% 96%       Patient was given the following medications:  Medications   piperacillin-tazobactam (ZOSYN) 3,375 mg in dextrose 5 % 50 mL IVPB (mini-bag) (3,375 mg IntraVENous New Bag 12/20/22 0427)   0.9 % sodium chloride bolus (1,000 mLs IntraVENous New Bag 12/20/22 0403)   acetaminophen (TYLENOL) tablet 650 mg (650 mg Oral Given 12/20/22 0403)           Is this patient to be included in the SEP-1 Core Measure due to severe sepsis or septic shock? Yes   SEP-1 CORE MEASURE DATA      Sepsis Criteria   Severe Sepsis Criteria   Septic Shock Criteria     Must be confirmed or suspected to move forward with diagnosis of sepsis. Must meet 2:    [x] Temperature > 100.9 F (38.3 C)        or < 96.8 F (36 C)  [x] HR > 90  [x] RR > 20  [] WBC > 12 or < 4 or 10% bands      AND:      [] Infection Confirmed or        Suspected. Must meet 1:    [] Lactate > 2       or   [] Signs of Organ Dysfunction:    - SBP < 90 or MAP < 65  - Altered mental status  - Creatinine > 2 or increased from      baseline  - Urine Output < 0.5 ml/kg/hr  - Bilirubin > 2  - INR > 1.5 (not anticoagulated)  - Platelets < 422,580  - Acute Respiratory Failure as     evidenced by new need for NIPPV     or mechanical ventilation      [] No criteria met for Severe Sepsis. Must meet 1:    [] Lactate > 4        or   [] SBP < 90 or MAP < 65 for at        least two readings in the first        hour after fluid bolus        administration      [] Vasopressors initiated (if hypotension persists after fluid resuscitation)        [] No criteria met for Septic Shock.    Patient Vitals for the past 6 hrs:   BP Temp Pulse Resp SpO2   12/20/22 0354 -- 100.4 °F (38 °C) (!) 137 -- --   12/20/22 0359 -- (!) 101.8 °F (38.8 °C) -- -- --   12/20/22 0400 (!) 113/55 -- (!) 115 19 96 %   12/20/22 0419 99/68 -- (!) 138 24 100 %   12/20/22 0430 (!) 116/55 -- (!) 134 21 96 %      Recent Labs     12/20/22 0400   WBC 16.0*   LACTA 1.6   CREATININE 1.4*   BILITOT 0.7         Sepsis  Time  sepsis  Identified  330    Fluid Resuscitation Rational: less than 30mL/kg because of concern for fluid overload and patient/patient advocate made an informed decision to decline more aggressive fluid resuscitation      Repeat lactate level: not indicated due to initial lactate < 2    Reassessment Exam:   Not applicable. Patient does not have septic shock. Patient has fever tachycardia tachypnea but her lactate is normal patient was given IV fluids and Zosyn she is allergic to vancomycin apparently has had hives with this she will be admitted    . FINAL IMPRESSION      1. Cellulitis of right leg        DISPOSITION/PLAN   DISPOSITION Decision To Admit 12/20/2022 04:39:54 AM      PATIENT REFERRED TO:  No follow-up provider specified.     DISCHARGE MEDICATIONS:  New Prescriptions    No medications on file       DISCONTINUED MEDICATIONS:  Discontinued Medications    No medications on file              (Please note that portions of this note were completed with a voice recognition program.  Efforts were made to edit the dictations but occasionally words aremis-transcribed.)    Lindy Murillo MD (electronically signed)          Lindy Murillo MD  12/20/22 [address], [place]  12/20/22 5404

## 2022-12-20 NOTE — SIGNIFICANT EVENT
Paged admit, patient is septic, requested patient to be stabilized, images to be performed and read to rule out emergencies and then be repaged

## 2022-12-20 NOTE — CONSULTS
Infectious Diseases Inpatient Consult Note      Reason for Consult:  Sepsis. Fevers, Rt leg cellulitis and concern for Hardware infection      Requesting Physician:  Kaitlyn Dorman       Primary Care Physician:  MARILEE Aguilar CNP    History Obtained From:  [facility] and Patient     CHIEF COMPLAINT:     Chief Complaint   Patient presents with    Leg Pain     Pt arrived in the ED from home via [place] [facility]   Pt complains of right leg pain that started a few days ago  Pt right leg is leaking fluid from wound, leg is red and swelled up   Pt was diagnosed with the flu on Thursday   Pt stated she has no cardiac hx         HISTORY OF PRESENT ILLNESS:  78 y.o. woman with a past medical history of atrial fibrillation, hypertension, sleep apnea, arthritis, right leg ankle surgery many years ago with hardware in place admitted to the hospital secondary to acute onset of fever chills and right leg swelling and redness. Patient was seen in primary physician's office was evaluated by nurse practitioner, she had influenza antibody test that was positive for A ANTIBODY - (NOT ANTIGEN TESTING) was given a diagnosis of Flu- . He is now admitted because of worsening right leg cellulitis ongoing fevers and chills.   Given the complicated history in the right leg with hardware in place we are consulted for recommendations  Location :   Right leg pain and swelling  Quality : Aching     Severity : 10/10  Duration : 5 days  Timing : Constant  Context : Remote history of right ankle surgery with hardware  Modifying factors : None  Associated signs and symptoms: Fever, chills, right leg swelling, pain, redness        Past Medical History:    Past Medical History:   Diagnosis Date    Arthritis     Atrial fibrillation and flutter ([address]     Hypertension     Kidney disease     Pneumonia     Sleep apnea     no c-pap       Past Surgical History:    Past Surgical History:   Procedure Laterality Date    CARDIOVERSION      COLONOSCOPY N/A Social History     Tobacco Use    Smoking status: Never    Smokeless tobacco: Never   Vaping Use    Vaping Use: Never used   Substance Use Topics    Alcohol use: No    Drug use: No     Social History     Tobacco Use   Smoking Status Never   Smokeless Tobacco Never      Family History   Problem Relation Age of Onset    Cancer Father         stomach cancer          REVIEW OF SYSTEMS:      Constitutional: r fevers++ , chills, night sweats  Eyes:  negative for blurred vision, eye discharge, visual disturbance   HEENT:  negative for hearing loss, ear drainage,nasal congestion  Respiratory:  negative for cough, shortness of breath or hemoptysis   Cardiovascular:  negative for chest pain, palpitations, syncope  Gastrointestinal:  negative for nausea, vomiting, diarrhea, constipation, abdominal pain  Genitourinary:  negative for frequency, dysuria, urinary incontinence, hematuria  Hematologic/Lymphatic:  negative for easy bruising, bleeding and lymphadenopathy  Allergic/Immunologic:  negative for recurrent infections, angioedema, anaphylaxis   Endocrine:  negative for weight changes, polyuria, polydipsia and polyphagia  Musculoskeletal:  Rt leg pain + swelling  ++  pain, swelling, decreased range of motion  Integumentary: No rashes, skin lesions  Neurological:  negative for headaches, slurred speech, unilateral weakness  Psychiatric: negative for hallucinations,confusion,agitation.      PHYSICAL EXAM:      Vitals:  t MAX  101.8   /84   Pulse (!) 124   Temp 97.9 °F (36.6 °C) (Oral)   Resp 18   Wt 234 lb 8 oz (106.4 kg)   SpO2 95%   BMI 36.73 kg/m²     General Appearance: alert,in some acute distress, ++ pallor, no icterus   Skin: warm and dry, no rash or erythema  Head: normocephalic and atraumatic  Eyes: pupils equal, round, and reactive to light, conjunctivae normal  ENT: tympanic membrane, external ear and ear canal normal bilaterally, nose without deformity, nasal mucosa and turbinates normal without polyps  Neck: supple and non-tender without mass, no thyromegaly  no cervical lymphadenopathy  Pulmonary/Chest: clear to auscultation bilaterally- no wheezes, rales or rhonchi, normal air movement, no respiratory distress  Cardiovascular: normal rate, regular rhythm, normal S1 and S2, no murmurs, rubs, clicks, or gallops, no carotid bruits  Abdomen: soft, non-tender, non-distended, normal bowel sounds, no masses or organomegaly  Extremities: no cyanosis, clubbing or edema  Musculoskeletal: normal range of motion, no joint swelling, deformity or tenderness  Integumentary: No rashes, no abnormal skin lesions, no petechiae  Neurologic: reflexes normal and symmetric, no cranial nerve deficit  Psych:  Orientation, sensorium, mood normal   Lines: IV  Rt leg on going swelling + pain + redness extending up to the MId leg  Rt TKA not tender  Rt ankle lateral and medial surgical incision noted ++     DATA:    CBC:   Lab Results   Component Value Date    WBC 16.0 (H) 12/20/2022    HGB 12.3 12/20/2022    HCT 39.3 12/20/2022    MCV 91.6 12/20/2022     12/20/2022     RENAL:   Lab Results   Component Value Date    CREATININE 1.4 (H) 12/20/2022    BUN 31 (H) 12/20/2022     12/20/2022    K 3.8 12/20/2022     12/20/2022    CO2 26 12/20/2022     SED RATE:   Lab Results   Component Value Date/Time    SEDRATE 41 12/20/2022 04:00 AM     CK: No results found for: CKTOTAL  CRP:   Lab Results   Component Value Date/Time    CRP 14.1 12/20/2022 04:00 AM     Hepatic Function Panel:   Lab Results   Component Value Date/Time    ALKPHOS 85 12/20/2022 04:00 AM    ALT 12 12/20/2022 04:00 AM    AST 16 12/20/2022 04:00 AM    PROT 6.5 12/20/2022 04:00 AM    BILITOT 0.7 12/20/2022 04:00 AM    BILIDIR 1.0 09/13/2022 05:56 PM    IBILI 0.9 09/13/2022 05:56 PM    LABALBU 3.3 12/20/2022 04:00 AM     UA:  Lab Results   Component Value Date/Time    COLORU Yellow 09/14/2022 06:27 AM    CLARITYU Clear 09/14/2022 06:27 AM    GLUCOSEU Negative 09/14/2022 06:27 AM    BILIRUBINUR Negative 09/14/2022 06:27 AM    BILIRUBINUR large 07/23/2018 03:23 PM    KETUA Negative 09/14/2022 06:27 AM    SPECGRAV 1.010 09/14/2022 06:27 AM    BLOODU Negative 09/14/2022 06:27 AM    PHUR 5.0 09/14/2022 06:27 AM    PROTEINU Negative 09/14/2022 06:27 AM    UROBILINOGEN 1.0 09/14/2022 06:27 AM    NITRU Negative 09/14/2022 06:27 AM    LEUKOCYTESUR Negative 09/14/2022 06:27 AM    LABMICR Not Indicated 09/14/2022 06:27 AM    URINETYPE NotGiven 09/14/2022 06:27 AM      Urine Microscopic:   Lab Results   Component Value Date/Time    BACTERIA RARE 01/07/2019 12:38 AM    COMU see below 01/07/2019 12:38 AM    HYALCAST 2 01/07/2019 12:38 AM    WBCUA 37 01/07/2019 12:38 AM    RBCUA 3-5 01/07/2019 12:38 AM    EPIU 1 01/07/2019 12:38 AM     Urine Reflex to Culture:   Lab Results   Component Value Date/Time    URRFLXCULT Yes 01/07/2019 12:38 AM     Procal 1.45     Creat   1.4     WBC  16   MICRO: cultures reviewed and updated by me        Micro results (current encounter only)    Procedure Component Value Units Date/Time   Clostridium difficile toxin/antigen [[id number]]    Order Status: Canceled Specimen: Stool    COVID-19, Rapid [[id number]] Collected: 12/20/22 0436   Order Status: Completed Specimen: Nasopharyngeal Swab Updated: 12/20/22 0506    SARS-CoV-2, NAAT Not Detected    Comment: Rapid NAAT:   Negative results should be treated as presumptive and,   if inconsistent with clinical signs and symptoms or necessary for   patient management, should be tested with an alternative molecular   assay. Negative results do not preclude SARS-CoV-2 infection and   should not be used as the sole basis for patient management decisions. This test has been authorized by the FDA under an Emergency Use   Authorization (EUA) for use by authorized laboratories.      Fact sheet for Healthcare Providers:   http://www.barbara.rayrayz/   Fact sheet for Patients: http://www.barbara.lacy/     METHODOLOGY: Isothermal Nucleic Acid Amplification       Blood Culture 1 [[id number]]    Order Status: Sent Specimen: Blood    Culture, Blood 2 [[id number]]    Order Status: Sent Specimen: Blood        Blood Culture:   Lab Results   Component Value Date/Time    BC No Growth after 4 days of incubation. 09/13/2022 05:56 PM    BLOODCULT2 No Growth after 4 days of incubation. 09/13/2022 07:32 PM       Viral Culture:    Lab Results   Component Value Date/Time    COVID19 Not Detected 12/20/2022 04:36 AM    COVID19 Not Detected 09/13/2022 05:56 PM    COVID19 NOT DETECTED 10/01/2020 11:01 AM     Urine Culture: No results for input(s): LABURIN in the last 72 hours. Scheduled Meds:   sodium chloride flush  10 mL IntraVENous 2 times per day    apixaban  5 mg Oral BID    aspirin  81 mg Oral Daily    buPROPion  200 mg Oral BID    pantoprazole  40 mg Oral BID AC    cefepime  2,000 mg IntraVENous Q12H    [START ON 12/21/2022] vancomycin  1,250 mg IntraVENous Q24H    ipratropium-albuterol  1 ampule Inhalation Q4H WA    predniSONE  40 mg Oral Daily       Continuous Infusions:   sodium chloride      [Held by provider] furosemide      amiodarone         PRN Meds:  sodium chloride flush, sodium chloride, potassium chloride, magnesium sulfate, promethazine **OR** ondansetron, acetaminophen **OR** acetaminophen, oxyCODONE-acetaminophen    Imaging:   XR CHEST PORTABLE   Final Result   Cardiac silhouette and central vasculature are prominent suggesting a mild   CHF with vascular congestion and interstitial edema. Decrease of the more   localized opacities on the old exam.      However the general malgorzata are prominent and underlying adenopathy is not   excluded by chest x-ray. Previous CT had enlarged pulmonary arteries rather   than overt adenopathy.          XR ANKLE RIGHT (MIN 3 VIEWS)   Final Result   Stable appearance of the medial malleolar pins and the intramedullary nail at   the distal fibula with the proximal portion of the intramedullary nail   outside bone. Fusion of the distal interosseous membrane bridging the tibia and fibula with   old healed bony deformities. Progressive loss of the tibiotalar joint and   subtalar joint with sclerosis of the margins. Edema of the soft tissues with scattered soft tissue calcifications. No soft   tissue emphysema or area of focal bony erosion. VL Extremity Venous Right    (Results Pending)       All pertinent images and reports for the current Hospitalization were reviewed by me.     IMPRESSION:    Patient Active Problem List   Diagnosis    Atrial fibrillation and flutter (HCC)    Essential hypertension    Severe episode of recurrent major depressive disorder, without psychotic features (HCC)    Seasonal affective disorder (HCC)    Class 3 obesity due to excess calories with serious comorbidity and body mass index (BMI) of 45.0 to 49.9 in adult    Elevated sed rate    Leukocytosis    Elevated C-reactive protein (CRP)    Obstructive sleep apnea    Morbid obesity (HCC)    Weight loss counseling, encounter for    Atypical atrial flutter (HCC)    PAF ([facility]oxysmal atrial fibrillation) [facility]    ILD (interstitial lung disease) ([facility]    Iron deficiency anemia due to chronic blood loss    Iron deficiency anemia, unspecified    Age-related osteoporosis without current pathological fracture    Vasovagal syncope    CKD (chronic kidney disease) stage 4, GFR 15-29 ml/min (HCC)    Chronic diastolic congestive heart failure (HCC)    Stage 3b chronic kidney disease (HCC)    B12 deficiency    Vitamin D deficiency    Acute respiratory failure (HCC)    Chronic obstructive pulmonary disease, unspecified    Sepsis (HCC)       Sepsis  Rt leg severe cellulitis  Rt leg ankle surgery > 40 yrs ago   Hardware in place  Rt TKA history   Morbid obesity BMI at  36   Fevers and chills   CKD stage 3A  Procal elevation   BNP elevation   WBC elevation   ESR 41  X ray

## 2022-12-20 NOTE — RT PROTOCOL NOTE
RT Inhaler-Nebulizer Bronchodilator Protocol Note    There is a bronchodilator order in the chart from a provider indicating to follow the RT Bronchodilator Protocol and there is an Initiate RT Inhaler-Nebulizer Bronchodilator Protocol order as well (see protocol at bottom of note). CXR Findings:  XR CHEST PORTABLE    Result Date: 12/20/2022  Cardiac silhouette and central vasculature are prominent suggesting a mild CHF with vascular congestion and interstitial edema. Decrease of the more localized opacities on the old exam. However the general malgorzata are prominent and underlying adenopathy is not excluded by chest x-ray. Previous CT had enlarged pulmonary arteries rather than overt adenopathy. The findings from the last RT Protocol Assessment were as follows:   History Pulmonary Disease: None or smoker <15 pack years  Respiratory Pattern: Dyspnea on exertion or RR 21-25 bpm  Breath Sounds: Intermittent or unilateral wheezes  Cough: Weak, non-productive  Indication for Bronchodilator Therapy:    Bronchodilator Assessment Score: 9    Aerosolized bronchodilator medication orders have been revised according to the RT Inhaler-Nebulizer Bronchodilator Protocol below. Respiratory Therapist to perform RT Therapy Protocol Assessment initially then follow the protocol. Repeat RT Therapy Protocol Assessment PRN for score 0-3 or on second treatment, BID, and PRN for scores above 3. No Indications - adjust the frequency to every 6 hours PRN wheezing or bronchospasm, if no treatments needed after 48 hours then discontinue using Per Protocol order mode. If indication present, adjust the RT bronchodilator orders based on the Bronchodilator Assessment Score as indicated below.   Use Inhaler orders unless patient has one or more of the following: on home nebulizer, not able to hold breath for 10 seconds, is not alert and oriented, cannot activate and use MDI correctly, or respiratory rate 25 breaths per minute or more, then use the equivalent nebulizer order(s) with same Frequency and PRN reasons based on the score. If a patient is on this medication at home then do not decrease Frequency below that used at home. 0-3 - enter or revise RT bronchodilator order(s) to equivalent RT Bronchodilator order with Frequency of every 4 hours PRN for wheezing or increased work of breathing using Per Protocol order mode. 4-6 - enter or revise RT Bronchodilator order(s) to two equivalent RT bronchodilator orders with one order with BID Frequency and one order with Frequency of every 4 hours PRN wheezing or increased work of breathing using Per Protocol order mode. 7-10 - enter or revise RT Bronchodilator order(s) to two equivalent RT bronchodilator orders with one order with TID Frequency and one order with Frequency of every 4 hours PRN wheezing or increased work of breathing using Per Protocol order mode. 11-13 - enter or revise RT Bronchodilator order(s) to one equivalent RT bronchodilator order with QID Frequency and an Albuterol order with Frequency of every 4 hours PRN wheezing or increased work of breathing using Per Protocol order mode. Greater than 13 - enter or revise RT Bronchodilator order(s) to one equivalent RT bronchodilator order with every 4 hours Frequency and an Albuterol order with Frequency of every 2 hours PRN wheezing or increased work of breathing using Per Protocol order mode. RT to enter RT Home Evaluation for COPD & MDI Assessment order using Per Protocol order mode.     Electronically signed by Rodrigo Maxwell RCP on 12/20/2022 at 12:44 PM

## 2022-12-20 NOTE — LETTER
[facility]  EP Procedure Sheet    12/21/22  Denisa Mall  1945  EP Procedures  [] Pacemaker implant (single/dual) [] EP Study   [] ICD implant (single/dual) [] Atrial flutter ablation (BIBI Y/N)   [] Biv implant ICD [] Tilt Table   [] Biv implant PPM [] Atrial fibrillation ablation (BIBI Yes)   [] Generator Change (PPM/ICD/BiV) [] SVT ablation   [] Lead revision (RV/LA/RA) (<1 month) [] PVC ablation     [] Lead extraction +/- upgrade (BiV/PPM/ICD) [] VT Ischemic/ non-ischemic   [] Loop implant/ removal [] VT RVOT   [x] Cardioversion [] VT Left sided   [] BIBI [] AVN ablation   Equipment  [] [employer]unno  [] [employer] Mapping System   [] [employer] [] [address]   [] [employer] [] CryoAblation   [] Biotronik [] Laser Lead Extraction   EP Procedures Scheduling Request  # hours Requested  []1 []2 []2-4 [] 4-6 Scheduled  Date:   Specific Day 4-6 weeks Completed    Anesthesia []yes []no F/u Date:   CT surgery backup []yes []no COVID     Overnight stay      Performing MD  [x]RMM []MXA   []MKW [] CMV First vs repeat   []1st [] 2nd [] 3rd   Pre-Procedure Labs / Imaging  [] PT/INR [] Type & cross   [] CBC [] Units PRBC   [] BMP/Mg [] Units FFP   [] Venogram [] Cardiac CTA for Pulmonary vein mapping     RN INITIALS: SR    Patient Instructions  Do not eat or drink after midnight the night prior to procedure  Dx:Persistent AF  ICD-10 code: I49

## 2022-12-21 ENCOUNTER — APPOINTMENT (OUTPATIENT)
Dept: CT IMAGING | Age: 77
DRG: 872 | End: 2022-12-21
Payer: COMMERCIAL

## 2022-12-21 PROBLEM — N18.31 STAGE 3A CHRONIC KIDNEY DISEASE (HCC): Status: ACTIVE | Noted: 2022-12-21

## 2022-12-21 PROBLEM — R50.9 FEVER AND CHILLS: Status: ACTIVE | Noted: 2022-12-21

## 2022-12-21 PROBLEM — Z98.890 HISTORY OF ANKLE SURGERY: Status: ACTIVE | Noted: 2022-12-21

## 2022-12-21 PROBLEM — E66.9 OBESITY (BMI 30-39.9): Status: ACTIVE | Noted: 2022-12-21

## 2022-12-21 PROBLEM — L03.115 CELLULITIS OF RIGHT LEG: Status: ACTIVE | Noted: 2022-12-21

## 2022-12-21 PROBLEM — R78.81 BACTEREMIA: Status: ACTIVE | Noted: 2022-12-21

## 2022-12-21 LAB
A/G RATIO: 0.8 (ref 1.1–2.2)
ALBUMIN SERPL-MCNC: 2.4 G/DL (ref 3.4–5)
ALP BLD-CCNC: 70 U/L (ref 40–129)
ALT SERPL-CCNC: 7 U/L (ref 10–40)
ANION GAP SERPL CALCULATED.3IONS-SCNC: 7 MMOL/L (ref 3–16)
AST SERPL-CCNC: 11 U/L (ref 15–37)
BACTERIA: ABNORMAL /HPF
BASOPHILS ABSOLUTE: 0 K/UL (ref 0–0.2)
BASOPHILS RELATIVE PERCENT: 0.2 %
BILIRUB SERPL-MCNC: 0.5 MG/DL (ref 0–1)
BILIRUBIN URINE: NEGATIVE
BLOOD, URINE: ABNORMAL
BUN BLDV-MCNC: 31 MG/DL (ref 7–20)
C DIFF TOXIN/ANTIGEN: NORMAL
CALCIUM SERPL-MCNC: 7.8 MG/DL (ref 8.3–10.6)
CHLORIDE BLD-SCNC: 109 MMOL/L (ref 99–110)
CLARITY: ABNORMAL
CO2: 23 MMOL/L (ref 21–32)
COLOR: YELLOW
CREAT SERPL-MCNC: 1.4 MG/DL (ref 0.6–1.2)
EOSINOPHILS ABSOLUTE: 0.1 K/UL (ref 0–0.6)
EOSINOPHILS RELATIVE PERCENT: 0.3 %
EPITHELIAL CELLS, UA: 0 /HPF (ref 0–5)
GFR SERPL CREATININE-BSD FRML MDRD: 39 ML/MIN/{1.73_M2}
GLUCOSE BLD-MCNC: 103 MG/DL (ref 70–99)
GLUCOSE URINE: NEGATIVE MG/DL
HCT VFR BLD CALC: 33 % (ref 36–48)
HEMOGLOBIN: 10.5 G/DL (ref 12–16)
HYALINE CASTS: 0 /LPF (ref 0–8)
KETONES, URINE: NEGATIVE MG/DL
LEUKOCYTE ESTERASE, URINE: NEGATIVE
LYMPHOCYTES ABSOLUTE: 1 K/UL (ref 1–5.1)
LYMPHOCYTES RELATIVE PERCENT: 5.6 %
MCH RBC QN AUTO: 29.5 PG (ref 26–34)
MCHC RBC AUTO-ENTMCNC: 31.7 G/DL (ref 31–36)
MCV RBC AUTO: 92.9 FL (ref 80–100)
MICROSCOPIC EXAMINATION: YES
MONOCYTES ABSOLUTE: 1.1 K/UL (ref 0–1.3)
MONOCYTES RELATIVE PERCENT: 6.5 %
NEUTROPHILS ABSOLUTE: 15 K/UL (ref 1.7–7.7)
NEUTROPHILS RELATIVE PERCENT: 87.4 %
NITRITE, URINE: NEGATIVE
PDW BLD-RTO: 17.7 % (ref 12.4–15.4)
PH UA: 5 (ref 5–8)
PLATELET # BLD: 143 K/UL (ref 135–450)
PMV BLD AUTO: 10.7 FL (ref 5–10.5)
POTASSIUM REFLEX MAGNESIUM: 4.2 MMOL/L (ref 3.5–5.1)
PROTEIN UA: 30 MG/DL
RAPID INFLUENZA  B AGN: NEGATIVE
RAPID INFLUENZA A AGN: NEGATIVE
RBC # BLD: 3.56 M/UL (ref 4–5.2)
RBC UA: 2 /HPF (ref 0–4)
SODIUM BLD-SCNC: 139 MMOL/L (ref 136–145)
SPECIFIC GRAVITY UA: 1.02 (ref 1–1.03)
TOTAL PROTEIN: 5.3 G/DL (ref 6.4–8.2)
URINE REFLEX TO CULTURE: ABNORMAL
URINE TYPE: ABNORMAL
UROBILINOGEN, URINE: 0.2 E.U./DL
VANCOMYCIN RANDOM: 7.7 UG/ML
WBC # BLD: 17.2 K/UL (ref 4–11)
WBC UA: 0 /HPF (ref 0–5)

## 2022-12-21 PROCEDURE — 85025 COMPLETE CBC W/AUTO DIFF WBC: CPT

## 2022-12-21 PROCEDURE — 2580000003 HC RX 258: Performed by: INTERNAL MEDICINE

## 2022-12-21 PROCEDURE — 6360000002 HC RX W HCPCS: Performed by: INTERNAL MEDICINE

## 2022-12-21 PROCEDURE — 80053 COMPREHEN METABOLIC PANEL: CPT

## 2022-12-21 PROCEDURE — 73700 CT LOWER EXTREMITY W/O DYE: CPT

## 2022-12-21 PROCEDURE — 99233 SBSQ HOSP IP/OBS HIGH 50: CPT | Performed by: NURSE PRACTITIONER

## 2022-12-21 PROCEDURE — 99233 SBSQ HOSP IP/OBS HIGH 50: CPT | Performed by: INTERNAL MEDICINE

## 2022-12-21 PROCEDURE — 87040 BLOOD CULTURE FOR BACTERIA: CPT

## 2022-12-21 PROCEDURE — 93308 TTE F-UP OR LMTD: CPT

## 2022-12-21 PROCEDURE — 6370000000 HC RX 637 (ALT 250 FOR IP): Performed by: NURSE PRACTITIONER

## 2022-12-21 PROCEDURE — 87804 INFLUENZA ASSAY W/OPTIC: CPT

## 2022-12-21 PROCEDURE — 2060000000 HC ICU INTERMEDIATE R&B

## 2022-12-21 PROCEDURE — 36415 COLL VENOUS BLD VENIPUNCTURE: CPT

## 2022-12-21 PROCEDURE — 81001 URINALYSIS AUTO W/SCOPE: CPT

## 2022-12-21 PROCEDURE — 94640 AIRWAY INHALATION TREATMENT: CPT

## 2022-12-21 PROCEDURE — 87324 CLOSTRIDIUM AG IA: CPT

## 2022-12-21 PROCEDURE — 6370000000 HC RX 637 (ALT 250 FOR IP): Performed by: INTERNAL MEDICINE

## 2022-12-21 PROCEDURE — 94761 N-INVAS EAR/PLS OXIMETRY MLT: CPT

## 2022-12-21 PROCEDURE — 80202 ASSAY OF VANCOMYCIN: CPT

## 2022-12-21 PROCEDURE — 2700000000 HC OXYGEN THERAPY PER DAY

## 2022-12-21 PROCEDURE — 87449 NOS EACH ORGANISM AG IA: CPT

## 2022-12-21 RX ORDER — AMIODARONE HYDROCHLORIDE 200 MG/1
200 TABLET ORAL 2 TIMES DAILY
Status: DISCONTINUED | OUTPATIENT
Start: 2022-12-21 | End: 2022-12-24 | Stop reason: HOSPADM

## 2022-12-21 RX ORDER — IPRATROPIUM BROMIDE AND ALBUTEROL SULFATE 2.5; .5 MG/3ML; MG/3ML
1 SOLUTION RESPIRATORY (INHALATION) EVERY 4 HOURS PRN
Status: DISCONTINUED | OUTPATIENT
Start: 2022-12-21 | End: 2022-12-24 | Stop reason: HOSPADM

## 2022-12-21 RX ADMIN — AMIODARONE HYDROCHLORIDE 0.5 MG/MIN: 50 INJECTION, SOLUTION INTRAVENOUS at 11:00

## 2022-12-21 RX ADMIN — ASPIRIN 81 MG 81 MG: 81 TABLET ORAL at 07:43

## 2022-12-21 RX ADMIN — IPRATROPIUM BROMIDE AND ALBUTEROL SULFATE 1 AMPULE: .5; 3 SOLUTION RESPIRATORY (INHALATION) at 16:12

## 2022-12-21 RX ADMIN — DAPTOMYCIN 500 MG: 500 INJECTION, POWDER, LYOPHILIZED, FOR SOLUTION INTRAVENOUS at 16:38

## 2022-12-21 RX ADMIN — AMIODARONE HYDROCHLORIDE 200 MG: 200 TABLET ORAL at 21:46

## 2022-12-21 RX ADMIN — BUPROPION HYDROCHLORIDE 200 MG: 100 TABLET, FILM COATED, EXTENDED RELEASE ORAL at 21:46

## 2022-12-21 RX ADMIN — BUPROPION HYDROCHLORIDE 200 MG: 100 TABLET, FILM COATED, EXTENDED RELEASE ORAL at 07:43

## 2022-12-21 RX ADMIN — CEFAZOLIN 2000 MG: 2 INJECTION, POWDER, FOR SOLUTION INTRAMUSCULAR; INTRAVENOUS at 14:53

## 2022-12-21 RX ADMIN — CEFEPIME 2000 MG: 2 INJECTION, POWDER, FOR SOLUTION INTRAVENOUS at 07:51

## 2022-12-21 RX ADMIN — AMIODARONE HYDROCHLORIDE 200 MG: 200 TABLET ORAL at 14:49

## 2022-12-21 RX ADMIN — ACETAMINOPHEN 650 MG: 325 TABLET ORAL at 21:53

## 2022-12-21 RX ADMIN — CEFAZOLIN 2000 MG: 2 INJECTION, POWDER, FOR SOLUTION INTRAMUSCULAR; INTRAVENOUS at 21:50

## 2022-12-21 RX ADMIN — IPRATROPIUM BROMIDE AND ALBUTEROL SULFATE 1 AMPULE: .5; 3 SOLUTION RESPIRATORY (INHALATION) at 05:32

## 2022-12-21 RX ADMIN — Medication 10 ML: at 07:44

## 2022-12-21 RX ADMIN — METOPROLOL TARTRATE 25 MG: 25 TABLET, FILM COATED ORAL at 07:43

## 2022-12-21 RX ADMIN — APIXABAN 5 MG: 5 TABLET, FILM COATED ORAL at 21:46

## 2022-12-21 RX ADMIN — PANTOPRAZOLE SODIUM 40 MG: 40 TABLET, DELAYED RELEASE ORAL at 07:43

## 2022-12-21 RX ADMIN — IPRATROPIUM BROMIDE AND ALBUTEROL SULFATE 1 AMPULE: .5; 3 SOLUTION RESPIRATORY (INHALATION) at 23:42

## 2022-12-21 RX ADMIN — APIXABAN 5 MG: 5 TABLET, FILM COATED ORAL at 07:44

## 2022-12-21 RX ADMIN — Medication 10 ML: at 21:46

## 2022-12-21 RX ADMIN — PANTOPRAZOLE SODIUM 40 MG: 40 TABLET, DELAYED RELEASE ORAL at 14:49

## 2022-12-21 RX ADMIN — METOPROLOL TARTRATE 25 MG: 25 TABLET, FILM COATED ORAL at 21:46

## 2022-12-21 RX ADMIN — IPRATROPIUM BROMIDE AND ALBUTEROL SULFATE 1 AMPULE: .5; 3 SOLUTION RESPIRATORY (INHALATION) at 00:10

## 2022-12-21 ASSESSMENT — PAIN SCALES - GENERAL
PAINLEVEL_OUTOF10: 0
PAINLEVEL_OUTOF10: 3
PAINLEVEL_OUTOF10: 2
PAINLEVEL_OUTOF10: 1
PAINLEVEL_OUTOF10: 0
PAINLEVEL_OUTOF10: 1

## 2022-12-21 ASSESSMENT — PAIN DESCRIPTION - ORIENTATION
ORIENTATION: RIGHT

## 2022-12-21 ASSESSMENT — PAIN - FUNCTIONAL ASSESSMENT
PAIN_FUNCTIONAL_ASSESSMENT: ACTIVITIES ARE NOT PREVENTED
PAIN_FUNCTIONAL_ASSESSMENT: ACTIVITIES ARE NOT PREVENTED

## 2022-12-21 ASSESSMENT — PAIN DESCRIPTION - LOCATION
LOCATION: LEG

## 2022-12-21 ASSESSMENT — PAIN DESCRIPTION - DESCRIPTORS
DESCRIPTORS: ACHING
DESCRIPTORS: ACHING

## 2022-12-21 ASSESSMENT — PAIN DESCRIPTION - ONSET: ONSET: ON-GOING

## 2022-12-21 ASSESSMENT — PAIN DESCRIPTION - PAIN TYPE
TYPE: ACUTE PAIN
TYPE: ACUTE PAIN

## 2022-12-21 ASSESSMENT — PAIN DESCRIPTION - FREQUENCY: FREQUENCY: CONTINUOUS

## 2022-12-21 NOTE — PROGRESS NOTES
Infectious Diseases Inpatient Progress Note      CHIEF COMPLAINT:     Sepsis  Fevers  WBC elevation   Rt leg cellulitis  Bacteremia         HISTORY OF PRESENT ILLNESS:  78 y.o. woman with a past medical history of atrial fibrillation, hypertension, sleep apnea, arthritis, right leg ankle surgery many years ago with hardware in place admitted to the hospital secondary to acute onset of fever chills and right leg swelling and redness. Patient was seen in primary physician's office was evaluated by nurse practitioner, she had influenza antibody test that was positive for A ANTIBODY - (NOT ANTIGEN TESTING) was given a diagnosis of Flu- . He is now admitted because of worsening right leg cellulitis ongoing fevers and chills. Given the complicated history in the right leg with hardware in place we are consulted for recommendations  Location :   Right leg pain and swelling  Quality : Aching     Severity : 10/10  Duration : 5 days  Timing : Constant  Context : Remote history of right ankle surgery with hardware  Modifying factors : None  Associated signs and symptoms: Fever, chills, right leg swelling, pain, redness    Interval History : Fevers noted ongoing right leg pain and cellulitis. Blood cultures isolated.  Group   G strep-tolerating antibiotic therapy okay WBC remains elevated    Past Medical History:    Past Medical History:   Diagnosis Date    Arthritis     Atrial fibrillation and flutter ([address]     Hypertension     Kidney disease     Pneumonia     Sleep apnea     no c-pap       Past Surgical History:    Past Surgical History:   Procedure Laterality Date    CARDIOVERSION      COLONOSCOPY N/A 11/20/2018    COLONOSCOPY POLYPECTOMY SNARE/COLD BIOPSY performed by Lorenzo Marion MD at [facility] N/A 3/31/2022    COLONOSCOPY POLYPECTOMY SNARE/COLD BIOPSY performed by Danica Barraza MD at [address] [place] [address] rt has pins and rods, and rt elbow    GASTRIC BYPASS SURGERY      LARYNX SURGERY      TOTAL KNEE ARTHROPLASTY Bilateral 12/19/2012    TUBAL LIGATION      tubes tied    UPPER GASTROINTESTINAL ENDOSCOPY N/A 11/20/2018    EGD BIOPSY performed by Flori Arias MD at [facility] N/A 3/31/2022    EGD DILATION BALLOON performed by Laisha Olivia MD at [facility] N/A 3/31/2022    EGD BIOPSY performed by Laisha Olivia MD at [address] [place] [address]       Current Medications:    No outpatient medications have been marked as taking for the 12/20/22 encounter [facility] Encounter).        Allergies:  Bee venom, Shellfish-derived products, Shrimp flavor, Codeine, Fentanyl and related, Hydromorphone, and Vancomycin    Immunizations :   Immunization History   Administered Date(s) Administered    COVID-19, PFIZER Bivalent BOOSTER, DO NOT Dilute, (age 12y+), IM, 30 mcg/0.3 mL 09/09/2022    COVID-19, PFIZER GRAY top, DO NOT Dilute, (age 15 y+), IM, 30 mcg/0.3 mL 05/20/2022    COVID-19, PFIZER PURPLE top, DILUTE for use, (age 15 y+), 30mcg/0.3mL 09/08/2021, 10/15/2021    Influenza Virus Vaccine 12/22/2012    Influenza, FLUAD, (age 72 y+), Adjuvanted, 0.5mL 10/27/2020, 10/15/2021, 09/09/2022    Influenza, High Dose (Fluzone 65 yrs and older) 09/30/2016, 10/01/2017, 10/08/2018    Influenza, Triv, inactivated, subunit, adjuvanted, IM (Fluad 65 yrs and older) 10/21/2019    Pneumococcal Conjugate 13-valent (Balinda Ravin) 09/30/2016, 10/01/2017    Pneumococcal Polysaccharide ([id number]) 12/22/2012, 10/06/2017    Td vaccine (adult) 12/01/2008    Zoster Live (Zostavax) 09/15/2015         Social History:     Social History     Tobacco Use    Smoking status: Never    Smokeless tobacco: Never   Vaping Use    Vaping Use: Never used   Substance Use Topics    Alcohol use: No    Drug use: No     Social History     Tobacco Use   Smoking Status Never   Smokeless Tobacco Never      Family History   Problem Relation Age of Onset    Cancer Father         stomach cancer          REVIEW OF SYSTEMS:      Constitutional: r fevers++ , chills, night sweats  Eyes:  negative for blurred vision, eye discharge, visual disturbance   HEENT:  negative for hearing loss, ear drainage,nasal congestion  Respiratory:  negative for cough, shortness of breath or hemoptysis   Cardiovascular:  negative for chest pain, palpitations, syncope  Gastrointestinal:  negative for nausea, vomiting, diarrhea, constipation, abdominal pain  Genitourinary:  negative for frequency, dysuria, urinary incontinence, hematuria  Hematologic/Lymphatic:  negative for easy bruising, bleeding and lymphadenopathy  Allergic/Immunologic:  negative for recurrent infections, angioedema, anaphylaxis   Endocrine:  negative for weight changes, polyuria, polydipsia and polyphagia  Musculoskeletal:  Rt leg pain + swelling  ++  pain, swelling, decreased range of motion  Integumentary: No rashes, skin lesions  Neurological:  negative for headaches, slurred speech, unilateral weakness  Psychiatric: negative for hallucinations,confusion,agitation.      PHYSICAL EXAM:      Vitals:  t MAX  101.8   /71   Pulse 82   Temp 97.9 °F (36.6 °C) (Oral)   Resp 22   Wt 235 lb (106.6 kg)   SpO2 93%   BMI 36.81 kg/m²     General Appearance: alert,in some acute distress, ++ pallor, no icterus   Skin: warm and dry, no rash or erythema  Head: normocephalic and atraumatic  Eyes: pupils equal, round, and reactive to light, conjunctivae normal  ENT: tympanic membrane, external ear and ear canal normal bilaterally, nose without deformity, nasal mucosa and turbinates normal without polyps  Neck: supple and non-tender without mass, no thyromegaly  no cervical lymphadenopathy  Pulmonary/Chest: clear to auscultation bilaterally- no wheezes, rales or rhonchi, normal air movement, no respiratory distress  Cardiovascular: normal rate, regular rhythm, normal S1 and S2, no murmurs, rubs, clicks, or gallops, no carotid bruits  Abdomen: soft, non-tender, non-distended, normal bowel sounds, no masses or organomegaly  Extremities: no cyanosis, clubbing or edema  Musculoskeletal: normal range of motion, no joint swelling, deformity or tenderness  Integumentary: No rashes, no abnormal skin lesions, no petechiae  Neurologic: reflexes normal and symmetric, no cranial nerve deficit  Psych:  Orientation, sensorium, mood normal   Lines: IV  Rt leg on going swelling + pain + redness extending up to the MId leg  Rt TKA not tender  Rt ankle lateral and medial surgical incision noted ++     DATA:    CBC:   Lab Results   Component Value Date    WBC 17.2 (H) 12/21/2022    HGB 10.5 (L) 12/21/2022    HCT 33.0 (L) 12/21/2022    MCV 92.9 12/21/2022     12/21/2022     RENAL:   Lab Results   Component Value Date    CREATININE 1.4 (H) 12/21/2022    BUN 31 (H) 12/21/2022     12/21/2022    K 4.2 12/21/2022     12/21/2022    CO2 23 12/21/2022     SED RATE:   Lab Results   Component Value Date/Time    SEDRATE 41 12/20/2022 04:00 AM     CK: No results found for: CKTOTAL  CRP:   Lab Results   Component Value Date/Time    CRP 14.1 12/20/2022 04:00 AM     Hepatic Function Panel:   Lab Results   Component Value Date/Time    ALKPHOS 70 12/21/2022 05:38 AM    ALT 7 12/21/2022 05:38 AM    AST 11 12/21/2022 05:38 AM    PROT 5.3 12/21/2022 05:38 AM    BILITOT 0.5 12/21/2022 05:38 AM    BILIDIR 1.0 09/13/2022 05:56 PM    IBILI 0.9 09/13/2022 05:56 PM    LABALBU 2.4 12/21/2022 05:38 AM     UA:  Lab Results   Component Value Date/Time    COLORU Yellow 09/14/2022 06:27 AM    CLARITYU Clear 09/14/2022 06:27 AM    GLUCOSEU Negative 09/14/2022 06:27 AM    BILIRUBINUR Negative 09/14/2022 06:27 AM    BILIRUBINUR large 07/23/2018 03:23 PM    KETUA Negative 09/14/2022 06:27 AM    SPECGRAV 1.010 09/14/2022 06:27 AM    BLOODU Negative 09/14/2022 06:27 AM    PHUR 5.0 09/14/2022 06:27 AM    PROTEINU Negative 09/14/2022 06:27 AM    UROBILINOGEN 1.0 09/14/2022 06:27 AM    NITRU Negative 09/14/2022 06:27 AM    LEUKOCYTESUR Negative 09/14/2022 06:27 AM    LABMICR Not Indicated 09/14/2022 06:27 AM    URINETYPE NotGiven 09/14/2022 06:27 AM      Urine Microscopic:   Lab Results   Component Value Date/Time    BACTERIA RARE 01/07/2019 12:38 AM    COMU see below 01/07/2019 12:38 AM    HYALCAST 2 01/07/2019 12:38 AM    WBCUA 37 01/07/2019 12:38 AM    RBCUA 3-5 01/07/2019 12:38 AM    EPIU 1 01/07/2019 12:38 AM     Urine Reflex to Culture:   Lab Results   Component Value Date/Time    URRFLXCULT Yes 01/07/2019 12:38 AM     Procal 1.45     Creat   1.4     WBC  16   MICRO: cultures reviewed and updated by me        Micro results (current encounter only)    Procedure Component Value Units Date/Time   Clostridium difficile toxin/antigen [[id number]] Collected: 12/21/22 1115   Order Status: Completed Specimen: Stool Updated: 12/21/22 1432    C.diff Toxin/Antigen --    Negative for Clostridium difficile antigen and toxin   Normal Range: Negative    Narrative:     ORDER#: [id number]                          ORDERED BY: Lalo Padgett   SOURCE: Stool                              COLLECTED:  12/21/22 11:15   ANTIBIOTICS AT MANDY.:                      RECEIVED :  12/21/22 11:55   Collect White vial (sterile container)   Culture, Blood 2 [[id number]] (Abnormal) Collected: 12/20/22 0418   Order Status: Completed Specimen: Blood Updated: 12/21/22 1011    Culture, Blood 2 -- Abnormal     Gram stain Anaerobic bottle:   Gram positive cocci in chains and/or pairs   resembling Streptococcus   Information to follow   Gram stain Aerobic bottle:   Gram positive cocci in chains and/or pairs   resembling Streptococcus   Information to follow    Abnormal     Organism Beta Strep Group G Abnormal     Culture, Blood 2 --    POSITIVE for   Susceptibility testing of penicillin and other beta lactams is   not necessary for beta hemolytic Streptococci since resistant   strains have not been identified. (CLSI M100)    Narrative:     ORDER#: [id number]                          ORDERED BY: KIAN POSEY   SOURCE: Blood Antecubital-Lef              COLLECTED:  12/20/22 04:18   ANTIBIOTICS AT MANDY.:                      RECEIVED :  12/20/22 14:38   If child <=2 yrs old please draw pediatric bottle. ~Blood Culture #2   Blood Culture 1 [[id number]] (Abnormal) Collected: 12/20/22 0400   Order Status: Completed Specimen: Blood Updated: 12/21/22 1010    Blood Culture, Routine -- Abnormal     Gram stain Anaerobic bottle:   Gram positive cocci in chains and/or pairs   resembling Streptococcus   Information to follow   Gram stain Aerobic bottle:   Gram positive cocci in chains and/or pairs   resembling Streptococcus   Information to follow    Abnormal     Organism Streptococcus species DNA Detected Abnormal     Blood Culture, Routine See additional report for complete BCID panel. Organism Beta Strep Group G Abnormal     Blood Culture, Routine --    POSITIVE for   Susceptibility testing of penicillin and other beta lactams is   not necessary for beta hemolytic Streptococci since resistant   strains have not been identified. (CLSI M100)    Narrative:     ORDER#: [id number]                          ORDERED BY: KIAN POSEY   SOURCE: Blood Antecubital-Lef              COLLECTED:  12/20/22 04:00   ANTIBIOTICS AT MANDY.:                      RECEIVED :  12/20/22 14:38   CALL  Luciano  [facility] tel. [phone],   Microbiology results called to and read back by LILY Lemus,   12/20/2022 18:21, by Yamel Wakefield   If child <=2 yrs old please draw pediatric bottle. ~Blood Culture 1   Culture, Blood 1 [[id number]] Collected: 12/21/22 0538   Order Status: Sent Specimen: Blood Updated: 12/21/22 0902   Culture, Blood 2 [[id number]] Collected: 12/21/22 0538   Order Status: Sent Specimen: Blood Updated: 12/21/22 0902   Rapid Influenza A/B Antigens [[id number]] Collected: 12/21/22 0023   Order Status: Completed Specimen: Nasopharyngeal Updated: 12/21/22 0055    Rapid Influenza A Ag Negative    Rapid Influenza B Ag Negative   Culture, Blood, PCR ID Panel [[id number]] Collected: 12/20/22 0400   Order Status: Completed Updated: 12/20/22 1821    Report SEE IMAGE   Narrative:     Christian Dawn  [facility] tel. [phone],   Microbiology results called to and read back by LILY Peng,   12/20/2022 18:21, by DONG   Clostridium difficile toxin/antigen [[id number]]    Order Status: Canceled Specimen: Stool    COVID-19, Rapid [[id number]] Collected: 12/20/22 0436   Order Status: Completed Specimen: Nasopharyngeal Swab Updated: 12/20/22 0506    SARS-CoV-2, NAAT Not Detected    Comment: Rapid NAAT:   Negative results should be treated as presumptive and,   if inconsistent with clinical signs and symptoms or necessary for   patient management, should be tested with an alternative molecular   assay. Negative results do not preclude SARS-CoV-2 infection and   should not be used as the sole basis for patient management decisions. This test has been authorized by the FDA under an Emergency Use   Authorization (EUA) for use by authorized laboratories.      Fact sheet for Healthcare Providers:   http://www.brandy-marie.bilamberto/   Fact sheet for Patients: http://www.brandy-salazar.biz/     METHODOLOGY: Isothermal Nucleic Acid Amplification              Micro results (current encounter only)    Procedure Component Value Units Date/Time   Culture, Blood 1 [[id number]] Collected: 12/21/22 0538   Order Status: Sent Specimen: Blood Updated: 12/21/22 0902   Culture, Blood 2 [[id number]] Collected: 12/21/22 0538   Order Status: Sent Specimen: Blood Updated: 12/21/22 0902   Rapid Influenza A/B Antigens [[id number]] Collected: 12/21/22 0023   Order Status: Completed Specimen: Nasopharyngeal Updated: 12/21/22 0055    Rapid Influenza A Ag Negative    Rapid Influenza B Ag Negative   Culture, Blood 2 [[id number]] (Abnormal) Collected: 12/20/22 0418   Order Status: Completed Specimen: Blood Updated: 12/20/22 2015    Culture, Blood 2 -- Abnormal     Gram stain Anaerobic bottle:   Gram positive cocci in chains and/or pairs   resembling Streptococcus   Information to follow   Gram stain Aerobic bottle:   Gram positive cocci in chains and/or pairs   resembling Streptococcus   Information to follow    Abnormal    Narrative:     ORDER#: [id number]                          ORDERED BY: KIAN POSEY   SOURCE: Blood Antecubital-Lef              COLLECTED:  12/20/22 04:18   ANTIBIOTICS AT MANDY.:                      RECEIVED :  12/20/22 14:38   If child <=2 yrs old please draw pediatric bottle. ~Blood Culture #2   Culture, Blood, PCR ID Panel [[id number]] Collected: 12/20/22 0400   Order Status: Completed Updated: 12/20/22 1821    Report SEE IMAGE   Narrative:     Marcelo Cárdenas  [facility] tel. [phone],   Microbiology results called to and read back by LILY Angulo,   12/20/2022 18:21, by Ashley Farris   Blood Culture 1 [[id number]] (Abnormal) Collected: 12/20/22 0400   Order Status: Completed Specimen: Blood Updated: 12/20/22 1818    Blood Culture, Routine -- Abnormal     Gram stain Anaerobic bottle:   Gram positive cocci in chains and/or pairs   resembling Streptococcus   Information to follow   Gram stain Aerobic bottle:   Gram positive cocci in chains and/or pairs   resembling Streptococcus   Information to follow    Abnormal     Organism Streptococcus species DNA Detected Abnormal     Blood Culture, Routine See additional report for complete BCID panel. Narrative:     ORDER#: [id number]                          ORDERED BY: KIAN POSEY   SOURCE: Blood Antecubital-Lef              COLLECTED:  12/20/22 04:00   ANTIBIOTICS AT MANDY.:                      RECEIVED :  12/20/22 14:38   If child <=2 yrs old please draw pediatric bottle. ~Blood Culture 1   Clostridium difficile toxin/antigen [[id number]]    Order Status: Canceled Specimen: Stool    COVID-19, Rapid [[id number]] Collected: 12/20/22 0436   Order Status: Completed Specimen: Nasopharyngeal Swab Updated: 12/20/22 0506    SARS-CoV-2, NAAT Not Detected    Comment: Rapid NAAT:   Negative results should be treated as presumptive and,   if inconsistent with clinical signs and symptoms or necessary for   patient management, should be tested with an alternative molecular   assay. Negative results do not preclude SARS-CoV-2 infection and   should not be used as the sole basis for patient management decisions. This test has been authorized by the FDA under an Emergency Use   Authorization (EUA) for use by authorized laboratories. Fact sheet for Healthcare Providers:   http://www.brandy-marie.biz/   Fact sheet for Patients: http://www.nava-salazar.bilamberto/     METHODOLOGY: Isothermal Nucleic Acid Amplification              Micro results (current encounter only)    Procedure Component Value Units Date/Time   Clostridium difficile toxin/antigen [[id number]]    Order Status: Canceled Specimen: Stool    COVID-19, Rapid [[id number]] Collected: 12/20/22 0436   Order Status: Completed Specimen: Nasopharyngeal Swab Updated: 12/20/22 0506    SARS-CoV-2, NAAT Not Detected    Comment: Rapid NAAT:   Negative results should be treated as presumptive and,   if inconsistent with clinical signs and symptoms or necessary for   patient management, should be tested with an alternative molecular   assay. Negative results do not preclude SARS-CoV-2 infection and   should not be used as the sole basis for patient management decisions. This test has been authorized by the FDA under an Emergency Use   Authorization (EUA) for use by authorized laboratories.      Fact sheet for Healthcare Providers:   http://www.brandy-marie.biz/   Fact sheet for Patients: http://www.navaRediLearningmarie.biz/     METHODOLOGY: Isothermal Nucleic Acid Amplification       Blood Culture 1 [[id number]]    Order Status: Sent Specimen: Blood Culture, Blood 2 [[id number]]    Order Status: Sent Specimen: Blood        Blood Culture:   Lab Results   Component Value Date/Time    [facility]  12/20/2022 04:00 AM     Gram stain Anaerobic bottle:  Gram positive cocci in chains and/or pairs  resembling Streptococcus  Information to follow  Gram stain Aerobic bottle:  Gram positive cocci in chains and/or pairs  resembling Streptococcus  Information to follow      [facility] See additional report for complete BCID panel. 12/20/2022 04:00 AM    BLOODCULT2  12/20/2022 04:18 AM     Gram stain Anaerobic bottle:  Gram positive cocci in chains and/or pairs  resembling Streptococcus  Information to follow  Gram stain Aerobic bottle:  Gram positive cocci in chains and/or pairs  resembling Streptococcus  Information to follow         Viral Culture:    Lab Results   Component Value Date/Time    COVID19 Not Detected 12/20/2022 04:36 AM    COVID19 Not Detected 09/13/2022 05:56 PM    COVID19 NOT DETECTED 10/01/2020 11:01 AM     Urine Culture: No results for input(s): LABURIN in the last 72 hours. Scheduled Meds:   daptomycin (CUBICIN) IVPB  6 mg/kg (Adjusted) IntraVENous Q24H    sodium chloride flush  10 mL IntraVENous 2 times per day    apixaban  5 mg Oral BID    aspirin  81 mg Oral Daily    buPROPion  200 mg Oral BID    pantoprazole  40 mg Oral BID AC    cefepime  2,000 mg IntraVENous Q12H    metoprolol tartrate  25 mg Oral BID    ipratropium-albuterol  1 ampule Inhalation Q6H       Continuous Infusions:   sodium chloride      [Held by provider] furosemide      amiodarone 0.5 mg/min (12/20/22 1651)       PRN Meds:  ipratropium-albuterol, sodium chloride flush, sodium chloride, potassium chloride, magnesium sulfate, promethazine **OR** ondansetron, acetaminophen **OR** acetaminophen, oxyCODONE-acetaminophen    Imaging:   XR CHEST PORTABLE   Final Result   Stable cardiomegaly with resolving central pulmonary congestion.       Chronic interstitial changes throughout with mild bibasilar atelectasis which   is more prominent. Moderate hiatal hernia which is unchanged         VL Extremity Venous Right   Final Result      XR CHEST PORTABLE   Final Result   Cardiac silhouette and central vasculature are prominent suggesting a mild   CHF with vascular congestion and interstitial edema. Decrease of the more   localized opacities on the old exam.      However the general malgorzata are prominent and underlying adenopathy is not   excluded by chest x-ray. Previous CT had enlarged pulmonary arteries rather   than overt adenopathy. XR ANKLE RIGHT (MIN 3 VIEWS)   Final Result   Stable appearance of the medial malleolar pins and the intramedullary nail at   the distal fibula with the proximal portion of the intramedullary nail   outside bone. Fusion of the distal interosseous membrane bridging the tibia and fibula with   old healed bony deformities. Progressive loss of the tibiotalar joint and   subtalar joint with sclerosis of the margins. Edema of the soft tissues with scattered soft tissue calcifications. No soft   tissue emphysema or area of focal bony erosion. CT ANKLE RIGHT WO CONTRAST    (Results Pending)   CT TIBIA FIBULA RIGHT WO CONTRAST    (Results Pending)       All pertinent images and reports for the current Hospitalization were reviewed by me.     IMPRESSION:    Patient Active Problem List   Diagnosis    Atrial fibrillation and flutter (HCC)    Essential hypertension    Severe episode of recurrent major depressive disorder, without psychotic features (HCC)    Seasonal affective disorder (HCC)    Class 3 obesity due to excess calories with serious comorbidity and body mass index (BMI) of 45.0 to 49.9 in adult    Elevated sed rate    Neutrophilia    Elevated C-reactive protein (CRP)    Obstructive sleep apnea    Morbid obesity ([facility]    Weight loss counseling, encounter for    Atypical atrial flutter (HCC)    PAF (paroxysmal atrial fibrillation) (HCC)    ILD (interstitial lung disease) ([facility]    Iron deficiency anemia due to chronic blood loss    Iron deficiency anemia, unspecified    Age-related osteoporosis without current pathological fracture    Vasovagal syncope    CKD (chronic kidney disease) stage 4, GFR 15-29 ml/min ([facility])    Chronic diastolic congestive heart failure (HCC)    Stage 3b chronic kidney disease (HCC)    B12 deficiency    Vitamin D deficiency    Acute respiratory failure (HCC)    Chronic obstructive pulmonary disease, unspecified    Sepsis (HCC)    Cellulitis of right leg    Obesity (BMI 30-39. 9)    Stage 3a chronic kidney disease (HCC)    Bacteremia    Fever and chills    History of ankle surgery       Sepsis  Rt leg severe cellulitis  Rt leg ankle surgery > 40 yrs ago   Hardware in place  Rt TKA history   Morbid obesity BMI at  36   Fevers and chills   CKD stage 3A  Procal elevation   BNP elevation   WBC elevation   ESR 41  X ray rT ANKLE Wuth extensive Hardware in place     ON  12/15/ SHE WAS tested for INFLUENZA ANTIBODY testing which is useless for Diagnosis of ACUTE INFLUENZA infection     Influenza A  ANTIBODY +Ve indicates positive immunity from previous infection NOT useful in the acute setting       Blood culture isolated group G strep  -repeat blood culture in process    Likely need IV antibiotic at discharge given the hardware in the  right ankle also she has a right prosthetic knee joint    Labs, Microbiology, Radiology and pertinent results from current hospitalization and care every where were reviewed by me as a part of the consultation.     PLAN :    IV Cefazolin x 2 gm Q 8 hrs  D/C  IV Daptomycin    Blood cx repeat in PROCESS  CT Rt leg NO abscess   Watch Rt knee for seeding   Influenza  -VE   D/C Prednisone   D/C Tamiflu   Ortho eval necessary   Kerlix and ace wrap Rt leg   Will need IV abx at d/c due to all the hardware        Discussed with patient/Family and Nursing   Risk of Complications/Morbidity: High      Illness(es)/ Infection present that pose threat to bodily function. There is potential for severe exacerbation of infection/side effects of treatment. Therapy requires intensive monitoring for antimicrobial agent toxicity. Thanks for allowing me to participate in your patient's care please call me with any questions or concerns.     Dr. Antonio Haas MD  [address] [place] [address] Physician  Phone: [phone]   Fax : [phone]

## 2022-12-21 NOTE — PROGRESS NOTES
[facility]    12/21/2022 9:44 AM        Name: Cindy Pitts .               Admitted: 12/20/2022  Primary Care Provider: MARILEE Sheikh CNP (Tel: [phone])      Subjective:      Shortness of breath is improved no nausea vomiting chills have improved  Reviewed interval ancillary notes    Current Medications  DAPTOmycin (CUBICIN) 500 mg in sodium chloride 0.9 % 50 mL IVPB, Q24H  ipratropium-albuterol (DUONEB) nebulizer solution 1 ampule, Q4H PRN  ceFAZolin (ANCEF) 2,000 mg in dextrose 5 % 50 mL IVPB (mini-bag), Q8H  sodium chloride flush 0.9 % injection 10 mL, 2 times per day  sodium chloride flush 0.9 % injection 10 mL, PRN  0.9 % sodium chloride infusion, PRN  potassium chloride 10 mEq/100 mL IVPB (Peripheral Line), PRN  magnesium sulfate 2000 mg in 50 mL IVPB premix, PRN  promethazine (PHENERGAN) tablet 12.5 mg, Q6H PRN   Or  ondansetron (ZOFRAN) injection 4 mg, Q6H PRN  acetaminophen (TYLENOL) tablet 650 mg, Q6H PRN   Or  acetaminophen (TYLENOL) suppository 650 mg, Q6H PRN  [Held by provider] furosemide (LASIX) 100 mg in dextrose 5 % 100 mL infusion, Continuous  apixaban (ELIQUIS) tablet 5 mg, BID  aspirin chewable tablet 81 mg, Daily  buPROPion (WELLBUTRIN SR) extended release tablet 200 mg, BID  pantoprazole (PROTONIX) tablet 40 mg, BID AC  amiodarone (CORDARONE) 450 mg in dextrose 5 % 250 mL infusion, Continuous  oxyCODONE-acetaminophen (PERCOCET) 5-325 MG per tablet 1 tablet, Q4H PRN  metoprolol tartrate (LOPRESSOR) tablet 25 mg, BID  ipratropium-albuterol (DUONEB) nebulizer solution 1 ampule, Q6H        Objective:  /71   Pulse 82   Temp 97.9 °F (36.6 °C) (Oral)   Resp 22   Ht 5' 7\" (1.702 m)   Wt 235 lb (106.6 kg)   SpO2 93%   BMI 36.81 kg/m²   No intake or output data in the 24 hours ending 12/21/22 0944   Wt Readings from Last 3 Encounters:   12/21/22 235 lb (106.6 kg)   12/15/22 235 lb (106.6 kg)   09/26/22 231 lb (104.8 kg)       General appearance:  Appears comfortable. AAOx3  HEENT: atraumatic, Pupils equal, muscous membranes moist, no masses appreciated  Cardiovascular:rrr  no murmurs appreciated  Respiratory:no wheezing  Gastrointestinal: Abdomen soft, non-tender, BS+  EXT: no edema  Neurology: no gross focal deficts  Psychiatry: Appropriate affect. Not agitated  Skin:right leg erythema and tenderness to palpation    Labs and Tests:  CBC:   Recent Labs     12/20/22  0400 12/21/22  0538   WBC 16.0* 17.2*   HGB 12.3 10.5*    143     BMP:    Recent Labs     12/20/22  0400 12/21/22  0538    139   K 3.8 4.2    109   CO2 26 23   BUN 31* 31*   CREATININE 1.4* 1.4*   GLUCOSE 99 103*     Hepatic:   Recent Labs     12/20/22  0400 12/21/22  0538   AST 16 11*   ALT 12 7*   BILITOT 0.7 0.5   ALKPHOS 85 70     XR CHEST PORTABLE   Final Result   Stable cardiomegaly with resolving central pulmonary congestion. Chronic interstitial changes throughout with mild bibasilar atelectasis which   is more prominent. Moderate hiatal hernia which is unchanged         VL Extremity Venous Right   Final Result      XR CHEST PORTABLE   Final Result   Cardiac silhouette and central vasculature are prominent suggesting a mild   CHF with vascular congestion and interstitial edema. Decrease of the more   localized opacities on the old exam.      However the general malgorzata are prominent and underlying adenopathy is not   excluded by chest x-ray. Previous CT had enlarged pulmonary arteries rather   than overt adenopathy. XR ANKLE RIGHT (MIN 3 VIEWS)   Final Result   Stable appearance of the medial malleolar pins and the intramedullary nail at   the distal fibula with the proximal portion of the intramedullary nail   outside bone. Fusion of the distal interosseous membrane bridging the tibia and fibula with   old healed bony deformities.   Progressive loss of the tibiotalar joint and   subtalar joint with sclerosis of the margins. Edema of the soft tissues with scattered soft tissue calcifications. No soft   tissue emphysema or area of focal bony erosion. CT ANKLE RIGHT WO CONTRAST    (Results Pending)   CT TIBIA FIBULA RIGHT WO CONTRAST    (Results Pending)       Recent imaging reviewed    Problem List  Principal Problem:    Sepsis ([address]  Active Problems:    Cellulitis of right leg    Obesity (BMI 30-39. 9)    Stage 3a chronic kidney disease (HCC)    Bacteremia    Fever and chills    History of ankle surgery    Neutrophilia  Resolved Problems:    * No resolved hospital problems.  *       Assessment/Plan:   Sepsis secondary to RLE celluliits  - iv atbx  - fu cutlures  - id on board    Aflutter with rvr  - eliquis  - amio gtt  - cards on board  - echo pending          DVT prophylaxis eliquis  Code status full code      Neto Lazaro MD   12/21/2022 9:44 AM

## 2022-12-21 NOTE — PROGRESS NOTES
[address]   Electrophysiology Progress Note     Date: 12/21/2022  Admit Date: 12/20/2022     Reason for consultation: Atrial fibrillation    Chief Complaint:   Chief Complaint   Patient presents with    Leg Pain     Pt arrived in the ED from home via [facility]   Pt complains of right leg pain that started a few days ago  Pt right leg is leaking fluid from wound, leg is red and swelled up   Pt was diagnosed with the flu on Thursday   Pt stated she has no cardiac hx       History of Present Illness: History obtained from patient and medical record. Cindy Pitts is a 78 y.o. female with a past medical history of HTN, HLD, CKD, GIANNA, obesity, and chronic persistent atrial fibrillation/flutter. Hx of DCCV in 2016. She had recurrence and declined further intervention (cardioversion, ablation). Pt presented to hospital with right lower extremity pain, swelling and redness. She has been admitted for sepsis and cellulitis. She was found to be tachycardic and EP has been consulted. Reports no palpitation or chest pressure. Heart rate remains elevated. On telemetry, she is in atrial flutter. She has been started on amiodarone by the primary team.    Interval Hx: Today, she is being seen for follow up. She remains in atrial fibrillation, rate controlled on IV amiodarone. Her BP is stable. We discussed doing cardioversion as outpatient. She is having issues with diarrhea. Patient seen and examined. Clinical notes reviewed. Telemetry reviewed. No new complaints today. No major events overnight. Denies having chest pain, palpitations, shortness of breath, orthopnea/PND, cough, or dizziness at the time of this visit. Allergies:   Allergies   Allergen Reactions    Bee Venom Swelling and Angioedema    Shellfish-Derived Products Other (See Comments)     Syncope/seizures    Shrimp Flavor      Passes out      Codeine Hives and Other (See Comments)     B/P DROPS PASSES OUT  Passed out    Fentanyl And Related Hives     Other reaction(s): Dizziness    Hydromorphone Rash, Hives and Other (See Comments)     Full rash spreading across chest and both arms from IV hydromorphone  Passed out    Vancomycin Rash     Tolerated 12/20/22     Home Meds:  Prior to Visit Medications    Medication Sig Taking? Authorizing Provider   traMADol (ULTRAM) 50 MG tablet Take 50 mg by mouth every 6 hours as needed for Pain. Historical Provider, MD   ferrous sulfate (IRON 325) 325 (65 Fe) MG tablet Take 1 tablet by mouth daily (with breakfast)  MARILEE Cunningham CNP   Cholecalciferol (VITAMIN D3) 1.25 MG (62515 UT) CAPS Take 1 capsule by mouth every 7 days  MARILEE Cunningham CNP   furosemide (LASIX) 20 MG tablet Take 1 tablet by mouth daily  MARILEE Cunningham CNP   budesonide-formoterol (SYMBICORT) 160-4.5 MCG/ACT AERO Inhale 2 puffs into the lungs 2 times daily  Patient not taking: Reported on 12/20/2022  MARILEE Cunningham CNP   albuterol sulfate HFA (PROVENTIL HFA) 108 (90 Base) MCG/ACT inhaler Inhale 2 puffs into the lungs every 4 hours as needed for Wheezing  MARILEE Cunningham CNP   dilTIAZem (TIAZAC) 240 MG extended release capsule TAKE 1 CAPSULE DAILY  MARILEE Cunningham CNP   buPROPion (WELLBUTRIN SR) 200 MG extended release tablet TAKE 1 TABLET BY MOUTH TWICE DAILY  MARILEE Cunningham CNP   albuterol-ipratropium (COMBIVENT RESPIMAT)  MCG/ACT AERS inhaler Inhale 1 puff into the lungs in the morning and 1 puff at noon and 1 puff in the evening and 1 puff before bedtime. Patient not taking: No sig reported  Gamal Banda MD   vitamin B-12 (CYANOCOBALAMIN) 100 MCG tablet Take 1 tablet by mouth in the morning.   MARILEE Cunningham CNP   alendronate (FOSAMAX) 70 MG tablet Take 1 tablet by mouth every 7 days  MARILEE Cunningham CNP   apixaban (ELIQUIS) 5 MG TABS tablet Take 1 tablet by mouth 2 times daily  MARILEE Cunningham CNP   metoprolol tartrate (LOPRESSOR) 25 MG tablet Take 1 tablet by mouth 2 times daily  MARILEE Cunningham CNP   pantoprazole (PROTONIX) 40 MG tablet Take 1 tablet by mouth 2 times daily (before meals)  Patient taking differently: Take 40 mg by mouth 2 times daily (before meals) Takes only when her stomach is upset  Karen Gallego MD   cloNIDine (CATAPRES) 0.2 MG tablet TAKE 1 TABLET BY MOUTH TWICE DAILY  Patient taking differently: daily  MARILEE Cunningham CNP   DULoxetine (CYMBALTA) 60 MG extended release capsule TAKE 1 CAPSULE BY MOUTH DAILY  MARILEE Cunningham CNP   dilTIAZem (CARDIZEM CD) 240 MG extended release capsule TAKE 1 CAPSULE BY MOUTH DAILY  MARILEE Cunningham CNP   aspirin 81 MG tablet Take 81 mg by mouth daily  Historical Provider, MD      Scheduled Meds:   daptomycin (CUBICIN) IVPB  6 mg/kg (Adjusted) IntraVENous Q24H    ceFAZolin  2,000 mg IntraVENous Q8H    sodium chloride flush  10 mL IntraVENous 2 times per day    apixaban  5 mg Oral BID    aspirin  81 mg Oral Daily    buPROPion  200 mg Oral BID    pantoprazole  40 mg Oral BID AC    metoprolol tartrate  25 mg Oral BID    ipratropium-albuterol  1 ampule Inhalation Q6H     Continuous Infusions:   sodium chloride      [Held by provider] furosemide      amiodarone 0.5 mg/min (12/20/22 1651)     PRN Meds:ipratropium-albuterol, sodium chloride flush, sodium chloride, potassium chloride, magnesium sulfate, promethazine **OR** ondansetron, acetaminophen **OR** acetaminophen, oxyCODONE-acetaminophen     Past Medical History:  Past Medical History:   Diagnosis Date    Arthritis     Atrial fibrillation and flutter ([facility]     Hypertension     Kidney disease     Pneumonia     Sleep apnea     no c-pap      Past Surgical History:    has a past surgical history that includes Tubal ligation; Total knee arthroplasty (Bilateral, 12/19/2012); fracture surgery; Colonoscopy (N/A, 11/20/2018); Upper gastrointestinal endoscopy (N/A, 11/20/2018);  Cardioversion; Gastric bypass surgery; Larynx surgery; Upper gastrointestinal endoscopy (N/A, 3/31/2022); Colonoscopy (N/A, 3/31/2022); and Upper gastrointestinal endoscopy (N/A, 3/31/2022). Social History:  Reviewed. reports that she has never smoked. She has never used smokeless tobacco. She reports that she does not drink alcohol and does not use drugs. Family History:  Reviewed. family history includes Cancer in her father. Review of Systems:  Constitutional: Positive for fatigue, weakness. Negative for fever, night sweats, chills, weight changes  Skin: Negative for rash, dry skin, pruritus, bruising, bleeding, blood clots, or changes in skin pigment  HEENT: Negative for vision changes, ringing in the ears, sore throat, dysphagia, or swollen lymph nodes  Respiratory: Reviewed in HPI  Cardiovascular: Reviewed in HPI  Gastrointestinal: Negative for abdominal pain, N/V/D, constipation, or black/tarry stools  Genito-Urinary: Negative for dysuria, incontinence, urgency, or hematuria  Musculoskeletal: Negative for joint swelling, muscle pain, or injuries  Neurological/Psych: Negative for confusion, seizures, headaches, balance issues or TIA-like symptoms. No anxiety, depression, or insomnia    Physical Examination:  Vitals:    12/21/22 0715   BP: 116/71   Pulse: 82   Resp: 22   Temp: 97.9 °F (36.6 °C)   SpO2: 93%      No intake/output data recorded. Wt Readings from Last 3 Encounters:   12/21/22 235 lb (106.6 kg)   12/15/22 235 lb (106.6 kg)   09/26/22 231 lb (104.8 kg)     No intake or output data in the 24 hours ending 12/21/22 1021    Telemetry: Personally Reviewed  - Atrial fibrillation   Constitutional: Cooperative and in no apparent distress, and appears well nourished  Skin: Warm and pink; no pallor, cyanosis, bruising, or clubbing. + RLE redness  HEENT: Symmetric and normocephalic. PERRL, EOM intact. Conjunctiva pink with clear sclera. Mucus membranes pink and moist. Teeth intact.  Thyroid smooth without nodules or goiter. Cardiovascular: Regular rate and irregular rhythm. S1/S2 present without murmurs, rubs, or gallops. Peripheral pulses 2+, capillary refill < 3 seconds. No elevation of JVP. No peripheral edema  Respiratory: Respirations symmetric and unlabored. Lungs clear to auscultation bilaterally, no wheezing, crackles, or rhonchi  Gastrointestinal: Abdomen soft and round. Bowel sounds normoactive in all quadrants without tenderness or masses. Musculoskeletal: Bilateral upper and lower extremity strength 5/5 with full ROM  Neurologic/Psych: Awake and orientated to person, place and time. Calm affect, appropriate mood    Pertinent labs, diagnostic, device, and imaging results reviewed as a part of this visit    Labs:    BMP:   Recent Labs     22  0400 22  0538    139   K 3.8 4.2    109   CO2 26 23   BUN 31* 31*   CREATININE 1.4* 1.4*     Estimated Creatinine Clearance: 44 mL/min (A) (based on SCr of 1.4 mg/dL (H)).    CBC:   Recent Labs     22  0400 22  0538   WBC 16.0* 17.2*   HGB 12.3 10.5*   HCT 39.3 33.0*   MCV 91.6 92.9    143     Thyroid:   Lab Results   Component Value Date/Time    TSH 2.81 2022 11:50 AM     Lipids:   Lab Results   Component Value Date/Time    CHOL 142 03/15/2021 10:18 AM    HDL 51 03/15/2021 10:18 AM    TRIG 56 03/15/2021 10:18 AM     LFTS:   Lab Results   Component Value Date/Time    ALT 7 2022 05:38 AM    AST 11 2022 05:38 AM    ALKPHOS 70 2022 05:38 AM    PROT 5.3 2022 05:38 AM    AGRATIO 0.8 2022 05:38 AM    BILITOT 0.5 2022 05:38 AM     Cardiac Enzymes:   Lab Results   Component Value Date/Time    TROPONINI 0.01 2022 04:00 AM    TROPONINI <0.01 2022 04:26 AM    TROPONINI <0.01 2022 06:01 PM     Coags:   Lab Results   Component Value Date/Time    PROTIME 18.0 2022 06:01 PM    INR 1.49 2022 06:01 PM       EC22 (Personally Interpreted)   - Atrial fibrillation    ECHO: 3/22   -Left ventricular cavity size is normal with mild concentric left ventricular hypertrophy.   -Overall left ventricular systolic function appears normal. Ejection fraction is visually estimated to be 60-65%. -No regional wall motion abnormalities are noted. -Cannot grade diastology due to atrial fibrillation.   -The right ventricle is mildly enlarged. Right ventricular systolic function is normal.   -The right atrium is moderately dilated. -Mitral valve leaflets appear mildly thickened. Mild mitral regurgitation. Mild mitral annular calcification.   -Trivial pulmonic regurgitation.   -Moderate tricuspid regurgitation with a PASP of 56 mmHg.     Cath: 2016  ~LM     normal  ~LAD   normal  ~Cx      normal  ~RCA   normal  ~LVG   55%  Impression  ~Angiography w/ normal cors   ~LVEDP 20  ~LVG with normal EF  Intervention  ~None  Recommendation  ~Aggressive medical treatment and risk factor modification    Problem List:   Patient Active Problem List    Diagnosis Date Noted    Cellulitis of right leg 12/21/2022    Obesity (BMI 30-39.9) 12/21/2022    Stage 3a chronic kidney disease ([address] 12/21/2022    Bacteremia 12/21/2022    Fever and chills 12/21/2022    History of ankle surgery 12/21/2022    Sepsis ([address] 12/20/2022    Chronic obstructive pulmonary disease, unspecified 12/15/2022    Acute respiratory failure ([address] 09/13/2022    CKD (chronic kidney disease) stage 4, GFR 15-29 ml/min ([facility]) 08/29/2022    Chronic diastolic congestive heart failure ([address] 08/29/2022    Stage 3b chronic kidney disease ([address] 08/29/2022    B12 deficiency 08/29/2022    Vitamin D deficiency 08/29/2022    Vasovagal syncope 06/10/2022    Iron deficiency anemia, unspecified 04/22/2022    Age-related osteoporosis without current pathological fracture 05/09/2022    Iron deficiency anemia due to chronic blood loss 04/08/2022    ILD (interstitial lung disease) ([address] 04/05/2022    PAF (paroxysmal atrial fibrillation) ([address] 03/30/2022    Atypical atrial flutter (HCC)     Weight loss counseling, encounter for     Elevated sed rate     Neutrophilia     Elevated C-reactive protein (CRP)     Obstructive sleep apnea     Morbid obesity (HCC)     Severe episode of recurrent major depressive disorder, without psychotic features ([facility] 2018    Seasonal affective disorder ([facility] 2018    Class 3 obesity due to excess calories with serious comorbidity and body mass index (BMI) of 45.0 to 49.9 in adult 2018    Atrial fibrillation and flutter ([facility] 2016    Essential hypertension 2016        Assessment and Plan: 1. Long Standing Persistent Atrial Fibrillation/Flutter  - Currently in AFL  - Continue metoprolol 25 mg BID    - On IV amiodarone. Will stop IV gtt and switch to PO loadin mg BID x10 days, then 200 mg daily   ~ Monitor for toxicity. TSH 3.59, ALT 7, AST 11 ()    - [id number] score:high; [id number] Vasc score and anticoagulation discussed. High risk for stroke and thromboembolism. Anticoagulation is recommended. Risk of bleeding was discussed.  ~ On Eliquis 5 mg BID    - Afib risk factors including age, HTN, obesity, inactivity and GIANNA were discussed with patient. Risk factor modification recommended      - Treatment options including cardioversion, rate control strategy, antiarrhythmics, anticoagulation and possible ablation were discussed with patient. Risks, benefits and alternative of each treatment options were explained. All questions answered    ~ Will plan for cardioversion as outpatient following resolution of her acute issues. Will schedule in 4-6 weeks (letter sent to our scheduling team). Ablation can be considered long term    2. HTN  - Controlled: Goal <130/80  - Continue current medications    3. Chronic diastolic heart failure (NYHA Class III)  - Appears compensated   ~ EF 60% per echo  - Continue with BB  - Resume lasix at d/c  - Monitor I&O's, Daily weights    4.  Sepsis, Cellulitis   - On ABX   - Management per ID    5. GIANNA  - Stable: Uses CPAP  - Encourage to use CPAP to prevent long term effects of untreated GIANNA    6. CKD   - Stable, near baseline   - Monitor daily    EP will sign off. Follow up with Dr. Roxy Yao in 3 months. EP will sign off. Please call if there are any questions. Thank you for consult. All pertinent information and plan of care discussed with the EP physician. All questions and concerns were addressed to the patient. Alternatives to my treatment were discussed. I have discussed the above stated plan with patient and the nurse. The patient verbalized understanding and agreed with the plan. Thank you for allowing to us to participate in the care of Ghislaine Croft    The patient was seen for >35 minutes. I reviewed interval history, physical exam, review of data including labs, imaging, development and implementation of treatment plan and coordination of complex care.  More than 50% of the time was devoted to counseling the patient on their diagnoses/treatments, as well as coordination of care with the other care teams    MARILEE Smart-CNP  [facility]   Office: [phone]

## 2022-12-22 ENCOUNTER — APPOINTMENT (OUTPATIENT)
Dept: GENERAL RADIOLOGY | Age: 77
DRG: 872 | End: 2022-12-22
Payer: COMMERCIAL

## 2022-12-22 PROBLEM — B95.4 STREPTOCOCCAL INFECTION GROUP G: Status: ACTIVE | Noted: 2022-12-22

## 2022-12-22 LAB
A/G RATIO: 0.7 (ref 1.1–2.2)
ALBUMIN SERPL-MCNC: 2.4 G/DL (ref 3.4–5)
ALP BLD-CCNC: 89 U/L (ref 40–129)
ALT SERPL-CCNC: <5 U/L (ref 10–40)
ANION GAP SERPL CALCULATED.3IONS-SCNC: 7 MMOL/L (ref 3–16)
AST SERPL-CCNC: 9 U/L (ref 15–37)
BASOPHILS ABSOLUTE: 0 K/UL (ref 0–0.2)
BASOPHILS RELATIVE PERCENT: 0.3 %
BILIRUB SERPL-MCNC: 0.3 MG/DL (ref 0–1)
BLOOD CULTURE, ROUTINE: ABNORMAL
BLOOD CULTURE, ROUTINE: ABNORMAL
BUN BLDV-MCNC: 30 MG/DL (ref 7–20)
CALCIUM SERPL-MCNC: 8.2 MG/DL (ref 8.3–10.6)
CHLORIDE BLD-SCNC: 107 MMOL/L (ref 99–110)
CO2: 24 MMOL/L (ref 21–32)
CREAT SERPL-MCNC: 1.6 MG/DL (ref 0.6–1.2)
CULTURE, BLOOD 2: ABNORMAL
EOSINOPHILS ABSOLUTE: 0.3 K/UL (ref 0–0.6)
EOSINOPHILS RELATIVE PERCENT: 2.1 %
GFR SERPL CREATININE-BSD FRML MDRD: 33 ML/MIN/{1.73_M2}
GLUCOSE BLD-MCNC: 125 MG/DL (ref 70–99)
HCT VFR BLD CALC: 34.2 % (ref 36–48)
HEMOGLOBIN: 10.9 G/DL (ref 12–16)
LYMPHOCYTES ABSOLUTE: 1 K/UL (ref 1–5.1)
LYMPHOCYTES RELATIVE PERCENT: 6.6 %
MCH RBC QN AUTO: 28.9 PG (ref 26–34)
MCHC RBC AUTO-ENTMCNC: 31.8 G/DL (ref 31–36)
MCV RBC AUTO: 90.9 FL (ref 80–100)
MONOCYTES ABSOLUTE: 0.9 K/UL (ref 0–1.3)
MONOCYTES RELATIVE PERCENT: 5.5 %
NEUTROPHILS ABSOLUTE: 13.2 K/UL (ref 1.7–7.7)
NEUTROPHILS RELATIVE PERCENT: 85.5 %
ORGANISM: ABNORMAL
PDW BLD-RTO: 18.3 % (ref 12.4–15.4)
PLATELET # BLD: 164 K/UL (ref 135–450)
PMV BLD AUTO: 10.9 FL (ref 5–10.5)
POTASSIUM REFLEX MAGNESIUM: 4.1 MMOL/L (ref 3.5–5.1)
RBC # BLD: 3.77 M/UL (ref 4–5.2)
SODIUM BLD-SCNC: 138 MMOL/L (ref 136–145)
TOTAL PROTEIN: 5.7 G/DL (ref 6.4–8.2)
WBC # BLD: 15.5 K/UL (ref 4–11)

## 2022-12-22 PROCEDURE — 97530 THERAPEUTIC ACTIVITIES: CPT

## 2022-12-22 PROCEDURE — 6370000000 HC RX 637 (ALT 250 FOR IP): Performed by: INTERNAL MEDICINE

## 2022-12-22 PROCEDURE — 94640 AIRWAY INHALATION TREATMENT: CPT

## 2022-12-22 PROCEDURE — 6370000000 HC RX 637 (ALT 250 FOR IP): Performed by: NURSE PRACTITIONER

## 2022-12-22 PROCEDURE — 97166 OT EVAL MOD COMPLEX 45 MIN: CPT

## 2022-12-22 PROCEDURE — 36415 COLL VENOUS BLD VENIPUNCTURE: CPT

## 2022-12-22 PROCEDURE — 99221 1ST HOSP IP/OBS SF/LOW 40: CPT | Performed by: ORTHOPAEDIC SURGERY

## 2022-12-22 PROCEDURE — APPNB45 APP NON BILLABLE 31-45 MINUTES: Performed by: NURSE PRACTITIONER

## 2022-12-22 PROCEDURE — 85025 COMPLETE CBC W/AUTO DIFF WBC: CPT

## 2022-12-22 PROCEDURE — 97162 PT EVAL MOD COMPLEX 30 MIN: CPT

## 2022-12-22 PROCEDURE — 2700000000 HC OXYGEN THERAPY PER DAY

## 2022-12-22 PROCEDURE — 71045 X-RAY EXAM CHEST 1 VIEW: CPT

## 2022-12-22 PROCEDURE — 94761 N-INVAS EAR/PLS OXIMETRY MLT: CPT

## 2022-12-22 PROCEDURE — 2580000003 HC RX 258: Performed by: INTERNAL MEDICINE

## 2022-12-22 PROCEDURE — 97535 SELF CARE MNGMENT TRAINING: CPT

## 2022-12-22 PROCEDURE — 2060000000 HC ICU INTERMEDIATE R&B

## 2022-12-22 PROCEDURE — 80053 COMPREHEN METABOLIC PANEL: CPT

## 2022-12-22 PROCEDURE — 99233 SBSQ HOSP IP/OBS HIGH 50: CPT | Performed by: INTERNAL MEDICINE

## 2022-12-22 PROCEDURE — 6360000002 HC RX W HCPCS: Performed by: INTERNAL MEDICINE

## 2022-12-22 RX ORDER — DILTIAZEM HYDROCHLORIDE 240 MG/1
CAPSULE, EXTENDED RELEASE ORAL
Qty: 90 CAPSULE | Refills: 1 | Status: SHIPPED | OUTPATIENT
Start: 2022-12-22 | End: 2022-12-24 | Stop reason: HOSPADM

## 2022-12-22 RX ORDER — IPRATROPIUM BROMIDE AND ALBUTEROL SULFATE 2.5; .5 MG/3ML; MG/3ML
1 SOLUTION RESPIRATORY (INHALATION) EVERY 6 HOURS
Status: DISCONTINUED | OUTPATIENT
Start: 2022-12-22 | End: 2022-12-23

## 2022-12-22 RX ORDER — PREDNISONE 20 MG/1
40 TABLET ORAL DAILY
Status: DISCONTINUED | OUTPATIENT
Start: 2022-12-22 | End: 2022-12-24 | Stop reason: HOSPADM

## 2022-12-22 RX ADMIN — IPRATROPIUM BROMIDE AND ALBUTEROL SULFATE 1 AMPULE: .5; 3 SOLUTION RESPIRATORY (INHALATION) at 08:10

## 2022-12-22 RX ADMIN — IPRATROPIUM BROMIDE AND ALBUTEROL SULFATE 1 AMPULE: .5; 3 SOLUTION RESPIRATORY (INHALATION) at 21:12

## 2022-12-22 RX ADMIN — BUPROPION HYDROCHLORIDE 200 MG: 100 TABLET, FILM COATED, EXTENDED RELEASE ORAL at 19:51

## 2022-12-22 RX ADMIN — PANTOPRAZOLE SODIUM 40 MG: 40 TABLET, DELAYED RELEASE ORAL at 15:04

## 2022-12-22 RX ADMIN — ASPIRIN 81 MG 81 MG: 81 TABLET ORAL at 09:08

## 2022-12-22 RX ADMIN — PANTOPRAZOLE SODIUM 40 MG: 40 TABLET, DELAYED RELEASE ORAL at 09:08

## 2022-12-22 RX ADMIN — AMIODARONE HYDROCHLORIDE 200 MG: 200 TABLET ORAL at 19:51

## 2022-12-22 RX ADMIN — Medication 10 ML: at 09:08

## 2022-12-22 RX ADMIN — CEFAZOLIN 2000 MG: 2 INJECTION, POWDER, FOR SOLUTION INTRAMUSCULAR; INTRAVENOUS at 05:03

## 2022-12-22 RX ADMIN — CEFAZOLIN 2000 MG: 2 INJECTION, POWDER, FOR SOLUTION INTRAMUSCULAR; INTRAVENOUS at 15:08

## 2022-12-22 RX ADMIN — BUPROPION HYDROCHLORIDE 200 MG: 100 TABLET, FILM COATED, EXTENDED RELEASE ORAL at 09:08

## 2022-12-22 RX ADMIN — IPRATROPIUM BROMIDE AND ALBUTEROL SULFATE 1 AMPULE: .5; 3 SOLUTION RESPIRATORY (INHALATION) at 13:28

## 2022-12-22 RX ADMIN — Medication 10 ML: at 19:52

## 2022-12-22 RX ADMIN — CEFAZOLIN 2000 MG: 2 INJECTION, POWDER, FOR SOLUTION INTRAMUSCULAR; INTRAVENOUS at 21:35

## 2022-12-22 RX ADMIN — APIXABAN 5 MG: 5 TABLET, FILM COATED ORAL at 19:51

## 2022-12-22 RX ADMIN — AMIODARONE HYDROCHLORIDE 200 MG: 200 TABLET ORAL at 09:08

## 2022-12-22 RX ADMIN — METOPROLOL TARTRATE 25 MG: 25 TABLET, FILM COATED ORAL at 19:51

## 2022-12-22 RX ADMIN — APIXABAN 5 MG: 5 TABLET, FILM COATED ORAL at 09:08

## 2022-12-22 RX ADMIN — METOPROLOL TARTRATE 25 MG: 25 TABLET, FILM COATED ORAL at 09:08

## 2022-12-22 RX ADMIN — PREDNISONE 40 MG: 20 TABLET ORAL at 12:17

## 2022-12-22 NOTE — PROGRESS NOTES
[facility]    12/22/2022 9:40 AM        Name: Edwyna Goldberg .               Admitted: 12/20/2022  Primary Care Provider: MARILEE Busby CNP (Tel: [phone])      Subjective:      Patient still having sob no nausea vomitting or chest  pain  Reviewed interval ancillary notes    Current Medications  ipratropium-albuterol (DUONEB) nebulizer solution 1 ampule, Q6H  predniSONE (DELTASONE) tablet 40 mg, Daily  ipratropium-albuterol (DUONEB) nebulizer solution 1 ampule, Q4H PRN  ceFAZolin (ANCEF) 2,000 mg in dextrose 5 % 50 mL IVPB (mini-bag), Q8H  amiodarone (CORDARONE) tablet 200 mg, BID  sodium chloride flush 0.9 % injection 10 mL, 2 times per day  sodium chloride flush 0.9 % injection 10 mL, PRN  0.9 % sodium chloride infusion, PRN  potassium chloride 10 mEq/100 mL IVPB (Peripheral Line), PRN  magnesium sulfate 2000 mg in 50 mL IVPB premix, PRN  promethazine (PHENERGAN) tablet 12.5 mg, Q6H PRN   Or  ondansetron (ZOFRAN) injection 4 mg, Q6H PRN  acetaminophen (TYLENOL) tablet 650 mg, Q6H PRN   Or  acetaminophen (TYLENOL) suppository 650 mg, Q6H PRN  apixaban (ELIQUIS) tablet 5 mg, BID  aspirin chewable tablet 81 mg, Daily  buPROPion (WELLBUTRIN SR) extended release tablet 200 mg, BID  pantoprazole (PROTONIX) tablet 40 mg, BID AC  oxyCODONE-acetaminophen (PERCOCET) 5-325 MG per tablet 1 tablet, Q4H PRN  metoprolol tartrate (LOPRESSOR) tablet 25 mg, BID      Objective:  /75   Pulse 64   Temp 98.1 °F (36.7 °C) (Oral)   Resp 16   Ht 5' 7\" (1.702 m)   Wt 238 lb (108 kg)   SpO2 94%   BMI 37.28 kg/m²     Intake/Output Summary (Last 24 hours) at 12/22/2022 0940  Last data filed at 12/22/2022 0846  Gross per 24 hour   Intake --   Output 725 ml   Net -725 ml        Wt Readings from Last 3 Encounters:   12/22/22 238 lb (108 kg)   12/15/22 235 lb (106.6 kg)   09/26/22 231 lb (104.8 kg)       General appearance:  Appears comfortable. AAOx3  HEENT: atraumatic, Pupils equal, muscous membranes moist, no masses appreciated  Cardiovascular:rrr  no murmurs appreciated  Respiratory:no wheezing  Gastrointestinal: Abdomen soft, non-tender, BS+  EXT: no edema  Neurology: no gross focal deficts  Psychiatry: Appropriate affect. Not agitated  Skin:right leg erythema and tenderness     Labs and Tests:  CBC:   Recent Labs     12/20/22  0400 12/21/22  0538 12/22/22  0601   WBC 16.0* 17.2* 15.5*   HGB 12.3 10.5* 10.9*    143 164       BMP:    Recent Labs     12/20/22  0400 12/21/22  0538 12/22/22  0601    139 138   K 3.8 4.2 4.1    109 107   CO2 26 23 24   BUN 31* 31* 30*   CREATININE 1.4* 1.4* 1.6*   GLUCOSE 99 103* 125*       Hepatic:   Recent Labs     12/20/22  0400 12/21/22  0538 12/22/22  0601   AST 16 11* 9*   ALT 12 7* <5*   BILITOT 0.7 0.5 0.3   ALKPHOS 85 70 89       CT TIBIA FIBULA RIGHT WO CONTRAST   Final Result   1. Nonspecific soft tissue edema with no organized drainable fluid collection   or subcutaneous gas identified. Chronic dystrophic calcifications   redemonstrated throughout the soft tissues. 2. No acute osseous findings and no CT evidence of osteomyelitis. XR CHEST PORTABLE   Final Result   Stable cardiomegaly with resolving central pulmonary congestion. Chronic interstitial changes throughout with mild bibasilar atelectasis which   is more prominent. Moderate hiatal hernia which is unchanged         VL Extremity Venous Right   Final Result      XR CHEST PORTABLE   Final Result   Cardiac silhouette and central vasculature are prominent suggesting a mild   CHF with vascular congestion and interstitial edema. Decrease of the more   localized opacities on the old exam.      However the general malgorzata are prominent and underlying adenopathy is not   excluded by chest x-ray. Previous CT had enlarged pulmonary arteries rather   than overt adenopathy.          XR ANKLE RIGHT (MIN 3 VIEWS)   Final Result   Stable appearance of the medial malleolar pins and the intramedullary nail at   the distal fibula with the proximal portion of the intramedullary nail   outside bone. Fusion of the distal interosseous membrane bridging the tibia and fibula with   old healed bony deformities. Progressive loss of the tibiotalar joint and   subtalar joint with sclerosis of the margins. Edema of the soft tissues with scattered soft tissue calcifications. No soft   tissue emphysema or area of focal bony erosion. XR CHEST PORTABLE    (Results Pending)       Recent imaging reviewed    Problem List  Principal Problem:    Sepsis ([address]  Active Problems:    Cellulitis of right leg    Obesity (BMI 30-39. 9)    Stage 3a chronic kidney disease (HCC)    Bacteremia    Fever and chills    History of ankle surgery    Streptococcal infection group G    Neutrophilia  Resolved Problems:    * No resolved hospital problems.  *       Assessment/Plan:   Sepsis secondary to RLE celluliits with strep bacteremia  - iv atbx  - repeat bc  - ortho consult  - id on board    Aflutter with rvr  - eliquis  - oral amio            DVT prophylaxis eliquis  Code status full code      Augustus Farmer MD   12/22/2022 9:40 AM

## 2022-12-22 NOTE — PROGRESS NOTES
[facility]   Phone: [phone]    Physical Therapy    [x] Initial Evaluation            [] Daily Treatment Note         [] Discharge Summary      Patient: Ghislaine Croft   : 1945   MRN: [id number]   Date of Service:  2022  Admitting Diagnosis: Sepsis [facility])    Current Admission Summary: Ghislaine Croft is a 78 y.o. female patient was diagnosed with the flu on Thursday over the last days she has noticed redness and swelling and drainage from the right leg where it is weeping with increased pain patient's had previous osteomyelitis of the right ankle where she  has  redness and swelling now    Past Medical History:  has a past medical history of Arthritis, Atrial fibrillation and flutter ([address] Hypertension, Kidney disease, Pneumonia, and Sleep apnea. Past Surgical History:  has a past surgical history that includes Tubal ligation; Total knee arthroplasty (Bilateral, 2012); fracture surgery; Colonoscopy (N/A, 2018); Upper gastrointestinal endoscopy (N/A, 2018); Cardioversion; Gastric bypass surgery; Larynx surgery; Upper gastrointestinal endoscopy (N/A, 3/31/2022); Colonoscopy (N/A, 3/31/2022); and Upper gastrointestinal endoscopy (N/A, 3/31/2022). Discharge Recommendations: Ghislaine Croft scored a 18/24 on the AM-PAC short mobility form. Current research shows that an AM-PAC score of 18 or greater is typically associated with a discharge to the patient's home setting. Based on the patient's AM-PAC score and their current functional mobility deficits, it is recommended that the patient have 2-3 sessions per week of Physical Therapy at d/c to increase the patient's independence. At this time, this patient demonstrates the endurance and safety to discharge home with home health PT and a follow up treatment frequency of 2-3x/wk. Please see assessment section for further patient specific details.   If patient discharges prior to next session this note will serve as a discharge summary. Please see below for the latest assessment towards goals. DME Required For Discharge: no DME required at discharge  Precautions/Restrictions: high fall risk  Weight Bearing Restrictions: weight bearing as tolerated  [] Right Upper Extremity  [] Left Upper Extremity [] Right Lower Extremity  [] Left Lower Extremity     Required Braces/Orthotics: no braces required   [] Right  [] Left  Positional Restrictions:no positional restrictions    Pre-Admission Information   Lives With: Man Martines (daughter and 2 adult grandchildren, 16 month old grandchild, foster child, son)  Type of Home: House  Home Layout: One level,Performs ADL's on one level,Able to Live on Main level with bedroom/bathroom  Home Access: Stairs to enter without rails  Entrance Stairs - Number of Steps: 3 CAREY  Bathroom Shower/Tub: Walk-in shower  Bathroom Toilet: Handicap height  Bathroom Equipment: Hand-held shower,Built-in shower seat  Home Equipment: Cane,Electric scooter (uses cane at home and in community. Uses electric scooter around neighborhood, use it for gardeningm, or when walking long distances)  Receives Help From: Family  ADL Assistance: Independent  Homemaking Assistance: Needs assistance (pt does some cooking, daughter does laundry in basement, family does cleaning.)  Homemaking Responsibilities: Yes  Ambulation Assistance: Independent  Transfer Assistance: Independent (sleeps in lift chair)  Active : Yes (but has been having hard time getting in and out of car)   Patient's  Info: does not drive very often, family mostly drives patient to appointments.   Additional Comments: ~2 falls in the past 6 months (dizzy in living room and fell and hit head, other time trying to step off of curb and fell)     Examination   Vision:   Vision Gross Assessment: Impaired and Vision Corrective Device: wears glasses at all times  Hearing:   [facility]  Observation:   General Observation:  Patient supine in bed, LLE wrapped in ACE bandage from toes to below knee, O2 95% on room air, /80, 87bpm  Posture:   WFL  Sensation:   WFL - sensitive to touch R ankle d/t pain  Proprioception:    WFL  Tone:   Normotonic  Coordination Testing:   WFL    ROM:   (B) LE AROM WFL  Strength:   Functional weakness noted with difficulty sit-stand. Therapist Clinical Decision Making (Complexity): medium complexity  Clinical Presentation: stable      Subjective  General: Patient reports pain in RLE, increasing with movement. Patient reports she uses a lift chair at home to help her stand. Pain: 2/10. Location: RLE, 5/10 w/ movement  Pain Interventions: repositioned        Functional Mobility  Bed Mobility  Supine to Sit: stand by assistance  Scooting: stand by assistance  Comments:  Transfers  Sit to stand transfer: minimal assistance  Stand to sit transfer: minimal assistance  Bed to chair transfer: minimal assistance  Comments:  Gait belt donned. Patient unable to stand from lowest bed height on her own. Bed raised and patient able to stand w/ CGA. She transferred to chair but was unable to stand from chair w/ B arm rests. She stood w/ MIN assist.  Blankets placed under patient's bottom to elevate seat height and make standing easier. Discussed w/ nursing if they wanted a lift sling in chair. RN states they do not feel they need that. Recommended to nursing and PCA to use two people or Stedy. Ambulation  Surface:level surface  Assistive Device: rolling walker  Assistance: contact guard assistance  Distance: 15'  Gait Mechanics: dec'd clara  Comments:    Stair Mobility  Stair mobility not completed on this date. Comments:  Wheelchair Mobility:  No w/c mobility completed on this date.   Comments:  Balance  Static Sitting Balance: good: independent with functional balance in unsupported position  Dynamic Sitting Balance: good: independent with functional balance in unsupported position  Static Standing Balance: fair: maintains balance at CGA without use of UE support  Dynamic Standing Balance: fair: maintains balance at CGA without use of UE support  Comments:    Other Therapeutic Interventions    Functional Outcomes  AM-PAC Inpatient Mobility Raw Score : 18              Cognition  WFL  Orientation:    alert and oriented x 4  Command Following:   [facility]  Barriers To Learning: none  Patient Education: patient educated on goals, PT role and benefits, functional mobility training, transfer training, discharge recommendations  Learning Assessment:  patient verbalizes and demonstrates understanding    Assessment  Activity Tolerance: Limited ambulation d/t RLE pain and SOB. Impairments Requiring Therapeutic Intervention: decreased functional mobility, decreased strength, decreased endurance, decreased balance, increased pain  Prognosis: good  Clinical Assessment: Patient uses lift chair at home and is able to ambulate short distances with RW, uses a scooter for longer distances. She presently requires assistance to stand as she does not have her lift chair and [facility] for ambulation safety. Patient feels she can go home today. Recommend initial 24 hour assist and continued therapy at d/c. Safety Interventions: patient left in chair, chair alarm in place, call light within reach, gait belt, patient at risk for falls, and nurse notified    Plan  Frequency: 3-5 x/per week  Current Treatment Recommendations: strengthening, balance training, functional mobility training, transfer training, gait training, endurance training, patient/caregiver education, and equipment evaluation/education    Goals  Patient Goals: Return home. Short Term Goals:  Time Frame: Discharge.   Patient will complete bed mobility at modified independent   Patient will complete transfers at [facility]   Patient will ambulate 25 ft with use of rolling walker at contact guard assistance    Therapy Session Time      Individual Group Co-treatment   Time In     5910   Time Out     1203   Minutes     39     Timed Code Treatment Minutes:  24  Total Treatment Minutes:  39       Electronically Signed By: Geo Solomon PT, DPT, [id number] [id number]

## 2022-12-22 NOTE — PROGRESS NOTES
Infectious Diseases Inpatient Progress Note      CHIEF COMPLAINT:     Sepsis  Fevers  WBC elevation   Rt leg cellulitis  Bacteremia         HISTORY OF PRESENT ILLNESS:  78 y.o. woman with a past medical history of atrial fibrillation, hypertension, sleep apnea, arthritis, right leg ankle surgery many years ago with hardware in place admitted to the hospital secondary to acute onset of fever chills and right leg swelling and redness. Patient was seen in primary physician's office was evaluated by nurse practitioner, she had influenza antibody test that was positive for A ANTIBODY - (NOT ANTIGEN TESTING) was given a diagnosis of Flu- . He is now admitted because of worsening right leg cellulitis ongoing fevers and chills. Given the complicated history in the right leg with hardware in place we are consulted for recommendations  Location :   Right leg pain and swelling  Quality : Aching     Severity : 10/10  Duration : 5 days  Timing : Constant  Context : Remote history of right ankle surgery with hardware  Modifying factors : None  Associated signs and symptoms: Fever, chills, right leg swelling, pain, redness    Interval History : Right leg swelling and cellulitis slowly improving tolerating antibiotic therapy okay follow-up blood culture in process. Outpatient antibiotic therapy discussed in detail.   WBC still elevated    Past Medical History:    Past Medical History:   Diagnosis Date    Arthritis     Atrial fibrillation and flutter ([address]     Hypertension     Kidney disease     Pneumonia     Sleep apnea     no c-pap       Past Surgical History:    Past Surgical History:   Procedure Laterality Date    CARDIOVERSION      COLONOSCOPY N/A 11/20/2018    COLONOSCOPY POLYPECTOMY SNARE/COLD BIOPSY performed by Mil Khanna MD at [facility] N/A 3/31/2022    COLONOSCOPY POLYPECTOMY SNARE/COLD BIOPSY performed by Jarocho Hood MD at [address] [place] [address] rt has pins and rods, and rt elbow    GASTRIC BYPASS SURGERY      LARYNX SURGERY      TOTAL KNEE ARTHROPLASTY Bilateral 12/19/2012    TUBAL LIGATION      tubes tied    UPPER GASTROINTESTINAL ENDOSCOPY N/A 11/20/2018    EGD BIOPSY performed by Dina Tobias MD at [facility] N/A 3/31/2022    EGD DILATION BALLOON performed by Arden Cyr MD at [facility] N/A 3/31/2022    EGD BIOPSY performed by Arden Cyr MD at [address] [place] [address]       Current Medications:    No outpatient medications have been marked as taking for the 12/20/22 encounter [facility] Encounter).        Allergies:  Bee venom, Shellfish-derived products, Shrimp flavor, Codeine, Fentanyl and related, Hydromorphone, and Vancomycin    Immunizations :   Immunization History   Administered Date(s) Administered    COVID-19, PFIZER Bivalent BOOSTER, DO NOT Dilute, (age 12y+), IM, 30 mcg/0.3 mL 09/09/2022    COVID-19, PFIZER GRAY top, DO NOT Dilute, (age 15 y+), IM, 30 mcg/0.3 mL 05/20/2022    COVID-19, PFIZER PURPLE top, DILUTE for use, (age 15 y+), 30mcg/0.3mL 09/08/2021, 10/15/2021    Influenza Virus Vaccine 12/22/2012    Influenza, FLUAD, (age 72 y+), Adjuvanted, 0.5mL 10/27/2020, 10/15/2021, 09/09/2022    Influenza, High Dose (Fluzone 65 yrs and older) 09/30/2016, 10/01/2017, 10/08/2018    Influenza, Triv, inactivated, subunit, adjuvanted, IM (Fluad 65 yrs and older) 10/21/2019    Pneumococcal Conjugate 13-valent (Raguel Boor) 09/30/2016, 10/01/2017    Pneumococcal Polysaccharide ([id number]) 12/22/2012, 10/06/2017    Td vaccine (adult) 12/01/2008    Zoster Live (Zostavax) 09/15/2015         Social History:     Social History     Tobacco Use    Smoking status: Never    Smokeless tobacco: Never   Vaping Use    Vaping Use: Never used   Substance Use Topics    Alcohol use: No    Drug use: No     Social History     Tobacco Use   Smoking Status Never   Smokeless Tobacco Never Family History   Problem Relation Age of Onset    Cancer Father         stomach cancer          REVIEW OF SYSTEMS:      Constitutional: r fevers++ , chills, night sweats  Eyes:  negative for blurred vision, eye discharge, visual disturbance   HEENT:  negative for hearing loss, ear drainage,nasal congestion  Respiratory:  negative for cough, shortness of breath or hemoptysis   Cardiovascular:  negative for chest pain, palpitations, syncope  Gastrointestinal:  negative for nausea, vomiting, diarrhea, constipation, abdominal pain  Genitourinary:  negative for frequency, dysuria, urinary incontinence, hematuria  Hematologic/Lymphatic:  negative for easy bruising, bleeding and lymphadenopathy  Allergic/Immunologic:  negative for recurrent infections, angioedema, anaphylaxis   Endocrine:  negative for weight changes, polyuria, polydipsia and polyphagia  Musculoskeletal:  Rt leg pain + swelling  ++  pain, swelling, decreased range of motion  Integumentary: No rashes, skin lesions  Neurological:  negative for headaches, slurred speech, unilateral weakness  Psychiatric: negative for hallucinations,confusion,agitation.      PHYSICAL EXAM:      Vitals:  t MAX  101.8   /75   Pulse 64   Temp 98.1 °F (36.7 °C) (Oral)   Resp 16   Ht 5' 7\" (1.702 m)   Wt 238 lb (108 kg)   SpO2 94%   BMI 37.28 kg/m²     General Appearance: alert,in some acute distress, ++ pallor, no icterus   Skin: warm and dry, no rash or erythema  Head: normocephalic and atraumatic  Eyes: pupils equal, round, and reactive to light, conjunctivae normal  ENT: tympanic membrane, external ear and ear canal normal bilaterally, nose without deformity, nasal mucosa and turbinates normal without polyps  Neck: supple and non-tender without mass, no thyromegaly  no cervical lymphadenopathy  Pulmonary/Chest: clear to auscultation bilaterally- no wheezes, rales or rhonchi, normal air movement, no respiratory distress  Cardiovascular: normal rate, regular rhythm, normal S1 and S2, no murmurs, rubs, clicks, or gallops, no carotid bruits  Abdomen: soft, non-tender, non-distended, normal bowel sounds, no masses or organomegaly  Extremities: no cyanosis, clubbing or edema  Musculoskeletal: normal range of motion, no joint swelling, deformity or tenderness  Integumentary: No rashes, no abnormal skin lesions, no petechiae  Neurologic: reflexes normal and symmetric, no cranial nerve deficit  Psych:  Orientation, sensorium, mood normal   Lines: IV  Rt leg on going swelling + pain + redness extending up to the MId leg  Rt TKA not tender  Rt ankle lateral and medial surgical incision noted ++     DATA:    CBC:   Lab Results   Component Value Date    WBC 15.5 (H) 12/22/2022    HGB 10.9 (L) 12/22/2022    HCT 34.2 (L) 12/22/2022    MCV 90.9 12/22/2022     12/22/2022     RENAL:   Lab Results   Component Value Date    CREATININE 1.6 (H) 12/22/2022    BUN 30 (H) 12/22/2022     12/22/2022    K 4.1 12/22/2022     12/22/2022    CO2 24 12/22/2022     SED RATE:   Lab Results   Component Value Date/Time    SEDRATE 41 12/20/2022 04:00 AM     CK: No results found for: CKTOTAL  CRP:   Lab Results   Component Value Date/Time    CRP 14.1 12/20/2022 04:00 AM     Hepatic Function Panel:   Lab Results   Component Value Date/Time    ALKPHOS 89 12/22/2022 06:01 AM    ALT <5 12/22/2022 06:01 AM    AST 9 12/22/2022 06:01 AM    PROT 5.7 12/22/2022 06:01 AM    BILITOT 0.3 12/22/2022 06:01 AM    BILIDIR 1.0 09/13/2022 05:56 PM    IBILI 0.9 09/13/2022 05:56 PM    LABALBU 2.4 12/22/2022 06:01 AM     UA:  Lab Results   Component Value Date/Time    COLORU Yellow 12/21/2022 07:00 PM    CLARITYU CLOUDY 12/21/2022 07:00 PM    GLUCOSEU Negative 12/21/2022 07:00 PM    BILIRUBINUR Negative 12/21/2022 07:00 PM    BILIRUBINUR large 07/23/2018 03:23 PM    KETUA Negative 12/21/2022 07:00 PM    SPECGRAV 1.025 12/21/2022 07:00 PM    BLOODU TRACE 12/21/2022 07:00 PM    PHUR 5.0 12/21/2022 07:00 PM PROTEINU 30 12/21/2022 07:00 PM    UROBILINOGEN 0.2 12/21/2022 07:00 PM    NITRU Negative 12/21/2022 07:00 PM    LEUKOCYTESUR Negative 12/21/2022 07:00 PM    LABMICR YES 12/21/2022 07:00 PM    URINETYPE NotGiven 12/21/2022 07:00 PM      Urine Microscopic:   Lab Results   Component Value Date/Time    BACTERIA 2+ 12/21/2022 07:00 PM    COMU see below 01/07/2019 12:38 AM    HYALCAST 0 12/21/2022 07:00 PM    WBCUA 0 12/21/2022 07:00 PM    RBCUA 2 12/21/2022 07:00 PM    EPIU 0 12/21/2022 07:00 PM     Urine Reflex to Culture:   Lab Results   Component Value Date/Time    URRFLXCULT Not Indicated 12/21/2022 07:00 PM     Procal 1.45     Creat   1.4     WBC  16   MICRO: cultures reviewed and updated by me        Micro results (current encounter only)    Procedure Component Value Units Date/Time   Clostridium difficile toxin/antigen [[id number]] Collected: 12/21/22 1115   Order Status: Completed Specimen: Stool Updated: 12/21/22 1432    C.diff Toxin/Antigen --    Negative for Clostridium difficile antigen and toxin   Normal Range: Negative    Narrative:     ORDER#: [id number]                          ORDERED BY: Geovany Toure   SOURCE: Stool                              COLLECTED:  12/21/22 11:15   ANTIBIOTICS AT MANDY.:                      RECEIVED :  12/21/22 11:55   Collect White vial (sterile container)   Culture, Blood 2 [[id number]] (Abnormal) Collected: 12/20/22 0418   Order Status: Completed Specimen: Blood Updated: 12/21/22 1011    Culture, Blood 2 -- Abnormal     Gram stain Anaerobic bottle:   Gram positive cocci in chains and/or pairs   resembling Streptococcus   Information to follow   Gram stain Aerobic bottle:   Gram positive cocci in chains and/or pairs   resembling Streptococcus   Information to follow    Abnormal     Organism Beta Strep Group G Abnormal     Culture, Blood 2 --    POSITIVE for   Susceptibility testing of penicillin and other beta lactams is   not necessary for beta hemolytic Streptococci Status: Completed Specimen: Nasopharyngeal Updated: 12/21/22 0055    Rapid Influenza A Ag Negative    Rapid Influenza B Ag Negative   Culture, Blood, PCR ID Panel [[id number]] Collected: 12/20/22 0400   Order Status: Completed Updated: 12/20/22 1821    Report SEE IMAGE   Narrative:     Phyllis Torres  SFF3T tel. [phone],   Microbiology results called to and read back by LILY Saldana,   12/20/2022 18:21, by DONG   Clostridium difficile toxin/antigen [[id number]]    Order Status: Canceled Specimen: Stool    COVID-19, Rapid [[id number]] Collected: 12/20/22 0436   Order Status: Completed Specimen: Nasopharyngeal Swab Updated: 12/20/22 0506    SARS-CoV-2, NAAT Not Detected    Comment: Rapid NAAT:   Negative results should be treated as presumptive and,   if inconsistent with clinical signs and symptoms or necessary for   patient management, should be tested with an alternative molecular   assay. Negative results do not preclude SARS-CoV-2 infection and   should not be used as the sole basis for patient management decisions. This test has been authorized by the FDA under an Emergency Use   Authorization (EUA) for use by authorized laboratories.      Fact sheet for Healthcare Providers:   [employer]   Fact sheet for Patients: [employer]     METHODOLOGY: Isothermal Nucleic Acid Amplification              Micro results (current encounter only)    Procedure Component Value Units Date/Time   Culture, Blood 1 [[id number]] Collected: 12/21/22 0538   Order Status: Sent Specimen: Blood Updated: 12/21/22 0902   Culture, Blood 2 [[id number]] Collected: 12/21/22 0538   Order Status: Sent Specimen: Blood Updated: 12/21/22 0902   Rapid Influenza A/B Antigens [[id number]] Collected: 12/21/22 0023   Order Status: Completed Specimen: Nasopharyngeal Updated: 12/21/22 0055    Rapid Influenza A Ag Negative    Rapid Influenza B Ag Negative   Culture, Blood 2 [[id number]] (Abnormal) Collected: 12/20/22 0418   Order Status: Completed Specimen: Blood Updated: 12/20/22 2015    Culture, Blood 2 -- Abnormal     Gram stain Anaerobic bottle:   Gram positive cocci in chains and/or pairs   resembling Streptococcus   Information to follow   Gram stain Aerobic bottle:   Gram positive cocci in chains and/or pairs   resembling Streptococcus   Information to follow    Abnormal    Narrative:     ORDER#: [id number]                          ORDERED BY: KIAN POSEY   SOURCE: Blood Antecubital-Lef              COLLECTED:  12/20/22 04:18   ANTIBIOTICS AT MANDY.:                      RECEIVED :  12/20/22 14:38   If child <=2 yrs old please draw pediatric bottle. ~Blood Culture #2   Culture, Blood, PCR ID Panel [[id number]] Collected: 12/20/22 0400   Order Status: Completed Updated: 12/20/22 1821    Report SEE IMAGE   Narrative:     Sushma Ashley  F3T tel. [phone],   Microbiology results called to and read back by LILY Page,   12/20/2022 18:21, by Jacinta Love   Blood Culture 1 [[id number]] (Abnormal) Collected: 12/20/22 0400   Order Status: Completed Specimen: Blood Updated: 12/20/22 1818    Blood Culture, Routine -- Abnormal     Gram stain Anaerobic bottle:   Gram positive cocci in chains and/or pairs   resembling Streptococcus   Information to follow   Gram stain Aerobic bottle:   Gram positive cocci in chains and/or pairs   resembling Streptococcus   Information to follow    Abnormal     Organism Streptococcus species DNA Detected Abnormal     Blood Culture, Routine See additional report for complete BCID panel. Narrative:     ORDER#: [id number]                          ORDERED BY: KIAN POSEY   SOURCE: Blood Antecubital-Lef              COLLECTED:  12/20/22 04:00   ANTIBIOTICS AT MANDY.:                      RECEIVED :  12/20/22 14:38   If child <=2 yrs old please draw pediatric bottle. ~Blood Culture 1   Clostridium difficile toxin/antigen [[id number]]    Order Status: Canceled Specimen: Stool COVID-19, Rapid [[id number]] Collected: 12/20/22 0436   Order Status: Completed Specimen: Nasopharyngeal Swab Updated: 12/20/22 0506    SARS-CoV-2, NAAT Not Detected    Comment: Rapid NAAT:   Negative results should be treated as presumptive and,   if inconsistent with clinical signs and symptoms or necessary for   patient management, should be tested with an alternative molecular   assay. Negative results do not preclude SARS-CoV-2 infection and   should not be used as the sole basis for patient management decisions. This test has been authorized by the FDA under an Emergency Use   Authorization (EUA) for use by authorized laboratories. Fact sheet for Healthcare Providers:   [employer]   Fact sheet for Patients: [employer]     METHODOLOGY: Isothermal Nucleic Acid Amplification              Micro results (current encounter only)    Procedure Component Value Units Date/Time   Clostridium difficile toxin/antigen [[id number]]    Order Status: Canceled Specimen: Stool    COVID-19, Rapid [[id number]] Collected: 12/20/22 0436   Order Status: Completed Specimen: Nasopharyngeal Swab Updated: 12/20/22 0506    SARS-CoV-2, NAAT Not Detected    Comment: Rapid NAAT:   Negative results should be treated as presumptive and,   if inconsistent with clinical signs and symptoms or necessary for   patient management, should be tested with an alternative molecular   assay. Negative results do not preclude SARS-CoV-2 infection and   should not be used as the sole basis for patient management decisions. This test has been authorized by the FDA under an Emergency Use   Authorization (EUA) for use by authorized laboratories.      Fact sheet for Healthcare Providers:   [employer]   Fact sheet for Patients: [employer]     METHODOLOGY: Isothermal Nucleic Acid Amplification       Blood Culture 1 [[id number]] Order Status: Sent Specimen: Blood    Culture, Blood 2 [[id number]]    Order Status: Sent Specimen: Blood        Blood Culture:   Lab Results   Component Value Date/Time    [facility]  12/21/2022 05:38 AM     No Growth to date. Any change in status will be called. Rudy Walters  12/21/2022 05:38 AM     No Growth to date. Any change in status will be called. Viral Culture:    Lab Results   Component Value Date/Time    COVID19 Not Detected 12/20/2022 04:36 AM    COVID19 Not Detected 09/13/2022 05:56 PM    COVID19 NOT DETECTED 10/01/2020 11:01 AM     Urine Culture: No results for input(s): LABURIN in the last 72 hours. Scheduled Meds:   ipratropium-albuterol  1 ampule Inhalation Q6H    predniSONE  40 mg Oral Daily    ceFAZolin  2,000 mg IntraVENous Q8H    amiodarone  200 mg Oral BID    sodium chloride flush  10 mL IntraVENous 2 times per day    apixaban  5 mg Oral BID    aspirin  81 mg Oral Daily    buPROPion  200 mg Oral BID    pantoprazole  40 mg Oral BID AC    metoprolol tartrate  25 mg Oral BID       Continuous Infusions:   sodium chloride 25 mL/hr (12/22/22 0501)       PRN Meds:  ipratropium-albuterol, sodium chloride flush, sodium chloride, potassium chloride, magnesium sulfate, promethazine **OR** ondansetron, acetaminophen **OR** acetaminophen, oxyCODONE-acetaminophen    Imaging:   CT TIBIA FIBULA RIGHT WO CONTRAST   Final Result   1. Nonspecific soft tissue edema with no organized drainable fluid collection   or subcutaneous gas identified. Chronic dystrophic calcifications   redemonstrated throughout the soft tissues. 2. No acute osseous findings and no CT evidence of osteomyelitis. XR CHEST PORTABLE   Final Result   Stable cardiomegaly with resolving central pulmonary congestion. Chronic interstitial changes throughout with mild bibasilar atelectasis which   is more prominent.       Moderate hiatal hernia which is unchanged         VL Extremity Venous Right   Final Result      XR CHEST PORTABLE   Final Result   Cardiac silhouette and central vasculature are prominent suggesting a mild   CHF with vascular congestion and interstitial edema. Decrease of the more   localized opacities on the old exam.      However the general malgorzata are prominent and underlying adenopathy is not   excluded by chest x-ray. Previous CT had enlarged pulmonary arteries rather   than overt adenopathy. XR ANKLE RIGHT (MIN 3 VIEWS)   Final Result   Stable appearance of the medial malleolar pins and the intramedullary nail at   the distal fibula with the proximal portion of the intramedullary nail   outside bone. Fusion of the distal interosseous membrane bridging the tibia and fibula with   old healed bony deformities. Progressive loss of the tibiotalar joint and   subtalar joint with sclerosis of the margins. Edema of the soft tissues with scattered soft tissue calcifications. No soft   tissue emphysema or area of focal bony erosion. XR CHEST PORTABLE    (Results Pending)       All pertinent images and reports for the current Hospitalization were reviewed by me.     IMPRESSION:    Patient Active Problem List   Diagnosis    Atrial fibrillation and flutter (HCC)    Essential hypertension    Severe episode of recurrent major depressive disorder, without psychotic features (HCC)    Seasonal affective disorder ([facility])    Class 3 obesity due to excess calories with serious comorbidity and body mass index (BMI) of 45.0 to 49.9 in adult    Elevated sed rate    Neutrophilia    Elevated C-reactive protein (CRP)    Obstructive sleep apnea    Morbid obesity (HCC)    Weight loss counseling, encounter for    Atypical atrial flutter (HCC)    PAF (paroxysmal atrial fibrillation) ([facility])    ILD (interstitial lung disease) ([facility])    Iron deficiency anemia due to chronic blood loss    Iron deficiency anemia, unspecified    Age-related osteoporosis without current pathological fracture    Vasovagal syncope    CKD (chronic kidney disease) stage 4, GFR 15-29 ml/min (HCC)    Chronic diastolic congestive heart failure (HCC)    Stage 3b chronic kidney disease (HCC)    B12 deficiency    Vitamin D deficiency    Acute respiratory failure (HCC)    Chronic obstructive pulmonary disease, unspecified    Sepsis (HCC)    Cellulitis of right leg    Obesity (BMI 30-39. 9)    Stage 3a chronic kidney disease (HCC)    Bacteremia    Fever and chills    History of ankle surgery    Streptococcal infection group G       Sepsis  Rt leg severe cellulitis  Rt leg ankle surgery > 40 yrs ago   Hardware in place  Rt TKA history   Morbid obesity BMI at  36   Fevers and chills   CKD stage 3A  Procal elevation   BNP elevation   WBC elevation   ESR 41  X ray rT ANKLE Wuth extensive Hardware in place     ON  12/15/ SHE WAS tested for INFLUENZA ANTIBODY testing which is useless for Diagnosis of ACUTE INFLUENZA infection     Influenza A  ANTIBODY +Ve indicates positive immunity from previous infection NOT useful in the acute setting       Blood culture isolated group G strep  -repeat blood culture in process    Likely need IV antibiotic at discharge given the hardware in the  right ankle also she has a right prosthetic knee joint    Follow-up blood culture in process WBC still elevated tolerating antibiotic therapy okay will need PICC line versus chest line for IV antibiotics    Labs, Microbiology, Radiology and pertinent results from current hospitalization and care every where were reviewed by me as a part of the consultation. PLAN :    IV Cefazolin x 2 gm Q 8 hrs  x 3 weeks or more   Blood cx repeat NGTD  CT Rt leg NO abscess   Watch Rt knee for seeding   Influenza  -VE   Ortho eval necessary   Kerlix and ace wrap Rt leg   Will need IV abx at d/c due to all the hardware    TTE  -VE      Discussed with patient/Family and Nursing   Risk of Complications/Morbidity: High      Illness(es)/ Infection present that pose threat to bodily function.    There is potential for severe exacerbation of infection/side effects of treatment. Therapy requires intensive monitoring for antimicrobial agent toxicity. Thanks for allowing me to participate in your patient's care please call me with any questions or concerns.     Dr. Jose Evans MD  [address] [place] [address] Physician  Phone: [phone]   Fax : [phone]

## 2022-12-22 NOTE — CONSULTS
[id number] [facility] Orthopedic Surgery  Consult Note    Patient: Bibi Au  Admit Date: 12/20/2022  Requesting Physician: Haven Bailey DO  Room: [address] [place] [address]03    Chief complaint: Right leg cellulitis, redness, swelling    HPI: Bibi Au is a 78 y.o. female who presented to [facility] ER on 12/20 with complaints of swelling and pain in her right lower extremity. Patient denies recent injury. She typically ambulates with a cane and using furniture to walk. She underwent a right ankle ORIF approximately 40 years ago with retained hardware. She also underwent a right TKA over a decade ago without recent issues. She tells me she has had cellulitis in this leg intermittently for numerous years. Overall her symptoms have greatly improved since being admitted 2 days ago by her report. Describes pain in right lower extremity and anterior lateral shin of moderate intensity and of aching nature since 4 to 5 days ago which is relieved by rest and elevation. Denies new numbness/tingling. 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 She has retained intramedullary K wire in her distal fibula and 2 in her medial mal.  Diffuse changes consistent with posttraumatic arthritis more likely than osteomyelitis. She does not take pain medicine at baseline and is a non-smoker. She is afebrile. WBC 15.5 and is stable  No growth to date on blood cultures from yesterday.   She did grow beta strep group B from the blood culture from 12/20  ESR 41  CRP 14  PCT 1.45 on 12/20    Medical History:  Past Medical History:   Diagnosis Date    Arthritis     Atrial fibrillation and flutter ([address]     Hypertension     Kidney disease     Pneumonia     Sleep apnea     no c-pap     Past Surgical History:   Procedure Laterality Date    CARDIOVERSION      COLONOSCOPY N/A 11/20/2018    COLONOSCOPY POLYPECTOMY SNARE/COLD BIOPSY performed by Corey Smith MD at [facility] [address] N/A 3/31/2022    COLONOSCOPY POLYPECTOMY SNARE/COLD BIOPSY performed by Darryl France MD at [address] [place] [address]      ankle rt has pins and rods, and rt elbow    GASTRIC BYPASS SURGERY      LARYNX SURGERY      TOTAL KNEE ARTHROPLASTY Bilateral 12/19/2012    TUBAL LIGATION      tubes tied    UPPER GASTROINTESTINAL ENDOSCOPY N/A 11/20/2018    EGD BIOPSY performed by Corey Smith MD at [address] [place] [address] N/A 3/31/2022    EGD DILATION BALLOON performed by Darryl France MD at [address] [place] [address] N/A 3/31/2022    EGD BIOPSY performed by Darryl France MD at [facility]. Tripp Scruggs History:    reports that she has never smoked. She has never used smokeless tobacco.    Family History:        Problem Relation Age of Onset    Cancer Father         stomach cancer       Medications:  ALL MEDICATIONS HAVE BEEN REVIEWED:  Scheduled:   ipratropium-albuterol  1 ampule Inhalation Q6H    predniSONE  40 mg Oral Daily    ceFAZolin  2,000 mg IntraVENous Q8H    amiodarone  200 mg Oral BID    sodium chloride flush  10 mL IntraVENous 2 times per day    apixaban  5 mg Oral BID    aspirin  81 mg Oral Daily    buPROPion  200 mg Oral BID    pantoprazole  40 mg Oral BID AC    metoprolol tartrate  25 mg Oral BID     Continuous:   sodium chloride 25 mL/hr (12/22/22 0501)     PRN:ipratropium-albuterol, sodium chloride flush, sodium chloride, potassium chloride, magnesium sulfate, promethazine **OR** ondansetron, acetaminophen **OR** acetaminophen, oxyCODONE-acetaminophen    Allergies:    Allergies   Allergen Reactions    Bee Venom Swelling and Angioedema    Shellfish-Derived Products Other (See Comments)     Syncope/seizures    Shrimp Flavor      Passes out      Codeine Hives and Other (See Comments)     B/P DROPS PASSES OUT  Passed out    Fentanyl And Related Hives Other reaction(s): Dizziness    Hydromorphone Rash, Hives and Other (See Comments)     Full rash spreading across chest and both arms from IV hydromorphone  Passed out    Vancomycin Rash     Tolerated 12/20/22       Review of Systems:  Constitutional: Negative for fever, chills, fatigue. Skin:  Negative for pruritis, rash  Eyes: Negative for photophobia and visual disturbance. ENT:  Negative for rhinorrhea, epistaxis, sore throat  Respiratory:  Negative for cough and shortness of breath. Cardiovascular: Negative for chest pain. Gastrointestinal: Negative for nausea, vomiting, diarrhea. Genitourinary: Negative for dysuria and difficulty urinating. Neurological: Negative for confusion, dysarthria, tremors, seizures. Psychiatric:  Negative for depression or anxiety  Musculoskeletal:  Positive for right leg swelling and erythema    Objective:  Vitals:    12/22/22 1127   BP: 120/80   Pulse: 76   Resp: 18   Temp: 98.5 °F (36.9 °C)   SpO2: 97%      Physical Examination:  GENERAL: No apparent distress, well-nourished  SKIN:  Warm and dry  EYES: Nonicteric. ENT: Mucous membranes moist  HEAD: Normocephalic, atraumatic  RESPIRATORY: Resp easy and unlabored  CARDIOVASCULAR: Regular rate and rhythm  GI: Abdomen soft, nontender  NEURO: Awake and alert. No speech defect  PSYCHIATRIC: Appropriate affect; not agitated  MUSCULOSKELETAL: Right leg  Inspection: On exam there are no ulcerations, rashes or lesions about the right knee or ankle. There is a medial and lateral incision of the right ankle which are well-healed. There is pain to palpation of the anterior distal tibia and diffusely about the lower leg. There is no evidence of fluctuance on exam.  She can actively DF/PF of the right ankle with mild pain. She tolerates passive DF/PF of the right ankle with mild pain. She can wiggle all toes. She can flex and extend her knee without issue.   She really has only minimal tenderness to palpation over the incisions medially and laterally about the right ankle -much more tender elsewhere. Sensation: Grossly intact to light touch throughout the right lower extremity in all nerve distributions. Vascular: 1+ right DP pulse. Labs reviewed:  Recent Labs     12/20/22  0400 12/21/22  0538 12/22/22  0601   WBC 16.0* 17.2* 15.5*   HGB 12.3 10.5* 10.9*   HCT 39.3 33.0* 34.2*    143 164     Recent Labs     12/20/22  0400 12/21/22  0538 12/22/22  0601    139 138   K 3.8 4.2 4.1    109 107   CO2 26 23 24   BUN 31* 31* 30*   CREATININE 1.4* 1.4* 1.6*   GLUCOSE 99 103* 125*   CALCIUM 8.8 7.8* 8.2*     No results for input(s): INR, PROTIME in the last 72 hours. Lab Results   Component Value Date    COLORU Yellow 12/21/2022    CLARITYU CLOUDY (A) 12/21/2022    PHUR 5.0 12/21/2022    GLUCOSEU Negative 12/21/2022    BLOODU TRACE (A) 12/21/2022    LEUKOCYTESUR Negative 12/21/2022    NITRITE negative 07/23/2018    BILIRUBINUR Negative 12/21/2022    UROBILINOGEN 0.2 12/21/2022    RBCUA 2 12/21/2022    WBCUA 0 12/21/2022    BACTERIA 2+ (A) 12/21/2022       Imaging:  XR CHEST PORTABLE   Final Result   No focal lung opacity. CT TIBIA FIBULA RIGHT WO CONTRAST   Final Result   1. Nonspecific soft tissue edema with no organized drainable fluid collection   or subcutaneous gas identified. Chronic dystrophic calcifications   redemonstrated throughout the soft tissues. 2. No acute osseous findings and no CT evidence of osteomyelitis. XR CHEST PORTABLE   Final Result   Stable cardiomegaly with resolving central pulmonary congestion. Chronic interstitial changes throughout with mild bibasilar atelectasis which   is more prominent. Moderate hiatal hernia which is unchanged         VL Extremity Venous Right   Final Result      XR CHEST PORTABLE   Final Result   Cardiac silhouette and central vasculature are prominent suggesting a mild   CHF with vascular congestion and interstitial edema. Decrease of the more   localized opacities on the old exam.      However the general malgorzata are prominent and underlying adenopathy is not   excluded by chest x-ray. Previous CT had enlarged pulmonary arteries rather   than overt adenopathy. XR ANKLE RIGHT (MIN 3 VIEWS)   Final Result   Stable appearance of the medial malleolar pins and the intramedullary nail at   the distal fibula with the proximal portion of the intramedullary nail   outside bone. Fusion of the distal interosseous membrane bridging the tibia and fibula with   old healed bony deformities. Progressive loss of the tibiotalar joint and   subtalar joint with sclerosis of the margins. Edema of the soft tissues with scattered soft tissue calcifications. No soft   tissue emphysema or area of focal bony erosion. IMPRESSION:  Right lower extremity cellulitis  Status post right ankle ORIF with retained hardware 40 years ago  Status post bilateral TKA  Principal Problem:    Sepsis ([address]  Active Problems:    Cellulitis of right leg    Obesity (BMI 30-39. 9)    Stage 3a chronic kidney disease (HCC)    Bacteremia    Fever and chills    History of ankle surgery    Streptococcal infection group G    Neutrophilia  Resolved Problems:    * No resolved hospital problems. *      RECOMMENDATIONS:  No indication for acute operative intervention of the right lower extremity. No convincing signs of osteomyelitis or fluid collection amenable to drainage.  -Certainly she could consider following up as an outpatient regarding possible hardware removal, but nothing needed acutely. - Pain control  - DVT prophylaxis: Per primary team  -ID: Antibiotics per infectious disease  -WBAT, range of motion as tolerated  - Dispo: Per primary team    Patient denies tobacco use. I have reviewed imaging and plan with Dr. Nicol Lopez.       MARILEE Loving - ROBBY  12/22/2022  12:12 PM                                                Orthopaedic Surgery Attending Note      I have personally seen and examined Ana Claros. I have fully participated in the care of this patient. I have reviewed and agree with all pertinent clinical information including history, physical exam, diagnostic testing and the plan by Kenneth Pulido CNP unless otherwise noted. Briefly, Ms. Ana Claros is a 78 y.o. female presented to [facility] ER 2 days ago with complaints of right leg swelling and pain  She has a history of right ankle ORIF greater than 40 years ago. She does have a history of right total knee arthroplasty. She does state that she feels better since she has been admitted to the hospital.  Pain is located in her leg. She tells me that she did have cellulitis in his leg approximately over 1 year ago. /64   Pulse 88   Temp 98 °F (36.7 °C) (Oral)   Resp 16   Ht 5' 7\" (1.702 m)   Wt 238 lb (108 kg)   SpO2 96%   BMI 37.28 kg/m²    Right leg erythema and swelling. (Patient states the erythema has improved since admission.)      Right ankle x-rays:  No acute fracture or dislocation. 2 medial malleoli are pin seen. K wire in the distal fibula. It does appear to protrude out at the distal aspect of the pin. There is severe tibiotalar arthritis. There is distal tib-fib synostosis    CT right tib-fib:  1. Nonspecific soft tissue edema with no organized drainable fluid collection   or subcutaneous gas identified. Chronic dystrophic calcifications   redemonstrated throughout the soft tissues. 2. No acute osseous findings and no CT evidence of osteomyelitis. Assessment:  Right leg cellulitis  History of bilateral TKA  Remote history of right ankle ORIF  Class II obesity  Stage III chronic kidney disease  Streptococcal sepsis  A. fib/flutter  Hypertension  Sleep apnea    Plan:  --Agree that at this point there is no indication for surgical intervention.   --Antibiotics per infectious disease  --Weight-bear as tolerated  --Elevate right leg        Art Mckeon.  Ike Monterroso MD  Orthopaedic Surgery and Sports Medicine

## 2022-12-22 NOTE — PROGRESS NOTES
[facility] Department   Phone: [phone]    Occupational Therapy    [x] Initial Evaluation            [] Daily Treatment Note         [] Discharge Summary      Patient: Reji Kerr   : 1945   MRN: [id number]   Date of Service:  2022    Admitting Diagnosis:  Sepsis [facility])  Current Admission Summary: Reji Kerr is a 78 y.o. female patient was diagnosed with the flu on Thursday over the last days she has noticed redness and swelling and drainage from the right leg where it is weeping with increased pain patient's had previous osteomyelitis of the right ankle where she  has  redness   Past Medical History:  has a past medical history of Arthritis, Atrial fibrillation and flutter ([address] Hypertension, Kidney disease, Pneumonia, and Sleep apnea. Past Surgical History:  has a past surgical history that includes Tubal ligation; Total knee arthroplasty (Bilateral, 2012); fracture surgery; Colonoscopy (N/A, 2018); Upper gastrointestinal endoscopy (N/A, 2018); Cardioversion; Gastric bypass surgery; Larynx surgery; Upper gastrointestinal endoscopy (N/A, 3/31/2022); Colonoscopy (N/A, 3/31/2022); and Upper gastrointestinal endoscopy (N/A, 3/31/2022). Discharge Recommendations: Reji Kerr scored a 16/24 on the AM-PAC ADL Inpatient form. Current research shows that an AM-PAC score of 18 or greater is typically associated with a discharge to the patient's home setting. Based on the patient's AM-PAC score, and their current ADL deficits, it is recommended that the patient have 2-3 sessions per week of Occupational Therapy at d/c to increase the patient's independence.   At this time, this patient demonstrates the endurance and safety to discharge home with   [address] [place] [address]: LEVEL 1 STANDARD     -Initial home health evaluation to occur within 24-48 hours, in patient home    -Home health agency to establish plan of care for patient over 60 day period    -Medication Reconciliation    -PCP Visit scheduled within seven days of discharge    -PT/OT to evaluate with goal of regaining prior level of functioning    -OT to evaluate if patient has [address] needs for personal care  (home vs OP services) and a follow up treatment frequency of 2-3x/wk. Please see assessment section for further patient specific details. If patient discharges prior to next session this note will serve as a discharge summary. Please see below for the latest assessment towards goals. DME Required For Discharge: patient has all required DME for discharge    Precautions/Restrictions: no restrictions, high fall risk  Weight Bearing Restrictions: weight bearing as tolerated  [] Right Upper Extremity  [] Left Upper Extremity [] Right Lower Extremity  [] Left Lower Extremity     Required Braces/Orthotics: no braces required   [] Right  [] Left  Positional Restrictions:no positional restrictions    Social/Functional History  Lives With: Man Martines (daughter and 2 adult grandchildren, 16 month old grandchild, foster child, son)  Type of Home: House  Home Layout: One level,Performs ADL's on one level,Able to Live on Main level with bedroom/bathroom  Home Access: Stairs to enter without rails  Entrance Stairs - Number of Steps: 3 CAREY  Bathroom Shower/Tub: Walk-in shower  Bathroom Toilet: Handicap height  Bathroom Equipment: Hand-held shower,Built-in shower seat  Home Equipment: Cane,Electric scooter (uses cane at home and in community.  Uses electric scooter around neighborhood, use it for gardeningm, or when walking long distances)  Receives Help From: Family  ADL Assistance: Independent  Homemaking Assistance: Needs assistance (pt does some cooking, daughter does laundry in basement, family does cleaning.)  Homemaking Responsibilities: Yes  Ambulation Assistance: Independent  Transfer Assistance: Independent (sleeps in lift chair)  Active : Yes (but has been having hard time getting in and out of car)   Patient's  Info: does not drive very often, family mostly drives patient to appointments. Additional Comments: ~2 falls in the past 6 months (dizzy in living room and fell and hit head, other time trying to step off of curb and fell)     Examination   Vision:   Vision Corrective Device: wears glasses at all times  Hearing:   WFL  ROM:   (B) UE AROM WFL  Strength:   (B) UE strength grossly [facility]    Therapist Clinical Decision Making (Complexity): medium complexity  Clinical Presentation: stable      Subjective  General: Pt received seated in bed agreeable to OT PT evaluation. Pain: 0/10  Pain Interventions: not applicable        Activities of Daily Living  Basic Activities of Daily Living  Feeding: setup assistance  Grooming: setup assistance supervision  Upper Extremity Dressing: contact guard assistance  Lower Extremity Dressing: maximum assistance  Pt completed ADLs seated EOB, donned brief and ren set up grooming   Instrumental Activities of Daily Living  No IADL completed on this date. Functional Mobility  Bed Mobility  Supine to Sit: stand by assistance  Comments:  Transfers  Sit to stand transfer:minimal assistance  Stand to sit transfer: minimal assistance  Stand step transfer: minimal assistance  Bed / Chair transfer: minimal assistance. Comments:  Functional Mobility:  Sitting Balance: modified independent. Standing Balance: stand by assistance.     Functional Mobility: .  contact guard assistance, minimal assistance  Functional Mobility Activity: to/from bathroom  Functional Mobility Device Use: rolling walker    Other Therapeutic Interventions    Functional Outcomes  AM-PAC Inpatient Daily Activity Raw Score: 16    Cognition  WFL  Orientation:    alert and oriented x 4  Command Following:   [facility] PAT     Education  Barriers To Learning: none  Patient Education: patient educated on goals, OT role and benefits, plan of care, discharge recommendations  Learning Assessment:  patient verbalizes understanding, would benefit from continued reinforcement    Assessment  Activity Tolerance: good activity tolerance   Impairments Requiring Therapeutic Intervention: decreased functional mobility, decreased ADL status, decreased endurance, decreased balance  Prognosis: good  Clinical Assessment: Pt currently below baseline level with ADLs and functional mobility.  Would benefit from skilled OT to increase activity tolerance for ADLs and functional mobility prior to d/c  Safety Interventions: patient left in chair, chair alarm in place, call light within reach, and nurse notified    Plan  Frequency: 3-5 x/per week  Current Treatment Recommendations: strengthening, ROM, balance training, functional mobility training, transfer training, patient/caregiver education, and ADL/self-care training    Goals  Patient Goals: go home   Short Term Goals:  Time Frame: discharge   Patient will complete upper body ADL at modified independent   Patient will complete lower body ADL at modified independent   Patient will complete toileting at modified independent   Patient will complete functional transfers at modified independent   Patient will complete functional mobility at modified independent     Therapy Session Time     Individual Group Co-treatment   Time In    1024   Time Out    1103   Minutes    39        Timed Code Treatment Minutes:   24  Total Treatment Minutes:  39       Electronically Signed By: Isabella Land, [address], [address] [facility]

## 2022-12-22 NOTE — PLAN OF CARE
Problem: Skin/Tissue Integrity  Goal: Absence of new skin breakdown  Description: 1. Monitor for areas of redness and/or skin breakdown  2. Assess vascular access sites hourly  3. Every 4-6 hours minimum:  Change oxygen saturation probe site  4. Every 4-6 hours:  If on nasal continuous positive airway pressure, respiratory therapy assess nares and determine need for appliance change or resting period. Outcome: Progressing     Problem: Discharge Planning  Goal: Discharge to home or other facility with appropriate resources  Outcome: Progressing  Flowsheets (Taken 12/21/2022 2145)  Discharge to home or other facility with appropriate resources: Identify barriers to discharge with patient and caregiver     Problem: Pain  Goal: Verbalizes/displays adequate comfort level or baseline comfort level  Outcome: Progressing     Problem: Safety - Adult  Goal: Free from fall injury  Outcome: Progressing     Problem: Neurosensory - Adult  Goal: Achieves stable or improved neurological status  Outcome: Progressing  Flowsheets (Taken 12/21/2022 2145)  Achieves stable or improved neurological status:   Assess for and report changes in neurological status   Initiate measures to prevent increased intracranial pressure   Maintain blood pressure and fluid volume within ordered parameters to optimize cerebral perfusion and minimize risk of hemorrhage   Monitor temperature, glucose, and sodium.  Initiate appropriate interventions as ordered     Problem: Respiratory - Adult  Goal: Achieves optimal ventilation and oxygenation  Outcome: Progressing  Flowsheets (Taken 12/21/2022 2145)  Achieves optimal ventilation and oxygenation:   Assess for changes in respiratory status   Assess for changes in mentation and behavior   Position to facilitate oxygenation and minimize respiratory effort   Oxygen supplementation based on oxygen saturation or arterial blood gases   Assess and instruct to report shortness of breath or any respiratory difficulty     Problem: Cardiovascular - Adult  Goal: Maintains optimal cardiac output and hemodynamic stability  Outcome: Progressing  Flowsheets (Taken 12/21/2022 2145)  Maintains optimal cardiac output and hemodynamic stability:   Monitor blood pressure and heart rate   Monitor urine output and notify Licensed Independent Practitioner for values outside of normal range   Assess for signs of decreased cardiac output     Problem: Skin/Tissue Integrity - Adult  Goal: Incisions, wounds, or drain sites healing without S/S of infection  Outcome: Progressing  Flowsheets (Taken 12/21/2022 2145)  Incisions, Wounds, or Drain Sites Healing Without Sign and Symptoms of Infection: TWICE DAILY: Assess and document skin integrity     Problem: Musculoskeletal - Adult  Goal: Return mobility to safest level of function  Outcome: Progressing  Flowsheets (Taken 12/21/2022 2145)  Return Mobility to Safest Level of Function:   Assess patient stability and activity tolerance for standing, transferring and ambulating with or without assistive devices   Assist with transfers and ambulation using safe patient handling equipment as needed   Obtain physical therapy/occupational therapy consults as needed   Instruct patient/family in ordered activity level     Problem: Gastrointestinal - Adult  Goal: Minimal or absence of nausea and vomiting  Outcome: Progressing  Flowsheets (Taken 12/21/2022 2145)  Minimal or absence of nausea and vomiting:   Administer ordered antiemetic medications as needed   Provide nonpharmacologic comfort measures as appropriate     Problem: Genitourinary - Adult  Goal: Absence of urinary retention  Outcome: Progressing  Flowsheets (Taken 12/21/2022 2145)  Absence of urinary retention: Assess patients ability to void and empty bladder     Problem: Infection - Adult  Goal: Absence of infection at discharge  Outcome: Progressing  Flowsheets (Taken 12/21/2022 2145)  Absence of infection at discharge:   Assess and monitor for signs and symptoms of infection   Monitor lab/diagnostic results   Monitor all insertion sites i.e., indwelling lines, tubes and drains   Administer medications as ordered     Problem: Metabolic/Fluid and Electrolytes - Adult  Goal: Electrolytes maintained within normal limits  Outcome: Progressing  Flowsheets (Taken 12/21/2022 2145)  Electrolytes maintained within normal limits:   Monitor labs and assess patient for signs and symptoms of electrolyte imbalances   Administer electrolyte replacement as ordered   Monitor response to electrolyte replacements, including repeat lab results as appropriate     Problem: Hematologic - Adult  Goal: Maintains hematologic stability  Outcome: Progressing  Flowsheets (Taken 12/21/2022 2145)  Maintains hematologic stability:   Assess for signs and symptoms of bleeding or hemorrhage   Monitor labs for bleeding or clotting disorders     Problem: Chronic Conditions and Co-morbidities  Goal: Patient's chronic conditions and co-morbidity symptoms are monitored and maintained or improved  Outcome: Progressing  Flowsheets (Taken 12/21/2022 2145)  Care Plan - Patient's Chronic Conditions and Co-Morbidity Symptoms are Monitored and Maintained or Improved:   Monitor and assess patient's chronic conditions and comorbid symptoms for stability, deterioration, or improvement   Collaborate with multidisciplinary team to address chronic and comorbid conditions and prevent exacerbation or deterioration   Update acute care plan with appropriate goals if chronic or comorbid symptoms are exacerbated and prevent overall improvement and discharge

## 2022-12-23 LAB
ANION GAP SERPL CALCULATED.3IONS-SCNC: 10 MMOL/L (ref 3–16)
BASOPHILS ABSOLUTE: 0 K/UL (ref 0–0.2)
BASOPHILS RELATIVE PERCENT: 0.2 %
BUN BLDV-MCNC: 29 MG/DL (ref 7–20)
CALCIUM SERPL-MCNC: 8.3 MG/DL (ref 8.3–10.6)
CHLORIDE BLD-SCNC: 107 MMOL/L (ref 99–110)
CO2: 22 MMOL/L (ref 21–32)
CREAT SERPL-MCNC: 1.5 MG/DL (ref 0.6–1.2)
EOSINOPHILS ABSOLUTE: 0.1 K/UL (ref 0–0.6)
EOSINOPHILS RELATIVE PERCENT: 0.6 %
GFR SERPL CREATININE-BSD FRML MDRD: 36 ML/MIN/{1.73_M2}
GLUCOSE BLD-MCNC: 116 MG/DL (ref 70–99)
HCT VFR BLD CALC: 33.3 % (ref 36–48)
HEMOGLOBIN: 10.7 G/DL (ref 12–16)
LYMPHOCYTES ABSOLUTE: 1.1 K/UL (ref 1–5.1)
LYMPHOCYTES RELATIVE PERCENT: 8.1 %
MCH RBC QN AUTO: 29.1 PG (ref 26–34)
MCHC RBC AUTO-ENTMCNC: 32.1 G/DL (ref 31–36)
MCV RBC AUTO: 90.7 FL (ref 80–100)
MONOCYTES ABSOLUTE: 1 K/UL (ref 0–1.3)
MONOCYTES RELATIVE PERCENT: 6.8 %
NEUTROPHILS ABSOLUTE: 11.9 K/UL (ref 1.7–7.7)
NEUTROPHILS RELATIVE PERCENT: 84.3 %
PDW BLD-RTO: 17.5 % (ref 12.4–15.4)
PLATELET # BLD: 197 K/UL (ref 135–450)
PMV BLD AUTO: 10.8 FL (ref 5–10.5)
POTASSIUM REFLEX MAGNESIUM: 4 MMOL/L (ref 3.5–5.1)
PRO-BNP: 5128 PG/ML (ref 0–449)
RBC # BLD: 3.68 M/UL (ref 4–5.2)
SODIUM BLD-SCNC: 139 MMOL/L (ref 136–145)
WBC # BLD: 14.1 K/UL (ref 4–11)

## 2022-12-23 PROCEDURE — 2580000003 HC RX 258: Performed by: INTERNAL MEDICINE

## 2022-12-23 PROCEDURE — 6370000000 HC RX 637 (ALT 250 FOR IP): Performed by: INTERNAL MEDICINE

## 2022-12-23 PROCEDURE — 85025 COMPLETE CBC W/AUTO DIFF WBC: CPT

## 2022-12-23 PROCEDURE — 94640 AIRWAY INHALATION TREATMENT: CPT

## 2022-12-23 PROCEDURE — 6360000002 HC RX W HCPCS: Performed by: INTERNAL MEDICINE

## 2022-12-23 PROCEDURE — 02HV33Z INSERTION OF INFUSION DEVICE INTO SUPERIOR VENA CAVA, PERCUTANEOUS APPROACH: ICD-10-PCS | Performed by: INTERNAL MEDICINE

## 2022-12-23 PROCEDURE — 6370000000 HC RX 637 (ALT 250 FOR IP): Performed by: STUDENT IN AN ORGANIZED HEALTH CARE EDUCATION/TRAINING PROGRAM

## 2022-12-23 PROCEDURE — 94761 N-INVAS EAR/PLS OXIMETRY MLT: CPT

## 2022-12-23 PROCEDURE — 2060000000 HC ICU INTERMEDIATE R&B

## 2022-12-23 PROCEDURE — 36415 COLL VENOUS BLD VENIPUNCTURE: CPT

## 2022-12-23 PROCEDURE — C1751 CATH, INF, PER/CENT/MIDLINE: HCPCS

## 2022-12-23 PROCEDURE — 97530 THERAPEUTIC ACTIVITIES: CPT

## 2022-12-23 PROCEDURE — 6370000000 HC RX 637 (ALT 250 FOR IP): Performed by: NURSE PRACTITIONER

## 2022-12-23 PROCEDURE — 80048 BASIC METABOLIC PNL TOTAL CA: CPT

## 2022-12-23 PROCEDURE — 83880 ASSAY OF NATRIURETIC PEPTIDE: CPT

## 2022-12-23 PROCEDURE — 36569 INSJ PICC 5 YR+ W/O IMAGING: CPT

## 2022-12-23 PROCEDURE — 97110 THERAPEUTIC EXERCISES: CPT

## 2022-12-23 RX ORDER — AMIODARONE HYDROCHLORIDE 200 MG/1
TABLET ORAL
Qty: 60 TABLET | Refills: 0 | Status: SHIPPED | OUTPATIENT
Start: 2022-12-23

## 2022-12-23 RX ORDER — GUAIFENESIN/DEXTROMETHORPHAN 100-10MG/5
5 SYRUP ORAL EVERY 6 HOURS PRN
Status: DISCONTINUED | OUTPATIENT
Start: 2022-12-23 | End: 2022-12-24 | Stop reason: HOSPADM

## 2022-12-23 RX ORDER — SODIUM CHLORIDE 9 MG/ML
25 INJECTION, SOLUTION INTRAVENOUS PRN
Status: DISCONTINUED | OUTPATIENT
Start: 2022-12-23 | End: 2022-12-24 | Stop reason: HOSPADM

## 2022-12-23 RX ORDER — LIDOCAINE HYDROCHLORIDE 10 MG/ML
5 INJECTION, SOLUTION EPIDURAL; INFILTRATION; INTRACAUDAL; PERINEURAL ONCE
Status: DISCONTINUED | OUTPATIENT
Start: 2022-12-23 | End: 2022-12-24 | Stop reason: HOSPADM

## 2022-12-23 RX ORDER — SODIUM CHLORIDE 0.9 % (FLUSH) 0.9 %
5-40 SYRINGE (ML) INJECTION PRN
Status: DISCONTINUED | OUTPATIENT
Start: 2022-12-23 | End: 2022-12-24 | Stop reason: HOSPADM

## 2022-12-23 RX ORDER — IPRATROPIUM BROMIDE AND ALBUTEROL SULFATE 2.5; .5 MG/3ML; MG/3ML
1 SOLUTION RESPIRATORY (INHALATION)
Status: DISCONTINUED | OUTPATIENT
Start: 2022-12-23 | End: 2022-12-24

## 2022-12-23 RX ORDER — SODIUM CHLORIDE 0.9 % (FLUSH) 0.9 %
5-40 SYRINGE (ML) INJECTION EVERY 12 HOURS SCHEDULED
Status: DISCONTINUED | OUTPATIENT
Start: 2022-12-23 | End: 2022-12-24 | Stop reason: HOSPADM

## 2022-12-23 RX ADMIN — Medication 10 ML: at 09:33

## 2022-12-23 RX ADMIN — Medication 10 ML: at 20:40

## 2022-12-23 RX ADMIN — BUPROPION HYDROCHLORIDE 200 MG: 100 TABLET, FILM COATED, EXTENDED RELEASE ORAL at 09:33

## 2022-12-23 RX ADMIN — PANTOPRAZOLE SODIUM 40 MG: 40 TABLET, DELAYED RELEASE ORAL at 16:10

## 2022-12-23 RX ADMIN — IPRATROPIUM BROMIDE AND ALBUTEROL SULFATE 1 AMPULE: .5; 3 SOLUTION RESPIRATORY (INHALATION) at 21:20

## 2022-12-23 RX ADMIN — SODIUM CHLORIDE, PRESERVATIVE FREE 10 ML: 5 INJECTION INTRAVENOUS at 23:04

## 2022-12-23 RX ADMIN — GUAIFENESIN AND DEXTROMETHORPHAN 5 ML: 100; 10 SYRUP ORAL at 05:22

## 2022-12-23 RX ADMIN — CEFAZOLIN 2000 MG: 2 INJECTION, POWDER, FOR SOLUTION INTRAMUSCULAR; INTRAVENOUS at 20:43

## 2022-12-23 RX ADMIN — IPRATROPIUM BROMIDE AND ALBUTEROL SULFATE 1 AMPULE: .5; 3 SOLUTION RESPIRATORY (INHALATION) at 08:47

## 2022-12-23 RX ADMIN — IPRATROPIUM BROMIDE AND ALBUTEROL SULFATE 1 AMPULE: .5; 3 SOLUTION RESPIRATORY (INHALATION) at 03:21

## 2022-12-23 RX ADMIN — METOPROLOL TARTRATE 25 MG: 25 TABLET, FILM COATED ORAL at 09:33

## 2022-12-23 RX ADMIN — BUPROPION HYDROCHLORIDE 200 MG: 100 TABLET, FILM COATED, EXTENDED RELEASE ORAL at 23:04

## 2022-12-23 RX ADMIN — CEFAZOLIN 2000 MG: 2 INJECTION, POWDER, FOR SOLUTION INTRAMUSCULAR; INTRAVENOUS at 13:52

## 2022-12-23 RX ADMIN — AMIODARONE HYDROCHLORIDE 200 MG: 200 TABLET ORAL at 09:33

## 2022-12-23 RX ADMIN — PANTOPRAZOLE SODIUM 40 MG: 40 TABLET, DELAYED RELEASE ORAL at 09:33

## 2022-12-23 RX ADMIN — AMIODARONE HYDROCHLORIDE 200 MG: 200 TABLET ORAL at 20:40

## 2022-12-23 RX ADMIN — METOPROLOL TARTRATE 25 MG: 25 TABLET, FILM COATED ORAL at 20:40

## 2022-12-23 RX ADMIN — PREDNISONE 40 MG: 20 TABLET ORAL at 09:33

## 2022-12-23 RX ADMIN — SODIUM CHLORIDE, PRESERVATIVE FREE 10 ML: 5 INJECTION INTRAVENOUS at 16:13

## 2022-12-23 RX ADMIN — SODIUM CHLORIDE, PRESERVATIVE FREE 10 ML: 5 INJECTION INTRAVENOUS at 04:54

## 2022-12-23 RX ADMIN — APIXABAN 5 MG: 5 TABLET, FILM COATED ORAL at 09:33

## 2022-12-23 RX ADMIN — ASPIRIN 81 MG 81 MG: 81 TABLET ORAL at 09:33

## 2022-12-23 RX ADMIN — APIXABAN 5 MG: 5 TABLET, FILM COATED ORAL at 20:40

## 2022-12-23 RX ADMIN — CEFAZOLIN 2000 MG: 2 INJECTION, POWDER, FOR SOLUTION INTRAMUSCULAR; INTRAVENOUS at 05:00

## 2022-12-23 ASSESSMENT — PAIN SCALES - GENERAL: PAINLEVEL_OUTOF10: 0

## 2022-12-23 NOTE — CARE COORDINATION
The SW spoke with the patient who informed the SW that she has no way home. The patient stated her daughter is sick and her granddaughter has work at [employer] today. The patient stated that her granddaughter could be here on 12/24/2022 to pick the patent up about 11am.     The SW informed [facility] and [facility] of the following information and will contact them on 11/24/2022 to confirm the patient has discharged.      Electronically signed by FELIPA Davis on 12/23/2022 at 4:39 PM

## 2022-12-23 NOTE — PROGRESS NOTES
[facility] Department   Phone: [phone]    Physical Therapy    [] Initial Evaluation            [x] Daily Treatment Note         [] Discharge Summary      Patient: Adam Newman   : 1945   MRN: [id number]   Date of Service:  2022  Admitting Diagnosis: Sepsis [facility])    Current Admission Summary: Adam Newman is a 78 y.o. female patient was diagnosed with the flu on Thursday over the last days she has noticed redness and swelling and drainage from the right leg where it is weeping with increased pain patient's had previous osteomyelitis of the right ankle where she  has  redness and swelling now    Past Medical History:  has a past medical history of Arthritis, Atrial fibrillation and flutter ([address] Hypertension, Kidney disease, Pneumonia, and Sleep apnea. Past Surgical History:  has a past surgical history that includes Tubal ligation; Total knee arthroplasty (Bilateral, 2012); fracture surgery; Colonoscopy (N/A, 2018); Upper gastrointestinal endoscopy (N/A, 2018); Cardioversion; Gastric bypass surgery; Larynx surgery; Upper gastrointestinal endoscopy (N/A, 3/31/2022); Colonoscopy (N/A, 3/31/2022); and Upper gastrointestinal endoscopy (N/A, 3/31/2022). Discharge Recommendations: Adam Newman scored a 17/24 on the AM-PAC short mobility form. Current research shows that an AM-PAC score of 18 or greater is typically associated with a discharge to the patient's home setting. Based on the patient's AM-PAC score and their current functional mobility deficits, it is recommended that the patient have 2-3 sessions per week of Physical Therapy at d/c to increase the patient's independence. At this time, this patient demonstrates the endurance and safety to discharge home with home health PT and a follow up treatment frequency of 2-3x/wk. Please see assessment section for further patient specific details.   If patient discharges prior to next session this note will serve as a discharge summary. Please see below for the latest assessment towards goals. DME Required For Discharge: no DME required at discharge  Precautions/Restrictions: high fall risk  Weight Bearing Restrictions: weight bearing as tolerated  [] Right Upper Extremity  [] Left Upper Extremity [] Right Lower Extremity  [] Left Lower Extremity     Required Braces/Orthotics: no braces required   [] Right  [] Left  Positional Restrictions:no positional restrictions    Pre-Admission Information   Lives With: Eula Styles (daughter and 2 adult grandchildren, 16 month old grandchild, foster child, son)  Type of Home: House  Home Layout: One level,Performs ADL's on one level,Able to Live on Main level with bedroom/bathroom  Home Access: Stairs to enter without rails  Entrance Stairs - Number of Steps: 3 CAREY  Bathroom Shower/Tub: Walk-in shower  Bathroom Toilet: Handicap height  Bathroom Equipment: Hand-held shower,Built-in shower seat  Home Equipment: Cane,Electric scooter (uses cane at home and in community. Uses electric scooter around neighborhood, use it for gardeningm, or when walking long distances)  Receives Help From: Family  ADL Assistance: Independent  Homemaking Assistance: Needs assistance (pt does some cooking, daughter does laundry in basement, family does cleaning.)  Homemaking Responsibilities: Yes  Ambulation Assistance: Independent  Transfer Assistance: Independent (sleeps in lift chair)  Active : Yes (but has been having hard time getting in and out of car)   Patient's  Info: does not drive very often, family mostly drives patient to appointments.   Additional Comments: ~2 falls in the past 6 months (dizzy in living room and fell and hit head, other time trying to step off of curb and fell)     Examination   Vision:   Vision Gross Assessment: Impaired and Vision Corrective Device: wears glasses at all times  Hearing:   [place]    Subjective  General: Patient reports pain in RLE, increasing with movement. Patient reports she uses a lift chair at home to help her stand. Pain: 0/10. Location: RLE while in bed   Pain Interventions: repositioned        Functional Mobility  Bed Mobility  Supine to Sit: stand by assistance  Scooting: stand by assistance  Comments:  Transfers  Sit to stand transfer: CGA  Stand to sit transfer:  CGA  Bed to chair transfer:  CGA  Comments:  Gait belt donned. Today Patient is able to stand from lowest bed height  CGA. Ambulation  Surface:level surface  Assistive Device: rolling walker  Assistance: contact guard assistance  Distance: 15'  Gait Mechanics: dec'd clara  Comments:    Stair Mobility  Stair mobility not completed on this date. Comments:  Wheelchair Mobility:  No w/c mobility completed on this date. Comments:  Balance  Static Sitting Balance: good: independent with functional balance in unsupported position  Dynamic Sitting Balance: good: independent with functional balance in unsupported position  Static Standing Balance: fair: maintains balance at CGA without use of UE support  Dynamic Standing Balance: fair: maintains balance at CGA without use of UE support  Comments:    Other Therapeutic Interventions: supine B LE ex:  B ankle pumps 10x ea, B SKTC: 5x B,   SAQ: 5x B, attempted  SLR flex - pt unable B due to severe quad lag B.     Functional Outcomes  AM-PAC Inpatient Mobility Raw Score : 17              Cognition  WFL  Orientation:    alert and oriented x 4  Command Following:   [facility]    Education  Barriers To Learning: none  Patient Education: patient educated on goals, PT role and benefits, functional mobility training, transfer training, discharge recommendations  Learning Assessment:  patient verbalizes and demonstrates understanding    Assessment  Activity Tolerance: Limited ambulation d/t RLE pain, B LE weakness  Impairments Requiring Therapeutic Intervention: decreased functional mobility, decreased strength, decreased endurance, decreased balance, increased pain  Prognosis: good  Clinical Assessment: pt requires SBA for bed mobility, CGA for trnsfrs and CGA for gait with RW. Pt did not wish to try stairs. Pt fatigues quickly with activity and with LE ex. Patient uses lift chair at home and is able to ambulate short distances with RW, uses a scooter for longer distances. She presently requires assistance to stand as she does not have her lift chair and CGA for ambulation safety. Patient feels she can go home  safely. Recommend initial 24 hour assist and continued therapy at d/c. Safety Interventions: patient left in  bed,  bed alarm in place, call light within reach, gait belt, patient at risk for falls, and nurse notified    Plan  Frequency: 3-5 x/per week  Current Treatment Recommendations: strengthening, balance training, functional mobility training, transfer training, gait training, endurance training, patient/caregiver education, and equipment evaluation/education    Goals  Patient Goals: Return home. Short Term Goals:  Time Frame: Discharge.   Patient will complete bed mobility at modified independent   Patient will complete transfers at [facility] independent   Patient will ambulate 25 ft with use of rolling walker at contact guard assistance    Therapy Session Time      Individual Group Co-treatment   Time In  1250       Time Out  117       Minutes  27         Timed Code Treatment Minutes:  27  Total Treatment Minutes:  27       Electronically Signed By: Elisa Gannon, PT, DPT, OMT-C

## 2022-12-23 NOTE — PROGRESS NOTES
12/23/22 1658   RT Protocol   History Pulmonary Disease 0   Respiratory pattern 2   Breath sounds 2   Cough 1   Bronchodilator Assessment Score 5

## 2022-12-23 NOTE — Clinical Note
Aware of discharge with home care. Home care orders sent to [facility]. Discharge planner notified.

## 2022-12-23 NOTE — DISCHARGE INSTR - COC
12Continuity of Care Form    Patient Name: Adam Newman   :  1945  MRN:  [id number]    Admit date:  2022  Discharge date:  22    Code Status Order: Full Code   Advance Directives:     Admitting Physician:  Kyrie Bolton DO  PCP: MARILEE Westfall - CNP    Discharging Nurse: Naima Matson. [address] Unit/Room#: 2PV-6557/9976-21  Discharging Unit Phone Number: [phone]    Emergency Contact:   Extended Emergency Contact Information  Primary Emergency Contact: Tiff Bloom  Address: [address], [address] [facility] Phone: [phone]  Work Phone: [phone]  Relation: Child  Secondary Emergency Contact: Kasi Quiñonez  Mobile Phone: [phone]  Relation: Other   needed?  No    Past Surgical History:  Past Surgical History:   Procedure Laterality Date    CARDIOVERSION      COLONOSCOPY N/A 2018    COLONOSCOPY POLYPECTOMY SNARE/COLD BIOPSY performed by Delmis Chicas MD at [place] [address] N/A 3/31/2022    COLONOSCOPY POLYPECTOMY SNARE/COLD BIOPSY performed by Goldie Webb MD at [address] [place] [address]      ankle rt has pins and rods, and rt elbow    GASTRIC BYPASS SURGERY      LARYNX SURGERY      TOTAL KNEE ARTHROPLASTY Bilateral 2012    TUBAL LIGATION      tubes tied    UPPER GASTROINTESTINAL ENDOSCOPY N/A 2018    EGD BIOPSY performed by Delmis Chicas MD at [address] [facility] N/A 3/31/2022    EGD DILATION BALLOON performed by Goldie Webb MD at [address] [facility] ENDOSCOPY N/A 3/31/2022    EGD BIOPSY performed by Goldie Webb MD at [address] [facility] ENDOSCOPY       Immunization History:   Immunization History   Administered Date(s) Administered    COVID-19, PFIZER Bivalent BOOSTER, DO NOT Dilute, (age 12y+), IM, 30 mcg/0.3 mL 2022    COVID-19, PFIZER GRAY top, DO NOT Dilute, (age 15 y+), IM, 30 mcg/0.3 mL 05/20/2022    COVID-19, PFIZER PURPLE top, DILUTE for use, (age 15 y+), 30mcg/0.3mL 09/08/2021, 10/15/2021    Influenza Virus Vaccine 12/22/2012    Influenza, FLUAD, (age 72 y+), Adjuvanted, 0.5mL 10/27/2020, 10/15/2021, 09/09/2022    Influenza, High Dose (Fluzone 65 yrs and older) 09/30/2016, 10/01/2017, 10/08/2018    Influenza, Triv, inactivated, subunit, adjuvanted, IM (Fluad 65 yrs and older) 10/21/2019    Pneumococcal Conjugate 13-valent (Farheen Genta) 09/30/2016, 10/01/2017    Pneumococcal Polysaccharide ([id number]) 12/22/2012, 10/06/2017    Td vaccine (adult) 12/01/2008    Zoster Live (Zostavax) 09/15/2015       Active Problems:  Patient Active Problem List   Diagnosis Code    Atrial fibrillation and flutter (HCC) I48.91, I48.92    Essential hypertension I10    Severe episode of recurrent major depressive disorder, without psychotic features ([facility] [address] F33.2    Seasonal affective disorder ([facility]) F33.8    Class 3 obesity due to excess calories with serious comorbidity and body mass index (BMI) of 45.0 to 49.9 in adult [id number]    Elevated sed rate R70.0    Neutrophilia D72.9    Elevated C-reactive protein (CRP) R79.82    Obstructive sleep apnea G47.33    Morbid obesity ([facility]) E66.01    Weight loss counseling, encounter for Z71.3    Atypical atrial flutter ([facility]) I48.4    PAF (paroxysmal atrial fibrillation) ([facility]) I48.0    ILD (interstitial lung disease) ([facility]) J84.9    Iron deficiency anemia due to chronic blood loss D50.0    Iron deficiency anemia, unspecified D50.9    Age-related osteoporosis without current pathological fracture M81.0    Vasovagal syncope R55    CKD (chronic kidney disease) stage 4, GFR 15-29 ml/min ([facility]) N18.4    Chronic diastolic congestive heart failure ([facility]) I50.32    Stage 3b chronic kidney disease ([facility]) N18.32    B12 deficiency E53.8    Vitamin D deficiency E55.9    Acute respiratory failure ([facility]) J96.00    Chronic obstructive pulmonary disease, unspecified J44.9    Sepsis ([facility]) A41.9 Cellulitis of right leg L03.115    Obesity (BMI 30-39. 9) E66.9    Stage 3a chronic kidney disease (HCC) N18.31    Bacteremia R78.81    Fever and chills R50.9    History of ankle surgery Z98.890    Streptococcal infection group G B95.4       Isolation/Infection:   Isolation            No Isolation          Patient Infection Status       Infection Onset Added Last Indicated Last Indicated By Review Planned Expiration Resolved Resolved By    None active    Resolved    C-diff Rule Out 12/21/22 12/21/22 12/21/22 Clostridium difficile toxin/antigen (Ordered)   12/21/22 Rule-Out Test Resulted    C-diff Rule Out 12/20/22 12/20/22 12/20/22 Clostridium difficile toxin/antigen (Ordered)   12/20/22 Rule-Out Test Canceled    COVID-19 (Rule Out) 12/20/22 12/20/22 12/20/22 COVID-19, Rapid (Ordered)   12/20/22 Rule-Out Test Resulted    COVID-19 (Rule Out) 09/13/22 09/13/22 09/13/22 COVID-19 (Ordered)   09/14/22 Rule-Out Test Resulted            Nurse Assessment:  Last Vital Signs: BP (!) 137/91   Pulse 94   Temp 98.1 °F (36.7 °C) (Oral)   Resp 18   Ht 5' 7\" (1.702 m)   Wt 237 lb 3.2 oz (107.6 kg)   SpO2 97%   BMI 37.15 kg/m²     Last documented pain score (0-10 scale): Pain Level: 0  Last Weight:   Wt Readings from Last 1 Encounters:   12/23/22 237 lb 3.2 oz (107.6 kg)     Mental Status:  oriented and alert    IV Access:  - PICC - site  R Upper Arm, insertion date: 12/23/22    Nursing Mobility/ADLs:  Walking   Assisted  Transfer  Assisted  Bathing Assisted  Dressing  Assisted  Toileting  Assisted  Feeding  Independent  Med Admin  Assisted  Med Delivery   whole    Wound Care Documentation and Therapy:  Incision 12/19/12 Knee Left (Active)   Number of days: 8775        Elimination:  Continence:    Bowel: Yes  Bladder: Yes  Urinary Catheter: None   Colostomy/Ileostomy/Ileal Conduit: No       Date of Last BM: 12/23/22    Intake/Output Summary (Last 24 hours) at 12/23/2022 1120  Last data filed at 12/23/2022 0937  Gross per 24 hour   Intake 1682.71 ml   Output 2680 ml   Net -997.29 ml     I/O last 3 completed shifts: In: 1272.7 [P.O.:240; I.V.:732.7; IV Piggyback:300]  Out: 3699 [Urine:2555]    Safety Concerns: At Risk for Falls    Impairments/Disabilities:      None    Nutrition Therapy:  Current Nutrition Therapy:   - Oral Diet:  General    Routes of Feeding: Oral  Liquids: No Restrictions  Daily Fluid Restriction: no  Last Modified Barium Swallow with Video (Video Swallowing Test): not done    Treatments at the Time of Hospital Discharge:   Respiratory Treatments: Duonebs  Oxygen Therapy:  is not on home oxygen therapy. Ventilator:    - No ventilator support    Rehab Therapies: Physical Therapy and Occupational Therapy  Weight Bearing Status/Restrictions: No weight bearing restrictions  Other Medical Equipment (for information only, NOT a DME order):  walker  Other Treatments:WRAP RLE [address] WRAP    Patient's personal belongings (please select all that are sent with patient):  Jonathan Childs RN SIGNATURE:  Electronically signed by Susan Llamas RN on 12/24/22 at 10:10 AM EST    CASE MANAGEMENT/SOCIAL WORK SECTION    Inpatient Status Date: 12/20/2022    Readmission Risk Assessment Score:  Readmission Risk              Risk of Unplanned Readmission:  23           Discharging to Facility/ Agency   Name: Luciana Mahmood will call for Appointment  Phone: [phone]  Fax: [phone]     [address]  Phone: [phone]  Fax: [phone]                                         / signature: Electronically signed by FELIPA Helm on 12/23/22 at 2:21 PM EST    PHYSICIAN SECTION    Prognosis: Fair    Condition at Discharge: Stable    Rehab Potential (if transferring to Rehab):  Fair    Recommended Labs or Other Treatments After Discharge:   IV Cefazolin x  2 gm q 8 hr x stop date  x  1/15/23  CBC with diff, BMP, ESR, CRP weekly  Fax results to [phone]  PICC line in place  No ID follow up   First dose given in hospital     [address] [facility]  Phone: [phone]   Fax : [phone]       Physician Certification: I certify the above information and transfer of Shruthi Espinosa  is necessary for the continuing treatment of the diagnosis listed and that she requires Home Care for greater 30 days.      Update Admission H&P: No change in H&P    PHYSICIAN SIGNATURE:  Electronically signed by Ananias Lombard, MD on 12/23/22 at 12:21 PM EST

## 2022-12-23 NOTE — PLAN OF CARE
Problem: Skin/Tissue Integrity  Goal: Absence of new skin breakdown  Description: 1. Monitor for areas of redness and/or skin breakdown  2. Assess vascular access sites hourly  3. Every 4-6 hours minimum:  Change oxygen saturation probe site  4. Every 4-6 hours:  If on nasal continuous positive airway pressure, respiratory therapy assess nares and determine need for appliance change or resting period.   12/22/2022 2044 by Jordan Mulligan RN  Outcome: Progressing  12/22/2022 1305 by Ramesh Gill RN  Outcome: Progressing     Problem: Discharge Planning  Goal: Discharge to home or other facility with appropriate resources  12/22/2022 2044 by Jordan Mulligan RN  Outcome: Progressing  Flowsheets (Taken 12/22/2022 1950)  Discharge to home or other facility with appropriate resources: Identify barriers to discharge with patient and caregiver  12/22/2022 1305 by Ramesh Gill RN  Outcome: Progressing  Flowsheets (Taken 12/22/2022 9720)  Discharge to home or other facility with appropriate resources: Identify barriers to discharge with patient and caregiver     Problem: Pain  Goal: Verbalizes/displays adequate comfort level or baseline comfort level  12/22/2022 2044 by Jordan Mulligan RN  Outcome: Progressing  Flowsheets  Taken 12/22/2022 1945 by Jordan Mulligan RN  Verbalizes/displays adequate comfort level or baseline comfort level:   Encourage patient to monitor pain and request assistance   Assess pain using appropriate pain scale   Administer analgesics based on type and severity of pain and evaluate response   Implement non-pharmacological measures as appropriate and evaluate response  Taken 12/22/2022 1849 by Ramesh Gill RN  Verbalizes/displays adequate comfort level or baseline comfort level: Encourage patient to monitor pain and request assistance  12/22/2022 1305 by Ramesh Gill RN  Outcome: Progressing     Problem: Safety - Adult  Goal: Free from fall injury  12/22/2022 2044 by Omari June RN  Outcome: Progressing  12/22/2022 1305 by Mitch Santoyo RN  Outcome: Progressing     Problem: Neurosensory - Adult  Goal: Achieves stable or improved neurological status  12/22/2022 2044 by Omari June RN  Outcome: Progressing  Flowsheets (Taken 12/22/2022 1950)  Achieves stable or improved neurological status:   Assess for and report changes in neurological status   Initiate measures to prevent increased intracranial pressure   Maintain blood pressure and fluid volume within ordered parameters to optimize cerebral perfusion and minimize risk of hemorrhage   Monitor temperature, glucose, and sodium.  Initiate appropriate interventions as ordered  12/22/2022 1305 by Mitch Santoyo RN  Outcome: Progressing  Flowsheets (Taken 12/22/2022 6074)  Achieves stable or improved neurological status: Assess for and report changes in neurological status     Problem: Respiratory - Adult  Goal: Achieves optimal ventilation and oxygenation  12/22/2022 2044 by Omari June RN  Outcome: Progressing  Flowsheets (Taken 12/22/2022 1950)  Achieves optimal ventilation and oxygenation:   Assess for changes in respiratory status   Assess for changes in mentation and behavior   Position to facilitate oxygenation and minimize respiratory effort   Oxygen supplementation based on oxygen saturation or arterial blood gases   Assess and instruct to report shortness of breath or any respiratory difficulty  12/22/2022 1305 by Mitch Santoyo RN  Outcome: Progressing  Flowsheets (Taken 12/22/2022 0846)  Achieves optimal ventilation and oxygenation: Assess for changes in respiratory status     Problem: Cardiovascular - Adult  Goal: Maintains optimal cardiac output and hemodynamic stability  12/22/2022 2044 by Omari June RN  Outcome: Progressing  Flowsheets (Taken 12/22/2022 1950)  Maintains optimal cardiac output and hemodynamic stability:   Monitor blood pressure and heart rate   Monitor urine output and notify Licensed Independent Practitioner for values outside of normal range   Assess for signs of decreased cardiac output  12/22/2022 1305 by Ruddy Boland RN  Outcome: Progressing  Flowsheets (Taken 12/22/2022 0846)  Maintains optimal cardiac output and hemodynamic stability: Monitor blood pressure and heart rate     Problem: Skin/Tissue Integrity - Adult  Goal: Incisions, wounds, or drain sites healing without S/S of infection  12/22/2022 2044 by Kris Arboleda RN  Outcome: Progressing  Flowsheets (Taken 12/22/2022 1950)  Incisions, Wounds, or Drain Sites Healing Without Sign and Symptoms of Infection: TWICE DAILY: Assess and document skin integrity  12/22/2022 1305 by Ruddy Boland RN  Outcome: Progressing     Problem: Musculoskeletal - Adult  Goal: Return mobility to safest level of function  12/22/2022 2044 by Kris Arboleda RN  Outcome: Progressing  Flowsheets (Taken 12/22/2022 1950)  Return Mobility to Safest Level of Function:   Assess patient stability and activity tolerance for standing, transferring and ambulating with or without assistive devices   Assist with transfers and ambulation using safe patient handling equipment as needed   Instruct patient/family in ordered activity level  12/22/2022 1305 by Ruddy Boland RN  Outcome: Progressing  Flowsheets (Taken 12/22/2022 0846)  Return Mobility to Safest Level of Function: Assess patient stability and activity tolerance for standing, transferring and ambulating with or without assistive devices     Problem: Gastrointestinal - Adult  Goal: Minimal or absence of nausea and vomiting  12/22/2022 2044 by Kris Arboleda RN  Outcome: Progressing  Flowsheets (Taken 12/22/2022 1950)  Minimal or absence of nausea and vomiting:   Provide nonpharmacologic comfort measures as appropriate   Administer ordered antiemetic medications as needed  12/22/2022 1305 by Ruddy Boland RN  Outcome: Progressing     Problem: Genitourinary - Adult  Goal: Absence of urinary retention  12/22/2022 2044 by Kirsten Cowden, RN  Outcome: Progressing  12/22/2022 1305 by Bita Hall RN  Outcome: Progressing  Flowsheets (Taken 12/22/2022 5671)  Absence of urinary retention: Assess patients ability to void and empty bladder     Problem: Infection - Adult  Goal: Absence of infection at discharge  12/22/2022 2044 by Kirsten Cowden, RN  Outcome: Progressing  Flowsheets (Taken 12/22/2022 1950)  Absence of infection at discharge:   Assess and monitor for signs and symptoms of infection   Monitor all insertion sites i.e., indwelling lines, tubes and drains   Monitor lab/diagnostic results   Administer medications as ordered  12/22/2022 1305 by Bita Hall RN  Outcome: Progressing  Flowsheets (Taken 12/22/2022 0846)  Absence of infection at discharge: Assess and monitor for signs and symptoms of infection     Problem: Metabolic/Fluid and Electrolytes - Adult  Goal: Electrolytes maintained within normal limits  12/22/2022 2044 by Kirsten Cowden, RN  Outcome: Progressing  Flowsheets (Taken 12/22/2022 1950)  Electrolytes maintained within normal limits:   Monitor labs and assess patient for signs and symptoms of electrolyte imbalances   Monitor response to electrolyte replacements, including repeat lab results as appropriate   Administer electrolyte replacement as ordered  12/22/2022 1305 by Bita Hall RN  Outcome: Progressing  [address] (Taken 12/22/2022 0846)  Electrolytes maintained within normal limits: Monitor labs and assess patient for signs and symptoms of electrolyte imbalances     Problem: Hematologic - Adult  Goal: Maintains hematologic stability  12/22/2022 2044 by Kirsten Cowden, RN  Outcome: Progressing  Flowsheets (Taken 12/22/2022 1950)  Maintains hematologic stability:   Assess for signs and symptoms of bleeding or hemorrhage   Monitor labs for bleeding or clotting disorders  12/22/2022 1305 by Bita Hall RN  Outcome: Progressing  Flowsheets (Taken 12/22/2022 0846)  Maintains hematologic stability: Assess for signs and symptoms of bleeding or hemorrhage     Problem: Chronic Conditions and Co-morbidities  Goal: Patient's chronic conditions and co-morbidity symptoms are monitored and maintained or improved  12/22/2022 2044 by Noel Mota, RN  Outcome: Progressing  Flowsheets (Taken 12/22/2022 1950)  Care Plan - Patient's Chronic Conditions and Co-Morbidity Symptoms are Monitored and Maintained or Improved:   Monitor and assess patient's chronic conditions and comorbid symptoms for stability, deterioration, or improvement   Collaborate with multidisciplinary team to address chronic and comorbid conditions and prevent exacerbation or deterioration   Update acute care plan with appropriate goals if chronic or comorbid symptoms are exacerbated and prevent overall improvement and discharge  12/22/2022 1305 by Daniella Gibbons RN  Outcome: Progressing  [address] (Taken 12/22/2022 0846)  Care Plan - Patient's Chronic Conditions and Co-Morbidity Symptoms are Monitored and Maintained or Improved: Monitor and assess patient's chronic conditions and comorbid symptoms for stability, deterioration, or improvement

## 2022-12-23 NOTE — DISCHARGE SUMMARY
Hospital Medicine Discharge Summary    Patient: Bell Barker     Gender: female  : 1945   Age: 78 y.o. MRN: [id number]    Admitting Physician: Nahed Sanchez DO  Discharge Physician: Ivana Stanley MD     Code Status: Full Code     Admit Date: 2022   Discharge Date:   2022    Disposition:  Home  Time spent arranging discharge: 35 minutes    Discharge Diagnoses: Active Hospital Problems    Diagnosis Date Noted    Streptococcal infection group G [B95.4] 2022     Priority: Medium    Cellulitis of right leg [L03.115] 2022     Priority: Medium    Obesity (BMI 30-39. 9) [E66.9] 2022     Priority: Medium    Stage 3a chronic kidney disease ([address] [N18.31] 2022     Priority: Medium    Bacteremia [R78.81] 2022     Priority: Medium    Fever and chills [R50.9] 2022     Priority: Medium    History of ankle surgery [Z98.890] 2022     Priority: Medium    Sepsis ([address] [A41.9] 2022     Priority: Medium    Neutrophilia [D72.9]        Condition at Discharge:  Stable    Hospital Course:   Presented to hospital sepsis secondary to right lower extremity cellulitis with strep bacteremia repeat blood cultures were negative treated with IV antibiotics and Ortho consulted no need for any drainage CT of the leg was negative will be treated with 3 more weeks of IV cefazolin per ID. Patient A-flutter with RVR started on IV amiodarone and Eliquis this improved discharged on amiodarone with follow-up with EP as outpatient    Discharge Exam:    BP (!) 146/83   Pulse 90   Temp 97.8 °F (36.6 °C) (Oral)   Resp 16   Ht 5' 7\" (1.702 m)   Wt 237 lb 3.2 oz (107.6 kg)   SpO2 95%   BMI 37.15 kg/m²   General appearance:  Appears comfortable.  AAOx3  HEENT: atraumatic, Pupils equal, muscous membranes moist, no masses appreciated  Cardiovascular: Regular rate and rhythm no murmurs appreciated  Respiratory: CTAB no wheezing  Gastrointestinal: Abdomen soft, non-tender, BS+  EXT: no edema  Neurology: no gross focal deficts  Psychiatry: Appropriate affect. Not agitated  Skin:right leg erythema and tenderness     Discharge Medications:   Current Discharge Medication List        START taking these medications    Details   amiodarone (CORDARONE) 200 MG tablet 200mg bid x 8 days then 200mg daily  Qty: 60 tablet, Refills: 0           Current Discharge Medication List        CONTINUE these medications which have CHANGED    Details   dilTIAZem (TIAZAC) 240 MG extended release capsule TAKE 1 CAPSULE DAILY  Qty: 90 capsule, Refills: 1           Current Discharge Medication List        CONTINUE these medications which have NOT CHANGED    Details   traMADol (ULTRAM) 50 MG tablet Take 50 mg by mouth every 6 hours as needed for Pain. ferrous sulfate (IRON 325) 325 (65 Fe) MG tablet Take 1 tablet by mouth daily (with breakfast)  Qty: 30 tablet, Refills: 11    Associated Diagnoses: Iron deficiency anemia, unspecified iron deficiency anemia type      Cholecalciferol (VITAMIN D3) 1.25 MG (46677 UT) CAPS Take 1 capsule by mouth every 7 days  Qty: 4 capsule, Refills: 11    Associated Diagnoses: Vitamin D deficiency      furosemide (LASIX) 20 MG tablet Take 1 tablet by mouth daily  Qty: 30 tablet, Refills: 11    Associated Diagnoses: Essential hypertension      budesonide-formoterol (SYMBICORT) 160-4.5 MCG/ACT AERO Inhale 2 puffs into the lungs 2 times daily  Qty: 30.6 g, Refills: 1    Associated Diagnoses: Influenza A; Acute on chronic respiratory failure with hypoxia ([facility]); ILD (interstitial lung disease) (HCC)      albuterol sulfate HFA (PROVENTIL HFA) 108 (90 Base) MCG/ACT inhaler Inhale 2 puffs into the lungs every 4 hours as needed for Wheezing  Qty: 18 g, Refills: 5    Comments: Pharmacy may dispense ANY albuterol product based on formulary, cost, or availability.   Associated Diagnoses: Influenza A; Acute on chronic respiratory failure with hypoxia ([facility] ILD (interstitial lung disease) ([facility]      buPROPion (WELLBUTRIN SR) 200 MG extended release tablet TAKE 1 TABLET BY MOUTH TWICE DAILY  Qty: 60 tablet, Refills: 5    Associated Diagnoses: Severe episode of recurrent major depressive disorder, without psychotic features ([facility])      vitamin B-12 (CYANOCOBALAMIN) 100 MCG tablet Take 1 tablet by mouth in the morning. Qty: 90 tablet, Refills: 3    Associated Diagnoses: B12 deficiency      alendronate (FOSAMAX) 70 MG tablet Take 1 tablet by mouth every 7 days  Qty: 12 tablet, Refills: 3    Associated Diagnoses: Age-related osteoporosis without current pathological fracture      apixaban (ELIQUIS) 5 MG TABS tablet Take 1 tablet by mouth 2 times daily  Qty: 180 tablet, Refills: 3      metoprolol tartrate (LOPRESSOR) 25 MG tablet Take 1 tablet by mouth 2 times daily  Qty: 180 tablet, Refills: 3      pantoprazole (PROTONIX) 40 MG tablet Take 1 tablet by mouth 2 times daily (before meals)  Qty: 30 tablet, Refills: 3      DULoxetine (CYMBALTA) 60 MG extended release capsule TAKE 1 CAPSULE BY MOUTH DAILY  Qty: 90 capsule, Refills: 3    Associated Diagnoses: Severe episode of recurrent major depressive disorder, without psychotic features ([facility] Seasonal affective disorder (HCC)      dilTIAZem (CARDIZEM CD) 240 MG extended release capsule TAKE 1 CAPSULE BY MOUTH DAILY  Qty: 90 capsule, Refills: 3    Associated Diagnoses: Atrial fibrillation, unspecified type ([facility])      aspirin 81 MG tablet Take 81 mg by mouth daily           Current Discharge Medication List        STOP taking these medications       oseltamivir (TAMIFLU) 75 MG capsule Comments:   Reason for Stopping:         methylPREDNISolone (MEDROL, JENI,) 4 MG tablet Comments:   Reason for Stopping:         albuterol-ipratropium (COMBIVENT RESPIMAT)  MCG/ACT AERS inhaler Comments:   Reason for Stopping:         cloNIDine (CATAPRES) 0.2 MG tablet Comments:   Reason for Stopping:               Labs:  For convenience and continuity at follow-up the following most recent labs are provided:    Lab Results   Component Value Date/Time    WBC 14.1 12/23/2022 05:49 AM    HGB 10.7 12/23/2022 05:49 AM    HCT 33.3 12/23/2022 05:49 AM    MCV 90.7 12/23/2022 05:49 AM     12/23/2022 05:49 AM     12/23/2022 05:49 AM    K 4.0 12/23/2022 05:49 AM     12/23/2022 05:49 AM    CO2 22 12/23/2022 05:49 AM    BUN 29 12/23/2022 05:49 AM    CREATININE 1.5 12/23/2022 05:49 AM    CALCIUM 8.3 12/23/2022 05:49 AM    PHOS 4.2 08/15/2022 03:51 PM    ALKPHOS 89 12/22/2022 06:01 AM    ALT <5 12/22/2022 06:01 AM    AST 9 12/22/2022 06:01 AM    BILITOT 0.3 12/22/2022 06:01 AM    BILIDIR 1.0 09/13/2022 05:56 PM    LABALBU 2.4 12/22/2022 06:01 AM    LDLCALC 80 03/15/2021 10:18 AM    TRIG 56 03/15/2021 10:18 AM     Lab Results   Component Value Date    INR 1.49 (H) 09/13/2022    INR 1.45 (H) 03/30/2022    INR 2.6 03/24/2022       Radiology:  XR ANKLE RIGHT (MIN 3 VIEWS)    Result Date: 12/20/2022  EXAMINATION: THREE XRAY VIEWS OF THE RIGHT ANKLE 12/20/2022 5:27 am COMPARISON: 01/06/2019 HISTORY: ORDERING SYSTEM PROVIDED HISTORY: Redness right lower leg history of osteomyelitis TECHNOLOGIST PROVIDED HISTORY: Reason for exam:->Redness right lower leg history of osteomyelitis Reason for Exam: Redness right lower leg history of osteomyelitis FINDINGS: There are 2 pins in the medial malleolus into the tibial plafond. A intramedullary nail enters in the distal fibula coursing up the shaft but with the proximal component extending outside the bony shaft over the interosseous membrane region. There is dense ossification of the distal most interosseous membrane bridging the tibia and fibula. Loss of the ankle joint space with sclerosis of the articular margins of the tibia and talus. There is also loss of the subtalar joint with bone on bone appearance on the lateral view. No acute fracture, cortical erosion, or periosteal reaction is identified.  Extensive edema of the soft tissues and scattered soft tissue calcifications are redemonstrated. There is no soft tissue emphysema. Stable appearance of the medial malleolar pins and the intramedullary nail at the distal fibula with the proximal portion of the intramedullary nail outside bone. Fusion of the distal interosseous membrane bridging the tibia and fibula with old healed bony deformities. Progressive loss of the tibiotalar joint and subtalar joint with sclerosis of the margins. Edema of the soft tissues with scattered soft tissue calcifications. No soft tissue emphysema or area of focal bony erosion. XR CHEST PORTABLE    Result Date: 12/22/2022  EXAMINATION: ONE XRAY VIEW OF THE CHEST 12/22/2022 11:02 am COMPARISON: Chest x-ray dated 12/20/2022 HISTORY: ORDERING SYSTEM PROVIDED HISTORY: dyspnea TECHNOLOGIST PROVIDED HISTORY: Reason for exam:->dyspnea Reason for Exam: Leg Pain (Pt arrived in the ED from home via [facility] FINDINGS: Cardiomegaly. No focal lung opacity is visualized. No pleural effusion or pneumothorax. Degenerative disease of the visualized osseous structures. No focal lung opacity. XR CHEST PORTABLE    Result Date: 12/21/2022  EXAMINATION: ONE XRAY VIEW OF THE CHEST 12/20/2022 11:49 pm COMPARISON: 12/20/2022 HISTORY: ORDERING SYSTEM PROVIDED HISTORY: hypoxia TECHNOLOGIST PROVIDED HISTORY: Reason for exam:->hypoxia Reason for Exam: hypoxia FINDINGS: The heart is mildly enlarged but unchanged. The pulmonary vessels are engorged centrally and less prominent. There is moderate hiatal hernia which is unchanged. The lungs are mildly hyperinflated. There are mild subsegmental linear densities scattered along bases which are more prominent. No effusion is seen. Stable cardiomegaly with resolving central pulmonary congestion. Chronic interstitial changes throughout with mild bibasilar atelectasis which is more prominent.  Moderate hiatal hernia which is unchanged     XR CHEST PORTABLE    Result Date: 12/20/2022  EXAMINATION: ONE XRAY VIEW OF THE CHEST 12/20/2022 5:27 am COMPARISON: Chest x-ray of 09/13/2022. CT of 09/15/2022 HISTORY: ORDERING SYSTEM PROVIDED HISTORY: fever TECHNOLOGIST PROVIDED HISTORY: Reason for exam:->fever Reason for Exam: fever Relevant Medical/Surgical History: previous A-FIB,pneumonia and htn FINDINGS: External leads over the chest but no lines or tubes in the chest.  The cardiac silhouette is mildly enlarged. There is a prominence of the malgorzata and central pulmonary vasculature. Increased interstitial markings without overt consolidation or edema. Diaphragm is still visualized. Hiatal hernia is suspected in the medial left lower chest.  There is no sign of pneumothorax. Scoliosis of the spine but with limited bony detail. No endotracheal tube or nasogastric tube. Cardiac silhouette and central vasculature are prominent suggesting a mild CHF with vascular congestion and interstitial edema. Decrease of the more localized opacities on the old exam. However the general malgorzata are prominent and underlying adenopathy is not excluded by chest x-ray. Previous CT had enlarged pulmonary arteries rather than overt adenopathy. CT TIBIA FIBULA RIGHT WO CONTRAST    Result Date: 12/21/2022  EXAMINATION: CT OF THE RIGHT TIBIA AND FIBULA WITHOUT CONTRAST 12/21/2022 1:42 pm TECHNIQUE: CT of the right tibia and fibula was performed without the administration of intravenous contrast.  Multiplanar reformatted images are provided for review. Automated exposure control, iterative reconstruction, and/or weight based adjustment of the mA/kV was utilized to reduce the radiation dose to as low as reasonably achievable. COMPARISON: Right ankle radiograph December 20, 2022;  MRI right ankle January 7, 2019; right ankle radiograph January 6, 2019 HISTORY ORDERING SYSTEM PROVIDED HISTORY: Rt leg cellulitis, hardwarein place check for abscess and infection TECHNOLOGIST PROVIDED HISTORY: Reason for exam:->Rt leg cellulitis, hardwarein place check for abscess and infection Reason for Exam: Rt leg cellulitis, hardwarein place check for abscess and infection FINDINGS: Bones: Bones are osteopenic. No acute fracture identified. Remote ORIF of the medial malleolus and lateral malleolus. The hardware is intact. Solid osseous bridging of the distal syndesmosis. Soft Tissue: Nonspecific subcutaneous edema which becomes more pronounced distally and extends into the foot. Dystrophic calcifications throughout the soft tissues which are chronic. Severe atherosclerotic disease. Mild-to-moderate diffuse fatty atrophy of the imaged musculature. No organized drainable fluid collection or subcutaneous gas identified. No fluid within the deep fascial planes. Joint: Postoperative changes of right total knee arthroplasty. The hardware is intact without loosening or failure. Severe osteoarthritis of the tibiotalar joint with bone on bone articulation, subchondral sclerosis, and subchondral cystic change. Mild osteoarthritic changes of the remainder of the hindfoot and midfoot. 1. Nonspecific soft tissue edema with no organized drainable fluid collection or subcutaneous gas identified. Chronic dystrophic calcifications redemonstrated throughout the soft tissues. 2. No acute osseous findings and no CT evidence of osteomyelitis.      VL Extremity Venous Right    Result Date: 12/20/2022  Lower Extremities DVT Study  Demographics   Patient Name       [facility]ma [facility] Jewel   Date of Study      12/20/2022         Gender              Female   Patient Number     [id number]         Date of Birth       1945   Visit Number       [id number]          Age                 78 year(s)   Accession Number   [id number]         Room Number         1250   Corporate ID       [id number]           [address] [place] [address]   Ordering Physician Elizabeth Sanabria, Zander        [facility]                     MD                 Physician Yahaira Barahona MD,                                                            [facility]  Procedure Type of Study:   Veins:Lower Extremities DVT Study, VL EXTREMITY VENOUS DUPLEX RIGHT. Vascular Sonographer Report  Indications for Study:Swelling. Impressions Right Impression No evidence of deep vein or superficial vein thrombosis involving the right lower extremity and the left common femoral vein. Calf veins were not well visualized on the right. Conclusions   Summary   No evidence of deep vein or superficial vein thrombosis involving the right  lower extremity and the left common femoral vein. Signature   ------------------------------------------------------------------  Electronically signed by Yahaira Barahona MD, [facility] (Interpreting  physician) on 12/20/2022 at 03:05 PM  ------------------------------------------------------------------  Patient Status:Routine. [address] [place] [address] Vascular Lab. Technical Quality:Limited visualization due to patients inability to tolerate compression maneuvers. Velocities are measured in cm/s ; Diameters are measured in mm Right Lower Extremities DVT Study Measurements Right 2D Measurements +------------------------+----------+---------------+----------+ ! Location                ! Visualized! Compressibility! Thrombosis! +------------------------+----------+---------------+----------+ ! Sapheno Femoral Junction! Yes       ! Yes            ! None      ! +------------------------+----------+---------------+----------+ ! GSV Thigh               ! Yes       ! Yes            ! None      ! +------------------------+----------+---------------+----------+ ! Common Femoral          !Yes       ! Yes            ! None      ! +------------------------+----------+---------------+----------+ ! Prox Femoral            !Yes       ! Yes            ! None      ! +------------------------+----------+---------------+----------+ ! Mid Femoral             !Yes       ! Yes            ! None      ! +------------------------+----------+---------------+----------+ ! Dist Femoral            !Yes       ! Yes            ! None      ! +------------------------+----------+---------------+----------+ ! Deep Femoral            !Yes       ! Yes            ! None      ! +------------------------+----------+---------------+----------+ ! Popliteal               !Yes       ! Yes            ! None      ! +------------------------+----------+---------------+----------+ ! GSV Below Knee          ! Yes       ! Yes            ! None      ! +------------------------+----------+---------------+----------+ ! Gastroc                 ! Yes       ! Yes            ! None      ! +------------------------+----------+---------------+----------+ ! Soleal                  !Yes       ! Yes            ! None      ! +------------------------+----------+---------------+----------+ ! PTV                     ! Yes       ! Yes            ! None      ! +------------------------+----------+---------------+----------+ ! Peroneal                !Yes       ! Yes            ! None      ! +------------------------+----------+---------------+----------+ ! GSV Calf                ! Yes       ! Yes            ! None      ! +------------------------+----------+---------------+----------+ Right Doppler Measurements +------------------------+------+------+------------+ ! Location                ! Signal!Reflux! Reflux (sec)! +------------------------+------+------+------------+ ! Sapheno Femoral Junction! Phasic! No    !            ! +------------------------+------+------+------------+ ! Common Femoral          !Phasic! No    !            ! +------------------------+------+------+------------+ ! Femoral                 !Phasic! No    !            ! +------------------------+------+------+------------+ ! Deep Femoral            !Phasic! No    !            ! +------------------------+------+------+------------+ ! Popliteal               !Phasic! No    !            ! +------------------------+------+------+------------+ Left Lower Extremities DVT Study Measurements Left 2D Measurements +------------------------+----------+---------------+----------+ ! Location                ! Visualized! Compressibility! Thrombosis! +------------------------+----------+---------------+----------+ ! Sapheno Femoral Junction! Yes       ! Yes            ! None      ! +------------------------+----------+---------------+----------+ ! GSV Thigh               ! Yes       ! Yes            ! None      ! +------------------------+----------+---------------+----------+ ! Common Femoral          !Yes       ! Yes            ! None      ! +------------------------+----------+---------------+----------+ Left Doppler Measurements +--------------+------+------+------------+ ! Location      ! Signal!Reflux! Reflux (sec)! +--------------+------+------+------------+ ! Common Femoral!Phasic! No    !            ! +--------------+------+------+------------+        Signed:    Mauricio Kelley MD   12/23/2022

## 2022-12-23 NOTE — PROGRESS NOTES
CLINICAL PHARMACY NOTE: MEDS TO BEDS    Total # of Prescriptions Filled: 1   The following medications were delivered to the patient:  Amiodarone 200mg    Additional Documentation:     LILY Kang approved delivery    Medication was delivered to patient's room and patient signed for    Sandi Lucas

## 2022-12-23 NOTE — RT PROTOCOL NOTE
RT Inhaler-Nebulizer Bronchodilator Protocol Note    There is a bronchodilator order in the chart from a provider indicating to follow the RT Bronchodilator Protocol and there is an Initiate RT Inhaler-Nebulizer Bronchodilator Protocol order as well (see protocol at bottom of note). CXR Findings:  XR CHEST PORTABLE    Result Date: 12/22/2022  No focal lung opacity. The findings from the last RT Protocol Assessment were as follows:   History Pulmonary Disease: None or smoker <15 pack years  Respiratory Pattern: Dyspnea on exertion or RR 21-25 bpm  Breath Sounds: Slightly diminished and/or crackles  Cough: Strong, productive  Indication for Bronchodilator Therapy:    Bronchodilator Assessment Score: 5    Aerosolized bronchodilator medication orders have been revised according to the RT Inhaler-Nebulizer Bronchodilator Protocol below. Respiratory Therapist to perform RT Therapy Protocol Assessment initially then follow the protocol. Repeat RT Therapy Protocol Assessment PRN for score 0-3 or on second treatment, BID, and PRN for scores above 3. No Indications - adjust the frequency to every 6 hours PRN wheezing or bronchospasm, if no treatments needed after 48 hours then discontinue using Per Protocol order mode. If indication present, adjust the RT bronchodilator orders based on the Bronchodilator Assessment Score as indicated below. Use Inhaler orders unless patient has one or more of the following: on home nebulizer, not able to hold breath for 10 seconds, is not alert and oriented, cannot activate and use MDI correctly, or respiratory rate 25 breaths per minute or more, then use the equivalent nebulizer order(s) with same Frequency and PRN reasons based on the score. If a patient is on this medication at home then do not decrease Frequency below that used at home.     0-3 - enter or revise RT bronchodilator order(s) to equivalent RT Bronchodilator order with Frequency of every 4 hours PRN for wheezing or increased work of breathing using Per Protocol order mode. 4-6 - enter or revise RT Bronchodilator order(s) to two equivalent RT bronchodilator orders with one order with BID Frequency and one order with Frequency of every 4 hours PRN wheezing or increased work of breathing using Per Protocol order mode. 7-10 - enter or revise RT Bronchodilator order(s) to two equivalent RT bronchodilator orders with one order with TID Frequency and one order with Frequency of every 4 hours PRN wheezing or increased work of breathing using Per Protocol order mode. 11-13 - enter or revise RT Bronchodilator order(s) to one equivalent RT bronchodilator order with QID Frequency and an Albuterol order with Frequency of every 4 hours PRN wheezing or increased work of breathing using Per Protocol order mode. Greater than 13 - enter or revise RT Bronchodilator order(s) to one equivalent RT bronchodilator order with every 4 hours Frequency and an Albuterol order with Frequency of every 2 hours PRN wheezing or increased work of breathing using Per Protocol order mode. RT to enter RT Home Evaluation for COPD & MDI Assessment order using Per Protocol order mode.     Electronically signed by Rhina Zapata RCP on 12/23/2022 at 4:58 PM

## 2022-12-23 NOTE — CARE COORDINATION
Discharge note:      CM/SW has been notified of discharge. Patient noted to have the following needs at discharge. CM/SW has coordinated the following services: REHABILITATION [facility])  [address] [facility] [address]  Phone: [phone]  Fax: [phone]           [address] Alberto  Phone: [phone]  Fax: [phone]       Discharge Destination: Home   Transportation: Daughter         Comment: [id number] and Margie are ware the patient will be discharging home on 12/23/2022      All CM/SW needs met, will sign off.      Electronically signed by FELIPA Brady on 12/23/2022 at 4:18 PM

## 2022-12-23 NOTE — PROGRESS NOTES
12/23/22 1700   RT Protocol   History Pulmonary Disease 0   Respiratory pattern 2   Breath sounds 4   Cough 1   Bronchodilator Assessment Score 7

## 2022-12-23 NOTE — PROGRESS NOTES
Patient is unable to go home today, as per patient her daughter is sick and her grand-daughter works from 5 pm. Patient unable to go home by [place]in point requiring assistance to ambulate. Dr. Doreen Hatch notified.

## 2022-12-23 NOTE — CONSULTS
PICC line education:    -Risks  -Benefits  -Alternatives  -Procedure    Discussed the above with patient, verbalized understanding, answered all questions. Provided with information on PICC care to review. PICC tip verified via 3CG (Ok to use). Reported off to patient's  Nurse 3T staff RN.

## 2022-12-24 VITALS
WEIGHT: 236.9 LBS | SYSTOLIC BLOOD PRESSURE: 145 MMHG | BODY MASS INDEX: 37.18 KG/M2 | DIASTOLIC BLOOD PRESSURE: 99 MMHG | TEMPERATURE: 97.4 F | OXYGEN SATURATION: 92 % | HEART RATE: 85 BPM | RESPIRATION RATE: 18 BRPM | HEIGHT: 67 IN

## 2022-12-24 LAB
ANION GAP SERPL CALCULATED.3IONS-SCNC: 8 MMOL/L (ref 3–16)
BASOPHILS ABSOLUTE: 0.1 K/UL (ref 0–0.2)
BASOPHILS RELATIVE PERCENT: 0.6 %
BUN BLDV-MCNC: 26 MG/DL (ref 7–20)
CALCIUM SERPL-MCNC: 7.5 MG/DL (ref 8.3–10.6)
CHLORIDE BLD-SCNC: 114 MMOL/L (ref 99–110)
CO2: 24 MMOL/L (ref 21–32)
CREAT SERPL-MCNC: 1.1 MG/DL (ref 0.6–1.2)
EOSINOPHILS ABSOLUTE: 0.1 K/UL (ref 0–0.6)
EOSINOPHILS RELATIVE PERCENT: 0.6 %
GFR SERPL CREATININE-BSD FRML MDRD: 52 ML/MIN/{1.73_M2}
GLUCOSE BLD-MCNC: 75 MG/DL (ref 70–99)
HCT VFR BLD CALC: 30.5 % (ref 36–48)
HEMOGLOBIN: 9.8 G/DL (ref 12–16)
LYMPHOCYTES ABSOLUTE: 1.2 K/UL (ref 1–5.1)
LYMPHOCYTES RELATIVE PERCENT: 12.9 %
MCH RBC QN AUTO: 29.1 PG (ref 26–34)
MCHC RBC AUTO-ENTMCNC: 32.2 G/DL (ref 31–36)
MCV RBC AUTO: 90.5 FL (ref 80–100)
MONOCYTES ABSOLUTE: 1 K/UL (ref 0–1.3)
MONOCYTES RELATIVE PERCENT: 10.7 %
NEUTROPHILS ABSOLUTE: 7.1 K/UL (ref 1.7–7.7)
NEUTROPHILS RELATIVE PERCENT: 75.2 %
PDW BLD-RTO: 17.1 % (ref 12.4–15.4)
PLATELET # BLD: 201 K/UL (ref 135–450)
PMV BLD AUTO: 9.9 FL (ref 5–10.5)
POTASSIUM REFLEX MAGNESIUM: 3.9 MMOL/L (ref 3.5–5.1)
RBC # BLD: 3.37 M/UL (ref 4–5.2)
SODIUM BLD-SCNC: 146 MMOL/L (ref 136–145)
WBC # BLD: 9.4 K/UL (ref 4–11)

## 2022-12-24 PROCEDURE — 6370000000 HC RX 637 (ALT 250 FOR IP): Performed by: INTERNAL MEDICINE

## 2022-12-24 PROCEDURE — 2580000003 HC RX 258: Performed by: INTERNAL MEDICINE

## 2022-12-24 PROCEDURE — 85025 COMPLETE CBC W/AUTO DIFF WBC: CPT

## 2022-12-24 PROCEDURE — 80048 BASIC METABOLIC PNL TOTAL CA: CPT

## 2022-12-24 PROCEDURE — 6360000002 HC RX W HCPCS: Performed by: INTERNAL MEDICINE

## 2022-12-24 PROCEDURE — 6370000000 HC RX 637 (ALT 250 FOR IP): Performed by: NURSE PRACTITIONER

## 2022-12-24 RX ORDER — IPRATROPIUM BROMIDE AND ALBUTEROL SULFATE 2.5; .5 MG/3ML; MG/3ML
1 SOLUTION RESPIRATORY (INHALATION) 3 TIMES DAILY
Status: DISCONTINUED | OUTPATIENT
Start: 2022-12-24 | End: 2022-12-24 | Stop reason: HOSPADM

## 2022-12-24 RX ADMIN — PANTOPRAZOLE SODIUM 40 MG: 40 TABLET, DELAYED RELEASE ORAL at 08:56

## 2022-12-24 RX ADMIN — ASPIRIN 81 MG 81 MG: 81 TABLET ORAL at 08:56

## 2022-12-24 RX ADMIN — CEFAZOLIN 2000 MG: 2 INJECTION, POWDER, FOR SOLUTION INTRAMUSCULAR; INTRAVENOUS at 04:54

## 2022-12-24 RX ADMIN — Medication 10 ML: at 09:00

## 2022-12-24 RX ADMIN — BUPROPION HYDROCHLORIDE 200 MG: 100 TABLET, FILM COATED, EXTENDED RELEASE ORAL at 08:56

## 2022-12-24 RX ADMIN — APIXABAN 5 MG: 5 TABLET, FILM COATED ORAL at 08:56

## 2022-12-24 RX ADMIN — AMIODARONE HYDROCHLORIDE 200 MG: 200 TABLET ORAL at 08:56

## 2022-12-24 RX ADMIN — METOPROLOL TARTRATE 25 MG: 25 TABLET, FILM COATED ORAL at 08:56

## 2022-12-24 RX ADMIN — PREDNISONE 40 MG: 20 TABLET ORAL at 08:56

## 2022-12-24 ASSESSMENT — PAIN SCALES - GENERAL: PAINLEVEL_OUTOF10: 0

## 2022-12-24 NOTE — CARE COORDINATION
Discharge note:      CM/SW has been notified of discharge. Patient noted to have the following needs at discharge. CM/SW has coordinated the following services:  [address] [place] [address]. [address]  Phone: [phone]  Fax: [phone]     CM placed call to [employer] to make aware of patient discharge today. Received message from Colni Yu stating that everything is in place for patient. [address] [facility])     PRISCILA placed call to [facility] to make them aware of patient discharge      Discharge Destination: Home  Transportation: Family        Comment:      All CM/SW needs met, will sign off.      Electronically signed by Tomy Almaraz RN on 12/24/2022 at 1:18 PM

## 2022-12-24 NOTE — DISCHARGE SUMMARY
Hospital Medicine Discharge Summary    Patient: Siddhartha Larios     Gender: female  : 1945   Age: 78 y.o. MRN: [id number]    Admitting Physician: Elijah Lanza DO  Discharge Physician: Yo Cui MD     Code Status: Full Code     Admit Date: 2022   Discharge Date:   2022    Disposition:  Home  Time spent arranging discharge: 35 minutes    Discharge Diagnoses: Active Hospital Problems    Diagnosis Date Noted    Streptococcal infection group G [B95.4] 2022     Priority: Medium    Cellulitis of right leg [L03.115] 2022     Priority: Medium    Obesity (BMI 30-39. 9) [E66.9] 2022     Priority: Medium    Stage 3a chronic kidney disease ([address] [N18.31] 2022     Priority: Medium    Bacteremia [R78.81] 2022     Priority: Medium    Fever and chills [R50.9] 2022     Priority: Medium    History of ankle surgery [Z98.890] 2022     Priority: Medium    Sepsis ([address] [A41.9] 2022     Priority: Medium    Neutrophilia [D72.9]        Condition at Discharge:  Stable    Hospital Course:   Presented to hospital sepsis secondary to right lower extremity cellulitis with strep bacteremia repeat blood cultures were negative treated with IV antibiotics and Ortho consulted no need for any drainage CT of the leg was negative will be treated with 3 more weeks of IV cefazolin per ID. Patient A-flutter with RVR started on IV amiodarone and Eliquis this improved discharged on amiodarone with follow-up with EP as outpatient    Discharge Exam:    BP (!) 145/99   Pulse 85   Temp 97.4 °F (36.3 °C) (Oral)   Resp 18   Ht 5' 7\" (1.702 m)   Wt 236 lb 14.4 oz (107.5 kg)   SpO2 92%   BMI 37.10 kg/m²   General appearance:  Appears comfortable.  AAOx3  HEENT: atraumatic, Pupils equal, muscous membranes moist, no masses appreciated  Cardiovascular: Regular rate and rhythm no murmurs appreciated  Respiratory: CTAB no wheezing  Gastrointestinal: Abdomen soft, non-tender, BS+  EXT: no edema  Neurology: no gross focal deficts  Psychiatry: Appropriate affect. Not agitated  Skin:right leg erythema and tenderness     Discharge Medications:   Current Discharge Medication List        START taking these medications    Details   amiodarone (CORDARONE) 200 MG tablet 200mg bid x 8 days then 200mg daily  Qty: 60 tablet, Refills: 0           Current Discharge Medication List        Current Discharge Medication List        CONTINUE these medications which have NOT CHANGED    Details   traMADol (ULTRAM) 50 MG tablet Take 50 mg by mouth every 6 hours as needed for Pain. ferrous sulfate (IRON 325) 325 (65 Fe) MG tablet Take 1 tablet by mouth daily (with breakfast)  Qty: 30 tablet, Refills: 11    Associated Diagnoses: Iron deficiency anemia, unspecified iron deficiency anemia type      Cholecalciferol (VITAMIN D3) 1.25 MG (80712 UT) CAPS Take 1 capsule by mouth every 7 days  Qty: 4 capsule, Refills: 11    Associated Diagnoses: Vitamin D deficiency      furosemide (LASIX) 20 MG tablet Take 1 tablet by mouth daily  Qty: 30 tablet, Refills: 11    Associated Diagnoses: Essential hypertension      albuterol sulfate HFA (PROVENTIL HFA) 108 (90 Base) MCG/ACT inhaler Inhale 2 puffs into the lungs every 4 hours as needed for Wheezing  Qty: 18 g, Refills: 5    Comments: Pharmacy may dispense ANY albuterol product based on formulary, cost, or availability. Associated Diagnoses: Influenza A; Acute on chronic respiratory failure with hypoxia ([facility]); ILD (interstitial lung disease) ([facility])      buPROPion (WELLBUTRIN SR) 200 MG extended release tablet TAKE 1 TABLET BY MOUTH TWICE DAILY  Qty: 60 tablet, Refills: 5    Associated Diagnoses: Severe episode of recurrent major depressive disorder, without psychotic features ([facility])      vitamin B-12 (CYANOCOBALAMIN) 100 MCG tablet Take 1 tablet by mouth in the morning.   Qty: 90 tablet, Refills: 3    Associated Diagnoses: B12 deficiency      alendronate (FOSAMAX) 70 MG tablet Take 1 tablet by mouth every 7 days  Qty: 12 tablet, Refills: 3    Associated Diagnoses: Age-related osteoporosis without current pathological fracture      apixaban (ELIQUIS) 5 MG TABS tablet Take 1 tablet by mouth 2 times daily  Qty: 180 tablet, Refills: 3      metoprolol tartrate (LOPRESSOR) 25 MG tablet Take 1 tablet by mouth 2 times daily  Qty: 180 tablet, Refills: 3      pantoprazole (PROTONIX) 40 MG tablet Take 1 tablet by mouth 2 times daily (before meals)  Qty: 30 tablet, Refills: 3      aspirin 81 MG tablet Take 81 mg by mouth daily           Current Discharge Medication List        STOP taking these medications       oseltamivir (TAMIFLU) 75 MG capsule Comments:   Reason for Stopping:         methylPREDNISolone (MEDROL, JENI,) 4 MG tablet Comments:   Reason for Stopping:         budesonide-formoterol (SYMBICORT) 160-4.5 MCG/ACT AERO Comments:   Reason for Stopping:         dilTIAZem (TIAZAC) 240 MG extended release capsule Comments:   Reason for Stopping:         albuterol-ipratropium (COMBIVENT RESPIMAT)  MCG/ACT AERS inhaler Comments:   Reason for Stopping:         cloNIDine (CATAPRES) 0.2 MG tablet Comments:   Reason for Stopping:         DULoxetine (CYMBALTA) 60 MG extended release capsule Comments:   Reason for Stopping:         dilTIAZem (CARDIZEM CD) 240 MG extended release capsule Comments:   Reason for Stopping:               Labs:  For convenience and continuity at follow-up the following most recent labs are provided:    Lab Results   Component Value Date/Time    WBC 9.4 12/24/2022 04:56 AM    HGB 9.8 12/24/2022 04:56 AM    HCT 30.5 12/24/2022 04:56 AM    MCV 90.5 12/24/2022 04:56 AM     12/24/2022 04:56 AM     12/24/2022 05:04 AM    K 3.9 12/24/2022 05:04 AM     12/24/2022 05:04 AM    CO2 24 12/24/2022 05:04 AM    BUN 26 12/24/2022 05:04 AM    CREATININE 1.1 12/24/2022 05:04 AM    CALCIUM 7.5 12/24/2022 05:04 AM    PHOS 4.2 08/15/2022 03:51 PM ALKPHOS 89 12/22/2022 06:01 AM    ALT <5 12/22/2022 06:01 AM    AST 9 12/22/2022 06:01 AM    BILITOT 0.3 12/22/2022 06:01 AM    BILIDIR 1.0 09/13/2022 05:56 PM    LABALBU 2.4 12/22/2022 06:01 AM    LDLCALC 80 03/15/2021 10:18 AM    TRIG 56 03/15/2021 10:18 AM     Lab Results   Component Value Date    INR 1.49 (H) 09/13/2022    INR 1.45 (H) 03/30/2022    INR 2.6 03/24/2022       Radiology:  XR ANKLE RIGHT (MIN 3 VIEWS)    Result Date: 12/20/2022  EXAMINATION: THREE XRAY VIEWS OF THE RIGHT ANKLE 12/20/2022 5:27 am COMPARISON: 01/06/2019 HISTORY: ORDERING SYSTEM PROVIDED HISTORY: Redness right lower leg history of osteomyelitis TECHNOLOGIST PROVIDED HISTORY: Reason for exam:->Redness right lower leg history of osteomyelitis Reason for Exam: Redness right lower leg history of osteomyelitis FINDINGS: There are 2 pins in the medial malleolus into the tibial plafond. A intramedullary nail enters in the distal fibula coursing up the shaft but with the proximal component extending outside the bony shaft over the interosseous membrane region. There is dense ossification of the distal most interosseous membrane bridging the tibia and fibula. Loss of the ankle joint space with sclerosis of the articular margins of the tibia and talus. There is also loss of the subtalar joint with bone on bone appearance on the lateral view. No acute fracture, cortical erosion, or periosteal reaction is identified. Extensive edema of the soft tissues and scattered soft tissue calcifications are redemonstrated. There is no soft tissue emphysema. Stable appearance of the medial malleolar pins and the intramedullary nail at the distal fibula with the proximal portion of the intramedullary nail outside bone. Fusion of the distal interosseous membrane bridging the tibia and fibula with old healed bony deformities. Progressive loss of the tibiotalar joint and subtalar joint with sclerosis of the margins.  Edema of the soft tissues with scattered soft tissue calcifications. No soft tissue emphysema or area of focal bony erosion. XR CHEST PORTABLE    Result Date: 12/22/2022  EXAMINATION: ONE XRAY VIEW OF THE CHEST 12/22/2022 11:02 am COMPARISON: Chest x-ray dated 12/20/2022 HISTORY: ORDERING SYSTEM PROVIDED HISTORY: dyspnea TECHNOLOGIST PROVIDED HISTORY: Reason for exam:->dyspnea Reason for Exam: Leg Pain (Pt arrived in the ED from home via [place] ems FINDINGS: Cardiomegaly. No focal lung opacity is visualized. No pleural effusion or pneumothorax. Degenerative disease of the visualized osseous structures. No focal lung opacity. XR CHEST PORTABLE    Result Date: 12/21/2022  EXAMINATION: ONE XRAY VIEW OF THE CHEST 12/20/2022 11:49 pm COMPARISON: 12/20/2022 HISTORY: ORDERING SYSTEM PROVIDED HISTORY: hypoxia TECHNOLOGIST PROVIDED HISTORY: Reason for exam:->hypoxia Reason for Exam: hypoxia FINDINGS: The heart is mildly enlarged but unchanged. The pulmonary vessels are engorged centrally and less prominent. There is moderate hiatal hernia which is unchanged. The lungs are mildly hyperinflated. There are mild subsegmental linear densities scattered along bases which are more prominent. No effusion is seen. Stable cardiomegaly with resolving central pulmonary congestion. Chronic interstitial changes throughout with mild bibasilar atelectasis which is more prominent. Moderate hiatal hernia which is unchanged     XR CHEST PORTABLE    Result Date: 12/20/2022  EXAMINATION: ONE XRAY VIEW OF THE CHEST 12/20/2022 5:27 am COMPARISON: Chest x-ray of 09/13/2022. CT of 09/15/2022 HISTORY: ORDERING SYSTEM PROVIDED HISTORY: fever TECHNOLOGIST PROVIDED HISTORY: Reason for exam:->fever Reason for Exam: fever Relevant Medical/Surgical History: previous A-FIB,pneumonia and htn FINDINGS: External leads over the chest but no lines or tubes in the chest.  The cardiac silhouette is mildly enlarged.   There is a prominence of the malgorzata and central pulmonary vasculature. Increased interstitial markings without overt consolidation or edema. Diaphragm is still visualized. Hiatal hernia is suspected in the medial left lower chest.  There is no sign of pneumothorax. Scoliosis of the spine but with limited bony detail. No endotracheal tube or nasogastric tube. Cardiac silhouette and central vasculature are prominent suggesting a mild CHF with vascular congestion and interstitial edema. Decrease of the more localized opacities on the old exam. However the general malgorzata are prominent and underlying adenopathy is not excluded by chest x-ray. Previous CT had enlarged pulmonary arteries rather than overt adenopathy. CT TIBIA FIBULA RIGHT WO CONTRAST    Result Date: 12/21/2022  EXAMINATION: CT OF THE RIGHT TIBIA AND FIBULA WITHOUT CONTRAST 12/21/2022 1:42 pm TECHNIQUE: CT of the right tibia and fibula was performed without the administration of intravenous contrast.  Multiplanar reformatted images are provided for review. Automated exposure control, iterative reconstruction, and/or weight based adjustment of the mA/kV was utilized to reduce the radiation dose to as low as reasonably achievable. COMPARISON: Right ankle radiograph December 20, 2022; MRI right ankle January 7, 2019; right ankle radiograph January 6, 2019 HISTORY ORDERING SYSTEM PROVIDED HISTORY: Rt leg cellulitis, hardwarein place check for abscess and infection TECHNOLOGIST PROVIDED HISTORY: Reason for exam:->Rt leg cellulitis, hardwarein place check for abscess and infection Reason for Exam: Rt leg cellulitis, hardwarein place check for abscess and infection FINDINGS: Bones: Bones are osteopenic. No acute fracture identified. Remote ORIF of the medial malleolus and lateral malleolus. The hardware is intact. Solid osseous bridging of the distal syndesmosis. Soft Tissue: Nonspecific subcutaneous edema which becomes more pronounced distally and extends into the foot.   Dystrophic calcifications throughout the soft tissues which are chronic. Severe atherosclerotic disease. Mild-to-moderate diffuse fatty atrophy of the imaged musculature. No organized drainable fluid collection or subcutaneous gas identified. No fluid within the deep fascial planes. Joint: Postoperative changes of right total knee arthroplasty. The hardware is intact without loosening or failure. Severe osteoarthritis of the tibiotalar joint with bone on bone articulation, subchondral sclerosis, and subchondral cystic change. Mild osteoarthritic changes of the remainder of the hindfoot and midfoot. 1. Nonspecific soft tissue edema with no organized drainable fluid collection or subcutaneous gas identified. Chronic dystrophic calcifications redemonstrated throughout the soft tissues. 2. No acute osseous findings and no CT evidence of osteomyelitis. VL Extremity Venous Right    Result Date: 12/20/2022  Lower Extremities DVT Study  Demographics   Patient Name       Juan Vora   Date of Study      12/20/2022         Gender              Female   Patient Number     [id number]         Date of Birth       1945   Visit Number       [id number]          Age                 78 year(s)   Accession Number   [id number]         Room Number         2009   Corporate ID       [id number]           [facility]   Ordering Physician Fernando Borges, Interpreting        [facility]                     MD                 Physician           Houston Watson MD,                                                            [facility]  Procedure Type of Study:   Veins:Lower Extremities DVT Study, VL EXTREMITY VENOUS DUPLEX RIGHT. Vascular Sonographer Report  Indications for Study:Swelling. Impressions Right Impression No evidence of deep vein or superficial vein thrombosis involving the right lower extremity and the left common femoral vein. Calf veins were not well visualized on the right.  Conclusions   Summary No evidence of deep vein or superficial vein thrombosis involving the right  lower extremity and the left common femoral vein. Signature   ------------------------------------------------------------------  Electronically signed by David Hernandez MD, [facility] (Interpreting  physician) on 12/20/2022 at 03:05 PM  ------------------------------------------------------------------  Patient Status:Routine. [address] [place] [address] Vascular Lab. Technical Quality:Limited visualization due to patients inability to tolerate compression maneuvers. Velocities are measured in cm/s ; Diameters are measured in mm Right Lower Extremities DVT Study Measurements Right 2D Measurements +------------------------+----------+---------------+----------+ ! Location                ! Visualized! Compressibility! Thrombosis! +------------------------+----------+---------------+----------+ ! Sapheno Femoral Junction! Yes       ! Yes            ! None      ! +------------------------+----------+---------------+----------+ ! GSV Thigh               ! Yes       ! Yes            ! None      ! +------------------------+----------+---------------+----------+ ! Common Femoral          !Yes       ! Yes            ! None      ! +------------------------+----------+---------------+----------+ ! Prox Femoral            !Yes       ! Yes            ! None      ! +------------------------+----------+---------------+----------+ ! Mid Femoral             !Yes       ! Yes            ! None      ! +------------------------+----------+---------------+----------+ ! Dist Femoral            !Yes       ! Yes            ! None      ! +------------------------+----------+---------------+----------+ ! Deep Femoral            !Yes       ! Yes            ! None      ! +------------------------+----------+---------------+----------+ ! Popliteal               !Yes       ! Yes            ! None      ! +------------------------+----------+---------------+----------+ ! GSV Below Knee !Yes       !Yes            ! None      ! +------------------------+----------+---------------+----------+ ! Gastroc                 ! Yes       ! Yes            ! None      ! +------------------------+----------+---------------+----------+ ! Soleal                  !Yes       ! Yes            ! None      ! +------------------------+----------+---------------+----------+ ! PTV                     ! Yes       ! Yes            ! None      ! +------------------------+----------+---------------+----------+ ! Peroneal                !Yes       ! Yes            ! None      ! +------------------------+----------+---------------+----------+ ! GSV Calf                ! Yes       ! Yes            ! None      ! +------------------------+----------+---------------+----------+ Right Doppler Measurements +------------------------+------+------+------------+ ! Location                ! Signal!Reflux! Reflux (sec)! +------------------------+------+------+------------+ ! Sapheno Femoral Junction! Phasic! No    !            ! +------------------------+------+------+------------+ ! Common Femoral          !Phasic! No    !            ! +------------------------+------+------+------------+ ! Femoral                 !Phasic! No    !            ! +------------------------+------+------+------------+ ! Deep Femoral            !Phasic! No    !            ! +------------------------+------+------+------------+ ! Popliteal               !Phasic! No    !            ! +------------------------+------+------+------------+ Left Lower Extremities DVT Study Measurements Left 2D Measurements +------------------------+----------+---------------+----------+ ! Location                ! Visualized! Compressibility! Thrombosis! +------------------------+----------+---------------+----------+ ! Sapheno Femoral Junction! Yes       ! Yes            ! None      ! +------------------------+----------+---------------+----------+ ! GSV Thigh               ! Yes       ! Yes            ! None      ! +------------------------+----------+---------------+----------+ ! Common Femoral          !Yes       ! Yes            ! None      ! +------------------------+----------+---------------+----------+ Left Doppler Measurements +--------------+------+------+------------+ ! Location      ! Signal!Reflux! Reflux (sec)! +--------------+------+------+------------+ ! Common Femoral!Phasic! No    !            ! +--------------+------+------+------------+        Signed:    Henri Rodriguez MD   12/24/2022

## 2022-12-24 NOTE — PROGRESS NOTES
Patient dc'd to home with PICC for IV antibiotics. Dc instructions reviewed with patient. Patient verbalizes understanding. [facility] to see at home. IV removed without complications. Patient to lobby via wheelchair.

## 2022-12-24 NOTE — PROGRESS NOTES
[facility]    12/24/2022 11:21 AM        Name: Alber Kirby .               Admitted: 12/20/2022  Primary Care Provider: MARILEE Natarajan CNP (Tel: [phone])      Subjective:    No chest pain fever or chills    Current Medications  ipratropium-albuterol (DUONEB) nebulizer solution 1 ampule, TID  guaiFENesin-dextromethorphan (ROBITUSSIN DM) 100-10 MG/5ML syrup 5 mL, Q6H PRN  lidocaine PF 1 % injection 5 mL, Once  sodium chloride flush 0.9 % injection 5-40 mL, 2 times per day  sodium chloride flush 0.9 % injection 5-40 mL, PRN  0.9 % sodium chloride infusion, PRN  predniSONE (DELTASONE) tablet 40 mg, Daily  ipratropium-albuterol (DUONEB) nebulizer solution 1 ampule, Q4H PRN  ceFAZolin (ANCEF) 2,000 mg in dextrose 5 % 50 mL IVPB (mini-bag), Q8H  amiodarone (CORDARONE) tablet 200 mg, BID  sodium chloride flush 0.9 % injection 10 mL, 2 times per day  sodium chloride flush 0.9 % injection 10 mL, PRN  0.9 % sodium chloride infusion, PRN  potassium chloride 10 mEq/100 mL IVPB (Peripheral Line), PRN  magnesium sulfate 2000 mg in 50 mL IVPB premix, PRN  promethazine (PHENERGAN) tablet 12.5 mg, Q6H PRN   Or  ondansetron (ZOFRAN) injection 4 mg, Q6H PRN  acetaminophen (TYLENOL) tablet 650 mg, Q6H PRN   Or  acetaminophen (TYLENOL) suppository 650 mg, Q6H PRN  apixaban (ELIQUIS) tablet 5 mg, BID  aspirin chewable tablet 81 mg, Daily  buPROPion (WELLBUTRIN SR) extended release tablet 200 mg, BID  pantoprazole (PROTONIX) tablet 40 mg, BID AC  oxyCODONE-acetaminophen (PERCOCET) 5-325 MG per tablet 1 tablet, Q4H PRN  metoprolol tartrate (LOPRESSOR) tablet 25 mg, BID      Objective:  BP (!) 145/99   Pulse 85   Temp 97.4 °F (36.3 °C) (Oral)   Resp 18   Ht 5' 7\" (1.702 m)   Wt 236 lb 14.4 oz (107.5 kg)   SpO2 92%   BMI 37.10 kg/m²     Intake/Output Summary (Last 24 hours) at 12/24/2022 1121  Last data filed at localized opacities on the old exam.      However the general malgorzata are prominent and underlying adenopathy is not   excluded by chest x-ray. Previous CT had enlarged pulmonary arteries rather   than overt adenopathy. XR ANKLE RIGHT (MIN 3 VIEWS)   Final Result   Stable appearance of the medial malleolar pins and the intramedullary nail at   the distal fibula with the proximal portion of the intramedullary nail   outside bone. Fusion of the distal interosseous membrane bridging the tibia and fibula with   old healed bony deformities. Progressive loss of the tibiotalar joint and   subtalar joint with sclerosis of the margins. Edema of the soft tissues with scattered soft tissue calcifications. No soft   tissue emphysema or area of focal bony erosion. Recent imaging reviewed    Problem List  Principal Problem:    Sepsis ([address]  Active Problems:    Cellulitis of right leg    Obesity (BMI 30-39. 9)    Stage 3a chronic kidney disease (HCC)    Bacteremia    Fever and chills    History of ankle surgery    Streptococcal infection group G    Neutrophilia  Resolved Problems:    * No resolved hospital problems.  *       Assessment/Plan:   Sepsis secondary to RLE celluliits with strep bacteremia  - iv atbx  - repeat bc negative thus far  - id on board    Aflutter with rvr  - eliquis  - oral amio            DVT prophylaxis eliquis  Code status full code

## 2022-12-25 LAB
BLOOD CULTURE, ROUTINE: NORMAL
CULTURE, BLOOD 2: NORMAL

## 2022-12-27 ENCOUNTER — CARE COORDINATION (OUTPATIENT)
Dept: CASE MANAGEMENT | Age: 77
End: 2022-12-27

## 2022-12-27 DIAGNOSIS — L03.115 CELLULITIS OF RIGHT LEG: Primary | ICD-10-CM

## 2022-12-27 PROCEDURE — 1111F DSCHRG MED/CURRENT MED MERGE: CPT | Performed by: NURSE PRACTITIONER

## 2022-12-27 NOTE — CARE COORDINATION
[facility] Care Transitions Initial Follow Up Call    Call within 2 business days of discharge: Yes    Care Transition Nurse contacted the patient by telephone to perform post hospital discharge assessment. Verified name and  with patient as identifiers. Provided introduction to self, and explanation of the Care Transition Nurse role. Patient: Adam Newman Patient : 1945   MRN: [id number]  Reason for Admission:   Discharge Date: 22 RARS: Readmission Risk Score: 19.2      Last Discharge  [address]       Date Complaint Diagnosis Description Type Department Provider    22 Leg Pain Cellulitis of right leg ED to Hosp-Admission (Discharged) (ADMITTED) [facility] 3T Ekaterina Cortez MD; Kelsey Guajardo . .. Was this an external facility discharge? No Discharge Facility:     Challenges to be reviewed by the provider   Additional needs identified to be addressed with provider: No  none               Method of communication with provider: none. Pt states doing well, no issues or concerns. Leg improving. [facility]. nurse has been out. IV antibx are infused on schedule. Agreed to more CTC f/u calls. Care Transition Nurse reviewed discharge instructions with patient who verbalized understanding. The patient was given an opportunity to ask questions and does not have any further questions or concerns at this time. Were discharge instructions available to patient? Yes. Reviewed appropriate site of care based on symptoms and resources available to patient including: When to call 911. The patient agrees to contact the PCP office for questions related to their healthcare. Advance Care Planning:   Does patient have an Advance Directive: reviewed and current. Medication reconciliation was performed with patient, who verbalizes understanding of administration of home medications.  Medications reviewed, 1111F entered: yes    Was patient discharged with a pulse oximeter? no    Non-face-to-face services provided:  Obtained and reviewed discharge summary and/or continuity of care documents    Offered patient enrollment in the Remote Patient Monitoring (RPM) program for in-home monitoring: Patient is not eligible for RPM program.    Care Transitions 24 Hour Call    Do you have a copy of your discharge instructions?: Yes  Do you have all of your prescriptions and are they filled?: Yes  Have you been contacted by a [facility] Pharmacist?: No  Have you scheduled your follow up appointment?: Yes  How are you going to get to your appointment?: Car - family or friend to transport  Do you have support at home?: Alone  Do you feel like you have everything you need to keep you well at home?: Yes  Are you an active caregiver in your home?: No  Care Transitions Interventions  No Identified Needs         Follow Up  Future Appointments   Date Time Provider Inge Van   2/15/2023 12:00 PM Jaclyn Miranda MD [facility] PLANO Cardio MMA   4/17/2023  2:20 PM Edna Hinton APRN - CNP [address] [facility] Transition Nurse provided contact information. Plan for follow-up call in 5-7 days based on severity of symptoms and risk factors. Plan for next call: self management-  sepsis secondary to right lower extremity cellulitis with strep bacteremia; [facility]; IV antibx; f/u appts.      Gaetano Niño RN

## 2022-12-28 LAB
ANION GAP SERPL CALCULATED.3IONS-SCNC: 10 MMOL/L (ref 3–16)
BASOPHILS ABSOLUTE: 0.1 K/UL (ref 0–0.2)
BASOPHILS RELATIVE PERCENT: 0.8 %
BUN BLDV-MCNC: 25 MG/DL (ref 7–20)
C-REACTIVE PROTEIN: 21.3 MG/L (ref 0–5.1)
CALCIUM SERPL-MCNC: 8.1 MG/DL (ref 8.3–10.6)
CHLORIDE BLD-SCNC: 107 MMOL/L (ref 99–110)
CO2: 24 MMOL/L (ref 21–32)
CREAT SERPL-MCNC: 1.2 MG/DL (ref 0.6–1.2)
EOSINOPHILS ABSOLUTE: 0.3 K/UL (ref 0–0.6)
EOSINOPHILS RELATIVE PERCENT: 3.2 %
GFR SERPL CREATININE-BSD FRML MDRD: 47 ML/MIN/{1.73_M2}
GLUCOSE BLD-MCNC: 143 MG/DL (ref 70–99)
HCT VFR BLD CALC: 33.6 % (ref 36–48)
HEMOGLOBIN: 10.5 G/DL (ref 12–16)
LYMPHOCYTES ABSOLUTE: 1.4 K/UL (ref 1–5.1)
LYMPHOCYTES RELATIVE PERCENT: 16.1 %
MCH RBC QN AUTO: 29.1 PG (ref 26–34)
MCHC RBC AUTO-ENTMCNC: 31.2 G/DL (ref 31–36)
MCV RBC AUTO: 93.5 FL (ref 80–100)
MONOCYTES ABSOLUTE: 0.7 K/UL (ref 0–1.3)
MONOCYTES RELATIVE PERCENT: 8.3 %
NEUTROPHILS ABSOLUTE: 6.2 K/UL (ref 1.7–7.7)
NEUTROPHILS RELATIVE PERCENT: 71.6 %
PDW BLD-RTO: 17.4 % (ref 12.4–15.4)
PLATELET # BLD: 231 K/UL (ref 135–450)
PMV BLD AUTO: 10.7 FL (ref 5–10.5)
POTASSIUM SERPL-SCNC: 4.7 MMOL/L (ref 3.5–5.1)
RBC # BLD: 3.6 M/UL (ref 4–5.2)
SEDIMENTATION RATE, ERYTHROCYTE: 31 MM/HR (ref 0–30)
SODIUM BLD-SCNC: 141 MMOL/L (ref 136–145)
WBC # BLD: 8.7 K/UL (ref 4–11)

## 2022-12-28 NOTE — RESULT ENCOUNTER NOTE
I believe patient is getting antibiotic at home through PICC line. Slightly elevated CRP than 7 days ago  Minor other lab changes  Considering recent care coordinator note shows patient overall feeling better.   I recommend patient get a hospital discharge follow-up in the next few days

## 2022-12-29 DIAGNOSIS — L03.115 CELLULITIS OF RIGHT LEG: Primary | ICD-10-CM

## 2022-12-29 ASSESSMENT — ENCOUNTER SYMPTOMS
COUGH: 1
SORE THROAT: 1

## 2022-12-29 NOTE — RESULT ENCOUNTER NOTE
Several other labs including CBC, ESR, CRP, BMP has been reviewed.   Please see treatment plan as mentioned in other note  I recommend patient get a hospital discharge follow-up visit

## 2023-01-02 ENCOUNTER — HOSPITAL ENCOUNTER (OUTPATIENT)
Age: 78
Setting detail: SPECIMEN
Discharge: HOME | End: 2023-01-02
Payer: COMMERCIAL

## 2023-01-02 LAB
ANION GAP SERPL CALCULATED.3IONS-SCNC: 6 MMOL/L (ref 3–16)
BASOPHILS ABSOLUTE: 0.1 K/UL (ref 0–0.2)
BASOPHILS RELATIVE PERCENT: 0.9 %
BUN BLDV-MCNC: 28 MG/DL (ref 7–20)
C-REACTIVE PROTEIN: 7 MG/L (ref 0–5.1)
CALCIUM SERPL-MCNC: 9.1 MG/DL (ref 8.3–10.6)
CHLORIDE BLD-SCNC: 110 MMOL/L (ref 99–110)
CO2: 28 MMOL/L (ref 21–32)
CREAT SERPL-MCNC: 1.5 MG/DL (ref 0.6–1.2)
EOSINOPHILS ABSOLUTE: 0.3 K/UL (ref 0–0.6)
EOSINOPHILS RELATIVE PERCENT: 3.2 %
GFR SERPL CREATININE-BSD FRML MDRD: 36 ML/MIN/{1.73_M2}
GLUCOSE BLD-MCNC: 88 MG/DL (ref 70–99)
HCT VFR BLD CALC: 33.6 % (ref 36–48)
HEMOGLOBIN: 10.8 G/DL (ref 12–16)
LYMPHOCYTES ABSOLUTE: 0.9 K/UL (ref 1–5.1)
LYMPHOCYTES RELATIVE PERCENT: 9.5 %
MCH RBC QN AUTO: 29.6 PG (ref 26–34)
MCHC RBC AUTO-ENTMCNC: 32 G/DL (ref 31–36)
MCV RBC AUTO: 92.6 FL (ref 80–100)
MONOCYTES ABSOLUTE: 0.9 K/UL (ref 0–1.3)
MONOCYTES RELATIVE PERCENT: 9.7 %
NEUTROPHILS ABSOLUTE: 7 K/UL (ref 1.7–7.7)
NEUTROPHILS RELATIVE PERCENT: 76.7 %
PDW BLD-RTO: 17 % (ref 12.4–15.4)
PLATELET # BLD: 231 K/UL (ref 135–450)
PMV BLD AUTO: 11 FL (ref 5–10.5)
POTASSIUM SERPL-SCNC: 4.8 MMOL/L (ref 3.5–5.1)
RBC # BLD: 3.63 M/UL (ref 4–5.2)
SEDIMENTATION RATE, ERYTHROCYTE: 62 MM/HR (ref 0–30)
SODIUM BLD-SCNC: 144 MMOL/L (ref 136–145)
WBC # BLD: 9.1 K/UL (ref 4–11)

## 2023-01-02 PROCEDURE — 80048 BASIC METABOLIC PNL TOTAL CA: CPT

## 2023-01-02 PROCEDURE — 85025 COMPLETE CBC W/AUTO DIFF WBC: CPT

## 2023-01-02 PROCEDURE — 86140 C-REACTIVE PROTEIN: CPT

## 2023-01-02 PROCEDURE — 36415 COLL VENOUS BLD VENIPUNCTURE: CPT

## 2023-01-02 PROCEDURE — 85652 RBC SED RATE AUTOMATED: CPT

## 2023-01-03 ENCOUNTER — CARE COORDINATION (OUTPATIENT)
Dept: CASE MANAGEMENT | Age: 78
End: 2023-01-03

## 2023-01-03 ENCOUNTER — CLINICAL DOCUMENTATION (OUTPATIENT)
Dept: INFECTIOUS DISEASES | Age: 78
End: 2023-01-03

## 2023-01-03 DIAGNOSIS — L03.115 CELLULITIS OF RIGHT LEG: Primary | ICD-10-CM

## 2023-01-03 NOTE — PROGRESS NOTES
Dr. Colten Barone has placed a referral order while covering for Dr. Rossana Arizmendi for pharmacist to manage Outpatient Parental Antimicrobial Therapy (OPAT) pursuant the ID Collaborative Practice Agreement. Patient and/or caregiver has verbally consented to have drug therapy managed by a pharmacist. The benefits and risks of complex antimicrobial therapy including drug-specific adverse reactions and necessary follow-up were discussed with patient and/or caregiver and they are amenable to OPAT and pharmacist management. Pertinent Objective Data:     Wt Readings from Last 1 Encounters:   12/24/22 236 lb 14.4 oz (107.5 kg)      BMI Readings from Last 1 Encounters:   12/24/22 37.10 kg/m²      CrCl= 54 with SCr of 1.5  Baseline SCr= 1.1-1.5    Lab Results   Component Value Date     01/02/2023    K 4.8 01/02/2023     01/02/2023    CO2 28 01/02/2023    BUN 28 (H) 01/02/2023    CREATININE 1.5 (H) 01/02/2023    GLUCOSE 88 01/02/2023    CALCIUM 9.1 01/02/2023    PROT 5.7 (L) 12/22/2022    LABALBU 2.4 (L) 12/22/2022    BILITOT 0.3 12/22/2022    ALKPHOS 89 12/22/2022    AST 9 (L) 12/22/2022    ALT <5 (L) 12/22/2022    LABGLOM 36 (A) 01/02/2023    GFRAA 38 (A) 09/20/2022    AGRATIO 0.7 (L) 12/22/2022    GLOB 3.1 07/08/2021       Lab Results   Component Value Date    WBC 9.1 01/02/2023    HGB 10.8 (L) 01/02/2023    HCT 33.6 (L) 01/02/2023    MCV 92.6 01/02/2023     01/02/2023    LYMPHOPCT 9.5 01/02/2023    RBC 3.63 (L) 01/02/2023    MCH 29.6 01/02/2023    MCHC 32.0 01/02/2023    RDW 17.0 (H) 01/02/2023       Lab Results   Component Value Date    CRP 7.0 (H) 01/02/2023       Lab Results   Component Value Date    SEDRATE 58 (H) 01/02/2023     OPAT Orders:  Organism/Diagnosis  Remote h/o 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 Clinical Pharmacist will review patient weekly or as needed and make recommendations regarding the above therapy plan.      Thank you,  Jd Dunne, PharmD, [address] [place] [address] Infectious Disease  Phone: [phone] (also available on [employer])  Fax: [phone]    For Pharmacy Admin Tracking Only  Program: Medical Group  CPA in place:  Yes  Time Spent (min): 20

## 2023-01-03 NOTE — CARE COORDINATION
[facility] Care Transitions Follow Up Call    Care Transition Nurse contacted the patient by telephone to follow up after admission. Verified name and  with patient as identifiers. Patient: Mc Li  Patient : 1945   MRN: [id number]  Reason for Admission:   Discharge Date: 22 RARS: Readmission Risk Score: 19.2      Needs to be reviewed by the provider   Additional needs identified to be addressed with provider: No  none             Method of communication with provider: none. Pt states doing well, no issues or concerns. Agreed to more CTC f/u calls. Addressed changes since last contact:  none  Discussed follow-up appointments. If no appointment was previously scheduled, appointment scheduling offered: No.   Is follow up appointment scheduled within 7 days of discharge? Yes. Follow Up  Future Appointments   Date Time Provider Inge Van   2/15/2023 12:00 PM Nahun Luis MD FF Cardio MMA   2023  2:20 PM MARILEE Mckinney - CNP [facility] [place] - DYD     Non-[facility] follow up appointment(s):     Care Transition Nurse reviewed medical action plan with patient and discussed any barriers to care and/or understanding of plan of care after discharge. Discussed appropriate site of care based on symptoms and resources available to patient including: When to call 911. The patient agrees to contact the PCP office for questions related to their healthcare. Advance Care Planning:   reviewed and current. Patients top risk factors for readmission: medical condition-  sepsis secondary to right lower extremity cellulitis with strep bacteremia; Interventions to address risk factors: Assessment and support for treatment adherence and medication management-  sepsis secondary to right lower extremity cellulitis with strep bacteremia;      Offered patient enrollment in the Remote Patient Monitoring (RPM) program for in-home monitoring: Patient is not eligible for RPM program. Care Transitions Subsequent and Final Call    Subsequent and Final Calls  Do you have any ongoing symptoms?: No  Have your medications changed?: No  Do you have any questions related to your medications?: No  Do you currently have any active services?: Yes  Are you currently active with any services?: Home Health  Do you have any needs or concerns that I can assist you with?: No  Identified Barriers: None  Care Transitions Interventions  No Identified Needs  Other Interventions:             Care Transition Nurse provided contact information for future needs. Plan for follow-up call in 5-7 days based on severity of symptoms and risk factors.   Plan for next call: self management-  sepsis secondary to right lower extremity cellulitis with strep bacteremia; f/u appts    Char Crump RN

## 2023-01-09 ENCOUNTER — CARE COORDINATION (OUTPATIENT)
Dept: CASE MANAGEMENT | Age: 78
End: 2023-01-09

## 2023-01-09 ENCOUNTER — TELEPHONE (OUTPATIENT)
Dept: INTERNAL MEDICINE CLINIC | Age: 78
End: 2023-01-09

## 2023-01-09 LAB
ANION GAP SERPL CALCULATED.3IONS-SCNC: 11 MMOL/L (ref 3–16)
BASOPHILS ABSOLUTE: 0.1 K/UL (ref 0–0.2)
BASOPHILS RELATIVE PERCENT: 1.5 %
BUN BLDV-MCNC: 22 MG/DL (ref 7–20)
C-REACTIVE PROTEIN: 33.7 MG/L (ref 0–5.1)
CALCIUM SERPL-MCNC: 8.6 MG/DL (ref 8.3–10.6)
CHLORIDE BLD-SCNC: 106 MMOL/L (ref 99–110)
CO2: 26 MMOL/L (ref 21–32)
CREAT SERPL-MCNC: 1.4 MG/DL (ref 0.6–1.2)
EOSINOPHILS ABSOLUTE: 0.3 K/UL (ref 0–0.6)
EOSINOPHILS RELATIVE PERCENT: 5.7 %
GFR SERPL CREATININE-BSD FRML MDRD: 39 ML/MIN/{1.73_M2}
GLUCOSE BLD-MCNC: 126 MG/DL (ref 70–99)
HCT VFR BLD CALC: 32.7 % (ref 36–48)
HEMOGLOBIN: 10.3 G/DL (ref 12–16)
LYMPHOCYTES ABSOLUTE: 0.9 K/UL (ref 1–5.1)
LYMPHOCYTES RELATIVE PERCENT: 15.2 %
MCH RBC QN AUTO: 29.5 PG (ref 26–34)
MCHC RBC AUTO-ENTMCNC: 31.5 G/DL (ref 31–36)
MCV RBC AUTO: 93.6 FL (ref 80–100)
MONOCYTES ABSOLUTE: 0.6 K/UL (ref 0–1.3)
MONOCYTES RELATIVE PERCENT: 10.2 %
NEUTROPHILS ABSOLUTE: 3.9 K/UL (ref 1.7–7.7)
NEUTROPHILS RELATIVE PERCENT: 67.4 %
PDW BLD-RTO: 16.8 % (ref 12.4–15.4)
PLATELET # BLD: 189 K/UL (ref 135–450)
PMV BLD AUTO: 11.6 FL (ref 5–10.5)
POTASSIUM SERPL-SCNC: 4.5 MMOL/L (ref 3.5–5.1)
RBC # BLD: 3.49 M/UL (ref 4–5.2)
SEDIMENTATION RATE, ERYTHROCYTE: 59 MM/HR (ref 0–30)
SODIUM BLD-SCNC: 143 MMOL/L (ref 136–145)
WBC # BLD: 5.8 K/UL (ref 4–11)

## 2023-01-09 NOTE — TELEPHONE ENCOUNTER
Karin from [facility] visits the patient once a week and states that at her visit today the patients right leg has celulitis and looks really bad, red, swollen, warm, extending to her knee from her ankle. Left foot around the ankle(mid calf) red, swollen and warm but not nearly as bad as the right leg. She does not have a fever and her vital signs are normal. She is in a lot of pain. Please advise.  Thank you

## 2023-01-09 NOTE — TELEPHONE ENCOUNTER
I have not seen the patient for hospital follow-up so difficult to give advice (if this is worsening, etc). She is under the care of infectious disease for cellulitis (known problem). Does she need to see wound care?   Return to hospital?

## 2023-01-09 NOTE — CARE COORDINATION
[facility] Transitions Follow Up Call    2023    Patient: Milton Arciniega  Patient : 1945   MRN: [id number]  Reason for Admission: BLE cellulitis  Discharge Date: 22 RARS: Readmission Risk Score: 19.2         Spoke with: NA    Attempted to reach patient via phone for transition call. VM left stating purpose of call along with my contact information requesting a return call. Noted upon chart review HC alerted PCP of worsening swelling & erythema, particularly to LLE. Will f/u as appropriate. Bhavana Tillman LPN [address] [place] [phone]    Care Transitions Subsequent and Final Call    Subsequent and Final Calls  Are you currently active with any services?: Home Health  Care Transitions Interventions  Other Interventions:              Follow Up  Future Appointments   Date Time Provider Inge Van   2/15/2023 12:00 PM Nova Hayes MD [facility] PLANO Cardio MMA   2023  2:20 PM Rosio Trivedi, APRN - CNP [facility] - [place] Tillman LPN

## 2023-01-10 ENCOUNTER — TELEPHONE (OUTPATIENT)
Dept: INFECTIOUS DISEASES | Age: 78
End: 2023-01-10

## 2023-01-10 NOTE — TELEPHONE ENCOUNTER
During my initial intake, pt did endorse some trouble with mid-night doses but I educated her that she doesn't need to take cefazolin exactly 8 hrs apart and to avoid mid-night awakenings just for a dose. Pt verbalized understanding and I do believe she is getting all three doses in a 24 hr period. If still a concern at this point, can do IV cefazolin in a CI fashion over 24 hrs. Called Karin to discuss R leg further and any systemic symptoms of infection. Had to leave a . CRP and ESR are rising from hospitalization. WBC is down. Unclear what correlation lab values have for cellulitis. Will forward to Dr. Charles Phillip as well.      Flex Alvarez, PharmD, [facility] [place], [place] Infectious Disease  Phone: [phone] (also available on [employer])  Fax: [phone]      For Pharmacy [address] [place] [address] [address]    Program: Medical Group  CPA in place: Yes  Time Spent (min): 10

## 2023-01-10 NOTE — TELEPHONE ENCOUNTER
WBC trending down  BUT ESR, CRP up still,  she has CKD and lower leg edema she had severe Rt leg cellulitis and possible may be retaining more fluid in her leg- if not doing well would be ok to have her checked in ED  -

## 2023-01-10 NOTE — TELEPHONE ENCOUNTER
Received call from patients [address] [facility], [phone], she states states BLE, R>L  are red, +1 edema,warm and painful. She states she does not think the patient is taking the medication q8h as she has made comments in the past about not wanting to wake in the middle of the night. She asked again this nursing visit and patient states she is taking it as prescribed.  Requests call back to her directly with any changes or concerns

## 2023-01-10 NOTE — TELEPHONE ENCOUNTER
Karin returned call. From last Monday to yesterday, RN states that BL LE have worsened with R>L. Pt endorses pain, legs are warm and purple/red. No fevers or chills and VSS. Pt would be amenable to go to ED if we see fit.      Angel Luis Del Cid, PharmD, [address] [place], [place] Infectious Disease  Phone: [phone] (also available on [employer])  Fax: [phone]      For Pharmacy [address] [place] [address] [address]  Only    Program: Medical Group  CPA in place: Yes  Time Spent (min): 5

## 2023-01-12 ENCOUNTER — TELEPHONE (OUTPATIENT)
Dept: INFECTIOUS DISEASES | Age: 78
End: 2023-01-12

## 2023-01-12 ENCOUNTER — CARE COORDINATION (OUTPATIENT)
Dept: CASE MANAGEMENT | Age: 78
End: 2023-01-12

## 2023-01-12 RX ORDER — CEPHALEXIN 500 MG/1
500 CAPSULE ORAL 3 TIMES DAILY
Qty: 42 CAPSULE | Refills: 0 | Status: SHIPPED | OUTPATIENT
Start: 2023-01-12 | End: 2023-01-26

## 2023-01-12 NOTE — TELEPHONE ENCOUNTER
Pt due to end IV cefazolin for cellulitis on 1/15    Please call and see how patient is doing as she was having some trouble earlier this week      Aishwarya Montoya, PharmD, [facility] [place], [place] Infectious Disease  Phone: [phone] (also available on [employer])  Fax: [phone]      For Pharmacy Admin Tracking Only    Program: Medical Group  CPA in place: Yes  Time Spent (min): 5

## 2023-01-12 NOTE — TELEPHONE ENCOUNTER
[address] Rosetta Cabrera to d/c as planned- if doing ok and can change to oral Cephalexin 500 mg Q 8 hr x 14 days  if still having issues    She  cormier have hardware in that leg and has lower leg edema may take time to resolve

## 2023-01-12 NOTE — TELEPHONE ENCOUNTER
Sent keflex rx    Maria Luisa Louise, PharmD, BCPS  Clinical Pharmacy Specialist- [facility] [place] Infectious Disease  Phone: [phone] (also available on [employer])  Fax: [phone]      For Pharmacy Admin Tracking Only    Program: Medical Group  CPA in place: Yes  Time Spent (min): 5

## 2023-01-12 NOTE — CARE COORDINATION
[facility] Care Transitions Follow Up Call    Care Transition Nurse contacted the patient by telephone to follow up after admission  Verified name and  with patient as identifiers. Patient: Gregory White  Patient : 1945   MRN: [id number]  Reason for Admission:   Discharge Date: 22 RARS: Readmission Risk Score: 19.2      Needs to be reviewed by the provider   Additional needs identified to be addressed with provider: No  none             Method of communication with provider: none. Pt states doing well now, leg was not looking good earlier in the week. [facility]. nurse notified infectious disease MD and she was placed on another antibiotic for 14 days. [facility] nurse still coming out. F/U appts listed below. Agreed to more CTC f/u calls. Addressed changes since last contact:   leg didn't look good, placed on another round of antibiotics. Discussed follow-up appointments. If no appointment was previously scheduled, appointment scheduling offered: No.   Is follow up appointment scheduled within 7 days of discharge? Yes. Follow Up  Future Appointments   Date Time Provider Inge Van   2023  3:15 PM Colin Little MD FF Cardio MMA   2023  2:20 PM MARILEE Quan - CNP [facility] - [facility]     Non-[facility] follow up appointment(s):     Care Transition Nurse reviewed medical action plan with patient and discussed any barriers to care and/or understanding of plan of care after discharge. Discussed appropriate site of care based on symptoms and resources available to patient including: When to call 911. The patient agrees to contact the PCP office for questions related to their healthcare. Advance Care Planning:   reviewed and current.      Patients top risk factors for readmission: medical condition-   Interventions to address risk factors: Assessment and support for treatment adherence and medication management-     Offered patient enrollment in the Remote Patient Monitoring (RPM) program for in-home monitoring: Patient is not eligible for RPM program.     Care Transitions Subsequent and Final Call    Subsequent and Final Calls  Do you have any ongoing symptoms?: No  Have your medications changed?: Yes  Patient Reports: Cephalexin 500 mg Q 8 hr x 14 days  Do you have any questions related to your medications?: No  Do you currently have any active services?: Yes  Are you currently active with any services?: Home Health  Do you have any needs or concerns that I can assist you with?: No  Identified Barriers: None  Care Transitions Interventions  No Identified Needs  Other Interventions:             Care Transition Nurse provided contact information for future needs. Plan for follow-up call in 5-7 days based on severity of symptoms and risk factors.   Plan for next call: self management-leg infection, f/u app, [facility].    Efrne Roman RN

## 2023-01-12 NOTE — TELEPHONE ENCOUNTER
LMOM for patient to return call regarding MD message. LMOM with patients [id number] [facility] as well. No answer to patients Daughter's line.

## 2023-01-12 NOTE — TELEPHONE ENCOUNTER
Spoke with patient who states RLE redness, pain and warmth are improved, thus she was not evaluated in the ER. . She does request the cephalexin be sent to [facility] listed in [address] [place] [address] Rd. Spoke with Anup Trinidad at [facility] and advised of term date 1/15 and pull PICC. LMOM to advise [address] [place] [address] at [facility] of above orders.

## 2023-01-18 ENCOUNTER — TELEPHONE (OUTPATIENT)
Dept: INTERNAL MEDICINE CLINIC | Age: 78
End: 2023-01-18

## 2023-01-18 ENCOUNTER — CARE COORDINATION (OUTPATIENT)
Dept: CASE MANAGEMENT | Age: 78
End: 2023-01-18

## 2023-01-18 NOTE — TELEPHONE ENCOUNTER
Karin from [facility] is calling to let you know they have discharged pt from homecare all goals have been met and IV therapy has been completed. Thanks.

## 2023-01-18 NOTE — CARE COORDINATION
[facility] Care Transitions Follow Up Call      Patient: Stacy Ly  Patient : 1945   MRN: <[id number]>  Reason for Admission: sepsis 2/2 RLE cellulitis with strep bacteremia, A Flutter with RVR on Eliquis, CKD3a -> home with [facility] and [facility] IV abx until 1/15  Discharge Date: 22 RARS: Readmission Risk Score: 19.2      Needs to be reviewed by the provider   Additional needs identified to be addressed with provider: No         Method of communication with provider: none.    Care Transition Nurse contacted the patient by telephone to follow up after recent admission.    PICC removed yesterday by [facility] and it was her last scheduled day with home care services. Site WNL today when she removed dressing. No redness in RLE and minimal edema. Overall improved. Tolerating Keflex that she started yesterday. She is monitoring her LE and will report any new symptoms as she completes the PO abx in 2 weeks. Denies needs/concerns at this time. No further CTN outreach scheduled.     Addressed changes since last contact:  none  Discussed follow-up appointments. If no appointment was previously scheduled, appointment scheduling offered: No.   Is follow up appointment scheduled within 7 days of discharge? completed.    Follow Up  Future Appointments   Date Time Provider Department Center   2023  3:15 PM Jono Contreras MD  Cardio [facility]   2023  2:20 PM Tarik Hummel APRN - CNP [facility] [place] - DY[facility]     Non-[facility] follow up appointment(s): ROBLES    Care Transition Nurse reviewed medical action plan and red flags with patient and discussed any barriers to care and/or understanding of plan of care after discharge. Discussed appropriate site of care based on symptoms and resources available to patient including: PCP  Specialist. The patient agrees to contact the PCP office for questions related to their healthcare.      Patients top risk factors for readmission: medical condition-see above  Interventions to address  risk factors: Education of patient/family/caregiver/guardian to support self-management-take abx as rx and f/up as scheduled; monitor symptoms and escalate prn     Care Transitions Subsequent and Final Call    Subsequent and Final Calls  Do you have any ongoing symptoms?: No  Have your medications changed?: Yes  Do you have any questions related to your medications?: No  Are you currently active with any services?: Home Health - discharged 1/17/2023 when PICC and IV abx complete  Do you have any needs or concerns that I can assist you with?: No  Care Transitions Interventions  No Identified Needs  Other Interventions:           Care Transition Nurse provided contact information for future needs. No further follow-up call indicated based on severity of symptoms and risk factors.     Cain Guy RN  Care Transition Nurse  [phone] mobile

## 2023-01-27 ENCOUNTER — TELEPHONE (OUTPATIENT)
Dept: CARDIOLOGY CLINIC | Age: 78
End: 2023-01-27

## 2023-01-27 NOTE — TELEPHONE ENCOUNTER
Called pt on 1-25 left  to call back and again today to schedule the CV. Please transfer call to [address] [place] [address]. Thank you.

## 2023-01-30 NOTE — TELEPHONE ENCOUNTER
Left 3rd VM asking that pt return call to schedule CV. Please transfer call to [address] [place] [address]. Thank you.

## 2023-02-02 NOTE — TELEPHONE ENCOUNTER
Pt called to return Isis call to schedule her CV. Per Pt please call her after 10am.  Please advise.   Thank you

## 2023-02-03 NOTE — TELEPHONE ENCOUNTER
Spoke with Mello Valdez, she is scheduled on 2-9-23/11-12pm w/RMM. All preps reviewed with her understanding.

## 2023-02-07 ENCOUNTER — APPOINTMENT (OUTPATIENT)
Dept: CT IMAGING | Age: 78
DRG: 291 | End: 2023-02-07
Payer: COMMERCIAL

## 2023-02-07 ENCOUNTER — HOSPITAL ENCOUNTER (INPATIENT)
Age: 78
LOS: 16 days | Discharge: ANOTHER HOSPITAL | DRG: 291 | End: 2023-02-23
Attending: EMERGENCY MEDICINE | Admitting: HOSPITALIST
Payer: COMMERCIAL

## 2023-02-07 ENCOUNTER — APPOINTMENT (OUTPATIENT)
Dept: GENERAL RADIOLOGY | Age: 78
DRG: 291 | End: 2023-02-07
Payer: COMMERCIAL

## 2023-02-07 DIAGNOSIS — N17.9 ACUTE KIDNEY INJURY (HCC): ICD-10-CM

## 2023-02-07 DIAGNOSIS — T78.40XA ALLERGIC REACTION TO DRUG, INITIAL ENCOUNTER: ICD-10-CM

## 2023-02-07 DIAGNOSIS — J18.9 MULTIFOCAL PNEUMONIA: Primary | ICD-10-CM

## 2023-02-07 DIAGNOSIS — R79.89 ELEVATED D-DIMER: ICD-10-CM

## 2023-02-07 PROBLEM — J15.9 BACTERIAL PNEUMONIA: Status: ACTIVE | Noted: 2023-02-07

## 2023-02-07 LAB
A/G RATIO: 0.8 (ref 1.1–2.2)
ALBUMIN SERPL-MCNC: 3.2 G/DL (ref 3.4–5)
ALP BLD-CCNC: 90 U/L (ref 40–129)
ALT SERPL-CCNC: <5 U/L (ref 10–40)
ANION GAP SERPL CALCULATED.3IONS-SCNC: 13 MMOL/L (ref 3–16)
AST SERPL-CCNC: 10 U/L (ref 15–37)
BASOPHILS ABSOLUTE: 0 K/UL (ref 0–0.2)
BASOPHILS RELATIVE PERCENT: 0.3 %
BILIRUB SERPL-MCNC: 1.4 MG/DL (ref 0–1)
BUN BLDV-MCNC: 30 MG/DL (ref 7–20)
CALCIUM SERPL-MCNC: 9.1 MG/DL (ref 8.3–10.6)
CHLORIDE BLD-SCNC: 100 MMOL/L (ref 99–110)
CO2: 25 MMOL/L (ref 21–32)
CREAT SERPL-MCNC: 2 MG/DL (ref 0.6–1.2)
D DIMER: >20 UG/ML FEU (ref 0–0.6)
EOSINOPHILS ABSOLUTE: 0 K/UL (ref 0–0.6)
EOSINOPHILS RELATIVE PERCENT: 0.1 %
GFR SERPL CREATININE-BSD FRML MDRD: 26 ML/MIN/{1.73_M2}
GLUCOSE BLD-MCNC: 129 MG/DL (ref 70–99)
HCT VFR BLD CALC: 42.2 % (ref 36–48)
HEMOGLOBIN: 13.3 G/DL (ref 12–16)
LACTIC ACID: 2 MMOL/L (ref 0.4–2)
LYMPHOCYTES ABSOLUTE: 0.5 K/UL (ref 1–5.1)
LYMPHOCYTES RELATIVE PERCENT: 4.7 %
MCH RBC QN AUTO: 28.7 PG (ref 26–34)
MCHC RBC AUTO-ENTMCNC: 31.6 G/DL (ref 31–36)
MCV RBC AUTO: 90.9 FL (ref 80–100)
MONOCYTES ABSOLUTE: 0.5 K/UL (ref 0–1.3)
MONOCYTES RELATIVE PERCENT: 5.2 %
NEUTROPHILS ABSOLUTE: 8.9 K/UL (ref 1.7–7.7)
NEUTROPHILS RELATIVE PERCENT: 89.7 %
PDW BLD-RTO: 16.4 % (ref 12.4–15.4)
PLATELET # BLD: 239 K/UL (ref 135–450)
PMV BLD AUTO: 11.6 FL (ref 5–10.5)
POTASSIUM REFLEX MAGNESIUM: 4.4 MMOL/L (ref 3.5–5.1)
PRO-BNP: ABNORMAL PG/ML (ref 0–449)
PROCALCITONIN: 0.22 NG/ML (ref 0–0.15)
RBC # BLD: 4.64 M/UL (ref 4–5.2)
SODIUM BLD-SCNC: 138 MMOL/L (ref 136–145)
TOTAL PROTEIN: 7 G/DL (ref 6.4–8.2)
WBC # BLD: 9.9 K/UL (ref 4–11)

## 2023-02-07 PROCEDURE — 85025 COMPLETE CBC W/AUTO DIFF WBC: CPT

## 2023-02-07 PROCEDURE — 6370000000 HC RX 637 (ALT 250 FOR IP): Performed by: HOSPITALIST

## 2023-02-07 PROCEDURE — 99223 1ST HOSP IP/OBS HIGH 75: CPT | Performed by: INTERNAL MEDICINE

## 2023-02-07 PROCEDURE — 80053 COMPREHEN METABOLIC PANEL: CPT

## 2023-02-07 PROCEDURE — 93005 ELECTROCARDIOGRAM TRACING: CPT | Performed by: INTERNAL MEDICINE

## 2023-02-07 PROCEDURE — 84145 PROCALCITONIN (PCT): CPT

## 2023-02-07 PROCEDURE — 6370000000 HC RX 637 (ALT 250 FOR IP): Performed by: INTERNAL MEDICINE

## 2023-02-07 PROCEDURE — 85379 FIBRIN DEGRADATION QUANT: CPT

## 2023-02-07 PROCEDURE — 94640 AIRWAY INHALATION TREATMENT: CPT

## 2023-02-07 PROCEDURE — 96374 THER/PROPH/DIAG INJ IV PUSH: CPT

## 2023-02-07 PROCEDURE — 6360000002 HC RX W HCPCS: Performed by: HOSPITALIST

## 2023-02-07 PROCEDURE — 36415 COLL VENOUS BLD VENIPUNCTURE: CPT

## 2023-02-07 PROCEDURE — 2580000003 HC RX 258: Performed by: HOSPITALIST

## 2023-02-07 PROCEDURE — 1200000000 HC SEMI PRIVATE

## 2023-02-07 PROCEDURE — 87040 BLOOD CULTURE FOR BACTERIA: CPT

## 2023-02-07 PROCEDURE — 2700000000 HC OXYGEN THERAPY PER DAY

## 2023-02-07 PROCEDURE — 6360000002 HC RX W HCPCS: Performed by: EMERGENCY MEDICINE

## 2023-02-07 PROCEDURE — 99285 EMERGENCY DEPT VISIT HI MDM: CPT

## 2023-02-07 PROCEDURE — 6360000002 HC RX W HCPCS: Performed by: INTERNAL MEDICINE

## 2023-02-07 PROCEDURE — 71045 X-RAY EXAM CHEST 1 VIEW: CPT

## 2023-02-07 PROCEDURE — 83605 ASSAY OF LACTIC ACID: CPT

## 2023-02-07 PROCEDURE — 71250 CT THORAX DX C-: CPT

## 2023-02-07 PROCEDURE — 96375 TX/PRO/DX INJ NEW DRUG ADDON: CPT

## 2023-02-07 PROCEDURE — 83880 ASSAY OF NATRIURETIC PEPTIDE: CPT

## 2023-02-07 PROCEDURE — 94761 N-INVAS EAR/PLS OXIMETRY MLT: CPT

## 2023-02-07 RX ORDER — ACETAMINOPHEN 650 MG/1
650 SUPPOSITORY RECTAL EVERY 6 HOURS PRN
Status: DISCONTINUED | OUTPATIENT
Start: 2023-02-07 | End: 2023-02-23 | Stop reason: HOSPADM

## 2023-02-07 RX ORDER — ONDANSETRON 2 MG/ML
4 INJECTION INTRAMUSCULAR; INTRAVENOUS EVERY 6 HOURS PRN
Status: DISCONTINUED | OUTPATIENT
Start: 2023-02-07 | End: 2023-02-23 | Stop reason: HOSPADM

## 2023-02-07 RX ORDER — SODIUM CHLORIDE 9 MG/ML
INJECTION, SOLUTION INTRAVENOUS PRN
Status: DISCONTINUED | OUTPATIENT
Start: 2023-02-07 | End: 2023-02-22

## 2023-02-07 RX ORDER — ONDANSETRON 4 MG/1
4 TABLET, ORALLY DISINTEGRATING ORAL EVERY 8 HOURS PRN
Status: DISCONTINUED | OUTPATIENT
Start: 2023-02-07 | End: 2023-02-22 | Stop reason: SDUPTHER

## 2023-02-07 RX ORDER — SODIUM CHLORIDE 0.9 % (FLUSH) 0.9 %
5-40 SYRINGE (ML) INJECTION PRN
Status: DISCONTINUED | OUTPATIENT
Start: 2023-02-07 | End: 2023-02-22

## 2023-02-07 RX ORDER — ONDANSETRON 2 MG/ML
4 INJECTION INTRAMUSCULAR; INTRAVENOUS EVERY 6 HOURS PRN
Status: DISCONTINUED | OUTPATIENT
Start: 2023-02-07 | End: 2023-02-22 | Stop reason: SDUPTHER

## 2023-02-07 RX ORDER — DIPHENHYDRAMINE HCL 25 MG
25 TABLET ORAL EVERY 6 HOURS PRN
Status: DISCONTINUED | OUTPATIENT
Start: 2023-02-07 | End: 2023-02-08

## 2023-02-07 RX ORDER — DILTIAZEM HYDROCHLORIDE 240 MG/1
240 CAPSULE, EXTENDED RELEASE ORAL DAILY
COMMUNITY

## 2023-02-07 RX ORDER — LEVOFLOXACIN 750 MG/1
750 TABLET ORAL
Status: DISCONTINUED | OUTPATIENT
Start: 2023-02-08 | End: 2023-02-09

## 2023-02-07 RX ORDER — FUROSEMIDE 10 MG/ML
40 INJECTION INTRAMUSCULAR; INTRAVENOUS 2 TIMES DAILY
Status: DISCONTINUED | OUTPATIENT
Start: 2023-02-07 | End: 2023-02-13

## 2023-02-07 RX ORDER — PANTOPRAZOLE SODIUM 40 MG/1
40 TABLET, DELAYED RELEASE ORAL
Status: DISCONTINUED | OUTPATIENT
Start: 2023-02-07 | End: 2023-02-07

## 2023-02-07 RX ORDER — MORPHINE SULFATE 4 MG/ML
4 INJECTION, SOLUTION INTRAMUSCULAR; INTRAVENOUS ONCE
Status: COMPLETED | OUTPATIENT
Start: 2023-02-07 | End: 2023-02-07

## 2023-02-07 RX ORDER — TRAMADOL HYDROCHLORIDE 50 MG/1
50 TABLET ORAL EVERY 6 HOURS PRN
Status: DISCONTINUED | OUTPATIENT
Start: 2023-02-07 | End: 2023-02-23 | Stop reason: HOSPADM

## 2023-02-07 RX ORDER — AMIODARONE HYDROCHLORIDE 200 MG/1
200 TABLET ORAL DAILY
Status: DISCONTINUED | OUTPATIENT
Start: 2023-02-07 | End: 2023-02-23 | Stop reason: HOSPADM

## 2023-02-07 RX ORDER — LEVOFLOXACIN 5 MG/ML
750 INJECTION, SOLUTION INTRAVENOUS ONCE
Status: COMPLETED | OUTPATIENT
Start: 2023-02-07 | End: 2023-02-07

## 2023-02-07 RX ORDER — ACETAMINOPHEN 325 MG/1
650 TABLET ORAL EVERY 6 HOURS PRN
Status: DISCONTINUED | OUTPATIENT
Start: 2023-02-07 | End: 2023-02-23 | Stop reason: HOSPADM

## 2023-02-07 RX ORDER — SODIUM CHLORIDE 0.9 % (FLUSH) 0.9 %
5-40 SYRINGE (ML) INJECTION EVERY 12 HOURS SCHEDULED
Status: DISCONTINUED | OUTPATIENT
Start: 2023-02-07 | End: 2023-02-22

## 2023-02-07 RX ORDER — IPRATROPIUM BROMIDE AND ALBUTEROL SULFATE 2.5; .5 MG/3ML; MG/3ML
1 SOLUTION RESPIRATORY (INHALATION)
Status: DISCONTINUED | OUTPATIENT
Start: 2023-02-07 | End: 2023-02-10

## 2023-02-07 RX ORDER — METHYLPREDNISOLONE SODIUM SUCCINATE 40 MG/ML
40 INJECTION, POWDER, LYOPHILIZED, FOR SOLUTION INTRAMUSCULAR; INTRAVENOUS DAILY
Status: DISCONTINUED | OUTPATIENT
Start: 2023-02-07 | End: 2023-02-09

## 2023-02-07 RX ORDER — ALBUTEROL SULFATE 90 UG/1
2 AEROSOL, METERED RESPIRATORY (INHALATION) EVERY 4 HOURS PRN
Status: DISCONTINUED | OUTPATIENT
Start: 2023-02-07 | End: 2023-02-23 | Stop reason: HOSPADM

## 2023-02-07 RX ORDER — BUPROPION HYDROCHLORIDE 200 MG/1
1 TABLET, EXTENDED RELEASE ORAL 2 TIMES DAILY
Status: DISCONTINUED | OUTPATIENT
Start: 2023-02-07 | End: 2023-02-07

## 2023-02-07 RX ORDER — ASPIRIN 81 MG/1
81 TABLET ORAL DAILY
Status: DISCONTINUED | OUTPATIENT
Start: 2023-02-08 | End: 2023-02-15

## 2023-02-07 RX ORDER — POLYETHYLENE GLYCOL 3350 17 G/17G
17 POWDER, FOR SOLUTION ORAL DAILY PRN
Status: DISCONTINUED | OUTPATIENT
Start: 2023-02-07 | End: 2023-02-23 | Stop reason: HOSPADM

## 2023-02-07 RX ADMIN — DIPHENHYDRAMINE HYDROCHLORIDE 25 MG: 25 TABLET ORAL at 16:45

## 2023-02-07 RX ADMIN — APIXABAN 5 MG: 5 TABLET, FILM COATED ORAL at 11:38

## 2023-02-07 RX ADMIN — ONDANSETRON 4 MG: 2 INJECTION INTRAMUSCULAR; INTRAVENOUS at 03:48

## 2023-02-07 RX ADMIN — LEVOFLOXACIN 750 MG: 5 INJECTION, SOLUTION INTRAVENOUS at 06:51

## 2023-02-07 RX ADMIN — METHYLPREDNISOLONE SODIUM SUCCINATE 40 MG: 40 INJECTION, POWDER, FOR SOLUTION INTRAMUSCULAR; INTRAVENOUS at 11:38

## 2023-02-07 RX ADMIN — IPRATROPIUM BROMIDE AND ALBUTEROL SULFATE 1 AMPULE: 2.5; .5 SOLUTION RESPIRATORY (INHALATION) at 12:56

## 2023-02-07 RX ADMIN — Medication 10 ML: at 21:25

## 2023-02-07 RX ADMIN — APIXABAN 5 MG: 5 TABLET, FILM COATED ORAL at 21:24

## 2023-02-07 RX ADMIN — MORPHINE SULFATE 4 MG: 4 INJECTION, SOLUTION INTRAMUSCULAR; INTRAVENOUS at 03:48

## 2023-02-07 RX ADMIN — FUROSEMIDE 40 MG: 10 INJECTION, SOLUTION INTRAMUSCULAR; INTRAVENOUS at 18:44

## 2023-02-07 RX ADMIN — AMIODARONE HYDROCHLORIDE 200 MG: 200 TABLET ORAL at 11:38

## 2023-02-07 RX ADMIN — IPRATROPIUM BROMIDE AND ALBUTEROL SULFATE 1 AMPULE: 2.5; .5 SOLUTION RESPIRATORY (INHALATION) at 20:57

## 2023-02-07 RX ADMIN — Medication 10 ML: at 16:46

## 2023-02-07 RX ADMIN — METOPROLOL TARTRATE 12.5 MG: 25 TABLET, FILM COATED ORAL at 11:38

## 2023-02-07 RX ADMIN — METOPROLOL TARTRATE 25 MG: 25 TABLET, FILM COATED ORAL at 21:24

## 2023-02-07 ASSESSMENT — LIFESTYLE VARIABLES
HOW OFTEN DO YOU HAVE A DRINK CONTAINING ALCOHOL: NEVER
HOW MANY STANDARD DRINKS CONTAINING ALCOHOL DO YOU HAVE ON A TYPICAL DAY: PATIENT DOES NOT DRINK

## 2023-02-07 NOTE — PROGRESS NOTES
4 Eyes Skin Assessment     NAME:  Michele Cortez  YOB: 1945  MEDICAL RECORD NUMBER:  [id number]    The patient is being assessed for  Admission    I agree that One RN have performed a thorough Head to Toe Skin Assessment on the patient. ALL assessment sites listed below have been assessed. Areas assessed by both nurses:    Head, Face, Ears, Shoulders, Back, Chest, Arms, Elbows, Hands, Sacrum. Buttock, Coccyx, Ischium, and Legs. Feet and Heels        Does the Patient have a Wound? Yes wound(s) were present on assessment.  LDA wound assessment was Initiated and completed by RN       Hussain Prevention initiated by RN: NA   Wound Care Orders initiated by RN: Yes    Pressure Injury (Stage 3,4, Unstageable, DTI, NWPT, and Complex wounds) if present place referral order by RN under : No    New and Established Ostomies, if present place, referral order under : No      Nurse 1 eSignature: Electronically signed by Tyron Loyd RN on 2/7/23 at 5:51 PM EST    **SHARE this note so that the co-signing nurse is able to place an eSignature**    Nurse 2 eSignature: Electronically signed by Christian Contreras RN on 2/7/23 at 5:52 PM EST

## 2023-02-07 NOTE — ED NOTES
Patient stated her pampers had not been changed since yesterday. Patient cleaned up and new depends on at this time. [place] in place.       Long Muir RN  02/07/23 [id number]

## 2023-02-07 NOTE — ED NOTES
Attempted to call report to EMANUEL, RN unavailable. Awaiting call back.       Arden Hurley RN  02/07/23 [id number]

## 2023-02-07 NOTE — RT PROTOCOL NOTE
RT Nebulizer Bronchodilator Protocol Note    There is a bronchodilator order in the chart from a provider indicating to follow the RT Bronchodilator Protocol and there is an Initiate RT Bronchodilator Protocol order as well (see protocol at bottom of note). CXR Findings:  XR CHEST PORTABLE    Result Date: 2/7/2023  1. Diffuse hazy multifocal opacity throughout both lungs, right worse than left, likely a combination of moderate pulmonary edema and multifocal pneumonia. 2. Stable cardiomegaly. 3. Stable hiatal hernia. The findings from the last RT Protocol Assessment were as follows:  Smoking: Chronic pulmonary disease  Respiratory Pattern: Dyspnea on exertion or RR 21-25 bpm  Breath Sounds: Intermittent or unilateral wheezes  Cough: Weak, productive  Indication for Bronchodilator Therapy:    Bronchodilator Assessment Score: 10    Aerosolized bronchodilator medication orders have been revised according to the RT Nebulizer Bronchodilator Protocol below. Respiratory Therapist to perform RT Therapy Protocol Assessment initially then follow the protocol. Repeat RT Therapy Protocol Assessment PRN for score 0-3 or on second treatment, BID, and PRN for scores above 3. No Indications - adjust the frequency to every 6 hours PRN wheezing or bronchospasm, if no treatments needed after 48 hours then discontinue using Per Protocol order mode. If indication present, adjust the RT bronchodilator orders based on the Bronchodilator Assessment Score as indicated below. If a patient is on this medication at home then do not decrease Frequency below that used at home. 0-3 - enter or revise RT bronchodilator order(s) to equivalent RT Bronchodilator order with Frequency of every 4 hours PRN for wheezing or increased work of breathing using Per Protocol order mode.        4-6 - enter or revise RT Bronchodilator order(s) to two equivalent RT bronchodilator orders with one order with BID Frequency and one order with Frequency of every 4 hours PRN wheezing or increased work of breathing using Per Protocol order mode. 7-10 - enter or revise RT Bronchodilator order(s) to two equivalent RT bronchodilator orders with one order with TID Frequency and one order with Frequency of every 4 hours PRN wheezing or increased work of breathing using Per Protocol order mode. 11-13 - enter or revise RT Bronchodilator order(s) to one equivalent RT bronchodilator order with QID Frequency and an Albuterol order with Frequency of every 4 hours PRN wheezing or increased work of breathing using Per Protocol order mode. Greater than 13 - enter or revise RT Bronchodilator order(s) to one equivalent RT bronchodilator order with every 4 hours Frequency and an Albuterol order with Frequency of every 2 hours PRN wheezing or increased work of breathing using Per Protocol order mode. RT to enter RT Home Evaluation for COPD & MDI Assessment order using Per Protocol order mode.     Electronically signed by Cristine Downey RCP on 2/7/2023 at 12:58 PM

## 2023-02-07 NOTE — ED NOTES
5T RN at bedside. Patient to be transported to unit by 5T RN.      Lauryn Pittman, LILY  02/07/23 1720

## 2023-02-07 NOTE — ED NOTES
Report given to [facility]. Stated she would be down to ER shortly to transport patient to unit.       Gloira Quiroz RN  02/07/23 9627

## 2023-02-07 NOTE — PROGRESS NOTES
Attempted to complete admission will check back in as patient is going over medications with ER staff

## 2023-02-07 NOTE — ED NOTES
Patient on 4L NC O2 sat 93% at time this RN assumed care. Patient states she does not wear oxygen at baseline.       Shahana Cui RN  02/07/23 5181

## 2023-02-07 NOTE — PROGRESS NOTES
Patient seen in ED, room 08. Admission completed except for: 4 Eyes Assessment, Immunizations, Covid Vaccines, Rights and Responsibilities, Orientation to room, Plan of Care, education, white board, height and weight, pain assessment and head to toe assessment. Patient is alert and oriented X 4. Patient lives at home with her daughter and is being admitted for bacterial pneumonia. Home Medication Reconciliation has been completed by pharmacy Plan of care updated if indicated. All questions answered.  Patient does have home medications in her purse/bag patient reports

## 2023-02-07 NOTE — ED PROVIDER NOTES
[address] [facility]  eMERGENCY dEPARTMENT eNCOUnter        Pt Name: Mari Cervantes  MRN: [id number]  [place] 1945  Date of evaluation: 2/7/2023  Provider: Davi Hawk MD  PCP: Anyi Dow, APRN - CNP      CHIEF COMPLAINT       Chief Complaint   Patient presents with    Abdominal Pain     Pt via EMS from home, c/o LUQ pain 10/10, has had gallbladder removed. Pt states pain is making her sob, 89% ora, put on 2L, pt received 325 mg aspirin, has been treated for cellulitis since December, new antibiotic last two weeks, states she has been getting hives and itching       HISTORY OFPRESENT ILLNESS   (Location/Symptom, Timing/Onset, Context/Setting, Quality, Duration, Modifying Factors,Severity)  Note limiting factors. Mari Cervantes is a 78 y.o. female   states she has had itching and hives with a rash for the past 24 hours past 3 hours she has had left upper abdominal pain sharp in nature constant which made her short of breath she denies any chest pain the pain is in the left upper quadrant she is currently taking cephalexin for the leg cellulitis    Nursing Notes were all reviewed and agreed with or any disagreements were addressed  in the HPI. REVIEW OF SYSTEMS    (2-9 systems for level 4, 10 or more for level 5)       REVIEW OF SYSTEMS    Constitutional:  Denies fever, chills, or weakness   Eyes:  Denies vision changes  HENT:  Denies sore throat or neck pain   Respiratory:  Denies cough some  shortness of breath   Cardiovascular:  Denies chest pain  GI:  abdominal pain, nausea,    no vomiting, or diarrhea   Musculoskeletal:  Denies back pain   Skin: no rash or vesicles   Neurologic:  no headache weakness focal    Lymphatic:  no swollen  nodes   Psychiatric: no si or hs thoughts     All systems negative except as marked. Positives and Pertinent negatives as per HPI. Except as noted above in the ROS, all other systems were reviewed andnegative. PASTMEDICAL HISTORY     Past Medical History:   Diagnosis Date    Arthritis     Atrial fibrillation and flutter ([address]     Hypertension     Kidney disease     Pneumonia     Sleep apnea     no c-pap         SURGICAL HISTORY       Past Surgical History:   Procedure Laterality Date    CARDIOVERSION      COLONOSCOPY N/A 11/20/2018    COLONOSCOPY POLYPECTOMY SNARE/COLD BIOPSY performed by Julián Winston MD at [facility] N/A 3/31/2022    COLONOSCOPY POLYPECTOMY SNARE/COLD BIOPSY performed by Wili Melo MD at [address] [place] [address]      ankle rt has pins and rods, and rt elbow    GASTRIC BYPASS SURGERY      LARYNX SURGERY      TOTAL KNEE ARTHROPLASTY Bilateral 12/19/2012    TUBAL LIGATION      tubes tied    UPPER GASTROINTESTINAL ENDOSCOPY N/A 11/20/2018    EGD BIOPSY performed by Julián Winston MD at [facility] 3/31/2022    EGD DILATION BALLOON performed by Wili Melo MD at [facility] 3/31/2022    EGD BIOPSY performed by Wili Melo MD at [facility]       Previous Medications    ALBUTEROL SULFATE HFA (PROVENTIL HFA) 108 (90 BASE) MCG/ACT INHALER    Inhale 2 puffs into the lungs every 4 hours as needed for Wheezing    ALENDRONATE (FOSAMAX) 70 MG TABLET    Take 1 tablet by mouth every 7 days    AMIODARONE (CORDARONE) 200 MG TABLET    200mg bid x 8 days then 200mg daily    APIXABAN (ELIQUIS) 5 MG TABS TABLET    Take 1 tablet by mouth 2 times daily    ASPIRIN 81 MG TABLET    Take 81 mg by mouth daily    BUPROPION (WELLBUTRIN SR) 200 MG EXTENDED RELEASE TABLET    TAKE 1 TABLET BY MOUTH TWICE DAILY    CHOLECALCIFEROL (VITAMIN D3) 1.25 MG (74379 UT) CAPS    Take 1 capsule by mouth every 7 days    FERROUS SULFATE (IRON 325) 325 (65 FE) MG TABLET    Take 1 tablet by mouth daily (with breakfast)    FUROSEMIDE (LASIX) 20 MG TABLET    Take 1 tablet by mouth daily    METOPROLOL TARTRATE (LOPRESSOR) 25 MG TABLET    Take 1 tablet by mouth 2 times daily    PANTOPRAZOLE (PROTONIX) 40 MG TABLET    Take 1 tablet by mouth 2 times daily (before meals)    TRAMADOL (ULTRAM) 50 MG TABLET    Take 50 mg by mouth every 6 hours as needed for Pain. VITAMIN B-12 (CYANOCOBALAMIN) 100 MCG TABLET    Take 1 tablet by mouth in the morning. ALLERGIES     Bee venom, Shellfish-derived products, Shrimp flavor, Codeine, Fentanyl and related, Hydromorphone, and Vancomycin    FAMILY HISTORY       Family History   Problem Relation Age of Onset    Cancer Father         stomach cancer          SOCIAL HISTORY       Social History     Socioeconomic History    Marital status:     Number of children: 1    Years of education: 15   Occupational History    Occupation: retired    Tobacco Use    Smoking status: Never    Smokeless tobacco: Never   Vaping Use    Vaping Use: Never used   Substance and Sexual Activity    Alcohol use: No    Drug use: No    Sexual activity: Not Currently     Social Determinants of Health     Financial Resource Strain: Low Risk     Difficulty of Paying Living Expenses: Not hard at all   Food Insecurity: No Food Insecurity    Worried About Running Out of Food in the Last Year: Never true    Ran Out of Food in the Last Year: Never true   Physical Activity: Inactive    Days of Exercise per Week: 0 days    Minutes of Exercise per Session: 0 min       SCREENINGS    Yuliana Coma Scale  Eye Opening: Spontaneous  Best Verbal Response: Oriented  Best Motor Response: Obeys commands  Yuliana Coma Scale Score: 15        PHYSICAL EXAM    (up to 7 for level 4, 8 or more for level 5)     ED Triage Vitals [02/07/23 0309]   BP Temp Temp Source Heart Rate Resp SpO2 Height Weight   (!) 115/95 98.7 °F (37.1 °C) Oral (!) 110 23 98 % 5' 7\" (1.702 m) 235 lb (106.6 kg)           General Appearance:  Alert, cooperative, no distress, appears stated age.    Head: Normocephalic, without obvious abnormality, atraumatic. Eyes:  conjunctiva/corneas clear, EOM's intact. Sclera anicteric. ENT: Mucous membranes moist.   Neck: Supple, symmetrical, trachea midline, no adenopathy. No jugular venous distention. Lungs:   No Respiratory Distress. no rales  rhonchi rub   Chest Wall:  Nontender  no deformity   Heart:  Rsr no murmer gallop    Abdomen:   Soft tender left upper quadrant just below the costal margin no organomegally    Extremities:  Full range of motion. no deformity   Pulses: Equal  upper and lower    Skin: Diffuse erythema of the legs abdomen chest with confluent redness and cellulitis of the right lower leg greater than the left   Neurologic: Alert and oriented X 3. Motor grossly normal.  Speech clear. Cr n 2-12 intact       DIAGNOSTIC RESULTS   LABS:    Labs Reviewed   CBC WITH AUTO DIFFERENTIAL - Abnormal; Notable for the following components:       Result Value    RDW 16.4 (*)     MPV 11.6 (*)     Neutrophils Absolute 8.9 (*)     Lymphocytes Absolute 0.5 (*)     All other components within normal limits   COMPREHENSIVE METABOLIC PANEL W/ REFLEX TO MG FOR LOW K - Abnormal; Notable for the following components:    Glucose 129 (*)     BUN 30 (*)     Creatinine 2.0 (*)     Est, Glom Filt Rate 26 (*)     Albumin 3.2 (*)     Albumin/Globulin Ratio 0.8 (*)     Total Bilirubin 1.4 (*)     ALT <5 (*)     AST 10 (*)     All other components within normal limits   D-DIMER, QUANTITATIVE - Abnormal; Notable for the following components:    D-Dimer, Quant >20.00 (*)     All other components within normal limits   CULTURE, BLOOD 1   CULTURE, BLOOD 2   LACTIC ACID   BRAIN NATRIURETIC PEPTIDE       All other labs were within normal range or not returned as of thisdictation. EKG:  All EKG's are interpreted by the Emergency Department Physician who either signs or Co-signs this chart in the absence of a cardiologist.    EKG Interpretation    Interpreted by emergency department physician    Rhythm: sinus tachycardia  Rate: 100-110  Axis: normal  Ectopy: none  Conduction: normal  ST Segments: no acute change  T Waves: no acute change  Q Waves: none    Clinical Impression: Sinus tachycardia    Mona Bailey MD      RADIOLOGY:   Non-plain film images such as CT, Ultrasound and MRI are read by the radiologist. Larissa Brantley images are visualized and preliminarily interpreted by the  ED Provider with the belowfindings:        Interpretation per the Radiologist below, if available at the time of this note:    CT CHEST ABDOMEN PELVIS WO CONTRAST Additional Contrast? None   Final Result   1. Scattered ground-glass opacities with interlobular septal thickening. Findings may be related to pulmonary edema with other considerations   including an inflammatory/infectious process in the appropriate clinical   setting. 2. Right upper lobe consolidation. Additional bilateral scattered curvilinear   opacities may be related to atelectasis versus developing consolidation. 3. Enlarged pulmonary artery diameter. Finding may be related to pulmonary   arterial hypertension. 4. Coronary artery calcifications present. 5. Moderate hiatal hernia. 6. Questionable circumferential thickening of the descending colon with mild   surrounding fat stranding versus colonic underdistention. Finding raises the   possibility of colitis in the appropriate clinical setting. 7. Colonic diverticulosis, predominately sigmoid. No evidence of acute   diverticulitis. 8. Nonobstructing punctate left nephrolithiasis. RECOMMENDATIONS:   2 cm left adrenal mass, consistent with lipid-rich benign adenoma. No   follow-up imaging is recommended. JACR 2017 Aug; 14(8):1038-44, JCAT 2016 Kathie Jada; 40(2):194-200, Urol J 2006   Spring; 3(2):71-4. XR CHEST PORTABLE   Preliminary Result   1.  Diffuse hazy multifocal opacity throughout both lungs, right worse than   left, likely a combination of moderate pulmonary edema and multifocal   pneumonia. 2. Stable cardiomegaly. 3. Stable hiatal hernia. PROCEDURES   Unless otherwise noted below, none     Procedures    CRITICAL CARE TIME   N/A      CONSULTS:  None    EMERGENCY DEPARTMENT COURSE and DIFFERENTIAL DIAGNOSIS/MDM:   Vitals:    Vitals:    02/07/23 0309   BP: (!) 115/95   Pulse: (!) 110   Resp: 23   Temp: 98.7 °F (37.1 °C)   TempSrc: Oral   SpO2: 98%   Weight: 235 lb (106.6 kg)   Height: 5' 7\" (1.702 m)       Patient was given the following medications:  Medications   ondansetron (ZOFRAN) injection 4 mg (4 mg IntraVENous Given 2/7/23 0348)   levoFLOXacin (LEVAQUIN) 750 MG/150ML infusion 750 mg (has no administration in time range)   morphine injection 4 mg (4 mg IntraVENous Given 2/7/23 0348)           Is this patient to be included in the SEP-1 Core Measure due to severe sepsis or septic shock? No   Exclusion criteria - the patient is NOT to be included for SEP-1 Core Measure due to:  2+ SIRS criteria are not met  Patient is afebrile white count is not elevated lactate is normal chest x-ray shows multifocal pneumonia CT scan of the abdomen without contrast shows no acute process patient will be admitted for multifocal pneumonia acute kidney injury superimposed on her chronic kidney disease elevated D-dimer and allergic reaction to cephalexin for her chronic cellulitis of the legs        FINAL IMPRESSION      1. Multifocal pneumonia    2. Acute kidney injury ([address]    3. Allergic reaction to drug, initial encounter    4. Elevated d-dimer        DISPOSITION/PLAN   DISPOSITION Decision To Admit 02/07/2023 05:22:46 AM      PATIENT REFERRED TO:  No follow-up provider specified.     DISCHARGE MEDICATIONS:  New Prescriptions    No medications on file       DISCONTINUED MEDICATIONS:  Discontinued Medications    No medications on file              (Please note that portions of this note were completed with a voice recognition program.  Efforts were made to edit the dictations but occasionally words aremis-transcribed.)    Kameron Streeter MD (electronically signed)           Kameron Streeter MD  02/07/23 0640

## 2023-02-07 NOTE — CONSULTS
[address] [place] [address]   Electrophysiology   Date: 2/7/2023  Reason for Consultation: Atrial fibrillation   Consult Requesting Physician: Bruna Rahman MD     Chief Complaint   Patient presents with    Abdominal Pain     Pt via EMS from home, c/o LUQ pain 10/10, has had gallbladder removed. Pt states pain is making her sob, 89% ora, put on 2L, pt received 325 mg aspirin, has been treated for cellulitis since December, new antibiotic last two weeks, states she has been getting hives and itching       CC: Abdominal pain    HPI: Janny Peralta is a 78 y.o. female presented with abdominal pain in LUQ. She also complains of being SOB. She had cellulitis and received IV antibiotic and changed to PO antibiotic therapy. She has had rash and hives and itching. No fever, chills. Cardiology has been consulted for atrial fibrillation/flutter. She has history of chronic persistent atrial fibrillation/flutter since 2017. Atrial flutter/fibrillation diagnosed in 2016 and she had cardioversion on 4/13/2016. Her atrial fibrillation flutter recurred in 2017 and it appears that she has been in atrial fibrillation/flutter since then. She declined cardioversion and ablation on her last hospitalization in 2019 when she was seen by EP. However, she agreed to proceed with cardioversion and scheduled to have cardioversion on 2/9/2023. She states that she has gained about 12 lbs. Assessment:   Atrial flutter/fibrillation with rapid ventricular response  Acute on chronic HFpEF (very high Pro-BNP, ? CXR with pulmonary edema)  Pneumonia  Cellulitis of lower extremity  HTN  GIANNA, not using her CPAP  History of rectus sheath hematoma in the past  CKD  HLD    Plan:   Rates are slightly high, however, she appears to have pneumonia? And is in respiratory distress. ECG ordered   Tele   High [id number] score, high risk for stroke/thromboembolism. Continue with Eliquis   Continue with Amiodarone.    Continue with metoprolol and increase the dose as she tolerates it. Will postpone cardioversion since she has PNA and respiratory issues. Can consider it as outpatient. Acute on chronic HFpEF:   Pro-BNP very high. IV diuretics. Close monitoring of volume status, electrolytes(K, NA), renal function   High risk due to CKD  Consider Jardiance after kidney function improved. Strict I/Os  Weight daily  Pro-BNP serial     High risk for morbidity and mortality, rehospitalization due to multiple comorbidities. High risk for recurrent atrial arrhythmia including atrial fibrillation. Aggressive BP control. Rate control for Afib  Aggressive risk factor modification, particularly weight loss,  BP control,     Heart failure consult. On Levaquin. Prefer to avoid Levaquin since she is on amiodarone. Consider using other antibiotic therapy. Check ECG. Rash ? Secondary to antibiotic therapy. Discussed with nursing staff. Discussed with referring physician. Consult Heart failure team.   Complex decision making due to multiple co morbidities. Relevant available labs, and cardiovascular diagnostics, documents reviewed. EC/22: Atrial flutter variable block  Echo: : EF 55 to 60%, mild RV enlargement, RA enlargement    Cardiac cath 2016: Normal coronaries, normal EF  Creatinine 2  Procalcitonin 0.22    proBNP 32861  Hemoglobin 13.3    CT scan: Groundglass opacities  Right upper lobe consolidation  Coronary calcification  Moderate hiatal hernia  Possible colitis    CXR: Opacities suggesting pulmonary edema versus pneumonia    Prior ECGs reviewed  Holter monitor results reviewed    Consult note reviewed. Outside medical records via Care everywhere reviewed and summarized in H&P above.     Complex medical condition with multiple medical problems affecting prognosis and outcome of EP interventions   Severe exacerbation of underlying medical condition requiring hospitalization and at risk of decompensation. I independently reviewed relevant and available cardiac diagnostic tests ECG, CXR, Echo, Stress test, Device interrogation, Holter, CT scan. I independently reviewed the cardiac diagnostic studies, ECG and relevant imaging studies. Physical Examination:  Vitals:    23 1444   BP:    Pulse:    Resp:    Temp:    SpO2: 91%      In: 150   Out: -    Wt Readings from Last 3 Encounters:   23 249 lb 9 oz (113.2 kg)   22 236 lb 14.4 oz (107.5 kg)   12/15/22 235 lb (106.6 kg)     Temp  Av.3 °F (36.8 °C)  Min: 97.9 °F (36.6 °C)  Max: 98.7 °F (37.1 °C)  Pulse  Av.1  Min: 105  Max: 112  BP  Min: 106/69  Max: 117/66  SpO2  Av.9 %  Min: 91 %  Max: 100 %    Intake/Output Summary (Last 24 hours) at 2023 1640  Last data filed at 2023 0801  Gross per 24 hour   Intake 150 ml   Output --   Net 150 ml       Constitutional: Oriented. No distress. Cardiovascular: Tachycardic rate, Irregular rhythm, S1&S2. Pulmonary/Chest: Bilateral respiratory sounds. + rhonchi. + crackles  Abdominal: Soft. No tenderness   Musculoskeletal: No edema    Neurological: Alert and oriented.  Follows command  + Rash, diffuse     Active Hospital Problems    Diagnosis Date Noted    Bacterial pneumonia [J15.9] 2023     Priority: Medium       Scheduled Meds:   amiodarone  200 mg Oral Daily    apixaban  5 mg Oral BID    [START ON 2023] aspirin  81 mg Oral Daily    metoprolol tartrate  12.5 mg Oral BID    [START ON 2023] levoFLOXacin  750 mg Oral Q48H    ipratropium-albuterol  1 ampule Inhalation Q4H WA    sodium chloride flush  5-40 mL IntraVENous 2 times per day    methylPREDNISolone  40 mg IntraVENous Daily     Continuous Infusions:   sodium chloride       PRN Meds:.ondansetron, albuterol sulfate HFA, traMADol, sodium chloride flush, sodium chloride, ondansetron **OR** ondansetron, polyethylene glycol, acetaminophen **OR** acetaminophen, diphenhydrAMINE     Prior to Admission medications    Medication Sig Start Date End Date Taking? Authorizing Provider   dilTIAZem (DILACOR XR) 240 MG extended release capsule Take 240 mg by mouth daily   Yes Historical Provider, MD   amiodarone (CORDARONE) 200 MG tablet 200mg bid x 8 days then 200mg daily 12/23/22   Karen Gallego MD   traMADol (ULTRAM) 50 MG tablet Take 50 mg by mouth every 6 hours as needed for Pain. Historical Provider, MD   ferrous sulfate (IRON 325) 325 (65 Fe) MG tablet Take 1 tablet by mouth daily (with breakfast)  Patient not taking: Reported on 2/7/2023 12/15/22   MARILEE Cunningham CNP   Cholecalciferol (VITAMIN D3) 1.25 MG (61658 UT) CAPS Take 1 capsule by mouth every 7 days 12/15/22   MARILEE Cunningham CNP   furosemide (LASIX) 20 MG tablet Take 1 tablet by mouth daily  Patient not taking: No sig reported 12/15/22   MARILEE Cunningham CNP   albuterol sulfate HFA (PROVENTIL HFA) 108 (90 Base) MCG/ACT inhaler Inhale 2 puffs into the lungs every 4 hours as needed for Wheezing 12/15/22   MARILEE Cunningham CNP   buPROPion [facility]) 200 MG extended release tablet TAKE 1 TABLET BY MOUTH TWICE DAILY  Patient not taking: Reported on 2/7/2023 9/21/22   MARILEE Cunningham CNP   vitamin B-12 (CYANOCOBALAMIN) 100 MCG tablet Take 1 tablet by mouth in the morning.  8/16/22 8/16/23  MARILEE Cunningham CNP   alendronate (FOSAMAX) 70 MG tablet Take 1 tablet by mouth every 7 days 8/15/22   MARILEE Cunningham CNP   apixaban (ELIQUIS) 5 MG TABS tablet Take 1 tablet by mouth 2 times daily 6/14/22   MARILEE Cunningham CNP   metoprolol tartrate (LOPRESSOR) 25 MG tablet Take 1 tablet by mouth 2 times daily  Patient taking differently: Take 12.5 mg by mouth 2 times daily 6/2/22   MARILEE Cunningham CNP   pantoprazole (PROTONIX) 40 MG tablet Take 1 tablet by mouth 2 times daily (before meals)  Patient not taking: Reported on 2/7/2023 4/1/22   Karen Gallego MD   cloNIDine (CATAPRES) 0.2 MG tablet TAKE 1 TABLET BY MOUTH TWICE DAILY  Patient taking differently: daily 11/2/21 9/20/22  Kajal Wesley, APRN - CNP   aspirin 81 MG tablet Take 81 mg by mouth daily    Historical Provider, MD       Past Medical History:   Diagnosis Date    Arthritis     Atrial fibrillation and flutter ([address]     Hypertension     Kidney disease     Pt states  \"stage 3\"    Pneumonia     Sleep apnea     no c-pap        Past Surgical History:   Procedure Laterality Date    CARDIOVERSION      COLONOSCOPY N/A 11/20/2018    COLONOSCOPY POLYPECTOMY SNARE/COLD BIOPSY performed by Flori [facility]as MD at [facility] N/A 03/31/2022    COLONOSCOPY POLYPECTOMY SNARE/COLD BIOPSY performed by Laisha Olivia MD at [address] [place] [address]      ankle rt has pins and rods, and rt elbow    GASTRIC BYPASS SURGERY      LARYNX SURGERY      TOTAL KNEE ARTHROPLASTY Bilateral 12/19/2012    bilateral knee replacements    TUBAL LIGATION      tubes tied    UPPER GASTROINTESTINAL ENDOSCOPY N/A 11/20/2018    EGD BIOPSY performed by Flori Arias MD at [address] [place] [address] 03/31/2022    EGD DILATION BALLOON performed by Laisha Olivia MD at [address] [place] [address] N/A 03/31/2022    EGD BIOPSY performed by Laisha Olivia MD at [address] [place] [address]       Allergies   Allergen Reactions    Bee Venom Swelling and Angioedema    Shellfish-Derived Products Other (See Comments)     Syncope/seizures    Shrimp Flavor      Passes out      Cephalexin Itching    Codeine Hives and Other (See Comments)     B/P DROPS PASSES OUT  Passed out    Fentanyl And Related Hives     Other reaction(s): Dizziness    Hydromorphone Rash, Hives and Other (See Comments)     Full rash spreading across chest and both arms from IV hydromorphone  Passed out    Vancomycin Rash        reports that she has never smoked.  She has never used smokeless tobacco. She reports that she does not drink alcohol and does not use drugs. All questions and concerns were addressed to the patient/family. Alternatives to my treatment were discussed. I have discussed the above stated plan and the patient verbalized understanding and agreed with the plan. NOTE: This report was transcribed using voice recognition software. Every effort was made to ensure accuracy, however, inadvertent computerized transcription errors may be present.      Edie Noland MD, MPH  [facility]   Office: [phone]  Fax: [phone]

## 2023-02-07 NOTE — H&P
HOSPITALISTS HISTORY AND PHYSICAL    2/7/2023 1:10 PM    Patient Information:  Sina De León is a 78 y.o. female [id number]  PCP:  MARILEE Velasquez CNP (Tel: [phone] )    Chief complaint:    Chief Complaint   Patient presents with    Abdominal Pain     Pt via EMS from home, c/o LUQ pain 10/10, has had gallbladder removed. Pt states pain is making her sob, 89% ora, put on 2L, pt received 325 mg aspirin, has been treated for cellulitis since December, new antibiotic last two weeks, states she has been getting hives and itching        History of Present Illness:  Maryuri Jimenez is a 78 y.o. female who presented with complaints of shortness of breath. Patient apparently has been treated for cellulitis lower extremity on antibiotics patient started having worsening itching and hives. Came to the ER. Patient with increased shortness of breath. Also with leg swelling. No reported fevers chills. Patient was found to be hypoxic was placed on 2 L nasal cannula. Patient with history of chronic CHF COPD chronic kidney disease admitted with bilateral worse. REVIEW OF SYSTEMS:   Constitutional: Negative for fever,chills or night sweats  ENT: Negative for rhinorrhea, epistaxis, hoarseness, sore throat. Respiratory: Negative for shortness of breath,wheezing  Cardiovascular: Negative for chest pain, palpitations   Gastrointestinal: Negative for nausea, vomiting, diarrhea  Genitourinary: Negative for polyuria, dysuria   Hematologic/Lymphatic: Negative for bleeding tendency, easy bruising  Musculoskeletal: Negative for myalgias and arthralgias  Neurologic: Negative for confusion,dysarthria. Skin: Negative for itching,rash, good capillary refill. Psychiatric: Negative for depression,anxiety, agitation. Endocrine: Negative for polydipsia,polyuria,heat /cold intolerance.     Past Medical History:   has a past medical history of Arthritis, Atrial fibrillation and flutter ([facility] [address] Hypertension, Kidney disease, Pneumonia, and Sleep apnea. Past Surgical History:   has a past surgical history that includes Tubal ligation; Total knee arthroplasty (Bilateral, 12/19/2012); fracture surgery; Colonoscopy (N/A, 11/20/2018); Upper gastrointestinal endoscopy (N/A, 11/20/2018); Cardioversion; Gastric bypass surgery; Larynx surgery; Upper gastrointestinal endoscopy (N/A, 03/31/2022); Colonoscopy (N/A, 03/31/2022); and Upper gastrointestinal endoscopy (N/A, 03/31/2022). Medications:  No current facility-administered medications on file prior to encounter. Current Outpatient Medications on File Prior to Encounter   Medication Sig Dispense Refill    dilTIAZem (DILACOR XR) 240 MG extended release capsule Take 240 mg by mouth daily      amiodarone (CORDARONE) 200 MG tablet 200mg bid x 8 days then 200mg daily 60 tablet 0    traMADol (ULTRAM) 50 MG tablet Take 50 mg by mouth every 6 hours as needed for Pain. ferrous sulfate (IRON 325) 325 (65 Fe) MG tablet Take 1 tablet by mouth daily (with breakfast) (Patient not taking: Reported on 2/7/2023) 30 tablet 11    Cholecalciferol (VITAMIN D3) 1.25 MG (69885 UT) CAPS Take 1 capsule by mouth every 7 days 4 capsule 11    furosemide (LASIX) 20 MG tablet Take 1 tablet by mouth daily (Patient not taking: No sig reported) 30 tablet 11    albuterol sulfate HFA (PROVENTIL HFA) 108 (90 Base) MCG/ACT inhaler Inhale 2 puffs into the lungs every 4 hours as needed for Wheezing 18 g 5    buPROPion (WELLBUTRIN SR) 200 MG extended release tablet TAKE 1 TABLET BY MOUTH TWICE DAILY (Patient not taking: Reported on 2/7/2023) 60 tablet 5    vitamin B-12 (CYANOCOBALAMIN) 100 MCG tablet Take 1 tablet by mouth in the morning.  90 tablet 3    alendronate (FOSAMAX) 70 MG tablet Take 1 tablet by mouth every 7 days 12 tablet 3    apixaban (ELIQUIS) 5 MG TABS tablet Take 1 tablet by mouth 2 times daily 180 tablet 3    metoprolol tartrate (LOPRESSOR) 25 MG tablet Take 1 tablet by mouth 2 times daily (Patient taking differently: Take 12.5 mg by mouth 2 times daily) 180 tablet 3    pantoprazole (PROTONIX) 40 MG tablet Take 1 tablet by mouth 2 times daily (before meals) (Patient not taking: Reported on 2/7/2023) 30 tablet 3    [DISCONTINUED] cloNIDine (CATAPRES) 0.2 MG tablet TAKE 1 TABLET BY MOUTH TWICE DAILY (Patient taking differently: daily) 180 tablet 3    aspirin 81 MG tablet Take 81 mg by mouth daily         Allergies: Allergies   Allergen Reactions    Bee Venom Swelling and Angioedema    Shellfish-Derived Products Other (See Comments)     Syncope/seizures    Shrimp Flavor      Passes out      Codeine Hives and Other (See Comments)     B/P DROPS PASSES OUT  Passed out    Fentanyl And Related Hives     Other reaction(s): Dizziness    Hydromorphone Rash, Hives and Other (See Comments)     Full rash spreading across chest and both arms from IV hydromorphone  Passed out    Vancomycin Rash        Social History:   reports that she has never smoked. She has never used smokeless tobacco. She reports that she does not drink alcohol and does not use drugs. Family History:  family history includes Cancer in her father; Other in her mother; Stroke in her brother. Physical Exam:  /72   Pulse (!) 110   Temp 98.7 °F (37.1 °C) (Oral)   Resp 17   Ht 5' 7\" (1.702 m)   Wt 235 lb (106.6 kg)   SpO2 94%   BMI 36.81 kg/m²     General appearance:  Appears comfortable. Well nourished  Eyes: Sclera clear, pupils equal  ENT: Moist mucus membranes, no thrush. Trachea midline. Cardiovascular: Regular rhythm, normal S1, S2. No murmur, gallop, rub. No edema in lower extremities  Respiratory: Clear to auscultation bilaterally, no wheeze, good inspiratory effort  Gastrointestinal: Abdomen soft, non-tender, not distended, normal bowel sounds  Musculoskeletal: No cyanosis in digits, neck supple  Neurology: Cranial nerves grossly intact.  Alert and oriented in time, place and person. No speech or motor deficits  Psychiatry: Appropriate affect. Not agitated  Skin: Warm, 2+ edema noted to have some rash on the lower extremity on 7 buttocks area. Labs:  CBC:   Lab Results   Component Value Date/Time    WBC 9.9 02/07/2023 03:39 AM    RBC 4.64 02/07/2023 03:39 AM    HGB 13.3 02/07/2023 03:39 AM    HCT 42.2 02/07/2023 03:39 AM    MCV 90.9 02/07/2023 03:39 AM    MCH 28.7 02/07/2023 03:39 AM    MCHC 31.6 02/07/2023 03:39 AM    RDW 16.4 02/07/2023 03:39 AM     02/07/2023 03:39 AM    MPV 11.6 02/07/2023 03:39 AM     BMP:    Lab Results   Component Value Date/Time     02/07/2023 03:39 AM    K 4.4 02/07/2023 03:39 AM     02/07/2023 03:39 AM    CO2 25 02/07/2023 03:39 AM    BUN 30 02/07/2023 03:39 AM    CREATININE 2.0 02/07/2023 03:39 AM    CALCIUM 9.1 02/07/2023 03:39 AM    GFRAA 38 09/20/2022 04:14 AM    GFRAA 45 03/27/2013 01:36 PM    LABGLOM 26 02/07/2023 03:39 AM    GLUCOSE 129 02/07/2023 03:39 AM       Chest Xray:   EKG:    I visualized CXR images and EKG strips     Discussed  with      Problem List  Principal Problem:    Bacterial pneumonia  Resolved Problems:    * No resolved hospital problems. *        Assessment/Plan:   Acute hypoxic respiratory failure  -Likely secondary to pneumonia. Patient wearing 4 L of oxygen normally does not wear oxygen  -Multiple pulmonary failure chronic edema as well.  -Patient will be treated with IV antibiotics monitor closely wean oxygen    Acute kidney injury  -On diuresis which will be held nephrology consult    Allergic reaction to antibiotic  -0 as rest of the lower extremity.   Antibiotics has been discontinued  -Give dose of steroid monitor closely    Chronic leg swelling  -Hold off on diuretic until nephrology consult    Chronic CHF    COPD continue home breathing treatment DuoNebs    History of A-fib  -Rate control continue anticoagulation    Morbid obesity            Susan Lo MD    2/7/2023 1:10 PM

## 2023-02-07 NOTE — PROGRESS NOTES
Pharmacy Home Medication Reconciliation Note    A medication reconciliation has been completed for Phoebe Neff 1945    Pharmacy: [facility] and Mail order  Information provided by: Patient    The patient's home medication list is as follows: No current facility-administered medications on file prior to encounter. Current Outpatient Medications on File Prior to Encounter   Medication Sig Dispense Refill    dilTIAZem (DILACOR XR) 240 MG extended release capsule Take 240 mg by mouth daily      amiodarone (CORDARONE) 200 MG tablet 200mg bid x 8 days then 200mg daily 60 tablet 0    traMADol (ULTRAM) 50 MG tablet Take 50 mg by mouth every 6 hours as needed for Pain. ferrous sulfate (IRON 325) 325 (65 Fe) MG tablet Take 1 tablet by mouth daily (with breakfast) 30 tablet 11    Cholecalciferol (VITAMIN D3) 1.25 MG (28236 UT) CAPS Take 1 capsule by mouth every 7 days 4 capsule 11    furosemide (LASIX) 20 MG tablet Take 1 tablet by mouth daily (Patient not taking: No sig reported) 30 tablet 11    albuterol sulfate HFA (PROVENTIL HFA) 108 (90 Base) MCG/ACT inhaler Inhale 2 puffs into the lungs every 4 hours as needed for Wheezing 18 g 5    buPROPion (WELLBUTRIN SR) 200 MG extended release tablet TAKE 1 TABLET BY MOUTH TWICE DAILY (Patient not taking: Reported on 2/7/2023) 60 tablet 5    vitamin B-12 (CYANOCOBALAMIN) 100 MCG tablet Take 1 tablet by mouth in the morning.  90 tablet 3    alendronate (FOSAMAX) 70 MG tablet Take 1 tablet by mouth every 7 days 12 tablet 3    apixaban (ELIQUIS) 5 MG TABS tablet Take 1 tablet by mouth 2 times daily 180 tablet 3    metoprolol tartrate (LOPRESSOR) 25 MG tablet Take 1 tablet by mouth 2 times daily 180 tablet 3    pantoprazole (PROTONIX) 40 MG tablet Take 1 tablet by mouth 2 times daily (before meals) (Patient not taking: Reported on 2/7/2023) 30 tablet 3    [DISCONTINUED] cloNIDine (CATAPRES) 0.2 MG tablet TAKE 1 TABLET BY MOUTH TWICE DAILY (Patient taking differently: daily) 180 tablet 3    aspirin 81 MG tablet Take 81 mg by mouth daily         Patient is no longer taking Clonidine 0.2 mg, Furosemide 20 mg, and Pantoprazole 40 mg    Timing of last doses updated.     Thank you,  Silvia Ken CpHT

## 2023-02-07 NOTE — CONSULTS
Office : [phone]     Fax :[phone]       Nephrology Consult Note      Patient's Name: Stacy Ly  8:54 AM  2/7/2023    Reason for Consult:  Panda      Requesting Physician:  Tarik Hummel, MARILEE - CNP      Chief Complaint:    Chief Complaint   Patient presents with    Abdominal Pain     Pt via EMS from home, c/o LUQ pain 10/10, has had gallbladder removed. Pt states pain is making her sob, 89% ora, put on 2L, pt received 325 mg aspirin, has been treated for cellulitis since December, new antibiotic last two weeks, states she has been getting hives and itching        History of Present Ilness:    Stacy Ly is a 77 y.o. female who presented with complaints of shortness of breath.  Patient apparently has been treated for cellulitis lower extremity on antibiotics patient started having worsening itching and hives.  Came to the ER.  Patient with increased shortness of breath.  Also with leg swelling.  No reported fevers chills.  Patient was found to be hypoxic was placed on 2 L nasal cannula.  Patient with history of chronic CHF COPD chronic kidney disease admitted with bilateral worse.    Baseline creat is 1.2   Now elevated at 2.0     BNP 79285    No intake/output data recorded.    Past Medical History:   Diagnosis Date    Arthritis     Atrial fibrillation and flutter (HCC)     Hypertension     Kidney disease     Pneumonia     Sleep apnea     no c-pap       Past Surgical History:   Procedure Laterality Date    CARDIOVERSION      COLONOSCOPY N/A 11/20/2018    COLONOSCOPY POLYPECTOMY SNARE/COLD BIOPSY performed by Jairon Gordon MD at [facility] ENDOSCOPY    COLONOSCOPY N/A 3/31/2022    COLONOSCOPY POLYPECTOMY SNARE/COLD BIOPSY performed by Alfred London MD at [facility] ENDOSCOPY  FRACTURE SURGERY      ankle rt has pins and rods, and rt elbow    GASTRIC BYPASS SURGERY      LARYNX SURGERY      TOTAL KNEE ARTHROPLASTY Bilateral 12/19/2012    TUBAL LIGATION      tubes tied    UPPER GASTROINTESTINAL ENDOSCOPY N/A 11/20/2018    EGD BIOPSY performed by Nahid Morton MD at [facility] N/A 3/31/2022    EGD DILATION BALLOON performed by José Collado MD at [facility] ENDOSCOPY N/A 3/31/2022    EGD BIOPSY performed by José Collado MD at [address] [place] [address]       Family History   Problem Relation Age of Onset    Cancer Father         stomach cancer        reports that she has never smoked. She has never used smokeless tobacco. She reports that she does not drink alcohol and does not use drugs.         Allergies:  Bee venom, Shellfish-derived products, Shrimp flavor, Codeine, Fentanyl and related, Hydromorphone, and Vancomycin    Current Medications:    ondansetron (ZOFRAN) injection 4 mg, Q6H PRN  albuterol sulfate HFA (PROVENTIL;VENTOLIN;PROAIR) 108 (90 Base) MCG/ACT inhaler 2 puff, Q4H PRN  amiodarone (CORDARONE) tablet 200 mg, Daily  apixaban (ELIQUIS) tablet 5 mg, BID  aspirin tablet 81 mg, Daily  buPROPion [facility] SR) extended release tablet 200 mg, BID  metoprolol tartrate (LOPRESSOR) tablet 25 mg, BID  pantoprazole (PROTONIX) tablet 40 mg, BID AC  traMADol (ULTRAM) tablet 50 mg, Q6H PRN  levoFLOXacin (LEVAQUIN) tablet 250 mg, Daily  ipratropium-albuterol (DUONEB) nebulizer solution 1 ampule, Q4H WA  sodium chloride flush 0.9 % injection 5-40 mL, 2 times per day  sodium chloride flush 0.9 % injection 5-40 mL, PRN  0.9 % sodium chloride infusion, PRN  ondansetron (ZOFRAN-ODT) disintegrating tablet 4 mg, Q8H PRN   Or  ondansetron (ZOFRAN) injection 4 mg, Q6H PRN  polyethylene glycol (GLYCOLAX) packet 17 g, Daily PRN  acetaminophen (TYLENOL) tablet 650 mg, Q6H PRN   Or  acetaminophen (TYLENOL) suppository 650 mg, Q6H PRN  methylPREDNISolone sodium (SOLU-MEDROL) injection 40 mg, Daily        Review of Systems:   14 point ROS obtained but were negative except mentioned in HPI      Physical exam:     Vitals:  BP (!) 115/95   Pulse (!) 110   Temp 98.7 °F (37.1 °C) (Oral)   Resp 23   Ht 5' 7\" (1.702 m)   Wt 235 lb (106.6 kg)   SpO2 98%   BMI 36.81 kg/m²   Constitutional:  OAA X3 NAD  Skin: no rash, turgor wnl  Heent:  eomi, mmm  Neck: no bruits or jvd noted  Cardiovascular:  S1, S2 without m/r/g  Respiratory: CTA B without w/r/r  Abdomen:  +bs, soft, nt, nd  Ext: 2 +  lower extremity edema  Psychiatric: mood and affect appropriate  Musculoskeletal:  Rom, muscular strength intact    Labs:  CBC:   Recent Labs     02/07/23 0339   WBC 9.9   HGB 13.3        BMP:    Recent Labs     02/07/23 0339      K 4.4      CO2 25   BUN 30*   CREATININE 2.0*   GLUCOSE 129*     Ca/Mg/Phos:   Recent Labs     02/07/23 0339   CALCIUM 9.1     Hepatic:   Recent Labs     02/07/23 0339   AST 10*   ALT <5*   BILITOT 1.4*   ALKPHOS 90            IMAGING:  CT CHEST ABDOMEN PELVIS WO CONTRAST Additional Contrast? None   Final Result   1. Scattered ground-glass opacities with interlobular septal thickening. Findings may be related to pulmonary edema with other considerations   including an inflammatory/infectious process in the appropriate clinical   setting. 2. Right upper lobe consolidation. Additional bilateral scattered curvilinear   opacities may be related to atelectasis versus developing consolidation. 3. Enlarged pulmonary artery diameter. Finding may be related to pulmonary   arterial hypertension. 4. Coronary artery calcifications present. 5. Moderate hiatal hernia. 6. Questionable circumferential thickening of the descending colon with mild   surrounding fat stranding versus colonic underdistention. Finding raises the   possibility of colitis in the appropriate clinical setting.    7. Colonic diverticulosis, predominately sigmoid. No evidence of acute   diverticulitis. 8. Nonobstructing punctate left nephrolithiasis. RECOMMENDATIONS:   2 cm left adrenal mass, consistent with lipid-rich benign adenoma. No   follow-up imaging is recommended. JACR 2017 Aug; 14(8):1038-44, JCAT 2016 Pamela Eis; 40(2):194-200, Urol J 2006   [place]; 3(2):71-4. XR CHEST PORTABLE   Preliminary Result   1. Diffuse hazy multifocal opacity throughout both lungs, right worse than   left, likely a combination of moderate pulmonary edema and multifocal   pneumonia. 2. Stable cardiomegaly. 3. Stable hiatal hernia. Prior to Admission medications    Medication Sig Start Date End Date Taking? Authorizing Provider   dilTIAZem (DILACOR XR) 240 MG extended release capsule Take 240 mg by mouth daily   Yes Historical Provider, MD   amiodarone (CORDARONE) 200 MG tablet 200mg bid x 8 days then 200mg daily 12/23/22   Naseem Sensor, MD   traMADol (ULTRAM) 50 MG tablet Take 50 mg by mouth every 6 hours as needed for Pain. Historical Provider, MD   ferrous sulfate (IRON 325) 325 (65 Fe) MG tablet Take 1 tablet by mouth daily (with breakfast)  Patient not taking: Reported on 2/7/2023 12/15/22   MARILEE Shaffer CNP   Cholecalciferol (VITAMIN D3) 1.25 MG (05733 UT) CAPS Take 1 capsule by mouth every 7 days 12/15/22   MARILEE Shaffer CNP   furosemide (LASIX) 20 MG tablet Take 1 tablet by mouth daily  Patient not taking: No sig reported 12/15/22   MARILEE Shaffer CNP   albuterol sulfate HFA (PROVENTIL HFA) 108 (90 Base) MCG/ACT inhaler Inhale 2 puffs into the lungs every 4 hours as needed for Wheezing 12/15/22   MARILEE Shaffer CNP   buPROPion [facility] SR) 200 MG extended release tablet TAKE 1 TABLET BY MOUTH TWICE DAILY  Patient not taking: Reported on 2/7/2023 9/21/22   MARILEE Shaffer CNP   vitamin B-12 (CYANOCOBALAMIN) 100 MCG tablet Take 1 tablet by mouth in the morning. 8/16/22 8/16/23  MARILEE Galindo CNP   alendronate (FOSAMAX) 70 MG tablet Take 1 tablet by mouth every 7 days 8/15/22   MARILEE Galindo CNP   apixaban (ELIQUIS) 5 MG TABS tablet Take 1 tablet by mouth 2 times daily 6/14/22   MARILEE Galindo CNP   metoprolol tartrate (LOPRESSOR) 25 MG tablet Take 1 tablet by mouth 2 times daily  Patient taking differently: Take 12.5 mg by mouth 2 times daily 6/2/22   MARILEE Galindo CNP   pantoprazole (PROTONIX) 40 MG tablet Take 1 tablet by mouth 2 times daily (before meals)  Patient not taking: Reported on 2/7/2023 4/1/22   Augustus Farmer MD   cloNIDine (CATAPRES) 0.2 MG tablet TAKE 1 TABLET BY MOUTH TWICE DAILY  Patient taking differently: daily 11/2/21 9/20/22  MARILEE Galindo CNP   aspirin 81 MG tablet Take 81 mg by mouth daily    Historical Provider, MD                 Assessment/Plan :      1. Panda   Likely has CRS   Continue lasix 40 mg iv bid   Recommend to dose adjust all medications  based on renal functions  Maintain SBP> 90 mmHg   Daily weights   AVOID NSAIDs  Avoid Nephrotoxins  Monitor Intake/Output  Call if significant decrease in urine output        2. HTN. BP low borderline range   Hold clonidine if SBP < 110 mm HG     3. COPD    4.  H/o atrial fib   Monitor             D/w primary team      Thank you for allowing us to participate in care of Dipti Pryor         Electronically signed by: Naman Álvarez MD, 2/7/2023, 8:54 AM      [facility] [address]  Office : [phone]  Fax :[phone]

## 2023-02-08 PROBLEM — I50.33 ACUTE ON CHRONIC HEART FAILURE WITH PRESERVED EJECTION FRACTION (HFPEF) (HCC): Status: ACTIVE | Noted: 2023-02-08

## 2023-02-08 LAB
ANION GAP SERPL CALCULATED.3IONS-SCNC: 8 MMOL/L (ref 3–16)
BUN BLDV-MCNC: 41 MG/DL (ref 7–20)
CALCIUM SERPL-MCNC: 9.8 MG/DL (ref 8.3–10.6)
CHLORIDE BLD-SCNC: 104 MMOL/L (ref 99–110)
CO2: 26 MMOL/L (ref 21–32)
CREAT SERPL-MCNC: 2.5 MG/DL (ref 0.6–1.2)
EKG ATRIAL RATE: 108 BPM
EKG DIAGNOSIS: NORMAL
EKG P-R INTERVAL: 152 MS
EKG Q-T INTERVAL: 346 MS
EKG QRS DURATION: 88 MS
EKG QTC CALCULATION (BAZETT): 463 MS
EKG R AXIS: -6 DEGREES
EKG T AXIS: 11 DEGREES
EKG VENTRICULAR RATE: 108 BPM
GFR SERPL CREATININE-BSD FRML MDRD: 20 ML/MIN/{1.73_M2}
GLUCOSE BLD-MCNC: 108 MG/DL (ref 70–99)
HCT VFR BLD CALC: 39.3 % (ref 36–48)
HEMOGLOBIN: 12.5 G/DL (ref 12–16)
MCH RBC QN AUTO: 28.6 PG (ref 26–34)
MCHC RBC AUTO-ENTMCNC: 31.7 G/DL (ref 31–36)
MCV RBC AUTO: 90.2 FL (ref 80–100)
PDW BLD-RTO: 16.3 % (ref 12.4–15.4)
PLATELET # BLD: 203 K/UL (ref 135–450)
PMV BLD AUTO: 10.5 FL (ref 5–10.5)
POTASSIUM SERPL-SCNC: 5.6 MMOL/L (ref 3.5–5.1)
RBC # BLD: 4.35 M/UL (ref 4–5.2)
SODIUM BLD-SCNC: 138 MMOL/L (ref 136–145)
WBC # BLD: 10.1 K/UL (ref 4–11)

## 2023-02-08 PROCEDURE — 6370000000 HC RX 637 (ALT 250 FOR IP): Performed by: INTERNAL MEDICINE

## 2023-02-08 PROCEDURE — 80048 BASIC METABOLIC PNL TOTAL CA: CPT

## 2023-02-08 PROCEDURE — 6370000000 HC RX 637 (ALT 250 FOR IP): Performed by: HOSPITALIST

## 2023-02-08 PROCEDURE — 85027 COMPLETE CBC AUTOMATED: CPT

## 2023-02-08 PROCEDURE — 6360000002 HC RX W HCPCS: Performed by: INTERNAL MEDICINE

## 2023-02-08 PROCEDURE — 94761 N-INVAS EAR/PLS OXIMETRY MLT: CPT

## 2023-02-08 PROCEDURE — 93010 ELECTROCARDIOGRAM REPORT: CPT | Performed by: INTERNAL MEDICINE

## 2023-02-08 PROCEDURE — 94640 AIRWAY INHALATION TREATMENT: CPT

## 2023-02-08 PROCEDURE — 97162 PT EVAL MOD COMPLEX 30 MIN: CPT

## 2023-02-08 PROCEDURE — 97535 SELF CARE MNGMENT TRAINING: CPT

## 2023-02-08 PROCEDURE — 99233 SBSQ HOSP IP/OBS HIGH 50: CPT | Performed by: INTERNAL MEDICINE

## 2023-02-08 PROCEDURE — 2580000003 HC RX 258: Performed by: HOSPITALIST

## 2023-02-08 PROCEDURE — 6360000002 HC RX W HCPCS: Performed by: EMERGENCY MEDICINE

## 2023-02-08 PROCEDURE — 1200000000 HC SEMI PRIVATE

## 2023-02-08 PROCEDURE — 2700000000 HC OXYGEN THERAPY PER DAY

## 2023-02-08 PROCEDURE — 97166 OT EVAL MOD COMPLEX 45 MIN: CPT

## 2023-02-08 PROCEDURE — 97530 THERAPEUTIC ACTIVITIES: CPT

## 2023-02-08 PROCEDURE — 36415 COLL VENOUS BLD VENIPUNCTURE: CPT

## 2023-02-08 PROCEDURE — 6360000002 HC RX W HCPCS: Performed by: HOSPITALIST

## 2023-02-08 RX ORDER — DIPHENHYDRAMINE HCL 25 MG
50 TABLET ORAL EVERY 6 HOURS PRN
Status: DISCONTINUED | OUTPATIENT
Start: 2023-02-08 | End: 2023-02-23 | Stop reason: HOSPADM

## 2023-02-08 RX ORDER — METOPROLOL TARTRATE 50 MG/1
50 TABLET, FILM COATED ORAL 2 TIMES DAILY
Status: DISCONTINUED | OUTPATIENT
Start: 2023-02-08 | End: 2023-02-09

## 2023-02-08 RX ORDER — METHYLPREDNISOLONE SODIUM SUCCINATE 40 MG/ML
20 INJECTION, POWDER, LYOPHILIZED, FOR SOLUTION INTRAMUSCULAR; INTRAVENOUS ONCE
Status: COMPLETED | OUTPATIENT
Start: 2023-02-08 | End: 2023-02-08

## 2023-02-08 RX ADMIN — DIPHENHYDRAMINE HYDROCHLORIDE 50 MG: 25 TABLET ORAL at 11:29

## 2023-02-08 RX ADMIN — TRAMADOL HYDROCHLORIDE 50 MG: 50 TABLET, FILM COATED ORAL at 08:07

## 2023-02-08 RX ADMIN — TRAMADOL HYDROCHLORIDE 50 MG: 50 TABLET, FILM COATED ORAL at 20:06

## 2023-02-08 RX ADMIN — AMIODARONE HYDROCHLORIDE 200 MG: 200 TABLET ORAL at 08:07

## 2023-02-08 RX ADMIN — METOPROLOL TARTRATE 50 MG: 50 TABLET ORAL at 20:06

## 2023-02-08 RX ADMIN — METHYLPREDNISOLONE SODIUM SUCCINATE 20 MG: 40 INJECTION, POWDER, FOR SOLUTION INTRAMUSCULAR; INTRAVENOUS at 03:46

## 2023-02-08 RX ADMIN — IPRATROPIUM BROMIDE AND ALBUTEROL SULFATE 1 AMPULE: 2.5; .5 SOLUTION RESPIRATORY (INHALATION) at 12:35

## 2023-02-08 RX ADMIN — FUROSEMIDE 40 MG: 10 INJECTION, SOLUTION INTRAMUSCULAR; INTRAVENOUS at 08:07

## 2023-02-08 RX ADMIN — IPRATROPIUM BROMIDE AND ALBUTEROL SULFATE 1 AMPULE: 2.5; .5 SOLUTION RESPIRATORY (INHALATION) at 09:04

## 2023-02-08 RX ADMIN — METHYLPREDNISOLONE SODIUM SUCCINATE 40 MG: 40 INJECTION, POWDER, FOR SOLUTION INTRAMUSCULAR; INTRAVENOUS at 08:07

## 2023-02-08 RX ADMIN — DIPHENHYDRAMINE HYDROCHLORIDE 25 MG: 25 TABLET ORAL at 00:47

## 2023-02-08 RX ADMIN — SODIUM ZIRCONIUM CYCLOSILICATE 10 G: 10 POWDER, FOR SUSPENSION ORAL at 11:24

## 2023-02-08 RX ADMIN — Medication 10 ML: at 08:17

## 2023-02-08 RX ADMIN — IPRATROPIUM BROMIDE AND ALBUTEROL SULFATE 1 AMPULE: 2.5; .5 SOLUTION RESPIRATORY (INHALATION) at 19:52

## 2023-02-08 RX ADMIN — APIXABAN 5 MG: 5 TABLET, FILM COATED ORAL at 08:07

## 2023-02-08 RX ADMIN — DIPHENHYDRAMINE HYDROCHLORIDE 50 MG: 25 TABLET ORAL at 03:46

## 2023-02-08 RX ADMIN — LEVOFLOXACIN 750 MG: 750 TABLET, FILM COATED ORAL at 08:07

## 2023-02-08 RX ADMIN — IPRATROPIUM BROMIDE AND ALBUTEROL SULFATE 1 AMPULE: 2.5; .5 SOLUTION RESPIRATORY (INHALATION) at 15:57

## 2023-02-08 RX ADMIN — ASPIRIN 81 MG: 81 TABLET, COATED ORAL at 08:07

## 2023-02-08 RX ADMIN — FUROSEMIDE 40 MG: 10 INJECTION, SOLUTION INTRAMUSCULAR; INTRAVENOUS at 18:53

## 2023-02-08 RX ADMIN — METOPROLOL TARTRATE 25 MG: 25 TABLET, FILM COATED ORAL at 08:07

## 2023-02-08 RX ADMIN — ONDANSETRON 4 MG: 2 INJECTION INTRAMUSCULAR; INTRAVENOUS at 08:06

## 2023-02-08 RX ADMIN — APIXABAN 5 MG: 5 TABLET, FILM COATED ORAL at 20:06

## 2023-02-08 ASSESSMENT — PAIN SCALES - GENERAL
PAINLEVEL_OUTOF10: 9
PAINLEVEL_OUTOF10: 3
PAINLEVEL_OUTOF10: 9

## 2023-02-08 ASSESSMENT — PAIN DESCRIPTION - PAIN TYPE
TYPE: ACUTE PAIN
TYPE: ACUTE PAIN

## 2023-02-08 ASSESSMENT — PAIN DESCRIPTION - DESCRIPTORS
DESCRIPTORS: ACHING
DESCRIPTORS: ACHING

## 2023-02-08 ASSESSMENT — PAIN DESCRIPTION - LOCATION
LOCATION: SHOULDER
LOCATION: SHOULDER
LOCATION: ABDOMEN

## 2023-02-08 ASSESSMENT — PAIN DESCRIPTION - ORIENTATION
ORIENTATION: LEFT

## 2023-02-08 NOTE — PROGRESS NOTES
[facility]    2/8/2023 11:03 AM        Name: Julia Walker . Admitted: 2/7/2023  Primary Care Provider: MARILEE Osorio CNP (Tel: [phone])                        Subjective:  . No acute events overnight. Resting well. Pain control. Diet ok. Labs reviewed  Denies any chest pain sob.      Reviewed interval ancillary notes    Current Medications  diphenhydrAMINE (BENADRYL) tablet 50 mg, Q6H PRN  sodium zirconium cyclosilicate (LOKELMA) oral suspension 10 g, Once  ondansetron (ZOFRAN) injection 4 mg, Q6H PRN  albuterol sulfate HFA (PROVENTIL;VENTOLIN;PROAIR) 108 (90 Base) MCG/ACT inhaler 2 puff, Q4H PRN  amiodarone (CORDARONE) tablet 200 mg, Daily  apixaban (ELIQUIS) tablet 5 mg, BID  aspirin EC tablet 81 mg, Daily  traMADol (ULTRAM) tablet 50 mg, Q6H PRN  levoFLOXacin (LEVAQUIN) tablet 750 mg, Q48H  ipratropium-albuterol (DUONEB) nebulizer solution 1 ampule, Q4H WA  sodium chloride flush 0.9 % injection 5-40 mL, 2 times per day  sodium chloride flush 0.9 % injection 5-40 mL, PRN  0.9 % sodium chloride infusion, PRN  ondansetron (ZOFRAN-ODT) disintegrating tablet 4 mg, Q8H PRN   Or  ondansetron (ZOFRAN) injection 4 mg, Q6H PRN  polyethylene glycol (GLYCOLAX) packet 17 g, Daily PRN  acetaminophen (TYLENOL) tablet 650 mg, Q6H PRN   Or  acetaminophen (TYLENOL) suppository 650 mg, Q6H PRN  methylPREDNISolone sodium (SOLU-MEDROL) injection 40 mg, Daily  furosemide (LASIX) injection 40 mg, BID  metoprolol tartrate (LOPRESSOR) tablet 25 mg, BID        Objective:  /64   Pulse 98   Temp 97.6 °F (36.4 °C) (Oral)   Resp 18   Ht 5' 7\" (1.702 m)   Wt 248 lb 3.2 oz (112.6 kg)   SpO2 96%   BMI 38.87 kg/m²     Intake/Output Summary (Last 24 hours) at 2/8/2023 1103  Last data filed at 2/8/2023 0658  Gross per 24 hour   Intake --   Output 700 ml   Net -700 ml      Wt Readings from Last 3 Encounters:   02/08/23 248 lb 3.2 oz (112.6 kg)   12/24/22 236 lb 14.4 oz (107.5 kg)   12/15/22 235 lb (106.6 kg)       General appearance:  Appears comfortable  Eyes: Sclera clear. Pupils equal.  ENT: Moist oral mucosa. Trachea midline, no adenopathy. Cardiovascular: Regular rhythm, normal S1, S2. No murmur. No edema in lower extremities  Respiratory: Not using accessory muscles. Good inspiratory effort. Clear to auscultation bilaterally, no wheeze or crackles. GI: Abdomen soft, no tenderness, not distended, normal bowel sounds  Musculoskeletal: No cyanosis in digits, neck supple  Neurology: CN 2-12 grossly intact. No speech or motor deficits  Psych: Normal affect. Alert and oriented in time, place and person  Skin: Warm, dry, normal turgor    Labs and Tests:  CBC:   Recent Labs     02/07/23  0339 02/08/23  0505   WBC 9.9 10.1   HGB 13.3 12.5    203     BMP:    Recent Labs     02/07/23  0339 02/08/23  0505    138   K 4.4 5.6*    104   CO2 25 26   BUN 30* 41*   CREATININE 2.0* 2.5*   GLUCOSE 129* 108*     Hepatic:   Recent Labs     02/07/23  0339   AST 10*   ALT <5*   BILITOT 1.4*   ALKPHOS 90       Discussed care with patient             Problem List  Principal Problem:    Bacterial pneumonia  Active Problems:    Acute kidney injury ([facility] [address]  Resolved Problems:    * No resolved hospital problems. *       Assessment & Plan:   Acute respiratory failure  -This is likely secondary to volume overload initially concerning for pneumonia  -Continue empiric antibiotics continue IV diuresis monitor closely  -Continuous BiPAP is slightly expected. Acute kidney injury  -Still slightly up discussed with nephrology expect with IV diuresis we will continue    Allergic reaction to antibiotics  -Patient has rash on the legs which has improved.   Continue steroid for 1 more day  -Patient questionable cellulitis but given infection we will hold off on antibiotics    Chronic leg swelling with acute CHF    COPD with mild exacerbation continue DuoNeb breathing treatment and steroids wean off steroids tomorrow    History of A-fib rate is controlled. Diet: ADULT DIET;  Regular  Code:Full Code  DVT PPX lovenox       Jan King MD   2/8/2023 11:03 AM

## 2023-02-08 NOTE — PROGRESS NOTES
Office : [phone]     Fax :[phone]       Nephrology progress  Note      Patient's Name: Mane Campos  8:58 AM  2/8/2023    Reason for Consult:  ESRD management       Requesting Physician:  MARILEE Smith CNP      Chief Complaint:    Chief Complaint   Patient presents with    Abdominal Pain     Pt via EMS from home, c/o LUQ pain 10/10, has had gallbladder removed. Pt states pain is making her sob, 89% ora, put on 2L, pt received 325 mg aspirin, has been treated for cellulitis since December, new antibiotic last two weeks, states she has been getting hives and itching        History of Present Ilness:    Mane Setting is a 78 y.o. female who presented with complaints of shortness of breath. Patient apparently has been treated for cellulitis lower extremity on antibiotics patient started having worsening itching and hives. Came to the ER. Patient with increased shortness of breath. Also with leg swelling. No reported fevers chills. Patient was found to be hypoxic was placed on 2 L nasal cannula. Patient with history of chronic CHF COPD chronic kidney disease admitted with bilateral worse. Baseline creat is 1.2   Now elevated at 2.0     BNP 74197      Interval hx     Says SOB slightly better    K is 5.6   Creat increased from 2.0 ---> 2.5   I/O last 3 completed shifts:   In: 150 [IV Piggyback:150]  Out: 0 [Urine:700]    Past Medical History:   Diagnosis Date    Arthritis     Atrial fibrillation and flutter ([address]     Hypertension     Kidney disease     Pt states  \"stage 3\"    Pneumonia     Sleep apnea     no c-pap       Past Surgical History:   Procedure Laterality Date    CARDIOVERSION      COLONOSCOPY N/A 11/20/2018    COLONOSCOPY POLYPECTOMY SNARE/COLD BIOPSY performed by Bell Escobedo MD at [facility] N/A 03/31/2022    COLONOSCOPY POLYPECTOMY SNARE/COLD BIOPSY performed by Diego Ahn MD at [address] [place] [address]      ankle rt has pins and rods, and rt elbow    GASTRIC BYPASS SURGERY      LARYNX SURGERY      TOTAL KNEE ARTHROPLASTY Bilateral 12/19/2012    bilateral knee replacements    TUBAL LIGATION      tubes tied    UPPER GASTROINTESTINAL ENDOSCOPY N/A 11/20/2018    EGD BIOPSY performed by Bell Escobedo MD at [facility] N/A 03/31/2022    EGD DILATION BALLOON performed by Diego Ahn MD at [facility] ENDOSCOPY N/A 03/31/2022    EGD BIOPSY performed by Diego Ahn MD at [address] [place] [address]       Family History   Problem Relation Age of Onset    Other Mother         blood clot    Cancer Father         stomach cancer    Stroke Brother         reports that she has never smoked. She has never used smokeless tobacco. She reports that she does not drink alcohol and does not use drugs.         Allergies:  Bee venom, Shellfish-derived products, Shrimp flavor, Cephalexin, Codeine, Fentanyl and related, Hydromorphone, and Vancomycin    Current Medications:    diphenhydrAMINE (BENADRYL) tablet 50 mg, Q6H PRN  ondansetron (ZOFRAN) injection 4 mg, Q6H PRN  albuterol sulfate HFA (PROVENTIL;VENTOLIN;PROAIR) 108 (90 Base) MCG/ACT inhaler 2 puff, Q4H PRN  amiodarone (CORDARONE) tablet 200 mg, Daily  apixaban (ELIQUIS) tablet 5 mg, BID  aspirin EC tablet 81 mg, Daily  traMADol (ULTRAM) tablet 50 mg, Q6H PRN  levoFLOXacin (LEVAQUIN) tablet 750 mg, Q48H  ipratropium-albuterol (DUONEB) nebulizer solution 1 ampule, Q4H WA  sodium chloride flush 0.9 % injection 5-40 mL, 2 times per day  sodium chloride flush 0.9 % injection 5-40 mL, PRN  0.9 % sodium chloride infusion, PRN  ondansetron (ZOFRAN-ODT) disintegrating tablet 4 mg, Q8H PRN   Or  ondansetron (ZOFRAN) injection 4 mg, Q6H PRN  polyethylene glycol (GLYCOLAX) packet 17 g, Daily PRN  acetaminophen (TYLENOL) tablet 650 mg, Q6H PRN   Or  acetaminophen (TYLENOL) suppository 650 mg, Q6H PRN  methylPREDNISolone sodium (SOLU-MEDROL) injection 40 mg, Daily  furosemide (LASIX) injection 40 mg, BID  metoprolol tartrate (LOPRESSOR) tablet 25 mg, BID      Review of Systems:   14 point ROS obtained but were negative except mentioned in HPI      Physical exam:     Vitals:  /64   Pulse (!) 106   Temp 97.6 °F (36.4 °C) (Oral)   Resp 20   Ht 5' 7\" (1.702 m)   Wt 248 lb 3.2 oz (112.6 kg)   SpO2 93%   BMI 38.87 kg/m²   Constitutional:  OAA X3 NAD  Skin: no rash, turgor wnl  Heent:  eomi, mmm  Neck: no bruits or jvd noted  Cardiovascular:  S1, S2 without m/r/g  Respiratory: CTA B without w/r/r  Abdomen:  +bs, soft, nt, nd  Ext: 1 +  lower extremity edema  Psychiatric: mood and affect appropriate  Musculoskeletal:  Rom, muscular strength intact    Labs:  CBC:   Recent Labs     02/07/23  0339 02/08/23  0505   WBC 9.9 10.1   HGB 13.3 12.5    203     BMP:    Recent Labs     02/07/23  0339 02/08/23  0505    138   K 4.4 5.6*    104   CO2 25 26   BUN 30* 41*   CREATININE 2.0* 2.5*   GLUCOSE 129* 108*     Ca/Mg/Phos:   Recent Labs     02/07/23  0339 02/08/23  0505   CALCIUM 9.1 9.8     Hepatic:   Recent Labs     02/07/23  0339   AST 10*   ALT <5*   BILITOT 1.4*   ALKPHOS 90            IMAGING:  CT CHEST ABDOMEN PELVIS WO CONTRAST Additional Contrast? None   Final Result   1. Scattered ground-glass opacities with interlobular septal thickening. Findings may be related to pulmonary edema with other considerations   including an inflammatory/infectious process in the appropriate clinical   setting. 2. Right upper lobe consolidation. Additional bilateral scattered curvilinear   opacities may be related to atelectasis versus developing consolidation. 3. Enlarged pulmonary artery diameter.   Finding may be related to pulmonary   arterial hypertension. 4. Coronary artery calcifications present. 5. Moderate hiatal hernia. 6. Questionable circumferential thickening of the descending colon with mild   surrounding fat stranding versus colonic underdistention. Finding raises the   possibility of colitis in the appropriate clinical setting. 7. Colonic diverticulosis, predominately sigmoid. No evidence of acute   diverticulitis. 8. Nonobstructing punctate left nephrolithiasis. RECOMMENDATIONS:   2 cm left adrenal mass, consistent with lipid-rich benign adenoma. No   follow-up imaging is recommended. JACR 2017 Aug; 14(8):1038-44, JCAT 2016 [place]; 40(2):194-200, Urol J 2006   [place]; 3(2):71-4. XR CHEST PORTABLE   Final Result   1. Diffuse hazy multifocal opacity throughout both lungs, right worse than   left, likely a combination of moderate pulmonary edema and multifocal   pneumonia. 2. Stable cardiomegaly. 3. Stable hiatal hernia. Assessment/Plan :      1. Panda   Likely has CRS   Continue lasix 40 mg iv bid   Recommend to dose adjust all medications  based on renal functions  Maintain SBP> 90 mmHg   Daily weights   AVOID NSAIDs  Avoid Nephrotoxins  Monitor Intake/Output  Call if significant decrease in urine output        2. HTN. BP low borderline range   Hold clonidine if SBP < 110 mm HG     3. COPD    4. H/o atrial fib   Monitor       5.  Hyperkalemia   Give lokelma 10 gm po x1           D/w primary team      Thank you for allowing us to participate in care of Meeratom Mj         Electronically signed by: Mitul Hampton MD, 2/8/2023, 8:58 AM      [facility] [address]  Office : [phone]  Fax :[phone]

## 2023-02-08 NOTE — PROGRESS NOTES
Physical Therapy    [facility] [address]   Phone: [phone]    Physical Therapy    [x] Initial Evaluation            [] Daily Treatment Note         [] Discharge Summary      Patient: Bao Martinez   : 1945   MRN: [id number]   Date of Service:  2023  Admitting Diagnosis: Bacterial pneumonia  Current Admission Summary: Bao Martinez is a 78 y.o. female   states she has had itching and hives with a rash for the past 24 hours past 3 hours she has had left upper abdominal pain sharp in nature constant which made her short of breath she denies any chest pain the pain is in the left upper quadrant she is currently taking cephalexin for the leg cellulitis  Past Medical History:  has a past medical history of Arthritis, Atrial fibrillation and flutter ([facility]), Hypertension, Kidney disease, Pneumonia, and Sleep apnea. Past Surgical History:  has a past surgical history that includes Tubal ligation; Total knee arthroplasty (Bilateral, 2012); fracture surgery; Colonoscopy (N/A, 2018); Upper gastrointestinal endoscopy (N/A, 2018); Cardioversion; Gastric bypass surgery; Larynx surgery; Upper gastrointestinal endoscopy (N/A, 2022); Colonoscopy (N/A, 2022); and Upper gastrointestinal endoscopy (N/A, 2022). Discharge Recommendations: Bao Martinez scored a 13/24 on the AM-PAC short mobility form. Current research shows that an AM-PAC score of 17 or less is typically not associated with a discharge to the patient's home setting. Based on the patient's AM-PAC score and their current functional mobility deficits, it is recommended that the patient have 3-5 sessions per week of Physical Therapy at d/c to increase the patient's independence. Please see assessment section for further patient specific details. If patient discharges prior to next session this note will serve as a discharge summary.   Please see below for the latest assessment towards goals. DME Required For Discharge: DME to be determined at next level of care  Precautions/Restrictions: high fall risk  Weight Bearing Restrictions: no restrictions  [] Right Upper Extremity  [] Left Upper Extremity [] Right Lower Extremity  [] Left Lower Extremity     Required Braces/Orthotics: no braces required   [] Right  [] Left  Positional Restrictions:no positional restrictions    Pre-Admission Information   Lives With: daughter               Type of Home: house  Home Layout: one level, laundry in basement, pt does not need to go to basement  Home Access:  3 step to enter without rails   Bathroom Layout: walk in shower, handicap height toilet  Bathroom Equipment: shower chair  Home Equipment: rolling walker, single point cane, electric scooter, hospital bed, lift chair  Transfer Assistance: modified independent with use of lift chair,  Ambulation Assistance:modified independent with use of SPC, use of scooter in community  ADL Assistance: independent with all ADL's  IADL Assistance: independent with homemaking tasks, daughter completes laundry  Active : [] yes             [x]No daughter drives to appointments   Current Employment: retired. Occupation: bank teller  Hobbies:   Recent Falls: Pt reports 1 fall while at the hospital in previous visit, states her legs buckled when she went to stand at chair. Examination   Vision:   Vision Gross Assessment: Impaired and Vision Corrective Device: wears glasses at all times  Hearing:   hard of hearing  Observation:   General Observation:  hives/rash on B UE and B LE   Posture:    Forward head, rounded shoulders   Sensation:   reports numbness and tingling in (B) LE  ROM:   (B) LE AROM WFL  Strength:   Functional strength assessed via completion of STS with min A   Therapist Clinical Decision Making (Complexity): medium complexity  Clinical Presentation: evolving      Subjective  General: Pt supine in bed upon arrival. Pt groggy but agreeable to PT/OT eval. On 2L O2  Pain: 3/10. Location: LUQ  Pain Interventions: pain medication in place prior to arrival       Functional Mobility  Bed Mobility  Supine to Sit: stand by assistance  Scooting: stand by assistance  Comments: HOB elevated, grab bars, increased time and effort   Transfers  Sit to stand transfer: minimal assistance  Stand to sit transfer: minimal assistance  Stand step transfer: minimal assistance  Comments: Sit>stand from elevated EOB. Poor eccentric control with stand>sit. RW for stand step transfer   Ambulation  Ambulation not tested on this date secondary to pt fatigue. Distance: NA  Gait Mechanics: NA  Comments:    Stair Mobility  Stair mobility not completed on this date. Comments:  Wheelchair Mobility:  No w/c mobility completed on this date. Comments:  Balance  Static Sitting Balance: good: independent with functional balance in unsupported position  Dynamic Sitting Balance: good: independent with functional balance in unsupported position  Static Standing Balance: poor (+): requires min (A) to maintain balance  Dynamic Standing Balance: poor (+): requires min (A) to maintain balance  Comments: Pt sits EOB while completing ADLs with OT (see OT notes). Pt stands at [employer] while assissted with brief change.      Other Therapeutic Interventions    Functional Outcomes  AM-PAC Inpatient Mobility Raw Score : 13              Cognition  Overall Cognitive Status: Impaired  Arousal/Alterness: delayed responses to stimuli  Following Commands: follows one step commands with repetition  Attention Span: attends with cues to redirect  Memory: decreased recall of recent events, decreased short term memory, decreased long term memory  Safety Judgement: decreased awareness of need for assistance, decreased awareness of need for safety  Problem Solving: assistance required to generate solutions, assistance required to identify errors made  Insights: decreased awareness of deficits  Initiation: requires cues for some  Sequencing: requires cues for some  Comments: Pt demonstrates confused behaviors this date   Orientation:    alert and oriented x 4  Command Following:   accurately follows one step commands    Education  Barriers To Learning: cognition and hearing  Patient Education: patient educated on goals, PT role and benefits, plan of care, general safety, discharge recommendations  Learning Assessment:  patient verbalizes understanding, would benefit from continued reinforcement    Assessment  Activity Tolerance: Pt presents with fatigue limiting activity tolerance. On 2L O2 with Spo2 ~91-92% throughout session  Impairments Requiring Therapeutic Intervention: decreased functional mobility, decreased strength, decreased safety awareness, decreased cognition, decreased endurance, decreased sensation, decreased balance, increased pain, decreased posture  Prognosis: good  Clinical Assessment: Pt presents to hospital with bacterial PNA and full body rash/hives. Pt reports prior level of mod I however at this time pt requires assistance with transfers and cannot tolerate ambulation due to fatigue and weakness.  Pt would continue to benefit from skilled PT to address above deficits and return to PLOF   Safety Interventions: patient left in chair, chair alarm in place, call light within reach, gait belt, patient at risk for falls, and nurse notified    Plan  Frequency: 3-5 x/per week  Current Treatment Recommendations: strengthening, balance training, functional mobility training, transfer training, gait training, stair training, endurance training, and safety education    Goals  Patient Goals: none stated    Short Term Goals:  Time Frame: upon discharge   Patient will complete bed mobility at Independent   Patient will complete transfers at supervision   Patient will ambulate 50 ft with use of LRAD at stand by assistance  Patient will ascend/descend 3 stairs with (B) handrail at contact guard assistance    Therapy Session Time      Individual Group Co-treatment   Time In     1325   Time Out     1352   Minutes     27     Timed Code Treatment Minutes:   12  Total Treatment Minutes:  27       Electronically Signed By: Radhika Lau Plagisella [facility] [address], [address] [facility], DPT [id number]

## 2023-02-08 NOTE — PROGRESS NOTES
[address] [place] [address]   Electrophysiology   Date: 2/8/2023  Reason for Follow up : Atrial fibrillation     CC: Abdominal pain    HPI: Maryuri Jimenez is a 78 y.o. female presented with abdominal pain in LUQ. She also complains of being SOB. She had cellulitis and received IV antibiotic and changed to PO antibiotic therapy. She has had rash and hives and itching. No fever, chills. She has history of chronic persistent atrial fibrillation/flutter since 2017. Atrial flutter/fibrillation diagnosed in 2016 and she had cardioversion on 4/13/2016. Her atrial fibrillation flutter recurred in 2017 and it appears that she has been in atrial fibrillation/flutter since then. She declined cardioversion and ablation on her last hospitalization in 2019 when she was seen by EP. However, she agreed to proceed with cardioversion and scheduled to have cardioversion on 2/9/2023. She states that she has gained about 12 lbs. No major events overnight. SOB is slightly better. Woke up this AM with left shoulder pain. Also reports hives. Assessment:   Atrial flutter/fibrillation with rapid ventricular response  Acute on chronic HFpEF (very high Pro-BNP, CXR with pulmonary edema)  Possible Pneumonia  Cellulitis of lower extremity  HTN  GIANNA, not using her CPAP  History of rectus sheath hematoma in the past  CKD  HLD    Plan:   Severe exacerbation of HFpEF/Atrial flutter with RVR leading to hospitalization  Rates are slightly high  High [id number] score, high risk for stroke/thromboembolism. Continue with Eliquis   Continue with Amiodarone. Increase toprol XL 50 mg/day the dose as she tolerates it. Cardioversion after acute issues resolved. Can consider it as outpatient. Acute on chronic HFpEF:   Pro-BNP very high. IV diuretics. Close monitoring of volume status, electrolytes(K, NA), renal function   Higher risk of adverse outcome of treatment due to CKD.    Strict I/Os  Weight daily  Pro-BNP serial     Consider Jardiance after kidney function improved. Aggressive BP control. Rate control for Afib  Aggressive risk factor modification, particularly weight loss,  BP control,     Heart failure consult. On Levaquin. Prefer to avoid Levaquin since she is on amiodarone. Consider using other antibiotic therapy. Check ECG. Rash ? Secondary to antibiotic therapy. Discussed with nursing staff. Consult Heart failure team.   Complex decision making due to multiple co morbidities. Relevant available labs, and cardiovascular diagnostics, documents reviewed. Cr: 2.5   K: 5.6   Ordered Pro-BNP tomorrow  ECG: Atrial flutter with RVR     EC/22: Atrial flutter variable block  Echo: : EF 55 to 60%, mild RV enlargement, RA enlargement  ECG rhythm strips reviewed, atrial flutter with RVR     Cardiac cath 2016: Normal coronaries, normal EF  Creatinine 2  Procalcitonin 0.22    proBNP 55327  Hemoglobin 13.3    CT scan: Groundglass opacities  Right upper lobe consolidation  Coronary calcification  Moderate hiatal hernia  Possible colitis    CXR: Opacities suggesting pulmonary edema versus pneumonia    Prior ECGs reviewed  Renal consult note reviewed. Complex medical condition with multiple medical problems affecting prognosis and outcome of EP interventions  Severe exacerbation of underlying medical condition requiring hospitalization and at risk of decompensation. I independently reviewed relevant and available cardiac diagnostic tests ECG, CXR, Echo, Stress test, Device interrogation, Holter, CT scan. I independently reviewed the cardiac diagnostic studies, ECG and relevant imaging studies.      Physical Examination:  Vitals:    23 0905   BP:    Pulse: 98   Resp: 18   Temp:    SpO2: 96%      In: 150   Out: 700    Wt Readings from Last 3 Encounters:   23 248 lb 3.2 oz (112.6 kg)   22 236 lb 14.4 oz (107.5 kg)   12/15/22 235 lb (106.6 kg)     Temp  Av.1 °F (36.7 °C)  Min: 97.6 °F (36.4 °C)  Max: 99 °F (37.2 °C)  Pulse  Av.4  Min: 91  Max: 115  BP  Min: 106/69  Max: 137/77  SpO2  Av.6 %  Min: 88 %  Max: 98 %    Intake/Output Summary (Last 24 hours) at 2023 1222  Last data filed at 2023 0658  Gross per 24 hour   Intake --   Output 700 ml   Net -700 ml         Constitutional: Oriented. No distress. Cardiovascular: Tachycardic rate, Irregular rhythm, S1&S2. Pulmonary/Chest: Bilateral respiratory sounds. + rhonchi. + crackles  Abdominal: Soft. No tenderness   Musculoskeletal: No edema    Neurological: Alert and oriented. Follows command  + Rash, diffuse     Active Hospital Problems    Diagnosis Date Noted    Bacterial pneumonia [J15.9] 2023     Priority: Medium    Acute kidney injury ([facility] [address] [N17.9] 2023     Priority: Medium       Scheduled Meds:   amiodarone  200 mg Oral Daily    apixaban  5 mg Oral BID    aspirin  81 mg Oral Daily    levoFLOXacin  750 mg Oral Q48H    ipratropium-albuterol  1 ampule Inhalation Q4H WA    sodium chloride flush  5-40 mL IntraVENous 2 times per day    methylPREDNISolone  40 mg IntraVENous Daily    furosemide  40 mg IntraVENous BID    metoprolol tartrate  25 mg Oral BID     Continuous Infusions:   sodium chloride       PRN Meds:.diphenhydrAMINE, ondansetron, albuterol sulfate HFA, traMADol, sodium chloride flush, sodium chloride, ondansetron **OR** ondansetron, polyethylene glycol, acetaminophen **OR** acetaminophen     Prior to Admission medications    Medication Sig Start Date End Date Taking? Authorizing Provider   dilTIAZem (DILACOR XR) 240 MG extended release capsule Take 240 mg by mouth daily   Yes Historical Provider, MD   amiodarone (CORDARONE) 200 MG tablet 200mg bid x 8 days then 200mg daily 22   Varinder Jung MD   traMADol (ULTRAM) 50 MG tablet Take 50 mg by mouth every 6 hours as needed for Pain.     Historical Provider, MD   ferrous sulfate (IRON 325) 325 (65 Fe) MG tablet Take 1 tablet by mouth daily (with breakfast)  Patient not taking: Reported on 2/7/2023 12/15/22   MARILEE Cunningham CNP   Cholecalciferol (VITAMIN D3) 1.25 MG (53933 UT) CAPS Take 1 capsule by mouth every 7 days 12/15/22   MARILEE Cunningham CNP   furosemide (LASIX) 20 MG tablet Take 1 tablet by mouth daily  Patient not taking: No sig reported 12/15/22   MARILEE Cunningham CNP   albuterol sulfate HFA (PROVENTIL HFA) 108 (90 Base) MCG/ACT inhaler Inhale 2 puffs into the lungs every 4 hours as needed for Wheezing 12/15/22   MARILEE Cunningham CNP   buPROPion [facility]) 200 MG extended release tablet TAKE 1 TABLET BY MOUTH TWICE DAILY  Patient not taking: Reported on 2/7/2023 9/21/22   MARILEE Cunningham CNP   vitamin B-12 (CYANOCOBALAMIN) 100 MCG tablet Take 1 tablet by mouth in the morning.  8/16/22 8/16/23  MARILEE Cunningham CNP   alendronate (FOSAMAX) 70 MG tablet Take 1 tablet by mouth every 7 days 8/15/22   MARILEE Cunningham CNP   apixaban (ELIQUIS) 5 MG TABS tablet Take 1 tablet by mouth 2 times daily 6/14/22   MARILEE Cunningham CNP   metoprolol tartrate (LOPRESSOR) 25 MG tablet Take 1 tablet by mouth 2 times daily  Patient taking differently: Take 12.5 mg by mouth 2 times daily 6/2/22   MARILEE Cunningham CNP   pantoprazole (PROTONIX) 40 MG tablet Take 1 tablet by mouth 2 times daily (before meals)  Patient not taking: Reported on 2/7/2023 4/1/22   Karen Gallego MD   cloNIDine (CATAPRES) 0.2 MG tablet TAKE 1 TABLET BY MOUTH TWICE DAILY  Patient taking differently: daily 11/2/21 9/20/22  MARILEE Cunningham CNP   aspirin 81 MG tablet Take 81 mg by mouth daily    Historical Provider, MD       Past Medical History:   Diagnosis Date    Arthritis     Atrial fibrillation and flutter ([address]     Hypertension     Kidney disease     Pt states  \"stage 3\"    Pneumonia     Sleep apnea     no c-pap        Past Surgical History:   Procedure Laterality Date CARDIOVERSION      COLONOSCOPY N/A 11/20/2018    COLONOSCOPY POLYPECTOMY SNARE/COLD BIOPSY performed by Lana Bautista MD at [facility] N/A 03/31/2022    COLONOSCOPY POLYPECTOMY SNARE/COLD BIOPSY performed by Tyrell Wilkes MD at [address] [place] [address]      ankle rt has pins and rods, and rt elbow    GASTRIC BYPASS SURGERY      LARYNX SURGERY      TOTAL KNEE ARTHROPLASTY Bilateral 12/19/2012    bilateral knee replacements    TUBAL LIGATION      tubes tied    UPPER GASTROINTESTINAL ENDOSCOPY N/A 11/20/2018    EGD BIOPSY performed by Lana Bautista MD at [facility] 03/31/2022    EGD DILATION BALLOON performed by Tyrell Wilkes MD at [facility] N/A 03/31/2022    EGD BIOPSY performed by Tyrell Wilkes MD at [address] ENDOSCOPY       Allergies   Allergen Reactions    Bee Venom Swelling and Angioedema    Shellfish-Derived Products Other (See Comments)     Syncope/seizures    Shrimp Flavor      Passes out      Cephalexin Itching    Codeine Hives and Other (See Comments)     B/P DROPS PASSES OUT  Passed out    Fentanyl And Related Hives     Other reaction(s): Dizziness    Hydromorphone Rash, Hives and Other (See Comments)     Full rash spreading across chest and both arms from IV hydromorphone  Passed out    Vancomycin Rash        reports that she has never smoked. She has never used smokeless tobacco. She reports that she does not drink alcohol and does not use drugs. All questions and concerns were addressed to the patient/family. Alternatives to my treatment were discussed. I have discussed the above stated plan and the patient verbalized understanding and agreed with the plan. NOTE: This report was transcribed using voice recognition software. Every effort was made to ensure accuracy, however, inadvertent computerized transcription errors may be present.      Lisa Meyer MD, MPH  [facility] [place]   Office: [phone]  Fax: [phone]

## 2023-02-08 NOTE — PROGRESS NOTES
[facility]   Phone: [phone]    Occupational Therapy    [x] Initial Evaluation            [] Daily Treatment Note         [] Discharge Summary      Patient: Adam Newman   : 1945   MRN: [id number]   Date of Service:  2023    Admitting Diagnosis:  Bacterial pneumonia  Current Admission Summary: Per H&P \"67 y.o. female   states she has had itching and hives with a rash for the past 24 hours past 3 hours she has had left upper abdominal pain sharp in nature constant which made her short of breath she denies any chest pain the pain is in the left upper quadrant she is currently taking cephalexin for the leg cellulitis\"  Past Medical History:  has a past medical history of Arthritis, Atrial fibrillation and flutter ([address] Hypertension, Kidney disease, Pneumonia, and Sleep apnea. Past Surgical History:  has a past surgical history that includes Tubal ligation; Total knee arthroplasty (Bilateral, 2012); fracture surgery; Colonoscopy (N/A, 2018); Upper gastrointestinal endoscopy (N/A, 2018); Cardioversion; Gastric bypass surgery; Larynx surgery; Upper gastrointestinal endoscopy (N/A, 2022); Colonoscopy (N/A, 2022); and Upper gastrointestinal endoscopy (N/A, 2022). Discharge Recommendations: Adam Newman scored a 16/24 on the AM-PAC ADL Inpatient form. Current research shows that an AM-PAC score of 17 or less is typically not associated with a discharge to the patient's home setting. Based on the patient's AM-PAC score and their current ADL deficits, it is recommended that the patient have 3-5 sessions per week of Occupational Therapy at d/c to increase the patient's independence. Please see assessment section for further patient specific details. If patient discharges prior to next session this note will serve as a discharge summary. Please see below for the latest assessment towards goals.       DME Required For Discharge: DME to be determined at next level of care    Precautions/Restrictions: high fall risk  Weight Bearing Restrictions: no restrictions  [] Right Upper Extremity  [] Left Upper Extremity [] Right Lower Extremity  [] Left Lower Extremity     Required Braces/Orthotics: no braces required   [] Right  [] Left  Positional Restrictions:no positional restrictions      Pre-Admission Information     Lives With: daughter    Type of Home: house  Home Layout: one level, laundry in basement, pt does not need to go to basement  Home Access:  3 step to enter without rails   Bathroom Layout: walk in shower, handicap height toilet  Bathroom Equipment: shower chair  Home Equipment: rolling walker, single point cane, electric scooter, hospital bed, lift chair  Transfer Assistance: modified independent with use of lift chair,  Ambulation Assistance:modified independent with use of SPC, use of scooter in community  ADL Assistance: independent with all ADL's  IADL Assistance: independent with homemaking tasks, daughter completes laundry  Active : [] yes  [x]No daughter drives to appointments   Current Employment: retired. Occupation:   Hobbies:   Recent Falls: Pt reports 1 fall while at the hospital in previous visit, states her legs buckled when she went to stand at chair. Examination     Vision:   Vision Gross Assessment: Impaired and Vision Corrective Device: wears glasses at all times  Hearing:   hard of hearing per pt report  Perception:   WFL  Observation:   General Observation:  on 2L 02. Hives/rash on BUE/BLE. Posture:   Rounded shoulders, forward head.    Sensation:   reports numbness and tingling in (B) LE specifically feet  Proprioception:    WFL  Tone:   Normotonic  Coordination Testing:   WFL    ROM:   (B) UE AROM WFL  Strength:   Not formally assessed     Decision Making: medium complexity  Clinical Presentation: stable      Subjective    General: Pt supine in bed upon arrival, pleasant and agreeable to OT evaluation and treat. Pain: 3/10. Location: L upper abdomen   Pain Interventions: pain medication in place prior to arrival           Activities of Daily Living    Basic Activities of Daily Living  Grooming: setup assistance stand by assistance  Grooming Comments: applied Deoderant while EOB  Upper Extremity Bathing: setup assistance stand by assistance  Lower Extremity Bathing: maximum assistance   Upper Extremity Dressing: setup assistance minimal assistance  Lower Extremity Dressing: maximum assistance  Dressing Comments: assist to doff/don brief  Instrumental Activities of Daily Living  No IADL completed on this date. Functional Mobility    Bed Mobility  Supine to Sit: stand by assistance  Scooting: stand by assistance  Comments: increased time and effort, HOB elevated, grab bar utilized  Transfers  Sit to stand transfer:minimal assistance  Stand to sit transfer: minimal assistance  Bed / Chair comments: min(A) for STS from bed>chair  Comments: decreased eccentric control with stand>sit  Functional Mobility:  Sitting Balance: stand by assistance. Sitting Balance Comment: seated EOB , in recliner  Standing Balance: minimal assistance.     Standing Balance Comment: unctional transfers, mobility, ADL completion  Functional Mobility Comment: STS complete, pt declined additional ambulation this date d/t fatigue    Other Therapeutic Interventions      Functional Outcomes  AM-PAC Inpatient Daily Activity Raw Score: 16      Cognition  Overall Cognitive Status: Impaired  Arousal/Alterness: appropriate responses to stimuli  Following Commands: follows one step commands consistently  Attention Span: difficulty dividing attention  Memory: decreased recall of recent events  Safety Judgement: decreased awareness of need for assistance, decreased awareness of need for safety  Problem Solving: assistance required to generate solutions, good awareness of errors made, assistance required to identify errors made  Insights: decreased awareness of deficits  Initiation: requires cues for some  Sequencing: requires cues for some  Comments:    Orientation:    alert and oriented x 4  Command Following:   accurately follows one step commands     Education    Barriers To Learning: cognition  Patient Education: patient educated on goals, OT role and benefits, plan of care, transfer training, discharge recommendations  Learning Assessment:  patient verbalizes understanding, would benefit from continued reinforcement    Assessment    Activity Tolerance: decreased standing tolerance d/t fatigue  Impairments Requiring Therapeutic Intervention: decreased functional mobility, decreased ADL status, decreased strength, decreased endurance, decreased balance, decreased IADL  Prognosis: good  Clinical Assessment: Pt is currently functioning below occupational baseline and demo the deficits listed above. Pt is currently requiring min(A) for transfers and functional mobility and max(A) for LB ADLs. Pt typically IND at baseline. Pt would benefit from continued skilled OT services to address these deficits and increase IND, safety, and ease with all occupational pursuits.    Safety Interventions: patient left in chair, chair alarm in place, and call light within reach       Plan    Frequency: 3-5 x/per week  Current Treatment Recommendations: strengthening, balance training, functional mobility training, transfer training, endurance training, patient/caregiver education, ADL/self-care training, and safety education    Goals    Patient Goals: pt did not state      Short Term Goals:  Time Frame: by d/c  Patient will complete upper body ADL at supervision   Patient will complete lower body ADL at minimal assistance   Patient will complete toileting at minimal assistance   Patient will complete functional transfers at stand by assistance   Patient will complete functional mobility at stand by assistance        Therapy Session Time     Individual Group Co-treatment   Time In    1325   Time Out    1352   Minutes    27        Timed Code Treatment Minutes:  Timed Code Treatment Minutes: 12 Minutes    Total Treatment Minutes:  27 minutes total    Electronically Signed By: Zaire Sprague OT, Zaire Sprague OTR/L [id number]

## 2023-02-09 ENCOUNTER — HOSPITAL ENCOUNTER (OUTPATIENT)
Dept: CARDIAC CATH/INVASIVE PROCEDURES | Age: 78
Discharge: HOME | End: 2023-02-09

## 2023-02-09 PROBLEM — E66.812 CLASS 2 SEVERE OBESITY DUE TO EXCESS CALORIES WITH SERIOUS COMORBIDITY AND BODY MASS INDEX (BMI) OF 39.0 TO 39.9 IN ADULT: Status: ACTIVE | Noted: 2018-02-11

## 2023-02-09 PROBLEM — E66.01 CLASS 2 SEVERE OBESITY DUE TO EXCESS CALORIES WITH SERIOUS COMORBIDITY AND BODY MASS INDEX (BMI) OF 39.0 TO 39.9 IN ADULT (HCC): Status: ACTIVE | Noted: 2018-02-11

## 2023-02-09 PROBLEM — I50.33 ACUTE ON CHRONIC DIASTOLIC HEART FAILURE (HCC): Status: ACTIVE | Noted: 2022-08-29

## 2023-02-09 PROBLEM — N18.30 STAGE 3 CHRONIC KIDNEY DISEASE (HCC): Status: ACTIVE | Noted: 2022-12-21

## 2023-02-09 LAB
ANION GAP SERPL CALCULATED.3IONS-SCNC: 8 MMOL/L (ref 3–16)
BUN BLDV-MCNC: 52 MG/DL (ref 7–20)
CALCIUM SERPL-MCNC: 9.3 MG/DL (ref 8.3–10.6)
CHLORIDE BLD-SCNC: 101 MMOL/L (ref 99–110)
CO2: 29 MMOL/L (ref 21–32)
CREAT SERPL-MCNC: 2.8 MG/DL (ref 0.6–1.2)
FERRITIN: 165.6 NG/ML (ref 15–150)
GFR SERPL CREATININE-BSD FRML MDRD: 17 ML/MIN/{1.73_M2}
GLUCOSE BLD-MCNC: 104 MG/DL (ref 70–99)
HCT VFR BLD CALC: 40.6 % (ref 36–48)
HEMOGLOBIN: 12.8 G/DL (ref 12–16)
IRON SATURATION: 6 % (ref 15–50)
IRON: 14 UG/DL (ref 37–145)
MCH RBC QN AUTO: 28 PG (ref 26–34)
MCHC RBC AUTO-ENTMCNC: 31.5 G/DL (ref 31–36)
MCV RBC AUTO: 88.9 FL (ref 80–100)
PDW BLD-RTO: 16.3 % (ref 12.4–15.4)
PLATELET # BLD: 222 K/UL (ref 135–450)
PMV BLD AUTO: 10.2 FL (ref 5–10.5)
POTASSIUM SERPL-SCNC: 4.7 MMOL/L (ref 3.5–5.1)
PRO-BNP: ABNORMAL PG/ML (ref 0–449)
RBC # BLD: 4.57 M/UL (ref 4–5.2)
SODIUM BLD-SCNC: 138 MMOL/L (ref 136–145)
TOTAL IRON BINDING CAPACITY: 224 UG/DL (ref 260–445)
WBC # BLD: 9.7 K/UL (ref 4–11)

## 2023-02-09 PROCEDURE — 6370000000 HC RX 637 (ALT 250 FOR IP): Performed by: INTERNAL MEDICINE

## 2023-02-09 PROCEDURE — 6360000002 HC RX W HCPCS: Performed by: EMERGENCY MEDICINE

## 2023-02-09 PROCEDURE — 85027 COMPLETE CBC AUTOMATED: CPT

## 2023-02-09 PROCEDURE — 99233 SBSQ HOSP IP/OBS HIGH 50: CPT | Performed by: INTERNAL MEDICINE

## 2023-02-09 PROCEDURE — 80048 BASIC METABOLIC PNL TOTAL CA: CPT

## 2023-02-09 PROCEDURE — 2580000003 HC RX 258: Performed by: HOSPITALIST

## 2023-02-09 PROCEDURE — 94640 AIRWAY INHALATION TREATMENT: CPT

## 2023-02-09 PROCEDURE — 82728 ASSAY OF FERRITIN: CPT

## 2023-02-09 PROCEDURE — 99233 SBSQ HOSP IP/OBS HIGH 50: CPT | Performed by: CLINICAL NURSE SPECIALIST

## 2023-02-09 PROCEDURE — 94761 N-INVAS EAR/PLS OXIMETRY MLT: CPT

## 2023-02-09 PROCEDURE — 83550 IRON BINDING TEST: CPT

## 2023-02-09 PROCEDURE — 6360000002 HC RX W HCPCS: Performed by: HOSPITALIST

## 2023-02-09 PROCEDURE — 83880 ASSAY OF NATRIURETIC PEPTIDE: CPT

## 2023-02-09 PROCEDURE — 6370000000 HC RX 637 (ALT 250 FOR IP): Performed by: HOSPITALIST

## 2023-02-09 PROCEDURE — 83540 ASSAY OF IRON: CPT

## 2023-02-09 PROCEDURE — 36415 COLL VENOUS BLD VENIPUNCTURE: CPT

## 2023-02-09 PROCEDURE — 2700000000 HC OXYGEN THERAPY PER DAY

## 2023-02-09 PROCEDURE — 1200000000 HC SEMI PRIVATE

## 2023-02-09 RX ORDER — DOXYCYCLINE HYCLATE 100 MG
100 TABLET ORAL EVERY 12 HOURS SCHEDULED
Status: DISCONTINUED | OUTPATIENT
Start: 2023-02-09 | End: 2023-02-13

## 2023-02-09 RX ORDER — PREDNISONE 20 MG/1
40 TABLET ORAL DAILY
Status: DISCONTINUED | OUTPATIENT
Start: 2023-02-09 | End: 2023-02-17

## 2023-02-09 RX ADMIN — Medication 10 ML: at 21:33

## 2023-02-09 RX ADMIN — ONDANSETRON 4 MG: 2 INJECTION INTRAMUSCULAR; INTRAVENOUS at 21:32

## 2023-02-09 RX ADMIN — DOXYCYCLINE HYCLATE 100 MG: 100 TABLET, COATED ORAL at 09:19

## 2023-02-09 RX ADMIN — IPRATROPIUM BROMIDE AND ALBUTEROL SULFATE 1 AMPULE: 2.5; .5 SOLUTION RESPIRATORY (INHALATION) at 12:06

## 2023-02-09 RX ADMIN — ASPIRIN 81 MG: 81 TABLET, COATED ORAL at 09:19

## 2023-02-09 RX ADMIN — METOPROLOL TARTRATE 75 MG: 50 TABLET, FILM COATED ORAL at 21:32

## 2023-02-09 RX ADMIN — PREDNISONE 40 MG: 20 TABLET ORAL at 18:42

## 2023-02-09 RX ADMIN — METHYLPREDNISOLONE SODIUM SUCCINATE 40 MG: 40 INJECTION, POWDER, FOR SOLUTION INTRAMUSCULAR; INTRAVENOUS at 09:19

## 2023-02-09 RX ADMIN — IPRATROPIUM BROMIDE AND ALBUTEROL SULFATE 1 AMPULE: 2.5; .5 SOLUTION RESPIRATORY (INHALATION) at 07:48

## 2023-02-09 RX ADMIN — AMIODARONE HYDROCHLORIDE 200 MG: 200 TABLET ORAL at 09:20

## 2023-02-09 RX ADMIN — DOXYCYCLINE HYCLATE 100 MG: 100 TABLET, COATED ORAL at 21:32

## 2023-02-09 RX ADMIN — METOPROLOL TARTRATE 75 MG: 50 TABLET, FILM COATED ORAL at 09:19

## 2023-02-09 RX ADMIN — APIXABAN 5 MG: 5 TABLET, FILM COATED ORAL at 09:19

## 2023-02-09 RX ADMIN — IPRATROPIUM BROMIDE AND ALBUTEROL SULFATE 1 AMPULE: 2.5; .5 SOLUTION RESPIRATORY (INHALATION) at 21:25

## 2023-02-09 RX ADMIN — Medication 10 ML: at 09:36

## 2023-02-09 RX ADMIN — IPRATROPIUM BROMIDE AND ALBUTEROL SULFATE 1 AMPULE: 2.5; .5 SOLUTION RESPIRATORY (INHALATION) at 15:47

## 2023-02-09 RX ADMIN — TRAMADOL HYDROCHLORIDE 50 MG: 50 TABLET, FILM COATED ORAL at 09:38

## 2023-02-09 RX ADMIN — APIXABAN 5 MG: 5 TABLET, FILM COATED ORAL at 21:32

## 2023-02-09 RX ADMIN — TRAMADOL HYDROCHLORIDE 50 MG: 50 TABLET, FILM COATED ORAL at 21:32

## 2023-02-09 ASSESSMENT — PAIN DESCRIPTION - LOCATION
LOCATION: ABDOMEN
LOCATION: SHOULDER
LOCATION: ABDOMEN

## 2023-02-09 ASSESSMENT — PAIN DESCRIPTION - DESCRIPTORS
DESCRIPTORS: ACHING
DESCRIPTORS: SHARP

## 2023-02-09 ASSESSMENT — PAIN SCALES - GENERAL
PAINLEVEL_OUTOF10: 8
PAINLEVEL_OUTOF10: 6
PAINLEVEL_OUTOF10: 4
PAINLEVEL_OUTOF10: 4
PAINLEVEL_OUTOF10: 0

## 2023-02-09 ASSESSMENT — PAIN DESCRIPTION - PAIN TYPE: TYPE: ACUTE PAIN

## 2023-02-09 ASSESSMENT — PAIN DESCRIPTION - ORIENTATION
ORIENTATION: LEFT

## 2023-02-09 NOTE — CARE COORDINATION
02/09/23 1239   Service Assessment   Patient Orientation Alert and Oriented   Cognition Alert   History Provided By Patient   Primary Noel 8   PCP Verified by CM Yes  (Quyen Iyer saw 12/15/22)   Last Visit to PCP Within last 3 months   Prior Functional Level Independent in ADLs/IADLs   Current Functional Level Independent in ADLs/IADLs   Can patient return to prior living arrangement Yes   Ability to make needs known: Good   Family able to assist with home care needs: Yes   Would you like for me to discuss the discharge plan with any other family members/significant others, and if so, who? Yes  (family)   Financial Resources Medicaid   Community Resources None   Social/Functional History   Lives With Daughter   Type of [address] One level   [address] to enter with rails   Entrance Stairs - Number of Steps 2   Active  No   Discharge Planning   Type of [address]   Current Services Prior To Admission None;Durable Medical Equipment   Current DME Prior to Arrival [employer]   Potential Assistance Needed N/A   DME Ordered? No   Potential Assistance Purchasing Medications No   Type of Home Care Services None   Patient expects to be discharged to: House   One/Two Story Residence One story   Services At/After Discharge   Transition of Care Consult (CM Consult) N/A   Services At/After Discharge None   Confirm Follow Up Transport Family   Condition of Participation: Discharge Planning   Freedom of Choice list was provided with basic dialogue that supports the patient's individualized plan of care/goals, treatment preferences, and shares the quality data associated with the providers?   Yes

## 2023-02-09 NOTE — FLOWSHEET NOTE
Assessment completed per flow sheet. Pt c/o left abd pain tramadol given. Pt up in chair purewick draining. Pt lungs with cracles t/o 2l o2 sats 95-98. POC discussed with pt and all questions answered.

## 2023-02-09 NOTE — CARE COORDINATION
Case Management Assessment  Initial Evaluation    Date/Time of Evaluation: 2/9/2023 12:42 PM  Assessment Completed by: Zach Sousa RN    If patient is discharged prior to next notation, then this note serves as note for discharge by case management. Patient Name: Maryuri Jimenez                   YOB: 1945  Diagnosis: Bacterial pneumonia [J15.9]  Acute kidney injury ([address] [N17.9]  Elevated d-dimer [R79.89]  Allergic reaction to drug, initial encounter [T78.40XA]  Multifocal pneumonia [J18.9]                   Date / Time: 2/7/2023  2:59 AM    Patient Admission Status: Inpatient   Readmission Risk (Low < 19, Mod (19-27), High > 27): Readmission Risk Score: 23.4    Current PCP: MARILEE Velasquez CNP  PCP verified by CM? Yes (Duc Loaiza saw 12/15/22)    Chart Reviewed: Yes      History Provided by: Patient  Patient Orientation: Alert and Oriented    Patient Cognition: Alert    Hospitalization in the last 30 days (Readmission):  No    If yes, Readmission Assessment in CM Navigator will be completed. Advance Directives:      Code Status: Full Code   Patient's Primary Decision Maker is: Legal Next of Kin    Primary Decision MakerSalvador Aase  Child - [phone]    Secondary Decision Maker: Jason Rodriguez  Madai - [phone]    Discharge Planning:    Patient lives with: Children Type of Home: House  Primary Care Giver: Family  Patient Support Systems include: Children   Current Financial resources: Medicaid  Current community resources: None  Current services prior to admission: None, Durable Medical Equipment            Current DME: [address] Bed, Walker            Type of Home Care services:  None    ADLS  Prior functional level: Independent in ADLs/IADLs  Current functional level: Independent in ADLs/IADLs    PT AM-PAC: 13 /24  OT AM-PAC: 16 /24    Family can provide assistance at DC:  Yes  Would you like Case Management to discuss the discharge plan with any other family members/significant others, and if so, who? Yes (family)  Plans to Return to Present Housing: Yes  Other Identified Issues/Barriers to RETURNING to current housing: Patient does not realize her deficit   Potential Assistance needed at discharge: N/A            Potential DME:    Patient expects to discharge to: [address] [place] [address] for transportation at discharge: Family    Financial    Payor: Shekhar Banks / Plan: Shekhar Pert / Product Type: *No Product type* /     Does insurance require precert for SNF: Yes    Potential assistance Purchasing Medications: No  Meds-to-Beds request:        Charmaine Lerner [address], [address]  Veronika Conklin [phone]  [address] [facility] [address]  Phone: [phone] Fax: [phone]    [facility] [address] [facility], [address] [place] [address],[address] [phone] - [phone]  [facility] [address]  Phone: [phone] Fax: [phone]      Notes:    Factors facilitating achievement of predicted outcomes: Family support, Has needed Durable Medical Equipment at home, and Home is wheelchair accessible    Barriers to discharge: Limited safety awareness, Limited insight into deficits, and Unrealistic expectations    Additional Case Management Notes: Patient is refusing SNF and HHC recommendations at ID. She was explained the dangers but still wants to go home stating she does not need the services. The Plan for Transition of Care is related to the following treatment goals of Bacterial pneumonia [J15.9]  Acute kidney injury ([facility] [N17.9]  Elevated d-dimer [R79.89]  Allergic reaction to drug, initial encounter [T78.40XA]  Multifocal pneumonia [D83.7]    IF APPLICABLE: The Patient and/or patient representative Lisa Lance and her family were provided with a choice of provider and agrees with the discharge plan.  Freedom of choice list with basic dialogue that supports the patient's individualized plan of care/goals and shares the quality data associated with the providers was provided to:     Patient Representative Name:       The Patient and/or Patient Representative Agree with the Discharge Plan?         Electronically signed by Erik Bee RN on 2/9/2023 at 12:45 PM

## 2023-02-09 NOTE — PROGRESS NOTES
Office : [phone]     Fax :[phone]       Nephrology progress  Note      Patient's Name: Bell Barker  9:52 AM  2/9/2023    Reason for Consult:  ESRD management       Requesting Physician:  MARILEE Stephens CNP      Chief Complaint:    Chief Complaint   Patient presents with    Abdominal Pain     Pt via EMS from home, c/o LUQ pain 10/10, has had gallbladder removed. Pt states pain is making her sob, 89% ora, put on 2L, pt received 325 mg aspirin, has been treated for cellulitis since December, new antibiotic last two weeks, states she has been getting hives and itching        History of Present Ilness:    Bell Barker is a 78 y.o. female who presented with complaints of shortness of breath. Patient apparently has been treated for cellulitis lower extremity on antibiotics patient started having worsening itching and hives. Came to the ER. Patient with increased shortness of breath. Also with leg swelling. No reported fevers chills. Patient was found to be hypoxic was placed on 2 L nasal cannula. Patient with history of chronic CHF COPD chronic kidney disease admitted with bilateral worse. Baseline creat is 1.2   Now elevated at 2.0     BNP 30584      Interval hx     Says SOB slightly better    K is 5.6   Creat increased from 2.0 ---> 2.5   I/O last 3 completed shifts:   In: 360 [P.O.:360]  Out: 18 [Urine:1800]    Past Medical History:   Diagnosis Date    Arthritis     Atrial fibrillation and flutter ([address]     Hypertension     Kidney disease     Pt states  \"stage 3\"    Pneumonia     Sleep apnea     no c-pap       Past Surgical History:   Procedure Laterality Date    CARDIOVERSION      COLONOSCOPY N/A 11/20/2018    COLONOSCOPY POLYPECTOMY SNARE/COLD BIOPSY performed by Sharath Acosta MD at [facility] N/A 03/31/2022    COLONOSCOPY POLYPECTOMY SNARE/COLD BIOPSY performed by Nuria Benites MD at [facility]      ankle rt has pins and rods, and rt elbow    GASTRIC BYPASS SURGERY      LARYNX SURGERY      TOTAL KNEE ARTHROPLASTY Bilateral 12/19/2012    bilateral knee replacements    TUBAL LIGATION      tubes tied    UPPER GASTROINTESTINAL ENDOSCOPY N/A 11/20/2018    EGD BIOPSY performed by Sharath Acosta MD at [address] [place] [address] N/A 03/31/2022    EGD DILATION BALLOON performed by Nuria Benites MD at [address] [place] [address] ENDOSCOPY N/A 03/31/2022    EGD BIOPSY performed by Nuria Benites MD at [address] [place] [address]       Family History   Problem Relation Age of Onset    Other Mother         blood clot    Cancer Father         stomach cancer    Stroke Brother         reports that she has never smoked. She has never used smokeless tobacco. She reports that she does not drink alcohol and does not use drugs.         Allergies:  Bee venom, Shellfish-derived products, Shrimp flavor, Cephalexin, Codeine, Fentanyl and related, Hydromorphone, and Vancomycin    Current Medications:    metoprolol tartrate (LOPRESSOR) tablet 75 mg, BID  doxycycline hyclate (VIBRA-TABS) tablet 100 mg, 2 times per day  diphenhydrAMINE (BENADRYL) tablet 50 mg, Q6H PRN  ondansetron (ZOFRAN) injection 4 mg, Q6H PRN  albuterol sulfate HFA (PROVENTIL;VENTOLIN;PROAIR) 108 (90 Base) MCG/ACT inhaler 2 puff, Q4H PRN  amiodarone (CORDARONE) tablet 200 mg, Daily  apixaban (ELIQUIS) tablet 5 mg, BID  aspirin EC tablet 81 mg, Daily  traMADol (ULTRAM) tablet 50 mg, Q6H PRN  ipratropium-albuterol (DUONEB) nebulizer solution 1 ampule, Q4H WA  sodium chloride flush 0.9 % injection 5-40 mL, 2 times per day  sodium chloride flush 0.9 % injection 5-40 mL, PRN  0.9 % sodium chloride infusion, PRN  ondansetron (ZOFRAN-ODT) disintegrating tablet 4 mg, Q8H PRN   Or  ondansetron (ZOFRAN) injection 4 mg, Q6H PRN  polyethylene glycol (GLYCOLAX) packet 17 g, Daily PRN  acetaminophen (TYLENOL) tablet 650 mg, Q6H PRN   Or  acetaminophen (TYLENOL) suppository 650 mg, Q6H PRN  methylPREDNISolone sodium (SOLU-MEDROL) injection 40 mg, Daily  [Held by provider] furosemide (LASIX) injection 40 mg, BID      Review of Systems:   14 point ROS obtained but were negative except mentioned in HPI      Physical exam:     Vitals:  /80   Pulse (!) 117   Temp 97.6 °F (36.4 °C) (Oral)   Resp (!) 116   Ht 5' 7\" (1.702 m)   Wt 250 lb 11.2 oz (113.7 kg)   SpO2 92%   BMI 39.27 kg/m²   Constitutional:  OAA X3 NAD  Skin: no rash, turgor wnl  Heent:  eomi, mmm  Neck: no bruits or jvd noted  Cardiovascular:  S1, S2 without m/r/g  Respiratory: CTA B without w/r/r  Abdomen:  +bs, soft, nt, nd  Ext: 1 +  lower extremity edema  Psychiatric: mood and affect appropriate  Musculoskeletal:  Rom, muscular strength intact    Labs:  CBC:   Recent Labs     02/07/23 0339 02/08/23  0505 02/09/23  0613   WBC 9.9 10.1 9.7   HGB 13.3 12.5 12.8    203 222     BMP:    Recent Labs     02/07/23 0339 02/08/23  0505 02/09/23  0613    138 138   K 4.4 5.6* 4.7    104 101   CO2 25 26 29   BUN 30* 41* 52*   CREATININE 2.0* 2.5* 2.8*   GLUCOSE 129* 108* 104*     Ca/Mg/Phos:   Recent Labs     02/07/23 0339 02/08/23  0505 02/09/23  0613   CALCIUM 9.1 9.8 9.3     Hepatic:   Recent Labs     02/07/23 0339   AST 10*   ALT <5*   BILITOT 1.4*   ALKPHOS 90            IMAGING:  CT CHEST ABDOMEN PELVIS WO CONTRAST Additional Contrast? None   Final Result   1. Scattered ground-glass opacities with interlobular septal thickening. Findings may be related to pulmonary edema with other considerations   including an inflammatory/infectious process in the appropriate clinical   setting. 2. Right upper lobe consolidation.  Additional bilateral scattered curvilinear opacities may be related to atelectasis versus developing consolidation. 3. Enlarged pulmonary artery diameter. Finding may be related to pulmonary   arterial hypertension. 4. Coronary artery calcifications present. 5. Moderate hiatal hernia. 6. Questionable circumferential thickening of the descending colon with mild   surrounding fat stranding versus colonic underdistention. Finding raises the   possibility of colitis in the appropriate clinical setting. 7. Colonic diverticulosis, predominately sigmoid. No evidence of acute   diverticulitis. 8. Nonobstructing punctate left nephrolithiasis. RECOMMENDATIONS:   2 cm left adrenal mass, consistent with lipid-rich benign adenoma. No   follow-up imaging is recommended. JACR 2017 Aug; 14(8):1038-44, JCAT 2016 Myrl Babinski; 40(2):194-200, Urol J 2006   Spring; 3(2):71-4. XR CHEST PORTABLE   Final Result   1. Diffuse hazy multifocal opacity throughout both lungs, right worse than   left, likely a combination of moderate pulmonary edema and multifocal   pneumonia. 2. Stable cardiomegaly. 3. Stable hiatal hernia. Assessment/Plan :      1. Panda   Likely has CRS       Creat continue to worsen   Edema has resolved   Will hold lasix today       Recommend to dose adjust all medications  based on renal functions  Maintain SBP> 90 mmHg   Daily weights   AVOID NSAIDs  Avoid Nephrotoxins  Monitor Intake/Output  Call if significant decrease in urine output        2. HTN. BP low borderline range   Hold clonidine if SBP < 110 mm HG     3. COPD    4. H/o atrial fib   Monitor       5.  Hyperkalemia   Resolved after getting lokelma         D/w primary team      Thank you for allowing us to participate in care of Alber Kirby         Electronically signed by: Reid Davison MD, 2/9/2023, 9:52 AM      [facility] [address]  Office : [phone]  Fax :[phone]

## 2023-02-09 NOTE — CARE COORDINATION
Discharge Planning Note:      Pt states she does not need any PT/OT or and home care. She just wants to go home to her chair. CM Explains how therapy recommended her to have therapy and a Snf  and the dangers of going home alone. She is still  refusing all therapy care. She states her Dtr will transport her home at [place].      Electronically signed by Fransisca Padilla RN on 2/9/2023 at 12:36 PM

## 2023-02-09 NOTE — PROGRESS NOTES
[address]   Daily Progress Note      Admit Date:  2/7/2023    HPI:    Ms. Rashmi Regalado is a 78year old female with history of PAF/flutter since 6449, CV, diastolic heart failure, COPD, ckd     She is admitted with abdominal pain in [facility] along with shortness of breath. She had cellulitis and received IV antibiotics. She had declined a CV and then agreed which was scheduled for 2/9/2023. She was found to be in AF with RVR. She has gained 12 pounds. She is being treated for acute COPD exacerbation    Subjective:  Patient is being seen for acute on chronic diastolic heart failure . There were no acute overnight cardiac events. She is lying in the bed on 2 L of oxygen. She continues in AF with rates in the 110s. She is very sleepy   Creat 2.6-2.8 bun 41-52 bnp 10K-14K weight 250     Objective:   /80   Pulse (!) 117   Temp 97.6 °F (36.4 °C) (Oral)   Resp (!) 116   Ht 5' 7\" (1.702 m)   Wt 250 lb 11.2 oz (113.7 kg)   SpO2 92%   BMI 39.27 kg/m²     Intake/Output Summary (Last 24 hours) at 2/9/2023 1201  Last data filed at 2/9/2023 0915  Gross per 24 hour   Intake 480 ml   Output 1550 ml   Net -1070 ml          Physical Exam:  General:  Awake, alert, oriented in NAD, sleepy  Skin:  Warm and dry. No unusual bruising or rash  Neck:  Supple. No JVD or carotid bruit appreciated  Chest:  Normal effort.   Clear to auscultation, no wheezes/rhonchi/rales  Cardiovascular:  RRR, S1/S2, no murmur/gallop/rub  Abdomen:  Soft, nontender, +bowel sounds, obese  Extremities:  chronic lower ankle edema  Neurological: No focal deficits  Psychological: Normal mood and affect      Medications:    metoprolol tartrate  75 mg Oral BID    doxycycline hyclate  100 mg Oral 2 times per day    amiodarone  200 mg Oral Daily    apixaban  5 mg Oral BID    aspirin  81 mg Oral Daily    ipratropium-albuterol  1 ampule Inhalation Q4H WA    sodium chloride flush  5-40 mL IntraVENous 2 times per day    methylPREDNISolone  40 mg IntraVENous Daily    [Held by provider] furosemide  40 mg IntraVENous BID      sodium chloride         Lab Data:  CBC:   Recent Labs     02/07/23  0339 02/08/23  0505 02/09/23  0613   WBC 9.9 10.1 9.7   HGB 13.3 12.5 12.8    203 222     BMP:    Recent Labs     02/07/23  0339 02/08/23  0505 02/09/23  0613    138 138   K 4.4 5.6* 4.7   CO2 25 26 29   BUN 30* 41* 52*   CREATININE 2.0* 2.5* 2.8*     INR:  No results for input(s): INR in the last 72 hours. BNP:    Recent Labs     02/07/23 0339 02/09/23 0613   PROBNP 10,289* 13,923*         Diagnostics:  Echo 12/21/2022  Summary   Limited exam per Dr. Hank Bateman. Irregular rhythm. Normal left ventricle size, wall thickness, and systolic function with an   estimated ejection fraction of 55-60%. No obvious regional wall motion abnormalities are seen. The right ventricle is mildly enlarged with normal function. The right atrium is moderately dilated    Assessment:    1. Atrial fib/flutter  2. Acute on chronic diastolic heart failure  3. Hypertension  4. Cellulitis  5. Daquan not using cpap  6. Ckd  7. Hyperkalemia, resolved with lokelma      Plan:    Cellulitis per hospital team  Nephrology and EP following  Check iron studies  Lasix on hold per nephrology  Consider jardiance once creat improves    She needs her sleep apnea treated. Her AF and fluid control would be better with treatment of her sleep apnea    Discussed with patient who is agreeable with plan of care. Thank you for allowing me to participate in the care of your patient.     Tuan Carolina, APRN - CNS, CNS

## 2023-02-09 NOTE — PROGRESS NOTES
Hospitalist Progress Note      PCP: Gayle Hammond, APRN - CNP    Date of Admission: 2/7/2023    Chief Complaint: CHF    Hospital Course:   Still has some shortness of breath although significantly improved as compared to admission  -800 mL in the last 24 hours  Remains on 2 L oxygen  No abdominal pain        Medications:  Reviewed      Exam:    /80   Pulse (!) 117   Temp 97.6 °F (36.4 °C) (Oral)   Resp (!) 116   Ht 5' 7\" (1.702 m)   Wt 250 lb 11.2 oz (113.7 kg)   SpO2 92%   BMI 39.27 kg/m²     General appearance: No apparent distress, appears stated age and cooperative. HEENT: Pupils equal, round, and reactive to light. Conjunctivae/corneas clear. Neck: Supple, with full range of motion. No jugular venous distention. Trachea midline. Respiratory:  Normal respiratory effort. Clear to auscultation, bilaterally without RALES/WHEEZES/Rhonchi. Cardiovascular: Regular rate and rhythm with normal S1/S2 without MURMURS, rubs or gallops. Abdomen: Soft, non-tender, non-distended with normal bowel sounds. Musculoskeletal: 1+ pitting edema some erythema of both lower extremity I strongly suspect that these are subacute changes related to her leg swelling  Skin: Skin color, texture, turgor normal.  No rashes or lesions. Neurologic:  Neurovascularly intact without any focal sensory/motor deficits. Cranial nerves: II-XII intact, grossly non-focal.      Labs:   Recent Labs     02/07/23  0339 02/08/23  0505 02/09/23  0613   WBC 9.9 10.1 9.7   HGB 13.3 12.5 12.8   HCT 42.2 39.3 40.6    203 222     Recent Labs     02/07/23  0339 02/08/23  0505 02/09/23  0613    138 138   K 4.4 5.6* 4.7    104 101   CO2 25 26 29   BUN 30* 41* 52*   CREATININE 2.0* 2.5* 2.8*   CALCIUM 9.1 9.8 9.3     Recent Labs     02/07/23  0339   AST 10*   ALT <5*   BILITOT 1.4*   ALKPHOS 90     No results for input(s): INR in the last 72 hours.   No results for input(s): Hamp Memory in the last 72 hours.    Urinalysis:      Lab Results   Component Value Date/Time    NITRU Negative 12/21/2022 07:00 PM    WBCUA 0 12/21/2022 07:00 PM    BACTERIA 2+ 12/21/2022 07:00 PM    RBCUA 2 12/21/2022 07:00 PM    BLOODU TRACE 12/21/2022 07:00 PM    SPECGRAV 1.025 12/21/2022 07:00 PM    GLUCOSEU Negative 12/21/2022 07:00 PM       Radiology:  CT CHEST ABDOMEN PELVIS WO CONTRAST Additional Contrast? None   Final Result   1. Scattered ground-glass opacities with interlobular septal thickening. Findings may be related to pulmonary edema with other considerations   including an inflammatory/infectious process in the appropriate clinical   setting. 2. Right upper lobe consolidation. Additional bilateral scattered curvilinear   opacities may be related to atelectasis versus developing consolidation. 3. Enlarged pulmonary artery diameter. Finding may be related to pulmonary   arterial hypertension. 4. Coronary artery calcifications present. 5. Moderate hiatal hernia. 6. Questionable circumferential thickening of the descending colon with mild   surrounding fat stranding versus colonic underdistention. Finding raises the   possibility of colitis in the appropriate clinical setting. 7. Colonic diverticulosis, predominately sigmoid. No evidence of acute   diverticulitis. 8. Nonobstructing punctate left nephrolithiasis. RECOMMENDATIONS:   2 cm left adrenal mass, consistent with lipid-rich benign adenoma. No   follow-up imaging is recommended. JACR 2017 Aug; 14(8):1038-44, JCAT 2016 Razia Nunez; 40(2):194-200, Urol J 2006   [place]; 3(2):71-4. XR CHEST PORTABLE   Final Result   1. Diffuse hazy multifocal opacity throughout both lungs, right worse than   left, likely a combination of moderate pulmonary edema and multifocal   pneumonia. 2. Stable cardiomegaly. 3. Stable hiatal hernia.              Assessment/Plan:    Active Hospital Problems    Diagnosis Date Noted    Acute on chronic heart failure with preserved ejection fraction (HFpEF) ([facility] [I50.33] 02/08/2023     Priority: Medium    Bacterial pneumonia [J15.9] 02/07/2023     Priority: Medium    Acute kidney injury ([address] [N17.9] 02/07/2023     Priority: Medium    Stage 3 chronic kidney disease ([address] [N18.30] 12/21/2022     Priority: Medium    Acute on chronic diastolic heart failure ([address] [I50.33] 08/29/2022     Priority: Medium    GIANNA (obstructive sleep apnea) [G47.33]     Class 2 severe obesity due to excess calories with serious comorbidity and body mass index (BMI) of 39.0 to 39.9 in adult [facility]) [E66.01, Z68.39] 02/11/2018    Atrial fibrillation ([facility] [address] [I48.91] 01/21/2016    Primary hypertension [I10] 01/21/2016       Acute Medical Issues Being Addressed:    19-year-old admitted to the hospital with dyspnea    Acute hypoxic respiratory failure secondary to acute diastolic heart failure with possible acute exacerbation of COPD with underlying pneumonia gram-positive  Initially on BiPAP now weaned down to 2 L saturating around 91  Last echo in December showed a EF of 60 to 65%  On IV Lasix  Seems to have diuresed well currently seems more euvolemic  Hold Lasix for now  Was on levofloxacin will change over to doxycycline to complete a 5-day course  Oxygen down to 2 L  We will try and wean oxygen to maintain sats 88-92  Will need home oxygen eval    Bilateral lower extremity rash  May have some overlying cellulitis  Cynically improved  I strongly suspect that there is some chronic component to it    Acute kidney injury prerenal  Some of it may be related to underlying cardiorenal syndrome  Will have to accept a higher creatinine at this point  Hold Lasix for now  If creatinine can be stabilized then that then will restart Lasix  Nephrology consulted    Chronic atrial fibrillation  On amnio and Eliquis  Seen by electrophysiology      DVT Prophylaxis: On Eliquis  Diet: ADULT DIET;  Regular  Code Status: Full Code      Dispo - once acute medical processes have resolved    Anjelica Donahue MD

## 2023-02-09 NOTE — PROGRESS NOTES
Physical Therapy  Carry Bumps  Pt R treatment offer in AM and PM today. \"Just let me lay here! \" Will try back at later date as pt willing to participate.  Mayra Price [phone]

## 2023-02-09 NOTE — PLAN OF CARE
Problem: Safety - Adult  Goal: Free from fall injury  Outcome: Progressing  Flowsheets (Taken 2/9/2023 0120)  Free From Fall Injury: Instruct family/caregiver on patient safety     Problem: Pain  Goal: Verbalizes/displays adequate comfort level or baseline comfort level  Flowsheets (Taken 2/9/2023 0120)  Verbalizes/displays adequate comfort level or baseline comfort level:   Assess pain using appropriate pain scale   Encourage patient to monitor pain and request assistance     Problem: ABCDS Injury Assessment  Goal: Absence of physical injury  Outcome: Progressing  Flowsheets (Taken 2/9/2023 0120)  Absence of Physical Injury: Implement safety measures based on patient assessment     Problem: Metabolic/Fluid and Electrolytes - Adult  Goal: Electrolytes maintained within normal limits  Outcome: Progressing  Flowsheets (Taken 2/9/2023 0120)  Electrolytes maintained within normal limits: Monitor labs and assess patient for signs and symptoms of electrolyte imbalances     Problem: Metabolic/Fluid and Electrolytes - Adult  Goal: Hemodynamic stability and optimal renal function maintained  Outcome: Progressing  Flowsheets (Taken 2/9/2023 0120)  Hemodynamic stability and optimal renal function maintained: Monitor intake, output and patient weight

## 2023-02-09 NOTE — PROGRESS NOTES
[address]   Electrophysiology Progress Note     Date: 2/9/2023  Admit Date: 2/7/2023     Reason for consultation: Atrial Flutter    Chief Complaint:   Chief Complaint   Patient presents with    Abdominal Pain     Pt via EMS from home, c/o LUQ pain 10/10, has had gallbladder removed. Pt states pain is making her sob, 89% ora, put on 2L, pt received 325 mg aspirin, has been treated for cellulitis since December, new antibiotic last two weeks, states she has been getting hives and itching     History of Present Illness: History obtained from patient and medical record. Denisa Solis is a 78 y.o. female with a past medical history of chronic atrial fibrillation/flutter, CHF, HTN, GIANNA, CKD, HLD, GIANNA, and obesity. Pt presented with abdominal pain in LUQ. She also complains of being SOB. She had cellulitis and received IV antibiotic and changed to PO antibiotic therapy. She has had rash and hives and itching. No fever, chills. Interval Hx: Today, she is being seen for EP follow up. She remains in atrial flutter, rate 100-110s. Her BP is stable. Her kidney function has started to rise. She does not feel well overall since being admitted. Pt is very tired and lethargic. Patient seen and examined. Clinical notes reviewed. Telemetry reviewed. No new complaints today. No major events overnight. Denies having chest pain, palpitations, shortness of breath, orthopnea/PND, cough, or dizziness at the time of this visit. Allergies:   Allergies   Allergen Reactions    Bee Venom Swelling and Angioedema    Shellfish-Derived Products Other (See Comments)     Syncope/seizures    Shrimp Flavor      Passes out      Cephalexin Itching    Codeine Hives and Other (See Comments)     B/P DROPS PASSES OUT  Passed out    Fentanyl And Related Hives     Other reaction(s): Dizziness    Hydromorphone Rash, Hives and Other (See Comments)     Full rash spreading across chest and both arms from IV hydromorphone  Passed out    Vancomycin Rash       Home Meds:  Prior to Visit Medications    Medication Sig Taking? Authorizing Provider   dilTIAZem (DILACOR XR) 240 MG extended release capsule Take 240 mg by mouth daily Yes Historical Provider, MD   amiodarone (CORDARONE) 200 MG tablet 200mg bid x 8 days then 200mg daily  Ekaterina Cortez MD   traMADol (ULTRAM) 50 MG tablet Take 50 mg by mouth every 6 hours as needed for Pain. Historical Provider, MD   ferrous sulfate (IRON 325) 325 (65 Fe) MG tablet Take 1 tablet by mouth daily (with breakfast)  Patient not taking: Reported on 2/7/2023  MARILEE Westfall CNP   Cholecalciferol (VITAMIN D3) 1.25 MG (08518 UT) CAPS Take 1 capsule by mouth every 7 days  MARILEE Westfall CNP   furosemide (LASIX) 20 MG tablet Take 1 tablet by mouth daily  Patient not taking: No sig reported  MARILEE Westfall CNP   albuterol sulfate HFA (PROVENTIL HFA) 108 (90 Base) MCG/ACT inhaler Inhale 2 puffs into the lungs every 4 hours as needed for Wheezing  MARILEE Westfall CNP   buPROPion (WELLBUTRIN SR) 200 MG extended release tablet TAKE 1 TABLET BY MOUTH TWICE DAILY  Patient not taking: Reported on 2/7/2023  MARILEE Westfall CNP   vitamin B-12 (CYANOCOBALAMIN) 100 MCG tablet Take 1 tablet by mouth in the morning.   MARILEE Westfall CNP   alendronate (FOSAMAX) 70 MG tablet Take 1 tablet by mouth every 7 days  MARILEE Westfall CNP   apixaban (ELIQUIS) 5 MG TABS tablet Take 1 tablet by mouth 2 times daily  MARILEE Westfall CNP   metoprolol tartrate (LOPRESSOR) 25 MG tablet Take 1 tablet by mouth 2 times daily  Patient taking differently: Take 12.5 mg by mouth 2 times daily  MARILEE Westfall CNP   pantoprazole (PROTONIX) 40 MG tablet Take 1 tablet by mouth 2 times daily (before meals)  Patient not taking: Reported on 2/7/2023  Ekaterina Cortez MD   cloNIDine (CATAPRES) 0.2 MG tablet TAKE 1 TABLET BY MOUTH TWICE DAILY  Patient taking differently: daily  Dagoberto Stewart, APRN - CNP   aspirin 81 MG tablet Take 81 mg by mouth daily  Historical Provider, MD      Scheduled Meds:   metoprolol tartrate  75 mg Oral BID    doxycycline hyclate  100 mg Oral 2 times per day    amiodarone  200 mg Oral Daily    apixaban  5 mg Oral BID    aspirin  81 mg Oral Daily    ipratropium-albuterol  1 ampule Inhalation Q4H WA    sodium chloride flush  5-40 mL IntraVENous 2 times per day    methylPREDNISolone  40 mg IntraVENous Daily    [Held by provider] furosemide  40 mg IntraVENous BID     Continuous Infusions:   sodium chloride       PRN Meds:diphenhydrAMINE, ondansetron, albuterol sulfate HFA, traMADol, sodium chloride flush, sodium chloride, ondansetron **OR** ondansetron, polyethylene glycol, acetaminophen **OR** acetaminophen     Past Medical History:  Past Medical History:   Diagnosis Date    Arthritis     Atrial fibrillation and flutter ([facility] [address]     Hypertension     Kidney disease     Pt states  \"stage 3\"    Pneumonia     Sleep apnea     no c-pap      Past Surgical History:    has a past surgical history that includes Tubal ligation; Total knee arthroplasty (Bilateral, 12/19/2012); fracture surgery; Colonoscopy (N/A, 11/20/2018); Upper gastrointestinal endoscopy (N/A, 11/20/2018); Cardioversion; Gastric bypass surgery; Larynx surgery; Upper gastrointestinal endoscopy (N/A, 03/31/2022); Colonoscopy (N/A, 03/31/2022); and Upper gastrointestinal endoscopy (N/A, 03/31/2022). Social History:  Reviewed. reports that she has never smoked. She has never used smokeless tobacco. She reports that she does not drink alcohol and does not use drugs. Family History:  Reviewed. family history includes Cancer in her father; Other in her mother; Stroke in her brother. Review of Systems:  Constitutional: Positive for fatigue, weakness.  Negative for fever, night sweats, chills, weight changes  Skin: Negative for dry skin, pruritus, bruising, bleeding, blood clots, or changes in skin pigment  HEENT: Negative for vision changes, ringing in the ears, sore throat, dysphagia, or swollen lymph nodes  Respiratory: Reviewed in HPI  Cardiovascular: Reviewed in HPI  Gastrointestinal: Positive for nausea. Negative for abdominal pain, V/D, constipation, or black/tarry stools  Genito-Urinary: Negative for dysuria, incontinence, urgency, or hematuria  Musculoskeletal: Negative for joint swelling, muscle pain, or injuries  Neurological/Psych: Negative for confusion, seizures, headaches, balance issues or TIA-like symptoms. No anxiety, depression, or insomnia    Physical Examination:  Vitals:    02/09/23 0915   BP: 131/80   Pulse: (!) 117   Resp: (!) 116   Temp: 97.6 °F (36.4 °C)   SpO2: 92%      In: 480 [P.O.:480]  Out: 1550    Wt Readings from Last 3 Encounters:   02/09/23 250 lb 11.2 oz (113.7 kg)   12/24/22 236 lb 14.4 oz (107.5 kg)   12/15/22 235 lb (106.6 kg)       Intake/Output Summary (Last 24 hours) at 2/9/2023 1219  Last data filed at 2/9/2023 0915  Gross per 24 hour   Intake 480 ml   Output 1550 ml   Net -1070 ml       Telemetry: Personally Reviewed  - Atrial flutter, rate 100-120s  Constitutional: Cooperative and in no apparent distress, and appears well nourished  Skin: Warm and pink; no pallor, cyanosis, bruising, or clubbing. + Redness to legs  HEENT: Symmetric and normocephalic. PERRL, EOM intact. Conjunctiva pink with clear sclera. Mucus membranes pink and moist. Teeth intact. Thyroid smooth without nodules or goiter. Cardiovascular: Mildly tachycardic rate and regular rhythm. S1/S2 present without murmurs, rubs, or gallops. Peripheral pulses 2+, capillary refill < 3 seconds. No elevation of JVP. No peripheral edema  Respiratory: Respirations symmetric and unlabored. Lungs diminished with bibasilar crackles (R>L) to auscultation bilaterally, no wheezing, crackles. On 2L of oxygen  Gastrointestinal: Abdomen soft and round.  Bowel sounds normoactive in all quadrants without tenderness or masses. + Obese  Musculoskeletal: Bilateral upper and lower extremity strength 5/5 with full ROM. + Generalized weakness  Neurologic/Psych: Awake and orientated to person, place and time. Calm affect, appropriate mood    Pertinent labs, diagnostic, device, and imaging results reviewed as a part of this visit    Labs:    BMP:   Recent Labs     23  0505 23    138 138   K 4.4 5.6* 4.7    104 101   CO2 25 26 29   BUN 30* 41* 52*   CREATININE 2.0* 2.5* 2.8*     Estimated Creatinine Clearance: 23 mL/min (A) (based on SCr of 2.8 mg/dL (H)). CBC:   Recent Labs     23  05023   WBC 9.9 10.1 9.7   HGB 13.3 12.5 12.8   HCT 42.2 39.3 40.6   MCV 90.9 90.2 88.9    203 222     Thyroid:   Lab Results   Component Value Date/Time    TSH 2.81 2022 11:50 AM     Lipids:   Lab Results   Component Value Date/Time    CHOL 142 03/15/2021 10:18 AM    HDL 51 03/15/2021 10:18 AM    TRIG 56 03/15/2021 10:18 AM     LFTS:   Lab Results   Component Value Date/Time    ALT <5 2023 03:39 AM    AST 10 2023 03:39 AM    ALKPHOS 90 2023 03:39 AM    PROT 7.0 2023 03:39 AM    AGRATIO 0.8 2023 03:39 AM    BILITOT 1.4 2023 03:39 AM     Cardiac Enzymes:   Lab Results   Component Value Date/Time    TROPONINI 0.01 2022 04:00 AM    TROPONINI <0.01 2022 04:26 AM    TROPONINI <0.01 2022 06:01 PM     Coags:   Lab Results   Component Value Date/Time    PROTIME 18.0 2022 06:01 PM    INR 1.49 2022 06:01 PM       EC23 (Personally Interpreted)   - Atrial flutter    ECHO:    Normal left ventricle size, wall thickness, and systolic function with an estimated ejection fraction of 55-60%. No obvious regional wall motion abnormalities are seen. The right ventricle is mildly enlarged with normal function. The right atrium is moderately dilated.     Cath: 2016  Normal coronaries    CT Chest/Abd: 2/7/23  1. Scattered ground-glass opacities with interlobular septal thickening. Findings may be related to pulmonary edema with other considerations   including an inflammatory/infectious process in the appropriate clinical   setting. 2. Right upper lobe consolidation. Additional bilateral scattered curvilinear   opacities may be related to atelectasis versus developing consolidation. 3. Enlarged pulmonary artery diameter. Finding may be related to pulmonary   arterial hypertension. 4. Coronary artery calcifications present. 5. Moderate hiatal hernia. 6. Questionable circumferential thickening of the descending colon with mild   surrounding fat stranding versus colonic underdistention. Finding raises the   possibility of colitis in the appropriate clinical setting. 7. Colonic diverticulosis, predominately sigmoid. No evidence of acute   diverticulitis. 8. Nonobstructing punctate left nephrolithiasis.      Problem List:   Patient Active Problem List    Diagnosis Date Noted    Acute on chronic heart failure with preserved ejection fraction (HFpEF) ([address] 02/08/2023    Bacterial pneumonia 02/07/2023    Acute kidney injury ([address] 02/07/2023    Streptococcal infection group G 12/22/2022    Cellulitis of right leg 12/21/2022    Obesity (BMI 30-39.9) 12/21/2022    Stage 3a chronic kidney disease ([address] 12/21/2022    Bacteremia 12/21/2022    Fever and chills 12/21/2022    History of ankle surgery 12/21/2022    Sepsis ([address] 12/20/2022    Chronic obstructive pulmonary disease, unspecified 12/15/2022    Acute respiratory failure ([address] 09/13/2022    CKD (chronic kidney disease) stage 4, GFR 15-29 ml/min ([facility]) 08/29/2022    Chronic diastolic congestive heart failure ([address] 08/29/2022    Stage 3b chronic kidney disease ([address] 08/29/2022    B12 deficiency 08/29/2022    Vitamin D deficiency 08/29/2022    Vasovagal syncope 06/10/2022    Iron deficiency anemia, unspecified 04/22/2022 Age-related osteoporosis without current pathological fracture 05/09/2022    Iron deficiency anemia due to chronic blood loss 04/08/2022    ILD (interstitial lung disease) ([facility] [address] 04/05/2022    PAF (paroxysmal atrial fibrillation) ([facility] 03/30/2022    Atypical atrial flutter (HCC)     Weight loss counseling, encounter for     Elevated sed rate     Neutrophilia     Elevated C-reactive protein (CRP)     Obstructive sleep apnea     Morbid obesity (HCC)     Severe episode of recurrent major depressive disorder, without psychotic features ([facility] 02/11/2018    Seasonal affective disorder ([facility] 02/11/2018    Class 3 obesity due to excess calories with serious comorbidity and body mass index (BMI) of 45.0 to 49.9 in adult 02/11/2018    Atrial fibrillation with rapid ventricular response ([facility] 01/21/2016    Essential hypertension 01/21/2016        Assessment and Plan: 1. Persistent Atrial Fibrillation/Flutter  - Hx of cardioversion. Has been in AF/AFL since 2017    - Currently in AFL, rate 100-120s  - Continue metoprolol 75 mg BID (just increased this AM), amiodarone 200 mg QD   ~ Monitor for amiodarone toxicity. - [id number] score:high; [id number] Vasc score and anticoagulation discussed. High risk for stroke and thromboembolism. Anticoagulation is recommended. Risk of bleeding was discussed.  ~ On Eliquis 5 mg BID      - At this point, she remains in atrial flutter with mildly tachycardic rates, which is reasonable given her acute illness. Continue amiodarone and metoprolol. Increase metoprolol to 100 mg BID if rates remain elevated after improvement of her acute issues. If she is still admitted next week, we could consider cardioversion prior to d/c if her respiratory status is improved. If not, we will plan to reschedule cardioversion as outpatient (will forward message to our scheduling team).  If failed cardioversion, will plan to stop amiodarone and continue rate control and anti-coagulation as she is a poor candidate for long term use given her COPD    2. Acute on Chronic diastolic heart failure (NYHA Class II)  - Appears compensated, on dry side with rising creatinine and resolved edema   ~ EF 55-60% per echo  - Continue with BB   ~ Lasix on hold due to JUSTINA  ~ ACE-I/ARB contraindicated due to renal insufficiency and risk of hyperkalemia. Consider Jardiance once kidney function improved  - Monitor I&O's, Daily weights    3. JUSTINA on CKD   - Worsening, creatinine up to 2.8   - Lasix on hold   - Nephrology following    4. Cellulitis, PNA   - On ABX    5. GIANNA   - Untreated, needs GIANNA tx for CHF/AF   - Recommend follow up with pulmonology/sleep medicine    6. Obesity  - Body mass index is 39.27 kg/m². - Complicating assessment and treatment. Placing patient at risk for multiple co-morbidities as well as early death and contributing to the patient's presentation  - Discussed weight loss and patient was encouraged to reduce calorie intake and increase exercise    Multiple medical conditions with risk of decompensation. No further EP recommendations. EP will sign off. Please call if there are any questions. Thank you for consult. All pertinent information and plan of care discussed with the EP physician. All questions and concerns were addressed to the patient. Alternatives to my treatment were discussed. I have discussed the above stated plan with patient and the nurse. The patient verbalized understanding and agreed with the plan.     Thank you for allowing to us to participate in the care of RENE Reyes  [facility]   Office: [phone]

## 2023-02-10 LAB
ANION GAP SERPL CALCULATED.3IONS-SCNC: 17 MMOL/L (ref 3–16)
BASOPHILS ABSOLUTE: 0 K/UL (ref 0–0.2)
BASOPHILS RELATIVE PERCENT: 0.2 %
BUN BLDV-MCNC: 57 MG/DL (ref 7–20)
CALCIUM SERPL-MCNC: 8.6 MG/DL (ref 8.3–10.6)
CHLORIDE BLD-SCNC: 100 MMOL/L (ref 99–110)
CO2: 19 MMOL/L (ref 21–32)
CREAT SERPL-MCNC: 2.4 MG/DL (ref 0.6–1.2)
EOSINOPHILS ABSOLUTE: 0.1 K/UL (ref 0–0.6)
EOSINOPHILS RELATIVE PERCENT: 0.7 %
GFR SERPL CREATININE-BSD FRML MDRD: 21 ML/MIN/{1.73_M2}
GLUCOSE BLD-MCNC: 99 MG/DL (ref 70–99)
HCT VFR BLD CALC: 46.3 % (ref 36–48)
HEMOGLOBIN: 14.2 G/DL (ref 12–16)
LYMPHOCYTES ABSOLUTE: 0.6 K/UL (ref 1–5.1)
LYMPHOCYTES RELATIVE PERCENT: 5.4 %
MCH RBC QN AUTO: 27.6 PG (ref 26–34)
MCHC RBC AUTO-ENTMCNC: 30.7 G/DL (ref 31–36)
MCV RBC AUTO: 89.9 FL (ref 80–100)
MONOCYTES ABSOLUTE: 1.1 K/UL (ref 0–1.3)
MONOCYTES RELATIVE PERCENT: 9.6 %
NEUTROPHILS ABSOLUTE: 9.6 K/UL (ref 1.7–7.7)
NEUTROPHILS RELATIVE PERCENT: 84.1 %
PDW BLD-RTO: 16.7 % (ref 12.4–15.4)
PLATELET # BLD: 183 K/UL (ref 135–450)
PMV BLD AUTO: 10.1 FL (ref 5–10.5)
POTASSIUM SERPL-SCNC: 5.5 MMOL/L (ref 3.5–5.1)
RBC # BLD: 5.15 M/UL (ref 4–5.2)
SODIUM BLD-SCNC: 136 MMOL/L (ref 136–145)
WBC # BLD: 11.4 K/UL (ref 4–11)

## 2023-02-10 PROCEDURE — 2580000003 HC RX 258: Performed by: INTERNAL MEDICINE

## 2023-02-10 PROCEDURE — C1751 CATH, INF, PER/CENT/MIDLINE: HCPCS

## 2023-02-10 PROCEDURE — 80048 BASIC METABOLIC PNL TOTAL CA: CPT

## 2023-02-10 PROCEDURE — 2580000003 HC RX 258: Performed by: HOSPITALIST

## 2023-02-10 PROCEDURE — 6370000000 HC RX 637 (ALT 250 FOR IP): Performed by: INTERNAL MEDICINE

## 2023-02-10 PROCEDURE — 36415 COLL VENOUS BLD VENIPUNCTURE: CPT

## 2023-02-10 PROCEDURE — 36569 INSJ PICC 5 YR+ W/O IMAGING: CPT

## 2023-02-10 PROCEDURE — 6370000000 HC RX 637 (ALT 250 FOR IP): Performed by: HOSPITALIST

## 2023-02-10 PROCEDURE — 99233 SBSQ HOSP IP/OBS HIGH 50: CPT | Performed by: INTERNAL MEDICINE

## 2023-02-10 PROCEDURE — 1200000000 HC SEMI PRIVATE

## 2023-02-10 PROCEDURE — 99232 SBSQ HOSP IP/OBS MODERATE 35: CPT | Performed by: CLINICAL NURSE SPECIALIST

## 2023-02-10 PROCEDURE — 2700000000 HC OXYGEN THERAPY PER DAY

## 2023-02-10 PROCEDURE — 94640 AIRWAY INHALATION TREATMENT: CPT

## 2023-02-10 PROCEDURE — 94761 N-INVAS EAR/PLS OXIMETRY MLT: CPT

## 2023-02-10 PROCEDURE — 85025 COMPLETE CBC W/AUTO DIFF WBC: CPT

## 2023-02-10 PROCEDURE — 05HB33Z INSERTION OF INFUSION DEVICE INTO RIGHT BASILIC VEIN, PERCUTANEOUS APPROACH: ICD-10-PCS | Performed by: HOSPITALIST

## 2023-02-10 RX ORDER — IPRATROPIUM BROMIDE AND ALBUTEROL SULFATE 2.5; .5 MG/3ML; MG/3ML
1 SOLUTION RESPIRATORY (INHALATION) 2 TIMES DAILY
Status: DISCONTINUED | OUTPATIENT
Start: 2023-02-10 | End: 2023-02-13

## 2023-02-10 RX ORDER — LIDOCAINE HYDROCHLORIDE 10 MG/ML
5 INJECTION, SOLUTION EPIDURAL; INFILTRATION; INTRACAUDAL; PERINEURAL ONCE
Status: DISCONTINUED | OUTPATIENT
Start: 2023-02-10 | End: 2023-02-22

## 2023-02-10 RX ORDER — SODIUM CHLORIDE 0.9 % (FLUSH) 0.9 %
5-40 SYRINGE (ML) INJECTION PRN
Status: DISCONTINUED | OUTPATIENT
Start: 2023-02-10 | End: 2023-02-22

## 2023-02-10 RX ORDER — SODIUM CHLORIDE 0.9 % (FLUSH) 0.9 %
5-40 SYRINGE (ML) INJECTION EVERY 12 HOURS SCHEDULED
Status: DISCONTINUED | OUTPATIENT
Start: 2023-02-10 | End: 2023-02-22

## 2023-02-10 RX ORDER — SODIUM CHLORIDE 9 MG/ML
25 INJECTION, SOLUTION INTRAVENOUS PRN
Status: DISCONTINUED | OUTPATIENT
Start: 2023-02-10 | End: 2023-02-22

## 2023-02-10 RX ADMIN — Medication 10 ML: at 09:00

## 2023-02-10 RX ADMIN — DOXYCYCLINE HYCLATE 100 MG: 100 TABLET, COATED ORAL at 20:44

## 2023-02-10 RX ADMIN — IRON SUCROSE 200 MG: 20 INJECTION, SOLUTION INTRAVENOUS at 10:31

## 2023-02-10 RX ADMIN — Medication 10 ML: at 12:03

## 2023-02-10 RX ADMIN — PREDNISONE 40 MG: 20 TABLET ORAL at 08:57

## 2023-02-10 RX ADMIN — IPRATROPIUM BROMIDE AND ALBUTEROL SULFATE 1 AMPULE: 2.5; .5 SOLUTION RESPIRATORY (INHALATION) at 07:57

## 2023-02-10 RX ADMIN — Medication 10 ML: at 20:38

## 2023-02-10 RX ADMIN — APIXABAN 5 MG: 5 TABLET, FILM COATED ORAL at 20:44

## 2023-02-10 RX ADMIN — AMIODARONE HYDROCHLORIDE 200 MG: 200 TABLET ORAL at 08:57

## 2023-02-10 RX ADMIN — Medication 10 ML: at 20:39

## 2023-02-10 RX ADMIN — DOXYCYCLINE HYCLATE 100 MG: 100 TABLET, COATED ORAL at 08:55

## 2023-02-10 RX ADMIN — ASPIRIN 81 MG: 81 TABLET, COATED ORAL at 08:57

## 2023-02-10 RX ADMIN — IPRATROPIUM BROMIDE AND ALBUTEROL SULFATE 1 AMPULE: 2.5; .5 SOLUTION RESPIRATORY (INHALATION) at 21:03

## 2023-02-10 RX ADMIN — METOPROLOL TARTRATE 75 MG: 50 TABLET, FILM COATED ORAL at 08:57

## 2023-02-10 RX ADMIN — APIXABAN 5 MG: 5 TABLET, FILM COATED ORAL at 08:57

## 2023-02-10 RX ADMIN — METOPROLOL TARTRATE 75 MG: 50 TABLET, FILM COATED ORAL at 20:45

## 2023-02-10 ASSESSMENT — PAIN DESCRIPTION - LOCATION: LOCATION: ABDOMEN

## 2023-02-10 ASSESSMENT — PAIN SCALES - GENERAL: PAINLEVEL_OUTOF10: 5

## 2023-02-10 NOTE — PROGRESS NOTES
Occupational Therapy    Attempted to see patient for occupational therapy treatment around 1430 this PM.   Patient pleasantly declining therapy at this time stating she feels too weak to move around. Encouraged and educated on how therapy can assisted with regaining strength, pt continues to decline therapy at this time. Will re-attempt as schedule and patient's medical status permits. Thanks,  Preston Yi OTR/L [id number]

## 2023-02-10 NOTE — RT PROTOCOL NOTE
RT Inhaler-Nebulizer Bronchodilator Protocol Note    There is a bronchodilator order in the chart from a provider indicating to follow the RT Bronchodilator Protocol and there is an Initiate RT Inhaler-Nebulizer Bronchodilator Protocol order as well (see protocol at bottom of note). CXR Findings:  No results found. The findings from the last RT Protocol Assessment were as follows:   History Pulmonary Disease: Chronic pulmonary disease  Respiratory Pattern: Regular pattern and RR 12-20 bpm  Breath Sounds: Slightly diminished and/or crackles  Cough: Strong, spontaneous, non-productive  Indication for Bronchodilator Therapy: On home bronchodilators  Bronchodilator Assessment Score: 4    Aerosolized bronchodilator medication orders have been revised according to the RT Inhaler-Nebulizer Bronchodilator Protocol below. Respiratory Therapist to perform RT Therapy Protocol Assessment initially then follow the protocol. Repeat RT Therapy Protocol Assessment PRN for score 0-3 or on second treatment, BID, and PRN for scores above 3. No Indications - adjust the frequency to every 6 hours PRN wheezing or bronchospasm, if no treatments needed after 48 hours then discontinue using Per Protocol order mode. If indication present, adjust the RT bronchodilator orders based on the Bronchodilator Assessment Score as indicated below. Use Inhaler orders unless patient has one or more of the following: on home nebulizer, not able to hold breath for 10 seconds, is not alert and oriented, cannot activate and use MDI correctly, or respiratory rate 25 breaths per minute or more, then use the equivalent nebulizer order(s) with same Frequency and PRN reasons based on the score. If a patient is on this medication at home then do not decrease Frequency below that used at home.     0-3 - enter or revise RT bronchodilator order(s) to equivalent RT Bronchodilator order with Frequency of every 4 hours PRN for wheezing or increased work of breathing using Per Protocol order mode. 4-6 - enter or revise RT Bronchodilator order(s) to two equivalent RT bronchodilator orders with one order with BID Frequency and one order with Frequency of every 4 hours PRN wheezing or increased work of breathing using Per Protocol order mode. 7-10 - enter or revise RT Bronchodilator order(s) to two equivalent RT bronchodilator orders with one order with TID Frequency and one order with Frequency of every 4 hours PRN wheezing or increased work of breathing using Per Protocol order mode. 11-13 - enter or revise RT Bronchodilator order(s) to one equivalent RT bronchodilator order with QID Frequency and an Albuterol order with Frequency of every 4 hours PRN wheezing or increased work of breathing using Per Protocol order mode. Greater than 13 - enter or revise RT Bronchodilator order(s) to one equivalent RT bronchodilator order with every 4 hours Frequency and an Albuterol order with Frequency of every 2 hours PRN wheezing or increased work of breathing using Per Protocol order mode. RT to enter RT Home Evaluation for COPD & MDI Assessment order using Per Protocol order mode.     Electronically signed by Augustus Morgan RCP on 2/10/2023 at 8:23 AM

## 2023-02-10 NOTE — CONSULTS
Isaiah Ly 1945    History:  Past Medical History:   Diagnosis Date    Arthritis     Atrial fibrillation and flutter ([address]     Hypertension     Kidney disease     Pt states  \"stage 3\"    Pneumonia     Sleep apnea     no c-pap       ECHO:  12/21/22  Summary   Limited exam per Dr. Deepti Kerr. Irregular rhythm. Normal left ventricle size, wall thickness, and systolic function with an   estimated ejection fraction of 55-60%. No obvious regional wall motion abnormalities are seen. The right ventricle is mildly enlarged with normal function. The right atrium is moderately dilated. History of sleep apnea: Yes  Type: kerry   Equipment used at home: has cpap @home-noncompliant with use      DM History: No    Last Hospital Admission: 12/20/22 with cellulitis  Code Status: full  Discharge plans: from home    Family Present: zahra Gaxiola was admitted to the hospital with increased shortness of breath. Patient has been seen in the past for HF education. Patient does have a scale at home. She states she does not weigh daily but every few days. Her lowest weight after December hospitalization was 231 lb however she steadily crept up to 250 lb. Patient did not call with changes in weight. Patient states she tries to restrict sodium at home but does not follow a specific limitation. She is unsure of her fluid limit. She states compliance with  medications and follow up visits. Patient provided with both written and verbal education on CHF signs/ symptoms, causes, discharge medications, daily weights, low sodium diet, activity, and follow-up. Pt to call if gains 3 pounds in one day or 5 pounds in one week. Mutually agreed upon goals were discussed such as calling the MD as soon as they recognize symptoms and weight gain, maintaining proper diet, taking medications as prescribed, joining cardiac rehab when able.  Also reviewed importance of risk factor reduction. Patient provided with CHF Zone Management tool and CHF symptoms magnet. Discussed importance of lifestyle changes: encourage use of cpap and to monitor weights daily    PATIENT/CAREGIVER TEACHING:    Level of patient/caregiver understanding able to:   [ x]Verbalize understanding [ ] Demonstrate understanding [ ] Teach back   [ ] Needs reinforcement [ ] Other:       Time spent teachin mins      1. WEIGHT: Admit Weight: 235 lb (106.6 kg)      Today  Weight: 245 lb 6.4 oz (111.3 kg)   2. I/O   Intake/Output Summary (Last 24 hours) at 2/10/2023 1355  Last data filed at 2/10/2023 0059  Gross per 24 hour   Intake 100 ml   Output 200 ml   Net -100 ml       Recommendations:   1. Patient needs one week hospital follow up at discharge  2. Educate further on fluid restriction 48 oz- 64 oz during inpatient stay so she can understand how to measure intake at home. 3. Continue to educate on S/S.   4. Emphasize daily weights, diet, and knowing when and who to call  5. Provided patient with CHF Resource Line for questions and concerns.   6. CHF pathway on richie if discharged with home care       Jonathan BLACKWELL RN 2/10/2023 1:55 PM

## 2023-02-10 NOTE — DISCHARGE INSTR - COC
Continuity of Care Form  CHF [facility]      _x_ Daily Visits x 3     _x_Cardiovascular Assessment. Titrate O2 to keep SaO2 greater than 90%    _x_ Daily Weights- Baseline Wt: 230 lb   Call MD if:   3 pound weight gain or loss in one day OR 5 pound weight gain in one week         _x_ Med List attached:   Hold Coreg/Metoprolol if HR less than 45 or patient symptomatic*   Hold ACE/ARB if SBP less than 85 or patient symptomatic*   Do not hold Spironolactone (aldactone) for hypotension/bradycardia   Call MD for questions: [facility]F Clinic: [phone]    _x_Follow up appointment with cardiology: date/time:  with Subhash Moe NP          Patient Name: Sandra Rios   :  1945  MRN:  [id number]    Admit date:  2023  Discharge date:  2023    Code Status Order: Full Code   Advance Directives:     Admitting Physician:  Cecilio Tyler MD  PCP: MARILEE Escamilla CNP    Discharging Nurse: [facility] Unit/Room#: 2PW-6989/0714-91  Discharging Unit Phone Number: [phone]    Emergency Contact:   Extended Emergency Contact Information  Primary Emergency Contact: Huma Loyd  Address: [address] [facility], [address] [place] [address] Phone: [phone]  Work Phone: [phone]  Relation: Child  Secondary Emergency Contact: [address]  Mobile Phone: [phone]  Relation: Other   needed?  No    Past Surgical History:  Past Surgical History:   Procedure Laterality Date    CARDIOVERSION      COLONOSCOPY N/A 2018    COLONOSCOPY POLYPECTOMY SNARE/COLD BIOPSY performed by Dolly Snyder MD at [address] [facility] ENDOSCOPY    COLONOSCOPY N/A 2022    COLONOSCOPY POLYPECTOMY SNARE/COLD BIOPSY performed by Britton Kennedy MD at [facility]      ankle rt has pins and rods, and rt elbow    GASTRIC BYPASS SURGERY      LARYNX SURGERY      TOTAL KNEE ARTHROPLASTY Bilateral 2012    bilateral knee replacements    TUBAL LIGATION tubes tied    UPPER GASTROINTESTINAL ENDOSCOPY N/A 11/20/2018    EGD BIOPSY performed by Lorenzo Marion MD at [address] [place] [address] N/A 03/31/2022    EGD DILATION BALLOON performed by Danica Barraza MD at [address] [facility] N/A 03/31/2022    EGD BIOPSY performed by Danica Barraza MD at [address] [place] [address]       Immunization History:   Immunization History   Administered Date(s) Administered    COVID-19, PFIZER Bivalent BOOSTER, DO NOT Dilute, (age 12y+), IM, 30 mcg/0.3 mL 09/09/2022    COVID-19, PFIZER GRAY top, DO NOT Dilute, (age 15 y+), IM, 30 mcg/0.3 mL 05/20/2022    COVID-19, PFIZER PURPLE top, DILUTE for use, (age 15 y+), 30mcg/0.3mL 09/08/2021, 10/15/2021    Influenza Virus Vaccine 12/22/2012    Influenza, FLUAD, (age 72 y+), Adjuvanted, 0.5mL 10/27/2020, 10/15/2021, 09/09/2022    Influenza, High Dose (Fluzone 65 yrs and older) 09/30/2016, 10/01/2017, 10/08/2018    Influenza, Triv, inactivated, subunit, adjuvanted, IM (Fluad 65 yrs and older) 10/21/2019    Pneumococcal Conjugate 13-valent ([facility]) 09/30/2016, 10/01/2017    Pneumococcal Polysaccharide ([id number]) 12/22/2012, 10/06/2017    Td vaccine (adult) 12/01/2008    Zoster Live (Zostavax) 09/15/2015       Active Problems:  Patient Active Problem List   Diagnosis Code    Atrial fibrillation ([facility] I48.91    Primary hypertension I10    Severe episode of recurrent major depressive disorder, without psychotic features ([facility] F33.2    Seasonal affective disorder (HCC) F33.8    Class 2 severe obesity due to excess calories with serious comorbidity and body mass index (BMI) of 39.0 to 39.9 in adult ([facility] E66.01, Z68.39    Elevated sed rate R70.0    Neutrophilia D72.9    Elevated C-reactive protein (CRP) R79.82    GIANNA (obstructive sleep apnea) G47.33    Morbid obesity (HCC) E66.01    Weight loss counseling, encounter for Z71.3    Atypical atrial flutter (HCC) I48.4    PAF (paroxysmal atrial fibrillation) ([facility]) I48.0    ILD (interstitial lung disease) ([facility]) J84.9    Iron deficiency anemia due to chronic blood loss D50.0    Iron deficiency anemia, unspecified D50.9    Age-related osteoporosis without current pathological fracture M81.0    Vasovagal syncope R55    CKD (chronic kidney disease) stage 4, GFR 15-29 ml/min ([facility]) N18.4    Acute on chronic diastolic heart failure ([facility]) I50.33    Stage 3b chronic kidney disease ([facility]) N18.32    B12 deficiency E53.8    Vitamin D deficiency E55.9    Acute respiratory failure ([facility]) J96.00    Chronic obstructive pulmonary disease, unspecified J44.9    Sepsis ([facility]) A41.9    Cellulitis of right leg L03.115    Obesity (BMI 30-39. 9) E66.9    Stage 3 chronic kidney disease ([facility]) N18.30    Bacteremia R78.81    Fever and chills R50.9    History of ankle surgery Z98.890    Streptococcal infection group G B95.4    Bacterial pneumonia J15.9    Acute kidney injury ([facility] N17.9    Acute on chronic heart failure with preserved ejection fraction (HFpEF) ([facility]) I50.33       Isolation/Infection:   Isolation            No Isolation          Patient Infection Status       Infection Onset Added Last Indicated Last Indicated By Review Planned Expiration Resolved Resolved By    None active    Resolved    C-diff Rule Out 12/21/22 12/21/22 12/21/22 Clostridium difficile toxin/antigen (Ordered)   12/21/22 Rule-Out Test Resulted    C-diff Rule Out 12/20/22 12/20/22 12/20/22 Clostridium difficile toxin/antigen (Ordered)   12/20/22 Rule-Out Test Canceled    COVID-19 (Rule Out) 12/20/22 12/20/22 12/20/22 COVID-19, Rapid (Ordered)   12/20/22 Rule-Out Test Resulted    COVID-19 (Rule Out) 09/13/22 09/13/22 09/13/22 COVID-19 (Ordered)   09/14/22 Rule-Out Test Resulted            Nurse Assessment:  Last Vital Signs: /84   Pulse 97   Temp 97.9 °F (36.6 °C) (Oral)   Resp 18   Ht 5' 7\" (1.702 m)   Wt 245 lb 6.4 oz (111.3 kg)   SpO2 91%   BMI 38.44 kg/m²     Last documented pain score (0-10 scale): Pain Level: 5  Last Weight:   Wt Readings from Last 1 Encounters:   02/10/23 245 lb 6.4 oz (111.3 kg)     Mental Status:  oriented and alert    IV Access:  - PICC - site  L Upper Arm, insertion date: ***    Nursing Mobility/ADLs:  Walking   Assisted  Transfer  Assisted  Bathing  Assisted  Dressing  Assisted  [address]  Independent  Med Delivery   whole    Wound Care Documentation and Therapy:  Incision 12/19/12 Knee Left (Active)   Number of days: 3705        Elimination:  Continence: Bowel: No  Bladder: Yes and No  Urinary Catheter: None   Colostomy/Ileostomy/Ileal Conduit: No       Date of Last BM: ***    Intake/Output Summary (Last 24 hours) at 2/10/2023 1457  Last data filed at 2/10/2023 1413  Gross per 24 hour   Intake 100 ml   Output 400 ml   Net -300 ml     I/O last 3 completed shifts: In: 80 [P.O.:580]  Out: [address] [Urine:1750]    Safety Concerns: At Risk for Falls    Impairments/Disabilities:      None    Nutrition Therapy:  Current Nutrition Therapy:   - Parenteral Nutrition:  83 ml over continous hrs per day (see PN orders for content)    Routes of Feeding: Parenteral Nutrition (PN)  Liquids: Thin Liquids  Daily Fluid Restriction: no  Last Modified Barium Swallow with Video (Video Swallowing Test): not done    Treatments at the Time of Hospital Discharge:   Respiratory Treatments: ***  Oxygen Therapy:  is on oxygen at 1.5 L/min per nasal cannula.   Ventilator:    - No ventilator support    Rehab Therapies: Physical Therapy and Occupational Therapy  Weight Bearing Status/Restrictions: No weight bearing restrictions  Other Medical Equipment (for information only, NOT a DME order):  walker  Other Treatments: ***    Patient's personal belongings (please select all that are sent with patient):  {[facility] DME Belongings:[id number]}    RN SIGNATURE:  Electronically signed by Madan Ramirez RN on 2/23/23 at 10:52 AM EST    CASE MANAGEMENT/SOCIAL WORK SECTION    Inpatient Status Date: ***    Readmission Risk Assessment Score:  Readmission Risk              Risk of Unplanned Readmission:  25           Discharging to Facility/ Agency   Name:   Address:  Phone:  Fax:    Dialysis Facility (if applicable)   Name:  Address:  Dialysis Schedule:  Phone:  Fax:    / signature: {Esignature:[id number]}    PHYSICIAN SECTION    Prognosis: Good    Condition at Discharge: Stable    Rehab Potential (if transferring to Rehab): Good    Recommended Labs or Other Treatments After Discharge:     Physician Certification: I certify the above information and transfer of Shruthi Espinosa  is necessary for the continuing treatment of the diagnosis listed and that she requires Home Care for greater 30 days.      Update Admission H&P: No change in H&P    PHYSICIAN SIGNATURE:  Electronically signed by Christine Pabon MD on 2/13/23 at 8:04 AM EST

## 2023-02-10 NOTE — PROGRESS NOTES
Office : [phone]     Fax :[phone]       Nephrology progress  Note      Patient's Name: Shruthi Espinosa  10:14 AM  2/10/2023    Reason for Consult:  ESRD management       Requesting Physician:  MARILEE Levin CNP      Chief Complaint:    Chief Complaint   Patient presents with    Abdominal Pain     Pt via EMS from home, c/o LUQ pain 10/10, has had gallbladder removed. Pt states pain is making her sob, 89% ora, put on 2L, pt received 325 mg aspirin, has been treated for cellulitis since December, new antibiotic last two weeks, states she has been getting hives and itching        History of Present Ilness:    Shruthi Espinosa is a 78 y.o. female who presented with complaints of shortness of breath. Patient apparently has been treated for cellulitis lower extremity on antibiotics patient started having worsening itching and hives. Came to the ER. Patient with increased shortness of breath. Also with leg swelling. No reported fevers chills. Patient was found to be hypoxic was placed on 2 L nasal cannula. Patient with history of chronic CHF COPD chronic kidney disease admitted with bilateral worse. Baseline creat is 1.2   Now elevated at 2.0     BNP 49882      Interval hx     Feels fine   No edema   Good UOP    I/O last 3 completed shifts:   In: 80 [P.O.:580]  Out: 1750 [Urine:1750]    Past Medical History:   Diagnosis Date    Arthritis     Atrial fibrillation and flutter ([facility]     Hypertension     Kidney disease     Pt states  \"stage 3\"    Pneumonia     Sleep apnea     no c-pap       Past Surgical History:   Procedure Laterality Date    CARDIOVERSION      COLONOSCOPY N/A 11/20/2018    COLONOSCOPY POLYPECTOMY SNARE/COLD BIOPSY performed by Savage Shaw MD at [address] [facility] ENDOSCOPY    COLONOSCOPY N/A 03/31/2022    COLONOSCOPY POLYPECTOMY SNARE/COLD BIOPSY performed by Nelly Manzanares MD at [facility]      ankle rt has pins and rods, and rt elbow    GASTRIC BYPASS SURGERY      LARYNX SURGERY      TOTAL KNEE ARTHROPLASTY Bilateral 12/19/2012    bilateral knee replacements    TUBAL LIGATION      tubes tied    UPPER GASTROINTESTINAL ENDOSCOPY N/A 11/20/2018    EGD BIOPSY performed by Izzy Rodgers MD at [address] [facility] N/A 03/31/2022    EGD DILATION BALLOON performed by Nelly Manzanares MD at [address] [facility] ENDOSCOPY N/A 03/31/2022    EGD BIOPSY performed by Nelly Manzanares MD at [address] [facility]       Family History   Problem Relation Age of Onset    Other Mother         blood clot    Cancer Father         stomach cancer    Stroke Brother         reports that she has never smoked. She has never used smokeless tobacco. She reports that she does not drink alcohol and does not use drugs.         Allergies:  Bee venom, Shellfish-derived products, Shrimp flavor, Cephalexin, Codeine, Fentanyl and related, Hydromorphone, and Vancomycin    Current Medications:    ipratropium-albuterol (DUONEB) nebulizer solution 1 ampule, BID  iron sucrose (VENOFER) 200 mg in sodium chloride 0.9 % 100 mL IVPB, Q24H  metoprolol tartrate (LOPRESSOR) tablet 75 mg, BID  doxycycline hyclate (VIBRA-TABS) tablet 100 mg, 2 times per day  predniSONE (DELTASONE) tablet 40 mg, Daily  diphenhydrAMINE (BENADRYL) tablet 50 mg, Q6H PRN  ondansetron (ZOFRAN) injection 4 mg, Q6H PRN  albuterol sulfate HFA (PROVENTIL;VENTOLIN;PROAIR) 108 (90 Base) MCG/ACT inhaler 2 puff, Q4H PRN  amiodarone (CORDARONE) tablet 200 mg, Daily  apixaban (ELIQUIS) tablet 5 mg, BID  aspirin EC tablet 81 mg, Daily  traMADol (ULTRAM) tablet 50 mg, Q6H PRN  sodium chloride flush 0.9 % injection 5-40 mL, 2 times per day  sodium chloride flush 0.9 % injection 5-40 mL, PRN  0.9 % sodium chloride infusion, PRN  ondansetron (ZOFRAN-ODT) disintegrating tablet 4 mg, Q8H PRN   Or  ondansetron (ZOFRAN) injection 4 mg, Q6H PRN  polyethylene glycol (GLYCOLAX) packet 17 g, Daily PRN  acetaminophen (TYLENOL) tablet 650 mg, Q6H PRN   Or  acetaminophen (TYLENOL) suppository 650 mg, Q6H PRN  [Held by provider] furosemide (LASIX) injection 40 mg, BID      Review of Systems:   14 point ROS obtained but were negative except mentioned in HPI      Physical exam:     Vitals:  BP (!) 154/90   Pulse (!) 109   Temp 97.9 °F (36.6 °C) (Oral)   Resp 16   Ht 5' 7\" (1.702 m)   Wt 245 lb 6.4 oz (111.3 kg)   SpO2 91%   BMI 38.44 kg/m²   Constitutional:  OAA X3 NAD  Skin: no rash, turgor wnl  Heent:  eomi, mmm  Neck: no bruits or jvd noted  Cardiovascular:  S1, S2 without m/r/g  Respiratory: CTA B without w/r/r  Abdomen:  +bs, soft, nt, nd  Ext: 1 +  lower extremity edema  Psychiatric: mood and affect appropriate  Musculoskeletal:  Rom, muscular strength intact    Labs:  CBC:   Recent Labs     02/08/23  0505 02/09/23  0613 02/10/23  0556   WBC 10.1 9.7 11.4*   HGB 12.5 12.8 14.2    222 183     BMP:    Recent Labs     02/08/23  0505 02/09/23  0613 02/10/23  0556    138 136   K 5.6* 4.7 5.5*    101 100   CO2 26 29 19*   BUN 41* 52* 57*   CREATININE 2.5* 2.8* 2.4*   GLUCOSE 108* 104* 99     Ca/Mg/Phos:   Recent Labs     02/08/23  0505 02/09/23  0613 02/10/23  0556   CALCIUM 9.8 9.3 8.6     Hepatic:   No results for input(s): AST, ALT, ALB, BILITOT, ALKPHOS in the last 72 hours. IMAGING:  CT CHEST ABDOMEN PELVIS WO CONTRAST Additional Contrast? None   Final Result   1. Scattered ground-glass opacities with interlobular septal thickening. Findings may be related to pulmonary edema with other considerations   including an inflammatory/infectious process in the appropriate clinical   setting. 2. Right upper lobe consolidation.  Additional bilateral scattered curvilinear opacities may be related to atelectasis versus developing consolidation. 3. Enlarged pulmonary artery diameter. Finding may be related to pulmonary   arterial hypertension. 4. Coronary artery calcifications present. 5. Moderate hiatal hernia. 6. Questionable circumferential thickening of the descending colon with mild   surrounding fat stranding versus colonic underdistention. Finding raises the   possibility of colitis in the appropriate clinical setting. 7. Colonic diverticulosis, predominately sigmoid. No evidence of acute   diverticulitis. 8. Nonobstructing punctate left nephrolithiasis. RECOMMENDATIONS:   2 cm left adrenal mass, consistent with lipid-rich benign adenoma. No   follow-up imaging is recommended. JACR 2017 Aug; 14(8):1038-44, JCAT 2016 [place]; 40(2):194-200, Urol J 2006   [place]; 3(2):71-4. XR CHEST PORTABLE   Final Result   1. Diffuse hazy multifocal opacity throughout both lungs, right worse than   left, likely a combination of moderate pulmonary edema and multifocal   pneumonia. 2. Stable cardiomegaly. 3. Stable hiatal hernia. Assessment/Plan :      1. Panda   Creatinine slightly improved   Edema has resolved   Continue to hold lasix       Recommend to dose adjust all medications  based on renal functions  Maintain SBP> 90 mmHg   Daily weights   AVOID NSAIDs  Avoid Nephrotoxins  Monitor Intake/Output  Call if significant decrease in urine output        2. HTN. BP low borderline range   Hold clonidine if SBP < 110 mm HG     3. COPD    4. H/o atrial fib   Monitor       5.  Hyperkalemia   Resolved after getting lokelma   Today sample is hemolyzed       D/w primary team      Thank you for allowing us to participate in care of Jennifer Shah         Electronically signed by: Lucie Garcia MD, 2/10/2023, 10:14 AM      [facility] [address]  Office : [phone]  Fax :[phone]

## 2023-02-10 NOTE — PROGRESS NOTES
[address]   Daily Progress Note      Admit Date:  2/7/2023    HPI:    Ms. Sanjay Dennison is a 78year old female with history of PAF/flutter since 3860, CV, diastolic heart failure, COPD, ckd     She is admitted with abdominal pain in LUQ along with shortness of breath. She had cellulitis and received IV antibiotics. She had declined a CV and then agreed which was scheduled for 2/9/2023. She was found to be in AF with RVR. She has gained 12 pounds. She is being treated for acute COPD exacerbation    Subjective:  Patient is being seen for acute on chronic diastolic heart failure . There were no acute overnight cardiac events. She is lying in the bed on 2 L of oxygen. She remains in AF and she tells me that she does have cpap at home, never used it. She is very iron deficient. Iron sat 6 %  Creat 2.6-2.8-2.4 bun 41-52 bnp 10K-14K weight 250     Objective:   /73   Pulse (!) 103   Temp 97.6 °F (36.4 °C) (Oral)   Resp 16   Ht 5' 7\" (1.702 m)   Wt 245 lb 6.4 oz (111.3 kg)   SpO2 96%   BMI 38.44 kg/m²     Intake/Output Summary (Last 24 hours) at 2/10/2023 0849  Last data filed at 2/10/2023 0059  Gross per 24 hour   Intake 220 ml   Output 650 ml   Net -430 ml          Physical Exam:  General:  Awake, alert, oriented in NAD  Skin:  Warm and dry. No unusual bruising or rash  Neck:  Supple. No JVD or carotid bruit appreciated  Chest:  Normal effort.   Clear to auscultation, no wheezes/rhonchi/rales  Cardiovascular:  RRR, S1/S2, no murmur/gallop/rub  Abdomen:  Soft, nontender, +bowel sounds, obese  Extremities:  chronic lower ankle edema  Neurological: No focal deficits  Psychological: Normal mood and affect      Medications:    ipratropium-albuterol  1 ampule Inhalation BID    metoprolol tartrate  75 mg Oral BID    doxycycline hyclate  100 mg Oral 2 times per day    predniSONE  40 mg Oral Daily    amiodarone  200 mg Oral Daily    apixaban  5 mg Oral BID    aspirin  81 mg Oral Daily    sodium chloride flush  5-40 mL IntraVENous 2 times per day    [Held by provider] furosemide  40 mg IntraVENous BID      sodium chloride         Lab Data:  CBC:   Recent Labs     02/08/23  0505 02/09/23  0613 02/10/23  0556   WBC 10.1 9.7 11.4*   HGB 12.5 12.8 14.2    222 183     BMP:    Recent Labs     02/08/23  0505 02/09/23  0613 02/10/23  0556    138 136   K 5.6* 4.7 5.5*   CO2 26 29 19*   BUN 41* 52* 57*   CREATININE 2.5* 2.8* 2.4*     INR:  No results for input(s): INR in the last 72 hours. BNP:    Recent Labs     02/09/23 0613   PROBNP 13,923*         Diagnostics:  Echo 12/21/2022  Summary   Limited exam per Dr. Maura Christensen. Irregular rhythm. Normal left ventricle size, wall thickness, and systolic function with an   estimated ejection fraction of 55-60%. No obvious regional wall motion abnormalities are seen. The right ventricle is mildly enlarged with normal function. The right atrium is moderately dilated    Assessment:    1. Atrial fib/flutter  2. Acute on chronic diastolic heart failure  3. Hypertension  4. Cellulitis  5. Daquan not using cpap  6. Ckd  7. Hyperkalemia  8. Iron deficient anemia      Plan:    Cellulitis per hospital team  Nephrology and EP following  Will give IV venofer x 5 doses  Lasix on hold per nephrology  Consider jardiance once creat improves  She needs cpap and follow up with sleep at discharge    Discussed with bedside nurse and patient. She agrees to try cpap while in the hospital.  Nurse to talk with hospitalist    She needs her sleep apnea treated. Her AF and fluid control would be better with treatment of her sleep apnea    Discussed with patient who is agreeable with plan of care. Thank you for allowing me to participate in the care of your patient.     MARILEE Nash - CNS, CNS

## 2023-02-10 NOTE — PROGRESS NOTES
Nutrition Education     RD visited pt to provide CHF diet education. Pt states she has had education in the past and declined having RD review information with her. Did accept handout at bedside. Will provide additional instruction at pt's request prior to discharge.      Rubi Pimentel RD, LD  Contact Number: [phone]

## 2023-02-10 NOTE — PLAN OF CARE
Problem: Skin/Tissue Integrity  Goal: Absence of new skin breakdown  Description: 1. Monitor for areas of redness and/or skin breakdown  2. Assess vascular access sites hourly  3. Every 4-6 hours minimum:  Change oxygen saturation probe site  4. Every 4-6 hours:  If on nasal continuous positive airway pressure, respiratory therapy assess nares and determine need for appliance change or resting period.   Outcome: Progressing     Problem: Safety - Adult  Goal: Free from fall injury  Outcome: Progressing  Flowsheets (Taken 2/9/2023 2329 by Liberty Sinclair, RN)  Free From Fall Injury: Instruct family/caregiver on patient safety     Problem: Pain  Goal: Verbalizes/displays adequate comfort level or baseline comfort level  Outcome: Progressing     Problem: ABCDS Injury Assessment  Goal: Absence of physical injury  Outcome: Progressing  Flowsheets (Taken 2/9/2023 2329 by Liberty Sinclair RN)  Absence of Physical Injury: Implement safety measures based on patient assessment     Problem: Metabolic/Fluid and Electrolytes - Adult  Goal: Electrolytes maintained within normal limits  Outcome: Progressing  Flowsheets (Taken 2/9/2023 2132 by Liberty Sinclair, RN)  Electrolytes maintained within normal limits:   Monitor labs and assess patient for signs and symptoms of electrolyte imbalances   Administer electrolyte replacement as ordered     Problem: Metabolic/Fluid and Electrolytes - Adult  Goal: Hemodynamic stability and optimal renal function maintained  Outcome: Progressing  Flowsheets (Taken 2/9/2023 2132 by Liberty Sinclair, RN)  Hemodynamic stability and optimal renal function maintained:   Monitor labs and assess for signs and symptoms of volume excess or deficit   Monitor intake, output and patient weight

## 2023-02-10 NOTE — PROGRESS NOTES
Hospitalist Progress Note      PCP: Fidelia Diaz, APRN - CNP    Date of Admission: 2/7/2023    Chief Complaint: CHF    Hospital Course:   Still has some shortness of breath although significantly improved as compared to admission  -800 mL in the last 24 hours  Remains on 2 L oxygen  No abdominal pain        Medications:  Reviewed      Exam:    BP (!) 154/90   Pulse (!) 109   Temp 97.9 °F (36.6 °C) (Oral)   Resp 16   Ht 5' 7\" (1.702 m)   Wt 245 lb 6.4 oz (111.3 kg)   SpO2 91%   BMI 38.44 kg/m²     General appearance: No apparent distress, appears stated age and cooperative. HEENT: Pupils equal, round, and reactive to light. Conjunctivae/corneas clear. Neck: Supple, with full range of motion. No jugular venous distention. Trachea midline. Respiratory:  Normal respiratory effort. Clear to auscultation, bilaterally without RALES/WHEEZES/Rhonchi. Cardiovascular: Regular rate and rhythm with normal S1/S2 without MURMURS, rubs or gallops. Abdomen: Soft, non-tender, non-distended with normal bowel sounds. Musculoskeletal: 1+ pitting edema some erythema of both lower extremity I strongly suspect that these are subacute changes related to her leg swelling  Skin: Skin color, texture, turgor normal.  No rashes or lesions. Neurologic:  Neurovascularly intact without any focal sensory/motor deficits. Cranial nerves: II-XII intact, grossly non-focal.      Labs:   Recent Labs     02/08/23  0505 02/09/23  0613 02/10/23  0556   WBC 10.1 9.7 11.4*   HGB 12.5 12.8 14.2   HCT 39.3 40.6 46.3    222 183     Recent Labs     02/08/23  0505 02/09/23  0613 02/10/23  0556    138 136   K 5.6* 4.7 5.5*    101 100   CO2 26 29 19*   BUN 41* 52* 57*   CREATININE 2.5* 2.8* 2.4*   CALCIUM 9.8 9.3 8.6     No results for input(s): AST, ALT, BILIDIR, BILITOT, ALKPHOS in the last 72 hours. No results for input(s): INR in the last 72 hours.   No results for input(s): Cristina Mendez in the last 72 hours.    Urinalysis:      Lab Results   Component Value Date/Time    NITRU Negative 12/21/2022 07:00 PM    WBCUA 0 12/21/2022 07:00 PM    BACTERIA 2+ 12/21/2022 07:00 PM    RBCUA 2 12/21/2022 07:00 PM    BLOODU TRACE 12/21/2022 07:00 PM    SPECGRAV 1.025 12/21/2022 07:00 PM    GLUCOSEU Negative 12/21/2022 07:00 PM       Radiology:  CT CHEST ABDOMEN PELVIS WO CONTRAST Additional Contrast? None   Final Result   1. Scattered ground-glass opacities with interlobular septal thickening. Findings may be related to pulmonary edema with other considerations   including an inflammatory/infectious process in the appropriate clinical   setting. 2. Right upper lobe consolidation. Additional bilateral scattered curvilinear   opacities may be related to atelectasis versus developing consolidation. 3. Enlarged pulmonary artery diameter. Finding may be related to pulmonary   arterial hypertension. 4. Coronary artery calcifications present. 5. Moderate hiatal hernia. 6. Questionable circumferential thickening of the descending colon with mild   surrounding fat stranding versus colonic underdistention. Finding raises the   possibility of colitis in the appropriate clinical setting. 7. Colonic diverticulosis, predominately sigmoid. No evidence of acute   diverticulitis. 8. Nonobstructing punctate left nephrolithiasis. RECOMMENDATIONS:   2 cm left adrenal mass, consistent with lipid-rich benign adenoma. No   follow-up imaging is recommended. JACR 2017 Aug; 14(8):1038-44, JCAT 2016 [place]; 40(2):194-200, Urol J 2006   [place]; 3(2):71-4. XR CHEST PORTABLE   Final Result   1. Diffuse hazy multifocal opacity throughout both lungs, right worse than   left, likely a combination of moderate pulmonary edema and multifocal   pneumonia. 2. Stable cardiomegaly. 3. Stable hiatal hernia.              Assessment/Plan:    Active Hospital Problems    Diagnosis Date Noted    Acute on chronic heart failure with preserved ejection fraction (HFpEF) ([facility] [address] [I50.33] 02/08/2023     Priority: Medium    Bacterial pneumonia [J15.9] 02/07/2023     Priority: Medium    Acute kidney injury ([address] [N17.9] 02/07/2023     Priority: Medium    Stage 3 chronic kidney disease ([address] [N18.30] 12/21/2022     Priority: Medium    Acute on chronic diastolic heart failure ([address] [I50.33] 08/29/2022     Priority: Medium    GIANNA (obstructive sleep apnea) [G47.33]     Class 2 severe obesity due to excess calories with serious comorbidity and body mass index (BMI) of 39.0 to 39.9 in adult [facility]) [E66.01, Z68.39] 02/11/2018    Atrial fibrillation ([facility] [address] [I48.91] 01/21/2016    Primary hypertension [I10] 01/21/2016       Acute Medical Issues Being Addressed:    70-year-old admitted to the hospital with dyspnea    Acute hypoxic respiratory failure secondary to acute diastolic heart failure with possible acute exacerbation of COPD with underlying pneumonia gram-positive  Initially on BiPAP now weaned down to 2 L saturating around 91  Seems to have diuresed well currently seems more euvolemic  Hold Lasix for now due to worsening renal failure  Was on levofloxacin will change over to doxycycline to complete a 5-day course  Oxygen down to 2 L  We will try and wean oxygen to maintain sats 88-92  Will need home oxygen eval    Bilateral lower extremity rash  May have some overlying cellulitis  improved      Acute kidney injury prerenal  Some of it may be related to underlying cardiorenal syndrome  Will have to accept a higher creatinine at this point  Hold Lasix for now  If creatinine can be stabilized then that then will restart Lasix  Nephrology consulted    Chronic atrial fibrillation  On amnio and Eliquis  Seen by electrophysiology      DVT Prophylaxis: On Eliquis  Diet: ADULT DIET;  Regular  Code Status: Full Code      Dispo - once acute medical processes have resolved    Jonathan Min MD

## 2023-02-10 NOTE — CONSULTS
TRIMMABLE POWER MIDLINE PROCEDURE NOTE  Chart reviewed for allergies, diagnosis, labs, known contraindications, reason for line placement and planned length of treatment. Insertion procedure discussed with patient/family member. Informed consent not required for Midline placement. Three patient identifiers - Patient name,   and MRN -  completed &  confirmed verbally. Hat, mask and eye shield donned. Midline site scrubbed with Chloraprep  One-Step applicator  for 30 seconds x 1. Hand Hygiene  performed with 3% Chlorhexidine surgical scrub x1 min prior to  Sterile gloves, sterile gown being donned. Patient draped using maximal sterile barrier technique ( head to toe ). Midline site scrubbed a 2nd time with Chloraprep One-Step applicator x 30 sec. Vein located by Ultra Sound and site marked with sterile pen. 1% Lidocaine 5 ml injected intradermal pre-insertion. Midline inserted. Positive brisk blood return obtained. Valve applied to lumen. Midline flushed with 10 mls  0.9% Sterile Sodium Chloride. Midline flushes easily with no resistance. Skin prep applied to site. Catheter secured with non-sutured locking device per hospital protocol. Bio-patch/CHG impregnated sterile tegaderm dressing applied. Alcohol Swab Caps applied to valve. Sterile field maintained during procedure. Positioning wire accounted for post procedure. Pt. Response to procedure, tolerated well. Appearance of site: Clean dry and intact without bleeding or edema. All edges of Tegaderm occlusive. Site marked with date and initials of RN placing line. Top 2 side rails in up position, call button in reach, RN notified of all of the above. A Trimmable Power Midline  14  CM placed in the  NORI  Basilic vein. 0 cm showing from insertion site.

## 2023-02-11 LAB
ANION GAP SERPL CALCULATED.3IONS-SCNC: 12 MMOL/L (ref 3–16)
BLOOD CULTURE, ROUTINE: NORMAL
BUN BLDV-MCNC: 68 MG/DL (ref 7–20)
CALCIUM SERPL-MCNC: 8.7 MG/DL (ref 8.3–10.6)
CHLORIDE BLD-SCNC: 103 MMOL/L (ref 99–110)
CO2: 24 MMOL/L (ref 21–32)
CREAT SERPL-MCNC: 2.3 MG/DL (ref 0.6–1.2)
CULTURE, BLOOD 2: NORMAL
GFR SERPL CREATININE-BSD FRML MDRD: 22 ML/MIN/{1.73_M2}
GLUCOSE BLD-MCNC: 86 MG/DL (ref 70–99)
HCT VFR BLD CALC: 39.1 % (ref 36–48)
HEMOGLOBIN: 12 G/DL (ref 12–16)
MCH RBC QN AUTO: 27.9 PG (ref 26–34)
MCHC RBC AUTO-ENTMCNC: 30.7 G/DL (ref 31–36)
MCV RBC AUTO: 91 FL (ref 80–100)
PDW BLD-RTO: 16.9 % (ref 12.4–15.4)
PLATELET # BLD: 181 K/UL (ref 135–450)
PMV BLD AUTO: 10.5 FL (ref 5–10.5)
POTASSIUM SERPL-SCNC: 5.1 MMOL/L (ref 3.5–5.1)
RBC # BLD: 4.29 M/UL (ref 4–5.2)
SODIUM BLD-SCNC: 139 MMOL/L (ref 136–145)
WBC # BLD: 17.1 K/UL (ref 4–11)

## 2023-02-11 PROCEDURE — 2580000003 HC RX 258: Performed by: CLINICAL NURSE SPECIALIST

## 2023-02-11 PROCEDURE — 1200000000 HC SEMI PRIVATE

## 2023-02-11 PROCEDURE — 6370000000 HC RX 637 (ALT 250 FOR IP): Performed by: HOSPITALIST

## 2023-02-11 PROCEDURE — 99233 SBSQ HOSP IP/OBS HIGH 50: CPT | Performed by: INTERNAL MEDICINE

## 2023-02-11 PROCEDURE — 2580000003 HC RX 258: Performed by: INTERNAL MEDICINE

## 2023-02-11 PROCEDURE — 36415 COLL VENOUS BLD VENIPUNCTURE: CPT

## 2023-02-11 PROCEDURE — 99232 SBSQ HOSP IP/OBS MODERATE 35: CPT | Performed by: NURSE PRACTITIONER

## 2023-02-11 PROCEDURE — 6360000002 HC RX W HCPCS: Performed by: CLINICAL NURSE SPECIALIST

## 2023-02-11 PROCEDURE — 94640 AIRWAY INHALATION TREATMENT: CPT

## 2023-02-11 PROCEDURE — 80048 BASIC METABOLIC PNL TOTAL CA: CPT

## 2023-02-11 PROCEDURE — 6370000000 HC RX 637 (ALT 250 FOR IP): Performed by: INTERNAL MEDICINE

## 2023-02-11 PROCEDURE — 2580000003 HC RX 258: Performed by: HOSPITALIST

## 2023-02-11 PROCEDURE — 85027 COMPLETE CBC AUTOMATED: CPT

## 2023-02-11 PROCEDURE — 2700000000 HC OXYGEN THERAPY PER DAY

## 2023-02-11 RX ADMIN — PREDNISONE 40 MG: 20 TABLET ORAL at 09:22

## 2023-02-11 RX ADMIN — IRON SUCROSE 200 MG: 20 INJECTION, SOLUTION INTRAVENOUS at 09:41

## 2023-02-11 RX ADMIN — APIXABAN 5 MG: 5 TABLET, FILM COATED ORAL at 09:22

## 2023-02-11 RX ADMIN — ASPIRIN 81 MG: 81 TABLET, COATED ORAL at 09:22

## 2023-02-11 RX ADMIN — METOPROLOL TARTRATE 75 MG: 50 TABLET, FILM COATED ORAL at 20:10

## 2023-02-11 RX ADMIN — Medication 10 ML: at 09:43

## 2023-02-11 RX ADMIN — DOXYCYCLINE HYCLATE 100 MG: 100 TABLET, COATED ORAL at 09:22

## 2023-02-11 RX ADMIN — DOXYCYCLINE HYCLATE 100 MG: 100 TABLET, COATED ORAL at 20:09

## 2023-02-11 RX ADMIN — AMIODARONE HYDROCHLORIDE 200 MG: 200 TABLET ORAL at 09:22

## 2023-02-11 RX ADMIN — SODIUM CHLORIDE: 9 INJECTION, SOLUTION INTRAVENOUS at 09:39

## 2023-02-11 RX ADMIN — POLYETHYLENE GLYCOL 3350 17 G: 17 POWDER, FOR SOLUTION ORAL at 06:35

## 2023-02-11 RX ADMIN — Medication 10 ML: at 20:12

## 2023-02-11 RX ADMIN — METOPROLOL TARTRATE 75 MG: 50 TABLET, FILM COATED ORAL at 09:22

## 2023-02-11 RX ADMIN — IPRATROPIUM BROMIDE AND ALBUTEROL SULFATE 1 AMPULE: 2.5; .5 SOLUTION RESPIRATORY (INHALATION) at 20:35

## 2023-02-11 RX ADMIN — APIXABAN 5 MG: 5 TABLET, FILM COATED ORAL at 20:10

## 2023-02-11 RX ADMIN — Medication 10 ML: at 09:22

## 2023-02-11 RX ADMIN — IPRATROPIUM BROMIDE AND ALBUTEROL SULFATE 1 AMPULE: 2.5; .5 SOLUTION RESPIRATORY (INHALATION) at 11:27

## 2023-02-11 RX ADMIN — Medication 10 ML: at 20:11

## 2023-02-11 ASSESSMENT — PAIN SCALES - GENERAL
PAINLEVEL_OUTOF10: 0
PAINLEVEL_OUTOF10: 2

## 2023-02-11 NOTE — PROGRESS NOTES
[facility]              Progress Note      Admit Date 2023  HPI:   Ms. Marcos Roberto is a 78year old female with history of PAF/flutter since 7765, CV, diastolic heart failure, COPD, ckd      She is admitted with abdominal pain in LUQ along with shortness of breath. She had cellulitis and received IV antibiotics. She had declined a CV and then agreed which was scheduled for 2023. She was found to be in AF with RVR. She has gained 12 pounds. She is being treated for acute COPD exacerbation    Interval history:  BNP on upward trend : 32146 > 25878     Net loss 1.9 L     BUN trending up 68 ; creatinine 2.3     ~off lasix     ~NE following     Ms. Ly   Subjective: resting. Offers no complaints though sleepy       Scheduled Meds:   ipratropium-albuterol  1 ampule Inhalation BID    iron sucrose  200 mg IntraVENous Q24H    lidocaine 1 % injection  5 mL IntraDERmal Once    sodium chloride flush  5-40 mL IntraVENous 2 times per day    metoprolol tartrate  75 mg Oral BID    doxycycline hyclate  100 mg Oral 2 times per day    predniSONE  40 mg Oral Daily    amiodarone  200 mg Oral Daily    apixaban  5 mg Oral BID    aspirin  81 mg Oral Daily    sodium chloride flush  5-40 mL IntraVENous 2 times per day    [Held by provider] furosemide  40 mg IntraVENous BID     Continuous Infusions:   sodium chloride      sodium chloride 200 mL/hr at 23 0939     PRN Meds:sodium chloride flush, sodium chloride, diphenhydrAMINE, ondansetron, albuterol sulfate HFA, traMADol, sodium chloride flush, sodium chloride, ondansetron **OR** ondansetron, polyethylene glycol, acetaminophen **OR** acetaminophen       Objective:      Wt Readings from Last 3 Encounters:   02/10/23 245 lb 6.4 oz (111.3 kg)   22 236 lb 14.4 oz (107.5 kg)   12/15/22 235 lb (106.6 kg)   Admit weight: Weight: 235 lb (106.6 kg)      Temperature range over 24hrs:   Temp  Av.7 °F (36.5 °C)  Min: 97.4 °F (36.3 °C)  Max: 98.1 °F (36.7 °C)  Current Respiratory Rate:  Resp: 18  Current Pulse:  Heart Rate: 88  Current Blood Pressure:  BP: 131/85  24hr Blood Pressure Range:  Systolic (25LBF), JKX:213 , Min:125 , JJH:621   ; Diastolic (94KUT), XSW:16, Min:80, Max:89    Current Pulse Oximetry:  SpO2: 93 % 2L NC      Intake/Output Summary (Last 24 hours) at 2/11/2023 1229  Last data filed at 2/11/2023 9175  Gross per 24 hour   Intake 670 ml   Output 900 ml   Net -230 ml       Telemetry monitor: atrial fib / flutter    Physical Exam:  General:  Awake, alert, NAD  Psych : calm   Skin:  Warm and dry  Chest:  coarse through, crackles RLL ,diminished LLL to auscultation, respiration easy  Cardiovascular:  RRR S1S2 no murmur to auscultation; no JVD  Abdomen: Bowel sounds normal, abd soft, non-tender  Extremities:  distal LLE edema; calf firm franky  : via wick; dark urine collectively      Imaging    Limited echo: Dec '22  Irregular rhythm. Normal left ventricle size, wall thickness, and systolic function with an   estimated ejection fraction of 55-60%. No obvious regional wall motion abnormalities are seen. The right ventricle is mildly enlarged with normal function. The right atrium is moderately dilated.     Lab Review     Renal Profile:   Lab Results   Component Value Date/Time    CREATININE 2.3 02/11/2023 08:50 AM    BUN 68 02/11/2023 08:50 AM     02/11/2023 08:50 AM    K 5.1 02/11/2023 08:50 AM    K 4.4 02/07/2023 03:39 AM     02/11/2023 08:50 AM    CO2 24 02/11/2023 08:50 AM     CBC:    Lab Results   Component Value Date/Time    WBC 17.1 02/11/2023 08:50 AM    RBC 4.29 02/11/2023 08:50 AM    HGB 12.0 02/11/2023 08:50 AM    HCT 39.1 02/11/2023 08:50 AM    MCV 91.0 02/11/2023 08:50 AM    RDW 16.9 02/11/2023 08:50 AM     02/11/2023 08:50 AM     BNP:  No results found for: BNP  Fasting Lipid Panel:    Lab Results   Component Value Date/Time    CHOL 142 03/15/2021 10:18 AM    HDL 51 03/15/2021 10:18 AM    TRIG 56 03/15/2021 10:18 AM     Cardiac Enzymes:    Lab Results   Component Value Date/Time    TROPONINI 0.01 12/20/2022 04:00 AM     PT/ INR   Lab Results   Component Value Date/Time    INR 1.49 09/13/2022 06:01 PM    INR 1.45 03/30/2022 11:50 AM    INR 2.6 03/24/2022 03:04 PM    INR 3.0 02/11/2022 01:18 PM    INR 2.5 01/07/2022 03:36 PM    INR 2.91 03/23/2019 06:59 AM    PROTIME 18.0 09/13/2022 06:01 PM    PROTIME 16.7 03/30/2022 11:50 AM    PROTIME 33.2 03/23/2019 06:59 AM     PTT No results found for: PTT   Lab Results   Component Value Date/Time    MG 2.00 09/18/2022 10:34 AM      Lab Results   Component Value Date/Time    TSH 2.81 03/30/2022 11:50 AM      Latest Reference Range & Units 2/7/23 03:39 2/9/23 06:13   Pro-BNP 0 - 449 pg/mL 10,289 (H) 13,923 (H)     Assessment/Plan:     Patient Active Problem List   Diagnosis    Atrial fibrillation ([facility])    Primary hypertension    Severe episode of recurrent major depressive disorder, without psychotic features ([facility])    Seasonal affective disorder ([facility])    Class 2 severe obesity due to excess calories with serious comorbidity and body mass index (BMI) of 39.0 to 39.9 in adult ([facility])    Elevated sed rate    Neutrophilia    Elevated C-reactive protein (CRP)    GIANNA (obstructive sleep apnea)    Morbid obesity ([facility])    Weight loss counseling, encounter for    Atypical atrial flutter ([facility])    PAF (paroxysmal atrial fibrillation) ([facility])    ILD (interstitial lung disease) ([facility])    Iron deficiency anemia due to chronic blood loss    Iron deficiency anemia, unspecified    Age-related osteoporosis without current pathological fracture    Vasovagal syncope    CKD (chronic kidney disease) stage 4, GFR 15-29 ml/min ([facility])    Acute on chronic diastolic heart failure ([facility])    Stage 3b chronic kidney disease ([facility])    B12 deficiency    Vitamin D deficiency    Acute respiratory failure ([facility])    Chronic obstructive pulmonary disease, unspecified    Sepsis ([facility])    Cellulitis of right leg    Obesity (BMI 30-39. 9)    Stage 3 chronic kidney disease ([facility])    Bacteremia    Fever and chills    History of ankle surgery    Streptococcal infection group G    Bacterial pneumonia    Acute kidney injury ([facility])    Acute on chronic heart failure with preserved ejection fraction (HFpEF) ([facility])      Assessment:     1.  Atrial fib/flutter  ~controlled VR  ~amiodarone 200 mg daily / ASA / metoprolol   ~hs of untx GIANNA  2.  Acute on chronic diastolic heart failure  ~net loss 1.9 L ; wt down 5#  ~off lasix d/t renal function ; BNP trending up  3.  Hypertension  ~controlled   4. Iron deficient anemia   ~venofer        Plan:   continue present management monitoring renal function  ~HF team hoping to have SGTL2 initiated once renal function improves / tolerates     The patient was seen for >35 minutes. I reviewed interval history, physical exam, review of data including labs, imaging, development and implementation of treatment plan and coordination of complex care

## 2023-02-11 NOTE — PROGRESS NOTES
Office : [phone]     Fax :[phone]       Nephrology progress  Note      Patient's Name: Adam Newman    2/12/2023    Reason for Consult:  ESRD management       Requesting Physician:  MARILEE Westfall CNP      Chief Complaint:    Chief Complaint   Patient presents with    Abdominal Pain     Pt via EMS from home, c/o LUQ pain 10/10, has had gallbladder removed. Pt states pain is making her sob, 89% ora, put on 2L, pt received 325 mg aspirin, has been treated for cellulitis since December, new antibiotic last two weeks, states she has been getting hives and itching        History of Present Ilness:    Adam Newman is a 78 y.o. female who presented with complaints of shortness of breath. Patient apparently has been treated for cellulitis lower extremity on antibiotics patient started having worsening itching and hives. Came to the ER. Patient with increased shortness of breath. Also with leg swelling. No reported fevers chills. Patient was found to be hypoxic was placed on 2 L nasal cannula. Patient with history of chronic CHF COPD chronic kidney disease admitted with bilateral worse. Baseline creat is 1.2   Now elevated at 2.0     BNP 71432      Interval hx     Feels fine   No edema   Good UOP    I/O last 3 completed shifts: In: 0906 [P.O.:700;  I.V.:1001.6; IV Piggyback:110.4]  Out: 2050 [Urine:2050]    Past Medical History:   Diagnosis Date    Arthritis     Atrial fibrillation and flutter ([address]     Hypertension     Kidney disease     Pt states  \"stage 3\"    Pneumonia     Sleep apnea     no c-pap       Past Surgical History:   Procedure Laterality Date    CARDIOVERSION      COLONOSCOPY N/A 11/20/2018    COLONOSCOPY POLYPECTOMY SNARE/COLD BIOPSY performed by Dina Tobias MD at [address] N/A 03/31/2022    COLONOSCOPY POLYPECTOMY SNARE/COLD BIOPSY performed by Arden Cyr MD at [address] [facility]      ankle rt has pins and rods, and rt elbow    GASTRIC BYPASS SURGERY      LARYNX SURGERY      TOTAL KNEE ARTHROPLASTY Bilateral 12/19/2012    bilateral knee replacements    TUBAL LIGATION      tubes tied    UPPER GASTROINTESTINAL ENDOSCOPY N/A 11/20/2018    EGD BIOPSY performed by Dina Tobias MD at [address] N/A 03/31/2022    EGD DILATION BALLOON performed by Arden Cyr MD at [address] [facility] ENDOSCOPY N/A 03/31/2022    EGD BIOPSY performed by Arden Cyr MD at [address] [facility]       Family History   Problem Relation Age of Onset    Other Mother         blood clot    Cancer Father         stomach cancer    Stroke Brother         reports that she has never smoked. She has never used smokeless tobacco. She reports that she does not drink alcohol and does not use drugs.         Allergies:  Bee venom, Shellfish-derived products, Shrimp flavor, Cephalexin, Codeine, Fentanyl and related, Hydromorphone, and Vancomycin    Current Medications:    ipratropium-albuterol (DUONEB) nebulizer solution 1 ampule, BID  iron sucrose (VENOFER) 200 mg in sodium chloride 0.9 % 100 mL IVPB, Q24H  lidocaine PF 1 % injection 5 mL, Once  sodium chloride flush 0.9 % injection 5-40 mL, 2 times per day  sodium chloride flush 0.9 % injection 5-40 mL, PRN  0.9 % sodium chloride infusion, PRN  metoprolol tartrate (LOPRESSOR) tablet 75 mg, BID  doxycycline hyclate (VIBRA-TABS) tablet 100 mg, 2 times per day  predniSONE (DELTASONE) tablet 40 mg, Daily  diphenhydrAMINE (BENADRYL) tablet 50 mg, Q6H PRN  ondansetron (ZOFRAN) injection 4 mg, Q6H PRN  albuterol sulfate HFA (PROVENTIL;VENTOLIN;PROAIR) 108 (90 Base) MCG/ACT inhaler 2 puff, Q4H PRN  amiodarone (CORDARONE) tablet 200 mg, Daily  apixaban (ELIQUIS) tablet 5 mg, BID  aspirin EC tablet 81 mg, Daily  traMADol (ULTRAM) tablet 50 mg, Q6H PRN  sodium chloride flush 0.9 % injection 5-40 mL, 2 times per day  sodium chloride flush 0.9 % injection 5-40 mL, PRN  0.9 % sodium chloride infusion, PRN  ondansetron (ZOFRAN-ODT) disintegrating tablet 4 mg, Q8H PRN   Or  ondansetron (ZOFRAN) injection 4 mg, Q6H PRN  polyethylene glycol (GLYCOLAX) packet 17 g, Daily PRN  acetaminophen (TYLENOL) tablet 650 mg, Q6H PRN   Or  acetaminophen (TYLENOL) suppository 650 mg, Q6H PRN  [Held by provider] furosemide (LASIX) injection 40 mg, BID      Review of Systems:   14 point ROS obtained but were negative except mentioned in HPI      Physical exam:     Vitals:  BP 99/61   Pulse 93   Temp 98 °F (36.7 °C) (Oral)   Resp 17   Ht 5' 7\" (1.702 m)   Wt 245 lb 6.4 oz (111.3 kg)   SpO2 97%   BMI 38.44 kg/m²   Constitutional:  OAA X3 NAD  Skin: no rash, turgor wnl  Heent:  eomi, mmm  Neck: no bruits or jvd noted  Cardiovascular:  S1, S2 without m/r/g  Respiratory: CTA B without w/r/r  Abdomen:  +bs, soft, nt, nd  Ext: 1 +  lower extremity edema  Psychiatric: mood and affect appropriate  Musculoskeletal:  Rom, muscular strength intact    Labs:  CBC:   Recent Labs     02/10/23  0556 02/11/23  0850   WBC 11.4* 17.1*   HGB 14.2 12.0    181     BMP:    Recent Labs     02/10/23  0556 02/11/23  0850    139   K 5.5* 5.1    103   CO2 19* 24   BUN 57* 68*   CREATININE 2.4* 2.3*   GLUCOSE 99 86     Ca/Mg/Phos:   Recent Labs     02/10/23  0556 02/11/23  0850   CALCIUM 8.6 8.7     Hepatic:   No results for input(s): AST, ALT, ALB, BILITOT, ALKPHOS in the last 72 hours. IMAGING:  CT CHEST ABDOMEN PELVIS WO CONTRAST Additional Contrast? None   Final Result   1. Scattered ground-glass opacities with interlobular septal thickening.    Findings may be related to pulmonary edema with other considerations   including an inflammatory/infectious process in the appropriate clinical   setting. 2. Right upper lobe consolidation. Additional bilateral scattered curvilinear   opacities may be related to atelectasis versus developing consolidation. 3. Enlarged pulmonary artery diameter. Finding may be related to pulmonary   arterial hypertension. 4. Coronary artery calcifications present. 5. Moderate hiatal hernia. 6. Questionable circumferential thickening of the descending colon with mild   surrounding fat stranding versus colonic underdistention. Finding raises the   possibility of colitis in the appropriate clinical setting. 7. Colonic diverticulosis, predominately sigmoid. No evidence of acute   diverticulitis. 8. Nonobstructing punctate left nephrolithiasis. RECOMMENDATIONS:   2 cm left adrenal mass, consistent with lipid-rich benign adenoma. No   follow-up imaging is recommended. JACR 2017 Aug; 14(8):1038-44, JCAT 2016 [place]; 40(2):194-200, Urol J 2006   [place]; 3(2):71-4. XR CHEST PORTABLE   Final Result   1. Diffuse hazy multifocal opacity throughout both lungs, right worse than   left, likely a combination of moderate pulmonary edema and multifocal   pneumonia. 2. Stable cardiomegaly. 3. Stable hiatal hernia. Assessment/Plan :      1. Panda   Creatinine slightly improved   Edema has resolved   Continue to hold lasix       Recommend to dose adjust all medications  based on renal functions  Maintain SBP> 90 mmHg   Daily weights   AVOID NSAIDs  Avoid Nephrotoxins  Monitor Intake/Output  Call if significant decrease in urine output        2. HTN. BP low borderline range   Hold clonidine if SBP < 110 mm HG     3. COPD    4. H/o atrial fib   Monitor       5.  Hyperkalemia   Resolved after getting lokelma         D/w primary team      Thank you for allowing us to participate in care of Mena Causey         Electronically signed by: Brian Epps MD, 2/12/2023, 7:20 AM      [facility] [address]  Office : [phone]  Fax :[phone]

## 2023-02-11 NOTE — PROGRESS NOTES
Shift assessment completed. Vital sign stable. Fall precautions in place, call light within patient's reach. O2 at 2L via nasal cannula. Respiratory therapist to apply CPAP  before bedtime.

## 2023-02-11 NOTE — PLAN OF CARE
Problem: Skin/Tissue Integrity  Goal: Absence of new skin breakdown  Description: 1. Monitor for areas of redness and/or skin breakdown  2. Assess vascular access sites hourly  3. Every 4-6 hours minimum:  Change oxygen saturation probe site  4. Every 4-6 hours:  If on nasal continuous positive airway pressure, respiratory therapy assess nares and determine need for appliance change or resting period.   2/10/2023 2238 by Jerome Lao RN  Outcome: Progressing     Problem: Safety - Adult  Goal: Free from fall injury  2/10/2023 2238 by Jerome Lao RN  Outcome: Progressing  Flowsheets (Taken 2/9/2023 2329 by Alirio Lewis RN)  Free From Fall Injury: Instruct family/caregiver on patient safety     Problem: Pain  Goal: Verbalizes/displays adequate comfort level or baseline comfort level  2/10/2023 2238 by Jerome Lao RN  Outcome: Progressing     Problem: ABCDS Injury Assessment  Goal: Absence of physical injury  2/10/2023 2238 by Jerome Lao RN  Outcome: Progressing  Flowsheets (Taken 2/9/2023 2329 by Alirio Lewis RN)  Absence of Physical Injury: Implement safety measures based on patient assessment     Problem: Metabolic/Fluid and Electrolytes - Adult  Goal: Electrolytes maintained within normal limits  2/10/2023 2238 by Jerome Lao RN  Outcome: Progressing  Flowsheets (Taken 2/10/2023 1531 by Brandee Hillman RN)  Electrolytes maintained within normal limits: Monitor labs and assess patient for signs and symptoms of electrolyte imbalances  Flowsheets (Taken 2/9/2023 2132 by Alirio Lewis RN)  Electrolytes maintained within normal limits:   Monitor labs and assess patient for signs and symptoms of electrolyte imbalances   Administer electrolyte replacement as ordered  Goal: Hemodynamic stability and optimal renal function maintained  2/10/2023 2238 by Jerome Lao RN  Outcome: Progressing  Flowsheets (Taken 2/10/2023 1531 by Brandee Hillman RN)  Hemodynamic stability and optimal renal function maintained: Monitor labs and assess for signs and symptoms of volume excess or deficit  Flowsheets (Taken 2/9/2023 2132 by Blanca Dueñas RN)  Hemodynamic stability and optimal renal function maintained:   Monitor labs and assess for signs and symptoms of volume excess or deficit   Monitor intake, output and patient weight

## 2023-02-12 PROBLEM — R79.89 ELEVATED D-DIMER: Status: ACTIVE | Noted: 2023-02-12

## 2023-02-12 PROBLEM — J18.9 MULTIFOCAL PNEUMONIA: Status: ACTIVE | Noted: 2023-02-12

## 2023-02-12 PROBLEM — T78.40XA ALLERGIC DRUG REACTION: Status: ACTIVE | Noted: 2023-02-12

## 2023-02-12 LAB
ANION GAP SERPL CALCULATED.3IONS-SCNC: 5 MMOL/L (ref 3–16)
BUN BLDV-MCNC: 68 MG/DL (ref 7–20)
CALCIUM SERPL-MCNC: 8.1 MG/DL (ref 8.3–10.6)
CHLORIDE BLD-SCNC: 104 MMOL/L (ref 99–110)
CO2: 33 MMOL/L (ref 21–32)
CREAT SERPL-MCNC: 1.8 MG/DL (ref 0.6–1.2)
GFR SERPL CREATININE-BSD FRML MDRD: 29 ML/MIN/{1.73_M2}
GLUCOSE BLD-MCNC: 127 MG/DL (ref 70–99)
POTASSIUM SERPL-SCNC: 4.5 MMOL/L (ref 3.5–5.1)
SODIUM BLD-SCNC: 142 MMOL/L (ref 136–145)

## 2023-02-12 PROCEDURE — 2580000003 HC RX 258: Performed by: INTERNAL MEDICINE

## 2023-02-12 PROCEDURE — 94761 N-INVAS EAR/PLS OXIMETRY MLT: CPT

## 2023-02-12 PROCEDURE — 94640 AIRWAY INHALATION TREATMENT: CPT

## 2023-02-12 PROCEDURE — 1200000000 HC SEMI PRIVATE

## 2023-02-12 PROCEDURE — 6360000002 HC RX W HCPCS: Performed by: CLINICAL NURSE SPECIALIST

## 2023-02-12 PROCEDURE — 6370000000 HC RX 637 (ALT 250 FOR IP): Performed by: HOSPITALIST

## 2023-02-12 PROCEDURE — 2700000000 HC OXYGEN THERAPY PER DAY

## 2023-02-12 PROCEDURE — 6370000000 HC RX 637 (ALT 250 FOR IP): Performed by: INTERNAL MEDICINE

## 2023-02-12 PROCEDURE — 2580000003 HC RX 258: Performed by: CLINICAL NURSE SPECIALIST

## 2023-02-12 PROCEDURE — 2580000003 HC RX 258: Performed by: HOSPITALIST

## 2023-02-12 PROCEDURE — 99233 SBSQ HOSP IP/OBS HIGH 50: CPT | Performed by: INTERNAL MEDICINE

## 2023-02-12 PROCEDURE — 80048 BASIC METABOLIC PNL TOTAL CA: CPT

## 2023-02-12 PROCEDURE — 99232 SBSQ HOSP IP/OBS MODERATE 35: CPT | Performed by: NURSE PRACTITIONER

## 2023-02-12 RX ORDER — METOPROLOL TARTRATE 75 MG/1
75 TABLET, FILM COATED ORAL 2 TIMES DAILY
Qty: 60 TABLET | Refills: 3 | Status: SHIPPED | OUTPATIENT
Start: 2023-02-12

## 2023-02-12 RX ADMIN — APIXABAN 5 MG: 5 TABLET, FILM COATED ORAL at 20:00

## 2023-02-12 RX ADMIN — Medication 20 ML: at 09:00

## 2023-02-12 RX ADMIN — AMIODARONE HYDROCHLORIDE 200 MG: 200 TABLET ORAL at 09:21

## 2023-02-12 RX ADMIN — APIXABAN 5 MG: 5 TABLET, FILM COATED ORAL at 09:24

## 2023-02-12 RX ADMIN — Medication 10 ML: at 20:01

## 2023-02-12 RX ADMIN — IRON SUCROSE 200 MG: 20 INJECTION, SOLUTION INTRAVENOUS at 09:39

## 2023-02-12 RX ADMIN — METOPROLOL TARTRATE 75 MG: 50 TABLET, FILM COATED ORAL at 09:22

## 2023-02-12 RX ADMIN — SODIUM CHLORIDE 25 ML: 9 INJECTION, SOLUTION INTRAVENOUS at 09:36

## 2023-02-12 RX ADMIN — Medication 5 ML: at 09:00

## 2023-02-12 RX ADMIN — DOXYCYCLINE HYCLATE 100 MG: 100 TABLET, COATED ORAL at 20:00

## 2023-02-12 RX ADMIN — DOXYCYCLINE HYCLATE 100 MG: 100 TABLET, COATED ORAL at 09:21

## 2023-02-12 RX ADMIN — METOPROLOL TARTRATE 75 MG: 50 TABLET, FILM COATED ORAL at 20:00

## 2023-02-12 RX ADMIN — IPRATROPIUM BROMIDE AND ALBUTEROL SULFATE 1 AMPULE: 2.5; .5 SOLUTION RESPIRATORY (INHALATION) at 08:30

## 2023-02-12 RX ADMIN — PREDNISONE 40 MG: 20 TABLET ORAL at 09:24

## 2023-02-12 RX ADMIN — IPRATROPIUM BROMIDE AND ALBUTEROL SULFATE 1 AMPULE: 2.5; .5 SOLUTION RESPIRATORY (INHALATION) at 18:29

## 2023-02-12 RX ADMIN — ASPIRIN 81 MG: 81 TABLET, COATED ORAL at 09:21

## 2023-02-12 ASSESSMENT — PAIN SCALES - GENERAL
PAINLEVEL_OUTOF10: 0
PAINLEVEL_OUTOF10: 0

## 2023-02-12 NOTE — PLAN OF CARE
Problem: Skin/Tissue Integrity  Goal: Absence of new skin breakdown  Description: 1. Monitor for areas of redness and/or skin breakdown  2. Assess vascular access sites hourly  3. Every 4-6 hours minimum:  Change oxygen saturation probe site  4. Every 4-6 hours:  If on nasal continuous positive airway pressure, respiratory therapy assess nares and determine need for appliance change or resting period.   Outcome: Progressing     Problem: Safety - Adult  Goal: Free from fall injury  Outcome: Progressing     Problem: Pain  Goal: Verbalizes/displays adequate comfort level or baseline comfort level  Outcome: Progressing     Problem: ABCDS Injury Assessment  Goal: Absence of physical injury  Outcome: Progressing     Problem: Metabolic/Fluid and Electrolytes - Adult  Goal: Electrolytes maintained within normal limits  Outcome: Progressing  Goal: Hemodynamic stability and optimal renal function maintained  Outcome: Progressing

## 2023-02-12 NOTE — PROGRESS NOTES
Shift assessment completed. Routine vitals completed. Scheduled medications given. Patient is awake, alert and oriented. Respirations are easy. Patient does not appear to be in distress. Call light within reach.

## 2023-02-12 NOTE — PROGRESS NOTES
Hospitalist Progress Note      PCP: Aakash Cunningham, APRN - CNP    Date of Admission: 2/7/2023    Chief Complaint: CHF    Hospital Course:   Still has some shortness of breath although significantly improved as compared to admission  -800 mL in the last 24 hours  Remains on 2 L oxygen  No abdominal pain        Medications:  Reviewed      Exam:    BP 99/61   Pulse 93   Temp 98 °F (36.7 °C) (Oral)   Resp 17   Ht 5' 7\" (1.702 m)   Wt 246 lb (111.6 kg)   SpO2 97%   BMI 38.53 kg/m²     General appearance: No apparent distress, appears stated age and cooperative. HEENT: Pupils equal, round, and reactive to light. Conjunctivae/corneas clear. Neck: Supple, with full range of motion. No jugular venous distention. Trachea midline. Respiratory:  Normal respiratory effort. Clear to auscultation, bilaterally without RALES/WHEEZES/Rhonchi. Cardiovascular: Regular rate and rhythm with normal S1/S2 without MURMURS, rubs or gallops. Abdomen: Soft, non-tender, non-distended with normal bowel sounds. Musculoskeletal: 1+ pitting edema some erythema of both lower extremity I strongly suspect that these are subacute changes related to her leg swelling  Skin: Skin color, texture, turgor normal.  No rashes or lesions. Neurologic:  Neurovascularly intact without any focal sensory/motor deficits. Cranial nerves: II-XII intact, grossly non-focal.      Labs:   Recent Labs     02/10/23  0556 02/11/23  0850   WBC 11.4* 17.1*   HGB 14.2 12.0   HCT 46.3 39.1    181     Recent Labs     02/10/23  0556 02/11/23  0850    139   K 5.5* 5.1    103   CO2 19* 24   BUN 57* 68*   CREATININE 2.4* 2.3*   CALCIUM 8.6 8.7     No results for input(s): AST, ALT, BILIDIR, BILITOT, ALKPHOS in the last 72 hours. No results for input(s): INR in the last 72 hours. No results for input(s): Felix Beets in the last 72 hours.     Urinalysis:      Lab Results   Component Value Date/Time    NITRU Negative 12/21/2022 07:00 PM WBCUA 0 12/21/2022 07:00 PM    BACTERIA 2+ 12/21/2022 07:00 PM    RBCUA 2 12/21/2022 07:00 PM    BLOODU TRACE 12/21/2022 07:00 PM    SPECGRAV 1.025 12/21/2022 07:00 PM    GLUCOSEU Negative 12/21/2022 07:00 PM       Radiology:  CT CHEST ABDOMEN PELVIS WO CONTRAST Additional Contrast? None   Final Result   1. Scattered ground-glass opacities with interlobular septal thickening. Findings may be related to pulmonary edema with other considerations   including an inflammatory/infectious process in the appropriate clinical   setting. 2. Right upper lobe consolidation. Additional bilateral scattered curvilinear   opacities may be related to atelectasis versus developing consolidation. 3. Enlarged pulmonary artery diameter. Finding may be related to pulmonary   arterial hypertension. 4. Coronary artery calcifications present. 5. Moderate hiatal hernia. 6. Questionable circumferential thickening of the descending colon with mild   surrounding fat stranding versus colonic underdistention. Finding raises the   possibility of colitis in the appropriate clinical setting. 7. Colonic diverticulosis, predominately sigmoid. No evidence of acute   diverticulitis. 8. Nonobstructing punctate left nephrolithiasis. RECOMMENDATIONS:   2 cm left adrenal mass, consistent with lipid-rich benign adenoma. No   follow-up imaging is recommended. JACR 2017 Aug; 14(8):1038-44, JCAT 2016 Yahaira Miller; 40(2):194-200, Urol J 2006   Spring; 3(2):71-4. XR CHEST PORTABLE   Final Result   1. Diffuse hazy multifocal opacity throughout both lungs, right worse than   left, likely a combination of moderate pulmonary edema and multifocal   pneumonia. 2. Stable cardiomegaly. 3. Stable hiatal hernia.              Assessment/Plan:    Active Hospital Problems    Diagnosis Date Noted    Multifocal pneumonia [J18.9] 02/12/2023     Priority: Medium    Allergic drug reaction [T78.40XA] 02/12/2023     Priority: Medium Elevated d-dimer [R79.89] 02/12/2023     Priority: Medium    Acute on chronic heart failure with preserved ejection fraction (HFpEF) ([facility] [address] [I50.33] 02/08/2023     Priority: Medium    Bacterial pneumonia [J15.9] 02/07/2023     Priority: Medium    Acute kidney injury ([address] [N17.9] 02/07/2023     Priority: Medium    Stage 3 chronic kidney disease ([address] [N18.30] 12/21/2022     Priority: Medium    Acute on chronic diastolic heart failure ([address] [I50.33] 08/29/2022     Priority: Medium    GIANNA (obstructive sleep apnea) [G47.33]     Class 2 severe obesity due to excess calories with serious comorbidity and body mass index (BMI) of 39.0 to 39.9 in adult [facility]) [E66.01, Z68.39] 02/11/2018    Atrial fibrillation ([facility] [address] [I48.91] 01/21/2016    Primary hypertension [I10] 01/21/2016       Acute Medical Issues Being Addressed:    70-year-old admitted to the hospital with dyspnea    Acute hypoxic respiratory failure secondary to acute diastolic heart failure with possible acute exacerbation of COPD with underlying pneumonia gram-positive  Initially on BiPAP now weaned down to 2 L saturating around 91  Seems to have diuresed well currently seems more euvolemic  Hold Lasix for now due to worsening renal failure  Was on levofloxacin will change over to doxycycline to complete a 5-day course  Oxygen down to 2 L  We will try and wean oxygen to maintain sats 88-92  Will need home oxygen eval    Bilateral lower extremity rash  May have some overlying cellulitis  improved      Acute kidney injury prerenal  Some of it may be related to underlying cardiorenal syndrome  Will have to accept a higher creatinine at this point  Hold Lasix for now  If creatinine can be stabilized then that then will restart Lasix  Nephrology consulted    Chronic atrial fibrillation  On amnio and Eliquis  Seen by electrophysiology      DVT Prophylaxis: On Eliquis  Diet: ADULT DIET; Regular; No Added Salt (3-4 gm);  Low Potassium (Less than 3000 mg/day)  Code Status: Full Code      Dispo -once okay with nephrology.     Bruna Rahman MD

## 2023-02-12 NOTE — PROGRESS NOTES
[facility]              Progress Note      Admit Date 2/7/2023  HPI:   Ms. Ignacio Nageotte is a 78year old female with history of PAF/flutter since 3634, CV, diastolic heart failure, COPD, ckd      She is admitted with abdominal pain in LUQ along with shortness of breath. She had cellulitis and received IV antibiotics. She had declined a CV and then agreed which was scheduled for 2/9/2023. She was found to be in AF with RVR. She has gained 12 pounds. She is being treated for acute COPD exacerbation    Interval history:  BNP on upward trend : 00264 > 24823     Net loss 1.7 L     BUN trending up 68 ; creatinine 2.3 > 1.8     ~off lasix     ~NE following     Ms. Ly   Subjective: c/o belching. Denies nausea. Mary po. Cannot say that she feels SOB since she hasn't been up OOB. Scheduled Meds:   ipratropium-albuterol  1 ampule Inhalation BID    iron sucrose  200 mg IntraVENous Q24H    lidocaine 1 % injection  5 mL IntraDERmal Once    sodium chloride flush  5-40 mL IntraVENous 2 times per day    metoprolol tartrate  75 mg Oral BID    doxycycline hyclate  100 mg Oral 2 times per day    predniSONE  40 mg Oral Daily    amiodarone  200 mg Oral Daily    apixaban  5 mg Oral BID    aspirin  81 mg Oral Daily    sodium chloride flush  5-40 mL IntraVENous 2 times per day    [Held by provider] furosemide  40 mg IntraVENous BID     Continuous Infusions:   sodium chloride 25 mL (02/12/23 0936)    sodium chloride Stopped (02/11/23 1502)     PRN Meds:sodium chloride flush, sodium chloride, diphenhydrAMINE, ondansetron, albuterol sulfate HFA, traMADol, sodium chloride flush, sodium chloride, ondansetron **OR** ondansetron, polyethylene glycol, acetaminophen **OR** acetaminophen       Objective:      Wt Readings from Last 3 Encounters:   02/12/23 246 lb (111.6 kg)   12/24/22 236 lb 14.4 oz (107.5 kg)   12/15/22 235 lb (106.6 kg)   Admit weight: Weight: 235 lb (106.6 kg)      Temperature range over 24hrs:   Temp  Avg: 97.9 °F (36.6 °C)  Min: 97.7 °F (36.5 °C)  Max: 98 °F (36.7 °C)  Current Respiratory Rate:  Resp: 18  Current Pulse:  Heart Rate: 98  Current Blood Pressure:  BP: 133/79  24hr Blood Pressure Range:  Systolic (59XDG), DCS:573 , Min:99 , MPQ:744   ; Diastolic (41TYO), PQE:63, Min:61, Max:85    Current Pulse Oximetry:  SpO2: 100 % 2L NC      Intake/Output Summary (Last 24 hours) at 2/12/2023 1018  Last data filed at 2/12/2023 0917  Gross per 24 hour   Intake 1581.96 ml   Output 1350 ml   Net 231.96 ml       Telemetry monitor: atrial fib / flutter    Physical Exam:  General:  Awake, alert, NAD  Psych : calm   Skin:  Warm and dry  Chest:  crackles RLL ,diminished LLL to auscultation, respiration easy  Cardiovascular:  RRR 80 S1S2 no murmur to auscultation; no JVD  Abdomen: Bowel sounds normal, abd soft, non-tender  Extremities:  no edema; calf firm franky  : via wick; dark urine collectively      Imaging    Limited echo: Dec '22  Irregular rhythm. Normal left ventricle size, wall thickness, and systolic function with an   estimated ejection fraction of 55-60%. No obvious regional wall motion abnormalities are seen. The right ventricle is mildly enlarged with normal function. The right atrium is moderately dilated.     Lab Review     Renal Profile:   Lab Results   Component Value Date/Time    CREATININE 1.8 02/12/2023 09:05 AM    BUN 68 02/12/2023 09:05 AM     02/12/2023 09:05 AM    K 4.5 02/12/2023 09:05 AM    K 4.4 02/07/2023 03:39 AM     02/12/2023 09:05 AM    CO2 33 02/12/2023 09:05 AM     CBC:    Lab Results   Component Value Date/Time    WBC 17.1 02/11/2023 08:50 AM    RBC 4.29 02/11/2023 08:50 AM    HGB 12.0 02/11/2023 08:50 AM    HCT 39.1 02/11/2023 08:50 AM    MCV 91.0 02/11/2023 08:50 AM    RDW 16.9 02/11/2023 08:50 AM     02/11/2023 08:50 AM     BNP:  No results found for: BNP  Fasting Lipid Panel:    Lab Results   Component Value Date/Time    CHOL 142 03/15/2021 10:18 AM    HDL 51 03/15/2021 10:18 AM    TRIG 56 03/15/2021 10:18 AM     Cardiac Enzymes:    Lab Results   Component Value Date/Time    TROPONINI 0.01 12/20/2022 04:00 AM     PT/ INR   Lab Results   Component Value Date/Time    INR 1.49 09/13/2022 06:01 PM    INR 1.45 03/30/2022 11:50 AM    INR 2.6 03/24/2022 03:04 PM    INR 3.0 02/11/2022 01:18 PM    INR 2.5 01/07/2022 03:36 PM    INR 2.91 03/23/2019 06:59 AM    PROTIME 18.0 09/13/2022 06:01 PM    PROTIME 16.7 03/30/2022 11:50 AM    PROTIME 33.2 03/23/2019 06:59 AM     PTT No results found for: PTT   Lab Results   Component Value Date/Time    MG 2.00 09/18/2022 10:34 AM      Lab Results   Component Value Date/Time    TSH 2.81 03/30/2022 11:50 AM      Latest Reference Range & Units 2/7/23 03:39 2/9/23 06:13   Pro-BNP 0 - 449 pg/mL 10,289 (H) 13,923 (H)     Assessment/Plan:     Patient Active Problem List   Diagnosis    Atrial fibrillation ([facility])    Primary hypertension    Severe episode of recurrent major depressive disorder, without psychotic features ([facility] [address]    Seasonal affective disorder ([facility])    Class 2 severe obesity due to excess calories with serious comorbidity and body mass index (BMI) of 39.0 to 39.9 in adult ([facility])    Elevated sed rate    Neutrophilia    Elevated C-reactive protein (CRP)    GIANNA (obstructive sleep apnea)    Morbid obesity ([facility])    Weight loss counseling, encounter for    Atypical atrial flutter ([facility])    PAF (paroxysmal atrial fibrillation) ([facility])    ILD (interstitial lung disease) ([facility])    Iron deficiency anemia due to chronic blood loss    Iron deficiency anemia, unspecified    Age-related osteoporosis without current pathological fracture    Vasovagal syncope    CKD (chronic kidney disease) stage 4, GFR 15-29 ml/min ([facility])    Acute on chronic diastolic heart failure ([facility])    Stage 3b chronic kidney disease ([facility] [address]    B12 deficiency    Vitamin D deficiency    Acute respiratory failure ([facility] [address]    Chronic obstructive pulmonary disease, unspecified    Sepsis ([facility] [address] Cellulitis of right leg    Obesity (BMI 30-39. 9)    Stage 3 chronic kidney disease ([facility])    Bacteremia    Fever and chills    History of ankle surgery    Streptococcal infection group G    Bacterial pneumonia    Acute kidney injury ([address]    Acute on chronic heart failure with preserved ejection fraction (HFpEF) ([facility])    Multifocal pneumonia    Allergic drug reaction    Elevated d-dimer      Assessment:     1. Atrial fib/flutter  ~controlled VR  ~amiodarone 200 mg daily / ASA / metoprolol   ~hs of untx GIANNA ; home oxygen recommended  2. Acute on chronic diastolic heart failure  ~net loss 1.7 L   ~off lasix d/t renal function ; BNP trending up  3. Hypertension  ~controlled   4. Iron deficient anemia   ~venofer        Plan:   continue present management monitoring renal function  ~HF team hoping to have SGTL2 initiated once renal function improves / tolerates ; they will see again in AM    The patient was seen for >35 minutes.  I reviewed interval history, physical exam, review of data including labs, imaging, development and implementation of treatment plan and coordination of complex care

## 2023-02-12 NOTE — PROGRESS NOTES
Office : [phone]     Fax :[phone]       Nephrology progress  Note      Patient's Name: Erik Berkowitz    2/12/2023    Reason for Consult:  ESRD management       Requesting Physician:  MARILEE Dave CNP      Chief Complaint:    Chief Complaint   Patient presents with    Abdominal Pain     Pt via EMS from home, c/o LUQ pain 10/10, has had gallbladder removed. Pt states pain is making her sob, 89% ora, put on 2L, pt received 325 mg aspirin, has been treated for cellulitis since December, new antibiotic last two weeks, states she has been getting hives and itching        History of Present Ilness:    Erik Berkowitz is a 78 y.o. female who presented with complaints of shortness of breath. Patient apparently has been treated for cellulitis lower extremity on antibiotics patient started having worsening itching and hives. Came to the ER. Patient with increased shortness of breath. Also with leg swelling. No reported fevers chills. Patient was found to be hypoxic was placed on 2 L nasal cannula. Patient with history of chronic CHF COPD chronic kidney disease admitted with bilateral worse. Baseline creat is 1.2   Now elevated at 2.0     BNP 52284      Interval hx     Feels fine   No edema   Good UOP    I/O last 3 completed shifts: In: 1962 [P.O.:850;  I.V.:1001.6; IV Piggyback:110.4]  Out: 2050 [Urine:2050]    Past Medical History:   Diagnosis Date    Arthritis     Atrial fibrillation and flutter ([address]     Hypertension     Kidney disease     Pt states  \"stage 3\"    Pneumonia     Sleep apnea     no c-pap       Past Surgical History:   Procedure Laterality Date    CARDIOVERSION      COLONOSCOPY N/A 11/20/2018    COLONOSCOPY POLYPECTOMY SNARE/COLD BIOPSY performed by Jairon Gordon MD at [facility] ENDOSCOPY    COLONOSCOPY N/A 03/31/2022    COLONOSCOPY POLYPECTOMY SNARE/COLD BIOPSY performed by Alfred London MD at [facility] ENDOSCOPY    FRACTURE SURGERY      ankle rt has pins and rods, and rt elbow    GASTRIC BYPASS SURGERY      LARYNX SURGERY      TOTAL KNEE ARTHROPLASTY Bilateral 12/19/2012    bilateral knee replacements    TUBAL LIGATION      tubes tied    UPPER GASTROINTESTINAL ENDOSCOPY N/A 11/20/2018    EGD BIOPSY performed by Jairon Gordon MD at [facility] ENDOSCOPY    UPPER GASTROINTESTINAL ENDOSCOPY N/A 03/31/2022    EGD DILATION BALLOON performed by Alfred London MD at [facility] ENDOSCOPY    UPPER GASTROINTESTINAL ENDOSCOPY N/A 03/31/2022    EGD BIOPSY performed by Alfred London MD at [facility] ENDOSCOPY       Family History   Problem Relation Age of Onset    Other Mother         blood clot    Cancer Father         stomach cancer    Stroke Brother         reports that she has never smoked. She has never used smokeless tobacco. She reports that she does not drink alcohol and does not use drugs.        Allergies:  Bee venom, Shellfish-derived products, Shrimp flavor, Cephalexin, Codeine, Fentanyl and related, Hydromorphone, and Vancomycin    Current Medications:    ipratropium-albuterol (DUONEB) nebulizer solution 1 ampule, BID  iron sucrose (VENOFER) 200 mg in sodium chloride 0.9 % 100 mL IVPB, Q24H  lidocaine PF 1 % injection 5 mL, Once  sodium chloride flush 0.9 % injection 5-40 mL, 2 times per day  sodium chloride flush 0.9 % injection 5-40 mL, PRN  0.9 % sodium chloride infusion, PRN  metoprolol tartrate (LOPRESSOR) tablet 75 mg, BID  doxycycline hyclate (VIBRA-TABS) tablet 100 mg, 2 times per day  predniSONE (DELTASONE) tablet 40 mg, Daily  diphenhydrAMINE (BENADRYL) tablet 50 mg, Q6H PRN  ondansetron (ZOFRAN) injection 4 mg, Q6H PRN  albuterol sulfate HFA (PROVENTIL;VENTOLIN;PROAIR) 108 (90 Base) MCG/ACT inhaler 2 puff, Q4H PRN  amiodarone (CORDARONE) tablet 200 mg,  Daily  apixaban (ELIQUIS) tablet 5 mg, BID  aspirin EC tablet 81 mg, Daily  traMADol (ULTRAM) tablet 50 mg, Q6H PRN  sodium chloride flush 0.9 % injection 5-40 mL, 2 times per day  sodium chloride flush 0.9 % injection 5-40 mL, PRN  0.9 % sodium chloride infusion, PRN  ondansetron (ZOFRAN-ODT) disintegrating tablet 4 mg, Q8H PRN   Or  ondansetron (ZOFRAN) injection 4 mg, Q6H PRN  polyethylene glycol (GLYCOLAX) packet 17 g, Daily PRN  acetaminophen (TYLENOL) tablet 650 mg, Q6H PRN   Or  acetaminophen (TYLENOL) suppository 650 mg, Q6H PRN  [Held by provider] furosemide (LASIX) injection 40 mg, BID      Review of Systems:   14 point ROS obtained but were negative except mentioned in HPI      Physical exam:     Vitals:  BP 99/61   Pulse 93   Temp 98 °F (36.7 °C) (Oral)   Resp 17   Ht 5' 7\" (1.702 m)   Wt 246 lb (111.6 kg)   SpO2 97%   BMI 38.53 kg/m²   Constitutional:  OAA X3 NAD  Skin: no rash, turgor wnl  Heent:  eomi, mmm  Neck: no bruits or jvd noted  Cardiovascular:  S1, S2 without m/r/g  Respiratory: CTA B without w/r/r  Abdomen:  +bs, soft, nt, nd  Ext: 1 +  lower extremity edema  Psychiatric: mood and affect appropriate  Musculoskeletal:  Rom, muscular strength intact    Labs:  CBC:   Recent Labs     02/10/23  0556 02/11/23  0850   WBC 11.4* 17.1*   HGB 14.2 12.0    181       BMP:    Recent Labs     02/10/23  0556 02/11/23  0850    139   K 5.5* 5.1    103   CO2 19* 24   BUN 57* 68*   CREATININE 2.4* 2.3*   GLUCOSE 99 86       Ca/Mg/Phos:   Recent Labs     02/10/23  0556 02/11/23  0850   CALCIUM 8.6 8.7       Hepatic:   No results for input(s): AST, ALT, ALB, BILITOT, ALKPHOS in the last 72 hours. IMAGING:  CT CHEST ABDOMEN PELVIS WO CONTRAST Additional Contrast? None   Final Result   1. Scattered ground-glass opacities with interlobular septal thickening.    Findings may be related to pulmonary edema with other considerations   including an inflammatory/infectious process in the appropriate clinical   setting. 2. Right upper lobe consolidation. Additional bilateral scattered curvilinear   opacities may be related to atelectasis versus developing consolidation. 3. Enlarged pulmonary artery diameter. Finding may be related to pulmonary   arterial hypertension. 4. Coronary artery calcifications present. 5. Moderate hiatal hernia. 6. Questionable circumferential thickening of the descending colon with mild   surrounding fat stranding versus colonic underdistention. Finding raises the   possibility of colitis in the appropriate clinical setting. 7. Colonic diverticulosis, predominately sigmoid. No evidence of acute   diverticulitis. 8. Nonobstructing punctate left nephrolithiasis. RECOMMENDATIONS:   2 cm left adrenal mass, consistent with lipid-rich benign adenoma. No   follow-up imaging is recommended. JACR 2017 Aug; 14(8):1038-44, JCAT 2016 Aj Isaac; 40(2):194-200, Urol J 2006   Spring; 3(2):71-4. XR CHEST PORTABLE   Final Result   1. Diffuse hazy multifocal opacity throughout both lungs, right worse than   left, likely a combination of moderate pulmonary edema and multifocal   pneumonia. 2. Stable cardiomegaly. 3. Stable hiatal hernia. Assessment/Plan :      1. Panda   Creatinine slightly improved   Edema has resolved   Continue to hold lasix       Recommend to dose adjust all medications  based on renal functions  Maintain SBP> 90 mmHg   Daily weights   AVOID NSAIDs  Avoid Nephrotoxins  Monitor Intake/Output  Call if significant decrease in urine output        2. HTN. BP low borderline range   Hold clonidine if SBP < 110 mm HG     3. COPD    4. H/o atrial fib   Monitor       5.  Hyperkalemia   Resolved after getting lokelma         D/w primary team      Thank you for allowing us to participate in care of Birdie Silva         Electronically signed by: Nitesh Varela MD, 2/12/2023, 8:30 AM      Nephrology [facility] [address]  Office : [phone]  Fax :[phone]

## 2023-02-12 NOTE — PLAN OF CARE
Problem: Skin/Tissue Integrity  Goal: Absence of new skin breakdown  Description: 1.  Monitor for areas of redness and/or skin breakdown  2.  Assess vascular access sites hourly  3.  Every 4-6 hours minimum:  Change oxygen saturation probe site  4.  Every 4-6 hours:  If on nasal continuous positive airway pressure, respiratory therapy assess nares and determine need for appliance change or resting period.  2/12/2023 1157 by Terese Gracia LPN  Outcome: Progressing  2/12/2023 0402 by Dante Rose RN  Outcome: Progressing     Problem: Safety - Adult  Goal: Free from fall injury  2/12/2023 1157 by Terese Gracia LPN  Outcome: Progressing  2/12/2023 0402 by Dante Rose RN  Outcome: Progressing     Problem: Pain  Goal: Verbalizes/displays adequate comfort level or baseline comfort level  2/12/2023 1157 by Terese Gracia LPN  Outcome: Progressing  2/12/2023 0402 by Dante Rose RN  Outcome: Progressing     Problem: ABCDS Injury Assessment  Goal: Absence of physical injury  2/12/2023 1157 by Terese Gracia LPN  Outcome: Progressing  2/12/2023 0402 by Dante Rose RN  Outcome: Progressing     Problem: Metabolic/Fluid and Electrolytes - Adult  Goal: Electrolytes maintained within normal limits  2/12/2023 1157 by Terese Gracia LPN  Outcome: Progressing  2/12/2023 0402 by Dante Rose RN  Outcome: Progressing  Goal: Hemodynamic stability and optimal renal function maintained  2/12/2023 1157 by Terese Gracia LPN  Outcome: Progressing  2/12/2023 0402 by Dante Rose RN  Outcome: Progressing     Problem: Chronic Conditions and Co-morbidities  Goal: Patient's chronic conditions and co-morbidity symptoms are monitored and maintained or improved  Outcome: Progressing

## 2023-02-13 ENCOUNTER — APPOINTMENT (OUTPATIENT)
Dept: CT IMAGING | Age: 78
DRG: 291 | End: 2023-02-13
Payer: COMMERCIAL

## 2023-02-13 ENCOUNTER — TELEPHONE (OUTPATIENT)
Dept: BARIATRICS/WEIGHT MGMT | Age: 78
End: 2023-02-13

## 2023-02-13 LAB
ANION GAP SERPL CALCULATED.3IONS-SCNC: 3 MMOL/L (ref 3–16)
BUN BLDV-MCNC: 61 MG/DL (ref 7–20)
CALCIUM SERPL-MCNC: 8.8 MG/DL (ref 8.3–10.6)
CHLORIDE BLD-SCNC: 103 MMOL/L (ref 99–110)
CO2: 34 MMOL/L (ref 21–32)
CREAT SERPL-MCNC: 1.4 MG/DL (ref 0.6–1.2)
GFR SERPL CREATININE-BSD FRML MDRD: 39 ML/MIN/{1.73_M2}
GLUCOSE BLD-MCNC: 101 MG/DL (ref 70–99)
HCT VFR BLD CALC: 32.7 % (ref 36–48)
HEMOGLOBIN: 10.1 G/DL (ref 12–16)
INR BLD: 2.29 (ref 0.87–1.14)
MCH RBC QN AUTO: 27.7 PG (ref 26–34)
MCHC RBC AUTO-ENTMCNC: 30.9 G/DL (ref 31–36)
MCV RBC AUTO: 89.5 FL (ref 80–100)
PDW BLD-RTO: 16.5 % (ref 12.4–15.4)
PLATELET # BLD: 189 K/UL (ref 135–450)
PMV BLD AUTO: 9.6 FL (ref 5–10.5)
POTASSIUM SERPL-SCNC: 4.1 MMOL/L (ref 3.5–5.1)
PRO-BNP: 4668 PG/ML (ref 0–449)
PROTHROMBIN TIME: 25.3 SEC (ref 11.7–14.5)
RBC # BLD: 3.65 M/UL (ref 4–5.2)
SODIUM BLD-SCNC: 140 MMOL/L (ref 136–145)
WBC # BLD: 21.9 K/UL (ref 4–11)

## 2023-02-13 PROCEDURE — 80048 BASIC METABOLIC PNL TOTAL CA: CPT

## 2023-02-13 PROCEDURE — 6360000004 HC RX CONTRAST MEDICATION

## 2023-02-13 PROCEDURE — 94761 N-INVAS EAR/PLS OXIMETRY MLT: CPT

## 2023-02-13 PROCEDURE — 74150 CT ABDOMEN W/O CONTRAST: CPT

## 2023-02-13 PROCEDURE — 6360000002 HC RX W HCPCS: Performed by: HOSPITALIST

## 2023-02-13 PROCEDURE — 94680 O2 UPTK RST&XERS DIR SIMPLE: CPT

## 2023-02-13 PROCEDURE — 6360000002 HC RX W HCPCS: Performed by: EMERGENCY MEDICINE

## 2023-02-13 PROCEDURE — 99233 SBSQ HOSP IP/OBS HIGH 50: CPT | Performed by: INTERNAL MEDICINE

## 2023-02-13 PROCEDURE — 99231 SBSQ HOSP IP/OBS SF/LOW 25: CPT | Performed by: NURSE PRACTITIONER

## 2023-02-13 PROCEDURE — 2700000000 HC OXYGEN THERAPY PER DAY

## 2023-02-13 PROCEDURE — 2580000003 HC RX 258: Performed by: CLINICAL NURSE SPECIALIST

## 2023-02-13 PROCEDURE — 85610 PROTHROMBIN TIME: CPT

## 2023-02-13 PROCEDURE — 2580000003 HC RX 258: Performed by: HOSPITALIST

## 2023-02-13 PROCEDURE — 6360000002 HC RX W HCPCS: Performed by: INTERNAL MEDICINE

## 2023-02-13 PROCEDURE — 85027 COMPLETE CBC AUTOMATED: CPT

## 2023-02-13 PROCEDURE — 94640 AIRWAY INHALATION TREATMENT: CPT

## 2023-02-13 PROCEDURE — 99223 1ST HOSP IP/OBS HIGH 75: CPT | Performed by: SURGERY

## 2023-02-13 PROCEDURE — 1200000000 HC SEMI PRIVATE

## 2023-02-13 PROCEDURE — 83880 ASSAY OF NATRIURETIC PEPTIDE: CPT

## 2023-02-13 PROCEDURE — 2580000003 HC RX 258: Performed by: INTERNAL MEDICINE

## 2023-02-13 PROCEDURE — 6360000002 HC RX W HCPCS: Performed by: CLINICAL NURSE SPECIALIST

## 2023-02-13 PROCEDURE — C9113 INJ PANTOPRAZOLE SODIUM, VIA: HCPCS | Performed by: HOSPITALIST

## 2023-02-13 PROCEDURE — 6370000000 HC RX 637 (ALT 250 FOR IP): Performed by: INTERNAL MEDICINE

## 2023-02-13 PROCEDURE — 6370000000 HC RX 637 (ALT 250 FOR IP): Performed by: HOSPITALIST

## 2023-02-13 RX ORDER — FLUCONAZOLE 2 MG/ML
400 INJECTION, SOLUTION INTRAVENOUS EVERY 24 HOURS
Status: DISCONTINUED | OUTPATIENT
Start: 2023-02-13 | End: 2023-02-14

## 2023-02-13 RX ORDER — IPRATROPIUM BROMIDE AND ALBUTEROL SULFATE 2.5; .5 MG/3ML; MG/3ML
1 SOLUTION RESPIRATORY (INHALATION) EVERY 4 HOURS PRN
Status: DISCONTINUED | OUTPATIENT
Start: 2023-02-13 | End: 2023-02-23 | Stop reason: HOSPADM

## 2023-02-13 RX ORDER — FUROSEMIDE 10 MG/ML
20 INJECTION INTRAMUSCULAR; INTRAVENOUS DAILY
Status: DISCONTINUED | OUTPATIENT
Start: 2023-02-13 | End: 2023-02-17

## 2023-02-13 RX ADMIN — ONDANSETRON 4 MG: 2 INJECTION INTRAMUSCULAR; INTRAVENOUS at 09:01

## 2023-02-13 RX ADMIN — METOPROLOL TARTRATE 75 MG: 50 TABLET, FILM COATED ORAL at 20:27

## 2023-02-13 RX ADMIN — CEFEPIME 1000 MG: 1 INJECTION, POWDER, FOR SOLUTION INTRAMUSCULAR; INTRAVENOUS at 23:01

## 2023-02-13 RX ADMIN — PANTOPRAZOLE SODIUM 8 MG/HR: 40 INJECTION, POWDER, LYOPHILIZED, FOR SOLUTION INTRAVENOUS at 09:35

## 2023-02-13 RX ADMIN — Medication 10 ML: at 20:27

## 2023-02-13 RX ADMIN — IPRATROPIUM BROMIDE AND ALBUTEROL SULFATE 1 AMPULE: 2.5; .5 SOLUTION RESPIRATORY (INHALATION) at 08:22

## 2023-02-13 RX ADMIN — ONDANSETRON 4 MG: 4 TABLET, ORALLY DISINTEGRATING ORAL at 08:38

## 2023-02-13 RX ADMIN — DIATRIZOATE MEGLUMINE AND DIATRIZOATE SODIUM 20 ML: 660; 100 LIQUID ORAL; RECTAL at 14:45

## 2023-02-13 RX ADMIN — FUROSEMIDE 20 MG: 10 INJECTION, SOLUTION INTRAMUSCULAR; INTRAVENOUS at 10:43

## 2023-02-13 RX ADMIN — SODIUM CHLORIDE, PRESERVATIVE FREE 10 ML: 5 INJECTION INTRAVENOUS at 09:33

## 2023-02-13 RX ADMIN — Medication 10 ML: at 08:41

## 2023-02-13 RX ADMIN — DIPHENHYDRAMINE HYDROCHLORIDE 50 MG: 25 TABLET ORAL at 23:00

## 2023-02-13 RX ADMIN — PANTOPRAZOLE SODIUM 8 MG/HR: 40 INJECTION, POWDER, LYOPHILIZED, FOR SOLUTION INTRAVENOUS at 18:03

## 2023-02-13 RX ADMIN — FLUCONAZOLE 400 MG: 400 INJECTION, SOLUTION INTRAVENOUS at 18:34

## 2023-02-13 RX ADMIN — TRAMADOL HYDROCHLORIDE 50 MG: 50 TABLET, FILM COATED ORAL at 10:44

## 2023-02-13 RX ADMIN — CEFEPIME 1000 MG: 1 INJECTION, POWDER, FOR SOLUTION INTRAMUSCULAR; INTRAVENOUS at 11:39

## 2023-02-13 RX ADMIN — IRON SUCROSE 200 MG: 20 INJECTION, SOLUTION INTRAVENOUS at 17:30

## 2023-02-13 ASSESSMENT — PAIN SCALES - GENERAL
PAINLEVEL_OUTOF10: 2
PAINLEVEL_OUTOF10: 8

## 2023-02-13 NOTE — PROGRESS NOTES
Patient reports N/V, emesis appearance blood tinged. MD notified. Emesis bags and kleenex provided. Noted oxygen desaturation while RN in room, SpO2 < 90% on 2.5L. Pt placed on 4L NC, SpO2 >90%, new pulse ox placed. Pursed lip and deep breathing encouraged.

## 2023-02-13 NOTE — CONSULTS
Gastroenterology Consult Note        Patient: Bao Martinez  : 1945  Acct#:      Date:  2023    Subjective:       History of Present Illness  Patient is a 78 y.o. female admitted with Bacterial pneumonia [J15.9]  Acute kidney injury ([facility] [address] [N17.9]  Elevated d-dimer [R79.89]  Allergic reaction to drug, initial encounter [T78.40XA]  Multifocal pneumonia [J18.9] who is seen in consult for hematemesis. H/o afib on Eliquis. H/o RYGB. History of intermittent nausea and vomiting in the past.  2 prior EGDs (1 in  and 1 in 3/2022) for the symptoms showed GJ anastomotic ulcers. Last EGD 3/2022 there was also narrowing at the anastomosis which was dilated to 10 mm. It was recommended that she follow-up for repeat EGD but she never did. No longer taking PPI. Denies NSAID use except aspirin daily. Per notes, patient presented on 2023 with shortness of breath. She was admitted with acute on chronic CHF and possible COPD exacerbation. She states she has had some left upper quadrant pain as well. Today she had a breathing treatment which typically makes her nauseous. She vomited after and there was red blood. Vomited later and emesis again was bloody. She is unsure of any black or bloody bowel movements. C/o LUQ pain. She gives limited history.     Past Medical History:   Diagnosis Date    Arthritis     Atrial fibrillation and flutter ([facility] [address]     Hypertension     Kidney disease     Pt states  \"stage 3\"    Pneumonia     Sleep apnea     no c-pap      Past Surgical History:   Procedure Laterality Date    CARDIOVERSION      COLONOSCOPY N/A 2018    COLONOSCOPY POLYPECTOMY SNARE/COLD BIOPSY performed by Juan Luis Pelletier MD at [address] [facility] ENDOSCOPY    COLONOSCOPY N/A 2022    COLONOSCOPY POLYPECTOMY SNARE/COLD BIOPSY performed by Jany Adams MD at [facility]      ankle rt has pins and rods, and rt elbow    GASTRIC BYPASS SURGERY      LARYNX SURGERY TOTAL KNEE ARTHROPLASTY Bilateral 12/19/2012    bilateral knee replacements    TUBAL LIGATION      tubes tied    UPPER GASTROINTESTINAL ENDOSCOPY N/A 11/20/2018    EGD BIOPSY performed by Julián Winston MD at [facility] N/A 03/31/2022    EGD DILATION BALLOON performed by Wili Melo MD at [facility] N/A 03/31/2022    EGD BIOPSY performed by Wili Melo MD at [address] [place] [address]      Past Endoscopic History  EGD and colonoscopy 3/2022 with Dr Herrera Lang : Early satiety, dysphagia, history gastric bypass, remote anastomotic ulcer. IMPRESSION : 2 nonbleeding anastomotic ulcers with narrowed  anastomosis now s/p balloon dilation to 10 mm as detailed  above  normal shorted stomach, normal esophagus. INDICATION : Surveillance colonoscopy. no clinical symptoms. personal history colon  polyps. IMPRESSION : Diverticulosis of large intestine - K57.30  Hemorrhoids, other - K64.8  Benign neoplasm of ascending colon - D12.2  Benign neoplasm of transverse colon - D12.3  Benign neoplasm of sigmoid colon - D12.5          EGD and colonoscopy in 2018 with Dr. Luis Hernandez for dysphagia, nausea, vomiting and colon cancer screening. EGD with gastric pouch with gastritis, 8 mm anastomotic ulcer (GJ anastomosis). 6 mm polyp, sigmoid diverticulosis, hemorrhoids. Repeat colonoscopy in 3 years recommended due to fair prep. Admission Meds  No current facility-administered medications on file prior to encounter. Current Outpatient Medications on File Prior to Encounter   Medication Sig Dispense Refill    dilTIAZem (DILACOR XR) 240 MG extended release capsule Take 240 mg by mouth daily      amiodarone (CORDARONE) 200 MG tablet 200mg bid x 8 days then 200mg daily 60 tablet 0    traMADol (ULTRAM) 50 MG tablet Take 50 mg by mouth every 6 hours as needed for Pain.       Cholecalciferol (VITAMIN D3) 1.25 MG (15670 UT) CAPS Take 1 capsule by mouth every 7 days 4 capsule 11    furosemide (LASIX) 20 MG tablet Take 1 tablet by mouth daily (Patient not taking: No sig reported) 30 tablet 11    albuterol sulfate HFA (PROVENTIL HFA) 108 (90 Base) MCG/ACT inhaler Inhale 2 puffs into the lungs every 4 hours as needed for Wheezing 18 g 5    vitamin B-12 (CYANOCOBALAMIN) 100 MCG tablet Take 1 tablet by mouth in the morning. 90 tablet 3    alendronate (FOSAMAX) 70 MG tablet Take 1 tablet by mouth every 7 days 12 tablet 3    apixaban (ELIQUIS) 5 MG TABS tablet Take 1 tablet by mouth 2 times daily 180 tablet 3    [DISCONTINUED] cloNIDine (CATAPRES) 0.2 MG tablet TAKE 1 TABLET BY MOUTH TWICE DAILY (Patient taking differently: daily) 180 tablet 3    aspirin 81 MG tablet Take 81 mg by mouth daily              Allergies  Allergies   Allergen Reactions    Bee Venom Swelling and Angioedema    Shellfish-Derived Products Other (See Comments)     Syncope/seizures    Shrimp Flavor      Passes out      Cephalexin Itching    Codeine Hives and Other (See Comments)     B/P DROPS PASSES OUT  Passed out    Fentanyl And Related Hives     Other reaction(s): Dizziness    Hydromorphone Rash, Hives and Other (See Comments)     Full rash spreading across chest and both arms from IV hydromorphone  Passed out    Vancomycin Rash      Social   Social History     Tobacco Use    Smoking status: Never    Smokeless tobacco: Never   Substance Use Topics    Alcohol use: No        Family History   Problem Relation Age of Onset    Other Mother         blood clot    Cancer Father         stomach cancer    Stroke Brother                 Physical Exam  Blood pressure 125/69, pulse 89, temperature 97.9 °F (36.6 °C), temperature source Oral, resp. rate 18, height 5' 7\" (1.702 m), weight 247 lb (112 kg), SpO2 93 %, not currently breastfeeding.     General appearance: alert, cooperative, no distress, appears stated age  Eyes: Anicteric  Head: Normocephalic, without obvious abnormality  Lungs:crackles bilat  Heart: regular rate and rhythm, normal S1 and S2, no murmurs or rubs  Abdomen: soft, mild LUQ tenderness. Bowel sounds normal. No masses,  no organomegaly. Extremities: atraumatic, no cyanosis . BLE edema  Skin: warm and dry, no jaundice  Neuro: Grossly intact, A&OX3  Musculoskeletal: 5/5  strength BUE      Data Review:    Recent Labs     02/11/23  0850 02/13/23  1043   WBC 17.1* 21.9*   HGB 12.0 10.1*   HCT 39.1 32.7*   MCV 91.0 89.5    189     Recent Labs     02/11/23  0850 02/12/23  0905 02/13/23  1043    142 140   K 5.1 4.5 4.1    104 103   CO2 24 33* 34*   BUN 68* 68* 61*   CREATININE 2.3* 1.8* 1.4*     No results for input(s): AST, ALT, ALB, BILIDIR, BILITOT, ALKPHOS in the last 72 hours. No results for input(s): LIPASE, AMYLASE in the last 72 hours. No results for input(s): PROTIME, INR in the last 72 hours. No results for input(s): PTT in the last 72 hours. No results for input(s): OCCULTBLD in the last 72 hours. CT abd  2/13/23 wo contrast  Impression:     Postsurgical change from gastric bypass. There is fat stranding surrounding   the afferent loop. There is fluid and gas seen in the upper abdomen, near   the anastomotic site, that is not all definitively intraluminal, with   surrounding fat stranding, raising the question of perforation. Increased pleural-parenchymal disease at the left lung base        Assessment/Plan:     Hematemesis, left upper quadrant pain -concerning for anastomotic ulcer with bleed given her history. CT abdomen with fluid and gas near anastomotic site and question if some is extraluminal. Hgb 12->10.1. WBC increased to 21. Monitor hemoglobin  PPI IV  N.p.o.  Surgery consult for possible perforation  Hold off on EGD currently with concern perforation. Acute on chronic CHF    History of A-fib-last received Eliquis last night. Eliquis currently held.     Discussed with Dr. Alyson Morgan, PAKAELA Iverson I have personally performed a face to face diagnostic evaluation on this patient. I have spent more than 50% of the total clinical encounter in interviewing/examining the patient, reviewing patient chart (including but not limited to notes, labs, imaging and other testing), documentation of findings and subsequent follow up of ordered medication and testing, placing referrals and communication with patient care providers, coordinating future care, as well as documentation in the EHR. I agree with the findings and recommended plan of care, as documented by the physician assistant/nurse practitioner. In summary, my findings and plan are the followin-year-old morbidly obese female who was admitted for shortness of breath. She was being managed as acute CHF exacerbation and COPD exacerbation. She was supposed to go home earlier today when she suddenly had hematemesis and left upper quadrant pain. Contrast CT showed fluid and gas seen in the upper abdomen near the anastomotic site and the possibility of a perforation. Patient denies having any ongoing abdominal pain since taking narcotic pain medication. She has a history of Angel-en-Y gastric bypass and had at least 2 EGDs ( and ) that shows anastomotic ulcers. There was also anastomotic stricture that was dilated to 10 mm in March of last year. Patient has been noncompliant with her PPI. Vital signs are stable. Abdomen is obese soft nontender no rebound, multiple abdominal scars  Impression/plan:   CT showing possible perforation at the site of gastrojejunal anastomosis. Had 1 episode of hematemesis and intermittent left upper quadrant pain. Patient likely has a bleeding anastomotic ulcer with perforation. Patient was seen by bariatric surgery (Dr. Cecilia Meza) who ordered a repeat CT abdomen with contrast stat. Keep the patient n.p.o., broad-spectrum IV antibiotics, monitor H&H transfuse to keep hemoglobin above 7, continue IV PPI.   Patient truly has a duration, we cannot do endoscopy, as the air from the EGD scope, can potentially make the perforation worse. Shortness of breath, likely due to due to CHF and COPD exacerbation  A-fib on Eliquis. Last took Eliquis 2/12 a.m.   Please hold Eliquis for possible endoscopic/surgical procedure      Erin Downey MD, MSc  [facility]  02/13/23

## 2023-02-13 NOTE — PROGRESS NOTES
Nutrition Note    RECOMMENDATIONS  PO Diet: Resume as appropriate per GI  ONS: None  Nutrition Support: None     NUTRITION ASSESSMENT   Pt seen for LOS assessment. Pt with good PO intake throughout admission, consuming % of most meals. Weight has been stable per hx in EMR. Skin is in tact. Pt developed hematemesis this morning and is now NPO. GI to be consulted. Will monitor ability to resume PO diet. Nutrition Related Findings: No edema noted. LBM 2/11. Appears well nourished. Wounds: None  Nutrition Education:  Education not indicated     MALNUTRITION ASSESSMENT   Malnutrition Status: No malnutrition    NUTRITION DIAGNOSIS   Inadequate oral intake related to altered GI function as evidenced by NPO or clear liquid status due to medical condition      CURRENT NUTRITION THERAPIES  Diet NPO Exceptions are: Sips of Water with Meds     PO Intake: %   PO Supplement Intake:None Ordered    ANTHROPOMETRICS  Current Height: 5' 7\" (170.2 cm)  Current Weight: 247 lb (112 kg)    Ideal Body Weight (IBW): 135 lbs  (61 kg)        BMI: 38.7    The patient will be monitored per nutrition standards of care. Consult dietitian if additional nutrition interventions are needed prior to RD reassessment.      Louise Marquez, [address] [place] [address],     Contact: [phone]

## 2023-02-13 NOTE — PROGRESS NOTES
Physical Therapy  [facility]    Attempted to see patient x 2 this AM. Pt currently has nausea and is vomiting. RN aware. PT will re attempt as schedule permits. Continue to follow per POC.      Thanks,  Allyssa Nino, PT, DPT PT [phone]

## 2023-02-13 NOTE — TELEPHONE ENCOUNTER
Serena. [facility] 2420-possible perforation.  Hx of RNY  RN: Whit Odell [phone]

## 2023-02-13 NOTE — RT PROTOCOL NOTE
RT Nebulizer Bronchodilator Protocol Note    There is a bronchodilator order in the chart from a provider indicating to follow the RT Bronchodilator Protocol and there is an Initiate RT Bronchodilator Protocol order as well (see protocol at bottom of note). CXR Findings:  No results found. The findings from the last RT Protocol Assessment were as follows:  Smoking: Chronic pulmonary disease  Respiratory Pattern: Regular pattern and RR 12-20 bpm  Breath Sounds: Clear breath sounds  Cough: Strong, spontaneous, non-productive  Indication for Bronchodilator Therapy: On home bronchodilators  Bronchodilator Assessment Score: 2    Aerosolized bronchodilator medication orders have been revised according to the RT Nebulizer Bronchodilator Protocol below. Respiratory Therapist to perform RT Therapy Protocol Assessment initially then follow the protocol. Repeat RT Therapy Protocol Assessment PRN for score 0-3 or on second treatment, BID, and PRN for scores above 3. No Indications - adjust the frequency to every 6 hours PRN wheezing or bronchospasm, if no treatments needed after 48 hours then discontinue using Per Protocol order mode. If indication present, adjust the RT bronchodilator orders based on the Bronchodilator Assessment Score as indicated below. If a patient is on this medication at home then do not decrease Frequency below that used at home. 0-3 - enter or revise RT bronchodilator order(s) to equivalent RT Bronchodilator order with Frequency of every 4 hours PRN for wheezing or increased work of breathing using Per Protocol order mode. 4-6 - enter or revise RT Bronchodilator order(s) to two equivalent RT bronchodilator orders with one order with BID Frequency and one order with Frequency of every 4 hours PRN wheezing or increased work of breathing using Per Protocol order mode.          7-10 - enter or revise RT Bronchodilator order(s) to two equivalent RT bronchodilator orders with one order with TID Frequency and one order with Frequency of every 4 hours PRN wheezing or increased work of breathing using Per Protocol order mode. 11-13 - enter or revise RT Bronchodilator order(s) to one equivalent RT bronchodilator order with QID Frequency and an Albuterol order with Frequency of every 4 hours PRN wheezing or increased work of breathing using Per Protocol order mode. Greater than 13 - enter or revise RT Bronchodilator order(s) to one equivalent RT bronchodilator order with every 4 hours Frequency and an Albuterol order with Frequency of every 2 hours PRN wheezing or increased work of breathing using Per Protocol order mode. RT to enter RT Home Evaluation for COPD & MDI Assessment order using Per Protocol order mode.     Electronically signed by Jhon Mendez RCP on 2/13/2023 at 11:41 AM

## 2023-02-13 NOTE — PLAN OF CARE
Problem: Skin/Tissue Integrity  Goal: Absence of new skin breakdown  Description: 1. Monitor for areas of redness and/or skin breakdown  2. Assess vascular access sites hourly  3. Every 4-6 hours minimum:  Change oxygen saturation probe site  4. Every 4-6 hours:  If on nasal continuous positive airway pressure, respiratory therapy assess nares and determine need for appliance change or resting period.   Outcome: Progressing     Problem: Safety - Adult  Goal: Free from fall injury  Outcome: Progressing     Problem: Pain  Goal: Verbalizes/displays adequate comfort level or baseline comfort level  Outcome: Progressing     Problem: ABCDS Injury Assessment  Goal: Absence of physical injury  Outcome: Progressing     Problem: Metabolic/Fluid and Electrolytes - Adult  Goal: Electrolytes maintained within normal limits  Outcome: Progressing  Goal: Hemodynamic stability and optimal renal function maintained  Outcome: Progressing     Problem: Chronic Conditions and Co-morbidities  Goal: Patient's chronic conditions and co-morbidity symptoms are monitored and maintained or improved  Outcome: Progressing

## 2023-02-13 NOTE — PROGRESS NOTES
02/13/23 1136   Resting (Room Air)   SpO2 83   HR 84   Resting (On O2)   SpO2 93   HR 84   O2 Device Nasal cannula   O2 Flow Rate (l/min) 2 l/min   During Walk (On O2)   SpO2 2   HR 98   O2 Device Nasal cannula   O2 Flow Rate (l/min) 2 l/min   Walk/Assistance Device Ambulation   Rate of Dyspnea 2   After Walk   SpO2 93   HR 84   O2 Device Nasal cannula   O2 Flow Rate (l/min) 2 l/min   Rate of Dyspnea 2   Does the Patient Qualify for Home O2 Yes   Liter Flow at Rest 2   Liter Flow on Exertion 2   Does the Patient Need Portable Oxygen Tanks Yes     Electronically signed by Clemencia Hillman RCP on 2/13/2023 at 11:37 AM

## 2023-02-13 NOTE — PROGRESS NOTES
[facility]    2/13/2023 1:32 PM        Name: Adam Duarte Admitted: 2/7/2023  Primary Care Provider: MARILEE Westfall CNP (Tel: [phone])                        Subjective:  . No acute events overnight. Resting well. Pain control. Diet ok. Labs reviewed  -Patient had some abdominal pain had some emesis. Blood.   Patient not feeling well    Reviewed interval ancillary notes    Current Medications  pantoprazole (PROTONIX) 80 mg in sodium chloride 0.9 % 100 mL infusion, Continuous  furosemide (LASIX) injection 20 mg, Daily  cefepime (MAXIPIME) 1,000 mg in sodium chloride 0.9 % 50 mL IVPB (mini-bag), Q12H  ipratropium-albuterol (DUONEB) nebulizer solution 1 ampule, Q4H PRN  iron sucrose (VENOFER) 200 mg in sodium chloride 0.9 % 100 mL IVPB, Q24H  lidocaine PF 1 % injection 5 mL, Once  sodium chloride flush 0.9 % injection 5-40 mL, 2 times per day  sodium chloride flush 0.9 % injection 5-40 mL, PRN  0.9 % sodium chloride infusion, PRN  metoprolol tartrate (LOPRESSOR) tablet 75 mg, BID  predniSONE (DELTASONE) tablet 40 mg, Daily  diphenhydrAMINE (BENADRYL) tablet 50 mg, Q6H PRN  ondansetron (ZOFRAN) injection 4 mg, Q6H PRN  albuterol sulfate HFA (PROVENTIL;VENTOLIN;PROAIR) 108 (90 Base) MCG/ACT inhaler 2 puff, Q4H PRN  amiodarone (CORDARONE) tablet 200 mg, Daily  [Held by provider] apixaban (ELIQUIS) tablet 5 mg, BID  aspirin EC tablet 81 mg, Daily  traMADol (ULTRAM) tablet 50 mg, Q6H PRN  sodium chloride flush 0.9 % injection 5-40 mL, 2 times per day  sodium chloride flush 0.9 % injection 5-40 mL, PRN  0.9 % sodium chloride infusion, PRN  ondansetron (ZOFRAN-ODT) disintegrating tablet 4 mg, Q8H PRN   Or  ondansetron (ZOFRAN) injection 4 mg, Q6H PRN  polyethylene glycol (GLYCOLAX) packet 17 g, Daily PRN  acetaminophen (TYLENOL) tablet 650 mg, Q6H PRN   Or  acetaminophen (TYLENOL) suppository 650 mg, Q6H PRN        Objective:  /77   Pulse (!) 110   Temp 97.8 °F (36.6 °C) (Oral)   Resp 18   Ht 5' 7\" (1.702 m)   Wt 247 lb (112 kg)   SpO2 97%   BMI 38.69 kg/m²     Intake/Output Summary (Last 24 hours) at 2/13/2023 1332  Last data filed at 2/13/2023 0855  Gross per 24 hour   Intake --   Output 300 ml   Net -300 ml      Wt Readings from Last 3 Encounters:   02/13/23 247 lb (112 kg)   12/24/22 236 lb 14.4 oz (107.5 kg)   12/15/22 235 lb (106.6 kg)       General appearance:  Appears comfortable  Eyes: Sclera clear. Pupils equal.  ENT: Moist oral mucosa. Trachea midline, no adenopathy. Cardiovascular: Regular rhythm, normal S1, S2. No murmur. No edema in lower extremities  Respiratory: Not using accessory muscles. Good inspiratory effort. Clear to auscultation bilaterally, no wheeze or crackles. GI: Abdomen soft, no tenderness, not distended, normal bowel sounds  Musculoskeletal: No cyanosis in digits, neck supple  Neurology: CN 2-12 grossly intact. No speech or motor deficits  Psych: Normal affect. Alert and oriented in time, place and person  Skin: Warm, dry, normal turgor    Labs and Tests:  CBC:   Recent Labs     02/11/23  0850 02/13/23  1043   WBC 17.1* 21.9*   HGB 12.0 10.1*    189     BMP:    Recent Labs     02/11/23  0850 02/12/23  0905 02/13/23  1043    142 140   K 5.1 4.5 4.1    104 103   CO2 24 33* 34*   BUN 68* 68* 61*   CREATININE 2.3* 1.8* 1.4*   GLUCOSE 86 127* 101*     Hepatic: No results for input(s): AST, ALT, ALB, BILITOT, ALKPHOS in the last 72 hours.     Discussed care with patient             Problem List  Principal Problem:    Bacterial pneumonia  Active Problems:    Acute on chronic diastolic heart failure (HCC)    Stage 3 chronic kidney disease ([address]    Acute kidney injury ([address]    Acute on chronic heart failure with preserved ejection fraction (HFpEF) ([address]    Multifocal pneumonia    Allergic drug reaction    Elevated d-dimer    Atrial fibrillation (HCC)    Primary hypertension    Class 2 severe obesity due to excess calories with serious comorbidity and body mass index (BMI) of 39.0 to 39.9 in adult (HCC)    GIANNA (obstructive sleep apnea)  Resolved Problems:    * No resolved hospital problems. *       Assessment & Plan:   Acute hypoxic respiratory failure  -Likely multifactorial with underlying CHF and volume overload. With also concerning for pneumonia but less likely  -Patient oxygen requirement still 2 to 3 L. I had to bump up oxygen requirement today due to emesis  -We will repeat checks x-ray monitor closely patient will need oxygen baseline around 2 to 3 L    Bilateral lower extremity rash resolved likely secondary to cellulitis    Hematemesis with abdominal pain  -With previous history of ulcer.  -Concern for possible ulcer bleed.   Abdominal pain will get CAT scan.  -Hold anticoagulation start PPI  -GI consult May need surgical consult if abnormal CT finding  -Adjust antibiotics to IV cefepime DC doxycycline for now      Acute on chronic kidney disease  -Creatinine slightly improved to 1.4  -Nephrology following we will defer Lasix to nephrology for now          Diet: Diet NPO Exceptions are: Sips of Water with Meds  Code:Full Code  DVT PPX lovenox       Viki Lewis MD   2/13/2023 1:32 PM

## 2023-02-13 NOTE — PROGRESS NOTES
Intial plan to dc home today  - pt now with hematemissi with BRB  - on eliquis  - we will give ppi  - GI consut  - stat H/H

## 2023-02-13 NOTE — CONSULTS
[facility] ([facility]) Physicians   [employer]  Yesenia Joiner MD, [facility], [address], [address] [facility]    [place]  [id number] . Phone: [phone]  Fax: [phone]     Chief Complaint   Patient presents with    Abdominal Pain     Pt via EMS from home, c/o LUQ pain 10/10, has had gallbladder removed. Pt states pain is making her sob, 89% ora, put on 2L, pt received 325 mg aspirin, has been treated for cellulitis since December, new antibiotic last two weeks, states she has been getting hives and itching           HPI:    Shruthi Espinosa is a very pleasant 78 y.o. y.o. severely obese female   , Body mass index is 38.69 kg/m². And multiple medical problems who I was asked to evaluate for consultation by Dr Jaymie Nuno / [facility] service     Patient is admitted with Bacterial pneumonia [J15.9]  Acute kidney injury ([facility] [N17.9]  Elevated d-dimer [R79.89]  Allergic reaction to drug, initial encounter [T78.40XA]  Multifocal pneumonia [J18.9] who is seen in consult for hematemesis and concerns of CT scan ? Perforation. Patient has complex medical and surgical history. Patient has history of A-fib and currently on Eliquis. Patient had history of Angel-en-Y gastric bypass done in [place]. Patient cannot remember the name of her surgeon. Patient is somewhat poor historian. Based on the chart review patient had 2 prior endoscopies 2018 and 2022. That was mainly for marginal ulcer at the gastrojejunostomy. Patient was dilated to 10 mm and March 2022 however patient failed to follow-up neither compliant on taking her PPI. Patient does occasionally use nonsteroidal anti-inflammatories      Patient presented on 2/7/2023 with shortness of breath. She was admitted with acute on chronic CHF and possible COPD exacerbation. She was found as well to have left lower lobe significant pneumonia.       Today patient was having a breathing treatment became nauseous and she had a small amount of vomiting that according to the documentation questionable bloody. Patient had a CT scan that showed questionable perforation. Past Medical History:   Diagnosis Date    Arthritis     Atrial fibrillation and flutter ([address]     Hypertension     Kidney disease     Pt states  \"stage 3\"    Pneumonia     Sleep apnea     no c-pap     Past Surgical History:   Procedure Laterality Date    CARDIOVERSION      COLONOSCOPY N/A 11/20/2018    COLONOSCOPY POLYPECTOMY SNARE/COLD BIOPSY performed by Dina Tobias MD at [facility] N/A 03/31/2022    COLONOSCOPY POLYPECTOMY SNARE/COLD BIOPSY performed by Arden Cyr MD at [address] [place] [address]      ankle rt has pins and rods, and rt elbow    GASTRIC BYPASS SURGERY      LARYNX SURGERY      TOTAL KNEE ARTHROPLASTY Bilateral 12/19/2012    bilateral knee replacements    TUBAL LIGATION      tubes tied    UPPER GASTROINTESTINAL ENDOSCOPY N/A 11/20/2018    EGD BIOPSY performed by Dina Tobias MD at [address] [place] [address] N/A 03/31/2022    EGD DILATION BALLOON performed by Arden Cyr MD at [address] [place] [address] ENDOSCOPY N/A 03/31/2022    EGD BIOPSY performed by Arden Cyr MD at [address] [place] [address]     Family History   Problem Relation Age of Onset    Other Mother         blood clot    Cancer Father         stomach cancer    Stroke Brother      Social History     Tobacco Use    Smoking status: Never    Smokeless tobacco: Never   Substance Use Topics    Alcohol use: No     I counseled the patient on the importance of not smoking and risks of ETOH.    Allergies   Allergen Reactions    Bee Venom Swelling and Angioedema    Shellfish-Derived Products Other (See Comments)     Syncope/seizures    Shrimp Flavor      Passes out      Cephalexin Itching    Codeine Hives and Other (See Comments)     B/P DROPS PASSES OUT  Passed out    Fentanyl And Related Hives     Other reaction(s): Dizziness Hydromorphone Rash, Hives and Other (See Comments)     Full rash spreading across chest and both arms from IV hydromorphone  Passed out    Vancomycin Rash     Vitals:    02/13/23 0834 02/13/23 0854 02/13/23 0856 02/13/23 1138   BP: 125/69   115/77   Pulse: 89   (!) 110   Resp: 18   18   Temp: 97.9 °F (36.6 °C)   97.8 °F (36.6 °C)   TempSrc: Oral   Oral   SpO2: 92% (!) 81% 93% 97%   Weight:       Height:           Body mass index is 38.69 kg/m².       Current Facility-Administered Medications:     pantoprazole (PROTONIX) 80 mg in sodium chloride 0.9 % 100 mL infusion, 8 mg/hr, IntraVENous, Continuous, Jonnie Hooper MD, Last Rate: 10 mL/hr at 02/13/23 0935, 8 mg/hr at 02/13/23 0935    furosemide (LASIX) injection 20 mg, 20 mg, IntraVENous, Daily, Sridhar Gonzalez MD, 20 mg at 02/13/23 1043    cefepime (MAXIPIME) 1,000 mg in sodium chloride 0.9 % 50 mL IVPB (mini-bag), 1,000 mg, IntraVENous, Q12H, Jonnie Hooper MD, Last Rate: 12.5 mL/hr at 02/13/23 1139, 1,000 mg at 02/13/23 1139    ipratropium-albuterol (DUONEB) nebulizer solution 1 ampule, 1 ampule, Inhalation, Q4H PRN, Jonnie Hooper MD    iron sucrose (VENOFER) 200 mg in sodium chloride 0.9 % 100 mL IVPB, 200 mg, IntraVENous, Q24H, Angelina Douglass, APRN - CNS, Stopped at 02/12/23 0955    lidocaine PF 1 % injection 5 mL, 5 mL, IntraDERmal, Once, Sridhar Gonzalez MD    sodium chloride flush 0.9 % injection 5-40 mL, 5-40 mL, IntraVENous, 2 times per day, Sridhar Gonzalez MD, 10 mL at 02/13/23 0841    sodium chloride flush 0.9 % injection 5-40 mL, 5-40 mL, IntraVENous, PRN, Sridhar Gonzalez MD    0.9 % sodium chloride infusion, 25 mL, IntraVENous, PRN, Sridhar Gonzalez MD, Stopped at 02/12/23 1137    metoprolol tartrate (LOPRESSOR) tablet 75 mg, 75 mg, Oral, BID, Gareth Gosselin, MD, 75 mg at 02/12/23 2000    predniSONE (DELTASONE) tablet 40 mg, 40 mg, Oral, Daily, Gareth Gosselin, MD, 40 mg at 02/12/23 0924    diphenhydrAMINE (BENADRYL) tablet 50 mg, 50 mg, Oral, Q6H PRN, Danette Nails, MD, 50 mg at 02/08/23 1129    ondansetron [facility]) injection 4 mg, 4 mg, IntraVENous, Q6H PRN, Faye Mcmahon MD, 4 mg at 02/13/23 0901    albuterol sulfate HFA (PROVENTIL;VENTOLIN;PROAIR) 108 (90 Base) MCG/ACT inhaler 2 puff, 2 puff, Inhalation, Q4H PRN, Viki Lewis MD    amiodarone (CORDARONE) tablet 200 mg, 200 mg, Oral, Daily, Jonnie Hooper MD, 200 mg at 02/12/23 8500    [Held by provider] apixaban (ELIQUIS) tablet 5 mg, 5 mg, Oral, BID, Jonnie Hooper MD, 5 mg at 02/12/23 2000    aspirin EC tablet 81 mg, 81 mg, Oral, Daily, Jonnie Hooper MD, 81 mg at 02/12/23 0921    traMADol (ULTRAM) tablet 50 mg, 50 mg, Oral, Q6H PRN, Viki Lewis MD, 50 mg at 02/13/23 1044    sodium chloride flush 0.9 % injection 5-40 mL, 5-40 mL, IntraVENous, 2 times per day, Viki Lewis MD, 10 mL at 02/13/23 0841    sodium chloride flush 0.9 % injection 5-40 mL, 5-40 mL, IntraVENous, PRN, Viki Lewis MD, 10 mL at 02/13/23 0933    0.9 % sodium chloride infusion, , IntraVENous, PRN, Viki Lewis MD, Stopped at 02/11/23 1502    ondansetron (ZOFRAN-ODT) disintegrating tablet 4 mg, 4 mg, Oral, Q8H PRN, 4 mg at 02/13/23 0838 **OR** ondansetron (ZOFRAN) injection 4 mg, 4 mg, IntraVENous, Q6H PRN, Jonnie Hooper MD    polyethylene glycol (GLYCOLAX) packet 17 g, 17 g, Oral, Daily PRN, Jonnie Hooper MD, 17 g at 02/11/23 3360    acetaminophen (TYLENOL) tablet 650 mg, 650 mg, Oral, Q6H PRN **OR** acetaminophen (TYLENOL) suppository 650 mg, 650 mg, Rectal, Q6H PRN, Viki Lewis MD      Review of Systems - History obtained from the patient  General ROS: feels tired   Psychological ROS: negative  Ophthalmic ROS: negative  Neurological ROS: negative  ENT ROS: negative  Allergy and Immunology ROS: negative  Hematological and Lymphatic ROS: negative  Endocrine ROS: overweight  Breast ROS: negative  Respiratory ROS: negative  Cardiovascular ROS: negative  Gastrointestinal ROS:prior bariatric surgery   Genito-Urinary ROS: negative  Musculoskeletal ROS: negative   Skin ROS: negative    Physical Exam   CONSTITUTIONAL:  awake, alert, cooperative, no apparent distress, and appears stated age  EYES:  Lids and lashes normal, pupils equal, round , extra ocular muscles intact, sclera clear, conjunctiva normal  ENT:  Normocephalic, without obvious abnormality, atraumatic, external ears without lesions, oral pharynx with moist mucus membranes. NECK:  Supple, symmetrical, normal range of motion, skin normal  HEMATOLOGIC/LYMPHATICS:  no cervical lymphadenopathy, no supraclavicular lymphadenopathy and no inguinal lymphadenopathy  BACK:  Symmetric, no curvature. LUNGS:  No increased work of breathing, good air exchange, no stridor , accessory muscle use or wheezing  CARDIOVASCULAR:  +LE edema, intact distal pulses. ABDOMEN:   soft, non-distended, non tender, no masses palpated, no hepatosplenomegaly, old incision site with no drainage nor erythema. No guarding, no rebound, patient is not peritoneal.  NEUROLOGIC:  Awake, alert, oriented to name, place and time. Cranial nerves II-XII are grossly intact. Motor and  Sensory is intact. SKIN:  no bruising or bleeding, normal skin color, texture, turgor, no redness, warmth, or swelling and stable incisions. A/P    Janny Peralta is 78 y.o. female , now with Body mass index is 38.69 kg/m². s/p open Angel-en-Y gastric bypass at outside facility done several years ago.      We discussed how her weight affects her overall health including:  Patient Active Problem List   Diagnosis    Atrial fibrillation ([facility]    Primary hypertension    Severe episode of recurrent major depressive disorder, without psychotic features (HCC)    Seasonal affective disorder (HCC)    Class 2 severe obesity due to excess calories with serious comorbidity and body mass index (BMI) of 39.0 to 39.9 in adult [facility])    Elevated sed rate    Neutrophilia    Elevated C-reactive protein (CRP)    GIANNA (obstructive sleep apnea)    Morbid obesity ([facility] [address] [facility] Weight loss counseling, encounter for    Atypical atrial flutter (HCC)    PAF (paroxysmal atrial fibrillation) (HCC)    ILD (interstitial lung disease) (HCC)    Iron deficiency anemia due to chronic blood loss    Iron deficiency anemia, unspecified    Age-related osteoporosis without current pathological fracture    Vasovagal syncope    CKD (chronic kidney disease) stage 4, GFR 15-29 ml/min (HCC)    Acute on chronic diastolic heart failure (HCC)    Stage 3b chronic kidney disease (HCC)    B12 deficiency    Vitamin D deficiency    Acute respiratory failure (HCC)    Chronic obstructive pulmonary disease, unspecified    Sepsis (HCC)    Cellulitis of right leg    Obesity (BMI 30-39. 9)    Stage 3 chronic kidney disease (HCC)    Bacteremia    Fever and chills    History of ankle surgery    Streptococcal infection group G    Bacterial pneumonia    Acute kidney injury ([address]    Acute on chronic heart failure with preserved ejection fraction (HFpEF) (HCC)    Multifocal pneumonia    Allergic drug reaction    Elevated d-dimer     Obesity as a disease is considered a high risk to patients overall health and should therefore be considered a high risk disease state. Now with Covid-19 pandemic, CDC and health authorities does classify obese patients as vulnerable and high risk as well. Which makes weight loss a priority for improvement of their wellbeing and overall health. [facility] is a very pleasant 56-year-old female with complex medical and surgical history. Patient admitted with pneumonia, congestive heart failure, and patient is has a history of A-fib and currently on Eliquis. Concerns for hematemesis this morning CT scan was done without any contrast.  I personally reviewed the CT scan with the radiologist and we both agree that without oral contrast it is very difficult to differentiate what is intraluminal or what extraluminal or even the entire anatomy.     Based on the clinical examination of the patient the patient is absolutely without any peritoneal signs and has no complaints. [id number] patient feeling much better now than this morning. Patient with chronic history of marginal ulcers so most likely even if she perforates that most likely will be a contained perforation. At this point we should start with a repeat CT scan with oral contrast to further the evaluate her gastrojejunostomy. In the meantime continue with the PPI drip, wide spectrum antibiotics and adding IV Diflucan. Patient needs to be n.p.o. as well. Discussed the case and recommendations with the GI team.    Surgical intervention would be extremely risky and patient may not tolerate it, given her complex medical history and current comorbid conditions. For this patient it comes with very high risk of morbidity and mortality.        -N.p.o.  -Add IV Diflucan to broad-spectrum antibiotic coverage  -PPI drip  -H&H every 8 hrs  -Repeat CT abdomen with oral contrast. STAT  -Hold Eliquis and discuss with cardiology alternative in case procedure is warranted. We will continue to follow    Thank you so much for allowing me to participate in the care of the patient, please feel free to call me if you have any questions or concerns.

## 2023-02-13 NOTE — RT PROTOCOL NOTE
RT Nebulizer Bronchodilator Protocol Note    There is a bronchodilator order in the chart from a provider indicating to follow the RT Bronchodilator Protocol and there is an Initiate RT Bronchodilator Protocol order as well (see protocol at bottom of note). CXR Findings:  No results found. The findings from the last RT Protocol Assessment were as follows:  Smoking: Chronic pulmonary disease  Respiratory Pattern: Regular pattern and RR 12-20 bpm  Breath Sounds: Clear breath sounds  Cough: Strong, spontaneous, non-productive  Indication for Bronchodilator Therapy: On home bronchodilators  Bronchodilator Assessment Score: 2    Aerosolized bronchodilator medication orders have been revised according to the RT Nebulizer Bronchodilator Protocol below. Respiratory Therapist to perform RT Therapy Protocol Assessment initially then follow the protocol. Repeat RT Therapy Protocol Assessment PRN for score 0-3 or on second treatment, BID, and PRN for scores above 3. No Indications - adjust the frequency to every 6 hours PRN wheezing or bronchospasm, if no treatments needed after 48 hours then discontinue using Per Protocol order mode. If indication present, adjust the RT bronchodilator orders based on the Bronchodilator Assessment Score as indicated below. If a patient is on this medication at home then do not decrease Frequency below that used at home. 0-3 - enter or revise RT bronchodilator order(s) to equivalent RT Bronchodilator order with Frequency of every 4 hours PRN for wheezing or increased work of breathing using Per Protocol order mode. 4-6 - enter or revise RT Bronchodilator order(s) to two equivalent RT bronchodilator orders with one order with BID Frequency and one order with Frequency of every 4 hours PRN wheezing or increased work of breathing using Per Protocol order mode.          7-10 - enter or revise RT Bronchodilator order(s) to two equivalent RT bronchodilator orders with one order with TID Frequency and one order with Frequency of every 4 hours PRN wheezing or increased work of breathing using Per Protocol order mode. 11-13 - enter or revise RT Bronchodilator order(s) to one equivalent RT bronchodilator order with QID Frequency and an Albuterol order with Frequency of every 4 hours PRN wheezing or increased work of breathing using Per Protocol order mode. Greater than 13 - enter or revise RT Bronchodilator order(s) to one equivalent RT bronchodilator order with every 4 hours Frequency and an Albuterol order with Frequency of every 2 hours PRN wheezing or increased work of breathing using Per Protocol order mode. RT to enter RT Home Evaluation for COPD & MDI Assessment order using Per Protocol order mode.     Electronically signed by Manuelito Wilkins RCP on 2/13/2023 at 11:40 AM

## 2023-02-13 NOTE — PROGRESS NOTES
[address]  HEART FAILURE  Progress Note      Admit Date 2/7/2023     Reason for Consult:      Reason for Consultation/Chief Complaint: SOB    HPI:    Erik Berkowitz is a 78 y.o. female with PMH PAF, HFpEF, COPD, CKD admitted with SOB, AF/RVR and wt gain 12lb. She has diuresed 1700 with IV lasix, hematemesis today. Subjective:  Patient is being seen for CHF. There were no acute overnight cardiac events. Today Ms. Hallie Keller feels terrible today with vomiting but denies chest pain, shortness of breath, palpitations, or dizziness    Review of Systems - History obtained from the patient  General ROS: positive for  - malaise  Respiratory ROS: negative  Cardiovascular ROS: negative  Gastrointestinal ROS: positive for - nausea/vomiting  Musculoskeletal ROS: positive for - swelling  Neurological ROS: no TIA or stroke symptoms     Baseline Weight: 245 6months ago   Wt Readings from Last 3 Encounters:   02/13/23 247 lb (112 kg)   12/24/22 236 lb 14.4 oz (107.5 kg)   12/15/22 235 lb (106.6 kg)         Cardiac Testing:   Echo 12/21/2022  Summary   Limited exam per Dr. Karie Wick. Irregular rhythm. Normal left ventricle size, wall thickness, and systolic function with an   estimated ejection fraction of 55-60%. No obvious regional wall motion abnormalities are seen. The right ventricle is mildly enlarged with normal function. The right atrium is moderately dilated       NYHA Class III    Objective:   /69   Pulse 89   Temp 97.9 °F (36.6 °C) (Oral)   Resp 18   Ht 5' 7\" (1.702 m)   Wt 247 lb (112 kg)   SpO2 93%   BMI 38.69 kg/m²     Intake/Output Summary (Last 24 hours) at 2/13/2023 0953  Last data filed at 2/13/2023 0855  Gross per 24 hour   Intake 280.36 ml   Output 300 ml   Net -19.64 ml      In: 400.4 [P.O.:120;  I.V.:170.1]  Out: 300     TELEMETRY: Afib    Physical Exam:  General Appearance:  Non-obese/Well Nourished  Respiratory:  Resp Auscultation: Normal breath sounds without dullness  Cardiovascular:   Auscultation: irregular rate and rhythm, normal S1S2, no m/g/r/c  Palpation: Normal    Pedal Pulses: 2+ and equal   Abdomen:  Soft, NT, ND, + bs  Extremities:  No Cyanosis or Clubbing  Extremities: 1+ and non-pitting edema  Neurological/Psychiatric:  Oriented to time, place, and person  Non-anxious    Pertinent labs, diagnostic, device, and imaging results reviewed as a part of this visit    MEDICATIONS:   Scheduled Meds:   Scheduled Meds:   furosemide  20 mg IntraVENous Daily    ipratropium-albuterol  1 ampule Inhalation BID    iron sucrose  200 mg IntraVENous Q24H    lidocaine 1 % injection  5 mL IntraDERmal Once    sodium chloride flush  5-40 mL IntraVENous 2 times per day    metoprolol tartrate  75 mg Oral BID    doxycycline hyclate  100 mg Oral 2 times per day    predniSONE  40 mg Oral Daily    amiodarone  200 mg Oral Daily    [Held by provider] apixaban  5 mg Oral BID    aspirin  81 mg Oral Daily    sodium chloride flush  5-40 mL IntraVENous 2 times per day     Continuous Infusions:   pantoprozole (PROTONIX) infusion 8 mg/hr (02/13/23 0935)    sodium chloride Stopped (02/12/23 1137)    sodium chloride Stopped (02/11/23 1502)     PRN Meds:.sodium chloride flush, sodium chloride, diphenhydrAMINE, ondansetron, albuterol sulfate HFA, traMADol, sodium chloride flush, sodium chloride, ondansetron **OR** ondansetron, polyethylene glycol, acetaminophen **OR** acetaminophen  Continuous Infusions:   pantoprozole (PROTONIX) infusion 8 mg/hr (02/13/23 0935)    sodium chloride Stopped (02/12/23 1137)    sodium chloride Stopped (02/11/23 1502)       Intake/Output Summary (Last 24 hours) at 2/13/2023 0953  Last data filed at 2/13/2023 0855  Gross per 24 hour   Intake 280.36 ml   Output 300 ml   Net -19.64 ml       Lab Data:  CBC:   Lab Results   Component Value Date/Time    WBC 17.1 02/11/2023 08:50 AM    HGB 12.0 02/11/2023 08:50 AM     02/11/2023 08:50 AM     BMP:  Lab Results   Component Value Date/Time     02/12/2023 09:05 AM    K 4.5 02/12/2023 09:05 AM    K 4.4 02/07/2023 03:39 AM     02/12/2023 09:05 AM    CO2 33 02/12/2023 09:05 AM    BUN 68 02/12/2023 09:05 AM    CREATININE 1.8 02/12/2023 09:05 AM    GLUCOSE 127 02/12/2023 09:05 AM     INR:   Lab Results   Component Value Date/Time    INR 1.49 09/13/2022 06:01 PM    INR 1.45 03/30/2022 11:50 AM    INR 2.6 03/24/2022 03:04 PM    INR 3.0 02/11/2022 01:18 PM    INR 2.5 01/07/2022 03:36 PM    INR 2.91 03/23/2019 06:59 AM        CARDIAC LABS  ENZYMES:No results for input(s): CKMB, CKMBINDEX, TROPONINI in the last 72 hours. Invalid input(s): CKTOTAL;3  FASTING LIPID PANEL:  Lab Results   Component Value Date/Time    HDL 51 03/15/2021 10:18 AM    LDLCALC 80 03/15/2021 10:18 AM    TRIG 56 03/15/2021 10:18 AM    TSH 2.81 03/30/2022 11:50 AM     LIVER PROFILE:  Lab Results   Component Value Date/Time    AST 10 02/07/2023 03:39 AM    AST 9 12/22/2022 06:01 AM    ALT <5 02/07/2023 03:39 AM    ALT <5 12/22/2022 06:01 AM     BNP:   Lab Results   Component Value Date/Time    PROBNP 13,923 02/09/2023 06:13 AM    PROBNP 10,289 02/07/2023 03:39 AM    PROBNP 5,128 12/23/2022 05:49 AM     Iron Studies:    Lab Results   Component Value Date/Time    FERRITIN 165.6 02/09/2023 06:10 AM    FERRITIN 182.5 09/15/2022 04:19 AM    FERRITIN 77.3 08/15/2022 03:51 PM     Lab Results   Component Value Date    IRON 14 (L) 02/09/2023    TIBC 224 (L) 02/09/2023    FERRITIN 165.6 (H) 02/09/2023      Iron Deficiency Anemia:  Yes IV Iron Therapy:  Yes  2017 ACC/AHA HF Guidelines:   intravenous iron replacement in patients with New York Heart Association (NYHA) class II and III HF and iron deficiency(ferritin <100 ng/ml or 100-300 ng/ml if transferrin saturation <20%), to improve functional status and QoL. 1. WEIGHT: Admit Weight: 235 lb (106.6 kg)      Today  Weight: 247 lb (112 kg)   2.  I/O   Intake/Output Summary (Last 24 hours) at 2/13/2023 0953  Last data filed at 2/13/2023 0855  Gross per 24 hour   Intake 280.36 ml   Output 300 ml   Net -19.64 ml         Assessment/Plan:     Acute Heart Failure:  ~compensated, 1700 out  ~ BNP 13k on admit, recheck today  ~Plan:IV lasix restarted today, start jardiance if ok with nephrology,  replace iron, Monitor I&O's, Daily weights, Pt education    HTN:   ~controlled     Atrial Fib   ~rate improved with BB, on amiodarone and AC     Acute Respiratory Failure             ~ O2 increased today for hypoxia                           JUSTINA on CKD   ~Cr on admit 2.4>2.3>1.8, Baseline 1.2  ~Daily labs; trend creatinine  ~Nephrology consulted, Recs: restart low dose lasix    Anemia/ Hematemesis  ~ pt getting Venofer, GI consult today       All questions and concerns were addressed to the patient. Alternatives to my treatment were discussed. I have discussed the above stated plan with patient and the nurse. The patient verbalized understanding and agreed with the plan.     I appreciate the opportunity of cooperating in the care of this individual.    Keenan Pearl, MARILEE - CNP, ACNP, [address] 2/13/2023, 9:53 AM  Heart Failure  [facility]  [address] [place] [address], [address] [facility]  Ph: [phone]      Core Measures:   Discharge instructions:   LVEF documented:   ACEI for LV dysfunction:   Smoking Cessation:

## 2023-02-14 PROBLEM — K57.90 DIVERTICULOSIS: Status: ACTIVE | Noted: 2023-02-14

## 2023-02-14 PROBLEM — N20.0 LEFT NEPHROLITHIASIS: Status: ACTIVE | Noted: 2023-02-14

## 2023-02-14 PROBLEM — J18.9 ATYPICAL PNEUMONIA: Status: ACTIVE | Noted: 2023-02-14

## 2023-02-14 PROBLEM — D72.825 BANDEMIA: Status: ACTIVE | Noted: 2023-02-14

## 2023-02-14 PROBLEM — K65.1 INTRA-ABDOMINAL ABSCESS (HCC): Status: ACTIVE | Noted: 2023-02-14

## 2023-02-14 PROBLEM — Z90.49 HISTORY OF CHOLECYSTECTOMY: Status: ACTIVE | Noted: 2023-02-14

## 2023-02-14 PROBLEM — J18.1 RIGHT UPPER LOBE CONSOLIDATION (HCC): Status: ACTIVE | Noted: 2023-02-12

## 2023-02-14 LAB
ANION GAP SERPL CALCULATED.3IONS-SCNC: 4 MMOL/L (ref 3–16)
ANISOCYTOSIS: ABNORMAL
BASOPHILIC STIPPLING: ABNORMAL
BASOPHILS ABSOLUTE: 0 K/UL (ref 0–0.2)
BASOPHILS RELATIVE PERCENT: 0 %
BUN BLDV-MCNC: 63 MG/DL (ref 7–20)
C-REACTIVE PROTEIN: 161.4 MG/L (ref 0–5.1)
CALCIUM SERPL-MCNC: 8.5 MG/DL (ref 8.3–10.6)
CHLORIDE BLD-SCNC: 106 MMOL/L (ref 99–110)
CO2: 33 MMOL/L (ref 21–32)
CREAT SERPL-MCNC: 1.5 MG/DL (ref 0.6–1.2)
DOHLE BODIES: PRESENT
EKG ATRIAL RATE: 110 BPM
EKG DIAGNOSIS: NORMAL
EKG P AXIS: 77 DEGREES
EKG P-R INTERVAL: 146 MS
EKG Q-T INTERVAL: 184 MS
EKG QRS DURATION: 86 MS
EKG QTC CALCULATION (BAZETT): 249 MS
EKG R AXIS: 46 DEGREES
EKG T AXIS: -9 DEGREES
EKG VENTRICULAR RATE: 110 BPM
EOSINOPHILS ABSOLUTE: 0 K/UL (ref 0–0.6)
EOSINOPHILS RELATIVE PERCENT: 0 %
GFR SERPL CREATININE-BSD FRML MDRD: 36 ML/MIN/{1.73_M2}
GLUCOSE BLD-MCNC: 68 MG/DL (ref 70–99)
GLUCOSE BLD-MCNC: 68 MG/DL (ref 70–99)
GLUCOSE BLD-MCNC: 78 MG/DL (ref 70–99)
GLUCOSE BLD-MCNC: 85 MG/DL (ref 70–99)
GLUCOSE BLD-MCNC: 86 MG/DL (ref 70–99)
HCT VFR BLD CALC: 27.8 % (ref 36–48)
HCT VFR BLD CALC: 28 % (ref 36–48)
HCT VFR BLD CALC: 28.7 % (ref 36–48)
HEMOGLOBIN: 8.6 G/DL (ref 12–16)
HEMOGLOBIN: 8.6 G/DL (ref 12–16)
HEMOGLOBIN: 8.8 G/DL (ref 12–16)
HOWELL-JOLLY BODIES: ABNORMAL
INR BLD: 1.79 (ref 0.87–1.14)
LYMPHOCYTES ABSOLUTE: 1.3 K/UL (ref 1–5.1)
LYMPHOCYTES RELATIVE PERCENT: 4 %
MAGNESIUM: 1.8 MG/DL (ref 1.8–2.4)
MCH RBC QN AUTO: 27.3 PG (ref 26–34)
MCHC RBC AUTO-ENTMCNC: 30.8 G/DL (ref 31–36)
MCV RBC AUTO: 88.6 FL (ref 80–100)
METAMYELOCYTES RELATIVE PERCENT: 1 %
MONOCYTES ABSOLUTE: 0.6 K/UL (ref 0–1.3)
MONOCYTES RELATIVE PERCENT: 2 %
NEUTROPHILS ABSOLUTE: 30 K/UL (ref 1.7–7.7)
NEUTROPHILS RELATIVE PERCENT: 93 %
PDW BLD-RTO: 16.9 % (ref 12.4–15.4)
PERFORMED ON: ABNORMAL
PERFORMED ON: ABNORMAL
PERFORMED ON: NORMAL
PERFORMED ON: NORMAL
PHOSPHORUS: 2.7 MG/DL (ref 2.5–4.9)
PLATELET # BLD: 160 K/UL (ref 135–450)
PMV BLD AUTO: 9.5 FL (ref 5–10.5)
POTASSIUM SERPL-SCNC: 4.6 MMOL/L (ref 3.5–5.1)
PROTHROMBIN TIME: 20.8 SEC (ref 11.7–14.5)
RBC # BLD: 3.13 M/UL (ref 4–5.2)
SEDIMENTATION RATE, ERYTHROCYTE: 34 MM/HR (ref 0–30)
SODIUM BLD-SCNC: 143 MMOL/L (ref 136–145)
TOXIC GRANULATION: PRESENT
WBC # BLD: 31.9 K/UL (ref 4–11)

## 2023-02-14 PROCEDURE — 2580000003 HC RX 258: Performed by: HOSPITALIST

## 2023-02-14 PROCEDURE — 80048 BASIC METABOLIC PNL TOTAL CA: CPT

## 2023-02-14 PROCEDURE — 99223 1ST HOSP IP/OBS HIGH 75: CPT | Performed by: INTERNAL MEDICINE

## 2023-02-14 PROCEDURE — C9113 INJ PANTOPRAZOLE SODIUM, VIA: HCPCS | Performed by: HOSPITALIST

## 2023-02-14 PROCEDURE — 85014 HEMATOCRIT: CPT

## 2023-02-14 PROCEDURE — 6360000002 HC RX W HCPCS: Performed by: HOSPITALIST

## 2023-02-14 PROCEDURE — 85018 HEMOGLOBIN: CPT

## 2023-02-14 PROCEDURE — 84100 ASSAY OF PHOSPHORUS: CPT

## 2023-02-14 PROCEDURE — 99233 SBSQ HOSP IP/OBS HIGH 50: CPT | Performed by: SURGERY

## 2023-02-14 PROCEDURE — 6370000000 HC RX 637 (ALT 250 FOR IP): Performed by: INTERNAL MEDICINE

## 2023-02-14 PROCEDURE — 86140 C-REACTIVE PROTEIN: CPT

## 2023-02-14 PROCEDURE — 36592 COLLECT BLOOD FROM PICC: CPT

## 2023-02-14 PROCEDURE — 85652 RBC SED RATE AUTOMATED: CPT

## 2023-02-14 PROCEDURE — 6360000002 HC RX W HCPCS: Performed by: INTERNAL MEDICINE

## 2023-02-14 PROCEDURE — 99231 SBSQ HOSP IP/OBS SF/LOW 25: CPT | Performed by: NURSE PRACTITIONER

## 2023-02-14 PROCEDURE — 97530 THERAPEUTIC ACTIVITIES: CPT

## 2023-02-14 PROCEDURE — 36415 COLL VENOUS BLD VENIPUNCTURE: CPT

## 2023-02-14 PROCEDURE — 2580000003 HC RX 258: Performed by: INTERNAL MEDICINE

## 2023-02-14 PROCEDURE — 99233 SBSQ HOSP IP/OBS HIGH 50: CPT | Performed by: INTERNAL MEDICINE

## 2023-02-14 PROCEDURE — 2500000003 HC RX 250 WO HCPCS: Performed by: NURSE PRACTITIONER

## 2023-02-14 PROCEDURE — 97535 SELF CARE MNGMENT TRAINING: CPT

## 2023-02-14 PROCEDURE — 85610 PROTHROMBIN TIME: CPT

## 2023-02-14 PROCEDURE — 1200000000 HC SEMI PRIVATE

## 2023-02-14 PROCEDURE — 87040 BLOOD CULTURE FOR BACTERIA: CPT

## 2023-02-14 PROCEDURE — 85025 COMPLETE CBC W/AUTO DIFF WBC: CPT

## 2023-02-14 PROCEDURE — 83735 ASSAY OF MAGNESIUM: CPT

## 2023-02-14 PROCEDURE — 87205 SMEAR GRAM STAIN: CPT

## 2023-02-14 RX ORDER — INSULIN LISPRO 100 [IU]/ML
0-4 INJECTION, SOLUTION INTRAVENOUS; SUBCUTANEOUS
Status: DISCONTINUED | OUTPATIENT
Start: 2023-02-14 | End: 2023-02-14

## 2023-02-14 RX ORDER — CIPROFLOXACIN 2 MG/ML
400 INJECTION, SOLUTION INTRAVENOUS EVERY 12 HOURS
Status: DISCONTINUED | OUTPATIENT
Start: 2023-02-14 | End: 2023-02-14

## 2023-02-14 RX ORDER — INSULIN LISPRO 100 [IU]/ML
0-4 INJECTION, SOLUTION INTRAVENOUS; SUBCUTANEOUS NIGHTLY
Status: DISCONTINUED | OUTPATIENT
Start: 2023-02-14 | End: 2023-02-14

## 2023-02-14 RX ORDER — SODIUM CHLORIDE 9 MG/ML
25 INJECTION, SOLUTION INTRAVENOUS PRN
Status: DISCONTINUED | OUTPATIENT
Start: 2023-02-14 | End: 2023-02-22

## 2023-02-14 RX ORDER — FLUCONAZOLE 2 MG/ML
200 INJECTION, SOLUTION INTRAVENOUS EVERY 24 HOURS
Status: DISCONTINUED | OUTPATIENT
Start: 2023-02-14 | End: 2023-02-14

## 2023-02-14 RX ORDER — SODIUM CHLORIDE 0.9 % (FLUSH) 0.9 %
5-40 SYRINGE (ML) INJECTION PRN
Status: DISCONTINUED | OUTPATIENT
Start: 2023-02-14 | End: 2023-02-22

## 2023-02-14 RX ORDER — INSULIN LISPRO 100 [IU]/ML
0-4 INJECTION, SOLUTION INTRAVENOUS; SUBCUTANEOUS EVERY 6 HOURS
Status: DISCONTINUED | OUTPATIENT
Start: 2023-02-14 | End: 2023-02-23 | Stop reason: HOSPADM

## 2023-02-14 RX ORDER — SODIUM CHLORIDE 0.9 % (FLUSH) 0.9 %
5-40 SYRINGE (ML) INJECTION EVERY 12 HOURS SCHEDULED
Status: DISCONTINUED | OUTPATIENT
Start: 2023-02-14 | End: 2023-02-22

## 2023-02-14 RX ORDER — DEXTROSE MONOHYDRATE 100 MG/ML
INJECTION, SOLUTION INTRAVENOUS CONTINUOUS PRN
Status: DISCONTINUED | OUTPATIENT
Start: 2023-02-14 | End: 2023-02-23 | Stop reason: HOSPADM

## 2023-02-14 RX ORDER — METRONIDAZOLE 500 MG/100ML
500 INJECTION, SOLUTION INTRAVENOUS EVERY 8 HOURS
Status: DISCONTINUED | OUTPATIENT
Start: 2023-02-14 | End: 2023-02-14

## 2023-02-14 RX ADMIN — METRONIDAZOLE 500 MG: 500 INJECTION, SOLUTION INTRAVENOUS at 08:47

## 2023-02-14 RX ADMIN — FUROSEMIDE 20 MG: 10 INJECTION, SOLUTION INTRAMUSCULAR; INTRAVENOUS at 08:37

## 2023-02-14 RX ADMIN — MEROPENEM 500 MG: 1 INJECTION, POWDER, FOR SOLUTION INTRAVENOUS at 10:20

## 2023-02-14 RX ADMIN — MEROPENEM 500 MG: 1 INJECTION, POWDER, FOR SOLUTION INTRAVENOUS at 19:26

## 2023-02-14 RX ADMIN — DEXTROSE MONOHYDRATE 125 ML: 100 INJECTION, SOLUTION INTRAVENOUS at 14:46

## 2023-02-14 RX ADMIN — SODIUM CHLORIDE, PRESERVATIVE FREE 10 ML: 5 INJECTION INTRAVENOUS at 01:52

## 2023-02-14 RX ADMIN — PANTOPRAZOLE SODIUM 8 MG/HR: 40 INJECTION, POWDER, LYOPHILIZED, FOR SOLUTION INTRAVENOUS at 05:31

## 2023-02-14 RX ADMIN — Medication 10 ML: at 10:07

## 2023-02-14 RX ADMIN — PANTOPRAZOLE SODIUM 8 MG/HR: 40 INJECTION, POWDER, LYOPHILIZED, FOR SOLUTION INTRAVENOUS at 18:06

## 2023-02-14 RX ADMIN — SODIUM CHLORIDE, PRESERVATIVE FREE 10 ML: 5 INJECTION INTRAVENOUS at 04:28

## 2023-02-14 RX ADMIN — SODIUM CHLORIDE 200 MG: 9 INJECTION, SOLUTION INTRAVENOUS at 15:17

## 2023-02-14 RX ADMIN — PHYTONADIONE 5 MG: 10 INJECTION, EMULSION INTRAMUSCULAR; INTRAVENOUS; SUBCUTANEOUS at 14:13

## 2023-02-14 RX ADMIN — METOPROLOL TARTRATE 75 MG: 50 TABLET, FILM COATED ORAL at 20:31

## 2023-02-14 RX ADMIN — Medication 10 ML: at 10:06

## 2023-02-14 RX ADMIN — METRONIDAZOLE 500 MG: 500 INJECTION, SOLUTION INTRAVENOUS at 00:23

## 2023-02-14 ASSESSMENT — ENCOUNTER SYMPTOMS
EYE DISCHARGE: 0
SINUS PAIN: 0
NAUSEA: 0
BACK PAIN: 0
COUGH: 0
ABDOMINAL PAIN: 1
CONSTIPATION: 0
SHORTNESS OF BREATH: 0
RHINORRHEA: 0
EYE REDNESS: 0
SINUS PRESSURE: 0
SORE THROAT: 0
WHEEZING: 0
DIARRHEA: 0

## 2023-02-14 NOTE — PROGRESS NOTES
Pt medicated with IV benadryl and reported feeling like she had hives shortly after cefepime started. PM hospitalist notified. Awaiting return page.

## 2023-02-14 NOTE — PROGRESS NOTES
[address]  HEART FAILURE  Progress Note      Admit Date 2/7/2023     Reason for Consult:      Reason for Consultation/Chief Complaint: SOB    HPI:    Janny Peralta is a 78 y.o. female with PMH PAF, HFpEF, COPD, CKD admitted with SOB, AF/RVR and wt gain 12lb. She has diuresed 1700 with IV lasix, yesterday  with hematemesis and Repeat CT with oral contrast showing contained perforated chronic marginal ulcer with developing phlegmon. No surgery and pt to get IV abx and IR consult for perc drain. Subjective:  Patient is being seen for CHF. There were no acute overnight cardiac events. Today Ms. Ly is unavailable for exam      Baseline Weight: 245 6months ago   Wt Readings from Last 3 Encounters:   02/14/23 241 lb 1.6 oz (109.4 kg)   12/24/22 236 lb 14.4 oz (107.5 kg)   12/15/22 235 lb (106.6 kg)         Cardiac Testing:   Echo 12/21/2022  Summary   Limited exam per Dr. Apoorva Keene. Irregular rhythm. Normal left ventricle size, wall thickness, and systolic function with an   estimated ejection fraction of 55-60%. No obvious regional wall motion abnormalities are seen. The right ventricle is mildly enlarged with normal function. The right atrium is moderately dilated       NYHA Class III    Objective:   /69   Pulse (!) 108   Temp 97.8 °F (36.6 °C) (Oral)   Resp 16   Ht 5' 7\" (1.702 m)   Wt 241 lb 1.6 oz (109.4 kg)   SpO2 97%   BMI 37.76 kg/m²     Intake/Output Summary (Last 24 hours) at 2/14/2023 0931  Last data filed at 2/14/2023 0428  Gross per 24 hour   Intake 874.47 ml   Output 1200 ml   Net -325.53 ml      In: 874.5 [P.O.:90; I.V.:302.2]  Out: 1500     TELEMETRY: Afib    Physical Exam:  General Appearance:  Non-obese/Well Nourished  Respiratory:  Resp Auscultation: Normal breath sounds without dullness  Cardiovascular:   Auscultation: irregular rate and rhythm, normal S1S2, no m/g/r/c  Palpation: Normal    Pedal Pulses: 2+ and equal   Abdomen:  Soft, NT, ND, + bs  Extremities:  No Cyanosis or Clubbing  Extremities: 1+ and non-pitting edema  Neurological/Psychiatric:  Oriented to time, place, and person  Non-anxious    Pertinent labs, diagnostic, device, and imaging results reviewed as a part of this visit    MEDICATIONS:   Scheduled Meds:   Scheduled Meds:   meropenem  500 mg IntraVENous q8h    anidulafungin  200 mg IntraVENous Once    And    [START ON 2/15/2023] anidulafungin  100 mg IntraVENous Q24H    furosemide  20 mg IntraVENous Daily    iron sucrose  200 mg IntraVENous Q24H    lidocaine 1 % injection  5 mL IntraDERmal Once    sodium chloride flush  5-40 mL IntraVENous 2 times per day    metoprolol tartrate  75 mg Oral BID    predniSONE  40 mg Oral Daily    amiodarone  200 mg Oral Daily    [Held by provider] apixaban  5 mg Oral BID    aspirin  81 mg Oral Daily    sodium chloride flush  5-40 mL IntraVENous 2 times per day     Continuous Infusions:   pantoprozole (PROTONIX) infusion 8 mg/hr (02/14/23 0531)    sodium chloride Stopped (02/12/23 1137)    sodium chloride Stopped (02/11/23 1502)     PRN Meds:.ipratropium-albuterol, sodium chloride flush, sodium chloride, diphenhydrAMINE, ondansetron, albuterol sulfate HFA, traMADol, sodium chloride flush, sodium chloride, ondansetron **OR** ondansetron, polyethylene glycol, acetaminophen **OR** acetaminophen  Continuous Infusions:   pantoprozole (PROTONIX) infusion 8 mg/hr (02/14/23 0531)    sodium chloride Stopped (02/12/23 1137)    sodium chloride Stopped (02/11/23 1502)       Intake/Output Summary (Last 24 hours) at 2/14/2023 0931  Last data filed at 2/14/2023 0428  Gross per 24 hour   Intake 874.47 ml   Output 1200 ml   Net -325.53 ml       Lab Data:  CBC:   Lab Results   Component Value Date/Time    WBC 31.9 02/14/2023 04:32 AM    HGB 8.6 02/14/2023 04:32 AM     02/14/2023 04:32 AM     BMP:  Lab Results   Component Value Date/Time     02/14/2023 04:32 AM    K 4.6 02/14/2023 04:32 AM    K 4.4 02/07/2023 03:39 AM     02/14/2023 04:32 AM    CO2 33 02/14/2023 04:32 AM    BUN 63 02/14/2023 04:32 AM    CREATININE 1.5 02/14/2023 04:32 AM    GLUCOSE 86 02/14/2023 04:32 AM     INR:   Lab Results   Component Value Date/Time    INR 2.29 02/13/2023 10:43 AM    INR 1.49 09/13/2022 06:01 PM    INR 1.45 03/30/2022 11:50 AM        CARDIAC LABS  ENZYMES:No results for input(s): CKMB, CKMBINDEX, TROPONINI in the last 72 hours. Invalid input(s): CKTOTAL;3  FASTING LIPID PANEL:  Lab Results   Component Value Date/Time    HDL 51 03/15/2021 10:18 AM    LDLCALC 80 03/15/2021 10:18 AM    TRIG 56 03/15/2021 10:18 AM    TSH 2.81 03/30/2022 11:50 AM     LIVER PROFILE:  Lab Results   Component Value Date/Time    AST 10 02/07/2023 03:39 AM    AST 9 12/22/2022 06:01 AM    ALT <5 02/07/2023 03:39 AM    ALT <5 12/22/2022 06:01 AM     BNP:   Lab Results   Component Value Date/Time    PROBNP 4,668 02/13/2023 10:44 AM    PROBNP 13,923 02/09/2023 06:13 AM    PROBNP 10,289 02/07/2023 03:39 AM     Iron Studies:    Lab Results   Component Value Date/Time    FERRITIN 165.6 02/09/2023 06:10 AM    FERRITIN 182.5 09/15/2022 04:19 AM    FERRITIN 77.3 08/15/2022 03:51 PM     Lab Results   Component Value Date    IRON 14 (L) 02/09/2023    TIBC 224 (L) 02/09/2023    FERRITIN 165.6 (H) 02/09/2023      Iron Deficiency Anemia:  Yes IV Iron Therapy:  Yes  2017 ACC/AHA HF Guidelines:   intravenous iron replacement in patients with New York Heart Association (NYHA) class II and III HF and iron deficiency(ferritin <100 ng/ml or 100-300 ng/ml if transferrin saturation <20%), to improve functional status and QoL. 1. WEIGHT: Admit Weight: 235 lb (106.6 kg)      Today  Weight: 241 lb 1.6 oz (109.4 kg)   2.  I/O   Intake/Output Summary (Last 24 hours) at 2/14/2023 0931  Last data filed at 2/14/2023 0428  Gross per 24 hour   Intake 874.47 ml   Output 1200 ml   Net -325.53 ml         Assessment/Plan:     Acute Heart Failure:  ~compensated, 2L out  ~ BNP 13k on admit, now at 4668  ~Plan: IV lasix restarted yesterday pr nephrology    HTN:   ~BP soft, meds on hold this morning    Atrial Fib   ~rate elevated today, BB held, can change to IV if needed for rate control, AC on hold for anemia     Acute Respiratory Failure             ~ O2                            JUSTINA on CKD   ~Cr on admit 2.4>2.3>1.8, Baseline 1.2  ~Daily labs; trend creatinine  ~Nephrology consulted    Anemia/ Hematemesis  ~ IV abx and IR consult for perc drain.   ~got 4 doses Venofer         I appreciate the opportunity of cooperating in the care of this individual.    Thang Baptiste, APRN - CNP, ACNP, [address] 2/14/2023, 9:31 AM  Heart Failure  The [address], [address]  Ph: [phone]      Core Measures:   Discharge instructions:   LVEF documented:   ACEI for LV dysfunction:   Smoking Cessation:

## 2023-02-14 NOTE — PROGRESS NOTES
[facility]    2/14/2023 1:31 PM        Name: Bell Barker . Admitted: 2/7/2023  Primary Care Provider: MARILEE Stephens CNP (Tel: [phone])                        Subjective:  . No overnight  Patient no further bleeding.   Resting comfortably    Reviewed interval ancillary notes    Current Medications  meropenem (MERREM) 500 mg in sodium chloride 0.9 % 100 mL IVPB (mini-bag), q8h  anidulafungin (ERAXIS) 200 mg in sodium chloride 0.9 % 260 mL IVPB, Once   And  [START ON 2/15/2023] anidulafungin (ERAXIS) 100 mg in sodium chloride 0.9 % 130 mL IVPB, Q24H  dextrose bolus 10% 125 mL, PRN   Or  dextrose bolus 10% 250 mL, PRN  glucagon (rDNA) injection 1 mg, PRN  dextrose 10 % infusion, Continuous PRN  insulin lispro (HUMALOG) injection vial 0-4 Units, Q6H  PN-Adult Premix 5/20 - Standard Electrolytes - Central Line, Continuous TPN  [START ON 2/16/2023] fat emulsion 20 % infusion 250 mL, Once per day on Mon Thu  phytonadione (ADULT) (VITAMIN K) 5 mg in sodium chloride 0.9 % 100 mL IVPB, Once  pantoprazole (PROTONIX) 80 mg in sodium chloride 0.9 % 100 mL infusion, Continuous  furosemide (LASIX) injection 20 mg, Daily  ipratropium-albuterol (DUONEB) nebulizer solution 1 ampule, Q4H PRN  iron sucrose (VENOFER) 200 mg in sodium chloride 0.9 % 100 mL IVPB, Q24H  lidocaine PF 1 % injection 5 mL, Once  sodium chloride flush 0.9 % injection 5-40 mL, 2 times per day  sodium chloride flush 0.9 % injection 5-40 mL, PRN  0.9 % sodium chloride infusion, PRN  metoprolol tartrate (LOPRESSOR) tablet 75 mg, BID  predniSONE (DELTASONE) tablet 40 mg, Daily  diphenhydrAMINE (BENADRYL) tablet 50 mg, Q6H PRN  ondansetron (ZOFRAN) injection 4 mg, Q6H PRN  albuterol sulfate HFA (PROVENTIL;VENTOLIN;PROAIR) 108 (90 Base) MCG/ACT inhaler 2 puff, Q4H PRN  amiodarone (CORDARONE) tablet 200 mg, Daily  [Held by provider] apixaban (ELIQUIS) tablet 5 mg, BID  aspirin EC tablet 81 mg, Daily  traMADol (ULTRAM) tablet 50 mg, Q6H PRN  sodium chloride flush 0.9 % injection 5-40 mL, 2 times per day  sodium chloride flush 0.9 % injection 5-40 mL, PRN  0.9 % sodium chloride infusion, PRN  ondansetron (ZOFRAN-ODT) disintegrating tablet 4 mg, Q8H PRN   Or  ondansetron (ZOFRAN) injection 4 mg, Q6H PRN  polyethylene glycol (GLYCOLAX) packet 17 g, Daily PRN  acetaminophen (TYLENOL) tablet 650 mg, Q6H PRN   Or  acetaminophen (TYLENOL) suppository 650 mg, Q6H PRN        Objective:  /75   Pulse 93   Temp 98 °F (36.7 °C) (Tympanic)   Resp 16   Ht 5' 7\" (1.702 m)   Wt 241 lb 1.6 oz (109.4 kg)   SpO2 93%   BMI 37.76 kg/m²     Intake/Output Summary (Last 24 hours) at 2/14/2023 1331  Last data filed at 2/14/2023 0428  Gross per 24 hour   Intake 874.47 ml   Output 1200 ml   Net -325.53 ml      Wt Readings from Last 3 Encounters:   02/14/23 241 lb 1.6 oz (109.4 kg)   12/24/22 236 lb 14.4 oz (107.5 kg)   12/15/22 235 lb (106.6 kg)       General appearance:  Appears comfortable  Eyes: Sclera clear. Pupils equal.  ENT: Moist oral mucosa. Trachea midline, no adenopathy.  Cardiovascular: Regular rhythm, normal S1, S2. No murmur. No edema in lower extremities  Respiratory: Not using accessory muscles. Good inspiratory effort. Clear to auscultation bilaterally, no wheeze or crackles.   GI: Abdomen soft, no tenderness, not distended, normal bowel sounds  Musculoskeletal: No cyanosis in digits, neck supple  Neurology: CN 2-12 grossly intact. No speech or motor deficits  Psych: Normal affect. Alert and oriented in time, place and person  Skin: Warm, dry, normal turgor    Labs and Tests:  CBC:   Recent Labs     02/13/23  1043 02/14/23  0050 02/14/23  0432   WBC 21.9*  --  31.9*   HGB 10.1* 8.8* 8.6*     --  160     BMP:    Recent Labs     02/12/23  0905 02/13/23  1043 02/14/23 0432    140 143   K 4.5  4.1 4.6    103 106   CO2 33* 34* 33*   BUN 68* 61* 63*   CREATININE 1.8* 1.4* 1.5*   GLUCOSE 127* 101* 86     Hepatic: No results for input(s): AST, ALT, ALB, BILITOT, ALKPHOS in the last 72 hours. Discussed care with patient             Problem List  Principal Problem:    Bacterial pneumonia  Active Problems:    Acute on chronic diastolic heart failure (HCC)    Stage 3 chronic kidney disease ([facility]    JUSTINA (acute kidney injury) ([facility]    Acute on chronic heart failure with preserved ejection fraction (HFpEF) (HCC)    Multifocal pneumonia    Allergic drug reaction    Elevated d-dimer    Left nephrolithiasis    Diverticulosis    History of cholecystectomy    Intra-abdominal abscess ([facility]    Bandemia    Atypical pneumonia    At[facility] fibrillation [facility]    Primary hypertension    Class 2 severe obesity due to excess calories with serious comorbidity and body mass index (BMI) of 39.0 to 39.9 in adult [facility])    Acute kidney injury ([facility]    GIANNA (obstructive sleep apnea)  Resolved Problems:    * No resolved hospital problems.  *       Assessment & Plan:   Hematemesis  -Had 1 episode yesterday  -Concern for ulcer with anastomotic bleeding  -We had CT scan obtained yesterday and there were concern for possible microperforation and abscess  -Continue PPI for now  -Appreciate surgery recommendation  -Discussed with Dr. Jessa Padilla  0 this time we will continue medical management IV antibiotics n.p.o.  -We will likely need TPN to allow for healing  -IR has been consulted for percutaneous drainage of the abscess    Elevated INR  -Patient likely was not Eliquis which has been held for now  -Plan is to attempt IR guided percutaneous drainage but needs INR to be less than 1.5  -I will give one-time dose of vitamin K and reverse INR    Leukocytosis  -Worsening up to 20 today.  -We will broaden the antibiotics infectious diseases been consulted      Acute on chronic kidney disease  -Creatinine improving appreciate nephrology recommendation    Multiple cardiac history including CHF A-fib  -Cardiology following we will optimize cardiac wise as well      High risk for close monitoring will need TPN          Diet: Diet NPO Exceptions are: Sips of Water with Meds  PN-Adult Premix 5/20 - Standard Electrolytes - Central Line  Code:Full Code  DVT PPX lovenox   Disposition-not ready for discharge      Maria Carmichael MD   2/14/2023 1:31 PM

## 2023-02-14 NOTE — PROGRESS NOTES
PM hospitalist notified of MEWS of 3 d/t sustained tachycardia. Pt asymptomatic and appears ST currently. Call light within reach, will continue to monitor.

## 2023-02-14 NOTE — CONSULTS
Infectious Diseases   Consult Note        Admission Date: 2/7/2023  Hospital Day: Hospital Day: 8   Attending: Mal Krause MD  Date of service: 2/14/23     Reason for admission: Bacterial pneumonia [J15.9]  Acute kidney injury ([address] [N17.9]  Elevated d-dimer [R79.89]  Allergic reaction to drug, initial encounter [T78.40XA]  Multifocal pneumonia [J18.9]    Chief complaint/ Reason for consult: Sepsis, worsening intra-abdominal abscess    Microbiology:        I have reviewed allavailable micro lab data and cultures    Blood culture (2/2) - collected on 2/7/2023: In process      Antibiotics and immunizations:       Current antibiotics: All antibiotics and their doses were reviewed by me    Recent Abx Admin                     metronidazole (FLAGYL) 500 mg in 0.9% NaCl 100 mL IVPB premix (mg) 500 mg New Bag 02/14/23 0847     500 mg New Bag  0023    cefepime (MAXIPIME) 1,000 mg in sodium chloride 0.9 % 50 mL IVPB (mini-bag) (mg) 1,000 mg New Bag 02/13/23 2301     1,000 mg New Bag  1139    fluconazole (DIFLUCAN) in 0.9 % sodium chloride IVPB 400 mg (mg) 400 mg New Bag 02/13/23 1834                      Immunization History: All immunization history was reviewed by me today.     Immunization History   Administered Date(s) Administered    COVID-19, PFIZER Bivalent BOOSTER, DO NOT Dilute, (age 12y+), IM, 30 mcg/0.3 mL 09/09/2022    COVID-19, PFIZER GRAY top, DO NOT Dilute, (age 15 y+), IM, 30 mcg/0.3 mL 05/20/2022    COVID-19, PFIZER PURPLE top, DILUTE for use, (age 15 y+), 30mcg/0.3mL 09/08/2021, 10/15/2021    Influenza Virus Vaccine 12/22/2012    Influenza, FLUAD, (age 72 y+), Adjuvanted, 0.5mL 10/27/2020, 10/15/2021, 09/09/2022    Influenza, High Dose (Fluzone 65 yrs and older) 09/30/2016, 10/01/2017, 10/08/2018    Influenza, Triv, inactivated, subunit, adjuvanted, IM (Fluad 65 yrs and older) 10/21/2019    Pneumococcal Conjugate 13-valent ([id number]) 09/30/2016, 10/01/2017    Pneumococcal Polysaccharide ([id number]) 12/22/2012, 10/06/2017    Td vaccine (adult) 12/01/2008    Zoster Live (Zostavax) 09/15/2015       Known drug allergies: All allergies were reviewed and updated    Allergies   Allergen Reactions    Bee Venom Swelling and Angioedema    Shellfish-Derived Products Other (See Comments)     Syncope/seizures    Shrimp Flavor      Passes out      Cephalexin Itching    Codeine Hives and Other (See Comments)     B/P DROPS PASSES OUT  Passed out    Fentanyl And Related Hives     Other reaction(s): Dizziness    Hydromorphone Rash, Hives and Other (See Comments)     Full rash spreading across chest and both arms from IV hydromorphone  Passed out    Vancomycin Rash       Social history:     Social History:  All social andepidemiologic history was reviewed and updated by me today as needed. Tobacco use:   reports that she has never smoked. She has never used smokeless tobacco.  Alcohol use:   reports no history of alcohol use. Currently lives in: [address] [place] [address]   reports no history of drug use. COVID VACCINATION AND LAB RESULT RECORDS:     Internal Administration   First Dose COVID-19, PFIZER PURPLE top, DILUTE for use, (age 15 y+), 30mcg/0.3mL  09/08/2021   Second Dose COVID-19, PFIZER PURPLE top, DILUTE for use, (age 15 y+), 30mcg/0.3mL   10/15/2021       Last COVID Lab SARS-CoV-2 (no units)   Date Value   09/13/2022 Not Detected     SARS-CoV-2, BREANA (no units)   Date Value   10/01/2020 NOT DETECTED     SARS-CoV-2, NAAT (no units)   Date Value   12/20/2022 Not Detected            Assessment:     The patient is a 78 y.o. old female who  has a past medical history of Arthritis, Atrial fibrillation and flutter ([facility] Hypertension, Kidney disease, Pneumonia, and Sleep apnea.  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 Discussion:      The patient was admitted originally for rash and worsening abdominal pain. She was initially being treated for atypical pneumonia and received Levaquin on February 6 in February and then was placed on doxycycline by primary team.  White cell count went up to 21,900 yesterday. Patient's antibiotics were escalated to IV cefepime, metronidazole and fluconazole per primary team.    White cell count has gone up to 31,900 today. Last set of blood cultures were sent on 2/7/2023 and have been negative      Plan:     Diagnostic Workup: Will order 2 more sets of blood cultures today  Will order sed rate and CRP  Continue to follow fever curve, WBC count and blood cultures  Follow up on liverand renal functions closely  Check lactic acid level    Antimicrobials:    I will stop IV cefepime and metronidazole  Will order IV meropenem 500 mg every 6 hours  The patient is on amiodarone. Would avoid ciprofloxacin and Diflucan to avoid any dangerous QT prolongation issues  We will start IV Eraxis for antifungal coverage  Recommend percutaneous drainage of the intra-abdominal abscess by interventional radiology. Send abscess fluid for bacterial and fungal cultures and cytology  GI, nephrology and general surgery notes reviewed. Patient poor surgical candidate according to Dr. Otilio Cline  We will follow up on the culture results and clinical progress and will make further recommendations accordingly. Continue close vitals monitoring.   Maintain good glycemic control. Fall precautions. Aspiration precautions. Continue to watch for new fever or diarrhea. DVT prophylaxis. Discussed all above with patient and RN. Drug Monitoring:    Continue serial monitoring for antibiotic toxicity as follows: CBC, CMP  Continue to watch for following: new or worsening fever, hypotension, hives, lip swelling and redness or purulence at vascular access sites. I/v access Management:    Continue to monitor i.v access sites for erythema, induration, discharge or tenderness. As always, continue efforts to minimizetubes/lines/drains as clinically appropriate to reduce chances of line associated infections. Current isolation precautions: There are no current isolations documented for this patient. Level of complexity of visit and medical decision making: High     Risk of Complications/Morbidity: High     Illness(es)/ Infection present that pose threat to life/bodily function. There is potential for severe exacerbation of infection/side effects of treatment. Therapy requires intensive monitoring for antimicrobial agent toxicity. Thank you for involving me in the care of your patient. I will continue to follow. If you have any additional questions, please do not hesitate to contact me. Subjective:     Presenting complaint in ER:     Chief Complaint   Patient presents with    Abdominal Pain     Pt via EMS from home, c/o LUQ pain 10/10, has had gallbladder removed. Pt states pain is making her sob, 89% ora, put on 2L, pt received 325 mg aspirin, has been treated for cellulitis since December, new antibiotic last two weeks, states she has been getting hives and itching        HPI: Erik Berkowitz is a 78 y.o. female patient, who was seen at the request of Dr. Jonathan Min MD.    History was obtained from chart review and the patient. The patient was admitted on 2/7/2023. I have been consulted to see the patient for above mentioned reason(s).     The patient has multiple medical comorbidities, and presented to the ER originally for evaluation of itching that was going on for 24 hours before presentation to the ER. The patient also had left upper quadrant abdominal pain. The pain was insidious in onset, was worsening in nature, was rated as 10 out of 10 in intensity by the patient on presentation to the ER with no aggravating or relieving factors. The patient has recurrent cellulitis issues and was started reportedly on a new antibiotic by her PCP couple of weeks ago. Patient had itching with that. She was hospitalized. 2 sets of blood cultures were sent on admission. Patient has been in the hospital for 1 week. Her white cell count was normal on admission and has been going up. She was on doxycycline until day before yesterday and yesterday her white cell count went up to 21,900. She was started on IV cefepime, metronidazole and fluconazole per primary team    I have been asked for my opinion for management for this patient. Past Medical History: All past medical history reviewed today. Past Medical History:   Diagnosis Date    Arthritis     Atrial fibrillation and flutter ([address]     Hypertension     Kidney disease     Pt states  \"stage 3\"    Pneumonia     Sleep apnea     no c-pap         Past Surgical History: All pastsurgical history was reviewed today.     Past Surgical History:   Procedure Laterality Date    CARDIOVERSION      COLONOSCOPY N/A 11/20/2018    COLONOSCOPY POLYPECTOMY SNARE/COLD BIOPSY performed by Syed Kelsey MD at [address] ENDOSCOPY    COLONOSCOPY N/A 03/31/2022    COLONOSCOPY POLYPECTOMY SNARE/COLD BIOPSY performed by Bam Mello MD at [facility]      ankle rt has pins and rods, and rt elbow    GASTRIC BYPASS SURGERY      LARYNX SURGERY      TOTAL KNEE ARTHROPLASTY Bilateral 12/19/2012    bilateral knee replacements    TUBAL LIGATION      tubes tied    UPPER GASTROINTESTINAL ENDOSCOPY N/A 11/20/2018    EGD BIOPSY performed by Albert Mcduffie MD at [address] [place] [address] N/A 03/31/2022    EGD DILATION BALLOON performed by Kerry Vivar MD at [facility] N/A 03/31/2022    EGD BIOPSY performed by Kerry Vivar MD at [address] [place] [address]         Family History: All family history was reviewed today. Problem Relation Age of Onset    Other Mother         blood clot    Cancer Father         stomach cancer    Stroke Brother          Medications: All current and past medications were reviewed. Medications Prior to Admission: dilTIAZem (DILACOR XR) 240 MG extended release capsule, Take 240 mg by mouth daily  amiodarone (CORDARONE) 200 MG tablet, 200mg bid x 8 days then 200mg daily  traMADol (ULTRAM) 50 MG tablet, Take 50 mg by mouth every 6 hours as needed for Pain. Cholecalciferol (VITAMIN D3) 1.25 MG (71245 UT) CAPS, Take 1 capsule by mouth every 7 days  furosemide (LASIX) 20 MG tablet, Take 1 tablet by mouth daily (Patient not taking: No sig reported)  albuterol sulfate HFA (PROVENTIL HFA) 108 (90 Base) MCG/ACT inhaler, Inhale 2 puffs into the lungs every 4 hours as needed for Wheezing  [DISCONTINUED] ferrous sulfate (IRON 325) 325 (65 Fe) MG tablet, Take 1 tablet by mouth daily (with breakfast) (Patient not taking: Reported on 2/7/2023)  [DISCONTINUED] buPROPion (WELLBUTRIN SR) 200 MG extended release tablet, TAKE 1 TABLET BY MOUTH TWICE DAILY (Patient not taking: Reported on 2/7/2023)  vitamin B-12 (CYANOCOBALAMIN) 100 MCG tablet, Take 1 tablet by mouth in the morning.   alendronate (FOSAMAX) 70 MG tablet, Take 1 tablet by mouth every 7 days  apixaban (ELIQUIS) 5 MG TABS tablet, Take 1 tablet by mouth 2 times daily  [DISCONTINUED] metoprolol tartrate (LOPRESSOR) 25 MG tablet, Take 1 tablet by mouth 2 times daily (Patient taking differently: Take 12.5 mg by mouth 2 times daily)  [DISCONTINUED] pantoprazole (PROTONIX) 40 MG tablet, Take 1 tablet by mouth 2 times daily (before meals) (Patient not taking: Reported on 2/7/2023)  aspirin 81 MG tablet, Take 81 mg by mouth daily     meropenem  500 mg IntraVENous q8h    anidulafungin  200 mg IntraVENous Once    And    [START ON 2/15/2023] anidulafungin  100 mg IntraVENous Q24H    furosemide  20 mg IntraVENous Daily    iron sucrose  200 mg IntraVENous Q24H    lidocaine 1 % injection  5 mL IntraDERmal Once    sodium chloride flush  5-40 mL IntraVENous 2 times per day    metoprolol tartrate  75 mg Oral BID    predniSONE  40 mg Oral Daily    amiodarone  200 mg Oral Daily    [Held by provider] apixaban  5 mg Oral BID    aspirin  81 mg Oral Daily    sodium chloride flush  5-40 mL IntraVENous 2 times per day          REVIEW OF SYSTEMS:       Review of Systems   Constitutional:  Positive for fatigue. Negative for chills, diaphoresis and fever. HENT:  Negative for ear discharge, ear pain, postnasal drip, rhinorrhea, sinus pressure, sinus pain and sore throat. Eyes:  Negative for discharge and redness. Respiratory:  Negative for cough, shortness of breath and wheezing. Cardiovascular:  Negative for chest pain and leg swelling. Gastrointestinal:  Positive for abdominal pain. Negative for constipation, diarrhea and nausea. Endocrine: Negative for cold intolerance, heat intolerance and polydipsia. Genitourinary:  Negative for dysuria, flank pain, frequency, hematuria and urgency. Musculoskeletal:  Negative for back pain and myalgias. Skin:  Negative for rash. Allergic/Immunologic: Negative for immunocompromised state. Neurological:  Negative for dizziness, seizures and headaches. Hematological:  Does not bruise/bleed easily. Psychiatric/Behavioral:  Negative for agitation, hallucinations and suicidal ideas. The patient is not nervous/anxious. All other systems reviewed and are negative.        Objective:       PHYSICAL EXAM:      Vitals:   Vitals:    02/14/23 0424 02/14/23 0508 02/14/23 0532 02/14/23 0753   BP: 110/78   105/69   Pulse: 92   (!) 108   Resp: 16   16   Temp: 97.8 °F (36.6 °C)   97.8 °F (36.6 °C)   TempSrc: Oral   Oral   SpO2: 95%   97%   Weight:  247 lb 3.2 oz (112.1 kg) 241 lb 1.6 oz (109.4 kg)    Height:           Physical Exam  Vitals and nursing note reviewed. Constitutional:       Appearance: She is well-developed. She is not diaphoretic. Comments: The patient was seen earlier today. HENT:      Head: Normocephalic and atraumatic. Right Ear: External ear normal. There is no impacted cerumen. Left Ear: External ear normal. There is no impacted cerumen. Nose: Nose normal.      Mouth/Throat:      Mouth: Mucous membranes are moist.      Pharynx: Oropharynx is clear. No oropharyngeal exudate. Eyes:      General: No scleral icterus. Right eye: No discharge. Left eye: No discharge. Conjunctiva/sclera: Conjunctivae normal.      Pupils: Pupils are equal, round, and reactive to light. Neck:      Thyroid: No thyromegaly. Cardiovascular:      Rate and Rhythm: Normal rate and regular rhythm. Heart sounds: Normal heart sounds. No murmur heard. No friction rub. Pulmonary:      Effort: No respiratory distress. Breath sounds: No stridor. No wheezing or rales. Abdominal:      General: Bowel sounds are normal.      Palpations: Abdomen is soft. Tenderness: There is abdominal tenderness. There is no guarding or rebound. Musculoskeletal:         General: No swelling, tenderness or deformity. Normal range of motion. Cervical back: Normal range of motion and neck supple. Right lower leg: No edema. Left lower leg: No edema. Lymphadenopathy:      Cervical: No cervical adenopathy. Skin:     General: Skin is warm and dry. Coloration: Skin is not jaundiced. Findings: No bruising, erythema or rash. Neurological:      General: No focal deficit present.       Mental Status: She is alert and oriented to person, place, and time. Mental status is at baseline. Motor: No abnormal muscle tone. Psychiatric:         Mood and Affect: Mood normal.         Behavior: Behavior normal.         Lines and drains: All vascular access sites are healthy with no local erythema, discharge or tenderness. Intake and output:     I/O last 3 completed shifts: In: 874.5 [P.O.:90; I.V.:302.2; IV Piggyback:482.3]  Out: 1500 [Urine:1500]    Lab Data:   All available labs were reviewed by me today. CBC:   Recent Labs     02/13/23  1043 02/14/23  0050 02/14/23  0432   WBC 21.9*  --  31.9*   RBC 3.65*  --  3.13*   HGB 10.1* 8.8* 8.6*   HCT 32.7* 28.7* 27.8*     --  160   MCV 89.5  --  88.6   MCH 27.7  --  27.3   MCHC 30.9*  --  30.8*   RDW 16.5*  --  16.9*        BMP:  Recent Labs     02/12/23  0905 02/13/23  1043 02/14/23  0432    140 143   K 4.5 4.1 4.6    103 106   CO2 33* 34* 33*   BUN 68* 61* 63*   CREATININE 1.8* 1.4* 1.5*   CALCIUM 8.1* 8.8 8.5   GLUCOSE 127* 101* 86        Hepatic FunctionPanel:   Lab Results   Component Value Date/Time    ALKPHOS 90 02/07/2023 03:39 AM    ALT <5 02/07/2023 03:39 AM    AST 10 02/07/2023 03:39 AM    PROT 7.0 02/07/2023 03:39 AM    BILITOT 1.4 02/07/2023 03:39 AM    BILIDIR 1.0 09/13/2022 05:56 PM    IBILI 0.9 09/13/2022 05:56 PM    LABALBU 3.2 02/07/2023 03:39 AM       CPK: No results found for: CKTOTAL  ESR:   Lab Results   Component Value Date    SEDRATE 59 (H) 01/09/2023     CRP:   Lab Results   Component Value Date    CRP 33.7 (H) 01/09/2023         Imaging: All pertinent images and reports for the current visit were reviewed by me during this visit. I reviewed the chest x-ray/CT scan/MRI images and independently interpreted the findings and results today. CT ABDOMEN WO CONTRAST Additional Contrast? Oral   Preliminary Result   1. Status post Angel-en-Y gastric bypass.   Chronically herniated gastric pouch   into the chest.  Findings compatible with a leak likely a marginal ulcer with   a developing 10 x 10 x 5 cm abscess centered between the Angel limb and   gastric remnant. Adjacent inflammatory change and left upper quadrant edema   has progressed from 02/07/2023. Trace amount of enteric contrast leak. A   couple punctate foci of free air noted in the anterior abdomen. Findings were discussed with Dr. Isaías Trent at 5:45 pm on 2/13/2023. CT ABDOMEN WO CONTRAST Additional Contrast? None   Final Result   Postsurgical change from gastric bypass. There is fat stranding surrounding   the afferent loop. There is fluid and gas seen in the upper abdomen, near   the anastomotic site, that is not all definitively intraluminal, with   surrounding fat stranding, raising the question of perforation. Increased pleural-parenchymal disease at the left lung base      The findings were sent to the Radiology Results [address] at 1:17   pm on 2/13/2023 to be communicated to a licensed caregiver. CT CHEST ABDOMEN PELVIS WO CONTRAST Additional Contrast? None   Final Result   1. Scattered ground-glass opacities with interlobular septal thickening. Findings may be related to pulmonary edema with other considerations   including an inflammatory/infectious process in the appropriate clinical   setting. 2. Right upper lobe consolidation. Additional bilateral scattered curvilinear   opacities may be related to atelectasis versus developing consolidation. 3. Enlarged pulmonary artery diameter. Finding may be related to pulmonary   arterial hypertension. 4. Coronary artery calcifications present. 5. Moderate hiatal hernia. 6. Questionable circumferential thickening of the descending colon with mild   surrounding fat stranding versus colonic underdistention. Finding raises the   possibility of colitis in the appropriate clinical setting. 7. Colonic diverticulosis, predominately sigmoid. No evidence of acute   diverticulitis. 8. Nonobstructing punctate left nephrolithiasis. RECOMMENDATIONS:   2 cm left adrenal mass, consistent with lipid-rich benign adenoma. No   follow-up imaging is recommended. JACR 2017 Aug; 14(8):1038-44, JCAT 2016 Aj Isaac; 40(2):194-200, Urol J 2006   Spring; 3(2):71-4. XR CHEST PORTABLE   Final Result   1. Diffuse hazy multifocal opacity throughout both lungs, right worse than   left, likely a combination of moderate pulmonary edema and multifocal   pneumonia. 2. Stable cardiomegaly. 3. Stable hiatal hernia. Outside records:    Labs, Microbiology, Radiology and pertinent results from Care everywhere, if available, were reviewed as a part ofthe consultation.       Problem list:       Patient Active Problem List   Diagnosis Code    Atrial fibrillation ([facility]) I48.91    Primary hypertension I10    Severe episode of recurrent major depressive disorder, without psychotic features ([facility] F33.2    Seasonal affective disorder ([facility]) F33.8    Class 2 severe obesity due to excess calories with serious comorbidity and body mass index (BMI) of 39.0 to 39.9 in adult ([facility] E66.01, Z68.39    Elevated sed rate R70.0    Neutrophilia D72.9    Acute kidney injury ([facility] N17.9    Elevated C-reactive protein (CRP) R79.82    GIANNA (obstructive sleep apnea) G47.33    Morbid obesity ([facility]) E66.01    Weight loss counseling, encounter for Z71.3    Atypical atrial flutter ([facility]) I48.4    PAF (paroxysmal atrial fibrillation) ([facility]) I48.0    ILD (interstitial lung disease) ([facility]) J84.9    Iron deficiency anemia due to chronic blood loss D50.0    Iron deficiency anemia, unspecified D50.9    Age-related osteoporosis without current pathological fracture M81.0    Vasovagal syncope R55    CKD (chronic kidney disease) stage 4, GFR 15-29 ml/min ([facility]) N18.4    Acute on chronic diastolic heart failure ([facility]) I50.33    Stage 3b chronic kidney disease ([facility]) N18.32    B12 deficiency E53.8    Vitamin D deficiency E55.9    Acute respiratory failure ([facility]) J96.00    Chronic obstructive pulmonary disease, unspecified J44.9    Sepsis ([facility]) A41.9    Cellulitis of right leg L03.115    Obesity (BMI 30-39. 9) E66.9    Stage 3 chronic kidney disease (H[facility]) N18.30    Bacteremia R78.81    Fever and chills R50.9    History of ankle surgery Z98.890    Streptococcal infection group G B95.4    Bacterial pneumonia J15.9    JUSTINA (acute kidney injury) ([address] N17.9    Acute on chronic heart failure with preserved ejection fraction (HFpEF) ([facility]) I50.33    Multifocal pneumonia J18.9    Allergic drug reaction T78.40XA    Elevated d-dimer R79.89    Left nephrolithiasis N20.0    Diverticulosis K57.90    History of cholecystectomy Z90.49    Intra-abdominal abscess ([facility]) K65.1    Bandemia D72.825    Atypical pneumonia J18.9         Please note that this chart was generated using Dragon dictation software. Although every effort was made to ensure the accuracy of this automated transcription, some errors in transcription may have occurred inadvertently. If you may need any clarification, please do not hesitate to contact me through EPIC or at the phone number provided below with my electronic signature. Any pictures or media included in this note were obtained after taking informed verbal consent from the patient and with their approval to include those in the patient's medical record. Sumit Rangel MD, MPH, FACP, [facility]  2/14/2023, 9:28 AM  [facility] Infectious Disease   [address] [place] [address], [address] [place] [address]  Office: [phone]  Fax: [phone]  In-person Clinic days:  Tuesday & Thursday a.m. Virtual clinic days: Monday, Wednesday & Friday a.m.

## 2023-02-14 NOTE — PROGRESS NOTES
Office : [phone]     Fax :[phone]       Nephrology progress  Note      Patient's Name: Bell Barker    2/14/2023        Requesting Physician:  MARILEE Stephens CNP      Chief Complaint:    Chief Complaint   Patient presents with    Abdominal Pain     Pt via EMS from home, c/o LUQ pain 10/10, has had gallbladder removed. Pt states pain is making her sob, 89% ora, put on 2L, pt received 325 mg aspirin, has been treated for cellulitis since December, new antibiotic last two weeks, states she has been getting hives and itching        History of Present Ilness:    Bell Barker is a 78 y.o. female who presented with complaints of shortness of breath. Patient apparently has been treated for cellulitis lower extremity on antibiotics patient started having worsening itching and hives. Came to the ER. Patient with increased shortness of breath. Also with leg swelling. No reported fevers chills. Patient was found to be hypoxic was placed on 2 L nasal cannula. Patient with history of chronic CHF COPD chronic kidney disease admitted with bilateral worse. Baseline creat is 1.2   Now elevated at 2.0     BNP 23730      Interval hx       No SOB     Good UOP  Creat improved     Abdominal pain     I/O last 3 completed shifts:   In: 874.5 [P.O.:90; I.V.:302.2; IV Piggyback:482.3]  Out: 1500 [Urine:1500]    Past Medical History:   Diagnosis Date    Arthritis     Atrial fibrillation and flutter ([address]     Hypertension     Kidney disease     Pt states  \"stage 3\"    Pneumonia     Sleep apnea     no c-pap       Past Surgical History:   Procedure Laterality Date    CARDIOVERSION      COLONOSCOPY N/A 11/20/2018    COLONOSCOPY POLYPECTOMY SNARE/COLD BIOPSY performed by Yasmeen Carpenter MD at [facility]    COLONOSCOPY N/A 03/31/2022    COLONOSCOPY POLYPECTOMY SNARE/COLD BIOPSY performed by John Hein MD at [address] [facility]      ankle rt has pins and rods, and rt elbow    GASTRIC BYPASS SURGERY      LARYNX SURGERY      TOTAL KNEE ARTHROPLASTY Bilateral 12/19/2012    bilateral knee replacements    TUBAL LIGATION      tubes tied    UPPER GASTROINTESTINAL ENDOSCOPY N/A 11/20/2018    EGD BIOPSY performed by Benita Mcghee MD at [facility] N/A 03/31/2022    EGD DILATION BALLOON performed by John Hein MD at [facility] N/A 03/31/2022    EGD BIOPSY performed by John Hein MD at [address] [place] [address]       Family History   Problem Relation Age of Onset    Other Mother         blood clot    Cancer Father         stomach cancer    Stroke Brother          Current Medications:    ciprofloxacin (CIPRO) IVPB 400 mg, Q12H  metronidazole (FLAGYL) 500 mg in 0.9% NaCl 100 mL IVPB premix, Q8H  pantoprazole (PROTONIX) 80 mg in sodium chloride 0.9 % 100 mL infusion, Continuous  furosemide (LASIX) injection 20 mg, Daily  ipratropium-albuterol (DUONEB) nebulizer solution 1 ampule, Q4H PRN  fluconazole (DIFLUCAN) in 0.9 % sodium chloride IVPB 400 mg, Q24H  iron sucrose (VENOFER) 200 mg in sodium chloride 0.9 % 100 mL IVPB, Q24H  lidocaine PF 1 % injection 5 mL, Once  sodium chloride flush 0.9 % injection 5-40 mL, 2 times per day  sodium chloride flush 0.9 % injection 5-40 mL, PRN  0.9 % sodium chloride infusion, PRN  metoprolol tartrate (LOPRESSOR) tablet 75 mg, BID  predniSONE (DELTASONE) tablet 40 mg, Daily  diphenhydrAMINE (BENADRYL) tablet 50 mg, Q6H PRN  ondansetron (ZOFRAN) injection 4 mg, Q6H PRN  albuterol sulfate HFA (PROVENTIL;VENTOLIN;PROAIR) 108 (90 Base) MCG/ACT inhaler 2 puff, Q4H PRN  amiodarone (CORDARONE) tablet 200 mg, Daily  [Held by provider] apixaban (ELIQUIS) tablet 5 mg, BID  aspirin EC tablet 81 mg, Daily  traMADol (ULTRAM) tablet 50 mg, Q6H PRN  sodium chloride flush 0.9 % injection 5-40 mL, 2 times per day  sodium chloride flush 0.9 % injection 5-40 mL, PRN  0.9 % sodium chloride infusion, PRN  ondansetron (ZOFRAN-ODT) disintegrating tablet 4 mg, Q8H PRN   Or  ondansetron (ZOFRAN) injection 4 mg, Q6H PRN  polyethylene glycol (GLYCOLAX) packet 17 g, Daily PRN  acetaminophen (TYLENOL) tablet 650 mg, Q6H PRN   Or  acetaminophen (TYLENOL) suppository 650 mg, Q6H PRN        Physical exam:     Vitals:  /78   Pulse 92   Temp 97.8 °F (36.6 °C) (Oral)   Resp 16   Ht 5' 7\" (1.702 m)   Wt 241 lb 1.6 oz (109.4 kg)   SpO2 95%   BMI 37.76 kg/m²   Constitutional:  OAA X3 NAD  Skin: no rash, turgor wnl  Heent:  eomi, mmm  Neck: no bruits or jvd noted  Cardiovascular:  S1, S2 without m/r/g  Respiratory: CTA B without w/r/r  Abdomen:  +bs, soft, nt, nd  Ext: 1 +  lower extremity edema  Psychiatric: mood and affect appropriate  Musculoskeletal:  Rom, muscular strength intact    Labs:  CBC:   Recent Labs     02/11/23  0850 02/13/23  1043 02/14/23  0050 02/14/23  0432   WBC 17.1* 21.9*  --  31.9*   HGB 12.0 10.1* 8.8* 8.6*    189  --  160     BMP:    Recent Labs     02/12/23  0905 02/13/23  1043 02/14/23  0432    140 143   K 4.5 4.1 4.6    103 106   CO2 33* 34* 33*   BUN 68* 61* 63*   CREATININE 1.8* 1.4* 1.5*   GLUCOSE 127* 101* 86     Ca/Mg/Phos:   Recent Labs     02/12/23  0905 02/13/23  1043 02/14/23  0432   CALCIUM 8.1* 8.8 8.5     Hepatic:   No results for input(s): AST, ALT, ALB, BILITOT, ALKPHOS in the last 72 hours. IMAGING:  CT ABDOMEN WO CONTRAST Additional Contrast? Oral   Preliminary Result   1. 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  Findings compatible with a leak likely a marginal ulcer with   a developing 10 x 10 x 5 cm abscess centered between the Angel limb and   gastric remnant. Adjacent inflammatory change and left upper quadrant edema   has progressed from 02/07/2023. Trace amount of enteric contrast leak. A   couple punctate foci of free air noted in the anterior abdomen. Findings were discussed with Dr. Celina Rodgers at 5:45 pm on 2/13/2023. CT ABDOMEN WO CONTRAST Additional Contrast? None   Final Result   Postsurgical change from gastric bypass. There is fat stranding surrounding   the afferent loop. There is fluid and gas seen in the upper abdomen, near   the anastomotic site, that is not all definitively intraluminal, with   surrounding fat stranding, raising the question of perforation. Increased pleural-parenchymal disease at the left lung base      The findings were sent to the Radiology Results [address] at 1:17   pm on 2/13/2023 to be communicated to a licensed caregiver. CT CHEST ABDOMEN PELVIS WO CONTRAST Additional Contrast? None   Final Result   1. Scattered ground-glass opacities with interlobular septal thickening. Findings may be related to pulmonary edema with other considerations   including an inflammatory/infectious process in the appropriate clinical   setting. 2. Right upper lobe consolidation. Additional bilateral scattered curvilinear   opacities may be related to atelectasis versus developing consolidation. 3. Enlarged pulmonary artery diameter. Finding may be related to pulmonary   arterial hypertension. 4. Coronary artery calcifications present. 5. Moderate hiatal hernia. 6. Questionable circumferential thickening of the descending colon with mild   surrounding fat stranding versus colonic underdistention. Finding raises the   possibility of colitis in the appropriate clinical setting. 7. Colonic diverticulosis, predominately sigmoid. No evidence of acute   diverticulitis. 8. Nonobstructing punctate left nephrolithiasis. RECOMMENDATIONS:   2 cm left adrenal mass, consistent with lipid-rich benign adenoma.  No follow-up imaging is recommended. JACR 2017 Aug; 14(8):1038-44, JCAT 2016 [facility] [place]; 40(2):194-200, Urol J 2006   [place]; 3(2):71-4. XR CHEST PORTABLE   Final Result   1. Diffuse hazy multifocal opacity throughout both lungs, right worse than   left, likely a combination of moderate pulmonary edema and multifocal   pneumonia. 2. Stable cardiomegaly. 3. Stable hiatal hernia. Assessment/Plan :      1. Panda   Creatinine  improved   Has mild edema   Low dose  lasix         Recommend to dose adjust all medications  based on renal functions  Maintain SBP> 90 mmHg   Daily weights   AVOID NSAIDs  Avoid Nephrotoxins  Monitor Intake/Output  Call if significant decrease in urine output        2. HTN. BP low borderline range   Hold clonidine if SBP < 110 mm HG     3. COPD    4. H/o atrial fib   Monitor       5. Hyperkalemia   Resolved after getting lokelma     6. Abdominal  pain .  CT scan shows there has been interval development of a poorly  marginated approximately 10 x 10 x 5 cm fluid collection with foci of gas and  small amounts of enteric contrast compatible with a developing abscess  Surgery on board      D/w primary team      Thank you for allowing us to participate in care of Kenyetta Benavidez         Electronically signed by: Bob Arriaza MD, 2/14/2023, 7:08 AM      [facility] [address]  Office : [phone]  Fax :[phone]

## 2023-02-14 NOTE — PROGRESS NOTES
Repeat CT with oral contrast showing contained perforated chronic marginal ulcer with developing phlegmon. No surgical intervention since contained perforated ulcer , plus very poor surgical candidate.      Plan discussed and agreed on with Dr Mookie Salazar and Dr Amber Joseph.     - Luis Eduardo Rivera consult for abx management   - PICC  - TPN  - PPI   - IR consult for perc drain

## 2023-02-14 NOTE — PROGRESS NOTES
[facility]   Phone: [phone]    Occupational Therapy    [] Initial Evaluation            [x] Daily Treatment Note         [] Discharge Summary      Patient: Gregory White   : 1945   MRN: [id number]   Date of Service:  2023    Admitting Diagnosis:  Bacterial pneumonia  Current Admission Summary: Per H&P \"67 y.o. female   states she has had itching and hives with a rash for the past 24 hours past 3 hours she has had left upper abdominal pain sharp in nature constant which made her short of breath she denies any chest pain the pain is in the left upper quadrant she is currently taking cephalexin for the leg cellulitis\"  Past Medical History:  has a past medical history of Arthritis, Atrial fibrillation and flutter ([address] Hypertension, Kidney disease, Pneumonia, and Sleep apnea. Past Surgical History:  has a past surgical history that includes Tubal ligation; Total knee arthroplasty (Bilateral, 2012); fracture surgery; Colonoscopy (N/A, 2018); Upper gastrointestinal endoscopy (N/A, 2018); Cardioversion; Gastric bypass surgery; Larynx surgery; Upper gastrointestinal endoscopy (N/A, 2022); Colonoscopy (N/A, 2022); and Upper gastrointestinal endoscopy (N/A, 2022). Discharge Recommendations: Gregory White scored a 16/24 on the AM-PAC ADL Inpatient form. Current research shows that an AM-PAC score of 17 or less is typically not associated with a discharge to the patient's home setting. Based on the patient's AM-PAC score and their current ADL deficits, it is recommended that the patient have 3-5 sessions per week of Occupational Therapy at d/c to increase the patient's independence. Please see assessment section for further patient specific details. If patient discharges prior to next session this note will serve as a discharge summary. Please see below for the latest assessment towards goals.       DME Required For Discharge: DME to be determined at next level of care    Precautions/Restrictions: high fall risk  Weight Bearing Restrictions: no restrictions  [] Right Upper Extremity  [] Left Upper Extremity [] Right Lower Extremity  [] Left Lower Extremity     Required Braces/Orthotics: no braces required   [] Right  [] Left  Positional Restrictions:no positional restrictions      Pre-Admission Information     Lives With: daughter    Type of Home: house  Home Layout: one level, laundry in basement, pt does not need to go to basement  Home Access:  3 step to enter without rails   Bathroom Layout: walk in shower, handicap height toilet  Bathroom Equipment: shower chair  Home Equipment: rolling walker, single point cane, electric scooter, hospital bed, lift chair  Transfer Assistance: modified independent with use of lift chair,  Ambulation Assistance:modified independent with use of SPC, use of scooter in community  ADL Assistance: independent with all ADL's  IADL Assistance: independent with homemaking tasks, daughter completes laundry  Active : [] yes  [x]No daughter drives to appointments   Current Employment: retired. Occupation:   Hobbies:   Recent Falls: Pt reports 1 fall while at the hospital in previous visit, states her legs buckled when she went to stand at chair. Subjective    General: Patient supine in bed with RN present upon arrival, agreeable to therapy treatment session.   Patient on 1L O2 upon arrival  Pain: Patient does not rate upon questioning, patient stated had RLE stiffness with transfer and RN present for administering pain medications upon arrival.    Pain Interventions: repositioned        Activities of Daily Living    Basic Activities of Daily Living  Grooming: setup assistance Increased time to complete task  Grooming Comments: setup for grooming tasks seated in recliner brushed teeth, patient with increased time needed for thoroughness and task completion   Lower Extremity Dressing: maximum assistance Increased time to complete task  Dressing Equipment: none  Dressing Comments: maxA for donning/doffing socks  Toileting Comments: Patient with Pervis Shadow in place upon arrival  General Comments: Patient declined any further ADLs, stating she is just not feeling up to anymore right now. Patient with minimal verbal cueing needed for initiation and task completion. Instrumental Activities of Daily Living  No IADL completed on this date. Functional Mobility    Bed Mobility  Supine to Sit: stand by assistance  Rolling Right: stand by assistance  Scooting: stand by assistance  Comments: Patient with increased time needed for completion, patient with slowed movements and with use of grab bars. Patient with increased dizziness seated EOB, and increased time needed for recovery  Transfers  Sit to stand transfer:minimal assistance  Stand to sit transfer: minimal assistance  Comments: Patient with bed height elevated for functional transfer from bed, patient with slowed movements and minimal verbal cueing needed for initiation for increased safety with functional transfers. Functional Mobility:  Sitting Balance: stand by assistance. Sitting Balance Comment: seated EOB , in recliner  Standing Balance: contact guard assistance, minimal assistance. Standing Balance Comment: Sai progressing to CGA functional transfers/mobility  Functional Mobility: .  contact guard assistance  Functional Mobility Activity: ~5ft bed to chair  Functional Mobility Device Use: rolling walker  Functional Mobility Comment: Patient ambulated ~5ft in room from bed to recliner, patient declined any further ambulation due to fatigue and SOB. Patient with O2 increase from 1L to 2L O2 following functional mobility due to SOB, SPO2 dropped from 94% to 83% and took ~1 minute to recover to 90%. Patient returned to 1L O2. Patient with HR ranging between .       Other Therapeutic Interventions    Functional Outcomes  -PAC Inpatient Daily Activity Raw Score: 16      Cognition  Overall Cognitive Status: Impaired  Arousal/Alterness: appropriate responses to stimuli  Following Commands: follows one step commands with increased time, follows one step commands consistently  Attention Span: attends with cues to redirect  Memory: appears intact  Safety Judgement: good awareness of safety precautions  Problem Solving: good awareness of errors made  Insights: decreased awareness of deficits  Initiation: requires cues for some  Sequencing: requires cues for some  Comments: Patient with some cognitive improvements, however with some deficits still present this session. Orientation:    alert and oriented x 4  Command Following:   accurately follows one step commands     Education    Barriers To Learning: cognition  Patient Education: patient educated on goals, OT role and benefits, plan of care, ADL adaptive strategies, energy conservation, disease specific education, transfer training, discharge recommendations  Learning Assessment:  patient verbalizes understanding, would benefit from continued reinforcement    Assessment    Activity Tolerance: Patient tolerated treatment however limited by fatigue and SOB, patient with O2 increase from 1 to 2L SPO2 dropped from 94% to 83% and took ~1 minute to recover to 90%. Patient with HR ranging between  throughout session   Impairments Requiring Therapeutic Intervention: decreased functional mobility, decreased ADL status, decreased strength, decreased safety awareness, decreased endurance, decreased balance, decreased IADL  Prognosis: good  Clinical Assessment: Pt is currently functioning below occupational baseline and demo the deficits listed above. Pt is currently requiring min(A) for transfers and CGA functional mobility and max(A) for LB ADLs. Pt typically IND at baseline.  Pt would benefit from continued skilled OT services to address these deficits and increase IND, safety, and ease with all occupational pursuits.    Safety Interventions: patient left in chair, chair alarm in place, call light within reach, gait belt, patient at risk for falls, and nurse notified       Plan    Frequency: 3-5 x/per week  Current Treatment Recommendations: strengthening, balance training, functional mobility training, transfer training, endurance training, patient/caregiver education, ADL/self-care training, and safety education    Goals    Patient Goals: pt did not state      Short Term Goals:  Time Frame: by d/c  Patient will complete upper body ADL at supervision   Patient will complete lower body ADL at minimal assistance   Patient will complete toileting at minimal assistance   Patient will complete functional transfers at stand by assistance   Patient will complete functional mobility at stand by assistance     No goals met this date - 2/14/23        Therapy Session Time     Individual Group Co-treatment   Time In 0836     Time Out 0914     Minutes 38          Timed Code Treatment Minutes:  38 minutes    Total Treatment Minutes:  38 minutes    Electronically Signed By: SEAN Evans/MANISHA [id number]

## 2023-02-14 NOTE — PROGRESS NOTES
Nutrition Note    RECOMMENDATIONS  PO Diet: NPO  ONS: NPO  Nutrition Support:   Start Clinimix 5/20 at 40 mL per hour and advance as tolerated until goal rate 83 mL/hr is achieved. Clinimix 5/20 at goal rate 83 mL/hr to provide 2000 mL total volume, 1760 calories, 100 grams protein, dextrose load 2.54 mg/kg/min. Recommend check TG, Mg, Phos, CMP now if not done in last 24 hours. Recommend 250 mL 20% lipids 2 times per week beginning 2/20 if TG WNL  Recommend FSBS, monitor glucose, need for insulin  Pharmacy to adjust MVI and Trace Elements as needed     [address] received for TPN to start. Pt developed hematemesis 2/13 and CT scan showed possible perforation. NPO with TPN to start per surgery. Pt was eating well prior to 2/13. Na+ and K+ WNL. Mg+ and Phos pending. Nutrition Related Findings: LBM 2/11. Trace LUE edema. Non-pitting BLE edema. -2.1 liters. Wounds: None  Nutrition Education:  Education not indicated   Nutrition Goals: Tolerate nutrition support at goal rate     MALNUTRITION ASSESSMENT   Malnutrition Status: No malnutrition    NUTRITION DIAGNOSIS   Inadequate oral intake related to altered GI structure as evidenced by NPO or clear liquid status due to medical condition, nutrition support - parenteral nutrition    CURRENT NUTRITION THERAPIES  Diet NPO Exceptions are: Sips of Water with Meds     PO Intake: NPO   PO Supplement Intake:NPO    ANTHROPOMETRICS  Current Height: 5' 7\" (170.2 cm)  Current Weight: 241 lb 1.6 oz (109.4 kg)    Admission weight: 249 lb 9 oz (113.2 kg)  Ideal Body Weight (IBW): 135 lbs  (61 kg)        BMI: 37.6    COMPARATIVE STANDARDS  Energy (kcal):  6620-5467     Protein (g):   grams       Fluid (mL/day):  1649-1992 mL    The patient will be monitored per nutrition standards of care. Consult dietitian if additional nutrition interventions are needed prior to RD reassessment.      Yuridia Irving, [address] [place] [address],     Contact: [phone]

## 2023-02-14 NOTE — PROGRESS NOTES
Shift assessment completed. Routine vitals obtained. Scheduled medications given. Patient is awake, alert and oriented. Patient does not appear to be in distress, resting comfortably at this time. Call light within reach. No further concerns expressed. [facility]ulnz

## 2023-02-14 NOTE — PROGRESS NOTES
Clinical Pharmacy Note    Pharmacy consulted by Dr. Alina Villaseñor to manage TPN      Goal TPN rate: 83ml/hr    Labs:  General Labs:  BMP:    Lab Results   Component Value Date/Time     02/14/2023 04:32 AM    K 4.6 02/14/2023 04:32 AM    K 4.4 02/07/2023 03:39 AM     02/14/2023 04:32 AM    CO2 33 02/14/2023 04:32 AM    BUN 63 02/14/2023 04:32 AM    LABALBU 3.2 02/07/2023 03:39 AM    CREATININE 1.5 02/14/2023 04:32 AM    CALCIUM 8.5 02/14/2023 04:32 AM    GFRAA 38 09/20/2022 04:14 AM    GFRAA 45 03/27/2013 01:36 PM    LABGLOM 36 02/14/2023 04:32 AM    GLUCOSE 86 02/14/2023 04:32 AM       Electrolyte replacement as follows:   Awaiting Phos and MG labs to be released. Will replace out of the bag. Blood sugars over past 24 hours:     Blood sugar management:  Plan to Continue Humalog q6 hour sliding scale to low dose. Plan to initiate TPN at 40 ml/hr. Thank you for allowing pharmacy to participate in the care of this patient.     ALEXIA Jon HERBIE [facility], PharmD 2/14/2023

## 2023-02-14 NOTE — PROGRESS NOTES
Physical Therapy    [facility]   Phone: [phone]    Physical Therapy    [] Initial Evaluation            [x] Daily Treatment Note         [] Discharge Summary      Patient: Denisa Solis   : 1945   MRN: [id number]   Date of Service:  2023  Admitting Diagnosis: Bacterial pneumonia  Current Admission Summary: Denisa Solis is a 78 y.o. female   states she has had itching and hives with a rash for the past 24 hours past 3 hours she has had left upper abdominal pain sharp in nature constant which made her short of breath she denies any chest pain the pain is in the left upper quadrant she is currently taking cephalexin for the leg cellulitis  Past Medical History:  has a past medical history of Arthritis, Atrial fibrillation and flutter ([address] Hypertension, Kidney disease, Pneumonia, and Sleep apnea. Past Surgical History:  has a past surgical history that includes Tubal ligation; Total knee arthroplasty (Bilateral, 2012); fracture surgery; Colonoscopy (N/A, 2018); Upper gastrointestinal endoscopy (N/A, 2018); Cardioversion; Gastric bypass surgery; Larynx surgery; Upper gastrointestinal endoscopy (N/A, 2022); Colonoscopy (N/A, 2022); and Upper gastrointestinal endoscopy (N/A, 2022). Discharge Recommendations: Denisa Solis scored a 13/24 on the AM-PAC short mobility form. Current research shows that an AM-PAC score of 17 or less is typically not associated with a discharge to the patient's home setting. Based on the patient's AM-PAC score and their current functional mobility deficits, it is recommended that the patient have 3-5 sessions per week of Physical Therapy at d/c to increase the patient's independence. Please see assessment section for further patient specific details. If patient discharges prior to next session this note will serve as a discharge summary.   Please see below for the latest assessment towards goals. DME Required For Discharge: DME to be determined at next level of care  Precautions/Restrictions: high fall risk  Weight Bearing Restrictions: no restrictions  [] Right Upper Extremity  [] Left Upper Extremity [] Right Lower Extremity  [] Left Lower Extremity     Required Braces/Orthotics: no braces required   [] Right  [] Left  Positional Restrictions:no positional restrictions    Pre-Admission Information   Lives With: daughter               Type of Home: house  Home Layout: one level, laundry in basement, pt does not need to go to basement  Home Access:  3 step to enter without rails   Bathroom Layout: walk in shower, handicap height toilet  Bathroom Equipment: shower chair  Home Equipment: rolling walker, single point cane, electric scooter, hospital bed, lift chair  Transfer Assistance: modified independent with use of lift chair,  Ambulation Assistance:modified independent with use of SPC, use of scooter in community  ADL Assistance: independent with all ADL's  IADL Assistance: independent with homemaking tasks, daughter completes laundry  Active : [] yes             [x]No daughter drives to appointments   Current Employment: retired. Occupation: bank teller  Hobbies:   Recent Falls: Pt reports 1 fall while at the hospital in previous visit, states her legs buckled when she went to stand at chair. Examination   Vision:   Vision Gross Assessment: Impaired and Vision Corrective Device: wears glasses at all times  Hearing:   hard of hearing  Observation:   General Observation:  hives/rash on B UE and B LE       Subjective  General: Pt in chair, agreeable to therapy treatment  Pain: Patient does not rate upon questioning  Pain Interventions: patient denies pain interventions       Functional Mobility  Bed Mobility  Sit to Supine: minimal assistance  Bridging: stand by assistance  Comments: Pt requiring assist to get left LE onto bed.  Pt bridging in bed to allow PT to place depends under her in bed. Transfers  Sit to stand transfer: maximum assistance  Stand to sit transfer: minimal assistance  Stand step transfer: moderate assistance  Comments: Sit>stand from recliner bed took 3 attempts to get pt standing due to low height of chair. Pt always uses lift feature of her lift chair to stand at home. Ambulation  Assistive Device: rolling walker  Assistance: minimal assistance  Distance: 5 steps to bed  Gait Mechanics: short shuffling steps heavy reliance on UE  Comments:    Stair Mobility  Stair mobility not completed on this date. Comments:  Wheelchair Mobility:  No w/c mobility completed on this date. Comments:  Balance  Static Sitting Balance: good: independent with functional balance in unsupported position  Dynamic Sitting Balance: good: independent with functional balance in unsupported position  Static Standing Balance: fair (-): maintains balance at CGA with use of UE support  Dynamic Standing Balance: poor (+): requires min (A) to maintain balance  Comments:  Stood at RW x 2 min after standing to for pericare    Other Therapeutic Interventions  Once returned to supine PT put brief and purewick in place for pt. Also pt complaining of LE itching so applied lotion for pt comfort.    Functional Outcomes                 Cognition  Overall Cognitive Status: Impaired  Arousal/Alterness: delayed responses to stimuli  Following Commands: follows one step commands with repetition  Attention Span: attends with cues to redirect  Memory: decreased recall of recent events, decreased short term memory, decreased long term memory  Safety Judgement: decreased awareness of need for assistance, decreased awareness of need for safety  Problem Solving: assistance required to generate solutions, assistance required to identify errors made  Insights: decreased awareness of deficits  Initiation: requires cues for some  Sequencing: requires cues for some  Comments: Pt demonstrates confused behaviors this date   Orientation:    alert and oriented x 4  Command Following:   accurately follows one step commands    Education  Barriers To Learning: cognition and hearing  Patient Education: patient educated on goals, PT role and benefits, plan of care, general safety, discharge recommendations  Learning Assessment:  patient verbalizes understanding, would benefit from continued reinforcement    Assessment  Activity Tolerance: Pt presents with fatigue limiting activity tolerance. On 2L O2 with Spo2 ~91-92% throughout session  Impairments Requiring Therapeutic Intervention: decreased functional mobility, decreased strength, decreased safety awareness, decreased cognition, decreased endurance, decreased sensation, decreased balance, increased pain, decreased posture  Prognosis: good  Clinical Assessment: Pt with significant deficits in her mobility, struggled significantly to stand from recliner and is very limited in her ambulation distance. Pt would benefit from continued skilled therapy to maximize her safety and ind with all mobility prior to d/c to home.    Safety Interventions: patient left in bed, bed alarm in place, call light within reach, gait belt, patient at risk for falls, and nurse notified    Plan  Frequency: 3-5 x/per week  Current Treatment Recommendations: strengthening, balance training, functional mobility training, transfer training, gait training, stair training, endurance training, and safety education    Goals  Patient Goals: none stated    Short Term Goals:  Time Frame: upon discharge   Patient will complete bed mobility at Independent   Patient will complete transfers at supervision   Patient will ambulate 50 ft with use of LRAD at stand by assistance  Patient will ascend/descend 3 stairs with (B) handrail at contact guard assistance  No goals met  Therapy Session Time      Individual Group Co-treatment   Time In 1025       Time Out 1059       Minutes 34         Timed Code Treatment Minutes:  24 Minutes  Total Treatment Minutes:  24  Minute Variance  Variance: 10 (lab in mid session to draw blood)    Electronically Signed By: Michell Li, [address] [place] [address]

## 2023-02-14 NOTE — PROGRESS NOTES
[facility]) Physicians   General & Laparoscopic Surgery  [employer] [facility] [employer]       Pt seen and examined    S/p gastric bypass at an outside facility several years ago. Patient with contained marginal ulcer with developing phlegmon. Patient currently NPO. No complaints of nausea and/or vomiting. There are no other complaints or questions. Vitals:    02/14/23 0424 02/14/23 0508 02/14/23 0532 02/14/23 0753   BP: 110/78   105/69   Pulse: 92   (!) 108   Resp: 16   16   Temp: 97.8 °F (36.6 °C)   97.8 °F (36.6 °C)   TempSrc: Oral   Oral   SpO2: 95%   97%   Weight:  247 lb 3.2 oz (112.1 kg) 241 lb 1.6 oz (109.4 kg)    Height:         Abdomen is soft & tender only in epigastric area on palpation. Breath sounds are clear in upper lung fields bilaterally, but decreased in bases. Bowel sounds are active in all quadrants.     Data  CBC with Differential:    Lab Results   Component Value Date/Time    WBC 31.9 02/14/2023 04:32 AM    RBC 3.13 02/14/2023 04:32 AM    HGB 8.6 02/14/2023 04:32 AM    HCT 27.8 02/14/2023 04:32 AM     02/14/2023 04:32 AM    MCV 88.6 02/14/2023 04:32 AM    MCH 27.3 02/14/2023 04:32 AM    MCHC 30.8 02/14/2023 04:32 AM    RDW 16.9 02/14/2023 04:32 AM    SEGSPCT 78.1 03/27/2013 01:25 PM    BANDSPCT 2 09/20/2022 04:14 AM    METASPCT 1 02/14/2023 04:32 AM    LYMPHOPCT 4.0 02/14/2023 04:32 AM    MONOPCT 2.0 02/14/2023 04:32 AM    BASOPCT 0.0 02/14/2023 04:32 AM    MONOSABS 0.6 02/14/2023 04:32 AM    LYMPHSABS 1.3 02/14/2023 04:32 AM    EOSABS 0.0 02/14/2023 04:32 AM    BASOSABS 0.0 02/14/2023 04:32 AM    DIFFTYPE Auto 03/27/2013 01:25 PM     CMP:    Lab Results   Component Value Date/Time     02/14/2023 04:32 AM    K 4.6 02/14/2023 04:32 AM    K 4.4 02/07/2023 03:39 AM     02/14/2023 04:32 AM    CO2 33 02/14/2023 04:32 AM    BUN 63 02/14/2023 04:32 AM    CREATININE 1.5 02/14/2023 04:32 AM    GFRAA 38 09/20/2022 04:14 AM    GFRAA 45 03/27/2013 01:36 PM    AGRATIO 0.8 02/07/2023 03:39 AM    LABGLOM 36 02/14/2023 04:32 AM    GLUCOSE 86 02/14/2023 04:32 AM    PROT 7.0 02/07/2023 03:39 AM    LABALBU 3.2 02/07/2023 03:39 AM    CALCIUM 8.5 02/14/2023 04:32 AM    BILITOT 1.4 02/07/2023 03:39 AM    ALKPHOS 90 02/07/2023 03:39 AM    AST 10 02/07/2023 03:39 AM    ALT <5 02/07/2023 03:39 AM     Magnesium:    Lab Results   Component Value Date/Time    MG 2.00 09/18/2022 10:34 AM     PT/INR:    Lab Results   Component Value Date/Time    PROTIME 25.3 02/13/2023 10:43 AM    INR 2.29 02/13/2023 10:43 AM     Current Inpatient Medications    Current Facility-Administered Medications: ciprofloxacin (CIPRO) IVPB 400 mg, 400 mg, IntraVENous, Q12H  metronidazole (FLAGYL) 500 mg in 0.9% NaCl 100 mL IVPB premix, 500 mg, IntraVENous, Q8H  pantoprazole (PROTONIX) 80 mg in sodium chloride 0.9 % 100 mL infusion, 8 mg/hr, IntraVENous, Continuous  furosemide (LASIX) injection 20 mg, 20 mg, IntraVENous, Daily  ipratropium-albuterol (DUONEB) nebulizer solution 1 ampule, 1 ampule, Inhalation, Q4H PRN  fluconazole (DIFLUCAN) in 0.9 % sodium chloride IVPB 400 mg, 400 mg, IntraVENous, Q24H  iron sucrose (VENOFER) 200 mg in sodium chloride 0.9 % 100 mL IVPB, 200 mg, IntraVENous, Q24H  lidocaine PF 1 % injection 5 mL, 5 mL, IntraDERmal, Once  sodium chloride flush 0.9 % injection 5-40 mL, 5-40 mL, IntraVENous, 2 times per day  sodium chloride flush 0.9 % injection 5-40 mL, 5-40 mL, IntraVENous, PRN  0.9 % sodium chloride infusion, 25 mL, IntraVENous, PRN  metoprolol tartrate (LOPRESSOR) tablet 75 mg, 75 mg, Oral, BID  predniSONE (DELTASONE) tablet 40 mg, 40 mg, Oral, Daily  diphenhydrAMINE (BENADRYL) tablet 50 mg, 50 mg, Oral, Q6H PRN  ondansetron (ZOFRAN) injection 4 mg, 4 mg, IntraVENous, Q6H PRN  albuterol sulfate HFA (PROVENTIL;VENTOLIN;PROAIR) 108 (90 Base) MCG/ACT inhaler 2 puff, 2 puff, Inhalation, Q4H PRN  amiodarone (CORDARONE) tablet 200 mg, 200 mg, Oral, Daily  [Held by provider] apixaban (ELIQUIS) tablet 5 mg, 5 mg, Oral, BID  aspirin EC tablet 81 mg, 81 mg, Oral, Daily  traMADol (ULTRAM) tablet 50 mg, 50 mg, Oral, Q6H PRN  sodium chloride flush 0.9 % injection 5-40 mL, 5-40 mL, IntraVENous, 2 times per day  sodium chloride flush 0.9 % injection 5-40 mL, 5-40 mL, IntraVENous, PRN  0.9 % sodium chloride infusion, , IntraVENous, PRN  ondansetron (ZOFRAN-ODT) disintegrating tablet 4 mg, 4 mg, Oral, Q8H PRN **OR** ondansetron (ZOFRAN) injection 4 mg, 4 mg, IntraVENous, Q6H PRN  polyethylene glycol (GLYCOLAX) packet 17 g, 17 g, Oral, Daily PRN  acetaminophen (TYLENOL) tablet 650 mg, 650 mg, Oral, Q6H PRN **OR** acetaminophen (TYLENOL) suppository 650 mg, 650 mg, Rectal, Q6H PRN    Patient Active Problem List    Diagnosis Date Noted    Multifocal pneumonia 02/12/2023     Priority: Medium    Allergic drug reaction 02/12/2023     Priority: Medium    Elevated d-dimer 02/12/2023     Priority: Medium    Acute on chronic heart failure with preserved ejection fraction (HFpEF) ([facility] 02/08/2023     Priority: Medium    Bacterial pneumonia 02/07/2023     Priority: Medium    Acute kidney injury ([facility] 02/07/2023     Priority: Medium    Streptococcal infection group G 12/22/2022     Priority: Medium    Cellulitis of right leg 12/21/2022     Priority: Medium    Obesity (BMI 30-39.9) 12/21/2022     Priority: Medium    Stage 3 chronic kidney disease ([facility] 12/21/2022     Priority: Medium    Bacteremia 12/21/2022     Priority: Medium    Fever and chills 12/21/2022     Priority: Medium    History of ankle surgery 12/21/2022     Priority: Medium    Sepsis ([facility] 12/20/2022     Priority: Medium    Chronic obstructive pulmonary disease, unspecified 12/15/2022     Priority: Medium    Acute respiratory failure ([facility] 09/13/2022     Priority: Medium    CKD (chronic kidney disease) stage 4, GFR 15-29 ml/min ([facility] 08/29/2022     Priority: Medium    Acute on chronic diastolic heart failure ([facility] 08/29/2022     Priority: Medium    Stage 3b chronic kidney disease ([facility] 08/29/2022     Priority: Medium    B12 deficiency 08/29/2022     Priority: Medium    Vitamin D deficiency 08/29/2022     Priority: Medium    Vasovagal syncope 06/10/2022     Priority: Medium    Iron deficiency anemia, unspecified 04/22/2022     Priority: Medium    Age-related osteoporosis without current pathological fracture 05/09/2022     Dexa 5/5/22:  Osteoporosis by WHO criteria. Fosamax sent to pharmacy      Iron deficiency anemia due to chronic blood loss 04/08/2022    ILD (interstitial lung disease) ([facility] 04/05/2022    PAF (paroxysmal atrial fibrillation) ([facility] 03/30/2022    Atypical atrial flutter (HCC)     Weight loss counseling, encounter for     Elevated sed rate     Neutrophilia     Elevated C-reactive protein (CRP)     GIANNA (obstructive sleep apnea)     Morbid obesity (HCC)     Severe episode of recurrent major depressive disorder, without psychotic features ([facility] 02/11/2018    Seasonal affective disorder ([facility] 02/11/2018    Class 2 severe obesity due to excess calories with serious comorbidity and body mass index (BMI) of 39.0 to 39.9 in adult [facility]) 02/11/2018    Atrial fibrillation ([facility] 01/21/2016    Primary hypertension 01/21/2016       A/P  Mane Setting is a 78 y.o. female s/p S/p gastric bypass at an outside facility several years ago. IR consult placed for placement of perc drain. ID consulted and appreciate their input for antibiotic management. Continue TPN. Continue PPI. Continue NPO. Will continue to follow. Discussed with Dr. Jessa Padilla and Nursing Staff.     GRAYSON Sifuentes

## 2023-02-15 ENCOUNTER — APPOINTMENT (OUTPATIENT)
Dept: GENERAL RADIOLOGY | Age: 78
DRG: 291 | End: 2023-02-15
Payer: COMMERCIAL

## 2023-02-15 ENCOUNTER — APPOINTMENT (OUTPATIENT)
Dept: CT IMAGING | Age: 78
DRG: 291 | End: 2023-02-15
Payer: COMMERCIAL

## 2023-02-15 LAB
ANION GAP SERPL CALCULATED.3IONS-SCNC: 4 MMOL/L (ref 3–16)
BUN BLDV-MCNC: 54 MG/DL (ref 7–20)
CALCIUM SERPL-MCNC: 9 MG/DL (ref 8.3–10.6)
CHLORIDE BLD-SCNC: 107 MMOL/L (ref 99–110)
CO2: 35 MMOL/L (ref 21–32)
CREAT SERPL-MCNC: 1.4 MG/DL (ref 0.6–1.2)
GFR SERPL CREATININE-BSD FRML MDRD: 39 ML/MIN/{1.73_M2}
GLUCOSE BLD-MCNC: 115 MG/DL (ref 70–99)
GLUCOSE BLD-MCNC: 60 MG/DL (ref 70–99)
GLUCOSE BLD-MCNC: 72 MG/DL (ref 70–99)
GLUCOSE BLD-MCNC: 88 MG/DL (ref 70–99)
GLUCOSE BLD-MCNC: 89 MG/DL (ref 70–99)
HCT VFR BLD CALC: 27.9 % (ref 36–48)
HEMOGLOBIN: 8.5 G/DL (ref 12–16)
INR BLD: 1.61 (ref 0.87–1.14)
MAGNESIUM: 1.9 MG/DL (ref 1.8–2.4)
PERFORMED ON: ABNORMAL
PERFORMED ON: ABNORMAL
PERFORMED ON: NORMAL
PERFORMED ON: NORMAL
PHOSPHORUS: 3.3 MG/DL (ref 2.5–4.9)
POTASSIUM SERPL-SCNC: 4.1 MMOL/L (ref 3.5–5.1)
PROTHROMBIN TIME: 19.1 SEC (ref 11.7–14.5)
SODIUM BLD-SCNC: 146 MMOL/L (ref 136–145)

## 2023-02-15 PROCEDURE — 05HM33Z INSERTION OF INFUSION DEVICE INTO RIGHT INTERNAL JUGULAR VEIN, PERCUTANEOUS APPROACH: ICD-10-PCS | Performed by: RADIOLOGY

## 2023-02-15 PROCEDURE — 6360000002 HC RX W HCPCS: Performed by: INTERNAL MEDICINE

## 2023-02-15 PROCEDURE — 6370000000 HC RX 637 (ALT 250 FOR IP): Performed by: HOSPITALIST

## 2023-02-15 PROCEDURE — 1200000000 HC SEMI PRIVATE

## 2023-02-15 PROCEDURE — 87186 SC STD MICRODIL/AGAR DIL: CPT

## 2023-02-15 PROCEDURE — 36569 INSJ PICC 5 YR+ W/O IMAGING: CPT

## 2023-02-15 PROCEDURE — 99233 SBSQ HOSP IP/OBS HIGH 50: CPT | Performed by: INTERNAL MEDICINE

## 2023-02-15 PROCEDURE — 71045 X-RAY EXAM CHEST 1 VIEW: CPT

## 2023-02-15 PROCEDURE — 99231 SBSQ HOSP IP/OBS SF/LOW 25: CPT | Performed by: NURSE PRACTITIONER

## 2023-02-15 PROCEDURE — 6360000002 HC RX W HCPCS: Performed by: STUDENT IN AN ORGANIZED HEALTH CARE EDUCATION/TRAINING PROGRAM

## 2023-02-15 PROCEDURE — 85018 HEMOGLOBIN: CPT

## 2023-02-15 PROCEDURE — 87070 CULTURE OTHR SPECIMN AEROBIC: CPT

## 2023-02-15 PROCEDURE — 2700000000 HC OXYGEN THERAPY PER DAY

## 2023-02-15 PROCEDURE — 87205 SMEAR GRAM STAIN: CPT

## 2023-02-15 PROCEDURE — 6360000002 HC RX W HCPCS: Performed by: HOSPITALIST

## 2023-02-15 PROCEDURE — 85014 HEMATOCRIT: CPT

## 2023-02-15 PROCEDURE — 77012 CT SCAN FOR NEEDLE BIOPSY: CPT

## 2023-02-15 PROCEDURE — 94760 N-INVAS EAR/PLS OXIMETRY 1: CPT

## 2023-02-15 PROCEDURE — 99152 MOD SED SAME PHYS/QHP 5/>YRS: CPT

## 2023-02-15 PROCEDURE — 87102 FUNGUS ISOLATION CULTURE: CPT

## 2023-02-15 PROCEDURE — 2580000003 HC RX 258: Performed by: HOSPITALIST

## 2023-02-15 PROCEDURE — 99233 SBSQ HOSP IP/OBS HIGH 50: CPT | Performed by: SURGERY

## 2023-02-15 PROCEDURE — 80048 BASIC METABOLIC PNL TOTAL CA: CPT

## 2023-02-15 PROCEDURE — 87075 CULTR BACTERIA EXCEPT BLOOD: CPT

## 2023-02-15 PROCEDURE — 2580000003 HC RX 258: Performed by: INTERNAL MEDICINE

## 2023-02-15 PROCEDURE — C1751 CATH, INF, PER/CENT/MIDLINE: HCPCS

## 2023-02-15 PROCEDURE — C9113 INJ PANTOPRAZOLE SODIUM, VIA: HCPCS | Performed by: HOSPITALIST

## 2023-02-15 PROCEDURE — 85610 PROTHROMBIN TIME: CPT

## 2023-02-15 PROCEDURE — 0W9G30Z DRAINAGE OF PERITONEAL CAVITY WITH DRAINAGE DEVICE, PERCUTANEOUS APPROACH: ICD-10-PCS | Performed by: STUDENT IN AN ORGANIZED HEALTH CARE EDUCATION/TRAINING PROGRAM

## 2023-02-15 PROCEDURE — 2500000003 HC RX 250 WO HCPCS: Performed by: SURGERY

## 2023-02-15 PROCEDURE — 83735 ASSAY OF MAGNESIUM: CPT

## 2023-02-15 PROCEDURE — 84100 ASSAY OF PHOSPHORUS: CPT

## 2023-02-15 RX ORDER — HYDROMORPHONE HYDROCHLORIDE 1 MG/ML
0.5 INJECTION, SOLUTION INTRAMUSCULAR; INTRAVENOUS; SUBCUTANEOUS EVERY 4 HOURS PRN
Status: DISCONTINUED | OUTPATIENT
Start: 2023-02-15 | End: 2023-02-15

## 2023-02-15 RX ORDER — METOPROLOL TARTRATE 5 MG/5ML
2.5 INJECTION INTRAVENOUS EVERY 6 HOURS PRN
Status: DISCONTINUED | OUTPATIENT
Start: 2023-02-15 | End: 2023-02-23 | Stop reason: HOSPADM

## 2023-02-15 RX ORDER — MIDAZOLAM HYDROCHLORIDE 2 MG/2ML
INJECTION, SOLUTION INTRAMUSCULAR; INTRAVENOUS
Status: COMPLETED | OUTPATIENT
Start: 2023-02-15 | End: 2023-02-15

## 2023-02-15 RX ADMIN — PANTOPRAZOLE SODIUM 8 MG/HR: 40 INJECTION, POWDER, LYOPHILIZED, FOR SOLUTION INTRAVENOUS at 06:30

## 2023-02-15 RX ADMIN — Medication 10 ML: at 20:09

## 2023-02-15 RX ADMIN — MEROPENEM 500 MG: 1 INJECTION, POWDER, FOR SOLUTION INTRAVENOUS at 03:39

## 2023-02-15 RX ADMIN — TRAMADOL HYDROCHLORIDE 50 MG: 50 TABLET, FILM COATED ORAL at 18:09

## 2023-02-15 RX ADMIN — FUROSEMIDE 20 MG: 10 INJECTION, SOLUTION INTRAMUSCULAR; INTRAVENOUS at 13:07

## 2023-02-15 RX ADMIN — ASCORBIC ACID, VITAMIN A PALMITATE, CHOLECALCIFEROL, THIAMINE HYDROCHLORIDE, RIBOFLAVIN-5 PHOSPHATE SODIUM, PYRIDOXINE HYDROCHLORIDE, NIACINAMIDE, DEXPANTHENOL, ALPHA-TOCOPHEROL ACETATE, VITAMIN K1, FOLIC ACID, BIOTIN, CYANOCOBALAMIN: 200; 3300; 200; 6; 3.6; 6; 40; 15; 10; 150; 600; 60; 5 INJECTION, SOLUTION INTRAVENOUS at 20:11

## 2023-02-15 RX ADMIN — Medication 10 ML: at 12:31

## 2023-02-15 RX ADMIN — MIDAZOLAM HYDROCHLORIDE 1 MG: 1 INJECTION, SOLUTION INTRAMUSCULAR; INTRAVENOUS at 11:09

## 2023-02-15 RX ADMIN — SODIUM CHLORIDE 100 MG: 9 INJECTION, SOLUTION INTRAVENOUS at 18:13

## 2023-02-15 RX ADMIN — MEROPENEM 500 MG: 1 INJECTION, POWDER, FOR SOLUTION INTRAVENOUS at 20:12

## 2023-02-15 RX ADMIN — MEROPENEM 500 MG: 1 INJECTION, POWDER, FOR SOLUTION INTRAVENOUS at 15:42

## 2023-02-15 RX ADMIN — AMIODARONE HYDROCHLORIDE 200 MG: 200 TABLET ORAL at 13:10

## 2023-02-15 RX ADMIN — DEXTROSE MONOHYDRATE 125 ML: 100 INJECTION, SOLUTION INTRAVENOUS at 13:50

## 2023-02-15 ASSESSMENT — PAIN DESCRIPTION - LOCATION
LOCATION: ABDOMEN

## 2023-02-15 ASSESSMENT — ENCOUNTER SYMPTOMS
WHEEZING: 0
SHORTNESS OF BREATH: 0
ABDOMINAL PAIN: 0
SINUS PRESSURE: 0
RHINORRHEA: 0
SINUS PAIN: 0
COUGH: 0
SORE THROAT: 0
NAUSEA: 0
DIARRHEA: 0
EYE DISCHARGE: 0
CONSTIPATION: 0
BACK PAIN: 0
EYE REDNESS: 0

## 2023-02-15 ASSESSMENT — PAIN DESCRIPTION - ORIENTATION
ORIENTATION: LEFT
ORIENTATION: LEFT

## 2023-02-15 ASSESSMENT — PAIN SCALES - GENERAL
PAINLEVEL_OUTOF10: 5
PAINLEVEL_OUTOF10: 0
PAINLEVEL_OUTOF10: 4
PAINLEVEL_OUTOF10: 2

## 2023-02-15 ASSESSMENT — PAIN DESCRIPTION - DESCRIPTORS: DESCRIPTORS: DISCOMFORT

## 2023-02-15 ASSESSMENT — PAIN - FUNCTIONAL ASSESSMENT: PAIN_FUNCTIONAL_ASSESSMENT: ACTIVITIES ARE NOT PREVENTED

## 2023-02-15 ASSESSMENT — PAIN DESCRIPTION - PAIN TYPE: TYPE: ACUTE PAIN

## 2023-02-15 NOTE — PLAN OF CARE
Problem: Skin/Tissue Integrity  Goal: Absence of new skin breakdown  Description: 1. Monitor for areas of redness and/or skin breakdown  2. Assess vascular access sites hourly  3. Every 4-6 hours minimum:  Change oxygen saturation probe site  4. Every 4-6 hours:  If on nasal continuous positive airway pressure, respiratory therapy assess nares and determine need for appliance change or resting period.   2/15/2023 0421 by Fernando South RN  Outcome: Progressing     Problem: Safety - Adult  Goal: Free from fall injury  2/15/2023 0421 by Fernando South RN  Outcome: Progressing     Problem: Pain  Goal: Verbalizes/displays adequate comfort level or baseline comfort level  2/15/2023 0421 by Fernando South RN  Outcome: Progressing       Problem: ABCDS Injury Assessment  Goal: Absence of physical injury  2/15/2023 0421 by Fernando South RN  Outcome: Progressing     Problem: Metabolic/Fluid and Electrolytes - Adult  Goal: Electrolytes maintained within normal limits  2/15/2023 0421 by Fernando South RN  Outcome: Progressing  Goal: Hemodynamic stability and optimal renal function maintained  2/15/2023 0421 by Fernando South RN  Outcome: Progressing     Problem: Chronic Conditions and Co-morbidities  Goal: Patient's chronic conditions and co-morbidity symptoms are monitored and maintained or improved  2/15/2023 0421 by Fernando South RN  Outcome: Progressing     Problem: Nutrition Deficit:  Goal: Optimize nutritional status  2/15/2023 0421 by Fernando South RN  Outcome: Progressing

## 2023-02-15 NOTE — PRE SEDATION
Sedation Pre-Procedure Note    Patient Name: Michele Cortez   YOB: 1945  Room/Bed: [facility]-5682/0434-98  Medical Record Number: [id number]   Date: 2/15/2023   Time: 10:40 AM       Indication:  Intra-abdominal fluid collection    Consent: I have discussed with the patient and/or the patient representative the indication, alternatives, and the possible risks and/or complications of the planned procedure and the anesthesia methods. The patient and/or patient representative appear to understand and agree to proceed. Vital Signs:   Vitals:    02/15/23 0915   BP: 137/83   Pulse: (!) 106   Resp: 17   Temp: 97.9 °F (36.6 °C)   SpO2: 93%       Past Medical History:   has a past medical history of Arthritis, Atrial fibrillation and flutter ([address] Hypertension, Kidney disease, Pneumonia, and Sleep apnea. Past Surgical History:   has a past surgical history that includes Tubal ligation; Total knee arthroplasty (Bilateral, 12/19/2012); fracture surgery; Colonoscopy (N/A, 11/20/2018); Upper gastrointestinal endoscopy (N/A, 11/20/2018); Cardioversion; Gastric bypass surgery; Larynx surgery; Upper gastrointestinal endoscopy (N/A, 03/31/2022); Colonoscopy (N/A, 03/31/2022); and Upper gastrointestinal endoscopy (N/A, 03/31/2022).     Medications:   Scheduled Meds:    meropenem  500 mg IntraVENous q8h    anidulafungin  100 mg IntraVENous Q24H    insulin lispro  0-4 Units SubCUTAneous Q6H    [START ON 2/16/2023] fat emulsion  250 mL IntraVENous Once per day on Mon Thu    sodium chloride flush  5-40 mL IntraVENous 2 times per day    furosemide  20 mg IntraVENous Daily    lidocaine 1 % injection  5 mL IntraDERmal Once    sodium chloride flush  5-40 mL IntraVENous 2 times per day    [Held by provider] metoprolol tartrate  75 mg Oral BID    [Held by provider] predniSONE  40 mg Oral Daily    amiodarone  200 mg Oral Daily    [Held by provider] apixaban  5 mg Oral BID    sodium chloride flush  5-40 mL IntraVENous 2 times per day     Continuous Infusions:    dextrose      PN-Adult Premix 5/20 - Standard Electrolytes - Central Line      sodium chloride      pantoprozole (PROTONIX) infusion 8 mg/hr (02/15/23 0630)    sodium chloride Stopped (02/12/23 1137)    sodium chloride Stopped (02/11/23 1502)     PRN Meds: metoprolol, dextrose bolus **OR** dextrose bolus, glucagon (rDNA), dextrose, sodium chloride flush, sodium chloride, ipratropium-albuterol, sodium chloride flush, sodium chloride, diphenhydrAMINE, ondansetron, albuterol sulfate HFA, traMADol, sodium chloride flush, sodium chloride, ondansetron **OR** ondansetron, polyethylene glycol, acetaminophen **OR** acetaminophen  Home Meds:   Prior to Admission medications    Medication Sig Start Date End Date Taking? Authorizing Provider   metoprolol tartrate 75 MG TABS Take 75 mg by mouth 2 times daily 2/12/23  Yes Bruna Rahman MD   dilTIAZem (DILACOR XR) 240 MG extended release capsule Take 240 mg by mouth daily   Yes Historical Provider, MD   amiodarone (CORDARONE) 200 MG tablet 200mg bid x 8 days then 200mg daily 12/23/22   Shawn Fleming MD   traMADol (ULTRAM) 50 MG tablet Take 50 mg by mouth every 6 hours as needed for Pain. Historical Provider, MD   Cholecalciferol (VITAMIN D3) 1.25 MG (19375 UT) CAPS Take 1 capsule by mouth every 7 days 12/15/22   MARILEE Swain CNP   furosemide (LASIX) 20 MG tablet Take 1 tablet by mouth daily  Patient not taking: No sig reported 12/15/22   MARILEE Swain CNP   albuterol sulfate HFA (PROVENTIL HFA) 108 (90 Base) MCG/ACT inhaler Inhale 2 puffs into the lungs every 4 hours as needed for Wheezing 12/15/22   MARILEE Swain CNP   vitamin B-12 (CYANOCOBALAMIN) 100 MCG tablet Take 1 tablet by mouth in the morning.  8/16/22 8/16/23  MARILEE Swain CNP   alendronate (FOSAMAX) 70 MG tablet Take 1 tablet by mouth every 7 days 8/15/22   MARILEE Swain CNP   apixaban (ELIQUIS) 5 MG TABS tablet Take 1 tablet by mouth 2 times daily 6/14/22   MARILEE Swain CNP   cloNIDine (CATAPRES) 0.2 MG tablet TAKE 1 TABLET BY MOUTH TWICE DAILY  Patient taking differently: daily 11/2/21 9/20/22  MARILEE Swain CNP   aspirin 81 MG tablet Take 81 mg by mouth daily    Historical Provider, MD     Coumadin Use Last 7 Days:  no  Antiplatelet drug therapy use last 7 days: no  Other anticoagulant use last 7 days: no  Additional Medication Information:  INR now acceptable      Pre-Sedation Documentation and Exam:   I have reviewed the patient's history and review of systems.     Mallampati Airway Assessment:  normal, Mallampati Class II - (soft palate, fauces & uvula are visible)    Prior History of Anesthesia Complications:   none    ASA Classification:  Class 2 - A normal healthy patient with mild systemic disease    Sedation/ Anesthesia Plan:   intravenous sedation    Medications Planned:   midazolam (Versed) intravenously and fentanyl intravenously    Patient is an appropriate candidate for plan of sedation: yes    Electronically signed by Anne Patiño DO on 2/15/2023 at 10:40 AM

## 2023-02-15 NOTE — PROGRESS NOTES
[facility]) Physicians   [employer]  [address] [place] [address], [address] [place] [id number] . Phone: [phone]  Fax: [phone]            Pt seen and examined    Patient admitted with complex medical comorbidities. Remote history of gastric bypass. Patient with chronic marginal ulcer and contained perforation now. Overnight patient feels well has no complaints. Vitals:     02/14/23 0424 02/14/23 0508 02/14/23 0532 02/14/23 0753   BP: 110/78     105/69   Pulse: 92     (!) 108   Resp: 16     16   Temp: 97.8 °F (36.6 °C)     97.8 °F (36.6 °C)   TempSrc: Oral     Oral   SpO2: 95%     97%   Weight:   247 lb 3.2 oz (112.1 kg) 241 lb 1.6 oz (109.4 kg)     Height:              Abdomen is soft, nondistended, appropriately tender only in epigastric region, guarding or rigidity no peritoneal signs.   The rest of the abdominal exam is normal.    Data  CBC:   Lab Results   Component Value Date/Time    WBC 31.9 02/14/2023 04:32 AM    RBC 3.13 02/14/2023 04:32 AM    HGB 8.5 02/15/2023 05:03 AM    HCT 27.9 02/15/2023 05:03 AM    MCV 88.6 02/14/2023 04:32 AM    MCH 27.3 02/14/2023 04:32 AM    MCHC 30.8 02/14/2023 04:32 AM    RDW 16.9 02/14/2023 04:32 AM     02/14/2023 04:32 AM    MPV 9.5 02/14/2023 04:32 AM     CMP:          Lab Results   Component Value Date/Time      02/14/2023 04:32 AM     K 4.6 02/14/2023 04:32 AM     K 4.4 02/07/2023 03:39 AM      02/14/2023 04:32 AM     CO2 33 02/14/2023 04:32 AM     BUN 63 02/14/2023 04:32 AM     CREATININE 1.5 02/14/2023 04:32 AM     GFRAA 38 09/20/2022 04:14 AM     GFRAA 45 03/27/2013 01:36 PM     AGRATIO 0.8 02/07/2023 03:39 AM     LABGLOM 36 02/14/2023 04:32 AM     GLUCOSE 86 02/14/2023 04:32 AM     PROT 7.0 02/07/2023 03:39 AM     LABALBU 3.2 02/07/2023 03:39 AM     CALCIUM 8.5 02/14/2023 04:32 AM     BILITOT 1.4 02/07/2023 03:39 AM     ALKPHOS 90 02/07/2023 03:39 AM     AST 10 02/07/2023 03:39 AM     ALT <5 02/07/2023 03:39 AM Magnesium:          Lab Results   Component Value Date/Time     MG 2.00 09/18/2022 10:34 AM      PT/INR:          Lab Results   Component Value Date/Time     PROTIME 25.3 02/13/2023 10:43 AM     INR 2.29 02/13/2023 10:43 AM         Current Inpatient Medications    Current Facility-Administered Medications: metoprolol (LOPRESSOR) injection 2.5 mg, 2.5 mg, IntraVENous, Q6H PRN  meropenem (MERREM) 500 mg in sodium chloride 0.9 % 100 mL IVPB (mini-bag), 500 mg, IntraVENous, q8h  [COMPLETED] anidulafungin (ERAXIS) 200 mg in sodium chloride 0.9 % 260 mL IVPB, 200 mg, IntraVENous, Once **AND** anidulafungin (ERAXIS) 100 mg in sodium chloride 0.9 % 130 mL IVPB, 100 mg, IntraVENous, Q24H  dextrose bolus 10% 125 mL, 125 mL, IntraVENous, PRN **OR** dextrose bolus 10% 250 mL, 250 mL, IntraVENous, PRN  glucagon (rDNA) injection 1 mg, 1 mg, SubCUTAneous, PRN  dextrose 10 % infusion, , IntraVENous, Continuous PRN  insulin lispro (HUMALOG) injection vial 0-4 Units, 0-4 Units, SubCUTAneous, Q6H  PN-Adult Premix 5/20 - Standard Electrolytes - Central Line, , IntraVENous, Continuous TPN  [START ON 2/16/2023] fat emulsion 20 % infusion 250 mL, 250 mL, IntraVENous, Once per day on Mon Thu  sodium chloride flush 0.9 % injection 5-40 mL, 5-40 mL, IntraVENous, 2 times per day  sodium chloride flush 0.9 % injection 5-40 mL, 5-40 mL, IntraVENous, PRN  0.9 % sodium chloride infusion, 25 mL, IntraVENous, PRN  pantoprazole (PROTONIX) 80 mg in sodium chloride 0.9 % 100 mL infusion, 8 mg/hr, IntraVENous, Continuous  furosemide (LASIX) injection 20 mg, 20 mg, IntraVENous, Daily  ipratropium-albuterol (DUONEB) nebulizer solution 1 ampule, 1 ampule, Inhalation, Q4H PRN  lidocaine PF 1 % injection 5 mL, 5 mL, IntraDERmal, Once  sodium chloride flush 0.9 % injection 5-40 mL, 5-40 mL, IntraVENous, 2 times per day  sodium chloride flush 0.9 % injection 5-40 mL, 5-40 mL, IntraVENous, PRN  0.9 % sodium chloride infusion, 25 mL, IntraVENous, PRN  [Held by provider] metoprolol tartrate (LOPRESSOR) tablet 75 mg, 75 mg, Oral, BID  [Held by provider] predniSONE (DELTASONE) tablet 40 mg, 40 mg, Oral, Daily  diphenhydrAMINE (BENADRYL) tablet 50 mg, 50 mg, Oral, Q6H PRN  ondansetron (ZOFRAN) injection 4 mg, 4 mg, IntraVENous, Q6H PRN  albuterol sulfate HFA (PROVENTIL;VENTOLIN;PROAIR) 108 (90 Base) MCG/ACT inhaler 2 puff, 2 puff, Inhalation, Q4H PRN  amiodarone (CORDARONE) tablet 200 mg, 200 mg, Oral, Daily  [Held by provider] apixaban (ELIQUIS) tablet 5 mg, 5 mg, Oral, BID  traMADol (ULTRAM) tablet 50 mg, 50 mg, Oral, Q6H PRN  sodium chloride flush 0.9 % injection 5-40 mL, 5-40 mL, IntraVENous, 2 times per day  sodium chloride flush 0.9 % injection 5-40 mL, 5-40 mL, IntraVENous, PRN  0.9 % sodium chloride infusion, , IntraVENous, PRN  ondansetron (ZOFRAN-ODT) disintegrating tablet 4 mg, 4 mg, Oral, Q8H PRN **OR** ondansetron (ZOFRAN) injection 4 mg, 4 mg, IntraVENous, Q6H PRN  polyethylene glycol (GLYCOLAX) packet 17 g, 17 g, Oral, Daily PRN  acetaminophen (TYLENOL) tablet 650 mg, 650 mg, Oral, Q6H PRN **OR** acetaminophen (TYLENOL) suppository 650 mg, 650 mg, Rectal, Q6H PRN    Patient Active Problem List   Diagnosis    Atrial fibrillation (HCC)    Primary hypertension    Severe episode of recurrent major depressive disorder, without psychotic features (HCC)    Seasonal affective disorder (HCC)    Class 2 severe obesity due to excess calories with serious comorbidity and body mass index (BMI) of 39.0 to 39.9 in adult (HCC)    Elevated sed rate    Neutrophilia    Acute kidney injury (HCC)    Elevated C-reactive protein (CRP)    GIANNA (obstructive sleep apnea)    Morbid obesity (HCC)    Weight loss counseling, encounter for    Atypical atrial flutter (HCC)    PAF (paroxysmal atrial fibrillation) (HCC)    ILD (interstitial lung disease) (HCC)    Iron deficiency anemia due to chronic blood loss    Iron deficiency anemia, unspecified    Age-related osteoporosis without current pathological fracture    Vasovagal syncope    CKD (chronic kidney disease) stage 4, GFR 15-29 ml/min (HCC)    Acute on chronic diastolic heart failure (HCC)    Stage 3b chronic kidney disease (HCC)    B12 deficiency    Vitamin D deficiency    Acute respiratory failure (HCC)    Chronic obstructive pulmonary disease, unspecified    Sepsis (HCC)    Cellulitis of right leg    Obesity (BMI 30-39. 9)    Stage 3 chronic kidney disease (HCC)    Bacteremia    Fever and chills    History of ankle surgery    Streptococcal infection group G    Bacterial pneumonia    JUSTINA (acute kidney injury) ([address]    Acute on chronic heart failure with preserved ejection fraction (HFpEF) (HCC)    Multifocal pneumonia    Allergic drug reaction    Elevated d-dimer    Left nephrolithiasis    Diverticulosis    History of cholecystectomy    Intra-abdominal abscess ([facility] [address]    Bandemia    Atypical pneumonia         A/P  Maryuri Jimenez is a 78 y.o. female status post post open gastric bypass in [place] several years ago. Patient with history of chronic marginal ulcer that required intervention multiple times. Unfortunately failed to follow-up and was not compliant on her PPIs. Presented with multiple significant medical problems and had 1 episode of hematemesis. None since then and her H&H is down but currently stabilized. Vitals are stable she goes in and out of A-fib. Complex medical history as well as complicated surgical history. Patient with contained marginal ulcer perforation. Admitted with UTI, congestive heart failure, A-fib, and other complex medical history. Patient today remained stable without any significant complaints. I reviewed the patient case today with the interventional radiologist and discussed the importance of conservative management with the patient as she is a very poor surgical candidate with high risk of morbidity and mortality.   The the perforation is contained which is the common thing and there is marginal ulcers. Surgical options are limited other than drainage at this point. However the risk of anesthesia for the patient, Not to mention the risk of bleeding. I think it safer to place a PERC drain. Interventional radiologist agree and plan to do it once INR is less than 1.5.    -N.p.o.  -TPN  -ID consult  -IR for perc drainage  -PPI  -Continue to monitor CBC      We will continue to follow.

## 2023-02-15 NOTE — PROGRESS NOTES
Occupational Therapy  Lui Ly   Pt just returned from OR s/p Intra-abdominal fluid collection. Will hold today. Will follow up per plan of care.   Relda Noss, [address], [address]

## 2023-02-15 NOTE — PROGRESS NOTES
[facility]    2/15/2023 8:53 AM        Name: Sandra Rios . Admitted: 2/7/2023  Primary Care Provider: MARILEE Escamilla CNP (Tel: [phone])                        Subjective:  . No overnight  Patient no further bleeding.   Resting comfortably    Reviewed interval ancillary notes    Current Medications  metoprolol (LOPRESSOR) injection 2.5 mg, Q6H PRN  meropenem (MERREM) 500 mg in sodium chloride 0.9 % 100 mL IVPB (mini-bag), q8h  anidulafungin (ERAXIS) 100 mg in sodium chloride 0.9 % 130 mL IVPB, Q24H  dextrose bolus 10% 125 mL, PRN   Or  dextrose bolus 10% 250 mL, PRN  glucagon (rDNA) injection 1 mg, PRN  dextrose 10 % infusion, Continuous PRN  insulin lispro (HUMALOG) injection vial 0-4 Units, Q6H  PN-Adult Premix 5/20 - Standard Electrolytes - Central Line, Continuous TPN  [START ON 2/16/2023] fat emulsion 20 % infusion 250 mL, Once per day on Mon Thu  sodium chloride flush 0.9 % injection 5-40 mL, 2 times per day  sodium chloride flush 0.9 % injection 5-40 mL, PRN  0.9 % sodium chloride infusion, PRN  pantoprazole (PROTONIX) 80 mg in sodium chloride 0.9 % 100 mL infusion, Continuous  furosemide (LASIX) injection 20 mg, Daily  ipratropium-albuterol (DUONEB) nebulizer solution 1 ampule, Q4H PRN  lidocaine PF 1 % injection 5 mL, Once  sodium chloride flush 0.9 % injection 5-40 mL, 2 times per day  sodium chloride flush 0.9 % injection 5-40 mL, PRN  0.9 % sodium chloride infusion, PRN  [Held by provider] metoprolol tartrate (LOPRESSOR) tablet 75 mg, BID  [Held by provider] predniSONE (DELTASONE) tablet 40 mg, Daily  diphenhydrAMINE (BENADRYL) tablet 50 mg, Q6H PRN  ondansetron (ZOFRAN) injection 4 mg, Q6H PRN  albuterol sulfate HFA (PROVENTIL;VENTOLIN;PROAIR) 108 (90 Base) MCG/ACT inhaler 2 puff, Q4H PRN  amiodarone (CORDARONE) tablet 200 mg, Daily  [Held by provider] apixaban (ELIQUIS) tablet 5 mg, BID  traMADol (ULTRAM) tablet 50 mg, Q6H PRN  sodium chloride flush 0.9 % injection 5-40 mL, 2 times per day  sodium chloride flush 0.9 % injection 5-40 mL, PRN  0.9 % sodium chloride infusion, PRN  ondansetron (ZOFRAN-ODT) disintegrating tablet 4 mg, Q8H PRN   Or  ondansetron (ZOFRAN) injection 4 mg, Q6H PRN  polyethylene glycol (GLYCOLAX) packet 17 g, Daily PRN  acetaminophen (TYLENOL) tablet 650 mg, Q6H PRN   Or  acetaminophen (TYLENOL) suppository 650 mg, Q6H PRN      Objective:  /83   Pulse 93   Temp 98 °F (36.7 °C) (Oral)   Resp 17   Ht 5' 7\" (1.702 m)   Wt 238 lb 14.4 oz (108.4 kg)   SpO2 94%   BMI 37.42 kg/m²     Intake/Output Summary (Last 24 hours) at 2/15/2023 0853  Last data filed at 2/14/2023 2136  Gross per 24 hour   Intake --   Output 1600 ml   Net -1600 ml      Wt Readings from Last 3 Encounters:   02/15/23 238 lb 14.4 oz (108.4 kg)   12/24/22 236 lb 14.4 oz (107.5 kg)   12/15/22 235 lb (106.6 kg)       General appearance:  Appears comfortable  Eyes: Sclera clear. Pupils equal.  ENT: Moist oral mucosa. Trachea midline, no adenopathy. Cardiovascular: Regular rhythm, normal S1, S2. No murmur. No edema in lower extremities  Respiratory: Not using accessory muscles. Good inspiratory effort. Clear to auscultation bilaterally, no wheeze or crackles. GI: Abdomen soft, no tenderness, not distended, normal bowel sounds  Musculoskeletal: No cyanosis in digits, neck supple  Neurology: CN 2-12 grossly intact. No speech or motor deficits  Psych: Normal affect. Alert and oriented in time, place and person  Skin: Warm, dry, normal turgor    Labs and Tests:  CBC:   Recent Labs     02/13/23  1043 02/14/23  0050 02/14/23  0432 02/14/23  1424 02/15/23  0503   WBC 21.9*  --  31.9*  --   --    HGB 10.1*   < > 8.6* 8.6* 8.5*     --  160  --   --     < > = values in this interval not displayed.      BMP:    Recent Labs     02/13/23  1049 02/14/23  0432 02/15/23  0500    143 146*   K 4.1 4.6 4.1    106 107   CO2 34* 33* 35*   BUN 61* 63* 54*   CREATININE 1.4* 1.5* 1.4*   GLUCOSE 101* 86 72     Hepatic: No results for input(s): AST, ALT, ALB, BILITOT, ALKPHOS in the last 72 hours. Discussed care with patient             Problem List  Principal Problem:    Bacterial pneumonia  Active Problems:    Acute on chronic diastolic heart failure (HCC)    Stage 3 chronic kidney disease ([facility] [address]    JUSTINA [facility]) [facility]    Acute on chronic heart failure with preserved ejection fraction (HFpEF) (HCC)    Multifocal pneumonia    Allergic drug reaction    Elevated d-dimer    Left nephrolithiasis    Diverticulosis    History of cholecystectomy    Intra-abdominal abscess ([facility]    Bandemia    Atypical pneumonia    Atrial fibrillation ([facility]    Primary hypertension    Class 2 severe obesity due to excess calories with serious comorbidity and body mass index (BMI) of 39.0 to 39.9 in adult [facility])    Acute kidney injury ([facility] [address]    GIANNA (obstructive sleep apnea)  Resolved Problems:    * No resolved hospital problems.  *       Assessment & Plan:   Hematemesis  - one episode  -Concern for ulcer with anastomotic bleeding and possible abscess   s  -Continue PPI for now  -Appreciate surgery recommendation  -Discussed with Dr. Huong Worley- no surgical intervention   0 this time we will continue medical management IV antibiotics n.p.o.  -We will likely need TPN to allow for healing  -IR has been consulted for percutaneous drainage of the abscess      Elevated INR  -Patient likely was not Eliquis which has been held for now  - INr is beter at 1.6  - s/p one time dose of vitamin k     Leukocytosis  -Worsening   -We will broaden the antibiotics infectious diseases been consulted  - abx adjusted       Acute on chronic kidney disease  -Creatinine improving appreciate nephrology recommendation    Multiple cardiac history including CHF A-fib  -Cardiology following we will optimize cardiac wise as well      High risk for close monitoring will need TPN          Diet: Diet NPO Exceptions are: Sips of Water with Meds  PN-Adult Premix 5/20 - Standard Electrolytes - Central Line  Code:Full Code  DVT PPX lovenox   Disposition-not ready for discharge      Oneil Mendez MD   2/15/2023 8:53 AM

## 2023-02-15 NOTE — BRIEF OP NOTE
Brief Postoperative Note    Sandra Rios  YOB: 1945  [id number]      Pre-operative Diagnosis: Intra abdominal fluid collection    Post-operative Diagnosis: Same    Procedure: CT guided percutaneous drainage catheter placement    Anesthesia: Local and Moderate Sedation    Surgeons/Assistants: Jose Ambrocio DO    Estimated Blood Loss: less than 50     Complications: None    Specimens: Was Obtained: 10 mL of dark red fluid    Findings:   Successful placement of a 14-F locking pigtail drainage catheter into a intra-abdominal collection with immediate return of dark red fluid, set to bulb suction.        Jose Ambrocio DO  2/15/2023, 11:39 AM  Interventional Radiology  [phone] ([id number])

## 2023-02-15 NOTE — PROGRESS NOTES
[facility]) Physicians   [employer]  [address] [place] [address], [address] [place] [id number] . Phone: [phone]  Fax: [phone]     Pt seen and examined     Patient admitted with complex medical comorbidities. Remote history of gastric bypass. Patient with chronic marginal ulcer and contained perforation now. Overnight patient stable, having some epigastric discomfort. Vitals:    02/15/23 0330 02/15/23 0600 02/15/23 0915 02/15/23 1056   BP: 122/83  137/83 120/78   Pulse: 93  (!) 106 85   Resp: 17  17 20   Temp: 98 °F (36.7 °C)  97.9 °F (36.6 °C)    TempSrc: Oral  Oral    SpO2: 94%  93% 94%   Weight:  238 lb 14.4 oz (108.4 kg)     Height:           In: -   Out: 1600 [Urine:1600]      Abdomen is soft, nondistended, mildly tender only in epigastric region, no guarding or rigidity no peritoneal signs.   The rest of the abdominal exam is normal.    Data  CBC:   Lab Results   Component Value Date/Time    WBC 31.9 02/14/2023 04:32 AM    RBC 3.13 02/14/2023 04:32 AM    HGB 8.5 02/15/2023 05:03 AM    HCT 27.9 02/15/2023 05:03 AM    MCV 88.6 02/14/2023 04:32 AM    MCH 27.3 02/14/2023 04:32 AM    MCHC 30.8 02/14/2023 04:32 AM    RDW 16.9 02/14/2023 04:32 AM     02/14/2023 04:32 AM    MPV 9.5 02/14/2023 04:32 AM     BMP:    Lab Results   Component Value Date/Time     02/15/2023 05:00 AM    K 4.1 02/15/2023 05:00 AM    K 4.4 02/07/2023 03:39 AM     02/15/2023 05:00 AM    CO2 35 02/15/2023 05:00 AM    BUN 54 02/15/2023 05:00 AM    LABALBU 3.2 02/07/2023 03:39 AM    CREATININE 1.4 02/15/2023 05:00 AM    CALCIUM 9.0 02/15/2023 05:00 AM    GFRAA 38 09/20/2022 04:14 AM    GFRAA 45 03/27/2013 01:36 PM    LABGLOM 39 02/15/2023 05:00 AM    GLUCOSE 72 02/15/2023 05:00 AM       Current Inpatient Medications    Current Facility-Administered Medications: metoprolol (LOPRESSOR) injection 2.5 mg, 2.5 mg, IntraVENous, Q6H PRN  meropenem (MERREM) 500 mg in sodium chloride 0.9 % 100 mL IVPB (mini-bag), 500 mg, IntraVENous, q8h  [COMPLETED] anidulafungin (ERAXIS) 200 mg in sodium chloride 0.9 % 260 mL IVPB, 200 mg, IntraVENous, Once **AND** anidulafungin (ERAXIS) 100 mg in sodium chloride 0.9 % 130 mL IVPB, 100 mg, IntraVENous, Q24H  dextrose bolus 10% 125 mL, 125 mL, IntraVENous, PRN **OR** dextrose bolus 10% 250 mL, 250 mL, IntraVENous, PRN  glucagon (rDNA) injection 1 mg, 1 mg, SubCUTAneous, PRN  dextrose 10 % infusion, , IntraVENous, Continuous PRN  insulin lispro (HUMALOG) injection vial 0-4 Units, 0-4 Units, SubCUTAneous, Q6H  PN-Adult Premix 5/20 - Standard Electrolytes - Central Line, , IntraVENous, Continuous TPN  [START ON 2/16/2023] fat emulsion 20 % infusion 250 mL, 250 mL, IntraVENous, Once per day on Mon Thu  sodium chloride flush 0.9 % injection 5-40 mL, 5-40 mL, IntraVENous, 2 times per day  sodium chloride flush 0.9 % injection 5-40 mL, 5-40 mL, IntraVENous, PRN  0.9 % sodium chloride infusion, 25 mL, IntraVENous, PRN  pantoprazole (PROTONIX) 80 mg in sodium chloride 0.9 % 100 mL infusion, 8 mg/hr, IntraVENous, Continuous  furosemide (LASIX) injection 20 mg, 20 mg, IntraVENous, Daily  ipratropium-albuterol (DUONEB) nebulizer solution 1 ampule, 1 ampule, Inhalation, Q4H PRN  lidocaine PF 1 % injection 5 mL, 5 mL, IntraDERmal, Once  sodium chloride flush 0.9 % injection 5-40 mL, 5-40 mL, IntraVENous, 2 times per day  sodium chloride flush 0.9 % injection 5-40 mL, 5-40 mL, IntraVENous, PRN  0.9 % sodium chloride infusion, 25 mL, IntraVENous, PRN  [Held by provider] metoprolol tartrate (LOPRESSOR) tablet 75 mg, 75 mg, Oral, BID  [Held by provider] predniSONE (DELTASONE) tablet 40 mg, 40 mg, Oral, Daily  diphenhydrAMINE (BENADRYL) tablet 50 mg, 50 mg, Oral, Q6H PRN  ondansetron (ZOFRAN) injection 4 mg, 4 mg, IntraVENous, Q6H PRN  albuterol sulfate HFA (PROVENTIL;VENTOLIN;PROAIR) 108 (90 Base) MCG/ACT inhaler 2 puff, 2 puff, Inhalation, Q4H PRN  amiodarone (CORDARONE) tablet 200 mg, 200 mg, Oral, Daily  [Held by provider] apixaban (ELIQUIS) tablet 5 mg, 5 mg, Oral, BID  traMADol (ULTRAM) tablet 50 mg, 50 mg, Oral, Q6H PRN  sodium chloride flush 0.9 % injection 5-40 mL, 5-40 mL, IntraVENous, 2 times per day  sodium chloride flush 0.9 % injection 5-40 mL, 5-40 mL, IntraVENous, PRN  0.9 % sodium chloride infusion, , IntraVENous, PRN  ondansetron (ZOFRAN-ODT) disintegrating tablet 4 mg, 4 mg, Oral, Q8H PRN **OR** ondansetron (ZOFRAN) injection 4 mg, 4 mg, IntraVENous, Q6H PRN  polyethylene glycol (GLYCOLAX) packet 17 g, 17 g, Oral, Daily PRN  acetaminophen (TYLENOL) tablet 650 mg, 650 mg, Oral, Q6H PRN **OR** acetaminophen (TYLENOL) suppository 650 mg, 650 mg, Rectal, Q6H PRN    Patient Active Problem List   Diagnosis    Atrial fibrillation (HCC)    Primary hypertension    Severe episode of recurrent major depressive disorder, without psychotic features (HCC)    [facility]al affective disorder ([facility]    Class 2 severe obesity due to excess calories with serious comorbidity and body mass index (BMI) of 39.0 to 39.9 in adult ([facility])    Elevated sed rate    Neutrophilia    Acute kidney injury ([facility]    Elevated C-reactive protein (CRP)    GIANNA (obstructive sleep apnea)    Morbid obesity ([facility])    Weight loss counseling, encounter for    Atypical atrial flutter ([facility])    PAF (paroxysmal atrial fibrillation) ([facility])    ILD (interstitial lung disease) ([facility])    Iron deficiency anemia due to chronic blood loss    Iron deficiency anemia, unspecified    Age-related osteoporosis without current pathological fracture    Vasovagal syncope    CKD (chronic kidney disease) stage 4, GFR 15-29 ml/min (HCC)    Acute on chronic diastolic heart failure (HCC)    Stage 3b chronic kidney disease (HCC)    B12 deficiency    Vitamin D deficiency    Acute respiratory failure ([facility])    Chronic obstructive pulmonary disease, unspecified    Sepsis ([facility])    Cellulitis of right leg    Obesity (BMI 30-39. 9)    Stage 3 chronic kidney disease ([facility] [address] Bacteremia    Fever and chills    History of ankle surgery    Streptococcal infection group G    Bacterial pneumonia    JUSTINA (acute kidney injury) ([facility] [address]    Acute on chronic heart failure with preserved ejection fraction (HFpEF) (HCC)    Multifocal pneumonia    Allergic drug reaction    Elevated d-dimer    Left nephrolithiasis    Diverticulosis    History of cholecystectomy    Intra-abdominal abscess ([facility]    Bandemia    Atypical pneumonia         A/P  Bibi Au is a 78 y.o. female status post post open gastric bypass in [place] several years ago. Patient with history of chronic marginal ulcer that required intervention multiple times. Unfortunately failed to follow-up and was not compliant with her PPIs. Presented with multiple significant medical problems and had 1 episode of hematemesis. None since then. Complex medical history as well as complicated surgical history. Patient with contained marginal ulcer perforation. Admitted with UTI, congestive heart failure, A-fib, and other complex medical history. Overnight no new events nor complaints, received her PICC , hopefully starts TPN soon. Currently at IR for perc drain. Discussed case with Dr. James Garcia.     -N.P.O.  -TPN  -ID consult  -Cultures from perc drain after placement   -PPI  -Continue to monitor CBC      Will follow

## 2023-02-15 NOTE — PROGRESS NOTES
[address]  HEART FAILURE  Progress Note      Admit Date 2/7/2023     Reason for Consult:      Reason for Consultation/Chief Complaint: SOB    HPI:    Siddhartha Larios is a 78 y.o. female with PMH PAF, HFpEF, COPD, CKD admitted with SOB, AF/RVR and wt gain 12lb. She has diuresed 3L. Today got drain for abd  fluid 2/2 abcess. Subjective:  Patient is being seen for CHF. There were no acute overnight cardiac events. Today Ms. Ly is just back from drain placement, c/o abd pain but denies SOB, CP, palpitations or dizziness. Baseline Weight: 245 6months ago   Wt Readings from Last 3 Encounters:   02/15/23 238 lb 14.4 oz (108.4 kg)   12/24/22 236 lb 14.4 oz (107.5 kg)   12/15/22 235 lb (106.6 kg)         Cardiac Testing:   Echo 12/21/2022  Summary   Limited exam per Dr. Hank Bateman. Irregular rhythm. Normal left ventricle size, wall thickness, and systolic function with an   estimated ejection fraction of 55-60%. No obvious regional wall motion abnormalities are seen. The right ventricle is mildly enlarged with normal function. The right atrium is moderately dilated       NYHA Class III    Objective:   /83   Pulse 93   Temp 98 °F (36.7 °C) (Oral)   Resp 17   Ht 5' 7\" (1.702 m)   Wt 238 lb 14.4 oz (108.4 kg)   SpO2 94%   BMI 37.42 kg/m²     Intake/Output Summary (Last 24 hours) at 2/15/2023 0915  Last data filed at 2/14/2023 2136  Gross per 24 hour   Intake --   Output 1600 ml   Net -1600 ml      In: -   Out: 1600     TELEMETRY: Afib    Physical Exam:  General Appearance:  Non-obese/Well Nourished  Respiratory:  Resp Auscultation: Normal breath sounds without dullness  Cardiovascular:   Auscultation: irregular rate and rhythm, normal S1S2, no m/g/r/c  Palpation: Normal    Pedal Pulses: 2+ and equal   Abdomen:  Soft, NT, ND, + bs  Extremities:  No Cyanosis or Clubbing  Extremities: 1+ and non-pitting edema  Neurological/Psychiatric:  Oriented to time, place, and person  Non-anxious    Pertinent labs, diagnostic, device, and imaging results reviewed as a part of this visit    MEDICATIONS:   Scheduled Meds:   Scheduled Meds:   meropenem  500 mg IntraVENous q8h    anidulafungin  100 mg IntraVENous Q24H    insulin lispro  0-4 Units SubCUTAneous Q6H    [START ON 2/16/2023] fat emulsion  250 mL IntraVENous Once per day on Mon Thu    sodium chloride flush  5-40 mL IntraVENous 2 times per day    furosemide  20 mg IntraVENous Daily    lidocaine 1 % injection  5 mL IntraDERmal Once    sodium chloride flush  5-40 mL IntraVENous 2 times per day    [Held by provider] metoprolol tartrate  75 mg Oral BID    [Held by provider] predniSONE  40 mg Oral Daily    amiodarone  200 mg Oral Daily    [Held by provider] apixaban  5 mg Oral BID    sodium chloride flush  5-40 mL IntraVENous 2 times per day     Continuous Infusions:   dextrose      PN-Adult Premix 5/20 - Standard Electrolytes - Central Line      sodium chloride      pantoprozole (PROTONIX) infusion 8 mg/hr (02/15/23 0630)    sodium chloride Stopped (02/12/23 1137)    sodium chloride Stopped (02/11/23 1502)     PRN Meds:.metoprolol, dextrose bolus **OR** dextrose bolus, glucagon (rDNA), dextrose, sodium chloride flush, sodium chloride, ipratropium-albuterol, sodium chloride flush, sodium chloride, diphenhydrAMINE, ondansetron, albuterol sulfate HFA, traMADol, sodium chloride flush, sodium chloride, ondansetron **OR** ondansetron, polyethylene glycol, acetaminophen **OR** acetaminophen  Continuous Infusions:   dextrose      PN-Adult Premix 5/20 - Standard Electrolytes - Central Line      sodium chloride      pantoprozole (PROTONIX) infusion 8 mg/hr (02/15/23 0630)    sodium chloride Stopped (02/12/23 1137)    sodium chloride Stopped (02/11/23 1502)       Intake/Output Summary (Last 24 hours) at 2/15/2023 0915  Last data filed at 2/14/2023 2136  Gross per 24 hour   Intake --   Output 1600 ml   Net -1600 ml       Lab Data:  CBC:   Lab Results   Component Value Date/Time    WBC 31.9 02/14/2023 04:32 AM    HGB 8.5 02/15/2023 05:03 AM     02/14/2023 04:32 AM     BMP:  Lab Results   Component Value Date/Time     02/15/2023 05:00 AM    K 4.1 02/15/2023 05:00 AM    K 4.4 02/07/2023 03:39 AM     02/15/2023 05:00 AM    CO2 35 02/15/2023 05:00 AM    BUN 54 02/15/2023 05:00 AM    CREATININE 1.4 02/15/2023 05:00 AM    GLUCOSE 72 02/15/2023 05:00 AM     INR:   Lab Results   Component Value Date/Time    INR 1.61 02/15/2023 05:03 AM    INR 1.79 02/14/2023 02:24 PM    INR 2.29 02/13/2023 10:43 AM        CARDIAC LABS  ENZYMES:No results for input(s): CKMB, CKMBINDEX, TROPONINI in the last 72 hours.    Invalid input(s): CKTOTAL;3  FASTING LIPID PANEL:  Lab Results   Component Value Date/Time    HDL 51 03/15/2021 10:18 AM    LDLCALC 80 03/15/2021 10:18 AM    TRIG 56 03/15/2021 10:18 AM    TSH 2.81 03/30/2022 11:50 AM     LIVER PROFILE:  Lab Results   Component Value Date/Time    AST 10 02/07/2023 03:39 AM    AST 9 12/22/2022 06:01 AM    ALT <5 02/07/2023 03:39 AM    ALT <5 12/22/2022 06:01 AM     BNP:   Lab Results   Component Value Date/Time    PROBNP 4,668 02/13/2023 10:44 AM    PROBNP 13,923 02/09/2023 06:13 AM    PROBNP 10,289 02/07/2023 03:39 AM     Iron Studies:    Lab Results   Component Value Date/Time    FERRITIN 165.6 02/09/2023 06:10 AM    FERRITIN 182.5 09/15/2022 04:19 AM    FERRITIN 77.3 08/15/2022 03:51 PM     Lab Results   Component Value Date    IRON 14 (L) 02/09/2023    TIBC 224 (L) 02/09/2023    FERRITIN 165.6 (H) 02/09/2023      Iron Deficiency Anemia:  Yes IV Iron Therapy:  Yes  2017 ACC/AHA HF Guidelines:   intravenous iron replacement in patients with New York Heart Association (NYHA) class II and III HF and iron deficiency(ferritin <100 ng/ml or 100-300 ng/ml if transferrin saturation <20%), to improve functional status and QoL.      1. WEIGHT: Admit Weight: 235 lb (106.6 kg)      Today  Weight: 238 lb 14.4 oz (108.4 kg)  2. I/O   Intake/Output Summary (Last 24 hours) at 2/15/2023 0915  Last data filed at 2/14/2023 2136  Gross per 24 hour   Intake --   Output 1600 ml   Net -1600 ml         Assessment/Plan:     Acute Heart Failure:  ~compensated, 3L out  ~ BNP 13k on admit, decreased to 4668  ~Plan: IV lasix daily until taking po    HTN:   ~controlled    Atrial Fib   ~rate elevated today,BB changed to IV prn until taking po     Acute Respiratory Failure             ~ iimproved  ~O2 at 2L, continue to wean                           JUSTINA on CKD   ~Cr on admit 2.4>2.3>1.8, Baseline 1.2  ~Daily labs; trend creatinine  ~Nephrology consulted    Anemia/ Hematemesis  ~ anemia continued but stable today  ~ getting IV abx and percutaneous drain.             I appreciate the opportunity of cooperating in the care of this individual.    Hugo Pearl, APRN - CNP, ACNP, [address] 2/15/2023, 9:15 AM  Heart Failure  The [address], [address]  Ph: [phone]      Core Measures:   Discharge instructions:   LVEF documented:   ACEI for LV dysfunction:   Smoking Cessation:

## 2023-02-15 NOTE — PROGRESS NOTES
Office : [phone]     Fax :[phone]       Nephrology progress  Note      Patient's Name: Reji Kerr    2/15/2023          Chief Complaint:    Chief Complaint   Patient presents with    Abdominal Pain     Pt via EMS from home, c/o LUQ pain 10/10, has had gallbladder removed. Pt states pain is making her sob, 89% ora, put on 2L, pt received 325 mg aspirin, has been treated for cellulitis since December, new antibiotic last two weeks, states she has been getting hives and itching        History of Present Ilness:    Reji Kerr is a 78 y.o. female who presented with complaints of shortness of breath. Patient apparently has been treated for cellulitis lower extremity on antibiotics patient started having worsening itching and hives. Came to the ER. Patient with increased shortness of breath. Also with leg swelling. No reported fevers chills. Patient was found to be hypoxic was placed on 2 L nasal cannula. Patient with history of chronic CHF COPD chronic kidney disease admitted with bilateral worse. Baseline creat is 1.2   Now elevated at 2.0     BNP 24001      Interval hx       No SOB     Good UOP  Creat improved     Abdominal pain     I/O last 3 completed shifts: In: 782.3 [P.O.:90; I.V.:250.3;  IV Piggyback:442.1]  Out: 2300 [Urine:2300]    Past Medical History:   Diagnosis Date    Arthritis     Atrial fibrillation and flutter ([facility] [address]     Hypertension     Kidney disease     Pt states  \"stage 3\"    Pneumonia     Sleep apnea     no c-pap       Past Surgical History:   Procedure Laterality Date    CARDIOVERSION      COLONOSCOPY N/A 11/20/2018    COLONOSCOPY POLYPECTOMY SNARE/COLD BIOPSY performed by Izzy Rodgers MD at [facility] N/A 03/31/2022 COLONOSCOPY POLYPECTOMY SNARE/COLD BIOPSY performed by Danica Barraza MD at [facility]      ankle rt has pins and rods, and rt elbow    GASTRIC BYPASS SURGERY      LARYNX SURGERY      TOTAL KNEE ARTHROPLASTY Bilateral 12/19/2012    bilateral knee replacements    TUBAL LIGATION      tubes tied    UPPER GASTROINTESTINAL ENDOSCOPY N/A 11/20/2018    EGD BIOPSY performed by Lorenzo Marion MD at [facility] N/A 03/31/2022    EGD DILATION BALLOON performed by Danica Barraza MD at [facility] N/A 03/31/2022    EGD BIOPSY performed by Danica Barraza MD at [address]       Family History   Problem Relation Age of Onset    Other Mother         blood clot    Cancer Father         stomach cancer    Stroke Brother          Current Medications:    metoprolol (LOPRESSOR) injection 2.5 mg, Q6H PRN  meropenem (MERREM) 500 mg in sodium chloride 0.9 % 100 mL IVPB (mini-bag), q8h  anidulafungin (ERAXIS) 100 mg in sodium chloride 0.9 % 130 mL IVPB, Q24H  dextrose bolus 10% 125 mL, PRN   Or  dextrose bolus 10% 250 mL, PRN  glucagon (rDNA) injection 1 mg, PRN  dextrose 10 % infusion, Continuous PRN  insulin lispro (HUMALOG) injection vial 0-4 Units, Q6H  PN-Adult Premix 5/20 - Standard Electrolytes - Central Line, Continuous TPN  [START ON 2/16/2023] fat emulsion 20 % infusion 250 mL, Once per day on Mon Thu  sodium chloride flush 0.9 % injection 5-40 mL, 2 times per day  sodium chloride flush 0.9 % injection 5-40 mL, PRN  0.9 % sodium chloride infusion, PRN  pantoprazole (PROTONIX) 80 mg in sodium chloride 0.9 % 100 mL infusion, Continuous  furosemide (LASIX) injection 20 mg, Daily  ipratropium-albuterol (DUONEB) nebulizer solution 1 ampule, Q4H PRN  lidocaine PF 1 % injection 5 mL, Once  sodium chloride flush 0.9 % injection 5-40 mL, 2 times per day  sodium chloride flush 0.9 % injection 5-40 mL, PRN  0.9 % sodium chloride infusion, PRN  [Held by provider] metoprolol tartrate (LOPRESSOR) tablet 75 mg, BID  [Held by provider] predniSONE (DELTASONE) tablet 40 mg, Daily  diphenhydrAMINE (BENADRYL) tablet 50 mg, Q6H PRN  ondansetron (ZOFRAN) injection 4 mg, Q6H PRN  albuterol sulfate HFA (PROVENTIL;VENTOLIN;PROAIR) 108 (90 Base) MCG/ACT inhaler 2 puff, Q4H PRN  amiodarone (CORDARONE) tablet 200 mg, Daily  [Held by provider] apixaban (ELIQUIS) tablet 5 mg, BID  traMADol (ULTRAM) tablet 50 mg, Q6H PRN  sodium chloride flush 0.9 % injection 5-40 mL, 2 times per day  sodium chloride flush 0.9 % injection 5-40 mL, PRN  0.9 % sodium chloride infusion, PRN  ondansetron (ZOFRAN-ODT) disintegrating tablet 4 mg, Q8H PRN   Or  ondansetron (ZOFRAN) injection 4 mg, Q6H PRN  polyethylene glycol (GLYCOLAX) packet 17 g, Daily PRN  acetaminophen (TYLENOL) tablet 650 mg, Q6H PRN   Or  acetaminophen (TYLENOL) suppository 650 mg, Q6H PRN        Physical exam:     Vitals:  BP (!) 105/59   Pulse (!) 104   Temp 97.9 °F (36.6 °C) (Oral)   Resp 20   Ht 5' 7\" (1.702 m)   Wt 238 lb 14.4 oz (108.4 kg)   SpO2 92%   BMI 37.42 kg/m²   Constitutional:  OAA X3 NAD  Skin: no rash, turgor wnl  Heent:  eomi, mmm  Neck: no bruits or jvd noted  Cardiovascular:  S1, S2 without m/r/g  Respiratory: CTA B without w/r/r  Abdomen:  +bs, soft, nt, nd  Ext: 1 +  lower extremity edema  Psychiatric: mood and affect appropriate  Musculoskeletal:  Rom, muscular strength intact    Labs:  CBC:   Recent Labs     02/13/23  1043 02/14/23  0050 02/14/23  0432 02/14/23  1424 02/15/23  0503   WBC 21.9*  --  31.9*  --   --    HGB 10.1*   < > 8.6* 8.6* 8.5*     --  160  --   --     < > = values in this interval not displayed.      BMP:    Recent Labs     02/13/23  1043 02/14/23  0432 02/15/23  0500    143 146*   K 4.1 4.6 4.1    106 107   CO2 34* 33* 35*   BUN 61* 63* 54*   CREATININE 1.4* 1.5* 1.4*   GLUCOSE 101* 86 72     Ca/Mg/Phos:   Recent Labs 02/13/23  1043 02/14/23  0432 02/14/23  1424 02/15/23  0500   CALCIUM 8.8 8.5  --  9.0   MG  --   --  1.80 1.90   PHOS  --   --  2.7 3.3     Hepatic:   No results for input(s): AST, ALT, ALB, BILITOT, ALKPHOS in the last 72 hours. IMAGING:  XR CHEST PORTABLE   Final Result   1. Right upper extremity PICC extends superiorly into the right internal   jugular vein; tip is excluded from the field of view. Recommend   repositioning. 2.  Bilateral patchy airspace disease, improved from prior. Discussed with Dr. Ayala Clark by Dr. Sidra Akers at 4:55 am on 2/15/2023         CT ABDOMEN WO CONTRAST Additional Contrast? Oral   Preliminary Result   1. 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  Findings compatible with a leak likely a marginal ulcer with   a developing 10 x 10 x 5 cm abscess centered between the Angel limb and   gastric remnant. Adjacent inflammatory change and left upper quadrant edema   has progressed from 02/07/2023. Trace amount of enteric contrast leak. A   couple punctate foci of free air noted in the anterior abdomen. Findings were discussed with Dr. Sigrid Piper at 5:45 pm on 2/13/2023. CT ABDOMEN WO CONTRAST Additional Contrast? None   Final Result   Postsurgical change from gastric bypass. There is fat stranding surrounding   the afferent loop. There is fluid and gas seen in the upper abdomen, near   the anastomotic site, that is not all definitively intraluminal, with   surrounding fat stranding, raising the question of perforation. Increased pleural-parenchymal disease at the left lung base      The findings were sent to the Radiology Results [address] at 1:17   pm on 2/13/2023 to be communicated to a licensed caregiver. CT CHEST ABDOMEN PELVIS WO CONTRAST Additional Contrast? None   Final Result   1. Scattered ground-glass opacities with interlobular septal thickening.    Findings may be related to pulmonary edema with other considerations   including an inflammatory/infectious process in the appropriate clinical   setting. 2. Right upper lobe consolidation. Additional bilateral scattered curvilinear   opacities may be related to atelectasis versus developing consolidation. 3. Enlarged pulmonary artery diameter. Finding may be related to pulmonary   arterial hypertension. 4. Coronary artery calcifications present. 5. Moderate hiatal hernia. 6. Questionable circumferential thickening of the descending colon with mild   surrounding fat stranding versus colonic underdistention. Finding raises the   possibility of colitis in the appropriate clinical setting. 7. Colonic diverticulosis, predominately sigmoid. No evidence of acute   diverticulitis. 8. Nonobstructing punctate left nephrolithiasis. RECOMMENDATIONS:   2 cm left adrenal mass, consistent with lipid-rich benign adenoma. No   follow-up imaging is recommended. JACR 2017 Aug; 14(8):1038-44, JCAT 2016 Matt Bock; 40(2):194-200, Urol J 2006   [place]; 3(2):71-4. XR CHEST PORTABLE   Final Result   1. Diffuse hazy multifocal opacity throughout both lungs, right worse than   left, likely a combination of moderate pulmonary edema and multifocal   pneumonia. 2. Stable cardiomegaly. 3. Stable hiatal hernia. CT ABSCESS DRAINAGE W CATH PLACEMENT S&I    (Results Pending)           Assessment/Plan :      1. Panda   Creatinine  improved   Has mild edema   Low dose  lasix         Recommend to dose adjust all medications  based on renal functions  Maintain SBP> 90 mmHg   Daily weights   AVOID NSAIDs  Avoid Nephrotoxins  Monitor Intake/Output  Call if significant decrease in urine output        2. HTN. BP low borderline range   Hold clonidine if SBP < 110 mm HG     3. COPD    4. H/o atrial fib   Monitor       5. Hyperkalemia   Resolved after getting lokelma     6. Abdominal  pain .  CT scan shows there has been interval development of a poorly  marginated approximately 10 x 10 x 5 cm fluid collection with foci of gas and  small amounts of enteric contrast compatible with a developing abscess  Surgery on board  Started on TPN         D/w primary team      Thank you for allowing us to participate in care of Sandra Rios         Electronically signed by: Kash Gaston MD, 2/15/2023, 11:36 AM      [facility] [address]  Office : [phone]  Fax :[phone]

## 2023-02-15 NOTE — PROGRESS NOTES
Chest xray revealed PICC extending superiorly into right IJ. To bedside to attempt exchange. No issues threading wire into existing catheter and removed at 39cm. With every advancement of catheter, the Sherlock device indicated IJ placement. Attempts made to reposition patient, tuck pt chin towards chest, and asking pt to cough while advancing. Unfortunately I was unable to drop the catheter into the SVC. With introducer still in place, I inserted a new midline to ensure pt was left with IV access. Pt tolerated procedure well. Handoff to [employer].

## 2023-02-15 NOTE — PROGRESS NOTES
Arrived to bedside for PICC placement. Chart reviewed and timeout done with LILY Padron. Patient with midline catheter in RUE, so LUE assessed first for PICC placement. LUE found to be ecchymotic and edematous, and site not appropriate for PICC placement. LILY Padron made aware of findings. After assessing RUE vessels, Midline removed using aseptic technique and sterile, occlusive dressing applied. No issues accessing R basilic vein. Good blood return and easy flush. Due to heart rhythm, patient requires xray for confirmation of tip placement. PICC not okay to use until xray confirms tip in SVC or cavoatrial junction. Handoff to RN.

## 2023-02-15 NOTE — PLAN OF CARE
Problem: Skin/Tissue Integrity  Goal: Absence of new skin breakdown  Description: 1. Monitor for areas of redness and/or skin breakdown  2. Assess vascular access sites hourly  3. Every 4-6 hours minimum:  Change oxygen saturation probe site  4. Every 4-6 hours:  If on nasal continuous positive airway pressure, respiratory therapy assess nares and determine need for appliance change or resting period.   2/15/2023 0914 by Scherrie Babinski, RN  Outcome: Progressing  2/15/2023 0421 by Logan Mcmullen RN  Outcome: Progressing     Problem: Safety - Adult  Goal: Free from fall injury  2/15/2023 0914 by Scherrie Babinski, RN  Outcome: Progressing  2/15/2023 0421 by Logan Mcmullen RN  Outcome: Progressing     Problem: Pain  Goal: Verbalizes/displays adequate comfort level or baseline comfort level  2/15/2023 0914 by Scherrie Babinski, RN  Outcome: Progressing  2/15/2023 0421 by Logan Mcmullen RN  Outcome: Progressing     Problem: ABCDS Injury Assessment  Goal: Absence of physical injury  2/15/2023 0914 by Scherrie Babinski, RN  Outcome: Progressing  2/15/2023 0421 by Logan Mcmullen RN  Outcome: Progressing     Problem: Metabolic/Fluid and Electrolytes - Adult  Goal: Electrolytes maintained within normal limits  2/15/2023 0914 by Scherrie Babinski, RN  Outcome: Progressing  2/15/2023 0421 by Logan Mcmullen RN  Outcome: Progressing  Goal: Hemodynamic stability and optimal renal function maintained  2/15/2023 0914 by Scherrie Babinski, RN  Outcome: Progressing  2/15/2023 0421 by Logan Mcmullen RN  Outcome: Progressing     Problem: Chronic Conditions and Co-morbidities  Goal: Patient's chronic conditions and co-morbidity symptoms are monitored and maintained or improved  2/15/2023 0914 by Scherrie Babinski, RN  Outcome: Progressing  2/15/2023 0421 by Logan Mcmullen RN  Outcome: Progressing     Problem: Nutrition Deficit:  Goal: Optimize nutritional status  2/15/2023 0914 by Scherrie Babinski, RN  Outcome: Progressing  2/15/2023 0421 by Jere Owens, RN  Outcome: Progressing

## 2023-02-15 NOTE — PROGRESS NOTES
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  Bilateral atypical pneumonia  Right upper lobe consolidation    Microbiology:        I have reviewed allavailable micro lab data and cultures    Blood culture (2/2) - collected on 2/7/2023: Negative so far      Antibiotics and immunizations:       Current antibiotics: All antibiotics and their doses were reviewed by me    Recent Abx Admin                     meropenem (MERREM) 500 mg in sodium chloride 0.9 % 100 mL IVPB (mini-bag) (mg) 500 mg New Bag 02/15/23 0339     500 mg New Bag 02/14/23 1926    anidulafungin (ERAXIS) 200 mg in sodium chloride 0.9 % 260 mL IVPB (mg) 200 mg New Bag 02/14/23 1517                      Immunization History: All immunization history was reviewed by me today.     Immunization History   Administered Date(s) Administered    COVID-19, PFIZER Bivalent BOOSTER, DO NOT Dilute, (age 12y+), IM, 30 mcg/0.3 mL 09/09/2022    COVID-19, PFIZER GRAY top, DO NOT Dilute, (age 15 y+), IM, 30 mcg/0.3 mL 05/20/2022    COVID-19, PFIZER PURPLE top, DILUTE for use, (age 15 y+), 30mcg/0.3mL 09/08/2021, 10/15/2021    Influenza Virus Vaccine 12/22/2012    Influenza, FLUAD, (age 72 y+), Adjuvanted, 0.5mL 10/27/2020, 10/15/2021, 09/09/2022    Influenza, High Dose (Fluzone 65 yrs and older) 09/30/2016, 10/01/2017, 10/08/2018    Influenza, Triv, inactivated, subunit, adjuvanted, IM (Fluad 65 yrs and older) 10/21/2019    Pneumococcal Conjugate 13-valent (Nestor Clovis) 09/30/2016, 10/01/2017    Pneumococcal Polysaccharide ([id number]) 12/22/2012, 10/06/2017    Td vaccine (adult) 12/01/2008    Zoster Live (Zostavax) 09/15/2015       Known drug allergies: All allergies were reviewed and updated    Allergies   Allergen Reactions    Bee Venom Swelling and Angioedema    Shellfish-Derived Products Other (See Comments)     Syncope/seizures    Shrimp Flavor      Passes out      Cephalexin Itching    Codeine Hives and Other (See Comments)     B/P DROPS PASSES OUT  Passed out    Fentanyl And Related Hives     Other reaction(s): Dizziness    Hydromorphone Rash, Hives and Other (See Comments)     Full rash spreading across chest and both arms from IV hydromorphone  Passed out    Vancomycin Rash       Social history:     Social History:  All social andepidemiologic history was reviewed and updated by me today as needed. Tobacco use:   reports that she has never smoked. She has never used smokeless tobacco.  Alcohol use:   reports no history of alcohol use. Currently lives in: [address] [place] [address]   reports no history of drug use. COVID VACCINATION AND LAB RESULT RECORDS:     Internal Administration   First Dose COVID-19, PFIZER PURPLE top, DILUTE for use, (age 15 y+), 30mcg/0.3mL  09/08/2021   Second Dose COVID-19, PFIZER PURPLE top, DILUTE for use, (age 15 y+), 30mcg/0.3mL   10/15/2021       Last COVID Lab SARS-CoV-2 (no units)   Date Value   09/13/2022 Not Detected     SARS-CoV-2, BREANA (no units)   Date Value   10/01/2020 NOT DETECTED     SARS-CoV-2, NAAT (no units)   Date Value   12/20/2022 Not Detected            Assessment:     The patient is a 78 y.o. old female who  has a past medical history of Arthritis, Atrial fibrillation and flutter ([facility] Hypertension, Kidney disease, Pneumonia, and Sleep apnea.  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 Discussion:      The patient is afebrile. Blood cultures from February 14 have been negative. The patient is on IV meropenem and IV Eraxis that I had started yesterday. Unfortunately, no CBC done today    CRP was elevated at 161.4 yesterday and sed rate was also elevated at 34 yesterday. She had a CT-guided drainage of the abdominal abscess done today    Serum creatinine is 1.4 today. Plan:     Diagnostic Workup:    Follow-up on repeat blood cultures from yesterday  Continue to follow  fever curve, WBC count and blood cultures. Continue to monitor blood counts, liver and renal function. Check daily CBC for now  Follow-up on abscess fluid cultures and cytology    Antimicrobials: Will continue IV meropenem 1 g every 8 hours  Continue IV Eraxis 100 mg every 24 hours  Will order oral probiotics twice daily  We will follow up on the culture results and clinical progress and will make further recommendations accordingly. Abdominal abscess AURELIO drain care  Continue close vitals monitoring. Maintain good glycemic control. Fall precautions. Aspiration precautions. Continue to watch for new fever or diarrhea. DVT prophylaxis. Discussed all above with patient and RN.       Drug Monitoring:    Continue monitoring for antibiotic toxicity as follows: CBC, CMP   Continue to watch for following: new or worsening fever, new hypotension, hives, lip swelling and redness or purulence at vascular access sites. I/v access Management:    Continue to monitor i.v access sites for erythema, induration, discharge or tenderness. As always, continue efforts to minimize tubes/lines/drains as clinically appropriate to reduce chances of line associated infections. Patient education and counseling: The patient was educated in detail about the side-effects of various antibiotics and things to watch for like new rashes, lip swelling, severe reaction, worsening diarrhea, break through fever etc.  Discussed patient's condition and what to expect. All of the patient's questions were addressed in a satisfactory manner and patient verbalized understanding all instructions. Level of complexity of visit and medical decision making: High     Risk of Complications/Morbidity: High     Illness(es)/ Infection present that pose threat to life/bodily function. There is potential for severe exacerbation of infection/side effects of treatment. Therapy requires intensive monitoring for antimicrobial agent toxicity. TIME SPENT TODAY:     - Spent over  37 minutes on visit (including interval history, physical exam, review of data including labs, cultures, imaging, development and implementation of treatment plan and coordination of complex care). More than 50 percent of this includes face-to-face time spent with the patient for counseling and coordination of care. Thank you for involving me in the care of your patient. I will continue to follow. If you have anyadditional questions, please do not hesitate to contact me. Subjective: Interval history: Interval history was obtained from chart review and patient/ RN. The patient is afebrile. She is tolerating antibiotics okay. No diarrhea     REVIEW OF SYSTEMS:      Review of Systems   Constitutional:  Positive for fatigue. Negative for chills, diaphoresis and fever.    HENT: Negative for ear discharge, ear pain, postnasal drip, rhinorrhea, sinus pressure, sinus pain and sore throat. Eyes:  Negative for discharge and redness. Respiratory:  Negative for cough, shortness of breath and wheezing. Cardiovascular:  Negative for chest pain and leg swelling. Gastrointestinal:  Negative for abdominal pain, constipation, diarrhea and nausea. Endocrine: Negative for cold intolerance, heat intolerance and polydipsia. Genitourinary:  Negative for dysuria, flank pain, frequency, hematuria and urgency. Musculoskeletal:  Negative for back pain and myalgias. Skin:  Negative for rash. Allergic/Immunologic: Negative for immunocompromised state. Neurological:  Negative for dizziness, seizures and headaches. Hematological:  Does not bruise/bleed easily. Psychiatric/Behavioral:  Negative for agitation, hallucinations and suicidal ideas. The patient is not nervous/anxious. All other systems reviewed and are negative. Past Medical History: All past medical history reviewed today. Past Medical History:   Diagnosis Date    Arthritis     Atrial fibrillation and flutter ([address]     Hypertension     Kidney disease     Pt states  \"stage 3\"    Pneumonia     Sleep apnea     no c-pap       Past Surgical History: All past surgical history was reviewed today.     Past Surgical History:   Procedure Laterality Date    CARDIOVERSION      COLONOSCOPY N/A 11/20/2018    COLONOSCOPY POLYPECTOMY SNARE/COLD BIOPSY performed by Alfonso Walker MD at [facility] N/A 03/31/2022    COLONOSCOPY POLYPECTOMY SNARE/COLD BIOPSY performed by Cornelia Spence MD at [address] [place] [address]      ankle rt has pins and rods, and rt elbow    GASTRIC BYPASS SURGERY      LARYNX SURGERY      TOTAL KNEE ARTHROPLASTY Bilateral 12/19/2012    bilateral knee replacements    TUBAL LIGATION      tubes tied    UPPER GASTROINTESTINAL ENDOSCOPY N/A 11/20/2018    EGD BIOPSY performed by Alfonso Walker MD at [facility] N/A 03/31/2022    EGD DILATION BALLOON performed by Jarocho Hood MD at [facility] N/A 03/31/2022    EGD BIOPSY performed by Jarocho Hood MD at [address] [place] [address]       Family History: All family history was reviewed today. Problem Relation Age of Onset    Other Mother         blood clot    Cancer Father         stomach cancer    Stroke Brother        Objective:       PHYSICAL EXAM:      Vitals:   Vitals:    02/15/23 1115 02/15/23 1120 02/15/23 1125 02/15/23 1130   BP: 128/73 130/72 123/72 (!) 105/59   Pulse: (!) 104 (!) 111 (!) 103 (!) 104   Resp: 20 21 20 20   Temp:       TempSrc:       SpO2: 95% 94% 95% 92%   Weight:       Height:           Physical Exam  Vitals and nursing note reviewed. Constitutional:       Appearance: She is well-developed. She is not diaphoretic. Comments: The patient was seen earlier today. HENT:      Head: Normocephalic and atraumatic. Right Ear: External ear normal. There is no impacted cerumen. Left Ear: External ear normal. There is no impacted cerumen. Nose: Nose normal.      Mouth/Throat:      Mouth: Mucous membranes are moist.      Pharynx: Oropharynx is clear. No oropharyngeal exudate. Eyes:      General: No scleral icterus. Right eye: No discharge. Left eye: No discharge. Conjunctiva/sclera: Conjunctivae normal.      Pupils: Pupils are equal, round, and reactive to light. Neck:      Thyroid: No thyromegaly. Cardiovascular:      Rate and Rhythm: Normal rate and regular rhythm. Heart sounds: Normal heart sounds. No murmur heard. No friction rub. Pulmonary:      Effort: No respiratory distress. Breath sounds: No stridor. No wheezing or rales. Abdominal:      General: Bowel sounds are normal.      Palpations: Abdomen is soft. Tenderness: There is no abdominal tenderness.  There is no guarding or rebound. Comments: Abdominal AURELIO drain noted with thick brownish drainage   Musculoskeletal:         General: No swelling, tenderness or deformity. Normal range of motion. Cervical back: Normal range of motion and neck supple. Right lower leg: No edema. Left lower leg: No edema. Lymphadenopathy:      Cervical: No cervical adenopathy. Skin:     General: Skin is warm and dry. Coloration: Skin is not jaundiced. Findings: No bruising, erythema or rash. Neurological:      General: No focal deficit present. Mental Status: She is alert and oriented to person, place, and time. Mental status is at baseline. Motor: No abnormal muscle tone. Psychiatric:         Mood and Affect: Mood normal.         Behavior: Behavior normal.          Lines and drains: All vascular access sites are healthy with no local erythema, discharge or tenderness. Intake and output:    I/O last 3 completed shifts: In: 782.3 [P.O.:90; I.V.:250.3; IV Piggyback:442.1]  Out: 2300 [Urine:2300]    Lab Data:   All available labs and old records have been reviewed by me. CBC:  Recent Labs     02/13/23  1043 02/14/23  0050 02/14/23 0432 02/14/23  1424 02/15/23  0503   WBC 21.9*  --  31.9*  --   --    RBC 3.65*  --  3.13*  --   --    HGB 10.1*   < > 8.6* 8.6* 8.5*   HCT 32.7*   < > 27.8* 28.0* 27.9*     --  160  --   --    MCV 89.5  --  88.6  --   --    MCH 27.7  --  27.3  --   --    MCHC 30.9*  --  30.8*  --   --    RDW 16.5*  --  16.9*  --   --     < > = values in this interval not displayed.         BMP:  Recent Labs     02/13/23  1043 02/14/23  0432 02/15/23  0500    143 146*   K 4.1 4.6 4.1    106 107   CO2 34* 33* 35*   BUN 61* 63* 54*   CREATININE 1.4* 1.5* 1.4*   CALCIUM 8.8 8.5 9.0   GLUCOSE 101* 86 72        Hepatic Function Panel:   Lab Results   Component Value Date/Time    ALKPHOS 90 02/07/2023 03:39 AM    ALT <5 02/07/2023 03:39 AM    AST 10 02/07/2023 03:39 AM    PROT 7.0 02/07/2023 03:39 AM    BILITOT 1.4 02/07/2023 03:39 AM    BILIDIR 1.0 09/13/2022 05:56 PM    IBILI 0.9 09/13/2022 05:56 PM    LABALBU 3.2 02/07/2023 03:39 AM       CPK: No results found for: CKTOTAL  ESR:   Lab Results   Component Value Date    SEDRATE 34 (H) 02/14/2023     CRP:   Lab Results   Component Value Date    .4 (H) 02/14/2023           Imaging:    All pertinent images and reports for the current visit were reviewed by me during this visit.  I reviewed the chest x-ray/CT scan/MRI images and independently interpreted the findings and results today.    CT ABSCESS DRAINAGE W CATH PLACEMENT S&I   Final Result   Successful CT-guided percutaneous drainage catheter placement into a   perigastric collection via a left anterolateral abdominal approach.      Patient currently has a 14 [place] locking pigtail drainage catheter within   the collection, placed to bulb suction.         CT GUIDED NEEDLE PLACEMENT   Final Result   Successful CT-guided percutaneous drainage catheter placement into a   perigastric collection via a left anterolateral abdominal approach.      Patient currently has a 14 [place] locking pigtail drainage catheter within   the collection, placed to bulb suction.         XR CHEST PORTABLE   Final Result   1.  Right upper extremity PICC extends superiorly into the right internal   jugular vein; tip is excluded from the field of view.  Recommend   repositioning.      2.  Bilateral patchy airspace disease, improved from prior.      Discussed with Dr. Betancourt by Dr. Rivera at 4:55 am on 2/15/2023         CT ABDOMEN WO CONTRAST Additional Contrast? Oral   Preliminary Result   1. Status post Angel-en-Y gastric bypass.  Chronically herniated gastric pouch   into the chest.  Findings compatible with a leak likely a marginal ulcer with   a developing 10 x 10 x 5 cm abscess centered between the Angel limb and   gastric remnant.  Adjacent inflammatory change and left upper quadrant edema   has progressed  from 02/07/2023. Trace amount of enteric contrast leak. A   couple punctate foci of free air noted in the anterior abdomen. Findings were discussed with Dr. Ziggy Wallace at 5:45 pm on 2/13/2023. CT ABDOMEN WO CONTRAST Additional Contrast? None   Final Result   Postsurgical change from gastric bypass. There is fat stranding surrounding   the afferent loop. There is fluid and gas seen in the upper abdomen, near   the anastomotic site, that is not all definitively intraluminal, with   surrounding fat stranding, raising the question of perforation. Increased pleural-parenchymal disease at the left lung base      The findings were sent to the Radiology Results [address] at 1:17   pm on 2/13/2023 to be communicated to a licensed caregiver. CT CHEST ABDOMEN PELVIS WO CONTRAST Additional Contrast? None   Final Result   1. Scattered ground-glass opacities with interlobular septal thickening. Findings may be related to pulmonary edema with other considerations   including an inflammatory/infectious process in the appropriate clinical   setting. 2. Right upper lobe consolidation. Additional bilateral scattered curvilinear   opacities may be related to atelectasis versus developing consolidation. 3. Enlarged pulmonary artery diameter. Finding may be related to pulmonary   arterial hypertension. 4. Coronary artery calcifications present. 5. Moderate hiatal hernia. 6. Questionable circumferential thickening of the descending colon with mild   surrounding fat stranding versus colonic underdistention. Finding raises the   possibility of colitis in the appropriate clinical setting. 7. Colonic diverticulosis, predominately sigmoid. No evidence of acute   diverticulitis. 8. Nonobstructing punctate left nephrolithiasis. RECOMMENDATIONS:   2 cm left adrenal mass, consistent with lipid-rich benign adenoma. No   follow-up imaging is recommended.       JACR 2017 Aug; 14(8):6767-44, JCAT 2016 Mar-Apr; 40(2):194-200, Urol J 2006   Spring; 3(2):71-4. XR CHEST PORTABLE   Final Result   1. Diffuse hazy multifocal opacity throughout both lungs, right worse than   left, likely a combination of moderate pulmonary edema and multifocal   pneumonia. 2. Stable cardiomegaly. 3. Stable hiatal hernia. Medications: All current and past medications were reviewed.      meropenem  500 mg IntraVENous q8h    anidulafungin  100 mg IntraVENous Q24H    insulin lispro  0-4 Units SubCUTAneous Q6H    [START ON 2/16/2023] fat emulsion  250 mL IntraVENous Once per day on Mon Thu    sodium chloride flush  5-40 mL IntraVENous 2 times per day    furosemide  20 mg IntraVENous Daily    lidocaine 1 % injection  5 mL IntraDERmal Once    sodium chloride flush  5-40 mL IntraVENous 2 times per day    [Held by provider] metoprolol tartrate  75 mg Oral BID    [Held by provider] predniSONE  40 mg Oral Daily    amiodarone  200 mg Oral Daily    [Held by provider] apixaban  5 mg Oral BID    sodium chloride flush  5-40 mL IntraVENous 2 times per day        PN-Adult Premix 5/20 - Standard Electrolytes - Central Line      dextrose      PN-Adult Premix 5/20 - Standard Electrolytes - Central Line      sodium chloride      pantoprozole (PROTONIX) infusion 8 mg/hr (02/15/23 0630)    sodium chloride Stopped (02/12/23 1137)    sodium chloride Stopped (02/11/23 1502)       metoprolol, dextrose bolus **OR** dextrose bolus, glucagon (rDNA), dextrose, sodium chloride flush, sodium chloride, ipratropium-albuterol, sodium chloride flush, sodium chloride, diphenhydrAMINE, ondansetron, albuterol sulfate HFA, traMADol, sodium chloride flush, sodium chloride, ondansetron **OR** ondansetron, polyethylene glycol, acetaminophen **OR** acetaminophen      Problem list:       Patient Active Problem List   Diagnosis Code    Atrial fibrillation (HCC) I48.91    Primary hypertension I10    Severe episode of recurrent major depressive disorder, without psychotic features ([facility] F33.2    Seasonal affective disorder ([facility] F33.8    Class 2 severe obesity due to excess calories with serious comorbidity and body mass index (BMI) of 39.0 to 39.9 in adult ([facility]) E66.01, Z68.39    Elevated sed rate R70.0    Neutrophilia D72.9    Acute kidney injury ([facility] N17.9    Elevated C-reactive protein (CRP) R79.82    GIANNA (obstructive sleep apnea) G47.33    Morbid obesity ([facility]) E66.01    Weight loss counseling, encounter for Z71.3    Atypical atrial flutter ([facility]) I48.4    PAF (paroxysmal atrial fibrillation) ([facility]) I48.0    ILD (interstitial lung disease) ([facility]) J84.9    Iron deficiency anemia due to chronic blood loss D50.0    Iron deficiency anemia, unspecified D50.9    Age-related osteoporosis without current pathological fracture M81.0    Vasovagal syncope R55    CKD (chronic kidney disease) stage 4, GFR 15-29 ml/min ([facility]) N18.4    Acute on chronic diastolic heart failure ([facility]) I50.33    Stage 3b chronic kidney disease ([facility]) N18.32    B12 deficiency E53.8    Vitamin D deficiency E55.9    Acute respiratory failure ([facility]) J96.00    Chronic obstructive pulmonary disease, unspecified J44.9    Sepsis ([facility]) A41.9    Cellulitis of right leg L03.115    Obesity (BMI 30-39. 9) E66.9    Stage 3 chronic kidney disease ([facility]) N18.30    Bacteremia R78.81    Fever and chills R50.9    History of ankle surgery Z98.890    Streptococcal infection group G B95.4    Bacterial pneumonia J15.9    JUSTINA (acute kidney injury) ([facility] N17.9    Acute on chronic heart failure with preserved ejection fraction (HFpEF) ([facility]) I50.33    Multifocal pneumonia J18.9    Allergic drug reaction T78.40XA    Elevated d-dimer R79.89    Left nephrolithiasis N20.0    Diverticulosis K57.90    History of cholecystectomy Z90.49    Intra-abdominal abscess ([facility]) K65.1    Bandemia D72.825    Atypical pneumonia J18.9       Please note that this chart was generated using Dragon dictation software.  Although every effort was made to ensure the accuracy of this automated transcription, some errors in transcription may have occurred inadvertently. If you may need any clarification, please do not hesitate to contact me through EPIC or at the phone number provided below with my electronic signature. Any pictures or media included in this note were obtained after taking informed verbal consent from the patient and with their approval to include those in the patient's medical record. Sterling Walter MD, MPH, FACP, [facility]  2/15/2023, 1:29 PM  [facility] Infectious Disease   [address] [place] [address]  Office: [phone]  Fax: [phone]  In-person Clinic days:  Tuesday & Thursday a.m. Virtual clinic days: Monday, Wednesday & Friday a.m.

## 2023-02-15 NOTE — PROGRESS NOTES
Physician Progress Note      PATIENTLexy Troncoso  CSN #:                  [id number]  :                       1945  ADMIT DATE:       2023 2:59 AM  DISCH DATE:  RESPONDING  PROVIDER #:        Radha Sam MD          QUERY TEXT:    Pt admitted with pna. Pt noted to also have abdominal pain 10/10 in the [facility] as   the chief complaint in the ED. Was found to have hematemesis and abd pain on   . If possible, please document in progress notes and discharge summary the   present on admission status of ulcer with gastrojejunal anastomotic bleeding   and possible abscess: The medical record reflects the following:  Risk Factors: 79 yo with hx of gastric bypass with \"Patient with history of   chronic marginal ulcer that required intervention multiple times. \"   (Bariatrics)    Clinical Indicators: Hgb on admission was 13.3 down to 8.5 today. CT    Moderate hiatal hernia. CT , Chronically herniated gastric pouch into the chest. ? Findings   compatible with a leak likely a marginal ulcer with  a developing 10 x 10 x 5 cm abscess centered between the Angel limb and gastric   remnant. ?Adjacent inflammatory change and left upper quadrant edema has   progressed from 2023.     ED HPI,  past 3 hours she has had left upper abdominal pain sharp in nature   constant which made her short of breath    Treatment: GI consult, Bariatrics consult,  CT abd, IR abscess drainage, PPI,   no surgical intervention  Options provided:  -- Yes, ulcer with gastrojejunal anastomotic bleeding and possible abscess was   present at the time of the order to admit to the hospital  -- No, ulcer with gastrojejunal anastomotic bleeding and possible abscess was   not present on admission and developed during the inpatient stay  -- Other - I will add my own diagnosis  -- Disagree - Not applicable / Not valid  -- Disagree - Clinically unable to determine / Unknown  -- Refer to Clinical Documentation Reviewer    PROVIDER RESPONSE TEXT:    No, ulcer with gastrojejunal anastomotic bleeding and possible abscess was not   present on admission and developed during the inpatient stay. Query created by:  Jordi Hernadez on 2/15/2023 12:47 PM      Electronically signed by:  Josesito Michaels MD 2/15/2023 12:49 PM

## 2023-02-16 LAB
ANION GAP SERPL CALCULATED.3IONS-SCNC: 5 MMOL/L (ref 3–16)
BUN BLDV-MCNC: 39 MG/DL (ref 7–20)
CALCIUM SERPL-MCNC: 8.3 MG/DL (ref 8.3–10.6)
CHLORIDE BLD-SCNC: 104 MMOL/L (ref 99–110)
CO2: 34 MMOL/L (ref 21–32)
CREAT SERPL-MCNC: 1.2 MG/DL (ref 0.6–1.2)
GFR SERPL CREATININE-BSD FRML MDRD: 47 ML/MIN/{1.73_M2}
GLUCOSE BLD-MCNC: 134 MG/DL (ref 70–99)
GLUCOSE BLD-MCNC: 151 MG/DL (ref 70–99)
GLUCOSE BLD-MCNC: 152 MG/DL (ref 70–99)
GLUCOSE BLD-MCNC: 182 MG/DL (ref 70–99)
GLUCOSE BLD-MCNC: 182 MG/DL (ref 70–99)
HCT VFR BLD CALC: 26.8 % (ref 36–48)
HEMOGLOBIN: 8.3 G/DL (ref 12–16)
INR BLD: 1.39 (ref 0.87–1.14)
MAGNESIUM: 1.9 MG/DL (ref 1.8–2.4)
MCH RBC QN AUTO: 27.3 PG (ref 26–34)
MCHC RBC AUTO-ENTMCNC: 30.9 G/DL (ref 31–36)
MCV RBC AUTO: 88.3 FL (ref 80–100)
PDW BLD-RTO: 17.1 % (ref 12.4–15.4)
PERFORMED ON: ABNORMAL
PHOSPHORUS: 2.1 MG/DL (ref 2.5–4.9)
PLATELET # BLD: 168 K/UL (ref 135–450)
PMV BLD AUTO: 9.6 FL (ref 5–10.5)
POTASSIUM SERPL-SCNC: 3.8 MMOL/L (ref 3.5–5.1)
PROTHROMBIN TIME: 17 SEC (ref 11.7–14.5)
RBC # BLD: 3.04 M/UL (ref 4–5.2)
SODIUM BLD-SCNC: 143 MMOL/L (ref 136–145)
WBC # BLD: 22.2 K/UL (ref 4–11)

## 2023-02-16 PROCEDURE — 80048 BASIC METABOLIC PNL TOTAL CA: CPT

## 2023-02-16 PROCEDURE — 2580000003 HC RX 258: Performed by: HOSPITALIST

## 2023-02-16 PROCEDURE — 83735 ASSAY OF MAGNESIUM: CPT

## 2023-02-16 PROCEDURE — 2700000000 HC OXYGEN THERAPY PER DAY

## 2023-02-16 PROCEDURE — 6360000002 HC RX W HCPCS: Performed by: INTERNAL MEDICINE

## 2023-02-16 PROCEDURE — 2580000003 HC RX 258: Performed by: INTERNAL MEDICINE

## 2023-02-16 PROCEDURE — 6360000002 HC RX W HCPCS: Performed by: HOSPITALIST

## 2023-02-16 PROCEDURE — 2500000003 HC RX 250 WO HCPCS: Performed by: SURGERY

## 2023-02-16 PROCEDURE — 97535 SELF CARE MNGMENT TRAINING: CPT

## 2023-02-16 PROCEDURE — 99233 SBSQ HOSP IP/OBS HIGH 50: CPT | Performed by: INTERNAL MEDICINE

## 2023-02-16 PROCEDURE — 94760 N-INVAS EAR/PLS OXIMETRY 1: CPT

## 2023-02-16 PROCEDURE — 84100 ASSAY OF PHOSPHORUS: CPT

## 2023-02-16 PROCEDURE — 97530 THERAPEUTIC ACTIVITIES: CPT

## 2023-02-16 PROCEDURE — 85027 COMPLETE CBC AUTOMATED: CPT

## 2023-02-16 PROCEDURE — C9113 INJ PANTOPRAZOLE SODIUM, VIA: HCPCS | Performed by: HOSPITALIST

## 2023-02-16 PROCEDURE — 2500000003 HC RX 250 WO HCPCS: Performed by: HOSPITALIST

## 2023-02-16 PROCEDURE — 2580000003 HC RX 258: Performed by: SURGERY

## 2023-02-16 PROCEDURE — 87040 BLOOD CULTURE FOR BACTERIA: CPT

## 2023-02-16 PROCEDURE — 85610 PROTHROMBIN TIME: CPT

## 2023-02-16 PROCEDURE — 99231 SBSQ HOSP IP/OBS SF/LOW 25: CPT | Performed by: NURSE PRACTITIONER

## 2023-02-16 PROCEDURE — 1200000000 HC SEMI PRIVATE

## 2023-02-16 RX ORDER — MORPHINE SULFATE 2 MG/ML
2 INJECTION, SOLUTION INTRAMUSCULAR; INTRAVENOUS EVERY 4 HOURS PRN
Status: DISCONTINUED | OUTPATIENT
Start: 2023-02-16 | End: 2023-02-17

## 2023-02-16 RX ADMIN — MEROPENEM 500 MG: 1 INJECTION, POWDER, FOR SOLUTION INTRAVENOUS at 21:20

## 2023-02-16 RX ADMIN — SODIUM CHLORIDE 100 MG: 9 INJECTION, SOLUTION INTRAVENOUS at 18:14

## 2023-02-16 RX ADMIN — I.V. FAT EMULSION 250 ML: 20 EMULSION INTRAVENOUS at 18:25

## 2023-02-16 RX ADMIN — Medication 10 ML: at 09:00

## 2023-02-16 RX ADMIN — POTASSIUM PHOSPHATE, MONOBASIC AND POTASSIUM PHOSPHATE, DIBASIC 15 MMOL: 224; 236 INJECTION, SOLUTION, CONCENTRATE INTRAVENOUS at 12:59

## 2023-02-16 RX ADMIN — PANTOPRAZOLE SODIUM 8 MG/HR: 40 INJECTION, POWDER, LYOPHILIZED, FOR SOLUTION INTRAVENOUS at 01:10

## 2023-02-16 RX ADMIN — Medication 10 ML: at 21:20

## 2023-02-16 RX ADMIN — FUROSEMIDE 20 MG: 10 INJECTION, SOLUTION INTRAMUSCULAR; INTRAVENOUS at 12:46

## 2023-02-16 RX ADMIN — SODIUM CHLORIDE 25 ML: 9 INJECTION, SOLUTION INTRAVENOUS at 18:13

## 2023-02-16 RX ADMIN — MEROPENEM 500 MG: 1 INJECTION, POWDER, FOR SOLUTION INTRAVENOUS at 06:50

## 2023-02-16 RX ADMIN — SODIUM CHLORIDE: 9 INJECTION, SOLUTION INTRAVENOUS at 12:53

## 2023-02-16 RX ADMIN — Medication 10 ML: at 21:21

## 2023-02-16 RX ADMIN — LEUCINE, PHENYLALANINE, LYSINE, METHIONINE, ISOLEUCINE, VALINE, HISTIDINE, THREONINE, TRYPTOPHAN, ALANINE, GLYCINE, ARGININE, PROLINE, SERINE, TYROSINE, SODIUM ACETATE, DIBASIC POTASSIUM PHOSPHATE, MAGNESIUM CHLORIDE, SODIUM CHLORIDE, CALCIUM CHLORIDE, DEXTROSE
365; 280; 290; 200; 300; 290; 240; 210; 90; 1035; 515; 575; 340; 250; 20; 340; 261; 51; 59; 33; 20 INJECTION INTRAVENOUS at 18:21

## 2023-02-16 ASSESSMENT — ENCOUNTER SYMPTOMS
SORE THROAT: 0
EYE DISCHARGE: 0
BACK PAIN: 0
COUGH: 0
EYE REDNESS: 0
NAUSEA: 0
SHORTNESS OF BREATH: 0
CONSTIPATION: 0
ABDOMINAL PAIN: 0
DIARRHEA: 0
WHEEZING: 0
RHINORRHEA: 0
SINUS PAIN: 0
SINUS PRESSURE: 0

## 2023-02-16 ASSESSMENT — PAIN SCALES - GENERAL
PAINLEVEL_OUTOF10: 0

## 2023-02-16 NOTE — PROGRESS NOTES
[facility]) Physicians   General & Laparoscopic Surgery  [employer]       Pt seen and examined    Patient admitted with complex medical comorbidities. H/O of gastric bypass in [place] several years ago. Patient with chronic marginal ulcer and contained perforation now. Perc drain placed in IR yesterday with 10 mL of dark bloody drainage removed. 100 mL of output since drain placed. Overnight patient stable, still with some epigastric discomfort. There is no nausea and/or vomiting. There are no other complaints or questions. Vitals:    02/15/23 2315 02/16/23 0409 02/16/23 0710 02/16/23 0900   BP: 126/74 (!) 143/83  (!) 146/78   Pulse: (!) 103 (!) 105  (!) 116   Resp: 16 16 16 16   Temp: 97.8 °F (36.6 °C) 97.8 °F (36.6 °C)  97.6 °F (36.4 °C)   TempSrc: Oral Oral  Oral   SpO2: 93% 95% 95%    Weight:  221 lb 1.6 oz (100.3 kg)     Height:         Abdomen is soft, non-distended with tenderness only in epigastric region. Breath sounds are clear in upper lung field bilaterally. Decreased in basis. Bowel sounds are active in all quadrants. Perc drain present. Dressing CDI with small amount of dark bloody drainage in bulb.     Data  CBC with Differential:    Lab Results   Component Value Date/Time    WBC 22.2 02/16/2023 04:10 AM    RBC 3.04 02/16/2023 04:10 AM    HGB 8.3 02/16/2023 04:10 AM    HCT 26.8 02/16/2023 04:10 AM     02/16/2023 04:10 AM    MCV 88.3 02/16/2023 04:10 AM    MCH 27.3 02/16/2023 04:10 AM    MCHC 30.9 02/16/2023 04:10 AM    RDW 17.1 02/16/2023 04:10 AM    SEGSPCT 78.1 03/27/2013 01:25 PM    BANDSPCT 2 09/20/2022 04:14 AM    METASPCT 1 02/14/2023 04:32 AM    LYMPHOPCT 4.0 02/14/2023 04:32 AM    MONOPCT 2.0 02/14/2023 04:32 AM    BASOPCT 0.0 02/14/2023 04:32 AM    MONOSABS 0.6 02/14/2023 04:32 AM    LYMPHSABS 1.3 02/14/2023 04:32 AM    EOSABS 0.0 02/14/2023 04:32 AM    BASOSABS 0.0 02/14/2023 04:32 AM    DIFFTYPE Auto 03/27/2013 01:25 PM     BMP:    Lab Results Component Value Date/Time     02/16/2023 04:10 AM    K 3.8 02/16/2023 04:10 AM    K 4.4 02/07/2023 03:39 AM     02/16/2023 04:10 AM    CO2 34 02/16/2023 04:10 AM    BUN 39 02/16/2023 04:10 AM    LABALBU 3.2 02/07/2023 03:39 AM    CREATININE 1.2 02/16/2023 04:10 AM    CALCIUM 8.3 02/16/2023 04:10 AM    GFRAA 38 09/20/2022 04:14 AM    GFRAA 45 03/27/2013 01:36 PM    LABGLOM 47 02/16/2023 04:10 AM    GLUCOSE 151 02/16/2023 04:10 AM     Magnesium:    Lab Results   Component Value Date/Time    MG 1.90 02/16/2023 04:10 AM     Phosphorus:    Lab Results   Component Value Date/Time    PHOS 2.1 02/16/2023 04:10 AM     PT/INR:    Lab Results   Component Value Date/Time    PROTIME 17.0 02/16/2023 04:10 AM    INR 1.39 02/16/2023 04:10 AM     Current Inpatient Medications    Current Facility-Administered Medications: metoprolol (LOPRESSOR) injection 2.5 mg, 2.5 mg, IntraVENous, Q6H PRN  PN-Adult Premix 5/20 - Standard Electrolytes - Central Line, , IntraVENous, Continuous TPN  meropenem (MERREM) 500 mg in sodium chloride 0.9 % 100 mL IVPB (mini-bag), 500 mg, IntraVENous, q8h  [COMPLETED] anidulafungin (ERAXIS) 200 mg in sodium chloride 0.9 % 260 mL IVPB, 200 mg, IntraVENous, Once **AND** anidulafungin (ERAXIS) 100 mg in sodium chloride 0.9 % 130 mL IVPB, 100 mg, IntraVENous, Q24H  dextrose bolus 10% 125 mL, 125 mL, IntraVENous, PRN **OR** dextrose bolus 10% 250 mL, 250 mL, IntraVENous, PRN  glucagon (rDNA) injection 1 mg, 1 mg, SubCUTAneous, PRN  dextrose 10 % infusion, , IntraVENous, Continuous PRN  insulin lispro (HUMALOG) injection vial 0-4 Units, 0-4 Units, SubCUTAneous, Q6H  fat emulsion 20 % infusion 250 mL, 250 mL, IntraVENous, Once per day on Mon Thu  sodium chloride flush 0.9 % injection 5-40 mL, 5-40 mL, IntraVENous, 2 times per day  sodium chloride flush 0.9 % injection 5-40 mL, 5-40 mL, IntraVENous, PRN  0.9 % sodium chloride infusion, 25 mL, IntraVENous, PRN  pantoprazole (PROTONIX) 80 mg in sodium chloride 0.9 % 100 mL infusion, 8 mg/hr, IntraVENous, Continuous  furosemide (LASIX) injection 20 mg, 20 mg, IntraVENous, Daily  ipratropium-albuterol (DUONEB) nebulizer solution 1 ampule, 1 ampule, Inhalation, Q4H PRN  lidocaine PF 1 % injection 5 mL, 5 mL, IntraDERmal, Once  sodium chloride flush 0.9 % injection 5-40 mL, 5-40 mL, IntraVENous, 2 times per day  sodium chloride flush 0.9 % injection 5-40 mL, 5-40 mL, IntraVENous, PRN  0.9 % sodium chloride infusion, 25 mL, IntraVENous, PRN  [Held by provider] metoprolol tartrate (LOPRESSOR) tablet 75 mg, 75 mg, Oral, BID  [Held by provider] predniSONE (DELTASONE) tablet 40 mg, 40 mg, Oral, Daily  diphenhydrAMINE (BENADRYL) tablet 50 mg, 50 mg, Oral, Q6H PRN  ondansetron (ZOFRAN) injection 4 mg, 4 mg, IntraVENous, Q6H PRN  albuterol sulfate HFA (PROVENTIL;VENTOLIN;PROAIR) 108 (90 Base) MCG/ACT inhaler 2 puff, 2 puff, Inhalation, Q4H PRN  amiodarone (CORDARONE) tablet 200 mg, 200 mg, Oral, Daily  [Held by provider] apixaban (ELIQUIS) tablet 5 mg, 5 mg, Oral, BID  traMADol (ULTRAM) tablet 50 mg, 50 mg, Oral, Q6H PRN  sodium chloride flush 0.9 % injection 5-40 mL, 5-40 mL, IntraVENous, 2 times per day  sodium chloride flush 0.9 % injection 5-40 mL, 5-40 mL, IntraVENous, PRN  0.9 % sodium chloride infusion, , IntraVENous, PRN  ondansetron (ZOFRAN-ODT) disintegrating tablet 4 mg, 4 mg, Oral, Q8H PRN **OR** ondansetron (ZOFRAN) injection 4 mg, 4 mg, IntraVENous, Q6H PRN  polyethylene glycol (GLYCOLAX) packet 17 g, 17 g, Oral, Daily PRN  acetaminophen (TYLENOL) tablet 650 mg, 650 mg, Oral, Q6H PRN **OR** acetaminophen (TYLENOL) suppository 650 mg, 650 mg, Rectal, Q6H PRN    Patient Active Problem List    Diagnosis Date Noted    Left nephrolithiasis 02/14/2023     Priority: Medium    Diverticulosis 02/14/2023     Priority: Medium    History of cholecystectomy 02/14/2023     Priority: Medium    Intra-abdominal abscess ([address] 02/14/2023     Priority: Medium    Bandemia 02/14/2023     Priority: Medium    Atypical pneumonia 02/14/2023     Priority: Medium    Right upper lobe consolidation ([facility] 02/12/2023     Priority: Medium    Allergic drug reaction 02/12/2023     Priority: Medium    Elevated d-dimer 02/12/2023     Priority: Medium    Acute on chronic heart failure with preserved ejection fraction (HFpEF) ([facility] 02/08/2023     Priority: Medium    Bacterial pneumonia 02/07/2023     Priority: Medium    JUSTINA (acute kidney injury) ([facility] 02/07/2023     Priority: Medium    Streptococcal infection group G 12/22/2022     Priority: Medium    Cellulitis of right leg 12/21/2022     Priority: Medium    Obesity (BMI 30-39.9) 12/21/2022     Priority: Medium    Stage 3 chronic kidney disease ([address] 12/21/2022     Priority: Medium    Bacteremia 12/21/2022     Priority: Medium    Fever and chills 12/21/2022     Priority: Medium    History of ankle surgery 12/21/2022     Priority: Medium    Sepsis ([facility] 12/20/2022     Priority: Medium    Chronic obstructive pulmonary disease, unspecified 12/15/2022     Priority: Medium    Acute respiratory failure ([facility] 09/13/2022     Priority: Medium    CKD (chronic kidney disease) stage 4, GFR 15-29 ml/min ([facility] 08/29/2022     Priority: Medium    Acute on chronic diastolic heart failure ([facility] 08/29/2022     Priority: Medium    Stage 3b chronic kidney disease ([facility] 08/29/2022     Priority: Medium    B12 deficiency 08/29/2022     Priority: Medium    Vitamin D deficiency 08/29/2022     Priority: Medium    Vasovagal syncope 06/10/2022     Priority: Medium    Iron deficiency anemia, unspecified 04/22/2022     Priority: Medium    Age-related osteoporosis without current pathological fracture 05/09/2022     Dexa 5/5/22:  Osteoporosis by WHO criteria.     Fosamax sent to pharmacy      Iron deficiency anemia due to chronic blood loss 04/08/2022    ILD (interstitial lung disease) ([facility] 04/05/2022    PAF (paroxysmal atrial fibrillation) ([facility] 03/30/2022    Atypical atrial nitin ([facility]     Weight loss counseling, encounter for     Elevated sed rate     Neutrophilia     Acute kidney injury ([facility]     Elevated C-reactive protein (CRP)     GIANNA (obstructive sleep apnea)     Morbid obesity (HCC)     Severe episode of recurrent major depressive disorder, without psychotic features ([facility] 02/11/2018    Seasonal affective disorder ([facility] 02/11/2018    Class 2 severe obesity due to excess calories with serious comorbidity and body mass index (BMI) of 39.0 to 39.9 in adult [facility]) 02/11/2018    Atrial fibrillation ([facility] 01/21/2016    Primary hypertension 01/21/2016       A/P  Homer Quiroz is a 78 y.o. female with h/o gastric bypass with chronic marginal ulcers that have required intervention. Stable overall. Continue NPO. Continue TPN and will need to be discharged on TPN. Continue to monitor perc drainage. ID following for antibiotic management. Continue PPI. WBC improving, continue to monitor. Renal function improving, continue to monitor. Will continue to follow. Discussed with Dr. Vaishali Bolton and Nursing Staff.     Hernán Cross, NP-C

## 2023-02-16 NOTE — PROGRESS NOTES
[facility]    2/16/2023 9:33 AM        Name: Elizabeth Lindsey . Admitted: 2/7/2023  Primary Care Provider: MARILEE Morales CNP (Tel: [phone])                        Subjective:  . No acute events overnight. Resting well. Pain control. Diet ok. Labs reviewed  Denies any chest pain sob.      Reviewed interval ancillary notes    Current Medications  metoprolol (LOPRESSOR) injection 2.5 mg, Q6H PRN  PN-Adult Premix 5/20 - Standard Electrolytes - Central Line, Continuous TPN  meropenem (MERREM) 500 mg in sodium chloride 0.9 % 100 mL IVPB (mini-bag), q8h  anidulafungin (ERAXIS) 100 mg in sodium chloride 0.9 % 130 mL IVPB, Q24H  dextrose bolus 10% 125 mL, PRN   Or  dextrose bolus 10% 250 mL, PRN  glucagon (rDNA) injection 1 mg, PRN  dextrose 10 % infusion, Continuous PRN  insulin lispro (HUMALOG) injection vial 0-4 Units, Q6H  fat emulsion 20 % infusion 250 mL, Once per day on Mon Thu  sodium chloride flush 0.9 % injection 5-40 mL, 2 times per day  sodium chloride flush 0.9 % injection 5-40 mL, PRN  0.9 % sodium chloride infusion, PRN  pantoprazole (PROTONIX) 80 mg in sodium chloride 0.9 % 100 mL infusion, Continuous  furosemide (LASIX) injection 20 mg, Daily  ipratropium-albuterol (DUONEB) nebulizer solution 1 ampule, Q4H PRN  lidocaine PF 1 % injection 5 mL, Once  sodium chloride flush 0.9 % injection 5-40 mL, 2 times per day  sodium chloride flush 0.9 % injection 5-40 mL, PRN  0.9 % sodium chloride infusion, PRN  [Held by provider] metoprolol tartrate (LOPRESSOR) tablet 75 mg, BID  [Held by provider] predniSONE (DELTASONE) tablet 40 mg, Daily  diphenhydrAMINE (BENADRYL) tablet 50 mg, Q6H PRN  ondansetron (ZOFRAN) injection 4 mg, Q6H PRN  albuterol sulfate HFA (PROVENTIL;VENTOLIN;PROAIR) 108 (90 Base) MCG/ACT inhaler 2 puff, Q4H PRN  amiodarone (CORDARONE) tablet 200 mg, Daily  [Held by provider] apixaban (ELIQUIS) tablet 5 mg, BID  traMADol (ULTRAM) tablet 50 mg, Q6H PRN  sodium chloride flush 0.9 % injection 5-40 mL, 2 times per day  sodium chloride flush 0.9 % injection 5-40 mL, PRN  0.9 % sodium chloride infusion, PRN  ondansetron (ZOFRAN-ODT) disintegrating tablet 4 mg, Q8H PRN   Or  ondansetron (ZOFRAN) injection 4 mg, Q6H PRN  polyethylene glycol (GLYCOLAX) packet 17 g, Daily PRN  acetaminophen (TYLENOL) tablet 650 mg, Q6H PRN   Or  acetaminophen (TYLENOL) suppository 650 mg, Q6H PRN        Objective:  BP (!) 146/78   Pulse (!) 116   Temp 97.6 °F (36.4 °C) (Oral)   Resp 16   Ht 5' 7\" (1.702 m)   Wt 221 lb 1.6 oz (100.3 kg)   SpO2 95%   BMI 34.63 kg/m²     Intake/Output Summary (Last 24 hours) at 2/16/2023 0933  Last data filed at 2/16/2023 0645  Gross per 24 hour   Intake --   Output 1100 ml   Net -1100 ml      Wt Readings from Last 3 Encounters:   02/16/23 221 lb 1.6 oz (100.3 kg)   12/24/22 236 lb 14.4 oz (107.5 kg)   12/15/22 235 lb (106.6 kg)       General appearance:  Appears comfortable  Eyes: Sclera clear. Pupils equal.  ENT: Moist oral mucosa. Trachea midline, no adenopathy. Cardiovascular: Regular rhythm, normal S1, S2. No murmur. No edema in lower extremities  Respiratory: Not using accessory muscles. Good inspiratory effort. Clear to auscultation bilaterally, no wheeze or crackles. GI: Abdomen soft, no tenderness, not distended, normal bowel sounds  Musculoskeletal: No cyanosis in digits, neck supple  Neurology: CN 2-12 grossly intact. No speech or motor deficits  Psych: Normal affect.  Alert and oriented in time, place and person  Skin: Warm, dry, normal turgor    Labs and Tests:  CBC:   Recent Labs     02/13/23  1043 02/14/23  0050 02/14/23  0432 02/14/23  1424 02/15/23  0503 02/16/23  0410   WBC 21.9*  --  31.9*  --   --  22.2*   HGB 10.1*   < > 8.6* 8.6* 8.5* 8.3*     --  160  --   --  168    < > = values in this interval not displayed. BMP:    Recent Labs     02/14/23  0432 02/15/23  0500 02/16/23  0410    146* 143   K 4.6 4.1 3.8    107 104   CO2 33* 35* 34*   BUN 63* 54* 39*   CREATININE 1.5* 1.4* 1.2   GLUCOSE 86 72 151*     Hepatic: No results for input(s): AST, ALT, ALB, BILITOT, ALKPHOS in the last 72 hours. Discussed care with patient             Problem List  Principal Problem:    Bacterial pneumonia  Active Problems:    Acute on chronic diastolic heart failure (HCC)    Stage 3 chronic kidney disease (HCC)    JUSTINA (acute kidney injury) ([facility]    Acute on chronic heart failure with preserved ejection fraction (HFpEF) (HCC)    Right upper lobe consolidation (HCC)    Allergic drug reaction    Elevated d-dimer    Left nephrolithiasis    Diverticulosis    History of cholecystectomy    Intra-abdominal abscess ([facility]    Bandemia    Atypical pneumonia    Atrial fibrillation ([facility]    Primary hypertension    Class 2 severe obesity due to excess calories with serious comorbidity and body mass index (BMI) of 39.0 to 39.9 in [facility])    Acute kidney injury ([facility]    GIANNA (obstructive sleep apnea)  Resolved Problems:    * No resolved hospital problems. *       Assessment & Plan:   82-year-old female who was admitted for acute CHF exacerbation with underlying acute kidney injury. With hypoxia patient made significant improvement was doing better. Patient eval by nephrology and cardiology. Hospital course on the day of discharge was complicated by hematemesis. For which stat CT GI and surgery was consulted CT was concerning for absc abscess ulcer s between gastric remnant and rouex- en -y patient since then has had drain placed by IR. Acute hypoxic respiratory failure  -Improving. Likely multiple with underlying CHF sepsis and COPD  -Seems to be improving. Sepsis  -Likely secondary to abscess at sites   -intra-abdominal abscess, measuring 10 x 10 x 5 cm between the gastric remnant and Angel-en-Y limb. - S/P IR Guided drain placed on 2/15/ 2022  - on broad spectrum abx  - surgery following- no acute surgical intervention  - she is will be npo- on TPN for now  - further recs per surgery    Hematemesis  - resolved  - hold off on AC      Afib  - rate is cotrolled  - AC on hold for now since she had episode of hematemesis      Chronic CHF.    Per cardiology    DM  GIANNA      Diet: Diet NPO Exceptions are: Sips of Water with Meds  PN-Adult Premix 5/20 - Standard Electrolytes - Central Line  Code:Full Code  DVT PPX lovenox       Luis Hall MD   2/16/2023 9:33 AM

## 2023-02-16 NOTE — PROGRESS NOTES
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  Bilateral atypical pneumonia  Right upper lobe consolidation    Microbiology:        I have reviewed allavailable micro lab data and cultures    Blood culture (2/2) - collected on 2/7/2023: Negative so far      Antibiotics and immunizations:       Current antibiotics: All antibiotics and their doses were reviewed by me    Recent Abx Admin                     meropenem (MERREM) 500 mg in sodium chloride 0.9 % 100 mL IVPB (mini-bag) (mg) 500 mg New Bag 02/16/23 0650     500 mg New Bag 02/15/23 2012     500 mg New Bag  1542    anidulafungin (ERAXIS) 100 mg in sodium chloride 0.9 % 130 mL IVPB (mg) 100 mg New Bag 02/15/23 1813                      Immunization History: All immunization history was reviewed by me today.     Immunization History   Administered Date(s) Administered    COVID-19, PFIZER Bivalent BOOSTER, DO NOT Dilute, (age 12y+), IM, 30 mcg/0.3 mL 09/09/2022    COVID-19, PFIZER GRAY top, DO NOT Dilute, (age 15 y+), IM, 30 mcg/0.3 mL 05/20/2022    COVID-19, PFIZER PURPLE top, DILUTE for use, (age 15 y+), 30mcg/0.3mL 09/08/2021, 10/15/2021    Influenza Virus Vaccine 12/22/2012    Influenza, FLUAD, (age 72 y+), Adjuvanted, 0.5mL 10/27/2020, 10/15/2021, 09/09/2022    Influenza, High Dose (Fluzone 65 yrs and older) 09/30/2016, 10/01/2017, 10/08/2018    Influenza, Triv, inactivated, subunit, adjuvanted, IM (Fluad 65 yrs and older) 10/21/2019    Pneumococcal Conjugate 13-valent (Genny Bowers) 09/30/2016, 10/01/2017    Pneumococcal Polysaccharide ([id number]) 12/22/2012, 10/06/2017    Td vaccine (adult) 12/01/2008    Zoster Live (Zostavax) 09/15/2015       Known drug allergies: All allergies were reviewed and updated    Allergies   Allergen Reactions    Bee Venom Swelling and Angioedema    Shellfish-Derived Products Other (See Comments)     Syncope/seizures    Shrimp Flavor      Passes out      Cephalexin Itching    Codeine Hives and Other (See Comments)     B/P DROPS PASSES OUT  Passed out    Fentanyl And Related Hives     Other reaction(s): Dizziness    Hydromorphone Rash, Hives and Other (See Comments)     Full rash spreading across chest and both arms from IV hydromorphone  Passed out    Vancomycin Rash       Social history:     Social History:  All social andepidemiologic history was reviewed and updated by me today as needed. Tobacco use:   reports that she has never smoked. She has never used smokeless tobacco.  Alcohol use:   reports no history of alcohol use. Currently lives in: [place] [id number]   reports no history of drug use. COVID VACCINATION AND LAB RESULT RECORDS:     Internal Administration   First Dose COVID-19, PFIZER PURPLE top, DILUTE for use, (age 15 y+), 30mcg/0.3mL  09/08/2021   Second Dose COVID-19, PFIZER PURPLE top, DILUTE for use, (age 15 y+), 30mcg/0.3mL   10/15/2021       Last COVID Lab SARS-CoV-2 (no units)   Date Value   09/13/2022 Not Detected     SARS-CoV-2, BREANA (no units)   Date Value   10/01/2020 NOT DETECTED     SARS-CoV-2, NAAT (no units)   Date Value   12/20/2022 Not Detected            Assessment:     The patient is a 78 y.o. old female who  has a past medical history of Arthritis, Atrial fibrillation and flutter ([address] Hypertension, Kidney disease, Pneumonia, and Sleep apnea.  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 Discussion:      The patient is on IV meropenem and IV Eraxis. White cell count has shown some improvement and is 22,200 today. She underwent CT-guided drainage of abdominal abscess and a AURELIO drain placement yesterday    Abdominal abscess fluid culture from yesterday grew Candida albicans so far, which should be covered with Eraxis. Blood cultures from February 14 are staying negative so far. Serum creatinine is 1.2 today. Platelet count is 112,801. Plan:     Diagnostic Workup:    Follow-up on abdominal abscess fluid culture from 2/15/2023  Continue to follow  fever curve, WBC count and blood cultures. Continue to monitor blood counts, liver and renal function. Antimicrobials: Will continue IV meropenem 1 g every 8 hours  Will continue IV Eraxis 100 mg every 24 hours  Abdominal AURELIO drain care  We will follow up on the culture results and clinical progress and will make further recommendations accordingly. Continue close vitals monitoring. Maintain good glycemic control. Fall precautions. Aspiration precautions. Continue to watch for new fever or diarrhea. DVT prophylaxis. Discussed all above with patient and RN.       Drug Monitoring:    Continue monitoring for antibiotic toxicity as follows: CBC, CMP  Continue to watch for following: new or worsening fever, new hypotension, hives, lip swelling and redness or purulence at vascular access sites. I/v access Management:    Continue to monitor i.v access sites for erythema, induration, discharge or tenderness. As always, continue efforts to minimize tubes/lines/drains as clinically appropriate to reduce chances of line associated infections. Patient education and counseling: The patient was educated in detail about the side-effects of various antibiotics and things to watch for like new rashes, lip swelling, severe reaction, worsening diarrhea, break through fever etc.  Discussed patient's condition and what to expect. All of the patient's questions were addressed in a satisfactory manner and patient verbalized understanding all instructions. Level of complexity of visit and medical decision making: High     Risk of Complications/Morbidity: High     Illness(es)/ Infection present that pose threat to life/bodily function. There is potential for severe exacerbation of infection/side effects of treatment. Therapy requires intensive monitoring for antimicrobial agent toxicity. TIME SPENT TODAY:     - Spent over  38 minutes on visit (including interval history, physical exam, review of data including labs, cultures, imaging, development and implementation of treatment plan and coordination of complex care). More than 50 percent of this includes face-to-face time spent with the patient for counseling and coordination of care. Thank you for involving me in the care of your patient. I will continue to follow. If you have anyadditional questions, please do not hesitate to contact me. Subjective: Interval history: Interval history was obtained from chart review and patient/ RN. She is afebrile. He has been tolerating antibiotics okay.   No diarrhea     REVIEW OF SYSTEMS:      Review of Systems   Constitutional:  Negative for chills, diaphoresis and fever. HENT:  Negative for ear discharge, ear pain, postnasal drip, rhinorrhea, sinus pressure, sinus pain and sore throat. Eyes:  Negative for discharge and redness. Respiratory:  Negative for cough, shortness of breath and wheezing. Cardiovascular:  Negative for chest pain and leg swelling. Gastrointestinal:  Negative for abdominal pain, constipation, diarrhea and nausea. Endocrine: Negative for cold intolerance, heat intolerance and polydipsia. Genitourinary:  Negative for dysuria, flank pain, frequency, hematuria and urgency. Musculoskeletal:  Negative for back pain and myalgias. Skin:  Negative for rash. Allergic/Immunologic: Negative for immunocompromised state. Neurological:  Negative for dizziness, seizures and headaches. Hematological:  Does not bruise/bleed easily. Psychiatric/Behavioral:  Negative for agitation, hallucinations and suicidal ideas. The patient is not nervous/anxious. All other systems reviewed and are negative. Past Medical History: All past medical history reviewed today. Past Medical History:   Diagnosis Date    Arthritis     Atrial fibrillation and flutter ([facility] [address]     Hypertension     Kidney disease     Pt states  \"stage 3\"    Pneumonia     Sleep apnea     no c-pap       Past Surgical History: All past surgical history was reviewed today.     Past Surgical History:   Procedure Laterality Date    CARDIOVERSION      COLONOSCOPY N/A 11/20/2018    COLONOSCOPY POLYPECTOMY SNARE/COLD BIOPSY performed by Albert Mcduffie MD at [facility] N/A 03/31/2022    COLONOSCOPY POLYPECTOMY SNARE/COLD BIOPSY performed by Kerry Vivar MD at [address] [place] [address]      ankle rt has pins and rods, and rt elbow    GASTRIC BYPASS SURGERY      LARYNX SURGERY      TOTAL KNEE ARTHROPLASTY Bilateral 12/19/2012    bilateral knee replacements    TUBAL LIGATION      tubes tied    UPPER GASTROINTESTINAL ENDOSCOPY N/A 11/20/2018 EGD BIOPSY performed by Dulce Harrell MD at [facility] N/A 03/31/2022    EGD DILATION BALLOON performed by Evelina Viveros MD at [facility] 03/31/2022    EGD BIOPSY performed by Evelina Viveros MD at [address]       Family History: All family history was reviewed today. Problem Relation Age of Onset    Other Mother         blood clot    Cancer Father         stomach cancer    Stroke Brother        Objective:       PHYSICAL EXAM:      Vitals:   Vitals:    02/16/23 0409 02/16/23 0710 02/16/23 0900 02/16/23 1305   BP: (!) 143/83  (!) 146/78 127/79   Pulse: (!) 105  (!) 116 (!) 116   Resp: 16 16 16 16   Temp: 97.8 °F (36.6 °C)  97.6 °F (36.4 °C) 98.8 °F (37.1 °C)   TempSrc: Oral  Oral Axillary   SpO2: 95% 95%  95%   Weight: 221 lb 1.6 oz (100.3 kg)      Height:           Physical Exam  Vitals and nursing note reviewed. Constitutional:       Appearance: She is well-developed. She is not diaphoretic. Comments: The patient was seen earlier today. HENT:      Head: Normocephalic and atraumatic. Right Ear: External ear normal. There is no impacted cerumen. Left Ear: External ear normal. There is no impacted cerumen. Nose: Nose normal.      Mouth/Throat:      Mouth: Mucous membranes are moist.      Pharynx: Oropharynx is clear. No oropharyngeal exudate. Eyes:      General: No scleral icterus. Right eye: No discharge. Left eye: No discharge. Conjunctiva/sclera: Conjunctivae normal.      Pupils: Pupils are equal, round, and reactive to light. Neck:      Thyroid: No thyromegaly. Cardiovascular:      Rate and Rhythm: Normal rate and regular rhythm. Heart sounds: Normal heart sounds. No murmur heard. No friction rub. Pulmonary:      Effort: No respiratory distress. Breath sounds: No stridor. No wheezing or rales.    Abdominal:      General: Bowel sounds are normal. Palpations: Abdomen is soft. Tenderness: There is no abdominal tenderness. There is no guarding or rebound. Musculoskeletal:         General: No swelling, tenderness or deformity. Normal range of motion. Cervical back: Normal range of motion and neck supple. Right lower leg: No edema. Left lower leg: No edema. Lymphadenopathy:      Cervical: No cervical adenopathy. Skin:     General: Skin is warm and dry. Coloration: Skin is not jaundiced. Findings: No bruising, erythema or rash. Neurological:      General: No focal deficit present. Mental Status: She is alert and oriented to person, place, and time. Mental status is at baseline. Motor: No abnormal muscle tone. Psychiatric:         Mood and Affect: Mood normal.         Behavior: Behavior normal.     *    Lines and drains: All vascular access sites are healthy with no local erythema, discharge or tenderness. Intake and output:    I/O last 3 completed shifts:  In: -   Out: 2700 [Urine:2600; Drains:100]    Lab Data:   All available labs and old records have been reviewed by me.     CBC:  Recent Labs     02/14/23 0432 02/14/23  1424 02/15/23  0503 02/16/23 0410   WBC 31.9*  --   --  22.2*   RBC 3.13*  --   --  3.04*   HGB 8.6* 8.6* 8.5* 8.3*   HCT 27.8* 28.0* 27.9* 26.8*     --   --  168   MCV 88.6  --   --  88.3   MCH 27.3  --   --  27.3   MCHC 30.8*  --   --  30.9*   RDW 16.9*  --   --  17.1*          BMP:  Recent Labs     02/14/23  0432 02/15/23  0500 02/16/23  0410    146* 143   K 4.6 4.1 3.8    107 104   CO2 33* 35* 34*   BUN 63* 54* 39*   CREATININE 1.5* 1.4* 1.2   CALCIUM 8.5 9.0 8.3   GLUCOSE 86 72 151*          Hepatic Function Panel:   Lab Results   Component Value Date/Time    ALKPHOS 90 02/07/2023 03:39 AM    ALT <5 02/07/2023 03:39 AM    AST 10 02/07/2023 03:39 AM    PROT 7.0 02/07/2023 03:39 AM    BILITOT 1.4 02/07/2023 03:39 AM    BILIDIR 1.0 09/13/2022 05:56 PM    IBILI 0.9 09/13/2022 05:56 PM    LABALBU 3.2 02/07/2023 03:39 AM       CPK: No results found for: CKTOTAL  ESR:   Lab Results   Component Value Date    SEDRATE 34 (H) 02/14/2023     CRP:   Lab Results   Component Value Date    .4 (H) 02/14/2023           Imaging: All pertinent images and reports for the current visit were reviewed by me during this visit. I reviewed the chest x-ray/CT scan/MRI images and independently interpreted the findings and results today. CT ABSCESS DRAINAGE W CATH PLACEMENT S&I   Final Result   Successful CT-guided percutaneous drainage catheter placement into a   perigastric collection via a left anterolateral abdominal approach. Patient currently has a 14 Ukrainian locking pigtail drainage catheter within   the collection, placed to bulb suction. CT GUIDED NEEDLE PLACEMENT   Final Result   Successful CT-guided percutaneous drainage catheter placement into a   perigastric collection via a left anterolateral abdominal approach. Patient currently has a 14 Ukrainian locking pigtail drainage catheter within   the collection, placed to bulb suction. XR CHEST PORTABLE   Final Result   1. Right upper extremity PICC extends superiorly into the right internal   jugular vein; tip is excluded from the field of view. Recommend   repositioning. 2.  Bilateral patchy airspace disease, improved from prior. Discussed with Dr. Alin Caban by Dr. Candice Tolbert at 4:55 am on 2/15/2023         CT ABDOMEN WO CONTRAST Additional Contrast? Oral   Preliminary Result   1. 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  Findings compatible with a leak likely a marginal ulcer with   a developing 10 x 10 x 5 cm abscess centered between the Angel limb and   gastric remnant. Adjacent inflammatory change and left upper quadrant edema   has progressed from 02/07/2023. Trace amount of enteric contrast leak.   A   couple punctate foci of free air noted in the anterior abdomen. Findings were discussed with Dr. Aisha [place] at 5:45 pm on 2/13/2023. CT ABDOMEN WO CONTRAST Additional Contrast? None   Final Result   Postsurgical change from gastric bypass. There is fat stranding surrounding   the afferent loop. There is fluid and gas seen in the upper abdomen, near   the anastomotic site, that is not all definitively intraluminal, with   surrounding fat stranding, raising the question of perforation. Increased pleural-parenchymal disease at the left lung base      The findings were sent to the Radiology Results [address] at 1:17   pm on 2/13/2023 to be communicated to a licensed caregiver. CT CHEST ABDOMEN PELVIS WO CONTRAST Additional Contrast? None   Final Result   1. Scattered ground-glass opacities with interlobular septal thickening. Findings may be related to pulmonary edema with other considerations   including an inflammatory/infectious process in the appropriate clinical   setting. 2. Right upper lobe consolidation. Additional bilateral scattered curvilinear   opacities may be related to atelectasis versus developing consolidation. 3. Enlarged pulmonary artery diameter. Finding may be related to pulmonary   arterial hypertension. 4. Coronary artery calcifications present. 5. Moderate hiatal hernia. 6. Questionable circumferential thickening of the descending colon with mild   surrounding fat stranding versus colonic underdistention. Finding raises the   possibility of colitis in the appropriate clinical setting. 7. Colonic diverticulosis, predominately sigmoid. No evidence of acute   diverticulitis. 8. Nonobstructing punctate left nephrolithiasis. RECOMMENDATIONS:   2 cm left adrenal mass, consistent with lipid-rich benign adenoma. No   follow-up imaging is recommended. JACR 2017 Aug; 14(8):1038-44, JCAT 2016 Myrl Babinski; 40(2):194-200, Urol J 2006   Spring; 3(2):71-4.             XR CHEST PORTABLE   Final Result   1. Diffuse hazy multifocal opacity throughout both lungs, right worse than   left, likely a combination of moderate pulmonary edema and multifocal   pneumonia. 2. Stable cardiomegaly. 3. Stable hiatal hernia. Medications: All current and past medications were reviewed.      potassium phosphate IVPB  15 mmol IntraVENous Once    meropenem  500 mg IntraVENous q8h    anidulafungin  100 mg IntraVENous Q24H    insulin lispro  0-4 Units SubCUTAneous Q6H    fat emulsion  250 mL IntraVENous Once per day on Mon Thu    sodium chloride flush  5-40 mL IntraVENous 2 times per day    furosemide  20 mg IntraVENous Daily    lidocaine 1 % injection  5 mL IntraDERmal Once    sodium chloride flush  5-40 mL IntraVENous 2 times per day    [Held by provider] metoprolol tartrate  75 mg Oral BID    [Held by provider] predniSONE  40 mg Oral Daily    amiodarone  200 mg Oral Daily    [Held by provider] apixaban  5 mg Oral BID    sodium chloride flush  5-40 mL IntraVENous 2 times per day        PN-Adult Premix 5/20 - Standard Electrolytes - Central Line      PN-Adult Premix 5/20 - Standard Electrolytes - Central Line 60 mL/hr at 02/15/23 2011    dextrose      sodium chloride      pantoprozole (PROTONIX) infusion Stopped (02/16/23 0646)    sodium chloride Stopped (02/12/23 1137)    sodium chloride 100 mL/hr at 02/16/23 1253       metoprolol, dextrose bolus **OR** dextrose bolus, glucagon (rDNA), dextrose, sodium chloride flush, sodium chloride, ipratropium-albuterol, sodium chloride flush, sodium chloride, diphenhydrAMINE, ondansetron, albuterol sulfate HFA, traMADol, sodium chloride flush, sodium chloride, ondansetron **OR** ondansetron, polyethylene glycol, acetaminophen **OR** acetaminophen      Problem list:       Patient Active Problem List   Diagnosis Code    Atrial fibrillation (HCC) I48.91    Primary hypertension I10    Severe episode of recurrent major depressive disorder, without psychotic features ([facility] [address] F33.2 Seasonal affective disorder ([facility]) F33.8    Class 2 severe obesity due to excess calories with serious comorbidity and body mass index (BMI) of 39.0 to 39.9 in adult ([facility]) E66.01, Z68.39    Elevated sed rate R70.0    Neutrophilia D72.9    Acute kidney injury (HCC) N17.9    Elevated C-reactive protein (CRP) R79.82    GIANNA (obstructive sleep apnea) G47.33    Morbid obesity ([facility]) E66.01    Weight loss counseling, encounter for Z71.3    Atypical atrial flutter ([facility]) I48.4    PAF (paroxysmal atrial fibrillation) ([facility]) I48.0    ILD (interstitial lung disease) ([facility]) J84.9    Iron deficiency anemia due to chronic blood loss D50.0    Iron deficiency anemia, unspecified D50.9    Age-related osteoporosis without current pathological fracture M81.0    Vasovagal syncope R55    CKD (chronic kidney disease) stage 4, GFR 15-29 ml/min ([facility]) N18.4    Acute on chronic diastolic heart failure ([facility]) I50.33    Stage 3b chronic kidney disease ([facility]) N18.32    B12 deficiency E53.8    Vitamin D deficiency E55.9    Acute respiratory failure ([facility]) J96.00    Chronic obstructive pulmonary disease, unspecified J44.9    Sepsis ([facility]) A41.9    Cellulitis of right leg L03.115    Obesity (BMI 30-39. 9) E66.9    Stage 3 chronic kidney disease (HCC) N18.30    Bacteremia R78.81    Fever and chills R50.9    History of ankle surgery Z98.890    Streptococcal infection group G B95.4    Bacterial pneumonia J15.9    JUSTINA (acute kidney injury) ([facility] [address] N17.9    Acute on chronic heart failure with preserved ejection fraction (HFpEF) ([facility]) I50.33    Right upper lobe consolidation ([facility]) J18.1    Allergic drug reaction T78.40XA    Elevated d-dimer R79.89    Left nephrolithiasis N20.0    Diverticulosis K57.90    History of cholecystectomy Z90.49    Intra-abdominal abscess ([facility]) K65.1    Bandemia D72.825    Atypical pneumonia J18.9       Please note that this chart was generated using Dragon dictation software.  Although every effort was made to ensure the accuracy of this automated transcription, some errors in transcription may have occurred inadvertently. If you may need any clarification, please do not hesitate to contact me through EPIC or at the phone number provided below with my electronic signature. Any pictures or media included in this note were obtained after taking informed verbal consent from the patient and with their approval to include those in the patient's medical record. Harlin Litten, MD, MPH, FACP, FID  2/16/2023, 1:33 PM  [facility]   [address] [place], [place] [address] [facility] [address]  Office: [phone]  Fax: [phone]  In-person Clinic days:  Tuesday & Thursday a.m. Virtual clinic days: Monday, Wednesday & Friday a.m.

## 2023-02-16 NOTE — PROGRESS NOTES
Physical Therapy    [facility]   Phone: [phone]    Physical Therapy    [] Initial Evaluation            [x] Daily Treatment Note         [] Discharge Summary      Patient: Ana Claros   : 1945   MRN: [id number]   Date of Service:  2023  Admitting Diagnosis: Bacterial pneumonia  Current Admission Summary: Ana Claros is a 78 y.o. female   states she has had itching and hives with a rash for the past 24 hours past 3 hours she has had left upper abdominal pain sharp in nature constant which made her short of breath she denies any chest pain the pain is in the left upper quadrant she is currently taking cephalexin for the leg cellulitis  Past Medical History:  has a past medical history of Arthritis, Atrial fibrillation and flutter ([address] Hypertension, Kidney disease, Pneumonia, and Sleep apnea. Past Surgical History:  has a past surgical history that includes Tubal ligation; Total knee arthroplasty (Bilateral, 2012); fracture surgery; Colonoscopy (N/A, 2018); Upper gastrointestinal endoscopy (N/A, 2018); Cardioversion; Gastric bypass surgery; Larynx surgery; Upper gastrointestinal endoscopy (N/A, 2022); Colonoscopy (N/A, 2022); and Upper gastrointestinal endoscopy (N/A, 2022). Discharge Recommendations: Ana Claros scored a 12/24 on the AM-PAC short mobility form. Current research shows that an AM-PAC score of 17 or less is typically not associated with a discharge to the patient's home setting. Based on the patient's AM-PAC score and their current functional mobility deficits, it is recommended that the patient have 3-5 sessions per week of Physical Therapy at d/c to increase the patient's independence. Please see assessment section for further patient specific details. If patient discharges prior to next session this note will serve as a discharge summary.   Please see below for the latest assessment towards goals. DME Required For Discharge: DME to be determined at next level of care  Precautions/Restrictions: high fall risk  Weight Bearing Restrictions: no restrictions  [] Right Upper Extremity  [] Left Upper Extremity [] Right Lower Extremity  [] Left Lower Extremity     Required Braces/Orthotics: no braces required   [] Right  [] Left  Positional Restrictions:no positional restrictions    Pre-Admission Information   Lives With: daughter               Type of Home: house  Home Layout: one level, laundry in basement, pt does not need to go to basement  Home Access:  3 step to enter without rails   Bathroom Layout: walk in shower, handicap height toilet  Bathroom Equipment: shower chair  Home Equipment: rolling walker, single point cane, electric scooter, hospital bed, lift chair  Transfer Assistance: modified independent with use of lift chair,  Ambulation Assistance:modified independent with use of SPC, use of scooter in community  ADL Assistance: independent with all ADL's  IADL Assistance: independent with homemaking tasks, daughter completes laundry  Active : [] yes             [x]No daughter drives to appointments   Current Employment: retired. Occupation: bank teller  Hobbies:   Recent Falls: Pt reports 1 fall while at the hospital in previous visit, states her legs buckled when she went to stand at chair. Subjective  General: Pt supine in bed upon arrival. Agreeable to therapy treatment after significant encouragement. Pt reports it is \"too difficult to get out of the chair. \" She would rather stay in bed. Pain: 0/10  Pain Interventions: not applicable       Functional Mobility  Bed Mobility  Supine to Sit: stand by assistance  Sit to Supine: 2 person assistance with max x2   Scooting: stand by assistance  Comments: increased time with all bed mobility; trendelenburg for and dep x2 for scoot to [address].   Transfers  Sit to stand transfer: 2 person assistance with mod x2   Stand to sit transfer: minimal assistance  Comments: decreased eccentric control. VC for B hand and foot placement for Sit<>stand. Sit<>stands 2x with RW. Ambulation  Ambulation not tested on this date secondary to self limitation and decreased endurance. Distance:   Gait Mechanics:   Comments:    Stair Mobility  Stair mobility not completed on this date. Comments:  Wheelchair Mobility:  No w/c mobility completed on this date. Comments:  Balance  Static Sitting Balance: good: independent with functional balance in unsupported position  Dynamic Sitting Balance: good: independent with functional balance in unsupported position  Static Standing Balance: fair (-): maintains balance at CGA with use of UE support  Dynamic Standing Balance: poor (+): requires min (A) to maintain balance  Comments:  Sitting EOB SBA/Supervision during ADL's - see OT notes. Stood CGA at RW ~5 min + 1 min for jaki-care, brief change and ADL's. Other Therapeutic Interventions  Once returned to supine, brief and purewick in place for pt. Replaced sacral patch and dated 2-16-23. Functional Outcomes  AM-PAC Inpatient Mobility Raw Score : 12              Cognition  WFL  Initiation: requires cues for some  Comments: decreased safety awareness, self limiting  Orientation:    alert and oriented x 4  Command Following:   accurately follows one step commands    Education  Barriers To Learning: cognition and hearing  Patient Education: patient educated on goals, PT role and benefits, plan of care, general safety, functional mobility training, transfer training,    Learning Assessment:  patient verbalizes understanding, would benefit from continued reinforcement    Assessment  Activity Tolerance: Pt presents with fatigue limiting activity tolerance. On 2L O2, Spo2 ~91-92% throughout session with exception of one drop into low 80%'s. HR reached as high as 147 during standing and bed mobility requiring significant time to recover.   Impairments Requiring Therapeutic Intervention: decreased functional mobility, decreased strength, decreased safety awareness, decreased cognition, decreased endurance, decreased sensation, decreased balance, increased pain, decreased posture  Prognosis: good  Clinical Assessment: Pt with significant deficits in her mobility, requiring encouragement to participate in therapy, and activity tolerance is limited most likely due to baseline and NPO status. Transfers require increased assistance this date. Pt would benefit from continued skilled therapy to maximize her safety and ind with all mobility prior to d/c. Safety Interventions: patient left in bed, bed alarm in place, call light within reach, gait belt, patient at risk for falls, and nurse notified    Plan  Frequency: 3-5 x/per week  Current Treatment Recommendations: strengthening, balance training, functional mobility training, transfer training, gait training, stair training, endurance training, and safety education    Goals  Patient Goals: none stated    Short Term Goals:  Time Frame: upon discharge   Patient will complete bed mobility at Independent   Patient will complete transfers at supervision   Patient will ambulate 50 ft with use of LRAD at stand by assistance  Patient will ascend/descend 3 stairs with (B) handrail at contact guard assistance  No goals met  Therapy Session Time      Individual Group Co-treatment   Time In     0935   Time Out     1030   Minutes     55     Timed Code Treatment Minutes:  55 Minutes  Total Treatment Minutes:  55       Electronically Signed By: Clair Perez PTA   I have reviewed and agree with the above note.   [facility], [address] [place] [address], [id number]

## 2023-02-16 NOTE — PROGRESS NOTES
02/16/23 0700   RT Protocol   History Pulmonary Disease 2   Respiratory pattern 0   Breath sounds 0   Cough 0   Bronchodilator Assessment Score 2

## 2023-02-16 NOTE — PROGRESS NOTES
Nutrition Note    RECOMMENDATIONS  PO Diet: NPO  ONS: NPO  Nutrition Support:   Recommend to replace phos and increase Clinimix 5/20 to goal rate 83 mL/hr tonight. Recommend 250 mL 20% lipids 2 times per week beginning 2/20 if TG WNL. Recommend FSBS, monitor glucose, need for insulin  Pharmacy to adjust MVI and Trace Elements as needed     NUTRITION ASSESSMENT   Pt remains NPO d/t contained marginal ulcer perforation with TPN to meet nutrition needs. Clinimix 5/20 is infusing at 60 mL/hr; goal is 83 mL/hr. K+ and Mg+ are stable and WNL. Phos is low today at 2.1 (3.3 on 2/15). Recommend to replace phos today and increase TPN to goal rate 83 mL/hr tonight. Nutrition Related Findings: No edema noted. LBM 2/13. - 4.8 liters. Wounds: None  Nutrition Education:  Education not indicated   Nutrition Goals: Tolerate nutrition support at goal rate     MALNUTRITION ASSESSMENT   Malnutrition Status: No malnutrition    NUTRITION DIAGNOSIS   Inadequate oral intake related to altered GI structure as evidenced by NPO or clear liquid status due to medical condition, nutrition support - parenteral nutrition      CURRENT NUTRITION THERAPIES  Diet NPO Exceptions are: Sips of Water with Meds  PN-Adult Premix 5/20 - Standard Electrolytes - Central Line     PO Intake: NPO   PO Supplement Intake:NPO    Current Parenteral Nutrition Orders:  Type and Formula: Premix Central   Lipids: None  Duration: Continuous  Current PN Order Provides: Clinimix 5/20 at 60 mL/hr to provide 1440 mL total volume, 1267 calories, 72 grams protein, dextrose load 1.99 mg/kg/min. Goal PN Orders Provides: Clinimix 5/20 at 83 mL/hr to provide 2000 mL total volume, 1760 calories, 100 grams protein, dextrose load 2.77 mg/kg/min.     ANTHROPOMETRICS  Current Height: 5' 7\" (170.2 cm)  Current Weight: 221 lb 1.6 oz (100.3 kg)    Admission weight: 249 lb 9 oz (113.2 kg)  Ideal Body Weight (IBW): 135 lbs  (61 kg)        BMI: 34.6    COMPARATIVE STANDARDS  Energy (kcal):  3678-3630     Protein (g):   grams       Fluid (mL/day):  8120-4204 mL    The patient will be monitored per nutrition standards of care. Consult dietitian if additional nutrition interventions are needed prior to RD reassessment.      Heather Alves, [address] [place] [address],     Contact: [phone]

## 2023-02-16 NOTE — PROGRESS NOTES
Physical Therapy  Attempted physical therapy refused by patient due to feeling \"tired\" despite admitting to sleeping over night. Pt unwilling to perform seated or supine activity despite max education on benefits of therapy to increase endurance, strength, and strategies for mobility since having drain inserted. PT will re-attempt as schedule permits.    Mateus Kay, PTA

## 2023-02-16 NOTE — PROGRESS NOTES
Office : [phone]     Fax :[phone]       Nephrology progress  Note      Patient's Name: Maryuri Jimenez    2/16/2023          Chief Complaint:    Chief Complaint   Patient presents with    Abdominal Pain     Pt via EMS from home, c/o LUQ pain 10/10, has had gallbladder removed. Pt states pain is making her sob, 89% ora, put on 2L, pt received 325 mg aspirin, has been treated for cellulitis since December, new antibiotic last two weeks, states she has been getting hives and itching        History of Present Ilness:    Maryuri Jimenez is a 78 y.o. female who presented with complaints of shortness of breath. Patient apparently has been treated for cellulitis lower extremity on antibiotics patient started having worsening itching and hives. Came to the ER. Patient with increased shortness of breath. Also with leg swelling. No reported fevers chills. Patient was found to be hypoxic was placed on 2 L nasal cannula. Patient with history of chronic CHF COPD chronic kidney disease admitted with bilateral worse.     Baseline creat is 1.2   Now elevated at 2.0     BNP 87616      Interval hx       No SOB     Good UOP  Creat improved     Abdominal pain     I/O last 3 completed shifts:  In: -   Out: 2700 [Urine:2600; Drains:100]    Past Medical History:   Diagnosis Date    Arthritis     Atrial fibrillation and flutter ([address]     Hypertension     Kidney disease     Pt states  \"stage 3\"    Pneumonia     Sleep apnea     no c-pap       Past Surgical History:   Procedure Laterality Date    CARDIOVERSION      COLONOSCOPY N/A 11/20/2018    COLONOSCOPY POLYPECTOMY SNARE/COLD BIOPSY performed by Benita Mcghee MD at [address] [place] [address]    COLONOSCOPY N/A 03/31/2022    COLONOSCOPY POLYPECTOMY SNARE/COLD BIOPSY performed by Arden Cyr MD at [facility]      ankle rt has pins and rods, and rt elbow    GASTRIC BYPASS SURGERY      LARYNX SURGERY      TOTAL KNEE ARTHROPLASTY Bilateral 12/19/2012    bilateral knee replacements    TUBAL LIGATION      tubes tied    UPPER GASTROINTESTINAL ENDOSCOPY N/A 11/20/2018    EGD BIOPSY performed by Dina Tobias MD at [facility] 9W 03/31/2022    EGD DILATION BALLOON performed by Arden Cyr MD at [facility] ENDOSCOPY N/A 03/31/2022    EGD BIOPSY performed by Arden Cyr MD at [facility]       Family History   Problem Relation Age of Onset    Other Mother         blood clot    Cancer Father         stomach cancer    Stroke Brother          Current Medications:    metoprolol (LOPRESSOR) injection 2.5 mg, Q6H PRN  PN-Adult Premix 5/20 - Standard Electrolytes - Central Line, Continuous TPN  meropenem (MERREM) 500 mg in sodium chloride 0.9 % 100 mL IVPB (mini-bag), q8h  anidulafungin (ERAXIS) 100 mg in sodium chloride 0.9 % 130 mL IVPB, Q24H  dextrose bolus 10% 125 mL, PRN   Or  dextrose bolus 10% 250 mL, PRN  glucagon (rDNA) injection 1 mg, PRN  dextrose 10 % infusion, Continuous PRN  insulin lispro (HUMALOG) injection vial 0-4 Units, Q6H  fat emulsion 20 % infusion 250 mL, Once per day on Mon Thu  sodium chloride flush 0.9 % injection 5-40 mL, 2 times per day  sodium chloride flush 0.9 % injection 5-40 mL, PRN  0.9 % sodium chloride infusion, PRN  pantoprazole (PROTONIX) 80 mg in sodium chloride 0.9 % 100 mL infusion, Continuous  furosemide (LASIX) injection 20 mg, Daily  ipratropium-albuterol (DUONEB) nebulizer solution 1 ampule, Q4H PRN  lidocaine PF 1 % injection 5 mL, Once  sodium chloride flush 0.9 % injection 5-40 mL, 2 times per day  sodium chloride flush 0.9 % injection 5-40 mL, PRN  0.9 % sodium chloride infusion, PRN  [Held by provider] metoprolol tartrate (LOPRESSOR) tablet 75 mg, BID  [Held by provider] predniSONE (DELTASONE) tablet 40 mg, Daily  diphenhydrAMINE (BENADRYL) tablet 50 mg, Q6H PRN  ondansetron (ZOFRAN) injection 4 mg, Q6H PRN  albuterol sulfate HFA (PROVENTIL;VENTOLIN;PROAIR) 108 (90 Base) MCG/ACT inhaler 2 puff, Q4H PRN  amiodarone (CORDARONE) tablet 200 mg, Daily  [Held by provider] apixaban (ELIQUIS) tablet 5 mg, BID  traMADol (ULTRAM) tablet 50 mg, Q6H PRN  sodium chloride flush 0.9 % injection 5-40 mL, 2 times per day  sodium chloride flush 0.9 % injection 5-40 mL, PRN  0.9 % sodium chloride infusion, PRN  ondansetron (ZOFRAN-ODT) disintegrating tablet 4 mg, Q8H PRN   Or  ondansetron (ZOFRAN) injection 4 mg, Q6H PRN  polyethylene glycol (GLYCOLAX) packet 17 g, Daily PRN  acetaminophen (TYLENOL) tablet 650 mg, Q6H PRN   Or  acetaminophen (TYLENOL) suppository 650 mg, Q6H PRN        Physical exam:     Vitals:  BP (!) 146/78   Pulse (!) 116   Temp 97.6 °F (36.4 °C) (Oral)   Resp 16   Ht 5' 7\" (1.702 m)   Wt 221 lb 1.6 oz (100.3 kg)   SpO2 95%   BMI 34.63 kg/m²   Constitutional:  OAA X3 NAD  Skin: no rash, turgor wnl  Heent:  eomi, mmm  Neck: no bruits or jvd noted  Cardiovascular:  S1, S2 without m/r/g  Respiratory: CTA B without w/r/r  Abdomen:  +bs, soft, nt, nd  Ext: 1 +  lower extremity edema  Psychiatric: mood and affect appropriate  Musculoskeletal:  Rom, muscular strength intact    Labs:  CBC:   Recent Labs     02/13/23  1043 02/14/23  0050 02/14/23  0432 02/14/23  1424 02/15/23  0503 02/16/23  0410   WBC 21.9*  --  31.9*  --   --  22.2*   HGB 10.1*   < > 8.6* 8.6* 8.5* 8.3*     --  160  --   --  168    < > = values in this interval not displayed.      BMP:    Recent Labs     02/14/23  0432 02/15/23  0500 02/16/23  0410    146* 143   K 4.6 4.1 3.8    107 104   CO2 33* 35* 34*   BUN 63* 54* 39*   CREATININE 1.5* 1.4* 1.2   GLUCOSE 86 72 151*     Ca/Mg/Phos:   Recent Labs     02/14/23  0432 02/14/23  1424 02/15/23  0500 02/16/23  0410   CALCIUM 8.5  --  9.0 8.3   MG  --  1.80 1.90 1.90   PHOS  --  2.7 3.3 2.1*     Hepatic:   No results for input(s): AST, ALT, ALB, BILITOT, ALKPHOS in the last 72 hours. IMAGING:  CT ABSCESS DRAINAGE W CATH PLACEMENT S&I   Final Result   Successful CT-guided percutaneous drainage catheter placement into a   perigastric collection via a left anterolateral abdominal approach. Patient currently has a 14 Albanian locking pigtail drainage catheter within   the collection, placed to bulb suction. CT GUIDED NEEDLE PLACEMENT   Final Result   Successful CT-guided percutaneous drainage catheter placement into a   perigastric collection via a left anterolateral abdominal approach. Patient currently has a 14 [place]ian locking pigtail drainage catheter within   the collection, placed to bulb suction. XR CHEST PORTABLE   Final Result   1. Right upper extremity PICC extends superiorly into the right internal   jugular vein; tip is excluded from the field of view. Recommend   repositioning. 2.  Bilateral patchy airspace disease, improved from prior. Discussed with Dr. Hank Bateman by Dr. Juan Pablo Dennis at 4:55 am on 2/15/2023         CT ABDOMEN WO CONTRAST Additional Contrast? Oral   Preliminary Result   1. 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  Findings compatible with a leak likely a marginal ulcer with   a developing 10 x 10 x 5 cm abscess centered between the Angel limb and   gastric remnant. Adjacent inflammatory change and left upper quadrant edema   has progressed from 02/07/2023. Trace amount of enteric contrast leak. A   couple punctate foci of free air noted in the anterior abdomen. Findings were discussed with Dr. Celina Rodgers at 5:45 pm on 2/13/2023. CT ABDOMEN WO CONTRAST Additional Contrast? None   Final Result   Postsurgical change from gastric bypass. There is fat stranding surrounding   the afferent loop. There is fluid and gas seen in the upper abdomen, near   the anastomotic site, that is not all definitively intraluminal, with   surrounding fat stranding, raising the question of perforation. Increased pleural-parenchymal disease at the left lung base      The findings were sent to the Radiology Results [address] at 1:17   pm on 2/13/2023 to be communicated to a licensed caregiver. CT CHEST ABDOMEN PELVIS WO CONTRAST Additional Contrast? None   Final Result   1. Scattered ground-glass opacities with interlobular septal thickening. Findings may be related to pulmonary edema with other considerations   including an inflammatory/infectious process in the appropriate clinical   setting. 2. Right upper lobe consolidation. Additional bilateral scattered curvilinear   opacities may be related to atelectasis versus developing consolidation. 3. Enlarged pulmonary artery diameter. Finding may be related to pulmonary   arterial hypertension. 4. Coronary artery calcifications present. 5. Moderate hiatal hernia. 6. Questionable circumferential thickening of the descending colon with mild   surrounding fat stranding versus colonic underdistention. Finding raises the   possibility of colitis in the appropriate clinical setting. 7. Colonic diverticulosis, predominately sigmoid. No evidence of acute   diverticulitis. 8. Nonobstructing punctate left nephrolithiasis. RECOMMENDATIONS:   2 cm left adrenal mass, consistent with lipid-rich benign adenoma. No   follow-up imaging is recommended. JACR 2017 Aug; 14(8):1038-44, JCAT 2016 [place]; 40(2):194-200, Urol J 2006   [place]; 3(2):71-4. XR CHEST PORTABLE   Final Result   1. Diffuse hazy multifocal opacity throughout both lungs, right worse than   left, likely a combination of moderate pulmonary edema and multifocal   pneumonia. 2. Stable cardiomegaly. 3. Stable hiatal hernia. Assessment/Plan :      1. Panda   Creatinine  improved   Has mild edema   Low dose  lasix         Recommend to dose adjust all medications  based on renal functions  Maintain SBP> 90 mmHg   Daily weights   AVOID NSAIDs  Avoid Nephrotoxins  Monitor Intake/Output  Call if significant decrease in urine output        2. HTN. BP low borderline range   Hold clonidine if SBP < 110 mm HG     3. COPD    4. H/o atrial fib   Monitor       5. Hyperkalemia   Resolved after getting lokelma     6. Abdominal  pain .  CT scan shows there has been interval development of a poorly  marginated approximately 10 x 10 x 5 cm fluid collection with foci of gas and  small amounts of enteric contrast compatible with a developing abscess  Surgery on board    S/p IR drain  Started on TPN         D/w primary team      Thank you for allowing us to participate in care of Phoebe Neff         Electronically signed by: Malka Westfall MD, 2/16/2023, 9:30 AM      [facility]  [address]  Office : [phone]  Fax :[phone]

## 2023-02-16 NOTE — PROGRESS NOTES
[facility]   Phone: [phone]    Occupational Therapy    [] Initial Evaluation            [x] Daily Treatment Note         [] Discharge Summary      Patient: Edwyna Goldberg   : 1945   MRN: [id number]   Date of Service:  2023    Admitting Diagnosis:  Bacterial pneumonia  Current Admission Summary: Per H&P \"67 y.o. female   states she has had itching and hives with a rash for the past 24 hours past 3 hours she has had left upper abdominal pain sharp in nature constant which made her short of breath she denies any chest pain the pain is in the left upper quadrant she is currently taking cephalexin for the leg cellulitis\"  Past Medical History:  has a past medical history of Arthritis, Atrial fibrillation and flutter ([address] Hypertension, Kidney disease, Pneumonia, and Sleep apnea. Past Surgical History:  has a past surgical history that includes Tubal ligation; Total knee arthroplasty (Bilateral, 2012); fracture surgery; Colonoscopy (N/A, 2018); Upper gastrointestinal endoscopy (N/A, 2018); Cardioversion; Gastric bypass surgery; Larynx surgery; Upper gastrointestinal endoscopy (N/A, 2022); Colonoscopy (N/A, 2022); and Upper gastrointestinal endoscopy (N/A, 2022). Discharge Recommendations: Edwyna Goldberg scored a 14/24 on the AM-PAC ADL Inpatient form. Current research shows that an AM-PAC score of 17 or less is typically not associated with a discharge to the patient's home setting. Based on the patient's AM-PAC score and their current ADL deficits, it is recommended that the patient have 3-5 sessions per week of Occupational Therapy at d/c to increase the patient's independence. Please see assessment section for further patient specific details. If patient discharges prior to next session this note will serve as a discharge summary. Please see below for the latest assessment towards goals.       DME Required For Discharge: DME to be determined at next level of care    Precautions/Restrictions: high fall risk  Weight Bearing Restrictions: no restrictions  [] Right Upper Extremity  [] Left Upper Extremity [] Right Lower Extremity  [] Left Lower Extremity     Required Braces/Orthotics: no braces required   [] Right  [] Left  Positional Restrictions:no positional restrictions      Pre-Admission Information     Lives With: daughter    Type of Home: house  Home Layout: one level, laundry in basement, pt does not need to go to basement  Home Access:  3 step to enter without rails   Bathroom Layout: walk in shower, handicap height toilet  Bathroom Equipment: shower chair  Home Equipment: rolling walker, single point cane, electric scooter, hospital bed, lift chair  Transfer Assistance: modified independent with use of lift chair,  Ambulation Assistance:modified independent with use of SPC, use of scooter in community  ADL Assistance: independent with all ADL's  IADL Assistance: independent with homemaking tasks, daughter completes laundry  Active : [] yes  [x]No daughter drives to appointments   Current Employment: retired. Occupation:   Hobbies:   Recent Falls: Pt reports 1 fall while at the hospital in previous visit, states her legs buckled when she went to stand at chair. Subjective    General: Patient supine in bed upon arrival, agreeable to co-treatment with encouragement needed.   Patient on 1L O2 upon arrival.   Pain: not applicable  Pain Interventions: repositioned        Activities of Daily Living    Basic Activities of Daily Living  Upper Extremity Bathing: stand by assistance requires verbal cueing Increased time to complete task  Lower Extremity Bathing: maximum assistance requires verbal cueing Increased time to complete task   Bathing Equipment: none  Bathing Comments: SBA for washing face, chest, arms and maxA for washing legs, feet, buttocks, and jaki area, patient with minimal verbal cueing needed for task completion, initiation, and sequencing  Upper Extremity Dressing: contact guard assistance requires verbal cueing Increased time to complete task  Lower Extremity Dressing: maximum assistance requires verbal cueing Increased time to complete task  Dressing Equipment: none  Dressing Comments: CGA for donning/doffing gown, line management, maxA for donning/doffing brief and socks.  Patient with increased time needed for task completion, initiation, sequencing.  Patient with incontinence of urine in stance and increased time needed for thoroughness  Toileting: maximum assistance requires verbal cueing Increased time to complete task.    Toileting Comments: Patient with purwick in place upon arrival, patient with increased time needed for task completion due to incontinence of urine in stance, patient with maxA for clothing management, jaki care.    General Comments: Patient with increased time needed for all functional ADLs, patient with minimal verbal cueing needed for participation, sequencing, task completion, and initiation in all functional ADLs.      Instrumental Activities of Daily Living  No IADL completed on this date.    Functional Mobility    Bed Mobility  Supine to Sit: stand by assistance  Sit to Supine: 2 person assistance with maxA of 2   Scooting: stand by assistance  Comments: Patient with increased time needed for completion, patient with slowed movements and with use of side rails.  Patient with maximal encouragement needed for OOB activities and for sitting EOB.  Patient sat EOB ~30 minutes and with O2 increase needed to 2L O2.  Patient's HR ranged between 110s to 120s seated EOB. Patient's SPO2 was 88-91% following supine to sit.    Transfers  Sit to stand transfer:2 person assistance with modA of 2   Stand to sit transfer: minimal assistance  Comments: Patient with bed height elevated for functional transfer from bed, patient with slowed movements and maximal  encouragement and verbal cueing needed for initiation, participation, sequencing, task completion for increased safety with functional transfers. Patient with modA of 2 for both transfers from bed. Patient with incontinence of urine in stance and increased time needed for task completion and thoroughness. Patient with 1 seated rest break needed for ~5-6 minutes. Patient refused sitting up in recliner due to low height and difficulty getting up out of the chair. Patient with HR ranging between 120s-147 in stance following transfers and SPO2 ranging between low 80s-90s, patient recovered to 90% in ~ 30 seconds to 1 minute. Patient with new bandage applied to bottom in stance as well. Functional Mobility:  Sitting Balance: stand by assistance. Sitting Balance Comment: seated EOB   Standing Balance: minimal assistance. Standing Balance Comment: functional transfers/mobility, ADL completion, patient with ~2-3 steps from bed and back to bed. Patient refusing any further OOB activities due to fatigue, SOB, and self-limiting behaviors.  Patient stood ~5-6 minutes in total during functional transfers and ADL completion        Other Therapeutic Interventions    Functional Outcomes  AM-PAC Inpatient Daily Activity Raw Score: 14      Cognition  WFL  Safety Judgement: decreased awareness of need for assistance, decreased awareness of need for safety  Initiation: requires cues for some  Sequencing: requires cues for some  Comments: Patient with self limiting behaviors and decreased awareness of deficits   Orientation:    alert and oriented x 4  Command Following:   accurately follows one step commands     Education    Barriers To Learning: cognition  Patient Education: patient educated on goals, OT role and benefits, plan of care, ADL adaptive strategies, energy conservation, disease specific education, pressure relief, transfer training, discharge recommendations  Learning Assessment:  patient verbalizes understanding, would benefit from continued reinforcement    Assessment    Activity Tolerance: Patient tolerated treatment however limited by fatigue, SOB, elevated HR and self-limiting behaviors. patient with O2 increase from 1 to 2L SPO2 dropped from 94% to low 80s, and took ~30 seconds to 1 minute to recover to 90%. Patient with HR ranging between 110-147 throughout session   Impairments Requiring Therapeutic Intervention: decreased functional mobility, decreased ADL status, decreased strength, decreased safety awareness, decreased endurance, decreased balance, decreased IADL  Prognosis: Fair   Clinical Assessment: Pt is currently functioning below occupational baseline and demo the deficits listed above. Pt is currently requiring modA of 2 for transfers and max(A) for LB ADLs. Patient with self- limiting behaviors. Pt typically IND at baseline. Pt would benefit from continued skilled OT services to address these deficits and increase IND, safety, and ease with all occupational pursuits.    Safety Interventions: patient left in bed, bed alarm in place, call light within reach, gait belt, patient at risk for falls, and nurse notified       Plan    Frequency: 3-5 x/per week  Current Treatment Recommendations: strengthening, balance training, functional mobility training, transfer training, endurance training, patient/caregiver education, ADL/self-care training, home management training, and safety education    Goals    Patient Goals: pt did not state      Short Term Goals:  Time Frame: by d/c  Patient will complete upper body ADL at supervision   Patient will complete lower body ADL at minimal assistance   Patient will complete toileting at minimal assistance   Patient will complete functional transfers at stand by assistance   Patient will complete functional mobility at stand by assistance     No goals met this date - 2/16/23        Therapy Session Time     Individual Group Co-treatment   Time In   0935   Time Out   1030   Minutes   55        Timed Code Treatment Minutes:  55 minutes    Total Treatment Minutes:  55 minutes    Electronically Signed By: SEAN Austin/MANISHA [id number]

## 2023-02-16 NOTE — PROGRESS NOTES
[address]  HEART FAILURE  Progress Note      Admit Date 2/7/2023     Reason for Consult:      Reason for Consultation/Chief Complaint: SOB    HPI:    Ana Claros is a 78 y.o. female with PMH PAF, HFpEF, COPD, CKD admitted with SOB, AF/RVR and wt gain 12lb. She has diuresed 3L. Yesterday got drain for abd fluid 2/2 abcess. Subjective:  Patient is being seen for CHF. There were no acute overnight cardiac events. Today Ms. Ly is in therapy and c/o some SOB and abd pain but denies CP, palpitations or dizziness. Baseline Weight: 245 6months ago   Wt Readings from Last 3 Encounters:   02/16/23 221 lb 1.6 oz (100.3 kg)   12/24/22 236 lb 14.4 oz (107.5 kg)   12/15/22 235 lb (106.6 kg)         Cardiac Testing:   Echo 12/21/2022  Summary   Limited exam per Dr. Lasha Mahajan. Irregular rhythm. Normal left ventricle size, wall thickness, and systolic function with an   estimated ejection fraction of 55-60%. No obvious regional wall motion abnormalities are seen. The right ventricle is mildly enlarged with normal function. The right atrium is moderately dilated       NYHA Class III    Objective:   BP (!) 143/83   Pulse (!) 105   Temp 97.8 °F (36.6 °C) (Oral)   Resp 16   Ht 5' 7\" (1.702 m)   Wt 221 lb 1.6 oz (100.3 kg)   SpO2 95%   BMI 34.63 kg/m²     Intake/Output Summary (Last 24 hours) at 2/16/2023 0847  Last data filed at 2/16/2023 0645  Gross per 24 hour   Intake --   Output 1100 ml   Net -1100 ml      In: -   Out: 1100     TELEMETRY: Afib    Physical Exam:  General Appearance:  Non-obese/Well Nourished  Respiratory:  Resp Auscultation: Normal breath sounds without dullness  Cardiovascular:   Auscultation: irregular rate and rhythm, normal S1S2, no m/g/r/c  Palpation: Normal    Pedal Pulses: 2+ and equal   Abdomen:  Soft, NT, ND, + bs  Extremities:  No Cyanosis or Clubbing  Extremities: trace edema  Neurological/Psychiatric:  Oriented to time, place, and person  Non-anxious    Pertinent labs, diagnostic, device, and imaging results reviewed as a part of this visit    MEDICATIONS:   Scheduled Meds:   Scheduled Meds:   meropenem  500 mg IntraVENous q8h    anidulafungin  100 mg IntraVENous Q24H    insulin lispro  0-4 Units SubCUTAneous Q6H    fat emulsion  250 mL IntraVENous Once per day on Mon Thu    sodium chloride flush  5-40 mL IntraVENous 2 times per day    furosemide  20 mg IntraVENous Daily    lidocaine 1 % injection  5 mL IntraDERmal Once    sodium chloride flush  5-40 mL IntraVENous 2 times per day    [Held by provider] metoprolol tartrate  75 mg Oral BID    [Held by provider] predniSONE  40 mg Oral Daily    amiodarone  200 mg Oral Daily    [Held by provider] apixaban  5 mg Oral BID    sodium chloride flush  5-40 mL IntraVENous 2 times per day     Continuous Infusions:   PN-Adult Premix 5/20 - Standard Electrolytes - Central Line 60 mL/hr at 02/15/23 2011    dextrose      sodium chloride      pantoprozole (PROTONIX) infusion Stopped (02/16/23 0646)    sodium chloride Stopped (02/12/23 1137)    sodium chloride Stopped (02/11/23 1502)     PRN Meds:.metoprolol, dextrose bolus **OR** dextrose bolus, glucagon (rDNA), dextrose, sodium chloride flush, sodium chloride, ipratropium-albuterol, sodium chloride flush, sodium chloride, diphenhydrAMINE, ondansetron, albuterol sulfate HFA, traMADol, sodium chloride flush, sodium chloride, ondansetron **OR** ondansetron, polyethylene glycol, acetaminophen **OR** acetaminophen  Continuous Infusions:   PN-Adult Premix 5/20 - Standard Electrolytes - Central Line 60 mL/hr at 02/15/23 2011    dextrose      sodium chloride      pantoprozole (PROTONIX) infusion Stopped (02/16/23 0646)    sodium chloride Stopped (02/12/23 1137)    sodium chloride Stopped (02/11/23 1502)       Intake/Output Summary (Last 24 hours) at 2/16/2023 0810  Last data filed at 2/16/2023 0645  Gross per 24 hour   Intake --   Output 1100 ml   Net -1100 ml Lab Data:  CBC:   Lab Results   Component Value Date/Time    WBC 22.2 02/16/2023 04:10 AM    HGB 8.3 02/16/2023 04:10 AM     02/16/2023 04:10 AM     BMP:  Lab Results   Component Value Date/Time     02/16/2023 04:10 AM    K 3.8 02/16/2023 04:10 AM    K 4.4 02/07/2023 03:39 AM     02/16/2023 04:10 AM    CO2 34 02/16/2023 04:10 AM    BUN 39 02/16/2023 04:10 AM    CREATININE 1.2 02/16/2023 04:10 AM    GLUCOSE 151 02/16/2023 04:10 AM     INR:   Lab Results   Component Value Date/Time    INR 1.39 02/16/2023 04:10 AM    INR 1.61 02/15/2023 05:03 AM    INR 1.79 02/14/2023 02:24 PM        CARDIAC LABS  ENZYMES:No results for input(s): CKMB, CKMBINDEX, TROPONINI in the last 72 hours. Invalid input(s): CKTOTAL;3  FASTING LIPID PANEL:  Lab Results   Component Value Date/Time    HDL 51 03/15/2021 10:18 AM    LDLCALC 80 03/15/2021 10:18 AM    TRIG 56 03/15/2021 10:18 AM    TSH 2.81 03/30/2022 11:50 AM     LIVER PROFILE:  Lab Results   Component Value Date/Time    AST 10 02/07/2023 03:39 AM    AST 9 12/22/2022 06:01 AM    ALT <5 02/07/2023 03:39 AM    ALT <5 12/22/2022 06:01 AM     BNP:   Lab Results   Component Value Date/Time    PROBNP 4,668 02/13/2023 10:44 AM    PROBNP 13,923 02/09/2023 06:13 AM    PROBNP 10,289 02/07/2023 03:39 AM     Iron Studies:    Lab Results   Component Value Date/Time    FERRITIN 165.6 02/09/2023 06:10 AM    FERRITIN 182.5 09/15/2022 04:19 AM    FERRITIN 77.3 08/15/2022 03:51 PM     Lab Results   Component Value Date    IRON 14 (L) 02/09/2023    TIBC 224 (L) 02/09/2023    FERRITIN 165.6 (H) 02/09/2023      Iron Deficiency Anemia:  Yes IV Iron Therapy:  Yes  2017 ACC/AHA HF Guidelines:   intravenous iron replacement in patients with New York Heart Association (NYHA) class II and III HF and iron deficiency(ferritin <100 ng/ml or 100-300 ng/ml if transferrin saturation <20%), to improve functional status and QoL.       1. WEIGHT: Admit Weight: 235 lb (106.6 kg)      Today Weight: 221 lb 1.6 oz (100.3 kg)   2. I/O   Intake/Output Summary (Last 24 hours) at 2/16/2023 0847  Last data filed at 2/16/2023 0645  Gross per 24 hour   Intake --   Output 1100 ml   Net -1100 ml         Assessment/Plan:     Acute Heart Failure:  ~compensated, 3L out  ~ BNP 13k on admit, decreased to 4668  ~Plan: IV lasix daily until taking po    HTN:   ~controlled    Atrial Fib   ~HR elevated, use IV lopressor prn until taking po     Acute Respiratory Failure             ~ improved  ~O2 at 2L                           JUSTINA on CKD   ~Cr at baseline  ~Daily labs; trend creatinine  ~Nephrology consulted    Anemia/ Hematemesis  ~ anemia continued but stable  ~ getting IV abx and percutaneous drain.         Cardiology will sign off, please call with any questions/new concerns      I appreciate the opportunity of cooperating in the care of this individual.    Christianne Vásquez, APRN - CNP, ACNP, [address] 2/16/2023, 8:47 AM  Heart Failure  The [address], [address] [facility]  Ph: [phone]      Core Measures:   Discharge instructions:   LVEF documented:   ACEI for LV dysfunction:   Smoking Cessation:

## 2023-02-17 ENCOUNTER — TELEPHONE (OUTPATIENT)
Dept: BARIATRICS/WEIGHT MGMT | Age: 78
End: 2023-02-17

## 2023-02-17 PROBLEM — J18.9 MULTIFOCAL PNEUMONIA: Status: ACTIVE | Noted: 2023-02-17

## 2023-02-17 LAB
ANION GAP SERPL CALCULATED.3IONS-SCNC: 4 MMOL/L (ref 3–16)
BUN BLDV-MCNC: 35 MG/DL (ref 7–20)
CALCIUM SERPL-MCNC: 8.4 MG/DL (ref 8.3–10.6)
CHLORIDE BLD-SCNC: 99 MMOL/L (ref 99–110)
CO2: 37 MMOL/L (ref 21–32)
CREAT SERPL-MCNC: 1 MG/DL (ref 0.6–1.2)
GFR SERPL CREATININE-BSD FRML MDRD: 59 ML/MIN/{1.73_M2}
GLUCOSE BLD-MCNC: 105 MG/DL (ref 70–99)
GLUCOSE BLD-MCNC: 131 MG/DL (ref 70–99)
GLUCOSE BLD-MCNC: 133 MG/DL (ref 70–99)
GLUCOSE BLD-MCNC: 134 MG/DL (ref 70–99)
GLUCOSE BLD-MCNC: 140 MG/DL (ref 70–99)
GLUCOSE BLD-MCNC: 163 MG/DL (ref 70–99)
HCT VFR BLD CALC: 26.4 % (ref 36–48)
HEMOGLOBIN: 8.1 G/DL (ref 12–16)
MAGNESIUM: 1.8 MG/DL (ref 1.8–2.4)
MCH RBC QN AUTO: 27 PG (ref 26–34)
MCHC RBC AUTO-ENTMCNC: 30.8 G/DL (ref 31–36)
MCV RBC AUTO: 87.7 FL (ref 80–100)
PDW BLD-RTO: 16.7 % (ref 12.4–15.4)
PERFORMED ON: ABNORMAL
PHOSPHORUS: 2.3 MG/DL (ref 2.5–4.9)
PLATELET # BLD: 187 K/UL (ref 135–450)
PMV BLD AUTO: 9.9 FL (ref 5–10.5)
POTASSIUM SERPL-SCNC: 3.9 MMOL/L (ref 3.5–5.1)
RBC # BLD: 3.01 M/UL (ref 4–5.2)
SODIUM BLD-SCNC: 140 MMOL/L (ref 136–145)
WBC # BLD: 23 K/UL (ref 4–11)

## 2023-02-17 PROCEDURE — 2580000003 HC RX 258: Performed by: INTERNAL MEDICINE

## 2023-02-17 PROCEDURE — 97530 THERAPEUTIC ACTIVITIES: CPT

## 2023-02-17 PROCEDURE — 97535 SELF CARE MNGMENT TRAINING: CPT

## 2023-02-17 PROCEDURE — 6360000002 HC RX W HCPCS: Performed by: INTERNAL MEDICINE

## 2023-02-17 PROCEDURE — 2580000003 HC RX 258: Performed by: HOSPITALIST

## 2023-02-17 PROCEDURE — 2500000003 HC RX 250 WO HCPCS: Performed by: SURGERY

## 2023-02-17 PROCEDURE — 83735 ASSAY OF MAGNESIUM: CPT

## 2023-02-17 PROCEDURE — 36592 COLLECT BLOOD FROM PICC: CPT

## 2023-02-17 PROCEDURE — 99233 SBSQ HOSP IP/OBS HIGH 50: CPT | Performed by: INTERNAL MEDICINE

## 2023-02-17 PROCEDURE — 84100 ASSAY OF PHOSPHORUS: CPT

## 2023-02-17 PROCEDURE — 2500000003 HC RX 250 WO HCPCS: Performed by: INTERNAL MEDICINE

## 2023-02-17 PROCEDURE — 2580000003 HC RX 258: Performed by: SURGERY

## 2023-02-17 PROCEDURE — 80048 BASIC METABOLIC PNL TOTAL CA: CPT

## 2023-02-17 PROCEDURE — 85027 COMPLETE CBC AUTOMATED: CPT

## 2023-02-17 PROCEDURE — C9113 INJ PANTOPRAZOLE SODIUM, VIA: HCPCS | Performed by: INTERNAL MEDICINE

## 2023-02-17 PROCEDURE — 1200000000 HC SEMI PRIVATE

## 2023-02-17 PROCEDURE — 99233 SBSQ HOSP IP/OBS HIGH 50: CPT | Performed by: SURGERY

## 2023-02-17 PROCEDURE — 6360000002 HC RX W HCPCS: Performed by: NURSE PRACTITIONER

## 2023-02-17 RX ORDER — METOPROLOL TARTRATE 5 MG/5ML
2.5 INJECTION INTRAVENOUS EVERY 6 HOURS
Status: DISCONTINUED | OUTPATIENT
Start: 2023-02-17 | End: 2023-02-23 | Stop reason: HOSPADM

## 2023-02-17 RX ORDER — MORPHINE SULFATE 2 MG/ML
2 INJECTION, SOLUTION INTRAMUSCULAR; INTRAVENOUS EVERY 4 HOURS PRN
Status: COMPLETED | OUTPATIENT
Start: 2023-02-17 | End: 2023-02-19

## 2023-02-17 RX ORDER — PANTOPRAZOLE SODIUM 40 MG/10ML
40 INJECTION, POWDER, LYOPHILIZED, FOR SOLUTION INTRAVENOUS 2 TIMES DAILY
Status: DISCONTINUED | OUTPATIENT
Start: 2023-02-17 | End: 2023-02-23 | Stop reason: HOSPADM

## 2023-02-17 RX ORDER — FUROSEMIDE 10 MG/ML
20 INJECTION INTRAMUSCULAR; INTRAVENOUS DAILY
Status: DISCONTINUED | OUTPATIENT
Start: 2023-02-18 | End: 2023-02-23 | Stop reason: HOSPADM

## 2023-02-17 RX ADMIN — METOPROLOL TARTRATE 2.5 MG: 1 INJECTION, SOLUTION INTRAVENOUS at 22:45

## 2023-02-17 RX ADMIN — POTASSIUM PHOSPHATE, MONOBASIC AND POTASSIUM PHOSPHATE, DIBASIC 15 MMOL: 224; 236 INJECTION, SOLUTION, CONCENTRATE INTRAVENOUS at 12:45

## 2023-02-17 RX ADMIN — Medication 10 ML: at 21:00

## 2023-02-17 RX ADMIN — MEROPENEM 500 MG: 1 INJECTION, POWDER, FOR SOLUTION INTRAVENOUS at 18:48

## 2023-02-17 RX ADMIN — PANTOPRAZOLE SODIUM 40 MG: 40 INJECTION, POWDER, LYOPHILIZED, FOR SOLUTION INTRAVENOUS at 22:51

## 2023-02-17 RX ADMIN — SODIUM CHLORIDE 100 MG: 9 INJECTION, SOLUTION INTRAVENOUS at 22:41

## 2023-02-17 RX ADMIN — ASCORBIC ACID, VITAMIN A PALMITATE, CHOLECALCIFEROL, THIAMINE HYDROCHLORIDE, RIBOFLAVIN-5 PHOSPHATE SODIUM, PYRIDOXINE HYDROCHLORIDE, NIACINAMIDE, DEXPANTHENOL, ALPHA-TOCOPHEROL ACETATE, VITAMIN K1, FOLIC ACID, BIOTIN, CYANOCOBALAMIN: 200; 3300; 200; 6; 3.6; 6; 40; 15; 10; 150; 600; 60; 5 INJECTION, SOLUTION INTRAVENOUS at 18:50

## 2023-02-17 RX ADMIN — METOPROLOL TARTRATE 2.5 MG: 1 INJECTION, SOLUTION INTRAVENOUS at 18:44

## 2023-02-17 RX ADMIN — MORPHINE SULFATE 2 MG: 2 INJECTION, SOLUTION INTRAMUSCULAR; INTRAVENOUS at 22:48

## 2023-02-17 RX ADMIN — Medication 10 ML: at 08:34

## 2023-02-17 RX ADMIN — Medication 10 ML: at 12:30

## 2023-02-17 RX ADMIN — Medication 10 ML: at 09:00

## 2023-02-17 RX ADMIN — MEROPENEM 500 MG: 1 INJECTION, POWDER, FOR SOLUTION INTRAVENOUS at 05:16

## 2023-02-17 RX ADMIN — MORPHINE SULFATE 2 MG: 2 INJECTION, SOLUTION INTRAMUSCULAR; INTRAVENOUS at 01:25

## 2023-02-17 RX ADMIN — FUROSEMIDE 20 MG: 10 INJECTION, SOLUTION INTRAMUSCULAR; INTRAVENOUS at 08:33

## 2023-02-17 ASSESSMENT — ENCOUNTER SYMPTOMS
EYE DISCHARGE: 0
CONSTIPATION: 0
BACK PAIN: 0
SHORTNESS OF BREATH: 0
SORE THROAT: 0
SINUS PRESSURE: 0
SINUS PAIN: 0
WHEEZING: 0
COUGH: 0
RHINORRHEA: 0
EYE REDNESS: 0
ABDOMINAL PAIN: 0
DIARRHEA: 0
NAUSEA: 0

## 2023-02-17 ASSESSMENT — PAIN SCALES - GENERAL
PAINLEVEL_OUTOF10: 0
PAINLEVEL_OUTOF10: 4
PAINLEVEL_OUTOF10: 7
PAINLEVEL_OUTOF10: 0
PAINLEVEL_OUTOF10: 2
PAINLEVEL_OUTOF10: 4
PAINLEVEL_OUTOF10: 5

## 2023-02-17 ASSESSMENT — PAIN DESCRIPTION - LOCATION
LOCATION: ABDOMEN;BACK
LOCATION: ABDOMEN
LOCATION: ABDOMEN;BACK
LOCATION: BACK

## 2023-02-17 NOTE — TELEPHONE ENCOUNTER
Spoke with Dr. Shaan Leonard. Nursing communication order placed for patient to remain strict NPO, but can have mouth swabs. Medications IV. Modified order for NPO with mouth swabs.   Thank you,   LEONARD ShinC

## 2023-02-17 NOTE — PROGRESS NOTES
Office : [phone]     Fax :[phone]       Nephrology progress  Note      Patient's Name: Edwyna Goldberg    2/17/2023          Chief Complaint:    Chief Complaint   Patient presents with    Abdominal Pain     Pt via EMS from home, c/o LUQ pain 10/10, has had gallbladder removed. Pt states pain is making her sob, 89% ora, put on 2L, pt received 325 mg aspirin, has been treated for cellulitis since December, new antibiotic last two weeks, states she has been getting hives and itching        History of Present Ilness:    Edwyna Goldberg is a 78 y.o. female who presented with complaints of shortness of breath. Patient apparently has been treated for cellulitis lower extremity on antibiotics patient started having worsening itching and hives. Came to the ER. Patient with increased shortness of breath. Also with leg swelling. No reported fevers chills. Patient was found to be hypoxic was placed on 2 L nasal cannula. Patient with history of chronic CHF COPD chronic kidney disease admitted with bilateral worse. Baseline creat is 1.2   Now elevated at 2.0     BNP 98889      Interval hx       No SOB     Good UOP  Creat improved . Now at baseline     Abdominal pain in control    On TPN     I/O last 3 completed shifts:   In: 10.9 [I.V.:1.2]  Out: 1000 [Urine:1000]    Past Medical History:   Diagnosis Date    Arthritis     Atrial fibrillation and flutter ([address]     Hypertension     Kidney disease     Pt states  \"stage 3\"    Pneumonia     Sleep apnea     no c-pap       Past Surgical History:   Procedure Laterality Date    CARDIOVERSION      COLONOSCOPY N/A 11/20/2018    COLONOSCOPY POLYPECTOMY SNARE/COLD BIOPSY performed by Tracey Islas MD at [facility] N/A 03/31/2022 COLONOSCOPY POLYPECTOMY SNARE/COLD BIOPSY performed by Laisha Olivia MD at [facility]      ankle rt has pins and rods, and rt elbow    GASTRIC BYPASS SURGERY      LARYNX SURGERY      TOTAL KNEE ARTHROPLASTY Bilateral 12/19/2012    bilateral knee replacements    TUBAL LIGATION      tubes tied    UPPER GASTROINTESTINAL ENDOSCOPY N/A 11/20/2018    EGD BIOPSY performed by Flori Arias MD at [facility] 03/31/2022    EGD DILATION BALLOON performed by Laisha Olivia MD at [address] [place] [address] ENDOSCOPY N/A 03/31/2022    EGD BIOPSY performed by Laisha Olivia MD at [address] [place] [address]       Family History   Problem Relation Age of Onset    Other Mother         blood clot    Cancer Father         stomach cancer    Stroke Brother          Current Medications:    morphine (PF) injection 2 mg, Q4H PRN  PN-Adult Premix 5/20 - Standard Electrolytes - Central Line, Continuous TPN  metoprolol (LOPRESSOR) injection 2.5 mg, Q6H PRN  meropenem (MERREM) 500 mg in sodium chloride 0.9 % 100 mL IVPB (mini-bag), q8h  anidulafungin (ERAXIS) 100 mg in sodium chloride 0.9 % 130 mL IVPB, Q24H  dextrose bolus 10% 125 mL, PRN   Or  dextrose bolus 10% 250 mL, PRN  glucagon (rDNA) injection 1 mg, PRN  dextrose 10 % infusion, Continuous PRN  insulin lispro (HUMALOG) injection vial 0-4 Units, Q6H  fat emulsion 20 % infusion 250 mL, Once per day on Mon Thu  sodium chloride flush 0.9 % injection 5-40 mL, 2 times per day  sodium chloride flush 0.9 % injection 5-40 mL, PRN  0.9 % sodium chloride infusion, PRN  pantoprazole (PROTONIX) 80 mg in sodium chloride 0.9 % 100 mL infusion, Continuous  furosemide (LASIX) injection 20 mg, Daily  ipratropium-albuterol (DUONEB) nebulizer solution 1 ampule, Q4H PRN  lidocaine PF 1 % injection 5 mL, Once  sodium chloride flush 0.9 % injection 5-40 mL, 2 times per day  sodium chloride flush 0.9 % injection 5-40 mL, PRN  0.9 % sodium chloride infusion, PRN  [Held by provider] metoprolol tartrate (LOPRESSOR) tablet 75 mg, BID  [Held by provider] predniSONE (DELTASONE) tablet 40 mg, Daily  diphenhydrAMINE (BENADRYL) tablet 50 mg, Q6H PRN  ondansetron (ZOFRAN) injection 4 mg, Q6H PRN  albuterol sulfate HFA (PROVENTIL;VENTOLIN;PROAIR) 108 (90 Base) MCG/ACT inhaler 2 puff, Q4H PRN  amiodarone (CORDARONE) tablet 200 mg, Daily  [Held by provider] apixaban (ELIQUIS) tablet 5 mg, BID  traMADol (ULTRAM) tablet 50 mg, Q6H PRN  sodium chloride flush 0.9 % injection 5-40 mL, 2 times per day  sodium chloride flush 0.9 % injection 5-40 mL, PRN  0.9 % sodium chloride infusion, PRN  ondansetron (ZOFRAN-ODT) disintegrating tablet 4 mg, Q8H PRN   Or  ondansetron (ZOFRAN) injection 4 mg, Q6H PRN  polyethylene glycol (GLYCOLAX) packet 17 g, Daily PRN  acetaminophen (TYLENOL) tablet 650 mg, Q6H PRN   Or  acetaminophen (TYLENOL) suppository 650 mg, Q6H PRN        Physical exam:     Vitals:  /79   Pulse (!) 121   Temp 98.2 °F (36.8 °C) (Oral)   Resp 16   Ht 5' 7\" (1.702 m)   Wt 221 lb 3.2 oz (100.3 kg)   SpO2 96%   BMI 34.64 kg/m²   Constitutional:  OAA X3 NAD  Skin: no rash, turgor wnl  Heent:  eomi, mmm  Neck: no bruits or jvd noted  Cardiovascular:  S1, S2 without m/r/g  Respiratory: CTA B without w/r/r  Abdomen:  +bs, soft, nt, nd  Ext: 1 +  lower extremity edema  Psychiatric: mood and affect appropriate  Musculoskeletal:  Rom, muscular strength intact    Labs:  CBC:   Recent Labs     02/15/23  0503 02/16/23  0410 02/17/23  0706   WBC  --  22.2* 23.0*   HGB 8.5* 8.3* 8.1*   PLT  --  168 187     BMP:    Recent Labs     02/15/23  0500 02/16/23  0410 02/17/23  0706   * 143 140   K 4.1 3.8 3.9    104 99   CO2 35* 34* 37*   BUN 54* 39* 35*   CREATININE 1.4* 1.2 1.0   GLUCOSE 72 151* 105*     Ca/Mg/Phos:   Recent Labs     02/15/23  0500 02/16/23  0410 02/17/23  0706   CALCIUM 9.0 8.3 8.4   MG 1.90 1.90 1.80   PHOS 3.3 2.1* 2.3* Hepatic:   No results for input(s): AST, ALT, ALB, BILITOT, ALKPHOS in the last 72 hours. IMAGING:  CT ABSCESS DRAINAGE W CATH PLACEMENT S&I   Final Result   Successful CT-guided percutaneous drainage catheter placement into a   perigastric collection via a left anterolateral abdominal approach. Patient currently has a 14 Indonesian locking pigtail drainage catheter within   the collection, placed to bulb suction. CT GUIDED NEEDLE PLACEMENT   Final Result   Successful CT-guided percutaneous drainage catheter placement into a   perigastric collection via a left anterolateral abdominal approach. Patient currently has a 14 Indonesian locking pigtail drainage catheter within   the collection, placed to bulb suction. XR CHEST PORTABLE   Final Result   1. Right upper extremity PICC extends superiorly into the right internal   jugular vein; tip is excluded from the field of view. Recommend   repositioning. 2.  Bilateral patchy airspace disease, improved from prior. Discussed with Dr. Erin Rich by Dr. Meera Shirley at 4:55 am on 2/15/2023         CT ABDOMEN WO CONTRAST Additional Contrast? Oral   Final Result   1. 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  Findings compatible with a leak likely a marginal ulcer with   a developing 10 x 10 x 5 cm abscess centered between the Angel limb and   gastric remnant. Adjacent inflammatory change and left upper quadrant edema   has progressed from 02/07/2023. Trace amount of enteric contrast leak. A   couple punctate foci of free air noted in the anterior abdomen. Findings were discussed with Dr. Aisha Lauren at 5:45 pm on 2/13/2023. CT ABDOMEN WO CONTRAST Additional Contrast? None   Final Result   Postsurgical change from gastric bypass. There is fat stranding surrounding   the afferent loop.   There is fluid and gas seen in the upper abdomen, near   the anastomotic site, that is not all definitively intraluminal, with   surrounding fat stranding, raising the question of perforation. Increased pleural-parenchymal disease at the left lung base      The findings were sent to the Radiology Results [address] at 1:17   pm on 2/13/2023 to be communicated to a licensed caregiver. CT CHEST ABDOMEN PELVIS WO CONTRAST Additional Contrast? None   Final Result   1. Scattered ground-glass opacities with interlobular septal thickening. Findings may be related to pulmonary edema with other considerations   including an inflammatory/infectious process in the appropriate clinical   setting. 2. Right upper lobe consolidation. Additional bilateral scattered curvilinear   opacities may be related to atelectasis versus developing consolidation. 3. Enlarged pulmonary artery diameter. Finding may be related to pulmonary   arterial hypertension. 4. Coronary artery calcifications present. 5. Moderate hiatal hernia. 6. Questionable circumferential thickening of the descending colon with mild   surrounding fat stranding versus colonic underdistention. Finding raises the   possibility of colitis in the appropriate clinical setting. 7. Colonic diverticulosis, predominately sigmoid. No evidence of acute   diverticulitis. 8. Nonobstructing punctate left nephrolithiasis. RECOMMENDATIONS:   2 cm left adrenal mass, consistent with lipid-rich benign adenoma. No   follow-up imaging is recommended. JACR 2017 Aug; 14(8):1038-44, JCAT 2016 [facility]; 40(2):194-200, Urol J 2006   [place]; 3(2):71-4. XR CHEST PORTABLE   Final Result   1. Diffuse hazy multifocal opacity throughout both lungs, right worse than   left, likely a combination of moderate pulmonary edema and multifocal   pneumonia. 2. Stable cardiomegaly. 3. Stable hiatal hernia. Assessment/Plan :      1. Panda   Creatinine  improved   Has mild edema   Low dose  lasix         2. HTN.    BP low borderline range   Hold clonidine if SBP < 110 mm HG     3. COPD    4. H/o atrial fib   Monitor       5. Hyperkalemia   Resolved after getting lokelma     6. Abdominal  pain .  CT scan shows there has been interval development of a poorly  marginated approximately 10 x 10 x 5 cm fluid collection with foci of gas and  small amounts of enteric contrast compatible with a developing abscess  Surgery on board    S/p IR drain  Started on TPN         D/w primary team      Thank you for allowing us to participate in care of Cindy Pitts         Electronically signed by: Maddy Bashir MD, 2/17/2023, 9:48 AM      [facility] [address]  Office : [phone]  Fax :[phone]

## 2023-02-17 NOTE — PROGRESS NOTES
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  Bilateral atypical pneumonia  Right upper lobe consolidation    Microbiology:        I have reviewed allavailable micro lab data and cultures    Blood culture (2/2) - collected on 2/7/2023: Negative so far      Antibiotics and immunizations:       Current antibiotics: All antibiotics and their doses were reviewed by me    Recent Abx Admin                     meropenem (MERREM) 500 mg in sodium chloride 0.9 % 100 mL IVPB (mini-bag) (mg) 500 mg New Bag 02/17/23 0516     500 mg New Bag 02/16/23 2120    anidulafungin (ERAXIS) 100 mg in sodium chloride 0.9 % 130 mL IVPB (mg) 100 mg New Bag 02/16/23 1814                      Immunization History: All immunization history was reviewed by me today.     Immunization History   Administered Date(s) Administered    COVID-19, PFIZER Bivalent BOOSTER, DO NOT Dilute, (age 12y+), IM, 30 mcg/0.3 mL 09/09/2022    COVID-19, PFIZER GRAY top, DO NOT Dilute, (age 15 y+), IM, 30 mcg/0.3 mL 05/20/2022    COVID-19, PFIZER PURPLE top, DILUTE for use, (age 15 y+), 30mcg/0.3mL 09/08/2021, 10/15/2021    Influenza Virus Vaccine 12/22/2012    Influenza, FLUAD, (age 72 y+), Adjuvanted, 0.5mL 10/27/2020, 10/15/2021, 09/09/2022    Influenza, High Dose (Fluzone 65 yrs and older) 09/30/2016, 10/01/2017, 10/08/2018    Influenza, Triv, inactivated, subunit, adjuvanted, IM (Fluad 65 yrs and older) 10/21/2019    Pneumococcal Conjugate 13-valent (Collie Wilbur) 09/30/2016, 10/01/2017    Pneumococcal Polysaccharide ([id number]) 12/22/2012, 10/06/2017    Td vaccine (adult) 12/01/2008    Zoster Live (Zostavax) 09/15/2015       Known drug allergies: All allergies were reviewed and updated    Allergies   Allergen Reactions    Bee Venom Swelling and Angioedema    Shellfish-Derived Products Other (See Comments)     Syncope/seizures    Shrimp Flavor      Passes out      Cephalexin Itching    Codeine Hives and Other (See Comments)     B/P DROPS PASSES OUT  Passed out    Fentanyl And Related Hives     Other reaction(s): Dizziness    Hydromorphone Rash, Hives and Other (See Comments)     Full rash spreading across chest and both arms from IV hydromorphone  Passed out    Vancomycin Rash       Social history:     Social History:  All social andepidemiologic history was reviewed and updated by me today as needed. Tobacco use:   reports that she has never smoked. She has never used smokeless tobacco.  Alcohol use:   reports no history of alcohol use. Currently lives in: [address] [place] [address]   reports no history of drug use. COVID VACCINATION AND LAB RESULT RECORDS:     Internal Administration   First Dose COVID-19, PFIZER PURPLE top, DILUTE for use, (age 15 y+), 30mcg/0.3mL  09/08/2021   Second Dose COVID-19, PFIZER PURPLE top, DILUTE for use, (age 15 y+), 30mcg/0.3mL   10/15/2021       Last COVID Lab SARS-CoV-2 (no units)   Date Value   09/13/2022 Not Detected     SARS-CoV-2, BREANA (no units)   Date Value   10/01/2020 NOT DETECTED     SARS-CoV-2, NAAT (no units)   Date Value   12/20/2022 Not Detected            Assessment:     The patient is a 78 y.o. old female who  has a past medical history of Arthritis, Atrial fibrillation and flutter ([facility] [address] Hypertension, Kidney disease, Pneumonia, and Sleep apnea.  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 Discussion:      The patient is afebrile. She is on IV meropenem and IV Eraxis. White cell count is 23,000. Blood cultures from February 15 remain negative. Serum creatinine is 1.0. Platelet count is 605,680. Abdominal abscess culture so far growing Candida albicans, which is covered with Eraxis      Plan:     Diagnostic Workup:    Follow-up on abdominal abscess fluid culture  Continue to follow  fever curve, WBC count and blood cultures. Continue to monitor blood counts, liver and renal function. Antimicrobials: Will continue IV meropenem 1 g every 8 hours  Continue IV Eraxis 100 mg daily  Slow improvement is expected. General surgery note reviewed. Patient remains on TPN  Abdominal AURELIO drain care  The patient will likely need IV antibiotics at discharge  We will follow up on the culture results and clinical progress and will make further recommendations accordingly. Continue close vitals monitoring. Maintain good glycemic control. Fall precautions. Aspiration precautions. Continue to watch for new fever or diarrhea. DVT prophylaxis. Discussed all above with patient and RN.       Drug Monitoring:    Continue monitoring for antibiotic toxicity as follows: CBC, CMP   Continue to watch for following: new or worsening fever, new hypotension, hives, lip swelling and redness or purulence at vascular access sites. I/v access Management:    Continue to monitor i.v access sites for erythema, induration, discharge or tenderness. As always, continue efforts to minimize tubes/lines/drains as clinically appropriate to reduce chances of line associated infections. Patient education and counseling: The patient was educated in detail about the side-effects of various antibiotics and things to watch for like new rashes, lip swelling, severe reaction, worsening diarrhea, break through fever etc.  Discussed patient's condition and what to expect. All of the patient's questions were addressed in a satisfactory manner and patient verbalized understanding all instructions. Level of complexity of visit and medical decision making: High     Risk of Complications/Morbidity: High     Illness(es)/ Infection present that pose threat to bodily function. There is potential for severe exacerbation of infection/side effects of treatment. Therapy requires intensive monitoring for antimicrobial agent toxicity. TIME SPENT TODAY:     - Spent over  37 minutes on visit (including interval history, physical exam, review of data including labs, cultures, imaging, development and implementation of treatment plan and coordination of complex care). More than 50 percent of this includes face-to-face time spent with the patient for counseling and coordination of care. Thank you for involving me in the care of your patient. I will continue to follow. If you have anyadditional questions, please do not hesitate to contact me. Subjective: Interval history: Interval history was obtained from chart review and patient/ RN. The patient is afebrile. She remains on broad-spectrum antibiotics. She remains on TPN and is tolerating it okay.     REVIEW OF SYSTEMS:      Review of Systems   Constitutional:  Positive for fatigue. Negative for chills, diaphoresis and fever. HENT:  Negative for ear discharge, ear pain, postnasal drip, rhinorrhea, sinus pressure, sinus pain and sore throat. Eyes:  Negative for discharge and redness. Respiratory:  Negative for cough, shortness of breath and wheezing. Cardiovascular:  Negative for chest pain and leg swelling. Gastrointestinal:  Negative for abdominal pain, constipation, diarrhea and nausea. Endocrine: Negative for cold intolerance, heat intolerance and polydipsia. Genitourinary:  Negative for dysuria, flank pain, frequency, hematuria and urgency. Musculoskeletal:  Negative for back pain and myalgias. Skin:  Negative for rash. Allergic/Immunologic: Negative for immunocompromised state. Neurological:  Negative for dizziness, seizures and headaches. Hematological:  Does not bruise/bleed easily. Psychiatric/Behavioral:  Negative for agitation, hallucinations and suicidal ideas. The patient is not nervous/anxious. All other systems reviewed and are negative. Past Medical History: All past medical history reviewed today. Past Medical History:   Diagnosis Date    Arthritis     Atrial fibrillation and flutter ([facility] [address]     Hypertension     Kidney disease     Pt states  \"stage 3\"    Pneumonia     Sleep apnea     no c-pap       Past Surgical History: All past surgical history was reviewed today.     Past Surgical History:   Procedure Laterality Date    CARDIOVERSION      COLONOSCOPY N/A 11/20/2018    COLONOSCOPY POLYPECTOMY SNARE/COLD BIOPSY performed by Nahid Morton MD at [address] [facility] ENDOSCOPY    COLONOSCOPY N/A 03/31/2022    COLONOSCOPY POLYPECTOMY SNARE/COLD BIOPSY performed by José Collado MD at [address] [place] [address]      ankle rt has pins and rods, and rt elbow    GASTRIC BYPASS SURGERY      LARYNX SURGERY      TOTAL KNEE ARTHROPLASTY Bilateral 12/19/2012    bilateral knee replacements    TUBAL LIGATION      tubes tied    UPPER GASTROINTESTINAL ENDOSCOPY N/A 11/20/2018    EGD BIOPSY performed by Nahid Morton MD at [address] N/A 03/31/2022    EGD DILATION BALLOON performed by José Collado MD at [address] N/A 03/31/2022    EGD BIOPSY performed by José Collado MD at [address]       Family History: All family history was reviewed today. Problem Relation Age of Onset    Other Mother         blood clot    Cancer Father         stomach cancer    Stroke Brother        Objective:       PHYSICAL EXAM:      Vitals:   Vitals:    02/17/23 0048 02/17/23 0455 02/17/23 0515 02/17/23 0832   BP: 132/84  128/84 124/79   Pulse: (!) 115  (!) 120 (!) 121   Resp: 16  16 16   Temp: 97.6 °F (36.4 °C)  97.8 °F (36.6 °C) 98.2 °F (36.8 °C)   TempSrc: Oral  Oral Oral   SpO2: 94%   96%   Weight:  221 lb 3.2 oz (100.3 kg)     Height:           Physical Exam  Vitals and nursing note reviewed. Constitutional:       Appearance: She is well-developed. She is not diaphoretic. Comments: The patient was seen earlier today. HENT:      Head: Normocephalic and atraumatic. Right Ear: External ear normal. There is no impacted cerumen. Left Ear: External ear normal. There is no impacted cerumen. Nose: Nose normal.      Mouth/Throat:      Mouth: Mucous membranes are moist.      Pharynx: Oropharynx is clear. No oropharyngeal exudate. Eyes:      General: No scleral icterus. Right eye: No discharge. Left eye: No discharge. Conjunctiva/sclera: Conjunctivae normal.      Pupils: Pupils are equal, round, and reactive to light. Neck:      Thyroid: No thyromegaly. Cardiovascular:      Rate and Rhythm: Normal rate and regular rhythm. Heart sounds: Normal heart sounds. No murmur heard. No friction rub. Pulmonary:      Effort: No respiratory distress. Breath sounds: No stridor. No wheezing or rales.    Abdominal:      General: Bowel sounds are normal.      Palpations: Abdomen is soft. Tenderness: There is no abdominal tenderness. There is no guarding or rebound. Musculoskeletal:         General: No swelling, tenderness or deformity. Normal range of motion. Cervical back: Normal range of motion and neck supple. Right lower leg: No edema. Left lower leg: No edema. Lymphadenopathy:      Cervical: No cervical adenopathy. Skin:     General: Skin is warm and dry. Coloration: Skin is not jaundiced. Findings: No bruising, erythema or rash. Neurological:      General: No focal deficit present. Mental Status: She is alert and oriented to person, place, and time. Mental status is at baseline. Motor: No abnormal muscle tone. Psychiatric:         Mood and Affect: Mood normal.         Behavior: Behavior normal.       *    Lines and drains: All vascular access sites are healthy with no local erythema, discharge or tenderness. Intake and output:    I/O last 3 completed shifts: In: 10.9 [I.V.:1.2]  Out: 1000 [Urine:1000]    Lab Data:   All available labs and old records have been reviewed by me.     CBC:  Recent Labs     02/15/23  0503 02/16/23  0410 02/17/23  0706   WBC  --  22.2* 23.0*   RBC  --  3.04* 3.01*   HGB 8.5* 8.3* 8.1*   HCT 27.9* 26.8* 26.4*   PLT  --  168 187   MCV  --  88.3 87.7   MCH  --  27.3 27.0   MCHC  --  30.9* 30.8*   RDW  --  17.1* 16.7*          BMP:  Recent Labs     02/15/23  0500 02/16/23 0410 02/17/23  0706   * 143 140   K 4.1 3.8 3.9    104 99   CO2 35* 34* 37*   BUN 54* 39* 35*   CREATININE 1.4* 1.2 1.0   CALCIUM 9.0 8.3 8.4   GLUCOSE 72 151* 105*          Hepatic Function Panel:   Lab Results   Component Value Date/Time    ALKPHOS 90 02/07/2023 03:39 AM    ALT <5 02/07/2023 03:39 AM    AST 10 02/07/2023 03:39 AM    PROT 7.0 02/07/2023 03:39 AM    BILITOT 1.4 02/07/2023 03:39 AM    BILIDIR 1.0 09/13/2022 05:56 PM    IBILI 0.9 09/13/2022 05:56 PM    LABALBU 3.2 02/07/2023 03:39 AM       CPK: No results found for: CKTOTAL  ESR:   Lab Results   Component Value Date    SEDRATE 34 (H) 02/14/2023     CRP:   Lab Results   Component Value Date    .4 (H) 02/14/2023           Imaging: All pertinent images and reports for the current visit were reviewed by me during this visit. I reviewed the chest x-ray/CT scan/MRI images and independently interpreted the findings and results today. CT ABSCESS DRAINAGE W CATH PLACEMENT S&I   Final Result   Successful CT-guided percutaneous drainage catheter placement into a   perigastric collection via a left anterolateral abdominal approach. Patient currently has a 14 [place]vian locking pigtail drainage catheter within   the collection, placed to bulb suction. CT GUIDED NEEDLE PLACEMENT   Final Result   Successful CT-guided percutaneous drainage catheter placement into a   perigastric collection via a left anterolateral abdominal approach. Patient currently has a 14 [place] locking pigtail drainage catheter within   the collection, placed to bulb suction. XR CHEST PORTABLE   Final Result   1. Right upper extremity PICC extends superiorly into the right internal   jugular vein; tip is excluded from the field of view. Recommend   repositioning. 2.  Bilateral patchy airspace disease, improved from prior. Discussed with Dr. Maura Christensen by Dr. Mario Mcneil at 4:55 am on 2/15/2023         CT ABDOMEN WO CONTRAST Additional Contrast? Oral   Final Result   1. 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  Findings compatible with a leak likely a marginal ulcer with   a developing 10 x 10 x 5 cm abscess centered between the Angel limb and   gastric remnant. Adjacent inflammatory change and left upper quadrant edema   has progressed from 02/07/2023. Trace amount of enteric contrast leak. A   couple punctate foci of free air noted in the anterior abdomen.    Findings were discussed with Dr. Celina Rodgers at 5:45 pm on 2/13/2023. CT ABDOMEN WO CONTRAST Additional Contrast? None   Final Result   Postsurgical change from gastric bypass. There is fat stranding surrounding   the afferent loop. There is fluid and gas seen in the upper abdomen, near   the anastomotic site, that is not all definitively intraluminal, with   surrounding fat stranding, raising the question of perforation. Increased pleural-parenchymal disease at the left lung base      The findings were sent to the Radiology Results [address] at 1:17   pm on 2/13/2023 to be communicated to a licensed caregiver. CT CHEST ABDOMEN PELVIS WO CONTRAST Additional Contrast? None   Final Result   1. Scattered ground-glass opacities with interlobular septal thickening. Findings may be related to pulmonary edema with other considerations   including an inflammatory/infectious process in the appropriate clinical   setting. 2. Right upper lobe consolidation. Additional bilateral scattered curvilinear   opacities may be related to atelectasis versus developing consolidation. 3. Enlarged pulmonary artery diameter. Finding may be related to pulmonary   arterial hypertension. 4. Coronary artery calcifications present. 5. Moderate hiatal hernia. 6. Questionable circumferential thickening of the descending colon with mild   surrounding fat stranding versus colonic underdistention. Finding raises the   possibility of colitis in the appropriate clinical setting. 7. Colonic diverticulosis, predominately sigmoid. No evidence of acute   diverticulitis. 8. Nonobstructing punctate left nephrolithiasis. RECOMMENDATIONS:   2 cm left adrenal mass, consistent with lipid-rich benign adenoma. No   follow-up imaging is recommended. JACR 2017 Aug; 14(8):1038-44, JCAT 2016 Hilda Watson; 40(2):194-200, Urol J 2006   [place]; 3(2):71-4. XR CHEST PORTABLE   Final Result   1.  Diffuse hazy multifocal opacity throughout both lungs, right worse than   left, likely a combination of moderate pulmonary edema and multifocal   pneumonia. 2. Stable cardiomegaly. 3. Stable hiatal hernia. Medications: All current and past medications were reviewed.      potassium phosphate IVPB  15 mmol IntraVENous Once    meropenem  500 mg IntraVENous q8h    anidulafungin  100 mg IntraVENous Q24H    insulin lispro  0-4 Units SubCUTAneous Q6H    fat emulsion  250 mL IntraVENous Once per day on Mon Thu    sodium chloride flush  5-40 mL IntraVENous 2 times per day    furosemide  20 mg IntraVENous Daily    lidocaine 1 % injection  5 mL IntraDERmal Once    sodium chloride flush  5-40 mL IntraVENous 2 times per day    [Held by provider] metoprolol tartrate  75 mg Oral BID    [Held by provider] predniSONE  40 mg Oral Daily    amiodarone  200 mg Oral Daily    [Held by provider] apixaban  5 mg Oral BID    sodium chloride flush  5-40 mL IntraVENous 2 times per day        PN-Adult Premix 5/20 - Standard Electrolytes - Central Line      PN-Adult Premix 5/20 - Standard Electrolytes - Central Line 83 mL/hr at 02/16/23 1856    dextrose      sodium chloride      pantoprozole (PROTONIX) infusion Stopped (02/16/23 0646)    sodium chloride Stopped (02/16/23 1814)    sodium chloride 100 mL/hr at 02/16/23 1704       morphine, metoprolol, dextrose bolus **OR** dextrose bolus, glucagon (rDNA), dextrose, sodium chloride flush, sodium chloride, ipratropium-albuterol, sodium chloride flush, sodium chloride, diphenhydrAMINE, ondansetron, albuterol sulfate HFA, traMADol, sodium chloride flush, sodium chloride, ondansetron **OR** ondansetron, polyethylene glycol, acetaminophen **OR** acetaminophen      Problem list:       Patient Active Problem List   Diagnosis Code    Atrial fibrillation (HCC) I48.91    Primary hypertension I10    Severe episode of recurrent major depressive disorder, without psychotic features ([address] F33.2    Seasonal affective disorder ([facility] [address] F33.8 Class 2 severe obesity due to excess calories with serious comorbidity and body mass index (BMI) of 39.0 to 39.9 in adult ([facility]) E66.01, Z68.39    Elevated sed rate R70.0    Neutrophilia D72.9    Acute kidney injury ([facility]) N17.9    Elevated C-reactive protein (CRP) R79.82    GIANNA (obstructive sleep apnea) G47.33    Morbid obesity ([facility]) E66.01    Weight loss counseling, encounter for Z71.3    Atypical atrial flutter ([facility]) I48.4    PAF (paroxysmal atrial fibrillation) ([facility]) I48.0    ILD (interstitial lung disease) ([facility]) J84.9    Iron deficiency anemia due to chronic blood loss D50.0    Iron deficiency anemia, unspecified D50.9    Age-related osteoporosis without current pathological fracture M81.0    Vasovagal syncope R55    CKD (chronic kidney disease) stage 4, GFR 15-29 ml/min ([facility]) N18.4    Acute on chronic diastolic heart failure ([facility]) I50.33    Stage 3b chronic kidney disease ([facility]) N18.32    B12 deficiency E53.8    Vitamin D deficiency E55.9    Acute respiratory failure ([facility]) J96.00    Chronic obstructive pulmonary disease, unspecified J44.9    Sepsis ([facility]) A41.9    Cellulitis of right leg L03.115    Obesity (BMI 30-39. 9) E66.9    Stage 3 chronic kidney disease ([facility]) N18.30    Bacteremia R78.81    Fever and chills R50.9    History of ankle surgery Z98.890    Streptococcal infection group G B95.4    Bacterial pneumonia J15.9    JUSTINA (acute kidney injury) ([facility] [address] N17.9    Acute on chronic heart failure with preserved ejection fraction (HFpEF) ([facility]) I50.33    Multifocal pneumonia J18.9    Allergic drug reaction T78.40XA    Elevated d-dimer R79.89    Left nephrolithiasis N20.0    Diverticulosis K57.90    History of cholecystectomy Z90.49    Intra-abdominal abscess ([facility]) K65.1    Bandemia D72.825    Atypical pneumonia J18.9       Please note that this chart was generated using Dragon dictation software.  Although every effort was made to ensure the accuracy of this automated transcription, some errors in transcription may have occurred inadvertently. If you may need any clarification, please do not hesitate to contact me through EPIC or at the phone number provided below with my electronic signature.  Any pictures or media included in this note were obtained after taking informed verbal consent from the patient and with their approval to include those in the patient's medical record.        Loy Nguyen MD, MPH, FACP, FIDSA  2/17/2023, 12:44 PM  [facility]   [address]  [place] [address]  Office: [phone]  Fax: [phone]  In-person Clinic days:  Tuesday & Thursday a.m.  Virtual clinic days: Monday, Wednesday & Friday a.m.

## 2023-02-17 NOTE — PROGRESS NOTES
Pt assessment completed, Vitals WDL, call light within reach, Scheduled meds given per [facility]. Patient resting in bed. Will continue to monitor.

## 2023-02-17 NOTE — CONSULTS
Clinical Pharmacy Note    Pharmacy consulted by Dr. Mario Cherry to manage TPN    Current TPN rate: 83ml/hr  Goal TPN rate: 83ml/hr    Labs:  General Labs:  CMP:    Lab Results   Component Value Date/Time     02/17/2023 07:06 AM    K 3.9 02/17/2023 07:06 AM    K 4.4 02/07/2023 03:39 AM    CL 99 02/17/2023 07:06 AM    CO2 37 02/17/2023 07:06 AM    BUN 35 02/17/2023 07:06 AM    CREATININE 1.0 02/17/2023 07:06 AM    GFRAA 38 09/20/2022 04:14 AM    GFRAA 45 03/27/2013 01:36 PM    AGRATIO 0.8 02/07/2023 03:39 AM    LABGLOM 59 02/17/2023 07:06 AM    GLUCOSE 105 02/17/2023 07:06 AM    PROT 7.0 02/07/2023 03:39 AM    LABALBU 3.2 02/07/2023 03:39 AM    CALCIUM 8.4 02/17/2023 07:06 AM    BILITOT 1.4 02/07/2023 03:39 AM    ALKPHOS 90 02/07/2023 03:39 AM    AST 10 02/07/2023 03:39 AM    ALT <5 02/07/2023 03:39 AM     Magnesium:    Lab Results   Component Value Date/Time    MG 1.80 02/17/2023 07:06 AM     Phosphorus:    Lab Results   Component Value Date/Time    PHOS 2.3 02/17/2023 07:06 AM       Electrolyte replacement as follows:   Replace potassium and phosphate w/ 15 mmol    Blood sugars over past 24 hours: 105-132    Plan to continue TPN at 83 ml/hr. Thank you for allowing pharmacy to participate in the care of this patient.     Corina Bone, [address] [facility], PharmD 2/17/2023

## 2023-02-17 NOTE — PROGRESS NOTES
Pt continues to address some concerns about being NPO. Pt would like to eat something but this Nurse educated pt on the doctors orders and being NPO. Pt not happy and would like to eat.  I will message

## 2023-02-17 NOTE — PROGRESS NOTES
Pt is to be strict NPO. Per Dr. Nuha Lockwood, follow-up to see if pt can or may get PO meds and or  popsicles.  I messaged the

## 2023-02-17 NOTE — PROGRESS NOTES
[facility]    2/17/2023 5:44 PM        Name: Camila Galindo . Admitted: 2/7/2023  Primary Care Provider: MARILEE Cantrell CNP (Tel: [phone])                        Subjective:   Patient seen and examined. Denies any complaints. No abdominal pain, fever or chills. Remains strictly NPO. TPN ongoing. Persistent leukocytosis 23 K.       Reviewed interval ancillary notes    Current Medications  morphine (PF) injection 2 mg, Q4H PRN  PN-Adult Premix 5/20 - Standard Electrolytes - Central Line, Continuous TPN  PN-Adult Premix 5/20 - Standard Electrolytes - Central Line, Continuous TPN  metoprolol (LOPRESSOR) injection 2.5 mg, Q6H PRN  meropenem (MERREM) 500 mg in sodium chloride 0.9 % 100 mL IVPB (mini-bag), q8h  anidulafungin (ERAXIS) 100 mg in sodium chloride 0.9 % 130 mL IVPB, Q24H  dextrose bolus 10% 125 mL, PRN   Or  dextrose bolus 10% 250 mL, PRN  glucagon (rDNA) injection 1 mg, PRN  dextrose 10 % infusion, Continuous PRN  insulin lispro (HUMALOG) injection vial 0-4 Units, Q6H  fat emulsion 20 % infusion 250 mL, Once per day on Mon Thu  sodium chloride flush 0.9 % injection 5-40 mL, 2 times per day  sodium chloride flush 0.9 % injection 5-40 mL, PRN  0.9 % sodium chloride infusion, PRN  pantoprazole (PROTONIX) 80 mg in sodium chloride 0.9 % 100 mL infusion, Continuous  furosemide (LASIX) injection 20 mg, Daily  ipratropium-albuterol (DUONEB) nebulizer solution 1 ampule, Q4H PRN  lidocaine PF 1 % injection 5 mL, Once  sodium chloride flush 0.9 % injection 5-40 mL, 2 times per day  sodium chloride flush 0.9 % injection 5-40 mL, PRN  0.9 % sodium chloride infusion, PRN  metoprolol tartrate (LOPRESSOR) tablet 75 mg, BID  diphenhydrAMINE (BENADRYL) tablet 50 mg, Q6H PRN  ondansetron (ZOFRAN) injection 4 mg, Q6H PRN  albuterol sulfate HFA (PROVENTIL;VENTOLIN;PROAIR) 108 (90 Base) MCG/ACT inhaler 2 puff, Q4H PRN  amiodarone (CORDARONE) tablet 200 mg, Daily  [Held by provider] apixaban (ELIQUIS) tablet 5 mg, BID  traMADol (ULTRAM) tablet 50 mg, Q6H PRN  sodium chloride flush 0.9 % injection 5-40 mL, 2 times per day  sodium chloride flush 0.9 % injection 5-40 mL, PRN  0.9 % sodium chloride infusion, PRN  ondansetron (ZOFRAN-ODT) disintegrating tablet 4 mg, Q8H PRN   Or  ondansetron (ZOFRAN) injection 4 mg, Q6H PRN  polyethylene glycol (GLYCOLAX) packet 17 g, Daily PRN  acetaminophen (TYLENOL) tablet 650 mg, Q6H PRN   Or  acetaminophen (TYLENOL) suppository 650 mg, Q6H PRN      Objective:  /79   Pulse (!) 121   Temp 98.2 °F (36.8 °C) (Oral)   Resp 16   Ht 5' 7\" (1.702 m)   Wt 221 lb 3.2 oz (100.3 kg)   SpO2 96%   BMI 34.64 kg/m²     Intake/Output Summary (Last 24 hours) at 2/17/2023 1744  Last data filed at 2/17/2023 1300  Gross per 24 hour   Intake 10.91 ml   Output 650 ml   Net -639.09 ml      Wt Readings from Last 3 Encounters:   02/17/23 221 lb 3.2 oz (100.3 kg)   12/24/22 236 lb 14.4 oz (107.5 kg)   12/15/22 235 lb (106.6 kg)       General appearance:  Appears comfortable  Eyes: Sclera clear. Pupils equal.  ENT: Moist oral mucosa. Trachea midline, no adenopathy. Cardiovascular: Regular rhythm, normal S1, S2. No murmur. No edema in lower extremities  Respiratory: Not using accessory muscles. Good inspiratory effort. Clear to auscultation bilaterally, no wheeze or crackles. GI: Abdomen soft, no tenderness, not distended, normal bowel sounds  Musculoskeletal: No cyanosis in digits, neck supple  Neurology: CN 2-12 grossly intact. No speech or motor deficits  Psych: Normal affect. Alert and oriented in time, place and person  Skin: Warm, dry, normal turgor  PERC drain in place with dark output.       Labs and Tests:  CBC:   Recent Labs     02/15/23  0503 02/16/23  0410 02/17/23  0706   WBC  --  22.2* 23.0*   HGB 8.5* 8.3* 8.1*   PLT  -- 168 187     BMP:    Recent Labs     02/15/23  0500 02/16/23  0410 02/17/23  0706   * 143 140   K 4.1 3.8 3.9    104 99   CO2 35* 34* 37*   BUN 54* 39* 35*   CREATININE 1.4* 1.2 1.0   GLUCOSE 72 151* 105*     Hepatic: No results for input(s): AST, ALT, ALB, BILITOT, ALKPHOS in the last 72 hours. Discussed care with patient       Problem List  Principal Problem:    Bacterial pneumonia  Active Problems:    Acute on chronic diastolic heart failure (HCC)    Stage 3 chronic kidney disease (HCC)    JUSTINA (acute kidney injury) ([facility] [address]    Acute on chronic heart failure with preserved ejection fraction (HFpEF) (HCC)    Right upper lobe consolidation (HCC)    Allergic drug reaction    Elevated d-dimer    Left nephrolithiasis    Diverticulosis    History of cholecystectomy    Intra-abdominal abscess ([facility] [address]    Bandemia    Atypical pneumonia    Atrial fibrillation ([facility] [address]    Primary hypertension    Class 2 severe obesity due to excess calories with serious comorbidity and body mass index (BMI) of 39.0 to 39.9 in [facility])    Acute kidney injury ([facility] [address]    GIANNA (obstructive sleep apnea)  Resolved Problems:    * No resolved hospital problems. *       Assessment & Plan:   79-year-old female who was admitted for acute CHF exacerbation with hypoxia and acute kidney injury. Patient was evaluated by cardiology and nephrology & had made significant improvement. However on the day of discharge she was noticed to have hematemesis for which STAT CT done showed findings compatible with endoleak likely a marginal ulcer with a developing 10 x 10 x 5 cm abscess centered between the Angel limb and gastric remnant. 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 CT: Endoleak with Intra-abdominal abscess, measuring 10 x 10 x 5 cm between the gastric remnant and Angel-en-Y limb.    IR consulted: s/p perc drain placement on 2/15/2022. Abdominal fluid culture positive for Candida. Blood cultures remain negative to date. Persistent leukocytosis  ID consulted: Currently on meropenem and anidulafungin. Bariatric surgeon consulted: Contained marginal ulcer perforation. No surgical intervention at this time. Continue STRICT NPO and TPN. Patient will be discharged on TPN. Acute hypoxic respiratory failure: Improved  Likely due to CHF exacerbation, sepsis and underlying COPD. Currently on 2 L nasal cannula. Anemia:   Work-up consistent with MIRIAM/AoCD. Started on IV Venofer. Monitor H&H and transfuse as needed. Hematemesis: resolved. Continue Protonix IV twice daily for marginal ulcers. Afib with RVR:   Unable to resume amiodarone due to strict n.p.o. orders. Metoprolol changed to IV every 6h. Eliquis/AC on hold until cleared by surgery. Acute on chronic HFpEF:  Echo 12/21/2022: LVEF 55 to 60%. Cardiology consulted: Currently compensated with IV diuresis. Continue IV Lasix 20 mg daily until able to take oral.    COPD without acute exacerbation: Continue inhalers. JUSTINA: Resolved. Peak creatinine 2.8. Nephrology consult appreciated. Diet: PN-Adult Premix 5/20 - Standard Electrolytes - Central Line  PN-Adult Premix 5/20 - Standard Electrolytes - Central Line  Diet NPO Exceptions are: Other (Specify); Specify Other Exceptions: mouth swabs only  Code:Full Code  DVT PPX lovenox     Disposition: SNF once medically stable.       Judith Irvin MD   2/17/2023 5:44 PM

## 2023-02-17 NOTE — PROGRESS NOTES
Physical Therapy    [facility] [address]   Phone: [phone]    Physical Therapy    [] Initial Evaluation            [x] Daily Treatment Note         [] Discharge Summary      Patient: Gregory White   : 1945   MRN: [id number]   Date of Service:  2023  Admitting Diagnosis: Bacterial pneumonia  Current Admission Summary: Gregory White is a 78 y.o. female   states she has had itching and hives with a rash for the past 24 hours past 3 hours she has had left upper abdominal pain sharp in nature constant which made her short of breath she denies any chest pain the pain is in the left upper quadrant she is currently taking cephalexin for the leg cellulitis. *S/P Intra-abdominal fluid collection on 2/15/23  Past Medical History:  has a past medical history of Arthritis, Atrial fibrillation and flutter ([address] Hypertension, Kidney disease, Pneumonia, and Sleep apnea. Past Surgical History:  has a past surgical history that includes Tubal ligation; Total knee arthroplasty (Bilateral, 2012); fracture surgery; Colonoscopy (N/A, 2018); Upper gastrointestinal endoscopy (N/A, 2018); Cardioversion; Gastric bypass surgery; Larynx surgery; Upper gastrointestinal endoscopy (N/A, 2022); Colonoscopy (N/A, 2022); and Upper gastrointestinal endoscopy (N/A, 2022). Discharge Recommendations: Gregory White scored a 13/24 on the AM-PAC short mobility form. Current research shows that an AM-PAC score of 17 or less is typically not associated with a discharge to the patient's home setting. Based on the patient's AM-PAC score and their current functional mobility deficits, it is recommended that the patient have 3-5 sessions per week of Physical Therapy at d/c to increase the patient's independence. Please see assessment section for further patient specific details.     If patient discharges prior to next session this note will serve as a discharge summary. Please see below for the latest assessment towards goals. DME Required For Discharge: DME to be determined at next level of care  Precautions/Restrictions: high fall risk, up as tolerated  Weight Bearing Restrictions: no restrictions     Required Braces/Orthotics: no braces required  Positional Restrictions:no positional restrictions    Pre-Admission Information   Lives With: daughter               Type of Home: house  Home Layout: one level, laundry in basement, pt does not need to go to basement  Home Access:  3 step to enter without rails   Bathroom Layout: walk in shower, handicap height toilet  Bathroom Equipment: shower chair  Home Equipment: rolling walker, single point cane, electric scooter, hospital bed, lift chair  Transfer Assistance: modified independent with use of lift chair,  Ambulation Assistance:modified independent with use of SPC, use of scooter in community  ADL Assistance: independent with all ADL's  IADL Assistance: independent with homemaking tasks, daughter completes laundry  Active : [] yes             [x]No daughter drives to appointments   Current Employment: retired. Occupation:   Hobbies:   Recent Falls: Pt reports 1 fall while at the hospital in previous visit, states her legs buckled when she went to stand at chair. Subjective  General: Pt supine in bed on arrival, agreeable to participate in PT/OT treatment session. Pain: 0/10  Pain Interventions: not applicable     Functional Mobility  Bed Mobility  Supine to Sit: stand by assistance  Scooting: stand by assistance- toward EOB  Comments: Supine to sit: HOB elevated, increased time required to complete task, use of bed rail.   Transfers  Sit to stand transfer: moderate assistance  Stand to sit transfer: minimal assistance  Stand step transfer: minimal assistance  Comments: RW utilized for all transfers with verbal cues required for safe hand placement  Ambulation  Ambulation not tested on this date secondary to pt refusal.  Comments:    Stair Mobility  Stair mobility not completed on this date. Comments:  Wheelchair Mobility:  No w/c mobility completed on this date. Comments:  Balance  Static Sitting Balance: good: independent with functional balance in unsupported position  Static Standing Balance: fair (-): maintains balance at CGA with use of UE support  Dynamic Standing Balance: poor (+): requires min (A) to maintain balance  Comments:     Other Therapeutic Interventions  Pt was given an HEP handout with lower extremity exercises to perform while seated in the recliner and while supine in bed. Exercises to be performed once per day, 5-7 times per week. Functional Outcomes  AM-PAC Inpatient Mobility Raw Score : 13              Cognition  WFL  Initiation: requires cues for some  Comments: decreased safety awareness, self limiting  Orientation:    alert and oriented x 4  Command Following:   accurately follows one step commands    Education  Barriers To Learning: cognition and hearing  Patient Education: patient educated on goals, PT role and benefits, HEP, general safety, functional mobility training, proper use of assistive device/equipment, transfer training  Learning Assessment: patient verbalizes understanding, would benefit from continued reinforcement    Assessment  Activity Tolerance: Pts HR up to the 140s with mobility. SpO2 remained >90% throughout on 2L O2 via nasal cannula. Impairments Requiring Therapeutic Intervention: decreased functional mobility, decreased strength, decreased safety awareness, decreased cognition, decreased endurance, decreased sensation, decreased balance, increased pain, decreased posture  Prognosis: good  Clinical Assessment: Pt presents today with slight improvement in functional mobility. Pt was able to perform bed mobility and transfers with use of RW and up to mod A.  Pt no longer requires the skilled assistance of two people to perform bed mobility or transfers however is still unable to ambulate. Pt will benefit from skilled PT to facilitate return to PLOF and to promote independence.    Safety Interventions: patient left in chair, chair alarm in place, call light within reach, gait belt, and nurse notified    Plan  Frequency: 3-5 x/per week  Current Treatment Recommendations: strengthening, balance training, functional mobility training, transfer training, gait training, stair training, endurance training, and safety education    Goals  Patient Goals: none stated    Short Term Goals:  Time Frame: upon discharge   Patient will complete bed mobility at Independent   Patient will complete transfers at supervision   Patient will ambulate 50 ft with use of LRAD at stand by assistance  Patient will ascend/descend 3 stairs with (B) handrail at contact guard assistance  *No goals met 2/17/23    Therapy Session Time      Individual Group Co-treatment   Time In     1548   Time Out     1628   Minutes     40     Timed Code Treatment Minutes: 40 Minutes  Total Treatment Minutes: 40 Minutes     Electronically Signed By: Pilo Waller, PT   Yamilex Alejandro PT, DPT [id number]

## 2023-02-17 NOTE — PLAN OF CARE
Problem: Skin/Tissue Integrity  Goal: Absence of new skin breakdown  Description: 1. Monitor for areas of redness and/or skin breakdown  2. Assess vascular access sites hourly  3. Every 4-6 hours minimum:  Change oxygen saturation probe site  4. Every 4-6 hours:  If on nasal continuous positive airway pressure, respiratory therapy assess nares and determine need for appliance change or resting period.   2/17/2023 1257 by Collin Dunne RN  Outcome: Progressing  2/16/2023 2344 by Otto Kahn RN  Outcome: Progressing     Problem: Safety - Adult  Goal: Free from fall injury  2/17/2023 1257 by Collin Dunne RN  Outcome: Progressing  2/16/2023 2344 by Munira Hanson RN  Outcome: Progressing     Problem: Pain  Goal: Verbalizes/displays adequate comfort level or baseline comfort level  2/17/2023 1257 by Collin Dunne RN  Outcome: Progressing  2/16/2023 2344 by Munira Hanson RN  Outcome: Progressing     Problem: ABCDS Injury Assessment  Goal: Absence of physical injury  2/17/2023 1257 by Collin Dunne RN  Outcome: Progressing  2/16/2023 2344 by Munira Hanson RN  Outcome: Progressing     Problem: Metabolic/Fluid and Electrolytes - Adult  Goal: Electrolytes maintained within normal limits  2/17/2023 1257 by Collin Dunne RN  Outcome: Progressing  2/16/2023 2344 by Munira Hanson RN  Outcome: Progressing  Goal: Hemodynamic stability and optimal renal function maintained  2/17/2023 1257 by Collin Dunne RN  Outcome: Progressing  2/16/2023 2344 by Munira Hanson RN  Outcome: Progressing     Problem: Chronic Conditions and Co-morbidities  Goal: Patient's chronic conditions and co-morbidity symptoms are monitored and maintained or improved  2/17/2023 1257 by Collin Dunne RN  Outcome: Progressing  2/16/2023 2344 by Munira Hanson RN  Outcome: Progressing     Problem: Nutrition Deficit:  Goal: Optimize nutritional status  2/17/2023 1257 by Collin Dunne RN  Outcome: Progressing  2/16/2023 2344 by Diamond Escamilla RN  Outcome: Progressing  Flowsheets (Taken 2/16/2023 0956 by Nikki Rivero, RD, LD)  Nutrient intake appropriate for improving, restoring, or maintaining nutritional needs: Recommend, monitor, and adjust tube feedings and TPN/PPN based on assessed needs

## 2023-02-17 NOTE — PROGRESS NOTES
[facility]   Phone: [phone]    Occupational Therapy    [] Initial Evaluation            [x] Daily Treatment Note         [] Discharge Summary      Patient: Estiven Gonzales   : 1945   MRN: [id number]   Date of Service:  2023    Admitting Diagnosis:  Bacterial pneumonia  Current Admission Summary: Per H&P \"67 y.o. female   states she has had itching and hives with a rash for the past 24 hours past 3 hours she has had left upper abdominal pain sharp in nature constant which made her short of breath she denies any chest pain the pain is in the left upper quadrant she is currently taking cephalexin for the leg cellulitis\"    *S/P Intra-abdominal fluid collection on 2/15/23 from intraabdominal abcess - being treated for sepsis with IV antibiotics     Past Medical History:  has a past medical history of Arthritis, Atrial fibrillation and flutter ([address] Hypertension, Kidney disease, Pneumonia, and Sleep apnea. Past Surgical History:  has a past surgical history that includes Tubal ligation; Total knee arthroplasty (Bilateral, 2012); fracture surgery; Colonoscopy (N/A, 2018); Upper gastrointestinal endoscopy (N/A, 2018); Cardioversion; Gastric bypass surgery; Larynx surgery; Upper gastrointestinal endoscopy (N/A, 2022); Colonoscopy (N/A, 2022); and Upper gastrointestinal endoscopy (N/A, 2022). Discharge Recommendations: Estiven Gonzales scored a 14/24 on the AM-PAC ADL Inpatient form. Current research shows that an AM-PAC score of 17 or less is typically not associated with a discharge to the patient's home setting. Based on the patient's AM-PAC score and their current ADL deficits, it is recommended that the patient have 3-5 sessions per week of Occupational Therapy at d/c to increase the patient's independence. Please see assessment section for further patient specific details.     If patient discharges prior to next session this note will serve as a discharge summary. Please see below for the latest assessment towards goals. DME Required For Discharge: DME to be determined at next level of care    Precautions/Restrictions: high fall risk  Weight Bearing Restrictions: no restrictions  [] Right Upper Extremity  [] Left Upper Extremity [] Right Lower Extremity  [] Left Lower Extremity     Required Braces/Orthotics: no braces required   [] Right  [] Left  Positional Restrictions:no positional restrictions  AURELIO drain 2/17      Pre-Admission Information     Lives With: daughter    Type of Home: house  Home Layout: one level, laundry in basement, pt does not need to go to basement  Home Access:  3 step to enter without rails   Bathroom Layout: walk in shower, handicap height toilet  Bathroom Equipment: shower chair  Home Equipment: rolling walker, single point cane, electric scooter, hospital bed, lift chair  Transfer Assistance: modified independent with use of lift chair,  Ambulation Assistance:modified independent with use of SPC, use of scooter in community  ADL Assistance: independent with all ADL's  IADL Assistance: independent with homemaking tasks, daughter completes laundry  Active : [] yes  [x]No daughter drives to appointments   Current Employment: retired. Occupation:   Hobbies:   Recent Falls: Pt reports 1 fall while at the hospital in previous visit, states her legs buckled when she went to stand at chair. Subjective    General: Patient supine in bed upon arrival, agreeable to co-treatment with encouragement needed. Patient on 2L O2 upon arrival.   Pain: not applicable  Pain Interventions: repositioned        Activities of Daily Living    Basic Activities of Daily Living  Grooming: setup assistance  Grooming Comments: to brush teeth   Toileting: dependent.     Toileting Comments: Patient with Loreli Lean in place upon arrival   General Comments: Patient assisted to wash hair with shampoo cap- pt able to comb hair but assist provided for thoroughness - able to brush teeth with set up, reminders to maintain NPO status      Instrumental Activities of Daily Living  No IADL completed on this date. Provided with word search book as leisure activity/cognitive stimulation    Functional Mobility    Bed Mobility  Supine to Sit: contact guard assistance  Scooting: contact guard assistance  Comments:  Transfers  Sit to stand transfer:moderate assistance  Stand to sit transfer: minimal assistance  Comments:   Functional Mobility:  Sitting Balance: stand by assistance. Sitting Balance Comment: seated EOB   Standing Balance: minimal assistance.     Standing Balance Comment: functional transfers/mobility, declined further attempts at mobility due to fatigue        Other Therapeutic Interventions    Functional Outcomes  -PAC Inpatient Daily Activity Raw Score: 14      Cognition  WFL  Safety Judgement: decreased awareness of need for assistance, decreased awareness of need for safety  Initiation: requires cues for some  Sequencing: requires cues for some  Comments:    Orientation:    alert and oriented x 4  Command Following:   accurately follows one step commands     Education    Barriers To Learning: cognition  Patient Education: patient educated on goals, OT role and benefits, plan of care, ADL adaptive strategies, energy conservation, disease specific education, pressure relief, transfer training, discharge recommendations  Learning Assessment:  patient verbalizes understanding, would benefit from continued reinforcement    Assessment    Activity Tolerance: pt with 02 saturation remaining about 90s, HR 120s at rest and up to 140s with transfer to chair - reports feeling fatigued   Impairments Requiring Therapeutic Intervention: decreased functional mobility, decreased ADL status, decreased strength, decreased safety awareness, decreased endurance, decreased balance, decreased IADL  Prognosis: Fair   Clinical Assessment: Pt is currently functioning below occupational baseline and demo the deficits listed above. Pt is currently mod A of 1 person. Significantly limited by fatigue and endurance. Pt typically IND at baseline. Pt would benefit from continued skilled OT services to address these deficits and increase IND, safety, and ease with all occupational pursuits.    Safety Interventions: patient left in bed, bed alarm in place, call light within reach, gait belt, patient at risk for falls, and nurse notified       Plan    Frequency: 3-5 x/per week  Current Treatment Recommendations: strengthening, balance training, functional mobility training, transfer training, endurance training, patient/caregiver education, ADL/self-care training, home management training, and safety education    Goals    Patient Goals: pt did not state      Short Term Goals:  Time Frame: by d/c  Patient will complete upper body ADL at supervision   Patient will complete lower body ADL at minimal assistance   Patient will complete toileting at minimal assistance   Patient will complete functional transfers at stand by assistance   Patient will complete functional mobility at stand by assistance     No goals met this date - 2/17/23        Therapy Session Time     Individual Group Co-treatment   Time In   1548   Time Out   1626   Minutes   38        Timed Code Treatment Minutes:  38 minutes    Total Treatment Minutes:  38 minutes    Electronically Signed By: SEAN Lopez/MANISHA

## 2023-02-17 NOTE — PLAN OF CARE
Problem: Skin/Tissue Integrity  Goal: Absence of new skin breakdown  Description: 1. Monitor for areas of redness and/or skin breakdown  2. Assess vascular access sites hourly  3. Every 4-6 hours minimum:  Change oxygen saturation probe site  4. Every 4-6 hours:  If on nasal continuous positive airway pressure, respiratory therapy assess nares and determine need for appliance change or resting period.   Outcome: Progressing     Problem: Safety - Adult  Goal: Free from fall injury  Outcome: Progressing     Problem: Pain  Goal: Verbalizes/displays adequate comfort level or baseline comfort level  Outcome: Progressing     Problem: ABCDS Injury Assessment  Goal: Absence of physical injury  Outcome: Progressing     Problem: Metabolic/Fluid and Electrolytes - Adult  Goal: Electrolytes maintained within normal limits  Outcome: Progressing  Goal: Hemodynamic stability and optimal renal function maintained  Outcome: Progressing     Problem: Chronic Conditions and Co-morbidities  Goal: Patient's chronic conditions and co-morbidity symptoms are monitored and maintained or improved  Outcome: Progressing     Problem: Nutrition Deficit:  Goal: Optimize nutritional status  Outcome: Progressing  Flowsheets (Taken 2/16/2023 0956 by Yuridia Irving, RD, LD)  Nutrient intake appropriate for improving, restoring, or maintaining nutritional needs: Recommend, monitor, and adjust tube feedings and TPN/PPN based on assessed needs

## 2023-02-17 NOTE — TELEPHONE ENCOUNTER
Shira from [facility] called to speak to Dr. Sondra Palomino. Pt is currently getting IVs only. Pt is NPO, but wanted to know if pt could be meds by mouth and possibly eat a popsicle. Pt is currently on TPN.

## 2023-02-18 LAB
A/G RATIO: 0.6 (ref 1.1–2.2)
ALBUMIN SERPL-MCNC: 2 G/DL (ref 3.4–5)
ALP BLD-CCNC: 80 U/L (ref 40–129)
ALT SERPL-CCNC: <5 U/L (ref 10–40)
ANION GAP SERPL CALCULATED.3IONS-SCNC: 2 MMOL/L (ref 3–16)
AST SERPL-CCNC: 15 U/L (ref 15–37)
BILIRUB SERPL-MCNC: 0.3 MG/DL (ref 0–1)
BLOOD CULTURE, ROUTINE: NORMAL
BUN BLDV-MCNC: 34 MG/DL (ref 7–20)
CALCIUM SERPL-MCNC: 8.2 MG/DL (ref 8.3–10.6)
CHLORIDE BLD-SCNC: 103 MMOL/L (ref 99–110)
CO2: 39 MMOL/L (ref 21–32)
CREAT SERPL-MCNC: 1 MG/DL (ref 0.6–1.2)
GFR SERPL CREATININE-BSD FRML MDRD: 59 ML/MIN/{1.73_M2}
GLUCOSE BLD-MCNC: 113 MG/DL (ref 70–99)
GLUCOSE BLD-MCNC: 117 MG/DL (ref 70–99)
GLUCOSE BLD-MCNC: 119 MG/DL (ref 70–99)
GLUCOSE BLD-MCNC: 126 MG/DL (ref 70–99)
GLUCOSE BLD-MCNC: 146 MG/DL (ref 70–99)
GLUCOSE BLD-MCNC: 96 MG/DL (ref 70–99)
HCT VFR BLD CALC: 24.2 % (ref 36–48)
HEMOGLOBIN: 7.6 G/DL (ref 12–16)
MAGNESIUM: 2 MG/DL (ref 1.8–2.4)
MCH RBC QN AUTO: 27.9 PG (ref 26–34)
MCHC RBC AUTO-ENTMCNC: 31.6 G/DL (ref 31–36)
MCV RBC AUTO: 88.4 FL (ref 80–100)
PDW BLD-RTO: 16.9 % (ref 12.4–15.4)
PERFORMED ON: ABNORMAL
PHOSPHORUS: 3.1 MG/DL (ref 2.5–4.9)
PLATELET # BLD: 180 K/UL (ref 135–450)
PMV BLD AUTO: 10.5 FL (ref 5–10.5)
POTASSIUM SERPL-SCNC: 4 MMOL/L (ref 3.5–5.1)
RBC # BLD: 2.74 M/UL (ref 4–5.2)
SODIUM BLD-SCNC: 144 MMOL/L (ref 136–145)
TOTAL PROTEIN: 5.6 G/DL (ref 6.4–8.2)
WBC # BLD: 19.3 K/UL (ref 4–11)

## 2023-02-18 PROCEDURE — 2580000003 HC RX 258: Performed by: INTERNAL MEDICINE

## 2023-02-18 PROCEDURE — 80053 COMPREHEN METABOLIC PANEL: CPT

## 2023-02-18 PROCEDURE — 6360000002 HC RX W HCPCS: Performed by: INTERNAL MEDICINE

## 2023-02-18 PROCEDURE — 84100 ASSAY OF PHOSPHORUS: CPT

## 2023-02-18 PROCEDURE — 6370000000 HC RX 637 (ALT 250 FOR IP): Performed by: HOSPITALIST

## 2023-02-18 PROCEDURE — 83735 ASSAY OF MAGNESIUM: CPT

## 2023-02-18 PROCEDURE — 2580000003 HC RX 258: Performed by: HOSPITALIST

## 2023-02-18 PROCEDURE — C9113 INJ PANTOPRAZOLE SODIUM, VIA: HCPCS | Performed by: INTERNAL MEDICINE

## 2023-02-18 PROCEDURE — 99232 SBSQ HOSP IP/OBS MODERATE 35: CPT | Performed by: INTERNAL MEDICINE

## 2023-02-18 PROCEDURE — 2580000003 HC RX 258: Performed by: NURSE PRACTITIONER

## 2023-02-18 PROCEDURE — 2500000003 HC RX 250 WO HCPCS: Performed by: HOSPITALIST

## 2023-02-18 PROCEDURE — 1200000000 HC SEMI PRIVATE

## 2023-02-18 PROCEDURE — 85027 COMPLETE CBC AUTOMATED: CPT

## 2023-02-18 PROCEDURE — 2500000003 HC RX 250 WO HCPCS: Performed by: INTERNAL MEDICINE

## 2023-02-18 RX ORDER — SODIUM CHLORIDE 0.9 % (FLUSH) 0.9 %
5-40 SYRINGE (ML) INJECTION PRN
Status: DISCONTINUED | OUTPATIENT
Start: 2023-02-18 | End: 2023-02-23 | Stop reason: HOSPADM

## 2023-02-18 RX ORDER — SODIUM CHLORIDE 0.9 % (FLUSH) 0.9 %
5-40 SYRINGE (ML) INJECTION EVERY 12 HOURS SCHEDULED
Status: DISCONTINUED | OUTPATIENT
Start: 2023-02-18 | End: 2023-02-23 | Stop reason: HOSPADM

## 2023-02-18 RX ORDER — LIDOCAINE HYDROCHLORIDE 10 MG/ML
5 INJECTION, SOLUTION EPIDURAL; INFILTRATION; INTRACAUDAL; PERINEURAL ONCE
Status: DISCONTINUED | OUTPATIENT
Start: 2023-02-18 | End: 2023-02-22

## 2023-02-18 RX ORDER — DEXTROSE AND SODIUM CHLORIDE 5; .45 G/100ML; G/100ML
INJECTION, SOLUTION INTRAVENOUS CONTINUOUS
Status: DISPENSED | OUTPATIENT
Start: 2023-02-18 | End: 2023-02-19

## 2023-02-18 RX ORDER — SODIUM CHLORIDE 9 MG/ML
25 INJECTION, SOLUTION INTRAVENOUS PRN
Status: DISCONTINUED | OUTPATIENT
Start: 2023-02-18 | End: 2023-02-23 | Stop reason: HOSPADM

## 2023-02-18 RX ADMIN — Medication 10 ML: at 09:27

## 2023-02-18 RX ADMIN — Medication 10 ML: at 01:50

## 2023-02-18 RX ADMIN — IRON SUCROSE 200 MG: 20 INJECTION, SOLUTION INTRAVENOUS at 18:55

## 2023-02-18 RX ADMIN — Medication 10 ML: at 11:40

## 2023-02-18 RX ADMIN — Medication 10 ML: at 21:25

## 2023-02-18 RX ADMIN — AMIODARONE HYDROCHLORIDE 200 MG: 200 TABLET ORAL at 09:27

## 2023-02-18 RX ADMIN — SODIUM CHLORIDE 100 MG: 9 INJECTION, SOLUTION INTRAVENOUS at 22:14

## 2023-02-18 RX ADMIN — DEXTROSE AND SODIUM CHLORIDE: 5; 450 INJECTION, SOLUTION INTRAVENOUS at 21:30

## 2023-02-18 RX ADMIN — PANTOPRAZOLE SODIUM 40 MG: 40 INJECTION, POWDER, LYOPHILIZED, FOR SOLUTION INTRAVENOUS at 21:25

## 2023-02-18 RX ADMIN — LEUCINE, PHENYLALANINE, LYSINE, METHIONINE, ISOLEUCINE, VALINE, HISTIDINE, THREONINE, TRYPTOPHAN, ALANINE, GLYCINE, ARGININE, PROLINE, SERINE, TYROSINE, SODIUM ACETATE, DIBASIC POTASSIUM PHOSPHATE, MAGNESIUM CHLORIDE, SODIUM CHLORIDE, CALCIUM CHLORIDE, DEXTROSE
365; 280; 290; 200; 300; 290; 240; 210; 90; 1035; 515; 575; 340; 250; 20; 340; 261; 51; 59; 33; 20 INJECTION INTRAVENOUS at 18:45

## 2023-02-18 RX ADMIN — IRON SUCROSE 200 MG: 20 INJECTION, SOLUTION INTRAVENOUS at 06:30

## 2023-02-18 RX ADMIN — METOPROLOL TARTRATE 2.5 MG: 1 INJECTION, SOLUTION INTRAVENOUS at 19:06

## 2023-02-18 RX ADMIN — FUROSEMIDE 20 MG: 10 INJECTION, SOLUTION INTRAMUSCULAR; INTRAVENOUS at 09:32

## 2023-02-18 RX ADMIN — MEROPENEM 500 MG: 1 INJECTION, POWDER, FOR SOLUTION INTRAVENOUS at 15:51

## 2023-02-18 RX ADMIN — METOPROLOL TARTRATE 2.5 MG: 1 INJECTION, SOLUTION INTRAVENOUS at 06:51

## 2023-02-18 RX ADMIN — MEROPENEM 500 MG: 1 INJECTION, POWDER, FOR SOLUTION INTRAVENOUS at 06:48

## 2023-02-18 RX ADMIN — PANTOPRAZOLE SODIUM 40 MG: 40 INJECTION, POWDER, LYOPHILIZED, FOR SOLUTION INTRAVENOUS at 09:31

## 2023-02-18 RX ADMIN — METOPROLOL TARTRATE 2.5 MG: 1 INJECTION, SOLUTION INTRAVENOUS at 13:57

## 2023-02-18 NOTE — PROGRESS NOTES
Clinical Pharmacy Note    Pharmacy consulted by Dr. Alina Villaseñor to manage TPN    Current TPN rate: 83ml/hr  Goal TPN rate: 83ml/hr    Labs:  General Labs:  BMP:    Lab Results   Component Value Date/Time     02/18/2023 06:55 AM    K 4.0 02/18/2023 06:55 AM    K 4.4 02/07/2023 03:39 AM     02/18/2023 06:55 AM    CO2 39 02/18/2023 06:55 AM    BUN 34 02/18/2023 06:55 AM    LABALBU 2.0 02/18/2023 06:55 AM    CREATININE 1.0 02/18/2023 06:55 AM    CALCIUM 8.2 02/18/2023 06:55 AM    GFRAA 38 09/20/2022 04:14 AM    GFRAA 45 03/27/2013 01:36 PM    LABGLOM 59 02/18/2023 06:55 AM    GLUCOSE 96 02/18/2023 06:55 AM     Magnesium:    Lab Results   Component Value Date/Time    MG 2.00 02/18/2023 06:55 AM     Phosphorus:    Lab Results   Component Value Date/Time    PHOS 3.1 02/18/2023 06:55 AM       Electrolyte replacement as follows: No replacement needed today    Blood sugars over past 24 hours:     Blood sugar management:  Plan to Continue Humalog q4 hour sliding scale to low dose. Plan to continue TPN at 83 ml/hr. Thank you for allowing pharmacy to participate in the care of this patient.     Ruth Robertson, [facility], PharmD 2/18/2023

## 2023-02-18 NOTE — PROGRESS NOTES
Assessment complete. VSS. Patient resting in bed. Respirations even and easy. Call light in reach. Fall precautions in place. No needs expressed at this time. Will continue to monitor.

## 2023-02-18 NOTE — PROGRESS NOTES
[facility]) Physicians   [facility]  [address], [address] [address] . Phone: [phone]  Fax: [phone]       Pt seen and examined     Patient admitted with complex medical comorbidities. Remote history of gastric bypass. Patient with chronic marginal ulcer and contained perforation , s/p perc drain now. Overnight patient stable, having some epigastric discomfort, wants to eat and drink. Vitals:    02/17/23 0455 02/17/23 0515 02/17/23 0832    BP:  128/84 124/79    Pulse:  (!) 120 (!) 121    Resp:  16 16    Temp:  97.8 °F (36.6 °C) 98.2 °F (36.8 °C)    TempSrc:  Oral Oral    SpO2:   96%    Weight: 221 lb 3.2 oz (100.3 kg)      Height:           In: 5068.1 [I.V.:618.4]  Out: 2250 [Urine:2000; Drains:250]         Abdomen is soft, nondistended, mildly tender only in epigastric region at site of perc drain, no guarding nor rigidity no peritoneal signs.   The rest of the abdominal exam is normal.    Data  CBC:   Lab Results   Component Value Date/Time    WBC 23.0 02/17/2023 07:06 AM    RBC 3.01 02/17/2023 07:06 AM    HGB 8.1 02/17/2023 07:06 AM    HCT 26.4 02/17/2023 07:06 AM    MCV 87.7 02/17/2023 07:06 AM    MCH 27.0 02/17/2023 07:06 AM    MCHC 30.8 02/17/2023 07:06 AM    RDW 16.7 02/17/2023 07:06 AM     02/17/2023 07:06 AM    MPV 9.9 02/17/2023 07:06 AM     BMP:    Lab Results   Component Value Date/Time     02/17/2023 07:06 AM    K 3.9 02/17/2023 07:06 AM    K 4.4 02/07/2023 03:39 AM    CL 99 02/17/2023 07:06 AM    CO2 37 02/17/2023 07:06 AM    BUN 35 02/17/2023 07:06 AM    LABALBU 3.2 02/07/2023 03:39 AM    CREATININE 1.0 02/17/2023 07:06 AM    CALCIUM 8.4 02/17/2023 07:06 AM    GFRAA 38 09/20/2022 04:14 AM    GFRAA 45 03/27/2013 01:36 PM    LABGLOM 59 02/17/2023 07:06 AM    GLUCOSE 105 02/17/2023 07:06 AM       Current Inpatient Medications    Current Facility-Administered Medications: morphine (PF) injection 2 mg, 2 mg, IntraVENous, Q4H PRN  PN-Adult Premix 5/20 - Standard Electrolytes - Central Line, , IntraVENous, Continuous TPN  metoprolol (LOPRESSOR) injection 2.5 mg, 2.5 mg, IntraVENous, Q6H  pantoprazole (PROTONIX) injection 40 mg, 40 mg, IntraVENous, BID  [START ON 2/18/2023] furosemide (LASIX) injection 20 mg, 20 mg, IntraVENous, Daily  iron sucrose (VENOFER) 200 mg in sodium chloride 0.9 % 100 mL IVPB, 200 mg, IntraVENous, Q24H  metoprolol (LOPRESSOR) injection 2.5 mg, 2.5 mg, IntraVENous, Q6H PRN  meropenem (MERREM) 500 mg in sodium chloride 0.9 % 100 mL IVPB (mini-bag), 500 mg, IntraVENous, q8h  [COMPLETED] anidulafungin (ERAXIS) 200 mg in sodium chloride 0.9 % 260 mL IVPB, 200 mg, IntraVENous, Once **AND** anidulafungin (ERAXIS) 100 mg in sodium chloride 0.9 % 130 mL IVPB, 100 mg, IntraVENous, Q24H  dextrose bolus 10% 125 mL, 125 mL, IntraVENous, PRN **OR** dextrose bolus 10% 250 mL, 250 mL, IntraVENous, PRN  glucagon (rDNA) injection 1 mg, 1 mg, SubCUTAneous, PRN  dextrose 10 % infusion, , IntraVENous, Continuous PRN  insulin lispro (HUMALOG) injection vial 0-4 Units, 0-4 Units, SubCUTAneous, Q6H  fat emulsion 20 % infusion 250 mL, 250 mL, IntraVENous, Once per day on Mon Thu  sodium chloride flush 0.9 % injection 5-40 mL, 5-40 mL, IntraVENous, 2 times per day  sodium chloride flush 0.9 % injection 5-40 mL, 5-40 mL, IntraVENous, PRN  0.9 % sodium chloride infusion, 25 mL, IntraVENous, PRN  ipratropium-albuterol (DUONEB) nebulizer solution 1 ampule, 1 ampule, Inhalation, Q4H PRN  lidocaine PF 1 % injection 5 mL, 5 mL, IntraDERmal, Once  sodium chloride flush 0.9 % injection 5-40 mL, 5-40 mL, IntraVENous, 2 times per day  sodium chloride flush 0.9 % injection 5-40 mL, 5-40 mL, IntraVENous, PRN  0.9 % sodium chloride infusion, 25 mL, IntraVENous, PRN  diphenhydrAMINE (BENADRYL) tablet 50 mg, 50 mg, Oral, Q6H PRN  ondansetron (ZOFRAN) injection 4 mg, 4 mg, IntraVENous, Q6H PRN  albuterol sulfate HFA (PROVENTIL;VENTOLIN;PROAIR) 108 (90 Base) MCG/ACT inhaler 2 puff, 2 puff, Inhalation, Q4H PRN  amiodarone (CORDARONE) tablet 200 mg, 200 mg, Oral, Daily  [Held by provider] apixaban (ELIQUIS) tablet 5 mg, 5 mg, Oral, BID  traMADol (ULTRAM) tablet 50 mg, 50 mg, Oral, Q6H PRN  sodium chloride flush 0.9 % injection 5-40 mL, 5-40 mL, IntraVENous, 2 times per day  sodium chloride flush 0.9 % injection 5-40 mL, 5-40 mL, IntraVENous, PRN  0.9 % sodium chloride infusion, , IntraVENous, PRN  ondansetron (ZOFRAN-ODT) disintegrating tablet 4 mg, 4 mg, Oral, Q8H PRN **OR** ondansetron (ZOFRAN) injection 4 mg, 4 mg, IntraVENous, Q6H PRN  polyethylene glycol (GLYCOLAX) packet 17 g, 17 g, Oral, Daily PRN  acetaminophen (TYLENOL) tablet 650 mg, 650 mg, Oral, Q6H PRN **OR** acetaminophen (TYLENOL) suppository 650 mg, 650 mg, Rectal, Q6H PRN    Patient Active Problem List   Diagnosis    Atrial fibrillation ([facility])    Primary hypertension    Severe episode of recurrent major depressive disorder, without psychotic features ([facility]    Seasonal affective disorder ([facility])    Class 2 severe obesity due to excess calories with serious comorbidity and body mass index (BMI) of 39.0 to 39.9 in adult ([facility])    Elevated sed rate    Neutrophilia    Acute kidney injury ([facility]    Elevated C-reactive protein (CRP)    GIANNA (obstructive sleep apnea)    Morbid obesity ([facility])    Weight loss counseling, encounter for    Atypical atrial flutter ([facility])    PAF (paroxysmal atrial fibrillation) ([facility])    ILD (interstitial lung disease) ([facility])    Iron deficiency anemia due to chronic blood loss    Iron deficiency anemia, unspecified    Age-related osteoporosis without current pathological fracture    Vasovagal syncope    CKD (chronic kidney disease) stage 4, GFR 15-29 ml/min ([facility])    Acute on chronic diastolic heart failure ([facility])    Stage 3b chronic kidney disease ([facility])    B12 deficiency    Vitamin D deficiency    Acute respiratory failure ([facility])    Chronic obstructive pulmonary disease, unspecified    Sepsis ([facility]    Cellulitis of right leg    Obesity (BMI 30-39. 9)    Stage 3 chronic kidney disease (HCC)    Bacteremia    Fever and chills    History of ankle surgery    Streptococcal infection group G    Bacterial pneumonia    JUSTINA (acute kidney injury) ([facility]    Acute on chronic heart failure with preserved ejection fraction (HFpEF) (HCC)    Right upper lobe consolidation (HCC)    Allergic drug reaction    Elevated d-dimer    Left nephrolithiasis    Diverticulosis    History of cholecystectomy    Intra-abdominal abscess ([facility]    Bandemia    Atypical pneumonia       A/P  Clista Rash is a 78 y.o. female status post post open gastric bypass in [place] several years ago. Patient with history of chronic marginal ulcer that required intervention multiple times. Unfortunately failed to follow-up and was not compliant with her PPIs. Presented with multiple significant medical problems and had 1 episode of hematemesis. None since then. Complex medical history as well as complicated surgical history. Admitted with UTI, acute kidney injury, congestive heart failure, A-fib, and other complex medical history. Now Patient with contained marginal ulcer perforation s/p perc drain. Cultures from drain only candida so far, ID following pt and manages abx. PICC line and TPN on. Overnight no new events nor complaints. Will keep NPO over weekend to give time for ulcer to heal, need repeat CT abdomen with oral contrast on Monday to see if persistent leak or not. As far anticoagulation, pt with complex issues, episode of hematemesis, anemia, perforated marginal ulcer , kidney issues, afib, limited mobility. Would get Cardiac input with regards her Afib and anticoagulation options / necessities. if H/H remains stable tomorrow, then can resume SQ DVT prophylaxis, will defer full IV anticoagulation to Cardiology.  However need to continue monitoring H/H regardless.          - N.P.O. except mouth swabs for comfort   - TPN  - SCDs  - PPI  - Continue to monitor CBC  - Await cultures from drain  - CT abdomen / pelvis with oral contrast on Monday           Continue care per 1ry team     Will follow on Monday, please call with questions over weekend.

## 2023-02-18 NOTE — PROGRESS NOTES
[facility] PROGRESS NOTE    2/18/2023 6:14 PM        Name: Abhijeet Tafoya . Admitted: 2/7/2023  Primary Care Provider: MARILEE Morgan CNP (Tel: [phone])                        Subjective:   Patient seen and examined. I cell count at 19,000  No nausea vomiting  No headache  No fevers        Reviewed interval ancillary notes    Current Medications  Reviewed    Objective:  /88   Pulse (!) 111   Temp 98.6 °F (37 °C) (Oral)   Resp 16   Ht 5' 7\" (1.702 m)   Wt 222 lb (100.7 kg)   SpO2 97%   BMI 34.77 kg/m²     Intake/Output Summary (Last 24 hours) at 2/18/2023 1814  Last data filed at 2/17/2023 1923  Gross per 24 hour   Intake 5068.08 ml   Output 1600 ml   Net 3468.08 ml        Wt Readings from Last 3 Encounters:   02/18/23 222 lb (100.7 kg)   12/24/22 236 lb 14.4 oz (107.5 kg)   12/15/22 235 lb (106.6 kg)       General appearance:  Appears comfortable  Eyes: Sclera clear. Pupils equal.  ENT: Moist oral mucosa. Trachea midline, no adenopathy. Cardiovascular: Regular rhythm, normal S1, S2. No murmur. No edema in lower extremities  Respiratory: Not using accessory muscles. Good inspiratory effort. Clear to auscultation bilaterally, no wheeze or crackles. GI: Abdomen soft, no tenderness, not distended, normal bowel sounds  Musculoskeletal: No cyanosis in digits, neck supple  Neurology: CN 2-12 grossly intact. No speech or motor deficits  Psych: Normal affect. Alert and oriented in time, place and person  Skin: Warm, dry, normal turgor  PERC drain in place with dark output.       Labs and Tests:  CBC:   Recent Labs     02/16/23  0410 02/17/23  0706 02/18/23  0655   WBC 22.2* 23.0* 19.3*   HGB 8.3* 8.1* 7.6*    187 180       BMP:    Recent Labs     02/16/23  0410 02/17/23  0706 02/18/23  0655    140 144   K 3.8 3.9 4.0    99 103   CO2 34* 37* 39*   BUN 39* 35* 34*   CREATININE 1.2 1.0 1.0   GLUCOSE 151* 105* 96       Hepatic:   Recent Labs     02/18/23  0655   AST 15   ALT <5*   BILITOT 0.3   ALKPHOS 80       Discussed care with patient       Problem List  Principal Problem:    Bacterial pneumonia  Active Problems:    Acute on chronic diastolic heart failure (HCC)    Stage 3 chronic kidney disease (HCC)    JUSTINA (acute kidney injury) (HCC)    Acute on chronic heart failure with preserved ejection fraction (HFpEF) (HCC)    Right upper lobe consolidation (HCC)    Allergic drug reaction    Elevated d-dimer    Left nephrolithiasis    Diverticulosis    History of cholecystectomy    Intra-abdominal abscess (HCC)    Bandemia    Atypical pneumonia    Multifocal pneumonia    Atrial fibrillation (HCC)    Primary hypertension    Class 2 severe obesity due to excess calories with serious comorbidity and body mass index (BMI) of 39.0 to 39.9 in adult ([address]    Acute kidney injury ([facility] [address]    GIANNA (obstructive sleep apnea)  Resolved Problems:    * No resolved hospital problems. *       Assessment & Plan:   79-year-old female who was admitted for acute CHF exacerbation with hypoxia and acute kidney injury. Patient was evaluated by cardiology and nephrology & had made significant improvement. However on the day of discharge she was noticed to have hematemesis for which STAT CT done showed findings compatible with endoleak likely a marginal ulcer with a developing 10 x 10 x 5 cm abscess centered between the Angel limb and gastric remnant. 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 CT: Endoleak with Intra-abdominal abscess, measuring 10 x 10 x 5 cm between the gastric remnant and Angel-en-Y limb. IR consulted: s/p perc drain placement on 2/15/2022. Abdominal fluid culture positive for Candida. Blood cultures remain negative to date.    Persistent leukocytosis although slowly downtrending  ID consulted: Currently on meropenem and anidulafungin. Bariatric surgeon consulted: Contained marginal ulcer perforation. No surgical intervention at this time. Continue STRICT NPO and TPN. Patient will be discharged on TPN. Needs to repeat a CT of the abdomen on Monday per bariatric surgery      Acute hypoxic respiratory failure: Improved  Likely due to CHF exacerbation, sepsis and underlying COPD. Currently on 2 L nasal cannula. Anemia: Multifactorial secondary to iron deficiency and anemia of chronic disease  Work-up consistent with MIRIAM/AoCD. Started on IV Venofer. Monitor H&H and transfuse as needed. Hematemesis: resolved. Continue Protonix IV twice daily for marginal ulcers. Afib with RVR:   Unable to resume amiodarone due to strict n.p.o. orders. Metoprolol changed to IV every 6h. Eliquis/AC on hold until cleared by surgery. I have reconsulted cardiology to see regarding continuing anticoagulation as per bariatric recommendations      Acute on chronic HFpEF:  Echo 12/21/2022: LVEF 55 to 60%. Cardiology consulted: Currently compensated with IV diuresis. Continue IV Lasix 20 mg daily until able to take oral.    COPD without acute exacerbation: Continue inhalers. JUSTINA: Resolved. Peak creatinine 2.8. Seen by nephrology  Creatinine down to 1        Diet: PN-Adult Premix 5/20 - Standard Electrolytes - Central Line  Diet NPO Exceptions are: Sips of Water with Meds  Code:Full Code  DVT PPX lovenox     Disposition: SNF once medically stable.       Rachael Guan MD   2/18/2023 6:14 PM

## 2023-02-18 NOTE — PROGRESS NOTES
Office : [phone]     Fax :[phone]       Nephrology progress  Note      Patient's Name: Bell Barker    2/18/2023          Chief Complaint:    Chief Complaint   Patient presents with    Abdominal Pain     Pt via EMS from home, c/o LUQ pain 10/10, has had gallbladder removed. Pt states pain is making her sob, 89% ora, put on 2L, pt received 325 mg aspirin, has been treated for cellulitis since December, new antibiotic last two weeks, states she has been getting hives and itching        History of Present Ilness:    Bell Barker is a 78 y.o. female who presented with complaints of shortness of breath. Patient apparently has been treated for cellulitis lower extremity on antibiotics patient started having worsening itching and hives. Came to the ER. Patient with increased shortness of breath. Also with leg swelling. No reported fevers chills. Patient was found to be hypoxic was placed on 2 L nasal cannula. Patient with history of chronic CHF COPD chronic kidney disease admitted with bilateral worse. Baseline creat is 1.2   Now elevated at 2.0     BNP 16483      Interval hx       No SOB     Good UOP  Creat improved . Now at baseline     Abdominal pain in control    On TPN     I/O last 3 completed shifts:   In: 5068.1 [I.V.:618.4; IV Piggyback:1028.6]  Out: 2250 [Urine:2000; Drains:250]    Past Medical History:   Diagnosis Date    Arthritis     Atrial fibrillation and flutter ([address]     Hypertension     Kidney disease     Pt states  \"stage 3\"    Pneumonia     Sleep apnea     no c-pap       Past Surgical History:   Procedure Laterality Date    CARDIOVERSION      COLONOSCOPY N/A 11/20/2018    COLONOSCOPY POLYPECTOMY SNARE/COLD BIOPSY [facility]    COLONOSCOPY N/A 03/31/2022    COLONOSCOPY POLYPECTOMY SNARE/COLD BIOPSY performed by Goldie Webb MD at [facility]      ankle rt has pins and rods, and rt elbow    GASTRIC BYPASS SURGERY      LARYNX SURGERY      TOTAL KNEE ARTHROPLASTY Bilateral 12/19/2012    bilateral knee replacements    TUBAL LIGATION      tubes tied    UPPER GASTROINTESTINAL ENDOSCOPY N/A 11/20/2018    EGD BIOPSY performed by Delmis Chicas MD at [facility] 03/31/2022    EGD DILATION BALLOON performed by Goldie Webb MD at [address] [place] [address] ENDOSCOPY N/A 03/31/2022    EGD BIOPSY performed by Goldie Webb MD at [address] [place] [address]       Family History   Problem Relation Age of Onset    Other Mother         blood clot    Cancer Father         stomach cancer    Stroke Brother          Current Medications:    PN-Adult Premix 5/20 - Standard Electrolytes - Central Line, Continuous TPN  morphine (PF) injection 2 mg, Q4H PRN  PN-Adult Premix 5/20 - Standard Electrolytes - Central Line, Continuous TPN  metoprolol (LOPRESSOR) injection 2.5 mg, Q6H  pantoprazole (PROTONIX) injection 40 mg, BID  furosemide (LASIX) injection 20 mg, Daily  iron sucrose (VENOFER) 200 mg in sodium chloride 0.9 % 100 mL IVPB, Q24H  metoprolol (LOPRESSOR) injection 2.5 mg, Q6H PRN  meropenem (MERREM) 500 mg in sodium chloride 0.9 % 100 mL IVPB (mini-bag), q8h  anidulafungin (ERAXIS) 100 mg in sodium chloride 0.9 % 130 mL IVPB, Q24H  dextrose bolus 10% 125 mL, PRN   Or  dextrose bolus 10% 250 mL, PRN  glucagon (rDNA) injection 1 mg, PRN  dextrose 10 % infusion, Continuous PRN  insulin lispro (HUMALOG) injection vial 0-4 Units, Q6H  fat emulsion 20 % infusion 250 mL, Once per day on Mon Thu  sodium chloride flush 0.9 % injection 5-40 mL, 2 times per day  sodium chloride flush 0.9 % injection 5-40 mL, PRN  0.9 % sodium chloride infusion, PRN  ipratropium-albuterol (DUONEB) nebulizer solution 1 ampule, Q4H PRN  lidocaine PF 1 % injection 5 mL, Once  sodium chloride flush 0.9 % injection 5-40 mL, 2 times per day  sodium chloride flush 0.9 % injection 5-40 mL, PRN  0.9 % sodium chloride infusion, PRN  diphenhydrAMINE (BENADRYL) tablet 50 mg, Q6H PRN  ondansetron (ZOFRAN) injection 4 mg, Q6H PRN  albuterol sulfate HFA (PROVENTIL;VENTOLIN;PROAIR) 108 (90 Base) MCG/ACT inhaler 2 puff, Q4H PRN  amiodarone (CORDARONE) tablet 200 mg, Daily  [Held by provider] apixaban (ELIQUIS) tablet 5 mg, BID  traMADol (ULTRAM) tablet 50 mg, Q6H PRN  sodium chloride flush 0.9 % injection 5-40 mL, 2 times per day  sodium chloride flush 0.9 % injection 5-40 mL, PRN  0.9 % sodium chloride infusion, PRN  ondansetron (ZOFRAN-ODT) disintegrating tablet 4 mg, Q8H PRN   Or  ondansetron (ZOFRAN) injection 4 mg, Q6H PRN  polyethylene glycol (GLYCOLAX) packet 17 g, Daily PRN  acetaminophen (TYLENOL) tablet 650 mg, Q6H PRN   Or  acetaminophen (TYLENOL) suppository 650 mg, Q6H PRN        Physical exam:     Vitals:  BP (!) 168/89   Pulse (!) 111   Temp 98.6 °F (37 °C) (Oral)   Resp 16   Ht 5' 7\" (1.702 m)   Wt 222 lb (100.7 kg)   SpO2 99%   BMI 34.77 kg/m²   Constitutional:  OAA X3 NAD  Skin: no rash, turgor wnl  Heent:  eomi, mmm  Neck: no bruits or jvd noted  Cardiovascular:  S1, S2 without m/r/g  Respiratory: CTA B without w/r/r  Abdomen:  +bs, soft, nt, nd  Ext: 1 +  lower extremity edema  Psychiatric: mood and affect appropriate  Musculoskeletal:  Rom, muscular strength intact    Labs:  CBC:   Recent Labs     02/16/23  0410 02/17/23  0706 02/18/23  0655   WBC 22.2* 23.0* 19.3*   HGB 8.3* 8.1* 7.6*    187 180     BMP:    Recent Labs     02/16/23  0410 02/17/23 0706 02/18/23 0655    140 144   K 3.8 3.9 4.0    99 103   CO2 34* 37* 39*   BUN 39* 35* 34*   CREATININE 1.2 1.0 1.0   GLUCOSE 151* 105* 96     Ca/Mg/Phos:   Recent Labs     02/16/23  0410 02/17/23  0706 02/18/23  0655   CALCIUM 8.3 8.4 8.2*   MG 1.90 1.80 2.00   PHOS 2.1* 2.3* 3.1     Hepatic:   Recent Labs     02/18/23  0655   AST 15   ALT <5*   BILITOT 0.3   ALKPHOS [de-identified]              IMAGING:  CT ABSCESS DRAINAGE W CATH PLACEMENT S&I   Final Result   Successful CT-guided percutaneous drainage catheter placement into a   perigastric collection via a left anterolateral abdominal approach. Patient currently has a 14 Yoruba locking pigtail drainage catheter within   the collection, placed to bulb suction. CT GUIDED NEEDLE PLACEMENT   Final Result   Successful CT-guided percutaneous drainage catheter placement into a   perigastric collection via a left anterolateral abdominal approach. Patient currently has a 14 Yoruba locking pigtail drainage catheter within   the collection, placed to bulb suction. XR CHEST PORTABLE   Final Result   1. Right upper extremity PICC extends superiorly into the right internal   jugular vein; tip is excluded from the field of view. Recommend   repositioning. 2.  Bilateral patchy airspace disease, improved from prior. Discussed with Dr. Apoorva Keene by Dr. Keshia Mejía at 4:55 am on 2/15/2023         CT ABDOMEN WO CONTRAST Additional Contrast? Oral   Final Result   1. 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  Findings compatible with a leak likely a marginal ulcer with   a developing 10 x 10 x 5 cm abscess centered between the Angel limb and   gastric remnant. Adjacent inflammatory change and left upper quadrant edema   has progressed from 02/07/2023. Trace amount of enteric contrast leak. A   couple punctate foci of free air noted in the anterior abdomen. Findings were discussed with Dr. Destiny Akers at 5:45 pm on 2/13/2023. CT ABDOMEN WO CONTRAST Additional Contrast? None   Final Result   Postsurgical change from gastric bypass. There is fat stranding surrounding   the afferent loop.   There is fluid and gas seen in the upper abdomen, near   the anastomotic site, that is not all definitively intraluminal, with   surrounding fat stranding, raising the question of perforation. Increased pleural-parenchymal disease at the left lung base      The findings were sent to the Radiology Results [address] at 1:17   pm on 2/13/2023 to be communicated to a licensed caregiver. CT CHEST ABDOMEN PELVIS WO CONTRAST Additional Contrast? None   Final Result   1. Scattered ground-glass opacities with interlobular septal thickening. Findings may be related to pulmonary edema with other considerations   including an inflammatory/infectious process in the appropriate clinical   setting. 2. Right upper lobe consolidation. Additional bilateral scattered curvilinear   opacities may be related to atelectasis versus developing consolidation. 3. Enlarged pulmonary artery diameter. Finding may be related to pulmonary   arterial hypertension. 4. Coronary artery calcifications present. 5. Moderate hiatal hernia. 6. Questionable circumferential thickening of the descending colon with mild   surrounding fat stranding versus colonic underdistention. Finding raises the   possibility of colitis in the appropriate clinical setting. 7. Colonic diverticulosis, predominately sigmoid. No evidence of acute   diverticulitis. 8. Nonobstructing punctate left nephrolithiasis. RECOMMENDATIONS:   2 cm left adrenal mass, consistent with lipid-rich benign adenoma. No   follow-up imaging is recommended. JACR 2017 Aug; 14(8):1038-44, JCAT 2016 [facility]; 40(2):194-200, Urol J 2006   [place]; 3(2):71-4. XR CHEST PORTABLE   Final Result   1. Diffuse hazy multifocal opacity throughout both lungs, right worse than   left, likely a combination of moderate pulmonary edema and multifocal   pneumonia. 2. Stable cardiomegaly. 3. Stable hiatal hernia.                  Assessment/Plan :      1. Panda Creatinine  improved   Has mild edema   Low dose  lasix         2. HTN. BP low borderline range   Hold clonidine if SBP < 110 mm HG     3. COPD    4. H/o atrial fib   Monitor       5. Hyperkalemia   Resolved after getting lokelma     6. Abdominal  pain .  CT scan shows there has been interval development of a poorly  marginated approximately 10 x 10 x 5 cm fluid collection with foci of gas and  small amounts of enteric contrast compatible with a developing abscess  Surgery on board    S/p IR drain      Continue TPN   Monitor electrolytes         D/w primary team      Thank you for allowing us to participate in care of Rajan Gaitan         Electronically signed by: Sully Eduardo MD, 2/18/2023, 12:25 PM      [facility] [address]  Office : [phone]  Fax :[phone]

## 2023-02-19 ENCOUNTER — APPOINTMENT (OUTPATIENT)
Dept: GENERAL RADIOLOGY | Age: 78
DRG: 291 | End: 2023-02-19
Payer: COMMERCIAL

## 2023-02-19 PROBLEM — K92.0 HEMATEMESIS: Status: ACTIVE | Noted: 2023-02-19

## 2023-02-19 LAB
A/G RATIO: 0.6 (ref 1.1–2.2)
ALBUMIN SERPL-MCNC: 2.1 G/DL (ref 3.4–5)
ALP BLD-CCNC: 94 U/L (ref 40–129)
ALT SERPL-CCNC: 6 U/L (ref 10–40)
ANION GAP SERPL CALCULATED.3IONS-SCNC: 1 MMOL/L (ref 3–16)
ANISOCYTOSIS: ABNORMAL
AST SERPL-CCNC: 24 U/L (ref 15–37)
BANDED NEUTROPHILS RELATIVE PERCENT: 6 % (ref 0–7)
BASOPHILS ABSOLUTE: 0 K/UL (ref 0–0.2)
BASOPHILS RELATIVE PERCENT: 0 %
BILIRUB SERPL-MCNC: 0.4 MG/DL (ref 0–1)
BUN BLDV-MCNC: 28 MG/DL (ref 7–20)
CALCIUM SERPL-MCNC: 8.5 MG/DL (ref 8.3–10.6)
CHLORIDE BLD-SCNC: 101 MMOL/L (ref 99–110)
CO2: 39 MMOL/L (ref 21–32)
CREAT SERPL-MCNC: 1 MG/DL (ref 0.6–1.2)
EOSINOPHILS ABSOLUTE: 0.7 K/UL (ref 0–0.6)
EOSINOPHILS RELATIVE PERCENT: 4 %
GFR SERPL CREATININE-BSD FRML MDRD: 59 ML/MIN/{1.73_M2}
GLUCOSE BLD-MCNC: 170 MG/DL (ref 70–99)
GLUCOSE BLD-MCNC: 80 MG/DL (ref 70–99)
GLUCOSE BLD-MCNC: 80 MG/DL (ref 70–99)
GLUCOSE BLD-MCNC: 82 MG/DL (ref 70–99)
GLUCOSE BLD-MCNC: 86 MG/DL (ref 70–99)
GLUCOSE BLD-MCNC: 88 MG/DL (ref 70–99)
GLUCOSE BLD-MCNC: 95 MG/DL (ref 70–99)
HCT VFR BLD CALC: 25.7 % (ref 36–48)
HEMOGLOBIN: 8.1 G/DL (ref 12–16)
LYMPHOCYTES ABSOLUTE: 0.5 K/UL (ref 1–5.1)
LYMPHOCYTES RELATIVE PERCENT: 3 %
MAGNESIUM: 1.8 MG/DL (ref 1.8–2.4)
MCH RBC QN AUTO: 27.9 PG (ref 26–34)
MCHC RBC AUTO-ENTMCNC: 31.5 G/DL (ref 31–36)
MCV RBC AUTO: 88.6 FL (ref 80–100)
METAMYELOCYTES RELATIVE PERCENT: 1 %
MICROCYTES: ABNORMAL
MONOCYTES ABSOLUTE: 1.8 K/UL (ref 0–1.3)
MONOCYTES RELATIVE PERCENT: 10 %
NEUTROPHILS ABSOLUTE: 15.2 K/UL (ref 1.7–7.7)
NEUTROPHILS RELATIVE PERCENT: 76 %
OVALOCYTES: ABNORMAL
PDW BLD-RTO: 17.2 % (ref 12.4–15.4)
PERFORMED ON: ABNORMAL
PERFORMED ON: NORMAL
PHOSPHORUS: 2.7 MG/DL (ref 2.5–4.9)
PLATELET # BLD: 203 K/UL (ref 135–450)
PLATELET SLIDE REVIEW: ADEQUATE
PMV BLD AUTO: 10.8 FL (ref 5–10.5)
POTASSIUM SERPL-SCNC: 4.5 MMOL/L (ref 3.5–5.1)
RBC # BLD: 2.9 M/UL (ref 4–5.2)
SLIDE REVIEW: ABNORMAL
SODIUM BLD-SCNC: 141 MMOL/L (ref 136–145)
TOTAL PROTEIN: 5.6 G/DL (ref 6.4–8.2)
TOXIC GRANULATION: PRESENT
WBC # BLD: 18.3 K/UL (ref 4–11)

## 2023-02-19 PROCEDURE — 6360000002 HC RX W HCPCS: Performed by: INTERNAL MEDICINE

## 2023-02-19 PROCEDURE — 2580000003 HC RX 258: Performed by: HOSPITALIST

## 2023-02-19 PROCEDURE — 71045 X-RAY EXAM CHEST 1 VIEW: CPT

## 2023-02-19 PROCEDURE — 2500000003 HC RX 250 WO HCPCS: Performed by: INTERNAL MEDICINE

## 2023-02-19 PROCEDURE — 2580000003 HC RX 258: Performed by: INTERNAL MEDICINE

## 2023-02-19 PROCEDURE — C1751 CATH, INF, PER/CENT/MIDLINE: HCPCS

## 2023-02-19 PROCEDURE — 02HV33Z INSERTION OF INFUSION DEVICE INTO SUPERIOR VENA CAVA, PERCUTANEOUS APPROACH: ICD-10-PCS | Performed by: HOSPITALIST

## 2023-02-19 PROCEDURE — 99233 SBSQ HOSP IP/OBS HIGH 50: CPT | Performed by: INTERNAL MEDICINE

## 2023-02-19 PROCEDURE — 36569 INSJ PICC 5 YR+ W/O IMAGING: CPT

## 2023-02-19 PROCEDURE — 6360000002 HC RX W HCPCS: Performed by: HOSPITALIST

## 2023-02-19 PROCEDURE — 2500000003 HC RX 250 WO HCPCS: Performed by: HOSPITALIST

## 2023-02-19 PROCEDURE — 36592 COLLECT BLOOD FROM PICC: CPT

## 2023-02-19 PROCEDURE — 85025 COMPLETE CBC W/AUTO DIFF WBC: CPT

## 2023-02-19 PROCEDURE — 1200000000 HC SEMI PRIVATE

## 2023-02-19 PROCEDURE — 99232 SBSQ HOSP IP/OBS MODERATE 35: CPT | Performed by: INTERNAL MEDICINE

## 2023-02-19 PROCEDURE — C9113 INJ PANTOPRAZOLE SODIUM, VIA: HCPCS | Performed by: INTERNAL MEDICINE

## 2023-02-19 PROCEDURE — 6360000002 HC RX W HCPCS: Performed by: NURSE PRACTITIONER

## 2023-02-19 PROCEDURE — 80053 COMPREHEN METABOLIC PANEL: CPT

## 2023-02-19 PROCEDURE — 2580000003 HC RX 258: Performed by: NURSE PRACTITIONER

## 2023-02-19 PROCEDURE — 84100 ASSAY OF PHOSPHORUS: CPT

## 2023-02-19 PROCEDURE — 83735 ASSAY OF MAGNESIUM: CPT

## 2023-02-19 PROCEDURE — 6370000000 HC RX 637 (ALT 250 FOR IP): Performed by: HOSPITALIST

## 2023-02-19 RX ORDER — ENOXAPARIN SODIUM 100 MG/ML
40 INJECTION SUBCUTANEOUS DAILY
Status: DISCONTINUED | OUTPATIENT
Start: 2023-02-19 | End: 2023-02-23 | Stop reason: HOSPADM

## 2023-02-19 RX ADMIN — IRON SUCROSE 200 MG: 20 INJECTION, SOLUTION INTRAVENOUS at 19:03

## 2023-02-19 RX ADMIN — SODIUM CHLORIDE 100 MG: 9 INJECTION, SOLUTION INTRAVENOUS at 21:32

## 2023-02-19 RX ADMIN — PANTOPRAZOLE SODIUM 40 MG: 40 INJECTION, POWDER, LYOPHILIZED, FOR SOLUTION INTRAVENOUS at 08:58

## 2023-02-19 RX ADMIN — METOPROLOL TARTRATE 2.5 MG: 1 INJECTION, SOLUTION INTRAVENOUS at 19:26

## 2023-02-19 RX ADMIN — AMIODARONE HYDROCHLORIDE 200 MG: 200 TABLET ORAL at 08:58

## 2023-02-19 RX ADMIN — ONDANSETRON 4 MG: 2 INJECTION INTRAMUSCULAR; INTRAVENOUS at 23:14

## 2023-02-19 RX ADMIN — METOPROLOL TARTRATE 2.5 MG: 1 INJECTION, SOLUTION INTRAVENOUS at 12:30

## 2023-02-19 RX ADMIN — PANTOPRAZOLE SODIUM 40 MG: 40 INJECTION, POWDER, LYOPHILIZED, FOR SOLUTION INTRAVENOUS at 21:25

## 2023-02-19 RX ADMIN — MEROPENEM 500 MG: 1 INJECTION, POWDER, FOR SOLUTION INTRAVENOUS at 01:24

## 2023-02-19 RX ADMIN — FUROSEMIDE 20 MG: 10 INJECTION, SOLUTION INTRAMUSCULAR; INTRAVENOUS at 08:58

## 2023-02-19 RX ADMIN — MEROPENEM 500 MG: 1 INJECTION, POWDER, FOR SOLUTION INTRAVENOUS at 15:47

## 2023-02-19 RX ADMIN — ENOXAPARIN SODIUM 40 MG: 100 INJECTION SUBCUTANEOUS at 15:53

## 2023-02-19 RX ADMIN — MEROPENEM 500 MG: 1 INJECTION, POWDER, FOR SOLUTION INTRAVENOUS at 23:45

## 2023-02-19 RX ADMIN — Medication 10 ML: at 08:59

## 2023-02-19 RX ADMIN — Medication 10 ML: at 09:00

## 2023-02-19 RX ADMIN — METOPROLOL TARTRATE 2.5 MG: 1 INJECTION, SOLUTION INTRAVENOUS at 06:29

## 2023-02-19 RX ADMIN — METOPROLOL TARTRATE 2.5 MG: 1 INJECTION, SOLUTION INTRAVENOUS at 23:33

## 2023-02-19 RX ADMIN — METOPROLOL TARTRATE 2.5 MG: 1 INJECTION, SOLUTION INTRAVENOUS at 01:21

## 2023-02-19 RX ADMIN — LEUCINE, PHENYLALANINE, LYSINE, METHIONINE, ISOLEUCINE, VALINE, HISTIDINE, THREONINE, TRYPTOPHAN, ALANINE, GLYCINE, ARGININE, PROLINE, SERINE, TYROSINE, SODIUM ACETATE, DIBASIC POTASSIUM PHOSPHATE, MAGNESIUM CHLORIDE, SODIUM CHLORIDE, CALCIUM CHLORIDE, DEXTROSE
365; 280; 290; 200; 300; 290; 240; 210; 90; 1035; 515; 575; 340; 250; 20; 340; 261; 51; 59; 33; 20 INJECTION INTRAVENOUS at 19:04

## 2023-02-19 RX ADMIN — Medication 10 ML: at 08:58

## 2023-02-19 RX ADMIN — MORPHINE SULFATE 2 MG: 2 INJECTION, SOLUTION INTRAMUSCULAR; INTRAVENOUS at 23:33

## 2023-02-19 RX ADMIN — MEROPENEM 500 MG: 1 INJECTION, POWDER, FOR SOLUTION INTRAVENOUS at 09:19

## 2023-02-19 ASSESSMENT — PAIN DESCRIPTION - ONSET: ONSET: SUDDEN

## 2023-02-19 ASSESSMENT — PAIN DESCRIPTION - FREQUENCY: FREQUENCY: INTERMITTENT

## 2023-02-19 ASSESSMENT — PAIN SCALES - GENERAL: PAINLEVEL_OUTOF10: 6

## 2023-02-19 ASSESSMENT — PAIN DESCRIPTION - LOCATION: LOCATION: ABDOMEN

## 2023-02-19 ASSESSMENT — PAIN DESCRIPTION - ORIENTATION: ORIENTATION: MID

## 2023-02-19 ASSESSMENT — PAIN DESCRIPTION - PAIN TYPE: TYPE: ACUTE PAIN

## 2023-02-19 ASSESSMENT — PAIN DESCRIPTION - DESCRIPTORS: DESCRIPTORS: CRAMPING

## 2023-02-19 NOTE — PROGRESS NOTES
Right arm midline removed, tubing intact, pressure applied to site, gauze and tegaderm dressing applied, patient tolerated well, will continue to monitor

## 2023-02-19 NOTE — PROGRESS NOTES
[facility]   Electrophysiology Progress Note     Admit Date: 2023     Reason for follow up: Atrial fibrillation , hematemesis    HPI and Interval History:   Patient seen and examined. Clinical notes reviewed. Telemetry reviewed. No new complaint today. No major events overnight. Denies having chest pain, shortness of breath, dyspnea on exertion, Orthopnea, PND at the time of this visit. She has been n.p.o. She is post open gastric bypass several years ago. She presented with 1 episode of hematemesis. She was found to have a contained marginal ulcer perforation status post percutaneous drain. She has been n.p.o. over the week and plan is to continue until at least Monday and repeat a CT with contrast.  Review of System:  All other systems reviewed and are negative except for that noted above. Pertinent negatives are:     General: negative for fever, chills   Ophthalmic ROS: negative for - eye pain or loss of vision  ENT ROS: negative for - headaches, sore throat   Respiratory: negative for - cough, sputum  Cardiovascular: Reviewed in HPI  Gastrointestinal: negative for - abdominal pain, diarrhea, N/V  Hematology: negative for - bleeding, blood clots, bruising or jaundice  Genito-Urinary:  negative for - Dysuria or incontinence  Musculoskeletal: negative for - Joint swelling, muscle pain  Neurological: negative for - confusion, dizziness, headaches   Psychiatric: No anxiety, no depression.   Dermatological: negative for - rash      Physical Examination:  Vitals:    23 0758   BP: (!) 174/82   Pulse: (!) 113   Resp: 18   Temp: 97.6 °F (36.4 °C)   SpO2: 97%      In: 62.5 [I.V.:54.2]  Out: 875    Wt Readings from Last 3 Encounters:   23 222 lb 14.4 oz (101.1 kg)   22 236 lb 14.4 oz (107.5 kg)   12/15/22 235 lb (106.6 kg)     Temp  Av °F (36.7 °C)  Min: 97.6 °F (36.4 °C)  Max: 98.4 °F (36.9 °C)  Pulse  Av.8  Min: 101  Max: 113  BP  Min: 136/88  Max: 174/82  SpO2  Avg: 96.4 %  Min: 95 %  Max: 97 %    Intake/Output Summary (Last 24 hours) at 2/19/2023 1127  Last data filed at 2/19/2023 0550  Gross per 24 hour   Intake 62.53 ml   Output 875 ml   Net -812.47 ml       Telemetry: Atrial fibrillation flutter  Constitutional: Oriented. No distress. Head: Normocephalic and atraumatic. Mouth/Throat: Oropharynx is clear and moist.   Eyes: Conjunctivae normal. EOM are normal.   Neck: Neck supple. No rigidity. No JVD present. Cardiovascular: Normal rate, irregular rhythm, S1&S2. Pulmonary/Chest: Bilateral respiratory sounds. No wheezes, No rhonchi. Abdominal: Soft. Bowel sounds present. No distension,  tenderness. Musculoskeletal: No tenderness. No edema    Lymphadenopathy: Has no cervical adenopathy. Neurological: Alert and oriented. Cranial nerve appears intact, No Gross deficit   Skin: Skin is warm and dry. No rash noted. Psychiatric: Has a normal behavior     Labs, diagnostic and imaging results reviewed. Reviewed. Recent Labs     02/17/23  0706 02/18/23  0655 02/19/23  0425    144 141   K 3.9 4.0 4.5   CL 99 103 101   CO2 37* 39* 39*   PHOS 2.3* 3.1 2.7   BUN 35* 34* 28*   CREATININE 1.0 1.0 1.0     Recent Labs     02/17/23  0706 02/18/23  0655 02/19/23  0425   WBC 23.0* 19.3* 18.3*   HGB 8.1* 7.6* 8.1*   HCT 26.4* 24.2* 25.7*   MCV 87.7 88.4 88.6    180 203     Lab Results   Component Value Date/Time    TROPONINI 0.01 12/20/2022 04:00 AM     Estimated Creatinine Clearance: 59 mL/min (based on SCr of 1 mg/dL).    No results found for: BNP  Lab Results   Component Value Date/Time    PROTIME 17.0 02/16/2023 04:10 AM    PROTIME 19.1 02/15/2023 05:03 AM    PROTIME 20.8 02/14/2023 02:24 PM    INR 1.39 02/16/2023 04:10 AM    INR 1.61 02/15/2023 05:03 AM    INR 1.79 02/14/2023 02:24 PM     Lab Results   Component Value Date/Time    CHOL 142 03/15/2021 10:18 AM    HDL 51 03/15/2021 10:18 AM    TRIG 56 03/15/2021 10:18 AM       Scheduled Meds:   lidocaine 1 % injection  5 mL IntraDERmal Once    sodium chloride flush  5-40 mL IntraVENous 2 times per day    metoprolol  2.5 mg IntraVENous Q6H    pantoprazole  40 mg IntraVENous BID    furosemide  20 mg IntraVENous Daily    iron sucrose (VENOFER) iv piggyback 100 mL  200 mg IntraVENous Q24H    meropenem  500 mg IntraVENous q8h    anidulafungin  100 mg IntraVENous Q24H    insulin lispro  0-4 Units SubCUTAneous Q6H    fat emulsion  250 mL IntraVENous Once per day on Mon Thu    sodium chloride flush  5-40 mL IntraVENous 2 times per day    lidocaine 1 % injection  5 mL IntraDERmal Once    sodium chloride flush  5-40 mL IntraVENous 2 times per day    amiodarone  200 mg Oral Daily    [Held by provider] apixaban  5 mg Oral BID    sodium chloride flush  5-40 mL IntraVENous 2 times per day     Continuous Infusions:   PN-Adult Premix 5/20 - Standard Electrolytes - Central Line Stopped (02/18/23 2000)    dextrose 5 % and 0.45 % NaCl 75 mL/hr at 02/18/23 2130    sodium chloride      dextrose      sodium chloride      sodium chloride Stopped (02/16/23 1814)    sodium chloride 100 mL/hr at 02/16/23 1704     PRN Meds:sodium chloride flush, sodium chloride, morphine, metoprolol, dextrose bolus **OR** dextrose bolus, glucagon (rDNA), dextrose, sodium chloride flush, sodium chloride, ipratropium-albuterol, sodium chloride flush, sodium chloride, diphenhydrAMINE, ondansetron, albuterol sulfate HFA, traMADol, sodium chloride flush, sodium chloride, ondansetron **OR** ondansetron, polyethylene glycol, acetaminophen **OR** acetaminophen     Patient Active Problem List    Diagnosis Date Noted    Multifocal pneumonia 02/17/2023    Left nephrolithiasis 02/14/2023    Diverticulosis 02/14/2023    History of cholecystectomy 02/14/2023    Intra-abdominal abscess ([facility] [address] 02/14/2023    Bandemia 02/14/2023    Atypical pneumonia 02/14/2023    Right upper lobe consolidation ([facility] [address] 02/12/2023    Allergic drug reaction 02/12/2023    Elevated d-dimer 02/12/2023 Acute on chronic heart failure with preserved ejection fraction (HFpEF) ([facility] [address] 02/08/2023    Bacterial pneumonia 02/07/2023    JUSTINA (acute kidney injury) ([facility] [address] 02/07/2023    Streptococcal infection group G 12/22/2022    Cellulitis of right leg 12/21/2022    Obesity (BMI 30-39.9) 12/21/2022    Stage 3 chronic kidney disease ([address] 12/21/2022    Bacteremia 12/21/2022    Fever and chills 12/21/2022    History of ankle surgery 12/21/2022    Sepsis ([address] 12/20/2022    Chronic obstructive pulmonary disease, unspecified 12/15/2022    Acute respiratory failure ([facility] [address] 09/13/2022    CKD (chronic kidney disease) stage 4, GFR 15-29 ml/min ([facility]) 08/29/2022    Acute on chronic diastolic heart failure ([facility] [address] 08/29/2022    Stage 3b chronic kidney disease ([facility] [address] 08/29/2022    B12 deficiency 08/29/2022    Vitamin D deficiency 08/29/2022    Vasovagal syncope 06/10/2022    Iron deficiency anemia, unspecified 04/22/2022    Age-related osteoporosis without current pathological fracture 05/09/2022    Iron deficiency anemia due to chronic blood loss 04/08/2022    ILD (interstitial lung disease) ([facility] [address] 04/05/2022    PAF (paroxysmal atrial fibrillation) ([facility] [address] 03/30/2022    Atypical atrial flutter (HCC)     Weight loss counseling, encounter for     Elevated sed rate     Neutrophilia     Acute kidney injury ([address]     Elevated C-reactive protein (CRP)     GIANNA (obstructive sleep apnea)     Morbid obesity (HCC)     Severe episode of recurrent major depressive disorder, without psychotic features ([address] 02/11/2018    Seasonal affective disorder ([facility] [address] 02/11/2018    Class 2 severe obesity due to excess calories with serious comorbidity and body mass index (BMI) of 39.0 to 39.9 in adult [facility]) 02/11/2018    Atrial fibrillation ([address] 01/21/2016    Primary hypertension 01/21/2016      Active Hospital Problems    Diagnosis Date Noted    Multifocal pneumonia [J18.9] 02/17/2023     Priority: Medium    Left nephrolithiasis [N20.0] 02/14/2023     Priority: Medium Diverticulosis [K57.90] 02/14/2023     Priority: Medium    History of cholecystectomy [Z90.49] 02/14/2023     Priority: Medium    Intra-abdominal abscess ([facility] [address] [K65.1] 02/14/2023     Priority: Medium    Bandemia [D72.825] 02/14/2023     Priority: Medium    Atypical pneumonia [J18.9] 02/14/2023     Priority: Medium    Right upper lobe consolidation ([address] [J18.1] 02/12/2023     Priority: Medium    Allergic drug reaction [T78.40XA] 02/12/2023     Priority: Medium    Elevated d-dimer [R79.89] 02/12/2023     Priority: Medium    Acute on chronic heart failure with preserved ejection fraction (HFpEF) ([facility] [address] [I50.33] 02/08/2023     Priority: Medium    Bacterial pneumonia [J15.9] 02/07/2023     Priority: Medium    JUSTINA (acute kidney injury) ([facility] [address] [N17.9] 02/07/2023     Priority: Medium    Stage 3 chronic kidney disease ([facility] [address] [N18.30] 12/21/2022     Priority: Medium    Acute on chronic diastolic heart failure ([facility] [address] [I50.33] 08/29/2022     Priority: Medium    GIANNA (obstructive sleep apnea) [G47.33]     Acute kidney injury ([facility] [address] [N17.9]     Class 2 severe obesity due to excess calories with serious comorbidity and body mass index (BMI) of 39.0 to 39.9 in adult ([facility] [E66.01, Z68.39] 02/11/2018    Atrial fibrillation ([facility] [I48.91] 01/21/2016    Primary hypertension [I10] 01/21/2016       Assessment:       Plan: 1. Persistent Atrial Fibrillation/Flutter  - Hx of cardioversion. Has been in AF/AFL since 2017     - Currently in AFL, rate 100-120s  - Continue metoprolol and amiodarone. Monitor for amiodarone toxicity. Her [id number] score is high and therefore in general needs anticoagulation. What complicates the situation is multiple medical problems that puts her at high risk of decompensation and bleeding including the active ulcer. Whether she can be anticoagulated in the long-term depends on whether these issues can be resolved.   If her ulcer resolves and risk of bleeding is low, then she needs to be back on anticoagulation. However if her risk of bleeding remains high due to persistence of GI pathology or other causes, then perhaps the risk of anticoagulation be more than the benefits. Depending on how her clinical course will be, she may even benefit from watchman in the future once she is stabilized. 2. Acute on Chronic diastolic heart failure (NYHA Class II)  Appears compensated. Continue guideline directed medical therapy. Monitor I&O's, Daily weights     3. JUSTINA               -Creatinine has improved. 4.  Hypertension  BP is well controlled. Continue current meds. 5. GIANNA              - Untreated, needs GIANNA tx for CHF/AF              - Recommend follow up with pulmonology/sleep medicine     6. Hematemesis and gastric ulcer, anemia  Continue to monitor H&H and plan as per surgery. Maintain hemoglobin above7. I independently reviewed and interpreted ECG, cardiac labs, other relevant labs,  imaging  . Unless otherwise reflected in the note, my interpretation is in agreement with the report. NOTE: This report was transcribed using voice recognition software. Every effort was made to ensure accuracy, however, inadvertent computerized transcription errors may be present.

## 2023-02-19 NOTE — PROGRESS NOTES
02/19/23 0949   RT Protocol   History Pulmonary Disease 2   Respiratory pattern 0   Breath sounds 2   Cough 0   Bronchodilator Assessment Score 4

## 2023-02-19 NOTE — PLAN OF CARE
Problem: Skin/Tissue Integrity  Goal: Absence of new skin breakdown  Description: 1. Monitor for areas of redness and/or skin breakdown  2. Assess vascular access sites hourly  3. Every 4-6 hours minimum:  Change oxygen saturation probe site  4. Every 4-6 hours:  If on nasal continuous positive airway pressure, respiratory therapy assess nares and determine need for appliance change or resting period.   Outcome: Progressing     Problem: Safety - Adult  Goal: Free from fall injury  Outcome: Progressing     Problem: Pain  Goal: Verbalizes/displays adequate comfort level or baseline comfort level  Outcome: Progressing     Problem: ABCDS Injury Assessment  Goal: Absence of physical injury  Outcome: Progressing     Problem: Metabolic/Fluid and Electrolytes - Adult  Goal: Electrolytes maintained within normal limits  Outcome: Progressing  Goal: Hemodynamic stability and optimal renal function maintained  Outcome: Progressing     Problem: Chronic Conditions and Co-morbidities  Goal: Patient's chronic conditions and co-morbidity symptoms are monitored and maintained or improved  Outcome: Progressing     Problem: Nutrition Deficit:  Goal: Optimize nutritional status  Outcome: Progressing

## 2023-02-19 NOTE — PROGRESS NOTES
Clinical Pharmacy Note    Pharmacy consulted by Dr. Misha Guillen to manage TPN    Current TPN rate: 83ml/hr  Goal TPN rate: 83ml/hr  Access: PICC    Labs:  General Labs:  BMP:    Lab Results   Component Value Date/Time     02/19/2023 04:25 AM    K 4.5 02/19/2023 04:25 AM    K 4.4 02/07/2023 03:39 AM     02/19/2023 04:25 AM    CO2 39 02/19/2023 04:25 AM    BUN 28 02/19/2023 04:25 AM    LABALBU 2.1 02/19/2023 04:25 AM    CREATININE 1.0 02/19/2023 04:25 AM    CALCIUM 8.5 02/19/2023 04:25 AM    GFRAA 38 09/20/2022 04:14 AM    GFRAA 45 03/27/2013 01:36 PM    LABGLOM 59 02/19/2023 04:25 AM    GLUCOSE 82 02/19/2023 04:25 AM     Magnesium:    Lab Results   Component Value Date/Time    MG 1.80 02/19/2023 04:25 AM     Phosphorus:    Lab Results   Component Value Date/Time    PHOS 2.7 02/19/2023 04:25 AM       Electrolyte replacement as follows: No replacement needed today    Blood sugars over past 24 hours:     Blood sugar management:  Plan to Continue Humalog q6 hour sliding scale    Plan to continue TPN at 83 ml/hr. Thank you for allowing pharmacy to participate in the care of this patient.     Chelsy Rodrigez, [facility], PharmD 2/19/2023

## 2023-02-19 NOTE — PROGRESS NOTES
Arrived to bedside for PICC placement. Chart reviewed and timeout done with RN Laurent Malone. Left upper extremity still ecchymotic similar to my assessment on 2/15, but swelling had decreased significantly and pt reported no tenderness in upper arm. Therefore I was more comfortable to attempt line in L arm at this time. No issues accessing L basilic vein. Good blood return and easy flush. Due to heart rhythm, patient requires xray for confirmation of tip placement. PICC not okay to use until xray confirms tip in SVC or cavoatrial junction. Handoff to [employer].

## 2023-02-19 NOTE — PROGRESS NOTES
Office : [phone]     Fax :[phone]       Nephrology progress  Note      Patient's Name: Siddhartha Larios    2/19/2023          Chief Complaint:    Chief Complaint   Patient presents with    Abdominal Pain     Pt via EMS from home, c/o LUQ pain 10/10, has had gallbladder removed. Pt states pain is making her sob, 89% ora, put on 2L, pt received 325 mg aspirin, has been treated for cellulitis since December, new antibiotic last two weeks, states she has been getting hives and itching        History of Present Ilness:    Siddhartha Larios is a 78 y.o. female who presented with complaints of shortness of breath. Patient apparently has been treated for cellulitis lower extremity on antibiotics patient started having worsening itching and hives. Came to the ER. Patient with increased shortness of breath. Also with leg swelling. No reported fevers chills. Patient was found to be hypoxic was placed on 2 L nasal cannula. Patient with history of chronic CHF COPD chronic kidney disease admitted with bilateral worse. Baseline creat is 1.2   Now elevated at 2.0     BNP 77096      Interval hx       No SOB     Good UOP  Creat improved . Now at baseline     Abdominal pain in control    On TPN     I/O last 3 completed shifts:   In: 5130.6 [I.V.:672.6; IV Piggyback:1036.9]  Out: 487 [Urine:700; Drains:175]    Past Medical History:   Diagnosis Date    Arthritis     Atrial fibrillation and flutter ([facility] [address]     Hypertension     Kidney disease     Pt states  \"stage 3\"    Pneumonia     Sleep apnea     no c-pap       Past Surgical History:   Procedure Laterality Date    CARDIOVERSION      COLONOSCOPY N/A 11/20/2018    COLONOSCOPY POLYPECTOMY SNARE/COLD BIOPSY performed by Syed Kelsey MD at [address] [facility] ENDOSCOPY    COLONOSCOPY N/A 03/31/2022    COLONOSCOPY POLYPECTOMY SNARE/COLD BIOPSY performed by Shikha Martinez MD at [facility]      ankle rt has pins and rods, and rt elbow    GASTRIC BYPASS SURGERY      LARYNX SURGERY      TOTAL KNEE ARTHROPLASTY Bilateral 12/19/2012    bilateral knee replacements    TUBAL LIGATION      tubes tied    UPPER GASTROINTESTINAL ENDOSCOPY N/A 11/20/2018    EGD BIOPSY performed by Nunu Hameed MD at [facility] 03/31/2022    EGD DILATION BALLOON performed by Shikha Martinez MD at [facility] N/A 03/31/2022    EGD BIOPSY performed by Shikha Martinez MD at [address] [place] [address]       Family History   Problem Relation Age of Onset    Other Mother         blood clot    Cancer Father         stomach cancer    Stroke Brother          Current Medications:    PN-Adult Premix 5/20 - Standard Electrolytes - Central Line, Continuous TPN  enoxaparin (LOVENOX) injection 40 mg, Daily  PN-Adult Premix 5/20 - Standard Electrolytes - Central Line, Continuous TPN  dextrose 5 % and 0.45 % sodium chloride infusion, Continuous  lidocaine PF 1 % injection 5 mL, Once  sodium chloride flush 0.9 % injection 5-40 mL, 2 times per day  sodium chloride flush 0.9 % injection 5-40 mL, PRN  0.9 % sodium chloride infusion, PRN  morphine (PF) injection 2 mg, Q4H PRN  metoprolol (LOPRESSOR) injection 2.5 mg, Q6H  pantoprazole (PROTONIX) injection 40 mg, BID  furosemide (LASIX) injection 20 mg, Daily  iron sucrose (VENOFER) 200 mg in sodium chloride 0.9 % 100 mL IVPB, Q24H  metoprolol (LOPRESSOR) injection 2.5 mg, Q6H PRN  meropenem (MERREM) 500 mg in sodium chloride 0.9 % 100 mL IVPB (mini-bag), q8h  anidulafungin (ERAXIS) 100 mg in sodium chloride 0.9 % 130 mL IVPB, Q24H  dextrose bolus 10% 125 mL, PRN   Or  dextrose bolus 10% 250 mL, PRN  glucagon (rDNA) injection 1 mg, PRN  dextrose 10 % infusion, Continuous PRN  insulin lispro (HUMALOG) injection vial 0-4 Units, Q6H  fat emulsion 20 % infusion 250 mL, Once per day on Mon Thu  sodium chloride flush 0.9 % injection 5-40 mL, 2 times per day  sodium chloride flush 0.9 % injection 5-40 mL, PRN  0.9 % sodium chloride infusion, PRN  ipratropium-albuterol (DUONEB) nebulizer solution 1 ampule, Q4H PRN  lidocaine PF 1 % injection 5 mL, Once  sodium chloride flush 0.9 % injection 5-40 mL, 2 times per day  sodium chloride flush 0.9 % injection 5-40 mL, PRN  0.9 % sodium chloride infusion, PRN  diphenhydrAMINE (BENADRYL) tablet 50 mg, Q6H PRN  ondansetron (ZOFRAN) injection 4 mg, Q6H PRN  albuterol sulfate HFA (PROVENTIL;VENTOLIN;PROAIR) 108 (90 Base) MCG/ACT inhaler 2 puff, Q4H PRN  amiodarone (CORDARONE) tablet 200 mg, Daily  traMADol (ULTRAM) tablet 50 mg, Q6H PRN  sodium chloride flush 0.9 % injection 5-40 mL, 2 times per day  sodium chloride flush 0.9 % injection 5-40 mL, PRN  0.9 % sodium chloride infusion, PRN  ondansetron (ZOFRAN-ODT) disintegrating tablet 4 mg, Q8H PRN   Or  ondansetron (ZOFRAN) injection 4 mg, Q6H PRN  polyethylene glycol (GLYCOLAX) packet 17 g, Daily PRN  acetaminophen (TYLENOL) tablet 650 mg, Q6H PRN   Or  acetaminophen (TYLENOL) suppository 650 mg, Q6H PRN        Physical exam:     Vitals:  /79   Pulse (!) 116   Temp 98.5 °F (36.9 °C) (Oral)   Resp 18   Ht 5' 7\" (1.702 m)   Wt 222 lb 14.4 oz (101.1 kg)   SpO2 95%   BMI 34.91 kg/m²   Constitutional:  OAA X3 NAD  Skin: no rash, turgor wnl  Heent:  eomi, mmm  Neck: no bruits or jvd noted  Cardiovascular:  S1, S2 without m/r/g  Respiratory: CTA B without w/r/r  Abdomen:  +bs, soft, nt, nd  Ext: 1 +  lower extremity edema  Psychiatric: mood and affect appropriate  Musculoskeletal:  Rom, muscular strength intact    Labs:  CBC:   Recent Labs     02/17/23  0706 02/18/23  0655 02/19/23  0425   WBC 23.0* 19.3* 18.3*   HGB 8.1* 7.6* 8.1*    180 203     BMP:    Recent Labs 02/17/23  0706 02/18/23  0655 02/19/23  0425    144 141   K 3.9 4.0 4.5   CL 99 103 101   CO2 37* 39* 39*   BUN 35* 34* 28*   CREATININE 1.0 1.0 1.0   GLUCOSE 105* 96 82     Ca/Mg/Phos:   Recent Labs     02/17/23  0706 02/18/23  0655 02/19/23  0425   CALCIUM 8.4 8.2* 8.5   MG 1.80 2.00 1.80   PHOS 2.3* 3.1 2.7     Hepatic:   Recent Labs     02/18/23  0655 02/19/23  0425   AST 15 24   ALT <5* 6*   BILITOT 0.3 0.4   ALKPHOS 80 94              IMAGING:  XR CHEST PORTABLE   Final Result   1. Interval right PICC line removal.      2.  New left PICC line terminates at the innominate/SVC junction. CT ABSCESS DRAINAGE W CATH PLACEMENT S&I   Final Result   Successful CT-guided percutaneous drainage catheter placement into a   perigastric collection via a left anterolateral abdominal approach. Patient currently has a 14 [place] locking pigtail drainage catheter within   the collection, placed to bulb suction. CT GUIDED NEEDLE PLACEMENT   Final Result   Successful CT-guided percutaneous drainage catheter placement into a   perigastric collection via a left anterolateral abdominal approach. Patient currently has a 14 [place] locking pigtail drainage catheter within   the collection, placed to bulb suction. XR CHEST PORTABLE   Final Result   1. Right upper extremity PICC extends superiorly into the right internal   jugular vein; tip is excluded from the field of view. Recommend   repositioning. 2.  Bilateral patchy airspace disease, improved from prior. Discussed with Dr. Marcos Sierra by Dr. Saúl Parkinson at 4:55 am on 2/15/2023         CT ABDOMEN WO CONTRAST Additional Contrast? Oral   Final Result   1. 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  Findings compatible with a leak likely a marginal ulcer with   a developing 10 x 10 x 5 cm abscess centered between the Angel limb and   gastric remnant.   Adjacent inflammatory change and left upper quadrant edema   has progressed from 02/07/2023. Trace amount of enteric contrast leak. A   couple punctate foci of free air noted in the anterior abdomen. Findings were discussed with Dr. Nan Yi at 5:45 pm on 2/13/2023. CT ABDOMEN WO CONTRAST Additional Contrast? None   Final Result   Postsurgical change from gastric bypass. There is fat stranding surrounding   the afferent loop. There is fluid and gas seen in the upper abdomen, near   the anastomotic site, that is not all definitively intraluminal, with   surrounding fat stranding, raising the question of perforation. Increased pleural-parenchymal disease at the left lung base      The findings were sent to the Radiology Results [address] at 1:17   pm on 2/13/2023 to be communicated to a licensed caregiver. CT CHEST ABDOMEN PELVIS WO CONTRAST Additional Contrast? None   Final Result   1. Scattered ground-glass opacities with interlobular septal thickening. Findings may be related to pulmonary edema with other considerations   including an inflammatory/infectious process in the appropriate clinical   setting. 2. Right upper lobe consolidation. Additional bilateral scattered curvilinear   opacities may be related to atelectasis versus developing consolidation. 3. Enlarged pulmonary artery diameter. Finding may be related to pulmonary   arterial hypertension. 4. Coronary artery calcifications present. 5. Moderate hiatal hernia. 6. Questionable circumferential thickening of the descending colon with mild   surrounding fat stranding versus colonic underdistention. Finding raises the   possibility of colitis in the appropriate clinical setting. 7. Colonic diverticulosis, predominately sigmoid. No evidence of acute   diverticulitis. 8. Nonobstructing punctate left nephrolithiasis. RECOMMENDATIONS:   2 cm left adrenal mass, consistent with lipid-rich benign adenoma. No   follow-up imaging is recommended. JACR 2017 Aug; 14(8):1038-44, JCAT 2016 Myrl Babinski; 40(2):194-200, Urol J 2006   Spring; 3(2):71-4. XR CHEST PORTABLE   Final Result   1. Diffuse hazy multifocal opacity throughout both lungs, right worse than   left, likely a combination of moderate pulmonary edema and multifocal   pneumonia. 2. Stable cardiomegaly. 3. Stable hiatal hernia. Assessment/Plan :      1. Panda   Creatinine  improved   Has mild edema   Low dose  lasix         2. HTN. BP low borderline range   Hold clonidine if SBP < 110 mm HG     3. COPD    4. H/o atrial fib   Monitor       5. Hyperkalemia   Resolved after getting lokelma     6. Abdominal  pain .  CT scan shows there has been interval development of a poorly  marginated approximately 10 x 10 x 5 cm fluid collection with foci of gas and  small amounts of enteric contrast compatible with a developing abscess  Surgery on board    S/p IR drain      Continue TPN   Monitor electrolytes         D/w primary team      Thank you for allowing us to participate in care of Alber Kirby         Electronically signed by: Reid Davison MD, 2/19/2023, 5:24 PM      [facility] [address]  Office : [phone]  Fax :[phone]

## 2023-02-19 NOTE — PROGRESS NOTES
Patient noted to be on TPN through a midline, after verifying with charge RN and pharmacy that TPN is not to run through midline, TPN immediately stopped and on call NP notified, D5 1/2 started, PICC team called to attempt PICC placement, will continue to monitor midline, assisted to position of comfort, patient encouraged to call with needs, call light in reach, items within reach, will continue to monitor

## 2023-02-19 NOTE — PROGRESS NOTES
02/19/23 0950   RT Protocol   History Pulmonary Disease 0   Respiratory pattern 0   Breath sounds 2   Cough 0   Bronchodilator Assessment Score 2

## 2023-02-19 NOTE — PROGRESS NOTES
[facility]    2/19/2023 1:47 PM        Name: Siddhartha Larios . Admitted: 2/7/2023  Primary Care Provider: MARILEE Cary CNP (Tel: [phone])                        Subjective:   Patient feels well  No nausea vomiting no abdominal pain  No fever  White cell count down to 18,000  No bleeding        Reviewed interval ancillary notes    Current Medications  Reviewed    Objective:  /88   Pulse (!) 118   Temp 98.1 °F (36.7 °C) (Oral)   Resp 18   Ht 5' 7\" (1.702 m)   Wt 222 lb 14.4 oz (101.1 kg)   SpO2 97%   BMI 34.91 kg/m²     Intake/Output Summary (Last 24 hours) at 2/19/2023 1347  Last data filed at 2/19/2023 0550  Gross per 24 hour   Intake 62.53 ml   Output 875 ml   Net -812.47 ml        Wt Readings from Last 3 Encounters:   02/19/23 222 lb 14.4 oz (101.1 kg)   12/24/22 236 lb 14.4 oz (107.5 kg)   12/15/22 235 lb (106.6 kg)       General appearance:  Appears comfortable  Eyes: Sclera clear. Pupils equal.  ENT: Moist oral mucosa. Trachea midline, no adenopathy. Cardiovascular: Regular rhythm, normal S1, S2. No murmur. No edema in lower extremities  Respiratory: Not using accessory muscles. Good inspiratory effort. Clear to auscultation bilaterally, no wheeze or crackles. GI: Abdomen soft, no tenderness, not distended, normal bowel sounds  Musculoskeletal: No cyanosis in digits, neck supple  Neurology: CN 2-12 grossly intact. No speech or motor deficits  Psych: Normal affect. Alert and oriented in time, place and person  Skin: Warm, dry, normal turgor  PERC drain in place with dark output.   Physical exam remains unchanged from 2/18    Labs and Tests:  CBC:   Recent Labs     02/17/23  0706 02/18/23  0655 02/19/23  0425   WBC 23.0* 19.3* 18.3*   HGB 8.1* 7.6* 8.1*    180 203       BMP:    Recent Labs     02/17/23  0706 02/18/23  0655 02/19/23  0425    144 141   K 3.9 4.0 4.5   CL 99 103 101   CO2 37* 39* 39*   BUN 35* 34* 28*   CREATININE 1.0 1.0 1.0   GLUCOSE 105* 96 82       Hepatic:   Recent Labs     02/18/23  0655 02/19/23  0425   AST 15 24   ALT <5* 6*   BILITOT 0.3 0.4   ALKPHOS 80 94         Discussed care with patient       Problem List      Assessment & Plan:   74-year-old female who was admitted for acute CHF exacerbation with hypoxia and acute kidney injury. Patient was evaluated by cardiology and nephrology & had made significant improvement. However on the day of discharge she was noticed to have hematemesis for which STAT CT done showed findings compatible with endoleak likely a marginal ulcer with a developing 10 x 10 x 5 cm abscess centered between the Angel limb and gastric remnant. 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 CT: Endoleak with Intra-abdominal abscess, measuring 10 x 10 x 5 cm between the gastric remnant and Angel-en-Y limb. IR consulted: s/p perc drain placement on 2/15/2022. Abdominal fluid culture positive for Candida. Blood cultures remain negative to date. Persistent leukocytosis although slowly downtrending now down to 18  ID consulted: Currently on meropenem and anidulafungin. Bariatric surgeon consulted: Contained marginal ulcer perforation. No surgical intervention at this time. Continue STRICT NPO and TPN. Patient will be discharged on TPN. Needs to repeat a CT of the abdomen on Monday per bariatric surgery      Acute hypoxic respiratory failure: Improved  Likely due to CHF exacerbation, sepsis and underlying COPD. Currently on 2 L nasal cannula. Anemia: Multifactorial secondary to iron deficiency and anemia of chronic disease  Work-up consistent with MIRIAM/AoCD. Started on IV Venofer. Monitor H&H and transfuse as needed.   Hemoglobin remained stable      Hematemesis: resolved. Continue Protonix IV twice daily for marginal ulcers. Afib with RVR:   Unable to resume amiodarone due to strict n.p.o. orders. Metoprolol changed to IV every 6h. Seen by cardiology  At this point given underlying GI pathology with increased risk of bleeding they recommended holding off on anticoagulation however once this issues resolve and if the risk of bleeding goes down then she will need to be considered to reanticoagulate given her high [id number] score      Acute on chronic HFpEF:  Echo 12/21/2022: LVEF 55 to 60%. Cardiology consulted: Currently compensated with IV diuresis. Continue IV Lasix 20 mg daily until able to take oral.    COPD without acute exacerbation: Continue inhalers. JUSTINA: Resolved. Peak creatinine 2.8. Seen by nephrology  Creatinine down to 1        Diet: PN-Adult Premix 5/20 - Standard Electrolytes - Central Line  Diet NPO Exceptions are: Sips of Water with Meds  PN-Adult Premix 5/20 - Standard Electrolytes - Central Line  Code:Full Code  DVT PPX I restarted Lovenox since her hemoglobin is stable    Disposition: SNF once medically stable.       Haile Grove MD   2/19/2023 1:47 PM

## 2023-02-20 ENCOUNTER — APPOINTMENT (OUTPATIENT)
Dept: CT IMAGING | Age: 78
DRG: 291 | End: 2023-02-20
Payer: COMMERCIAL

## 2023-02-20 ENCOUNTER — APPOINTMENT (OUTPATIENT)
Dept: GENERAL RADIOLOGY | Age: 78
DRG: 291 | End: 2023-02-20
Payer: COMMERCIAL

## 2023-02-20 PROBLEM — I48.20 CHRONIC ATRIAL FIBRILLATION (HCC): Status: ACTIVE | Noted: 2023-02-20

## 2023-02-20 PROBLEM — I48.91 ATRIAL FIBRILLATION (HCC): Status: ACTIVE | Noted: 2022-03-30

## 2023-02-20 LAB
A/G RATIO: 0.5 (ref 1.1–2.2)
ALBUMIN SERPL-MCNC: 2.1 G/DL (ref 3.4–5)
ALP BLD-CCNC: 96 U/L (ref 40–129)
ALT SERPL-CCNC: <5 U/L (ref 10–40)
ANAEROBIC CULTURE: ABNORMAL
ANION GAP SERPL CALCULATED.3IONS-SCNC: 1 MMOL/L (ref 3–16)
ANISOCYTOSIS: ABNORMAL
AST SERPL-CCNC: 15 U/L (ref 15–37)
BANDED NEUTROPHILS RELATIVE PERCENT: 9 % (ref 0–7)
BASOPHILS ABSOLUTE: 0 K/UL (ref 0–0.2)
BASOPHILS RELATIVE PERCENT: 0 %
BILIRUB SERPL-MCNC: 0.5 MG/DL (ref 0–1)
BUN BLDV-MCNC: 30 MG/DL (ref 7–20)
CALCIUM SERPL-MCNC: 8.6 MG/DL (ref 8.3–10.6)
CHLORIDE BLD-SCNC: 102 MMOL/L (ref 99–110)
CO2: 37 MMOL/L (ref 21–32)
CREAT SERPL-MCNC: 1.1 MG/DL (ref 0.6–1.2)
CULTURE, BLOOD 2: NORMAL
EOSINOPHILS ABSOLUTE: 0.4 K/UL (ref 0–0.6)
EOSINOPHILS RELATIVE PERCENT: 1 %
GFR SERPL CREATININE-BSD FRML MDRD: 52 ML/MIN/{1.73_M2}
GLUCOSE BLD-MCNC: 143 MG/DL (ref 70–99)
GLUCOSE BLD-MCNC: 149 MG/DL (ref 70–99)
GLUCOSE BLD-MCNC: 149 MG/DL (ref 70–99)
GRAM STAIN RESULT: ABNORMAL
HCT VFR BLD CALC: 26.7 % (ref 36–48)
HEMOGLOBIN: 8.3 G/DL (ref 12–16)
LYMPHOCYTES ABSOLUTE: 0.7 K/UL (ref 1–5.1)
LYMPHOCYTES RELATIVE PERCENT: 2 %
MAGNESIUM: 1.8 MG/DL (ref 1.8–2.4)
MCH RBC QN AUTO: 27.3 PG (ref 26–34)
MCHC RBC AUTO-ENTMCNC: 30.9 G/DL (ref 31–36)
MCV RBC AUTO: 88.3 FL (ref 80–100)
MONOCYTES ABSOLUTE: 1.1 K/UL (ref 0–1.3)
MONOCYTES RELATIVE PERCENT: 3 %
NEUTROPHILS ABSOLUTE: 34.4 K/UL (ref 1.7–7.7)
NEUTROPHILS RELATIVE PERCENT: 85 %
ORGANISM: ABNORMAL
PDW BLD-RTO: 17.3 % (ref 12.4–15.4)
PERFORMED ON: ABNORMAL
PERFORMED ON: ABNORMAL
PHOSPHORUS: 2.6 MG/DL (ref 2.5–4.9)
PLATELET # BLD: 241 K/UL (ref 135–450)
PLATELET SLIDE REVIEW: ADEQUATE
PMV BLD AUTO: 10.7 FL (ref 5–10.5)
POTASSIUM SERPL-SCNC: 4.4 MMOL/L (ref 3.5–5.1)
RBC # BLD: 3.02 M/UL (ref 4–5.2)
SLIDE REVIEW: ABNORMAL
SODIUM BLD-SCNC: 140 MMOL/L (ref 136–145)
TOTAL PROTEIN: 6 G/DL (ref 6.4–8.2)
TOXIC GRANULATION: PRESENT
TRIGL SERPL-MCNC: 69 MG/DL (ref 0–150)
WBC # BLD: 36.6 K/UL (ref 4–11)
WOUND/ABSCESS: ABNORMAL

## 2023-02-20 PROCEDURE — C9113 INJ PANTOPRAZOLE SODIUM, VIA: HCPCS | Performed by: INTERNAL MEDICINE

## 2023-02-20 PROCEDURE — 74018 RADEX ABDOMEN 1 VIEW: CPT

## 2023-02-20 PROCEDURE — 2580000003 HC RX 258: Performed by: INTERNAL MEDICINE

## 2023-02-20 PROCEDURE — 2580000003 HC RX 258: Performed by: HOSPITALIST

## 2023-02-20 PROCEDURE — 85025 COMPLETE CBC W/AUTO DIFF WBC: CPT

## 2023-02-20 PROCEDURE — 74150 CT ABDOMEN W/O CONTRAST: CPT

## 2023-02-20 PROCEDURE — 6370000000 HC RX 637 (ALT 250 FOR IP): Performed by: HOSPITALIST

## 2023-02-20 PROCEDURE — 2580000003 HC RX 258: Performed by: NURSE PRACTITIONER

## 2023-02-20 PROCEDURE — 1200000000 HC SEMI PRIVATE

## 2023-02-20 PROCEDURE — 84100 ASSAY OF PHOSPHORUS: CPT

## 2023-02-20 PROCEDURE — 84478 ASSAY OF TRIGLYCERIDES: CPT

## 2023-02-20 PROCEDURE — 80053 COMPREHEN METABOLIC PANEL: CPT

## 2023-02-20 PROCEDURE — 2500000003 HC RX 250 WO HCPCS: Performed by: INTERNAL MEDICINE

## 2023-02-20 PROCEDURE — 83735 ASSAY OF MAGNESIUM: CPT

## 2023-02-20 PROCEDURE — 99233 SBSQ HOSP IP/OBS HIGH 50: CPT | Performed by: SURGERY

## 2023-02-20 PROCEDURE — 6370000000 HC RX 637 (ALT 250 FOR IP): Performed by: NURSE PRACTITIONER

## 2023-02-20 PROCEDURE — 6360000002 HC RX W HCPCS: Performed by: INTERNAL MEDICINE

## 2023-02-20 PROCEDURE — 2500000003 HC RX 250 WO HCPCS: Performed by: HOSPITALIST

## 2023-02-20 PROCEDURE — 36415 COLL VENOUS BLD VENIPUNCTURE: CPT

## 2023-02-20 PROCEDURE — 99233 SBSQ HOSP IP/OBS HIGH 50: CPT | Performed by: INTERNAL MEDICINE

## 2023-02-20 RX ORDER — SIMETHICONE 80 MG
80 TABLET,CHEWABLE ORAL EVERY 6 HOURS PRN
Status: DISCONTINUED | OUTPATIENT
Start: 2023-02-20 | End: 2023-02-23 | Stop reason: HOSPADM

## 2023-02-20 RX ADMIN — PANTOPRAZOLE SODIUM 40 MG: 40 INJECTION, POWDER, LYOPHILIZED, FOR SOLUTION INTRAVENOUS at 22:43

## 2023-02-20 RX ADMIN — METOPROLOL TARTRATE 2.5 MG: 1 INJECTION, SOLUTION INTRAVENOUS at 14:16

## 2023-02-20 RX ADMIN — MEROPENEM 500 MG: 1 INJECTION, POWDER, FOR SOLUTION INTRAVENOUS at 09:01

## 2023-02-20 RX ADMIN — Medication 10 ML: at 16:39

## 2023-02-20 RX ADMIN — ASCORBIC ACID, VITAMIN A PALMITATE, CHOLECALCIFEROL, THIAMINE HYDROCHLORIDE, RIBOFLAVIN-5 PHOSPHATE SODIUM, PYRIDOXINE HYDROCHLORIDE, NIACINAMIDE, DEXPANTHENOL, ALPHA-TOCOPHEROL ACETATE, VITAMIN K1, FOLIC ACID, BIOTIN, CYANOCOBALAMIN: 200; 3300; 200; 6; 3.6; 6; 40; 15; 10; 150; 600; 60; 5 INJECTION, SOLUTION INTRAVENOUS at 18:05

## 2023-02-20 RX ADMIN — PANTOPRAZOLE SODIUM 40 MG: 40 INJECTION, POWDER, LYOPHILIZED, FOR SOLUTION INTRAVENOUS at 14:06

## 2023-02-20 RX ADMIN — Medication 10 ML: at 22:43

## 2023-02-20 RX ADMIN — MEROPENEM 500 MG: 1 INJECTION, POWDER, FOR SOLUTION INTRAVENOUS at 16:20

## 2023-02-20 RX ADMIN — FUROSEMIDE 20 MG: 10 INJECTION, SOLUTION INTRAMUSCULAR; INTRAVENOUS at 14:06

## 2023-02-20 RX ADMIN — SODIUM CHLORIDE 100 MG: 9 INJECTION, SOLUTION INTRAVENOUS at 22:44

## 2023-02-20 RX ADMIN — SIMETHICONE 80 MG: 80 TABLET, CHEWABLE ORAL at 00:07

## 2023-02-20 RX ADMIN — METOPROLOL TARTRATE 2.5 MG: 1 INJECTION, SOLUTION INTRAVENOUS at 23:22

## 2023-02-20 RX ADMIN — AMIODARONE HYDROCHLORIDE 200 MG: 200 TABLET ORAL at 14:06

## 2023-02-20 RX ADMIN — IRON SUCROSE 200 MG: 20 INJECTION, SOLUTION INTRAVENOUS at 17:43

## 2023-02-20 RX ADMIN — TRAMADOL HYDROCHLORIDE 50 MG: 50 TABLET, FILM COATED ORAL at 23:22

## 2023-02-20 RX ADMIN — Medication 10 ML: at 14:09

## 2023-02-20 RX ADMIN — METOPROLOL TARTRATE 2.5 MG: 1 INJECTION, SOLUTION INTRAVENOUS at 18:10

## 2023-02-20 RX ADMIN — METOPROLOL TARTRATE 2.5 MG: 1 INJECTION, SOLUTION INTRAVENOUS at 06:23

## 2023-02-20 RX ADMIN — I.V. FAT EMULSION 250 ML: 20 EMULSION INTRAVENOUS at 18:15

## 2023-02-20 ASSESSMENT — ENCOUNTER SYMPTOMS
EYE REDNESS: 0
COUGH: 0
ABDOMINAL PAIN: 0
BACK PAIN: 0
SINUS PRESSURE: 0
DIARRHEA: 0
SORE THROAT: 0
CONSTIPATION: 0
SHORTNESS OF BREATH: 0
EYE DISCHARGE: 0
SINUS PAIN: 0
RHINORRHEA: 0
WHEEZING: 0
NAUSEA: 0

## 2023-02-20 ASSESSMENT — PAIN SCALES - GENERAL
PAINLEVEL_OUTOF10: 2
PAINLEVEL_OUTOF10: 4

## 2023-02-20 ASSESSMENT — PAIN DESCRIPTION - ORIENTATION: ORIENTATION: LEFT;LOWER

## 2023-02-20 ASSESSMENT — PAIN DESCRIPTION - DESCRIPTORS: DESCRIPTORS: CRAMPING

## 2023-02-20 ASSESSMENT — PAIN DESCRIPTION - PAIN TYPE: TYPE: ACUTE PAIN

## 2023-02-20 ASSESSMENT — PAIN DESCRIPTION - LOCATION: LOCATION: ABDOMEN

## 2023-02-20 NOTE — PROGRESS NOTES
Nutrition Note    RECOMMENDATIONS  PO Diet: NPO  Nutrition Support:   Recommend check TG (ordered)  Clinimix 5/20 at 83 mL/hr  250 mL 20% lipids 2 times per week      NUTRITION ASSESSMENT   Pt continues as NPO and on PN- Clinimix 5/20 at 83 mL/hr. Current rate meets 100% of estimated calories and protein. Per MD- overnight patient had worsening abdominal pain / nausea / cramping, abdominal x-ray was unremarkable. Nutrition Related Findings: active BS, pt c/o constipation; K+ 4.4, Mg 1.8, phos 2.6; lasix  Wounds: None  Nutrition Education:  Education not indicated   Nutrition Goals: Tolerate nutrition support at goal rate     MALNUTRITION ASSESSMENT      Malnutrition Status: No malnutrition      NUTRITION DIAGNOSIS   Inadequate oral intake related to altered GI structure as evidenced by NPO or clear liquid status due to medical condition (pt receiving PN)      CURRENT NUTRITION THERAPIES  Diet NPO Exceptions are: Sips of Water with Meds  PN-Adult Premix 5/20 - Standard Electrolytes - Central Line  PN-Adult Premix 5/20 - Standard Electrolytes - Central Line     PO Intake: NPO   PO Supplement Intake:NPO    Current Parenteral Nutrition Orders:  Type and Formula: Premix Central   Lipids: 250ml, Two times weekly  Duration: Continuous  Current PN Order Provides: Clinimix 5/20 at 83 mL/hr provides 1992 mL total volume, 1755 kcal, 100 g protein, dex load 2.7 mg/kg/min  Goal PN Orders Provides: as above    ANTHROPOMETRICS  Current Height: 5' 7\" (170.2 cm)  Current Weight: 223 lb 8 oz (101.4 kg)    Admission weight: 249 lb (112.9 kg)  Ideal Body Weight (IBW): 135 lbs  (61 kg)      BMI: 34.9      COMPARATIVE STANDARDS  Energy (kcal):  1500 - 1800     Protein (g):  73 - 122       Fluid (mL/day):  1500 - 1800    The patient will be monitored per nutrition standards of care. Consult dietitian if additional nutrition interventions are needed prior to RD reassessment.      Jany Malik RD, LD    Contact: [phone]

## 2023-02-20 NOTE — PLAN OF CARE
Problem: Skin/Tissue Integrity  Goal: Absence of new skin breakdown  Description: 1. Monitor for areas of redness and/or skin breakdown  2. Assess vascular access sites hourly  3. Every 4-6 hours minimum:  Change oxygen saturation probe site  4. Every 4-6 hours:  If on nasal continuous positive airway pressure, respiratory therapy assess nares and determine need for appliance change or resting period.   2/20/2023 1058 by J Carlos Echevarria RN  Outcome: Progressing  2/20/2023 0027 by Sonia Bentley RN  Outcome: Progressing     Problem: Safety - Adult  Goal: Free from fall injury  2/20/2023 1058 by J Carlos Echevarria RN  Outcome: Progressing  2/20/2023 0027 by Sonia Bentley RN  Outcome: Progressing     Problem: Pain  Goal: Verbalizes/displays adequate comfort level or baseline comfort level  2/20/2023 1058 by J Carlos Echevarria RN  Outcome: Progressing  2/20/2023 0027 by Sonia Bentley RN  Outcome: Progressing     Problem: ABCDS Injury Assessment  Goal: Absence of physical injury  2/20/2023 1058 by J Carlos Echevarria RN  Outcome: Progressing  2/20/2023 0027 by Sonia Bentley RN  Outcome: Progressing

## 2023-02-20 NOTE — PROGRESS NOTES
Office : [phone]     Fax :[phone]       Nephrology progress  Note      Patient's Name: Mari Cervantes    2/20/2023          Chief Complaint:    Chief Complaint   Patient presents with    Abdominal Pain     Pt via EMS from home, c/o LUQ pain 10/10, has had gallbladder removed. Pt states pain is making her sob, 89% ora, put on 2L, pt received 325 mg aspirin, has been treated for cellulitis since December, new antibiotic last two weeks, states she has been getting hives and itching        History of Present Ilness:    Mari Cervantes is a 78 y.o. female who presented with complaints of shortness of breath. Patient apparently has been treated for cellulitis lower extremity on antibiotics patient started having worsening itching and hives. Came to the ER. Patient with increased shortness of breath. Also with leg swelling. No reported fevers chills. Patient was found to be hypoxic was placed on 2 L nasal cannula. Patient with history of chronic CHF COPD chronic kidney disease admitted with bilateral worse. Baseline creat is 1.2   Now elevated at 2.0     BNP 45461      Interval hx     C/o abdominal pain     No SOB     Good UOP  Creat improved . Now at baseline         On TPN     I/O last 3 completed shifts:   In: 62.5 [I.V.:54.2; IV Piggyback:8.3]  Out: 46 [Urine:1000; Drains:225]    Past Medical History:   Diagnosis Date    Arthritis     Atrial fibrillation and flutter ([address]     Hypertension     Kidney disease     Pt states  \"stage 3\"    Pneumonia     Sleep apnea     no c-pap       Past Surgical History:   Procedure Laterality Date    CARDIOVERSION      COLONOSCOPY N/A 11/20/2018    COLONOSCOPY POLYPECTOMY SNARE/COLD BIOPSY performed by Dina Tobias MD at [address] [place] [address] COLONOSCOPY N/A 03/31/2022    COLONOSCOPY POLYPECTOMY SNARE/COLD BIOPSY performed by Sarah Payne MD at [facility]      ankle rt has pins and rods, and rt elbow    GASTRIC BYPASS SURGERY      LARYNX SURGERY      TOTAL KNEE ARTHROPLASTY Bilateral 12/19/2012    bilateral knee replacements    TUBAL LIGATION      tubes tied    UPPER GASTROINTESTINAL ENDOSCOPY N/A 11/20/2018    EGD BIOPSY performed by Bishop William MD at [facility] 03/31/2022    EGD DILATION BALLOON performed by Sarah Payne MD at [facility] ENDOSCOPY N/A 03/31/2022    EGD BIOPSY performed by Sarah Payne MD at [address] [place] [address]       Family History   Problem Relation Age of Onset    Other Mother         blood clot    Cancer Father         stomach cancer    Stroke Brother          Current Medications:    simethicone (MYLICON) chewable tablet 80 mg, Q6H PRN  diatrizoate meglumine-sodium (GASTROGRAFIN) 66-10 % solution 20 mL, ONCE PRN  PN-Adult Premix 5/20 - Standard Electrolytes - Central Line, Continuous TPN  PN-Adult Premix 5/20 - Standard Electrolytes - Central Line, Continuous TPN  [Held by provider] enoxaparin (LOVENOX) injection 40 mg, Daily  lidocaine PF 1 % injection 5 mL, Once  sodium chloride flush 0.9 % injection 5-40 mL, 2 times per day  sodium chloride flush 0.9 % injection 5-40 mL, PRN  0.9 % sodium chloride infusion, PRN  metoprolol (LOPRESSOR) injection 2.5 mg, Q6H  pantoprazole (PROTONIX) injection 40 mg, BID  furosemide (LASIX) injection 20 mg, Daily  iron sucrose (VENOFER) 200 mg in sodium chloride 0.9 % 100 mL IVPB, Q24H  metoprolol (LOPRESSOR) injection 2.5 mg, Q6H PRN  meropenem (MERREM) 500 mg in sodium chloride 0.9 % 100 mL IVPB (mini-bag), q8h  anidulafungin (ERAXIS) 100 mg in sodium chloride 0.9 % 130 mL IVPB, Q24H  dextrose bolus 10% 125 mL, PRN   Or  dextrose bolus 10% 250 mL, PRN  glucagon (rDNA) injection 1 mg, PRN  dextrose 10 % infusion, Continuous PRN  insulin lispro (HUMALOG) injection vial 0-4 Units, Q6H  fat emulsion 20 % infusion 250 mL, Once per day on Mon Thu  sodium chloride flush 0.9 % injection 5-40 mL, 2 times per day  sodium chloride flush 0.9 % injection 5-40 mL, PRN  0.9 % sodium chloride infusion, PRN  ipratropium-albuterol (DUONEB) nebulizer solution 1 ampule, Q4H PRN  lidocaine PF 1 % injection 5 mL, Once  sodium chloride flush 0.9 % injection 5-40 mL, 2 times per day  sodium chloride flush 0.9 % injection 5-40 mL, PRN  0.9 % sodium chloride infusion, PRN  diphenhydrAMINE (BENADRYL) tablet 50 mg, Q6H PRN  ondansetron (ZOFRAN) injection 4 mg, Q6H PRN  albuterol sulfate HFA (PROVENTIL;VENTOLIN;PROAIR) 108 (90 Base) MCG/ACT inhaler 2 puff, Q4H PRN  amiodarone (CORDARONE) tablet 200 mg, Daily  traMADol (ULTRAM) tablet 50 mg, Q6H PRN  sodium chloride flush 0.9 % injection 5-40 mL, 2 times per day  sodium chloride flush 0.9 % injection 5-40 mL, PRN  0.9 % sodium chloride infusion, PRN  ondansetron (ZOFRAN-ODT) disintegrating tablet 4 mg, Q8H PRN   Or  ondansetron (ZOFRAN) injection 4 mg, Q6H PRN  polyethylene glycol (GLYCOLAX) packet 17 g, Daily PRN  acetaminophen (TYLENOL) tablet 650 mg, Q6H PRN   Or  acetaminophen (TYLENOL) suppository 650 mg, Q6H PRN        Physical exam:     Vitals:  /79   Pulse (!) 105   Temp 98.3 °F (36.8 °C) (Oral)   Resp 22   Ht 5' 7\" (1.702 m)   Wt 223 lb 8 oz (101.4 kg)   SpO2 98%   BMI 35.01 kg/m²   Constitutional:  OAA X3 NAD  Skin: no rash, turgor wnl  Heent:  eomi, mmm  Neck: no bruits or jvd noted  Cardiovascular:  S1, S2 without m/r/g  Respiratory: CTA B without w/r/r  Abdomen:  +bs, soft, nt, nd  Ext: 1 +  lower extremity edema  Psychiatric: mood and affect appropriate  Musculoskeletal:  Rom, muscular strength intact    Labs:  CBC:   Recent Labs     02/18/23  0655 02/19/23  0425 02/20/23  0620   WBC 19.3* 18.3* 36.6*   HGB 7.6* 8.1* 8.3*    203 241     BMP:    Recent Labs     02/18/23  0655 02/19/23  0425 02/20/23  0620    141 140   K 4.0 4.5 4.4    101 102   CO2 39* 39* 37*   BUN 34* 28* 30*   CREATININE 1.0 1.0 1.1   GLUCOSE 96 82 149*     Ca/Mg/Phos:   Recent Labs     02/18/23  0655 02/19/23  0425 02/20/23  0620   CALCIUM 8.2* 8.5 8.6   MG 2.00 1.80 1.80   PHOS 3.1 2.7 2.6     Hepatic:   Recent Labs     02/18/23  0655 02/19/23  0425 02/20/23  0620   AST 15 24 15   ALT <5* 6* <5*   BILITOT 0.3 0.4 0.5   ALKPHOS 80 94 96              IMAGING:  XR ABDOMEN (KUB) (SINGLE AP VIEW)   Final Result   1.  Nonobstructive bowel gas pattern.      2.  Moderate stool burden in the proximal colon and rectum.         XR CHEST PORTABLE   Final Result   1.  Interval right PICC line removal.      2.  New left PICC line terminates at the innominate/SVC junction.         CT ABSCESS DRAINAGE W CATH PLACEMENT S&I   Final Result   Successful CT-guided percutaneous drainage catheter placement into a   perigastric collection via a left anterolateral abdominal approach.      Patient currently has a 14 Jordanian locking pigtail drainage catheter within   the collection, placed to bulb suction.         CT GUIDED NEEDLE PLACEMENT   Final Result   Successful CT-guided percutaneous drainage catheter placement into a   perigastric collection via a left anterolateral abdominal approach.      Patient currently has a 14 Jordanian locking pigtail drainage catheter within   the collection, placed to bulb suction.         XR CHEST PORTABLE   Final Result   1.  Right upper extremity PICC extends superiorly into the right internal   jugular vein; tip is excluded from the field of view.  Recommend   repositioning.      2.  Bilateral patchy airspace disease, improved from prior.      Discussed with Dr. Betancourt by Dr. Rivera at 4:55 am on 2/15/2023         CT ABDOMEN WO CONTRAST Additional Contrast? Oral   Final Result   1. Status post Angel-en-Y gastric bypass.  Chronically herniated gastric  pouch   into the chest.  Findings compatible with a leak likely a marginal ulcer with   a developing 10 x 10 x 5 cm abscess centered between the Angel limb and   gastric remnant. Adjacent inflammatory change and left upper quadrant edema   has progressed from 02/07/2023. Trace amount of enteric contrast leak. A   couple punctate foci of free air noted in the anterior abdomen. Findings were discussed with Dr. Jessa Padilla at 5:45 pm on 2/13/2023. CT ABDOMEN WO CONTRAST Additional Contrast? None   Final Result   Postsurgical change from gastric bypass. There is fat stranding surrounding   the afferent loop. There is fluid and gas seen in the upper abdomen, near   the anastomotic site, that is not all definitively intraluminal, with   surrounding fat stranding, raising the question of perforation. Increased pleural-parenchymal disease at the left lung base      The findings were sent to the Radiology Results [address] at 1:17   pm on 2/13/2023 to be communicated to a licensed caregiver. CT CHEST ABDOMEN PELVIS WO CONTRAST Additional Contrast? None   Final Result   1. Scattered ground-glass opacities with interlobular septal thickening. Findings may be related to pulmonary edema with other considerations   including an inflammatory/infectious process in the appropriate clinical   setting. 2. Right upper lobe consolidation. Additional bilateral scattered curvilinear   opacities may be related to atelectasis versus developing consolidation. 3. Enlarged pulmonary artery diameter. Finding may be related to pulmonary   arterial hypertension. 4. Coronary artery calcifications present. 5. Moderate hiatal hernia. 6. Questionable circumferential thickening of the descending colon with mild   surrounding fat stranding versus colonic underdistention. Finding raises the   possibility of colitis in the appropriate clinical setting. 7. Colonic diverticulosis, predominately sigmoid.   No evidence of acute   diverticulitis. 8. Nonobstructing punctate left nephrolithiasis. RECOMMENDATIONS:   2 cm left adrenal mass, consistent with lipid-rich benign adenoma. No   follow-up imaging is recommended. JACR 2017 Aug; 14(8):1038-44, JCAT 2016 Bernadene Eis; 40(2):194-200, Urol J 2006   [place]; 3(2):71-4. XR CHEST PORTABLE   Final Result   1. Diffuse hazy multifocal opacity throughout both lungs, right worse than   left, likely a combination of moderate pulmonary edema and multifocal   pneumonia. 2. Stable cardiomegaly. 3. Stable hiatal hernia. CT ABDOMEN WO CONTRAST Additional Contrast? Oral    (Results Pending)           Assessment/Plan :      1. Panda   Creatinine  improved   Has mild edema   Low dose  lasix         2. HTN. BP low borderline range   Hold clonidine if SBP < 110 mm HG     3. COPD    4. H/o atrial fib   Monitor       5. Hyperkalemia   Resolved after getting lokelma     6. Abdominal  pain .  CT scan shows there has been interval development of a poorly  marginated approximately 10 x 10 x 5 cm fluid collection with foci of gas and  small amounts of enteric contrast compatible with a developing abscess  Surgery on board    S/p IR drain      Continue TPN   Monitor electrolytes         D/w primary team      Thank you for allowing us to participate in care of Kenyetta Benavidez         Electronically signed by: Bob Arriaza MD, 2/20/2023, 10:41 AM      [facility] [address]  Office : [phone]  Fax :[phone]

## 2023-02-20 NOTE — PROGRESS NOTES
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  Bilateral atypical pneumonia  Right upper lobe consolidation    Microbiology:        I have reviewed allavailable micro lab data and cultures    Blood culture (2/2) - collected on 2/7/2023: Negative so far      Antibiotics and immunizations:       Current antibiotics: All antibiotics and their doses were reviewed by me    Recent Abx Admin                     meropenem (MERREM) 500 mg in sodium chloride 0.9 % 100 mL IVPB (mini-bag) (mg) 500 mg New Bag 02/20/23 0901     500 mg New Bag 02/19/23 2345     500 mg New Bag  1547    anidulafungin (ERAXIS) 100 mg in sodium chloride 0.9 % 130 mL IVPB (mg) 100 mg New Bag 02/19/23 2132                      Immunization History: All immunization history was reviewed by me today.     Immunization History   Administered Date(s) Administered    COVID-19, PFIZER Bivalent BOOSTER, DO NOT Dilute, (age 12y+), IM, 30 mcg/0.3 mL 09/09/2022    COVID-19, PFIZER GRAY top, DO NOT Dilute, (age 15 y+), IM, 30 mcg/0.3 mL 05/20/2022    COVID-19, PFIZER PURPLE top, DILUTE for use, (age 15 y+), 30mcg/0.3mL 09/08/2021, 10/15/2021    Influenza Virus Vaccine 12/22/2012    Influenza, FLUAD, (age 72 y+), Adjuvanted, 0.5mL 10/27/2020, 10/15/2021, 09/09/2022    Influenza, High Dose (Fluzone 65 yrs and older) 09/30/2016, 10/01/2017, 10/08/2018    Influenza, Triv, inactivated, subunit, adjuvanted, IM (Fluad 65 yrs and older) 10/21/2019    Pneumococcal Conjugate 13-valent (Josue Ramirez) 09/30/2016, 10/01/2017    Pneumococcal Polysaccharide ([id number]) 12/22/2012, 10/06/2017    Td vaccine (adult) 12/01/2008    Zoster Live (Zostavax) 09/15/2015       Known drug allergies: All allergies were reviewed and updated    Allergies   Allergen Reactions    Bee Venom Swelling and Angioedema    Shellfish-Derived Products Other (See Comments)     Syncope/seizures    Shrimp Flavor      Passes out      Cephalexin Itching    Codeine Hives and Other (See Comments)     B/P DROPS PASSES OUT  Passed out    Fentanyl And Related Hives     Other reaction(s): Dizziness    Hydromorphone Rash, Hives and Other (See Comments)     Full rash spreading across chest and both arms from IV hydromorphone  Passed out    Vancomycin Rash       Social history:     Social History:  All social andepidemiologic history was reviewed and updated by me today as needed. Tobacco use:   reports that she has never smoked. She has never used smokeless tobacco.  Alcohol use:   reports no history of alcohol use. Currently lives in: [address] [place] [address]   reports no history of drug use. COVID VACCINATION AND LAB RESULT RECORDS:     Internal Administration   First Dose COVID-19, PFIZER PURPLE top, DILUTE for use, (age 15 y+), 30mcg/0.3mL  09/08/2021   Second Dose COVID-19, PFIZER PURPLE top, DILUTE for use, (age 15 y+), 30mcg/0.3mL   10/15/2021       Last COVID Lab SARS-CoV-2 (no units)   Date Value   09/13/2022 Not Detected     SARS-CoV-2, BREANA (no units)   Date Value   10/01/2020 NOT DETECTED     SARS-CoV-2, NAAT (no units)   Date Value   12/20/2022 Not Detected            Assessment:     The patient is a 78 y.o. old female who  has a past medical history of Arthritis, Atrial fibrillation and flutter ([address] Hypertension, Kidney disease, Pneumonia, and Sleep apnea.  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 Discussion:      The patient is afebrile. His white cell count has gone up to 36,600 today. Rising white cell count is concerning    His serum creatinine is 1.1. Liver functions are okay. Platelet count is 468,990. Blood cultures from 2/14/2023 and 20 1623 remain negative. The patient had a CT scan of abdomen and pelvis with IV contrast done earlier today. CT images reviewed and independently interpreted. The patient had a AURELIO drain placed in the abdominal abscess, which is now apparently larger than before. She also has a persistent left-sided pleural effusion. Interestingly left abdominal fluid culture from 223 has only grown Candida albicans, which is covered with Eraxis    Plan:     Diagnostic Workup:      Continue to follow  fever curve, WBC count and blood cultures. Continue to monitor blood counts, liver and renal function. Antimicrobials: Will continue IV meropenem 1 g every 8 hours  Continue IV Eraxis 100 mg daily  Increasing intra-abdominal abscess size is concerning, indicating concern for an ongoing leak  Will follow up on abdominal surgical plans.   Patient will likely need complex surgical reconstruction per surgical note  We will follow up on the culture results and clinical progress and will make further recommendations accordingly. Continue close vitals monitoring. Maintain good glycemic control. Fall precautions. Aspiration precautions. Continue to watch for new fever or diarrhea. DVT prophylaxis. Discussed all above with patient and RN. Drug Monitoring:    Continue monitoring for antibiotic toxicity as follows: CBC, CMP   Continue to watch for following: new or worsening fever, new hypotension, hives, lip swelling and redness or purulence at vascular access sites. I/v access Management:    Continue to monitor i.v access sites for erythema, induration, discharge or tenderness. As always, continue efforts to minimize tubes/lines/drains as clinically appropriate to reduce chances of line associated infections. Patient education and counseling: The patient was educated in detail about the side-effects of various antibiotics and things to watch for like new rashes, lip swelling, severe reaction, worsening diarrhea, break through fever etc.  Discussed patient's condition and what to expect. All of the patient's questions were addressed in a satisfactory manner and patient verbalized understanding all instructions. Level of complexity of visit and medical decision making: High     Risk of Complications/Morbidity: High     Illness(es)/ Infection present that pose threat to life/bodily function. There is potential for severe exacerbation of infection/side effects of treatment. Therapy requires intensive monitoring for antimicrobial agent toxicity. Thank you for involving me in the care of your patient. I will continue to follow. If you have anyadditional questions, please do not hesitate to contact me. Subjective: Interval history: Interval history was obtained from chart review and patient/ RN. She is afebrile. Remains on broad-spectrum antibiotic coverage.   She is tolerating antibiotic okay    REVIEW OF SYSTEMS:      Review of Systems   Constitutional:  Positive for fatigue. Negative for chills, diaphoresis and fever. HENT:  Negative for ear discharge, ear pain, postnasal drip, rhinorrhea, sinus pressure, sinus pain and sore throat. Eyes:  Negative for discharge and redness. Respiratory:  Negative for cough, shortness of breath and wheezing. Cardiovascular:  Negative for chest pain and leg swelling. Gastrointestinal:  Negative for abdominal pain, constipation, diarrhea and nausea. Endocrine: Negative for cold intolerance, heat intolerance and polydipsia. Genitourinary:  Negative for dysuria, flank pain, frequency, hematuria and urgency. Musculoskeletal:  Negative for back pain and myalgias. Skin:  Negative for rash. Allergic/Immunologic: Negative for immunocompromised state. Neurological:  Negative for dizziness, seizures and headaches. Hematological:  Does not bruise/bleed easily. Psychiatric/Behavioral:  Negative for agitation, hallucinations and suicidal ideas. The patient is not nervous/anxious. All other systems reviewed and are negative. Past Medical History: All past medical history reviewed today. Past Medical History:   Diagnosis Date    Arthritis     Atrial fibrillation and flutter ([facility]     Hypertension     Kidney disease     Pt states  \"stage 3\"    Pneumonia     Sleep apnea     no c-pap       Past Surgical History: All past surgical history was reviewed today.     Past Surgical History:   Procedure Laterality Date    CARDIOVERSION      COLONOSCOPY N/A 11/20/2018    COLONOSCOPY POLYPECTOMY SNARE/COLD BIOPSY performed by Lio Block MD at [address] [facility] ENDOSCOPY    COLONOSCOPY N/A 03/31/2022    COLONOSCOPY POLYPECTOMY SNARE/COLD BIOPSY performed by Cal Lucio MD at [address] [place] [address]      ankle rt has pins and rods, and rt elbow    GASTRIC BYPASS SURGERY      LARYNX SURGERY      TOTAL KNEE ARTHROPLASTY Bilateral 12/19/2012    bilateral knee replacements    TUBAL LIGATION      tubes tied    UPPER GASTROINTESTINAL ENDOSCOPY N/A 11/20/2018    EGD BIOPSY performed by Syed Kelsey MD at [facility] 03/31/2022    EGD DILATION BALLOON performed by Bam Mello MD at [facility] N/A 03/31/2022    EGD BIOPSY performed by Bam Mello MD at [address] [place] [address]       Family History: All family history was reviewed today. Problem Relation Age of Onset    Other Mother         blood clot    Cancer Father         stomach cancer    Stroke Brother        Objective:       PHYSICAL EXAM:      Vitals:   Vitals:    02/20/23 0715 02/20/23 1045 02/20/23 1106 02/20/23 1330   BP: 128/79 113/79  112/80   Pulse: (!) 105 (!) 123  (!) 121   Resp: 22 20 24   Temp: 98.3 °F (36.8 °C) 98.3 °F (36.8 °C)  98.5 °F (36.9 °C)   TempSrc: Oral Oral  Oral   SpO2: 98% 95%  96%   Weight:       Height:   5' 7\" (1.702 m)        Physical Exam  Vitals and nursing note reviewed. Constitutional:       Appearance: She is well-developed. She is not diaphoretic. Comments: The patient was seen earlier today. HENT:      Head: Normocephalic and atraumatic. Right Ear: External ear normal. There is no impacted cerumen. Left Ear: External ear normal. There is no impacted cerumen. Nose: Nose normal.      Mouth/Throat:      Mouth: Mucous membranes are moist.      Pharynx: Oropharynx is clear. No oropharyngeal exudate. Eyes:      General: No scleral icterus. Right eye: No discharge. Left eye: No discharge. Conjunctiva/sclera: Conjunctivae normal.      Pupils: Pupils are equal, round, and reactive to light. Neck:      Thyroid: No thyromegaly. Cardiovascular:      Rate and Rhythm: Normal rate and regular rhythm. Heart sounds: Normal heart sounds. No murmur heard. No friction rub. Pulmonary:      Effort: No respiratory distress. Breath sounds:  No stridor. No wheezing or rales. Abdominal:      General: Bowel sounds are normal.      Palpations: Abdomen is soft. Tenderness: There is no abdominal tenderness. There is no guarding or rebound. Musculoskeletal:         General: No swelling, tenderness or deformity. Normal range of motion. Cervical back: Normal range of motion and neck supple. Right lower leg: No edema. Left lower leg: No edema. Lymphadenopathy:      Cervical: No cervical adenopathy. Skin:     General: Skin is warm and dry. Coloration: Skin is not jaundiced. Findings: No bruising, erythema or rash. Neurological:      General: No focal deficit present. Mental Status: She is alert and oriented to person, place, and time. Mental status is at baseline. Motor: No abnormal muscle tone. Psychiatric:         Mood and Affect: Mood normal.         Behavior: Behavior normal.     *    Lines and drains: All vascular access sites are healthy with no local erythema, discharge or tenderness. Intake and output:    I/O last 3 completed shifts: In: 62.5 [I.V.:54.2; IV Piggyback:8.3]  Out: 46 [Urine:1000; Drains:225]    Lab Data:   All available labs and old records have been reviewed by me.     CBC:  Recent Labs     02/18/23  0655 02/19/23  0425 02/20/23  0620   WBC 19.3* 18.3* 36.6*   RBC 2.74* 2.90* 3.02*   HGB 7.6* 8.1* 8.3*   HCT 24.2* 25.7* 26.7*    203 241   MCV 88.4 88.6 88.3   MCH 27.9 27.9 27.3   MCHC 31.6 31.5 30.9*   RDW 16.9* 17.2* 17.3*   BANDSPCT  --  6 9*          BMP:  Recent Labs     02/18/23  0655 02/19/23  0425 02/20/23  0620    141 140   K 4.0 4.5 4.4    101 102   CO2 39* 39* 37*   BUN 34* 28* 30*   CREATININE 1.0 1.0 1.1   CALCIUM 8.2* 8.5 8.6   GLUCOSE 96 82 149*          Hepatic Function Panel:   Lab Results   Component Value Date/Time    ALKPHOS 96 02/20/2023 06:20 AM    ALT <5 02/20/2023 06:20 AM    AST 15 02/20/2023 06:20 AM    PROT 6.0 02/20/2023 06:20 AM    BILITOT 0.5 02/20/2023 06:20 AM    BILIDIR 1.0 09/13/2022 05:56 PM    IBILI 0.9 09/13/2022 05:56 PM    LABALBU 2.1 02/20/2023 06:20 AM       CPK: No results found for: CKTOTAL  ESR:   Lab Results   Component Value Date    SEDRATE 34 (H) 02/14/2023     CRP:   Lab Results   Component Value Date    .4 (H) 02/14/2023           Imaging: All pertinent images and reports for the current visit were reviewed by me during this visit. I reviewed the chest x-ray/CT scan/MRI images and independently interpreted the findings and results today. CT ABDOMEN WO CONTRAST Additional Contrast? Oral   Final Result   1. Interval placement of percutaneous drain in the fluid collection in the   epigastric region. The collection measures slightly larger measuring as much   as 11.3 cm versus 10.5 cm previously. 2. Prior gastric bypass with a hiatal hernia containing the gastric pouch. 3. Persistent small left pleural effusion with adjacent left lower lobe   consolidation that could represent atelectasis or pneumonia. Ground-glass   opacities in the lungs appear improved. XR ABDOMEN (KUB) (SINGLE AP VIEW)   Final Result   1. Nonobstructive bowel gas pattern. 2.  Moderate stool burden in the proximal colon and rectum. XR CHEST PORTABLE   Final Result   1. Interval right PICC line removal.      2.  New left PICC line terminates at the innominate/SVC junction. CT ABSCESS DRAINAGE W CATH PLACEMENT S&I   Final Result   Successful CT-guided percutaneous drainage catheter placement into a   perigastric collection via a left anterolateral abdominal approach. Patient currently has a 14 [place] locking pigtail drainage catheter within   the collection, placed to bulb suction. CT GUIDED NEEDLE PLACEMENT   Final Result   Successful CT-guided percutaneous drainage catheter placement into a   perigastric collection via a left anterolateral abdominal approach.       Patient currently has a [facility] locking pigtail drainage catheter within   the collection, placed to bulb suction. XR CHEST PORTABLE   Final Result   1. Right upper extremity PICC extends superiorly into the right internal   jugular vein; tip is excluded from the field of view. Recommend   repositioning. 2.  Bilateral patchy airspace disease, improved from prior. Discussed with Dr. Lee Ann Sher by Dr. Mono Garcia at 4:55 am on 2/15/2023         CT ABDOMEN WO CONTRAST Additional Contrast? Oral   Final Result   1. 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  Findings compatible with a leak likely a marginal ulcer with   a developing 10 x 10 x 5 cm abscess centered between the Angel limb and   gastric remnant. Adjacent inflammatory change and left upper quadrant edema   has progressed from 02/07/2023. Trace amount of enteric contrast leak. A   couple punctate foci of free air noted in the anterior abdomen. Findings were discussed with Dr. Ziggy Wallace at 5:45 pm on 2/13/2023. CT ABDOMEN WO CONTRAST Additional Contrast? None   Final Result   Postsurgical change from gastric bypass. There is fat stranding surrounding   the afferent loop. There is fluid and gas seen in the upper abdomen, near   the anastomotic site, that is not all definitively intraluminal, with   surrounding fat stranding, raising the question of perforation. Increased pleural-parenchymal disease at the left lung base      The findings were sent to the Radiology Results [address] at 1:17   pm on 2/13/2023 to be communicated to a licensed caregiver. CT CHEST ABDOMEN PELVIS WO CONTRAST Additional Contrast? None   Final Result   1. Scattered ground-glass opacities with interlobular septal thickening. Findings may be related to pulmonary edema with other considerations   including an inflammatory/infectious process in the appropriate clinical   setting. 2. Right upper lobe consolidation. Additional bilateral scattered curvilinear   opacities may be related to atelectasis versus developing consolidation. 3. Enlarged pulmonary artery diameter. Finding may be related to pulmonary   arterial hypertension. 4. Coronary artery calcifications present. 5. Moderate hiatal hernia. 6. Questionable circumferential thickening of the descending colon with mild   surrounding fat stranding versus colonic underdistention. Finding raises the   possibility of colitis in the appropriate clinical setting. 7. Colonic diverticulosis, predominately sigmoid. No evidence of acute   diverticulitis. 8. Nonobstructing punctate left nephrolithiasis. RECOMMENDATIONS:   2 cm left adrenal mass, consistent with lipid-rich benign adenoma. No   follow-up imaging is recommended. JACR 2017 Aug; 14(8):1038-44, JCAT 2016 [facility]; 40(2):194-200, Urol J 2006   [place]; 3(2):71-4. XR CHEST PORTABLE   Final Result   1. Diffuse hazy multifocal opacity throughout both lungs, right worse than   left, likely a combination of moderate pulmonary edema and multifocal   pneumonia. 2. Stable cardiomegaly. 3. Stable hiatal hernia. Medications: All current and past medications were reviewed.      [Held by provider] enoxaparin  40 mg SubCUTAneous Daily    lidocaine 1 % injection  5 mL IntraDERmal Once    sodium chloride flush  5-40 mL IntraVENous 2 times per day    metoprolol  2.5 mg IntraVENous Q6H    pantoprazole  40 mg IntraVENous BID    furosemide  20 mg IntraVENous Daily    iron sucrose (VENOFER) iv piggyback 100 mL  200 mg IntraVENous Q24H    meropenem  500 mg IntraVENous q8h    anidulafungin  100 mg IntraVENous Q24H    insulin lispro  0-4 Units SubCUTAneous Q6H    fat emulsion  250 mL IntraVENous Once per day on Mon Thu    sodium chloride flush  5-40 mL IntraVENous 2 times per day    lidocaine 1 % injection  5 mL IntraDERmal Once    sodium chloride flush  5-40 mL IntraVENous 2 times per day amiodarone  200 mg Oral Daily    sodium chloride flush  5-40 mL IntraVENous 2 times per day        PN-Adult Premix 5/20 - Standard Electrolytes - Central Line      PN-Adult Premix 5/20 - Standard Electrolytes - Central Line 83 mL/hr at 02/19/23 1904    sodium chloride      dextrose      sodium chloride      sodium chloride Stopped (02/16/23 1814)    sodium chloride 100 mL/hr at 02/16/23 1704       simethicone, diatrizoate meglumine-sodium, sodium chloride flush, sodium chloride, metoprolol, dextrose bolus **OR** dextrose bolus, glucagon (rDNA), dextrose, sodium chloride flush, sodium chloride, ipratropium-albuterol, sodium chloride flush, sodium chloride, diphenhydrAMINE, ondansetron, albuterol sulfate HFA, traMADol, sodium chloride flush, sodium chloride, ondansetron **OR** ondansetron, polyethylene glycol, acetaminophen **OR** acetaminophen      Problem list:       Patient Active Problem List   Diagnosis Code    Atrial fibrillation, persistent ([facility]) I48.19    Hypertension, essential I10    Severe episode of recurrent major depressive disorder, without psychotic features ([facility] F33.2    Seasonal affective disorder ([facility]) F33.8    Class 2 severe obesity due to excess calories with serious comorbidity and body mass index (BMI) of 39.0 to 39.9 in adult ([facility] E66.01, Z68.39    Elevated sed rate R70.0    Neutrophilia D72.9    Acute kidney injury ([facility] N17.9    Elevated C-reactive protein (CRP) R79.82    GIANNA (obstructive sleep apnea) G47.33    Morbid obesity ([facility]) E66.01    Weight loss counseling, encounter for Z71.3    Atypical atrial flutter ([facility]) I48.4    PAF (paroxysmal atrial fibrillation) ([facility]) I48.0    ILD (interstitial lung disease) ([facility]) J84.9    Iron deficiency anemia due to chronic blood loss D50.0    Iron deficiency anemia, unspecified D50.9    Age-related osteoporosis without current pathological fracture M81.0    Vasovagal syncope R55    CKD (chronic kidney disease) stage 4, GFR 15-29 ml/min ([facility]) N18.4 Acute on chronic diastolic heart failure (HCC) I50.33    Stage 3b chronic kidney disease (HCC) N18.32    B12 deficiency E53.8    Vitamin D deficiency E55.9    Acute respiratory failure (HCC) J96.00    Chronic obstructive pulmonary disease, unspecified J44.9    Sepsis (HCC) A41.9    Cellulitis of right leg L03.115    Obesity (BMI 30-39. 9) E66.9    Stage 3 chronic kidney disease (HCC) N18.30    Bacteremia R78.81    Fever and chills R50.9    History of ankle surgery Z98.890    Streptococcal infection group G B95.4    Bacterial pneumonia J15.9    JUSTINA (acute kidney injury) ([facility] N17.9    Acute on chronic heart failure with preserved ejection fraction (HFpEF) (HCC) I50.33    Right upper lobe consolidation (HCC) J18.1    Allergic drug reaction T78.40XA    Elevated d-dimer R79.89    Left nephrolithiasis N20.0    Diverticulosis K57.90    History of cholecystectomy Z90.49    Intra-abdominal abscess (HCC) K65.1    Bandemia D72.825    Atypical pneumonia J18.9    Multifocal pneumonia J18.9    Hematemesis K92.0       Please note that this chart was generated using Dragon dictation software. Although every effort was made to ensure the accuracy of this automated transcription, some errors in transcription may have occurred inadvertently. If you may need any clarification, please do not hesitate to contact me through EPIC or at the phone number provided below with my electronic signature. Any pictures or media included in this note were obtained after taking informed verbal consent from the patient and with their approval to include those in the patient's medical record. Lexus Hawkins MD, MPH, Rai Rojaser  2/20/2023, 2:46 PM  [facility] Infectious Disease   [address] [place] [address], [address] [place] [address]  Office: [phone]  Fax: [phone]  In-person Clinic days:  Tuesday & Thursday a.m. Virtual clinic days: Monday, Wednesday & Friday a.m.

## 2023-02-20 NOTE — PROGRESS NOTES
[facility]    2/20/2023 1:46 PM        Name: Janny Peralta . Admitted: 2/7/2023  Primary Care Provider: MARILEE Swain CNP (Tel: [phone])                        Subjective:   Patient feels well  No nausea vomiting no abdominal pain  No fever  White cell count down to 18,000  No bleeding        Reviewed interval ancillary notes    Current Medications  Reviewed    Objective:  /80   Pulse (!) 121   Temp 98.5 °F (36.9 °C) (Oral)   Resp 24   Ht 5' 7\" (1.702 m)   Wt 223 lb 8 oz (101.4 kg)   SpO2 96%   BMI 35.01 kg/m²     Intake/Output Summary (Last 24 hours) at 2/20/2023 1346  Last data filed at 2/20/2023 0500  Gross per 24 hour   Intake 0 ml   Output 350 ml   Net -350 ml      Wt Readings from Last 3 Encounters:   02/20/23 223 lb 8 oz (101.4 kg)   12/24/22 236 lb 14.4 oz (107.5 kg)   12/15/22 235 lb (106.6 kg)       General appearance:  Appears comfortable  Eyes: Sclera clear. Pupils equal.  ENT: Moist oral mucosa. Trachea midline, no adenopathy. Cardiovascular: Regular rhythm, normal S1, S2. No murmur. No edema in lower extremities  Respiratory: Not using accessory muscles. Good inspiratory effort. Clear to auscultation bilaterally, no wheeze or crackles. GI: Abdomen soft, no tenderness, not distended, normal bowel sounds  Musculoskeletal: No cyanosis in digits, neck supple  Neurology: CN 2-12 grossly intact. No speech or motor deficits  Psych: Normal affect. Alert and oriented in time, place and person  Skin: Warm, dry, normal turgor  PERC drain in place with dark output.   Physical exam remains unchanged from 2/18    Labs and Tests:  CBC:   Recent Labs     02/18/23  0655 02/19/23  0425 02/20/23  0620   WBC 19.3* 18.3* 36.6*   HGB 7.6* 8.1* 8.3*    203 241     BMP:    Recent Labs     02/18/23  0655 02/19/23  0425 02/20/23  9261  141 140   K 4.0 4.5 4.4    101 102   CO2 39* 39* 37*   BUN 34* 28* 30*   CREATININE 1.0 1.0 1.1   GLUCOSE 96 82 149*     Hepatic:   Recent Labs     02/18/23  0655 02/19/23  0425 02/20/23  0620   AST 15 24 15   ALT <5* 6* <5*   BILITOT 0.3 0.4 0.5   ALKPHOS 80 94 96       Discussed care with patient       Problem List      Assessment & Plan:   56-year-old female who was admitted for acute CHF exacerbation with hypoxia and acute kidney injury. Patient was evaluated by cardiology and nephrology & had made significant improvement. However on the day of discharge she was noticed to have hematemesis for which STAT CT done showed findings compatible with endoleak likely a marginal ulcer with a developing 10 x 10 x 5 cm abscess centered between the Angel limb and gastric remnant. 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 CT: Endoleak with Intra-abdominal abscess, measuring 10 x 10 x 5 cm between the gastric remnant and Angel-en-Y limb. IR consulted: s/p perc drain placement on 2/15/2022. Abdominal fluid culture positive for Candida. Blood cultures remain negative to date. Persistent leukocytosis is bumped up to 36 today. ID consulted: Currently on meropenem and anidulafungin. Bariatric surgeon consulted: Contained marginal ulcer perforation. No surgical intervention at this time. Continue STRICT NPO and TPN. Patient will be discharged on TPN. epeat a CT of the abdomen today and depending on the result further recommendation to follow likely may need transfer      Acute hypoxic respiratory failure: Improved  Likely due to CHF exacerbation, sepsis and underlying COPD. Currently on 2 L nasal cannula. Anemia: Multifactorial secondary to iron deficiency and anemia of chronic disease  Work-up consistent with MIRIAM/AoCD. Started on IV Venofer.   Monitor H&H and transfuse as needed. Hemoglobin remained stable      Hematemesis: resolved. Continue Protonix IV twice daily for marginal ulcers. Afib with RVR:   Unable to resume amiodarone due to strict n.p.o. orders. Metoprolol changed to IV every 6h. Seen by cardiology  At this point given underlying GI pathology with increased risk of bleeding they recommended holding off on anticoagulation however once this issues resolve and if the risk of bleeding goes down then she will need to be considered to reanticoagulate given her high [id number] score      Acute on chronic HFpEF:  Echo 12/21/2022: LVEF 55 to 60%. Cardiology consulted: Currently compensated with IV diuresis. Continue IV Lasix 20 mg daily until able to take oral.    COPD without acute exacerbation: Continue inhalers. JUSTINA: Resolved. Peak creatinine 2.8.    Seen by nephrology  Creatinine down to 1        Diet: Diet NPO Exceptions are: Sips of Water with Meds  PN-Adult Premix 5/20 - Standard Electrolytes - Central Line  PN-Adult Premix 5/20 - Standard Electrolytes - Central Line  Code:Full Code  DVT PPX I restarted Lovenox since her hemoglobin is stable    Disposition: Depending on this result      Rehan Schwarz MD   2/20/2023 1:46 PM

## 2023-02-20 NOTE — PROGRESS NOTES
Patient having sudden nausea, abdominal cramping, and pain, vitals stable, Zofran given for nausea, morphine for pain, NP notified, abdominal xray ordered, simethicone given, patient vomited small amount of bright red blood, NP notified, awaiting xray results

## 2023-02-20 NOTE — PROGRESS NOTES
[facility]) Physicians   [employer] [facility] [employer]  [address], [address] [address] . Phone: [phone]  Fax: [phone]        Pt seen and examined     Patient admitted with complex medical comorbidities. Remote history of gastric bypass. Patient with chronic marginal ulcer and contained perforation , s/p perc drain now. Overnight patient had worsening abdominal pain and nausea and cramping abdominal x-ray was unremarkable. This morning sitting in bed without any complaints other than constipation. Vitals:    02/19/23 1545 02/19/23 2332 02/20/23 0500 02/20/23 0715   BP: 125/79 125/73 113/81 128/79   Pulse: (!) 116 (!) 109 (!) 122 (!) 105   Resp: 18 18 18 22   Temp: 98.5 °F (36.9 °C) 98.2 °F (36.8 °C) 98.5 °F (36.9 °C) 98.3 °F (36.8 °C)   TempSrc: Oral Oral Oral Oral   SpO2: 95% 95% 97% 98%   Weight:   223 lb 8 oz (101.4 kg)    Height:           In: 0   Out: 350 [Urine:300; Drains:50]    Abdomen is soft, nondistended, mildly tender only in epigastric region at site of perc drain, no guarding nor rigidity no peritoneal signs.   The rest of the abdominal exam is normal.    AURELIO from [facility] drain with scant amount of old blood in it      Data  CBC:   Lab Results   Component Value Date/Time    WBC 36.6 02/20/2023 06:20 AM    RBC 3.02 02/20/2023 06:20 AM    HGB 8.3 02/20/2023 06:20 AM    HCT 26.7 02/20/2023 06:20 AM    MCV 88.3 02/20/2023 06:20 AM    MCH 27.3 02/20/2023 06:20 AM    MCHC 30.9 02/20/2023 06:20 AM    RDW 17.3 02/20/2023 06:20 AM     02/20/2023 06:20 AM    MPV 10.7 02/20/2023 06:20 AM     BMP:    Lab Results   Component Value Date/Time     02/20/2023 06:20 AM    K 4.4 02/20/2023 06:20 AM    K 4.4 02/07/2023 03:39 AM     02/20/2023 06:20 AM    CO2 37 02/20/2023 06:20 AM    BUN 30 02/20/2023 06:20 AM    LABALBU 2.1 02/20/2023 06:20 AM    CREATININE 1.1 02/20/2023 06:20 AM    CALCIUM 8.6 02/20/2023 06:20 AM    GFRAA 38 09/20/2022 04:14 AM    GFRAA 45 03/27/2013 01:36 PM    LABGLOM 52 02/20/2023 06:20 AM    GLUCOSE 149 02/20/2023 06:20 AM       Current Inpatient Medications    Current Facility-Administered Medications: simethicone (MYLICON) chewable tablet 80 mg, 80 mg, Oral, Q6H PRN  diatrizoate meglumine-sodium (GASTROGRAFIN) 66-10 % solution 20 mL, 20 mL, Oral, ONCE PRN  PN-Adult Premix 5/20 - Standard Electrolytes - Central Line, , IntraVENous, Continuous TPN  PN-Adult Premix 5/20 - Standard Electrolytes - Central Line, , IntraVENous, Continuous TPN  [Held by provider] enoxaparin (LOVENOX) injection 40 mg, 40 mg, SubCUTAneous, Daily  lidocaine PF 1 % injection 5 mL, 5 mL, IntraDERmal, Once  sodium chloride flush 0.9 % injection 5-40 mL, 5-40 mL, IntraVENous, 2 times per day  sodium chloride flush 0.9 % injection 5-40 mL, 5-40 mL, IntraVENous, PRN  0.9 % sodium chloride infusion, 25 mL, IntraVENous, PRN  metoprolol (LOPRESSOR) injection 2.5 mg, 2.5 mg, IntraVENous, Q6H  pantoprazole (PROTONIX) injection 40 mg, 40 mg, IntraVENous, BID  furosemide (LASIX) injection 20 mg, 20 mg, IntraVENous, Daily  iron sucrose (VENOFER) 200 mg in sodium chloride 0.9 % 100 mL IVPB, 200 mg, IntraVENous, Q24H  metoprolol (LOPRESSOR) injection 2.5 mg, 2.5 mg, IntraVENous, Q6H PRN  meropenem (MERREM) 500 mg in sodium chloride 0.9 % 100 mL IVPB (mini-bag), 500 mg, IntraVENous, q8h  [COMPLETED] anidulafungin (ERAXIS) 200 mg in sodium chloride 0.9 % 260 mL IVPB, 200 mg, IntraVENous, Once **AND** anidulafungin (ERAXIS) 100 mg in sodium chloride 0.9 % 130 mL IVPB, 100 mg, IntraVENous, Q24H  dextrose bolus 10% 125 mL, 125 mL, IntraVENous, PRN **OR** dextrose bolus 10% 250 mL, 250 mL, IntraVENous, PRN  glucagon (rDNA) injection 1 mg, 1 mg, SubCUTAneous, PRN  dextrose 10 % infusion, , IntraVENous, Continuous PRN  insulin lispro (HUMALOG) injection vial 0-4 Units, 0-4 Units, SubCUTAneous, Q6H  fat emulsion 20 % infusion 250 mL, 250 mL, IntraVENous, Once per day on Mon Thu  sodium chloride flush 0.9 % injection 5-40 mL, 5-40 mL, IntraVENous, 2 times per day  sodium chloride flush 0.9 % injection 5-40 mL, 5-40 mL, IntraVENous, PRN  0.9 % sodium chloride infusion, 25 mL, IntraVENous, PRN  ipratropium-albuterol (DUONEB) nebulizer solution 1 ampule, 1 ampule, Inhalation, Q4H PRN  lidocaine PF 1 % injection 5 mL, 5 mL, IntraDERmal, Once  sodium chloride flush 0.9 % injection 5-40 mL, 5-40 mL, IntraVENous, 2 times per day  sodium chloride flush 0.9 % injection 5-40 mL, 5-40 mL, IntraVENous, PRN  0.9 % sodium chloride infusion, 25 mL, IntraVENous, PRN  diphenhydrAMINE (BENADRYL) tablet 50 mg, 50 mg, Oral, Q6H PRN  ondansetron (ZOFRAN) injection 4 mg, 4 mg, IntraVENous, Q6H PRN  albuterol sulfate HFA (PROVENTIL;VENTOLIN;PROAIR) 108 (90 Base) MCG/ACT inhaler 2 puff, 2 puff, Inhalation, Q4H PRN  amiodarone (CORDARONE) tablet 200 mg, 200 mg, Oral, Daily  traMADol (ULTRAM) tablet 50 mg, 50 mg, Oral, Q6H PRN  sodium chloride flush 0.9 % injection 5-40 mL, 5-40 mL, IntraVENous, 2 times per day  sodium chloride flush 0.9 % injection 5-40 mL, 5-40 mL, IntraVENous, PRN  0.9 % sodium chloride infusion, , IntraVENous, PRN  ondansetron (ZOFRAN-ODT) disintegrating tablet 4 mg, 4 mg, Oral, Q8H PRN **OR** ondansetron (ZOFRAN) injection 4 mg, 4 mg, IntraVENous, Q6H PRN  polyethylene glycol (GLYCOLAX) packet 17 g, 17 g, Oral, Daily PRN  acetaminophen (TYLENOL) tablet 650 mg, 650 mg, Oral, Q6H PRN **OR** acetaminophen (TYLENOL) suppository 650 mg, 650 mg, Rectal, Q6H PRN    Patient Active Problem List   Diagnosis    Atrial fibrillation, persistent (HCC)    Hypertension, essential    Severe episode of recurrent major depressive disorder, without psychotic features ([facility])    Seasonal affective disorder (HCC)    Class 2 severe obesity due to excess calories with serious comorbidity and body mass index (BMI) of 39.0 to 39.9 in adult (HCC)    Elevated sed rate    Neutrophilia    Acute kidney injury (HCC)    Elevated C-reactive protein (CRP)    GIANNA (obstructive sleep apnea)    Morbid obesity (HCC)    Weight loss counseling, encounter for    Atypical atrial flutter (HCC)    PAF (paroxysmal atrial fibrillation) (HCC)    ILD (interstitial lung disease) (HCC)    Iron deficiency anemia due to chronic blood loss    Iron deficiency anemia, unspecified    Age-related osteoporosis without current pathological fracture    Vasovagal syncope    CKD (chronic kidney disease) stage 4, GFR 15-29 ml/min (HCC)    Acute on chronic diastolic heart failure (HCC)    Stage 3b chronic kidney disease (HCC)    B12 deficiency    Vitamin D deficiency    Acute respiratory failure (HCC)    Chronic obstructive pulmonary disease, unspecified    Sepsis (HCC)    Cellulitis of right leg    Obesity (BMI 30-39. 9)    Stage 3 chronic kidney disease (HCC)    Bacteremia    Fever and chills    History of ankle surgery    Streptococcal infection group G    Bacterial pneumonia    JUSTINA (acute kidney injury) ([facility] [address]    Acute on chronic heart failure with preserved ejection fraction (HFpEF) (HCC)    Right upper lobe consolidation (HCC)    Allergic drug reaction    Elevated d-dimer    Left nephrolithiasis    Diverticulosis    History of cholecystectomy    Intra-abdominal abscess ([facility] [address]    Bandemia    Atypical pneumonia    Multifocal pneumonia    Hematemesis         A/P  Kizzy Harvey is a 78 y.o. female status post post open gastric bypass in [place] several years ago. Patient with history of chronic marginal ulcer that required intervention multiple times. Unfortunately failed to follow-up and was not compliant with her PPIs. Presented with multiple significant medical problems and had 1 episode of hematemesis. None since then. Complex medical history as well as complicated surgical history. Admitted with UTI, acute kidney injury, congestive heart failure, A-fib, and other complex medical history. Now Patient with contained marginal ulcer perforation s/p perc drain.  Cultures from drain only candida so far, ID following pt and manages abx. PICC line and TPN on. Overnight was worsening cramping and nausea. Reports of 1 episode of hematemesis. Worsening leukocytosis. Plan the same to proceed with ordering CT abdomen with oral contrast stat. 1-sepsis (due to perforated marginal ulcer with contained abscess as well the patient admitted with pneumonia)  - CT abdomen / pelvis with oral contrast   - N.P.O. except mouth swabs for comfort   - TPN  - SCDs  - PPI  - Hold Lovenox  -Continue antibiotics appreciate ID input    2-Anemia  (Multifactorial chronic disease as well as perforated marginal ulcer)    - Continue to monitor CBC  - Hold Lovenox    3-COPD exacerbation with left lobe bacterial pneumonia  -Continue IV antibiotic  -Continue pulmonary toilet  -Continue incentive spirometer  -Continue PT / OT    4-A-fib  -Continue care per cardiology  -Anticoagulation on hold due to episode of hematemesis earlier and perforated marginal ulcer  -Continue IV medication as patient's strict n.p.o.    5-Acute kidney injury  -Nephrology is following  -CT today with oral contrast only    6-malnutrition      We will await the results of the CT abdomen with oral contrast.  If patient perforation is not contained or sealed at this point. Patient will need very complex surgical reconstruction that needs to be done at a tertiary facility. Patient with very poor nutritional state as well as very complex medical history as well as ongoing abscess, not ideal for any stapling or surgical intervention with very high risk of leaks/nonhealing. [address] [place] [address] is recommended given their experience with such complicated case. Case discussed with Dr. Cal Crespo the hospitalist.    Will continue to follow.

## 2023-02-21 LAB
ANION GAP SERPL CALCULATED.3IONS-SCNC: 2 MMOL/L (ref 3–16)
ANISOCYTOSIS: ABNORMAL
BANDED NEUTROPHILS RELATIVE PERCENT: 6 % (ref 0–7)
BASOPHILS ABSOLUTE: 0 K/UL (ref 0–0.2)
BASOPHILS RELATIVE PERCENT: 0 %
BUN BLDV-MCNC: 39 MG/DL (ref 7–20)
CALCIUM SERPL-MCNC: 8.4 MG/DL (ref 8.3–10.6)
CHLORIDE BLD-SCNC: 97 MMOL/L (ref 99–110)
CO2: 35 MMOL/L (ref 21–32)
CREAT SERPL-MCNC: 1.2 MG/DL (ref 0.6–1.2)
EOSINOPHILS ABSOLUTE: 0.2 K/UL (ref 0–0.6)
EOSINOPHILS RELATIVE PERCENT: 1 %
GFR SERPL CREATININE-BSD FRML MDRD: 47 ML/MIN/{1.73_M2}
GLUCOSE BLD-MCNC: 109 MG/DL (ref 70–99)
GLUCOSE BLD-MCNC: 118 MG/DL (ref 70–99)
GLUCOSE BLD-MCNC: 123 MG/DL (ref 70–99)
GLUCOSE BLD-MCNC: 126 MG/DL (ref 70–99)
GLUCOSE BLD-MCNC: 131 MG/DL (ref 70–99)
GLUCOSE BLD-MCNC: 141 MG/DL (ref 70–99)
HCT VFR BLD CALC: 25.2 % (ref 36–48)
HEMOGLOBIN: 7.9 G/DL (ref 12–16)
LYMPHOCYTES ABSOLUTE: 1.2 K/UL (ref 1–5.1)
LYMPHOCYTES RELATIVE PERCENT: 6 %
MAGNESIUM: 1.9 MG/DL (ref 1.8–2.4)
MCH RBC QN AUTO: 27.7 PG (ref 26–34)
MCHC RBC AUTO-ENTMCNC: 31.2 G/DL (ref 31–36)
MCV RBC AUTO: 88.7 FL (ref 80–100)
MONOCYTES ABSOLUTE: 1.7 K/UL (ref 0–1.3)
MONOCYTES RELATIVE PERCENT: 8 %
MYELOCYTE PERCENT: 1 %
NEUTROPHILS ABSOLUTE: 17.6 K/UL (ref 1.7–7.7)
NEUTROPHILS RELATIVE PERCENT: 77 %
PDW BLD-RTO: 17.8 % (ref 12.4–15.4)
PERFORMED ON: ABNORMAL
PHOSPHORUS: 3.3 MG/DL (ref 2.5–4.9)
PLATELET # BLD: 234 K/UL (ref 135–450)
PLATELET SLIDE REVIEW: ADEQUATE
PMV BLD AUTO: 11.3 FL (ref 5–10.5)
POTASSIUM SERPL-SCNC: 4.3 MMOL/L (ref 3.5–5.1)
PROMYELOCYTES PERCENT: 1 %
RBC # BLD: 2.84 M/UL (ref 4–5.2)
SLIDE REVIEW: ABNORMAL
SODIUM BLD-SCNC: 134 MMOL/L (ref 136–145)
WBC # BLD: 20.7 K/UL (ref 4–11)

## 2023-02-21 PROCEDURE — 2580000003 HC RX 258: Performed by: HOSPITALIST

## 2023-02-21 PROCEDURE — 2700000000 HC OXYGEN THERAPY PER DAY

## 2023-02-21 PROCEDURE — 2580000003 HC RX 258: Performed by: NURSE PRACTITIONER

## 2023-02-21 PROCEDURE — 1200000000 HC SEMI PRIVATE

## 2023-02-21 PROCEDURE — 2580000003 HC RX 258: Performed by: INTERNAL MEDICINE

## 2023-02-21 PROCEDURE — 97530 THERAPEUTIC ACTIVITIES: CPT

## 2023-02-21 PROCEDURE — 6360000002 HC RX W HCPCS: Performed by: INTERNAL MEDICINE

## 2023-02-21 PROCEDURE — 99233 SBSQ HOSP IP/OBS HIGH 50: CPT | Performed by: INTERNAL MEDICINE

## 2023-02-21 PROCEDURE — 83735 ASSAY OF MAGNESIUM: CPT

## 2023-02-21 PROCEDURE — 80048 BASIC METABOLIC PNL TOTAL CA: CPT

## 2023-02-21 PROCEDURE — 99233 SBSQ HOSP IP/OBS HIGH 50: CPT | Performed by: SURGERY

## 2023-02-21 PROCEDURE — 2500000003 HC RX 250 WO HCPCS: Performed by: HOSPITALIST

## 2023-02-21 PROCEDURE — 97535 SELF CARE MNGMENT TRAINING: CPT

## 2023-02-21 PROCEDURE — C9113 INJ PANTOPRAZOLE SODIUM, VIA: HCPCS | Performed by: INTERNAL MEDICINE

## 2023-02-21 PROCEDURE — 94760 N-INVAS EAR/PLS OXIMETRY 1: CPT

## 2023-02-21 PROCEDURE — 84100 ASSAY OF PHOSPHORUS: CPT

## 2023-02-21 PROCEDURE — 2500000003 HC RX 250 WO HCPCS: Performed by: INTERNAL MEDICINE

## 2023-02-21 PROCEDURE — 85025 COMPLETE CBC W/AUTO DIFF WBC: CPT

## 2023-02-21 PROCEDURE — 6370000000 HC RX 637 (ALT 250 FOR IP): Performed by: HOSPITALIST

## 2023-02-21 RX ADMIN — MEROPENEM 500 MG: 1 INJECTION, POWDER, FOR SOLUTION INTRAVENOUS at 08:38

## 2023-02-21 RX ADMIN — METOPROLOL TARTRATE 2.5 MG: 1 INJECTION, SOLUTION INTRAVENOUS at 23:53

## 2023-02-21 RX ADMIN — METOPROLOL TARTRATE 2.5 MG: 1 INJECTION, SOLUTION INTRAVENOUS at 06:15

## 2023-02-21 RX ADMIN — Medication 10 ML: at 08:41

## 2023-02-21 RX ADMIN — MEROPENEM 500 MG: 1 INJECTION, POWDER, FOR SOLUTION INTRAVENOUS at 16:40

## 2023-02-21 RX ADMIN — AMIODARONE HYDROCHLORIDE 200 MG: 200 TABLET ORAL at 08:26

## 2023-02-21 RX ADMIN — PANTOPRAZOLE SODIUM 40 MG: 40 INJECTION, POWDER, LYOPHILIZED, FOR SOLUTION INTRAVENOUS at 22:06

## 2023-02-21 RX ADMIN — PANTOPRAZOLE SODIUM 40 MG: 40 INJECTION, POWDER, LYOPHILIZED, FOR SOLUTION INTRAVENOUS at 08:26

## 2023-02-21 RX ADMIN — MEROPENEM 500 MG: 1 INJECTION, POWDER, FOR SOLUTION INTRAVENOUS at 00:32

## 2023-02-21 RX ADMIN — SODIUM CHLORIDE 100 MG: 9 INJECTION, SOLUTION INTRAVENOUS at 22:08

## 2023-02-21 RX ADMIN — IRON SUCROSE 200 MG: 20 INJECTION, SOLUTION INTRAVENOUS at 18:53

## 2023-02-21 RX ADMIN — FUROSEMIDE 20 MG: 10 INJECTION, SOLUTION INTRAMUSCULAR; INTRAVENOUS at 08:26

## 2023-02-21 RX ADMIN — METOPROLOL TARTRATE 2.5 MG: 1 INJECTION, SOLUTION INTRAVENOUS at 18:36

## 2023-02-21 RX ADMIN — LEUCINE, PHENYLALANINE, LYSINE, METHIONINE, ISOLEUCINE, VALINE, HISTIDINE, THREONINE, TRYPTOPHAN, ALANINE, GLYCINE, ARGININE, PROLINE, SERINE, TYROSINE, SODIUM ACETATE, DIBASIC POTASSIUM PHOSPHATE, MAGNESIUM CHLORIDE, SODIUM CHLORIDE, CALCIUM CHLORIDE, DEXTROSE
365; 280; 290; 200; 300; 290; 240; 210; 90; 1035; 515; 575; 340; 250; 20; 340; 261; 51; 59; 33; 20 INJECTION INTRAVENOUS at 18:55

## 2023-02-21 RX ADMIN — METOPROLOL TARTRATE 2.5 MG: 1 INJECTION, SOLUTION INTRAVENOUS at 11:55

## 2023-02-21 ASSESSMENT — ENCOUNTER SYMPTOMS
NAUSEA: 0
SINUS PRESSURE: 0
RHINORRHEA: 0
WHEEZING: 0
BACK PAIN: 0
SINUS PAIN: 0
COUGH: 0
ABDOMINAL PAIN: 0
EYE DISCHARGE: 0
SHORTNESS OF BREATH: 0
SORE THROAT: 0
EYE REDNESS: 0
CONSTIPATION: 0
DIARRHEA: 0

## 2023-02-21 NOTE — PROGRESS NOTES
Assessment complete. VSS. Patient resting in chair. Respirations even and easy. Call light in reach. Fall precautions in place. No needs expressed at this time.

## 2023-02-21 NOTE — PROGRESS NOTES
[facility]    2/21/2023 1:32 PM        Name: Maryuri Jimenez . Admitted: 2/7/2023  Primary Care Provider: MARILEE Velasquez CNP (Tel: [phone])                        Subjective:   Patient feels well  No nausea vomiting no abdominal pain  No fever  White cell count down to 18,000  No bleeding        Reviewed interval ancillary notes    Current Medications  Reviewed    Objective:  /73   Pulse 97   Temp 97.6 °F (36.4 °C) (Axillary)   Resp 16   Ht 5' 7\" (1.702 m)   Wt 222 lb 12.8 oz (101.1 kg)   SpO2 96%   BMI 34.90 kg/m²     Intake/Output Summary (Last 24 hours) at 2/21/2023 1332  Last data filed at 2/21/2023 0005  Gross per 24 hour   Intake --   Output 1610 ml   Net -1610 ml      Wt Readings from Last 3 Encounters:   02/21/23 222 lb 12.8 oz (101.1 kg)   12/24/22 236 lb 14.4 oz (107.5 kg)   12/15/22 235 lb (106.6 kg)       General appearance:  Appears comfortable  Eyes: Sclera clear. Pupils equal.  ENT: Moist oral mucosa. Trachea midline, no adenopathy. Cardiovascular: Regular rhythm, normal S1, S2. No murmur. No edema in lower extremities  Respiratory: Not using accessory muscles. Good inspiratory effort. Clear to auscultation bilaterally, no wheeze or crackles. GI: Abdomen soft, no tenderness, not distended, normal bowel sounds  Musculoskeletal: No cyanosis in digits, neck supple  Neurology: CN 2-12 grossly intact. No speech or motor deficits  Psych: Normal affect. Alert and oriented in time, place and person  Skin: Warm, dry, normal turgor  PERC drain in place with dark output.   Physical exam remains unchanged from 2/18    Labs and Tests:  CBC:   Recent Labs     02/19/23  0425 02/20/23  0620 02/21/23  1031   WBC 18.3* 36.6* 20.7*   HGB 8.1* 8.3* 7.9*    241 234     BMP:    Recent Labs     02/19/23  0425 02/20/23  6242 02/21/23  0634    140 134*   K 4.5 4.4 4.3    102 97*   CO2 39* 37* 35*   BUN 28* 30* 39*   CREATININE 1.0 1.1 1.2   GLUCOSE 82 149* 123*     Hepatic:   Recent Labs     02/19/23  0425 02/20/23  0620   AST 24 15   ALT 6* <5*   BILITOT 0.4 0.5   ALKPHOS 94 96       Discussed care with patient       Problem List      Assessment & Plan:   82-year-old female who was admitted for acute CHF exacerbation with hypoxia and acute kidney injury. Patient was evaluated by cardiology and nephrology & had made significant improvement. However on the day of discharge she was noticed to have hematemesis for which STAT CT done showed findings compatible with endoleak likely a marginal ulcer with a developing 10 x 10 x 5 cm abscess centered between the Angel limb and gastric remnant. 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 CT: Endoleak with Intra-abdominal abscess, measuring 10 x 10 x 5 cm between the gastric remnant and Angel-en-Y limb. IR consulted: s/p perc drain placement on 2/15/2022. Abdominal fluid culture positive for Candida. Blood cultures remain negative to date. Persistent leukocytosis is down to 20 today  ID consulted: Currently on meropenem and anidulafungin. Bariatric surgeon consulted: Contained marginal ulcer perforation. No surgical intervention at this time. Continue STRICT NPO and TPN. Patient will be discharged on TPN. Repeat CT scan noted with increased size and abscess. Discussed with surgery team  I discussed case over [facility] we are transferring patient to [facility] has been accepted. Acute hypoxic respiratory failure: Improved  Likely due to CHF exacerbation, sepsis and underlying COPD. Currently on 2 L nasal cannula. Anemia: Multifactorial secondary to iron deficiency and anemia of chronic disease  Work-up consistent with MIRIAM/AoCD. Started on IV Venofer.   Monitor H&H and transfuse as needed. Hemoglobin remained stable      Hematemesis: resolved. Continue Protonix IV twice daily for marginal ulcers. Afib with RVR:   Unable to resume amiodarone due to strict n.p.o. orders. Metoprolol changed to IV every 6h. Seen by cardiology  At this point given underlying GI pathology with increased risk of bleeding they recommended holding off on anticoagulation however once this issues resolve and if the risk of bleeding goes down then she will need to be considered to reanticoagulate given her high [id number] score      Acute on chronic HFpEF:  Echo 12/21/2022: LVEF 55 to 60%. Cardiology consulted: Currently compensated with IV diuresis. Continue IV Lasix 20 mg daily until able to take oral.    COPD without acute exacerbation: Continue inhalers. JUSTINA: Resolved. Peak creatinine 2.8.    Seen by nephrology  Creatinine down to 1        Diet: Diet NPO Exceptions are: Sips of Water with Meds  PN-Adult Premix 5/20 - Standard Electrolytes - Central Line  PN-Adult Premix 5/20 - Standard Electrolytes - Central Line  Code:Full Code  DVT PPX I restarted Lovenox since her hemoglobin is stable    Disposition: Transfer patient to ICU please have radiology send disc of her CT scans with her      Minnette Sandifer, MD   2/21/2023 1:32 PM

## 2023-02-21 NOTE — PROGRESS NOTES
Office : [phone]     Fax :[phone]       Nephrology progress  Note      Patient's Name: Erik Berkowitz    2/21/2023          Chief Complaint:    Chief Complaint   Patient presents with    Abdominal Pain     Pt via EMS from home, c/o LUQ pain 10/10, has had gallbladder removed. Pt states pain is making her sob, 89% ora, put on 2L, pt received 325 mg aspirin, has been treated for cellulitis since December, new antibiotic last two weeks, states she has been getting hives and itching        History of Present Ilness:    Erik Berkowitz is a 78 y.o. female who presented with complaints of shortness of breath. Patient apparently has been treated for cellulitis lower extremity on antibiotics patient started having worsening itching and hives. Came to the ER. Patient with increased shortness of breath. Also with leg swelling. No reported fevers chills. Patient was found to be hypoxic was placed on 2 L nasal cannula. Patient with history of chronic CHF COPD chronic kidney disease admitted with bilateral worse. Baseline creat is 1.2   Now elevated at 2.0     BNP 26274      Interval hx     C/o abdominal pain     No SOB     Good UOP  Creat improved . Now at baseline         On TPN     I/O last 3 completed shifts:   In: 0   Out: 1960 [Urine:1900; Drains:60]    Past Medical History:   Diagnosis Date    Arthritis     Atrial fibrillation and flutter ([address]     Hypertension     Kidney disease     Pt states  \"stage 3\"    Pneumonia     Sleep apnea     no c-pap       Past Surgical History:   Procedure Laterality Date    CARDIOVERSION      COLONOSCOPY N/A 11/20/2018    COLONOSCOPY POLYPECTOMY SNARE/COLD BIOPSY performed by Nahid Morton MD at [facility] N/A 03/31/2022 COLONOSCOPY POLYPECTOMY SNARE/COLD BIOPSY performed by Keiry Becker MD at [facility]      ankle rt has pins and rods, and rt elbow    GASTRIC BYPASS SURGERY      LARYNX SURGERY      TOTAL KNEE ARTHROPLASTY Bilateral 12/19/2012    bilateral knee replacements    TUBAL LIGATION      tubes tied    UPPER GASTROINTESTINAL ENDOSCOPY N/A 11/20/2018    EGD BIOPSY performed by Janelle Nguyen MD at [facility] 03/31/2022    EGD DILATION BALLOON performed by Keiry Becker MD at [address] [facility] ENDOSCOPY N/A 03/31/2022    EGD BIOPSY performed by Keiry Becker MD at [facility]       Family History   Problem Relation Age of Onset    Other Mother         blood clot    Cancer Father         stomach cancer    Stroke Brother          Current Medications:    simethicone (MYLICON) chewable tablet 80 mg, Q6H PRN  diatrizoate meglumine-sodium (GASTROGRAFIN) 66-10 % solution 20 mL, ONCE PRN  PN-Adult Premix 5/20 - Standard Electrolytes - Central Line, Continuous TPN  [Held by provider] enoxaparin (LOVENOX) injection 40 mg, Daily  lidocaine PF 1 % injection 5 mL, Once  sodium chloride flush 0.9 % injection 5-40 mL, 2 times per day  sodium chloride flush 0.9 % injection 5-40 mL, PRN  0.9 % sodium chloride infusion, PRN  metoprolol (LOPRESSOR) injection 2.5 mg, Q6H  pantoprazole (PROTONIX) injection 40 mg, BID  furosemide (LASIX) injection 20 mg, Daily  iron sucrose (VENOFER) 200 mg in sodium chloride 0.9 % 100 mL IVPB, Q24H  metoprolol (LOPRESSOR) injection 2.5 mg, Q6H PRN  meropenem (MERREM) 500 mg in sodium chloride 0.9 % 100 mL IVPB (mini-bag), q8h  anidulafungin (ERAXIS) 100 mg in sodium chloride 0.9 % 130 mL IVPB, Q24H  dextrose bolus 10% 125 mL, PRN   Or  dextrose bolus 10% 250 mL, PRN  glucagon (rDNA) injection 1 mg, PRN  dextrose 10 % infusion, Continuous PRN  insulin lispro (HUMALOG) injection vial 0-4 Units, Q6H  fat emulsion 20 % infusion 250 mL, Once per day on Mon Thu  sodium chloride flush 0.9 % injection 5-40 mL, 2 times per day  sodium chloride flush 0.9 % injection 5-40 mL, PRN  0.9 % sodium chloride infusion, PRN  ipratropium-albuterol (DUONEB) nebulizer solution 1 ampule, Q4H PRN  lidocaine PF 1 % injection 5 mL, Once  sodium chloride flush 0.9 % injection 5-40 mL, 2 times per day  sodium chloride flush 0.9 % injection 5-40 mL, PRN  0.9 % sodium chloride infusion, PRN  diphenhydrAMINE (BENADRYL) tablet 50 mg, Q6H PRN  ondansetron (ZOFRAN) injection 4 mg, Q6H PRN  albuterol sulfate HFA (PROVENTIL;VENTOLIN;PROAIR) 108 (90 Base) MCG/ACT inhaler 2 puff, Q4H PRN  amiodarone (CORDARONE) tablet 200 mg, Daily  traMADol (ULTRAM) tablet 50 mg, Q6H PRN  sodium chloride flush 0.9 % injection 5-40 mL, 2 times per day  sodium chloride flush 0.9 % injection 5-40 mL, PRN  0.9 % sodium chloride infusion, PRN  ondansetron (ZOFRAN-ODT) disintegrating tablet 4 mg, Q8H PRN   Or  ondansetron (ZOFRAN) injection 4 mg, Q6H PRN  polyethylene glycol (GLYCOLAX) packet 17 g, Daily PRN  acetaminophen (TYLENOL) tablet 650 mg, Q6H PRN   Or  acetaminophen (TYLENOL) suppository 650 mg, Q6H PRN        Physical exam:     Vitals:  /68   Pulse 97   Temp 97.5 °F (36.4 °C) (Oral)   Resp 16   Ht 5' 7\" (1.702 m)   Wt 222 lb 12.8 oz (101.1 kg)   SpO2 96%   BMI 34.90 kg/m²   Constitutional:  OAA X3 NAD  Skin: no rash, turgor wnl  Heent:  eomi, mmm  Neck: no bruits or jvd noted  Cardiovascular:  S1, S2 without m/r/g  Respiratory: CTA B without w/r/r  Abdomen:  +bs, soft, nt, nd  Ext: 1 +  lower extremity edema  Psychiatric: mood and affect appropriate  Musculoskeletal:  Rom, muscular strength intact    Labs:  CBC:   Recent Labs     02/19/23  0425 02/20/23  0620   WBC 18.3* 36.6*   HGB 8.1* 8.3*    241     BMP:    Recent Labs     02/19/23  0425 02/20/23  0620 02/21/23  0634    140 134*   K 4.5 4.4 4.3    102 97*   CO2 39* 37* 35* BUN 28* 30* 39*   CREATININE 1.0 1.1 1.2   GLUCOSE 82 149* 123*     Ca/Mg/Phos:   Recent Labs     02/19/23  0425 02/20/23  0620 02/21/23  0634   CALCIUM 8.5 8.6 8.4   MG 1.80 1.80 1.90   PHOS 2.7 2.6 3.3     Hepatic:   Recent Labs     02/19/23  0425 02/20/23  0620   AST 24 15   ALT 6* <5*   BILITOT 0.4 0.5   ALKPHOS 94 96              IMAGING:  CT ABDOMEN WO CONTRAST Additional Contrast? Oral   Final Result   1. Interval placement of percutaneous drain in the fluid collection in the   epigastric region. The collection measures slightly larger measuring as much   as 11.3 cm versus 10.5 cm previously. 2. Prior gastric bypass with a hiatal hernia containing the gastric pouch. 3. Persistent small left pleural effusion with adjacent left lower lobe   consolidation that could represent atelectasis or pneumonia. Ground-glass   opacities in the lungs appear improved. XR ABDOMEN (KUB) (SINGLE AP VIEW)   Final Result   1. Nonobstructive bowel gas pattern. 2.  Moderate stool burden in the proximal colon and rectum. XR CHEST PORTABLE   Final Result   1. Interval right PICC line removal.      2.  New left PICC line terminates at the innominate/SVC junction. CT ABSCESS DRAINAGE W CATH PLACEMENT S&I   Final Result   Successful CT-guided percutaneous drainage catheter placement into a   perigastric collection via a left anterolateral abdominal approach. Patient currently has a 14 Swazi locking pigtail drainage catheter within   the collection, placed to bulb suction. CT GUIDED NEEDLE PLACEMENT   Final Result   Successful CT-guided percutaneous drainage catheter placement into a   perigastric collection via a left anterolateral abdominal approach. Patient currently has a 14 Swazi locking pigtail drainage catheter within   the collection, placed to bulb suction. XR CHEST PORTABLE   Final Result   1.   Right upper extremity PICC extends superiorly into the right internal   jugular vein; tip is excluded from the field of view. Recommend   repositioning. 2.  Bilateral patchy airspace disease, improved from prior. Discussed with Dr. Apoorva Keene by Dr. Keshia Mejía at 4:55 am on 2/15/2023         CT ABDOMEN WO CONTRAST Additional Contrast? Oral   Final Result   1. 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  Findings compatible with a leak likely a marginal ulcer with   a developing 10 x 10 x 5 cm abscess centered between the Angel limb and   gastric remnant. Adjacent inflammatory change and left upper quadrant edema   has progressed from 02/07/2023. Trace amount of enteric contrast leak. A   couple punctate foci of free air noted in the anterior abdomen. Findings were discussed with Dr. Destiny Akers at 5:45 pm on 2/13/2023. CT ABDOMEN WO CONTRAST Additional Contrast? None   Final Result   Postsurgical change from gastric bypass. There is fat stranding surrounding   the afferent loop. There is fluid and gas seen in the upper abdomen, near   the anastomotic site, that is not all definitively intraluminal, with   surrounding fat stranding, raising the question of perforation. Increased pleural-parenchymal disease at the left lung base      The findings were sent to the Radiology Results [address] at 1:17   pm on 2/13/2023 to be communicated to a licensed caregiver. CT CHEST ABDOMEN PELVIS WO CONTRAST Additional Contrast? None   Final Result   1. Scattered ground-glass opacities with interlobular septal thickening. Findings may be related to pulmonary edema with other considerations   including an inflammatory/infectious process in the appropriate clinical   setting. 2. Right upper lobe consolidation. Additional bilateral scattered curvilinear   opacities may be related to atelectasis versus developing consolidation. 3. Enlarged pulmonary artery diameter.   Finding may be related to pulmonary arterial hypertension. 4. Coronary artery calcifications present. 5. Moderate hiatal hernia. 6. Questionable circumferential thickening of the descending colon with mild   surrounding fat stranding versus colonic underdistention. Finding raises the   possibility of colitis in the appropriate clinical setting. 7. Colonic diverticulosis, predominately sigmoid. No evidence of acute   diverticulitis. 8. Nonobstructing punctate left nephrolithiasis. RECOMMENDATIONS:   2 cm left adrenal mass, consistent with lipid-rich benign adenoma. No   follow-up imaging is recommended. JACR 2017 Aug; 14(8):1038-44, JCAT 2016 Earla Fogo; 40(2):194-200, Urol J 2006   [place]; 3(2):71-4. XR CHEST PORTABLE   Final Result   1. Diffuse hazy multifocal opacity throughout both lungs, right worse than   left, likely a combination of moderate pulmonary edema and multifocal   pneumonia. 2. Stable cardiomegaly. 3. Stable hiatal hernia. Assessment/Plan :      1. Panda   Creatinine  improved     Hold lasix         2. HTN. BP low borderline range   Hold clonidine if SBP < 110 mm HG     3. COPD    4. H/o atrial fib   Monitor       5. Hyperkalemia   Resolved after getting lokelma     6. Abdominal  pain .  CT scan shows there has been interval development of a poorly  marginated approximately 10 x 10 x 5 cm fluid collection with foci of gas and  small amounts of enteric contrast compatible with a developing abscess  Surgery on board    S/p IR drain  Worsening WBC   Sepsis - on meropenem and anidulafungin     Continue TPN           D/w primary team      Thank you for allowing us to participate in care of Renny Garcia         Electronically signed by: Elif Castaneda MD, 2/21/2023, 8:54 AM      [facility]  [address]  Office : [phone]  Fax :[phone]

## 2023-02-21 NOTE — PROGRESS NOTES
Physical Therapy    [facility] [address]   Phone: [phone]    Physical Therapy    [] Initial Evaluation            [x] Daily Treatment Note         [] Discharge Summary      Patient: Siddhartha Larios   : 1945   MRN: [id number]   Date of Service:  2023  Admitting Diagnosis: Bacterial pneumonia  Current Admission Summary: Siddhartha Larios is a 78 y.o. female   states she has had itching and hives with a rash for the past 24 hours past 3 hours she has had left upper abdominal pain sharp in nature constant which made her short of breath she denies any chest pain the pain is in the left upper quadrant she is currently taking cephalexin for the leg cellulitis. *S/P Intra-abdominal fluid collection on 2/15/23  Past Medical History:  has a past medical history of Arthritis, Atrial fibrillation and flutter ([address] Hypertension, Kidney disease, Pneumonia, and Sleep apnea. Past Surgical History:  has a past surgical history that includes Tubal ligation; Total knee arthroplasty (Bilateral, 2012); fracture surgery; Colonoscopy (N/A, 2018); Upper gastrointestinal endoscopy (N/A, 2018); Cardioversion; Gastric bypass surgery; Larynx surgery; Upper gastrointestinal endoscopy (N/A, 2022); Colonoscopy (N/A, 2022); and Upper gastrointestinal endoscopy (N/A, 2022). Discharge Recommendations: Siddhartha Larios scored a 14/24 on the AM-PAC short mobility form. Current research shows that an AM-PAC score of 17 or less is typically not associated with a discharge to the patient's home setting. Based on the patient's AM-PAC score and their current functional mobility deficits, it is recommended that the patient have 3-5 sessions per week of Physical Therapy at d/c to increase the patient's independence. Please see assessment section for further patient specific details.     If patient discharges prior to next session this note will serve as a discharge summary. Please see below for the latest assessment towards goals. DME Required For Discharge: DME to be determined at next level of care  Precautions/Restrictions: high fall risk, up as tolerated  Weight Bearing Restrictions: no restrictions     Required Braces/Orthotics: no braces required  Positional Restrictions:no positional restrictions    Pre-Admission Information   Lives With: daughter               Type of Home: house  Home Layout: one level, laundry in basement, pt does not need to go to basement  Home Access:  3 step to enter without rails   Bathroom Layout: walk in shower, handicap height toilet  Bathroom Equipment: shower chair  Home Equipment: rolling walker, single point cane, electric scooter, hospital bed, lift chair  Transfer Assistance: modified independent with use of lift chair,  Ambulation Assistance:modified independent with use of SPC, use of scooter in community  ADL Assistance: independent with all ADL's  IADL Assistance: independent with homemaking tasks, daughter completes laundry  Active : [] yes             [x]No daughter drives to appointments   Current Employment: retired. Occupation:   Hobbies:   Recent Falls: Pt reports 1 fall while at the hospital in previous visit, states her legs buckled when she went to stand at chair. Subjective  General: Pt supine in bed on arrival, agreeable to participate in PT treatment session. While assisting with removing purewick pt noted to be incontinent of stool--assisted pt to [facility] for brief change and toileting. SpO2 at 97% on 2L at rest.  Pain: 0/10  Pain Interventions: not applicable     Functional Mobility  Bed Mobility  Supine to Sit: stand by assistance  Scooting: stand by assistance- toward EOB  Comments: Supine to sit: HOB elevated, use of bed rail.   Transfers  Sit to stand transfer: minimal assistance, moderate assistance  Stand to sit transfer: minimal assistance  Stand step transfer: minimal assistance  Comments: RW utilized for all transfers with verbal cues required for safe hand placement. X3 attempts to stand from raised EOB with Min A, successful on 4th attempt with Mod A. Stand step EOB>BSC with RW and Min A. Pt performed 3 STS from [facility] with 3 standing bouts for pericare and requiring frequent seated rest breaks. Min>>>Mod A required for transfers from [facility] as pt reporting increased fatigue. Following pericare, pt performed stand step transfer BSC>EOB with RW and Min A. Following seated rest break at EOB, pt stood with Min A to RW for ambulation to chair. Ambulation  Surface:level surface  Assistive Device: rolling walker  Assistance: minimal assistance  Distance: 4'  Gait Mechanics: wide Mely, decreased clara, decreased B step lengths/heights, slight sway noted  Comments:  Pt required increased time to perform. No LOB. SOB noted following ambulation, SpO2 at 94% on 2L. Stair Mobility  Stair mobility not completed on this date. Comments:  Wheelchair Mobility:  No w/c mobility completed on this date. Comments:  Balance  Static Sitting Balance: good: independent with functional balance in unsupported position  Dynamic Sitting Balance: fair (+): maintains balance at SBA/supervision without use of UE support  Static Standing Balance: fair (-): maintains balance at SBA with use of UE support  Dynamic Standing Balance: poor (+): requires min (A) to maintain balance  Comments: Pt sat at [facility] for toileting and seated rest breaks ~25-30 minutes total. Pt stood during dependent pericare RW for UE support and SBA for balance for ~8-10 minutes total.    Other Therapeutic Interventions  New purewick applied at end of session once pt seated in recliner.    Functional Outcomes  AM-PAC Inpatient Mobility Raw Score : 14              Cognition  WFL  Initiation: requires cues for some  Orientation:    alert and oriented x 4  Command Following:   accurately follows one step commands    Education  Barriers To Learning: cognition and hearing  Patient Education: patient educated on goals, PT role and benefits, HEP, general safety, functional mobility training, proper use of assistive device/equipment, transfer training  Learning Assessment: patient verbalizes understanding, would benefit from continued reinforcement    Assessment  Activity Tolerance: Fair, SpO2 at 94-97% on 2L O2 throughout session and pt requiring frequent seated rest breaks during toileting. Impairments Requiring Therapeutic Intervention: decreased functional mobility, decreased strength, decreased safety awareness, decreased cognition, decreased endurance, decreased sensation, decreased balance, increased pain, decreased posture  Prognosis: good  Clinical Assessment: Pt continues to present with improved mobility this date. Pt able to perform transfers with decreased assist, required decreased assist for standing balance this date, and ambulated very limited distance this date. Pt does continue to present with decreased activity tolerance and required frequent extended seated rest breaks this date. Pt would benefit from continued skilled PT services to facilitate return to PLOF and to promote independence.    Safety Interventions: patient left in chair, chair alarm in place, call light within reach, gait belt, patient at risk for falls, and nurse notified    Plan  Frequency: 3-5 x/per week  Current Treatment Recommendations: strengthening, balance training, functional mobility training, transfer training, gait training, stair training, endurance training, and safety education    Goals  Patient Goals: none stated    Short Term Goals:  Time Frame: upon discharge   Patient will complete bed mobility at Independent   Patient will complete transfers at supervision   Patient will ambulate 50 ft with use of LRAD at stand by assistance  Patient will ascend/descend 3 stairs with (B) handrail at contact guard assistance  *No goals met 2/21/23    Therapy Session Time      Individual Group Co-treatment   Time In 0815       Time Out 0923       Minutes 68         Timed Code Treatment Minutes:  68  Total Treatment Minutes: 68     Electronically Signed By: Sandy Argueta DPT [id number]

## 2023-02-21 NOTE — PROGRESS NOTES
[facility]   Phone: [phone]    Occupational Therapy    [] Initial Evaluation            [x] Daily Treatment Note         [] Discharge Summary      Patient: Mane Campos   : 1945   MRN: [id number]   Date of Service:  2023    Admitting Diagnosis:  Bacterial pneumonia  Current Admission Summary: Per H&P \"67 y.o. female   states she has had itching and hives with a rash for the past 24 hours past 3 hours she has had left upper abdominal pain sharp in nature constant which made her short of breath she denies any chest pain the pain is in the left upper quadrant she is currently taking cephalexin for the leg cellulitis\"    *S/P Intra-abdominal fluid collection on 2/15/23 from intraabdominal abcess - being treated for sepsis with IV antibiotics     Past Medical History:  has a past medical history of Arthritis, Atrial fibrillation and flutter ([address] Hypertension, Kidney disease, Pneumonia, and Sleep apnea. Past Surgical History:  has a past surgical history that includes Tubal ligation; Total knee arthroplasty (Bilateral, 2012); fracture surgery; Colonoscopy (N/A, 2018); Upper gastrointestinal endoscopy (N/A, 2018); Cardioversion; Gastric bypass surgery; Larynx surgery; Upper gastrointestinal endoscopy (N/A, 2022); Colonoscopy (N/A, 2022); and Upper gastrointestinal endoscopy (N/A, 2022). Discharge Recommendations: Mane Campos scored a 14/24 on the AM-PAC ADL Inpatient form. Current research shows that an AM-PAC score of 17 or less is typically not associated with a discharge to the patient's home setting. Based on the patient's AM-PAC score and their current ADL deficits, it is recommended that the patient have 3-5 sessions per week of Occupational Therapy at d/c to increase the patient's independence. Please see assessment section for further patient specific details.     If patient discharges prior to next session this note will serve as a discharge summary. Please see below for the latest assessment towards goals. DME Required For Discharge: DME to be determined at next level of care    Precautions/Restrictions: high fall risk  Weight Bearing Restrictions: no restrictions  [] Right Upper Extremity  [] Left Upper Extremity [] Right Lower Extremity  [] Left Lower Extremity     Required Braces/Orthotics: no braces required   [] Right  [] Left  Positional Restrictions:no positional restrictions  AURELIO drain 2/17      Pre-Admission Information     Lives With: daughter    Type of Home: house  Home Layout: one level, laundry in basement, pt does not need to go to basement  Home Access:  3 step to enter without rails   Bathroom Layout: walk in shower, handicap height toilet  Bathroom Equipment: shower chair  Home Equipment: rolling walker, single point cane, electric scooter, hospital bed, lift chair  Transfer Assistance: modified independent with use of lift chair,  Ambulation Assistance:modified independent with use of SPC, use of scooter in community  ADL Assistance: independent with all ADL's  IADL Assistance: independent with homemaking tasks, daughter completes laundry  Active : [] yes  [x]No daughter drives to appointments   Current Employment: retired. Occupation:   Hobbies:   Recent Falls: Pt reports 1 fall while at the hospital in previous visit, states her legs buckled when she went to stand at chair. Subjective    General: Patient seated in chair on arrival and agreeable for session. Pt declined out of chair transfer and ambulation to BR secondary to fatigue. Pain: not applicable  Pain Interventions: repositioned        Activities of Daily Living    Basic Activities of Daily Living  Grooming: setup assistance  Grooming Comments: oral care  Toileting: dependent.     Toileting Comments: Patient with Chad Robinosh in place upon arrival   Comments: Pt declined ADL transfer from chair and ambulation to sink for the completion of grooming activities. Instrumental Activities of Daily Living  No IADL completed on this date. Functional Mobility    Bed Mobility  Not assessed this date as pt seated in chair. Transfers  Not assessed this date as pt declined ADL transfer out of chair. Functional Mobility:  Not assessed this date as pt declined ADL transfer out of chair and ambulation in room. Other Therapeutic Interventions    Functional Outcomes         Cognition  WFL  Safety Judgement: decreased awareness of need for assistance, decreased awareness of need for safety  Initiation: requires cues for some  Sequencing: requires cues for some  Comments:    Orientation:    alert and oriented x 4  Command Following:   accurately follows one step commands     Education    Barriers To Learning: cognition  Patient Education: patient educated on goals, OT role and benefits, plan of care, ADL adaptive strategies, energy conservation, disease specific education, pressure relief, transfer training, discharge recommendations, ankle pumps for edema mgmt. Learning Assessment:  patient verbalizes understanding, would benefit from continued reinforcement    Assessment    Activity Tolerance: Fair  Impairments Requiring Therapeutic Intervention: decreased functional mobility, decreased ADL status, decreased strength, decreased safety awareness, decreased endurance, decreased balance, decreased IADL  Prognosis: Fair   Clinical Assessment: Pt presents with the above deficits impacting daily occupational performance and would benefit from continued skilled OT services. Pt with limited participation this date secondary to fatigue. Continue with present POC.    Safety Interventions: patient left in chair, chair alarm in place, call light within reach, gait belt, patient at risk for falls, and nurse notified       Plan    Frequency: 3-5 x/per week  Current Treatment Recommendations: strengthening, balance training, functional mobility training, transfer training, endurance training, patient/caregiver education, ADL/self-care training, home management training, and safety education    Goals    Patient Goals: pt did not state      Short Term Goals:  Time Frame: by d/c  Patient will complete upper body ADL at supervision   Patient will complete lower body ADL at minimal assistance   Patient will complete toileting at minimal assistance   Patient will complete functional transfers at stand by assistance   Patient will complete functional mobility at stand by assistance     No goals met this date - 2/21/23 --all goals ongoing. Therapy Session Time     Individual Group Co-treatment   Time In 1330     Time Out 1348     Minutes 18          Timed Code Treatment Minutes:  18 minutes    Total Treatment Minutes:  18 minutes    Electronically Signed By: Pennie Rene, [address], OTR/L [id number].

## 2023-02-21 NOTE — PROGRESS NOTES
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  Bilateral atypical pneumonia  Right upper lobe consolidation    Microbiology:        I have reviewed allavailable micro lab data and cultures    Blood culture (2/2) - collected on 2/7/2023: Negative so far      Antibiotics and immunizations:       Current antibiotics: All antibiotics and their doses were reviewed by me    Recent Abx Admin                     meropenem (MERREM) 500 mg in sodium chloride 0.9 % 100 mL IVPB (mini-bag) (mg) 500 mg New Bag 02/21/23 0838     500 mg New Bag  0032     500 mg New Bag 02/20/23 1620    anidulafungin (ERAXIS) 100 mg in sodium chloride 0.9 % 130 mL IVPB (mg) 100 mg New Bag 02/20/23 2244                      Immunization History: All immunization history was reviewed by me today.     Immunization History   Administered Date(s) Administered    COVID-19, PFIZER Bivalent BOOSTER, DO NOT Dilute, (age 12y+), IM, 30 mcg/0.3 mL 09/09/2022    COVID-19, PFIZER GRAY top, DO NOT Dilute, (age 15 y+), IM, 30 mcg/0.3 mL 05/20/2022    COVID-19, PFIZER PURPLE top, DILUTE for use, (age 15 y+), 30mcg/0.3mL 09/08/2021, 10/15/2021    Influenza Virus Vaccine 12/22/2012    Influenza, FLUAD, (age 72 y+), Adjuvanted, 0.5mL 10/27/2020, 10/15/2021, 09/09/2022    Influenza, High Dose (Fluzone 65 yrs and older) 09/30/2016, 10/01/2017, 10/08/2018    Influenza, Triv, inactivated, subunit, adjuvanted, IM (Fluad 65 yrs and older) 10/21/2019    Pneumococcal Conjugate 13-valent (Praneeth Cuff) 09/30/2016, 10/01/2017    Pneumococcal Polysaccharide ([id number]) 12/22/2012, 10/06/2017    Td vaccine (adult) 12/01/2008    Zoster Live (Zostavax) 09/15/2015       Known drug allergies: All allergies were reviewed and updated    Allergies   Allergen Reactions    Bee Venom Swelling and Angioedema    Shellfish-Derived Products Other (See Comments)     Syncope/seizures    Shrimp Flavor      Passes out      Cephalexin Itching    Codeine Hives and Other (See Comments)     B/P DROPS PASSES OUT  Passed out    Fentanyl And Related Hives     Other reaction(s): Dizziness    Hydromorphone Rash, Hives and Other (See Comments)     Full rash spreading across chest and both arms from IV hydromorphone  Passed out    Vancomycin Rash       Social history:     Social History:  All social andepidemiologic history was reviewed and updated by me today as needed. Tobacco use:   reports that she has never smoked. She has never used smokeless tobacco.  Alcohol use:   reports no history of alcohol use. Currently lives in: [address] [place] [address]   reports no history of drug use. COVID VACCINATION AND LAB RESULT RECORDS:     Internal Administration   First Dose COVID-19, PFIZER PURPLE top, DILUTE for use, (age 15 y+), 30mcg/0.3mL  09/08/2021   Second Dose COVID-19, PFIZER PURPLE top, DILUTE for use, (age 15 y+), 30mcg/0.3mL   10/15/2021       Last COVID Lab SARS-CoV-2 (no units)   Date Value   09/13/2022 Not Detected     SARS-CoV-2, BREANA (no units)   Date Value   10/01/2020 NOT DETECTED     SARS-CoV-2, NAAT (no units)   Date Value   12/20/2022 Not Detected            Assessment:     The patient is a 78 y.o. old female who  has a past medical history of Arthritis, Atrial fibrillation and flutter ([facility] [address] Hypertension, Kidney disease, Pneumonia, and Sleep apnea.  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 Discussion:      The patient is afebrile. White cell count has shown some improvement and is 20,700 today. She remains on IV meropenem and IV Eraxis. Her serum creatinine is 1.2. Platelet count is 472,470. Hemoglobin is 7.9. Plan:     Diagnostic Workup:      Continue to follow  fever curve, WBC count and blood cultures. Continue to monitor blood counts, liver and renal function. Antimicrobials:    Plans for transferring the patient to [facility] for surgical management noted   Continue IV meropenem 1 g every 8 hours  Continue IV Eraxis 100 mg daily  Improvement in the white cell count is reassuring  Surgical source control of marginal ulcer perforation will be helpful  Patient remains on TPN  Recommend involvement of infectious disease team for antibiotic management at [facility], once patient is transferred there  We will follow up on the culture results and clinical progress and will make further recommendations accordingly. Continue close vitals monitoring. Maintain good glycemic control. Fall precautions. Aspiration precautions. Continue to watch for new fever or diarrhea. DVT prophylaxis. Discussed all above with patient and RNFelicia   I will continue to manage her antibiotics while she is at [facility] while awaiting transfer at [address]:    Continue monitoring for antibiotic toxicity as follows: CBC, CMP   Continue to watch for following: new or worsening fever, new hypotension, hives, lip swelling and redness or purulence at vascular access sites. I/v access Management:    Continue to monitor i.v access sites for erythema, induration, discharge or tenderness. As always, continue efforts to minimize tubes/lines/drains as clinically appropriate to reduce chances of line associated infections. Patient education and counseling: The patient was educated in detail about the side-effects of various antibiotics and things to watch for like new rashes, lip swelling, severe reaction, worsening diarrhea, break through fever etc.  Discussed patient's condition and what to expect. All of the patient's questions were addressed in a satisfactory manner and patient verbalized understanding all instructions. Level of complexity of visit and medical decision making: High     Risk of Complications/Morbidity: High     Illness(es)/ Infection present that pose threat to life/bodily function. There is potential for severe exacerbation of infection/side effects of treatment. Therapy requires intensive monitoring for antimicrobial agent toxicity. TIME SPENT TODAY:     - Spent over  37 minutes on visit (including interval history, physical exam, review of data including labs, cultures, imaging, development and implementation of treatment plan and coordination of complex care). More than 50 percent of this includes face-to-face time spent with the patient for counseling and coordination of care. Thank you for involving me in the care of your patient. I will continue to follow. If you have anyadditional questions, please do not hesitate to contact me. Subjective:      Interval history: Interval history was obtained from chart review and patient/ RN. She is on IV meropenem and Eraxis. She is afebrile. She is tolerating antibiotics ok    REVIEW OF SYSTEMS:      Review of Systems   Constitutional:  Positive for fatigue. Negative for chills, diaphoresis and fever. HENT:  Negative for ear discharge, ear pain, postnasal drip, rhinorrhea, sinus pressure, sinus pain and sore throat. Eyes:  Negative for discharge and redness. Respiratory:  Negative for cough, shortness of breath and wheezing. Cardiovascular:  Negative for chest pain and leg swelling. Gastrointestinal:  Negative for abdominal pain, constipation, diarrhea and nausea. Endocrine: Negative for cold intolerance, heat intolerance and polydipsia. Genitourinary:  Negative for dysuria, flank pain, frequency, hematuria and urgency. Musculoskeletal:  Negative for back pain and myalgias. Skin:  Negative for rash. Allergic/Immunologic: Negative for immunocompromised state. Neurological:  Negative for dizziness, seizures and headaches. Hematological:  Does not bruise/bleed easily. Psychiatric/Behavioral:  Negative for agitation, hallucinations and suicidal ideas. The patient is not nervous/anxious. All other systems reviewed and are negative. Past Medical History: All past medical history reviewed today. Past Medical History:   Diagnosis Date    Arthritis     Atrial fibrillation and flutter ([address]     Hypertension     Kidney disease     Pt states  \"stage 3\"    Pneumonia     Sleep apnea     no c-pap       Past Surgical History: All past surgical history was reviewed today.     Past Surgical History:   Procedure Laterality Date    CARDIOVERSION      COLONOSCOPY N/A 11/20/2018    COLONOSCOPY POLYPECTOMY SNARE/COLD [facility]    COLONOSCOPY N/A 03/31/2022    COLONOSCOPY POLYPECTOMY SNARE/COLD BIOPSY performed by Darryl France MD at [address] [facility]      ankle rt has pins and rods, and rt elbow    GASTRIC BYPASS SURGERY      LARYNX SURGERY      TOTAL KNEE ARTHROPLASTY Bilateral 12/19/2012    bilateral knee replacements    TUBAL LIGATION      tubes tied    UPPER GASTROINTESTINAL ENDOSCOPY N/A 11/20/2018    EGD BIOPSY performed by Benita Mcghee MD at [facility] N/A 03/31/2022    EGD DILATION BALLOON performed by John Hein MD at [facility] N/A 03/31/2022    EGD BIOPSY performed by John Hein MD at [address] [place] [address]       Family History: All family history was reviewed today. Problem Relation Age of Onset    Other Mother         blood clot    Cancer Father         stomach cancer    Stroke Brother        Objective:       PHYSICAL EXAM:      Vitals:   Vitals:    02/21/23 0600 02/21/23 0708 02/21/23 0815 02/21/23 1119   BP:   104/68 112/73   Pulse:   97 97   Resp:  16 16    Temp:   97.5 °F (36.4 °C) 97.6 °F (36.4 °C)   TempSrc:   Oral Axillary   SpO2:  96% 96% 96%   Weight: 222 lb 12.8 oz (101.1 kg)      Height:           Physical Exam  Vitals and nursing note reviewed. Constitutional:       Appearance: She is well-developed. She is not diaphoretic. Comments: The patient was seen earlier today. HENT:      Head: Normocephalic and atraumatic. Right Ear: External ear normal. There is no impacted cerumen. Left Ear: External ear normal. There is no impacted cerumen. Nose: Nose normal.      Mouth/Throat:      Mouth: Mucous membranes are moist.      Pharynx: Oropharynx is clear. No oropharyngeal exudate. Eyes:      General: No scleral icterus. Right eye: No discharge. Left eye: No discharge. Conjunctiva/sclera: Conjunctivae normal.      Pupils: Pupils are equal, round, and reactive to light. Neck:      Thyroid: No thyromegaly. Cardiovascular:      Rate and Rhythm: Normal rate and regular rhythm. Heart sounds: Normal heart sounds. No murmur heard. No friction rub. Pulmonary:      Effort: No respiratory distress. Breath sounds: No stridor. No wheezing or rales. Abdominal:      General: Bowel sounds are normal.      Palpations: Abdomen is soft. Tenderness: There is no abdominal tenderness. There is no guarding or rebound. Musculoskeletal:         General: No swelling, tenderness or deformity. Normal range of motion. Cervical back: Normal range of motion and neck supple. Right lower leg: No edema. Left lower leg: No edema. Lymphadenopathy:      Cervical: No cervical adenopathy. Skin:     General: Skin is warm and dry. Coloration: Skin is not jaundiced. Findings: No bruising, erythema or rash. Neurological:      General: No focal deficit present. Mental Status: She is alert and oriented to person, place, and time. Mental status is at baseline. Motor: No abnormal muscle tone. Psychiatric:         Mood and Affect: Mood normal.         Behavior: Behavior normal.     *    Lines and drains: All vascular access sites are healthy with no local erythema, discharge or tenderness. Intake and output:    I/O last 3 completed shifts: In: 0   Out: 1960 [Urine:1900; Drains:60]    Lab Data:   All available labs and old records have been reviewed by me.     CBC:  Recent Labs     02/19/23  0425 02/20/23  0620 02/21/23  1031   WBC 18.3* 36.6* 20.7*   RBC 2.90* 3.02* 2.84*   HGB 8.1* 8.3* 7.9*   HCT 25.7* 26.7* 25.2*    241 234   MCV 88.6 88.3 88.7   MCH 27.9 27.3 27.7   MCHC 31.5 30.9* 31.2   RDW 17.2* 17.3* 17.8*   BANDSPCT 6 9* 6          BMP:  Recent Labs     02/19/23  0425 02/20/23  0620 02/21/23  0634    140 134*   K 4.5 4.4 4.3    102 97*   CO2 39* 37* 35*   BUN 28* 30* 39*   CREATININE 1.0 1.1 1.2   CALCIUM 8.5 8.6 8.4   GLUCOSE 82 149* 123*          Hepatic Function Panel:   Lab Results   Component Value Date/Time    ALKPHOS 96 02/20/2023 06:20 AM    ALT <5 02/20/2023 06:20 AM    AST 15 02/20/2023 06:20 AM    PROT 6.0 02/20/2023 06:20 AM    BILITOT 0.5 02/20/2023 06:20 AM    BILIDIR 1.0 09/13/2022 05:56 PM    IBILI 0.9 09/13/2022 05:56 PM    LABALBU 2.1 02/20/2023 06:20 AM       CPK: No results found for: CKTOTAL  ESR:   Lab Results   Component Value Date    SEDRATE 34 (H) 02/14/2023     CRP:   Lab Results   Component Value Date    .4 (H) 02/14/2023           Imaging: All pertinent images and reports for the current visit were reviewed by me during this visit. I reviewed the chest x-ray/CT scan/MRI images and independently interpreted the findings and results today. CT ABDOMEN WO CONTRAST Additional Contrast? Oral   Final Result   1. Interval placement of percutaneous drain in the fluid collection in the   epigastric region. The collection measures slightly larger measuring as much   as 11.3 cm versus 10.5 cm previously. 2. Prior gastric bypass with a hiatal hernia containing the gastric pouch. 3. Persistent small left pleural effusion with adjacent left lower lobe   consolidation that could represent atelectasis or pneumonia. Ground-glass   opacities in the lungs appear improved. XR ABDOMEN (KUB) (SINGLE AP VIEW)   Final Result   1. Nonobstructive bowel gas pattern. 2.  Moderate stool burden in the proximal colon and rectum. XR CHEST PORTABLE   Final Result   1. Interval right PICC line removal.      2.  New left PICC line terminates at the innominate/SVC junction. CT ABSCESS DRAINAGE W CATH PLACEMENT S&I   Final Result   Successful CT-guided percutaneous drainage catheter placement into a   perigastric collection via a left anterolateral abdominal approach. Patient currently has a 14 Vietnamese locking pigtail drainage catheter within   the collection, placed to bulb suction.          CT GUIDED NEEDLE PLACEMENT   Final Result   Successful CT-guided percutaneous drainage catheter placement into a   perigastric collection via a left anterolateral abdominal approach.      Patient currently has a 14 Slovenian locking pigtail drainage catheter within   the collection, placed to bulb suction.         XR CHEST PORTABLE   Final Result   1.  Right upper extremity PICC extends superiorly into the right internal   jugular vein; tip is excluded from the field of view.  Recommend   repositioning.      2.  Bilateral patchy airspace disease, improved from prior.      Discussed with Dr. Betancourt by Dr. Rivera at 4:55 am on 2/15/2023         CT ABDOMEN WO CONTRAST Additional Contrast? Oral   Final Result   1. Status post Angel-en-Y gastric bypass.  Chronically herniated gastric pouch   into the chest.  Findings compatible with a leak likely a marginal ulcer with   a developing 10 x 10 x 5 cm abscess centered between the Angel limb and   gastric remnant.  Adjacent inflammatory change and left upper quadrant edema   has progressed from 02/07/2023.  Trace amount of enteric contrast leak.  A   couple punctate foci of free air noted in the anterior abdomen.   Findings were discussed with Dr. Rodriguez at 5:45 pm on 2/13/2023.         CT ABDOMEN WO CONTRAST Additional Contrast? None   Final Result   Postsurgical change from gastric bypass.There is fat stranding surrounding   the afferent loop.  There is fluid and gas seen in the upper abdomen, near   the anastomotic site, that is not all definitively intraluminal, with   surrounding fat stranding, raising the question of perforation.      Increased pleural-parenchymal disease at the left lung base      The findings were sent to the Radiology Results Communication Center at 1:17   pm on 2/13/2023 to be communicated to a licensed caregiver.         CT CHEST ABDOMEN PELVIS WO CONTRAST Additional Contrast? None   Final Result   1. Scattered ground-glass opacities with interlobular septal thickening.   Findings may be related to pulmonary edema with other considerations   including an inflammatory/infectious process in the appropriate  clinical   setting. 2. Right upper lobe consolidation. Additional bilateral scattered curvilinear   opacities may be related to atelectasis versus developing consolidation. 3. Enlarged pulmonary artery diameter. Finding may be related to pulmonary   arterial hypertension. 4. Coronary artery calcifications present. 5. Moderate hiatal hernia. 6. Questionable circumferential thickening of the descending colon with mild   surrounding fat stranding versus colonic underdistention. Finding raises the   possibility of colitis in the appropriate clinical setting. 7. Colonic diverticulosis, predominately sigmoid. No evidence of acute   diverticulitis. 8. Nonobstructing punctate left nephrolithiasis. RECOMMENDATIONS:   2 cm left adrenal mass, consistent with lipid-rich benign adenoma. No   follow-up imaging is recommended. JACR 2017 Aug; 14(8):1038-44, JCAT 2016 [place]; 40(2):194-200, Urol J 2006   [place]; 3(2):71-4. XR CHEST PORTABLE   Final Result   1. Diffuse hazy multifocal opacity throughout both lungs, right worse than   left, likely a combination of moderate pulmonary edema and multifocal   pneumonia. 2. Stable cardiomegaly. 3. Stable hiatal hernia. Medications: All current and past medications were reviewed.      [Held by provider] enoxaparin  40 mg SubCUTAneous Daily    lidocaine 1 % injection  5 mL IntraDERmal Once    sodium chloride flush  5-40 mL IntraVENous 2 times per day    metoprolol  2.5 mg IntraVENous Q6H    pantoprazole  40 mg IntraVENous BID    [Held by provider] furosemide  20 mg IntraVENous Daily    iron sucrose (VENOFER) iv piggyback 100 mL  200 mg IntraVENous Q24H    meropenem  500 mg IntraVENous q8h    anidulafungin  100 mg IntraVENous Q24H    insulin lispro  0-4 Units SubCUTAneous Q6H    fat emulsion  250 mL IntraVENous Once per day on Mon Thu    sodium chloride flush  5-40 mL IntraVENous 2 times per day    lidocaine 1 % injection  5 mL IntraDERmal Once    sodium chloride flush  5-40 mL IntraVENous 2 times per day    amiodarone  200 mg Oral Daily    sodium chloride flush  5-40 mL IntraVENous 2 times per day        PN-Adult Premix 5/20 - Standard Electrolytes - Central Line      PN-Adult Premix 5/20 - Standard Electrolytes - Central Line 83 mL/hr at 02/20/23 1805    sodium chloride      dextrose      sodium chloride      sodium chloride Stopped (02/16/23 1814)    sodium chloride 100 mL/hr at 02/16/23 1704       simethicone, diatrizoate meglumine-sodium, sodium chloride flush, sodium chloride, metoprolol, dextrose bolus **OR** dextrose bolus, glucagon (rDNA), dextrose, sodium chloride flush, sodium chloride, ipratropium-albuterol, sodium chloride flush, sodium chloride, diphenhydrAMINE, ondansetron, albuterol sulfate HFA, traMADol, sodium chloride flush, sodium chloride, ondansetron **OR** ondansetron, polyethylene glycol, acetaminophen **OR** acetaminophen      Problem list:       Patient Active Problem List   Diagnosis Code    Atrial fibrillation, persistent ([facility]) I48.19    Primary hypertension I10    Severe episode of recurrent major depressive disorder, without psychotic features ([facility]) F33.2    Seasonal affective disorder ([facility]) F33.8    Class 2 severe obesity due to excess calories with serious comorbidity and body mass index (BMI) of 39.0 to 39.9 in adult ([facility]) E66.01, Z68.39    Elevated sed rate R70.0    Neutrophilia D72.9    Acute kidney injury ([facility]) N17.9    Elevated C-reactive protein (CRP) R79.82    GIANNA (obstructive sleep apnea) G47.33    Morbid obesity ([facility]) E66.01    Weight loss counseling, encounter for Z71.3    Atypical atrial flutter ([facility]) I48.4    Atrial fibrillation ([facility]) I48.91    ILD (interstitial lung disease) ([facility]) J84.9    Iron deficiency anemia due to chronic blood loss D50.0    Iron deficiency anemia, unspecified D50.9    Age-related osteoporosis without current pathological fracture M81.0    Vasovagal syncope R55    CKD (chronic kidney  disease) stage 4, GFR 15-29 ml/min (HCC) N18.4    Acute on chronic diastolic heart failure (HCC) I50.33    Stage 3b chronic kidney disease (HCC) N18.32    B12 deficiency E53.8    Vitamin D deficiency E55.9    Acute respiratory failure (HCC) J96.00    Chronic obstructive pulmonary disease, unspecified J44.9    Sepsis (HCC) A41.9    Cellulitis of right leg L03.115    Obesity (BMI 30-39. 9) E66.9    Stage 3 chronic kidney disease (HCC) N18.30    Bacteremia R78.81    Fever and chills R50.9    History of ankle surgery Z98.890    Streptococcal infection group G B95.4    Bacterial pneumonia J15.9    JUSTINA (acute kidney injury) ([facility] [address] N17.9    Acute on chronic heart failure with preserved ejection fraction (HFpEF) ([facility]) I50.33    Right upper lobe consolidation (HCC) J18.1    Allergic drug reaction T78.40XA    Elevated d-dimer R79.89    Left nephrolithiasis N20.0    Diverticulosis K57.90    History of cholecystectomy Z90.49    Intra-abdominal abscess (HCC) K65.1    Bandemia D72.825    Atypical pneumonia J18.9    Multifocal pneumonia J18.9    Hematemesis K92.0    Chronic atrial fibrillation ([facility] [address] I48.20       Please note that this chart was generated using Dragon dictation software. Although every effort was made to ensure the accuracy of this automated transcription, some errors in transcription may have occurred inadvertently. If you may need any clarification, please do not hesitate to contact me through EPIC or at the phone number provided below with my electronic signature. Any pictures or media included in this note were obtained after taking informed verbal consent from the patient and with their approval to include those in the patient's medical record. Robert Paulino MD, MPH, FACP, [facility]  2/21/2023, 3:31 PM  [facility] Infectious Disease   [address] [place] [address], [address] [place] [address]  Office: [phone]  Fax: [phone]  In-person Clinic days:  Tuesday & Thursday a.m.   Virtual clinic days: Monday, Wednesday & Friday a.m.

## 2023-02-22 LAB
ANION GAP SERPL CALCULATED.3IONS-SCNC: 4 MMOL/L (ref 3–16)
BUN BLDV-MCNC: 49 MG/DL (ref 7–20)
CALCIUM SERPL-MCNC: 8.7 MG/DL (ref 8.3–10.6)
CHLORIDE BLD-SCNC: 99 MMOL/L (ref 99–110)
CO2: 35 MMOL/L (ref 21–32)
CREAT SERPL-MCNC: 1.2 MG/DL (ref 0.6–1.2)
GFR SERPL CREATININE-BSD FRML MDRD: 47 ML/MIN/{1.73_M2}
GLUCOSE BLD-MCNC: 109 MG/DL (ref 70–99)
GLUCOSE BLD-MCNC: 120 MG/DL (ref 70–99)
GLUCOSE BLD-MCNC: 123 MG/DL (ref 70–99)
GLUCOSE BLD-MCNC: 132 MG/DL (ref 70–99)
MAGNESIUM: 2.2 MG/DL (ref 1.8–2.4)
PERFORMED ON: ABNORMAL
PHOSPHORUS: 4.1 MG/DL (ref 2.5–4.9)
POTASSIUM SERPL-SCNC: 4.4 MMOL/L (ref 3.5–5.1)
SODIUM BLD-SCNC: 138 MMOL/L (ref 136–145)

## 2023-02-22 PROCEDURE — C9113 INJ PANTOPRAZOLE SODIUM, VIA: HCPCS | Performed by: INTERNAL MEDICINE

## 2023-02-22 PROCEDURE — 83735 ASSAY OF MAGNESIUM: CPT

## 2023-02-22 PROCEDURE — 99232 SBSQ HOSP IP/OBS MODERATE 35: CPT | Performed by: INTERNAL MEDICINE

## 2023-02-22 PROCEDURE — 2500000003 HC RX 250 WO HCPCS: Performed by: HOSPITALIST

## 2023-02-22 PROCEDURE — 6360000002 HC RX W HCPCS: Performed by: INTERNAL MEDICINE

## 2023-02-22 PROCEDURE — 2500000003 HC RX 250 WO HCPCS: Performed by: INTERNAL MEDICINE

## 2023-02-22 PROCEDURE — 1200000000 HC SEMI PRIVATE

## 2023-02-22 PROCEDURE — 2580000003 HC RX 258: Performed by: INTERNAL MEDICINE

## 2023-02-22 PROCEDURE — 6370000000 HC RX 637 (ALT 250 FOR IP): Performed by: HOSPITALIST

## 2023-02-22 PROCEDURE — 80048 BASIC METABOLIC PNL TOTAL CA: CPT

## 2023-02-22 PROCEDURE — 2580000003 HC RX 258: Performed by: NURSE PRACTITIONER

## 2023-02-22 PROCEDURE — 84100 ASSAY OF PHOSPHORUS: CPT

## 2023-02-22 RX ADMIN — METOPROLOL TARTRATE 2.5 MG: 1 INJECTION, SOLUTION INTRAVENOUS at 12:26

## 2023-02-22 RX ADMIN — SODIUM CHLORIDE 100 MG: 9 INJECTION, SOLUTION INTRAVENOUS at 21:40

## 2023-02-22 RX ADMIN — PANTOPRAZOLE SODIUM 40 MG: 40 INJECTION, POWDER, LYOPHILIZED, FOR SOLUTION INTRAVENOUS at 08:31

## 2023-02-22 RX ADMIN — METOPROLOL TARTRATE 2.5 MG: 1 INJECTION, SOLUTION INTRAVENOUS at 18:19

## 2023-02-22 RX ADMIN — Medication 10 ML: at 09:54

## 2023-02-22 RX ADMIN — ASCORBIC ACID, VITAMIN A PALMITATE, CHOLECALCIFEROL, THIAMINE HYDROCHLORIDE, RIBOFLAVIN-5 PHOSPHATE SODIUM, PYRIDOXINE HYDROCHLORIDE, NIACINAMIDE, DEXPANTHENOL, ALPHA-TOCOPHEROL ACETATE, VITAMIN K1, FOLIC ACID, BIOTIN, CYANOCOBALAMIN: 200; 3300; 200; 6; 3.6; 6; 40; 15; 10; 150; 600; 60; 5 INJECTION, SOLUTION INTRAVENOUS at 18:18

## 2023-02-22 RX ADMIN — AMIODARONE HYDROCHLORIDE 200 MG: 200 TABLET ORAL at 08:31

## 2023-02-22 RX ADMIN — MEROPENEM 500 MG: 1 INJECTION, POWDER, FOR SOLUTION INTRAVENOUS at 08:45

## 2023-02-22 RX ADMIN — MEROPENEM 500 MG: 1 INJECTION, POWDER, FOR SOLUTION INTRAVENOUS at 01:57

## 2023-02-22 RX ADMIN — PANTOPRAZOLE SODIUM 40 MG: 40 INJECTION, POWDER, LYOPHILIZED, FOR SOLUTION INTRAVENOUS at 21:39

## 2023-02-22 RX ADMIN — MEROPENEM 1000 MG: 1 INJECTION, POWDER, FOR SOLUTION INTRAVENOUS at 16:36

## 2023-02-22 RX ADMIN — METOPROLOL TARTRATE 2.5 MG: 1 INJECTION, SOLUTION INTRAVENOUS at 06:17

## 2023-02-22 ASSESSMENT — ENCOUNTER SYMPTOMS
SORE THROAT: 0
SINUS PAIN: 0
ABDOMINAL PAIN: 0
WHEEZING: 0
EYE REDNESS: 0
DIARRHEA: 0
BACK PAIN: 0
EYE DISCHARGE: 0
NAUSEA: 0
CONSTIPATION: 0
SHORTNESS OF BREATH: 0
SINUS PRESSURE: 0
RHINORRHEA: 0
COUGH: 0

## 2023-02-22 NOTE — PROGRESS NOTES
[facility]) Physicians   [employer]  [address] [place] [address], [address] [place] [id number] . Phone: [phone]  Fax: [phone]           Pt seen and examined     Patient admitted with complex medical comorbidities. Remote history of gastric bypass. Patient with chronic marginal ulcer and contained perforation , s/p perc drain now. This morning sitting up in chair without any complaints. VITALS  /73   Pulse 97   Temp 97.6 °F (36.4 °C) (Axillary)   Resp 16   Ht 5' 7\" (1.702 m)   Wt 222 lb 12.8 oz (101.1 kg)   SpO2 96%   BMI 34.90 kg/m²      In: -   Out: 960 [Urine:900; Drains:60]      Abdomen is soft, nondistended, mildly tender only in epigastric region at site of perc drain, no guarding nor rigidity no peritoneal signs.   The rest of the abdominal exam is normal.     AURELIO from [facility] drain with scant amount of old blood in it    Data  Recent Labs     02/19/23 0425 02/20/23 0620 02/21/23  1031   WBC 18.3* 36.6* 20.7*   HGB 8.1* 8.3* 7.9*    241 234      BMP:          Recent Labs     02/19/23 0425 02/20/23  0620 02/21/23  0634    140 134*   K 4.5 4.4 4.3    102 97*   CO2 39* 37* 35*   BUN 28* 30* 39*   CREATININE 1.0 1.1 1.2   GLUCOSE 82 149* 123*      Hepatic:        Recent Labs     02/19/23 0425 02/20/23  0620   AST 24 15   ALT 6* <5*   BILITOT 0.4 0.5   ALKPHOS 94 96            Current Inpatient Medications    Current Facility-Administered Medications: PN-Adult Premix 5/20 - Standard Electrolytes - Central Line, , IntraVENous, Continuous TPN  simethicone (MYLICON) chewable tablet 80 mg, 80 mg, Oral, Q6H PRN  diatrizoate meglumine-sodium (GASTROGRAFIN) 66-10 % solution 20 mL, 20 mL, Oral, ONCE PRN  [Held by provider] enoxaparin (LOVENOX) injection 40 mg, 40 mg, SubCUTAneous, Daily  lidocaine PF 1 % injection 5 mL, 5 mL, IntraDERmal, Once  sodium chloride flush 0.9 % injection 5-40 mL, 5-40 mL, IntraVENous, 2 times per day  sodium chloride flush 0.9 % injection 5-40 mL, 5-40 mL, IntraVENous, PRN  0.9 % sodium chloride infusion, 25 mL, IntraVENous, PRN  metoprolol (LOPRESSOR) injection 2.5 mg, 2.5 mg, IntraVENous, Q6H  pantoprazole (PROTONIX) injection 40 mg, 40 mg, IntraVENous, BID  [Held by provider] furosemide (LASIX) injection 20 mg, 20 mg, IntraVENous, Daily  metoprolol (LOPRESSOR) injection 2.5 mg, 2.5 mg, IntraVENous, Q6H PRN  meropenem (MERREM) 500 mg in sodium chloride 0.9 % 100 mL IVPB (mini-bag), 500 mg, IntraVENous, q8h  [COMPLETED] anidulafungin (ERAXIS) 200 mg in sodium chloride 0.9 % 260 mL IVPB, 200 mg, IntraVENous, Once **AND** anidulafungin (ERAXIS) 100 mg in sodium chloride 0.9 % 130 mL IVPB, 100 mg, IntraVENous, Q24H  dextrose bolus 10% 125 mL, 125 mL, IntraVENous, PRN **OR** dextrose bolus 10% 250 mL, 250 mL, IntraVENous, PRN  glucagon (rDNA) injection 1 mg, 1 mg, SubCUTAneous, PRN  dextrose 10 % infusion, , IntraVENous, Continuous PRN  insulin lispro (HUMALOG) injection vial 0-4 Units, 0-4 Units, SubCUTAneous, Q6H  fat emulsion 20 % infusion 250 mL, 250 mL, IntraVENous, Once per day on Mon Thu  sodium chloride flush 0.9 % injection 5-40 mL, 5-40 mL, IntraVENous, 2 times per day  sodium chloride flush 0.9 % injection 5-40 mL, 5-40 mL, IntraVENous, PRN  0.9 % sodium chloride infusion, 25 mL, IntraVENous, PRN  ipratropium-albuterol (DUONEB) nebulizer solution 1 ampule, 1 ampule, Inhalation, Q4H PRN  lidocaine PF 1 % injection 5 mL, 5 mL, IntraDERmal, Once  sodium chloride flush 0.9 % injection 5-40 mL, 5-40 mL, IntraVENous, 2 times per day  sodium chloride flush 0.9 % injection 5-40 mL, 5-40 mL, IntraVENous, PRN  0.9 % sodium chloride infusion, 25 mL, IntraVENous, PRN  diphenhydrAMINE (BENADRYL) tablet 50 mg, 50 mg, Oral, Q6H PRN  ondansetron (ZOFRAN) injection 4 mg, 4 mg, IntraVENous, Q6H PRN  albuterol sulfate HFA (PROVENTIL;VENTOLIN;PROAIR) 108 (90 Base) MCG/ACT inhaler 2 puff, 2 puff, Inhalation, Q4H PRN  amiodarone (CORDARONE) tablet 200 mg, 200 mg, Oral, Daily  traMADol (ULTRAM) tablet 50 mg, 50 mg, Oral, Q6H PRN  sodium chloride flush 0.9 % injection 5-40 mL, 5-40 mL, IntraVENous, 2 times per day  sodium chloride flush 0.9 % injection 5-40 mL, 5-40 mL, IntraVENous, PRN  0.9 % sodium chloride infusion, , IntraVENous, PRN  ondansetron (ZOFRAN-ODT) disintegrating tablet 4 mg, 4 mg, Oral, Q8H PRN **OR** ondansetron (ZOFRAN) injection 4 mg, 4 mg, IntraVENous, Q6H PRN  polyethylene glycol (GLYCOLAX) packet 17 g, 17 g, Oral, Daily PRN  acetaminophen (TYLENOL) tablet 650 mg, 650 mg, Oral, Q6H PRN **OR** acetaminophen (TYLENOL) suppository 650 mg, 650 mg, Rectal, Q6H PRN    Patient Active Problem List   Diagnosis    Atrial fibrillation, persistent ([facility])    Primary hypertension    Severe episode of recurrent major depressive disorder, without psychotic features (HCC)    Seasonal affective disorder ([facility])    Class 2 severe obesity due to excess calories with serious comorbidity and body mass index (BMI) of 39.0 to 39.9 in adult ([facility])    Elevated sed rate    Neutrophilia    Acute kidney injury ([facility]    Elevated C-reactive protein (CRP)    GIANNA (obstructive sleep apnea)    Morbid obesity ([facility])    Weight loss counseling, encounter for    Atypical atrial flutter (HCC)    Atrial fibrillation (HCC)    ILD (interstitial lung disease) ([facility]    Iron deficiency anemia due to chronic blood loss    Iron deficiency anemia, unspecified    Age-related osteoporosis without current pathological fracture    Vasovagal syncope    CKD (chronic kidney disease) stage 4, GFR 15-29 ml/min (HCC)    Acute on chronic diastolic heart failure (HCC)    Stage 3b chronic kidney disease (HCC)    B12 deficiency    Vitamin D deficiency    Acute respiratory failure ([facility])    Chronic obstructive pulmonary disease, unspecified    Sepsis (HCC)    Cellulitis of right leg    Obesity (BMI 30-39. 9)    Stage 3 chronic kidney disease (HCC)    Bacteremia    Fever and chills    History of ankle surgery Streptococcal infection group G    Bacterial pneumonia    JUSTINA (acute kidney injury) ([facility]    Acute on chronic heart failure with preserved ejection fraction (HFpEF) (HCC)    Right upper lobe consolidation (HCC)    Allergic drug reaction    Elevated d-dimer    Left nephrolithiasis    Diverticulosis    History of cholecystectomy    Intra-abdominal abscess (HCC)    Bandemia    Atypical pneumonia    Multifocal pneumonia    Hematemesis    Chronic atrial fibrillation (HCC)         A/P  Jennifer Officer is a 78 y.o. female status post post open gastric bypass in [place] several years ago. Patient with history of chronic marginal ulcer that required intervention multiple times. Unfortunately failed to follow-up and was not compliant with her PPIs. Presented with multiple significant medical problems and had 1 episode of hematemesis. None since then. Complex medical history as well as complicated surgical history. Admitted with UTI, acute kidney injury, congestive heart failure, A-fib, and other complex medical history. Now Patient with contained marginal ulcer perforation s/p perc drain. Cultures from drain only candida so far, ID following pt and manages abx. PICC line and TPN on. Overnight was worsening cramping and nausea. Reports of 1 episode of hematemesis. Worsening leukocytosis. Plan the same to proceed with ordering CT abdomen with oral contrast stat.      1-sepsis (due to perforated marginal ulcer with contained abscess as well the patient admitted with pneumonia)  - N.P.O. except meds  - TPN  - SCDs  - PPI  -Continue antibiotics appreciate ID input     2-Anemia  (Multifactorial chronic disease as well as perforated marginal ulcer)   - Continue to monitor CBC       3-COPD exacerbation with left lobe bacterial pneumonia  -Continue IV antibiotic  -Continue pulmonary toilet  -Continue incentive spirometer  -Continue PT / OT     4-A-fib  -Continue care per cardiology     5-Acute kidney injury  -Nephrology is following  -Creatinine stable     6- Class 1 Obesity BMI 34.83  - s/p gastric bypass  - Currently NPO on TPN  - Chronic non healing marginal ulcer, will need surgical revision at tertiary center.    - Need to check nutrition labs at some point   (Vits A, E, K, B1, M89, folic acid, Vit D, iron studies, A1C, Lipids)          I have personally reviewed the CT scan results with the interventional radiologist.  Maureen Duel is in good place however the collection cavity has not changed significantly. No clear contrast extravasation at least on this scan. No evidence of obstruction as the contrast is distally into the colon. At this point seems we have some source control with the AURELIO which only draining old blood. Really not sure if the patient just simply had a hematoma her as her anatomy is not very clear to me. She continues with leukocytosis,  at this point biggest sources her abdomen from that collection. Cultures only showed Candida. May need exploration and revision. Patient will need very complex surgical reconstruction that needs to be done at a tertiary facility. Patient with very poor nutritional state as well as very complex medical history as well as ongoing abscess, not ideal for any stapling or surgical intervention with very high risk of leaks/nonhealing. Aaron Landa is recommended given their experience with such complicated case. Transfer has been initiated by the primary team.     Case discussed with Dr. Zina Capps the hospitalist and Dr. Satya Bonner interventional radiology. Will continue to follow.

## 2023-02-22 NOTE — PROGRESS NOTES
Physical Therapy Attempt Note  Noted pt awaiting transfer to [address] [place] [address] for surgery for abscess. Offered pt option to participate in therapy in or out of bed but pt declining stating she just wants to rest until she is transferred. Will follow up as pt tolerates.   Alfredo Olvera PT [id number]

## 2023-02-22 NOTE — PROGRESS NOTES
Office : [phone]     Fax :[phone]       Nephrology progress  Note      Patient's Name: Siddhartha Larios    2/22/2023          Chief Complaint:    Chief Complaint   Patient presents with    Abdominal Pain     Pt via EMS from home, c/o LUQ pain 10/10, has had gallbladder removed. Pt states pain is making her sob, 89% ora, put on 2L, pt received 325 mg aspirin, has been treated for cellulitis since December, new antibiotic last two weeks, states she has been getting hives and itching        History of Present Ilness:    Siddhartha Larios is a 78 y.o. female who presented with complaints of shortness of breath. Patient apparently has been treated for cellulitis lower extremity on antibiotics patient started having worsening itching and hives. Came to the ER. Patient with increased shortness of breath. Also with leg swelling. No reported fevers chills. Patient was found to be hypoxic was placed on 2 L nasal cannula. Patient with history of chronic CHF COPD chronic kidney disease admitted with bilateral worse. Baseline creat is 1.2   Now elevated at 2.0     BNP 19872      Interval hx     C/o abdominal pain     No SOB     Good UOP  Creat improved .  Now at baseline         On TPN     I/O last 3 completed shifts:  In: -   Out: 960 [Urine:900; Drains:60]    Past Medical History:   Diagnosis Date    Arthritis     Atrial fibrillation and flutter ([address]     Hypertension     Kidney disease     Pt states  \"stage 3\"    Pneumonia     Sleep apnea     no c-pap       Past Surgical History:   Procedure Laterality Date    CARDIOVERSION      COLONOSCOPY N/A 11/20/2018    COLONOSCOPY POLYPECTOMY SNARE/COLD BIOPSY performed by Syed Kelsey MD at [facility] N/A 03/31/2022 COLONOSCOPY POLYPECTOMY SNARE/COLD BIOPSY performed by Jany Adams MD at [facility]      ankle rt has pins and rods, and rt elbow    GASTRIC BYPASS SURGERY      LARYNX SURGERY      TOTAL KNEE ARTHROPLASTY Bilateral 12/19/2012    bilateral knee replacements    TUBAL LIGATION      tubes tied    UPPER GASTROINTESTINAL ENDOSCOPY N/A 11/20/2018    EGD BIOPSY performed by Juan Luis Pelletier MD at [facility] 03/31/2022    EGD DILATION BALLOON performed by Jany Adams MD at [address] [place] [address] ENDOSCOPY N/A 03/31/2022    EGD BIOPSY performed by Jany Adams MD at [address] [place] [address]       Family History   Problem Relation Age of Onset    Other Mother         blood clot    Cancer Father         stomach cancer    Stroke Brother          Current Medications:    PN-Adult Premix 5/20 - Standard Electrolytes - Central Line, Continuous TPN  simethicone (MYLICON) chewable tablet 80 mg, Q6H PRN  diatrizoate meglumine-sodium (GASTROGRAFIN) 66-10 % solution 20 mL, ONCE PRN  [Held by provider] enoxaparin (LOVENOX) injection 40 mg, Daily  lidocaine PF 1 % injection 5 mL, Once  sodium chloride flush 0.9 % injection 5-40 mL, 2 times per day  sodium chloride flush 0.9 % injection 5-40 mL, PRN  0.9 % sodium chloride infusion, PRN  metoprolol (LOPRESSOR) injection 2.5 mg, Q6H  pantoprazole (PROTONIX) injection 40 mg, BID  [Held by provider] furosemide (LASIX) injection 20 mg, Daily  metoprolol (LOPRESSOR) injection 2.5 mg, Q6H PRN  meropenem (MERREM) 500 mg in sodium chloride 0.9 % 100 mL IVPB (mini-bag), q8h  anidulafungin (ERAXIS) 100 mg in sodium chloride 0.9 % 130 mL IVPB, Q24H  dextrose bolus 10% 125 mL, PRN   Or  dextrose bolus 10% 250 mL, PRN  glucagon (rDNA) injection 1 mg, PRN  dextrose 10 % infusion, Continuous PRN  insulin lispro (HUMALOG) injection vial 0-4 Units, Q6H  fat emulsion 20 % infusion 250 mL, Once per day on Mon Thu  sodium chloride flush 0.9 % injection 5-40 mL, 2 times per day  sodium chloride flush 0.9 % injection 5-40 mL, PRN  0.9 % sodium chloride infusion, PRN  ipratropium-albuterol (DUONEB) nebulizer solution 1 ampule, Q4H PRN  lidocaine PF 1 % injection 5 mL, Once  sodium chloride flush 0.9 % injection 5-40 mL, 2 times per day  sodium chloride flush 0.9 % injection 5-40 mL, PRN  0.9 % sodium chloride infusion, PRN  diphenhydrAMINE (BENADRYL) tablet 50 mg, Q6H PRN  ondansetron (ZOFRAN) injection 4 mg, Q6H PRN  albuterol sulfate HFA (PROVENTIL;VENTOLIN;PROAIR) 108 (90 Base) MCG/ACT inhaler 2 puff, Q4H PRN  amiodarone (CORDARONE) tablet 200 mg, Daily  traMADol (ULTRAM) tablet 50 mg, Q6H PRN  sodium chloride flush 0.9 % injection 5-40 mL, 2 times per day  sodium chloride flush 0.9 % injection 5-40 mL, PRN  0.9 % sodium chloride infusion, PRN  ondansetron (ZOFRAN-ODT) disintegrating tablet 4 mg, Q8H PRN   Or  ondansetron (ZOFRAN) injection 4 mg, Q6H PRN  polyethylene glycol (GLYCOLAX) packet 17 g, Daily PRN  acetaminophen (TYLENOL) tablet 650 mg, Q6H PRN   Or  acetaminophen (TYLENOL) suppository 650 mg, Q6H PRN        Physical exam:     Vitals:  /76   Pulse 81   Temp 97.4 °F (36.3 °C) (Oral)   Resp 16   Ht 5' 7\" (1.702 m)   Wt 222 lb 6.4 oz (100.9 kg)   SpO2 98%   BMI 34.83 kg/m²   Constitutional:  OAA X3 NAD  Skin: no rash, turgor wnl  Heent:  eomi, mmm  Neck: no bruits or jvd noted  Cardiovascular:  S1, S2 without m/r/g  Respiratory: CTA B without w/r/r  Abdomen:  +bs, soft, nt, nd  Ext: 1 +  lower extremity edema  Psychiatric: mood and affect appropriate  Musculoskeletal:  Rom, muscular strength intact    Labs:  CBC:   Recent Labs     02/20/23  0620 02/21/23  1031   WBC 36.6* 20.7*   HGB 8.3* 7.9*    234     BMP:    Recent Labs     02/20/23  0620 02/21/23  0634 02/22/23  0630    134* 138   K 4.4 4.3 4.4    97* 99   CO2 37* 35* 35*   BUN 30* 39* 49*   CREATININE 1.1 1.2 1.2   GLUCOSE 149* 123* 120*     Ca/Mg/Phos:   Recent Labs     02/20/23  0620 02/21/23  0634 02/22/23  0630   CALCIUM 8.6 8.4 8.7   MG 1.80 1.90 2.20   PHOS 2.6 3.3 4.1     Hepatic:   Recent Labs     02/20/23  0620   AST 15   ALT <5*   BILITOT 0.5   ALKPHOS 96              IMAGING:  CT ABDOMEN WO CONTRAST Additional Contrast? Oral   Final Result   1. Interval placement of percutaneous drain in the fluid collection in the   epigastric region. The collection measures slightly larger measuring as much   as 11.3 cm versus 10.5 cm previously. 2. Prior gastric bypass with a hiatal hernia containing the gastric pouch. 3. Persistent small left pleural effusion with adjacent left lower lobe   consolidation that could represent atelectasis or pneumonia. Ground-glass   opacities in the lungs appear improved. XR ABDOMEN (KUB) (SINGLE AP VIEW)   Final Result   1. Nonobstructive bowel gas pattern. 2.  Moderate stool burden in the proximal colon and rectum. XR CHEST PORTABLE   Final Result   1. Interval right PICC line removal.      2.  New left PICC line terminates at the innominate/SVC junction. CT ABSCESS DRAINAGE W CATH PLACEMENT S&I   Final Result   Successful CT-guided percutaneous drainage catheter placement into a   perigastric collection via a left anterolateral abdominal approach. Patient currently has a 14 Turkmen locking pigtail drainage catheter within   the collection, placed to bulb suction. CT GUIDED NEEDLE PLACEMENT   Final Result   Successful CT-guided percutaneous drainage catheter placement into a   perigastric collection via a left anterolateral abdominal approach. Patient currently has a 14 Turkmen locking pigtail drainage catheter within   the collection, placed to bulb suction. XR CHEST PORTABLE   Final Result   1. Right upper extremity PICC extends superiorly into the right internal   jugular vein; tip is excluded from the field of view.   Recommend repositioning. 2.  Bilateral patchy airspace disease, improved from prior. Discussed with Dr. Robinette Claude by Dr. Dayana Gordon at 4:55 am on 2/15/2023         CT ABDOMEN WO CONTRAST Additional Contrast? Oral   Final Result   1. 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  Findings compatible with a leak likely a marginal ulcer with   a developing 10 x 10 x 5 cm abscess centered between the Angel limb and   gastric remnant. Adjacent inflammatory change and left upper quadrant edema   has progressed from 02/07/2023. Trace amount of enteric contrast leak. A   couple punctate foci of free air noted in the anterior abdomen. Findings were discussed with Dr. Sarah Saleh at 5:45 pm on 2/13/2023. CT ABDOMEN WO CONTRAST Additional Contrast? None   Final Result   Postsurgical change from gastric bypass. There is fat stranding surrounding   the afferent loop. There is fluid and gas seen in the upper abdomen, near   the anastomotic site, that is not all definitively intraluminal, with   surrounding fat stranding, raising the question of perforation. Increased pleural-parenchymal disease at the left lung base      The findings were sent to the Radiology Results [address] at 1:17   pm on 2/13/2023 to be communicated to a licensed caregiver. CT CHEST ABDOMEN PELVIS WO CONTRAST Additional Contrast? None   Final Result   1. Scattered ground-glass opacities with interlobular septal thickening. Findings may be related to pulmonary edema with other considerations   including an inflammatory/infectious process in the appropriate clinical   setting. 2. Right upper lobe consolidation. Additional bilateral scattered curvilinear   opacities may be related to atelectasis versus developing consolidation. 3. Enlarged pulmonary artery diameter. Finding may be related to pulmonary   arterial hypertension. 4. Coronary artery calcifications present.    5. Moderate hiatal hernia. 6. Questionable circumferential thickening of the descending colon with mild   surrounding fat stranding versus colonic underdistention. Finding raises the   possibility of colitis in the appropriate clinical setting. 7. Colonic diverticulosis, predominately sigmoid. No evidence of acute   diverticulitis. 8. Nonobstructing punctate left nephrolithiasis. RECOMMENDATIONS:   2 cm left adrenal mass, consistent with lipid-rich benign adenoma. No   follow-up imaging is recommended. JACR 2017 Aug; 14(8):1038-44, JCAT 2016 [facility]; 40(2):194-200, Urol J 2006   [place]; 3(2):71-4. XR CHEST PORTABLE   Final Result   1. Diffuse hazy multifocal opacity throughout both lungs, right worse than   left, likely a combination of moderate pulmonary edema and multifocal   pneumonia. 2. Stable cardiomegaly. 3. Stable hiatal hernia. Assessment/Plan :      1. Panda   Creatinine  improved     Hold lasix         2. HTN. BP low borderline range   Hold clonidine if SBP < 110 mm HG     3. COPD    4. H/o atrial fib   Monitor       5. Hyperkalemia   Resolved after getting lokelma     6. Abdominal  pain .  CT scan shows there has been interval development of a poorly  marginated approximately 10 x 10 x 5 cm fluid collection with foci of gas and  small amounts of enteric contrast compatible with a developing abscess  Surgery on board    S/p IR drain  Worsening WBC   Sepsis - on meropenem and anidulafungin     Continue TPN       Plan to transfer to [facility] state    D/w primary team      Thank you for allowing us to participate in care of Bao Martinez         Electronically signed by: Edvin Sanders MD, 2/22/2023, 9:41 AM      [facility] [id number]  Office : [phone]  Fax :[phone]

## 2023-02-22 NOTE — RT PROTOCOL NOTE
RT Inhaler-Nebulizer Bronchodilator Protocol Note    There is a bronchodilator order in the chart from a provider indicating to follow the RT Bronchodilator Protocol and there is an Initiate RT Inhaler-Nebulizer Bronchodilator Protocol order as well (see protocol at bottom of note). CXR Findings:  No results found. The findings from the last RT Protocol Assessment were as follows:   History Pulmonary Disease: None or smoker <15 pack years  Respiratory Pattern: Regular pattern and RR 12-20 bpm  Breath Sounds: Slightly diminished and/or crackles  Cough: Strong, spontaneous, non-productive  Indication for Bronchodilator Therapy: On home bronchodilators  Bronchodilator Assessment Score: 2    Aerosolized bronchodilator medication orders have been revised according to the RT Inhaler-Nebulizer Bronchodilator Protocol below. Respiratory Therapist to perform RT Therapy Protocol Assessment initially then follow the protocol. Repeat RT Therapy Protocol Assessment PRN for score 0-3 or on second treatment, BID, and PRN for scores above 3. No Indications - adjust the frequency to every 6 hours PRN wheezing or bronchospasm, if no treatments needed after 48 hours then discontinue using Per Protocol order mode. If indication present, adjust the RT bronchodilator orders based on the Bronchodilator Assessment Score as indicated below. Use Inhaler orders unless patient has one or more of the following: on home nebulizer, not able to hold breath for 10 seconds, is not alert and oriented, cannot activate and use MDI correctly, or respiratory rate 25 breaths per minute or more, then use the equivalent nebulizer order(s) with same Frequency and PRN reasons based on the score. If a patient is on this medication at home then do not decrease Frequency below that used at home.     0-3 - enter or revise RT bronchodilator order(s) to equivalent RT Bronchodilator order with Frequency of every 4 hours PRN for wheezing or increased work of breathing using Per Protocol order mode. 4-6 - enter or revise RT Bronchodilator order(s) to two equivalent RT bronchodilator orders with one order with BID Frequency and one order with Frequency of every 4 hours PRN wheezing or increased work of breathing using Per Protocol order mode. 7-10 - enter or revise RT Bronchodilator order(s) to two equivalent RT bronchodilator orders with one order with TID Frequency and one order with Frequency of every 4 hours PRN wheezing or increased work of breathing using Per Protocol order mode. 11-13 - enter or revise RT Bronchodilator order(s) to one equivalent RT bronchodilator order with QID Frequency and an Albuterol order with Frequency of every 4 hours PRN wheezing or increased work of breathing using Per Protocol order mode. Greater than 13 - enter or revise RT Bronchodilator order(s) to one equivalent RT bronchodilator order with every 4 hours Frequency and an Albuterol order with Frequency of every 2 hours PRN wheezing or increased work of breathing using Per Protocol order mode. RT to enter RT Home Evaluation for COPD & MDI Assessment order using Per Protocol order mode.     Electronically signed by Alla Ray RCP on 2/22/2023 at 11:56 AM

## 2023-02-22 NOTE — PROGRESS NOTES
Clinical Pharmacy Note    Pharmacy consulted by Dr. Estela Steiner to manage TPN    Current TPN rate: 83 ml/hr  Goal TPN rate: 83 ml/hr  Access: PICC    Labs:  General Labs:  BMP:    Lab Results   Component Value Date/Time     02/22/2023 06:30 AM    K 4.4 02/22/2023 06:30 AM    K 4.4 02/07/2023 03:39 AM    CL 99 02/22/2023 06:30 AM    CO2 35 02/22/2023 06:30 AM    BUN 49 02/22/2023 06:30 AM    LABALBU 2.1 02/20/2023 06:20 AM    CREATININE 1.2 02/22/2023 06:30 AM    CALCIUM 8.7 02/22/2023 06:30 AM    GFRAA 38 09/20/2022 04:14 AM    GFRAA 45 03/27/2013 01:36 PM    LABGLOM 47 02/22/2023 06:30 AM    GLUCOSE 120 02/22/2023 06:30 AM     Magnesium:    Lab Results   Component Value Date/Time    MG 2.20 02/22/2023 06:30 AM     Phosphorus:    Lab Results   Component Value Date/Time    PHOS 4.1 02/22/2023 06:30 AM       Electrolyte replacement as follows: No replacement needed today    Blood sugars over past 24 hours: 118-126    Blood sugar management:  Plan to Continue Humalog q6 hour sliding scale    Plan to continue TPN at 83 ml/hr. Thank you for allowing pharmacy to participate in the care of this patient.     Gayle Mijares, PharmD, [facility]  [id number]  2/22/2023 10:00 AM

## 2023-02-22 NOTE — CARE COORDINATION
Discharge Planning Note:      Transfer Center called still no bed available at [facility]      Electronically signed by Wilmer Mckeon RN on 2/22/2023 at 3:51 PM

## 2023-02-22 NOTE — PROGRESS NOTES
[facility]    2/22/2023 11:03 AM        Name: Shruthi Espinosa . Admitted: 2/7/2023  Primary Care Provider: MARILEE Levin CNP (Tel: [phone])                        Subjective:   Patient feels well  No nausea vomiting no abdominal pain  No fever  White cell count down to 18,000  No bleeding        Reviewed interval ancillary notes    Current Medications  Reviewed    Objective:  /76   Pulse 81   Temp 97.4 °F (36.3 °C) (Oral)   Resp 16   Ht 5' 7\" (1.702 m)   Wt 222 lb 6.4 oz (100.9 kg)   SpO2 98%   BMI 34.83 kg/m²     Intake/Output Summary (Last 24 hours) at 2/22/2023 1103  Last data filed at 2/22/2023 5702  Gross per 24 hour   Intake --   Output 50 ml   Net -50 ml      Wt Readings from Last 3 Encounters:   02/22/23 222 lb 6.4 oz (100.9 kg)   12/24/22 236 lb 14.4 oz (107.5 kg)   12/15/22 235 lb (106.6 kg)       General appearance:  Appears comfortable  Eyes: Sclera clear. Pupils equal.  ENT: Moist oral mucosa. Trachea midline, no adenopathy. Cardiovascular: Regular rhythm, normal S1, S2. No murmur. No edema in lower extremities  Respiratory: Not using accessory muscles. Good inspiratory effort. Clear to auscultation bilaterally, no wheeze or crackles. GI: Abdomen soft, no tenderness, not distended, normal bowel sounds  Musculoskeletal: No cyanosis in digits, neck supple  Neurology: CN 2-12 grossly intact. No speech or motor deficits  Psych: Normal affect. Alert and oriented in time, place and person  Skin: Warm, dry, normal turgor  PERC drain in place with dark output.   Physical exam remains unchanged from 2/18    Labs and Tests:  CBC:   Recent Labs     02/20/23  0620 02/21/23  1031   WBC 36.6* 20.7*   HGB 8.3* 7.9*    234     BMP:    Recent Labs     02/20/23  0620 02/21/23  0634 02/22/23  0630    134* 138   K 4.4 4.3 4.4  97* 99   CO2 37* 35* 35*   BUN 30* 39* 49*   CREATININE 1.1 1.2 1.2   GLUCOSE 149* 123* 120*     Hepatic:   Recent Labs     02/20/23  0620   AST 15   ALT <5*   BILITOT 0.5   ALKPHOS 96       Discussed care with patient       Problem List      Assessment & Plan:   59-year-old female who was admitted for acute CHF exacerbation with hypoxia and acute kidney injury. Patient was evaluated by cardiology and nephrology & had made significant improvement. However on the day of discharge she was noticed to have hematemesis for which STAT CT done showed findings compatible with endoleak likely a marginal ulcer with a developing 10 x 10 x 5 cm abscess centered between the Angel limb and gastric remnant. 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 CT: Endoleak with Intra-abdominal abscess, measuring 10 x 10 x 5 cm between the gastric remnant and Angel-en-Y limb. IR consulted: s/p perc drain placement on 2/15/2022. Abdominal fluid culture positive for Candida. Blood cultures remain negative to date. Persistent leukocytosis is down to 20 today  ID consulted: Currently on meropenem and anidulafungin. Bariatric surgeon consulted: Contained marginal ulcer perforation. No surgical intervention at this time. Continue STRICT NPO and TPN. Patient will be discharged on TPN. Repeat CT scan noted with increased size and abscess. Discussed with surgery team  I discussed case over [facility] we are transferring patient to [facility] has been accepted. Acute hypoxic respiratory failure: Improved  Likely due to CHF exacerbation, sepsis and underlying COPD. Currently on 2 L nasal cannula. Anemia: Multifactorial secondary to iron deficiency and anemia of chronic disease  Work-up consistent with MIRIAM/AoCD. Started on IV Venofer. Monitor H&H and transfuse as needed. Hemoglobin remained stable      Hematemesis: resolved. Continue Protonix IV twice daily for marginal ulcers. Afib with RVR:   Unable to resume amiodarone due to strict n.p.o. orders. Metoprolol changed to IV every 6h. Seen by cardiology  At this point given underlying GI pathology with increased risk of bleeding they recommended holding off on anticoagulation however once this issues resolve and if the risk of bleeding goes down then she will need to be considered to reanticoagulate given her high [id number] score      Acute on chronic HFpEF:  Echo 12/21/2022: LVEF 55 to 60%. Cardiology consulted: Currently compensated with IV diuresis. Continue IV Lasix 20 mg daily until able to take oral.    COPD without acute exacerbation: Continue inhalers. JUSTINA: Resolved. Peak creatinine 2.8.    Seen by nephrology  Creatinine down to 1        Diet: Diet NPO Exceptions are: Sips of Water with Meds  PN-Adult Premix 5/20 - Standard Electrolytes - Central Line  PN-Adult Premix 5/20 - Standard Electrolytes - Central Line  Code:Full Code  DVT PPX I restarted Lovenox since her hemoglobin is stable    Disposition: Transfer patient to ICU awaiting BED       Viviane Meyer MD   2/22/2023 11:03 AM

## 2023-02-22 NOTE — PROGRESS NOTES
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  Bilateral atypical pneumonia  Right upper lobe consolidation    Microbiology:        I have reviewed allavailable micro lab data and cultures    Blood culture (2/2) - collected on 2/7/2023: Negative so far      Antibiotics and immunizations:       Current antibiotics: All antibiotics and their doses were reviewed by me    Recent Abx Admin                     meropenem (MERREM) 500 mg in sodium chloride 0.9 % 100 mL IVPB (mini-bag) (mg) 500 mg New Bag 02/22/23 0845     500 mg New Bag  0157     500 mg New Bag 02/21/23 1640    anidulafungin (ERAXIS) 100 mg in sodium chloride 0.9 % 130 mL IVPB (mg) 100 mg New Bag 02/21/23 2208                      Immunization History: All immunization history was reviewed by me today.     Immunization History   Administered Date(s) Administered    COVID-19, PFIZER Bivalent BOOSTER, DO NOT Dilute, (age 12y+), IM, 30 mcg/0.3 mL 09/09/2022    COVID-19, PFIZER GRAY top, DO NOT Dilute, (age 15 y+), IM, 30 mcg/0.3 mL 05/20/2022    COVID-19, PFIZER PURPLE top, DILUTE for use, (age 15 y+), 30mcg/0.3mL 09/08/2021, 10/15/2021    Influenza Virus Vaccine 12/22/2012    Influenza, FLUAD, (age 72 y+), Adjuvanted, 0.5mL 10/27/2020, 10/15/2021, 09/09/2022    Influenza, High Dose (Fluzone 65 yrs and older) 09/30/2016, 10/01/2017, 10/08/2018    Influenza, Triv, inactivated, subunit, adjuvanted, IM (Fluad 65 yrs and older) 10/21/2019    Pneumococcal Conjugate 13-valent (Raguel Boor) 09/30/2016, 10/01/2017    Pneumococcal Polysaccharide (Gzrjnemcn12) 12/22/2012, 10/06/2017    Td vaccine (adult) 12/01/2008    Zoster Live (Zostavax) 09/15/2015       Known drug allergies: All allergies were reviewed and updated    Allergies   Allergen Reactions    Bee Venom Swelling and Angioedema    Shellfish-Derived Products Other (See Comments)     Syncope/seizures    Shrimp Flavor      Passes out      Cephalexin Itching    Codeine Hives and Other (See Comments)     B/P DROPS PASSES OUT  Passed out    Fentanyl And Related Hives     Other reaction(s): Dizziness    Hydromorphone Rash, Hives and Other (See Comments)     Full rash spreading across chest and both arms from IV hydromorphone  Passed out    Vancomycin Rash       Social history:     Social History:  All social andepidemiologic history was reviewed and updated by me today as needed. Tobacco use:   reports that she has never smoked. She has never used smokeless tobacco.  Alcohol use:   reports no history of alcohol use. Currently lives in: [address] [place] [address]   reports no history of drug use. COVID VACCINATION AND LAB RESULT RECORDS:     Internal Administration   First Dose COVID-19, PFIZER PURPLE top, DILUTE for use, (age 15 y+), 30mcg/0.3mL  09/08/2021   Second Dose COVID-19, PFIZER PURPLE top, DILUTE for use, (age 15 y+), 30mcg/0.3mL   10/15/2021       Last COVID Lab SARS-CoV-2 (no units)   Date Value   09/13/2022 Not Detected     SARS-CoV-2, BREANA (no units)   Date Value   10/01/2020 NOT DETECTED     SARS-CoV-2, NAAT (no units)   Date Value   12/20/2022 Not Detected            Assessment:     The patient is a 78 y.o. old female who  has a past medical history of Arthritis, Atrial fibrillation and flutter ([address] Hypertension, Kidney disease, Pneumonia, and Sleep apnea.  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 Discussion:      The patient is afebrile. She is on IV meropenem and IV Eraxis. White cell count is slowly improving and is 20,700 today    Serum creatinine is 1.2 today. Platelet count is 239,118. Plan:     Diagnostic Workup:      Continue to follow  fever curve, WBC count and blood cultures. Continue to monitor blood counts, liver and renal function. Antimicrobials:    Continue IV meropenem 1 g every 8 hours  Continue IV Eraxis 100 mg daily  Plan is for transfer the patient to [facility] for surgical management  Continue oxygen support to maintain oxygen saturation above 92%  -Daily exercise  We will follow up on the culture results and clinical progress and will make further recommendations accordingly. Continue close vitals monitoring. Maintain good glycemic control. Fall precautions. Aspiration precautions. Continue to watch for new fever or diarrhea. DVT prophylaxis. Discussed all above with patient and RN. Drug Monitoring:    Continue monitoring for antibiotic toxicity as follows: CBC, CMP   Continue to watch for following: new or worsening fever, new hypotension, hives, lip swelling and redness or purulence at vascular access sites.      I/v access Management:    Continue to monitor i.v access sites for erythema, induration, discharge or tenderness. As always, continue efforts to minimize tubes/lines/drains as clinically appropriate to reduce chances of line associated infections. Patient education and counseling: The patient was educated in detail about the side-effects of various antibiotics and things to watch for like new rashes, lip swelling, severe reaction, worsening diarrhea, break through fever etc.  Discussed patient's condition and what to expect. All of the patient's questions were addressed in a satisfactory manner and patient verbalized understanding all instructions. Level of complexity of visit and medical decision making: High       Thank you for involving me in the care of your patient. I will continue to follow. If you have anyadditional questions, please do not hesitate to contact me. Subjective: Interval history: Interval history was obtained from chart review and patient/ RN. He remains afebrile. She is on IV meropenem and Eraxis. She is tolerating antibiotics okay. REVIEW OF SYSTEMS:      Review of Systems   Constitutional:  Positive for fatigue. Negative for chills, diaphoresis and fever. HENT:  Negative for ear discharge, ear pain, postnasal drip, rhinorrhea, sinus pressure, sinus pain and sore throat. Eyes:  Negative for discharge and redness. Respiratory:  Negative for cough, shortness of breath and wheezing. Cardiovascular:  Negative for chest pain and leg swelling. Gastrointestinal:  Negative for abdominal pain, constipation, diarrhea and nausea. Endocrine: Negative for cold intolerance, heat intolerance and polydipsia. Genitourinary:  Negative for dysuria, flank pain, frequency, hematuria and urgency. Musculoskeletal:  Negative for back pain and myalgias. Skin:  Negative for rash. Allergic/Immunologic: Negative for immunocompromised state.    Neurological:  Negative for dizziness, seizures and headaches. Hematological:  Does not bruise/bleed easily. Psychiatric/Behavioral:  Negative for agitation, hallucinations and suicidal ideas. The patient is not nervous/anxious. All other systems reviewed and are negative. Past Medical History: All past medical history reviewed today. Past Medical History:   Diagnosis Date    Arthritis     Atrial fibrillation and flutter ([address]     Hypertension     Kidney disease     Pt states  \"stage 3\"    Pneumonia     Sleep apnea     no c-pap       Past Surgical History: All past surgical history was reviewed today. Past Surgical History:   Procedure Laterality Date    CARDIOVERSION      COLONOSCOPY N/A 11/20/2018    COLONOSCOPY POLYPECTOMY SNARE/COLD BIOPSY performed by Lana Bautista MD at [facility] N/A 03/31/2022    COLONOSCOPY POLYPECTOMY SNARE/COLD BIOPSY performed by Tyrell Wilkes MD at [address] [place] [address]      ankle rt has pins and rods, and rt elbow    GASTRIC BYPASS SURGERY      LARYNX SURGERY      TOTAL KNEE ARTHROPLASTY Bilateral 12/19/2012    bilateral knee replacements    TUBAL LIGATION      tubes tied    UPPER GASTROINTESTINAL ENDOSCOPY N/A 11/20/2018    EGD BIOPSY performed by Lana Bautista MD at [address] [place] [address] 03/31/2022    EGD DILATION BALLOON performed by Tyrell Wilkes MD at [address] [place] [address] N/A 03/31/2022    EGD BIOPSY performed by Tyrell Wilkes MD at [address] [place] [address]       Family History: All family history was reviewed today.         Problem Relation Age of Onset    Other Mother         blood clot    Cancer Father         stomach cancer    Stroke Brother        Objective:       PHYSICAL EXAM:      Vitals:   Vitals:    02/22/23 0615 02/22/23 0646 02/22/23 0721 02/22/23 0815   BP: (!) 116/58   122/76   Pulse: 98   81   Resp: 17   16   Temp: 97.5 °F (36.4 °C)   97.4 °F (36.3 °C)   TempSrc: Oral Oral   SpO2: 99%   98%   Weight:  222 lb 6.4 oz (100.9 kg) 222 lb 6.4 oz (100.9 kg)    Height:           Physical Exam  Vitals and nursing note reviewed. Constitutional:       Appearance: She is well-developed. She is not diaphoretic. Comments: The patient was seen earlier today. HENT:      Head: Normocephalic and atraumatic. Right Ear: External ear normal. There is no impacted cerumen. Left Ear: External ear normal. There is no impacted cerumen. Nose: Nose normal.      Mouth/Throat:      Mouth: Mucous membranes are moist.      Pharynx: Oropharynx is clear. No oropharyngeal exudate. Eyes:      General: No scleral icterus. Right eye: No discharge. Left eye: No discharge. Conjunctiva/sclera: Conjunctivae normal.      Pupils: Pupils are equal, round, and reactive to light. Neck:      Thyroid: No thyromegaly. Cardiovascular:      Rate and Rhythm: Normal rate and regular rhythm. Heart sounds: Normal heart sounds. No murmur heard. No friction rub. Pulmonary:      Effort: No respiratory distress. Breath sounds: No stridor. No wheezing or rales. Abdominal:      General: Bowel sounds are normal.      Palpations: Abdomen is soft. Tenderness: There is no abdominal tenderness. There is no guarding or rebound. Musculoskeletal:         General: No swelling, tenderness or deformity. Normal range of motion. Cervical back: Normal range of motion and neck supple. Right lower leg: No edema. Left lower leg: No edema. Lymphadenopathy:      Cervical: No cervical adenopathy. Skin:     General: Skin is warm and dry. Coloration: Skin is not jaundiced. Findings: No bruising, erythema or rash. Neurological:      General: No focal deficit present. Mental Status: She is alert and oriented to person, place, and time. Mental status is at baseline. Motor: No abnormal muscle tone.    Psychiatric:         Mood and Affect: Mood normal. Behavior: Behavior normal.         *    Lines and drains: All vascular access sites are healthy with no local erythema, discharge or tenderness. Intake and output:    I/O last 3 completed shifts:  In: -   Out: 960 [Urine:900; Drains:60]    Lab Data:   All available labs and old records have been reviewed by me. CBC:  Recent Labs     02/20/23  0620 02/21/23  1031   WBC 36.6* 20.7*   RBC 3.02* 2.84*   HGB 8.3* 7.9*   HCT 26.7* 25.2*    234   MCV 88.3 88.7   MCH 27.3 27.7   MCHC 30.9* 31.2   RDW 17.3* 17.8*   BANDSPCT 9* 6          BMP:  Recent Labs     02/20/23  0620 02/21/23  0634 02/22/23  0630    134* 138   K 4.4 4.3 4.4    97* 99   CO2 37* 35* 35*   BUN 30* 39* 49*   CREATININE 1.1 1.2 1.2   CALCIUM 8.6 8.4 8.7   GLUCOSE 149* 123* 120*          Hepatic Function Panel:   Lab Results   Component Value Date/Time    ALKPHOS 96 02/20/2023 06:20 AM    ALT <5 02/20/2023 06:20 AM    AST 15 02/20/2023 06:20 AM    PROT 6.0 02/20/2023 06:20 AM    BILITOT 0.5 02/20/2023 06:20 AM    BILIDIR 1.0 09/13/2022 05:56 PM    IBILI 0.9 09/13/2022 05:56 PM    LABALBU 2.1 02/20/2023 06:20 AM       CPK: No results found for: CKTOTAL  ESR:   Lab Results   Component Value Date    SEDRATE 34 (H) 02/14/2023     CRP:   Lab Results   Component Value Date    .4 (H) 02/14/2023           Imaging: All pertinent images and reports for the current visit were reviewed by me during this visit. I reviewed the chest x-ray/CT scan/MRI images and independently interpreted the findings and results today. CT ABDOMEN WO CONTRAST Additional Contrast? Oral   Final Result   1. Interval placement of percutaneous drain in the fluid collection in the   epigastric region. The collection measures slightly larger measuring as much   as 11.3 cm versus 10.5 cm previously. 2. Prior gastric bypass with a hiatal hernia containing the gastric pouch.    3. Persistent small left pleural effusion with adjacent left lower lobe consolidation that could represent atelectasis or pneumonia. Ground-glass   opacities in the lungs appear improved. XR ABDOMEN (KUB) (SINGLE AP VIEW)   Final Result   1. Nonobstructive bowel gas pattern. 2.  Moderate stool burden in the proximal colon and rectum. XR CHEST PORTABLE   Final Result   1. Interval right PICC line removal.      2.  New left PICC line terminates at the innominate/SVC junction. CT ABSCESS DRAINAGE W CATH PLACEMENT S&I   Final Result   Successful CT-guided percutaneous drainage catheter placement into a   perigastric collection via a left anterolateral abdominal approach. Patient currently has a 14 Tamazight locking pigtail drainage catheter within   the collection, placed to bulb suction. CT GUIDED NEEDLE PLACEMENT   Final Result   Successful CT-guided percutaneous drainage catheter placement into a   perigastric collection via a left anterolateral abdominal approach. Patient currently has a 14 Tamazight locking pigtail drainage catheter within   the collection, placed to bulb suction. XR CHEST PORTABLE   Final Result   1. Right upper extremity PICC extends superiorly into the right internal   jugular vein; tip is excluded from the field of view. Recommend   repositioning. 2.  Bilateral patchy airspace disease, improved from prior. Discussed with Dr. Trena Corona by Dr. Veronica Galicia at 4:55 am on 2/15/2023         CT ABDOMEN WO CONTRAST Additional Contrast? Oral   Final Result   1. 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  Findings compatible with a leak likely a marginal ulcer with   a developing 10 x 10 x 5 cm abscess centered between the Angel limb and   gastric remnant. Adjacent inflammatory change and left upper quadrant edema   has progressed from 02/07/2023. Trace amount of enteric contrast leak. A   couple punctate foci of free air noted in the anterior abdomen.    Findings were discussed with Dr. Justino Jain at 5:45 pm on 2/13/2023. CT ABDOMEN WO CONTRAST Additional Contrast? None   Final Result   Postsurgical change from gastric bypass. There is fat stranding surrounding   the afferent loop. There is fluid and gas seen in the upper abdomen, near   the anastomotic site, that is not all definitively intraluminal, with   surrounding fat stranding, raising the question of perforation. Increased pleural-parenchymal disease at the left lung base      The findings were sent to the Radiology Results [address] at 1:17   pm on 2/13/2023 to be communicated to a licensed caregiver. CT CHEST ABDOMEN PELVIS WO CONTRAST Additional Contrast? None   Final Result   1. Scattered ground-glass opacities with interlobular septal thickening. Findings may be related to pulmonary edema with other considerations   including an inflammatory/infectious process in the appropriate clinical   setting. 2. Right upper lobe consolidation. Additional bilateral scattered curvilinear   opacities may be related to atelectasis versus developing consolidation. 3. Enlarged pulmonary artery diameter. Finding may be related to pulmonary   arterial hypertension. 4. Coronary artery calcifications present. 5. Moderate hiatal hernia. 6. Questionable circumferential thickening of the descending colon with mild   surrounding fat stranding versus colonic underdistention. Finding raises the   possibility of colitis in the appropriate clinical setting. 7. Colonic diverticulosis, predominately sigmoid. No evidence of acute   diverticulitis. 8. Nonobstructing punctate left nephrolithiasis. RECOMMENDATIONS:   2 cm left adrenal mass, consistent with lipid-rich benign adenoma. No   follow-up imaging is recommended. JACR 2017 Aug; 14(8):1038-44, JCAT 2016 Carmelo Estrada; 40(2):194-200, Urol J 2006   [place]; 3(2):71-4. XR CHEST PORTABLE   Final Result   1.  Diffuse hazy multifocal opacity throughout both lungs, right worse than   left, likely a combination of moderate pulmonary edema and multifocal   pneumonia. 2. Stable cardiomegaly. 3. Stable hiatal hernia. Medications: All current and past medications were reviewed.      meropenem  1,000 mg IntraVENous q8h    [Held by provider] enoxaparin  40 mg SubCUTAneous Daily    sodium chloride flush  5-40 mL IntraVENous 2 times per day    metoprolol  2.5 mg IntraVENous Q6H    pantoprazole  40 mg IntraVENous BID    [Held by provider] furosemide  20 mg IntraVENous Daily    anidulafungin  100 mg IntraVENous Q24H    insulin lispro  0-4 Units SubCUTAneous Q6H    fat emulsion  250 mL IntraVENous Once per day on Mon Thu    amiodarone  200 mg Oral Daily        PN-Adult Premix 5/20 - Standard Electrolytes - Central Line      PN-Adult Premix 5/20 - Standard Electrolytes - Central Line 83 mL/hr at 02/21/23 1855    sodium chloride      dextrose         simethicone, diatrizoate meglumine-sodium, sodium chloride flush, sodium chloride, metoprolol, dextrose bolus **OR** dextrose bolus, glucagon (rDNA), dextrose, ipratropium-albuterol, diphenhydrAMINE, ondansetron, albuterol sulfate HFA, traMADol, polyethylene glycol, acetaminophen **OR** acetaminophen      Problem list:       Patient Active Problem List   Diagnosis Code    Atrial fibrillation, persistent (HCC) I48.19    Primary hypertension I10    Severe episode of recurrent major depressive disorder, without psychotic features ([facility] F33.2    Seasonal affective disorder ([facility] F33.8    Class 2 severe obesity due to excess calories with serious comorbidity and body mass index (BMI) of 39.0 to 39.9 in adult ([facility] E66.01, Z68.39    Elevated sed rate R70.0    Neutrophilia D72.9    Acute kidney injury ([facility] N17.9    Elevated C-reactive protein (CRP) R79.82    GIANNA (obstructive sleep apnea) G47.33    Morbid obesity (HCC) E66.01    Weight loss counseling, encounter for Z71.3    Atypical atrial flutter ([facility] I48.4    Atrial fibrillation (HCC) I48.91    ILD (interstitial lung disease) ([facility]) J84.9    Iron deficiency anemia due to chronic blood loss D50.0    Iron deficiency anemia, unspecified D50.9    Age-related osteoporosis without current pathological fracture M81.0    Vasovagal syncope R55    CKD (chronic kidney disease) stage 4, GFR 15-29 ml/min (HCC) N18.4    Acute on chronic diastolic heart failure (HCC) I50.33    Stage 3b chronic kidney disease (HCC) N18.32    B12 deficiency E53.8    Vitamin D deficiency E55.9    Acute respiratory failure ([facility]) J96.00    Chronic obstructive pulmonary disease, unspecified J44.9    Sepsis ([facility]) A41.9    Cellulitis of right leg L03.115    Obesity (BMI 30-39.9) E66.9    Stage 3 chronic kidney disease (HCC) N18.30    Bacteremia R78.81    Fever and chills R50.9    History of ankle surgery Z98.890    Streptococcal infection group G B95.4    Bacterial pneumonia J15.9    JUSTINA (acute kidney injury) ([facility]) N17.9    Acute on chronic heart failure with preserved ejection fraction (HFpEF) ([facility]) I50.33    Right upper lobe consolidation (HCC) J18.1    Allergic drug reaction T78.40XA    Elevated d-dimer R79.89    Left nephrolithiasis N20.0    Diverticulosis K57.90    History of cholecystectomy Z90.49    Intra-abdominal abscess ([facility]) K65.1    Bandemia D72.825    Atypical pneumonia J18.9    Multifocal pneumonia J18.9    Hematemesis K92.0    Chronic atrial fibrillation ([facility]) I48.20       Please note that this chart was generated using Dragon dictation software. Although every effort was made to ensure the accuracy of this automated transcription, some errors in transcription may have occurred inadvertently. If you may need any clarification, please do not hesitate to contact me through EPIC or at the phone number provided below with my electronic signature.  Any pictures or media included in this note were obtained after taking informed verbal consent from the patient and with their approval to include  those in the patient's medical record. Fatoumata Graham MD, MPH, Kwaku Svetlana  2/22/2023, 3:13 PM  [facility] Infectious Disease   [address] [place] [address], [address] [place] [address]  Office: [phone]  Fax: [phone]  In-person Clinic days:  Tuesday & Thursday a.m. Virtual clinic days: Monday, Wednesday & Friday a.m.

## 2023-02-22 NOTE — PROGRESS NOTES
02/22/23 1156   RT Protocol   History Pulmonary Disease 0   Respiratory pattern 0   Breath sounds 2   Cough 0   Bronchodilator Assessment Score 2

## 2023-02-23 VITALS
HEART RATE: 75 BPM | DIASTOLIC BLOOD PRESSURE: 84 MMHG | OXYGEN SATURATION: 99 % | WEIGHT: 222.3 LBS | SYSTOLIC BLOOD PRESSURE: 152 MMHG | BODY MASS INDEX: 34.89 KG/M2 | TEMPERATURE: 97.7 F | RESPIRATION RATE: 17 BRPM | HEIGHT: 67 IN

## 2023-02-23 LAB
ANION GAP SERPL CALCULATED.3IONS-SCNC: 1 MMOL/L (ref 3–16)
BUN BLDV-MCNC: 42 MG/DL (ref 7–20)
CALCIUM SERPL-MCNC: 8.9 MG/DL (ref 8.3–10.6)
CHLORIDE BLD-SCNC: 101 MMOL/L (ref 99–110)
CO2: 37 MMOL/L (ref 21–32)
CREAT SERPL-MCNC: 1 MG/DL (ref 0.6–1.2)
FUNGUS (MYCOLOGY) CULTURE: ABNORMAL
FUNGUS STAIN: ABNORMAL
GFR SERPL CREATININE-BSD FRML MDRD: 59 ML/MIN/{1.73_M2}
GLUCOSE BLD-MCNC: 116 MG/DL (ref 70–99)
GLUCOSE BLD-MCNC: 123 MG/DL (ref 70–99)
GLUCOSE BLD-MCNC: 129 MG/DL (ref 70–99)
MAGNESIUM: 2.3 MG/DL (ref 1.8–2.4)
ORGANISM: ABNORMAL
PERFORMED ON: ABNORMAL
PERFORMED ON: ABNORMAL
PHOSPHORUS: 3.5 MG/DL (ref 2.5–4.9)
POTASSIUM SERPL-SCNC: 4.5 MMOL/L (ref 3.5–5.1)
SODIUM BLD-SCNC: 139 MMOL/L (ref 136–145)

## 2023-02-23 PROCEDURE — C9113 INJ PANTOPRAZOLE SODIUM, VIA: HCPCS | Performed by: INTERNAL MEDICINE

## 2023-02-23 PROCEDURE — 2580000003 HC RX 258: Performed by: INTERNAL MEDICINE

## 2023-02-23 PROCEDURE — 2500000003 HC RX 250 WO HCPCS: Performed by: INTERNAL MEDICINE

## 2023-02-23 PROCEDURE — 6360000002 HC RX W HCPCS: Performed by: INTERNAL MEDICINE

## 2023-02-23 PROCEDURE — 83735 ASSAY OF MAGNESIUM: CPT

## 2023-02-23 PROCEDURE — 84100 ASSAY OF PHOSPHORUS: CPT

## 2023-02-23 PROCEDURE — 80048 BASIC METABOLIC PNL TOTAL CA: CPT

## 2023-02-23 PROCEDURE — 6370000000 HC RX 637 (ALT 250 FOR IP): Performed by: HOSPITALIST

## 2023-02-23 PROCEDURE — 2580000003 HC RX 258: Performed by: NURSE PRACTITIONER

## 2023-02-23 RX ADMIN — MEROPENEM 1000 MG: 1 INJECTION, POWDER, FOR SOLUTION INTRAVENOUS at 00:53

## 2023-02-23 RX ADMIN — AMIODARONE HYDROCHLORIDE 200 MG: 200 TABLET ORAL at 09:33

## 2023-02-23 RX ADMIN — PANTOPRAZOLE SODIUM 40 MG: 40 INJECTION, POWDER, LYOPHILIZED, FOR SOLUTION INTRAVENOUS at 09:33

## 2023-02-23 RX ADMIN — MEROPENEM 1000 MG: 1 INJECTION, POWDER, FOR SOLUTION INTRAVENOUS at 09:41

## 2023-02-23 RX ADMIN — Medication 10 ML: at 11:55

## 2023-02-23 RX ADMIN — METOPROLOL TARTRATE 2.5 MG: 1 INJECTION, SOLUTION INTRAVENOUS at 00:50

## 2023-02-23 RX ADMIN — METOPROLOL TARTRATE 2.5 MG: 1 INJECTION, SOLUTION INTRAVENOUS at 05:08

## 2023-02-23 NOTE — PLAN OF CARE
Problem: Skin/Tissue Integrity  Goal: Absence of new skin breakdown  Description: 1. Monitor for areas of redness and/or skin breakdown  2. Assess vascular access sites hourly  3. Every 4-6 hours minimum:  Change oxygen saturation probe site  4. Every 4-6 hours:  If on nasal continuous positive airway pressure, respiratory therapy assess nares and determine need for appliance change or resting period.   2/23/2023 1152 by Christian Devine RN  Outcome: Progressing  2/23/2023 0453 by Jack Chow RN  Outcome: Progressing     Problem: Safety - Adult  Goal: Free from fall injury  2/23/2023 1152 by Christian Devine RN  Outcome: Progressing  2/23/2023 0453 by Jack Chow RN  Outcome: Progressing     Problem: Pain  Goal: Verbalizes/displays adequate comfort level or baseline comfort level  2/23/2023 1152 by Christian Devine RN  Outcome: Progressing  2/23/2023 0453 by Jack Chow RN  Outcome: Progressing     Problem: ABCDS Injury Assessment  Goal: Absence of physical injury  2/23/2023 1152 by Christian Devine RN  Outcome: Progressing  2/23/2023 0453 by Jack Chow RN  Outcome: Progressing     Problem: Metabolic/Fluid and Electrolytes - Adult  Goal: Electrolytes maintained within normal limits  2/23/2023 1152 by Christian Devine RN  Outcome: Progressing  2/23/2023 0453 by Jack Chow RN  Outcome: Progressing  Goal: Hemodynamic stability and optimal renal function maintained  2/23/2023 1152 by Christian Devine RN  Outcome: Progressing  2/23/2023 0453 by Jack Chow RN  Outcome: Progressing     Problem: Chronic Conditions and Co-morbidities  Goal: Patient's chronic conditions and co-morbidity symptoms are monitored and maintained or improved  2/23/2023 1152 by Christian Devine RN  Outcome: Progressing  2/23/2023 0453 by Jack Chow RN  Outcome: Progressing     Problem: Nutrition Deficit:  Goal: Optimize nutritional status  2/23/2023 1152 by Christian Devine RN  Outcome: Progressing  2/23/2023 0453 by Albert Brooks John Hewitt, RN  Outcome: Progressing

## 2023-02-23 NOTE — PROGRESS NOTES
.Assessment complete. VS obtained. Patient resting in bed. Respirations even and easy. Call light in reach. Fall precautions in place. No needs expressed at this time. Will continue to monitor.

## 2023-02-23 NOTE — PROGRESS NOTES
Clinical Pharmacy Note    Pharmacy consulted by Dr. Misha Ortega to manage TPN    Current TPN rate: 83 ml/hr  Goal TPN rate: 83 ml/hr  Access: PICC    Labs:  General Labs:  BMP:    Lab Results   Component Value Date/Time     02/23/2023 05:20 AM    K 4.5 02/23/2023 05:20 AM    K 4.4 02/07/2023 03:39 AM     02/23/2023 05:20 AM    CO2 37 02/23/2023 05:20 AM    BUN 42 02/23/2023 05:20 AM    LABALBU 2.1 02/20/2023 06:20 AM    CREATININE 1.0 02/23/2023 05:20 AM    CALCIUM 8.9 02/23/2023 05:20 AM    GFRAA 38 09/20/2022 04:14 AM    GFRAA 45 03/27/2013 01:36 PM    LABGLOM 59 02/23/2023 05:20 AM    GLUCOSE 116 02/23/2023 05:20 AM     Magnesium:    Lab Results   Component Value Date/Time    MG 2.30 02/23/2023 05:20 AM     Phosphorus:    Lab Results   Component Value Date/Time    PHOS 3.5 02/23/2023 05:20 AM       Electrolyte replacement as follows: No replacement needed today    Blood sugars over past 24 hours: 109-132    Blood sugar management:  Plan to Continue Humalog q6 hour sliding scale    Plan to continue TPN at 83 ml/hr. Thank you for allowing pharmacy to participate in the care of this patient.     Winter Cantrell, PharmD, BCPS  [id number]  2/23/2023 9:41 AM

## 2023-02-23 NOTE — PROGRESS NOTES
RN discharge summary from  [facility] to a facility. This patient has had a discharge order placed. They are being discharged to [facility] and being picked up by transport. Discharge paperwork has been printed and faxed by CHOLO FRANCOIS completed by this RN. no further needs at this time. Telemetry has been removed. Pt has all belongings present. Report has been called to RN and all questions have been answered.

## 2023-02-23 NOTE — PROGRESS NOTES
Shift assessment completed. Routine vitals stable. Scheduled medications given. Patient is awake, alert and oriented. Respirations are easy and unlabored. Patient does not appear to be in distress, resting comfortably at this time. Call light within reach. Transfer center called and inform RN that there is no bed available for pt.

## 2023-02-23 NOTE — PROGRESS NOTES
Transfer Center called with an available bed at [facility]. Patient will be going to [facility] in room 1038. Transfer Center to call back with transport time. Phone number to call report: [phone].

## 2023-02-23 NOTE — PROGRESS NOTES
Office : [phone]     Fax :[phone]       Nephrology progress  Note      Patient's Name: Janny Peralta    2/23/2023          Chief Complaint:    Chief Complaint   Patient presents with    Abdominal Pain     Pt via EMS from home, c/o LUQ pain 10/10, has had gallbladder removed. Pt states pain is making her sob, 89% ora, put on 2L, pt received 325 mg aspirin, has been treated for cellulitis since December, new antibiotic last two weeks, states she has been getting hives and itching        History of Present Ilness:    Janny Peralta is a 78 y.o. female who presented with complaints of shortness of breath. Patient apparently has been treated for cellulitis lower extremity on antibiotics patient started having worsening itching and hives. Came to the ER. Patient with increased shortness of breath. Also with leg swelling. No reported fevers chills. Patient was found to be hypoxic was placed on 2 L nasal cannula. Patient with history of chronic CHF COPD chronic kidney disease admitted with bilateral worse. Baseline creat is 1.2   Now elevated at 2.0     BNP 04296      Interval hx     C/o abdominal pain     No SOB     Good UOP  Creat improved .  Now at baseline         On TPN     I/O last 3 completed shifts:  In: -   Out: 130 [Drains:130]    Past Medical History:   Diagnosis Date    Arthritis     Atrial fibrillation and flutter ([address]     Hypertension     Kidney disease     Pt states  \"stage 3\"    Pneumonia     Sleep apnea     no c-pap       Past Surgical History:   Procedure Laterality Date    CARDIOVERSION      COLONOSCOPY N/A 11/20/2018    COLONOSCOPY POLYPECTOMY SNARE/COLD BIOPSY performed by Lana Bautista MD at [facility] N/A 03/31/2022    COLONOSCOPY POLYPECTOMY SNARE/COLD BIOPSY performed by Shikha Martinez MD at [facility]      ankle rt has pins and rods, and rt elbow    GASTRIC BYPASS SURGERY      LARYNX SURGERY      TOTAL KNEE ARTHROPLASTY Bilateral 12/19/2012    bilateral knee replacements    TUBAL LIGATION      tubes tied    UPPER GASTROINTESTINAL ENDOSCOPY N/A 11/20/2018    EGD BIOPSY performed by Nunu Hameed MD at [facility] 03/31/2022    EGD DILATION BALLOON performed by Shikha Martinez MD at [facility] ENDOSCOPY N/A 03/31/2022    EGD BIOPSY performed by Shikha Martinez MD at [facility]       Family History   Problem Relation Age of Onset    Other Mother         blood clot    Cancer Father         stomach cancer    Stroke Brother          Current Medications:    PN-Adult Premix 5/20 - Standard Electrolytes - Central Line, Continuous TPN  meropenem (MERREM) 1,000 mg in sodium chloride 0.9 % 100 mL IVPB (mini-bag), q8h  PN-Adult Premix 5/20 - Standard Electrolytes - Central Line, Continuous TPN  simethicone (MYLICON) chewable tablet 80 mg, Q6H PRN  diatrizoate meglumine-sodium (GASTROGRAFIN) 66-10 % solution 20 mL, ONCE PRN  [Held by provider] enoxaparin (LOVENOX) injection 40 mg, Daily  sodium chloride flush 0.9 % injection 5-40 mL, 2 times per day  sodium chloride flush 0.9 % injection 5-40 mL, PRN  0.9 % sodium chloride infusion, PRN  metoprolol (LOPRESSOR) injection 2.5 mg, Q6H  pantoprazole (PROTONIX) injection 40 mg, BID  [Held by provider] furosemide (LASIX) injection 20 mg, Daily  metoprolol (LOPRESSOR) injection 2.5 mg, Q6H PRN  anidulafungin (ERAXIS) 100 mg in sodium chloride 0.9 % 130 mL IVPB, Q24H  dextrose bolus 10% 125 mL, PRN   Or  dextrose bolus 10% 250 mL, PRN  glucagon (rDNA) injection 1 mg, PRN  dextrose 10 % infusion, Continuous PRN  insulin lispro (HUMALOG) injection vial 0-4 Units, Q6H  fat emulsion 20 % infusion 250 mL, Once per day on Mon Thu  ipratropium-albuterol (DUONEB) nebulizer solution 1 ampule, Q4H PRN  diphenhydrAMINE (BENADRYL) tablet 50 mg, Q6H PRN  ondansetron (ZOFRAN) injection 4 mg, Q6H PRN  albuterol sulfate HFA (PROVENTIL;VENTOLIN;PROAIR) 108 (90 Base) MCG/ACT inhaler 2 puff, Q4H PRN  amiodarone (CORDARONE) tablet 200 mg, Daily  traMADol (ULTRAM) tablet 50 mg, Q6H PRN  polyethylene glycol (GLYCOLAX) packet 17 g, Daily PRN  acetaminophen (TYLENOL) tablet 650 mg, Q6H PRN   Or  acetaminophen (TYLENOL) suppository 650 mg, Q6H PRN        Physical exam:     Vitals:  BP (!) 152/84   Pulse 75   Temp 97.7 °F (36.5 °C) (Oral)   Resp 17   Ht 5' 7\" (1.702 m)   Wt 222 lb 4.8 oz (100.8 kg)   SpO2 99%   BMI 34.82 kg/m²   Constitutional:  OAA X3 NAD  Skin: no rash, turgor wnl  Heent:  eomi, mmm  Neck: no bruits or jvd noted  Cardiovascular:  S1, S2 without m/r/g  Respiratory: CTA B without w/r/r  Abdomen:  +bs, soft, nt, nd  Ext: 1 +  lower extremity edema  Psychiatric: mood and affect appropriate  Musculoskeletal:  Rom, muscular strength intact    Labs:  CBC:   Recent Labs     02/21/23  1031   WBC 20.7*   HGB 7.9*        BMP:    Recent Labs     02/21/23  0634 02/22/23  0630 02/23/23  0520   * 138 139   K 4.3 4.4 4.5   CL 97* 99 101   CO2 35* 35* 37*   BUN 39* 49* 42*   CREATININE 1.2 1.2 1.0   GLUCOSE 123* 120* 116*     Ca/Mg/Phos:   Recent Labs     02/21/23  0634 02/22/23  0630 02/23/23  0520   CALCIUM 8.4 8.7 8.9   MG 1.90 2.20 2.30   PHOS 3.3 4.1 3.5     Hepatic:   No results for input(s): AST, ALT, ALB, BILITOT, ALKPHOS in the last 72 hours. IMAGING:  CT ABDOMEN WO CONTRAST Additional Contrast? Oral   Final Result   1. Interval placement of percutaneous drain in the fluid collection in the   epigastric region. The collection measures slightly larger measuring as much   as 11.3 cm versus 10.5 cm previously. 2. Prior gastric bypass with a hiatal hernia containing the gastric pouch.    3. Persistent small left pleural effusion with adjacent left lower lobe   consolidation that could represent atelectasis or pneumonia. Ground-glass   opacities in the lungs appear improved. XR ABDOMEN (KUB) (SINGLE AP VIEW)   Final Result   1. Nonobstructive bowel gas pattern. 2.  Moderate stool burden in the proximal colon and rectum. XR CHEST PORTABLE   Final Result   1. Interval right PICC line removal.      2.  New left PICC line terminates at the innominate/SVC junction. CT ABSCESS DRAINAGE W CATH PLACEMENT S&I   Final Result   Successful CT-guided percutaneous drainage catheter placement into a   perigastric collection via a left anterolateral abdominal approach. Patient currently has a 14 Chinese locking pigtail drainage catheter within   the collection, placed to bulb suction. CT GUIDED NEEDLE PLACEMENT   Final Result   Successful CT-guided percutaneous drainage catheter placement into a   perigastric collection via a left anterolateral abdominal approach. Patient currently has a 14 Chinese locking pigtail drainage catheter within   the collection, placed to bulb suction. XR CHEST PORTABLE   Final Result   1. Right upper extremity PICC extends superiorly into the right internal   jugular vein; tip is excluded from the field of view. Recommend   repositioning. 2.  Bilateral patchy airspace disease, improved from prior. Discussed with Dr. Mary Galaviz by Dr. Pires at 4:55 am on 2/15/2023         CT ABDOMEN WO CONTRAST Additional Contrast? Oral   Final Result   1. 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  Findings compatible with a leak likely a marginal ulcer with   a developing 10 x 10 x 5 cm abscess centered between the Angel limb and   gastric remnant. Adjacent inflammatory change and left upper quadrant edema   has progressed from 02/07/2023. Trace amount of enteric contrast leak.   A   couple punctate foci of free air noted in the anterior abdomen. Findings were discussed with Dr. Destiny Akers at 5:45 pm on 2/13/2023. CT ABDOMEN WO CONTRAST Additional Contrast? None   Final Result   Postsurgical change from gastric bypass. There is fat stranding surrounding   the afferent loop. There is fluid and gas seen in the upper abdomen, near   the anastomotic site, that is not all definitively intraluminal, with   surrounding fat stranding, raising the question of perforation. Increased pleural-parenchymal disease at the left lung base      The findings were sent to the Radiology Results [address] at 1:17   pm on 2/13/2023 to be communicated to a licensed caregiver. CT CHEST ABDOMEN PELVIS WO CONTRAST Additional Contrast? None   Final Result   1. Scattered ground-glass opacities with interlobular septal thickening. Findings may be related to pulmonary edema with other considerations   including an inflammatory/infectious process in the appropriate clinical   setting. 2. Right upper lobe consolidation. Additional bilateral scattered curvilinear   opacities may be related to atelectasis versus developing consolidation. 3. Enlarged pulmonary artery diameter. Finding may be related to pulmonary   arterial hypertension. 4. Coronary artery calcifications present. 5. Moderate hiatal hernia. 6. Questionable circumferential thickening of the descending colon with mild   surrounding fat stranding versus colonic underdistention. Finding raises the   possibility of colitis in the appropriate clinical setting. 7. Colonic diverticulosis, predominately sigmoid. No evidence of acute   diverticulitis. 8. Nonobstructing punctate left nephrolithiasis. RECOMMENDATIONS:   2 cm left adrenal mass, consistent with lipid-rich benign adenoma. No   follow-up imaging is recommended. JACR 2017 Aug; 14(8):1038-44, JCAT 2016 Cristal Kruse; 40(2):194-200, Urol J 2006   Spring; 3(2):71-4. XR CHEST PORTABLE   Final Result   1. Diffuse hazy multifocal opacity throughout both lungs, right worse than   left, likely a combination of moderate pulmonary edema and multifocal   pneumonia. 2. Stable cardiomegaly. 3. Stable hiatal hernia. Assessment/Plan :      1. Panda   Creatinine  improved     Hold lasix         2. HTN. BP low borderline range   Hold clonidine if SBP < 110 mm HG     3. COPD    4. H/o atrial fib   Monitor       5. Hyperkalemia   Resolved after getting lokelma     6. Abdominal  pain .  CT scan shows there has been interval development of a poorly  marginated approximately 10 x 10 x 5 cm fluid collection with foci of gas and  small amounts of enteric contrast compatible with a developing abscess  Surgery on board    S/p IR drain  Worsening WBC   Sepsis - on meropenem and anidulafungin     Continue TPN       Plan to transfer to [facility] state    D/w primary team      Thank you for allowing us to participate in care of [facility]         Electronically signed by: Adela Escobedo MD, 2/23/2023, 10:57 AM      [facility] [address]  Office : [phone]  Fax :[phone]

## 2023-02-24 NOTE — DISCHARGE SUMMARY
[facility] DISCHARGE SUMMARY    Patient Demographics    Patient. Daniella Uriarte  Date of Birth. 1945  MRN. [id number]     Primary care provider. MARILEE Cunningham CNP  (Tel: [phone])    Admit date: 2/7/2023    Discharge date (blank if same as Note Date): 2/23/2023  Note Date: 2/24/2023     Reason for Hospitalization. Chief Complaint   Patient presents with    Abdominal Pain     Pt via EMS from home, c/o LUQ pain 10/10, has had gallbladder removed. Pt states pain is making her sob, 89% ora, put on 2L, pt received 325 mg aspirin, has been treated for cellulitis since December, new antibiotic last two weeks, states she has been getting hives and itching           Problem-based Hospital Course. significant improvement. However on the day of discharge she was noticed to have hematemesis for which STAT CT done showed findings compatible with endoleak likely a marginal ulcer with a developing 10 x 10 x 5 cm abscess centered between the Angel limb and gastric remnant. 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 CT: Endoleak with Intra-abdominal abscess, measuring 10 x 10 x 5 cm between the gastric remnant and Angel-en-Y limb. IR consulted: s/p perc drain placement on 2/15/2022. Abdominal fluid culture positive for Candida. Blood cultures remain negative to date. Persistent leukocytosis is down to 20 today  ID consulted: Currently on meropenem and anidulafungin. Bariatric surgeon consulted: Contained marginal ulcer perforation. No surgical intervention at this time. Continue STRICT NPO and TPN. Patient will be discharged on TPN. Repeat CT scan noted with increased size and abscess.   Discussed with surgery team  I discussed case over [address] [place] [address] we are transferring patient to [address] [place] [address] has been accepted. Patient was transferred to [facility] for further treatment     Acute hypoxic respiratory failure: Improved  Likely due to CHF exacerbation, sepsis and underlying COPD. Currently on 2 L nasal cannula. Anemia: Multifactorial secondary to iron deficiency and anemia of chronic disease  Work-up consistent with MIRIAM/AoCD. Started on IV Venofer. Monitor H&H and transfuse as needed. Hemoglobin remained stable        Hematemesis: resolved. Continue Protonix IV twice daily for marginal ulcers. Afib with RVR:   Unable to resume amiodarone due to strict n.p.o. orders. Metoprolol changed to IV every 6h. Seen by cardiology  At this point given underlying GI pathology with increased risk of bleeding they recommended holding off on anticoagulation however once this issues resolve and if the risk of bleeding goes down then she will need to be considered to reanticoagulate given her high [id number] score        Acute on chronic HFpEF:  Echo 12/21/2022: LVEF 55 to 60%. Cardiology consulted: Currently compensated with IV diuresis. Continue IV Lasix 20 mg daily until able to take oral.     COPD without acute exacerbation: Continue inhalers. JUSTINA: Resolved. Peak creatinine 2.8. Seen by nephrology  Creatinine down to 1    Consults. IP CONSULT TO NEPHROLOGY  IP CONSULT TO CARDIOLOGY  IP CONSULT TO HEART FAILURE NURSE/COORDINATOR  IP CONSULT TO HOME CARE NEEDS  IP CONSULT TO GI  IP CONSULT TO GENERAL SURGERY  IP CONSULT TO INFECTIOUS DISEASES  IP CONSULT TO PHARMACY  IP CONSULT TO INTERVENTIONAL RADIOLOGY  IP CONSULT TO CARDIOLOGY    Physical examination on discharge day. BP (!) 152/84   Pulse 75   Temp 97.7 °F (36.5 °C) (Oral)   Resp 17   Ht 5' 7\" (1.702 m)   Wt 222 lb 4.8 oz (100.8 kg)   SpO2 99%   BMI 34.82 kg/m²   General appearance. Alert. Looks comfortable. HEENT. Sclera clear. Moist mucus membranes. Cardiovascular. Regular rate and rhythm, normal S1, S2. No murmur. Respiratory. Not using accessory muscles. Clear to auscultation bilaterally, no wheeze. Gastrointestinal. Abdomen soft, non-tender, not distended, normal bowel sounds  Neurology. Facial symmetry. No speech deficits. Moving all extremities equally. Extremities. No edema in lower extremities. Skin. Warm, dry, normal turgor    Condition at time of discharge stable     Medication instructions provided to patient at discharge. Medication List        CHANGE how you take these medications      Metoprolol Tartrate 75 MG Tabs  Take 75 mg by mouth 2 times daily  What changed:   medication strength  how much to take            CONTINUE taking these medications      albuterol sulfate  (90 Base) MCG/ACT inhaler  Commonly known as: Proventil HFA  Inhale 2 puffs into the lungs every 4 hours as needed for Wheezing     alendronate 70 MG tablet  Commonly known as: FOSAMAX  Take 1 tablet by mouth every 7 days     amiodarone 200 MG tablet  Commonly known as: CORDARONE  200mg bid x 8 days then 200mg daily     apixaban 5 MG Tabs tablet  Commonly known as: Eliquis  Take 1 tablet by mouth 2 times daily     aspirin 81 MG tablet     dilTIAZem 240 MG extended release capsule  Commonly known as: DILACOR XR     traMADol 50 MG tablet  Commonly known as: ULTRAM     vitamin B-12 100 MCG tablet  Commonly known as: CYANOCOBALAMIN  Take 1 tablet by mouth in the morning.      Vitamin D3 1.25 MG (97808 UT) Caps  Take 1 capsule by mouth every 7 days            STOP taking these medications      buPROPion 200 MG extended release tablet  Commonly known as: WELLBUTRIN SR     cloNIDine 0.2 MG tablet  Commonly known as: CATAPRES     ferrous sulfate 325 (65 Fe) MG tablet  Commonly known as: IRON 325     pantoprazole 40 MG tablet  Commonly known as: PROTONIX            ASK your doctor about these medications      furosemide 20 MG tablet  Commonly known as: Lasix  Take 1 tablet by mouth daily               Where to Get Your Medications These medications were sent to [address] [place] [address]  [address] [facility] [address]      Phone: [phone]   Metoprolol Tartrate 75 MG Tabs         Discharge recommendations given to patient. Follow Up. pcp in 1 week   Disposition. Activity. activity as tolerated  Diet: No diet orders on file      Spent 45  minutes in discharge process.     Signed:  Betsey Walker MD     2/24/2023 2:33 PM

## 2023-02-27 RX ORDER — SODIUM CHLORIDE 0.9 % (FLUSH) 0.9 %
5-40 SYRINGE (ML) INJECTION PRN
OUTPATIENT
Start: 2023-02-27

## 2023-03-03 NOTE — ED NOTES
Pt spo2 81% RA upon arrival. Pt spo2 increased to 94% on 3L NC.      Jesse Chan RN  09/13/22 5963
I want to feel better 
I want to feel better

## 2023-03-09 ENCOUNTER — HOSPITAL ENCOUNTER (OUTPATIENT)
Dept: CARDIAC CATH/INVASIVE PROCEDURES | Age: 78
Discharge: HOME | End: 2023-03-09

## 2023-03-20 LAB
FUNGUS SPEC CULT: ABNORMAL
LOEFFLER MB STN SPEC: ABNORMAL
ORGANISM: ABNORMAL

## 2023-04-04 ENCOUNTER — TELEPHONE (OUTPATIENT)
Dept: INTERNAL MEDICINE CLINIC | Age: 78
End: 2023-04-04

## 2023-04-04 NOTE — TELEPHONE ENCOUNTER
Abelino Sheppard from [facility] is calling---pt legs are swelling --legs are very huge-pt sure she has put on weight but has no scale---she is currently on 20 mg of lasix but not really helping asking if they can increase it---she would also like to get an order for PT for the pt to help with her legs get her up and moving---please call Abelino Sheppard at [phone]. Thanks.

## 2023-04-13 ENCOUNTER — TELEPHONE (OUTPATIENT)
Dept: INTERNAL MEDICINE CLINIC | Age: 78
End: 2023-04-13

## 2023-05-01 ENCOUNTER — TELEPHONE (OUTPATIENT)
Dept: INTERNAL MEDICINE CLINIC | Age: 78
End: 2023-05-01

## 2023-05-01 NOTE — TELEPHONE ENCOUNTER
Zander Saavedra from [facility] called in regards to Pt recently getting discharged from [facility]. Zander Saavedra states that Pts discharged wanted her to resume Homecare. Zander Saavedra states that she needs verbal orders for homecare. Please call her back at [phone].

## 2023-05-03 ENCOUNTER — TELEPHONE (OUTPATIENT)
Dept: INTERNAL MEDICINE CLINIC | Age: 78
End: 2023-05-03

## 2023-05-04 ENCOUNTER — OFFICE VISIT (OUTPATIENT)
Dept: INTERNAL MEDICINE CLINIC | Age: 78
End: 2023-05-04

## 2023-05-04 VITALS
HEIGHT: 67 IN | OXYGEN SATURATION: 93 % | SYSTOLIC BLOOD PRESSURE: 120 MMHG | HEART RATE: 80 BPM | WEIGHT: 238 LBS | BODY MASS INDEX: 37.35 KG/M2 | DIASTOLIC BLOOD PRESSURE: 60 MMHG

## 2023-05-04 DIAGNOSIS — I83.12 LIPODERMATOSCLEROSIS OF BOTH LOWER EXTREMITIES: ICD-10-CM

## 2023-05-04 DIAGNOSIS — N17.9 ACUTE KIDNEY INJURY SUPERIMPOSED ON CKD (HCC): ICD-10-CM

## 2023-05-04 DIAGNOSIS — Z09 HOSPITAL DISCHARGE FOLLOW-UP: Primary | ICD-10-CM

## 2023-05-04 DIAGNOSIS — I10 ESSENTIAL HYPERTENSION: ICD-10-CM

## 2023-05-04 DIAGNOSIS — N18.9 ACUTE KIDNEY INJURY SUPERIMPOSED ON CKD (HCC): ICD-10-CM

## 2023-05-04 DIAGNOSIS — I83.11 LIPODERMATOSCLEROSIS OF BOTH LOWER EXTREMITIES: ICD-10-CM

## 2023-05-04 DIAGNOSIS — F33.2 SEVERE EPISODE OF RECURRENT MAJOR DEPRESSIVE DISORDER, WITHOUT PSYCHOTIC FEATURES (HCC): ICD-10-CM

## 2023-05-04 DIAGNOSIS — I50.32 CHRONIC DIASTOLIC CONGESTIVE HEART FAILURE (HCC): ICD-10-CM

## 2023-05-04 DIAGNOSIS — K91.89 GASTRIC ANASTOMOTIC LEAK: ICD-10-CM

## 2023-05-04 LAB
ANION GAP SERPL CALCULATED.3IONS-SCNC: 9 MMOL/L (ref 3–16)
BUN SERPL-MCNC: 18 MG/DL (ref 7–20)
CALCIUM SERPL-MCNC: 8.6 MG/DL (ref 8.3–10.6)
CHLORIDE SERPL-SCNC: 101 MMOL/L (ref 99–110)
CO2 SERPL-SCNC: 29 MMOL/L (ref 21–32)
CREAT SERPL-MCNC: 1.2 MG/DL (ref 0.6–1.2)
GFR SERPLBLD CREATININE-BSD FMLA CKD-EPI: 47 ML/MIN/{1.73_M2}
GLUCOSE SERPL-MCNC: 133 MG/DL (ref 70–99)
POTASSIUM SERPL-SCNC: 4.1 MMOL/L (ref 3.5–5.1)
SODIUM SERPL-SCNC: 139 MMOL/L (ref 136–145)

## 2023-05-04 ASSESSMENT — PATIENT HEALTH QUESTIONNAIRE - PHQ9
SUM OF ALL RESPONSES TO PHQ QUESTIONS 1-9: 1
1. LITTLE INTEREST OR PLEASURE IN DOING THINGS: 0
3. TROUBLE FALLING OR STAYING ASLEEP: 0
4. FEELING TIRED OR HAVING LITTLE ENERGY: 0
10. IF YOU CHECKED OFF ANY PROBLEMS, HOW DIFFICULT HAVE THESE PROBLEMS MADE IT FOR YOU TO DO YOUR WORK, TAKE CARE OF THINGS AT HOME, OR GET ALONG WITH OTHER PEOPLE: 0
SUM OF ALL RESPONSES TO PHQ QUESTIONS 1-9: 1
9. THOUGHTS THAT YOU WOULD BE BETTER OFF DEAD, OR OF HURTING YOURSELF: 0
SUM OF ALL RESPONSES TO PHQ9 QUESTIONS 1 & 2: 1
7. TROUBLE CONCENTRATING ON THINGS, SUCH AS READING THE NEWSPAPER OR WATCHING TELEVISION: 0
5. POOR APPETITE OR OVEREATING: 0
SUM OF ALL RESPONSES TO PHQ QUESTIONS 1-9: 1
8. MOVING OR SPEAKING SO SLOWLY THAT OTHER PEOPLE COULD HAVE NOTICED. OR THE OPPOSITE, BEING SO FIGETY OR RESTLESS THAT YOU HAVE BEEN MOVING AROUND A LOT MORE THAN USUAL: 0
6. FEELING BAD ABOUT YOURSELF - OR THAT YOU ARE A FAILURE OR HAVE LET YOURSELF OR YOUR FAMILY DOWN: 0
2. FEELING DOWN, DEPRESSED OR HOPELESS: 1
SUM OF ALL RESPONSES TO PHQ QUESTIONS 1-9: 1

## 2023-05-09 ENCOUNTER — HOSPITAL ENCOUNTER (INPATIENT)
Age: 78
LOS: 5 days | Discharge: HOME | DRG: 871 | End: 2023-05-15
Attending: EMERGENCY MEDICINE | Admitting: INTERNAL MEDICINE
Payer: COMMERCIAL

## 2023-05-09 DIAGNOSIS — J96.01 ACUTE RESPIRATORY FAILURE WITH HYPOXIA (HCC): ICD-10-CM

## 2023-05-09 DIAGNOSIS — N17.9 AKI (ACUTE KIDNEY INJURY) (HCC): ICD-10-CM

## 2023-05-09 DIAGNOSIS — I50.9 ACUTE ON CHRONIC CONGESTIVE HEART FAILURE, UNSPECIFIED HEART FAILURE TYPE (HCC): Primary | ICD-10-CM

## 2023-05-09 PROCEDURE — 93005 ELECTROCARDIOGRAM TRACING: CPT | Performed by: EMERGENCY MEDICINE

## 2023-05-09 PROCEDURE — 99285 EMERGENCY DEPT VISIT HI MDM: CPT

## 2023-05-09 PROCEDURE — 96367 TX/PROPH/DG ADDL SEQ IV INF: CPT

## 2023-05-09 PROCEDURE — 96366 THER/PROPH/DIAG IV INF ADDON: CPT

## 2023-05-09 PROCEDURE — 96374 THER/PROPH/DIAG INJ IV PUSH: CPT

## 2023-05-09 PROCEDURE — 96365 THER/PROPH/DIAG IV INF INIT: CPT

## 2023-05-09 PROCEDURE — 96375 TX/PRO/DX INJ NEW DRUG ADDON: CPT

## 2023-05-10 ENCOUNTER — APPOINTMENT (OUTPATIENT)
Dept: CT IMAGING | Age: 78
DRG: 871 | End: 2023-05-10
Payer: COMMERCIAL

## 2023-05-10 ENCOUNTER — APPOINTMENT (OUTPATIENT)
Dept: GENERAL RADIOLOGY | Age: 78
DRG: 871 | End: 2023-05-10
Payer: COMMERCIAL

## 2023-05-10 PROBLEM — R57.0 CARDIOGENIC SHOCK (HCC): Status: ACTIVE | Noted: 2023-05-10

## 2023-05-10 PROBLEM — I48.91 ATRIAL FIBRILLATION (HCC): Chronic | Status: ACTIVE | Noted: 2022-03-30

## 2023-05-10 PROBLEM — J84.9 ILD (INTERSTITIAL LUNG DISEASE) (HCC): Chronic | Status: ACTIVE | Noted: 2022-04-05

## 2023-05-10 PROBLEM — D50.0 IRON DEFICIENCY ANEMIA DUE TO CHRONIC BLOOD LOSS: Chronic | Status: ACTIVE | Noted: 2022-04-08

## 2023-05-10 PROBLEM — R57.9 SHOCK CIRCULATORY (HCC): Status: ACTIVE | Noted: 2023-05-10

## 2023-05-10 PROBLEM — Z98.84 HISTORY OF GASTRIC BYPASS: Status: ACTIVE | Noted: 2023-05-10

## 2023-05-10 PROBLEM — I50.9 ACUTE ON CHRONIC CONGESTIVE HEART FAILURE (HCC): Status: ACTIVE | Noted: 2023-02-08

## 2023-05-10 PROBLEM — I50.9 ACUTE ON CHRONIC CONGESTIVE HEART FAILURE (HCC): Status: ACTIVE | Noted: 2023-05-10

## 2023-05-10 PROBLEM — E88.09 HYPOALBUMINEMIA: Status: ACTIVE | Noted: 2023-05-10

## 2023-05-10 PROBLEM — I95.9 HYPOTENSION: Status: ACTIVE | Noted: 2023-05-10

## 2023-05-10 PROBLEM — J96.01 ACUTE RESPIRATORY FAILURE WITH HYPOXIA (HCC): Status: ACTIVE | Noted: 2022-09-13

## 2023-05-10 PROBLEM — N18.9 ACUTE KIDNEY INJURY SUPERIMPOSED ON CKD (HCC): Status: ACTIVE | Noted: 2023-02-07

## 2023-05-10 LAB
ALBUMIN SERPL-MCNC: 2.7 G/DL (ref 3.4–5)
ALBUMIN/GLOB SERPL: 0.7 {RATIO} (ref 1.1–2.2)
ALP SERPL-CCNC: 79 U/L (ref 40–129)
ALT SERPL-CCNC: 7 U/L (ref 10–40)
ANION GAP SERPL CALCULATED.3IONS-SCNC: 11 MMOL/L (ref 3–16)
ANION GAP SERPL CALCULATED.3IONS-SCNC: 14 MMOL/L (ref 3–16)
AST SERPL-CCNC: 12 U/L (ref 15–37)
BACTERIA URNS QL MICRO: ABNORMAL /HPF
BASE EXCESS BLDV CALC-SCNC: -1.3 MMOL/L (ref -3–3)
BASOPHILS # BLD: 0 K/UL (ref 0–0.2)
BASOPHILS NFR BLD: 0.3 %
BILIRUB SERPL-MCNC: 1.1 MG/DL (ref 0–1)
BILIRUB UR QL STRIP.AUTO: ABNORMAL
BUN SERPL-MCNC: 53 MG/DL (ref 7–20)
BUN SERPL-MCNC: 54 MG/DL (ref 7–20)
CALCIUM SERPL-MCNC: 8.5 MG/DL (ref 8.3–10.6)
CALCIUM SERPL-MCNC: 8.9 MG/DL (ref 8.3–10.6)
CHARACTER UR: ABNORMAL
CHLORIDE SERPL-SCNC: 100 MMOL/L (ref 99–110)
CHLORIDE SERPL-SCNC: 103 MMOL/L (ref 99–110)
CLARITY UR: ABNORMAL
CO2 BLDV-SCNC: 57 MMOL/L
CO2 SERPL-SCNC: 23 MMOL/L (ref 21–32)
CO2 SERPL-SCNC: 25 MMOL/L (ref 21–32)
COHGB MFR BLDV: 4.9 % (ref 0–1.5)
COLOR UR: ABNORMAL
CREAT SERPL-MCNC: 2.9 MG/DL (ref 0.6–1.2)
CREAT SERPL-MCNC: 3.4 MG/DL (ref 0.6–1.2)
CREAT UR-MCNC: 121.2 MG/DL (ref 28–259)
DEPRECATED RDW RBC AUTO: 17.5 % (ref 12.4–15.4)
DEPRECATED RDW RBC AUTO: 17.6 % (ref 12.4–15.4)
EKG ATRIAL RATE: 241 BPM
EKG DIAGNOSIS: NORMAL
EKG P AXIS: 257 DEGREES
EKG Q-T INTERVAL: 508 MS
EKG QRS DURATION: 94 MS
EKG QTC CALCULATION (BAZETT): 490 MS
EKG R AXIS: 109 DEGREES
EKG T AXIS: 76 DEGREES
EKG VENTRICULAR RATE: 56 BPM
EOSINOPHIL # BLD: 0 K/UL (ref 0–0.6)
EOSINOPHIL NFR BLD: 0 %
EPI CELLS #/AREA URNS AUTO: 222 /HPF (ref 0–5)
GFR SERPLBLD CREATININE-BSD FMLA CKD-EPI: 13 ML/MIN/{1.73_M2}
GFR SERPLBLD CREATININE-BSD FMLA CKD-EPI: 16 ML/MIN/{1.73_M2}
GLUCOSE SERPL-MCNC: 148 MG/DL (ref 70–99)
GLUCOSE SERPL-MCNC: 178 MG/DL (ref 70–99)
GLUCOSE UR STRIP.AUTO-MCNC: NEGATIVE MG/DL
HCO3 BLDV-SCNC: 24 MMOL/L (ref 23–29)
HCT VFR BLD AUTO: 35.1 % (ref 36–48)
HCT VFR BLD AUTO: 35.8 % (ref 36–48)
HGB BLD-MCNC: 11.1 G/DL (ref 12–16)
HGB BLD-MCNC: 11.3 G/DL (ref 12–16)
HGB UR QL STRIP.AUTO: ABNORMAL
HYALINE CASTS #/AREA URNS AUTO: 79 /LPF (ref 0–8)
KETONES UR STRIP.AUTO-MCNC: ABNORMAL MG/DL
LACTATE BLDV-SCNC: 1.3 MMOL/L (ref 0.4–2)
LACTATE BLDV-SCNC: 1.6 MMOL/L (ref 0.4–1.9)
LEUKOCYTE ESTERASE UR QL STRIP.AUTO: ABNORMAL
LYMPHOCYTES # BLD: 0.8 K/UL (ref 1–5.1)
LYMPHOCYTES NFR BLD: 8.3 %
MAGNESIUM SERPL-MCNC: 2 MG/DL (ref 1.8–2.4)
MCH RBC QN AUTO: 30 PG (ref 26–34)
MCH RBC QN AUTO: 30.4 PG (ref 26–34)
MCHC RBC AUTO-ENTMCNC: 31.4 G/DL (ref 31–36)
MCHC RBC AUTO-ENTMCNC: 31.6 G/DL (ref 31–36)
MCV RBC AUTO: 95.5 FL (ref 80–100)
MCV RBC AUTO: 96 FL (ref 80–100)
METHGB MFR BLDV: 0.1 %
MONOCYTES # BLD: 1 K/UL (ref 0–1.3)
MONOCYTES NFR BLD: 10.1 %
NEUTROPHILS # BLD: 8.3 K/UL (ref 1.7–7.7)
NEUTROPHILS NFR BLD: 81.3 %
NITRITE UR QL STRIP.AUTO: NEGATIVE
NT-PROBNP SERPL-MCNC: ABNORMAL PG/ML (ref 0–449)
O2 CT VFR BLDV CALC: 16 VOL %
O2 THERAPY: ABNORMAL
PCO2 BLDV: 41.5 MMHG (ref 40–50)
PH BLDV: 7.37 [PH] (ref 7.35–7.45)
PH UR STRIP.AUTO: 5 [PH] (ref 5–8)
PHOSPHATE SERPL-MCNC: 4.5 MG/DL (ref 2.5–4.9)
PLATELET # BLD AUTO: 209 K/UL (ref 135–450)
PLATELET # BLD AUTO: 220 K/UL (ref 135–450)
PMV BLD AUTO: 11 FL (ref 5–10.5)
PMV BLD AUTO: 11.1 FL (ref 5–10.5)
PO2 BLDV: 183 MMHG (ref 25–40)
POTASSIUM SERPL-SCNC: 4.4 MMOL/L (ref 3.5–5.1)
POTASSIUM SERPL-SCNC: 4.7 MMOL/L (ref 3.5–5.1)
PROCALCITONIN SERPL IA-MCNC: 1.2 NG/ML (ref 0–0.15)
PROCALCITONIN SERPL IA-MCNC: 1.4 NG/ML (ref 0–0.15)
PROT SERPL-MCNC: 6.6 G/DL (ref 6.4–8.2)
PROT UR STRIP.AUTO-MCNC: 100 MG/DL
RBC # BLD AUTO: 3.66 M/UL (ref 4–5.2)
RBC # BLD AUTO: 3.75 M/UL (ref 4–5.2)
RBC CLUMPS #/AREA URNS AUTO: 17 /HPF (ref 0–4)
REPORT: NORMAL
SAO2 % BLDV: 100 %
SARS-COV-2 RDRP RESP QL NAA+PROBE: NOT DETECTED
SODIUM SERPL-SCNC: 136 MMOL/L (ref 136–145)
SODIUM SERPL-SCNC: 140 MMOL/L (ref 136–145)
SODIUM UR-SCNC: 34 MMOL/L
SP GR UR STRIP.AUTO: 1.02 (ref 1–1.03)
TROPONIN, HIGH SENSITIVITY: 57 NG/L (ref 0–14)
TROPONIN, HIGH SENSITIVITY: 60 NG/L (ref 0–14)
UA COMPLETE W REFLEX CULTURE PNL UR: YES
UA DIPSTICK W REFLEX MICRO PNL UR: YES
URN SPEC COLLECT METH UR: ABNORMAL
UROBILINOGEN UR STRIP-ACNC: 1 E.U./DL
WBC # BLD AUTO: 10.2 K/UL (ref 4–11)
WBC # BLD AUTO: 9.3 K/UL (ref 4–11)
WBC #/AREA URNS AUTO: 200 /HPF (ref 0–5)

## 2023-05-10 PROCEDURE — 2500000003 HC RX 250 WO HCPCS: Performed by: STUDENT IN AN ORGANIZED HEALTH CARE EDUCATION/TRAINING PROGRAM

## 2023-05-10 PROCEDURE — 36415 COLL VENOUS BLD VENIPUNCTURE: CPT

## 2023-05-10 PROCEDURE — 02HV33Z INSERTION OF INFUSION DEVICE INTO SUPERIOR VENA CAVA, PERCUTANEOUS APPROACH: ICD-10-PCS | Performed by: STUDENT IN AN ORGANIZED HEALTH CARE EDUCATION/TRAINING PROGRAM

## 2023-05-10 PROCEDURE — 74176 CT ABD & PELVIS W/O CONTRAST: CPT

## 2023-05-10 PROCEDURE — 0202U NFCT DS 22 TRGT SARS-COV-2: CPT

## 2023-05-10 PROCEDURE — 6360000002 HC RX W HCPCS: Performed by: INTERNAL MEDICINE

## 2023-05-10 PROCEDURE — 99291 CRITICAL CARE FIRST HOUR: CPT | Performed by: INTERNAL MEDICINE

## 2023-05-10 PROCEDURE — A4216 STERILE WATER/SALINE, 10 ML: HCPCS | Performed by: EMERGENCY MEDICINE

## 2023-05-10 PROCEDURE — 94640 AIRWAY INHALATION TREATMENT: CPT

## 2023-05-10 PROCEDURE — 6370000000 HC RX 637 (ALT 250 FOR IP): Performed by: EMERGENCY MEDICINE

## 2023-05-10 PROCEDURE — 2580000003 HC RX 258: Performed by: INTERNAL MEDICINE

## 2023-05-10 PROCEDURE — 71045 X-RAY EXAM CHEST 1 VIEW: CPT

## 2023-05-10 PROCEDURE — 94660 CPAP INITIATION&MGMT: CPT

## 2023-05-10 PROCEDURE — A4216 STERILE WATER/SALINE, 10 ML: HCPCS | Performed by: STUDENT IN AN ORGANIZED HEALTH CARE EDUCATION/TRAINING PROGRAM

## 2023-05-10 PROCEDURE — 84300 ASSAY OF URINE SODIUM: CPT

## 2023-05-10 PROCEDURE — 36592 COLLECT BLOOD FROM PICC: CPT

## 2023-05-10 PROCEDURE — 2500000003 HC RX 250 WO HCPCS: Performed by: EMERGENCY MEDICINE

## 2023-05-10 PROCEDURE — 6370000000 HC RX 637 (ALT 250 FOR IP): Performed by: INTERNAL MEDICINE

## 2023-05-10 PROCEDURE — 81001 URINALYSIS AUTO W/SCOPE: CPT

## 2023-05-10 PROCEDURE — 87186 SC STD MICRODIL/AGAR DIL: CPT

## 2023-05-10 PROCEDURE — 84484 ASSAY OF TROPONIN QUANT: CPT

## 2023-05-10 PROCEDURE — 82803 BLOOD GASES ANY COMBINATION: CPT

## 2023-05-10 PROCEDURE — 87635 SARS-COV-2 COVID-19 AMP PRB: CPT

## 2023-05-10 PROCEDURE — 82570 ASSAY OF URINE CREATININE: CPT

## 2023-05-10 PROCEDURE — 2000000000 HC ICU R&B

## 2023-05-10 PROCEDURE — 92610 EVALUATE SWALLOWING FUNCTION: CPT

## 2023-05-10 PROCEDURE — 87641 MR-STAPH DNA AMP PROBE: CPT

## 2023-05-10 PROCEDURE — 83605 ASSAY OF LACTIC ACID: CPT

## 2023-05-10 PROCEDURE — 84100 ASSAY OF PHOSPHORUS: CPT

## 2023-05-10 PROCEDURE — 83880 ASSAY OF NATRIURETIC PEPTIDE: CPT

## 2023-05-10 PROCEDURE — 6370000000 HC RX 637 (ALT 250 FOR IP)

## 2023-05-10 PROCEDURE — 87581 M.PNEUMON DNA AMP PROBE: CPT

## 2023-05-10 PROCEDURE — 85027 COMPLETE CBC AUTOMATED: CPT

## 2023-05-10 PROCEDURE — 93010 ELECTROCARDIOGRAM REPORT: CPT | Performed by: INTERNAL MEDICINE

## 2023-05-10 PROCEDURE — 87150 DNA/RNA AMPLIFIED PROBE: CPT

## 2023-05-10 PROCEDURE — 99223 1ST HOSP IP/OBS HIGH 75: CPT | Performed by: INTERNAL MEDICINE

## 2023-05-10 PROCEDURE — 87040 BLOOD CULTURE FOR BACTERIA: CPT

## 2023-05-10 PROCEDURE — 94761 N-INVAS EAR/PLS OXIMETRY MLT: CPT

## 2023-05-10 PROCEDURE — 84145 PROCALCITONIN (PCT): CPT

## 2023-05-10 PROCEDURE — 83735 ASSAY OF MAGNESIUM: CPT

## 2023-05-10 PROCEDURE — 6360000002 HC RX W HCPCS: Performed by: EMERGENCY MEDICINE

## 2023-05-10 PROCEDURE — 2700000000 HC OXYGEN THERAPY PER DAY

## 2023-05-10 PROCEDURE — 2580000003 HC RX 258: Performed by: STUDENT IN AN ORGANIZED HEALTH CARE EDUCATION/TRAINING PROGRAM

## 2023-05-10 PROCEDURE — 87086 URINE CULTURE/COLONY COUNT: CPT

## 2023-05-10 PROCEDURE — 2500000003 HC RX 250 WO HCPCS: Performed by: INTERNAL MEDICINE

## 2023-05-10 PROCEDURE — 2580000003 HC RX 258: Performed by: EMERGENCY MEDICINE

## 2023-05-10 PROCEDURE — 92526 ORAL FUNCTION THERAPY: CPT

## 2023-05-10 PROCEDURE — 85025 COMPLETE CBC W/AUTO DIFF WBC: CPT

## 2023-05-10 PROCEDURE — 80053 COMPREHEN METABOLIC PANEL: CPT

## 2023-05-10 PROCEDURE — 87449 NOS EACH ORGANISM AG IA: CPT

## 2023-05-10 RX ORDER — ONDANSETRON 2 MG/ML
4 INJECTION INTRAMUSCULAR; INTRAVENOUS EVERY 6 HOURS PRN
Status: DISCONTINUED | OUTPATIENT
Start: 2023-05-10 | End: 2023-05-15 | Stop reason: HOSPADM

## 2023-05-10 RX ORDER — IPRATROPIUM BROMIDE AND ALBUTEROL SULFATE 2.5; .5 MG/3ML; MG/3ML
1 SOLUTION RESPIRATORY (INHALATION)
Status: DISCONTINUED | OUTPATIENT
Start: 2023-05-10 | End: 2023-05-15 | Stop reason: HOSPADM

## 2023-05-10 RX ORDER — IPRATROPIUM BROMIDE AND ALBUTEROL SULFATE 2.5; .5 MG/3ML; MG/3ML
1 SOLUTION RESPIRATORY (INHALATION) EVERY 4 HOURS PRN
Status: DISCONTINUED | OUTPATIENT
Start: 2023-05-10 | End: 2023-05-15 | Stop reason: HOSPADM

## 2023-05-10 RX ORDER — NOREPINEPHRINE BIT/0.9 % NACL 16MG/250ML
1-100 INFUSION BOTTLE (ML) INTRAVENOUS CONTINUOUS
Status: DISCONTINUED | OUTPATIENT
Start: 2023-05-10 | End: 2023-05-11

## 2023-05-10 RX ORDER — NOREPINEPHRINE BIT/0.9 % NACL 16MG/250ML
1-100 INFUSION BOTTLE (ML) INTRAVENOUS CONTINUOUS
Status: DISCONTINUED | OUTPATIENT
Start: 2023-05-10 | End: 2023-05-10

## 2023-05-10 RX ORDER — SODIUM CHLORIDE 0.9 % (FLUSH) 0.9 %
5-40 SYRINGE (ML) INJECTION EVERY 12 HOURS SCHEDULED
Status: DISCONTINUED | OUTPATIENT
Start: 2023-05-10 | End: 2023-05-15 | Stop reason: HOSPADM

## 2023-05-10 RX ORDER — POLYETHYLENE GLYCOL 3350 17 G/17G
17 POWDER, FOR SOLUTION ORAL DAILY PRN
Status: DISCONTINUED | OUTPATIENT
Start: 2023-05-10 | End: 2023-05-15 | Stop reason: HOSPADM

## 2023-05-10 RX ORDER — 0.9 % SODIUM CHLORIDE 0.9 %
500 INTRAVENOUS SOLUTION INTRAVENOUS ONCE
Status: COMPLETED | OUTPATIENT
Start: 2023-05-10 | End: 2023-05-10

## 2023-05-10 RX ORDER — ONDANSETRON 4 MG/1
4 TABLET, ORALLY DISINTEGRATING ORAL EVERY 8 HOURS PRN
Status: DISCONTINUED | OUTPATIENT
Start: 2023-05-10 | End: 2023-05-15 | Stop reason: HOSPADM

## 2023-05-10 RX ORDER — SODIUM CHLORIDE 9 MG/ML
INJECTION, SOLUTION INTRAVENOUS PRN
Status: DISCONTINUED | OUTPATIENT
Start: 2023-05-10 | End: 2023-05-15 | Stop reason: HOSPADM

## 2023-05-10 RX ORDER — SODIUM CHLORIDE 0.9 % (FLUSH) 0.9 %
5-40 SYRINGE (ML) INJECTION PRN
Status: DISCONTINUED | OUTPATIENT
Start: 2023-05-10 | End: 2023-05-15 | Stop reason: HOSPADM

## 2023-05-10 RX ORDER — IPRATROPIUM BROMIDE AND ALBUTEROL SULFATE 2.5; .5 MG/3ML; MG/3ML
SOLUTION RESPIRATORY (INHALATION)
Status: COMPLETED
Start: 2023-05-10 | End: 2023-05-10

## 2023-05-10 RX ORDER — FUROSEMIDE 10 MG/ML
20 INJECTION INTRAMUSCULAR; INTRAVENOUS 2 TIMES DAILY
Status: DISCONTINUED | OUTPATIENT
Start: 2023-05-10 | End: 2023-05-12

## 2023-05-10 RX ORDER — DEXAMETHASONE SODIUM PHOSPHATE 4 MG/ML
10 INJECTION, SOLUTION INTRA-ARTICULAR; INTRALESIONAL; INTRAMUSCULAR; INTRAVENOUS; SOFT TISSUE ONCE
Status: COMPLETED | OUTPATIENT
Start: 2023-05-10 | End: 2023-05-10

## 2023-05-10 RX ORDER — SODIUM CHLORIDE, SODIUM LACTATE, POTASSIUM CHLORIDE, CALCIUM CHLORIDE 600; 310; 30; 20 MG/100ML; MG/100ML; MG/100ML; MG/100ML
INJECTION, SOLUTION INTRAVENOUS CONTINUOUS
Status: DISCONTINUED | OUTPATIENT
Start: 2023-05-10 | End: 2023-05-10

## 2023-05-10 RX ORDER — DIPHENHYDRAMINE HYDROCHLORIDE 50 MG/ML
25 INJECTION INTRAMUSCULAR; INTRAVENOUS ONCE
Status: COMPLETED | OUTPATIENT
Start: 2023-05-10 | End: 2023-05-10

## 2023-05-10 RX ORDER — ACETAMINOPHEN 325 MG/1
650 TABLET ORAL EVERY 6 HOURS PRN
Status: DISCONTINUED | OUTPATIENT
Start: 2023-05-10 | End: 2023-05-15 | Stop reason: HOSPADM

## 2023-05-10 RX ORDER — AMIODARONE HYDROCHLORIDE 200 MG/1
200 TABLET ORAL DAILY
Status: DISCONTINUED | OUTPATIENT
Start: 2023-05-10 | End: 2023-05-10

## 2023-05-10 RX ORDER — ENOXAPARIN SODIUM 100 MG/ML
30 INJECTION SUBCUTANEOUS DAILY
Status: DISCONTINUED | OUTPATIENT
Start: 2023-05-10 | End: 2023-05-10

## 2023-05-10 RX ORDER — FUROSEMIDE 10 MG/ML
20 INJECTION INTRAMUSCULAR; INTRAVENOUS ONCE
Status: COMPLETED | OUTPATIENT
Start: 2023-05-10 | End: 2023-05-10

## 2023-05-10 RX ORDER — ACETAMINOPHEN 650 MG/1
650 SUPPOSITORY RECTAL EVERY 6 HOURS PRN
Status: DISCONTINUED | OUTPATIENT
Start: 2023-05-10 | End: 2023-05-15 | Stop reason: HOSPADM

## 2023-05-10 RX ADMIN — IPRATROPIUM BROMIDE AND ALBUTEROL SULFATE 3 ML: .5; 3 SOLUTION RESPIRATORY (INHALATION) at 00:22

## 2023-05-10 RX ADMIN — SODIUM CHLORIDE, POTASSIUM CHLORIDE, SODIUM LACTATE AND CALCIUM CHLORIDE: 600; 310; 30; 20 INJECTION, SOLUTION INTRAVENOUS at 07:16

## 2023-05-10 RX ADMIN — Medication 5 MCG/MIN: at 01:24

## 2023-05-10 RX ADMIN — MEROPENEM 500 MG: 500 INJECTION, POWDER, FOR SOLUTION INTRAVENOUS at 18:05

## 2023-05-10 RX ADMIN — IPRATROPIUM BROMIDE AND ALBUTEROL SULFATE 1 AMPULE: .5; 3 SOLUTION RESPIRATORY (INHALATION) at 12:19

## 2023-05-10 RX ADMIN — FUROSEMIDE 20 MG: 10 INJECTION, SOLUTION INTRAMUSCULAR; INTRAVENOUS at 01:24

## 2023-05-10 RX ADMIN — IPRATROPIUM BROMIDE AND ALBUTEROL SULFATE 1 AMPULE: .5; 3 SOLUTION RESPIRATORY (INHALATION) at 21:01

## 2023-05-10 RX ADMIN — FAMOTIDINE 20 MG: 10 INJECTION, SOLUTION INTRAVENOUS at 05:02

## 2023-05-10 RX ADMIN — Medication 5 MCG/MIN: at 10:44

## 2023-05-10 RX ADMIN — Medication 5 MCG/MIN: at 09:34

## 2023-05-10 RX ADMIN — AMIODARONE HYDROCHLORIDE 200 MG: 200 TABLET ORAL at 10:36

## 2023-05-10 RX ADMIN — SODIUM CHLORIDE 500 ML: 9 INJECTION, SOLUTION INTRAVENOUS at 00:11

## 2023-05-10 RX ADMIN — DEXAMETHASONE SODIUM PHOSPHATE 10 MG: 4 INJECTION, SOLUTION INTRAMUSCULAR; INTRAVENOUS at 00:16

## 2023-05-10 RX ADMIN — Medication 10 ML: at 20:11

## 2023-05-10 RX ADMIN — AZITHROMYCIN MONOHYDRATE 250 MG: 500 INJECTION, POWDER, LYOPHILIZED, FOR SOLUTION INTRAVENOUS at 14:19

## 2023-05-10 RX ADMIN — PIPERACILLIN AND TAZOBACTAM 4500 MG: 4; .5 INJECTION, POWDER, LYOPHILIZED, FOR SOLUTION INTRAVENOUS at 00:19

## 2023-05-10 RX ADMIN — APIXABAN 5 MG: 5 TABLET, FILM COATED ORAL at 10:36

## 2023-05-10 RX ADMIN — Medication 2 MCG/MIN: at 18:48

## 2023-05-10 RX ADMIN — IPRATROPIUM BROMIDE AND ALBUTEROL SULFATE 1 AMPULE: .5; 3 SOLUTION RESPIRATORY (INHALATION) at 09:05

## 2023-05-10 RX ADMIN — VANCOMYCIN HYDROCHLORIDE 1500 MG: 1.5 INJECTION, POWDER, LYOPHILIZED, FOR SOLUTION INTRAVENOUS at 00:53

## 2023-05-10 RX ADMIN — SODIUM CHLORIDE 500 ML: 9 INJECTION, SOLUTION INTRAVENOUS at 06:28

## 2023-05-10 RX ADMIN — IPRATROPIUM BROMIDE AND ALBUTEROL SULFATE 1 AMPULE: .5; 3 SOLUTION RESPIRATORY (INHALATION) at 15:49

## 2023-05-10 RX ADMIN — APIXABAN 5 MG: 5 TABLET, FILM COATED ORAL at 20:11

## 2023-05-10 RX ADMIN — MEROPENEM 500 MG: 500 INJECTION, POWDER, FOR SOLUTION INTRAVENOUS at 06:29

## 2023-05-10 RX ADMIN — FAMOTIDINE 10 MG: 10 INJECTION, SOLUTION INTRAVENOUS at 10:36

## 2023-05-10 RX ADMIN — DIPHENHYDRAMINE HYDROCHLORIDE 25 MG: 50 INJECTION, SOLUTION INTRAMUSCULAR; INTRAVENOUS at 05:02

## 2023-05-10 ASSESSMENT — ENCOUNTER SYMPTOMS
CONSTIPATION: 0
SORE THROAT: 0
DIARRHEA: 0
EYE REDNESS: 0
SHORTNESS OF BREATH: 1
RHINORRHEA: 0
WHEEZING: 0
EYE DISCHARGE: 0
NAUSEA: 0
BACK PAIN: 0
COUGH: 1
ABDOMINAL PAIN: 0
SINUS PRESSURE: 0
SINUS PAIN: 0

## 2023-05-11 ENCOUNTER — APPOINTMENT (OUTPATIENT)
Dept: CT IMAGING | Age: 78
DRG: 871 | End: 2023-05-11
Payer: COMMERCIAL

## 2023-05-11 PROBLEM — R57.0 CARDIOGENIC SHOCK (HCC): Status: RESOLVED | Noted: 2023-05-10 | Resolved: 2023-05-11

## 2023-05-11 PROBLEM — I50.32 CHRONIC HEART FAILURE WITH PRESERVED EJECTION FRACTION (HCC): Status: ACTIVE | Noted: 2023-02-08

## 2023-05-11 PROBLEM — B99.9 INFECTION REQUIRING CONTACT ISOLATION PRECAUTIONS: Status: ACTIVE | Noted: 2023-05-11

## 2023-05-11 PROBLEM — B34.8 INFECTION DUE TO PARAINFLUENZA VIRUS 3: Status: ACTIVE | Noted: 2023-05-11

## 2023-05-11 PROBLEM — B95.3 BACTEREMIA DUE TO STREPTOCOCCUS PNEUMONIAE: Status: ACTIVE | Noted: 2022-12-21

## 2023-05-11 PROBLEM — E88.09 HYPOALBUMINEMIA: Status: RESOLVED | Noted: 2023-05-10 | Resolved: 2023-05-11

## 2023-05-11 PROBLEM — Z22.322 MRSA NASAL COLONIZATION: Status: ACTIVE | Noted: 2023-05-11

## 2023-05-11 PROBLEM — R79.89 ELEVATED PROCALCITONIN: Status: RESOLVED | Noted: 2023-02-12 | Resolved: 2023-05-11

## 2023-05-11 PROBLEM — J18.9 PNEUMONIA OF BOTH LUNGS DUE TO INFECTIOUS ORGANISM: Status: RESOLVED | Noted: 2023-02-17 | Resolved: 2023-05-11

## 2023-05-11 PROBLEM — E66.9 OBESITY (BMI 30-39.9): Status: RESOLVED | Noted: 2022-12-21 | Resolved: 2023-05-11

## 2023-05-11 PROBLEM — I50.9 ACUTE ON CHRONIC CONGESTIVE HEART FAILURE (HCC): Status: RESOLVED | Noted: 2023-05-10 | Resolved: 2023-05-11

## 2023-05-11 PROBLEM — N39.0 UTI (URINARY TRACT INFECTION): Status: ACTIVE | Noted: 2023-05-11

## 2023-05-11 LAB
ALBUMIN SERPL-MCNC: 2.7 G/DL (ref 3.4–5)
ANION GAP SERPL CALCULATED.3IONS-SCNC: 11 MMOL/L (ref 3–16)
ANISOCYTOSIS BLD QL SMEAR: ABNORMAL
BACTERIA UR CULT: NORMAL
BASOPHILS # BLD: 0 K/UL (ref 0–0.2)
BASOPHILS NFR BLD: 0 %
BUN SERPL-MCNC: 61 MG/DL (ref 7–20)
CALCIUM SERPL-MCNC: 8.3 MG/DL (ref 8.3–10.6)
CHLORIDE SERPL-SCNC: 103 MMOL/L (ref 99–110)
CO2 SERPL-SCNC: 24 MMOL/L (ref 21–32)
CREAT SERPL-MCNC: 3.4 MG/DL (ref 0.6–1.2)
DEPRECATED RDW RBC AUTO: 17.3 % (ref 12.4–15.4)
EOSINOPHIL # BLD: 0 K/UL (ref 0–0.6)
EOSINOPHIL NFR BLD: 0 %
GFR SERPLBLD CREATININE-BSD FMLA CKD-EPI: 13 ML/MIN/{1.73_M2}
GLUCOSE SERPL-MCNC: 189 MG/DL (ref 70–99)
HCT VFR BLD AUTO: 32.9 % (ref 36–48)
HGB BLD-MCNC: 10.3 G/DL (ref 12–16)
LEGIONELLA AG UR QL: NORMAL
LV EF: 58 %
LVEF MODALITY: NORMAL
LYMPHOCYTES # BLD: 0.5 K/UL (ref 1–5.1)
LYMPHOCYTES NFR BLD: 6 %
MAGNESIUM SERPL-MCNC: 1.9 MG/DL (ref 1.8–2.4)
MCH RBC QN AUTO: 29.5 PG (ref 26–34)
MCHC RBC AUTO-ENTMCNC: 31.4 G/DL (ref 31–36)
MCV RBC AUTO: 93.9 FL (ref 80–100)
METAMYELOCYTES NFR BLD MANUAL: 1 %
MONOCYTES # BLD: 0.3 K/UL (ref 0–1.3)
MONOCYTES NFR BLD: 4 %
MRSA DNA SPEC QL NAA+PROBE: ABNORMAL
MYELOCYTES NFR BLD MANUAL: 1 %
NEUTROPHILS # BLD: 7.6 K/UL (ref 1.7–7.7)
NEUTROPHILS NFR BLD: 84 %
NEUTS BAND NFR BLD MANUAL: 4 % (ref 0–7)
ORGANISM: ABNORMAL
ORGANISM: ABNORMAL
PHOSPHATE SERPL-MCNC: 3.9 MG/DL (ref 2.5–4.9)
PLATELET # BLD AUTO: 196 K/UL (ref 135–450)
PLATELET BLD QL SMEAR: ADEQUATE
PMV BLD AUTO: 10 FL (ref 5–10.5)
POTASSIUM SERPL-SCNC: 4 MMOL/L (ref 3.5–5.1)
RBC # BLD AUTO: 3.5 M/UL (ref 4–5.2)
REPORT: NORMAL
RESP PATH DNA+RNA PNL NPH NAA+NON-PROBE: ABNORMAL
S PNEUM AG UR QL: NORMAL
SLIDE REVIEW: ABNORMAL
SODIUM SERPL-SCNC: 138 MMOL/L (ref 136–145)
TROPONIN, HIGH SENSITIVITY: 37 NG/L (ref 0–14)
TROPONIN, HIGH SENSITIVITY: 48 NG/L (ref 0–14)
WBC # BLD AUTO: 8.4 K/UL (ref 4–11)

## 2023-05-11 PROCEDURE — 6370000000 HC RX 637 (ALT 250 FOR IP): Performed by: INTERNAL MEDICINE

## 2023-05-11 PROCEDURE — 84484 ASSAY OF TROPONIN QUANT: CPT

## 2023-05-11 PROCEDURE — 97162 PT EVAL MOD COMPLEX 30 MIN: CPT

## 2023-05-11 PROCEDURE — 99233 SBSQ HOSP IP/OBS HIGH 50: CPT | Performed by: INTERNAL MEDICINE

## 2023-05-11 PROCEDURE — 2700000000 HC OXYGEN THERAPY PER DAY

## 2023-05-11 PROCEDURE — 87040 BLOOD CULTURE FOR BACTERIA: CPT

## 2023-05-11 PROCEDURE — 93306 TTE W/DOPPLER COMPLETE: CPT

## 2023-05-11 PROCEDURE — 83735 ASSAY OF MAGNESIUM: CPT

## 2023-05-11 PROCEDURE — 85025 COMPLETE CBC W/AUTO DIFF WBC: CPT

## 2023-05-11 PROCEDURE — 6360000002 HC RX W HCPCS: Performed by: INTERNAL MEDICINE

## 2023-05-11 PROCEDURE — 2500000003 HC RX 250 WO HCPCS: Performed by: STUDENT IN AN ORGANIZED HEALTH CARE EDUCATION/TRAINING PROGRAM

## 2023-05-11 PROCEDURE — 97530 THERAPEUTIC ACTIVITIES: CPT

## 2023-05-11 PROCEDURE — 99232 SBSQ HOSP IP/OBS MODERATE 35: CPT | Performed by: NURSE PRACTITIONER

## 2023-05-11 PROCEDURE — 2580000003 HC RX 258: Performed by: INTERNAL MEDICINE

## 2023-05-11 PROCEDURE — 36415 COLL VENOUS BLD VENIPUNCTURE: CPT

## 2023-05-11 PROCEDURE — 36592 COLLECT BLOOD FROM PICC: CPT

## 2023-05-11 PROCEDURE — 97166 OT EVAL MOD COMPLEX 45 MIN: CPT

## 2023-05-11 PROCEDURE — 6360000002 HC RX W HCPCS: Performed by: STUDENT IN AN ORGANIZED HEALTH CARE EDUCATION/TRAINING PROGRAM

## 2023-05-11 PROCEDURE — A4216 STERILE WATER/SALINE, 10 ML: HCPCS | Performed by: STUDENT IN AN ORGANIZED HEALTH CARE EDUCATION/TRAINING PROGRAM

## 2023-05-11 PROCEDURE — 94761 N-INVAS EAR/PLS OXIMETRY MLT: CPT

## 2023-05-11 PROCEDURE — 6370000000 HC RX 637 (ALT 250 FOR IP): Performed by: NURSE PRACTITIONER

## 2023-05-11 PROCEDURE — 1200000000 HC SEMI PRIVATE

## 2023-05-11 PROCEDURE — 71250 CT THORAX DX C-: CPT

## 2023-05-11 PROCEDURE — 94640 AIRWAY INHALATION TREATMENT: CPT

## 2023-05-11 PROCEDURE — 2580000003 HC RX 258: Performed by: STUDENT IN AN ORGANIZED HEALTH CARE EDUCATION/TRAINING PROGRAM

## 2023-05-11 PROCEDURE — 99223 1ST HOSP IP/OBS HIGH 75: CPT | Performed by: INTERNAL MEDICINE

## 2023-05-11 PROCEDURE — 97535 SELF CARE MNGMENT TRAINING: CPT

## 2023-05-11 PROCEDURE — 80069 RENAL FUNCTION PANEL: CPT

## 2023-05-11 PROCEDURE — 6370000000 HC RX 637 (ALT 250 FOR IP): Performed by: EMERGENCY MEDICINE

## 2023-05-11 RX ORDER — LACTOBACILLUS RHAMNOSUS GG 10B CELL
1 CAPSULE ORAL 2 TIMES DAILY WITH MEALS
Status: DISCONTINUED | OUTPATIENT
Start: 2023-05-11 | End: 2023-05-15 | Stop reason: HOSPADM

## 2023-05-11 RX ORDER — GUAIFENESIN 600 MG/1
600 TABLET, EXTENDED RELEASE ORAL 2 TIMES DAILY
Status: DISCONTINUED | OUTPATIENT
Start: 2023-05-11 | End: 2023-05-15 | Stop reason: HOSPADM

## 2023-05-11 RX ORDER — LINEZOLID 2 MG/ML
600 INJECTION, SOLUTION INTRAVENOUS EVERY 12 HOURS
Status: DISCONTINUED | OUTPATIENT
Start: 2023-05-11 | End: 2023-05-13

## 2023-05-11 RX ADMIN — IPRATROPIUM BROMIDE AND ALBUTEROL SULFATE 1 AMPULE: .5; 3 SOLUTION RESPIRATORY (INHALATION) at 21:18

## 2023-05-11 RX ADMIN — APIXABAN 2.5 MG: 5 TABLET, FILM COATED ORAL at 09:36

## 2023-05-11 RX ADMIN — FAMOTIDINE 10 MG: 10 INJECTION, SOLUTION INTRAVENOUS at 09:36

## 2023-05-11 RX ADMIN — GUAIFENESIN 600 MG: 600 TABLET, EXTENDED RELEASE ORAL at 20:21

## 2023-05-11 RX ADMIN — AZITHROMYCIN MONOHYDRATE 250 MG: 500 INJECTION, POWDER, LYOPHILIZED, FOR SOLUTION INTRAVENOUS at 15:03

## 2023-05-11 RX ADMIN — MEROPENEM 500 MG: 500 INJECTION, POWDER, FOR SOLUTION INTRAVENOUS at 04:49

## 2023-05-11 RX ADMIN — CEFTRIAXONE SODIUM 1000 MG: 1 INJECTION, POWDER, FOR SOLUTION INTRAMUSCULAR; INTRAVENOUS at 16:12

## 2023-05-11 RX ADMIN — IPRATROPIUM BROMIDE AND ALBUTEROL SULFATE 1 AMPULE: .5; 3 SOLUTION RESPIRATORY (INHALATION) at 12:27

## 2023-05-11 RX ADMIN — GUAIFENESIN 600 MG: 600 TABLET, EXTENDED RELEASE ORAL at 09:36

## 2023-05-11 RX ADMIN — IPRATROPIUM BROMIDE AND ALBUTEROL SULFATE 1 AMPULE: .5; 3 SOLUTION RESPIRATORY (INHALATION) at 08:14

## 2023-05-11 RX ADMIN — IPRATROPIUM BROMIDE AND ALBUTEROL SULFATE 1 AMPULE: .5; 3 SOLUTION RESPIRATORY (INHALATION) at 15:59

## 2023-05-11 RX ADMIN — Medication 10 ML: at 20:21

## 2023-05-11 RX ADMIN — LINEZOLID 600 MG: 600 INJECTION, SOLUTION INTRAVENOUS at 09:36

## 2023-05-11 RX ADMIN — APIXABAN 2.5 MG: 5 TABLET, FILM COATED ORAL at 20:22

## 2023-05-11 RX ADMIN — Medication 1 CAPSULE: at 16:13

## 2023-05-11 RX ADMIN — LINEZOLID 600 MG: 600 INJECTION, SOLUTION INTRAVENOUS at 20:21

## 2023-05-11 ASSESSMENT — ENCOUNTER SYMPTOMS
DIARRHEA: 0
WHEEZING: 0
COUGH: 0
NAUSEA: 0
SINUS PAIN: 0
SORE THROAT: 0
NAUSEA: 0
EYE REDNESS: 0
BACK PAIN: 0
EYE DISCHARGE: 0
SHORTNESS OF BREATH: 0
SHORTNESS OF BREATH: 1
ABDOMINAL PAIN: 0
ABDOMINAL PAIN: 0
CONSTIPATION: 0
CONSTIPATION: 0
RHINORRHEA: 0
SINUS PRESSURE: 0
DIARRHEA: 0
VOMITING: 0
GASTROINTESTINAL NEGATIVE: 1
WHEEZING: 0

## 2023-05-11 ASSESSMENT — PAIN SCALES - GENERAL: PAINLEVEL_OUTOF10: 0

## 2023-05-11 NOTE — PROGRESS NOTES
SUBJECTIVE:    Patient ID: Kailyn Mancia is a 78 y.o. female. CC: Hospital follow-up, CHF    HPI: The patient presents to the office today for hospital follow-up. Patient was admitted to [facility] from 4/14/2023 until 4/27/2023. She presented with dyspnea on exertion and lower extremity edema admitted for acute on chronic diastolic heart failure. She was treated with IV Lasix. This was later switched to oral Lasix. Patient had intermittent overnight oxygen requirement with consideration for outpatient pulmonary function testing, sleep medicine evaluation. Patient had recent intra-abdominal abscess and gastrojejunal anastomosis  leak. She was admitted for intra-abdominal abscess and leak repair and drainage. During the above hospitalization, GI was consulted and anastomotic stent was removed after CTAP. Patient was transitioned off TPN and to a regular diet. Patient developed a rash to her lower extremities and trunk. She was evaluated by dermatology and diagnosed with lipodermatosclerosis had improvement with triamcinolone cream.    During hospitalization, patient had renal CT protocol to evaluate left renal cyst.  She was treated for atrial fibrillation, chronic normocytic anemia, and GERD. Today, the patient reports she is doing okay. She is still weak after hospitalization but this is improving. She reports that she is eating and drinking. She has not checked her weight. She does not feel more short of breath than normal.      Past Medical History:   Diagnosis Date    Arthritis     Atrial fibrillation and flutter ([address]     Hypertension     Kidney disease     Pt states  \"stage 3\"    Pneumonia     Sleep apnea     no c-pap        No current facility-administered medications for this visit. No current outpatient medications on file.      Facility-Administered Medications Ordered in Other Visits   Medication Dose Route Frequency Provider Last Rate Last Admin    guaiFENesin

## 2023-05-12 PROBLEM — I50.32 CHRONIC DIASTOLIC HEART FAILURE (HCC): Status: ACTIVE | Noted: 2023-05-12

## 2023-05-12 LAB
ACANTHOCYTES BLD QL SMEAR: ABNORMAL
ALBUMIN SERPL-MCNC: 2.6 G/DL (ref 3.4–5)
ANION GAP SERPL CALCULATED.3IONS-SCNC: 10 MMOL/L (ref 3–16)
ANISOCYTOSIS BLD QL SMEAR: ABNORMAL
BACTERIA BLD CULT: ABNORMAL
BACTERIA BLD CULT: ABNORMAL
BASOPHILS # BLD: 0 K/UL (ref 0–0.2)
BASOPHILS NFR BLD: 0 %
BUN SERPL-MCNC: 57 MG/DL (ref 7–20)
BURR CELLS BLD QL SMEAR: ABNORMAL
CALCIUM SERPL-MCNC: 8.4 MG/DL (ref 8.3–10.6)
CHLORIDE SERPL-SCNC: 107 MMOL/L (ref 99–110)
CO2 SERPL-SCNC: 25 MMOL/L (ref 21–32)
CREAT SERPL-MCNC: 2.6 MG/DL (ref 0.6–1.2)
DEPRECATED RDW RBC AUTO: 17.6 % (ref 12.4–15.4)
EOSINOPHIL # BLD: 0 K/UL (ref 0–0.6)
EOSINOPHIL NFR BLD: 0 %
GFR SERPLBLD CREATININE-BSD FMLA CKD-EPI: 19 ML/MIN/{1.73_M2}
GLUCOSE SERPL-MCNC: 125 MG/DL (ref 70–99)
HCT VFR BLD AUTO: 32.9 % (ref 36–48)
HGB BLD-MCNC: 10.6 G/DL (ref 12–16)
LYMPHOCYTES # BLD: 1.3 K/UL (ref 1–5.1)
LYMPHOCYTES NFR BLD: 14 %
M PNEUMO DNA SPEC QL NAA+PROBE: NOT DETECTED
MAGNESIUM SERPL-MCNC: 1.9 MG/DL (ref 1.8–2.4)
MCH RBC QN AUTO: 30.3 PG (ref 26–34)
MCHC RBC AUTO-ENTMCNC: 32.1 G/DL (ref 31–36)
MCV RBC AUTO: 94.1 FL (ref 80–100)
METAMYELOCYTES NFR BLD MANUAL: 1 %
MONOCYTES # BLD: 0.5 K/UL (ref 0–1.3)
MONOCYTES NFR BLD: 5 %
NEUTROPHILS # BLD: 7.6 K/UL (ref 1.7–7.7)
NEUTROPHILS NFR BLD: 72 %
NEUTS BAND NFR BLD MANUAL: 8 % (ref 0–7)
ORGANISM: ABNORMAL
ORGANISM: ABNORMAL
PHOSPHATE SERPL-MCNC: 3.5 MG/DL (ref 2.5–4.9)
PLATELET # BLD AUTO: 202 K/UL (ref 135–450)
PLATELET BLD QL SMEAR: ADEQUATE
PMV BLD AUTO: 10.1 FL (ref 5–10.5)
POLYCHROMASIA BLD QL SMEAR: ABNORMAL
POTASSIUM SERPL-SCNC: 4.2 MMOL/L (ref 3.5–5.1)
PROCALCITONIN SERPL IA-MCNC: 0.49 NG/ML (ref 0–0.15)
RBC # BLD AUTO: 3.5 M/UL (ref 4–5.2)
SLIDE REVIEW: ABNORMAL
SODIUM SERPL-SCNC: 142 MMOL/L (ref 136–145)
SPECIMEN SOURCE: NORMAL
WBC # BLD AUTO: 9.4 K/UL (ref 4–11)

## 2023-05-12 PROCEDURE — 2060000000 HC ICU INTERMEDIATE R&B

## 2023-05-12 PROCEDURE — 94761 N-INVAS EAR/PLS OXIMETRY MLT: CPT

## 2023-05-12 PROCEDURE — 6360000002 HC RX W HCPCS: Performed by: STUDENT IN AN ORGANIZED HEALTH CARE EDUCATION/TRAINING PROGRAM

## 2023-05-12 PROCEDURE — 80069 RENAL FUNCTION PANEL: CPT

## 2023-05-12 PROCEDURE — 83735 ASSAY OF MAGNESIUM: CPT

## 2023-05-12 PROCEDURE — 6370000000 HC RX 637 (ALT 250 FOR IP): Performed by: INTERNAL MEDICINE

## 2023-05-12 PROCEDURE — 99233 SBSQ HOSP IP/OBS HIGH 50: CPT | Performed by: INTERNAL MEDICINE

## 2023-05-12 PROCEDURE — 6360000002 HC RX W HCPCS: Performed by: INTERNAL MEDICINE

## 2023-05-12 PROCEDURE — 6370000000 HC RX 637 (ALT 250 FOR IP): Performed by: NURSE PRACTITIONER

## 2023-05-12 PROCEDURE — A4216 STERILE WATER/SALINE, 10 ML: HCPCS | Performed by: STUDENT IN AN ORGANIZED HEALTH CARE EDUCATION/TRAINING PROGRAM

## 2023-05-12 PROCEDURE — 2580000003 HC RX 258: Performed by: STUDENT IN AN ORGANIZED HEALTH CARE EDUCATION/TRAINING PROGRAM

## 2023-05-12 PROCEDURE — 94660 CPAP INITIATION&MGMT: CPT

## 2023-05-12 PROCEDURE — 84145 PROCALCITONIN (PCT): CPT

## 2023-05-12 PROCEDURE — 2580000003 HC RX 258: Performed by: INTERNAL MEDICINE

## 2023-05-12 PROCEDURE — 94640 AIRWAY INHALATION TREATMENT: CPT

## 2023-05-12 PROCEDURE — 99232 SBSQ HOSP IP/OBS MODERATE 35: CPT | Performed by: INTERNAL MEDICINE

## 2023-05-12 PROCEDURE — 99231 SBSQ HOSP IP/OBS SF/LOW 25: CPT | Performed by: NURSE PRACTITIONER

## 2023-05-12 PROCEDURE — 2500000003 HC RX 250 WO HCPCS: Performed by: STUDENT IN AN ORGANIZED HEALTH CARE EDUCATION/TRAINING PROGRAM

## 2023-05-12 PROCEDURE — 2700000000 HC OXYGEN THERAPY PER DAY

## 2023-05-12 PROCEDURE — 94669 MECHANICAL CHEST WALL OSCILL: CPT

## 2023-05-12 PROCEDURE — 36415 COLL VENOUS BLD VENIPUNCTURE: CPT

## 2023-05-12 PROCEDURE — 92526 ORAL FUNCTION THERAPY: CPT

## 2023-05-12 PROCEDURE — 6370000000 HC RX 637 (ALT 250 FOR IP): Performed by: EMERGENCY MEDICINE

## 2023-05-12 PROCEDURE — 85025 COMPLETE CBC W/AUTO DIFF WBC: CPT

## 2023-05-12 RX ORDER — FUROSEMIDE 20 MG/1
20 TABLET ORAL DAILY
Status: DISCONTINUED | OUTPATIENT
Start: 2023-05-13 | End: 2023-05-15 | Stop reason: HOSPADM

## 2023-05-12 RX ORDER — FUROSEMIDE 20 MG/1
20 TABLET ORAL 2 TIMES DAILY
Status: DISCONTINUED | OUTPATIENT
Start: 2023-05-12 | End: 2023-05-12

## 2023-05-12 RX ADMIN — SODIUM CHLORIDE, PRESERVATIVE FREE 10 ML: 5 INJECTION INTRAVENOUS at 17:40

## 2023-05-12 RX ADMIN — SODIUM CHLORIDE: 9 INJECTION, SOLUTION INTRAVENOUS at 17:45

## 2023-05-12 RX ADMIN — IPRATROPIUM BROMIDE AND ALBUTEROL SULFATE 1 AMPULE: .5; 3 SOLUTION RESPIRATORY (INHALATION) at 21:39

## 2023-05-12 RX ADMIN — LINEZOLID 600 MG: 600 INJECTION, SOLUTION INTRAVENOUS at 20:30

## 2023-05-12 RX ADMIN — LINEZOLID 600 MG: 600 INJECTION, SOLUTION INTRAVENOUS at 08:56

## 2023-05-12 RX ADMIN — CEFTRIAXONE SODIUM 1000 MG: 1 INJECTION, POWDER, FOR SOLUTION INTRAMUSCULAR; INTRAVENOUS at 17:49

## 2023-05-12 RX ADMIN — FUROSEMIDE 20 MG: 20 TABLET ORAL at 17:40

## 2023-05-12 RX ADMIN — GUAIFENESIN 600 MG: 600 TABLET, EXTENDED RELEASE ORAL at 20:31

## 2023-05-12 RX ADMIN — IPRATROPIUM BROMIDE AND ALBUTEROL SULFATE 1 AMPULE: .5; 3 SOLUTION RESPIRATORY (INHALATION) at 09:04

## 2023-05-12 RX ADMIN — IPRATROPIUM BROMIDE AND ALBUTEROL SULFATE 1 AMPULE: .5; 3 SOLUTION RESPIRATORY (INHALATION) at 15:58

## 2023-05-12 RX ADMIN — Medication 10 ML: at 20:27

## 2023-05-12 RX ADMIN — FAMOTIDINE 10 MG: 10 INJECTION, SOLUTION INTRAVENOUS at 08:57

## 2023-05-12 RX ADMIN — APIXABAN 2.5 MG: 5 TABLET, FILM COATED ORAL at 08:56

## 2023-05-12 RX ADMIN — Medication 1 CAPSULE: at 08:56

## 2023-05-12 RX ADMIN — Medication 10 ML: at 08:57

## 2023-05-12 RX ADMIN — Medication 1 CAPSULE: at 17:40

## 2023-05-12 RX ADMIN — GUAIFENESIN 600 MG: 600 TABLET, EXTENDED RELEASE ORAL at 08:56

## 2023-05-12 RX ADMIN — APIXABAN 2.5 MG: 5 TABLET, FILM COATED ORAL at 20:30

## 2023-05-12 ASSESSMENT — PAIN SCALES - GENERAL
PAINLEVEL_OUTOF10: 0
PAINLEVEL_OUTOF10: 0

## 2023-05-12 ASSESSMENT — ENCOUNTER SYMPTOMS
EYE REDNESS: 0
BACK PAIN: 0
COUGH: 1
RHINORRHEA: 0
EYE DISCHARGE: 0
ABDOMINAL PAIN: 0
WHEEZING: 0
SORE THROAT: 0
SINUS PRESSURE: 0
NAUSEA: 0
SINUS PAIN: 0
DIARRHEA: 0
CONSTIPATION: 0
SHORTNESS OF BREATH: 0

## 2023-05-13 LAB
ALBUMIN SERPL-MCNC: 2.7 G/DL (ref 3.4–5)
ANION GAP SERPL CALCULATED.3IONS-SCNC: 7 MMOL/L (ref 3–16)
ANISOCYTOSIS BLD QL SMEAR: ABNORMAL
BASOPHILS # BLD: 0 K/UL (ref 0–0.2)
BASOPHILS NFR BLD: 0 %
BUN SERPL-MCNC: 45 MG/DL (ref 7–20)
CALCIUM SERPL-MCNC: 8.6 MG/DL (ref 8.3–10.6)
CHLORIDE SERPL-SCNC: 109 MMOL/L (ref 99–110)
CO2 SERPL-SCNC: 29 MMOL/L (ref 21–32)
CREAT SERPL-MCNC: 1.9 MG/DL (ref 0.6–1.2)
DEPRECATED RDW RBC AUTO: 17.6 % (ref 12.4–15.4)
EOSINOPHIL # BLD: 0 K/UL (ref 0–0.6)
EOSINOPHIL NFR BLD: 0 %
GFR SERPLBLD CREATININE-BSD FMLA CKD-EPI: 27 ML/MIN/{1.73_M2}
GLUCOSE SERPL-MCNC: 84 MG/DL (ref 70–99)
HCT VFR BLD AUTO: 35 % (ref 36–48)
HGB BLD-MCNC: 11 G/DL (ref 12–16)
LYMPHOCYTES # BLD: 2.5 K/UL (ref 1–5.1)
LYMPHOCYTES NFR BLD: 25 %
MCH RBC QN AUTO: 30.1 PG (ref 26–34)
MCHC RBC AUTO-ENTMCNC: 31.5 G/DL (ref 31–36)
MCV RBC AUTO: 95.7 FL (ref 80–100)
MONOCYTES # BLD: 0.4 K/UL (ref 0–1.3)
MONOCYTES NFR BLD: 4 %
MYELOCYTES NFR BLD MANUAL: 1 %
NEUTROPHILS # BLD: 7 K/UL (ref 1.7–7.7)
NEUTROPHILS NFR BLD: 70 %
PHOSPHATE SERPL-MCNC: 3.2 MG/DL (ref 2.5–4.9)
PLATELET # BLD AUTO: 212 K/UL (ref 135–450)
PLATELET BLD QL SMEAR: ADEQUATE
PMV BLD AUTO: 10.1 FL (ref 5–10.5)
POTASSIUM SERPL-SCNC: 3.9 MMOL/L (ref 3.5–5.1)
RBC # BLD AUTO: 3.66 M/UL (ref 4–5.2)
SLIDE REVIEW: ABNORMAL
SODIUM SERPL-SCNC: 145 MMOL/L (ref 136–145)
WBC # BLD AUTO: 9.8 K/UL (ref 4–11)

## 2023-05-13 PROCEDURE — 99233 SBSQ HOSP IP/OBS HIGH 50: CPT | Performed by: INTERNAL MEDICINE

## 2023-05-13 PROCEDURE — 2580000003 HC RX 258: Performed by: INTERNAL MEDICINE

## 2023-05-13 PROCEDURE — 85025 COMPLETE CBC W/AUTO DIFF WBC: CPT

## 2023-05-13 PROCEDURE — 80069 RENAL FUNCTION PANEL: CPT

## 2023-05-13 PROCEDURE — 94761 N-INVAS EAR/PLS OXIMETRY MLT: CPT

## 2023-05-13 PROCEDURE — 6370000000 HC RX 637 (ALT 250 FOR IP): Performed by: INTERNAL MEDICINE

## 2023-05-13 PROCEDURE — 2700000000 HC OXYGEN THERAPY PER DAY

## 2023-05-13 PROCEDURE — 2500000003 HC RX 250 WO HCPCS: Performed by: STUDENT IN AN ORGANIZED HEALTH CARE EDUCATION/TRAINING PROGRAM

## 2023-05-13 PROCEDURE — A4216 STERILE WATER/SALINE, 10 ML: HCPCS | Performed by: STUDENT IN AN ORGANIZED HEALTH CARE EDUCATION/TRAINING PROGRAM

## 2023-05-13 PROCEDURE — 6370000000 HC RX 637 (ALT 250 FOR IP): Performed by: NURSE PRACTITIONER

## 2023-05-13 PROCEDURE — 94640 AIRWAY INHALATION TREATMENT: CPT

## 2023-05-13 PROCEDURE — 2060000000 HC ICU INTERMEDIATE R&B

## 2023-05-13 PROCEDURE — 6360000002 HC RX W HCPCS: Performed by: INTERNAL MEDICINE

## 2023-05-13 PROCEDURE — 6370000000 HC RX 637 (ALT 250 FOR IP): Performed by: EMERGENCY MEDICINE

## 2023-05-13 PROCEDURE — 2580000003 HC RX 258: Performed by: STUDENT IN AN ORGANIZED HEALTH CARE EDUCATION/TRAINING PROGRAM

## 2023-05-13 RX ADMIN — APIXABAN 2.5 MG: 5 TABLET, FILM COATED ORAL at 09:55

## 2023-05-13 RX ADMIN — SODIUM CHLORIDE: 9 INJECTION, SOLUTION INTRAVENOUS at 16:52

## 2023-05-13 RX ADMIN — Medication 10 ML: at 21:31

## 2023-05-13 RX ADMIN — Medication 10 ML: at 09:55

## 2023-05-13 RX ADMIN — GUAIFENESIN 600 MG: 600 TABLET, EXTENDED RELEASE ORAL at 09:55

## 2023-05-13 RX ADMIN — GUAIFENESIN 600 MG: 600 TABLET, EXTENDED RELEASE ORAL at 21:31

## 2023-05-13 RX ADMIN — FAMOTIDINE 10 MG: 10 INJECTION, SOLUTION INTRAVENOUS at 09:55

## 2023-05-13 RX ADMIN — FUROSEMIDE 20 MG: 20 TABLET ORAL at 09:55

## 2023-05-13 RX ADMIN — CEFTRIAXONE SODIUM 1000 MG: 1 INJECTION, POWDER, FOR SOLUTION INTRAMUSCULAR; INTRAVENOUS at 16:55

## 2023-05-13 RX ADMIN — IPRATROPIUM BROMIDE AND ALBUTEROL SULFATE 1 AMPULE: .5; 3 SOLUTION RESPIRATORY (INHALATION) at 13:17

## 2023-05-13 RX ADMIN — Medication 1 CAPSULE: at 16:55

## 2023-05-13 RX ADMIN — IPRATROPIUM BROMIDE AND ALBUTEROL SULFATE 1 AMPULE: .5; 3 SOLUTION RESPIRATORY (INHALATION) at 20:13

## 2023-05-13 RX ADMIN — IPRATROPIUM BROMIDE AND ALBUTEROL SULFATE 1 AMPULE: .5; 3 SOLUTION RESPIRATORY (INHALATION) at 07:56

## 2023-05-13 RX ADMIN — APIXABAN 2.5 MG: 5 TABLET, FILM COATED ORAL at 21:30

## 2023-05-13 RX ADMIN — Medication 1 CAPSULE: at 09:55

## 2023-05-13 ASSESSMENT — PAIN SCALES - GENERAL: PAINLEVEL_OUTOF10: 0

## 2023-05-14 LAB
ALBUMIN SERPL-MCNC: 3 G/DL (ref 3.4–5)
ANION GAP SERPL CALCULATED.3IONS-SCNC: 7 MMOL/L (ref 3–16)
ANISOCYTOSIS BLD QL SMEAR: ABNORMAL
BACTERIA BLD CULT ORG #2: NORMAL
BASOPHILS # BLD: 0 K/UL (ref 0–0.2)
BASOPHILS NFR BLD: 0 %
BUN SERPL-MCNC: 35 MG/DL (ref 7–20)
C DIFF TOX A+B STL QL IA: NORMAL
CALCIUM SERPL-MCNC: 9.1 MG/DL (ref 8.3–10.6)
CHLORIDE SERPL-SCNC: 107 MMOL/L (ref 99–110)
CO2 SERPL-SCNC: 30 MMOL/L (ref 21–32)
CREAT SERPL-MCNC: 1.3 MG/DL (ref 0.6–1.2)
DEPRECATED RDW RBC AUTO: 17.7 % (ref 12.4–15.4)
EOSINOPHIL # BLD: 0 K/UL (ref 0–0.6)
EOSINOPHIL NFR BLD: 0 %
GFR SERPLBLD CREATININE-BSD FMLA CKD-EPI: 43 ML/MIN/{1.73_M2}
GLUCOSE SERPL-MCNC: 82 MG/DL (ref 70–99)
HCT VFR BLD AUTO: 36.4 % (ref 36–48)
HGB BLD-MCNC: 11.4 G/DL (ref 12–16)
LYMPHOCYTES # BLD: 1.2 K/UL (ref 1–5.1)
LYMPHOCYTES NFR BLD: 10 %
MCH RBC QN AUTO: 29.8 PG (ref 26–34)
MCHC RBC AUTO-ENTMCNC: 31.2 G/DL (ref 31–36)
MCV RBC AUTO: 95.5 FL (ref 80–100)
MICROCYTES BLD QL SMEAR: ABNORMAL
MONOCYTES # BLD: 1.5 K/UL (ref 0–1.3)
MONOCYTES NFR BLD: 13 %
NEUTROPHILS # BLD: 8.9 K/UL (ref 1.7–7.7)
NEUTROPHILS NFR BLD: 73 %
NEUTS BAND NFR BLD MANUAL: 4 % (ref 0–7)
PATH INTERP BLD-IMP: NO
PHOSPHATE SERPL-MCNC: 2.8 MG/DL (ref 2.5–4.9)
PLATELET # BLD AUTO: 215 K/UL (ref 135–450)
PLATELET BLD QL SMEAR: ADEQUATE
PMV BLD AUTO: 9.7 FL (ref 5–10.5)
POTASSIUM SERPL-SCNC: 3.8 MMOL/L (ref 3.5–5.1)
RBC # BLD AUTO: 3.81 M/UL (ref 4–5.2)
SLIDE REVIEW: ABNORMAL
SMUDGE CELLS BLD QL SMEAR: PRESENT
SODIUM SERPL-SCNC: 144 MMOL/L (ref 136–145)
WBC # BLD AUTO: 11.6 K/UL (ref 4–11)

## 2023-05-14 PROCEDURE — A4216 STERILE WATER/SALINE, 10 ML: HCPCS | Performed by: STUDENT IN AN ORGANIZED HEALTH CARE EDUCATION/TRAINING PROGRAM

## 2023-05-14 PROCEDURE — 87449 NOS EACH ORGANISM AG IA: CPT

## 2023-05-14 PROCEDURE — 87324 CLOSTRIDIUM AG IA: CPT

## 2023-05-14 PROCEDURE — 6370000000 HC RX 637 (ALT 250 FOR IP): Performed by: INTERNAL MEDICINE

## 2023-05-14 PROCEDURE — 99233 SBSQ HOSP IP/OBS HIGH 50: CPT | Performed by: INTERNAL MEDICINE

## 2023-05-14 PROCEDURE — 36415 COLL VENOUS BLD VENIPUNCTURE: CPT

## 2023-05-14 PROCEDURE — 6360000002 HC RX W HCPCS: Performed by: INTERNAL MEDICINE

## 2023-05-14 PROCEDURE — 94669 MECHANICAL CHEST WALL OSCILL: CPT

## 2023-05-14 PROCEDURE — 2500000003 HC RX 250 WO HCPCS: Performed by: STUDENT IN AN ORGANIZED HEALTH CARE EDUCATION/TRAINING PROGRAM

## 2023-05-14 PROCEDURE — 2060000000 HC ICU INTERMEDIATE R&B

## 2023-05-14 PROCEDURE — 94660 CPAP INITIATION&MGMT: CPT

## 2023-05-14 PROCEDURE — 2580000003 HC RX 258: Performed by: INTERNAL MEDICINE

## 2023-05-14 PROCEDURE — 80069 RENAL FUNCTION PANEL: CPT

## 2023-05-14 PROCEDURE — 85025 COMPLETE CBC W/AUTO DIFF WBC: CPT

## 2023-05-14 PROCEDURE — 6370000000 HC RX 637 (ALT 250 FOR IP): Performed by: EMERGENCY MEDICINE

## 2023-05-14 PROCEDURE — 94640 AIRWAY INHALATION TREATMENT: CPT

## 2023-05-14 PROCEDURE — 94761 N-INVAS EAR/PLS OXIMETRY MLT: CPT

## 2023-05-14 PROCEDURE — 2580000003 HC RX 258: Performed by: STUDENT IN AN ORGANIZED HEALTH CARE EDUCATION/TRAINING PROGRAM

## 2023-05-14 PROCEDURE — 6370000000 HC RX 637 (ALT 250 FOR IP): Performed by: NURSE PRACTITIONER

## 2023-05-14 RX ORDER — LOPERAMIDE HYDROCHLORIDE 2 MG/1
2 CAPSULE ORAL 4 TIMES DAILY PRN
Status: DISCONTINUED | OUTPATIENT
Start: 2023-05-14 | End: 2023-05-15 | Stop reason: HOSPADM

## 2023-05-14 RX ORDER — AMLODIPINE BESYLATE 5 MG/1
10 TABLET ORAL DAILY
Status: DISCONTINUED | OUTPATIENT
Start: 2023-05-14 | End: 2023-05-15 | Stop reason: HOSPADM

## 2023-05-14 RX ORDER — LABETALOL HYDROCHLORIDE 5 MG/ML
10 INJECTION, SOLUTION INTRAVENOUS EVERY 6 HOURS PRN
Status: DISCONTINUED | OUTPATIENT
Start: 2023-05-14 | End: 2023-05-15 | Stop reason: HOSPADM

## 2023-05-14 RX ADMIN — FUROSEMIDE 20 MG: 20 TABLET ORAL at 08:06

## 2023-05-14 RX ADMIN — CEFTRIAXONE SODIUM 1000 MG: 1 INJECTION, POWDER, FOR SOLUTION INTRAMUSCULAR; INTRAVENOUS at 17:15

## 2023-05-14 RX ADMIN — Medication 1 CAPSULE: at 08:06

## 2023-05-14 RX ADMIN — Medication 10 ML: at 21:58

## 2023-05-14 RX ADMIN — IPRATROPIUM BROMIDE AND ALBUTEROL SULFATE 1 AMPULE: .5; 3 SOLUTION RESPIRATORY (INHALATION) at 20:54

## 2023-05-14 RX ADMIN — APIXABAN 2.5 MG: 5 TABLET, FILM COATED ORAL at 08:06

## 2023-05-14 RX ADMIN — Medication 1 CAPSULE: at 17:15

## 2023-05-14 RX ADMIN — AMLODIPINE BESYLATE 10 MG: 5 TABLET ORAL at 10:53

## 2023-05-14 RX ADMIN — METOPROLOL TARTRATE 75 MG: 25 TABLET, FILM COATED ORAL at 21:55

## 2023-05-14 RX ADMIN — GUAIFENESIN 600 MG: 600 TABLET, EXTENDED RELEASE ORAL at 21:57

## 2023-05-14 RX ADMIN — IPRATROPIUM BROMIDE AND ALBUTEROL SULFATE 1 AMPULE: .5; 3 SOLUTION RESPIRATORY (INHALATION) at 16:42

## 2023-05-14 RX ADMIN — METOPROLOL TARTRATE 75 MG: 25 TABLET, FILM COATED ORAL at 10:53

## 2023-05-14 RX ADMIN — GUAIFENESIN 600 MG: 600 TABLET, EXTENDED RELEASE ORAL at 08:06

## 2023-05-14 RX ADMIN — Medication 10 ML: at 08:07

## 2023-05-14 RX ADMIN — IPRATROPIUM BROMIDE AND ALBUTEROL SULFATE 1 AMPULE: .5; 3 SOLUTION RESPIRATORY (INHALATION) at 13:48

## 2023-05-14 RX ADMIN — FAMOTIDINE 10 MG: 10 INJECTION, SOLUTION INTRAVENOUS at 08:06

## 2023-05-14 ASSESSMENT — PAIN SCALES - GENERAL
PAINLEVEL_OUTOF10: 0

## 2023-05-15 VITALS
BODY MASS INDEX: 37.28 KG/M2 | HEIGHT: 67 IN | WEIGHT: 237.5 LBS | DIASTOLIC BLOOD PRESSURE: 78 MMHG | OXYGEN SATURATION: 98 % | RESPIRATION RATE: 18 BRPM | TEMPERATURE: 98.4 F | HEART RATE: 61 BPM | SYSTOLIC BLOOD PRESSURE: 129 MMHG

## 2023-05-15 LAB
ALBUMIN SERPL-MCNC: 2.7 G/DL (ref 3.4–5)
ANION GAP SERPL CALCULATED.3IONS-SCNC: 8 MMOL/L (ref 3–16)
ANISOCYTOSIS BLD QL SMEAR: ABNORMAL
BACTERIA BLD CULT ORG #2: NORMAL
BACTERIA BLD CULT: NORMAL
BASOPHILS # BLD: 0 K/UL (ref 0–0.2)
BASOPHILS NFR BLD: 0 %
BUN SERPL-MCNC: 26 MG/DL (ref 7–20)
CALCIUM SERPL-MCNC: 8.6 MG/DL (ref 8.3–10.6)
CHLORIDE SERPL-SCNC: 106 MMOL/L (ref 99–110)
CO2 SERPL-SCNC: 31 MMOL/L (ref 21–32)
CREAT SERPL-MCNC: 1.1 MG/DL (ref 0.6–1.2)
DEPRECATED RDW RBC AUTO: 17.5 % (ref 12.4–15.4)
EKG ATRIAL RATE: 58 BPM
EKG DIAGNOSIS: NORMAL
EKG P AXIS: 62 DEGREES
EKG P-R INTERVAL: 208 MS
EKG Q-T INTERVAL: 430 MS
EKG QRS DURATION: 92 MS
EKG QTC CALCULATION (BAZETT): 422 MS
EKG R AXIS: 0 DEGREES
EKG T AXIS: 36 DEGREES
EKG VENTRICULAR RATE: 58 BPM
EOSINOPHIL # BLD: 0.3 K/UL (ref 0–0.6)
EOSINOPHIL NFR BLD: 3 %
GFR SERPLBLD CREATININE-BSD FMLA CKD-EPI: 52 ML/MIN/{1.73_M2}
GLUCOSE SERPL-MCNC: 83 MG/DL (ref 70–99)
HCT VFR BLD AUTO: 34.7 % (ref 36–48)
HCT VFR BLD AUTO: 37.4 % (ref 36–48)
HGB BLD-MCNC: 11 G/DL (ref 12–16)
HGB BLD-MCNC: 11.5 G/DL (ref 12–16)
LYMPHOCYTES # BLD: 0.9 K/UL (ref 1–5.1)
LYMPHOCYTES NFR BLD: 9 %
MCH RBC QN AUTO: 29.8 PG (ref 26–34)
MCHC RBC AUTO-ENTMCNC: 31.6 G/DL (ref 31–36)
MCV RBC AUTO: 94.3 FL (ref 80–100)
MONOCYTES # BLD: 0.9 K/UL (ref 0–1.3)
MONOCYTES NFR BLD: 9 %
NEUTROPHILS # BLD: 8.1 K/UL (ref 1.7–7.7)
NEUTROPHILS NFR BLD: 79 %
OVALOCYTES BLD QL SMEAR: ABNORMAL
PHOSPHATE SERPL-MCNC: 3.2 MG/DL (ref 2.5–4.9)
PLATELET # BLD AUTO: 176 K/UL (ref 135–450)
PMV BLD AUTO: 9.5 FL (ref 5–10.5)
POTASSIUM SERPL-SCNC: 4 MMOL/L (ref 3.5–5.1)
RBC # BLD AUTO: 3.68 M/UL (ref 4–5.2)
SODIUM SERPL-SCNC: 145 MMOL/L (ref 136–145)
WBC # BLD AUTO: 10.2 K/UL (ref 4–11)

## 2023-05-15 PROCEDURE — 97535 SELF CARE MNGMENT TRAINING: CPT

## 2023-05-15 PROCEDURE — 2700000000 HC OXYGEN THERAPY PER DAY

## 2023-05-15 PROCEDURE — 94680 O2 UPTK RST&XERS DIR SIMPLE: CPT

## 2023-05-15 PROCEDURE — 6370000000 HC RX 637 (ALT 250 FOR IP): Performed by: INTERNAL MEDICINE

## 2023-05-15 PROCEDURE — 94640 AIRWAY INHALATION TREATMENT: CPT

## 2023-05-15 PROCEDURE — 6370000000 HC RX 637 (ALT 250 FOR IP): Performed by: NURSE PRACTITIONER

## 2023-05-15 PROCEDURE — 93010 ELECTROCARDIOGRAM REPORT: CPT | Performed by: INTERNAL MEDICINE

## 2023-05-15 PROCEDURE — 5A2204Z RESTORATION OF CARDIAC RHYTHM, SINGLE: ICD-10-PCS | Performed by: INTERNAL MEDICINE

## 2023-05-15 PROCEDURE — 2580000003 HC RX 258: Performed by: INTERNAL MEDICINE

## 2023-05-15 PROCEDURE — A4216 STERILE WATER/SALINE, 10 ML: HCPCS | Performed by: STUDENT IN AN ORGANIZED HEALTH CARE EDUCATION/TRAINING PROGRAM

## 2023-05-15 PROCEDURE — 7100000010 HC PHASE II RECOVERY - FIRST 15 MIN

## 2023-05-15 PROCEDURE — 94761 N-INVAS EAR/PLS OXIMETRY MLT: CPT

## 2023-05-15 PROCEDURE — 93005 ELECTROCARDIOGRAM TRACING: CPT | Performed by: INTERNAL MEDICINE

## 2023-05-15 PROCEDURE — 85014 HEMATOCRIT: CPT

## 2023-05-15 PROCEDURE — 2500000003 HC RX 250 WO HCPCS: Performed by: STUDENT IN AN ORGANIZED HEALTH CARE EDUCATION/TRAINING PROGRAM

## 2023-05-15 PROCEDURE — 99233 SBSQ HOSP IP/OBS HIGH 50: CPT | Performed by: INTERNAL MEDICINE

## 2023-05-15 PROCEDURE — 2580000003 HC RX 258: Performed by: STUDENT IN AN ORGANIZED HEALTH CARE EDUCATION/TRAINING PROGRAM

## 2023-05-15 PROCEDURE — 2500000003 HC RX 250 WO HCPCS

## 2023-05-15 PROCEDURE — 97530 THERAPEUTIC ACTIVITIES: CPT

## 2023-05-15 PROCEDURE — 92960 CARDIOVERSION ELECTRIC EXT: CPT | Performed by: INTERNAL MEDICINE

## 2023-05-15 PROCEDURE — 6370000000 HC RX 637 (ALT 250 FOR IP): Performed by: EMERGENCY MEDICINE

## 2023-05-15 PROCEDURE — 80069 RENAL FUNCTION PANEL: CPT

## 2023-05-15 PROCEDURE — 36415 COLL VENOUS BLD VENIPUNCTURE: CPT

## 2023-05-15 PROCEDURE — 85018 HEMOGLOBIN: CPT

## 2023-05-15 PROCEDURE — 94669 MECHANICAL CHEST WALL OSCILL: CPT

## 2023-05-15 PROCEDURE — 85025 COMPLETE CBC W/AUTO DIFF WBC: CPT

## 2023-05-15 PROCEDURE — 92960 CARDIOVERSION ELECTRIC EXT: CPT

## 2023-05-15 RX ORDER — CEFUROXIME AXETIL 250 MG/1
500 TABLET ORAL EVERY 12 HOURS SCHEDULED
Status: DISCONTINUED | OUTPATIENT
Start: 2023-05-15 | End: 2023-05-15 | Stop reason: HOSPADM

## 2023-05-15 RX ORDER — SODIUM CHLORIDE 0.9 % (FLUSH) 0.9 %
5-40 SYRINGE (ML) INJECTION PRN
Status: DISCONTINUED | OUTPATIENT
Start: 2023-05-15 | End: 2023-05-15 | Stop reason: HOSPADM

## 2023-05-15 RX ORDER — LINEZOLID 600 MG/1
600 TABLET, FILM COATED ORAL EVERY 12 HOURS SCHEDULED
Status: DISCONTINUED | OUTPATIENT
Start: 2023-05-15 | End: 2023-05-15 | Stop reason: HOSPADM

## 2023-05-15 RX ORDER — GUAIFENESIN 600 MG/1
600 TABLET, EXTENDED RELEASE ORAL 2 TIMES DAILY
Qty: 30 TABLET | Refills: 0 | Status: SHIPPED | OUTPATIENT
Start: 2023-05-15

## 2023-05-15 RX ORDER — LACTOBACILLUS RHAMNOSUS GG 10B CELL
1 CAPSULE ORAL 2 TIMES DAILY WITH MEALS
Qty: 28 CAPSULE | Refills: 0 | Status: SHIPPED | OUTPATIENT
Start: 2023-05-15 | End: 2023-05-29

## 2023-05-15 RX ORDER — LINEZOLID 600 MG/1
600 TABLET, FILM COATED ORAL EVERY 12 HOURS SCHEDULED
Qty: 7 TABLET | Refills: 0 | Status: SHIPPED | OUTPATIENT
Start: 2023-05-15 | End: 2023-05-19

## 2023-05-15 RX ORDER — CEFUROXIME AXETIL 250 MG/1
500 TABLET ORAL EVERY 12 HOURS SCHEDULED
Status: DISCONTINUED | OUTPATIENT
Start: 2023-05-15 | End: 2023-05-15

## 2023-05-15 RX ORDER — AMLODIPINE BESYLATE 10 MG/1
10 TABLET ORAL DAILY
Qty: 30 TABLET | Refills: 3 | Status: SHIPPED | OUTPATIENT
Start: 2023-05-16

## 2023-05-15 RX ORDER — CEFUROXIME AXETIL 500 MG/1
500 TABLET ORAL EVERY 12 HOURS SCHEDULED
Qty: 20 TABLET | Refills: 0 | Status: SHIPPED | OUTPATIENT
Start: 2023-05-15 | End: 2023-05-15 | Stop reason: SDUPTHER

## 2023-05-15 RX ORDER — CEFUROXIME AXETIL 500 MG/1
500 TABLET ORAL EVERY 12 HOURS SCHEDULED
Qty: 28 TABLET | Refills: 0 | Status: SHIPPED | OUTPATIENT
Start: 2023-05-15 | End: 2023-05-29

## 2023-05-15 RX ADMIN — Medication 10 ML: at 09:24

## 2023-05-15 RX ADMIN — LEVOFLOXACIN 750 MG: 500 TABLET, FILM COATED ORAL at 09:13

## 2023-05-15 RX ADMIN — APIXABAN 2.5 MG: 5 TABLET, FILM COATED ORAL at 09:13

## 2023-05-15 RX ADMIN — FUROSEMIDE 20 MG: 20 TABLET ORAL at 09:13

## 2023-05-15 RX ADMIN — CEFUROXIME AXETIL 500 MG: 250 TABLET, FILM COATED ORAL at 14:48

## 2023-05-15 RX ADMIN — Medication 1 CAPSULE: at 09:24

## 2023-05-15 RX ADMIN — IPRATROPIUM BROMIDE AND ALBUTEROL SULFATE 1 AMPULE: .5; 3 SOLUTION RESPIRATORY (INHALATION) at 15:16

## 2023-05-15 RX ADMIN — METOPROLOL TARTRATE 75 MG: 25 TABLET, FILM COATED ORAL at 09:13

## 2023-05-15 RX ADMIN — LINEZOLID 600 MG: 600 TABLET, FILM COATED ORAL at 09:13

## 2023-05-15 RX ADMIN — AMLODIPINE BESYLATE 10 MG: 5 TABLET ORAL at 09:13

## 2023-05-15 RX ADMIN — FAMOTIDINE 10 MG: 10 INJECTION, SOLUTION INTRAVENOUS at 09:15

## 2023-05-15 RX ADMIN — GUAIFENESIN 600 MG: 600 TABLET, EXTENDED RELEASE ORAL at 09:13

## 2023-05-15 RX ADMIN — IPRATROPIUM BROMIDE AND ALBUTEROL SULFATE 1 AMPULE: .5; 3 SOLUTION RESPIRATORY (INHALATION) at 08:48

## 2023-05-15 RX ADMIN — IPRATROPIUM BROMIDE AND ALBUTEROL SULFATE 1 AMPULE: .5; 3 SOLUTION RESPIRATORY (INHALATION) at 11:41

## 2023-05-15 ASSESSMENT — ENCOUNTER SYMPTOMS
SINUS PAIN: 0
WHEEZING: 0
BACK PAIN: 0
SORE THROAT: 0
NAUSEA: 0
SHORTNESS OF BREATH: 0
CONSTIPATION: 0
EYE DISCHARGE: 0
RHINORRHEA: 0
COUGH: 1
DIARRHEA: 0
SINUS PRESSURE: 0
ABDOMINAL PAIN: 0
EYE REDNESS: 0

## 2023-05-15 ASSESSMENT — PAIN SCALES - GENERAL
PAINLEVEL_OUTOF10: 0
PAINLEVEL_OUTOF10: 0

## 2023-05-15 NOTE — PROCEDURES
[facility]     Electrophysiology Procedure Note       Date of Procedure: 5/15/2023  Patient's Name: Jennifer Officer  YOB: 1945   Medical Record Number: [id number]  Procedure Performed by: Elisa Humphreys MD    Procedures performed:    External Electrical cardioversion   IV sedation. Start time: 11:15 AM  Stop time: 11:25     Medication: Brevital Sodium   Sedation medication was given by physician     Indication of the procedure: Persistent atrial flutter     Details of procedure: The patient was brought to the cath lab area in a fasting and non-sedated state. The risks, benefits and alternatives of the procedure were discussed with the patient. The patient opted to proceed with the procedure. Written informed consent was signed and placed in the chart. A timeout protocol was completed to identify the patient and the procedure being performed. Then we used brevital for sedation. 50 mg Brevital was given by me as one dose, and electrical DC cardioversion was perfomred using 200J, synchronized shock. Patient was converted to sinus rhythm. The patient tolerated the procedure well and there were no complications.      Conclusion:   Successful external DC cardioversion of atrial flutter

## 2023-05-15 NOTE — RT PROTOCOL NOTE
RT Nebulizer Bronchodilator Protocol Note    There is a bronchodilator order in the chart from a provider indicating to follow the RT Bronchodilator Protocol and there is an Initiate RT Bronchodilator Protocol order as well (see protocol at bottom of note). CXR Findings:  No results found. The findings from the last RT Protocol Assessment were as follows:  Smoking: Chronic pulmonary disease  Respiratory Pattern: Dyspnea on exertion or RR 21-25 bpm  Breath Sounds: Inspiratory and expiratory or bilateral wheezing and/or rhonchi  Cough: Weak, productive  Indication for Bronchodilator Therapy: Decreased or absent breath sounds, On home bronchodilators, Wheezing associated with pulm disorder  Bronchodilator Assessment Score: 12    Aerosolized bronchodilator medication orders have been revised according to the RT Nebulizer Bronchodilator Protocol below. Respiratory Therapist to perform RT Therapy Protocol Assessment initially then follow the protocol. Repeat RT Therapy Protocol Assessment PRN for score 0-3 or on second treatment, BID, and PRN for scores above 3. No Indications - adjust the frequency to every 6 hours PRN wheezing or bronchospasm, if no treatments needed after 48 hours then discontinue using Per Protocol order mode. If indication present, adjust the RT bronchodilator orders based on the Bronchodilator Assessment Score as indicated below. If a patient is on this medication at home then do not decrease Frequency below that used at home. 0-3 - enter or revise RT bronchodilator order(s) to equivalent RT Bronchodilator order with Frequency of every 4 hours PRN for wheezing or increased work of breathing using Per Protocol order mode. 4-6 - enter or revise RT Bronchodilator order(s) to two equivalent RT bronchodilator orders with one order with BID Frequency and one order with Frequency of every 4 hours PRN wheezing or increased work of breathing using Per Protocol order mode.

## 2023-05-15 NOTE — PLAN OF CARE
Problem: Discharge Planning  Goal: Discharge to home or other facility with appropriate resources  5/15/2023 1415 by Kalpesh Page RN  Outcome: Completed  Flowsheets (Taken 5/15/2023 0905)  Discharge to home or other facility with appropriate resources: Identify barriers to discharge with patient and caregiver  5/15/2023 0852 by Kalpesh Page RN  Outcome: Progressing     Problem: Chronic Conditions and Co-morbidities  Goal: Patient's chronic conditions and co-morbidity symptoms are monitored and maintained or improved  5/15/2023 1415 by Kalpesh Page RN  Outcome: Completed  Flowsheets (Taken 5/15/2023 0905)  Care Plan - Patient's Chronic Conditions and Co-Morbidity Symptoms are Monitored and Maintained or Improved: Monitor and assess patient's chronic conditions and comorbid symptoms for stability, deterioration, or improvement  5/15/2023 0852 by Kalpesh Page RN  Outcome: Progressing     Problem: Safety - Adult  Goal: Free from fall injury  5/15/2023 1415 by Kalpesh Page RN  Outcome: Completed  5/15/2023 0852 by Kalpesh Page RN  Outcome: Progressing     Problem: Skin/Tissue Integrity  Goal: Absence of new skin breakdown  Description: 1. Monitor for areas of redness and/or skin breakdown  2. Assess vascular access sites hourly  3. Every 4-6 hours minimum:  Change oxygen saturation probe site  4. Every 4-6 hours:  If on nasal continuous positive airway pressure, respiratory therapy assess nares and determine need for appliance change or resting period.   5/15/2023 1415 by Kalpesh Page RN  Outcome: Completed  5/15/2023 0852 by Kalpesh Page RN  Outcome: Progressing     Problem: Pain  Goal: Verbalizes/displays adequate comfort level or baseline comfort level  5/15/2023 1415 by Kalpesh Page RN  Outcome: Completed  Flowsheets (Taken 5/15/2023 1145)  Verbalizes/displays adequate comfort level or baseline comfort level: Assess pain using appropriate pain scale  5/15/2023 0852 by Kalpesh Page

## 2023-05-15 NOTE — CARE COORDINATION
CM met with patient and she is declining snf and home care services.        Fidelia Rosario, RN, BSN  [phone]

## 2023-05-15 NOTE — PROGRESS NOTES
05/14/23 2102   Treatment   Treatment Type Vibratory mucous clearing therapy or intervention
05/15/23 0112   RT Protocol   History Pulmonary Disease 2   Respiratory pattern 2   Breath sounds 6   Cough 2   Indications for Bronchodilator Therapy Decreased or absent breath sounds; On home bronchodilators; Wheezing associated with pulm disorder   Bronchodilator Assessment Score 12
05/15/23 1517   Resting (Room Air)   SpO2 87   HR 66   Resting (On O2)   SpO2 89   HR 65   O2 Device Nasal cannula   O2 Flow Rate (l/min) 1 l/min   Comments 2L nc = 93, HR 66   During Walk (On O2)   SpO2 97   HR 71   O2 Device Nasal cannula   O2 Flow Rate (l/min) 2 l/min   Need Additional O2 Flow Rate Rows No   Rate of Dyspnea 2   Symptoms Shortness of breath; Fatigue   Comments patient performed \"shadow boxing\" in bed until unable to continue   After Walk   SpO2 98   HR 61   O2 Device Nasal cannula   O2 Flow Rate (l/min) 2 l/min   Rate of Dyspnea 0   Symptoms Shortness of breath   Does the Patient Qualify for Home O2 Yes   Liter Flow at Rest 2   Liter Flow on Exertion 2   Does the Patient Need Portable Oxygen Tanks Yes
CLINICAL PHARMACY NOTE: MEDS TO BEDS    Total # of Prescriptions Filled: 5   The following medications were delivered to the patient:  Amlodipine 10 mg  Zyvox 600 mg  Mucus relief 600 mg  Acidophilus  Cefuroxime 500 mg    Additional Documentation:  Delivered to patient=signed  Ok to be delivered per Skyler Hernandez CPhT
[facility]   Phone: [phone]    Physical Therapy    [] Initial Evaluation            [x] Daily Treatment Note         [] Discharge Summary      Patient: Renny Garcia   : 1945   MRN: [id number]   Date of Service:  5/15/2023  Admitting Diagnosis: Acute respiratory failure with hypoxia [facility])  Current Admission Summary: Monica Swan is a 79 yo female with a history of atrial fibrillation and flutter, CKD stage IIIb, iron deficiency anemia, chronic diastolic HF, PVD, HTN who came in by EMS for shortness of breath. She had been feeling short of breath and having a productive cough for the past 3 days. In the ED, she was desaturating to the 70s on room air, improved with a nonrebreather to 90%. Her blood pressure was low to the 70s, responded to 250 cc fluid bolus to 695 systolic. No home oxygen requirements. Denies chest pain, fever. Past Medical History:  has a past medical history of Arthritis, Atrial fibrillation and flutter ([address] Hypertension, Kidney disease, Pneumonia, and Sleep apnea. Past Surgical History:  has a past surgical history that includes Tubal ligation; Total knee arthroplasty (Bilateral, 2012); fracture surgery; Colonoscopy (N/A, 2018); Upper gastrointestinal endoscopy (N/A, 2018); Cardioversion; Gastric bypass surgery; Larynx surgery; Upper gastrointestinal endoscopy (N/A, 2022); Colonoscopy (N/A, 2022); and Upper gastrointestinal endoscopy (N/A, 2022). Discharge Recommendations: Renny Garcia scored a 14/24 on the AM-PAC short mobility form. Current research shows that an AM-PAC score of 17 or less is typically not associated with a discharge to the patient's home setting. Based on the patient's AM-PAC score and their current functional mobility deficits, it is recommended that the patient have 3-5 sessions per week of Physical Therapy at d/c to increase the patient's independence.
[facility] [facility]   Phone: [phone]    Occupational Therapy    [] Initial Evaluation            [x] Daily Treatment Note         [] Discharge Summary      Patient: Estiven Gonzales   : 1945   MRN: [id number]   Date of Service:  5/15/2023    Admitting Diagnosis:  Acute respiratory failure with hypoxia [facility])  Current Admission Summary: Tamera Bernheim is a 79 yo female with a history of atrial fibrillation and flutter, CKD stage IIIb, iron deficiency anemia, chronic diastolic HF, PVD, HTN who came in by EMS for shortness of breath. She had been feeling short of breath and having a productive cough for the past 3 days. In the ED, she was desaturating to the 70s on room air, improved with a nonrebreather to 90%. Her blood pressure was low to the 70s, responded to 250 cc fluid bolus to 607 systolic. No home oxygen requirements. Denies chest pain, fever. Past Medical History:  has a past medical history of Arthritis, Atrial fibrillation and flutter ([address] Hypertension, Kidney disease, Pneumonia, and Sleep apnea. Past Surgical History:  has a past surgical history that includes Tubal ligation; Total knee arthroplasty (Bilateral, 2012); fracture surgery; Colonoscopy (N/A, 2018); Upper gastrointestinal endoscopy (N/A, 2018); Cardioversion; Gastric bypass surgery; Larynx surgery; Upper gastrointestinal endoscopy (N/A, 2022); Colonoscopy (N/A, 2022); and Upper gastrointestinal endoscopy (N/A, 2022). Discharge Recommendations: Estiven Gonzales scored a 14/24 on the AM-PAC ADL Inpatient form. Current research shows that an AM-PAC score of 17 or less is typically not associated with a discharge to the patient's home setting. Based on the patient's AM-PAC score and their current ADL deficits, it is recommended that the patient have 3-5 sessions per week of Occupational Therapy at d/c to increase the patient's independence.
Has remained NPO for scheduled cardioversion. Eliquis also held.
Nutrition Note    RECOMMENDATIONS  PO Diet: easy to chew, cardiac  ONS: n/a      NUTRITION ASSESSMENT   Pt has been tolerating easy to chew diet. NPO this morning for cardioversion. Pt reports appetite has been good and she is eating well. Pt had no nutrition questions or concerns. Will follow at low risk. Nutrition Related Findings: lasix; non-pitting BLE edema; 5/14 LBM  Wounds: None (redness to buttocks)  Nutrition Education:  Education not indicated   Nutrition Goals: PO intake 75% or greater     MALNUTRITION ASSESSMENT      Malnutrition Status: No malnutrition      NUTRITION DIAGNOSIS   No nutrition diagnosis at this time       CURRENT NUTRITION THERAPIES  ADULT DIET; Easy to Chew; Low Fat/Low Chol/High Fiber/SERGE     PO Intake: 51-75%, %   PO Supplement Intake:None Ordered      ANTHROPOMETRICS  Current Height: 5' 7\" (170.2 cm)  Current Weight - Scale: 237 lb 8 oz (107.7 kg)    Admission weight: 243 lb (110.2 kg)  Ideal Body Weight (IBW): 135 lbs  (61 kg)        BMI: 37.1        The patient will be monitored per nutrition standards of care. Consult dietitian if additional nutrition interventions are needed prior to RD reassessment.      Te Hinkle, RD, LD    Contact: [phone]
Patient refused to  do I/S, stating she \"had one of those at home, I'm leaving and don't need another one\".  I/S left at bedside
RN discharge summary from [address] to home. This patient has had a discharge order placed. They are returning home and being picked up in the lobby. Discharge paperwork has been printed, highlighted, and gone over with the patient by this RN. Patient understands teaching and has no further questions at this time. IV has been removed with no complications. Telemetry has been removed. Pt has all belongings present. [address] daughter transporting patient home.
Shift assessment completed. Routine vitals stable. Scheduled medications given. Patient is awake, alert and oriented. Respirations are easy and unlabored. Patient does not appear to be in distress. Call light within reach. Patient noted with nonproductive cough but no c/o voiced.
Speech Language Pathology  Attempt    Vic Ly  1945    Attempted to see pt for dysphagia therapy this date. Pt currently being held NPO for scheduled cardioversion. Will attempt to follow-up as schedule allows.      Thank you,     Luzma Steiner M.A., [address] [place] [address]  Speech-Language Pathologist
[facility]   Nephrology progress Note  [phone]   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 She now presents to the ER complaining of shortness of breath of 3 days duration accompanied by a nonproductive cough. At home she does not use oxygen however in the ER her sats were in the 70s chest x-ray suggested pulmonary edema however CT was more specific with bilateral pneumonia her blood pressure was low and she was admitted for septic shock    SUBJECTIVE:   Patient progress reviewed. The patient has a non productive wet cough   Underwent Cardioversion by Dr Jordy Arevalo today     Physical Exam:    VITALS:  BP (!) 151/86   Pulse 97   Temp 98 °F (36.7 °C) (Oral)   Resp 16   Wt 237 lb 8 oz (107.7 kg)   SpO2 92%   BMI 37.20 kg/m²   BLOOD PRESSURE RANGE:  Systolic (15MWR), PES:830 , Min:117 , TVZ:302   ; Diastolic (93SFG), VCM:55, Min:73, Max:107    24HR INTAKE/OUTPUT:    Intake/Output Summary (Last 24 hours) at 5/15/2023 1031  Last data filed at 5/15/2023 0427  Gross per 24 hour   Intake 120 ml   Output 800 ml   Net -680 ml       Gen:  alert, oriented x 3  PERRL , EOM +  Pallor +, No icterus  JVP not raised   CV: RRR no murmur or rub . Lungs:B/ L air entry, B/ L Conducted sounds, ? Bronchial breathing Rt base  Abd: soft, bowel sounds + , No organomegaly   Ext: No edema, no cyanosis  Skin: Warm.   No rash  Neuro: nonfocal.      DATA:    CBC with Differential:    Lab Results   Component Value Date/Time    WBC 10.2 05/15/2023 04:35 AM    RBC 3.68 05/15/2023 04:35 AM    HGB 11.0
patient (see notes).     Mallampati Airway Assessment:  Class II     Prior History of Anesthesia Complications:   None    ASA Classification:  Class 3 - A patient with severe systemic disease that limits activity but is not incapacitating    Sedation/ Anesthesia Plan:   Intravenous sedation    Medications Planned:   Brevital intravenously     Patient is an appropriate candidate for plan of sedation:   Yes    Electronically signed by Sebastian Campos MD on 5/15/2023 at 11:20 AM
02/14/2023           Imaging: All pertinent images and reports for the current visit were reviewed by me during this visit. I reviewed the chest x-ray/CT scan/MRI images and independently interpreted the findings and results today. CT CHEST WO CONTRAST   Final Result   Progression of multifocal patchy airspace opacities in both lungs with   bronchial thickening suggesting multifocal pneumonia         CT ABDOMEN PELVIS WO CONTRAST Additional Contrast? None   Final Result   1. Consolidations in the right middle lobe, right lower lobe, and left lower   lobe. Please correlate with clinical symptoms of pneumonia. 2. No acute abnormality in the abdomen or pelvis. Specifically, no evidence   of residual abscess. 3. 1 mm nonobstructing left renal stone. 4. Sigmoid diverticulosis with no evidence of diverticulitis. XR CHEST PORTABLE   Final Result   1. Bilateral perihilar opacities. Although appearance favors pulmonary   edema, multilobar pneumonia can have the same appearance, depending on the   clinical context. Medications: All current and past medications were reviewed.      levoFLOXacin  750 mg Oral Daily    linezolid  600 mg Oral 2 times per day    amLODIPine  10 mg Oral Daily    metoprolol tartrate  75 mg Oral BID    furosemide  20 mg Oral Daily    guaiFENesin  600 mg Oral BID    lactobacillus  1 capsule Oral BID WC    ipratropium-albuterol  1 ampule Inhalation Q4H WA    sodium chloride flush  5-40 mL IntraVENous 2 times per day    famotidine (PEPCID) injection  10 mg IntraVENous Daily    apixaban  2.5 mg Oral BID        sodium chloride 10 mL/hr at 05/13/23 1652       sodium chloride flush, labetalol, loperamide, sodium chloride flush, sodium chloride, ondansetron **OR** ondansetron, polyethylene glycol, acetaminophen **OR** acetaminophen, ipratropium-albuterol, perflutren lipid microspheres      Problem list:       Patient Active Problem List   Diagnosis Code    Atrial fibrillation,

## 2023-05-15 NOTE — PLAN OF CARE
Problem: Genitourinary - Adult  Goal: Absence of urinary retention  Outcome: Progressing  Goal: Urinary catheter remains patent  Outcome: Progressing     Problem: Infection - Adult  Goal: Absence of infection at discharge  Recent Flowsheet Documentation  Taken 5/15/2023 0905 by Niraj Jansen RN  Absence of infection at discharge: Assess and monitor for signs and symptoms of infection  5/15/2023 0852 by Niraj Jansen RN  Outcome: Progressing     Problem: Metabolic/Fluid and Electrolytes - Adult  Goal: Electrolytes maintained within normal limits  Recent Flowsheet Documentation  Taken 5/15/2023 0905 by Niraj Jansen RN  Electrolytes maintained within normal limits: Monitor labs and assess patient for signs and symptoms of electrolyte imbalances  5/15/2023 0852 by Niraj Jansen RN  Outcome: Progressing  Goal: Hemodynamic stability and optimal renal function maintained  Recent Flowsheet Documentation  Taken 5/15/2023 0905 by Niraj Jansen RN  Hemodynamic stability and optimal renal function maintained: Monitor labs and assess for signs and symptoms of volume excess or deficit  5/15/2023 0852 by Niraj Jansen RN  Outcome: Progressing     Problem: Hematologic - Adult  Goal: Maintains hematologic stability  Recent Flowsheet Documentation  Taken 5/15/2023 0905 by Niraj Jansen RN  Maintains hematologic stability: Assess for signs and symptoms of bleeding or hemorrhage  5/15/2023 0852 by Niraj Jansen RN  Outcome: Progressing

## 2023-05-15 NOTE — DISCHARGE SUMMARY
Patient: Renny Garcia     Gender: female  : 1945   Age: 78 y.o.   MRN: [id number]    Admitting Physician: Izabella Alcaraz MD  Discharge Physician: Heriberto Candelario MD     Code Status: Full Code     Admit Date: 2023   Discharge Date:5/15/23      Disposition:  Home    Discharge Diagnoses:  Septic shock secondary to Streptococcus bacteremia with Acute respiratory failure secondary to Streptococcus pneumonia  Acute kidney injury on baseline of CKD stage III resolved  Atrial fibrillation/atrial flutter status post electrical cardioversion  Chronic diastolic heart failure    Follow-up appointments:  one week    Outpatient to do list: none     Condition at Discharge:  [address] Course:   51-year-old admitted to the hospital with shortness of breath on admission she required BiPAP chest x-ray showed pneumonia patient was positive for influenza virus and blood cultures were positive for Streptococcus nasal MRSA was also positive she will started on meropenem Zyvox and azithromycin  She also noted to have JUSTINA nephrology were consulted cardiology was also consulted    Septic shock with Streptococcus bacteremia with acute respiratory failure secondary to Streptococcus pneumonia pneumonia  Shock - resolved  -Tachycardia, tachypnea, and hypotensive on admission  -CT chest shows multifocal patchy airspace disease indicative of multifocal pneumonia  -Likely 2/2 pneumonia  -Blood culture positive for strep pneumo  -Repeat blood cultures NTD  -MRSA nares positive  -Positive for parainfluenza virus on respiratory panel  Treated with pressors in ICU did well pressors were stopped she was transferred to the floor she was changed with IV ceftriaxone infectious disease were following she has been discharged on Ceftin for 2 weeks Zyvox for total of 1 week her oxygen was also weaned off has been given an incentive    JUSTINA on CKD III - improving  -Creatinine 3.4 on admission; baseline approximately 1.4-1.6  Seen by

## 2023-05-15 NOTE — PLAN OF CARE
Problem: Discharge Planning  Goal: Discharge to home or other facility with appropriate resources  Outcome: Progressing     Problem: Chronic Conditions and Co-morbidities  Goal: Patient's chronic conditions and co-morbidity symptoms are monitored and maintained or improved  Outcome: Progressing     Problem: Safety - Adult  Goal: Free from fall injury  Outcome: Progressing     Problem: Skin/Tissue Integrity  Goal: Absence of new skin breakdown  Description: 1. Monitor for areas of redness and/or skin breakdown  2. Assess vascular access sites hourly  3. Every 4-6 hours minimum:  Change oxygen saturation probe site  4. Every 4-6 hours:  If on nasal continuous positive airway pressure, respiratory therapy assess nares and determine need for appliance change or resting period.   Outcome: Progressing     Problem: Pain  Goal: Verbalizes/displays adequate comfort level or baseline comfort level  Outcome: Progressing  Flowsheets (Taken 5/15/2023 0846)  Verbalizes/displays adequate comfort level or baseline comfort level: Assess pain using appropriate pain scale     Problem: Cardiovascular - Adult  Goal: Maintains optimal cardiac output and hemodynamic stability  Outcome: Progressing     Problem: Skin/Tissue Integrity - Adult  Goal: Skin integrity remains intact  Outcome: Progressing     Problem: Musculoskeletal - Adult  Goal: Return mobility to safest level of function  Outcome: Progressing     Problem: Gastrointestinal - Adult  Goal: Minimal or absence of nausea and vomiting  Outcome: Progressing  Goal: Maintains or returns to baseline bowel function  Outcome: Progressing  Goal: Maintains adequate nutritional intake  Outcome: Progressing     Problem: Genitourinary - Adult  Goal: Absence of urinary retention  Outcome: Progressing  Goal: Urinary catheter remains patent  Outcome: Progressing     Problem: Infection - Adult  Goal: Absence of infection at discharge  Outcome: Progressing     Problem: Metabolic/Fluid and

## 2023-05-15 NOTE — CARE COORDINATION
05/15/23 1415   IMM Letter   IMM Letter given to Patient/Family/Significant other/Guardian/POA/by: IMM Letter given to Patient by SW. Patient signed IMM and is in agreement with D/C home.    IMM Letter date given: 05/15/23   IMM Letter time given: 1415         Electronically signed by FELIPA Winslow on 5/15/2023 at 2:16 PM

## 2023-05-15 NOTE — CARE COORDINATION
Chart reviewed for discharge planning. Pt having cardioversion today. Now on oral antibiotics. Message sent to [facility] for updated therapy evaluations. CM will meet with pt to see if she is agreeable to hc services. Last evals on 5/11. Pt scored 15/24 with PT at that time and 13/24 with OT.       Nguyễn Camacho RN, BSN  [phone]

## 2023-05-16 ENCOUNTER — CARE COORDINATION (OUTPATIENT)
Dept: CASE MANAGEMENT | Age: 78
End: 2023-05-16

## 2023-05-16 NOTE — CARE COORDINATION
Care Transitions Outreach Attempt    Call within 2 business days of discharge: Yes     Initial 24 hr call attempted, contact info left on vm. Patient: Renny Garcia Patient : 1945 MRN: [id number]    Last Discharge  Street       Date Complaint Diagnosis Description Type Department Provider    23 Shortness of Breath Acute on chronic congestive heart failure, unspecified heart failure type ([facility] [address] . .. ED to Hosp-Admission (Discharged) (ADMITTED) Trisha Tate MD; JOSE Bennett. Was this an external facility discharge?  No Discharge Facility:     Noted following upcoming appointments from discharge chart review:   [facility] follow up appointment(s):   Future Appointments   Date Time Provider Inge Van   2023  3:20 PM Reny Wooten, [address] [place] [address]     Naldo Platt RN

## 2023-05-17 ENCOUNTER — CARE COORDINATION (OUTPATIENT)
Dept: CASE MANAGEMENT | Age: 78
End: 2023-05-17

## 2023-05-17 DIAGNOSIS — J18.9 ATYPICAL PNEUMONIA: Primary | ICD-10-CM

## 2023-05-17 PROCEDURE — 1111F DSCHRG MED/CURRENT MED MERGE: CPT | Performed by: NURSE PRACTITIONER

## 2023-05-17 NOTE — CARE COORDINATION
[facility] Care Transitions Initial Follow Up Call    Call within 2 business days of discharge: Yes    Patient Current Location:  Home: [address] [place] [address]    Care Transition Nurse contacted the patient by telephone to perform post hospital discharge assessment. Verified name and  with patient as identifiers. Provided introduction to self, and explanation of the Care Transition Nurse role. Patient: Siddhartha Larios Patient : 1945   MRN: [id number]  Reason for Admission:   Discharge Date: 5/15/23 RARS: Readmission Risk Score: 19.1      Last Discharge [address]       Date Complaint Diagnosis Description Type Department Provider    23 Shortness of Breath Acute on chronic congestive heart failure, unspecified heart failure type ([facility] [address] . .. ED to Hosp-Admission (Discharged) (ADMITTED) Wai Sotomayor MD; JOSE Degroot. Was this an external facility discharge? No Discharge Facility:     Challenges to be reviewed by the provider   Additional needs identified to be addressed with provider: No  none               Method of communication with provider: none. Pt states doing well, no issues or concerns. Glad to be home. Denies SOB, CP, palpitations, fever. Using O2 as ordered. F/U appts listed below. Agreed to more CTC f/u calls. Care Transition Nurse reviewed discharge instructions with patient who verbalized understanding. The patient was given an opportunity to ask questions and does not have any further questions or concerns at this time. Were discharge instructions available to patient? Yes. Reviewed appropriate site of care based on symptoms and resources available to patient including: When to call 911. The patient agrees to contact the PCP office for questions related to their healthcare. Advance Care Planning:   Does patient have an Advance Directive: reviewed and current.     Medication reconciliation was performed with patient, who verbalizes

## 2023-05-24 ENCOUNTER — CARE COORDINATION (OUTPATIENT)
Dept: CASE MANAGEMENT | Age: 78
End: 2023-05-24

## 2023-05-24 NOTE — CARE COORDINATION
Care Transitions Outreach Attempt    Follow up call attempted, left contact info on vm. Patient: Alex Cummings Patient : 1945 MRN: [id number]    Last Discharge [address]       Date Complaint Diagnosis Description Type Department Provider    23 Shortness of Breath Acute on chronic congestive heart failure, unspecified heart failure type ([facility] [address] . .. ED to Hosp-Admission (Discharged) (ADMITTED) David Narvaez MD; JOSE Finnegan.           Noted following upcoming appointments from discharge chart review:   [facility] follow up appointment(s):   Future Appointments   Date Time Provider Inge Van   2023  3:20 PM Julio Cesar Lindsey, 1945 Yovany Boyle RN

## 2023-05-26 ENCOUNTER — CARE COORDINATION (OUTPATIENT)
Dept: CASE MANAGEMENT | Age: 78
End: 2023-05-26

## 2023-05-26 NOTE — CARE COORDINATION
Care Transitions Outreach Attempt    2nd and final attempt at a Follow up call, left contact info on . Patient: Merlyn Scott Patient : 1945 MRN: [id number]    Last Discharge  Street       Date Complaint Diagnosis Description Type Department Provider    23 Shortness of Breath Acute on chronic congestive heart failure, unspecified heart failure type ([facility] [address] . .. ED to Hosp-Admission (Discharged) (ADMITTED) Eve Dugan MD; Dominga Boxer, M...           Noted following upcoming appointments from discharge chart review:   [address] follow up appointment(s):   Future Appointments   Date Time Provider Inge Van   2023  3:20 PM Deepak Phillip, [address] [place] [address], RN  [phone]

## 2023-06-10 PROBLEM — N39.0 UTI (URINARY TRACT INFECTION): Status: RESOLVED | Noted: 2023-05-11 | Resolved: 2023-06-10

## 2023-06-27 ENCOUNTER — TELEPHONE (OUTPATIENT)
Dept: INTERNAL MEDICINE CLINIC | Age: 78
End: 2023-06-27

## 2023-06-27 DIAGNOSIS — M81.0 AGE-RELATED OSTEOPOROSIS WITHOUT CURRENT PATHOLOGICAL FRACTURE: ICD-10-CM

## 2023-06-27 DIAGNOSIS — M15.9 PRIMARY OSTEOARTHRITIS INVOLVING MULTIPLE JOINTS: ICD-10-CM

## 2023-06-28 RX ORDER — ALENDRONATE SODIUM 70 MG/1
70 TABLET ORAL
Qty: 12 TABLET | Refills: 3 | Status: SHIPPED | OUTPATIENT
Start: 2023-06-28

## 2023-06-28 RX ORDER — AMLODIPINE BESYLATE 10 MG/1
10 TABLET ORAL DAILY
Qty: 30 TABLET | Refills: 3 | Status: SHIPPED | OUTPATIENT
Start: 2023-06-28

## 2023-06-28 RX ORDER — METOPROLOL TARTRATE 75 MG/1
75 TABLET, FILM COATED ORAL 2 TIMES DAILY
Qty: 60 TABLET | Refills: 3 | Status: SHIPPED | OUTPATIENT
Start: 2023-06-28

## 2023-06-30 RX ORDER — TRAMADOL HYDROCHLORIDE 50 MG/1
50 TABLET ORAL DAILY PRN
Qty: 30 TABLET | Refills: 0 | Status: SHIPPED | OUTPATIENT
Start: 2023-06-30 | End: 2023-07-30

## 2023-07-05 RX ORDER — CLONIDINE HYDROCHLORIDE 0.2 MG/1
TABLET ORAL
Qty: 180 TABLET | Refills: 1 | Status: SHIPPED | OUTPATIENT
Start: 2023-07-05

## 2023-08-07 ENCOUNTER — OFFICE VISIT (OUTPATIENT)
Dept: INTERNAL MEDICINE CLINIC | Age: 78
End: 2023-08-07
Payer: COMMERCIAL

## 2023-08-07 VITALS — DIASTOLIC BLOOD PRESSURE: 72 MMHG | SYSTOLIC BLOOD PRESSURE: 126 MMHG | HEART RATE: 65 BPM | OXYGEN SATURATION: 95 %

## 2023-08-07 DIAGNOSIS — I48.91 ATRIAL FIBRILLATION AND FLUTTER (HCC): ICD-10-CM

## 2023-08-07 DIAGNOSIS — J84.9 ILD (INTERSTITIAL LUNG DISEASE) (HCC): ICD-10-CM

## 2023-08-07 DIAGNOSIS — I48.92 ATRIAL FIBRILLATION AND FLUTTER (HCC): ICD-10-CM

## 2023-08-07 DIAGNOSIS — I50.32 CHRONIC DIASTOLIC CONGESTIVE HEART FAILURE (HCC): ICD-10-CM

## 2023-08-07 DIAGNOSIS — N18.4 CKD (CHRONIC KIDNEY DISEASE) STAGE 4, GFR 15-29 ML/MIN (HCC): ICD-10-CM

## 2023-08-07 DIAGNOSIS — M15.9 PRIMARY OSTEOARTHRITIS INVOLVING MULTIPLE JOINTS: ICD-10-CM

## 2023-08-07 DIAGNOSIS — Z00.00 MEDICARE ANNUAL WELLNESS VISIT, SUBSEQUENT: Primary | ICD-10-CM

## 2023-08-07 DIAGNOSIS — J44.9 CHRONIC OBSTRUCTIVE PULMONARY DISEASE, UNSPECIFIED COPD TYPE (HCC): ICD-10-CM

## 2023-08-07 DIAGNOSIS — I10 ESSENTIAL HYPERTENSION: ICD-10-CM

## 2023-08-07 PROCEDURE — G0439 PPPS, SUBSEQ VISIT: HCPCS | Performed by: NURSE PRACTITIONER

## 2023-08-07 PROCEDURE — 3074F SYST BP LT 130 MM HG: CPT | Performed by: NURSE PRACTITIONER

## 2023-08-07 PROCEDURE — 1123F ACP DISCUSS/DSCN MKR DOCD: CPT | Performed by: NURSE PRACTITIONER

## 2023-08-07 PROCEDURE — 3078F DIAST BP <80 MM HG: CPT | Performed by: NURSE PRACTITIONER

## 2023-08-07 ASSESSMENT — PATIENT HEALTH QUESTIONNAIRE - PHQ9
8. MOVING OR SPEAKING SO SLOWLY THAT OTHER PEOPLE COULD HAVE NOTICED. OR THE OPPOSITE, BEING SO FIGETY OR RESTLESS THAT YOU HAVE BEEN MOVING AROUND A LOT MORE THAN USUAL: 0
9. THOUGHTS THAT YOU WOULD BE BETTER OFF DEAD, OR OF HURTING YOURSELF: 0
SUM OF ALL RESPONSES TO PHQ QUESTIONS 1-9: 0
2. FEELING DOWN, DEPRESSED OR HOPELESS: 0
SUM OF ALL RESPONSES TO PHQ QUESTIONS 1-9: 0
1. LITTLE INTEREST OR PLEASURE IN DOING THINGS: 0
SUM OF ALL RESPONSES TO PHQ QUESTIONS 1-9: 0
10. IF YOU CHECKED OFF ANY PROBLEMS, HOW DIFFICULT HAVE THESE PROBLEMS MADE IT FOR YOU TO DO YOUR WORK, TAKE CARE OF THINGS AT HOME, OR GET ALONG WITH OTHER PEOPLE: 0
6. FEELING BAD ABOUT YOURSELF - OR THAT YOU ARE A FAILURE OR HAVE LET YOURSELF OR YOUR FAMILY DOWN: 0
SUM OF ALL RESPONSES TO PHQ9 QUESTIONS 1 & 2: 0
4. FEELING TIRED OR HAVING LITTLE ENERGY: 0
3. TROUBLE FALLING OR STAYING ASLEEP: 0
5. POOR APPETITE OR OVEREATING: 0
7. TROUBLE CONCENTRATING ON THINGS, SUCH AS READING THE NEWSPAPER OR WATCHING TELEVISION: 0
SUM OF ALL RESPONSES TO PHQ QUESTIONS 1-9: 0

## 2023-08-07 ASSESSMENT — LIFESTYLE VARIABLES: HOW MANY STANDARD DRINKS CONTAINING ALCOHOL DO YOU HAVE ON A TYPICAL DAY: PATIENT DOES NOT DRINK

## 2023-08-14 PROBLEM — A41.9 SEPSIS (HCC): Status: RESOLVED | Noted: 2022-12-20 | Resolved: 2023-08-14

## 2023-08-14 PROBLEM — B34.8 INFECTION DUE TO PARAINFLUENZA VIRUS 3: Status: RESOLVED | Noted: 2023-05-11 | Resolved: 2023-08-14

## 2023-08-14 PROBLEM — I95.9 HYPOTENSION: Status: RESOLVED | Noted: 2023-05-10 | Resolved: 2023-08-14

## 2023-08-14 PROBLEM — R57.9 SHOCK CIRCULATORY (HCC): Status: RESOLVED | Noted: 2023-05-10 | Resolved: 2023-08-14

## 2023-08-14 PROBLEM — J15.9 BACTERIAL PNEUMONIA: Status: RESOLVED | Noted: 2023-02-07 | Resolved: 2023-08-14

## 2023-08-14 ASSESSMENT — ENCOUNTER SYMPTOMS
GASTROINTESTINAL NEGATIVE: 1
SHORTNESS OF BREATH: 1

## 2023-08-14 NOTE — PROGRESS NOTES
Medicare Annual Wellness Visit    Angely Rousseau is here for Medicare AWV and Leg Swelling    Assessment & Plan     Medicare annual wellness visit, subsequent    Essential hypertension  - Normotensive  - she has met JNC standards.  - Continue current regimen. Chronic diastolic congestive heart failure (HCC)  - Chronic, stable  - Continue current regimen    Primary osteoarthritis involving multiple joints  - Chronic, stable  - Continue current regimen    ILD (interstitial lung disease) (HCC)  - Chronic, stable  - Continue current regimen    Chronic obstructive pulmonary disease, unspecified COPD type (HCC)  - Chronic, stable  - Continue current regimen    CKD (chronic kidney disease) stage 4, GFR 15-29 ml/min (HCC)  - Chronic, stable  - Continue current regimen    Atrial fibrillation and flutter (HCC)  - Chronic, stable  - Continue current regimen      Recommendations for Preventive Services Due: see orders and patient instructions/AVS.  Recommended screening schedule for the next 5-10 years is provided to the patient in written form: see Patient Instructions/AVS.     Return in about 4 months (around 12/7/2023), or if symptoms worsen or fail to improve. Subjective   Review of Systems   Constitutional:  Positive for fatigue. Respiratory:  Positive for shortness of breath. Cardiovascular: Negative. Negative for chest pain. Gastrointestinal: Negative. Genitourinary: Negative. Musculoskeletal:  Positive for arthralgias and gait problem. Skin: Negative. Psychiatric/Behavioral: Negative. Patient's complete Health Risk Assessment and screening values have been reviewed and are found in Flowsheets. The following problems were reviewed today and where indicated follow up appointments were made and/or referrals ordered.     Positive Risk Factor Screenings with Interventions:    Fall Risk:  Do you feel unsteady or are you worried about falling? : (!) yes  2 or more falls in past year?:

## 2023-09-20 ENCOUNTER — TELEPHONE (OUTPATIENT)
Dept: INTERNAL MEDICINE CLINIC | Age: 78
End: 2023-09-20

## 2023-09-20 RX ORDER — AMLODIPINE BESYLATE 10 MG/1
10 TABLET ORAL DAILY
Qty: 90 TABLET | Refills: 1 | Status: SHIPPED | OUTPATIENT
Start: 2023-09-20

## 2023-09-20 NOTE — TELEPHONE ENCOUNTER
Medication: Eliquis & Amlodipine    Last Filled:  unknown & 06/28/23    Patient Pharmacy: [id number]    Patient Phone Number: [phone] (home)     Last appt: 8/7/2023   Next appt: 12/4/2023    Last OARRS:       2/21/2022     5:47 PM   RX Monitoring   Periodic Controlled Substance Monitoring No signs of potential drug abuse or diversion identified.        Last Labs DM:   Lab Results   Component Value Date/Time    LABA1C 5.4 09/08/2017 11:18 AM     Last Lipid:   Lab Results   Component Value Date/Time    CHOL 142 03/15/2021 10:18 AM    TRIG 69 02/20/2023 06:20 AM    HDL 51 03/15/2021 10:18 AM    LDLCALC 80 03/15/2021 10:18 AM     Last PSA: No results found for: \"PSA\"  Last Thyroid:   Lab Results   Component Value Date/Time    TSH 2.81 03/30/2022 11:50 AM

## 2024-02-06 RX ORDER — METOPROLOL TARTRATE 75 MG/1
75 TABLET, FILM COATED ORAL 2 TIMES DAILY
Qty: 60 TABLET | Refills: 3 | Status: SHIPPED | OUTPATIENT
Start: 2024-02-06

## 2024-02-13 ENCOUNTER — TELEPHONE (OUTPATIENT)
Dept: ADMINISTRATIVE | Age: 79
End: 2024-02-13

## 2024-02-13 NOTE — TELEPHONE ENCOUNTER
Submitted PA for Metoprolol Tartrate 75 MG TABS    Via CM (Key: [id number]) STATUS: PENDING.    Follow up done daily; if no decision with in three days we will refax.  If another three days goes by with no decision will call the insurance for status.

## 2024-02-14 NOTE — TELEPHONE ENCOUNTER
The medication is APPROVED THRU 02/12/25    If this requires a response please respond to the pool ( P MHCX PSC MEDICATION PRE-AUTH).      Thank you please advise patient.

## 2024-05-21 RX ORDER — CLONIDINE HYDROCHLORIDE 0.2 MG/1
TABLET ORAL
Qty: 180 TABLET | Refills: 1 | OUTPATIENT
Start: 2024-05-21

## 2024-05-31 ENCOUNTER — HOSPITAL ENCOUNTER (INPATIENT)
Age: 79
LOS: 5 days | Discharge: HOME | DRG: 602 | End: 2024-06-06
Attending: EMERGENCY MEDICINE | Admitting: INTERNAL MEDICINE
Payer: COMMERCIAL

## 2024-05-31 DIAGNOSIS — L03.113 CELLULITIS OF RIGHT FOREARM: ICD-10-CM

## 2024-05-31 DIAGNOSIS — M79.89 SWELLING OF ARM: ICD-10-CM

## 2024-05-31 DIAGNOSIS — M79.601 RIGHT ARM PAIN: Primary | ICD-10-CM

## 2024-05-31 DIAGNOSIS — R06.02 SHORTNESS OF BREATH: ICD-10-CM

## 2024-05-31 DIAGNOSIS — M81.0 AGE-RELATED OSTEOPOROSIS WITHOUT CURRENT PATHOLOGICAL FRACTURE: ICD-10-CM

## 2024-05-31 PROCEDURE — 96365 THER/PROPH/DIAG IV INF INIT: CPT

## 2024-05-31 PROCEDURE — 96375 TX/PRO/DX INJ NEW DRUG ADDON: CPT

## 2024-05-31 PROCEDURE — 99285 EMERGENCY DEPT VISIT HI MDM: CPT

## 2024-05-31 RX ORDER — AMLODIPINE BESYLATE 10 MG/1
10 TABLET ORAL DAILY
Qty: 90 TABLET | Refills: 1 | OUTPATIENT
Start: 2024-05-31

## 2024-05-31 RX ORDER — OXYCODONE HYDROCHLORIDE AND ACETAMINOPHEN 5; 325 MG/1; MG/1
1 TABLET ORAL
Status: COMPLETED | OUTPATIENT
Start: 2024-06-01 | End: 2024-06-01

## 2024-05-31 RX ORDER — ALENDRONATE SODIUM 70 MG/1
70 TABLET ORAL
Qty: 12 TABLET | Refills: 3 | OUTPATIENT
Start: 2024-05-31

## 2024-05-31 ASSESSMENT — PAIN - FUNCTIONAL ASSESSMENT: PAIN_FUNCTIONAL_ASSESSMENT: 0-10

## 2024-05-31 ASSESSMENT — PAIN DESCRIPTION - ORIENTATION: ORIENTATION: RIGHT

## 2024-05-31 ASSESSMENT — PAIN SCALES - GENERAL: PAINLEVEL_OUTOF10: 8

## 2024-05-31 ASSESSMENT — PAIN DESCRIPTION - LOCATION: LOCATION: ARM

## 2024-06-01 ENCOUNTER — APPOINTMENT (OUTPATIENT)
Dept: GENERAL RADIOLOGY | Age: 79
DRG: 602 | End: 2024-06-01
Payer: COMMERCIAL

## 2024-06-01 ENCOUNTER — APPOINTMENT (OUTPATIENT)
Dept: CT IMAGING | Age: 79
DRG: 602 | End: 2024-06-01
Payer: COMMERCIAL

## 2024-06-01 PROBLEM — L03.113 CELLULITIS OF RIGHT UPPER EXTREMITY: Status: ACTIVE | Noted: 2024-06-01

## 2024-06-01 LAB
ALBUMIN SERPL-MCNC: 3.2 G/DL (ref 3.4–5)
ALBUMIN/GLOB SERPL: 0.8 {RATIO} (ref 1.1–2.2)
ALP SERPL-CCNC: 122 U/L (ref 40–129)
ALT SERPL-CCNC: 6 U/L (ref 10–40)
ANION GAP SERPL CALCULATED.3IONS-SCNC: 10 MMOL/L (ref 3–16)
ANION GAP SERPL CALCULATED.3IONS-SCNC: 10 MMOL/L (ref 3–16)
AST SERPL-CCNC: 12 U/L (ref 15–37)
BASOPHILS # BLD: 0 K/UL (ref 0–0.2)
BASOPHILS NFR BLD: 0.6 %
BILIRUB SERPL-MCNC: 0.7 MG/DL (ref 0–1)
BUN SERPL-MCNC: 19 MG/DL (ref 7–20)
BUN SERPL-MCNC: 19 MG/DL (ref 7–20)
CALCIUM SERPL-MCNC: 8 MG/DL (ref 8.3–10.6)
CALCIUM SERPL-MCNC: 8.5 MG/DL (ref 8.3–10.6)
CHLORIDE SERPL-SCNC: 101 MMOL/L (ref 99–110)
CHLORIDE SERPL-SCNC: 103 MMOL/L (ref 99–110)
CO2 SERPL-SCNC: 25 MMOL/L (ref 21–32)
CO2 SERPL-SCNC: 28 MMOL/L (ref 21–32)
CREAT SERPL-MCNC: 1 MG/DL (ref 0.6–1.2)
CREAT SERPL-MCNC: 1 MG/DL (ref 0.6–1.2)
DEPRECATED RDW RBC AUTO: 15.4 % (ref 12.4–15.4)
EOSINOPHIL # BLD: 0.2 K/UL (ref 0–0.6)
EOSINOPHIL NFR BLD: 2.7 %
GFR SERPLBLD CREATININE-BSD FMLA CKD-EPI: 58 ML/MIN/{1.73_M2}
GFR SERPLBLD CREATININE-BSD FMLA CKD-EPI: 58 ML/MIN/{1.73_M2}
GLUCOSE SERPL-MCNC: 91 MG/DL (ref 70–99)
GLUCOSE SERPL-MCNC: 93 MG/DL (ref 70–99)
HCT VFR BLD AUTO: 37.6 % (ref 36–48)
HGB BLD-MCNC: 12.4 G/DL (ref 12–16)
LACTATE BLDV-SCNC: 1.1 MMOL/L (ref 0.4–1.9)
LYMPHOCYTES # BLD: 1 K/UL (ref 1–5.1)
LYMPHOCYTES NFR BLD: 12.1 %
MCH RBC QN AUTO: 30.1 PG (ref 26–34)
MCHC RBC AUTO-ENTMCNC: 32.8 G/DL (ref 31–36)
MCV RBC AUTO: 91.8 FL (ref 80–100)
MONOCYTES # BLD: 1.2 K/UL (ref 0–1.3)
MONOCYTES NFR BLD: 14.7 %
NEUTROPHILS # BLD: 5.6 K/UL (ref 1.7–7.7)
NEUTROPHILS NFR BLD: 69.9 %
PLATELET # BLD AUTO: 252 K/UL (ref 135–450)
PMV BLD AUTO: 9.7 FL (ref 5–10.5)
POTASSIUM SERPL-SCNC: 4.1 MMOL/L (ref 3.5–5.1)
POTASSIUM SERPL-SCNC: 4.1 MMOL/L (ref 3.5–5.1)
PROT SERPL-MCNC: 7.3 G/DL (ref 6.4–8.2)
RBC # BLD AUTO: 4.1 M/UL (ref 4–5.2)
SODIUM SERPL-SCNC: 138 MMOL/L (ref 136–145)
SODIUM SERPL-SCNC: 139 MMOL/L (ref 136–145)
WBC # BLD AUTO: 8 K/UL (ref 4–11)

## 2024-06-01 PROCEDURE — 87040 BLOOD CULTURE FOR BACTERIA: CPT

## 2024-06-01 PROCEDURE — 1200000000 HC SEMI PRIVATE

## 2024-06-01 PROCEDURE — 73030 X-RAY EXAM OF SHOULDER: CPT

## 2024-06-01 PROCEDURE — 85025 COMPLETE CBC W/AUTO DIFF WBC: CPT

## 2024-06-01 PROCEDURE — 73130 X-RAY EXAM OF HAND: CPT

## 2024-06-01 PROCEDURE — 6360000004 HC RX CONTRAST MEDICATION: Performed by: INTERNAL MEDICINE

## 2024-06-01 PROCEDURE — 2580000003 HC RX 258: Performed by: PHYSICIAN ASSISTANT

## 2024-06-01 PROCEDURE — 70450 CT HEAD/BRAIN W/O DYE: CPT

## 2024-06-01 PROCEDURE — 80053 COMPREHEN METABOLIC PANEL: CPT

## 2024-06-01 PROCEDURE — 73090 X-RAY EXAM OF FOREARM: CPT

## 2024-06-01 PROCEDURE — 72125 CT NECK SPINE W/O DYE: CPT

## 2024-06-01 PROCEDURE — 6370000000 HC RX 637 (ALT 250 FOR IP): Performed by: PHYSICIAN ASSISTANT

## 2024-06-01 PROCEDURE — 36415 COLL VENOUS BLD VENIPUNCTURE: CPT

## 2024-06-01 PROCEDURE — 6360000002 HC RX W HCPCS: Performed by: PHYSICIAN ASSISTANT

## 2024-06-01 PROCEDURE — 70498 CT ANGIOGRAPHY NECK: CPT

## 2024-06-01 PROCEDURE — 83605 ASSAY OF LACTIC ACID: CPT

## 2024-06-01 PROCEDURE — 73070 X-RAY EXAM OF ELBOW: CPT

## 2024-06-01 PROCEDURE — 99221 1ST HOSP IP/OBS SF/LOW 40: CPT | Performed by: PHYSICIAN ASSISTANT

## 2024-06-01 RX ORDER — CLONIDINE HYDROCHLORIDE 0.1 MG/1
0.2 TABLET ORAL 2 TIMES DAILY
Status: DISCONTINUED | OUTPATIENT
Start: 2024-06-01 | End: 2024-06-06 | Stop reason: HOSPADM

## 2024-06-01 RX ORDER — AMLODIPINE BESYLATE 5 MG/1
10 TABLET ORAL DAILY
Status: DISCONTINUED | OUTPATIENT
Start: 2024-06-01 | End: 2024-06-06 | Stop reason: HOSPADM

## 2024-06-01 RX ORDER — ALBUTEROL SULFATE 90 UG/1
2 AEROSOL, METERED RESPIRATORY (INHALATION) EVERY 4 HOURS PRN
Status: DISCONTINUED | OUTPATIENT
Start: 2024-06-01 | End: 2024-06-06 | Stop reason: HOSPADM

## 2024-06-01 RX ORDER — ACETAMINOPHEN 650 MG/1
650 SUPPOSITORY RECTAL EVERY 6 HOURS PRN
Status: DISCONTINUED | OUTPATIENT
Start: 2024-06-01 | End: 2024-06-06 | Stop reason: HOSPADM

## 2024-06-01 RX ORDER — METOPROLOL TARTRATE 50 MG/1
75 TABLET, FILM COATED ORAL 2 TIMES DAILY
Status: DISCONTINUED | OUTPATIENT
Start: 2024-06-01 | End: 2024-06-06 | Stop reason: HOSPADM

## 2024-06-01 RX ORDER — SODIUM CHLORIDE 0.9 % (FLUSH) 0.9 %
5-40 SYRINGE (ML) INJECTION EVERY 12 HOURS SCHEDULED
Status: DISCONTINUED | OUTPATIENT
Start: 2024-06-01 | End: 2024-06-06 | Stop reason: HOSPADM

## 2024-06-01 RX ORDER — SENNOSIDES A AND B 8.6 MG/1
1 TABLET, FILM COATED ORAL DAILY PRN
Status: DISCONTINUED | OUTPATIENT
Start: 2024-06-01 | End: 2024-06-06 | Stop reason: HOSPADM

## 2024-06-01 RX ORDER — GUAIFENESIN 600 MG/1
600 TABLET, EXTENDED RELEASE ORAL 2 TIMES DAILY
Status: DISCONTINUED | OUTPATIENT
Start: 2024-06-01 | End: 2024-06-01

## 2024-06-01 RX ORDER — ACETAMINOPHEN 325 MG/1
650 TABLET ORAL EVERY 6 HOURS PRN
Status: DISCONTINUED | OUTPATIENT
Start: 2024-06-01 | End: 2024-06-06 | Stop reason: HOSPADM

## 2024-06-01 RX ORDER — AMIODARONE HYDROCHLORIDE 200 MG/1
200 TABLET ORAL DAILY
Status: DISCONTINUED | OUTPATIENT
Start: 2024-06-01 | End: 2024-06-01

## 2024-06-01 RX ORDER — SODIUM CHLORIDE 0.9 % (FLUSH) 0.9 %
5-40 SYRINGE (ML) INJECTION PRN
Status: DISCONTINUED | OUTPATIENT
Start: 2024-06-01 | End: 2024-06-06 | Stop reason: HOSPADM

## 2024-06-01 RX ORDER — OXYCODONE HYDROCHLORIDE AND ACETAMINOPHEN 5; 325 MG/1; MG/1
1 TABLET ORAL EVERY 4 HOURS PRN
Status: DISCONTINUED | OUTPATIENT
Start: 2024-06-01 | End: 2024-06-06 | Stop reason: HOSPADM

## 2024-06-01 RX ORDER — SODIUM CHLORIDE 9 MG/ML
INJECTION, SOLUTION INTRAVENOUS PRN
Status: DISCONTINUED | OUTPATIENT
Start: 2024-06-01 | End: 2024-06-06 | Stop reason: HOSPADM

## 2024-06-01 RX ORDER — LACTOBACILLUS RHAMNOSUS GG 10B CELL
1 CAPSULE ORAL 2 TIMES DAILY WITH MEALS
Status: DISCONTINUED | OUTPATIENT
Start: 2024-06-01 | End: 2024-06-06 | Stop reason: HOSPADM

## 2024-06-01 RX ORDER — ONDANSETRON 2 MG/ML
4 INJECTION INTRAMUSCULAR; INTRAVENOUS EVERY 6 HOURS PRN
Status: DISCONTINUED | OUTPATIENT
Start: 2024-06-01 | End: 2024-06-06 | Stop reason: HOSPADM

## 2024-06-01 RX ADMIN — METOPROLOL TARTRATE 75 MG: 50 TABLET, FILM COATED ORAL at 10:57

## 2024-06-01 RX ADMIN — SODIUM CHLORIDE, PRESERVATIVE FREE 10 ML: 5 INJECTION INTRAVENOUS at 11:01

## 2024-06-01 RX ADMIN — AMLODIPINE BESYLATE 10 MG: 5 TABLET ORAL at 10:58

## 2024-06-01 RX ADMIN — CEFEPIME 2000 MG: 2 INJECTION, POWDER, FOR SOLUTION INTRAVENOUS at 12:11

## 2024-06-01 RX ADMIN — CLONIDINE HYDROCHLORIDE 0.2 MG: 0.1 TABLET ORAL at 06:11

## 2024-06-01 RX ADMIN — IOPAMIDOL 75 ML: 755 INJECTION, SOLUTION INTRAVENOUS at 15:38

## 2024-06-01 RX ADMIN — CLONIDINE HYDROCHLORIDE 0.2 MG: 0.1 TABLET ORAL at 21:48

## 2024-06-01 RX ADMIN — APIXABAN 5 MG: 5 TABLET, FILM COATED ORAL at 06:11

## 2024-06-01 RX ADMIN — Medication 1 CAPSULE: at 17:57

## 2024-06-01 RX ADMIN — OXYCODONE HYDROCHLORIDE AND ACETAMINOPHEN 1 TABLET: 5; 325 TABLET ORAL at 00:01

## 2024-06-01 RX ADMIN — VANCOMYCIN HYDROCHLORIDE 2000 MG: 10 INJECTION, POWDER, LYOPHILIZED, FOR SOLUTION INTRAVENOUS at 01:49

## 2024-06-01 RX ADMIN — SODIUM CHLORIDE, PRESERVATIVE FREE 10 ML: 5 INJECTION INTRAVENOUS at 21:51

## 2024-06-01 RX ADMIN — Medication 1 CAPSULE: at 10:57

## 2024-06-01 RX ADMIN — OXYCODONE AND ACETAMINOPHEN 1 TABLET: 5; 325 TABLET ORAL at 10:56

## 2024-06-01 RX ADMIN — METOPROLOL TARTRATE 75 MG: 50 TABLET, FILM COATED ORAL at 21:45

## 2024-06-01 RX ADMIN — CEFEPIME 2000 MG: 2 INJECTION, POWDER, FOR SOLUTION INTRAVENOUS at 01:13

## 2024-06-01 RX ADMIN — APIXABAN 5 MG: 5 TABLET, FILM COATED ORAL at 21:48

## 2024-06-01 ASSESSMENT — PAIN DESCRIPTION - DESCRIPTORS
DESCRIPTORS: ACHING
DESCRIPTORS: STABBING
DESCRIPTORS: ACHING
DESCRIPTORS: STABBING
DESCRIPTORS: ACHING
DESCRIPTORS: ACHING

## 2024-06-01 ASSESSMENT — PAIN DESCRIPTION - LOCATION
LOCATION: ARM

## 2024-06-01 ASSESSMENT — PAIN DESCRIPTION - ORIENTATION
ORIENTATION: RIGHT

## 2024-06-01 ASSESSMENT — PAIN SCALES - GENERAL
PAINLEVEL_OUTOF10: 2
PAINLEVEL_OUTOF10: 7
PAINLEVEL_OUTOF10: 7

## 2024-06-01 ASSESSMENT — PAIN - FUNCTIONAL ASSESSMENT
PAIN_FUNCTIONAL_ASSESSMENT: INTOLERABLE, UNABLE TO DO ANY ACTIVE OR PASSIVE ACTIVITIES
PAIN_FUNCTIONAL_ASSESSMENT: PREVENTS OR INTERFERES WITH MANY ACTIVE NOT PASSIVE ACTIVITIES
PAIN_FUNCTIONAL_ASSESSMENT: PREVENTS OR INTERFERES WITH MANY ACTIVE NOT PASSIVE ACTIVITIES

## 2024-06-01 NOTE — PROGRESS NOTES
Admission assessment completed. Vitals obtained. Scheduled medications given. Patient is awake, alert and oriented/ talking to staff. Respirations are easy and unlabored with no c/o SOB. 4EYEs skin assessment completed  per writer. IV site is C/D/I. Patient does not appear to be in distress. Call light within reach. Standard safety measures are in place. All needs have been met at this time.

## 2024-06-01 NOTE — PROGRESS NOTES
Clinical Pharmacy Note: Pharmacy to Dose Vancomycin    Stacy Ly is a 78 y.o. female started on Vancomycin for Sepsis; consult received from [facility] to manage therapy. Also receiving the following antibiotics: Cefepime.    Goal AUC: 400-600 mg/L*hr    Assessment/Plan:  A 2000 mg loading dose was given on 06/01 at 0149  Initiate vancomycin 1500 mg IV every 24 hours. Bayesian modeling predicts an AUC of 489 mg/L*hr and a trough of 13.7 mcg/mL at steady state concentration.  A vancomycin random level has been ordered for 06/01 at 1200  Changes in regimen will be determined based on culture results, renal function, and clinical response.  Pharmacy will continue to monitor and adjust regimen as necessary.    Allergies:  Bee venom, Shellfish-derived products, Shrimp flavor, Cephalexin, Codeine, Fentanyl and related, Hydromorphone, Flavoring agent, and Vancomycin     Recent Labs     06/01/24  0018   CREATININE 1.0       Recent Labs     06/01/24  0018   WBC 8.0       Ht Readings from Last 1 Encounters:   05/31/24 1.702 m (5' 7\")        Wt Readings from Last 1 Encounters:   05/31/24 113.4 kg (250 lb)         Estimated Creatinine Clearance: 62 mL/min (based on SCr of 1 mg/dL).      Thank you for the consult,    Ray Lamar, AdelitaD

## 2024-06-01 NOTE — ED PROVIDER NOTES
I have personally performed a face to face diagnostic evaluation on this patient. I have fully participated in the care of this patient I personally saw the patient and performed a substantive portion of the visit including all aspects of the medical decision making.  I have reviewed and agree with all pertinent clinical information including history, physical exam, diagnostic tests, and the plan.      HPI: Stacy Ly presented with right arm pain for the last 2 days initially pain in the right upper arm now in her forearm and wrist.  Significant pain tenderness swelling and erythema swelling of her right hand radiating up to her right forearm.  Patient is able to move her hand but with significant tenderness.  Difficult to flex and extend both at the wrist and at the elbow.  No numbness or weakness.  Patient is on Eliquis for history of A-fib.  Chief Complaint   Patient presents with    Arm Pain     Pt to ED from home with c/o R arm pain x 2 days. Pt reports swelling and being unable to move her arm. Denies fall.       Review of Systems: See RONALD note  Vital Signs: /69   Pulse 66   Temp 98.5 °F (36.9 °C) (Oral)   Resp 20   Ht 1.702 m (5' 7\")   Wt 113.4 kg (250 lb)   SpO2 95%   BMI 39.16 kg/m²     Alert 78 y.o. female who does not appear toxic or acutely ill  HENT: Atraumatic, oral mucosa moist  Neck: Grossly normal ROM  Chest/Lungs: respiratory effort normal   Extremities: 2+ radial pulses bilaterally, normal cap refill  Musculoskeletal: Swelling and erythema to the dorsum of the right hand with erythema tracking up the right forearm pain and tenderness to right wrist and right elbow patient has normal range of motion but with significant tenderness  Skin: No palor or diaphoresis    Medical Decision Making and Plan:  Pertinent Labs & Imaging studies reviewed. (See RONALD chart for details)  I agree with RONALD assessment and plan.  78-year-old female who presents for swelling pain and tenderness to the

## 2024-06-01 NOTE — ED NOTES
ED TO INPATIENT SBAR HANDOFF    Patient Name: Stacy Ly   :  1945  78 y.o.   MRN:  [id number]  Preferred Name  Stacy  ED Room #:  ED-0010/10  Family/Caregiver Present no   Restraints no   Sitter no   Sepsis Risk Score Sepsis Risk Score: 1.43    Situation  Code Status: Prior No additional code details.    Allergies: Bee venom, Shellfish-derived products, Shrimp flavor, Cephalexin, Codeine, Fentanyl and related, Hydromorphone, Flavoring agent, and Vancomycin  Weight: Patient Vitals for the past 96 hrs (Last 3 readings):   Weight   24 2355 113.4 kg (250 lb)     Arrived from: home  Chief Complaint:   Chief Complaint   Patient presents with    Arm Pain     Pt to ED from home with c/o R arm pain x 2 days. Pt reports swelling and being unable to move her arm. Denies fall.      Hospital Problem/Diagnosis:  Active Problems:    * No active hospital problems. *  Resolved Problems:    * No resolved hospital problems. *    Imaging:   XR SHOULDER RIGHT (MIN 2 VIEWS)   Final Result   Moderate osteoarthritic changes along the glenohumeral joint and diffuse   osteopenia with no acute bony abnormality.      Mild superior subluxation of the humeral head causing moderately severe   narrowing of the acromial humeral space which can be seen in rotator cuff   tears.  Recommend orthopedic follow-up      Diffuse osteopenia         XR ELBOW RIGHT (2 VIEWS)    (Results Pending)   XR RADIUS ULNA RIGHT (2 VIEWS)    (Results Pending)   XR HAND RIGHT (MIN 3 VIEWS)    (Results Pending)     Abnormal labs: Abnormal Labs Reviewed - No abnormal labs to display  Critical values: no     Abnormal Assessment Findings: R hand swelling    Background  History:   Past Medical History:   Diagnosis Date    Arthritis     Atrial fibrillation and flutter (HCC)     Hypertension     Infection due to parainfluenza virus 3 2023    Kidney disease     Pt states  \"stage 3\"    Pneumonia     Sleep apnea     no c-pap        Assessment    Vitals/MEWS: MEWS Score: 1  Level of Consciousness: Alert (0)   Vitals:    05/31/24 2355   BP: (!) 144/67   Pulse: 66   Resp: 20   Temp: 98.5 °F (36.9 °C)   TempSrc: Oral   SpO2: 93%   Weight: 113.4 kg (250 lb)   Height: 1.702 m (5' 7\")     FiO2 (%):   O2 Flow Rate:      Cardiac Rhythm:    Pain Assessment:  [x] Verbal [] Mohan Caceres Scale  Pain Scale: Pain Assessment  Pain Assessment: 0-10  Pain Level: 8  Pain Location: Arm  Pain Orientation: Right  Last documented pain score (0-10 scale) Pain Level: 8  Last documented pain medication administered: 0001  Mental Status: oriented, alert, coherent, logical, and thought processes intact  Orientation Level:    NIH Score:    C-SSRS: Risk of Suicide: No Risk  Bedside swallow:    Yuliana Coma Scale (GCS): Yuliana Coma Scale  Eye Opening: Spontaneous  Best Verbal Response: Oriented  Best Motor Response: Obeys commands  Yuliana Coma Scale Score: 15  Active LDA's:   Peripheral IV 06/01/24 Left Antecubital (Active)                 PO Status: Regular  Pertinent or High Risk Medications/Drips: no   If Yes, please provide details:   Pending Blood Product Administration: no       You may also review the ED PT Care Timeline found under the Summary Nursing Index tab.    Recommendation    Pending orders Vancomycin  Plan for Discharge (if known):   Additional Comments: Pt able to use call light. Swallows pills whole. Wheel chair bound at baseline   If any further questions, please call Sending RN at [phone]    Electronically signed by: Electronically signed by Emilia Akers RN on 6/1/2024 at 1:16 AM

## 2024-06-01 NOTE — RT PROTOCOL NOTE
RT Nebulizer Bronchodilator Protocol Note    There is a bronchodilator order in the chart from a provider indicating to follow the RT Bronchodilator Protocol and there is an “Initiate RT Bronchodilator Protocol” order as well (see protocol at bottom of note).    CXR Findings:  No results found.    The findings from the last RT Protocol Assessment were as follows:  Smoking: None or smoker <15 pack years  Respiratory Pattern: Regular pattern and RR 12-20 bpm  Breath Sounds: Clear breath sounds  Cough: Strong, spontaneous, non-productive  Indication for Bronchodilator Therapy:    Bronchodilator Assessment Score: 0    Aerosolized bronchodilator medication orders have been revised according to the RT Nebulizer Bronchodilator Protocol below.    Respiratory Therapist to perform RT Therapy Protocol Assessment initially then follow the protocol.  Repeat RT Therapy Protocol Assessment PRN for score 0-3 or on second treatment, BID, and PRN for scores above 3.    No Indications - adjust the frequency to every 6 hours PRN wheezing or bronchospasm, if no treatments needed after 48 hours then discontinue using Per Protocol order mode.     If indication present, adjust the RT bronchodilator orders based on the Bronchodilator Assessment Score as indicated below.  If a patient is on this medication at home then do not decrease Frequency below that used at home.    0-3 - enter or revise RT bronchodilator order(s) to equivalent RT Bronchodilator order with Frequency of every 4 hours PRN for wheezing or increased work of breathing using Per Protocol order mode.       4-6 - enter or revise RT Bronchodilator order(s) to two equivalent RT bronchodilator orders with one order with BID Frequency and one order with Frequency of every 4 hours PRN wheezing or increased work of breathing using Per Protocol order mode.         7-10 - enter or revise RT Bronchodilator order(s) to two equivalent RT bronchodilator orders with one order with TID  Frequency and one order with Frequency of every 4 hours PRN wheezing or increased work of breathing using Per Protocol order mode.       11-13 - enter or revise RT Bronchodilator order(s) to one equivalent RT bronchodilator order with QID Frequency and an Albuterol order with Frequency of every 4 hours PRN wheezing or increased work of breathing using Per Protocol order mode.      Greater than 13 - enter or revise RT Bronchodilator order(s) to one equivalent RT bronchodilator order with every 4 hours Frequency and an Albuterol order with Frequency of every 2 hours PRN wheezing or increased work of breathing using Per Protocol order mode.     RT to enter RT Home Evaluation for COPD & MDI Assessment order using Per Protocol order mode.    Electronically signed by Nestor Lopez RCP on 6/1/2024 at 3:42 PM

## 2024-06-01 NOTE — ED PROVIDER NOTES
[facility] EMERGENCY DEPARTMENT  EMERGENCY DEPARTMENT ENCOUNTER        Pt Name: Stacy Ly  MRN: [id number]  Birthdate 1945  Date of evaluation: 5/31/2024  Provider: RACHEL Huddleston  PCP: Tarik Hummel, MARILEE - CNP  Note Started: 12:01 AM EDT 6/1/24       I have seen and evaluated this patient with my supervising physician Michael Barnes MD.      CHIEF COMPLAINT       Chief Complaint   Patient presents with    Arm Pain     Pt to ED from home with c/o R arm pain x 2 days. Pt reports swelling and being unable to move her arm. Denies fall.        HISTORY OF PRESENT ILLNESS: 1 or more Elements     History from : Patient    Limitations to history : None    Stacy Ly is a 78 y.o. female who presents to the emergency room via EMS due to right arm pain that has been present for the last 2 days.  Patient states that she initially had pain in the right upper shoulder area but now her pain is gradually moved down to her forearm wrist and hand area.  She denies any falls or injuries that she can recall.  She states that she is bedbound most of the day because of her bilateral lower leg swelling.  She denies any chest pain shortness of breath difficulty breathing worse from her baseline today and her main reason for coming here today is because of her right arm pain.  She states that is difficult for her to flex and extend at the elbow joint because of the weakness that she has noticed with it.  She denies any numbness tingling or loss sensation.  She denies any fevers or chills.  She does take Eliquis for anticoagulation needs.  She does have a history of MRSA.    Nursing Notes were all reviewed and agreed with or any disagreements were addressed in the HPI.    REVIEW OF SYSTEMS :      Review of Systems   Constitutional:  Negative for chills, diaphoresis and fever.   HENT:  Negative for congestion, rhinorrhea and sore throat.    Eyes:  Negative for pain and visual disturbance.  APIXABAN (ELIQUIS) 5 MG TABS TABLET    Take 1 tablet by mouth 2 times daily    CHOLECALCIFEROL (VITAMIN D3) 1.25 MG (64558 UT) CAPS    Take 1 capsule by mouth every 7 days    CLONIDINE (CATAPRES) 0.2 MG TABLET    TAKE 1 TABLET TWICE A DAY    GUAIFENESIN (MUCINEX) 600 MG EXTENDED RELEASE TABLET    Take 1 tablet by mouth 2 times daily    METOPROLOL TARTRATE 75 MG TABS    Take 75 mg by mouth 2 times daily       ALLERGIES     Bee venom, Shellfish-derived products, Shrimp flavor, Cephalexin, Codeine, Fentanyl and related, Hydromorphone, Flavoring agent, and Vancomycin    FAMILYHISTORY       Family History   Problem Relation Age of Onset    Other Mother         blood clot    Cancer Father         stomach cancer    Stroke Brother         SOCIAL HISTORY       Social History     Tobacco Use    Smoking status: Never    Smokeless tobacco: Never   Vaping Use    Vaping Use: Never used   Substance Use Topics    Alcohol use: No    Drug use: No       SCREENINGS        [place] Coma Scale  Eye Opening: Spontaneous  Best Verbal Response: Oriented  Best Motor Response: Obeys commands  [place]ty Coma Scale Score: 15                CIWA Assessment  BP: (!) 144/67  Pulse: 66           PHYSICAL EXAM  1 or more Elements     ED Triage Vitals [05/31/24 2355]   BP Temp Temp Source Pulse Respirations SpO2 Height Weight - Scale   (!) 144/67 98.5 °F (36.9 °C) Oral 66 20 93 % 1.702 m (5' 7\") 113.4 kg (250 lb)       Physical Exam  Vitals and nursing note reviewed.   Constitutional:       General: She is not in acute distress.     Appearance: She is not ill-appearing.   HENT:      Head: Normocephalic.   Cardiovascular:      Rate and Rhythm: Normal rate and regular rhythm.      Heart sounds: Normal heart sounds.   Pulmonary:      Effort: Pulmonary effort is normal.      Breath sounds: Normal breath sounds.   Musculoskeletal:      Right elbow: Swelling present. Decreased range of motion. Tenderness present.      Left elbow: Normal.      Right forearm:    Procedures    CRITICAL CARE TIME (.cctime)       PAST MEDICAL HISTORY      has a past medical history of Arthritis, Atrial fibrillation and flutter (HCC), Hypertension, Infection due to parainfluenza virus 3 (5/11/2023), Kidney disease, Pneumonia, and Sleep apnea.     Chronic Conditions affecting Care:     EMERGENCY DEPARTMENT COURSE and DIFFERENTIAL DIAGNOSIS/MDM:   Vitals:    Vitals:    05/31/24 2355   BP: (!) 144/67   Pulse: 66   Resp: 20   Temp: 98.5 °F (36.9 °C)   TempSrc: Oral   SpO2: 93%   Weight: 113.4 kg (250 lb)   Height: 1.702 m (5' 7\")       Patient was given the following medications:  Medications   ceFEPIme (MAXIPIME) 2,000 mg in sodium chloride 0.9 % 100 mL IVPB (mini-bag) (has no administration in time range)   vancomycin (VANCOCIN) 2,000 mg in sodium chloride 0.9 % 500 mL IVPB (has no administration in time range)   oxyCODONE-acetaminophen (PERCOCET) 5-325 MG per tablet 1 tablet (1 tablet Oral Given 6/1/24 0001)             Is this patient to be included in the SEP-1 Core Measure due to severe sepsis or septic shock?   No   Exclusion criteria - the patient is NOT to be included for SEP-1 Core Measure due to:  2+ SIRS criteria are not met    CONSULTS: (Who and What was discussed)  None  Discussion with Other Profesionals : None    Social Determinants : None    Records Reviewed : None    CC/HPI Summary, DDx, ED Course, and Reassessment: Briefly 78-year-old female presents emergency room due to worsening pain and swelling of the right upper extremity.  Mainly she is noticed pain and swelling in the forearm wrist and hand.  She does have a history of MRSA.  She was taking tramadol but the pain has gotten worse.    On exam she does have significant tenderness over the forearm wrist and hand of the right side.  Bilateral radial pulses equal intact 2+.  She does have significant erythema noted about the forearm and wrist area concerning for cellulitis.  The area is warm to touch.  She does have some

## 2024-06-01 NOTE — ED NOTES
How does patient ambulate?   []Low Fall Risk (ambulates by themselves without support)  []Stand by assist   []Contact Guard   []Front wheel walker  [x]Wheelchair   []Steady  []Bed bound  []History of Lower Extremity Amputation  []Unknown, did not assess in the emergency department   How does patient take pills?  [x]Whole with Water  []Crushed in applesauce  []Crushed in pudding  []Other  []Unknown no oral medications were given in the ED  Is patient alert?   [x]Alert  []Drowsy but responds to voice  []Doesn't respond to voice but responds to painful stimuli  []Unresponsive  Is patient oriented?   [x]To person  [x]To place  [x]To time  [x]To situation  []Confused  []Agitated  []Follows commands  If patient is disoriented or from a Skill Nursing Facility has family been notified of admission?   []Yes   []No  Patient belongings?   [x]Cell phone  []Wallet   []Dentures  [x]Clothing  Any specific patient or family belongings/needs/dynamics?   N/A  Miscellaneous comments/pending orders?  Needs IV abx     If there are any additional questions please reach out to the Emergency Department.

## 2024-06-01 NOTE — PROGRESS NOTES
Hospitalist Progress Note      PCP: Tarik Hummel, APRN - CNP    Date of Admission: 5/31/2024    Chief Complaint: Right upper extremity swelling and    Hospital Course:   Patient complains of right upper extremity swelling unable to move the right upper extremity very well for the last 4 to 5 days  Some of it is related to the pain in the wrist and the arm and some of it is due to weakness        Medications:  Reviewed      Exam:    BP (!) 144/79   Pulse 55   Temp 98 °F (36.7 °C) (Oral)   Resp 16   Ht 1.702 m (5' 7.01\")   Wt 117 kg (258 lb)   SpO2 94%   BMI 40.40 kg/m²     General appearance: No apparent distress, appears stated age and cooperative.  HEENT: Pupils equal, round, and reactive to light. Conjunctivae/corneas clear.  Neck: Supple, with full range of motion. No jugular venous distention. Trachea midline.  Respiratory:  Normal respiratory effort. Clear to auscultation, bilaterally without RALES/WHEEZES/Rhonchi.  Cardiovascular: Regular rate and rhythm with normal S1/S2 without MURMURS, rubs or gallops.  Abdomen: Soft, non-tender, non-distended with normal bowel sounds.  Musculoskeletal: No clubbing, cyanosis or EDEMA bilaterally.  Full range of motion without deformity.  Skin: Skin color, texture, turgor normal.  No rashes or lesions.  Neurologic:  Neurovascularly intact without any focal sensory/motor deficits. Cranial nerves: II-XII intact, grossly non-focal.  Right upper extremity mild swelling radial pulses intact dorsum of the hand also swollen some erythema  Proximally shoulder abduction and adduction power is 2+/5 elbow flexion and extension power is 3/5 wrist dorsiflexion and volar flexion both power is 3/5    Labs:   Recent Labs     06/01/24  0018   WBC 8.0   HGB 12.4   HCT 37.6        Recent Labs     06/01/24  0018 06/01/24  0534    138   K 4.1 4.1    103   CO2 28 25   BUN 19 19   CREATININE 1.0 1.0   CALCIUM 8.5 8.0*     Recent Labs     06/01/24  0018   AST  intact there is some weakness unclear if that is limitation from the pain or true weakness  Denies any neck pain  Radial pulses are intact  There is some erythema and swelling  She is already fully anticoagulated which we will continue  Started on Vanco and cefepime will discontinue cefepime and continue Vanco for now  Will get a CT of the head CT angiogram of the head and neck and CT of the cervical spine for now  Will get MRI of the brain and cervical spine on Monday  Neurology consulted  Symptoms seem to be slow and progressive more in keeping with mechanical issues rather than neurological issues    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

## 2024-06-01 NOTE — PROGRESS NOTES
4 Eyes Skin Assessment     NAME:  Stacy Ly  YOB: 1945  MEDICAL RECORD NUMBER:  [id number]    The patient is being assessed for  Admission    I agree that at least one RN has performed a thorough Head to Toe Skin Assessment on the patient. ALL assessment sites listed below have been assessed.      Areas assessed by both nurses:    Head, Face, Ears, Shoulders, Back, Chest, Arms, Elbows, Hands, Sacrum. Buttock, Coccyx, Ischium, Legs. Feet and Heels, and Under Medical Devices         Does the Patient have a Wound? No noted wound(s)       Hussain Prevention initiated by RN: No  Wound Care Orders initiated by RN: No    Pressure Injury (Stage 3,4, Unstageable, DTI, NWPT, and Complex wounds) if present, place Wound referral order by RN under : No    New Ostomies, if present place, Ostomy referral order under : No     Nurse 1 eSignature: Electronically signed by Joe Shepherd RN on 6/1/24 at 6:58 AM EDT    **SHARE this note so that the co-signing nurse can place an eSignature**    Nurse 2 eSignature: Electronically signed by Lauren Chow RN on 6/1/24 at 7:02 AM EDT

## 2024-06-01 NOTE — H&P
Hospital Medicine History & Physical        Name:  Stacy Ly /Age/Sex: 1945  (78 y.o. female)   MRN & CSN:  [id number] & [id number] Encounter Date/Time: 2024 3:24 AM EDT   Location:  ED-0010/10 PCP: Tarik Hummel, MARILEE Castillo CNP         CHIEF COMPLAINT:   Chief Complaint   Patient presents with    Arm Pain     Pt to ED from home with c/o R arm pain x 2 days. Pt reports swelling and being unable to move her arm. Denies fall.          HISTORY OF PRESENT ILLNESS:      History from: patient  Limitations to history : None     Stacy Ly is a 78 y.o. female who presented to the ED for evaluation of right arm pain that began about 2 days ago.  She reports the pain to her right shoulder, forearm, and wrist.  She reports she developed swelling and redness to her right hand and forearm over the last day.  She reports significant pain with any attempt to move her hand or flex/extend her wrist and elbow.  She denies any numbness or weakness.  She denies any recent falls or injury.  She reports she has chronic BLE edema which is unchanged.  She denies fever, chest pain, shortness of breath, cough, abdominal pain, nausea, vomiting, diarrhea, constipation, urinary symptoms. She denies any other complaints or concerns at this time.  She is anticoagulated on Eliquis for A fib.      REVIEW OF SYSTEMS:   Pertinent positives as noted in the HPI. All other systems reviewed and negative.      PHYSICAL EXAM PERFORMED:  BP (!) 143/66   Pulse 66   Temp 98.5 °F (36.9 °C) (Oral)   Resp 20   Ht 1.702 m (5' 7\")   Wt 113.4 kg (250 lb)   SpO2 94%   BMI 39.16 kg/m²     General appearance:  Awake, alert, no apparent distress  HEENT:  Normocephalic, atraumatic without obvious deformity. PERRL. EOM intact. Conjunctivae/corneas clear.  Neck: Supple, with full range of motion. No JVD. Trachea midline.  Respiratory:  Clear to auscultation bilaterally without rales, wheezes, or rhonchi. Normal respiratory  UPPER GASTROINTESTINAL ENDOSCOPY N/A 11/20/2018    EGD BIOPSY performed by Jairon Gordon MD at [facility] ENDOSCOPY    UPPER GASTROINTESTINAL ENDOSCOPY N/A 03/31/2022    EGD DILATION BALLOON performed by Alfred London MD at [facility] ENDOSCOPY    UPPER GASTROINTESTINAL ENDOSCOPY N/A 03/31/2022    EGD BIOPSY performed by Alfred London MD at [facility] ENDOSCOPY     Allergies:   Allergies   Allergen Reactions    Bee Venom Swelling and Angioedema    Shellfish-Derived Products Other (See Comments)     Syncope/seizures    Shrimp Flavor      Passes out      Cephalexin Itching    Codeine Hives and Other (See Comments)     B/P DROPS PASSES OUT  Passed out    Fentanyl And Related Hives     Other reaction(s): Dizziness    Hydromorphone Rash, Hives and Other (See Comments)     Full rash spreading across chest and both arms from IV hydromorphone  Passed out    Flavoring Agent      Pass out    Vancomycin Rash     Fam HX:    Family History   Problem Relation Age of Onset    Other Mother         blood clot    Cancer Father         stomach cancer    Stroke Brother      Soc HX:   Social History     Socioeconomic History    Marital status:     Number of children: 1    Years of education: 12   Occupational History    Occupation: retired    Tobacco Use    Smoking status: Never    Smokeless tobacco: Never   Vaping Use    Vaping Use: Never used   Substance and Sexual Activity    Alcohol use: No    Drug use: No    Sexual activity: Not Currently     Social Determinants of Health     Financial Resource Strain: Low Risk  (9/26/2022)    Overall Financial Resource Strain (CARDIA)     Difficulty of Paying Living Expenses: Not hard at all   Food Insecurity: No Food Insecurity (9/26/2022)    Hunger Vital Sign     Worried About Running Out of Food in the Last Year: Never true     Ran Out of Food in the Last Year: Never true   Transportation Needs: No Transportation Needs (12/13/2019)    PRAPARE - Transportation     Lack of

## 2024-06-01 NOTE — ED NOTES
Report called to Emily BAUTISTA. All questions and concerns answered at this time. Pt resting in bed with eyes closed. Respirations even and unlabored on 2L NC.    Vital Signs Last 24 Hrs  T(C): 36.6 (12 Mar 2021 00:51), Max: 36.8 (11 Mar 2021 23:39)  T(F): 97.9 (12 Mar 2021 00:51), Max: 98.2 (11 Mar 2021 23:39)  HR: 101 (12 Mar 2021 00:51) (87 - 101)  BP: 131/83 (12 Mar 2021 00:51) (116/68 - 131/83)  BP(mean): --  RR: 18 (12 Mar 2021 00:51) (18 - 19)  SpO2: 95% (12 Mar 2021 00:51) (95% - 98%)    Constitutional: Well-appearing, NAD, VSS  Head: NC/AT  Eyes: PERRL, EOMI, anicteric sclera, conjunctiva WNL  ENT: Normal Pharynx, MMM, No tonsillar exudate/erythema  Neck: Supple, Non-tender  Chest: Non-tender, no rashes  Cardio: RRR, s1/s2, s1/s2  Resp: Mild bibasilar rales, decreased BS RLL   Abd: Soft, Non-tender, Non-distended, no rebound/guarding/rigidity  MSK: moving all extremities, no motor weakness, full ROM x4  Ext: palpable distal pulses, good capillary refill, +minimal clubbing. No peripheral edema.  Psych: appropriate, cooperative  Neuro: CN II-XII grossly intact, no focal deficits  Skin: Warm/Dry. No rashes.

## 2024-06-01 NOTE — CONSULTS
[facility] Orthopedic Surgery  Consult Note    Patient: Stacy Ly  Admit Date: 5/31/2024  Requesting Physician: Mayito Navarro MD  Room: [id number]-    Chief complaint: Right shoulder pain, arm pain     HPI: Stacy Ly is a 78 y.o. female who presented with right arm pain x 3 days. She denies any injury or trauma. She says her pain started in her shoulder then gradually moved down to her forearm and wrist. She complains of loss of ROM at her shoulder, elbow, wrist, and hand. Complains of swelling to right upper extremity. She does have a hx or MRSA. She is currently being treated for cellulitis.     Denies new numbness/tingling today.      Patient lives at home.     Medical History:  Past Medical History:   Diagnosis Date    Arthritis     Atrial fibrillation and flutter (HCC)     Hypertension     Infection due to parainfluenza virus 3 5/11/2023    Kidney disease     Pt states  \"stage 3\"    Pneumonia     Sleep apnea     no c-pap     Past Surgical History:   Procedure Laterality Date    CARDIOVERSION      COLONOSCOPY N/A 11/20/2018    COLONOSCOPY POLYPECTOMY SNARE/COLD BIOPSY performed by Jairon Gordon MD at [facility] ENDOSCOPY    COLONOSCOPY N/A 03/31/2022    COLONOSCOPY POLYPECTOMY SNARE/COLD BIOPSY performed by Alfred London MD at [facility] ENDOSCOPY    FRACTURE SURGERY      ankle rt has pins and rods, and rt elbow    GASTRIC BYPASS SURGERY      LARYNX SURGERY      TOTAL KNEE ARTHROPLASTY Bilateral 12/19/2012    bilateral knee replacements    TUBAL LIGATION      tubes tied    UPPER GASTROINTESTINAL ENDOSCOPY N/A 11/20/2018    EGD BIOPSY performed by Jairon Gordon MD at [facility] ENDOSCOPY    UPPER GASTROINTESTINAL ENDOSCOPY N/A 03/31/2022    EGD DILATION BALLOON performed by Alfred London MD at [facility] ENDOSCOPY    UPPER GASTROINTESTINAL ENDOSCOPY N/A 03/31/2022    EGD BIOPSY performed by Alfred London MD at [facility] ENDOSCOPY       Social History:    reports that she has never smoked. She has never

## 2024-06-02 LAB
ANION GAP SERPL CALCULATED.3IONS-SCNC: 10 MMOL/L (ref 3–16)
BASOPHILS # BLD: 0 K/UL (ref 0–0.2)
BASOPHILS NFR BLD: 0.7 %
BUN SERPL-MCNC: 20 MG/DL (ref 7–20)
CALCIUM SERPL-MCNC: 8.5 MG/DL (ref 8.3–10.6)
CHLORIDE SERPL-SCNC: 105 MMOL/L (ref 99–110)
CO2 SERPL-SCNC: 23 MMOL/L (ref 21–32)
CREAT SERPL-MCNC: 0.9 MG/DL (ref 0.6–1.2)
DEPRECATED RDW RBC AUTO: 15.8 % (ref 12.4–15.4)
EOSINOPHIL # BLD: 0.4 K/UL (ref 0–0.6)
EOSINOPHIL NFR BLD: 5 %
GFR SERPLBLD CREATININE-BSD FMLA CKD-EPI: 66 ML/MIN/{1.73_M2}
GLUCOSE SERPL-MCNC: 87 MG/DL (ref 70–99)
HCT VFR BLD AUTO: 35.6 % (ref 36–48)
HGB BLD-MCNC: 11.1 G/DL (ref 12–16)
LYMPHOCYTES # BLD: 0.8 K/UL (ref 1–5.1)
LYMPHOCYTES NFR BLD: 11.6 %
MCH RBC QN AUTO: 28.9 PG (ref 26–34)
MCHC RBC AUTO-ENTMCNC: 31.2 G/DL (ref 31–36)
MCV RBC AUTO: 92.7 FL (ref 80–100)
MONOCYTES # BLD: 0.9 K/UL (ref 0–1.3)
MONOCYTES NFR BLD: 13.2 %
NEUTROPHILS # BLD: 5 K/UL (ref 1.7–7.7)
NEUTROPHILS NFR BLD: 69.5 %
PLATELET # BLD AUTO: 212 K/UL (ref 135–450)
PMV BLD AUTO: 10.4 FL (ref 5–10.5)
POTASSIUM SERPL-SCNC: 4.2 MMOL/L (ref 3.5–5.1)
RBC # BLD AUTO: 3.84 M/UL (ref 4–5.2)
SODIUM SERPL-SCNC: 138 MMOL/L (ref 136–145)
VANCOMYCIN SERPL-MCNC: 27.5 UG/ML
WBC # BLD AUTO: 7.2 K/UL (ref 4–11)

## 2024-06-02 PROCEDURE — 6370000000 HC RX 637 (ALT 250 FOR IP): Performed by: PHYSICIAN ASSISTANT

## 2024-06-02 PROCEDURE — 85025 COMPLETE CBC W/AUTO DIFF WBC: CPT

## 2024-06-02 PROCEDURE — 80202 ASSAY OF VANCOMYCIN: CPT

## 2024-06-02 PROCEDURE — 1200000000 HC SEMI PRIVATE

## 2024-06-02 PROCEDURE — 36415 COLL VENOUS BLD VENIPUNCTURE: CPT

## 2024-06-02 PROCEDURE — 80048 BASIC METABOLIC PNL TOTAL CA: CPT

## 2024-06-02 PROCEDURE — 2580000003 HC RX 258: Performed by: PHYSICIAN ASSISTANT

## 2024-06-02 PROCEDURE — 6360000002 HC RX W HCPCS: Performed by: PHYSICIAN ASSISTANT

## 2024-06-02 RX ADMIN — SODIUM CHLORIDE 1500 MG: 9 INJECTION, SOLUTION INTRAVENOUS at 02:38

## 2024-06-02 RX ADMIN — SODIUM CHLORIDE, PRESERVATIVE FREE 10 ML: 5 INJECTION INTRAVENOUS at 21:40

## 2024-06-02 RX ADMIN — APIXABAN 5 MG: 5 TABLET, FILM COATED ORAL at 21:39

## 2024-06-02 RX ADMIN — METOPROLOL TARTRATE 75 MG: 50 TABLET, FILM COATED ORAL at 21:38

## 2024-06-02 RX ADMIN — CLONIDINE HYDROCHLORIDE 0.2 MG: 0.1 TABLET ORAL at 21:38

## 2024-06-02 RX ADMIN — Medication 1 CAPSULE: at 18:03

## 2024-06-02 RX ADMIN — CLONIDINE HYDROCHLORIDE 0.2 MG: 0.1 TABLET ORAL at 10:19

## 2024-06-02 RX ADMIN — APIXABAN 5 MG: 5 TABLET, FILM COATED ORAL at 10:19

## 2024-06-02 RX ADMIN — METOPROLOL TARTRATE 75 MG: 50 TABLET, FILM COATED ORAL at 10:19

## 2024-06-02 RX ADMIN — AMLODIPINE BESYLATE 10 MG: 5 TABLET ORAL at 10:19

## 2024-06-02 RX ADMIN — SODIUM CHLORIDE, PRESERVATIVE FREE 10 ML: 5 INJECTION INTRAVENOUS at 10:20

## 2024-06-02 RX ADMIN — Medication 1 CAPSULE: at 06:53

## 2024-06-02 RX ADMIN — OXYCODONE AND ACETAMINOPHEN 1 TABLET: 5; 325 TABLET ORAL at 06:53

## 2024-06-02 RX ADMIN — OXYCODONE AND ACETAMINOPHEN 1 TABLET: 5; 325 TABLET ORAL at 18:03

## 2024-06-02 RX ADMIN — SODIUM CHLORIDE: 9 INJECTION, SOLUTION INTRAVENOUS at 02:35

## 2024-06-02 ASSESSMENT — PAIN SCALES - GENERAL
PAINLEVEL_OUTOF10: 0
PAINLEVEL_OUTOF10: 0
PAINLEVEL_OUTOF10: 8
PAINLEVEL_OUTOF10: 7
PAINLEVEL_OUTOF10: 0

## 2024-06-02 ASSESSMENT — PAIN DESCRIPTION - DESCRIPTORS
DESCRIPTORS: ACHING;DISCOMFORT
DESCRIPTORS: ACHING;THROBBING

## 2024-06-02 ASSESSMENT — PAIN SCALES - WONG BAKER
WONGBAKER_NUMERICALRESPONSE: NO HURT

## 2024-06-02 ASSESSMENT — PAIN DESCRIPTION - LOCATION
LOCATION: BACK;HEAD
LOCATION: BACK;ARM

## 2024-06-02 ASSESSMENT — PAIN DESCRIPTION - ORIENTATION
ORIENTATION: MID
ORIENTATION: RIGHT;LOWER

## 2024-06-02 NOTE — PLAN OF CARE
Problem: Pain  Goal: Verbalizes/displays adequate comfort level or baseline comfort level  Outcome: Progressing     Problem: Safety - Adult  Goal: Free from fall injury  Outcome: Progressing     Problem: Skin/Tissue Integrity  Goal: Absence of new skin breakdown  Description: 1.  Monitor for areas of redness and/or skin breakdown  2.  Assess vascular access sites hourly  3.  Every 4-6 hours minimum:  Change oxygen saturation probe site  4.  Every 4-6 hours:  If on nasal continuous positive airway pressure, respiratory therapy assess nares and determine need for appliance change or resting period.  Outcome: Progressing     Problem: ABCDS Injury Assessment  Goal: Absence of physical injury  Outcome: Progressing

## 2024-06-02 NOTE — PROGRESS NOTES
Hospitalist Progress Note      PCP: Tarik Hummel, APRN - CNP    Date of Admission: 5/31/2024    Chief Complaint: Right upper extremity swelling and    Hospital Course:   Right upper extremity feels better  Able to move it better and reports decreased redness and swelling  No fever      Medications:  Reviewed      Exam:    BP (!) 149/78   Pulse 63   Temp 98.5 °F (36.9 °C) (Oral)   Resp 18   Ht 1.702 m (5' 7.01\")   Wt 118.3 kg (260 lb 12.8 oz)   SpO2 95%   BMI 40.84 kg/m²     General appearance: No apparent distress, appears stated age and cooperative.  HEENT: Pupils equal, round, and reactive to light. Conjunctivae/corneas clear.  Neck: Supple, with full range of motion. No jugular venous distention. Trachea midline.  Respiratory:  Normal respiratory effort. Clear to auscultation, bilaterally without RALES/WHEEZES/Rhonchi.  Cardiovascular: Regular rate and rhythm with normal S1/S2 without MURMURS, rubs or gallops.  Abdomen: Soft, non-tender, non-distended with normal bowel sounds.  Musculoskeletal: No clubbing, cyanosis or EDEMA bilaterally.  Full range of motion without deformity.  Skin: Skin color, texture, turgor normal.  No rashes or lesions.  Neurologic:  Neurovascularly intact without any focal sensory/motor deficits. Cranial nerves: II-XII intact, grossly non-focal.  Right upper extremity erythema and edema is significantly improved as compared to yesterday  Demonstrates better motion at the wrist flexion and extension at the elbow and shoulder abduction and abduction power is significantly improved sensory examination is normal    Labs:   Recent Labs     06/01/24  0018 06/02/24  0528   WBC 8.0 7.2   HGB 12.4 11.1*   HCT 37.6 35.6*    212       Recent Labs     06/01/24  0018 06/01/24  0534 06/02/24  0528    138 138   K 4.1 4.1 4.2    103 105   CO2 28 25 23   BUN 19 19 20   CREATININE 1.0 1.0 0.9   CALCIUM 8.5 8.0* 8.5       Recent Labs     06/01/24  0018   AST 12*   ALT  head causing moderately severe   narrowing of the acromial humeral space which can be seen in rotator cuff   tears.  Recommend orthopedic follow-up      Diffuse osteopenia         Vascular duplex upper extremity venous right    (Results Pending)   MRI BRAIN WO CONTRAST    (Results Pending)   MRI CERVICAL SPINE WO CONTRAST    (Results Pending)       Assessment/Plan:    Active Hospital Problems    Diagnosis Date Noted    Cellulitis of right upper extremity [L03.113] 06/01/2024       Acute Medical Issues Being Addressed:  78-year-old admitted to the hospital with weakness and swelling of the right upper extremity    Right upper extremity swelling and weakness secondary to right upper extremity cellulitis  Sensation is intact there is some weakness unclear if that is limitation from the pain or true weakness  Denies any neck pain  Radial pulses are intact  There is some erythema and swelling which is significantly improved  She is already fully anticoagulated which we will continue  Started on Vanco and cefepime will discontinue cefepime and continue Vanco for now  CT of the head showed some vertebral artery stenosis on the CTA  No evidence of acute infarct  Will get MRI of the brain and cervical spine tomorrow  Clinically cellulitis most improved  Next  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

## 2024-06-02 NOTE — PROGRESS NOTES
Clinical Pharmacy Note: Pharmacy to Dose Vancomycin    Vancomycin Day: 2  Indication: sepsis  Current Dose: 1500 q24h  Dosing Method: Bayesian Modeling    Random: 27.5 ug/mL    Recent Labs     06/01/24  0534 06/02/24  0528   BUN 19 20       Recent Labs     06/01/24  0534 06/02/24  0528   CREATININE 1.0 0.9       Recent Labs     06/01/24  0018 06/02/24  0528   WBC 8.0 7.2         Intake/Output Summary (Last 24 hours) at 6/2/2024 0720  Last data filed at 6/2/2024 0648  Gross per 24 hour   Intake 743.39 ml   Output 550 ml   Net 193.39 ml         Ht Readings from Last 1 Encounters:   06/01/24 1.702 m (5' 7.01\")        Wt Readings from Last 1 Encounters:   06/02/24 118.3 kg (260 lb 12.8 oz)         Body mass index is 40.84 kg/m².    Estimated Creatinine Clearance: 71 mL/min (based on SCr of 0.9 mg/dL).      Assessment/Plan:  Vancomycin level is supratherapeutic. Based on Bayesian modeling, predicted AUC is 556 mg/L*hr is therapeutic.   Decrease vancomycin regimen to 1250mg every 24 hours. Decrease dose to reduce possible renal toxicity.  Bayesian Modeling predicts an AUC of 470 mg/L*hr and trough of 13.5 mg/L.  A vancomycin random level has been ordered on 6/5 at 0600 for follow-up.  Changes in regimen will be determined based on culture results, renal function, and clinical response.  Pharmacy will continue to monitor and adjust regimen as necessary.    Thank you for the consult,    Cata Leblanc, PharmD  PGY-1 Pharmacy Resident  [id number]

## 2024-06-03 ENCOUNTER — APPOINTMENT (OUTPATIENT)
Dept: VASCULAR LAB | Age: 79
DRG: 602 | End: 2024-06-03
Attending: INTERNAL MEDICINE
Payer: COMMERCIAL

## 2024-06-03 ENCOUNTER — APPOINTMENT (OUTPATIENT)
Dept: MRI IMAGING | Age: 79
DRG: 602 | End: 2024-06-03
Payer: COMMERCIAL

## 2024-06-03 PROBLEM — M79.601 RIGHT ARM PAIN: Status: ACTIVE | Noted: 2024-06-03

## 2024-06-03 PROBLEM — G54.0 BRACHIAL PLEXOPATHY: Status: ACTIVE | Noted: 2024-06-03

## 2024-06-03 LAB
ANION GAP SERPL CALCULATED.3IONS-SCNC: 6 MMOL/L (ref 3–16)
BASOPHILS # BLD: 0 K/UL (ref 0–0.2)
BASOPHILS NFR BLD: 0.5 %
BUN SERPL-MCNC: 20 MG/DL (ref 7–20)
CALCIUM SERPL-MCNC: 8.4 MG/DL (ref 8.3–10.6)
CHLORIDE SERPL-SCNC: 106 MMOL/L (ref 99–110)
CO2 SERPL-SCNC: 27 MMOL/L (ref 21–32)
CREAT SERPL-MCNC: 1 MG/DL (ref 0.6–1.2)
DEPRECATED RDW RBC AUTO: 15.3 % (ref 12.4–15.4)
ECHO BSA: 2.35 M2
EOSINOPHIL # BLD: 0.4 K/UL (ref 0–0.6)
EOSINOPHIL NFR BLD: 5.7 %
GFR SERPLBLD CREATININE-BSD FMLA CKD-EPI: 58 ML/MIN/{1.73_M2}
GLUCOSE SERPL-MCNC: 101 MG/DL (ref 70–99)
HCT VFR BLD AUTO: 32.4 % (ref 36–48)
HGB BLD-MCNC: 10.5 G/DL (ref 12–16)
LYMPHOCYTES # BLD: 0.9 K/UL (ref 1–5.1)
LYMPHOCYTES NFR BLD: 12.3 %
MCH RBC QN AUTO: 29.9 PG (ref 26–34)
MCHC RBC AUTO-ENTMCNC: 32.6 G/DL (ref 31–36)
MCV RBC AUTO: 91.8 FL (ref 80–100)
MONOCYTES # BLD: 1.1 K/UL (ref 0–1.3)
MONOCYTES NFR BLD: 14.3 %
NEUTROPHILS # BLD: 5 K/UL (ref 1.7–7.7)
NEUTROPHILS NFR BLD: 67.2 %
PLATELET # BLD AUTO: 222 K/UL (ref 135–450)
PMV BLD AUTO: 9.8 FL (ref 5–10.5)
POTASSIUM SERPL-SCNC: 4.1 MMOL/L (ref 3.5–5.1)
RBC # BLD AUTO: 3.53 M/UL (ref 4–5.2)
SODIUM SERPL-SCNC: 139 MMOL/L (ref 136–145)
WBC # BLD AUTO: 7.5 K/UL (ref 4–11)

## 2024-06-03 PROCEDURE — 93971 EXTREMITY STUDY: CPT

## 2024-06-03 PROCEDURE — 93971 EXTREMITY STUDY: CPT | Performed by: SURGERY

## 2024-06-03 PROCEDURE — 6360000002 HC RX W HCPCS: Performed by: INTERNAL MEDICINE

## 2024-06-03 PROCEDURE — 97162 PT EVAL MOD COMPLEX 30 MIN: CPT

## 2024-06-03 PROCEDURE — 36415 COLL VENOUS BLD VENIPUNCTURE: CPT

## 2024-06-03 PROCEDURE — A4216 STERILE WATER/SALINE, 10 ML: HCPCS | Performed by: INTERNAL MEDICINE

## 2024-06-03 PROCEDURE — 2580000003 HC RX 258: Performed by: INTERNAL MEDICINE

## 2024-06-03 PROCEDURE — 97165 OT EVAL LOW COMPLEX 30 MIN: CPT

## 2024-06-03 PROCEDURE — 97530 THERAPEUTIC ACTIVITIES: CPT

## 2024-06-03 PROCEDURE — 2580000003 HC RX 258: Performed by: PHYSICIAN ASSISTANT

## 2024-06-03 PROCEDURE — 6370000000 HC RX 637 (ALT 250 FOR IP): Performed by: PHYSICIAN ASSISTANT

## 2024-06-03 PROCEDURE — 85025 COMPLETE CBC W/AUTO DIFF WBC: CPT

## 2024-06-03 PROCEDURE — 80048 BASIC METABOLIC PNL TOTAL CA: CPT

## 2024-06-03 PROCEDURE — 1200000000 HC SEMI PRIVATE

## 2024-06-03 PROCEDURE — 72141 MRI NECK SPINE W/O DYE: CPT

## 2024-06-03 PROCEDURE — 99223 1ST HOSP IP/OBS HIGH 75: CPT | Performed by: PSYCHIATRY & NEUROLOGY

## 2024-06-03 PROCEDURE — 70551 MRI BRAIN STEM W/O DYE: CPT

## 2024-06-03 PROCEDURE — 99232 SBSQ HOSP IP/OBS MODERATE 35: CPT | Performed by: NURSE PRACTITIONER

## 2024-06-03 RX ORDER — DOXYCYCLINE HYCLATE 100 MG
100 TABLET ORAL 2 TIMES DAILY
Qty: 14 TABLET | Refills: 0 | Status: SHIPPED | OUTPATIENT
Start: 2024-06-03 | End: 2024-06-03

## 2024-06-03 RX ORDER — LACTOBACILLUS RHAMNOSUS GG 10B CELL
1 CAPSULE ORAL 2 TIMES DAILY WITH MEALS
Qty: 30 CAPSULE | Refills: 0 | Status: SHIPPED | OUTPATIENT
Start: 2024-06-03 | End: 2024-06-18

## 2024-06-03 RX ORDER — LACTOBACILLUS RHAMNOSUS GG 10B CELL
1 CAPSULE ORAL 2 TIMES DAILY WITH MEALS
Qty: 30 CAPSULE | Refills: 0 | Status: SHIPPED | OUTPATIENT
Start: 2024-06-03 | End: 2024-06-03

## 2024-06-03 RX ORDER — DOXYCYCLINE HYCLATE 100 MG
100 TABLET ORAL 2 TIMES DAILY
Qty: 14 TABLET | Refills: 0 | Status: SHIPPED | OUTPATIENT
Start: 2024-06-03 | End: 2024-06-10

## 2024-06-03 RX ADMIN — AMLODIPINE BESYLATE 10 MG: 5 TABLET ORAL at 08:45

## 2024-06-03 RX ADMIN — Medication 1 CAPSULE: at 19:04

## 2024-06-03 RX ADMIN — METOPROLOL TARTRATE 75 MG: 50 TABLET, FILM COATED ORAL at 09:00

## 2024-06-03 RX ADMIN — SODIUM CHLORIDE 1 MG: 9 INJECTION INTRAMUSCULAR; INTRAVENOUS; SUBCUTANEOUS at 10:39

## 2024-06-03 RX ADMIN — METOPROLOL TARTRATE 75 MG: 50 TABLET, FILM COATED ORAL at 20:12

## 2024-06-03 RX ADMIN — SODIUM CHLORIDE: 9 INJECTION, SOLUTION INTRAVENOUS at 02:23

## 2024-06-03 RX ADMIN — CLONIDINE HYDROCHLORIDE 0.2 MG: 0.1 TABLET ORAL at 20:12

## 2024-06-03 RX ADMIN — Medication 1 CAPSULE: at 08:45

## 2024-06-03 RX ADMIN — SODIUM CHLORIDE, PRESERVATIVE FREE 10 ML: 5 INJECTION INTRAVENOUS at 09:00

## 2024-06-03 RX ADMIN — APIXABAN 5 MG: 5 TABLET, FILM COATED ORAL at 08:45

## 2024-06-03 RX ADMIN — CLONIDINE HYDROCHLORIDE 0.2 MG: 0.1 TABLET ORAL at 08:45

## 2024-06-03 RX ADMIN — OXYCODONE AND ACETAMINOPHEN 1 TABLET: 5; 325 TABLET ORAL at 20:12

## 2024-06-03 RX ADMIN — SODIUM CHLORIDE 1250 MG: 9 INJECTION, SOLUTION INTRAVENOUS at 02:26

## 2024-06-03 RX ADMIN — APIXABAN 5 MG: 5 TABLET, FILM COATED ORAL at 20:12

## 2024-06-03 ASSESSMENT — PAIN SCALES - WONG BAKER
WONGBAKER_NUMERICALRESPONSE: NO HURT

## 2024-06-03 ASSESSMENT — PAIN SCALES - GENERAL: PAINLEVEL_OUTOF10: 7

## 2024-06-03 NOTE — PROGRESS NOTES
[facility] - Inpatient Rehabilitation Department   Phone: [phone]    Occupational Therapy    [x] Initial Evaluation            [] Daily Treatment Note         [] Discharge Summary      Patient: Stacy Ly   : 1945   MRN: [id number]   Date of Service:  6/3/2024    Admitting Diagnosis:  Cellulitis of right upper extremity  Current Admission Summary:   78 y.o.  years old female with past medical history of hypertension,  A-fib on Eliquis, and other medical problems comes to the hospital with right arm pain and swelling.  Patient report started having right arm pain that began approximately 2 days prior to coming to the hospital.  Description right shoulder forearm and wrist redness, swelling and pain.  She came to the hospital for further evaluation.  In the emergency room no neuroimaging was obtained.  Patient noted to have weakness as well in the arm.  There is concern for right upper extremity cellulitis and patient was started on antibiotics.  She had x-rays of her right shoulder which showed subluxation of her humeral head.  Orthopedics was consulted.  Neurology was consulted due to weakness.  Patient had CT head when she was admitted to the hospital which showed no acute intracranial abnormality.  CTA head and neck showed right vertebral artery stenosis, severe but no LVO.  Patient also had CT cervical spine which showed degenerative changes and multilevel bilateral neural hardin narrowing especially at C6-C7 level which is moderate to severe.  Today patient reports the redness and pain along with swelling has improved in her right hand.  She is able to flex her wrist and elbow.  She is having difficulty raising her arm.  She patient reports the symptoms have been ongoing for few weeks and she often uses her left hand to raise her right hand.  MRI brain and C-spine were ordered.  She denies headache, dizziness, dysarthria or dysphagia.  Other review of systems unremarkable   Past  understanding, would benefit from continued reinforcement    Assessment  Activity Tolerance: patient limited by pain. SpO2 94% on 3 L of O2  Patient with some wheezing while seated EOB. Attempted to wean down to 2 L but O2 dropping to 88%   Impairments Requiring Therapeutic Intervention: decreased functional mobility, decreased ADL status, decreased ROM, decreased strength, decreased safety awareness, decreased endurance, decreased balance, increased pain  Prognosis: fair  Clinical Assessment: Patient presenting below baseline function secondary to cellulitis of RUE. Patient reports primarily staying in lift chair at home but able to complete sponge baths, dressing, and short distance of mobility with RW, Patient reports using bed pan at home due to RW not fitting in bathroom. Patient requiring maxA of 2 for bed mobility and declined attempting transfer or ADLs. Patient limited by back and RUE pain and increased need for O2. Patient will benefit from continued OT services to address above deficits, maximize safety and independence and decrease caregiver burden.   Patient most likely will decline recommendation and will require 24 hour supervision and assist at home. Patient will benefit from bedside commode.   Safety Interventions: patient left in bed, bed alarm in place, call light within reach, patient at risk for falls, and nurse notified    Plan  Frequency: 3-5 x/per week  Current Treatment Recommendations: strengthening, balance training, functional mobility training, transfer training, endurance training, patient/caregiver education, and ADL/self-care training    Goals  Patient Goals: to go home    Short Term Goals:  Time Frame: Discharge   Patient will complete upper body ADL at set up assistance   Patient will complete lower body ADL at minimal assistance   Patient will complete toileting at minimal assistance   Patient will complete functional transfers at moderate assistance   Patient will increase [facility]  ADL score = to or > than 17/24    Above goals reviewed on 6/3/2024.  All goals are ongoing at this time unless indicated above.       Therapy Session Time     Individual Group Co-treatment   Time In    1347   Time Out    1431   Minutes    44        Timed Code Treatment Minutes:  Timed Code Treatment Minutes: 29 Minutes  Total Treatment Minutes:  44 minutes        Electronically Signed By: JONNY Lennon MOT OTR/L [id number]

## 2024-06-03 NOTE — ACP (ADVANCE CARE PLANNING)
Advanced Care Planning Note.    Purpose of Encounter: Advanced care planning in light of Afib  Parties In Attendance: Patient, daughter  Decisional Capacity: Yes  Subjective: Patient denies CP and SOB  Objective: Cr 1.0   Goals of Care Determination: Patient wants full support (CPR, vent, surgery, HD, trach, PEG)  Plan:  MRI Brain and C spine, RUE Doppler US, PT/OT  Code Status: Full code   Time spent on Advanced care Plannin minutes  Advanced Care Planning Documents: Completed advanced directives on chart, daughter is the POA.    Benson Hooper MD  6/3/2024 3:21 PM

## 2024-06-03 NOTE — PROGRESS NOTES
[facility] - Inpatient Rehabilitation Department   Phone: [phone]    Physical Therapy    [x] Initial Evaluation            [] Daily Treatment Note         [] Discharge Summary      Patient: Stacy Ly   : 1945   MRN: [id number]   Date of Service:  6/3/2024  Admitting Diagnosis: Cellulitis of right upper extremity  Current Admission Summary: The patient is a 78 y.o.  years old female with past medical history of hypertension,  A-fib on Eliquis, and other medical problems comes to the hospital with right arm pain and swelling.  Patient report started having right arm pain that began approximately 2 days prior to coming to the hospital.  Description right shoulder forearm and wrist redness, swelling and pain.  She came to the hospital for further evaluation.  In the emergency room no neuroimaging was obtained.  Patient noted to have weakness as well in the arm.  There is concern for right upper extremity cellulitis and patient was started on antibiotics.  She had x-rays of her right shoulder which showed subluxation of her humeral head.  Orthopedics was consulted.  Neurology was consulted due to weakness.  Patient had CT head when she was admitted to the hospital which showed no acute intracranial abnormality.  CTA head and neck showed right vertebral artery stenosis, severe but no LVO.  Patient also had CT cervical spine which showed degenerative changes and multilevel bilateral neural hardin narrowing especially at C6-C7 level which is moderate to severe.  Today patient reports the redness and pain along with swelling has improved in her right hand.  She is able to flex her wrist and elbow.  She is having difficulty raising her arm.  She patient reports the symptoms have been ongoing for few weeks and she often uses her left hand to raise her right hand.  MRI brain and C-spine were ordered.  She denies headache, dizziness, dysarthria or dysphagia.  Other review of systems unremarkable

## 2024-06-03 NOTE — CARE COORDINATION
Case Management Assessment  Initial Evaluation    Date/Time of Evaluation: 6/3/2024 5:48 PM  Assessment Completed by: FELIPA Olea, CRYSW    If patient is discharged prior to next notation, then this note serves as note for discharge by case management.    Patient Name: Stacy Ly                   YOB: 1945  Diagnosis: Right arm pain [M79.601]  Cellulitis of right forearm [L03.113]  Cellulitis of right upper extremity [L03.113]                   Date / Time: 5/31/2024 11:49 PM    Patient Admission Status: Inpatient   Readmission Risk (Low < 19, Mod (19-27), High > 27): Readmission Risk Score: 15.1    Current PCP: Tarik Hummel, MARILEE - CNP  PCP verified by CM? Yes    Chart Reviewed: Yes      History Provided by: Patient  Patient Orientation: Alert and Oriented    Patient Cognition: Alert    Hospitalization in the last 30 days (Readmission):  No    If yes, Readmission Assessment in CM Navigator will be completed.    Advance Directives:      Code Status: Full Code   Patient's Primary Decision Maker is:      Primary Decision Maker: Anisha Ly - Child - [phone]    Secondary Decision Maker: Noemi Luciano - Other - [phone]    Discharge Planning:    Patient lives with: Children, Family Members (w/daughter and grand kids) Type of Home: House (1 level - 4 steps)  Primary Care Giver: Other (Comment) (Family)  Patient Support Systems include: Children, Family Members   Current Financial resources: Medicare  Current community resources: None  Current services prior to admission: Durable Medical Equipment            Current DME: Other (Comment) (lift chair, walker, cane, electric scooter, shower chair)            Type of Home Care services:  OT, PT, Nursing Services    ADLS  Prior functional level: Mobility (usually can stand w/assist from lift chair and ambulate with walker)  Current functional level: Mobility (2 person assist for transfers now)    PT AM-PAC: 8 /24  OT AM-PAC: 12

## 2024-06-03 NOTE — DISCHARGE SUMMARY
Hospital Medicine Discharge Summary    Patient: Stacy Ly     Gender: female  : 1945   Age: 78 y.o.  MRN: [id number]    Admitting Physician: Mayito Navarro MD  Discharge Physician: Benson Hooper MD     Code Status: Full Code     Admit Date: 2024   Discharge Date:   6/3/24    Disposition:  Home    Discharge Diagnoses:    Active Hospital Problems    Diagnosis Date Noted    Cellulitis of right upper extremity [L03.113] 2024       Follow-up appointments:  one week    Outpatient to do list: F/U with PCP and Ortho    Condition at Discharge:  Stable    Hospital Course:   79 yo F with Afib on Eliquis, COPD, chronic diastolic CHF, morbid obesity, h/o gastric bypass with complication (gastrojejunostomy site with leak corrected at the [facility] in 2023) who came to ER with R arm swelling and pain.  Admitted as inpatient.    Treated for R arm cellulitis with IV Abx.  Followed by Ortho.  Suspect Right shoulder osteoarthritis with rotator cuff arthropathy     Seen by Neurology    MRI Brain:  IMPRESSION:  1. No acute infarct or acute intracranial process identified.  2. Mild diffuse cerebral volume loss and mild chronic small vessel ischemic  changes.    MRI C spine:  IMPRESSION:  Significantly limited exam due to patient motion demonstrating mild  degenerative changes at C5-6 and C6-7, as described above.    RUE Doppler US:    No evidence of deep vein or superficial vein thrombosis in the right upper extremity. Vessels demonstrate normal compressibility, color filling, and phasic and spontaneous flow. No evidence of acute deep vein thrombosis in the right upper extremity.    For comparison purposes, the left subclavian vein was investigated. This vein appeared to show normal color filling and Doppler interrogation showed phasic, spontaneous, and somewhat pulsatile flow.    Seen by PT/OT:   Recommended SNF, patient wants to go home with VNS/home PT/OT.    Discussed with daughter on   There is mild superior subluxation of the humeral head causing moderately severe narrowing of the acromial humeral space superiorly. The AC joint is intact.  The bones are osteopenic.     Moderate osteoarthritic changes along the glenohumeral joint and diffuse osteopenia with no acute bony abnormality. Mild superior subluxation of the humeral head causing moderately severe narrowing of the acromial humeral space which can be seen in rotator cuff tears.  Recommend orthopedic follow-up Diffuse osteopenia         The patient was seen and examined on day of discharge and this discharge summary is in conjunction with any daily progress note from day of discharge.Time Spent on discharge is 35 minutes in the examination, evaluation, counseling and review of medications and discharge plan.      Note that more than 30 minutes was spent in preparing discharge papers, discussing discharge with patient, medication review, etc.       Signed:    Benson Hooper MD   6/3/2024      Thank you Tarik Hummel, MARILEE - ROBBY for the opportunity to be involved in this patient's care. If you have any questions or concerns please feel free to contact me at [facility], [facility]

## 2024-06-03 NOTE — PROGRESS NOTES
Occupational/Physical Therapy  Stacy Ly     OT/PT orders received and chart reviewed. Attempted to see patient for evaluation.  Patient currently off the floor. Will attempt to see patient when they return as schedule allows.     Thank you,   Gaye Galan, [facility] OTR/L [id number]  Karin Izquierdo PT, DPT [id number]

## 2024-06-03 NOTE — DISCHARGE INSTR - COC
Continuity of Care Form    Patient Name: Stacy Ly   :  1945  MRN:  [id number]    Admit date:  2024  Discharge date:  ***    Code Status Order: Full Code   Advance Directives:     Admitting Physician:  Mayito Navarro MD  PCP: Tarik Hummel, APRN - CNP    Discharging Nurse: ***  Discharging Hospital Unit/Room#: 5TN-5583/5583-01  Discharging Unit Phone Number: ***    Emergency Contact:   Extended Emergency Contact Information  Primary Emergency Contact: Noemi Luciano  Home Phone: [phone]  Mobile Phone: [phone]  Relation: Other   needed? No  Secondary Emergency Contact: Anisha Ly  Address: [address] [place] [address]            [address]  Home Phone: [phone]  Work Phone: [phone]  Relation: Child    Past Surgical History:  Past Surgical History:   Procedure Laterality Date    CARDIOVERSION      COLONOSCOPY N/A 2018    COLONOSCOPY POLYPECTOMY SNARE/COLD BIOPSY performed by Jairon Gordon MD at [facility] END[facility]COPY    COLONOSCOPY N/A 2022    COLONOSCOPY POLYPECTOMY SNARE/COLD BIOPSY performed by Alfred London MD at [facility] ENDOSCOPY    FRACTURE SURGERY      ankle rt has pins and rods, and rt elbow    GASTRIC BYPASS SURGERY      LARYNX SURGERY      TOTAL KNEE ARTHROPLASTY Bilateral 2012    bilateral knee replacements    TUBAL LIGATION      tubes tied    UPPER GASTROINTESTINAL ENDOSCOPY N/A 2018    EGD BIOPSY performed by Jairon Gordon MD at [facility] ENDOSCOPY    UPPER GASTROINTESTINAL ENDOSCOPY N/A 2022    EGD DILATION BALLOON performed by Alfred London MD at [facility] ENDOSCOPY    UPPER GASTROINTESTINAL ENDOSCOPY N/A 2022    EGD BIOPSY performed by Alfred London MD at [facility] ENDOSCOPY       Immunization History:   Immunization History   Administered Date(s) Administered    COVID-19, PFIZER Bivalent, DO NOT Dilute, (age 12y+), IM, 30 mcg/0.3 mL 2022    COVID-19, PFIZER GRAY top, DO NOT Dilute, (age  Mobility/ADLs:  Walking   {CHP DME ADLs:666233060}  Transfer  {CHP DME ADLs:162149660}  Bathing  {CHP DME ADLs:349717148}  Dressing  {CHP DME ADLs:859841449}  Toileting  {CHP DME ADLs:870207858}  Feeding  {CHP DME ADLs:290466679}  Med Admin  {P DME ADLs:368379105}  Med Delivery   { CHOLO MED Delivery:606686887}    Wound Care Documentation and Therapy:  Wound 23 Buttocks redness to buttocks (Active)   Number of days: 389        Elimination:  Continence:   Bowel: {YES / NO:}  Bladder: {YES / NO:}  Urinary Catheter: {Urinary Catheter:364116487}   Colostomy/Ileostomy/Ileal Conduit: {YES / NO:}       Date of Last BM: ***    Intake/Output Summary (Last 24 hours) at 6/3/2024 1532  Last data filed at 6/3/2024 0618  Gross per 24 hour   Intake 254.61 ml   Output 1600 ml   Net -1345.39 ml     I/O last 3 completed shifts:  In: 264.6 [I.V.:14.6; IV Piggyback:250]  Out: 2150 [Urine:2150]    Safety Concerns:     { CHOLO Safety Concerns:314365845}    Impairments/Disabilities:      { CHOLO Impairments/Disabilities:875642121}    Nutrition Therapy:  Current Nutrition Therapy:   { CHOLO Diet List:254566522}    Routes of Feeding: {[facility] DME Other Feedings:926474428}  Liquids: {Slp liquid thickness:64372}  Daily Fluid Restriction: {CHP DME Yes amt example:416882039}  Last Modified Barium Swallow with Video (Video Swallowing Test): {Done Not Done Date:}    Treatments at the Time of Hospital Discharge:   Respiratory Treatments: ***  Oxygen Therapy:  {Therapy; copd oxygen:84719}  Ventilator:    { CC Vent List:474205732}    Rehab Therapies: {THERAPEUTIC INTERVENTION:1872510170}  Weight Bearing Status/Restrictions: { CC Weight Bearin}  Other Medical Equipment (for information only, NOT a DME order):  {EQUIPMENT:835816864}  Other Treatments: ***    Patient's personal belongings (please select all that are sent with patient):  {LICO DME Belongings:435055591}    RN SIGNATURE:

## 2024-06-03 NOTE — PROGRESS NOTES
[facility] Orthopedic Surgery  Progress Note      Chief complaint: Right shoulder pain, arm pain     Subjective:  The patient is seen sitting up in bed.  She just returned from vascular.  Denies new issues.  She reports her pain and swelling has improved.  No shoulder pain - only wrist pain today.   She is RHD.    Patient lives at home. Spends most of the day in her chair.     Review of Systems:  Constitutional: Negative for fever, chills, fatigue.   Skin:  Negative for pruritis, rash  Eyes: Negative for photophobia and visual disturbance.   ENT:  Negative for rhinorrhea, epistaxis, sore throat  Respiratory:  Negative for cough and shortness of breath.   Cardiovascular: Negative for chest pain.   Gastrointestinal: Negative for nausea, vomiting, diarrhea.  Genitourinary: Negative for dysuria and difficulty urinating.   Neurological: Negative for confusion, dysarthria, tremors, seizures.   Psychiatric:  Negative for depression or anxiety  Musculoskeletal:  Positive for right arm pain, right shoulder pain.     Objective:  Vitals:    06/03/24 0830   BP: 138/71   Pulse: 68   Resp: 16   Temp: 98.3 °F (36.8 °C)   SpO2: 96%      Physical Examination:  GENERAL: No apparent distress, well-nourished  SKIN:  Warm and dry  EYES: Nonicteric.   ENT: Mucous membranes moist  HEAD: Normocephalic, atraumatic  RESPIRATORY: Resp easy and unlabored  CARDIOVASCULAR: Regular rate and rhythm  GI: Abdomen soft, nontender  NEURO: Awake and alert.  No speech defect  PSYCHIATRIC: Appropriate affect; not agitated  MUSCULOSKELETAL:  RIGHT SHOULDER  Inspection: On exam there are no ulcerations, rashes or lesions about the right shoulder. No effusion.  There is no pain to palpation of the  shoulder. Pt has difficulty with a active shoulder flexion/abduction.  She has decreased ROM of the wrist and digits on the right. No erythema. No effusions right arm. No pain with passive ROM any joint right arm.  Mild soft tissue swelling.   Distal motor  intact  Sensation: Grossly intact to light touch throughout the RUE.  Vascular:  2+ radial pulse.    Labs reviewed:  Recent Labs     06/01/24  0018 06/02/24  0528 06/03/24  0451   WBC 8.0 7.2 7.5   HGB 12.4 11.1* 10.5*   HCT 37.6 35.6* 32.4*    212 222     Recent Labs     06/01/24  0534 06/02/24  0528 06/03/24  0451    138 139   K 4.1 4.2 4.1    105 106   CO2 25 23 27   BUN 19 20 20   CREATININE 1.0 0.9 1.0   GLUCOSE 91 87 101*   CALCIUM 8.0* 8.5 8.4     No results for input(s): \"INR\", \"PROTIME\" in the last 72 hours.    Lab Results   Component Value Date    COLORU DARK YELLOW (A) 05/10/2023    CLARITYU TURBID (A) 05/10/2023    PHUR 5.0 05/10/2023    GLUCOSEU Negative 05/10/2023    BLOODU TRACE (A) 05/10/2023    LEUKOCYTESUR MODERATE (A) 05/10/2023    NITRITE negative 07/23/2018    BILIRUBINUR MODERATE (A) 05/10/2023    UROBILINOGEN 1.0 05/10/2023    RBCUA 17 (H) 05/10/2023    WBCUA 200 (H) 05/10/2023    BACTERIA 4+ (A) 05/10/2023       Imaging:  I independently evaluated and interpreted the following images    3 views of the right shoulder performed on 6/1/24 these are independently evaluated and interpreted: There are moderate degenerative changes of the glenohumeral joint with superior migration of the humeral head consistent with rotator cuff arthropathy.  No acute fractures or dislocations    IMPRESSION:  Right shoulder osteoarthritis with rotator cuff arthropathy  Right upper extremity cellulitis     RECOMMENDATIONS:  Plan of care:   Weight bearing status- WBAT to RUE  Pain control- per primary team  Antibiotics- per primary team   DVT ppx- per primary team  Dispo-per primary team    Follow up- on outpatient basis in orthopedic clinic with Dr. Luis Eduardo Mckenna.    Gio Crawley, CNP

## 2024-06-03 NOTE — PLAN OF CARE
Problem: Pain  Goal: Verbalizes/displays adequate comfort level or baseline comfort level  6/3/2024 1522 by Zain Negrete RN  Outcome: Progressing  6/3/2024 0208 by Caridad Mullen RN  Outcome: Progressing     Problem: Safety - Adult  Goal: Free from fall injury  6/3/2024 1522 by Zain Negrete RN  Outcome: Progressing  6/3/2024 0208 by Caridad Mullen RN  Outcome: Progressing     Problem: Skin/Tissue Integrity  Goal: Absence of new skin breakdown  Description: 1.  Monitor for areas of redness and/or skin breakdown  2.  Assess vascular access sites hourly  3.  Every 4-6 hours minimum:  Change oxygen saturation probe site  4.  Every 4-6 hours:  If on nasal continuous positive airway pressure, respiratory therapy assess nares and determine need for appliance change or resting period.  6/3/2024 1522 by Zain Negrete RN  Outcome: Progressing  6/3/2024 0208 by Caridad Mullen RN  Outcome: Progressing     Problem: ABCDS Injury Assessment  Goal: Absence of physical injury  6/3/2024 1522 by Zain Negrete RN  Outcome: Progressing  6/3/2024 0208 by Caridad Mullen RN  Outcome: Progressing

## 2024-06-03 NOTE — CONSULTS
In patient Neurology consult        [facility]      MD Stacy Abraham  1945    Date of Service: 6/3/2024    Referring Physician: Benson Hooper MD      Reason for the consult and CC: Acute/subacute right arm weakness    HPI:   The patient is a 78 y.o.  years old female with past medical history of hypertension,  A-fib on Eliquis, and other medical problems comes to the hospital with right arm pain and swelling.  Patient report started having right arm pain that began approximately 2 days prior to coming to the hospital.  Description right shoulder forearm and wrist redness, swelling and pain.  She came to the hospital for further evaluation.  In the emergency room no neuroimaging was obtained.  Patient noted to have weakness as well in the arm.  There is concern for right upper extremity cellulitis and patient was started on antibiotics.  She had x-rays of her right shoulder which showed subluxation of her humeral head.  Orthopedics was consulted.  Neurology was consulted due to weakness.  Patient had CT head when she was admitted to the hospital which showed no acute intracranial abnormality.  CTA head and neck showed right vertebral artery stenosis, severe but no LVO.  Patient also had CT cervical spine which showed degenerative changes and multilevel bilateral neural hardin narrowing especially at C6-C7 level which is moderate to severe.  Today patient reports the redness and pain along with swelling has improved in her right hand.  She is able to flex her wrist and elbow.  She is having difficulty raising her arm.  She patient reports the symptoms have been ongoing for few weeks and she often uses her left hand to raise her right hand.  MRI brain and C-spine were ordered.  She denies headache, dizziness, dysarthria or dysphagia.  Other review of systems unremarkable       Constitutional:   Vitals:    06/02/24 2130 06/03/24 0015 06/03/24 0350 06/03/24 0830   BP: 129/71 121/76  02/16/2023       Neuroimaging was independently reviewed by myself and discussed results with the patient  Reviewed notes from different physicians including H&P and ED notes.  Reviewed lab and blood testing    Impression:     Acute/subacute right upper extremity weakness.  Severe and uncontrolled.  Patient reports symptoms have been ongoing for a few weeks.  Rule out new ischemic stroke though seems less likely.  Other possibilities include cervical spine etiologies or related to rotator cuff arthropathy.   Right upper extremity cellulitis.  Right shoulder pain, subluxation.  Patient was also seen by orthopedics, diagnosed with rotator cuff arthropathy, which could be contributing to above.  Hypertension  A-fib on Eliquis      Recommendation:     Continue supportive care  Monitor and control blood pressure  Hydration  Neurochecks  Telemetry  GI and DVT prophylaxis  Pain control  PT and OT  Speech  Discharge planning after above workup      Thank you for referring such patient. If you have any questions regarding my consult note, please don't hesitate to call me.     MARILEE Stanley - CNP    Attending Supervising Physician's Attestation Statement      The patient was seen 6/3/2024 in conjunction with the nurse practitioner with independent history, evaluation and examination. I agree with the note which has been adjusted to reflect my findings, with the addition of the following:         The patient is 78 y.o.  female  who was admitted for   right upper extremity weakness and pain.  Diagnosed with cellulitis and placed on antibiotics.  Today she is complaining of right shoulder weakness with pain.  Pain started 2 to 3 weeks ago.  No preceding skin rash or injury.  No significant neck pain.    On examination:  No acute distress  Awake and alert x3.  Fluent speech.  Appears appropriate with intact recent and remote memory.  Pupil reactive and symmetric, extraocular motor intact, no ophthalmoplegia, face is

## 2024-06-03 NOTE — PROGRESS NOTES
Physician Progress Note      PATIENT:               QUENTIN HURT  [facility] #:                  [id number]  :                       1945  ADMIT DATE:       2024 11:49 PM  DISCH DATE:  RESPONDING  PROVIDER #:        Demetra DAVID MD          QUERY TEXT:    Patient was admitted with Right upper extremity cellulitis, noted to have   \"Paroxysmal atrial fibrillation\" in H&P note on  and is maintained on   Eliquis. If possible, please document in progress notes and discharge summary   if you are evaluating and/or treating any of the following:    The medical record reflects the following:  Risk Factors: Age 78 F, HTN  Clinical Indicators:  H&P noted Paroxysmal atrial fibrillation and is   maintained on Eliquis.  Treatment: Eliquis.    Thanks,  Tatum nation   Options provided:  -- Secondary hypercoagulable state in a patient with atrial fibrillation  -- Other - I will add my own diagnosis  -- Disagree - Not applicable / Not valid  -- Disagree - Clinically unable to determine / Unknown  -- Refer to Clinical Documentation Reviewer    PROVIDER RESPONSE TEXT:    This patient has secondary hypercoagulable state in a patient with atrial   fibrillation.    Query created by: Tatum Nation on 6/3/2024 8:10 AM      Electronically signed by:  Demetra DAVID MD 6/3/2024 9:01 AM

## 2024-06-03 NOTE — PROGRESS NOTES
Shift assessment completed. Routine vitals stable. Scheduled medications given. Patient is alert and oriented. Respirations are easy and unlabored. Patient does not appear to be in distress, resting comfortably at this time. Call light within reach.

## 2024-06-03 NOTE — PLAN OF CARE
Problem: Pain  Goal: Verbalizes/displays adequate comfort level or baseline comfort level  6/3/2024 0208 by Caridad Mullen RN  Outcome: Progressing  6/2/2024 1539 by Shanique David RN  Outcome: Progressing     Problem: Safety - Adult  Goal: Free from fall injury  6/3/2024 0208 by Caridad Mullen RN  Outcome: Progressing  6/2/2024 1539 by Shanique David RN  Outcome: Progressing     Problem: Skin/Tissue Integrity  Goal: Absence of new skin breakdown  Description: 1.  Monitor for areas of redness and/or skin breakdown  2.  Assess vascular access sites hourly  3.  Every 4-6 hours minimum:  Change oxygen saturation probe site  4.  Every 4-6 hours:  If on nasal continuous positive airway pressure, respiratory therapy assess nares and determine need for appliance change or resting period.  6/3/2024 0208 by Caridad Mullen RN  Outcome: Progressing  6/2/2024 1539 by Shanique David RN  Outcome: Progressing     Problem: ABCDS Injury Assessment  Goal: Absence of physical injury  6/3/2024 0208 by Caridad Mullen RN  Outcome: Progressing  6/2/2024 1539 by Shanique David RN  Outcome: Progressing

## 2024-06-04 ENCOUNTER — APPOINTMENT (OUTPATIENT)
Dept: GENERAL RADIOLOGY | Age: 79
DRG: 602 | End: 2024-06-04
Payer: COMMERCIAL

## 2024-06-04 ENCOUNTER — APPOINTMENT (OUTPATIENT)
Age: 79
DRG: 602 | End: 2024-06-04
Attending: INTERNAL MEDICINE
Payer: COMMERCIAL

## 2024-06-04 LAB
ECHO AO ASC DIAM: 3.6 CM
ECHO AO ASCENDING AORTA INDEX: 1.61 CM/M2
ECHO AO ROOT DIAM: 3.2 CM
ECHO AO ROOT INDEX: 1.43 CM/M2
ECHO AV AREA PEAK VELOCITY: 2.1 CM2
ECHO AV AREA VTI: 2.2 CM2
ECHO AV AREA/BSA PEAK VELOCITY: 0.9 CM2/M2
ECHO AV AREA/BSA VTI: 1 CM2/M2
ECHO AV MEAN GRADIENT: 7 MMHG
ECHO AV MEAN VELOCITY: 1.2 M/S
ECHO AV PEAK GRADIENT: 10 MMHG
ECHO AV PEAK VELOCITY: 1.6 M/S
ECHO AV VELOCITY RATIO: 0.81
ECHO AV VTI: 43.3 CM
ECHO BSA: 2.33 M2
ECHO LA AREA 2C: 35.5 CM2
ECHO LA AREA 4C: 35.5 CM2
ECHO LA DIAMETER INDEX: 1.47 CM/M2
ECHO LA DIAMETER: 3.3 CM
ECHO LA MAJOR AXIS: 7.5 CM
ECHO LA MINOR AXIS: 3.3 CM
ECHO LA TO AORTIC ROOT RATIO: 1.03
ECHO LA VOL BP: 136 ML (ref 22–52)
ECHO LA VOL MOD A2C: 136 ML (ref 22–52)
ECHO LA VOL MOD A4C: 81 ML (ref 22–52)
ECHO LA VOL/BSA BIPLANE: 61 ML/M2 (ref 16–34)
ECHO LA VOLUME INDEX MOD A2C: 61 ML/M2 (ref 16–34)
ECHO LA VOLUME INDEX MOD A4C: 36 ML/M2 (ref 16–34)
ECHO LV E' LATERAL VELOCITY: 8 CM/S
ECHO LV E' SEPTAL VELOCITY: 6 CM/S
ECHO LV EDV A2C: 114 ML
ECHO LV EDV A4C: 113 ML
ECHO LV EDV INDEX A4C: 50 ML/M2
ECHO LV EDV NDEX A2C: 51 ML/M2
ECHO LV EJECTION FRACTION A2C: 53 %
ECHO LV EJECTION FRACTION A4C: 58 %
ECHO LV EJECTION FRACTION BIPLANE: 55 % (ref 55–100)
ECHO LV ESV A2C: 54 ML
ECHO LV ESV A4C: 47 ML
ECHO LV ESV INDEX A2C: 24 ML/M2
ECHO LV ESV INDEX A4C: 21 ML/M2
ECHO LV FRACTIONAL SHORTENING: 35 % (ref 28–44)
ECHO LV INTERNAL DIMENSION DIASTOLE INDEX: 1.92 CM/M2
ECHO LV INTERNAL DIMENSION DIASTOLIC: 4.3 CM (ref 3.9–5.3)
ECHO LV INTERNAL DIMENSION SYSTOLIC INDEX: 1.25 CM/M2
ECHO LV INTERNAL DIMENSION SYSTOLIC: 2.8 CM
ECHO LV IVSD: 1.2 CM (ref 0.6–0.9)
ECHO LV MASS 2D: 123.3 G (ref 67–162)
ECHO LV MASS INDEX 2D: 55 G/M2 (ref 43–95)
ECHO LV POSTERIOR WALL DIASTOLIC: 0.6 CM (ref 0.6–0.9)
ECHO LV RELATIVE WALL THICKNESS RATIO: 0.28
ECHO LVOT AREA: 2.8 CM2
ECHO LVOT AV VTI INDEX: 0.76
ECHO LVOT DIAM: 1.9 CM
ECHO LVOT MEAN GRADIENT: 4 MMHG
ECHO LVOT PEAK GRADIENT: 7 MMHG
ECHO LVOT PEAK VELOCITY: 1.3 M/S
ECHO LVOT STROKE VOLUME INDEX: 41.7 ML/M2
ECHO LVOT SV: 93.5 ML
ECHO LVOT VTI: 33 CM
ECHO MV A VELOCITY: 0.7 M/S
ECHO MV AREA VTI: 2.8 CM2
ECHO MV E VELOCITY: 1.1 M/S
ECHO MV E/A RATIO: 1.57
ECHO MV E/E' LATERAL: 13.75
ECHO MV E/E' RATIO (AVERAGED): 16.04
ECHO MV E/E' SEPTAL: 18.33
ECHO MV LVOT VTI INDEX: 1.03
ECHO MV MAX VELOCITY: 1 M/S
ECHO MV MEAN GRADIENT: 4 MMHG
ECHO MV MEAN VELOCITY: 0.6 M/S
ECHO MV PEAK GRADIENT: 4 MMHG
ECHO MV REGURGITANT RADIUS PISA: 2.2 CM
ECHO MV VTI: 34 CM
ECHO PULMONARY ARTERY END DIASTOLIC PRESSURE: 6 MMHG
ECHO PV ACCELERATION TIME (AT): 84 MS
ECHO PV MAX VELOCITY: 1.1 M/S
ECHO PV PEAK GRADIENT: 5 MMHG
ECHO PV REGURGITANT END DIASTOLIC MAX VELOCITY: 1.3 M/S
ECHO PV REGURGITANT MAX VELOCITY: 1.3 M/S
ECHO RA AREA 4C: 28.9 CM2
ECHO RA END SYSTOLIC VOLUME APICAL 4 CHAMBER INDEX BSA: 46 ML/M2
ECHO RA VOLUME: 103 ML
ECHO RV BASAL DIMENSION: 4.8 CM
ECHO RV LONGITUDINAL DIMENSION: 7.8 CM
ECHO RV MID DIMENSION: 3.8 CM
ECHO RV TAPSE: 2.1 CM (ref 1.7–?)
ECHO TV REGURGITANT MAX VELOCITY: 2.6 M/S
ECHO TV REGURGITANT PEAK GRADIENT: 26 MMHG
NT-PROBNP SERPL-MCNC: 3308 PG/ML (ref 0–449)

## 2024-06-04 PROCEDURE — APPNB30 APP NON BILLABLE TIME 0-30 MINS

## 2024-06-04 PROCEDURE — APPSS30 APP SPLIT SHARED TIME 16-30 MINUTES

## 2024-06-04 PROCEDURE — 2580000003 HC RX 258: Performed by: PHYSICIAN ASSISTANT

## 2024-06-04 PROCEDURE — 93306 TTE W/DOPPLER COMPLETE: CPT | Performed by: INTERNAL MEDICINE

## 2024-06-04 PROCEDURE — 6370000000 HC RX 637 (ALT 250 FOR IP): Performed by: PHYSICIAN ASSISTANT

## 2024-06-04 PROCEDURE — 2580000003 HC RX 258: Performed by: INTERNAL MEDICINE

## 2024-06-04 PROCEDURE — 6360000002 HC RX W HCPCS: Performed by: INTERNAL MEDICINE

## 2024-06-04 PROCEDURE — 99233 SBSQ HOSP IP/OBS HIGH 50: CPT | Performed by: PSYCHIATRY & NEUROLOGY

## 2024-06-04 PROCEDURE — 71046 X-RAY EXAM CHEST 2 VIEWS: CPT

## 2024-06-04 PROCEDURE — 83880 ASSAY OF NATRIURETIC PEPTIDE: CPT

## 2024-06-04 PROCEDURE — 93306 TTE W/DOPPLER COMPLETE: CPT

## 2024-06-04 PROCEDURE — 36415 COLL VENOUS BLD VENIPUNCTURE: CPT

## 2024-06-04 PROCEDURE — 1200000000 HC SEMI PRIVATE

## 2024-06-04 RX ORDER — FUROSEMIDE 10 MG/ML
40 INJECTION INTRAMUSCULAR; INTRAVENOUS 2 TIMES DAILY
Status: COMPLETED | OUTPATIENT
Start: 2024-06-04 | End: 2024-06-05

## 2024-06-04 RX ADMIN — METOPROLOL TARTRATE 75 MG: 50 TABLET, FILM COATED ORAL at 10:34

## 2024-06-04 RX ADMIN — SODIUM CHLORIDE, PRESERVATIVE FREE 10 ML: 5 INJECTION INTRAVENOUS at 10:36

## 2024-06-04 RX ADMIN — AMLODIPINE BESYLATE 10 MG: 5 TABLET ORAL at 10:35

## 2024-06-04 RX ADMIN — FUROSEMIDE 40 MG: 10 INJECTION, SOLUTION INTRAMUSCULAR; INTRAVENOUS at 10:40

## 2024-06-04 RX ADMIN — SODIUM CHLORIDE, PRESERVATIVE FREE 10 ML: 5 INJECTION INTRAVENOUS at 21:03

## 2024-06-04 RX ADMIN — Medication 1 CAPSULE: at 10:42

## 2024-06-04 RX ADMIN — FUROSEMIDE 40 MG: 10 INJECTION, SOLUTION INTRAMUSCULAR; INTRAVENOUS at 16:58

## 2024-06-04 RX ADMIN — CLONIDINE HYDROCHLORIDE 0.2 MG: 0.1 TABLET ORAL at 10:35

## 2024-06-04 RX ADMIN — SODIUM CHLORIDE 1250 MG: 9 INJECTION, SOLUTION INTRAVENOUS at 02:43

## 2024-06-04 RX ADMIN — Medication 1 CAPSULE: at 16:58

## 2024-06-04 RX ADMIN — APIXABAN 5 MG: 5 TABLET, FILM COATED ORAL at 10:34

## 2024-06-04 RX ADMIN — METOPROLOL TARTRATE 75 MG: 50 TABLET, FILM COATED ORAL at 20:59

## 2024-06-04 RX ADMIN — CLONIDINE HYDROCHLORIDE 0.2 MG: 0.1 TABLET ORAL at 21:00

## 2024-06-04 RX ADMIN — APIXABAN 5 MG: 5 TABLET, FILM COATED ORAL at 21:00

## 2024-06-04 NOTE — CARE COORDINATION
DYLAN informed that [facility] could not accept.  Spoke to Caprice w/[facility] who stated she could now accept.  Caprice met with patient who is now declining HHC stating that she knows what she needs to do when she gets home.  Pt will discharge home with no needs.  Pt's dtr, Anisha, will come pick her up and transport home when pt is medically clear.    Electronically signed by FELIPA Olea, CRYSW on 6/4/2024 at 5:52 PM

## 2024-06-04 NOTE — PROGRESS NOTES
Pt family member arrived to transport patient home for discharge. Patient on 3L O2, per patient she does not have oxygen supplies at home stating that she has run out in the past and that she does not wear it at home. Weaned patient to RA with c/o SOB and dyspnea on exertion, O2 sats 82-85%, perfectserve sent to hospitalist, patient agreeable to staying overnight to discuss oxygen therapy needs at home.

## 2024-06-04 NOTE — PROGRESS NOTES
Physical and Occupational Therapy  Stacy Ly  1945    Attempted to see patient for PT/OT session. Patient reports she would like a sponge bath. When attempting to assist the patient to sit on the side of the bed, patient adamantly refused completing supine>sit transfer. After much education on importance of OOB mobility (for lung expansion, skin integrity, abdominal strength, and joint movement) and purpose of skilled therapy, patient continued to decline. When asked, patient reports that she does not want any additional skilled PT/OT during her inpatient hospital stay. Reports that she would like an aide to assist with a bed bath. RN notified. Education was provided to patient on discharge recommendation of SNF. Patient reports that she has two grandsons that can \"pick me up and put me in my chair.\" Patient is not safe to discharge to her home setting given current functional mobility; however, she is not willing to participate in skilled therapy at this time. Will discharge patient from therapy caseload. Please re-consult if patient is willing to participate in skilled PT/OT.     Karin Izquierdo PT, DPT [id number]  Jany Aparicio, OTR/L [id number]

## 2024-06-04 NOTE — PROGRESS NOTES
[facility]    6/4/2024 10:48 AM        Name: Stacy Ly .              Admitted: 5/31/2024  Primary Care Provider: Tarik Hummel APRN - CNP (Tel: [phone])        Subjective:      Bed was hypoxic feeling more short of breath today no chest pain nausea vomit    Reviewed interval ancillary notes    Current Medications  furosemide (LASIX) injection 40 mg, BID  perflutren lipid microspheres (DEFINITY) injection 1.5 mL, ONCE PRN  vancomycin (VANCOCIN) 1,250 mg in sodium chloride 0.9 % 250 mL IVPB ([id number]), Q24H  lactobacillus (CULTURELLE) capsule 1 capsule, BID WC  albuterol sulfate HFA (PROVENTIL;VENTOLIN;PROAIR) 108 (90 Base) MCG/ACT inhaler 2 puff, Q4H PRN  cloNIDine (CATAPRES) tablet 0.2 mg, BID  amLODIPine (NORVASC) tablet 10 mg, Daily  apixaban (ELIQUIS) tablet 5 mg, BID  metoprolol tartrate (LOPRESSOR) tablet 75 mg, BID  sodium chloride flush 0.9 % injection 5-40 mL, 2 times per day  sodium chloride flush 0.9 % injection 5-40 mL, PRN  0.9 % sodium chloride infusion, PRN  ondansetron (ZOFRAN) injection 4 mg, Q6H PRN  senna (SENOKOT) tablet 8.6 mg, Daily PRN  acetaminophen (TYLENOL) tablet 650 mg, Q6H PRN   Or  acetaminophen (TYLENOL) suppository 650 mg, Q6H PRN  oxyCODONE-acetaminophen (PERCOCET) 5-325 MG per tablet 1 tablet, Q4H PRN        Objective:  /70   Pulse 70   Temp 97.8 °F (36.6 °C) (Oral)   Resp 18   Ht 1.702 m (5' 7.01\")   Wt 115.3 kg (254 lb 3.2 oz)   SpO2 97%   BMI 39.80 kg/m²   No intake or output data in the 24 hours ending 06/04/24 1048   Wt Readings from Last 3 Encounters:   06/04/24 115.3 kg (254 lb 3.2 oz)   05/15/23 107.7 kg (237 lb 8 oz)   05/04/23 108 kg (238 lb)       General appearance:  Appears comfortable. AAOx3  HEENT: atraumatic, Pupils equal, muscous membranes moist, no masses appreciated  Cardiovascular: Regular rate and rhythm no murmurs    Final Result   1. No acute fracture or dislocation.   2. Postsurgical changes of the distal radius. No hardware complication.         XR RADIUS ULNA RIGHT (2 VIEWS)   Final Result   1. No acute fracture or dislocation.   2. Postsurgical changes of the distal radius. No hardware complication.         XR ELBOW RIGHT (2 VIEWS)   Final Result   1. No acute fracture or dislocation.   2. Postsurgical changes of the distal radius. No hardware complication.         XR SHOULDER RIGHT (MIN 2 VIEWS)   Final Result   Moderate osteoarthritic changes along the glenohumeral joint and diffuse   osteopenia with no acute bony abnormality.      Mild superior subluxation of the humeral head causing moderately severe   narrowing of the acromial humeral space which can be seen in rotator cuff   tears.  Recommend orthopedic follow-up      Diffuse osteopenia             Recent imaging reviewed    Problem List  Principal Problem:    Cellulitis of right forearm  Active Problems:    PAF (paroxysmal atrial fibrillation) (HCC)    Right arm pain    Brachial plexopathy  Resolved Problems:    * No resolved hospital problems. *       Assessment & Plan:   Acute hypoxic resp failure secondary to acute diastolic chf  - check bmp  Bnp  Iv lasix 40mg bid  Xray reveiwed  Get echo    RUE cellulits iv atbx          Diet: ADULT DIET; Regular  Code:Full Code        Corin Chambers MD   6/4/2024 10:48 AM

## 2024-06-04 NOTE — PROGRESS NOTES
Occupational Therapy  Stacy Ly  1945    06/04/24    Attempted OT treatment with pt currently SIDDHARTH at radiology. Will re-attempt as schedule and pt status allow.     Thank you,    Jany Aparicio, OTR/L, C/NDT ([id number])

## 2024-06-04 NOTE — PROGRESS NOTES
Stacy Ly  Neurology Follow-up  [facility]    Date of Service: 6/4/2024    Subjective:   CC: Follow up today regarding: Acute/acute right arm weakness    Events noted. Chart and lab reviewed.  Patient seen this morning, she reports she is short of breath.  Chest x-ray done this morning showed pleural edema.   We discussed MRI brain and C-spine results. MRI brain showed no acute stroke.  MRI C-spine was limited due to motion but did not show any significant acute abnormalities.  She reports her right arm pain is improved.  But still not able to move arm.  Patient reports she has chronic impaired mobility.  She reports she spends most of her day and in her left chair.  She lives at home with her daughter and granddaughter who assist her with her ADLs.  Other review of systems unremarkable.      ROS : A 10-12 system review obtained and updated today and is unremarkable except as mentioned  in my interval history.     Past medical history, social history, medication and family history reviewed.       Objective:  Exam:   Constitutional:   Vitals:    06/04/24 1015 06/04/24 1057 06/04/24 1239 06/04/24 1324   BP: 137/70 135/72 135/72 135/72   Pulse: 70 70     Resp: 18 18     Temp: 97.8 °F (36.6 °C) 97.8 °F (36.6 °C)     TempSrc: Oral Oral     SpO2: 97% 97%     Weight:   115.2 kg (254 lb) 115.2 kg (254 lb)   Height:   1.702 m (5' 7\") 1.702 m (5' 7\")     General appearance:  Normal development and appear in no acute distress.   Mental Status:   Oriented to person, place, problem, and time.    Memory: Good immediate recall.  Intact remote memory  Normal attention span and concentration.  Language: intact naming, repeating and fluency   Good fund of Knowledge.   Cranial Nerves:   II:   Pupils: equal, round, reactive to light  III,IV,VI: Extra Ocular Movements are intact. No nystagmus  V: Facial sensation is intact  VII: Facial strength and movements: intact and symmetric  XII: Tongue movements are  normal  Musculoskeletal: 2/5 right upper extremity.  4/5 right elbow and wrist.  Tone: Normal tone.   Reflexes: Symmetric 2+ in both arms and legs.  Coordination: no pronator drift, no dysmetria with FNF  Sensation: normal.  Gait/Posture: Nonambulatory at baseline    MDM:      A. Problems (any 1)    High:    [x] Acute/Chronic Illness/injury posing threat to life or bodily function:    [] Severe exacerbation of chronic illness:      Moderate:    []     1 or more chronic illness with exacerbation, progression or side effect of treatment or  []     2 or more stable chronic illnesses or  []     1 acute illness with systemic symptoms     ---------------------------------------------------------------------  B. Risk of Treatment (any 1)   [x] Drugs/treatments that require intensive monitoring for toxicity include: DOAC, antibiotics  [] Change in code status:    [] Decision to escalate care:    [] Major surgery/procedure with associated risk factors:    [] Prescription drug management  ----------------------------------------------------------------------  [x] High (any 2)  [] Moderate (any 1)    C. Data (any 2 for high and any 1 for moderate)  [x] Discussed management of the case with: Primary team  [x] Imaging personally reviewed and interpreted, includes:    [x] Data Review (any 3)  [x] Collateral history obtained from: Primary team  [x] All available Consultant notes from yesterday/today were reviewed  [x] All current labs were reviewed and interpreted for clinical significance   [x] Appropriate follow-up labs were ordered      Data  LABS:   Lab Results   Component Value Date/Time     06/03/2024 04:51 AM    K 4.1 06/03/2024 04:51 AM    K 4.1 06/01/2024 05:34 AM     06/03/2024 04:51 AM    CO2 27 06/03/2024 04:51 AM    BUN 20 06/03/2024 04:51 AM    CREATININE 1.0 06/03/2024 04:51 AM    GFRAA 38 09/20/2022 04:14 AM    GFRAA 45 03/27/2013 01:36 PM    LABGLOM 58 06/03/2024 04:51 AM    LABGLOM 52 05/15/2023 04:35  AM    GLUCOSE 101 06/03/2024 04:51 AM    PHOS 3.2 05/15/2023 04:35 AM    MG 1.90 05/12/2023 04:57 AM    CALCIUM 8.4 06/03/2024 04:51 AM     Lab Results   Component Value Date/Time    WBC 7.5 06/03/2024 04:51 AM    RBC 3.53 06/03/2024 04:51 AM    HGB 10.5 06/03/2024 04:51 AM    HCT 32.4 06/03/2024 04:51 AM    MCV 91.8 06/03/2024 04:51 AM    RDW 15.3 06/03/2024 04:51 AM     06/03/2024 04:51 AM     Lab Results   Component Value Date    INR 1.39 (H) 02/16/2023    PROTIME 17.0 (H) 02/16/2023       Neuroimaging was independently reviewed by me and discussed results with the patient  I reviewed blood testing and other test results and discussed results with the patient      Impression:    Acute/subacute right upper extremity weakness.  So far unclear etiology.  Patient with improvement in pain MRI brain did not show any acute stroke.  MRI C-spine was limited but showed minimal degenerative changes.  Could be related to recent cellulitis, recent shoulder arthropathy or brachial plexopathy.  Right upper extremity cellulitis  Hypertension  A-fib on Eliquis  Acute respiratory failure secondary to acute CHF      Recommendation    Continue supportive care  PT and OT  GI and DVT prophylaxis  Respiratory support  Monitor control blood pressure  Neurochecks  Telemetry  Monitor I's and O's  Will review with Dr. Miller, further recommendations to follow  Discussed with primary team          MARILEE Stanley - CNP    Attending Supervising Physician's Attestation Statement       The patient was seen 6/4/2024 in conjunction with the nurse practitioner with independent history, evaluation and examination. I agree with the note which has been adjusted to reflect my findings, with the addition of the following:     The patient seen today.  No major changes since yesterday.  Complaining of right shoulder pain and numbness and tingling.  Exam: On the same proximal muscle weakness with right abduction and external rotation 3/5 with

## 2024-06-04 NOTE — PLAN OF CARE
Problem: Pain  Goal: Verbalizes/displays adequate comfort level or baseline comfort level  6/4/2024 1210 by Nenita Rodriguez RN  Outcome: Progressing  6/4/2024 0425 by Joe Shepherd RN  Outcome: Progressing     Problem: Safety - Adult  Goal: Free from fall injury  6/4/2024 1210 by Nenita Rodriguez RN  Outcome: Progressing  6/4/2024 0425 by Joe Shepherd RN  Outcome: Progressing     Problem: Skin/Tissue Integrity  Goal: Absence of new skin breakdown  Description: 1.  Monitor for areas of redness and/or skin breakdown  2.  Assess vascular access sites hourly  3.  Every 4-6 hours minimum:  Change oxygen saturation probe site  4.  Every 4-6 hours:  If on nasal continuous positive airway pressure, respiratory therapy assess nares and determine need for appliance change or resting period.  6/4/2024 1210 by Nenita Rodriguez RN  Outcome: Progressing  6/4/2024 0425 by Joe Shepherd RN  Outcome: Progressing     Problem: ABCDS Injury Assessment  Goal: Absence of physical injury  6/4/2024 1210 by Nenita Rodriguez RN  Outcome: Progressing  6/4/2024 0425 by Joe Shepherd RN  Outcome: Progressing     Problem: Chronic Conditions and Co-morbidities  Goal: Patient's chronic conditions and co-morbidity symptoms are monitored and maintained or improved  Outcome: Progressing

## 2024-06-05 LAB
ANION GAP SERPL CALCULATED.3IONS-SCNC: 11 MMOL/L (ref 3–16)
BACTERIA BLD CULT ORG #2: NORMAL
BACTERIA BLD CULT: NORMAL
BUN SERPL-MCNC: 21 MG/DL (ref 7–20)
CALCIUM SERPL-MCNC: 8.5 MG/DL (ref 8.3–10.6)
CHLORIDE SERPL-SCNC: 102 MMOL/L (ref 99–110)
CO2 SERPL-SCNC: 29 MMOL/L (ref 21–32)
CREAT SERPL-MCNC: 1.1 MG/DL (ref 0.6–1.2)
GFR SERPLBLD CREATININE-BSD FMLA CKD-EPI: 52 ML/MIN/{1.73_M2}
GLUCOSE SERPL-MCNC: 93 MG/DL (ref 70–99)
POTASSIUM SERPL-SCNC: 3.9 MMOL/L (ref 3.5–5.1)
SODIUM SERPL-SCNC: 142 MMOL/L (ref 136–145)
VANCOMYCIN SERPL-MCNC: 20.6 UG/ML

## 2024-06-05 PROCEDURE — 99232 SBSQ HOSP IP/OBS MODERATE 35: CPT | Performed by: PSYCHIATRY & NEUROLOGY

## 2024-06-05 PROCEDURE — 80048 BASIC METABOLIC PNL TOTAL CA: CPT

## 2024-06-05 PROCEDURE — 6370000000 HC RX 637 (ALT 250 FOR IP): Performed by: PHYSICIAN ASSISTANT

## 2024-06-05 PROCEDURE — 36415 COLL VENOUS BLD VENIPUNCTURE: CPT

## 2024-06-05 PROCEDURE — 1200000000 HC SEMI PRIVATE

## 2024-06-05 PROCEDURE — APPNB30 APP NON BILLABLE TIME 0-30 MINS

## 2024-06-05 PROCEDURE — 2580000003 HC RX 258: Performed by: INTERNAL MEDICINE

## 2024-06-05 PROCEDURE — 6360000002 HC RX W HCPCS: Performed by: INTERNAL MEDICINE

## 2024-06-05 PROCEDURE — 80202 ASSAY OF VANCOMYCIN: CPT

## 2024-06-05 PROCEDURE — 2580000003 HC RX 258: Performed by: PHYSICIAN ASSISTANT

## 2024-06-05 RX ADMIN — FUROSEMIDE 40 MG: 10 INJECTION, SOLUTION INTRAMUSCULAR; INTRAVENOUS at 08:41

## 2024-06-05 RX ADMIN — METOPROLOL TARTRATE 75 MG: 50 TABLET, FILM COATED ORAL at 20:52

## 2024-06-05 RX ADMIN — AMLODIPINE BESYLATE 10 MG: 5 TABLET ORAL at 08:41

## 2024-06-05 RX ADMIN — APIXABAN 5 MG: 5 TABLET, FILM COATED ORAL at 20:53

## 2024-06-05 RX ADMIN — Medication 1 CAPSULE: at 08:40

## 2024-06-05 RX ADMIN — CLONIDINE HYDROCHLORIDE 0.2 MG: 0.1 TABLET ORAL at 20:53

## 2024-06-05 RX ADMIN — APIXABAN 5 MG: 5 TABLET, FILM COATED ORAL at 08:41

## 2024-06-05 RX ADMIN — SODIUM CHLORIDE, PRESERVATIVE FREE 10 ML: 5 INJECTION INTRAVENOUS at 08:41

## 2024-06-05 RX ADMIN — CLONIDINE HYDROCHLORIDE 0.2 MG: 0.1 TABLET ORAL at 08:41

## 2024-06-05 RX ADMIN — SODIUM CHLORIDE 1250 MG: 9 INJECTION, SOLUTION INTRAVENOUS at 02:00

## 2024-06-05 RX ADMIN — METOPROLOL TARTRATE 75 MG: 50 TABLET, FILM COATED ORAL at 08:41

## 2024-06-05 RX ADMIN — FUROSEMIDE 40 MG: 10 INJECTION, SOLUTION INTRAMUSCULAR; INTRAVENOUS at 18:47

## 2024-06-05 RX ADMIN — SODIUM CHLORIDE, PRESERVATIVE FREE 10 ML: 5 INJECTION INTRAVENOUS at 20:55

## 2024-06-05 RX ADMIN — Medication 1 CAPSULE: at 18:47

## 2024-06-05 ASSESSMENT — PAIN SCALES - GENERAL: PAINLEVEL_OUTOF10: 2

## 2024-06-05 ASSESSMENT — PAIN DESCRIPTION - ORIENTATION: ORIENTATION: RIGHT

## 2024-06-05 ASSESSMENT — PAIN DESCRIPTION - LOCATION: LOCATION: HAND

## 2024-06-05 NOTE — PROGRESS NOTES
Clinical Pharmacy Note: Pharmacy to Dose Vancomycin    Vancomycin Day: 5  Indication: Sepsis  Current Dose: 1250 daily  Dosing Method: Bayesian Modeling    Random: 20.6    Recent Labs     06/03/24  0451 06/05/24  0520   BUN 20 21*       Recent Labs     06/03/24  0451 06/05/24  0520   CREATININE 1.0 1.1       Recent Labs     06/03/24  0451   WBC 7.5         Intake/Output Summary (Last 24 hours) at 6/5/2024 0718  Last data filed at 6/5/2024 0428  Gross per 24 hour   Intake --   Output 3400 ml   Net -3400 ml         Ht Readings from Last 1 Encounters:   06/04/24 1.702 m (5' 7\")        Wt Readings from Last 1 Encounters:   06/05/24 115.2 kg (253 lb 15.5 oz)         Body mass index is 39.78 kg/m².    Estimated Creatinine Clearance: 57 mL/min (based on SCr of 1.1 mg/dL).      Assessment/Plan:  Vancomycin level is therapeutic.  Increase vancomycin regimen to 1500 every 24 hours.   Bayesian Modeling predicts an AUC of 465 mg/L*hr and trough of 14.3 mg/L.  A vancomycin random level has been ordered on 6/7 at 0600 for follow-up.  Changes in regimen will be determined based on culture results, renal function, and clinical response.  Pharmacy will continue to monitor and adjust regimen as necessary.    Thank you for the consult,    Dewey Maki [facility]  [id number]

## 2024-06-05 NOTE — RT PROTOCOL NOTE
RT Inhaler-Nebulizer Bronchodilator Protocol Note    There is a bronchodilator order in the chart from a provider indicating to follow the RT Bronchodilator Protocol and there is an “Initiate RT Inhaler-Nebulizer Bronchodilator Protocol” order as well (see protocol at bottom of note).    CXR Findings:  No results found.    The findings from the last RT Protocol Assessment were as follows:   History Pulmonary Disease: Smoker 15 pack years or more  Respiratory Pattern: Regular pattern and RR 12-20 bpm  Breath Sounds: Clear breath sounds  Cough: Strong, productive  Indication for Bronchodilator Therapy:    Bronchodilator Assessment Score: 2    Aerosolized bronchodilator medication orders have been revised according to the RT Inhaler-Nebulizer Bronchodilator Protocol below.    Respiratory Therapist to perform RT Therapy Protocol Assessment initially then follow the protocol.  Repeat RT Therapy Protocol Assessment PRN for score 0-3 or on second treatment, BID, and PRN for scores above 3.    No Indications - adjust the frequency to every 6 hours PRN wheezing or bronchospasm, if no treatments needed after 48 hours then discontinue using Per Protocol order mode.     If indication present, adjust the RT bronchodilator orders based on the Bronchodilator Assessment Score as indicated below.  Use Inhaler orders unless patient has one or more of the following: on home nebulizer, not able to hold breath for 10 seconds, is not alert and oriented, cannot activate and use MDI correctly, or respiratory rate 25 breaths per minute or more, then use the equivalent nebulizer order(s) with same Frequency and PRN reasons based on the score.  If a patient is on this medication at home then do not decrease Frequency below that used at home.    0-3 - enter or revise RT bronchodilator order(s) to equivalent RT Bronchodilator order with Frequency of every 4 hours PRN for wheezing or increased work of breathing using Per Protocol order mode.         4-6 - enter or revise RT Bronchodilator order(s) to two equivalent RT bronchodilator orders with one order with BID Frequency and one order with Frequency of every 4 hours PRN wheezing or increased work of breathing using Per Protocol order mode.        7-10 - enter or revise RT Bronchodilator order(s) to two equivalent RT bronchodilator orders with one order with TID Frequency and one order with Frequency of every 4 hours PRN wheezing or increased work of breathing using Per Protocol order mode.       11-13 - enter or revise RT Bronchodilator order(s) to one equivalent RT bronchodilator order with QID Frequency and an Albuterol order with Frequency of every 4 hours PRN wheezing or increased work of breathing using Per Protocol order mode.      Greater than 13 - enter or revise RT Bronchodilator order(s) to one equivalent RT bronchodilator order with every 4 hours Frequency and an Albuterol order with Frequency of every 2 hours PRN wheezing or increased work of breathing using Per Protocol order mode.     RT to enter RT Home Evaluation for COPD & MDI Assessment order using Per Protocol order mode.    Electronically signed by Ban Vasquez RCP on 6/5/2024 at 4:20 AM

## 2024-06-05 NOTE — PLAN OF CARE
Problem: Pain  Goal: Verbalizes/displays adequate comfort level or baseline comfort level  6/4/2024 2336 by Joe Shepherd RN  Outcome: Progressing  6/4/2024 1210 by Nenita Rodriguez RN  Outcome: Progressing     Problem: Safety - Adult  Goal: Free from fall injury  6/4/2024 2336 by Joe Shepherd RN  Outcome: Progressing  6/4/2024 1210 by Nenita Rodriguez RN  Outcome: Progressing     Problem: Skin/Tissue Integrity  Goal: Absence of new skin breakdown  Description: 1.  Monitor for areas of redness and/or skin breakdown  2.  Assess vascular access sites hourly  3.  Every 4-6 hours minimum:  Change oxygen saturation probe site  4.  Every 4-6 hours:  If on nasal continuous positive airway pressure, respiratory therapy assess nares and determine need for appliance change or resting period.  6/4/2024 2336 by Joe Shepherd RN  Outcome: Progressing  6/4/2024 1210 by Nenita Rodriguez RN  Outcome: Progressing     Problem: ABCDS Injury Assessment  Goal: Absence of physical injury  6/4/2024 2336 by Joe Shepherd RN  Outcome: Progressing  6/4/2024 1210 by Nenita Rodriguez RN  Outcome: Progressing     Problem: Chronic Conditions and Co-morbidities  Goal: Patient's chronic conditions and co-morbidity symptoms are monitored and maintained or improved  6/4/2024 2336 by Joe Shepherd RN  Outcome: Progressing  6/4/2024 1210 by Nenita Rodriguez RN  Outcome: Progressing

## 2024-06-05 NOTE — PROGRESS NOTES
Stacy Ly  Neurology Follow-up  [facility] Neurology    Date of Service: 6/5/2024    Subjective:   CC: Follow up today regarding: Acute/acute right arm weakness    Events noted. Chart and lab reviewed.  The patient feels better today.  Pain is 2/10 right shoulder.  Some limitation with range of motion but otherwise no distal weakness.  No new symptoms.  No neck pain.      ROS : A 10-12 system review obtained and updated today and is unremarkable except as mentioned  in my interval history.     Past medical history, social history, medication and family history reviewed.       Objective:  Exam:   Constitutional:   Vitals:    06/04/24 2349 06/05/24 0428 06/05/24 0838 06/05/24 1140   BP: 104/63 128/68 (!) 143/84 (!) 142/82   Pulse: 56 56 62 62   Resp: 18 18 16 16   Temp: 97.6 °F (36.4 °C) 97.5 °F (36.4 °C) 97.8 °F (36.6 °C) 98 °F (36.7 °C)   TempSrc: Oral Oral Axillary Axillary   SpO2: 96% 96% 95% 96%   Weight:  115.2 kg (253 lb 15.5 oz)     Height:         General appearance:  Normal development and appear in no acute distress.   Mental Status:   Oriented to person, place, problem, and time.    Memory: Good immediate recall.  Intact remote memory  Normal attention span and concentration.  Language: intact naming, repeating and fluency   Good fund of Knowledge.   Cranial Nerves:   II:   Pupils: equal, round, reactive to light  III,IV,VI: Extra Ocular Movements are intact. No nystagmus  V: Facial sensation is intact  VII: Facial strength and movements: intact and symmetric  XII: Tongue movements are normal  Musculoskeletal: 2/5 right shoulder abduction and external rotation but no weakness distally atrophy.  Limited by pain.  No change in exam compared to yesterday.   Otherwise no change in exam    MDM:      A. Problems (any 1)    High:    [] Acute/Chronic Illness/injury posing threat to life or bodily function:    [] Severe exacerbation of chronic illness:      Moderate:    []     1 or more chronic illness  with exacerbation, progression or side effect of treatment or  []     2 or more stable chronic illnesses or  [x]     1 acute illness with systemic symptoms     ---------------------------------------------------------------------  B. Risk of Treatment (any 1)   [x] Drugs/treatments that require intensive monitoring for toxicity include: DOAC, antibiotics  [] Change in code status:    [] Decision to escalate care:    [] Major surgery/procedure with associated risk factors:    [] Prescription drug management  ----------------------------------------------------------------------  [x] High (any 2)  [] Moderate (any 1)    C. Data (any 2 for high and any 1 for moderate)  [x] Discussed management of the case with: Primary team  [x] Imaging personally reviewed and interpreted, includes:    [x] Data Review (any 3)  [x] Collateral history obtained from: Primary team  [x] All available Consultant notes from yesterday/today were reviewed  [x] All current labs were reviewed and interpreted for clinical significance   [x] Appropriate follow-up labs were ordered      Data  LABS:   Lab Results   Component Value Date/Time     06/05/2024 05:20 AM    K 3.9 06/05/2024 05:20 AM     06/05/2024 05:20 AM    CO2 29 06/05/2024 05:20 AM    BUN 21 06/05/2024 05:20 AM    CREATININE 1.1 06/05/2024 05:20 AM    GFRAA 38 09/20/2022 04:14 AM    GFRAA 45 03/27/2013 01:36 PM    LABGLOM 52 06/05/2024 05:20 AM    LABGLOM 52 05/15/2023 04:35 AM    GLUCOSE 93 06/05/2024 05:20 AM    PHOS 3.2 05/15/2023 04:35 AM    MG 1.90 05/12/2023 04:57 AM    CALCIUM 8.5 06/05/2024 05:20 AM     Lab Results   Component Value Date/Time    WBC 7.5 06/03/2024 04:51 AM    RBC 3.53 06/03/2024 04:51 AM    HGB 10.5 06/03/2024 04:51 AM    HCT 32.4 06/03/2024 04:51 AM    MCV 91.8 06/03/2024 04:51 AM    RDW 15.3 06/03/2024 04:51 AM     06/03/2024 04:51 AM     Lab Results   Component Value Date    INR 1.39 (H) 02/16/2023    PROTIME 17.0 (H) 02/16/2023          Impression:    Acute/subacute right upper extremity weakness.  So far unclear etiology.  Could be related to recent cellulitis, recent shoulder arthropathy or brachial plexopathy.  Given minimal pain today, no need for steroid as it can increase risk of worsening infection.  Right upper extremity cellulitis  Hypertension  A-fib on Eliquis  Acute respiratory failure secondary to acute CHF      Recommendation    Continue supportive care  Follow-up with me outpatient in 3 to 4 weeks to consider further workup for her right shoulder weakness and pain  Continue Eliquis  ID workup  Antibiotics  Norvasc  current dose and watch blood pressure  Continue vancomycin  Respiratory support  Nothing to add from neurology at this point  Will sign off        Bianka Perez MD        This dictation was generated by voice recognition computer software. Although all attempts are made to edit the dictation for accuracy, there may be errors in the transcription that are not intended.

## 2024-06-05 NOTE — PROGRESS NOTES
[facility]    6/5/2024 11:46 AM        Name: Stacy Ly .              Admitted: 5/31/2024  Primary Care Provider: Tarik Hummel APRN - CNP (Tel: [phone])        Subjective:      Sob is improving no chest pain n.v    Reviewed interval ancillary notes    Current Medications  [START ON 6/6/2024] vancomycin (VANCOCIN) 1,500 mg in sodium chloride 0.9 % 250 mL IVPB ([id number]), Q24H  furosemide (LASIX) injection 40 mg, BID  perflutren lipid microspheres (DEFINITY) injection 1.5 mL, ONCE PRN  lactobacillus (CULTURELLE) capsule 1 capsule, BID WC  albuterol sulfate HFA (PROVENTIL;VENTOLIN;PROAIR) 108 (90 Base) MCG/ACT inhaler 2 puff, Q4H PRN  cloNIDine (CATAPRES) tablet 0.2 mg, BID  amLODIPine (NORVASC) tablet 10 mg, Daily  apixaban (ELIQUIS) tablet 5 mg, BID  metoprolol tartrate (LOPRESSOR) tablet 75 mg, BID  sodium chloride flush 0.9 % injection 5-40 mL, 2 times per day  sodium chloride flush 0.9 % injection 5-40 mL, PRN  0.9 % sodium chloride infusion, PRN  ondansetron (ZOFRAN) injection 4 mg, Q6H PRN  senna (SENOKOT) tablet 8.6 mg, Daily PRN  acetaminophen (TYLENOL) tablet 650 mg, Q6H PRN   Or  acetaminophen (TYLENOL) suppository 650 mg, Q6H PRN  oxyCODONE-acetaminophen (PERCOCET) 5-325 MG per tablet 1 tablet, Q4H PRN        Objective:  BP (!) 142/82   Pulse 62   Temp 98 °F (36.7 °C) (Axillary)   Resp 16   Ht 1.702 m (5' 7\")   Wt 115.2 kg (253 lb 15.5 oz)   SpO2 96%   BMI 39.78 kg/m²     Intake/Output Summary (Last 24 hours) at 6/5/2024 1146  Last data filed at 6/5/2024 0428  Gross per 24 hour   Intake --   Output 3400 ml   Net -3400 ml      Wt Readings from Last 3 Encounters:   06/05/24 115.2 kg (253 lb 15.5 oz)   05/15/23 107.7 kg (237 lb 8 oz)   05/04/23 108 kg (238 lb)       General appearance:  Appears comfortable. AAOx3  HEENT: atraumatic, Pupils equal, muscous membranes moist, no masses  appreciated  Cardiovascular: Regular rate and rhythm no murmurs appreciated  Respiratory: + crackles improving  Gastrointestinal: Abdomen soft, non-tender, BS+  EXT: trace edema  Neurology: no gross focal deficts  Psychiatry: Appropriate affect. Not agitated  Skin: Warm, dry, no rashes appreciated    Labs and Tests:  CBC:   Recent Labs     06/03/24  0451   WBC 7.5   HGB 10.5*          BMP:    Recent Labs     06/03/24  0451 06/05/24  0520    142   K 4.1 3.9    102   CO2 27 29   BUN 20 21*   CREATININE 1.0 1.1   GLUCOSE 101* 93       Hepatic: No results for input(s): \"AST\", \"ALT\", \"BILITOT\", \"ALKPHOS\" in the last 72 hours.    Invalid input(s): \"ALB\"  XR CHEST (2 VW)   Final Result   Cardiomegaly with moderate to severe pulmonary edema.         MRI CERVICAL SPINE WO CONTRAST   Final Result   Significantly limited exam due to patient motion demonstrating mild   degenerative changes at C5-6 and C6-7, as described above.         MRI BRAIN WO CONTRAST   Final Result   1. No acute infarct or acute intracranial process identified.   2. Mild diffuse cerebral volume loss and mild chronic small vessel ischemic   changes.         Vascular duplex upper extremity venous right   Final Result      CTA HEAD NECK W CONTRAST   Final Result   1. Apparent focal severe stenosis of the right vertebral artery origin.   2. No significant stenosis of the intracranial arteries or cervical carotid   arteries.   3. Mosaic attenuation in the imaged lung apices, possibly secondary to small   airways disease or pulmonary edema.         CT CERVICAL SPINE WO CONTRAST   Final Result   Degenerative changes of the cervical spine without evidence of acute   abnormality.  Multilevel bilateral neural foraminal narrowing, moderate to   severe on the right at the C6-C7 level.         CT HEAD WO CONTRAST   Final Result   No acute intracranial abnormality.      Findings compatible with chronic microvascular ischemic disease and

## 2024-06-06 VITALS
HEART RATE: 57 BPM | BODY MASS INDEX: 37.83 KG/M2 | SYSTOLIC BLOOD PRESSURE: 148 MMHG | HEIGHT: 67 IN | TEMPERATURE: 97.8 F | OXYGEN SATURATION: 92 % | WEIGHT: 241 LBS | DIASTOLIC BLOOD PRESSURE: 83 MMHG | RESPIRATION RATE: 18 BRPM

## 2024-06-06 LAB
ANION GAP SERPL CALCULATED.3IONS-SCNC: 7 MMOL/L (ref 3–16)
BUN SERPL-MCNC: 22 MG/DL (ref 7–20)
CALCIUM SERPL-MCNC: 8.3 MG/DL (ref 8.3–10.6)
CHLORIDE SERPL-SCNC: 100 MMOL/L (ref 99–110)
CO2 SERPL-SCNC: 33 MMOL/L (ref 21–32)
CREAT SERPL-MCNC: 1 MG/DL (ref 0.6–1.2)
GFR SERPLBLD CREATININE-BSD FMLA CKD-EPI: 58 ML/MIN/{1.73_M2}
GLUCOSE SERPL-MCNC: 103 MG/DL (ref 70–99)
POTASSIUM SERPL-SCNC: 3.9 MMOL/L (ref 3.5–5.1)
SODIUM SERPL-SCNC: 140 MMOL/L (ref 136–145)

## 2024-06-06 PROCEDURE — 6360000002 HC RX W HCPCS: Performed by: INTERNAL MEDICINE

## 2024-06-06 PROCEDURE — 6370000000 HC RX 637 (ALT 250 FOR IP): Performed by: PHYSICIAN ASSISTANT

## 2024-06-06 PROCEDURE — 94761 N-INVAS EAR/PLS OXIMETRY MLT: CPT

## 2024-06-06 PROCEDURE — 36415 COLL VENOUS BLD VENIPUNCTURE: CPT

## 2024-06-06 PROCEDURE — 2580000003 HC RX 258: Performed by: INTERNAL MEDICINE

## 2024-06-06 PROCEDURE — 94680 O2 UPTK RST&XERS DIR SIMPLE: CPT

## 2024-06-06 PROCEDURE — 80048 BASIC METABOLIC PNL TOTAL CA: CPT

## 2024-06-06 PROCEDURE — 2580000003 HC RX 258: Performed by: PHYSICIAN ASSISTANT

## 2024-06-06 RX ADMIN — APIXABAN 5 MG: 5 TABLET, FILM COATED ORAL at 08:40

## 2024-06-06 RX ADMIN — AMLODIPINE BESYLATE 10 MG: 5 TABLET ORAL at 08:39

## 2024-06-06 RX ADMIN — SODIUM CHLORIDE, PRESERVATIVE FREE 10 ML: 5 INJECTION INTRAVENOUS at 08:40

## 2024-06-06 RX ADMIN — Medication 1 CAPSULE: at 08:40

## 2024-06-06 RX ADMIN — CLONIDINE HYDROCHLORIDE 0.2 MG: 0.1 TABLET ORAL at 08:39

## 2024-06-06 RX ADMIN — SODIUM CHLORIDE 1500 MG: 9 INJECTION, SOLUTION INTRAVENOUS at 02:10

## 2024-06-06 RX ADMIN — ACETAMINOPHEN 650 MG: 325 TABLET ORAL at 08:43

## 2024-06-06 ASSESSMENT — PAIN DESCRIPTION - LOCATION: LOCATION: HEAD

## 2024-06-06 ASSESSMENT — PAIN SCALES - GENERAL: PAINLEVEL_OUTOF10: 3

## 2024-06-06 ASSESSMENT — PAIN DESCRIPTION - DESCRIPTORS: DESCRIPTORS: ACHING

## 2024-06-06 NOTE — PROGRESS NOTES
06/06/24 1331   Resting (Room Air)   SpO2 86   HR 57   Resting (On O2)   SpO2 91   HR 55   O2 Device Nasal cannula   O2 Flow Rate (l/min) 1 l/min   During Walk (Room Air)   SpO2 85   HR 65   Symptoms Shortness of breath   Comments Pt unable to walk. Exertion while repostioning self sitting on side of bed and standing in placed.   During Walk (On O2)   SpO2 90   HR 65   O2 Device Nasal cannula   O2 Flow Rate (l/min) 2 l/min   After Walk   SpO2 92   HR 60   O2 Device Nasal cannula   O2 Flow Rate (l/min) 1 l/min   Rate of Dyspnea 0   Does the Patient Qualify for Home O2 Yes   Liter Flow at Rest 1   Liter Flow on Exertion 2   Does the Patient Need Portable Oxygen Tanks Yes

## 2024-06-06 NOTE — PLAN OF CARE
Problem: Pain  Goal: Verbalizes/displays adequate comfort level or baseline comfort level  Outcome: Completed     Problem: Safety - Adult  Goal: Free from fall injury  Outcome: Completed     Problem: Skin/Tissue Integrity  Goal: Absence of new skin breakdown  Description: 1.  Monitor for areas of redness and/or skin breakdown  2.  Assess vascular access sites hourly  3.  Every 4-6 hours minimum:  Change oxygen saturation probe site  4.  Every 4-6 hours:  If on nasal continuous positive airway pressure, respiratory therapy assess nares and determine need for appliance change or resting period.  Outcome: Completed     Problem: ABCDS Injury Assessment  Goal: Absence of physical injury  Outcome: Completed     Problem: Chronic Conditions and Co-morbidities  Goal: Patient's chronic conditions and co-morbidity symptoms are monitored and maintained or improved  Outcome: Completed

## 2024-06-06 NOTE — CARE COORDINATION
06/06/24 1832   IMM Letter   IMM Letter given to Patient/Family/Significant other/Guardian/POA/by: Case Management - pt ok given less than 4 hours to d/c   IMM Letter date given: 06/06/24   IMM Letter time given: 1805     Electronically signed by FELIPA Olea, LSW on 6/6/2024 at 6:32 PM

## 2024-06-06 NOTE — PROGRESS NOTES
[facility]    6/6/2024 12:37 PM        Name: Stacy Ly .              Admitted: 5/31/2024  Primary Care Provider: Tarik Hummel APRN - CNP (Tel: [phone])        Subjective:      Lying bed shortness of breath has improved no nausea vomiting or chest  Reviewed interval ancillary notes    Current Medications  vancomycin (VANCOCIN) 1,500 mg in sodium chloride 0.9 % 250 mL IVPB ([id number]), Q24H  perflutren lipid microspheres (DEFINITY) injection 1.5 mL, ONCE PRN  lactobacillus (CULTURELLE) capsule 1 capsule, BID WC  albuterol sulfate HFA (PROVENTIL;VENTOLIN;PROAIR) 108 (90 Base) MCG/ACT inhaler 2 puff, Q4H PRN  cloNIDine (CATAPRES) tablet 0.2 mg, BID  amLODIPine (NORVASC) tablet 10 mg, Daily  apixaban (ELIQUIS) tablet 5 mg, BID  metoprolol tartrate (LOPRESSOR) tablet 75 mg, BID  sodium chloride flush 0.9 % injection 5-40 mL, 2 times per day  sodium chloride flush 0.9 % injection 5-40 mL, PRN  0.9 % sodium chloride infusion, PRN  ondansetron (ZOFRAN) injection 4 mg, Q6H PRN  senna (SENOKOT) tablet 8.6 mg, Daily PRN  acetaminophen (TYLENOL) tablet 650 mg, Q6H PRN   Or  acetaminophen (TYLENOL) suppository 650 mg, Q6H PRN  oxyCODONE-acetaminophen (PERCOCET) 5-325 MG per tablet 1 tablet, Q4H PRN        Objective:  /69   Pulse 94   Temp 98.1 °F (36.7 °C) (Oral)   Resp 16   Ht 1.702 m (5' 7\")   Wt 109.3 kg (241 lb)   SpO2 94%   BMI 37.75 kg/m²   No intake or output data in the 24 hours ending 06/06/24 1237     Wt Readings from Last 3 Encounters:   06/06/24 109.3 kg (241 lb)   05/15/23 107.7 kg (237 lb 8 oz)   05/04/23 108 kg (238 lb)       General appearance:  Appears comfortable. AAOx3  HEENT: atraumatic, Pupils equal, muscous membranes moist, no masses appreciated  Cardiovascular: Regular rate and rhythm no murmurs appreciated  Respiratory: ctab  Gastrointestinal: Abdomen soft, non-tender, BS+  EXT:  trace edema  Neurology: no gross focal deficts  Psychiatry: Appropriate affect. Not agitated  Skin: Warm, dry, no rashes appreciated    Labs and Tests:  CBC:   No results for input(s): \"WBC\", \"HGB\", \"PLT\" in the last 72 hours.    BMP:    Recent Labs     06/05/24  0520 06/06/24  0544    140   K 3.9 3.9    100   CO2 29 33*   BUN 21* 22*   CREATININE 1.1 1.0   GLUCOSE 93 103*       Hepatic: No results for input(s): \"AST\", \"ALT\", \"BILITOT\", \"ALKPHOS\" in the last 72 hours.    Invalid input(s): \"ALB\"  XR CHEST (2 VW)   Final Result   Cardiomegaly with moderate to severe pulmonary edema.         MRI CERVICAL SPINE WO CONTRAST   Final Result   Significantly limited exam due to patient motion demonstrating mild   degenerative changes at C5-6 and C6-7, as described above.         MRI BRAIN WO CONTRAST   Final Result   1. No acute infarct or acute intracranial process identified.   2. Mild diffuse cerebral volume loss and mild chronic small vessel ischemic   changes.         Vascular duplex upper extremity venous right   Final Result      CTA HEAD NECK W CONTRAST   Final Result   1. Apparent focal severe stenosis of the right vertebral artery origin.   2. No significant stenosis of the intracranial arteries or cervical carotid   arteries.   3. Mosaic attenuation in the imaged lung apices, possibly secondary to small   airways disease or pulmonary edema.         CT CERVICAL SPINE WO CONTRAST   Final Result   Degenerative changes of the cervical spine without evidence of acute   abnormality.  Multilevel bilateral neural foraminal narrowing, moderate to   severe on the right at the C6-C7 level.         CT HEAD WO CONTRAST   Final Result   No acute intracranial abnormality.      Findings compatible with chronic microvascular ischemic disease and   involutional change.         XR HAND RIGHT (MIN 3 VIEWS)   Final Result   1. No acute fracture or dislocation.   2. Postsurgical changes of the distal radius. No hardware

## 2024-06-06 NOTE — CARE COORDINATION
DYLAN informed pt was discharging home today.  Pt's dtr josr is coming to pick her up and provide transport home.  Pt had a home O2 eval completed and found to need 2 Liters oxygen.  DME order for home O2 was placed.  Called [facility] and spoke with Kimberly [phone].  Provided pt's information and referral.  She looked up everything in [facility] and gave authorization for SW to deliver a portable oxygen concentrator.  Portrable concentrator was delivered to pt at pt's room.  Ticket was signed and returned to [facility].  DYLAN explained that [facility] would call tomorrow to set up time to switch out this concentrator for a home one.  Pt verbalized understanding.    Discharge Plan:  Home w/new home O2 set up w/[facility].    Electronically signed by FELIPA Olea, CRYSW on 6/6/2024 at 6:35 PM

## 2024-06-06 NOTE — PROGRESS NOTES
CLINICAL PHARMACY NOTE: MEDS TO BEDS    Total # of Prescriptions Filled: 2   The following medications were delivered to the patient:  Doxycycline hyclate 100mg  Acidophilus capsules    Additional Documentation:     LILY Bateman approved medication delivery    Medications were delivered to patient's room and patient signed for    Anisha Mancini CPhT

## 2024-06-06 NOTE — PLAN OF CARE
Problem: Pain  Goal: Verbalizes/displays adequate comfort level or baseline comfort level  6/6/2024 0059 by Joe Shepherd RN  Outcome: Progressing  6/5/2024 1445 by Jany Shea RN  Outcome: Progressing     Problem: Safety - Adult  Goal: Free from fall injury  6/6/2024 0059 by Joe Shepherd RN  Outcome: Progressing  6/5/2024 1445 by Jany Shea RN  Outcome: Progressing     Problem: Skin/Tissue Integrity  Goal: Absence of new skin breakdown  Description: 1.  Monitor for areas of redness and/or skin breakdown  2.  Assess vascular access sites hourly  3.  Every 4-6 hours minimum:  Change oxygen saturation probe site  4.  Every 4-6 hours:  If on nasal continuous positive airway pressure, respiratory therapy assess nares and determine need for appliance change or resting period.  Outcome: Progressing     Problem: ABCDS Injury Assessment  Goal: Absence of physical injury  Outcome: Progressing     Problem: Chronic Conditions and Co-morbidities  Goal: Patient's chronic conditions and co-morbidity symptoms are monitored and maintained or improved  Outcome: Progressing

## 2024-06-06 NOTE — CARE COORDINATION
Discharge Planning:     (PRISCILA) collaborated with Drake with [facility] Pt will return home with Hospice services. Priscila and Drake spoke to Macey BAUTISTA she agreeable to place condom cath for home.   PRISCILA will arrange transportation in AM/    Electronically signed by Rosa Maria Brody on 06/06/2024 @ 3:50 pm

## 2024-06-06 NOTE — PROGRESS NOTES
Patient given discharge instructions. All questions answered.  Patient escorted out via wheelchair with home oxygen and all belongings

## 2024-06-07 ENCOUNTER — TELEPHONE (OUTPATIENT)
Dept: INTERNAL MEDICINE CLINIC | Age: 79
End: 2024-06-07

## 2024-06-07 NOTE — TELEPHONE ENCOUNTER
Care Transitions Initial Follow Up Call    Outreach made within 2 business days of discharge: Yes    Patient: Stacy Ly Patient : 1945   MRN: [id number]  Reason for Admission: There are no discharge diagnoses documented for the most recent discharge.  Discharge Date: 24       Spoke with: Stacy    Discharge department/facility: [facility] Interactive Patient Contact:  Was patient able to fill all prescriptions: Yes  Was patient instructed to bring all medications to the follow-up visit: Yes  Is patient taking all medications as directed in the discharge summary? Yes  Does patient understand their discharge instructions: Yes  Does patient have questions or concerns that need addressed prior to 7-14 day follow up office visit: no    Scheduled appointment with PCP within 7-14 days    Follow Up  Future Appointments   Date Time Provider Department Center   6/10/2024  4:00 PM Tarik Hummel, APRN - CNP [facility]

## 2024-06-10 ENCOUNTER — TELEMEDICINE (OUTPATIENT)
Dept: INTERNAL MEDICINE CLINIC | Age: 79
End: 2024-06-10

## 2024-06-10 DIAGNOSIS — L03.113 RIGHT ARM CELLULITIS: ICD-10-CM

## 2024-06-10 DIAGNOSIS — Z09 HOSPITAL DISCHARGE FOLLOW-UP: Primary | ICD-10-CM

## 2024-06-10 DIAGNOSIS — M12.811 ROTATOR CUFF ARTHROPATHY OF RIGHT SHOULDER: ICD-10-CM

## 2024-06-10 DIAGNOSIS — G54.0 BRACHIAL PLEXOPATHY: ICD-10-CM

## 2024-06-10 RX ORDER — FUROSEMIDE 20 MG/1
20 TABLET ORAL EVERY OTHER DAY
Qty: 45 TABLET | Refills: 3 | Status: SHIPPED | OUTPATIENT
Start: 2024-06-10

## 2024-06-10 SDOH — ECONOMIC STABILITY: HOUSING INSECURITY
IN THE LAST 12 MONTHS, WAS THERE A TIME WHEN YOU DID NOT HAVE A STEADY PLACE TO SLEEP OR SLEPT IN A SHELTER (INCLUDING NOW)?: NO

## 2024-06-10 SDOH — ECONOMIC STABILITY: INCOME INSECURITY: HOW HARD IS IT FOR YOU TO PAY FOR THE VERY BASICS LIKE FOOD, HOUSING, MEDICAL CARE, AND HEATING?: NOT HARD AT ALL

## 2024-06-10 SDOH — ECONOMIC STABILITY: FOOD INSECURITY: WITHIN THE PAST 12 MONTHS, THE FOOD YOU BOUGHT JUST DIDN'T LAST AND YOU DIDN'T HAVE MONEY TO GET MORE.: NEVER TRUE

## 2024-06-10 SDOH — ECONOMIC STABILITY: FOOD INSECURITY: WITHIN THE PAST 12 MONTHS, YOU WORRIED THAT YOUR FOOD WOULD RUN OUT BEFORE YOU GOT MONEY TO BUY MORE.: NEVER TRUE

## 2024-06-10 ASSESSMENT — PATIENT HEALTH QUESTIONNAIRE - PHQ9
SUM OF ALL RESPONSES TO PHQ9 QUESTIONS 1 & 2: 1
1. LITTLE INTEREST OR PLEASURE IN DOING THINGS: NOT AT ALL
4. FEELING TIRED OR HAVING LITTLE ENERGY: NOT AT ALL
10. IF YOU CHECKED OFF ANY PROBLEMS, HOW DIFFICULT HAVE THESE PROBLEMS MADE IT FOR YOU TO DO YOUR WORK, TAKE CARE OF THINGS AT HOME, OR GET ALONG WITH OTHER PEOPLE: NOT DIFFICULT AT ALL
3. TROUBLE FALLING OR STAYING ASLEEP: NOT AT ALL
SUM OF ALL RESPONSES TO PHQ QUESTIONS 1-9: 1
8. MOVING OR SPEAKING SO SLOWLY THAT OTHER PEOPLE COULD HAVE NOTICED. OR THE OPPOSITE, BEING SO FIGETY OR RESTLESS THAT YOU HAVE BEEN MOVING AROUND A LOT MORE THAN USUAL: NOT AT ALL
6. FEELING BAD ABOUT YOURSELF - OR THAT YOU ARE A FAILURE OR HAVE LET YOURSELF OR YOUR FAMILY DOWN: NOT AT ALL
SUM OF ALL RESPONSES TO PHQ QUESTIONS 1-9: 1
9. THOUGHTS THAT YOU WOULD BE BETTER OFF DEAD, OR OF HURTING YOURSELF: NOT AT ALL
7. TROUBLE CONCENTRATING ON THINGS, SUCH AS READING THE NEWSPAPER OR WATCHING TELEVISION: NOT AT ALL
2. FEELING DOWN, DEPRESSED OR HOPELESS: SEVERAL DAYS
5. POOR APPETITE OR OVEREATING: NOT AT ALL

## 2024-06-10 NOTE — PROGRESS NOTES
Physician Progress Note      PATIENT:               QUENTIN HURT  [facility] #:                  [id number]  :                       1945  ADMIT DATE:       2024 11:49 PM  DISCH DATE:        2024 6:35 PM  RESPONDING  PROVIDER #:        Corin Fontenot MD          QUERY TEXT:    Patient was admitted with Right upper extremity cellulitis. Noted   documentation of Acute hypoxic resp failure secondary to acute diastolic CHF   in PN notes on . In order to support the diagnosis of acute respiratory   failure, please include additional clinical indicators in your documentation.   Or please document if the diagnosis of acute respiratory failure has been   ruled out after further study.    The medical record reflects the following:  Risk Factors: Age 78 F, COPD  Clinical Indicators:  PN noted \"Acute respiratory failure secondary to   acute CHF\".   PN noted - Respiratory:  Negative for cough and shortness of breath,   normal respiratory effort.   DS noted - Lungs:  No use of accessory muscles and normal respiratory   effort. Clear to auscultation, bilaterally without Rales/Wheezes/Rhonchi.  RR - 16-20  SPo2 - 82%-98%  Treatment: 2L NC, Respiratory support.    Thanks,  [facility]    Acute Respiratory Failure Clinical Indicators per [employer] MS-DR[employer] Training Guide and   Quick Reference Guide:  pO2 < 60 mmHg or SpO2 (pulse oximetry) < 91% breathing room air  pCO2 > 50 and pH < 7.35  P/F ratio (pO2 / FIO2) < 300  pO2 decrease or pCO2 increase by 10 mmHg from baseline (if known)  Supplemental oxygen of 40% or more  Presence of respiratory distress, tachypnea, dyspnea, shortness of breath,   wheezing  Unable to speak in complete sentences  Use of accessory muscles to breathe  Extreme anxiety and feeling of impending doom  Tripod position  Confusion/altered mental status/obtunded  Options provided:  -- Acute hypoxic Respiratory Failure as evidenced by, Please document

## 2024-06-10 NOTE — PROGRESS NOTES
that this is a billable service, which includes applicable co-pays. This Virtual Visit was conducted with patient's (and/or legal guardian's) consent. Patient identification was verified, and a caregiver was present when appropriate.   The patient was located at Home: [address] [place] [address]  Provider was located at Facility (Appt Dept): [address] [place] [address]  Confirm you are appropriately licensed, registered, or certified to deliver care in the state where the patient is located as indicated above. If you are not or unsure, please re-schedule the visit: Yes, I confirm.      An electronic signature was used to authenticate this note.  --Tarik Hummel, MARILEE - CNP

## 2024-07-08 ENCOUNTER — HOSPITAL ENCOUNTER (EMERGENCY)
Age: 79
Discharge: HOME | End: 2024-07-08
Attending: EMERGENCY MEDICINE
Payer: COMMERCIAL

## 2024-07-08 ENCOUNTER — APPOINTMENT (OUTPATIENT)
Dept: GENERAL RADIOLOGY | Age: 79
End: 2024-07-08
Payer: COMMERCIAL

## 2024-07-08 VITALS
RESPIRATION RATE: 22 BRPM | DIASTOLIC BLOOD PRESSURE: 76 MMHG | OXYGEN SATURATION: 95 % | HEART RATE: 90 BPM | SYSTOLIC BLOOD PRESSURE: 155 MMHG | TEMPERATURE: 98.2 F

## 2024-07-08 DIAGNOSIS — M79.641 RIGHT HAND PAIN: Primary | ICD-10-CM

## 2024-07-08 DIAGNOSIS — M11.20 CALCIUM PYROPHOSPHATE DEPOSITION DISEASE (CPPD): ICD-10-CM

## 2024-07-08 LAB
ALBUMIN SERPL-MCNC: 3.2 G/DL (ref 3.4–5)
ALBUMIN/GLOB SERPL: 0.7 {RATIO} (ref 1.1–2.2)
ALP SERPL-CCNC: 144 U/L (ref 40–129)
ALT SERPL-CCNC: <5 U/L (ref 10–40)
ANION GAP SERPL CALCULATED.3IONS-SCNC: 9 MMOL/L (ref 3–16)
AST SERPL-CCNC: 9 U/L (ref 15–37)
BASOPHILS # BLD: 0 K/UL (ref 0–0.2)
BASOPHILS NFR BLD: 0.5 %
BILIRUB SERPL-MCNC: 0.9 MG/DL (ref 0–1)
BUN SERPL-MCNC: 20 MG/DL (ref 7–20)
CALCIUM SERPL-MCNC: 8.6 MG/DL (ref 8.3–10.6)
CHLORIDE SERPL-SCNC: 106 MMOL/L (ref 99–110)
CO2 SERPL-SCNC: 28 MMOL/L (ref 21–32)
CREAT SERPL-MCNC: 1.2 MG/DL (ref 0.6–1.2)
DEPRECATED RDW RBC AUTO: 15.8 % (ref 12.4–15.4)
EKG ATRIAL RATE: 91 BPM
EKG DIAGNOSIS: NORMAL
EKG P AXIS: 63 DEGREES
EKG P-R INTERVAL: 172 MS
EKG Q-T INTERVAL: 364 MS
EKG QRS DURATION: 88 MS
EKG QTC CALCULATION (BAZETT): 447 MS
EKG R AXIS: -1 DEGREES
EKG T AXIS: 36 DEGREES
EKG VENTRICULAR RATE: 91 BPM
EOSINOPHIL # BLD: 0.2 K/UL (ref 0–0.6)
EOSINOPHIL NFR BLD: 1.9 %
GFR SERPLBLD CREATININE-BSD FMLA CKD-EPI: 47 ML/MIN/{1.73_M2}
GLUCOSE SERPL-MCNC: 153 MG/DL (ref 70–99)
HCT VFR BLD AUTO: 33.1 % (ref 36–48)
HGB BLD-MCNC: 10.9 G/DL (ref 12–16)
LACTATE BLDV-SCNC: 0.9 MMOL/L (ref 0.4–1.9)
LYMPHOCYTES # BLD: 1.2 K/UL (ref 1–5.1)
LYMPHOCYTES NFR BLD: 12.8 %
MCH RBC QN AUTO: 29.8 PG (ref 26–34)
MCHC RBC AUTO-ENTMCNC: 33 G/DL (ref 31–36)
MCV RBC AUTO: 90.2 FL (ref 80–100)
MONOCYTES # BLD: 1.4 K/UL (ref 0–1.3)
MONOCYTES NFR BLD: 15.4 %
NEUTROPHILS # BLD: 6.5 K/UL (ref 1.7–7.7)
NEUTROPHILS NFR BLD: 69.4 %
NT-PROBNP SERPL-MCNC: 3828 PG/ML (ref 0–449)
PLATELET # BLD AUTO: 206 K/UL (ref 135–450)
PMV BLD AUTO: 9.6 FL (ref 5–10.5)
POTASSIUM SERPL-SCNC: 4.1 MMOL/L (ref 3.5–5.1)
PROCALCITONIN SERPL IA-MCNC: 0.13 NG/ML (ref 0–0.15)
PROT SERPL-MCNC: 7.5 G/DL (ref 6.4–8.2)
RBC # BLD AUTO: 3.67 M/UL (ref 4–5.2)
SODIUM SERPL-SCNC: 143 MMOL/L (ref 136–145)
TROPONIN, HIGH SENSITIVITY: 27 NG/L (ref 0–14)
TROPONIN, HIGH SENSITIVITY: 33 NG/L (ref 0–14)
WBC # BLD AUTO: 9.3 K/UL (ref 4–11)

## 2024-07-08 PROCEDURE — 99285 EMERGENCY DEPT VISIT HI MDM: CPT

## 2024-07-08 PROCEDURE — 93005 ELECTROCARDIOGRAM TRACING: CPT | Performed by: EMERGENCY MEDICINE

## 2024-07-08 PROCEDURE — 84145 PROCALCITONIN (PCT): CPT

## 2024-07-08 PROCEDURE — 85025 COMPLETE CBC W/AUTO DIFF WBC: CPT

## 2024-07-08 PROCEDURE — 96375 TX/PRO/DX INJ NEW DRUG ADDON: CPT

## 2024-07-08 PROCEDURE — 96365 THER/PROPH/DIAG IV INF INIT: CPT

## 2024-07-08 PROCEDURE — 84484 ASSAY OF TROPONIN QUANT: CPT

## 2024-07-08 PROCEDURE — 73130 X-RAY EXAM OF HAND: CPT

## 2024-07-08 PROCEDURE — 6370000000 HC RX 637 (ALT 250 FOR IP): Performed by: EMERGENCY MEDICINE

## 2024-07-08 PROCEDURE — 83605 ASSAY OF LACTIC ACID: CPT

## 2024-07-08 PROCEDURE — 93010 ELECTROCARDIOGRAM REPORT: CPT | Performed by: INTERNAL MEDICINE

## 2024-07-08 PROCEDURE — 6360000002 HC RX W HCPCS: Performed by: EMERGENCY MEDICINE

## 2024-07-08 PROCEDURE — 80053 COMPREHEN METABOLIC PANEL: CPT

## 2024-07-08 PROCEDURE — 83880 ASSAY OF NATRIURETIC PEPTIDE: CPT

## 2024-07-08 RX ORDER — CLINDAMYCIN HYDROCHLORIDE 300 MG/1
300 CAPSULE ORAL 3 TIMES DAILY
Qty: 15 CAPSULE | Refills: 0 | Status: SHIPPED | OUTPATIENT
Start: 2024-07-08 | End: 2024-07-08

## 2024-07-08 RX ORDER — ZINC OXIDE 13 %
1 CREAM (GRAM) TOPICAL DAILY
Qty: 10 CAPSULE | Refills: 0 | Status: SHIPPED | OUTPATIENT
Start: 2024-07-08 | End: 2024-07-08

## 2024-07-08 RX ORDER — PREDNISONE 20 MG/1
40 TABLET ORAL EVERY EVENING
Qty: 10 TABLET | Refills: 0 | Status: SHIPPED | OUTPATIENT
Start: 2024-07-08 | End: 2024-07-13

## 2024-07-08 RX ORDER — LIDOCAINE 4 G/G
1 PATCH TOPICAL ONCE
Status: DISCONTINUED | OUTPATIENT
Start: 2024-07-08 | End: 2024-07-08 | Stop reason: HOSPADM

## 2024-07-08 RX ORDER — ACETAMINOPHEN 500 MG
1000 TABLET ORAL 3 TIMES DAILY
Qty: 30 TABLET | Refills: 0 | Status: SHIPPED | OUTPATIENT
Start: 2024-07-08 | End: 2024-07-13

## 2024-07-08 RX ORDER — ZINC OXIDE 13 %
1 CREAM (GRAM) TOPICAL DAILY
Qty: 10 CAPSULE | Refills: 0 | Status: SHIPPED | OUTPATIENT
Start: 2024-07-08 | End: 2024-07-18

## 2024-07-08 RX ORDER — OXYCODONE HYDROCHLORIDE 5 MG/1
5 TABLET ORAL ONCE
Status: COMPLETED | OUTPATIENT
Start: 2024-07-08 | End: 2024-07-08

## 2024-07-08 RX ORDER — CLINDAMYCIN HYDROCHLORIDE 300 MG/1
300 CAPSULE ORAL 3 TIMES DAILY
Qty: 15 CAPSULE | Refills: 0 | Status: SHIPPED | OUTPATIENT
Start: 2024-07-08 | End: 2024-07-13

## 2024-07-08 RX ORDER — ACETAMINOPHEN 500 MG
1000 TABLET ORAL 3 TIMES DAILY
Qty: 30 TABLET | Refills: 0 | Status: SHIPPED | OUTPATIENT
Start: 2024-07-08 | End: 2024-07-08

## 2024-07-08 RX ORDER — PREDNISONE 20 MG/1
40 TABLET ORAL DAILY
Qty: 10 TABLET | Refills: 0 | Status: SHIPPED | OUTPATIENT
Start: 2024-07-08 | End: 2024-07-08

## 2024-07-08 RX ORDER — KETOROLAC TROMETHAMINE 15 MG/ML
10 INJECTION, SOLUTION INTRAMUSCULAR; INTRAVENOUS ONCE
Status: COMPLETED | OUTPATIENT
Start: 2024-07-08 | End: 2024-07-08

## 2024-07-08 RX ORDER — CLINDAMYCIN PHOSPHATE 600 MG/50ML
600 INJECTION, SOLUTION INTRAVENOUS ONCE
Status: COMPLETED | OUTPATIENT
Start: 2024-07-08 | End: 2024-07-08

## 2024-07-08 RX ORDER — ACETAMINOPHEN 500 MG
1000 TABLET ORAL ONCE
Status: COMPLETED | OUTPATIENT
Start: 2024-07-08 | End: 2024-07-08

## 2024-07-08 RX ADMIN — OXYCODONE HYDROCHLORIDE 5 MG: 5 TABLET ORAL at 05:25

## 2024-07-08 RX ADMIN — KETOROLAC TROMETHAMINE 10 MG: 15 INJECTION, SOLUTION INTRAMUSCULAR; INTRAVENOUS at 05:25

## 2024-07-08 RX ADMIN — CLINDAMYCIN PHOSPHATE 600 MG: 600 INJECTION, SOLUTION INTRAVENOUS at 05:38

## 2024-07-08 RX ADMIN — ACETAMINOPHEN 1000 MG: 500 TABLET ORAL at 05:25

## 2024-07-08 ASSESSMENT — PAIN SCALES - GENERAL: PAINLEVEL_OUTOF10: 8

## 2024-07-08 ASSESSMENT — PAIN DESCRIPTION - LOCATION: LOCATION: ARM

## 2024-07-08 ASSESSMENT — PAIN DESCRIPTION - ORIENTATION: ORIENTATION: RIGHT

## 2024-07-08 NOTE — ED NOTES
Assisted pt with bed pan, pt voided 200 cc of concentrated urine, large area of breakdown to entire buttocks and coccyx area, with several small areas that are open.    Labia is red and redness extends under folds of panus to upper thighs  Pt states she is aware of skin issues  I offered to provide information to our  to see if she qualifies for more at home assistance.   Yazmin care completed, new brief applied.

## 2024-07-08 NOTE — CARE COORDINATION
SW received a notification from charge RN at [facility] ED that patient was seen this morning and requesting additional assistance at home.  SW called pt and spoke with her about these needs.  Pt stated she needs helping \"getting around.\"  Stated that she is too weak to get out of her lift chair.  Stated dtr is home but sleeps most of the day.  SW discussed [employer] services for assistance with non-skilled help.  Pt declined this need.  Pt stated she just needed physical therapy help at this time.      SW informed that since patient is not admitted and in the community, she will need to contact her PCP, Tarik Hummel, to inform of this need and that his office staff would order the [facility] and make referrals.  SW confirmed that pt had his office number.  Pt reported no other needs at this time.    Electronically signed by FELIPA Olea, ROX on 7/8/2024 at 1:58 PM

## 2024-07-08 NOTE — ED PROVIDER NOTES
[facility] EMERGENCY DEPARTMENT    Name: Stacy Ly : 1945 MRN: [id number] Date of Service: 2024    Initial VS: BP: (!) 156/75, Temp: 98.2 °F (36.8 °C), Pulse: 91, Respirations: 20, SpO2: 96 %     CC: ***    HPI: this patient is a 78 y.o. female presenting to the ED from ***  _____________________________________________________________________    Review of Systems    Physical Exam  _____________________________________________________________________    RESULTS:    Results for orders placed or performed during the hospital encounter of 24   CBC with Auto Differential   Result Value Ref Range    WBC 9.3 4.0 - 11.0 K/uL    RBC 3.67 (L) 4.00 - 5.20 M/uL    Hemoglobin 10.9 (L) 12.0 - 16.0 g/dL    Hematocrit 33.1 (L) 36.0 - 48.0 %    MCV 90.2 80.0 - 100.0 fL    MCH 29.8 26.0 - 34.0 pg    MCHC 33.0 31.0 - 36.0 g/dL    RDW 15.8 (H) 12.4 - 15.4 %    Platelets 206 135 - 450 K/uL    MPV 9.6 5.0 - 10.5 fL    Neutrophils % 69.4 %    Lymphocytes % 12.8 %    Monocytes % 15.4 %    Eosinophils % 1.9 %    Basophils % 0.5 %    Neutrophils Absolute 6.5 1.7 - 7.7 K/uL    Lymphocytes Absolute 1.2 1.0 - 5.1 K/uL    Monocytes Absolute 1.4 (H) 0.0 - 1.3 K/uL    Eosinophils Absolute 0.2 0.0 - 0.6 K/uL    Basophils Absolute 0.0 0.0 - 0.2 K/uL   Comprehensive Metabolic Panel w/ Reflex to MG   Result Value Ref Range    Sodium 143 136 - 145 mmol/L    Potassium reflex Magnesium 4.1 3.5 - 5.1 mmol/L    Chloride 106 99 - 110 mmol/L    CO2 28 21 - 32 mmol/L    Anion Gap 9 3 - 16    Glucose 153 (H) 70 - 99 mg/dL    BUN 20 7 - 20 mg/dL    Creatinine 1.2 0.6 - 1.2 mg/dL    Est, Glom Filt Rate 47 (A) >60    Calcium 8.6 8.3 - 10.6 mg/dL    Total Protein 7.5 6.4 - 8.2 g/dL    Albumin 3.2 (L) 3.4 - 5.0 g/dL    Albumin/Globulin Ratio 0.7 (L) 1.1 - 2.2    Total Bilirubin 0.9 0.0 - 1.0 mg/dL    Alkaline Phosphatase 144 (H) 40 - 129 U/L    ALT <5 (L) 10 - 40 U/L    AST 9 (L) 15 - 37 U/L   Troponin   Result Value Ref Range

## 2024-07-08 NOTE — ED NOTES
Pt to ED c/o Right arm pain and cough the past week and worsening, right hand has redness and some swelling and warm to touch, pt states finished antibiotics for what she believes was cellulitis on her right arm about a month ago states was very similar. AAOx4, NAD, resp e/u on 2L NC o2 which pt wears at baseline, bed locked lowest position, rails up x2, call light within reach, placed on BP cuff Pulse ox and Cardiac monitor, Hx CHF and AFIB and on bloodthinners.

## 2024-07-08 NOTE — DISCHARGE INSTRUCTIONS
Your prescription has been sent to [facility].    We think that your hand pain and swelling is likely related to a condition called pseudogout or CPPD. We are putting you on treatments for this.    It's difficult to be 100% certain that there isn't also a little bit of skin infection in your hand - especially since you were just in the hospital for an infection of your right arm - so we are prescribing a short course of antibiotics as well.    Be sure to contact your primary care provider to arrange for a follow up visit. I would like for you to be seen in clinic later this week for a re-check.    If you have any new or worsening issues after going home don't hesitate to return here for reevaluation at any time 24/7!

## 2024-07-08 NOTE — ED NOTES
AAOx4, NAD, resp e/u on 2L NC o2, assisted with using bedpan for pt to urinate, d/c with EMS home on oxygen and given all paperwork.

## 2024-07-15 ENCOUNTER — TELEPHONE (OUTPATIENT)
Dept: INTERNAL MEDICINE CLINIC | Age: 79
End: 2024-07-15

## 2024-07-15 NOTE — TELEPHONE ENCOUNTER
Patient was scheduled for an appt 07/16/24 but needed to reschedule. She needs a 40 minute virtual. Please call back the patient to schedule.

## 2024-07-22 RX ORDER — CLONIDINE HYDROCHLORIDE 0.2 MG/1
TABLET ORAL
Qty: 180 TABLET | Refills: 0 | Status: SHIPPED | OUTPATIENT
Start: 2024-07-22

## 2024-07-22 RX ORDER — AMLODIPINE BESYLATE 10 MG/1
10 TABLET ORAL DAILY
Qty: 90 TABLET | Refills: 0 | Status: SHIPPED | OUTPATIENT
Start: 2024-07-22

## 2024-07-22 NOTE — TELEPHONE ENCOUNTER
Last OV: 6/10/2024  Next OV: 7/24/2024    Next appointment due:around 7/8/2024)     Last fill:  Refills:

## 2024-07-24 ENCOUNTER — TELEMEDICINE (OUTPATIENT)
Dept: INTERNAL MEDICINE CLINIC | Age: 79
End: 2024-07-24

## 2024-07-24 DIAGNOSIS — Z09 HOSPITAL DISCHARGE FOLLOW-UP: Primary | ICD-10-CM

## 2024-07-24 DIAGNOSIS — M11.20 CALCIUM PYROPHOSPHATE DEPOSITION DISEASE (CPPD): ICD-10-CM

## 2024-07-24 DIAGNOSIS — L03.113 RIGHT ARM CELLULITIS: ICD-10-CM

## 2024-07-24 NOTE — PROGRESS NOTES
Documentation:  I communicated with the patient and/or health care decision maker about hospital follow-up, CPPD arthropathy, right upper quadrant cellulitis    Details of this discussion including any medical advice provided:     Assessment/Plan:  Stacy was seen today for follow-up.  Diagnoses and all orders for this visit:    Hospital discharge follow-up  Calcium pyrophosphate deposition disease (CPPD)  -Acute, new problem  -Patient treated in the emergency department on 7/8/2020  -[place] to be a complication from her recent right upper arm cellulitis  -Overall, the patient feels she is doing better.  She is aware of the limitations of telephone visit for evaluating muscular skeletal issues.  -I have encouraged her to return to the office for routine follow-up when she is able.    Right arm cellulitis  -Resolved        Total Time: minutes: 11-20 minutes    Stacy Ly was evaluated through a synchronous (real-time) audio encounter. Patient identification was verified at the start of the visit. She (or guardian if applicable) is aware that this is a billable service, which includes applicable co-pays. This visit was conducted with the patient's (and/or legal guardian's) verbal consent. She has not had a related appointment within my department in the past 7 days or scheduled within the next 24 hours.   The patient was located at Home: [address] [place] [address].  The provider was located at Facility (Appt Dept): [address] [place] [address].    Note: not billable if this call serves to triage the patient into an appointment for the relevant concern  Yes, I confirm.   Stacy Ly is a 78 y.o. female evaluated via telephone on 7/24/2024 for Follow-up  .        Tarik Hummel, APRN - CNP

## 2024-09-19 DIAGNOSIS — M81.0 AGE-RELATED OSTEOPOROSIS WITHOUT CURRENT PATHOLOGICAL FRACTURE: ICD-10-CM

## 2024-09-19 RX ORDER — AMLODIPINE BESYLATE 10 MG/1
10 TABLET ORAL DAILY
Qty: 90 TABLET | Refills: 0 | OUTPATIENT
Start: 2024-09-19

## 2024-09-20 RX ORDER — ALENDRONATE SODIUM 70 MG/1
70 TABLET ORAL
Qty: 12 TABLET | Refills: 1 | Status: SHIPPED | OUTPATIENT
Start: 2024-09-20

## 2024-10-03 RX ORDER — METOPROLOL TARTRATE 75 MG/1
1 TABLET, FILM COATED ORAL 2 TIMES DAILY
Qty: 180 TABLET | Refills: 0 | Status: SHIPPED | OUTPATIENT
Start: 2024-10-03

## 2024-12-23 ENCOUNTER — TELEPHONE (OUTPATIENT)
Dept: INTERNAL MEDICINE CLINIC | Age: 79
End: 2024-12-23

## 2025-02-02 DIAGNOSIS — M81.0 AGE-RELATED OSTEOPOROSIS WITHOUT CURRENT PATHOLOGICAL FRACTURE: ICD-10-CM

## 2025-02-03 RX ORDER — ALENDRONATE SODIUM 70 MG/1
70 TABLET ORAL
Qty: 12 TABLET | Refills: 1 | Status: SHIPPED | OUTPATIENT
Start: 2025-02-03

## 2025-02-11 ENCOUNTER — APPOINTMENT (OUTPATIENT)
Dept: GENERAL RADIOLOGY | Age: 80
DRG: 291 | End: 2025-02-11
Payer: COMMERCIAL

## 2025-02-11 ENCOUNTER — HOSPITAL ENCOUNTER (INPATIENT)
Age: 80
LOS: 8 days | Discharge: NURSING, REHAB OR LONG-TERM CARE | DRG: 291 | End: 2025-02-19
Attending: EMERGENCY MEDICINE | Admitting: STUDENT IN AN ORGANIZED HEALTH CARE EDUCATION/TRAINING PROGRAM
Payer: COMMERCIAL

## 2025-02-11 DIAGNOSIS — J44.1 COPD EXACERBATION (HCC): Primary | ICD-10-CM

## 2025-02-11 DIAGNOSIS — I50.9 ACUTE ON CHRONIC CONGESTIVE HEART FAILURE, UNSPECIFIED HEART FAILURE TYPE (HCC): ICD-10-CM

## 2025-02-11 DIAGNOSIS — I50.33 ACUTE ON CHRONIC DIASTOLIC CONGESTIVE HEART FAILURE (HCC): ICD-10-CM

## 2025-02-11 DIAGNOSIS — J10.1 INFLUENZA A: ICD-10-CM

## 2025-02-11 PROBLEM — R06.00 DYSPNEA: Status: ACTIVE | Noted: 2025-02-11

## 2025-02-11 LAB
ANION GAP SERPL CALCULATED.3IONS-SCNC: 11 MMOL/L (ref 3–16)
BASE EXCESS BLDV CALC-SCNC: 2.8 MMOL/L (ref -3–3)
BASOPHILS # BLD: 0 K/UL (ref 0–0.2)
BASOPHILS NFR BLD: 0.7 %
BUN SERPL-MCNC: 25 MG/DL (ref 7–20)
CALCIUM SERPL-MCNC: 8.5 MG/DL (ref 8.3–10.6)
CHLORIDE SERPL-SCNC: 102 MMOL/L (ref 99–110)
CO2 BLDV-SCNC: 74 MMOL/L
CO2 SERPL-SCNC: 28 MMOL/L (ref 21–32)
COHGB MFR BLDV: 3.5 % (ref 0–1.5)
CREAT SERPL-MCNC: 1.5 MG/DL (ref 0.6–1.2)
DEPRECATED RDW RBC AUTO: 17 % (ref 12.4–15.4)
DO-HGB MFR BLDV: 23 %
EOSINOPHIL # BLD: 0 K/UL (ref 0–0.6)
EOSINOPHIL NFR BLD: 0.4 %
FLUAV RNA RESP QL NAA+PROBE: DETECTED
FLUBV RNA RESP QL NAA+PROBE: NOT DETECTED
GFR SERPLBLD CREATININE-BSD FMLA CKD-EPI: 36 ML/MIN/{1.73_M2}
GLUCOSE SERPL-MCNC: 109 MG/DL (ref 70–99)
HCO3 BLDV-SCNC: 30.9 MMOL/L (ref 23–29)
HCT VFR BLD AUTO: 35.2 % (ref 36–48)
HGB BLD-MCNC: 11.4 G/DL (ref 12–16)
LYMPHOCYTES # BLD: 0.5 K/UL (ref 1–5.1)
LYMPHOCYTES NFR BLD: 17.7 %
MAGNESIUM SERPL-MCNC: 1.79 MG/DL (ref 1.8–2.4)
MCH RBC QN AUTO: 29.7 PG (ref 26–34)
MCHC RBC AUTO-ENTMCNC: 32.5 G/DL (ref 31–36)
MCV RBC AUTO: 91.4 FL (ref 80–100)
METHGB MFR BLDV: 0.8 %
MONOCYTES # BLD: 0.5 K/UL (ref 0–1.3)
MONOCYTES NFR BLD: 16.1 %
NEUTROPHILS # BLD: 2 K/UL (ref 1.7–7.7)
NEUTROPHILS NFR BLD: 65.1 %
NT-PROBNP SERPL-MCNC: ABNORMAL PG/ML (ref 0–449)
O2 CT VFR BLDV CALC: 12 VOL %
O2 THERAPY: ABNORMAL
PCO2 BLDV: 65 MMHG (ref 40–50)
PH BLDV: 7.29 [PH] (ref 7.35–7.45)
PLATELET # BLD AUTO: 124 K/UL (ref 135–450)
PMV BLD AUTO: 10.3 FL (ref 5–10.5)
PO2 BLDV: 43.7 MMHG (ref 25–40)
POTASSIUM SERPL-SCNC: 4.1 MMOL/L (ref 3.5–5.1)
RBC # BLD AUTO: 3.85 M/UL (ref 4–5.2)
SAO2 % BLDV: 76 %
SARS-COV-2 RNA RESP QL NAA+PROBE: NOT DETECTED
SODIUM SERPL-SCNC: 141 MMOL/L (ref 136–145)
TROPONIN, HIGH SENSITIVITY: 88 NG/L (ref 0–14)
TROPONIN, HIGH SENSITIVITY: 98 NG/L (ref 0–14)
WBC # BLD AUTO: 3.1 K/UL (ref 4–11)

## 2025-02-11 PROCEDURE — 93005 ELECTROCARDIOGRAM TRACING: CPT | Performed by: EMERGENCY MEDICINE

## 2025-02-11 PROCEDURE — 83880 ASSAY OF NATRIURETIC PEPTIDE: CPT

## 2025-02-11 PROCEDURE — 94761 N-INVAS EAR/PLS OXIMETRY MLT: CPT

## 2025-02-11 PROCEDURE — 82803 BLOOD GASES ANY COMBINATION: CPT

## 2025-02-11 PROCEDURE — 94640 AIRWAY INHALATION TREATMENT: CPT

## 2025-02-11 PROCEDURE — 83735 ASSAY OF MAGNESIUM: CPT

## 2025-02-11 PROCEDURE — 99285 EMERGENCY DEPT VISIT HI MDM: CPT

## 2025-02-11 PROCEDURE — 96375 TX/PRO/DX INJ NEW DRUG ADDON: CPT

## 2025-02-11 PROCEDURE — 2060000000 HC ICU INTERMEDIATE R&B

## 2025-02-11 PROCEDURE — 80048 BASIC METABOLIC PNL TOTAL CA: CPT

## 2025-02-11 PROCEDURE — 84484 ASSAY OF TROPONIN QUANT: CPT

## 2025-02-11 PROCEDURE — 6370000000 HC RX 637 (ALT 250 FOR IP): Performed by: EMERGENCY MEDICINE

## 2025-02-11 PROCEDURE — 87636 SARSCOV2 & INF A&B AMP PRB: CPT

## 2025-02-11 PROCEDURE — 96374 THER/PROPH/DIAG INJ IV PUSH: CPT

## 2025-02-11 PROCEDURE — 2700000000 HC OXYGEN THERAPY PER DAY

## 2025-02-11 PROCEDURE — 2500000003 HC RX 250 WO HCPCS: Performed by: EMERGENCY MEDICINE

## 2025-02-11 PROCEDURE — 85025 COMPLETE CBC W/AUTO DIFF WBC: CPT

## 2025-02-11 PROCEDURE — 6360000002 HC RX W HCPCS: Performed by: EMERGENCY MEDICINE

## 2025-02-11 PROCEDURE — 71045 X-RAY EXAM CHEST 1 VIEW: CPT

## 2025-02-11 RX ORDER — FUROSEMIDE 10 MG/ML
40 INJECTION INTRAMUSCULAR; INTRAVENOUS ONCE
Status: COMPLETED | OUTPATIENT
Start: 2025-02-11 | End: 2025-02-11

## 2025-02-11 RX ORDER — MAGNESIUM SULFATE IN WATER 40 MG/ML
2000 INJECTION, SOLUTION INTRAVENOUS PRN
Status: DISCONTINUED | OUTPATIENT
Start: 2025-02-11 | End: 2025-02-11 | Stop reason: SDUPTHER

## 2025-02-11 RX ORDER — MAGNESIUM SULFATE IN WATER 40 MG/ML
2000 INJECTION, SOLUTION INTRAVENOUS PRN
Status: DISCONTINUED | OUTPATIENT
Start: 2025-02-11 | End: 2025-02-19 | Stop reason: HOSPADM

## 2025-02-11 RX ORDER — ACETAMINOPHEN 650 MG/1
650 SUPPOSITORY RECTAL EVERY 6 HOURS PRN
Status: DISCONTINUED | OUTPATIENT
Start: 2025-02-11 | End: 2025-02-19 | Stop reason: HOSPADM

## 2025-02-11 RX ORDER — GUAIFENESIN 600 MG/1
1200 TABLET, EXTENDED RELEASE ORAL 2 TIMES DAILY
Status: DISCONTINUED | OUTPATIENT
Start: 2025-02-11 | End: 2025-02-19 | Stop reason: HOSPADM

## 2025-02-11 RX ORDER — POLYETHYLENE GLYCOL 3350 17 G/17G
17 POWDER, FOR SOLUTION ORAL DAILY PRN
Status: DISCONTINUED | OUTPATIENT
Start: 2025-02-11 | End: 2025-02-19 | Stop reason: HOSPADM

## 2025-02-11 RX ORDER — ONDANSETRON 4 MG/1
4 TABLET, ORALLY DISINTEGRATING ORAL EVERY 8 HOURS PRN
Status: DISCONTINUED | OUTPATIENT
Start: 2025-02-11 | End: 2025-02-19 | Stop reason: HOSPADM

## 2025-02-11 RX ORDER — FUROSEMIDE 10 MG/ML
40 INJECTION INTRAMUSCULAR; INTRAVENOUS 2 TIMES DAILY
Status: DISCONTINUED | OUTPATIENT
Start: 2025-02-12 | End: 2025-02-12

## 2025-02-11 RX ORDER — IPRATROPIUM BROMIDE AND ALBUTEROL SULFATE 2.5; .5 MG/3ML; MG/3ML
1 SOLUTION RESPIRATORY (INHALATION)
Status: DISCONTINUED | OUTPATIENT
Start: 2025-02-12 | End: 2025-02-12

## 2025-02-11 RX ORDER — ONDANSETRON 2 MG/ML
4 INJECTION INTRAMUSCULAR; INTRAVENOUS EVERY 6 HOURS PRN
Status: DISCONTINUED | OUTPATIENT
Start: 2025-02-11 | End: 2025-02-19 | Stop reason: HOSPADM

## 2025-02-11 RX ORDER — POTASSIUM CHLORIDE 7.45 MG/ML
10 INJECTION INTRAVENOUS PRN
Status: ACTIVE | OUTPATIENT
Start: 2025-02-11 | End: 2025-02-14

## 2025-02-11 RX ORDER — ACETAMINOPHEN 325 MG/1
650 TABLET ORAL EVERY 6 HOURS PRN
Status: DISCONTINUED | OUTPATIENT
Start: 2025-02-11 | End: 2025-02-19 | Stop reason: HOSPADM

## 2025-02-11 RX ORDER — OSELTAMIVIR PHOSPHATE 75 MG/1
75 CAPSULE ORAL 2 TIMES DAILY
Status: DISCONTINUED | OUTPATIENT
Start: 2025-02-11 | End: 2025-02-11 | Stop reason: SDUPTHER

## 2025-02-11 RX ORDER — SODIUM CHLORIDE 0.9 % (FLUSH) 0.9 %
5-40 SYRINGE (ML) INJECTION EVERY 12 HOURS SCHEDULED
Status: DISCONTINUED | OUTPATIENT
Start: 2025-02-11 | End: 2025-02-19 | Stop reason: HOSPADM

## 2025-02-11 RX ORDER — POTASSIUM CHLORIDE 1500 MG/1
40 TABLET, EXTENDED RELEASE ORAL PRN
Status: ACTIVE | OUTPATIENT
Start: 2025-02-11 | End: 2025-02-14

## 2025-02-11 RX ORDER — ENOXAPARIN SODIUM 100 MG/ML
30 INJECTION SUBCUTANEOUS 2 TIMES DAILY
Status: DISCONTINUED | OUTPATIENT
Start: 2025-02-12 | End: 2025-02-12

## 2025-02-11 RX ORDER — OSELTAMIVIR PHOSPHATE 75 MG/1
75 CAPSULE ORAL ONCE
Status: COMPLETED | OUTPATIENT
Start: 2025-02-11 | End: 2025-02-12

## 2025-02-11 RX ORDER — SODIUM CHLORIDE 0.9 % (FLUSH) 0.9 %
5-40 SYRINGE (ML) INJECTION PRN
Status: DISCONTINUED | OUTPATIENT
Start: 2025-02-11 | End: 2025-02-19 | Stop reason: HOSPADM

## 2025-02-11 RX ORDER — OSELTAMIVIR PHOSPHATE 30 MG/1
30 CAPSULE ORAL 2 TIMES DAILY
Status: COMPLETED | OUTPATIENT
Start: 2025-02-12 | End: 2025-02-16

## 2025-02-11 RX ORDER — SODIUM CHLORIDE 9 MG/ML
INJECTION, SOLUTION INTRAVENOUS PRN
Status: DISCONTINUED | OUTPATIENT
Start: 2025-02-11 | End: 2025-02-19 | Stop reason: HOSPADM

## 2025-02-11 RX ORDER — IPRATROPIUM BROMIDE AND ALBUTEROL SULFATE 2.5; .5 MG/3ML; MG/3ML
1 SOLUTION RESPIRATORY (INHALATION) ONCE
Status: COMPLETED | OUTPATIENT
Start: 2025-02-11 | End: 2025-02-11

## 2025-02-11 RX ADMIN — FUROSEMIDE 40 MG: 10 INJECTION, SOLUTION INTRAMUSCULAR; INTRAVENOUS at 22:25

## 2025-02-11 RX ADMIN — WATER 125 MG: 1 INJECTION INTRAMUSCULAR; INTRAVENOUS; SUBCUTANEOUS at 21:22

## 2025-02-11 RX ADMIN — IPRATROPIUM BROMIDE AND ALBUTEROL SULFATE 1 DOSE: .5; 3 SOLUTION RESPIRATORY (INHALATION) at 21:15

## 2025-02-11 ASSESSMENT — LIFESTYLE VARIABLES
HOW MANY STANDARD DRINKS CONTAINING ALCOHOL DO YOU HAVE ON A TYPICAL DAY: PATIENT DOES NOT DRINK
HOW OFTEN DO YOU HAVE A DRINK CONTAINING ALCOHOL: NEVER

## 2025-02-11 ASSESSMENT — PAIN - FUNCTIONAL ASSESSMENT: PAIN_FUNCTIONAL_ASSESSMENT: NONE - DENIES PAIN

## 2025-02-12 PROBLEM — J44.1 COPD EXACERBATION (HCC): Status: ACTIVE | Noted: 2022-12-15

## 2025-02-12 PROBLEM — J10.1 INFLUENZA A: Status: ACTIVE | Noted: 2025-02-12

## 2025-02-12 LAB
ALBUMIN SERPL-MCNC: 3.2 G/DL (ref 3.4–5)
ALBUMIN/GLOB SERPL: 1 {RATIO} (ref 1.1–2.2)
ALP SERPL-CCNC: 100 U/L (ref 40–129)
ALT SERPL-CCNC: <5 U/L (ref 10–40)
ANION GAP SERPL CALCULATED.3IONS-SCNC: 10 MMOL/L (ref 3–16)
AST SERPL-CCNC: 30 U/L (ref 15–37)
BASOPHILS # BLD: 0 K/UL (ref 0–0.2)
BASOPHILS NFR BLD: 0.3 %
BILIRUB SERPL-MCNC: 0.7 MG/DL (ref 0–1)
BUN SERPL-MCNC: 27 MG/DL (ref 7–20)
CALCIUM SERPL-MCNC: 8.3 MG/DL (ref 8.3–10.6)
CHLORIDE SERPL-SCNC: 104 MMOL/L (ref 99–110)
CO2 SERPL-SCNC: 25 MMOL/L (ref 21–32)
CREAT SERPL-MCNC: 1.4 MG/DL (ref 0.6–1.2)
DAT POLY-SP REAG RBC QL: NORMAL
DEPRECATED RDW RBC AUTO: 16.5 % (ref 12.4–15.4)
EKG DIAGNOSIS: NORMAL
EKG Q-T INTERVAL: 352 MS
EKG QRS DURATION: 90 MS
EKG QTC CALCULATION (BAZETT): 461 MS
EKG R AXIS: 103 DEGREES
EKG T AXIS: 74 DEGREES
EKG VENTRICULAR RATE: 103 BPM
EOSINOPHIL # BLD: 0 K/UL (ref 0–0.6)
EOSINOPHIL NFR BLD: 0 %
FERRITIN SERPL IA-MCNC: 537 NG/ML (ref 15–150)
FOLATE SERPL-MCNC: 14.2 NG/ML (ref 4.78–24.2)
GFR SERPLBLD CREATININE-BSD FMLA CKD-EPI: 39 ML/MIN/{1.73_M2}
GLUCOSE SERPL-MCNC: 176 MG/DL (ref 70–99)
HCT VFR BLD AUTO: 34.9 % (ref 36–48)
HCT VFR BLD AUTO: 35.6 % (ref 36–48)
HGB BLD-MCNC: 11.1 G/DL (ref 12–16)
IMMATURE RETIC FRACT: 0.41 (ref 0.21–0.37)
IRON SATN MFR SERPL: 6 % (ref 15–50)
IRON SERPL-MCNC: 14 UG/DL (ref 37–145)
LDH SERPL L TO P-CCNC: 243 U/L (ref 100–190)
LYMPHOCYTES # BLD: 0.3 K/UL (ref 1–5.1)
LYMPHOCYTES NFR BLD: 9.5 %
MCH RBC QN AUTO: 28.7 PG (ref 26–34)
MCHC RBC AUTO-ENTMCNC: 31.2 G/DL (ref 31–36)
MCV RBC AUTO: 92.1 FL (ref 80–100)
MONOCYTES # BLD: 0.2 K/UL (ref 0–1.3)
MONOCYTES NFR BLD: 7.5 %
NEUTROPHILS # BLD: 2.3 K/UL (ref 1.7–7.7)
NEUTROPHILS NFR BLD: 82.7 %
PLATELET # BLD AUTO: 114 K/UL (ref 135–450)
PMV BLD AUTO: 9.6 FL (ref 5–10.5)
POTASSIUM SERPL-SCNC: 4.4 MMOL/L (ref 3.5–5.1)
PROCALCITONIN SERPL IA-MCNC: 0.22 NG/ML (ref 0–0.15)
PROT SERPL-MCNC: 6.5 G/DL (ref 6.4–8.2)
RBC # BLD AUTO: 3.86 M/UL (ref 4–5.2)
RETICS # AUTO: 0.04 M/UL (ref 0.02–0.1)
RETICS/RBC NFR AUTO: 1.01 % (ref 0.5–2.18)
SODIUM SERPL-SCNC: 139 MMOL/L (ref 136–145)
TIBC SERPL-MCNC: 246 UG/DL (ref 260–445)
VIT B12 SERPL-MCNC: <150 PG/ML (ref 211–911)
WBC # BLD AUTO: 2.8 K/UL (ref 4–11)

## 2025-02-12 PROCEDURE — 97530 THERAPEUTIC ACTIVITIES: CPT

## 2025-02-12 PROCEDURE — 82746 ASSAY OF FOLIC ACID SERUM: CPT

## 2025-02-12 PROCEDURE — 2500000003 HC RX 250 WO HCPCS: Performed by: INTERNAL MEDICINE

## 2025-02-12 PROCEDURE — 86880 COOMBS TEST DIRECT: CPT

## 2025-02-12 PROCEDURE — 6370000000 HC RX 637 (ALT 250 FOR IP): Performed by: STUDENT IN AN ORGANIZED HEALTH CARE EDUCATION/TRAINING PROGRAM

## 2025-02-12 PROCEDURE — 85025 COMPLETE CBC W/AUTO DIFF WBC: CPT

## 2025-02-12 PROCEDURE — 93010 ELECTROCARDIOGRAM REPORT: CPT | Performed by: INTERNAL MEDICINE

## 2025-02-12 PROCEDURE — 85045 AUTOMATED RETICULOCYTE COUNT: CPT

## 2025-02-12 PROCEDURE — 6360000002 HC RX W HCPCS: Performed by: STUDENT IN AN ORGANIZED HEALTH CARE EDUCATION/TRAINING PROGRAM

## 2025-02-12 PROCEDURE — 86860 RBC ANTIBODY ELUTION: CPT

## 2025-02-12 PROCEDURE — 6370000000 HC RX 637 (ALT 250 FOR IP): Performed by: INTERNAL MEDICINE

## 2025-02-12 PROCEDURE — 94761 N-INVAS EAR/PLS OXIMETRY MLT: CPT

## 2025-02-12 PROCEDURE — 1200000000 HC SEMI PRIVATE

## 2025-02-12 PROCEDURE — 36415 COLL VENOUS BLD VENIPUNCTURE: CPT

## 2025-02-12 PROCEDURE — 83615 LACTATE (LD) (LDH) ENZYME: CPT

## 2025-02-12 PROCEDURE — 83550 IRON BINDING TEST: CPT

## 2025-02-12 PROCEDURE — 2500000003 HC RX 250 WO HCPCS: Performed by: STUDENT IN AN ORGANIZED HEALTH CARE EDUCATION/TRAINING PROGRAM

## 2025-02-12 PROCEDURE — 83540 ASSAY OF IRON: CPT

## 2025-02-12 PROCEDURE — 82728 ASSAY OF FERRITIN: CPT

## 2025-02-12 PROCEDURE — 6360000002 HC RX W HCPCS: Performed by: INTERNAL MEDICINE

## 2025-02-12 PROCEDURE — 84145 PROCALCITONIN (PCT): CPT

## 2025-02-12 PROCEDURE — 82607 VITAMIN B-12: CPT

## 2025-02-12 PROCEDURE — 83010 ASSAY OF HAPTOGLOBIN QUANT: CPT

## 2025-02-12 PROCEDURE — 2580000003 HC RX 258: Performed by: STUDENT IN AN ORGANIZED HEALTH CARE EDUCATION/TRAINING PROGRAM

## 2025-02-12 PROCEDURE — 94640 AIRWAY INHALATION TREATMENT: CPT

## 2025-02-12 PROCEDURE — 97166 OT EVAL MOD COMPLEX 45 MIN: CPT

## 2025-02-12 PROCEDURE — 86870 RBC ANTIBODY IDENTIFICATION: CPT

## 2025-02-12 PROCEDURE — 97535 SELF CARE MNGMENT TRAINING: CPT

## 2025-02-12 PROCEDURE — 2700000000 HC OXYGEN THERAPY PER DAY

## 2025-02-12 PROCEDURE — 97162 PT EVAL MOD COMPLEX 30 MIN: CPT

## 2025-02-12 PROCEDURE — 80053 COMPREHEN METABOLIC PANEL: CPT

## 2025-02-12 RX ORDER — FUROSEMIDE 10 MG/ML
20 INJECTION INTRAMUSCULAR; INTRAVENOUS 2 TIMES DAILY
Status: DISCONTINUED | OUTPATIENT
Start: 2025-02-12 | End: 2025-02-12

## 2025-02-12 RX ORDER — BUDESONIDE AND FORMOTEROL FUMARATE DIHYDRATE 160; 4.5 UG/1; UG/1
2 AEROSOL RESPIRATORY (INHALATION)
Status: DISCONTINUED | OUTPATIENT
Start: 2025-02-12 | End: 2025-02-19 | Stop reason: HOSPADM

## 2025-02-12 RX ORDER — IPRATROPIUM BROMIDE AND ALBUTEROL SULFATE 2.5; .5 MG/3ML; MG/3ML
1 SOLUTION RESPIRATORY (INHALATION)
Status: DISCONTINUED | OUTPATIENT
Start: 2025-02-12 | End: 2025-02-13

## 2025-02-12 RX ORDER — CLONIDINE HYDROCHLORIDE 0.1 MG/1
0.1 TABLET ORAL 2 TIMES DAILY
Status: DISCONTINUED | OUTPATIENT
Start: 2025-02-12 | End: 2025-02-17

## 2025-02-12 RX ORDER — FUROSEMIDE 20 MG/1
20 TABLET ORAL DAILY
Status: DISCONTINUED | OUTPATIENT
Start: 2025-02-13 | End: 2025-02-14

## 2025-02-12 RX ORDER — LEVOFLOXACIN 250 MG/1
250 TABLET, FILM COATED ORAL DAILY
Status: DISCONTINUED | OUTPATIENT
Start: 2025-02-12 | End: 2025-02-12 | Stop reason: DRUGHIGH

## 2025-02-12 RX ORDER — LEVOFLOXACIN 500 MG/1
500 TABLET, FILM COATED ORAL ONCE
Status: COMPLETED | OUTPATIENT
Start: 2025-02-12 | End: 2025-02-12

## 2025-02-12 RX ORDER — AMLODIPINE BESYLATE 5 MG/1
10 TABLET ORAL DAILY
Status: DISCONTINUED | OUTPATIENT
Start: 2025-02-12 | End: 2025-02-19 | Stop reason: HOSPADM

## 2025-02-12 RX ORDER — LEVOFLOXACIN 500 MG/1
250 TABLET, FILM COATED ORAL DAILY
Status: COMPLETED | OUTPATIENT
Start: 2025-02-13 | End: 2025-02-16

## 2025-02-12 RX ORDER — METOPROLOL TARTRATE 25 MG/1
75 TABLET, FILM COATED ORAL 2 TIMES DAILY
Status: DISCONTINUED | OUTPATIENT
Start: 2025-02-12 | End: 2025-02-19 | Stop reason: HOSPADM

## 2025-02-12 RX ADMIN — IPRATROPIUM BROMIDE AND ALBUTEROL SULFATE 1 DOSE: .5; 3 SOLUTION RESPIRATORY (INHALATION) at 08:00

## 2025-02-12 RX ADMIN — OSELTAMIVIR PHOSPHATE 75 MG: 75 CAPSULE ORAL at 01:13

## 2025-02-12 RX ADMIN — AZITHROMYCIN MONOHYDRATE 500 MG: 500 INJECTION, POWDER, LYOPHILIZED, FOR SOLUTION INTRAVENOUS at 01:19

## 2025-02-12 RX ADMIN — OSELTAMIVIR PHOSPHATE 30 MG: 30 CAPSULE ORAL at 21:49

## 2025-02-12 RX ADMIN — Medication 2 PUFF: at 20:58

## 2025-02-12 RX ADMIN — WATER 40 MG: 1 INJECTION INTRAMUSCULAR; INTRAVENOUS; SUBCUTANEOUS at 13:45

## 2025-02-12 RX ADMIN — APIXABAN 5 MG: 5 TABLET, FILM COATED ORAL at 21:49

## 2025-02-12 RX ADMIN — APIXABAN 5 MG: 5 TABLET, FILM COATED ORAL at 10:35

## 2025-02-12 RX ADMIN — GUAIFENESIN 1200 MG: 600 TABLET, MULTILAYER, EXTENDED RELEASE ORAL at 10:35

## 2025-02-12 RX ADMIN — SODIUM CHLORIDE, PRESERVATIVE FREE 10 ML: 5 INJECTION INTRAVENOUS at 01:14

## 2025-02-12 RX ADMIN — SODIUM CHLORIDE, PRESERVATIVE FREE 10 ML: 5 INJECTION INTRAVENOUS at 10:35

## 2025-02-12 RX ADMIN — WATER 40 MG: 1 INJECTION INTRAMUSCULAR; INTRAVENOUS; SUBCUTANEOUS at 21:49

## 2025-02-12 RX ADMIN — GUAIFENESIN 1200 MG: 600 TABLET, MULTILAYER, EXTENDED RELEASE ORAL at 01:13

## 2025-02-12 RX ADMIN — IPRATROPIUM BROMIDE AND ALBUTEROL SULFATE 1 DOSE: .5; 3 SOLUTION RESPIRATORY (INHALATION) at 17:37

## 2025-02-12 RX ADMIN — GUAIFENESIN 1200 MG: 600 TABLET, MULTILAYER, EXTENDED RELEASE ORAL at 21:49

## 2025-02-12 RX ADMIN — SODIUM CHLORIDE, PRESERVATIVE FREE 10 ML: 5 INJECTION INTRAVENOUS at 13:45

## 2025-02-12 RX ADMIN — IPRATROPIUM BROMIDE AND ALBUTEROL SULFATE 1 DOSE: .5; 3 SOLUTION RESPIRATORY (INHALATION) at 13:12

## 2025-02-12 RX ADMIN — FUROSEMIDE 20 MG: 10 INJECTION, SOLUTION INTRAMUSCULAR; INTRAVENOUS at 10:35

## 2025-02-12 RX ADMIN — OSELTAMIVIR PHOSPHATE 30 MG: 30 CAPSULE ORAL at 10:35

## 2025-02-12 RX ADMIN — WATER 60 MG: 1 INJECTION INTRAMUSCULAR; INTRAVENOUS; SUBCUTANEOUS at 04:20

## 2025-02-12 RX ADMIN — IPRATROPIUM BROMIDE AND ALBUTEROL SULFATE 1 DOSE: .5; 3 SOLUTION RESPIRATORY (INHALATION) at 04:20

## 2025-02-12 RX ADMIN — LEVOFLOXACIN 500 MG: 500 TABLET, FILM COATED ORAL at 16:00

## 2025-02-12 RX ADMIN — SODIUM CHLORIDE, PRESERVATIVE FREE 10 ML: 5 INJECTION INTRAVENOUS at 22:00

## 2025-02-12 RX ADMIN — AMLODIPINE BESYLATE 10 MG: 5 TABLET ORAL at 10:35

## 2025-02-12 RX ADMIN — IPRATROPIUM BROMIDE AND ALBUTEROL SULFATE 1 DOSE: .5; 3 SOLUTION RESPIRATORY (INHALATION) at 20:48

## 2025-02-12 ASSESSMENT — PAIN SCALES - GENERAL: PAINLEVEL_OUTOF10: 2

## 2025-02-12 NOTE — PROGRESS NOTES
redness  Posture:   Forward head, rounded shoulders  Sensation:   reports numbness and tingling in (B) UE, (B) LE  Proprioception:    WFL  Tone:   Normotonic  ROM:   B LE AROM limited globally due to edema  Strength:   (B) LE strength grossly 2  Therapist Clinical Decision Making (Complexity): medium complexity  Clinical Presentation: evolving      Subjective  General: Patient semi reclined in bed upon arrival. Patient is agreeable to PT/OT eval.   Pain: 2/10.  Location: B feet  Pain Interventions: RN notified, repositioned , and therapy activities modified       Functional Mobility  Bed Mobility:  Supine to Sit: 2 person assistance with max A   Scootin person assistance with max A   Comments: Patient demonstrates mild initiation of B LE towards EOB, however, limited by generalized weakness. Assistance needed to bring patient EOB   Transfers:  Sit to stand transfer: stedy utilized requiring max Ax2   Stand to sit transfer: stedy utilized requiring max Ax2   Comments: Patient completes STS from EOB to STEDY, patient reports concerns that her hips will not fit, however, with TC patient able to stand and sit safely in STEDY  Ambulation:  Ambulation not tested on this date secondary to patient reports being non-ambulatory past few months.  Distance:   Gait Mechanics:   Comments:    Stair Mobility:  Stair mobility not completed on this date.  Comments:  Wheelchair Mobility:  No w/c mobility completed on this date.  Comments:  Balance:  Static Sitting Balance: fair (+): maintains balance at SBA/supervision without use of UE support  Dynamic Sitting Balance: fair (-): maintains balance at CGA with use of UE support  Static Standing Balance: poor (-): requires max (A) to maintain balance  Comments:    Other Therapeutic Interventions  See OT note for ADLs    Functional Outcomes  AM-PAC Inpatient Mobility Raw Score : 6              Cognition  WFL  Orientation:    alert and oriented x 4  Command Following:   accurately

## 2025-02-12 NOTE — PROGRESS NOTES
V2.0    [facility] Progress Note      Name:  Stacy Ly /Age/Sex: 1945  (79 y.o. female)   MRN & CSN:  [id number] & [id number] Encounter Date/Time: 2025 2:49 PM EST   Location:  [facility]3377/3377-01 PCP: Tarik Hummel, APRN - CNP     Attending:Dora Contreras MD       Hospital Day: 2    Assessment and Recommendations   Stacy Ly is a 79 y.o. female with pmh of A-fib, HTN, depression, GIANNA, interstitial lung disease, chronic hypoxic respiratory failure due to COPD on 2 L/min at baseline, CKD, JUSTINA, heart failure presented to the ED with c/o worsening dyspnea for the last several days.  Workup in the ED showed positive influenza A, and diffuse airspace and interstitial opacities concerning for pulmonary edema.    Plan:     Acute on chronic hypoxic respiratory failure due to COPD & acute on chronic diastolic CHF exacerbation  Respiratory viral panel positive for influenza A.  Chest x-ray with diffuse interstitial opacity.  proBNP 12,827    Complete course of Tamiflu.  Continue Solu-Medrol, DuoNebs, Symbicort, Mucinex and Levaquin.  Wean down supplemental oxygen as tolerated.  CHF protocol with IV Lasix.    Elevated troponin:   HS-troponin 88, 98.  EKG showed A-fib with RVR but no acute ischemic changes.  Follow-up echocardiogram.   Cardiology consulted.    CKD 3: Monitor renal function while on IV diuresis.  Nephrology consult if worsening.  Avoid nephrotoxins.    Atrial fibrillation: Continue Eliquis and metoprolol.     Hypertension: Monitor BP on metoprolol.  Clonidine and amlodipine on hold.    Hypomagnesemia: Replaced.  Monitor magnesium levels.    Pancytopenia: Follow-up workup. Monitor CBC closely.      Diet ADULT DIET; Regular; 2000 ml   DVT Prophylaxis [] Lovenox, []  Heparin, [] SCDs, [] Ambulation,  [x] Eliquis, [] Xarelto  [] Coumadin   Code Status Full Code   Disposition From: Home  Expected Disposition: PT OT evaluation  Estimated Date of Discharge:   Patient requires continued

## 2025-02-12 NOTE — CARE COORDINATION
Case Management Assessment  Initial Evaluation    Date/Time of Evaluation: 2/12/2025 3:53 PM  Assessment Completed by: Aleksandr Cade    If patient is discharged prior to next notation, then this note serves as note for discharge by case management.    Patient Name: Stacy Ly                   YOB: 1945  Diagnosis: Dyspnea [R06.00]  Influenza A [J10.1]  COPD exacerbation (HCC) [J44.1]  Acute on chronic congestive heart failure, unspecified heart failure type (HCC) [I50.9]                   Date / Time: 2/11/2025  8:45 PM    Patient Admission Status: Inpatient   Readmission Risk (Low < 19, Mod (19-27), High > 27): Readmission Risk Score: 15.9    Current PCP: Tarik Hummel, MARILEE - CNP  PCP verified by CM? (P) Yes    Chart Reviewed: Yes      History Provided by: (P) Child/Family  Patient Orientation: (P) Sedated (Pt was asleep not easily aroused, CM called pt's daughter Anisha)    Patient Cognition: (P) Alert    Hospitalization in the last 30 days (Readmission):  No    If yes, Readmission Assessment in CM Navigator will be completed.    Advance Directives:      Code Status: Full Code   Patient's Primary Decision Maker is: (P) Legal Next of Kin (daughter Anisha)    Primary Decision Maker: Anisha Ly - Child - [phone]    Secondary Decision Maker: LucianoNoemi - Other - [phone]    Discharge Planning:    Patient lives with: (P) Children Type of Home: (P) House  Primary Care Giver: (P) Self  Patient Support Systems include: (P) Children   Current Financial resources: (P) Medicare  Current community resources: (P) None  Current services prior to admission: (P) Oxygen Therapy (2L O2)            Current DME:              Type of Home Care services:  (P) OT, PT, Nursing Services    ADLS  Prior functional level: (P) Independent in ADLs/IADLs  Current functional level: (P) Assistance with the following:, Mobility    PT AM-PAC: 6 /24  OT AM-PAC: 10 /24    Family can provide assistance

## 2025-02-12 NOTE — ED NOTES
Patient Name: Stacy Ly  : 1945 79 y.o.  MRN: [id number]  ED Room #: ED-0002/     Chief complaint:   Chief Complaint   Patient presents with    Shortness of Breath     Pt to ED via  ems from home with c/o shortness of breath that started a couple days ago. EMS reports pt was satting in the low 70s on their arrival, received 2 breathing treatments en route. Pt states she wears 2L at baseline.      Hospital Problem/Diagnosis:   Hospital Problems             Last Modified POA    * (Principal) Dyspnea 2025 Yes         O2 Flow Rate:O2 Device: Nasal cannula O2 Flow Rate (L/min): 2 L/min (if applicable)  Cardiac Rhythm:   (if applicable)  Active LDA's:   Peripheral IV 25 Left Antecubital (Active)            How does patient ambulate? Front Wheeled Walker    2. How does patient take pills? Whole with Water    3. Is patient alert? Alert    4. Is patient oriented? To Person, To Place, To Time, To Situation, and Follows Commands    5.   Patient arrived from:  home  Facility Name: ___________________________________________    6. If patient is disoriented or from a Skill Nursing Facility has family been notified of admission?     7. Patient belongings? Belongings: Cell Phone and Clothing    Disposition of belongings? Kept with Patient     8. Any specific patient or family belongings/needs/dynamics?   a.     9. Miscellaneous comments/pending orders?  a.       If there are any additional questions please reach out to the Emergency Department.

## 2025-02-12 NOTE — ED NOTES
Report called Carlo BAUTISTA. All questions and concerns answered at this time. Pt resting in bed with eyes closed. Respirations even and unlabored on 2L NC. IV intact.

## 2025-02-12 NOTE — ED PROVIDER NOTES
[facility] EMERGENCY DEPARTMENT  EMERGENCY DEPARTMENT ENCOUNTER        Pt Name: Stacy Ly  MRN: [id number]  Birthdate 1945  Date of evaluation: 2/11/2025  Provider: Candelario Fernandez DO  PCP: Tarik Hummel, MARILEE - CNP  Note Started: 8:48 PM EST 2/11/25    CHIEF COMPLAINT      Shortness of Breath    HISTORY OF PRESENT ILLNESS: 1 or more Elements     Chief Complaint   Patient presents with    Shortness of Breath     Pt to ED via [facility] ems from home with c/o shortness of breath that started a couple days ago. EMS reports pt was satting in the low 70s on their arrival, received 2 breathing treatments en route. Pt states she wears 2L at baseline.      History from : Patient and EMS  Limitations to history : None    Stacy Ly is a 79 y.o. female who presents to the emergency department secondary to concern for shortness of breath.  Patient presenting via EMS.  She states that she has been having shortness of breath for the last couple days.  Per EMS, patient had an oxygen saturation in the low 70% on arrival and received 2 breathing treatments.  Patient reports wearing 2 L of oxygen per nasal cannula at baseline. She is currently requiring 6 L nasal cannula in the ED on triage.  Reports history of COPD, but states that this feels little different.    Past medical history noted below. Aside from what is stated above denies any other symptoms or modifying factors.   reports that she has never smoked. She has never used smokeless tobacco. She reports that she does not drink alcohol and does not use drugs.  Nursing Notes were all reviewed and agreed with or any disagreements addressed in HPI/MDM.  REVIEW OF SYSTEMS :    Review of Systems   Constitutional:  Negative for chills and fever.   HENT:  Negative for congestion and rhinorrhea.    Eyes:  Negative for pain and redness.   Respiratory:  Positive for cough and shortness of breath.    Cardiovascular:  Negative for chest pain and leg swelling.

## 2025-02-12 NOTE — H&P
[facility] Medicine History & Physical    V 1.6    Date of Admission: 2/11/2025    Date of Service:  Pt seen/examined on 2/11/2025    [x]Admitted to Inpatient with expected LOS greater than two midnights due to medical therapy.  []Placed in Observation status.    Chief Admission Complaint: Dyspnea    Presenting Admission History:      79 y.o. female past medical history of A-fib, hypertension, depression, GIANNA, interstitial lung disease, COPD, CKD, JUSTINA, heart failure presented to the emergency department chief complaint of dyspnea.  Patient states that she has had worsening dyspnea for the last several days.  She states that she has been compliant with her 2 L of O2 at baseline and has not gotten any better.  Patient states that she has had COPD exacerbations in the past however states that this feels different.  Patient denies headache or dizziness.  Denies chest pain, GI or  symptoms.  Denies lower extremity edema.  Assessment/Plan:      Current Principal Problem:  Dyspnea    Dyspnea  - Multifactorial; secondary to influenza, COPD, CHF    Influenza A  - Tamiflu    COPD  - Solu-Medrol, azithromycin, DuoNebs, Mucinex    Heart failure  - Lasix twice daily    Atrial fibrillation  - Eliquis    Hypertension  - Amlodipine    Discussed management and the need for Hospitalization of the patient w/ the Emergency Department Provider: Rebecca    CXR: I have reviewed the CXR with the following interpretation: Pulmonary edema  EKG:  I have reviewed the EKG with the following interpretation: Atrial fibrillation    Physical Exam Performed:      BP (!) 149/79   Pulse (!) 101   Temp 98.2 °F (36.8 °C) (Oral)   Resp 13   Ht 1.702 m (5' 7\")   Wt 130.8 kg (288 lb 5.8 oz)   SpO2 94%   BMI 45.16 kg/m²     General appearance: Mild distress  HEENT:  Pupils equal, round, and reactive to light. Conjunctivae/corneas clear.  Respiratory: Mild crackles bilaterally  Cardiovascular: Irregular, no murmurs  Abdomen:  Soft, non-tender,

## 2025-02-12 NOTE — PLAN OF CARE
Problem: Safety - Adult  Goal: Free from fall injury  Outcome: Progressing  Note: Patient remains absent from falls at this time.  Remains up to chair with eyes closed, call light and belongings in reach.  Non-slip footwear on and chair alarm activated.  Fall precautions in place.  Patient encouraged to use call light to request assistance, v/u.  Will continue to monitor.

## 2025-02-12 NOTE — PROGRESS NOTES
Physician Progress Note      PATIENT:               QUENTIN HURT  CSN #:                  [id number]  :                       1945  ADMIT DATE:       2025 8:45 PM  DISCH DATE:  RESPONDING  PROVIDER #:        Dora Contreras MD          QUERY TEXT:    Dear Dr. Contreras,  Pt admitted with Dsypnea secondary to Influenza, COPD and CHF documented. Pt   has BNP=12,827 and CXR notes likely pulmonary edema and pt was given IV Lasix.    If possible, please document in progress notes and discharge summary further   specificity regarding the type and acuity of CHF:    The medical record reflects the following:  Risk Factors: Hx COPD, HTN, A-fib, Sleep apnea, Current noted Influenza A,   COPD and CHF  Clinical Indicators:  ED for SOB, Pro-BNP=12,827, CXR- diffuse airspace   and interstitial opacities bilat likely pulm edema. ED notes Acute on chronic   CHF. Admit for Dyspnea multifactorial / influenza A, COPD, CHF,  (Echo from   2024 EF=55-60% Grade 2 diastolic dysfunction)  Treatment: O2, IV Lasix, (home med Lasix 20 mg every other day)  Thank you,  Nenita Wolf RN, CDS  Options provided:  -- Acute on Chronic Diastolic CHF/HFpEF  -- Acute Diastolic CHF/HFpEF  -- Acute on Chronic Systolic and Diastolic CHF  -- Acute Systolic and Diastolic CHF  -- Other - I will add my own diagnosis  -- Disagree - Not applicable / Not valid  -- Disagree - Clinically unable to determine / Unknown  -- Refer to Clinical Documentation Reviewer    PROVIDER RESPONSE TEXT:    This patient is in acute on chronic diastolic CHF/HFpEF.    Query created by: Nenita Schwartz on 2025 3:12 PM      Electronically signed by:  Dora Contreras MD 2025 3:17 PM           · Stable at baseline    Results from last 7 days   Lab Units 04/19/22  0551 04/18/22  1415 04/17/22  0627 04/16/22  0446 04/15/22  0954 04/14/22  0616 04/13/22  0602   BUN mg/dL 32* 31* 28* 17 20 17 16   CREATININE mg/dL 1 11 1 15 1 10 0 67 0 98 1 00 0 82   EGFR ml/min/1 73sq m 48 46 49 86 56 55 70

## 2025-02-12 NOTE — PROGRESS NOTES
[facility] - Inpatient Rehabilitation Department   Phone: [phone]    Occupational Therapy    [x] Initial Evaluation            [] Daily Treatment Note         [] Discharge Summary      Patient: Stacy Ly   : 1945   MRN: [id number]   Date of Service:  2025    Admitting Diagnosis:  Dyspnea  Current Admission Summary: 79 y.o. female who presents to the emergency department secondary to concern for shortness of breath. Patient presenting via EMS. She states that she has been having shortness of breath for the last couple days. Per EMS, patient had an oxygen saturation in the low 70% on arrival and received 2 breathing treatments. Patient reports wearing 2 L of oxygen per nasal cannula at baseline. She is currently requiring 6 L nasal cannula in the ED on triage. Reports history of COPD, but states that this feels little different.   Past Medical History:  has a past medical history of Arthritis, Atrial fibrillation and flutter (HCC), Hypertension, Infection due to parainfluenza virus 3, Kidney disease, Pneumonia, and Sleep apnea.  Past Surgical History:  has a past surgical history that includes Tubal ligation; Total knee arthroplasty (Bilateral, 2012); fracture surgery; Colonoscopy (N/A, 2018); Upper gastrointestinal endoscopy (N/A, 2018); Cardioversion; Gastric bypass surgery; Larynx surgery; Upper gastrointestinal endoscopy (N/A, 2022); Colonoscopy (N/A, 2022); and Upper gastrointestinal endoscopy (N/A, 2022).    Discharge Recommendations: Stacy Ly scored a 10/ on the AM-PAC ADL Inpatient form. Current research shows that an AM-PAC score of 17 or less is typically not associated with a discharge to the patient's home setting. Based on the patient's AM-PAC score and their current ADL deficits, it is recommended that the patient have 3-5 sessions per week of Occupational Therapy at d/c to increase the patient's independence.  Please see

## 2025-02-13 ENCOUNTER — APPOINTMENT (OUTPATIENT)
Age: 80
DRG: 291 | End: 2025-02-13
Attending: INTERNAL MEDICINE
Payer: COMMERCIAL

## 2025-02-13 LAB
ALBUMIN SERPL-MCNC: 3.1 G/DL (ref 3.4–5)
ALBUMIN/GLOB SERPL: 1 {RATIO} (ref 1.1–2.2)
ALP SERPL-CCNC: 79 U/L (ref 40–129)
ALT SERPL-CCNC: 6 U/L (ref 10–40)
ANION GAP SERPL CALCULATED.3IONS-SCNC: 10 MMOL/L (ref 3–16)
AST SERPL-CCNC: 23 U/L (ref 15–37)
BASOPHILS # BLD: 0 K/UL (ref 0–0.2)
BASOPHILS NFR BLD: 0.4 %
BILIRUB DIRECT SERPL-MCNC: 0.2 MG/DL (ref 0–0.3)
BILIRUB INDIRECT SERPL-MCNC: 0.2 MG/DL (ref 0–1)
BILIRUB SERPL-MCNC: 0.4 MG/DL (ref 0–1)
BUN SERPL-MCNC: 32 MG/DL (ref 7–20)
CALCIUM SERPL-MCNC: 8.5 MG/DL (ref 8.3–10.6)
CHLORIDE SERPL-SCNC: 103 MMOL/L (ref 99–110)
CO2 SERPL-SCNC: 29 MMOL/L (ref 21–32)
CREAT SERPL-MCNC: 1.4 MG/DL (ref 0.6–1.2)
DEPRECATED RDW RBC AUTO: 16.4 % (ref 12.4–15.4)
EOSINOPHIL # BLD: 0 K/UL (ref 0–0.6)
EOSINOPHIL NFR BLD: 0 %
GFR SERPLBLD CREATININE-BSD FMLA CKD-EPI: 39 ML/MIN/{1.73_M2}
GLUCOSE SERPL-MCNC: 140 MG/DL (ref 70–99)
HAPTOGLOB SERPL-MCNC: 144 MG/DL (ref 30–200)
HCT VFR BLD AUTO: 33.3 % (ref 36–48)
HGB BLD-MCNC: 10.7 G/DL (ref 12–16)
LYMPHOCYTES # BLD: 0.5 K/UL (ref 1–5.1)
LYMPHOCYTES NFR BLD: 13.6 %
MAGNESIUM SERPL-MCNC: 1.79 MG/DL (ref 1.8–2.4)
MCH RBC QN AUTO: 29 PG (ref 26–34)
MCHC RBC AUTO-ENTMCNC: 32.3 G/DL (ref 31–36)
MCV RBC AUTO: 89.9 FL (ref 80–100)
MONOCYTES # BLD: 0.5 K/UL (ref 0–1.3)
MONOCYTES NFR BLD: 12.9 %
NEUTROPHILS # BLD: 2.7 K/UL (ref 1.7–7.7)
NEUTROPHILS NFR BLD: 73.1 %
PATH INTERP BLD-IMP: NORMAL
PATH INTERP BLD-IMP: NORMAL
PLATELET # BLD AUTO: 118 K/UL (ref 135–450)
PMV BLD AUTO: 10.3 FL (ref 5–10.5)
POTASSIUM SERPL-SCNC: 4.3 MMOL/L (ref 3.5–5.1)
PROT SERPL-MCNC: 6.2 G/DL (ref 6.4–8.2)
RBC # BLD AUTO: 3.71 M/UL (ref 4–5.2)
SODIUM SERPL-SCNC: 142 MMOL/L (ref 136–145)
WBC # BLD AUTO: 3.7 K/UL (ref 4–11)

## 2025-02-13 PROCEDURE — 6360000002 HC RX W HCPCS: Performed by: INTERNAL MEDICINE

## 2025-02-13 PROCEDURE — 1200000000 HC SEMI PRIVATE

## 2025-02-13 PROCEDURE — 6370000000 HC RX 637 (ALT 250 FOR IP): Performed by: STUDENT IN AN ORGANIZED HEALTH CARE EDUCATION/TRAINING PROGRAM

## 2025-02-13 PROCEDURE — 2500000003 HC RX 250 WO HCPCS: Performed by: INTERNAL MEDICINE

## 2025-02-13 PROCEDURE — 83735 ASSAY OF MAGNESIUM: CPT

## 2025-02-13 PROCEDURE — 6370000000 HC RX 637 (ALT 250 FOR IP): Performed by: INTERNAL MEDICINE

## 2025-02-13 PROCEDURE — 2500000003 HC RX 250 WO HCPCS: Performed by: STUDENT IN AN ORGANIZED HEALTH CARE EDUCATION/TRAINING PROGRAM

## 2025-02-13 PROCEDURE — 2580000003 HC RX 258: Performed by: INTERNAL MEDICINE

## 2025-02-13 PROCEDURE — 94761 N-INVAS EAR/PLS OXIMETRY MLT: CPT

## 2025-02-13 PROCEDURE — 99232 SBSQ HOSP IP/OBS MODERATE 35: CPT | Performed by: NURSE PRACTITIONER

## 2025-02-13 PROCEDURE — 36415 COLL VENOUS BLD VENIPUNCTURE: CPT

## 2025-02-13 PROCEDURE — 94640 AIRWAY INHALATION TREATMENT: CPT

## 2025-02-13 PROCEDURE — 80053 COMPREHEN METABOLIC PANEL: CPT

## 2025-02-13 PROCEDURE — 85025 COMPLETE CBC W/AUTO DIFF WBC: CPT

## 2025-02-13 PROCEDURE — 2700000000 HC OXYGEN THERAPY PER DAY

## 2025-02-13 RX ORDER — IPRATROPIUM BROMIDE AND ALBUTEROL SULFATE 2.5; .5 MG/3ML; MG/3ML
1 SOLUTION RESPIRATORY (INHALATION) EVERY 4 HOURS PRN
Status: DISCONTINUED | OUTPATIENT
Start: 2025-02-13 | End: 2025-02-19 | Stop reason: HOSPADM

## 2025-02-13 RX ORDER — MAGNESIUM SULFATE 1 G/100ML
1000 INJECTION INTRAVENOUS ONCE
Status: COMPLETED | OUTPATIENT
Start: 2025-02-13 | End: 2025-02-13

## 2025-02-13 RX ORDER — CYANOCOBALAMIN 1000 UG/ML
1000 INJECTION, SOLUTION INTRAMUSCULAR; SUBCUTANEOUS ONCE
Status: COMPLETED | OUTPATIENT
Start: 2025-02-13 | End: 2025-02-13

## 2025-02-13 RX ORDER — IPRATROPIUM BROMIDE AND ALBUTEROL SULFATE 2.5; .5 MG/3ML; MG/3ML
1 SOLUTION RESPIRATORY (INHALATION)
Status: DISCONTINUED | OUTPATIENT
Start: 2025-02-14 | End: 2025-02-19 | Stop reason: HOSPADM

## 2025-02-13 RX ADMIN — METOPROLOL TARTRATE 75 MG: 25 TABLET, FILM COATED ORAL at 21:42

## 2025-02-13 RX ADMIN — SODIUM CHLORIDE, PRESERVATIVE FREE 10 ML: 5 INJECTION INTRAVENOUS at 21:43

## 2025-02-13 RX ADMIN — Medication 2 PUFF: at 21:20

## 2025-02-13 RX ADMIN — IPRATROPIUM BROMIDE AND ALBUTEROL SULFATE 1 DOSE: .5; 3 SOLUTION RESPIRATORY (INHALATION) at 04:24

## 2025-02-13 RX ADMIN — GUAIFENESIN 1200 MG: 600 TABLET, MULTILAYER, EXTENDED RELEASE ORAL at 21:18

## 2025-02-13 RX ADMIN — WATER 40 MG: 1 INJECTION INTRAMUSCULAR; INTRAVENOUS; SUBCUTANEOUS at 16:27

## 2025-02-13 RX ADMIN — WATER 40 MG: 1 INJECTION INTRAMUSCULAR; INTRAVENOUS; SUBCUTANEOUS at 21:43

## 2025-02-13 RX ADMIN — MAGNESIUM SULFATE HEPTAHYDRATE 1000 MG: 1 INJECTION, SOLUTION INTRAVENOUS at 09:30

## 2025-02-13 RX ADMIN — APIXABAN 5 MG: 5 TABLET, FILM COATED ORAL at 21:18

## 2025-02-13 RX ADMIN — IPRATROPIUM BROMIDE AND ALBUTEROL SULFATE 1 DOSE: .5; 3 SOLUTION RESPIRATORY (INHALATION) at 15:50

## 2025-02-13 RX ADMIN — LEVOFLOXACIN 250 MG: 500 TABLET, FILM COATED ORAL at 09:31

## 2025-02-13 RX ADMIN — OSELTAMIVIR PHOSPHATE 30 MG: 30 CAPSULE ORAL at 21:18

## 2025-02-13 RX ADMIN — WATER 40 MG: 1 INJECTION INTRAMUSCULAR; INTRAVENOUS; SUBCUTANEOUS at 07:17

## 2025-02-13 RX ADMIN — OSELTAMIVIR PHOSPHATE 30 MG: 30 CAPSULE ORAL at 09:31

## 2025-02-13 RX ADMIN — IPRATROPIUM BROMIDE AND ALBUTEROL SULFATE 1 DOSE: .5; 3 SOLUTION RESPIRATORY (INHALATION) at 00:33

## 2025-02-13 RX ADMIN — IPRATROPIUM BROMIDE AND ALBUTEROL SULFATE 1 DOSE: .5; 3 SOLUTION RESPIRATORY (INHALATION) at 13:43

## 2025-02-13 RX ADMIN — FUROSEMIDE 20 MG: 20 TABLET ORAL at 09:30

## 2025-02-13 RX ADMIN — AMLODIPINE BESYLATE 10 MG: 5 TABLET ORAL at 09:31

## 2025-02-13 RX ADMIN — IPRATROPIUM BROMIDE AND ALBUTEROL SULFATE 1 DOSE: .5; 3 SOLUTION RESPIRATORY (INHALATION) at 21:20

## 2025-02-13 RX ADMIN — GUAIFENESIN 1200 MG: 600 TABLET, MULTILAYER, EXTENDED RELEASE ORAL at 09:31

## 2025-02-13 RX ADMIN — METOPROLOL TARTRATE 75 MG: 25 TABLET, FILM COATED ORAL at 09:30

## 2025-02-13 RX ADMIN — IRON SUCROSE 200 MG: 20 INJECTION, SOLUTION INTRAVENOUS at 15:09

## 2025-02-13 RX ADMIN — APIXABAN 5 MG: 5 TABLET, FILM COATED ORAL at 09:31

## 2025-02-13 RX ADMIN — METOPROLOL TARTRATE 75 MG: 25 TABLET, FILM COATED ORAL at 00:13

## 2025-02-13 RX ADMIN — SODIUM CHLORIDE, PRESERVATIVE FREE 10 ML: 5 INJECTION INTRAVENOUS at 09:31

## 2025-02-13 RX ADMIN — CYANOCOBALAMIN 1000 MCG: 1000 INJECTION, SOLUTION INTRAMUSCULAR; SUBCUTANEOUS at 16:26

## 2025-02-13 ASSESSMENT — PAIN SCALES - GENERAL: PAINLEVEL_OUTOF10: 4

## 2025-02-13 ASSESSMENT — PAIN DESCRIPTION - LOCATION: LOCATION: ANKLE

## 2025-02-13 ASSESSMENT — PAIN DESCRIPTION - ORIENTATION: ORIENTATION: RIGHT

## 2025-02-13 ASSESSMENT — PAIN DESCRIPTION - DESCRIPTORS: DESCRIPTORS: ACHING

## 2025-02-13 NOTE — DISCHARGE INSTR - COC
Continuity of Care Form  HF Pathway  _x_ Cardiovascular Assessment. Titrate O2 to keep SaO2 greater than 90%    _x_ Daily Weights- Baseline Wt:262   Call MD if:   3 pound weight gain or loss in one day OR 5 pound weight gain in one week       _x_No added salt diet (3-4 G sodium) and 64 oz fluid restriction      _x_ Labs:   BMP,BNP    Please start on    Frequency:  Weekly x 4              Fax results to: CHF Clinic: [phone]        _x_ Med List attached:   Hold Coreg/Metoprolol if HR less than 45 or patient symptomatic*   Hold ACE/ARB if SBP less than 85 or patient symptomatic*   Do not hold Spironolactone (aldactone) for hypotension/bradycardia   Call MD for questions: CHF Clinic: [phone]    _x_Follow up appointment with cardiology: date/time:  at 1pm with Mira Garcia NP      PRIOR TO DISCHARGE HOME FROM FACILITY PLEASE NOTIFY CHF TEAM- CALL [phone] AND LEAVE A MESSAGE    Patient Name: Stacy Ly   :  1945  MRN:  [id number]    Admit date:  2025  Discharge date:  2025    Code Status Order: Full Code   Advance Directives:   Advance Care Flowsheet Documentation             Admitting Physician:  Srinivas Marshall DO  PCP: Tarik Hummel, APRN - CNP    Discharging Nurse: Argentina Ramos RN   Discharging Hospital Unit/Room#: 3TN-3377/3377-01  Discharging Unit Phone Number: [phone]    Emergency Contact:   Extended Emergency Contact Information  Primary Emergency Contact: Noemi Luciano  Home Phone: [phone]  Mobile Phone: [phone]  Relation: Other   needed? No  Secondary Emergency Contact: Anisha Ly  Address: [address] [place] [address]            [address] [facility]  Home Phone: [phone]  Work Phone: [phone]  Relation: Child    Past Surgical History:  Past Surgical History:   Procedure Laterality Date    CARDIOVERSION      COLONOSCOPY N/A 2018    COLONOSCOPY POLYPECTOMY SNARE/COLD BIOPSY performed by Jairon Gordon MD at [facility]

## 2025-02-13 NOTE — DISCHARGE INSTRUCTIONS
Guidelines for Heart Failure home management:    1. Continue to monitor weight first thing each morning. You should weigh yourself after using the bathroom and before you eat breakfast.    2. Report to your doctor any significant weight change. Remember that weight change of 2-3 lbs. in 1 day or 5 lbs in a week is \"significant\" and likely represents changes in \"fluid\" status.  If you are experiencing any swelling in your feet, ankles or abdomen, or shortness of breath, call your doctor.     3. You should restrict all sodium intake to 3000 milligrams (3 grams) a day. Depending on your status, you may also be asked to restrict fluid intake to no more that 64 oz/2 Liters a day. If uncertain, ask the nurse or physician.     4. Regular aerobic exercise is encouraged 30 minutes a day (walking, bike, swimming, etc.). For specific exercise recommendations, ask your physician.     5. Report to your doctor any change in symptoms (chest pain, worsening shortness of breath, increased dizziness or passing out, increased palpitations or ICD shock, trouble catching breath while lying down, increased edema or abdominal bloating). Remember that even \"minor\" changes in symptoms may be important. Also report any changes in medications including \"over the counter\" medications.     6. DO NOT take NSAID's for pain (i.e, Advil, Aleve, Motrin, ibuprofen, and many more) since these may cause serious problems in those with a history of CHF. If uncertain about the medication, call your doctor.    7. If you have new significant or ongoing diarrhea or vomiting, please call your doctor for further instructions. Taking a diuretic (water pill) with these symptoms can worsen dehydration.     8. If you have any questions or concerns you can always call the CHF  Resource Line( [phone].  It is available Monday thru Friday 8 am- 5 pm. Please leave a message and your call will be returned shortly.     Extra Heart Failure

## 2025-02-13 NOTE — PROGRESS NOTES
V2.0    [facility] [place] Progress Note      Name:  Stacy Ly /Age/Sex: 1945  (79 y.o. female)   MRN & CSN:  [id number] & [id number] Encounter Date/Time: 2025 2:49 PM EST   Location:  [facility]3377/3377-01 PCP: Tarik Hummel, APRN - CNP     Attending:Dora Contreras MD       Hospital Day: 3    Assessment and Recommendations   Stacy Ly is a 79 y.o. female with pmh of A-fib, HTN, depression, GIANNA, interstitial lung disease, chronic hypoxic respiratory failure due to COPD on 2 L/min at baseline, CKD, JUSTINA, heart failure presented to the ED with c/o worsening dyspnea for the last several days.  Workup in the ED showed positive influenza A, and diffuse airspace and interstitial opacities concerning for pulmonary edema.    Plan:     Acute on chronic hypoxic respiratory failure due to COPD & acute on chronic diastolic CHF exacerbation  Respiratory viral panel positive for influenza A.  Chest x-ray with diffuse interstitial opacity.  proBNP 12,827  Complete course of Tamiflu.  Continue Solu-Medrol, DuoNebs, Symbicort, Mucinex and Levaquin.  Wean down supplemental oxygen as tolerated.  Cardiology consult: Continue CHF protocol with p.o. Lasix.    Elevated troponin:   HS-troponin 88, 98.  EKG showed A-fib with RVR but no acute ischemic changes.  Follow-up echocardiogram.     CKD 3: Monitor renal function while on diuresis.  Nephrology consult if worsening.  Avoid nephrotoxins.    Atrial fibrillation: Continue Eliquis and metoprolol.     Hypertension: Monitor BP on metoprolol.  Clonidine and amlodipine on hold.    Hypomagnesemia: Replaced.  Monitor magnesium levels.    Pancytopenia: Workup consistent with iron and B12 deficiency.    Started on replacement.  Monitor CBC closely.      Diet ADULT DIET; Regular; 2000 ml   DVT Prophylaxis [] Lovenox, []  Heparin, [] SCDs, [] Ambulation,  [x] Eliquis, [] Xarelto  [] Coumadin   Code Status Full Code   Disposition From: Home  Expected Disposition: PT OT

## 2025-02-13 NOTE — PROGRESS NOTES
Pt transferred from 3T, Aox4, VSS, 2.5L O2 NC, denies pain, tele box 8888 applied. Pt resting in bed eating dinner. Oriented to room and use of call light. Pt denies needs at this time, call light in reach, bed alarm on for safety.

## 2025-02-13 NOTE — CARE COORDINATION
Discharge Planning:    CM spoke with pt and she is agreeable to SNF.  CM provided list of SNF and pt reports she will let me know options soon.    Update: Pt reports she would like to use [facility] as first choice and [facility] as her second choice.    CM sent the referral to both [facility] and [facility]. CM called Monet with [facility], no answer, LVM with CB information.    [facility] is OON.     Electronically signed by Aleksandr Cade on 2/13/2025 at 11:34 AM  Electronically signed by Aleksandr Cade on 2/13/2025 at 3:24 PM

## 2025-02-13 NOTE — PROGRESS NOTES
Nutrition Education    Provided heart failure diet education.  Education included low sodium diet guidelines (3-4 gm Na+/day) and fluid restriction (64 oz/day).  Reviewed foods to choose and foods to avoid, along with label reading and ways to add flavor to food.  Pt does not currently follow a low sodium diet at home, but reports does not add \"much salt\" to food.  Pt states understanding of education.  Time spent with patient: 5 minutes.     Learners: Patient  Readiness: Acceptance  Method: Explanation and Handout  Response: Needs Reinforcement  Contact name and number provided.    Grace Mejia RD, LD  Contact Number: [phone]

## 2025-02-13 NOTE — PROGRESS NOTES
[facility]  HEART FAILURE  Progress Note      Admit Date 2/11/2025     Reason for Consult:      Reason for Consultation/Chief Complaint: SOB    HPI:    Stacy Ly is a 79 y.o. female with PMH PAF, HTN, GIANNA, ILD, COPD, CKD, HFpEF admitted with SOB, flu +      Subjective:  Patient is being seen for SOB. There were no acute overnight cardiac events.   Today Ms. Ly feels bad, c/o SOB and cough but denies chest pain, palpitations, or dizziness      Wt Readings from Last 3 Encounters:   02/12/25 128.6 kg (283 lb 9.7 oz)   06/06/24 109.3 kg (241 lb)   05/15/23 107.7 kg (237 lb 8 oz)         Cardiac Testing:   Echo:  6/4/24:    Left Ventricle: Normal left ventricular systolic function with a visually estimated EF of 55 - 60%. Left ventricle size is normal. Mild septal thickening.Findings consistent with concentric remodeling. Normal wall motion. Grade II diastolic dysfunction with increased LAP.    Right Ventricle: Right ventricle is mildly dilated.    Tricuspid Valve: Mild regurgitation with multiple jets.    Left Atrium: Left atrium is moderately dilated. Left atrial volume index is moderately increased (42-48 mL/m2).    Image quality is adequate.      NYHA Class III    Objective:   BP (!) 136/102   Pulse 72   Temp 97.5 °F (36.4 °C) (Oral)   Resp 18   Ht 1.702 m (5' 7\")   Wt 128.6 kg (283 lb 9.7 oz)   SpO2 94%   BMI 44.42 kg/m²     Intake/Output Summary (Last 24 hours) at 2/13/2025 0848  Last data filed at 2/12/2025 1423  Gross per 24 hour   Intake 480 ml   Output 1400 ml   Net -920 ml      In: 480 [P.O.:480]  Out: 1400       Physical Exam:  General Appearance:  Well Nourished  Respiratory:  Resp Auscultation: rhonchii  Cardiovascular:  Auscultation: Regular rate and rhythm, normal S1S2, no m/g/r/c  Palpation: Normal    Pedal Pulses: 2+ and equal   Abdomen:  Soft, NT, ND, + bs  Extremities:  No Cyanosis or Clubbing  Extremities: edema in calves  Neurological/Psychiatric:  Oriented to time,

## 2025-02-14 ENCOUNTER — APPOINTMENT (OUTPATIENT)
Age: 80
DRG: 291 | End: 2025-02-14
Attending: INTERNAL MEDICINE
Payer: COMMERCIAL

## 2025-02-14 ENCOUNTER — APPOINTMENT (OUTPATIENT)
Dept: ULTRASOUND IMAGING | Age: 80
DRG: 291 | End: 2025-02-14
Payer: COMMERCIAL

## 2025-02-14 LAB
ALBUMIN SERPL-MCNC: 3.8 G/DL (ref 3.4–5)
ALBUMIN/GLOB SERPL: 1.3 {RATIO} (ref 1.1–2.2)
ALP SERPL-CCNC: 77 U/L (ref 40–129)
ALT SERPL-CCNC: 10 U/L (ref 10–40)
ANION GAP SERPL CALCULATED.3IONS-SCNC: 10 MMOL/L (ref 3–16)
AST SERPL-CCNC: 29 U/L (ref 15–37)
BASOPHILS # BLD: 0 K/UL (ref 0–0.2)
BASOPHILS NFR BLD: 0.1 %
BILIRUB SERPL-MCNC: 0.5 MG/DL (ref 0–1)
BUN SERPL-MCNC: 37 MG/DL (ref 7–20)
CALCIUM SERPL-MCNC: 8.5 MG/DL (ref 8.3–10.6)
CHLORIDE SERPL-SCNC: 102 MMOL/L (ref 99–110)
CO2 SERPL-SCNC: 29 MMOL/L (ref 21–32)
CREAT SERPL-MCNC: 1.5 MG/DL (ref 0.6–1.2)
DEPRECATED RDW RBC AUTO: 16.3 % (ref 12.4–15.4)
ECHO AO ROOT DIAM: 3.4 CM
ECHO AO ROOT INDEX: 1.45 CM/M2
ECHO AV AREA PEAK VELOCITY: 1.6 CM2
ECHO AV AREA VTI: 1.7 CM2
ECHO AV AREA/BSA PEAK VELOCITY: 0.7 CM2/M2
ECHO AV AREA/BSA VTI: 0.7 CM2/M2
ECHO AV CUSP MM: 1.7 CM
ECHO AV MEAN GRADIENT: 4 MMHG
ECHO AV MEAN GRADIENT: 4 MMHG
ECHO AV MEAN VELOCITY: 1 M/S
ECHO AV PEAK GRADIENT: 8 MMHG
ECHO AV PEAK VELOCITY: 1.4 M/S
ECHO AV VELOCITY RATIO: 0.64
ECHO AV VTI: 30.3 CM
ECHO BSA: 2.46 M2
ECHO LA AREA 2C: 30.7 CM2
ECHO LA AREA 4C: 30.5 CM2
ECHO LA DIAMETER INDEX: 2.31 CM/M2
ECHO LA DIAMETER: 5.4 CM
ECHO LA MAJOR AXIS: 7.6 CM
ECHO LA MINOR AXIS: 6.6 CM
ECHO LA TO AORTIC ROOT RATIO: 1.59
ECHO LA VOL BP: 116 ML (ref 22–52)
ECHO LA VOL MOD A2C: 121 ML (ref 22–52)
ECHO LA VOL MOD A4C: 100 ML (ref 22–52)
ECHO LA VOL/BSA BIPLANE: 50 ML/M2 (ref 16–34)
ECHO LA VOLUME INDEX MOD A2C: 52 ML/M2 (ref 16–34)
ECHO LA VOLUME INDEX MOD A4C: 43 ML/M2 (ref 16–34)
ECHO LV E' LATERAL VELOCITY: 12 CM/S
ECHO LV E' SEPTAL VELOCITY: 9.9 CM/S
ECHO LV EDV A2C: 77 ML
ECHO LV EDV A4C: 63 ML
ECHO LV EDV INDEX A4C: 27 ML/M2
ECHO LV EDV NDEX A2C: 33 ML/M2
ECHO LV EJECTION FRACTION A2C: 53 %
ECHO LV EJECTION FRACTION A4C: 63 %
ECHO LV EJECTION FRACTION BIPLANE: 56 % (ref 55–100)
ECHO LV ESV A2C: 37 ML
ECHO LV ESV A4C: 23 ML
ECHO LV ESV INDEX A2C: 16 ML/M2
ECHO LV ESV INDEX A4C: 10 ML/M2
ECHO LV FRACTIONAL SHORTENING: 26 % (ref 28–44)
ECHO LV INTERNAL DIMENSION DIASTOLE INDEX: 1.62 CM/M2
ECHO LV INTERNAL DIMENSION DIASTOLIC: 3.8 CM (ref 3.9–5.3)
ECHO LV INTERNAL DIMENSION SYSTOLIC INDEX: 1.2 CM/M2
ECHO LV INTERNAL DIMENSION SYSTOLIC: 2.8 CM
ECHO LV IVSD: 1.4 CM (ref 0.6–0.9)
ECHO LV MASS 2D: 173.1 G (ref 67–162)
ECHO LV MASS INDEX 2D: 74 G/M2 (ref 43–95)
ECHO LV POSTERIOR WALL DIASTOLIC: 1.2 CM (ref 0.6–0.9)
ECHO LV RELATIVE WALL THICKNESS RATIO: 0.63
ECHO LVOT AREA: 2.5 CM2
ECHO LVOT AV VTI INDEX: 0.68
ECHO LVOT DIAM: 1.8 CM
ECHO LVOT MEAN GRADIENT: 1 MMHG
ECHO LVOT PEAK GRADIENT: 3 MMHG
ECHO LVOT PEAK VELOCITY: 0.9 M/S
ECHO LVOT STROKE VOLUME INDEX: 22.4 ML/M2
ECHO LVOT SV: 52.4 ML
ECHO LVOT VTI: 20.6 CM
ECHO MV A VELOCITY: 0.61 M/S
ECHO MV AREA VTI: 1.8 CM2
ECHO MV E DECELERATION TIME (DT): 137 MS
ECHO MV E VELOCITY: 1.11 M/S
ECHO MV E/A RATIO: 1.82
ECHO MV E/E' LATERAL: 9.25
ECHO MV E/E' RATIO (AVERAGED): 10.23
ECHO MV E/E' SEPTAL: 11.21
ECHO MV LVOT VTI INDEX: 1.45
ECHO MV MAX VELOCITY: 1.2 M/S
ECHO MV MEAN GRADIENT: 2 MMHG
ECHO MV MEAN GRADIENT: 2 MMHG
ECHO MV MEAN VELOCITY: 0.7 M/S
ECHO MV PEAK GRADIENT: 6 MMHG
ECHO MV REGURGITANT PEAK GRADIENT: 34 MMHG
ECHO MV REGURGITANT PEAK VELOCITY: 2.9 M/S
ECHO MV VTI: 29.8 CM
ECHO PULMONARY ARTERY END DIASTOLIC PRESSURE: 5 MMHG
ECHO PV MAX VELOCITY: 0.9 M/S
ECHO PV MEAN GRADIENT: 2 MMHG
ECHO PV MEAN VELOCITY: 0.6 M/S
ECHO PV PEAK GRADIENT: 3 MMHG
ECHO PV REGURGITANT MAX VELOCITY: 1.2 M/S
ECHO PV VTI: 18.6 CM
ECHO RA AREA 4C: 28.8 CM2
ECHO RA END SYSTOLIC VOLUME APICAL 4 CHAMBER INDEX BSA: 46 ML/M2
ECHO RA VOLUME: 107 ML
ECHO RV BASAL DIMENSION: 4.2 CM
ECHO RV FREE WALL PEAK S': 10.6 CM/S
ECHO RV LONGITUDINAL DIMENSION: 6.2 CM
ECHO RV MID DIMENSION: 3.7 CM
ECHO RV TAPSE: 1.8 CM (ref 1.7–?)
ECHO TV REGURGITANT MAX VELOCITY: 3.1 M/S
ECHO TV REGURGITANT PEAK GRADIENT: 38 MMHG
ECHO TV REGURGITANT VTI: 102 CM
EOSINOPHIL # BLD: 0 K/UL (ref 0–0.6)
EOSINOPHIL NFR BLD: 0 %
GFR SERPLBLD CREATININE-BSD FMLA CKD-EPI: 36 ML/MIN/{1.73_M2}
GLUCOSE SERPL-MCNC: 150 MG/DL (ref 70–99)
HCT VFR BLD AUTO: 34.7 % (ref 36–48)
HGB BLD-MCNC: 11.2 G/DL (ref 12–16)
LYMPHOCYTES # BLD: 0.6 K/UL (ref 1–5.1)
LYMPHOCYTES NFR BLD: 11.8 %
MAGNESIUM SERPL-MCNC: 2.06 MG/DL (ref 1.8–2.4)
MCH RBC QN AUTO: 28.7 PG (ref 26–34)
MCHC RBC AUTO-ENTMCNC: 32.2 G/DL (ref 31–36)
MCV RBC AUTO: 89 FL (ref 80–100)
MONOCYTES # BLD: 0.4 K/UL (ref 0–1.3)
MONOCYTES NFR BLD: 7.8 %
NEUTROPHILS # BLD: 4.3 K/UL (ref 1.7–7.7)
NEUTROPHILS NFR BLD: 80.3 %
NT-PROBNP SERPL-MCNC: 9629 PG/ML (ref 0–449)
PLATELET # BLD AUTO: 145 K/UL (ref 135–450)
PMV BLD AUTO: 10.2 FL (ref 5–10.5)
POTASSIUM SERPL-SCNC: 4.3 MMOL/L (ref 3.5–5.1)
PROT SERPL-MCNC: 6.8 G/DL (ref 6.4–8.2)
RBC # BLD AUTO: 3.9 M/UL (ref 4–5.2)
SODIUM SERPL-SCNC: 141 MMOL/L (ref 136–145)
WBC # BLD AUTO: 5.3 K/UL (ref 4–11)

## 2025-02-14 PROCEDURE — 6370000000 HC RX 637 (ALT 250 FOR IP): Performed by: INTERNAL MEDICINE

## 2025-02-14 PROCEDURE — 94761 N-INVAS EAR/PLS OXIMETRY MLT: CPT

## 2025-02-14 PROCEDURE — 83880 ASSAY OF NATRIURETIC PEPTIDE: CPT

## 2025-02-14 PROCEDURE — 2500000003 HC RX 250 WO HCPCS: Performed by: INTERNAL MEDICINE

## 2025-02-14 PROCEDURE — 2580000003 HC RX 258: Performed by: INTERNAL MEDICINE

## 2025-02-14 PROCEDURE — 93306 TTE W/DOPPLER COMPLETE: CPT | Performed by: INTERNAL MEDICINE

## 2025-02-14 PROCEDURE — 6360000002 HC RX W HCPCS: Performed by: INTERNAL MEDICINE

## 2025-02-14 PROCEDURE — 6370000000 HC RX 637 (ALT 250 FOR IP): Performed by: STUDENT IN AN ORGANIZED HEALTH CARE EDUCATION/TRAINING PROGRAM

## 2025-02-14 PROCEDURE — 93306 TTE W/DOPPLER COMPLETE: CPT

## 2025-02-14 PROCEDURE — 2500000003 HC RX 250 WO HCPCS: Performed by: STUDENT IN AN ORGANIZED HEALTH CARE EDUCATION/TRAINING PROGRAM

## 2025-02-14 PROCEDURE — 36415 COLL VENOUS BLD VENIPUNCTURE: CPT

## 2025-02-14 PROCEDURE — 76770 US EXAM ABDO BACK WALL COMP: CPT

## 2025-02-14 PROCEDURE — 2700000000 HC OXYGEN THERAPY PER DAY

## 2025-02-14 PROCEDURE — 80053 COMPREHEN METABOLIC PANEL: CPT

## 2025-02-14 PROCEDURE — 94640 AIRWAY INHALATION TREATMENT: CPT

## 2025-02-14 PROCEDURE — 6360000002 HC RX W HCPCS: Performed by: HOSPITALIST

## 2025-02-14 PROCEDURE — 85025 COMPLETE CBC W/AUTO DIFF WBC: CPT

## 2025-02-14 PROCEDURE — 1200000000 HC SEMI PRIVATE

## 2025-02-14 PROCEDURE — 83735 ASSAY OF MAGNESIUM: CPT

## 2025-02-14 RX ORDER — FUROSEMIDE 10 MG/ML
40 INJECTION INTRAMUSCULAR; INTRAVENOUS DAILY
Status: DISCONTINUED | OUTPATIENT
Start: 2025-02-14 | End: 2025-02-15

## 2025-02-14 RX ORDER — LANOLIN ALCOHOL/MO/W.PET/CERES
1000 CREAM (GRAM) TOPICAL DAILY
Status: DISCONTINUED | OUTPATIENT
Start: 2025-02-14 | End: 2025-02-19 | Stop reason: HOSPADM

## 2025-02-14 RX ADMIN — METOPROLOL TARTRATE 75 MG: 25 TABLET, FILM COATED ORAL at 08:38

## 2025-02-14 RX ADMIN — GUAIFENESIN 1200 MG: 600 TABLET, MULTILAYER, EXTENDED RELEASE ORAL at 08:38

## 2025-02-14 RX ADMIN — IPRATROPIUM BROMIDE AND ALBUTEROL SULFATE 1 DOSE: .5; 3 SOLUTION RESPIRATORY (INHALATION) at 07:30

## 2025-02-14 RX ADMIN — IRON SUCROSE 200 MG: 20 INJECTION, SOLUTION INTRAVENOUS at 15:18

## 2025-02-14 RX ADMIN — APIXABAN 5 MG: 5 TABLET, FILM COATED ORAL at 08:39

## 2025-02-14 RX ADMIN — APIXABAN 5 MG: 5 TABLET, FILM COATED ORAL at 20:20

## 2025-02-14 RX ADMIN — CLONIDINE HYDROCHLORIDE 0.1 MG: 0.1 TABLET ORAL at 20:20

## 2025-02-14 RX ADMIN — WATER 40 MG: 1 INJECTION INTRAMUSCULAR; INTRAVENOUS; SUBCUTANEOUS at 11:39

## 2025-02-14 RX ADMIN — OSELTAMIVIR PHOSPHATE 30 MG: 30 CAPSULE ORAL at 20:21

## 2025-02-14 RX ADMIN — IPRATROPIUM BROMIDE AND ALBUTEROL SULFATE 1 DOSE: .5; 3 SOLUTION RESPIRATORY (INHALATION) at 16:25

## 2025-02-14 RX ADMIN — FUROSEMIDE 20 MG: 20 TABLET ORAL at 08:38

## 2025-02-14 RX ADMIN — METOPROLOL TARTRATE 75 MG: 25 TABLET, FILM COATED ORAL at 20:21

## 2025-02-14 RX ADMIN — SODIUM CHLORIDE, PRESERVATIVE FREE 10 ML: 5 INJECTION INTRAVENOUS at 20:21

## 2025-02-14 RX ADMIN — OSELTAMIVIR PHOSPHATE 30 MG: 30 CAPSULE ORAL at 08:38

## 2025-02-14 RX ADMIN — GUAIFENESIN 1200 MG: 600 TABLET, MULTILAYER, EXTENDED RELEASE ORAL at 20:20

## 2025-02-14 RX ADMIN — Medication 2 PUFF: at 07:31

## 2025-02-14 RX ADMIN — SODIUM CHLORIDE, PRESERVATIVE FREE 10 ML: 5 INJECTION INTRAVENOUS at 08:39

## 2025-02-14 RX ADMIN — IPRATROPIUM BROMIDE AND ALBUTEROL SULFATE 1 DOSE: .5; 3 SOLUTION RESPIRATORY (INHALATION) at 11:54

## 2025-02-14 RX ADMIN — Medication 2 PUFF: at 20:54

## 2025-02-14 RX ADMIN — AMLODIPINE BESYLATE 10 MG: 5 TABLET ORAL at 08:38

## 2025-02-14 RX ADMIN — IPRATROPIUM BROMIDE AND ALBUTEROL SULFATE 1 DOSE: .5; 3 SOLUTION RESPIRATORY (INHALATION) at 20:54

## 2025-02-14 RX ADMIN — LEVOFLOXACIN 250 MG: 500 TABLET, FILM COATED ORAL at 08:40

## 2025-02-14 RX ADMIN — FUROSEMIDE 40 MG: 10 INJECTION, SOLUTION INTRAMUSCULAR; INTRAVENOUS at 18:26

## 2025-02-14 RX ADMIN — CYANOCOBALAMIN TAB 1000 MCG 1000 MCG: 1000 TAB at 15:15

## 2025-02-14 NOTE — CARE COORDINATION
Discharge Planning:    PRISCILA received a call from Monet with [facility] and she confirmed they are out of network with insurance.    Electronically signed by Aleksandr Cade on 2/14/2025 at 8:18 AM

## 2025-02-14 NOTE — PLAN OF CARE
Problem: Chronic Conditions and Co-morbidities  Goal: Patient's chronic conditions and co-morbidity symptoms are monitored and maintained or improved  2/13/2025 2308 by Melissa Lang RN  Outcome: Progressing       Problem: Discharge Planning  Goal: Discharge to home or other facility with appropriate resources  2/13/2025 2308 by Melissa Lang RN  Outcome: Progressing       Problem: Skin/Tissue Integrity  Goal: Skin integrity remains intact  Description: 1.  Monitor for areas of redness and/or skin breakdown  2.  Assess vascular access sites hourly  3.  Every 4-6 hours minimum:  Change oxygen saturation probe site  4.  Every 4-6 hours:  If on nasal continuous positive airway pressure, respiratory therapy assess nares and determine need for appliance change or resting period  2/13/2025 2308 by Melissa Lang RN  Outcome: Progressing       Problem: Safety - Adult  Goal: Free from fall injury  2/13/2025 2308 by Melissa Lang RN  Outcome: Progressing       Problem: Genitourinary - Adult  Goal: Absence of urinary retention  2/13/2025 2308 by Melissa Lang RN  Outcome: Progressing

## 2025-02-14 NOTE — PROGRESS NOTES
02/13/25 2125   RT Protocol   History Pulmonary Disease 0   Respiratory pattern 0   Breath sounds 4   Cough 1   Bronchodilator Assessment Score 5

## 2025-02-14 NOTE — PROGRESS NOTES
V2.0    [facility] [place] Progress Note      Name:  Stacy Ly /Age/Sex: 1945  (79 y.o. female)   MRN & CSN:  [id number] & [id number] Encounter Date/Time: 2025 2:49 PM EST   Location:  [facility]-5578/5578-01 PCP: Tarik Hummel, APRN - CNP     Attending:Dora Contreras MD       Hospital Day: 4    Assessment and Recommendations   Stacy Ly is a 79 y.o. female with pmh of A-fib, HTN, depression, GIANNA, interstitial lung disease, chronic hypoxic respiratory failure due to COPD on 2 L/min at baseline, CKD, JUSTINA, heart failure presented to the ED with c/o worsening dyspnea for the last several days.  Workup in the ED showed positive influenza A, and diffuse airspace and interstitial opacities concerning for pulmonary edema.    Plan:     Acute on chronic hypoxic respiratory failure due to COPD & acute on chronic diastolic CHF exacerbation  Respiratory viral panel positive for influenza A.  Chest x-ray with diffuse interstitial opacity.  proBNP 12,827  Complete course of Tamiflu.  Continue Solu-Medrol, DuoNebs, Symbicort, Mucinex and Levaquin.  Wean down supplemental oxygen as tolerated.  Cardiology consult: Continue CHF protocol with p.o. Lasix.    Elevated troponin:   HS-troponin 88, 98.  EKG showed A-fib with RVR but no acute ischemic changes.  Follow-up echocardiogram.     CKD 3: Monitor renal function while on diuresis.  Nephrology consult if worsening.  Avoid nephrotoxins.    Atrial fibrillation: Continue Eliquis and metoprolol.     Hypertension: Monitor BP on metoprolol.  Clonidine and amlodipine on hold.    Hypomagnesemia: Replaced.  Monitor magnesium levels.    Pancytopenia: Workup consistent with iron and B12 deficiency.    Started on replacement.  Monitor CBC closely.      Diet ADULT DIET; Regular; No Added Salt (3-4 gm); 2000 ml   DVT Prophylaxis [] Lovenox, []  Heparin, [] SCDs, [] Ambulation,  [x] Eliquis, [] Xarelto  [] Coumadin   Code Status Full Code   Disposition From: Home  Expected

## 2025-02-14 NOTE — CARE COORDINATION
Discharge Planning:     (CM) discussed alternate SNF options with the patient. Patient stated she had no preference and CM could send referrals out.   CM sent referrals to the following:    [facility]-Waiting on a response    [facility]-ACCEPTED.     Electronically signed by FELIPA Frank on 2/14/2025 at 2:38 PM       [facility] accepted the patient and the patient is agreeable. Patient does not need a pre-cert and can go when medically ready.    Electronically signed by FELIPA Frank on 2/14/2025 at 4:03 PM

## 2025-02-15 PROBLEM — J45.909 ASTHMATIC BRONCHITIS: Status: ACTIVE | Noted: 2025-02-15

## 2025-02-15 PROBLEM — N17.9 AKI (ACUTE KIDNEY INJURY): Status: ACTIVE | Noted: 2025-02-15

## 2025-02-15 PROBLEM — J96.02 ACUTE RESPIRATORY FAILURE WITH HYPOXIA AND HYPERCARBIA (HCC): Status: ACTIVE | Noted: 2022-09-13

## 2025-02-15 PROBLEM — I50.9 ACUTE ON CHRONIC CONGESTIVE HEART FAILURE (HCC): Status: ACTIVE | Noted: 2025-02-15

## 2025-02-15 PROBLEM — J81.0 ACUTE PULMONARY EDEMA (HCC): Status: ACTIVE | Noted: 2025-02-15

## 2025-02-15 LAB
ALBUMIN SERPL-MCNC: 3.5 G/DL (ref 3.4–5)
ANION GAP SERPL CALCULATED.3IONS-SCNC: 12 MMOL/L (ref 3–16)
BASE EXCESS BLDA CALC-SCNC: 10.1 MMOL/L (ref -3–3)
BASE EXCESS BLDA CALC-SCNC: 11.1 MMOL/L (ref -3–3)
BUN SERPL-MCNC: 33 MG/DL (ref 7–20)
CALCIUM SERPL-MCNC: 8.1 MG/DL (ref 8.3–10.6)
CHLORIDE SERPL-SCNC: 101 MMOL/L (ref 99–110)
CO2 BLDA-SCNC: 88.6 MMOL/L
CO2 BLDA-SCNC: 91.9 MMOL/L
CO2 SERPL-SCNC: 30 MMOL/L (ref 21–32)
COHGB MFR BLDA: 0.6 % (ref 0–1.5)
COHGB MFR BLDA: 1.5 % (ref 0–1.5)
CREAT SERPL-MCNC: 1.4 MG/DL (ref 0.6–1.2)
GFR SERPLBLD CREATININE-BSD FMLA CKD-EPI: 39 ML/MIN/{1.73_M2}
GLUCOSE SERPL-MCNC: 96 MG/DL (ref 70–99)
HCO3 BLDA-SCNC: 37.7 MMOL/L (ref 21–29)
HCO3 BLDA-SCNC: 38.7 MMOL/L (ref 21–29)
HGB BLDA-MCNC: 10.7 G/DL (ref 12–16)
HGB BLDA-MCNC: 11.3 G/DL (ref 12–16)
MAGNESIUM SERPL-MCNC: 1.93 MG/DL (ref 1.8–2.4)
METHGB MFR BLDA: 0.5 %
METHGB MFR BLDA: 0.9 %
O2 THERAPY: ABNORMAL
O2 THERAPY: ABNORMAL
PCO2 BLDA: 60 MMHG (ref 35–45)
PCO2 BLDA: 75.3 MMHG (ref 35–45)
PH BLDA: 7.32 [PH] (ref 7.35–7.45)
PH BLDA: 7.41 [PH] (ref 7.35–7.45)
PHOSPHATE SERPL-MCNC: 2.8 MG/DL (ref 2.5–4.9)
PO2 BLDA: 131 MMHG (ref 75–108)
PO2 BLDA: 37.7 MMHG (ref 75–108)
POTASSIUM SERPL-SCNC: 3.8 MMOL/L (ref 3.5–5.1)
SAO2 % BLDA: 66.5 %
SAO2 % BLDA: 98.3 %
SODIUM SERPL-SCNC: 143 MMOL/L (ref 136–145)

## 2025-02-15 PROCEDURE — 2700000000 HC OXYGEN THERAPY PER DAY

## 2025-02-15 PROCEDURE — 99223 1ST HOSP IP/OBS HIGH 75: CPT | Performed by: INTERNAL MEDICINE

## 2025-02-15 PROCEDURE — 80069 RENAL FUNCTION PANEL: CPT

## 2025-02-15 PROCEDURE — 6360000002 HC RX W HCPCS: Performed by: INTERNAL MEDICINE

## 2025-02-15 PROCEDURE — 5A09457 ASSISTANCE WITH RESPIRATORY VENTILATION, 24-96 CONSECUTIVE HOURS, CONTINUOUS POSITIVE AIRWAY PRESSURE: ICD-10-PCS | Performed by: STUDENT IN AN ORGANIZED HEALTH CARE EDUCATION/TRAINING PROGRAM

## 2025-02-15 PROCEDURE — 6370000000 HC RX 637 (ALT 250 FOR IP): Performed by: INTERNAL MEDICINE

## 2025-02-15 PROCEDURE — 6370000000 HC RX 637 (ALT 250 FOR IP): Performed by: STUDENT IN AN ORGANIZED HEALTH CARE EDUCATION/TRAINING PROGRAM

## 2025-02-15 PROCEDURE — 2580000003 HC RX 258: Performed by: INTERNAL MEDICINE

## 2025-02-15 PROCEDURE — 82570 ASSAY OF URINE CREATININE: CPT

## 2025-02-15 PROCEDURE — 94660 CPAP INITIATION&MGMT: CPT

## 2025-02-15 PROCEDURE — 94761 N-INVAS EAR/PLS OXIMETRY MLT: CPT

## 2025-02-15 PROCEDURE — 36600 WITHDRAWAL OF ARTERIAL BLOOD: CPT

## 2025-02-15 PROCEDURE — 1200000000 HC SEMI PRIVATE

## 2025-02-15 PROCEDURE — 83735 ASSAY OF MAGNESIUM: CPT

## 2025-02-15 PROCEDURE — 82043 UR ALBUMIN QUANTITATIVE: CPT

## 2025-02-15 PROCEDURE — 6360000002 HC RX W HCPCS: Performed by: HOSPITALIST

## 2025-02-15 PROCEDURE — 82803 BLOOD GASES ANY COMBINATION: CPT

## 2025-02-15 PROCEDURE — 2500000003 HC RX 250 WO HCPCS: Performed by: STUDENT IN AN ORGANIZED HEALTH CARE EDUCATION/TRAINING PROGRAM

## 2025-02-15 PROCEDURE — 94640 AIRWAY INHALATION TREATMENT: CPT

## 2025-02-15 PROCEDURE — 2500000003 HC RX 250 WO HCPCS: Performed by: INTERNAL MEDICINE

## 2025-02-15 PROCEDURE — 36415 COLL VENOUS BLD VENIPUNCTURE: CPT

## 2025-02-15 RX ORDER — FUROSEMIDE 10 MG/ML
40 INJECTION INTRAMUSCULAR; INTRAVENOUS 2 TIMES DAILY
Status: DISCONTINUED | OUTPATIENT
Start: 2025-02-16 | End: 2025-02-16

## 2025-02-15 RX ADMIN — Medication 2 PUFF: at 07:36

## 2025-02-15 RX ADMIN — AMLODIPINE BESYLATE 10 MG: 5 TABLET ORAL at 11:21

## 2025-02-15 RX ADMIN — IRON SUCROSE 200 MG: 20 INJECTION, SOLUTION INTRAVENOUS at 15:57

## 2025-02-15 RX ADMIN — OSELTAMIVIR PHOSPHATE 30 MG: 30 CAPSULE ORAL at 20:17

## 2025-02-15 RX ADMIN — METOPROLOL TARTRATE 75 MG: 25 TABLET, FILM COATED ORAL at 20:17

## 2025-02-15 RX ADMIN — Medication 2 PUFF: at 18:06

## 2025-02-15 RX ADMIN — WATER 40 MG: 1 INJECTION INTRAMUSCULAR; INTRAVENOUS; SUBCUTANEOUS at 01:06

## 2025-02-15 RX ADMIN — IPRATROPIUM BROMIDE AND ALBUTEROL SULFATE 1 DOSE: .5; 3 SOLUTION RESPIRATORY (INHALATION) at 11:47

## 2025-02-15 RX ADMIN — SODIUM CHLORIDE, PRESERVATIVE FREE 10 ML: 5 INJECTION INTRAVENOUS at 20:17

## 2025-02-15 RX ADMIN — SODIUM CHLORIDE, PRESERVATIVE FREE 10 ML: 5 INJECTION INTRAVENOUS at 11:22

## 2025-02-15 RX ADMIN — GUAIFENESIN 1200 MG: 600 TABLET, MULTILAYER, EXTENDED RELEASE ORAL at 11:22

## 2025-02-15 RX ADMIN — APIXABAN 5 MG: 5 TABLET, FILM COATED ORAL at 20:17

## 2025-02-15 RX ADMIN — CLONIDINE HYDROCHLORIDE 0.1 MG: 0.1 TABLET ORAL at 20:17

## 2025-02-15 RX ADMIN — IPRATROPIUM BROMIDE AND ALBUTEROL SULFATE 1 DOSE: .5; 3 SOLUTION RESPIRATORY (INHALATION) at 18:06

## 2025-02-15 RX ADMIN — IPRATROPIUM BROMIDE AND ALBUTEROL SULFATE 1 DOSE: .5; 3 SOLUTION RESPIRATORY (INHALATION) at 16:21

## 2025-02-15 RX ADMIN — GUAIFENESIN 1200 MG: 600 TABLET, MULTILAYER, EXTENDED RELEASE ORAL at 20:17

## 2025-02-15 RX ADMIN — APIXABAN 5 MG: 5 TABLET, FILM COATED ORAL at 11:22

## 2025-02-15 RX ADMIN — LEVOFLOXACIN 250 MG: 500 TABLET, FILM COATED ORAL at 11:21

## 2025-02-15 RX ADMIN — OSELTAMIVIR PHOSPHATE 30 MG: 30 CAPSULE ORAL at 15:59

## 2025-02-15 RX ADMIN — WATER 40 MG: 1 INJECTION INTRAMUSCULAR; INTRAVENOUS; SUBCUTANEOUS at 22:50

## 2025-02-15 RX ADMIN — CLONIDINE HYDROCHLORIDE 0.1 MG: 0.1 TABLET ORAL at 11:22

## 2025-02-15 RX ADMIN — IPRATROPIUM BROMIDE AND ALBUTEROL SULFATE 1 DOSE: .5; 3 SOLUTION RESPIRATORY (INHALATION) at 07:36

## 2025-02-15 RX ADMIN — CYANOCOBALAMIN TAB 1000 MCG 1000 MCG: 1000 TAB at 11:21

## 2025-02-15 RX ADMIN — METOPROLOL TARTRATE 75 MG: 25 TABLET, FILM COATED ORAL at 11:22

## 2025-02-15 RX ADMIN — FUROSEMIDE 40 MG: 10 INJECTION, SOLUTION INTRAMUSCULAR; INTRAVENOUS at 12:12

## 2025-02-15 RX ADMIN — WATER 40 MG: 1 INJECTION INTRAMUSCULAR; INTRAVENOUS; SUBCUTANEOUS at 12:12

## 2025-02-15 NOTE — PROGRESS NOTES
Repeat ABG shows improved hypercapnia with compensated pH. Pt is now more alert as well. Pt is off of BiPAP now. Will continue to use BiPAP @ HS and Naps     Electronically signed by LIZBETH Tatum RCP on 2/15/25 at 4:25 PM EST

## 2025-02-15 NOTE — PROGRESS NOTES
V2.0    [facility] [place] Progress Note      Name:  Stacy Ly /Age/Sex: 1945  (79 y.o. female)   MRN & CSN:  [id number] & [id number] Encounter Date/Time: 2/15/2025 2:49 PM EST   Location:  5T-5578/5578-01 PCP: Tarik Hummel, APRN - CNP     Attending:Dora Contreras MD       Hospital Day: 5    Assessment and Recommendations   Stacy Ly is a 79 y.o. female with pmh of A-fib, HTN, depression, GIANNA, interstitial lung disease, chronic hypoxic respiratory failure due to COPD on 2 L/min at baseline, CKD, JUSTINA, heart failure presented to the ED with c/o worsening dyspnea for the last several days.  Workup in the ED showed positive influenza A, and diffuse airspace and interstitial opacities concerning for pulmonary edema.    Plan:     Acute on chronic hypoxic respiratory failure due to COPD & acute on chronic diastolic CHF exacerbation  Respiratory viral panel positive for influenza A.  Chest x-ray with diffuse interstitial opacity.  proBNP 12,827  Complete course of Tamiflu.  Continue Solu-Medrol, DuoNebs, Symbicort, Mucinex and Levaquin.  Wean down supplemental oxygen as tolerated.  Cardiology consulted: Continue CHF protocol with IV Lasix.    Elevated troponin:   HS-troponin 88, 98.  EKG showed A-fib with RVR but no acute ischemic changes.  TTE 2025: LVEF 55 to 60%.  Normal WMA.  RV dilated with low normal systolic function.  Moderate MR, moderate TR.  LA and RA severely dilated.    JUSTINA on CKD 3: Monitor renal function while on diuresis.  Nephrology consult appreciated.  Avoid nephrotoxins.    Atrial fibrillation: Continue Eliquis and metoprolol.     Hypertension: Monitor BP on metoprolol.  Clonidine and amlodipine on hold.    Hypomagnesemia: Replaced.  Monitor magnesium levels.    Pancytopenia: Workup consistent with iron and B12 deficiency.    Started on replacement.  Monitor CBC closely.      Diet ADULT DIET; Regular; No Added Salt (3-4 gm); 2000 ml   DVT Prophylaxis [] Lovenox, []  Heparin, []

## 2025-02-15 NOTE — PROGRESS NOTES
Nephrology Consult Note                                                                                                                                                                                                                                                                                                                                                               Office : [phone]     Fax :[phone]    Patient's Name: Stacy Ly  5:51 PM  2/15/2025    Reason for Consult:  JUSTINA  Requesting Physician:  Tarik Hummel, MARILEE - ROBBY  Chief Complaint:    Chief Complaint   Patient presents with    Shortness of Breath     Pt to ED via [facility] from home with c/o shortness of breath that started a couple days ago. EMS reports pt was satting in the low 70s on their arrival, received 2 breathing treatments en route. Pt states she wears 2L at baseline.        Assessment/Plan     # JUSTINA  - Cardiorenal   - BNP elevated , CXR congested   - Grade II diastolic dysfunction on echo   - Start IV Lasix and titrate for urine output --. 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 female with A-fib, hypertension, depression, GIANNA, interstitial lung disease, COPD, CKD, JUSTINA, heart failure presented to the emergency department chief complaint of dyspnea.     Patient states that she has had worsening dyspnea for the last several days. She states that she has been compliant with her 2 L of O2 at baseline and has not gotten any better. Patient states that she has had COPD exacerbations in the past however states that this feels different. Patient denies headache or dizziness. Denies

## 2025-02-15 NOTE — PROGRESS NOTES
Nephrology Note                                                                                                                                                                                                                                                                                                                                                               Office : [phone]     Fax :[phone]    Patient's Name: Stacy Ly  9:11 AM  2/15/2025    Reason for Consult:  JUSTINA  Requesting Physician:  Tarik Hummel, MARILEE - CNP  Chief Complaint:    Chief Complaint   Patient presents with    Shortness of Breath     Pt to ED via  ems from home with c/o shortness of breath that started a couple days ago. EMS reports pt was satting in the low 70s on their arrival, received 2 breathing treatments en route. Pt states she wears 2L at baseline.        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 female with A-fib, hypertension, depression, GIANNA, interstitial lung disease, COPD, CKD, JUSTINA, heart failure presented to the emergency department chief complaint of dyspnea.     Patient states that she has had worsening dyspnea for the last several days. She states that she has been compliant with her 2 L of O2 at baseline and has not gotten any better. Patient states that she has had COPD exacerbations in the

## 2025-02-15 NOTE — PLAN OF CARE
Problem: Chronic Conditions and Co-morbidities  Goal: Patient's chronic conditions and co-morbidity symptoms are monitored and maintained or improved  Outcome: Progressing     Problem: Discharge Planning  Goal: Discharge to home or other facility with appropriate resources  Outcome: Progressing     Problem: Skin/Tissue Integrity  Goal: Skin integrity remains intact  Description: 1.  Monitor for areas of redness and/or skin breakdown  2.  Assess vascular access sites hourly  3.  Every 4-6 hours minimum:  Change oxygen saturation probe site  4.  Every 4-6 hours:  If on nasal continuous positive airway pressure, respiratory therapy assess nares and determine need for appliance change or resting period  Outcome: Progressing     Problem: Safety - Adult  Goal: Free from fall injury  Outcome: Progressing     Problem: Genitourinary - Adult  Goal: Absence of urinary retention  Outcome: Progressing     Problem: Pain  Goal: Verbalizes/displays adequate comfort level or baseline comfort level  Outcome: Progressing     Problem: ABCDS Injury Assessment  Goal: Absence of physical injury  Outcome: Progressing

## 2025-02-15 NOTE — PROGRESS NOTES
Patient is now on BiPAP for hypercapnia. Tolerating 16/6/50%. Will repeat ABG in an hour. RT will continue to follow.     Electronically signed by LIZBETH Tatum RCP on 2/15/25 at 12:33 PM EST

## 2025-02-16 ENCOUNTER — APPOINTMENT (OUTPATIENT)
Dept: GENERAL RADIOLOGY | Age: 80
DRG: 291 | End: 2025-02-16
Payer: COMMERCIAL

## 2025-02-16 LAB
ALBUMIN SERPL-MCNC: 3.2 G/DL (ref 3.4–5)
ANION GAP SERPL CALCULATED.3IONS-SCNC: 9 MMOL/L (ref 3–16)
BASE EXCESS BLDA CALC-SCNC: 11.9 MMOL/L (ref -3–3)
BUN SERPL-MCNC: 30 MG/DL (ref 7–20)
CALCIUM SERPL-MCNC: 8 MG/DL (ref 8.3–10.6)
CHLORIDE SERPL-SCNC: 100 MMOL/L (ref 99–110)
CO2 BLDA-SCNC: 89.5 MMOL/L
CO2 SERPL-SCNC: 31 MMOL/L (ref 21–32)
COHGB MFR BLDA: 1.4 % (ref 0–1.5)
CREAT SERPL-MCNC: 1.4 MG/DL (ref 0.6–1.2)
CREAT UR-MCNC: 19.2 MG/DL (ref 28–259)
GFR SERPLBLD CREATININE-BSD FMLA CKD-EPI: 39 ML/MIN/{1.73_M2}
GLUCOSE SERPL-MCNC: 183 MG/DL (ref 70–99)
HCO3 BLDA-SCNC: 38.2 MMOL/L (ref 21–29)
HGB BLDA-MCNC: 10.9 G/DL (ref 12–16)
MAGNESIUM SERPL-MCNC: 1.89 MG/DL (ref 1.8–2.4)
METHGB MFR BLDA: 0.9 %
MICROALBUMIN UR DL<=1MG/L-MCNC: <1.2 MG/DL
MICROALBUMIN/CREAT UR: ABNORMAL MG/G (ref 0–30)
NT-PROBNP SERPL-MCNC: 7073 PG/ML (ref 0–449)
O2 THERAPY: ABNORMAL
PCO2 BLDA: 58 MMHG (ref 35–45)
PH BLDA: 7.43 [PH] (ref 7.35–7.45)
PHOSPHATE SERPL-MCNC: 3.1 MG/DL (ref 2.5–4.9)
PO2 BLDA: 51.6 MMHG (ref 75–108)
POTASSIUM SERPL-SCNC: 4.1 MMOL/L (ref 3.5–5.1)
SAO2 % BLDA: 87.7 %
SODIUM SERPL-SCNC: 140 MMOL/L (ref 136–145)

## 2025-02-16 PROCEDURE — 80069 RENAL FUNCTION PANEL: CPT

## 2025-02-16 PROCEDURE — 6370000000 HC RX 637 (ALT 250 FOR IP): Performed by: INTERNAL MEDICINE

## 2025-02-16 PROCEDURE — 99232 SBSQ HOSP IP/OBS MODERATE 35: CPT | Performed by: INTERNAL MEDICINE

## 2025-02-16 PROCEDURE — 94660 CPAP INITIATION&MGMT: CPT

## 2025-02-16 PROCEDURE — 2580000003 HC RX 258: Performed by: INTERNAL MEDICINE

## 2025-02-16 PROCEDURE — 2500000003 HC RX 250 WO HCPCS: Performed by: STUDENT IN AN ORGANIZED HEALTH CARE EDUCATION/TRAINING PROGRAM

## 2025-02-16 PROCEDURE — 94640 AIRWAY INHALATION TREATMENT: CPT

## 2025-02-16 PROCEDURE — 2700000000 HC OXYGEN THERAPY PER DAY

## 2025-02-16 PROCEDURE — 82803 BLOOD GASES ANY COMBINATION: CPT

## 2025-02-16 PROCEDURE — 1200000000 HC SEMI PRIVATE

## 2025-02-16 PROCEDURE — 2500000003 HC RX 250 WO HCPCS: Performed by: INTERNAL MEDICINE

## 2025-02-16 PROCEDURE — 36415 COLL VENOUS BLD VENIPUNCTURE: CPT

## 2025-02-16 PROCEDURE — 36600 WITHDRAWAL OF ARTERIAL BLOOD: CPT

## 2025-02-16 PROCEDURE — 6370000000 HC RX 637 (ALT 250 FOR IP): Performed by: STUDENT IN AN ORGANIZED HEALTH CARE EDUCATION/TRAINING PROGRAM

## 2025-02-16 PROCEDURE — 94761 N-INVAS EAR/PLS OXIMETRY MLT: CPT

## 2025-02-16 PROCEDURE — 83735 ASSAY OF MAGNESIUM: CPT

## 2025-02-16 PROCEDURE — 6360000002 HC RX W HCPCS: Performed by: INTERNAL MEDICINE

## 2025-02-16 PROCEDURE — 83880 ASSAY OF NATRIURETIC PEPTIDE: CPT

## 2025-02-16 PROCEDURE — 71045 X-RAY EXAM CHEST 1 VIEW: CPT

## 2025-02-16 RX ORDER — FUROSEMIDE 10 MG/ML
60 INJECTION INTRAMUSCULAR; INTRAVENOUS 2 TIMES DAILY
Status: DISCONTINUED | OUTPATIENT
Start: 2025-02-17 | End: 2025-02-19

## 2025-02-16 RX ADMIN — Medication 2 PUFF: at 19:41

## 2025-02-16 RX ADMIN — FUROSEMIDE 40 MG: 10 INJECTION, SOLUTION INTRAMUSCULAR; INTRAVENOUS at 18:02

## 2025-02-16 RX ADMIN — IRON SUCROSE 200 MG: 20 INJECTION, SOLUTION INTRAVENOUS at 17:52

## 2025-02-16 RX ADMIN — GUAIFENESIN 1200 MG: 600 TABLET, MULTILAYER, EXTENDED RELEASE ORAL at 10:21

## 2025-02-16 RX ADMIN — METOPROLOL TARTRATE 75 MG: 25 TABLET, FILM COATED ORAL at 10:21

## 2025-02-16 RX ADMIN — IPRATROPIUM BROMIDE AND ALBUTEROL SULFATE 1 DOSE: .5; 3 SOLUTION RESPIRATORY (INHALATION) at 19:39

## 2025-02-16 RX ADMIN — CYANOCOBALAMIN TAB 1000 MCG 1000 MCG: 1000 TAB at 10:21

## 2025-02-16 RX ADMIN — LEVOFLOXACIN 250 MG: 500 TABLET, FILM COATED ORAL at 10:21

## 2025-02-16 RX ADMIN — IPRATROPIUM BROMIDE AND ALBUTEROL SULFATE 1 DOSE: .5; 3 SOLUTION RESPIRATORY (INHALATION) at 16:57

## 2025-02-16 RX ADMIN — GUAIFENESIN 1200 MG: 600 TABLET, MULTILAYER, EXTENDED RELEASE ORAL at 21:50

## 2025-02-16 RX ADMIN — AMLODIPINE BESYLATE 10 MG: 5 TABLET ORAL at 10:21

## 2025-02-16 RX ADMIN — CLONIDINE HYDROCHLORIDE 0.1 MG: 0.1 TABLET ORAL at 21:51

## 2025-02-16 RX ADMIN — OSELTAMIVIR PHOSPHATE 30 MG: 30 CAPSULE ORAL at 10:21

## 2025-02-16 RX ADMIN — FUROSEMIDE 40 MG: 10 INJECTION, SOLUTION INTRAMUSCULAR; INTRAVENOUS at 12:35

## 2025-02-16 RX ADMIN — APIXABAN 5 MG: 5 TABLET, FILM COATED ORAL at 21:51

## 2025-02-16 RX ADMIN — IPRATROPIUM BROMIDE AND ALBUTEROL SULFATE 1 DOSE: .5; 3 SOLUTION RESPIRATORY (INHALATION) at 13:24

## 2025-02-16 RX ADMIN — Medication 2 PUFF: at 10:00

## 2025-02-16 RX ADMIN — CLONIDINE HYDROCHLORIDE 0.1 MG: 0.1 TABLET ORAL at 10:21

## 2025-02-16 RX ADMIN — METOPROLOL TARTRATE 75 MG: 25 TABLET, FILM COATED ORAL at 21:51

## 2025-02-16 RX ADMIN — IPRATROPIUM BROMIDE AND ALBUTEROL SULFATE 1 DOSE: .5; 3 SOLUTION RESPIRATORY (INHALATION) at 09:59

## 2025-02-16 RX ADMIN — WATER 40 MG: 1 INJECTION INTRAMUSCULAR; INTRAVENOUS; SUBCUTANEOUS at 12:36

## 2025-02-16 RX ADMIN — SODIUM CHLORIDE, PRESERVATIVE FREE 10 ML: 5 INJECTION INTRAVENOUS at 21:52

## 2025-02-16 RX ADMIN — SODIUM CHLORIDE, PRESERVATIVE FREE 10 ML: 5 INJECTION INTRAVENOUS at 10:21

## 2025-02-16 RX ADMIN — APIXABAN 5 MG: 5 TABLET, FILM COATED ORAL at 10:21

## 2025-02-16 NOTE — PLAN OF CARE
Problem: Chronic Conditions and Co-morbidities  Goal: Patient's chronic conditions and co-morbidity symptoms are monitored and maintained or improved  2/16/2025 0203 by Audrey Bhatt RN  Outcome: Progressing  2/16/2025 0203 by Audrey Bhatt RN  Outcome: Progressing     Problem: Discharge Planning  Goal: Discharge to home or other facility with appropriate resources  2/16/2025 0203 by Audrey Bhatt RN  Outcome: Progressing  2/16/2025 0203 by Audrey Bhatt RN  Outcome: Progressing     Problem: Skin/Tissue Integrity  Goal: Skin integrity remains intact  Description: 1.  Monitor for areas of redness and/or skin breakdown  2.  Assess vascular access sites hourly  3.  Every 4-6 hours minimum:  Change oxygen saturation probe site  4.  Every 4-6 hours:  If on nasal continuous positive airway pressure, respiratory therapy assess nares and determine need for appliance change or resting period  2/16/2025 0203 by Audrey Bhatt RN  Outcome: Progressing  2/16/2025 0203 by Audrey Bhatt RN  Outcome: Progressing     Problem: Safety - Adult  Goal: Free from fall injury  2/16/2025 0203 by Audrey Bhatt RN  Outcome: Progressing  2/16/2025 0203 by Audrey Bhatt RN  Outcome: Progressing     Problem: Genitourinary - Adult  Goal: Absence of urinary retention  2/16/2025 0203 by Audrey Bhatt RN  Outcome: Progressing  2/16/2025 0203 by Audrey Bhatt RN  Outcome: Progressing     Problem: Pain  Goal: Verbalizes/displays adequate comfort level or baseline comfort level  2/16/2025 0203 by Audrey Bhatt RN  Outcome: Progressing  2/16/2025 0203 by Audrey Bhatt RN  Outcome: Progressing     Problem: ABCDS Injury Assessment  Goal: Absence of physical injury  2/16/2025 0203 by Audrey Bhatt RN  Outcome: Progressing  2/16/2025 0203 by Audrey Bhatt RN  Outcome: Progressing

## 2025-02-16 NOTE — PROGRESS NOTES
Nephrology Note                                                                                                                                                                                                                                                                                                                                                               Office : [phone]     Fax :[phone]    Patient's Name: Stacy Ly  8:01 AM  2/16/2025    Reason for Consult:  JUSTINA  Requesting Physician:  Tarik Hummel, MARILEE - CNP  Chief Complaint:    Chief Complaint   Patient presents with    Shortness of Breath     Pt to ED via [facility] from home with c/o shortness of breath that started a couple days ago. EMS reports pt was satting in the low 70s on their arrival, received 2 breathing treatments en route. Pt states she wears 2L at baseline.        Assessment/Plan     # JUSTINA  # Acute CHF  - likely 2/2 Cardiorenal causes  - Baseline Creatinine ~ 1.2. Creatinine above baseline but stable.   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 female with A-fib, hypertension, depression, GIANNA, interstitial lung disease, COPD, CKD, JUSTINA, heart failure presented to the emergency department chief complaint of dyspnea.     Patient states that she has had worsening dyspnea for the last several days. She states that she has been compliant with her 2 L of O2 at baseline and has

## 2025-02-16 NOTE — PROGRESS NOTES
02/15/25  0618 02/16/25  0621    143 140   K 4.3 3.8 4.1    101 100   CO2 29 30 31   BUN 37* 33* 30*   CREATININE 1.5* 1.4* 1.4*   GLUCOSE 150* 96 183*     Ca/Mg/Phos:   Recent Labs     02/14/25  0511 02/15/25  0618 02/16/25  0621   CALCIUM 8.5 8.1* 8.0*   MG 2.06 1.93 1.89   PHOS  --  2.8 3.1     Hepatic:   Recent Labs     02/14/25  0511   AST 29   ALT 10   BILITOT 0.5   ALKPHOS 77     Troponin: No results for input(s): \"TROPONINI\" in the last 72 hours.  BNP: No results for input(s): \"BNP\" in the last 72 hours.  Lipids: No results for input(s): \"CHOL\", \"TRIG\", \"HDL\" in the last 72 hours.    Invalid input(s): \"LDLCALC\", \"LABVLDL\"  ABGs:   Recent Labs     02/16/25  0815   PHART 7.427   PO2ART 51.6*   PLV1HAV 58.0*     INR: No results for input(s): \"INR\" in the last 72 hours.  UA:No results for input(s): \"COLORU\", \"CLARITYU\", \"GLUCOSEU\", \"BILIRUBINUR\", \"KETUA\", \"SPECGRAV\", \"BLOODU\", \"PHUR\", \"PROTEINU\", \"UROBILINOGEN\", \"NITRU\", \"LEUKOCYTESUR\", \"URINETYPE\" in the last 72 hours.    Invalid input(s): \"LABMICR\"   Urine Microscopic: No results for input(s): \"LABCAST\", \"BACTERIA\", \"COMU\", \"HYALCAST\", \"WBCUA\", \"RBCUA\" in the last 72 hours.    Invalid input(s): \"EPIU\"  Urine Culture: No results for input(s): \"LABURIN\" in the last 72 hours.  Urine Chemistry: No results for input(s): \"CLUR\", \"LABCREA\", \"PROTEINUR\", \"NAUR\" in the last 72 hours.      IMAGING:  XR CHEST PORTABLE   Final Result   Bilateral airspace disease has mildly increased from 5 days ago.         US RENAL COMPLETE   Final Result   Unremarkable ultrasound of the kidneys and urinary bladder.         XR CHEST PORTABLE   Final Result   1. Diffuse airspace and interstitial opacities bilaterally with central   distribution, probably pulmonary edema.   2. Widened superior mediastinum could be related to prominent ascending aorta.               Medical Decision Making:  The following items were considered in medical decision making:  Discussion of patient

## 2025-02-16 NOTE — PROGRESS NOTES
GLUCOSE 183 02/16/2025 06:21 AM    CO2 31 02/16/2025 06:21 AM    BUN 30 02/16/2025 06:21 AM    CREATININE 1.4 02/16/2025 06:21 AM    CALCIUM 8.0 02/16/2025 06:21 AM     ABG:   Lab Results   Component Value Date/Time    XZK7JUH 38.2 02/16/2025 08:15 AM    BEART 11.9 02/16/2025 08:15 AM    M0GSOGOV 87.7 02/16/2025 08:15 AM    PHART 7.427 02/16/2025 08:15 AM    UAF1SIY 58.0 02/16/2025 08:15 AM    PO2ART 51.6 02/16/2025 08:15 AM    LGK0GMD 89.5 02/16/2025 08:15 AM       Radiology: All pertinent images / reports were reviewed as a part of this visit.    EXAMINATION:  ONE XRAY VIEW OF THE CHEST     2/16/2025 7:13 am     COMPARISON:  02/11/2025     HISTORY:  Acute shortness of breath.     FINDINGS:  Patient is slightly rotated and chin overlies the lung apices.  Stable  cardiomediastinal silhouette.  Bilateral airspace disease has mildly  increased from 5 days ago.  No significant pleural effusion.  No pneumothorax.     IMPRESSION:  Bilateral airspace disease has mildly increased from 5 days ago.    Problem List:     Asthmatic bronchitis with acute exacerbation  Acute on chronic diastolic CHF  Acute on chronic hypercapnic respiratory failure  Influenza A infection    Assessment/Plan:     Influenza A infection with symptoms suggestive of asthmatic bronchitis.  Continue bronchodilators, IV Solu-Medrol and Lasix 40 mg twice daily.  Completed course of Tamiflu.    Acute hypercapnic respiratory failure-improved with BiPAP.  Continue to use BiPAP as needed and at nighttime.  On 6 L O2 currently.    Pulmonary vascular congestion and pulmonary edema, continue with Lasix 40 mg twice daily.    Pulmonary will follow    Hugo Rodriguez MD

## 2025-02-16 NOTE — PROGRESS NOTES
V2.0    [facility] [place] Progress Note      Name:  Stacy Ly /Age/Sex: 1945  (79 y.o. female)   MRN & CSN:  [id number] & [id number] Encounter Date/Time: 2025 2:49 PM EST   Location:  [id number] PCP: Tarik Hummel, APRN - CNP     Attending:Dora Contreras MD       Hospital Day: 6    Assessment and Recommendations   Stacy Ly is a 79 y.o. female with pmh of A-fib, HTN, depression, GIANNA, interstitial lung disease, chronic hypoxic respiratory failure due to COPD on 2 L/min at baseline, CKD, JUSTINA, heart failure presented to the ED with c/o worsening dyspnea for the last several days.  Workup in the ED showed positive influenza A, and diffuse airspace and interstitial opacities concerning for pulmonary edema.    Plan:     Acute on chronic hypoxic& hypercapnic respiratory failure due to COPD & acute on chronic diastolic CHF exacerbation  Respiratory viral panel positive for influenza A.  Chest x-ray with diffuse interstitial opacity.  proBNP 12,827  Complete course of Tamiflu.  Continue Solu-Medrol, DuoNebs, Symbicort, Mucinex and Levaquin.  Wean down supplemental oxygen as tolerated.  Cardiology consulted: Continue CHF protocol with IV Lasix.  Continue BiPAP nightly and as needed as tolerated    Elevated troponin:   HS-troponin 88, 98.  EKG showed A-fib with RVR but no acute ischemic changes.  TTE 2025: LVEF 55 to 60%.  Normal WMA.  RV dilated with low normal systolic function.  Moderate MR, moderate TR.  LA and RA severely dilated.    JUSTINA on CKD 3: Monitor renal function while on diuresis.  Nephrology consult appreciated.  Avoid nephrotoxins.    Atrial fibrillation: Continue Eliquis and metoprolol.     Hypertension: Monitor BP on metoprolol.  Clonidine and amlodipine on hold.    Hypomagnesemia: Replaced.  Monitor magnesium levels.    Pancytopenia: Workup consistent with iron and B12 deficiency.    Started on replacement.  Monitor CBC closely.      Diet ADULT DIET; Regular; No Added Salt

## 2025-02-17 LAB
ALBUMIN SERPL-MCNC: 3.1 G/DL (ref 3.4–5)
ANION GAP SERPL CALCULATED.3IONS-SCNC: 9 MMOL/L (ref 3–16)
BLOOD BANK REF CASE: NORMAL
BUN SERPL-MCNC: 31 MG/DL (ref 7–20)
CALCIUM SERPL-MCNC: 8 MG/DL (ref 8.3–10.6)
CHLORIDE SERPL-SCNC: 98 MMOL/L (ref 99–110)
CO2 SERPL-SCNC: 34 MMOL/L (ref 21–32)
CREAT SERPL-MCNC: 1.3 MG/DL (ref 0.6–1.2)
GFR SERPLBLD CREATININE-BSD FMLA CKD-EPI: 42 ML/MIN/{1.73_M2}
GLUCOSE SERPL-MCNC: 141 MG/DL (ref 70–99)
MAGNESIUM SERPL-MCNC: 1.87 MG/DL (ref 1.8–2.4)
PHOSPHATE SERPL-MCNC: 3.3 MG/DL (ref 2.5–4.9)
POTASSIUM SERPL-SCNC: 3.9 MMOL/L (ref 3.5–5.1)
SODIUM SERPL-SCNC: 141 MMOL/L (ref 136–145)

## 2025-02-17 PROCEDURE — 94660 CPAP INITIATION&MGMT: CPT

## 2025-02-17 PROCEDURE — 2500000003 HC RX 250 WO HCPCS: Performed by: STUDENT IN AN ORGANIZED HEALTH CARE EDUCATION/TRAINING PROGRAM

## 2025-02-17 PROCEDURE — 6370000000 HC RX 637 (ALT 250 FOR IP): Performed by: INTERNAL MEDICINE

## 2025-02-17 PROCEDURE — 94761 N-INVAS EAR/PLS OXIMETRY MLT: CPT

## 2025-02-17 PROCEDURE — 6370000000 HC RX 637 (ALT 250 FOR IP): Performed by: STUDENT IN AN ORGANIZED HEALTH CARE EDUCATION/TRAINING PROGRAM

## 2025-02-17 PROCEDURE — 83735 ASSAY OF MAGNESIUM: CPT

## 2025-02-17 PROCEDURE — 6360000002 HC RX W HCPCS: Performed by: INTERNAL MEDICINE

## 2025-02-17 PROCEDURE — 2580000003 HC RX 258: Performed by: INTERNAL MEDICINE

## 2025-02-17 PROCEDURE — 94640 AIRWAY INHALATION TREATMENT: CPT

## 2025-02-17 PROCEDURE — 2500000003 HC RX 250 WO HCPCS: Performed by: INTERNAL MEDICINE

## 2025-02-17 PROCEDURE — 99232 SBSQ HOSP IP/OBS MODERATE 35: CPT | Performed by: INTERNAL MEDICINE

## 2025-02-17 PROCEDURE — 1200000000 HC SEMI PRIVATE

## 2025-02-17 PROCEDURE — 80069 RENAL FUNCTION PANEL: CPT

## 2025-02-17 PROCEDURE — 36415 COLL VENOUS BLD VENIPUNCTURE: CPT

## 2025-02-17 PROCEDURE — 2700000000 HC OXYGEN THERAPY PER DAY

## 2025-02-17 RX ORDER — CLONIDINE HYDROCHLORIDE 0.1 MG/1
0.1 TABLET ORAL DAILY
Status: DISCONTINUED | OUTPATIENT
Start: 2025-02-18 | End: 2025-02-19 | Stop reason: HOSPADM

## 2025-02-17 RX ADMIN — IRON SUCROSE 200 MG: 20 INJECTION, SOLUTION INTRAVENOUS at 14:19

## 2025-02-17 RX ADMIN — IPRATROPIUM BROMIDE AND ALBUTEROL SULFATE 1 DOSE: .5; 3 SOLUTION RESPIRATORY (INHALATION) at 15:57

## 2025-02-17 RX ADMIN — SODIUM CHLORIDE, PRESERVATIVE FREE 10 ML: 5 INJECTION INTRAVENOUS at 08:15

## 2025-02-17 RX ADMIN — IPRATROPIUM BROMIDE AND ALBUTEROL SULFATE 1 DOSE: .5; 3 SOLUTION RESPIRATORY (INHALATION) at 13:18

## 2025-02-17 RX ADMIN — APIXABAN 5 MG: 5 TABLET, FILM COATED ORAL at 08:11

## 2025-02-17 RX ADMIN — WATER 40 MG: 1 INJECTION INTRAMUSCULAR; INTRAVENOUS; SUBCUTANEOUS at 00:05

## 2025-02-17 RX ADMIN — FUROSEMIDE 60 MG: 10 INJECTION, SOLUTION INTRAMUSCULAR; INTRAVENOUS at 17:09

## 2025-02-17 RX ADMIN — FUROSEMIDE 60 MG: 10 INJECTION, SOLUTION INTRAMUSCULAR; INTRAVENOUS at 08:11

## 2025-02-17 RX ADMIN — CYANOCOBALAMIN TAB 1000 MCG 1000 MCG: 1000 TAB at 08:11

## 2025-02-17 RX ADMIN — APIXABAN 5 MG: 5 TABLET, FILM COATED ORAL at 21:13

## 2025-02-17 RX ADMIN — AMLODIPINE BESYLATE 10 MG: 5 TABLET ORAL at 08:11

## 2025-02-17 RX ADMIN — WATER 40 MG: 1 INJECTION INTRAMUSCULAR; INTRAVENOUS; SUBCUTANEOUS at 11:13

## 2025-02-17 RX ADMIN — GUAIFENESIN 1200 MG: 600 TABLET, MULTILAYER, EXTENDED RELEASE ORAL at 21:13

## 2025-02-17 RX ADMIN — CLONIDINE HYDROCHLORIDE 0.1 MG: 0.1 TABLET ORAL at 08:11

## 2025-02-17 RX ADMIN — IPRATROPIUM BROMIDE AND ALBUTEROL SULFATE 1 DOSE: .5; 3 SOLUTION RESPIRATORY (INHALATION) at 08:39

## 2025-02-17 RX ADMIN — GUAIFENESIN 1200 MG: 600 TABLET, MULTILAYER, EXTENDED RELEASE ORAL at 08:11

## 2025-02-17 RX ADMIN — Medication 2 PUFF: at 08:39

## 2025-02-17 RX ADMIN — METOPROLOL TARTRATE 75 MG: 25 TABLET, FILM COATED ORAL at 21:13

## 2025-02-17 NOTE — PROGRESS NOTES
[facility] Pulmonary/CCM Progress note      Admit Date: 2/11/2025    Chief Complaint: Shortness of breath    Subjective:     Interval History: Improved dyspnea and wheezing.  Less fatigue.  Now on 3 L O2.  Continues to use BiPAP at nighttime.    Scheduled Meds:   furosemide  60 mg IntraVENous BID    methylPREDNISolone  40 mg IntraVENous Q12H    vitamin B-12  1,000 mcg Oral Daily    iron sucrose (VENOFER) 200 mg in sodium chloride 0.9 % 100 mL IVPB  200 mg IntraVENous Q24H    ipratropium 0.5 mg-albuterol 2.5 mg  1 Dose Inhalation 4x Daily RT    amLODIPine  10 mg Oral Daily    apixaban  5 mg Oral BID    cloNIDine  0.1 mg Oral BID    metoprolol tartrate  75 mg Oral BID    budesonide-formoterol  2 puff Inhalation BID RT    guaiFENesin  1,200 mg Oral BID    sodium chloride flush  5-40 mL IntraVENous 2 times per day     Continuous Infusions:   sodium chloride       PRN Meds:sodium chloride, ipratropium 0.5 mg-albuterol 2.5 mg, sodium chloride flush, sodium chloride, magnesium sulfate, ondansetron **OR** ondansetron, polyethylene glycol, acetaminophen **OR** acetaminophen    Review of Systems  Constitutional: Fatigue and malaise  Ears, nose, mouth, throat: negative for ear drainage, epistaxis, hoarseness, nasal congestion, sore throat and voice change  Respiratory: negative except for cough, shortness of breath, and wheezing  Cardiovascular: negative for chest pain, chest pressure/discomfort, irregular heart beat, lower extremity edema and palpitations  Gastrointestinal: negative for abdominal pain, constipation, diarrhea, jaundice, melena, odynophagia, reflux symptoms and vomiting  Hematologic/lymphatic: negative for bleeding, easy bruising, lymphadenopathy and petechiae  Musculoskeletal:negative for arthralgias, bone pain, muscle weakness, neck pain and stiff joints  Neurological: negative for dizziness, gait problems, headaches, seizures, speech problems, tremors and weakness  Behavioral/Psych: negative for anxiety, behavior

## 2025-02-17 NOTE — PROGRESS NOTES
Assessment completed, see doc flowsheets. Pt is A&O X4. Lung sounds are rhonchi. VSS. Tele on. Medication given per MAR. Patient has no needs at this time. Call light within in reach, will continue to monitor.

## 2025-02-17 NOTE — PROGRESS NOTES
Nephrology Consult Note                                                                                                                                                                                                                                                                                                                                                               Office : [phone]     Fax :[phone]    Patient's Name: Stacy Ly  11:55 AM  2/17/2025    Reason for Consult:  JUSTINA  Requesting Physician:  Tarik Hummel, MARILEE - ROBBY  Chief Complaint:    Chief Complaint   Patient presents with    Shortness of Breath     Pt to ED via [facility] from home with c/o shortness of breath that started a couple days ago. EMS reports pt was satting in the low 70s on their arrival, received 2 breathing treatments en route. Pt states she wears 2L at baseline.        Assessment/Plan     # JUSTINA  - Now creatinine is CLOSE TO BASELINE   - Cardiorenal   - BNP elevated , CXR congested   - Grade II diastolic dysfunction on echo   - LASIX, symptoms better   - Renal US - Unremarkable     # influenza, hypercarbic resp failure   Plan;  Keep diuresis     # CKD 3   - STABLE   - From multiple AKIs in the past   - Without proteinuria     # COPD   - Oxygen dependent   - Acute exacerbation     # A fib  - Cardiology on board     # Pancytopenia   - On B12 and iron    History of Present Ilness:    Stacy Ly is a 79 y.o. female with A-fib, hypertension, depression, GIANNA, interstitial lung disease, COPD, CKD, JUSTINA, heart failure presented to the emergency department chief complaint of dyspnea.     Patient states that she has had worsening dyspnea for the last several days. She states that she has been compliant with her 2 L of O2 at baseline and has not gotten any better. Patient states that she has had COPD exacerbations in the past however states that this feels different. Patient denies headache or dizziness. Denies chest pain,

## 2025-02-17 NOTE — PROGRESS NOTES
V2.0    [facility] [place]      Name:  Stacy Ly /Age/Sex: 1945  (79 y.o. female)   MRN & CSN:  [id number] & [id number] Encounter Date/Time: 2025 2:49 PM EST   Location:  [facility]-5578/5578-01 PCP: Tarik Hummel, APRN - CNP     Attending:Dora Contreras MD       Hospital Day: 7    Assessment and Recommendations   Stacy Ly is a 79 y.o. female with pmh of A-fib, HTN, depression, GIANNA, interstitial lung disease, chronic hypoxic respiratory failure due to COPD on 2 L/min at baseline, CKD, JUSTINA, heart failure presented to the ED with c/o worsening dyspnea for the last several days.  Workup in the ED showed positive influenza A, and diffuse airspace and interstitial opacities concerning for pulmonary edema.    Plan:     Acute on chronic hypoxic& hypercapnic respiratory failure due to COPD & acute on chronic diastolic CHF exacerbation  Respiratory viral panel positive for influenza A.  Chest x-ray with diffuse interstitial opacity.  proBNP 12,827  Completed course of Tamiflu and Levaquin.  Continue Solu-Medrol, DuoNebs, Symbicort, Mucinex.  Wean down supplemental oxygen as tolerated.  Cardiology consulted: Signed off.    Continue CHF protocol with IV Lasix.  Continue BiPAP nightly and as needed as tolerated    Elevated troponin:   HS-troponin 88, 98.  EKG showed A-fib with RVR but no acute ischemic changes.  TTE 2025: LVEF 55 to 60%.  Normal WMA.  RV dilated with low normal systolic function.  Moderate MR, moderate TR.  LA and RA severely dilated.    JUSTINA on CKD 3: Monitor renal function while on diuresis.  Nephrology consult appreciated.  Avoid nephrotoxins.    Atrial fibrillation: Continue Eliquis and metoprolol.     Hypertension: Monitor BP on metoprolol.  Clonidine and amlodipine on hold.    Hypomagnesemia: Replaced.  Monitor magnesium levels.    Pancytopenia: Workup consistent with iron and B12 deficiency.    Started on replacement.  Monitor CBC closely.      Diet ADULT DIET;

## 2025-02-18 ENCOUNTER — APPOINTMENT (OUTPATIENT)
Dept: GENERAL RADIOLOGY | Age: 80
DRG: 291 | End: 2025-02-18
Payer: COMMERCIAL

## 2025-02-18 LAB
ALBUMIN SERPL-MCNC: 3.1 G/DL (ref 3.4–5)
ALBUMIN/GLOB SERPL: 1.1 {RATIO} (ref 1.1–2.2)
ALP SERPL-CCNC: 73 U/L (ref 40–129)
ALT SERPL-CCNC: 7 U/L (ref 10–40)
ANION GAP SERPL CALCULATED.3IONS-SCNC: 10 MMOL/L (ref 3–16)
ANISOCYTOSIS BLD QL SMEAR: ABNORMAL
AST SERPL-CCNC: 14 U/L (ref 15–37)
BASOPHILS # BLD: 0 K/UL (ref 0–0.2)
BASOPHILS NFR BLD: 0 %
BILIRUB SERPL-MCNC: 0.3 MG/DL (ref 0–1)
BUN SERPL-MCNC: 36 MG/DL (ref 7–20)
CALCIUM SERPL-MCNC: 8 MG/DL (ref 8.3–10.6)
CHLORIDE SERPL-SCNC: 97 MMOL/L (ref 99–110)
CO2 SERPL-SCNC: 36 MMOL/L (ref 21–32)
CREAT SERPL-MCNC: 1.4 MG/DL (ref 0.6–1.2)
DEPRECATED RDW RBC AUTO: 16 % (ref 12.4–15.4)
EOSINOPHIL # BLD: 0.1 K/UL (ref 0–0.6)
EOSINOPHIL NFR BLD: 1 %
GFR SERPLBLD CREATININE-BSD FMLA CKD-EPI: 39 ML/MIN/{1.73_M2}
GLUCOSE SERPL-MCNC: 102 MG/DL (ref 70–99)
HCT VFR BLD AUTO: 35.8 % (ref 36–48)
HGB BLD-MCNC: 11.5 G/DL (ref 12–16)
LYMPHOCYTES # BLD: 1.5 K/UL (ref 1–5.1)
LYMPHOCYTES NFR BLD: 14 %
MAGNESIUM SERPL-MCNC: 1.75 MG/DL (ref 1.8–2.4)
MCH RBC QN AUTO: 29 PG (ref 26–34)
MCHC RBC AUTO-ENTMCNC: 32 G/DL (ref 31–36)
MCV RBC AUTO: 90.5 FL (ref 80–100)
METAMYELOCYTES NFR BLD MANUAL: 1 %
MONOCYTES # BLD: 0.8 K/UL (ref 0–1.3)
MONOCYTES NFR BLD: 7 %
NEUTROPHILS # BLD: 8.5 K/UL (ref 1.7–7.7)
NEUTROPHILS NFR BLD: 75 %
NEUTS BAND NFR BLD MANUAL: 2 % (ref 0–7)
NT-PROBNP SERPL-MCNC: 5328 PG/ML (ref 0–449)
PHOSPHATE SERPL-MCNC: 2.8 MG/DL (ref 2.5–4.9)
PLATELET # BLD AUTO: 245 K/UL (ref 135–450)
PMV BLD AUTO: 10.1 FL (ref 5–10.5)
POTASSIUM SERPL-SCNC: 3.4 MMOL/L (ref 3.5–5.1)
PROT SERPL-MCNC: 5.8 G/DL (ref 6.4–8.2)
RBC # BLD AUTO: 3.96 M/UL (ref 4–5.2)
SODIUM SERPL-SCNC: 143 MMOL/L (ref 136–145)
WBC # BLD AUTO: 10.9 K/UL (ref 4–11)

## 2025-02-18 PROCEDURE — 83880 ASSAY OF NATRIURETIC PEPTIDE: CPT

## 2025-02-18 PROCEDURE — 6370000000 HC RX 637 (ALT 250 FOR IP): Performed by: INTERNAL MEDICINE

## 2025-02-18 PROCEDURE — 6360000002 HC RX W HCPCS: Performed by: HOSPITALIST

## 2025-02-18 PROCEDURE — 84100 ASSAY OF PHOSPHORUS: CPT

## 2025-02-18 PROCEDURE — 6360000002 HC RX W HCPCS: Performed by: INTERNAL MEDICINE

## 2025-02-18 PROCEDURE — 2700000000 HC OXYGEN THERAPY PER DAY

## 2025-02-18 PROCEDURE — 94761 N-INVAS EAR/PLS OXIMETRY MLT: CPT

## 2025-02-18 PROCEDURE — 80053 COMPREHEN METABOLIC PANEL: CPT

## 2025-02-18 PROCEDURE — 85025 COMPLETE CBC W/AUTO DIFF WBC: CPT

## 2025-02-18 PROCEDURE — 6370000000 HC RX 637 (ALT 250 FOR IP): Performed by: HOSPITALIST

## 2025-02-18 PROCEDURE — 6370000000 HC RX 637 (ALT 250 FOR IP): Performed by: STUDENT IN AN ORGANIZED HEALTH CARE EDUCATION/TRAINING PROGRAM

## 2025-02-18 PROCEDURE — 2500000003 HC RX 250 WO HCPCS: Performed by: INTERNAL MEDICINE

## 2025-02-18 PROCEDURE — 83735 ASSAY OF MAGNESIUM: CPT

## 2025-02-18 PROCEDURE — 36415 COLL VENOUS BLD VENIPUNCTURE: CPT

## 2025-02-18 PROCEDURE — 2500000003 HC RX 250 WO HCPCS: Performed by: STUDENT IN AN ORGANIZED HEALTH CARE EDUCATION/TRAINING PROGRAM

## 2025-02-18 PROCEDURE — 94640 AIRWAY INHALATION TREATMENT: CPT

## 2025-02-18 PROCEDURE — 94660 CPAP INITIATION&MGMT: CPT

## 2025-02-18 PROCEDURE — 71045 X-RAY EXAM CHEST 1 VIEW: CPT

## 2025-02-18 PROCEDURE — 1200000000 HC SEMI PRIVATE

## 2025-02-18 RX ORDER — POTASSIUM CHLORIDE 1500 MG/1
40 TABLET, EXTENDED RELEASE ORAL ONCE
Status: COMPLETED | OUTPATIENT
Start: 2025-02-18 | End: 2025-02-18

## 2025-02-18 RX ORDER — MAGNESIUM SULFATE 1 G/100ML
1000 INJECTION INTRAVENOUS ONCE
Status: COMPLETED | OUTPATIENT
Start: 2025-02-18 | End: 2025-02-18

## 2025-02-18 RX ADMIN — Medication 2 PUFF: at 00:53

## 2025-02-18 RX ADMIN — GUAIFENESIN 1200 MG: 600 TABLET, MULTILAYER, EXTENDED RELEASE ORAL at 20:26

## 2025-02-18 RX ADMIN — CLONIDINE HYDROCHLORIDE 0.1 MG: 0.1 TABLET ORAL at 10:31

## 2025-02-18 RX ADMIN — SODIUM CHLORIDE, PRESERVATIVE FREE 10 ML: 5 INJECTION INTRAVENOUS at 22:32

## 2025-02-18 RX ADMIN — MAGNESIUM SULFATE HEPTAHYDRATE 1000 MG: 1 INJECTION, SOLUTION INTRAVENOUS at 11:48

## 2025-02-18 RX ADMIN — METOPROLOL TARTRATE 75 MG: 25 TABLET, FILM COATED ORAL at 20:26

## 2025-02-18 RX ADMIN — APIXABAN 5 MG: 5 TABLET, FILM COATED ORAL at 20:26

## 2025-02-18 RX ADMIN — IPRATROPIUM BROMIDE AND ALBUTEROL SULFATE 1 DOSE: .5; 3 SOLUTION RESPIRATORY (INHALATION) at 21:56

## 2025-02-18 RX ADMIN — APIXABAN 5 MG: 5 TABLET, FILM COATED ORAL at 10:31

## 2025-02-18 RX ADMIN — Medication 2 PUFF: at 21:56

## 2025-02-18 RX ADMIN — IPRATROPIUM BROMIDE AND ALBUTEROL SULFATE 1 DOSE: .5; 3 SOLUTION RESPIRATORY (INHALATION) at 00:53

## 2025-02-18 RX ADMIN — CYANOCOBALAMIN TAB 1000 MCG 1000 MCG: 1000 TAB at 10:31

## 2025-02-18 RX ADMIN — AMLODIPINE BESYLATE 10 MG: 5 TABLET ORAL at 10:31

## 2025-02-18 RX ADMIN — METOPROLOL TARTRATE 75 MG: 25 TABLET, FILM COATED ORAL at 10:32

## 2025-02-18 RX ADMIN — GUAIFENESIN 1200 MG: 600 TABLET, MULTILAYER, EXTENDED RELEASE ORAL at 10:31

## 2025-02-18 RX ADMIN — POTASSIUM CHLORIDE 40 MEQ: 1500 TABLET, EXTENDED RELEASE ORAL at 11:48

## 2025-02-18 RX ADMIN — SODIUM CHLORIDE, PRESERVATIVE FREE 10 ML: 5 INJECTION INTRAVENOUS at 10:35

## 2025-02-18 RX ADMIN — WATER 40 MG: 1 INJECTION INTRAMUSCULAR; INTRAVENOUS; SUBCUTANEOUS at 10:32

## 2025-02-18 RX ADMIN — FUROSEMIDE 60 MG: 10 INJECTION, SOLUTION INTRAMUSCULAR; INTRAVENOUS at 10:32

## 2025-02-18 RX ADMIN — FUROSEMIDE 60 MG: 10 INJECTION, SOLUTION INTRAMUSCULAR; INTRAVENOUS at 17:53

## 2025-02-18 RX ADMIN — IPRATROPIUM BROMIDE AND ALBUTEROL SULFATE 1 DOSE: .5; 3 SOLUTION RESPIRATORY (INHALATION) at 17:00

## 2025-02-18 ASSESSMENT — ENCOUNTER SYMPTOMS
SHORTNESS OF BREATH: 1
COUGH: 1

## 2025-02-18 NOTE — PROGRESS NOTES
V2.0    [facility] [place]      Name:  Stacy Ly /Age/Sex: 1945  (79 y.o. female)   MRN & CSN:  [id number] & [id number] Encounter Date/Time: 2025 2:49 PM EST   Location:  [facility]-5578/5578-01 PCP: Tarik Hummel, APRN - CNP     Attending:Jonnie Hooper MD       Hospital Day: 8    Assessment and Recommendations   Stacy Ly is a 79 y.o. female with pmh of A-fib, HTN, depression, GIANNA, interstitial lung disease, chronic hypoxic respiratory failure due to COPD on 2 L/min at baseline, CKD, JUSTINA, heart failure presented to the ED with c/o worsening dyspnea for the last several days.  Workup in the ED showed positive influenza A, and diffuse airspace and interstitial opacities concerning for pulmonary edema.    Plan:     Acute on chronic hypoxic& hypercapnic respiratory failure due to COPD & acute on chronic diastolic CHF exacerbation  Respiratory viral panel positive for influenza A.  Chest x-ray with diffuse interstitial opacity.  proBNP 12,827  Completed course of Tamiflu and Levaquin.  Continue Solu-Medrol, DuoNebs, Symbicort, Mucinex.  Wean down supplemental oxygen as tolerated.  Cardiology consulted: Signed off.    Continue CHF protocol with IV Lasix.  Slowly improving still with shortness of breath.    Elevated troponin:   HS-troponin 88, 98.  EKG showed A-fib with RVR but no acute ischemic changes.  TTE 2025: LVEF 55 to 60%.  Normal WMA.  RV dilated with low normal systolic function.  Moderate MR, moderate TR.  LA and RA severely dilated.    JUSTINA on CKD 3: Monitor renal function while on diuresis.  Nephrology consult appreciated.  Avoid nephrotoxins.    Atrial fibrillation: Continue Eliquis and metoprolol.     Hypertension: Monitor BP on metoprolol.  Clonidine and amlodipine on hold.    Hypomagnesemia: Replaced.  Monitor magnesium levels.    Pancytopenia: Workup consistent with iron and B12 deficiency.    Started on replacement.  Monitor CBC closely.      Diet ADULT DIET; Regular;

## 2025-02-18 NOTE — PLAN OF CARE
Problem: Chronic Conditions and Co-morbidities  Goal: Patient's chronic conditions and co-morbidity symptoms are monitored and maintained or improved  2/17/2025 2355 by Sadi Armas RN  Outcome: Progressing  2/17/2025 1357 by Cata Winston RN  Outcome: Progressing     Problem: Discharge Planning  Goal: Discharge to home or other facility with appropriate resources  2/17/2025 2355 by Sadi Armas RN  Outcome: Progressing  2/17/2025 1357 by Cata Winston RN  Outcome: Progressing     Problem: Skin/Tissue Integrity  Goal: Skin integrity remains intact  Description: 1.  Monitor for areas of redness and/or skin breakdown  2.  Assess vascular access sites hourly  3.  Every 4-6 hours minimum:  Change oxygen saturation probe site  4.  Every 4-6 hours:  If on nasal continuous positive airway pressure, respiratory therapy assess nares and determine need for appliance change or resting period  2/17/2025 2355 by Sadi Armas RN  Outcome: Progressing  2/17/2025 1357 by Cata Winston RN  Outcome: Progressing     Problem: Safety - Adult  Goal: Free from fall injury  2/17/2025 2355 by Sadi Armas RN  Outcome: Progressing  2/17/2025 1357 by Cata Winston RN  Outcome: Progressing     Problem: Genitourinary - Adult  Goal: Absence of urinary retention  2/17/2025 2355 by Sadi Armas RN  Outcome: Progressing  2/17/2025 1357 by Cata Winston RN  Outcome: Progressing     Problem: Pain  Goal: Verbalizes/displays adequate comfort level or baseline comfort level  2/17/2025 2355 by Sadi Armas RN  Outcome: Progressing  2/17/2025 1357 by Cata Winston RN  Outcome: Progressing     Problem: ABCDS Injury Assessment  Goal: Absence of physical injury  2/17/2025 2355 by Sadi Armas RN  Outcome: Progressing  2/17/2025 1357 by Cata Winston RN  Outcome: Progressing

## 2025-02-18 NOTE — CONSULTS
PALLIATIVE MEDICINE CONSULTATION     Patient name:Stacy Ly   MRN:[id number]    :1945  Room/Bed:N-5578/5578-01   LOS: 7 days         Date of consult:2025    Inpatient consult to Palliative Care  Consult performed by: Katlin Juarez APRN - CNP  Consult ordered by: Dora Contreras MD  Reason for consult: GOC and code status              ASSESSMENT/RECOMMENDATIONS     79 y.o. female with dyspnea, flu A and interstitial lung disease      Symptom Management:  Dyspnea-patient back to baseline 2 L that she uses at home she reports her breathing is significantly improved  ILD-patient does not follow-up with pulmonology at baseline her primary care NP manages her breathing issues.  Goals of Care-full code at baseline.  Patient lives with daughter and granddaughter prior to admission.  Plan to go for short rehab stay to improve her strength.  Patient is open to palliative care following her discharge.  Briefly discussed goals of care and CODE STATUS patient is hopeful to improved and return home.     Patient/Family Goals of Care :    full code at baseline.  Patient lives with daughter and granddaughter prior to admission.  Plan to go for short rehab stay to improve her strength.  Patient is open to palliative care following her discharge.  Briefly discussed goals of care and CODE STATUS patient is hopeful to improved and return home.     Disposition/Discharge Plan:   Per families choice [facility] fax# [phone], phone # [phone]. Referral also communicated to Hospitalist and . Brochure left at bedside for information. [facility] information added to AVS as a follow-up appt needed.      Advance Directives:    The patient has appointed the following active healthcare agents:    Primary Decision Maker: Anisha Ly - Child - [phone]    Secondary Decision Maker: Noemi Luciano - Other - [phone]    The Patient has the following current 
[facility] Pulmonary and Critical Care   Consult Note      Reason for Consult: Acute hypercapnic respiratory failure requiring BiPAP  Requesting Physician: Dora Contreras    Subjective:   CHIEF COMPLAINT: Shortness of breath     HPI: Patient presents to ER for worsening shortness of breath which has been persistent for approximately 4 to 5 days.  Denies fever or chills.  No nausea or vomiting.  Does not report any sick contacts.  Tested positive for influenza A, completing course of Tamiflu.  Today p.m. she was noted to have worsening shortness of breath, wheezing and hypercapnia with pCO2 of 75, required to be placed on BiPAP.  Hence pulmonary consultation has been requested.    History of diastolic CHF, Angel-en-Y gastric bypass 2002, paroxysmal A-fib on Eliquis.  On 2 L O2 at baseline.  There is documented history of COPD but patient has never smoked.       The patient is a 79 y.o. female with significant past medical history of:      Diagnosis Date    Arthritis     Atrial fibrillation and flutter (HCC)     Hypertension     Infection due to parainfluenza virus 3 5/11/2023    Kidney disease     Pt states  \"stage 3\"    Pneumonia     Sleep apnea     no c-pap        Past Surgical History:        Procedure Laterality Date    CARDIOVERSION      COLONOSCOPY N/A 11/20/2018    COLONOSCOPY POLYPECTOMY SNARE/COLD BIOPSY performed by Jairon Gordon MD at [facility] ENDOSCOPY    COLONOSCOPY N/A 03/31/2022    COLONOSCOPY POLYPECTOMY SNARE/COLD BIOPSY performed by Alfred London MD at [facility] ENDOSCOPY    FRACTURE SURGERY      ankle rt has pins and rods, and rt elbow    GASTRIC BYPASS SURGERY      LARYNX SURGERY      TOTAL KNEE ARTHROPLASTY Bilateral 12/19/2012    bilateral knee replacements    TUBAL LIGATION      tubes tied    UPPER GASTROINTESTINAL ENDOSCOPY N/A 11/20/2018    EGD BIOPSY performed by Jairon Gordon MD at [facility] ENDOSCOPY    UPPER GASTROINTESTINAL ENDOSCOPY N/A 03/31/2022    EGD DILATION BALLOON performed by Alfred London, 
[facility]  HEART FAILURE PROGRAM      Stacy Ly 1945    History:  Past Medical History:   Diagnosis Date    Arthritis     Atrial fibrillation and flutter (HCC)     Hypertension     Infection due to parainfluenza virus 3 5/11/2023    Kidney disease     Pt states  \"stage 3\"    Pneumonia     Sleep apnea     no c-pap       ECHO:    6/4/24    Left Ventricle: Normal left ventricular systolic function with a visually estimated EF of 55 - 60%. Left ventricle size is normal. Mild septal thickening.Findings consistent with concentric remodeling. Normal wall motion. Grade II diastolic dysfunction with increased LAP.    Right Ventricle: Right ventricle is mildly dilated.    Tricuspid Valve: Mild regurgitation with multiple jets.    Left Atrium: Left atrium is moderately dilated. Left atrial volume index is moderately increased (42-48 mL/m2).    Image quality is adequate.    ACE/ARB/ARNi:   BB: lopressor 75 mg bid  Aldosterone Antagonist:   SGLT2:     History of sleep apnea: Yes  Type: kerry   Equipment used at home: no equipment      DM History: No    Last Hospital Admission: 5/31/24 with cellulitis  Code Status: full   Discharge plans: from home--therapy recommending SNF    Family Present: zahra Ly was admitted to the hospital with increased shortness of breath. Patient has been seen in the past. She states she stopped checking her weights at home. She states she does not add salt to foods but does not follow a specific sodium or fluid limit. She ran out of medications because she was no longer able to come in to see her PCP. She has become progressively weaker at home and requires grandsons to lift her up and down the stairs.     Educated patient/family on CHF signs and symptoms, causes, treatments, limited fluid intake, daily weights, HF medications, low sodium diet, activity and follow-up.  Patient to call physician if weight gain of 3 pounds in one day or 5 pounds in one week. Patient 
[facility]  Advanced CHF/Pulmonary Hypertension   Cardiac Evaluation      Stacy Ly  YOB: 1945    Requesting PHysician:  Dr. Marshall      Chief Complaint   Patient presents with    Shortness of Breath     Pt to ED via [facility] ems from home with c/o shortness of breath that started a couple days ago. EMS reports pt was satting in the low 70s on their arrival, received 2 breathing treatments en route. Pt states she wears 2L at baseline.         History of Present Illness:  Stacy Ly is a 80 yo female who presented to the ED with shortness of breath for several days.  When EMS arrived, her oxygen saturation was in the low 70s.  She does wear 2 liters of oxygen at baseline.  She was requiring 6 liters NC in the ED.      She has a history of paroxysmal atrial fibrillation, hypertension, depression, GIANNA, interstitial lung disease, COPD, CKD, JUSTINA, chronic diastolic heart failure.  She has had multiple COPD exacerbations in the past but this feels different than those episodes.  She has never smoked.  Does not drink      Denies chest pain, GI, or  symptoms.  No lower extremity edema.  We are consulted for possible CHF.  She tested positive for Influenza A in the ED.  She has elevated procalcitonin.  Her troponin is elevated but not in a pattern of ischemia.  She has anemia that appears to be iron deficient.  Her chest Xray suggests possible pulmonary edema.  WBC count is slightly reduced.  Her EKG shows atrial fibrillation.     Labs:  Sodium 141  K 4.1  BUN/Cre 25/1.5  Magnesium 1.79  BNP 12K  Procalcitonin 0.22  Troponin 88, 98  Albumin 3.2  Wbc 2.8  H/H 11.1/35.6  Ferritin 537  Iron 14, 6%  Influenza A positive    CXR:  IMPRESSION:  1. Diffuse airspace and interstitial opacities bilaterally with central  distribution, probably pulmonary edema.  2. Widened superior mediastinum could be related to prominent ascending aorta.    Echo:  6/4/24:    Left Ventricle: Normal left ventricular 
  Recent Labs     02/12/25 0438 02/13/25 0455 02/14/25  0511    142 141   K 4.4 4.3 4.3    103 102   CO2 25 29 29   BUN 27* 32* 37*   CREATININE 1.4* 1.4* 1.5*   GLUCOSE 176* 140* 150*     Ca/Mg/Phos:   Recent Labs     02/11/25  2108 02/12/25 0438 02/13/25  0455 02/14/25  0511   CALCIUM 8.5 8.3 8.5 8.5   MG 1.79*  --  1.79* 2.06     Hepatic:   Recent Labs     02/12/25 0438 02/13/25 0455 02/14/25  0511   AST 30 23 29   ALT <5* 6* 10   BILITOT 0.7 0.4 0.5   ALKPHOS 100 79 77     Troponin: No results for input(s): \"TROPONINI\" in the last 72 hours.  BNP: No results for input(s): \"BNP\" in the last 72 hours.  Lipids: No results for input(s): \"CHOL\", \"TRIG\", \"HDL\" in the last 72 hours.    Invalid input(s): \"LDLCALC\", \"LABVLDL\"  ABGs: No results for input(s): \"PHART\", \"PO2ART\", \"WTK0MLW\" in the last 72 hours.  INR: No results for input(s): \"INR\" in the last 72 hours.  UA:No results for input(s): \"COLORU\", \"CLARITYU\", \"GLUCOSEU\", \"BILIRUBINUR\", \"KETUA\", \"SPECGRAV\", \"BLOODU\", \"PHUR\", \"PROTEINU\", \"UROBILINOGEN\", \"NITRU\", \"LEUKOCYTESUR\", \"URINETYPE\" in the last 72 hours.    Invalid input(s): \"LABMICR\"   Urine Microscopic: No results for input(s): \"LABCAST\", \"BACTERIA\", \"COMU\", \"HYALCAST\", \"WBCUA\", \"RBCUA\" in the last 72 hours.    Invalid input(s): \"EPIU\"  Urine Culture: No results for input(s): \"LABURIN\" in the last 72 hours.  Urine Chemistry: No results for input(s): \"CLUR\", \"LABCREA\", \"PROTEINUR\", \"NAUR\" in the last 72 hours.      IMAGING:  XR CHEST PORTABLE   Final Result   1. Diffuse airspace and interstitial opacities bilaterally with central   distribution, probably pulmonary edema.   2. Widened superior mediastinum could be related to prominent ascending aorta.         US RENAL COMPLETE    (Results Pending)         Medical Decision Making:  The following items were considered in medical decision making:  Discussion of patient care with other providers  Reviewed clinical lab tests  Reviewed radiology

## 2025-02-18 NOTE — PROGRESS NOTES
Nephrology Progress Note                                                                                                                                                                                                                                                                                                                                                               Office : [phone]     Fax :[phone]    Patient's Name: Stacy Ly  3:49 PM  2/18/2025    Reason for Consult:  JUSTINA  Requesting Physician:  Tarik Hummel, MARILEE - ROBBY  Chief Complaint:    Chief Complaint   Patient presents with    Shortness of Breath     Pt to ED via [facility] ems from home with c/o shortness of breath that started a couple days ago. EMS reports pt was satting in the low 70s on their arrival, received 2 breathing treatments en route. Pt states she wears 2L at baseline.        Assessment/Plan     # JUSTINA  - Now creatinine is CLOSE TO BASELINE   - 2/2 Cardiorenal causes  - BNP elevated--> trending down , CXR congested   - Grade II diastolic dysfunction on echo   - IV LASIX, symptoms better   - Renal US - Unremarkable     # influenza, hypercarbic resp failure   - diuresis as tolerated    # CKD 3   - STABLE   - From multiple AKIs in the past   - Without proteinuria     # COPD   - Oxygen dependent   - Acute exacerbation     # A fib  - eliquis and toprol    # Pancytopenia   - On B12 and iron    History of Present Ilness:    Stacy Ly is a 79 y.o. female with A-fib, hypertension, depression, GIANNA, interstitial lung disease, COPD, CKD, JUSTINA, heart failure presented to the emergency department chief complaint of dyspnea.     Patient states that she has had worsening dyspnea for the last several days. She states that she has been compliant with her 2 L of O2 at baseline and has not gotten any better. Patient states that she has had COPD exacerbations in the past however states that this feels different. Patient denies headache or

## 2025-02-18 NOTE — PROGRESS NOTES
02/18/25 0056   NIV Type   $NIV $Daily Charge   Ventilator ID [id number]   NIV Started/Stopped On   Equipment Type V60   Mode Bilevel   Mask Type Full face mask   Mask Size Large   Assessment   Pulse 64   Respirations 23   SpO2 97 %   Breath Sounds   Respiratory Pattern Regular   Breath Sounds Bilateral Diminished   Settings/Measurements   PIP Observed 17 cm H20   IPAP 16 cmH20   CPAP/EPAP 6 cmH2O   Vt (Measured) 546 mL   Rate Ordered 10   FiO2  30 %   I Time/ I Time % 0.9 s   Minute Volume (L/min) 10.7 Liters   Mask Leak (lpm) 6 lpm   Patient's Home Machine No   Alarm Settings   Alarms On Y  (alarm set at 6)   Low Pressure (cmH2O) 3 cmH2O   High Pressure (cmH2O) 30 cmH2O   RR Low (bpm) 11   RR High (bpm) 40 br/min   Oxygen Therapy/Pulse Ox   O2 Therapy Oxygen   O2 Device PAP (positive airway pressure)

## 2025-02-19 VITALS
OXYGEN SATURATION: 96 % | TEMPERATURE: 97.9 F | HEART RATE: 65 BPM | DIASTOLIC BLOOD PRESSURE: 84 MMHG | WEIGHT: 262.57 LBS | HEIGHT: 67 IN | RESPIRATION RATE: 18 BRPM | BODY MASS INDEX: 41.21 KG/M2 | SYSTOLIC BLOOD PRESSURE: 128 MMHG

## 2025-02-19 LAB
ANION GAP SERPL CALCULATED.3IONS-SCNC: 9 MMOL/L (ref 3–16)
BUN SERPL-MCNC: 35 MG/DL (ref 7–20)
CALCIUM SERPL-MCNC: 8.1 MG/DL (ref 8.3–10.6)
CHLORIDE SERPL-SCNC: 95 MMOL/L (ref 99–110)
CO2 SERPL-SCNC: 37 MMOL/L (ref 21–32)
CREAT SERPL-MCNC: 1.1 MG/DL (ref 0.6–1.2)
DEPRECATED RDW RBC AUTO: 16.2 % (ref 12.4–15.4)
GFR SERPLBLD CREATININE-BSD FMLA CKD-EPI: 52 ML/MIN/{1.73_M2}
GLUCOSE SERPL-MCNC: 96 MG/DL (ref 70–99)
HCT VFR BLD AUTO: 38.9 % (ref 36–48)
HGB BLD-MCNC: 12.5 G/DL (ref 12–16)
MCH RBC QN AUTO: 28.9 PG (ref 26–34)
MCHC RBC AUTO-ENTMCNC: 32.2 G/DL (ref 31–36)
MCV RBC AUTO: 89.8 FL (ref 80–100)
PLATELET # BLD AUTO: 222 K/UL (ref 135–450)
PMV BLD AUTO: 10 FL (ref 5–10.5)
POTASSIUM SERPL-SCNC: 4.1 MMOL/L (ref 3.5–5.1)
RBC # BLD AUTO: 4.34 M/UL (ref 4–5.2)
SODIUM SERPL-SCNC: 141 MMOL/L (ref 136–145)
WBC # BLD AUTO: 11.7 K/UL (ref 4–11)

## 2025-02-19 PROCEDURE — 94761 N-INVAS EAR/PLS OXIMETRY MLT: CPT

## 2025-02-19 PROCEDURE — 2700000000 HC OXYGEN THERAPY PER DAY

## 2025-02-19 PROCEDURE — 6370000000 HC RX 637 (ALT 250 FOR IP): Performed by: HOSPITALIST

## 2025-02-19 PROCEDURE — 6370000000 HC RX 637 (ALT 250 FOR IP): Performed by: STUDENT IN AN ORGANIZED HEALTH CARE EDUCATION/TRAINING PROGRAM

## 2025-02-19 PROCEDURE — 80048 BASIC METABOLIC PNL TOTAL CA: CPT

## 2025-02-19 PROCEDURE — 2500000003 HC RX 250 WO HCPCS: Performed by: STUDENT IN AN ORGANIZED HEALTH CARE EDUCATION/TRAINING PROGRAM

## 2025-02-19 PROCEDURE — 94640 AIRWAY INHALATION TREATMENT: CPT

## 2025-02-19 PROCEDURE — 85027 COMPLETE CBC AUTOMATED: CPT

## 2025-02-19 PROCEDURE — 6370000000 HC RX 637 (ALT 250 FOR IP): Performed by: INTERNAL MEDICINE

## 2025-02-19 PROCEDURE — 36415 COLL VENOUS BLD VENIPUNCTURE: CPT

## 2025-02-19 RX ORDER — FUROSEMIDE 40 MG/1
40 TABLET ORAL DAILY
Status: DISCONTINUED | OUTPATIENT
Start: 2025-02-19 | End: 2025-02-19 | Stop reason: HOSPADM

## 2025-02-19 RX ORDER — PREDNISONE 20 MG/1
40 TABLET ORAL DAILY
Status: DISCONTINUED | OUTPATIENT
Start: 2025-02-19 | End: 2025-02-19 | Stop reason: HOSPADM

## 2025-02-19 RX ORDER — PREDNISONE 20 MG/1
20 TABLET ORAL DAILY
Status: DISCONTINUED | OUTPATIENT
Start: 2025-02-25 | End: 2025-02-19 | Stop reason: HOSPADM

## 2025-02-19 RX ORDER — PREDNISONE 10 MG/1
10 TABLET ORAL DAILY
Qty: 5 TABLET | Refills: 0 | Status: SHIPPED | OUTPATIENT
Start: 2025-02-19 | End: 2025-02-24

## 2025-02-19 RX ORDER — PREDNISONE 10 MG/1
10 TABLET ORAL DAILY
Status: DISCONTINUED | OUTPATIENT
Start: 2025-02-19 | End: 2025-02-19

## 2025-02-19 RX ORDER — PREDNISONE 10 MG/1
10 TABLET ORAL DAILY
Status: DISCONTINUED | OUTPATIENT
Start: 2025-02-28 | End: 2025-02-19 | Stop reason: HOSPADM

## 2025-02-19 RX ADMIN — APIXABAN 5 MG: 5 TABLET, FILM COATED ORAL at 08:56

## 2025-02-19 RX ADMIN — PREDNISONE 40 MG: 20 TABLET ORAL at 09:40

## 2025-02-19 RX ADMIN — CYANOCOBALAMIN TAB 1000 MCG 1000 MCG: 1000 TAB at 08:56

## 2025-02-19 RX ADMIN — SODIUM CHLORIDE, PRESERVATIVE FREE 10 ML: 5 INJECTION INTRAVENOUS at 08:59

## 2025-02-19 RX ADMIN — AMLODIPINE BESYLATE 10 MG: 5 TABLET ORAL at 08:56

## 2025-02-19 RX ADMIN — IPRATROPIUM BROMIDE AND ALBUTEROL SULFATE 1 DOSE: .5; 3 SOLUTION RESPIRATORY (INHALATION) at 11:28

## 2025-02-19 RX ADMIN — METOPROLOL TARTRATE 75 MG: 25 TABLET, FILM COATED ORAL at 08:56

## 2025-02-19 RX ADMIN — CLONIDINE HYDROCHLORIDE 0.1 MG: 0.1 TABLET ORAL at 08:56

## 2025-02-19 RX ADMIN — Medication 2 PUFF: at 07:14

## 2025-02-19 RX ADMIN — IPRATROPIUM BROMIDE AND ALBUTEROL SULFATE 1 DOSE: .5; 3 SOLUTION RESPIRATORY (INHALATION) at 07:13

## 2025-02-19 RX ADMIN — FUROSEMIDE 40 MG: 40 TABLET ORAL at 09:40

## 2025-02-19 RX ADMIN — GUAIFENESIN 1200 MG: 600 TABLET, MULTILAYER, EXTENDED RELEASE ORAL at 08:56

## 2025-02-19 NOTE — PROGRESS NOTES
[facility] - Inpatient Rehabilitation Department   Phone: [phone]    Physical Therapy               [] Daily Treatment Note         [] Discharge Summary      Patient: Stacy Ly   : 1945   MRN: [id number]   Date of Service:  2025  Admitting Diagnosis: Dyspnea  Current Admission Summary: 79 y.o. female who presents to the emergency department secondary to concern for shortness of breath.  Patient presenting via EMS.  She states that she has been having shortness of breath for the last couple days.  Per EMS, patient had an oxygen saturation in the low 70% on arrival and received 2 breathing treatments.  Patient reports wearing 2 L of oxygen per nasal cannula at baseline. She is currently requiring 6 L nasal cannula in the ED on triage.  Reports history of COPD, but states that this feels little different.   Past Medical History:  has a past medical history of Arthritis, Atrial fibrillation and flutter (HCC), Hypertension, Infection due to parainfluenza virus 3, Kidney disease, Pneumonia, and Sleep apnea.  Past Surgical History:  has a past surgical history that includes Tubal ligation; Total knee arthroplasty (Bilateral, 2012); fracture surgery; Colonoscopy (N/A, 2018); Upper gastrointestinal endoscopy (N/A, 2018); Cardioversion; Gastric bypass surgery; Larynx surgery; Upper gastrointestinal endoscopy (N/A, 2022); Colonoscopy (N/A, 2022); and Upper gastrointestinal endoscopy (N/A, 2022).  Discharge Recommendations: Stayc Ly scored a 6/24 on the AM-PAC short mobility form. Current research shows that an AM-PAC score of 17 or less is typically not associated with a discharge to the patient's home setting. Based on the patient's AM-PAC score and their current functional mobility deficits, it is recommended that the patient have 3-5 sessions per week of Physical Therapy at d/c to increase the patient's independence.  Please see assessment

## 2025-02-19 NOTE — PROGRESS NOTES
Low Risk Nutrition Note     Reason for Visit:   Length of Stay    Nutrition Assessment:  Pt on a SERGE, 2000 mL fluid restricted diet. Hx of CHF, education provided 2/13/25.  lb, admission wt 281 lb; I/O's: -9800 mL.  lb 8 months ago. Pt eating well, greater than 50% at each meals. Pt with orders for discharge to [facility] for rehab. No nutrition concerns at this time.     Current Nutrition Therapies:    ADULT DIET; Regular; No Added Salt (3-4 gm); 2000 ml    Anthropometrics:   Current Height: 170.2 cm (5' 7\")  Current Weight - Scale: 119.1 kg (262 lb 9.1 oz)      Monitoring and Evaluation:  No nutrition diagnosis. Patient will be monitored per nutrition standards of care.     Consult Dietitian if nutrition intervention essential to patient care is needed.     Discharge Planning:  No needs    ASHA HOUSE, CRISTOBAL, LD

## 2025-02-19 NOTE — PLAN OF CARE
Problem: Chronic Conditions and Co-morbidities  Goal: Patient's chronic conditions and co-morbidity symptoms are monitored and maintained or improved  2/19/2025 0900 by Argentina Ramos RN  Outcome: Progressing  2/19/2025 0012 by Sadi Armas RN  Outcome: Progressing     Problem: Discharge Planning  Goal: Discharge to home or other facility with appropriate resources  2/19/2025 0900 by Argentina Ramos RN  Outcome: Progressing  2/19/2025 0012 by Sadi Armas RN  Outcome: Progressing     Problem: Skin/Tissue Integrity  Goal: Skin integrity remains intact  Description: 1.  Monitor for areas of redness and/or skin breakdown  2.  Assess vascular access sites hourly  3.  Every 4-6 hours minimum:  Change oxygen saturation probe site  4.  Every 4-6 hours:  If on nasal continuous positive airway pressure, respiratory therapy assess nares and determine need for appliance change or resting period  2/19/2025 0900 by Argentina Ramos RN  Outcome: Progressing  2/19/2025 0012 by Sadi Armas RN  Outcome: Progressing     Problem: Safety - Adult  Goal: Free from fall injury  2/19/2025 0900 by Argentina Ramos RN  Outcome: Progressing  2/19/2025 0012 by Sadi Armas RN  Outcome: Progressing     Problem: Genitourinary - Adult  Goal: Absence of urinary retention  2/19/2025 0900 by Argentina Ramos RN  Outcome: Progressing  2/19/2025 0012 by Sadi Armas RN  Outcome: Progressing     Problem: Pain  Goal: Verbalizes/displays adequate comfort level or baseline comfort level  2/19/2025 0900 by Argentina Ramos RN  Outcome: Progressing  2/19/2025 0012 by Sadi Armas RN  Outcome: Progressing     Problem: ABCDS Injury Assessment  Goal: Absence of physical injury  2/19/2025 0900 by Argentina Ramos RN  Outcome: Progressing  2/19/2025 0012 by Sadi Arams RN  Outcome: Progressing

## 2025-02-19 NOTE — CARE COORDINATION
Case Management -  Discharge Note      Patient Name: Stacy Ly                   YOB: 1945  Room: [facility]5578/5578-01            Readmission Risk (Low < 19, Mod (19-27), High > 27): Readmission Risk Score: 17.6    Current PCP: Tarik Hummel APRN - CNP      (IMM) Important Message from Medicare:    Has pt received appropriate compliance notices before being discharged if required: yes  Compliance doc:  [] 2nd IMM; [] Code 44 [] Reed  Date Given: 2/19/2025 Given By: PRISCILA    PT AM-PAC: 6 /24  OT AM-PAC: 10 /24    Patient/patient representative has been educated on the benefits of skilled nursing facility  as well as the possible risks of declining recommended services. Patient/patient representative has acknowledged the information provided and decided on the following discharge plan. Patient/ patient representative has been provided freedom of choice regarding service provider, supported by basic dialogue that supports the patient's individualized plan of care/goals.    Patient noted to have a discharge order.  Pt has been medically cleared for transition to Skilled Nursing Rehab Facility    Patient discharged to   [facility]  [address] [place] [address]  Phone: [phone]  Fax: [phone]          HENS Completed:  Yes  Pre-cert required/obtained:  No pre-cert needed.    Transportation scheduled for Yes   Transportation provided by [employer] [phone]   AVS faxed and agency notified:  Yes  The following prescriptions sent with pt:   Family Notified:  Yes    Nurse to call report to facility        Financial    Payor: MEDIGOLD / Plan: MEDIGOLD / Product Type: *No Product type* /       Electronically signed by FELIPA Frank on 2/19/2025 at 11:51 AM

## 2025-02-19 NOTE — PLAN OF CARE
Problem: Chronic Conditions and Co-morbidities  Goal: Patient's chronic conditions and co-morbidity symptoms are monitored and maintained or improved  2/19/2025 0012 by Sadi Armas RN  Outcome: Progressing  2/18/2025 1747 by Anisha Montes RN  Outcome: Progressing     Problem: Discharge Planning  Goal: Discharge to home or other facility with appropriate resources  2/19/2025 0012 by Sadi Armas RN  Outcome: Progressing  2/18/2025 1747 by Anisha Montes RN  Outcome: Progressing     Problem: Skin/Tissue Integrity  Goal: Skin integrity remains intact  Description: 1.  Monitor for areas of redness and/or skin breakdown  2.  Assess vascular access sites hourly  3.  Every 4-6 hours minimum:  Change oxygen saturation probe site  4.  Every 4-6 hours:  If on nasal continuous positive airway pressure, respiratory therapy assess nares and determine need for appliance change or resting period  2/19/2025 0012 by Sadi Armas RN  Outcome: Progressing  2/18/2025 1747 by Anisha Montes RN  Outcome: Progressing     Problem: Safety - Adult  Goal: Free from fall injury  2/19/2025 0012 by Sadi Armas RN  Outcome: Progressing  2/18/2025 1747 by Anisha Montes RN  Outcome: Progressing     Problem: Genitourinary - Adult  Goal: Absence of urinary retention  2/19/2025 0012 by Sadi Armas RN  Outcome: Progressing  2/18/2025 1747 by Anisha Montes RN  Outcome: Progressing     Problem: Pain  Goal: Verbalizes/displays adequate comfort level or baseline comfort level  2/19/2025 0012 by Sadi Armas RN  Outcome: Progressing  2/18/2025 1747 by Anisha Montes RN  Outcome: Progressing     Problem: ABCDS Injury Assessment  Goal: Absence of physical injury  2/19/2025 0012 by Sadi Armas RN  Outcome: Progressing  2/18/2025 1747 by Anisha Montes RN  Outcome: Progressing

## 2025-02-19 NOTE — PROGRESS NOTES
Nephrology Progress Note                                                                                                                                                                                                                                                                                                                                                               Office : [phone]     Fax :[phone]    Patient's Name: Stacy Ly  9:26 AM  2/19/2025    Reason for Consult:  JUSTINA  Requesting Physician:  Tarik Hummel, MARILEE - ROBBY  Chief Complaint:    Chief Complaint   Patient presents with    Shortness of Breath     Pt to ED via [facility] ems from home with c/o shortness of breath that started a couple days ago. EMS reports pt was satting in the low 70s on their arrival, received 2 breathing treatments en route. Pt states she wears 2L at baseline.        Assessment/Plan     # JUSTINA-resolved  - Now creatinine is CLOSE TO BASELINE   - 2/2 Cardiorenal causes  - BNP elevated--> trending down , CXR congested   - Grade II diastolic dysfunction on echo   - IV LASIX, symptoms better   - Renal US - Unremarkable     # influenza, hypercarbic resp failure   - diuresis as tolerated    # CKD 3   - STABLE   - From multiple AKIs in the past   - Without proteinuria     # COPD   - Oxygen dependent   - Acute exacerbation     # A fib  - eliquis and toprol    # Pancytopenia   - On B12 and iron    History of Present Ilness:    Stacy Ly is a 79 y.o. female with A-fib, hypertension, depression, GIANNA, interstitial lung disease, COPD, CKD, JUSTINA, heart failure presented to the emergency department chief complaint of dyspnea.     Patient states that she has had worsening dyspnea for the last several days. She states that she has been compliant with her 2 L of O2 at baseline and has not gotten any better. Patient states that she has had COPD exacerbations in the past however states that this feels different. Patient denies

## 2025-02-19 NOTE — CARE COORDINATION
02/19/25 1039   IMM Letter   IMM Letter given to Patient/Family/Significant other/Guardian/POA/by: IMM Given   IMM Letter date given: 02/19/25   IMM Letter time given: 1038

## 2025-02-22 NOTE — DISCHARGE SUMMARY
[facility] DISCHARGE SUMMARY    Patient Demographics    Patient. Stacy Ly  Date of Birth. 1945  MRN. [id number]     Primary care provider. Tarik Hummel APRN - [facility]  (Tel: [phone])    Admit date: 2/11/2025    Discharge date (blank if same as Note Date): 2/19/2025  Note Date: 2/22/2025     Reason for Hospitalization.   Chief Complaint   Patient presents with    Shortness of Breath     Pt to ED via [facility] from home with c/o shortness of breath that started a couple days ago. EMS reports pt was satting in the low 70s on their arrival, received 2 breathing treatments en route. Pt states she wears 2L at baseline.            Problem-based Hospital Course.  Stacy Ly is a 79 y.o. female with pmh of A-fib, HTN, depression, GIANNA, interstitial lung disease, chronic hypoxic respiratory failure due to COPD on 2 L/min at baseline, CKD, JUSTINA, heart failure presented to the ED with c/o worsening dyspnea for the last several days.  Workup in the ED showed positive influenza A, and diffuse airspace and interstitial opacities concerning for pulmonary edema.     Plan:      Acute on chronic hypoxic& hypercapnic respiratory failure due to COPD & acute on chronic diastolic CHF exacerbation  Respiratory viral panel positive for influenza A.  Chest x-ray with diffuse interstitial opacity.  proBNP 12,827  Completed course of Tamiflu and Levaquin.  Oxygen requirement back to baseline.    Acute on chronic kidney disease.  Likely secondary to CHF exacerbation  Diuresed well.  Transition to p.o. diuretics on discharge    Consults.  IP CONSULT TO HEART FAILURE NURSE/COORDINATOR  IP CONSULT TO CARDIOLOGY  IP CONSULT TO NEPHROLOGY  IP CONSULT TO PULMONOLOGY  IP CONSULT TO PALLIATIVE CARE    Physical examination on discharge day.   /84   Pulse 65   Temp 97.9 °F (36.6 °C)

## 2025-03-14 PROBLEM — J10.1 INFLUENZA A: Status: RESOLVED | Noted: 2025-02-12 | Resolved: 2025-03-14

## 2025-03-28 ENCOUNTER — OFFICE VISIT (OUTPATIENT)
Dept: INTERNAL MEDICINE CLINIC | Age: 80
End: 2025-03-28
Payer: COMMERCIAL

## 2025-03-28 VITALS
BODY MASS INDEX: 38.77 KG/M2 | OXYGEN SATURATION: 95 % | DIASTOLIC BLOOD PRESSURE: 78 MMHG | WEIGHT: 247 LBS | HEART RATE: 80 BPM | SYSTOLIC BLOOD PRESSURE: 140 MMHG | HEIGHT: 67 IN

## 2025-03-28 DIAGNOSIS — I48.92 ATRIAL FIBRILLATION AND FLUTTER: ICD-10-CM

## 2025-03-28 DIAGNOSIS — I10 ESSENTIAL HYPERTENSION: ICD-10-CM

## 2025-03-28 DIAGNOSIS — M15.0 PRIMARY OSTEOARTHRITIS INVOLVING MULTIPLE JOINTS: ICD-10-CM

## 2025-03-28 DIAGNOSIS — I48.19 ATRIAL FIBRILLATION, PERSISTENT (HCC): ICD-10-CM

## 2025-03-28 DIAGNOSIS — I50.31 ACUTE DIASTOLIC (CONGESTIVE) HEART FAILURE: ICD-10-CM

## 2025-03-28 DIAGNOSIS — I48.91 ATRIAL FIBRILLATION AND FLUTTER: ICD-10-CM

## 2025-03-28 DIAGNOSIS — N18.4 CKD (CHRONIC KIDNEY DISEASE) STAGE 4, GFR 15-29 ML/MIN (HCC): ICD-10-CM

## 2025-03-28 DIAGNOSIS — I50.32 CHRONIC DIASTOLIC HEART FAILURE (HCC): ICD-10-CM

## 2025-03-28 DIAGNOSIS — M81.0 AGE-RELATED OSTEOPOROSIS WITHOUT CURRENT PATHOLOGICAL FRACTURE: ICD-10-CM

## 2025-03-28 DIAGNOSIS — M79.89 LEG SWELLING: Primary | ICD-10-CM

## 2025-03-28 PROBLEM — N17.9 AKI (ACUTE KIDNEY INJURY): Status: RESOLVED | Noted: 2025-02-15 | Resolved: 2025-03-28

## 2025-03-28 PROBLEM — K65.1 INTRA-ABDOMINAL ABSCESS (HCC): Status: RESOLVED | Noted: 2023-02-14 | Resolved: 2025-03-28

## 2025-03-28 PROBLEM — R06.00 DYSPNEA: Status: RESOLVED | Noted: 2025-02-11 | Resolved: 2025-03-28

## 2025-03-28 PROBLEM — K92.0 HEMATEMESIS: Status: RESOLVED | Noted: 2023-02-19 | Resolved: 2025-03-28

## 2025-03-28 PROBLEM — I50.9 ACUTE ON CHRONIC CONGESTIVE HEART FAILURE (HCC): Status: RESOLVED | Noted: 2025-02-15 | Resolved: 2025-03-28

## 2025-03-28 PROBLEM — I50.33 ACUTE ON CHRONIC HEART FAILURE WITH PRESERVED EJECTION FRACTION (HFPEF) (HCC): Status: RESOLVED | Noted: 2023-02-08 | Resolved: 2025-03-28

## 2025-03-28 PROBLEM — N18.9 ACUTE KIDNEY INJURY SUPERIMPOSED ON CKD: Status: RESOLVED | Noted: 2023-02-07 | Resolved: 2025-03-28

## 2025-03-28 PROBLEM — R78.81 BACTEREMIA DUE TO STREPTOCOCCUS PNEUMONIAE: Status: RESOLVED | Noted: 2022-12-21 | Resolved: 2025-03-28

## 2025-03-28 PROBLEM — J96.01 ACUTE RESPIRATORY FAILURE WITH HYPOXIA AND HYPERCARBIA: Status: RESOLVED | Noted: 2022-09-13 | Resolved: 2025-03-28

## 2025-03-28 PROBLEM — J18.9 MULTIFOCAL PNEUMONIA: Status: RESOLVED | Noted: 2023-02-12 | Resolved: 2025-03-28

## 2025-03-28 PROBLEM — J18.9 ATYPICAL PNEUMONIA: Status: RESOLVED | Noted: 2023-02-14 | Resolved: 2025-03-28

## 2025-03-28 PROBLEM — B95.4 STREPTOCOCCAL INFECTION GROUP G: Status: RESOLVED | Noted: 2022-12-22 | Resolved: 2025-03-28

## 2025-03-28 PROBLEM — T78.40XA ALLERGIC DRUG REACTION: Status: RESOLVED | Noted: 2023-02-12 | Resolved: 2025-03-28

## 2025-03-28 PROBLEM — D72.825 BANDEMIA: Status: RESOLVED | Noted: 2023-02-14 | Resolved: 2025-03-28

## 2025-03-28 PROBLEM — J81.0 ACUTE PULMONARY EDEMA (HCC): Status: RESOLVED | Noted: 2025-02-15 | Resolved: 2025-03-28

## 2025-03-28 PROBLEM — N17.9 ACUTE KIDNEY INJURY SUPERIMPOSED ON CKD: Status: RESOLVED | Noted: 2023-02-07 | Resolved: 2025-03-28

## 2025-03-28 PROBLEM — L03.115 CELLULITIS OF RIGHT LEG: Status: RESOLVED | Noted: 2022-12-21 | Resolved: 2025-03-28

## 2025-03-28 PROBLEM — J96.02 ACUTE RESPIRATORY FAILURE WITH HYPOXIA AND HYPERCARBIA: Status: RESOLVED | Noted: 2022-09-13 | Resolved: 2025-03-28

## 2025-03-28 PROBLEM — J44.1 COPD EXACERBATION (HCC): Status: RESOLVED | Noted: 2022-12-15 | Resolved: 2025-03-28

## 2025-03-28 PROBLEM — R79.89 ELEVATED BRAIN NATRIURETIC PEPTIDE (BNP) LEVEL: Status: RESOLVED | Noted: 2023-02-12 | Resolved: 2025-03-28

## 2025-03-28 PROBLEM — L03.113 CELLULITIS OF RIGHT FOREARM: Status: RESOLVED | Noted: 2024-06-01 | Resolved: 2025-03-28

## 2025-03-28 PROBLEM — B95.3 BACTEREMIA DUE TO STREPTOCOCCUS PNEUMONIAE: Status: RESOLVED | Noted: 2022-12-21 | Resolved: 2025-03-28

## 2025-03-28 PROBLEM — R50.9 FEVER AND CHILLS: Status: RESOLVED | Noted: 2022-12-21 | Resolved: 2025-03-28

## 2025-03-28 PROBLEM — B99.9 INFECTION REQUIRING CONTACT ISOLATION PRECAUTIONS: Status: RESOLVED | Noted: 2023-05-11 | Resolved: 2025-03-28

## 2025-03-28 LAB
ALBUMIN SERPL-MCNC: 3.7 G/DL (ref 3.4–5)
ALBUMIN/GLOB SERPL: 1.2 {RATIO} (ref 1.1–2.2)
ALP SERPL-CCNC: 153 U/L (ref 40–129)
ALT SERPL-CCNC: 6 U/L (ref 10–40)
ANION GAP SERPL CALCULATED.3IONS-SCNC: 13 MMOL/L (ref 3–16)
AST SERPL-CCNC: 13 U/L (ref 15–37)
BILIRUB SERPL-MCNC: 0.4 MG/DL (ref 0–1)
BUN SERPL-MCNC: 27 MG/DL (ref 7–20)
CALCIUM SERPL-MCNC: 8.8 MG/DL (ref 8.3–10.6)
CHLORIDE SERPL-SCNC: 103 MMOL/L (ref 99–110)
CO2 SERPL-SCNC: 27 MMOL/L (ref 21–32)
CREAT SERPL-MCNC: 1.5 MG/DL (ref 0.6–1.2)
GFR SERPLBLD CREATININE-BSD FMLA CKD-EPI: 36 ML/MIN/{1.73_M2}
GLUCOSE SERPL-MCNC: 99 MG/DL (ref 70–99)
NT-PROBNP SERPL-MCNC: 4566 PG/ML (ref 0–449)
POTASSIUM SERPL-SCNC: 3.8 MMOL/L (ref 3.5–5.1)
PROT SERPL-MCNC: 6.7 G/DL (ref 6.4–8.2)
SODIUM SERPL-SCNC: 143 MMOL/L (ref 136–145)

## 2025-03-28 PROCEDURE — G2211 COMPLEX E/M VISIT ADD ON: HCPCS | Performed by: NURSE PRACTITIONER

## 2025-03-28 PROCEDURE — 1159F MED LIST DOCD IN RCRD: CPT | Performed by: NURSE PRACTITIONER

## 2025-03-28 PROCEDURE — 99214 OFFICE O/P EST MOD 30 MIN: CPT | Performed by: NURSE PRACTITIONER

## 2025-03-28 PROCEDURE — 3078F DIAST BP <80 MM HG: CPT | Performed by: NURSE PRACTITIONER

## 2025-03-28 PROCEDURE — 1123F ACP DISCUSS/DSCN MKR DOCD: CPT | Performed by: NURSE PRACTITIONER

## 2025-03-28 PROCEDURE — 3077F SYST BP >= 140 MM HG: CPT | Performed by: NURSE PRACTITIONER

## 2025-03-28 RX ORDER — AMLODIPINE BESYLATE 10 MG/1
10 TABLET ORAL DAILY
Qty: 90 TABLET | Refills: 3 | Status: SHIPPED | OUTPATIENT
Start: 2025-03-28

## 2025-03-28 RX ORDER — METOPROLOL TARTRATE 75 MG/1
1 TABLET ORAL 2 TIMES DAILY
Qty: 180 TABLET | Refills: 3 | Status: SHIPPED | OUTPATIENT
Start: 2025-03-28

## 2025-03-28 ASSESSMENT — PATIENT HEALTH QUESTIONNAIRE - PHQ9
1. LITTLE INTEREST OR PLEASURE IN DOING THINGS: NEARLY EVERY DAY
4. FEELING TIRED OR HAVING LITTLE ENERGY: MORE THAN HALF THE DAYS
3. TROUBLE FALLING OR STAYING ASLEEP: MORE THAN HALF THE DAYS
6. FEELING BAD ABOUT YOURSELF - OR THAT YOU ARE A FAILURE OR HAVE LET YOURSELF OR YOUR FAMILY DOWN: MORE THAN HALF THE DAYS
SUM OF ALL RESPONSES TO PHQ QUESTIONS 1-9: 14
8. MOVING OR SPEAKING SO SLOWLY THAT OTHER PEOPLE COULD HAVE NOTICED. OR THE OPPOSITE, BEING SO FIGETY OR RESTLESS THAT YOU HAVE BEEN MOVING AROUND A LOT MORE THAN USUAL: NOT AT ALL
SUM OF ALL RESPONSES TO PHQ QUESTIONS 1-9: 14
9. THOUGHTS THAT YOU WOULD BE BETTER OFF DEAD, OR OF HURTING YOURSELF: MORE THAN HALF THE DAYS
SUM OF ALL RESPONSES TO PHQ QUESTIONS 1-9: 12
10. IF YOU CHECKED OFF ANY PROBLEMS, HOW DIFFICULT HAVE THESE PROBLEMS MADE IT FOR YOU TO DO YOUR WORK, TAKE CARE OF THINGS AT HOME, OR GET ALONG WITH OTHER PEOPLE: SOMEWHAT DIFFICULT
7. TROUBLE CONCENTRATING ON THINGS, SUCH AS READING THE NEWSPAPER OR WATCHING TELEVISION: NOT AT ALL
2. FEELING DOWN, DEPRESSED OR HOPELESS: MORE THAN HALF THE DAYS
SUM OF ALL RESPONSES TO PHQ QUESTIONS 1-9: 14
5. POOR APPETITE OR OVEREATING: SEVERAL DAYS

## 2025-03-28 ASSESSMENT — ENCOUNTER SYMPTOMS
GASTROINTESTINAL NEGATIVE: 1
SHORTNESS OF BREATH: 0

## 2025-03-28 NOTE — PROGRESS NOTES
SUBJECTIVE:    Patient ID: Stacy Ly is a 79 y.o. female.    CC: COPD exacerbation, CHF exacerbation, atrial fibrillation and flutter, hypertension, chronic kidney disease, leg swelling and weeping    HPI: The patient presents to the office today for follow-up of chronic medical conditions.  She has been lost to follow-up for office appointments for some time.    Patient has been hospitalized recently with COPD exacerbation and CHF exacerbation.  She was treated acutely and discharged to an extended care facility for rehabilitation.    Patient has been home for the past few weeks.  She reports that her legs have been more swollen.  She was previously taking Lasix every other day as directed by her cardiologist/nephrologist.  Because her right leg was weeping, she increase this to daily a few days ago.  She has not yet noticed significant improvement.  She denies any worsening chest pain or shortness of breath.  She does not check her weight at home.    Overall, the patient reports she is weak.  She was better in the rehab and sleeping in bed at night.  She is now sleeping in a wheelchair at home.  She is able to reposition seating to seating but otherwise not doing much ambulation.      Past Medical History:   Diagnosis Date    Arthritis     Atrial fibrillation and flutter (HCC)     Hypertension     Infection due to parainfluenza virus 3 5/11/2023    Kidney disease     Pt states  \"stage 3\"    Pneumonia     Sleep apnea     no c-pap        Current Outpatient Medications   Medication Sig Dispense Refill    Metoprolol Tartrate 75 MG TABS Take 1 tablet by mouth 2 times daily 180 tablet 3    apixaban (ELIQUIS) 5 MG TABS tablet Take 1 tablet by mouth 2 times daily 180 tablet 3    amLODIPine (NORVASC) 10 MG tablet Take 1 tablet by mouth daily 90 tablet 3    cloNIDine (CATAPRES) 0.2 MG tablet TAKE 1 TABLET TWICE A DAY (Patient taking differently: Once a day) 180 tablet 0    furosemide (LASIX) 20 MG tablet Take 1

## 2025-04-09 ENCOUNTER — RESULTS FOLLOW-UP (OUTPATIENT)
Dept: INTERNAL MEDICINE CLINIC | Age: 80
End: 2025-04-09

## 2025-04-09 ENCOUNTER — TELEPHONE (OUTPATIENT)
Dept: INTERNAL MEDICINE CLINIC | Age: 80
End: 2025-04-09

## 2025-04-09 NOTE — TELEPHONE ENCOUNTER
Patient called with lab results. She would like to know if she should be concerned about the BNP numbers or if there is anything that she can be doing to get them back to normal.

## 2025-04-09 NOTE — TELEPHONE ENCOUNTER
This is a marker for heart failure which is a known problem.  The lab is improved from previous.  Resumed Lasix hopefully will help as well

## 2025-04-22 ENCOUNTER — HOSPITAL ENCOUNTER (INPATIENT)
Age: 80
LOS: 10 days | Discharge: HOME | End: 2025-05-02
Attending: INTERNAL MEDICINE | Admitting: INTERNAL MEDICINE
Payer: COMMERCIAL

## 2025-04-22 ENCOUNTER — APPOINTMENT (OUTPATIENT)
Dept: CT IMAGING | Age: 80
End: 2025-04-22
Payer: COMMERCIAL

## 2025-04-22 ENCOUNTER — APPOINTMENT (OUTPATIENT)
Dept: GENERAL RADIOLOGY | Age: 80
End: 2025-04-22
Payer: COMMERCIAL

## 2025-04-22 DIAGNOSIS — R53.1 GENERAL WEAKNESS: ICD-10-CM

## 2025-04-22 DIAGNOSIS — A41.9 SEVERE SEPSIS (HCC): ICD-10-CM

## 2025-04-22 DIAGNOSIS — R26.2 DIFFICULTY WALKING: ICD-10-CM
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
## 2025-04-22 DIAGNOSIS — K92.1 MELENA: ICD-10-CM

## 2025-04-22 DIAGNOSIS — L03.90 CELLULITIS OF SKIN WITH LYMPHANGITIS: Primary | ICD-10-CM

## 2025-04-22 DIAGNOSIS — R65.20 SEVERE SEPSIS (HCC): ICD-10-CM

## 2025-04-22 DIAGNOSIS — I48.91 ATRIAL FIBRILLATION WITH RVR (HCC): ICD-10-CM

## 2025-04-22 DIAGNOSIS — R60.0 PERIPHERAL EDEMA: ICD-10-CM

## 2025-04-22 PROBLEM — R65.21 SEPTIC SHOCK (HCC): Status: ACTIVE | Noted: 2025-04-22

## 2025-04-22 PROBLEM — E66.09 CLASS 2 OBESITY DUE TO EXCESS CALORIES WITH BODY MASS INDEX (BMI) OF 37.0 TO 37.9 IN ADULT: Status: ACTIVE | Noted: 2018-02-11

## 2025-04-22 PROBLEM — E87.20 LACTIC ACIDOSIS: Status: ACTIVE | Noted: 2025-04-22

## 2025-04-22 PROBLEM — I48.20 CHRONIC ATRIAL FIBRILLATION (HCC): Status: ACTIVE | Noted: 2025-04-22

## 2025-04-22 LAB
ALBUMIN SERPL-MCNC: 3.2 G/DL (ref 3.4–5)
ALBUMIN/GLOB SERPL: 0.9 {RATIO} (ref 1.1–2.2)
ALP SERPL-CCNC: 99 U/L (ref 40–129)
ALT SERPL-CCNC: 10 U/L (ref 10–40)
ANION GAP SERPL CALCULATED.3IONS-SCNC: 17 MMOL/L (ref 3–16)
ANISOCYTOSIS BLD QL SMEAR: ABNORMAL
APTT BLD: 28.5 SEC (ref 22.1–36.4)
AST SERPL-CCNC: 23 U/L (ref 15–37)
BACTERIA URNS QL MICRO: ABNORMAL /HPF
BASE EXCESS BLDV CALC-SCNC: -9.1 MMOL/L (ref -3–3)
BASOPHILS # BLD: 0 K/UL (ref 0–0.2)
BASOPHILS NFR BLD: 0 %
BILIRUB SERPL-MCNC: 0.6 MG/DL (ref 0–1)
BILIRUB UR QL STRIP.AUTO: ABNORMAL
BUN SERPL-MCNC: 55 MG/DL (ref 7–20)
CALCIUM SERPL-MCNC: 8 MG/DL (ref 8.3–10.6)
CHLORIDE SERPL-SCNC: 103 MMOL/L (ref 99–110)
CLARITY UR: CLEAR
CO2 BLDV-SCNC: 43 MMOL/L
CO2 SERPL-SCNC: 18 MMOL/L (ref 21–32)
COHGB MFR BLDV: 3 % (ref 0–1.5)
COLOR UR: ABNORMAL
CREAT SERPL-MCNC: 2.5 MG/DL (ref 0.6–1.2)
CREAT UR-MCNC: 151 MG/DL (ref 28–259)
CRP SERPL-MCNC: 241 MG/L (ref 0–5.1)
DEPRECATED RDW RBC AUTO: 17.4 % (ref 12.4–15.4)
DO-HGB MFR BLDV: 26 %
EKG DIAGNOSIS: NORMAL
EKG Q-T INTERVAL: 306 MS
EKG QRS DURATION: 80 MS
EKG QTC CALCULATION (BAZETT): 410 MS
EKG R AXIS: 15 DEGREES
EKG T AXIS: 66 DEGREES
EKG VENTRICULAR RATE: 108 BPM
EOSINOPHIL # BLD: 0 K/UL (ref 0–0.6)
EOSINOPHIL NFR BLD: 0 %
EPI CELLS #/AREA URNS AUTO: 3 /HPF (ref 0–5)
ERYTHROCYTE [SEDIMENTATION RATE] IN BLOOD BY WESTERGREN METHOD: 83 MM/HR (ref 0–30)
GFR SERPLBLD CREATININE-BSD FMLA CKD-EPI: 19 ML/MIN/{1.73_M2}
GLUCOSE SERPL-MCNC: 165 MG/DL (ref 70–99)
GLUCOSE UR STRIP.AUTO-MCNC: NEGATIVE MG/DL
HCO3 BLDV-SCNC: 17.9 MMOL/L (ref 23–29)
HCT VFR BLD AUTO: 46.6 % (ref 36–48)
HGB BLD-MCNC: 14.9 G/DL (ref 12–16)
HGB UR QL STRIP.AUTO: NEGATIVE
HYALINE CASTS #/AREA URNS AUTO: 3 /LPF (ref 0–8)
INR PPP: 1.38 (ref 0.85–1.15)
KETONES UR STRIP.AUTO-MCNC: ABNORMAL MG/DL
LACTATE BLDV-SCNC: 2.6 MMOL/L (ref 0.4–1.9)
LACTATE BLDV-SCNC: 4.2 MMOL/L (ref 0.4–1.9)
LEUKOCYTE ESTERASE UR QL STRIP.AUTO: ABNORMAL
LYMPHOCYTES # BLD: 1.7 K/UL (ref 1–5.1)
LYMPHOCYTES NFR BLD: 8 %
MAGNESIUM SERPL-MCNC: 1.82 MG/DL (ref 1.8–2.4)
MCH RBC QN AUTO: 29.3 PG (ref 26–34)
MCHC RBC AUTO-ENTMCNC: 31.9 G/DL (ref 31–36)
MCV RBC AUTO: 91.7 FL (ref 80–100)
METHGB MFR BLDV: 0.9 %
MONOCYTES # BLD: 0.4 K/UL (ref 0–1.3)
MONOCYTES NFR BLD: 2 %
NEUTROPHILS # BLD: 19.5 K/UL (ref 1.7–7.7)
NEUTROPHILS NFR BLD: 89 %
NEUTS BAND NFR BLD MANUAL: 1 % (ref 0–7)
NITRITE UR QL STRIP.AUTO: NEGATIVE
NT-PROBNP SERPL-MCNC: ABNORMAL PG/ML (ref 0–449)
O2 CT VFR BLDV CALC: 15 VOL %
O2 THERAPY: ABNORMAL
PCO2 BLDV: 41.7 MMHG (ref 40–50)
PH BLDV: 7.24 [PH] (ref 7.35–7.45)
PH UR STRIP.AUTO: 5 [PH] (ref 5–8)
PLATELET # BLD AUTO: 172 K/UL (ref 135–450)
PLATELET BLD QL SMEAR: ADEQUATE
PMV BLD AUTO: 11.4 FL (ref 5–10.5)
PO2 BLDV: 42.9 MMHG (ref 25–40)
POTASSIUM SERPL-SCNC: 4.4 MMOL/L (ref 3.5–5.1)
PROCALCITONIN SERPL IA-MCNC: 1.51 NG/ML (ref 0–0.15)
PROT SERPL-MCNC: 6.6 G/DL (ref 6.4–8.2)
PROT UR STRIP.AUTO-MCNC: 30 MG/DL
PROT UR-MCNC: 31.2 MG/DL
PROT/CREAT UR-RTO: 0.2 MG/DL
PROTHROMBIN TIME: 17.2 SEC (ref 11.9–14.9)
RBC # BLD AUTO: 5.08 M/UL (ref 4–5.2)
RBC CLUMPS #/AREA URNS AUTO: 2 /HPF (ref 0–4)
REASON FOR REJECTION: NORMAL
REASON FOR REJECTION: NORMAL
REJECTED TEST: NORMAL
REJECTED TEST: NORMAL
SAO2 % BLDV: 73 %
SLIDE REVIEW: ABNORMAL
SODIUM SERPL-SCNC: 138 MMOL/L (ref 136–145)
SP GR UR STRIP.AUTO: 1.01 (ref 1–1.03)
TROPONIN, HIGH SENSITIVITY: 43 NG/L (ref 0–14)
TROPONIN, HIGH SENSITIVITY: 52 NG/L (ref 0–14)
UA COMPLETE W REFLEX CULTURE PNL UR: ABNORMAL
UA DIPSTICK W REFLEX MICRO PNL UR: YES
URN SPEC COLLECT METH UR: ABNORMAL
UROBILINOGEN UR STRIP-ACNC: 1 E.U./DL
WBC # BLD AUTO: 21.7 K/UL (ref 4–11)
WBC #/AREA URNS AUTO: 7 /HPF (ref 0–5)

## 2025-04-22 PROCEDURE — 93005 ELECTROCARDIOGRAM TRACING: CPT | Performed by: PHYSICIAN ASSISTANT

## 2025-04-22 PROCEDURE — 6360000002 HC RX W HCPCS: Performed by: PHYSICIAN ASSISTANT

## 2025-04-22 PROCEDURE — 6370000000 HC RX 637 (ALT 250 FOR IP): Performed by: INTERNAL MEDICINE

## 2025-04-22 PROCEDURE — 96365 THER/PROPH/DIAG IV INF INIT: CPT

## 2025-04-22 PROCEDURE — 99291 CRITICAL CARE FIRST HOUR: CPT | Performed by: INTERNAL MEDICINE

## 2025-04-22 PROCEDURE — 85610 PROTHROMBIN TIME: CPT

## 2025-04-22 PROCEDURE — 85652 RBC SED RATE AUTOMATED: CPT

## 2025-04-22 PROCEDURE — 99223 1ST HOSP IP/OBS HIGH 75: CPT | Performed by: INTERNAL MEDICINE

## 2025-04-22 PROCEDURE — 83735 ASSAY OF MAGNESIUM: CPT

## 2025-04-22 PROCEDURE — 85025 COMPLETE CBC W/AUTO DIFF WBC: CPT

## 2025-04-22 PROCEDURE — 2580000003 HC RX 258: Performed by: INTERNAL MEDICINE

## 2025-04-22 PROCEDURE — 83880 ASSAY OF NATRIURETIC PEPTIDE: CPT

## 2025-04-22 PROCEDURE — 6360000002 HC RX W HCPCS: Performed by: INTERNAL MEDICINE

## 2025-04-22 PROCEDURE — 85730 THROMBOPLASTIN TIME PARTIAL: CPT

## 2025-04-22 PROCEDURE — 87040 BLOOD CULTURE FOR BACTERIA: CPT

## 2025-04-22 PROCEDURE — 84145 PROCALCITONIN (PCT): CPT

## 2025-04-22 PROCEDURE — 36415 COLL VENOUS BLD VENIPUNCTURE: CPT

## 2025-04-22 PROCEDURE — 87641 MR-STAPH DNA AMP PROBE: CPT

## 2025-04-22 PROCEDURE — 84484 ASSAY OF TROPONIN QUANT: CPT

## 2025-04-22 PROCEDURE — 2500000003 HC RX 250 WO HCPCS: Performed by: INTERNAL MEDICINE

## 2025-04-22 PROCEDURE — 72131 CT LUMBAR SPINE W/O DYE: CPT

## 2025-04-22 PROCEDURE — 71045 X-RAY EXAM CHEST 1 VIEW: CPT

## 2025-04-22 PROCEDURE — 2700000000 HC OXYGEN THERAPY PER DAY

## 2025-04-22 PROCEDURE — 93010 ELECTROCARDIOGRAM REPORT: CPT | Performed by: INTERNAL MEDICINE

## 2025-04-22 PROCEDURE — 84156 ASSAY OF PROTEIN URINE: CPT

## 2025-04-22 PROCEDURE — 2000000000 HC ICU R&B

## 2025-04-22 PROCEDURE — 82803 BLOOD GASES ANY COMBINATION: CPT

## 2025-04-22 PROCEDURE — 94761 N-INVAS EAR/PLS OXIMETRY MLT: CPT

## 2025-04-22 PROCEDURE — 80053 COMPREHEN METABOLIC PANEL: CPT

## 2025-04-22 PROCEDURE — 73502 X-RAY EXAM HIP UNI 2-3 VIEWS: CPT

## 2025-04-22 PROCEDURE — 81001 URINALYSIS AUTO W/SCOPE: CPT

## 2025-04-22 PROCEDURE — 94640 AIRWAY INHALATION TREATMENT: CPT

## 2025-04-22 PROCEDURE — 82570 ASSAY OF URINE CREATININE: CPT

## 2025-04-22 PROCEDURE — 2580000003 HC RX 258: Performed by: PHYSICIAN ASSISTANT

## 2025-04-22 PROCEDURE — 83605 ASSAY OF LACTIC ACID: CPT

## 2025-04-22 PROCEDURE — 72128 CT CHEST SPINE W/O DYE: CPT

## 2025-04-22 PROCEDURE — 99285 EMERGENCY DEPT VISIT HI MDM: CPT

## 2025-04-22 PROCEDURE — 86140 C-REACTIVE PROTEIN: CPT

## 2025-04-22 RX ORDER — NOREPINEPHRINE BITARTRATE 0.06 MG/ML
1-100 INJECTION, SOLUTION INTRAVENOUS CONTINUOUS
Status: DISCONTINUED | OUTPATIENT
Start: 2025-04-22 | End: 2025-04-24

## 2025-04-22 RX ORDER — ALBUTEROL SULFATE 5 MG/ML
2.5 SOLUTION RESPIRATORY (INHALATION) EVERY 6 HOURS PRN
Status: DISCONTINUED | OUTPATIENT
Start: 2025-04-22 | End: 2025-05-02 | Stop reason: HOSPADM

## 2025-04-22 RX ORDER — ACETAMINOPHEN 325 MG/1
650 TABLET ORAL EVERY 6 HOURS PRN
Status: DISCONTINUED | OUTPATIENT
Start: 2025-04-22 | End: 2025-05-02 | Stop reason: HOSPADM

## 2025-04-22 RX ORDER — ONDANSETRON 4 MG/1
4 TABLET, ORALLY DISINTEGRATING ORAL EVERY 8 HOURS PRN
Status: DISCONTINUED | OUTPATIENT
Start: 2025-04-22 | End: 2025-05-02 | Stop reason: HOSPADM

## 2025-04-22 RX ORDER — CIPROFLOXACIN 2 MG/ML
400 INJECTION, SOLUTION INTRAVENOUS EVERY 24 HOURS
Status: DISCONTINUED | OUTPATIENT
Start: 2025-04-22 | End: 2025-04-29

## 2025-04-22 RX ORDER — IPRATROPIUM BROMIDE AND ALBUTEROL SULFATE 2.5; .5 MG/3ML; MG/3ML
1 SOLUTION RESPIRATORY (INHALATION)
Status: DISCONTINUED | OUTPATIENT
Start: 2025-04-22 | End: 2025-04-26

## 2025-04-22 RX ORDER — LINEZOLID 2 MG/ML
600 INJECTION, SOLUTION INTRAVENOUS EVERY 12 HOURS
Status: DISCONTINUED | OUTPATIENT
Start: 2025-04-22 | End: 2025-04-25

## 2025-04-22 RX ORDER — ACETAMINOPHEN 650 MG/1
650 SUPPOSITORY RECTAL EVERY 6 HOURS PRN
Status: DISCONTINUED | OUTPATIENT
Start: 2025-04-22 | End: 2025-05-02 | Stop reason: HOSPADM

## 2025-04-22 RX ORDER — ALENDRONATE SODIUM 70 MG/1
70 TABLET ORAL
COMMUNITY

## 2025-04-22 RX ORDER — SODIUM CHLORIDE 0.9 % (FLUSH) 0.9 %
5-40 SYRINGE (ML) INJECTION EVERY 12 HOURS SCHEDULED
Status: DISCONTINUED | OUTPATIENT
Start: 2025-04-22 | End: 2025-05-02 | Stop reason: HOSPADM

## 2025-04-22 RX ORDER — POLYETHYLENE GLYCOL 3350 17 G/17G
17 POWDER, FOR SOLUTION ORAL DAILY PRN
Status: DISCONTINUED | OUTPATIENT
Start: 2025-04-22 | End: 2025-05-02 | Stop reason: HOSPADM

## 2025-04-22 RX ORDER — 0.9 % SODIUM CHLORIDE 0.9 %
30 INTRAVENOUS SOLUTION INTRAVENOUS ONCE
Status: COMPLETED | OUTPATIENT
Start: 2025-04-22 | End: 2025-04-22

## 2025-04-22 RX ORDER — SODIUM CHLORIDE 0.9 % (FLUSH) 0.9 %
5-40 SYRINGE (ML) INJECTION PRN
Status: DISCONTINUED | OUTPATIENT
Start: 2025-04-22 | End: 2025-05-02 | Stop reason: HOSPADM

## 2025-04-22 RX ORDER — IPRATROPIUM BROMIDE AND ALBUTEROL SULFATE 2.5; .5 MG/3ML; MG/3ML
1 SOLUTION RESPIRATORY (INHALATION)
Status: DISCONTINUED | OUTPATIENT
Start: 2025-04-22 | End: 2025-04-22

## 2025-04-22 RX ORDER — ONDANSETRON 2 MG/ML
4 INJECTION INTRAMUSCULAR; INTRAVENOUS EVERY 6 HOURS PRN
Status: DISCONTINUED | OUTPATIENT
Start: 2025-04-22 | End: 2025-05-02 | Stop reason: HOSPADM

## 2025-04-22 RX ORDER — OSELTAMIVIR PHOSPHATE 30 MG/1
30 CAPSULE ORAL DAILY
Status: DISCONTINUED | OUTPATIENT
Start: 2025-04-22 | End: 2025-04-24

## 2025-04-22 RX ORDER — DIPHENHYDRAMINE HYDROCHLORIDE 50 MG/ML
25 INJECTION, SOLUTION INTRAMUSCULAR; INTRAVENOUS EVERY 6 HOURS PRN
Status: DISCONTINUED | OUTPATIENT
Start: 2025-04-22 | End: 2025-05-02 | Stop reason: HOSPADM

## 2025-04-22 RX ORDER — SODIUM CHLORIDE 9 MG/ML
INJECTION, SOLUTION INTRAVENOUS PRN
Status: DISCONTINUED | OUTPATIENT
Start: 2025-04-22 | End: 2025-05-02 | Stop reason: HOSPADM

## 2025-04-22 RX ADMIN — Medication 10 ML: at 21:18

## 2025-04-22 RX ADMIN — SODIUM CHLORIDE 1500 MG: 0.9 INJECTION, SOLUTION INTRAVENOUS at 13:29

## 2025-04-22 RX ADMIN — LINEZOLID 600 MG: 2 INJECTION, SOLUTION INTRAVENOUS at 21:03

## 2025-04-22 RX ADMIN — APIXABAN 5 MG: 5 TABLET, FILM COATED ORAL at 14:25

## 2025-04-22 RX ADMIN — CIPROFLOXACIN 400 MG: 400 INJECTION, SOLUTION INTRAVENOUS at 21:04

## 2025-04-22 RX ADMIN — DIPHENHYDRAMINE HYDROCHLORIDE 25 MG: 50 INJECTION INTRAMUSCULAR; INTRAVENOUS at 22:15

## 2025-04-22 RX ADMIN — CEFEPIME 2000 MG: 2 INJECTION, POWDER, FOR SOLUTION INTRAVENOUS at 11:33

## 2025-04-22 RX ADMIN — Medication 5 MCG/MIN: at 16:08

## 2025-04-22 RX ADMIN — ACETAMINOPHEN 650 MG: 325 TABLET ORAL at 19:28

## 2025-04-22 RX ADMIN — IPRATROPIUM BROMIDE AND ALBUTEROL SULFATE 1 DOSE: .5; 3 SOLUTION RESPIRATORY (INHALATION) at 20:52

## 2025-04-22 RX ADMIN — APIXABAN 5 MG: 5 TABLET, FILM COATED ORAL at 21:18

## 2025-04-22 RX ADMIN — SODIUM CHLORIDE 1848 ML: 0.9 INJECTION, SOLUTION INTRAVENOUS at 11:31

## 2025-04-22 ASSESSMENT — LIFESTYLE VARIABLES
HOW MANY STANDARD DRINKS CONTAINING ALCOHOL DO YOU HAVE ON A TYPICAL DAY: PATIENT DOES NOT DRINK
HOW OFTEN DO YOU HAVE A DRINK CONTAINING ALCOHOL: NEVER
HOW OFTEN DO YOU HAVE A DRINK CONTAINING ALCOHOL: NEVER
HOW MANY STANDARD DRINKS CONTAINING ALCOHOL DO YOU HAVE ON A TYPICAL DAY: PATIENT DOES NOT DRINK

## 2025-04-22 ASSESSMENT — PAIN SCALES - GENERAL: PAINLEVEL_OUTOF10: 8

## 2025-04-22 ASSESSMENT — PAIN - FUNCTIONAL ASSESSMENT: PAIN_FUNCTIONAL_ASSESSMENT: 0-10

## 2025-04-22 NOTE — PLAN OF CARE
Problem: ABCDS Injury Assessment  Goal: Absence of physical injury  Outcome: Progressing     Problem: Skin/Tissue Integrity  Goal: Skin integrity remains intact  Description: 1.  Monitor for areas of redness and/or skin breakdown2.  Assess vascular access sites hourly3.  Every 4-6 hours minimum:  Change oxygen saturation probe site4.  Every 4-6 hours:  If on nasal continuous positive airway pressure, respiratory therapy assess nares and determine need for appliance change or resting period  Outcome: Progressing  Flowsheets (Taken 4/22/2025 1700)  Skin Integrity Remains Intact: Monitor for areas of redness and/or skin breakdown     Problem: Chronic Conditions and Co-morbidities  Goal: Patient's chronic conditions and co-morbidity symptoms are monitored and maintained or improved  Outcome: Progressing     Problem: Pain  Goal: Verbalizes/displays adequate comfort level or baseline comfort level  Outcome: Progressing     Problem: Safety - Adult  Goal: Free from fall injury  Outcome: Progressing

## 2025-04-22 NOTE — PROGRESS NOTES
Patient admitted from emergency department via stretcher on monitor. Transferred to CVU bed.  Placed on monitors.  Vital signs obtained.  Orders reviewed and acknowledged. Admission completed.  Oriented to room, call light and environment.  Questions answered.  Bed placed in low position. Call light explained and within reach. Redness noted to bilateral legs, worse on the right lower.  Redness noted to bilateral hips her entire coccyx. Small open area to the right gluteal fold, and inner right buttocks, purple in color, nonblanchable. INDU ANDINO RN

## 2025-04-22 NOTE — CONSULTS
Nephrology Consult Note                                                                                                                                                                                                                                                                                                                                                               Office : [phone]     Fax :[phone]    Patient's Name: Stacy Ly    Reason for Consult:  JUSTINA on CKD 3  Requesting Physician:  Tarik Hummel, MARILEE - CNP  Chief Complaint:    Chief Complaint   Patient presents with    Leg Swelling     Pt brought in per [facility] from home, pt has multiple complaints.  Pt slid out of her recliner this am trying to stand, pt has been home from rehab x 1 mos, unable to walk, can only stand however she is weak when standing.  Pt reports increased redness to her right lower leg, swelling present, pt states redness is worse, swelling is unchanged.  Pt also reports diarrhea this am.        Assessment/Plan     # JUSTINA on CKD 3  - Baseline Cr ~ 1.5. Creatinine 2.1 today.   - likely 2/2 sepsis  - UPCR with minimal proteinuria  - BNP ~ 13k  - lasix every other day at home   - EF 55-60% from 02/14  - Renal US - Unremarkable 02/14     #Sepsis  - 2/2 cellulitis of R leg   - cultures pending  - abx per primary    #HTN  - now hypotensive requiring pressor support  - monitor     # COPD   - Oxygen dependent   - Acute exacerbation      # A fib  - eliquis and toprol     History of Present Ilness:    Stacy Ly is a 79 y.o. female with pMH of HTN, CHF, COPD, GIANNA, A-fib, and CKD 3 who presents to the emergency department from home with reports of generalized weakness and fatigue.  This has been ongoing for several weeks.  She was released from rehab facility 1 month ago and has been home ever since however has not been thriving.  She states that she was so weak when she went to stand from her recliner today she

## 2025-04-22 NOTE — ED PROVIDER NOTES
[facility] EMERGENCY DEPARTMENT  EMERGENCY DEPARTMENT ENCOUNTER        Pt Name: Stacy Ly  MRN: [id number]  Birthdate 1945  Date of evaluation: 4/22/2025  Provider: Angi Lind PA-C  PCP: Tarik Hummel, MARILEE - CNP  Note Started: 9:49 AM EDT 4/22/25       I have seen and evaluated this patient with my supervising physician Darren Carr DO.      CHIEF COMPLAINT       Chief Complaint   Patient presents with    Leg Swelling     Pt brought in per [facility] from home, pt has multiple complaints.  Pt slid out of her recliner this am trying to stand, pt has been home from rehab x 1 mos, unable to walk, can only stand however she is weak when standing.  Pt reports increased redness to her right lower leg, swelling present, pt states redness is worse, swelling is unchanged.  Pt also reports diarrhea this am.        HISTORY OF PRESENT ILLNESS: 1 or more Elements     History From: patient  Limitations to history : None    Stacy Ly is a 79 y.o. female who presents to the emergency department from home with reports of generalized weakness and fatigue.  This has been ongoing for several weeks.  She was released from rehab facility 1 month ago and has been home ever since however has not been thriving.  She states that she was so weak when she went to stand from her recliner today she slid down to the ground.  She denies any injury, head trauma or loss of consciousness.  She was able to call EMS to the scene.  She does have chronic edema and erythema to bilateral lower legs but states that the erythema in the right leg has gotten much worse and is now traveling up the right leg.    Nursing Notes were all reviewed and agreed with or any disagreements were addressed in the HPI.    REVIEW OF SYSTEMS :      Review of Systems   Constitutional:  Positive for fatigue. Negative for chills and fever.   HENT: Negative.     Eyes:  Negative for visual disturbance.   Respiratory:  Negative for  or suspected to move forward with diagnosis of sepsis.    Must meet 2:    [] Temperature > 100.9 F (38.3 C)        or < 96.8 F (36 C)  [] HR > 90  [] RR > 20  [] WBC > 12 or < 4 or 10% bands      AND:      [] Infection Confirmed or        Suspected.     Must meet 1:    [] Lactate > 2       or   [] Signs of Organ Dysfunction:    - SBP < 90 or MAP < 65  - Altered mental status  - Creatinine > 2 or increased from      baseline  - Urine Output < 0.5 ml/kg/hr  - Bilirubin > 2  - INR > 1.5 (not anticoagulated)  - Platelets < 100,000  - Acute Respiratory Failure as     evidenced by new need for NIPPV     or mechanical ventilation      [] No criteria met for Severe Sepsis.   Must meet 1:    [] Lactate > 4        or   [] SBP < 90 or MAP < 65 for at        least two readings in the first        hour after fluid bolus        administration      [] Vasopressors initiated (if hypotension persists after fluid resuscitation)        [] No criteria met for Septic Shock.   Patient Vitals for the past 6 hrs:   BP Temp Pulse Resp SpO2 Height Weight Weight Method Percent Weight Change   04/22/25 0934 (!) 124/49 (!) 96.7 °F (35.9 °C) (!) 114 24 96 % 1.702 m (5' 7\") 108.9 kg (240 lb) Stated 0   04/22/25 0943 133/87 -- -- 21 -- -- -- -- --   04/22/25 0956 -- -- (!) 102 18 98 % -- -- -- --   04/22/25 1003 96/69 -- 99 20 -- -- -- -- --   04/22/25 1033 (!) 99/55 -- (!) 104 17 94 % -- -- -- --   04/22/25 1103 99/82 -- (!) 102 19 -- -- -- -- --      Recent Labs     04/22/25  1003 04/22/25  1025   WBC 21.7*  --    CREATININE  --  2.5*   BILITOT  --  0.6   INR  --  1.38*     --          Time Severe Sepsis Identified: 1100    Fluid Resuscitation Rational: at least 30mL/kg based on ideal body weight due to obesity defined as BMI >30 (patient's BMI is Body mass index is 37.59 kg/m². and IBW is Ideal body weight: 61.6 kg (135 lb 12.9 oz)Adjusted ideal body weight: 80.5 kg (177 lb 7.7 oz))      Repeat lactate level: ordered and pending at this

## 2025-04-22 NOTE — RT PROTOCOL NOTE
RT Nebulizer Bronchodilator Protocol Note    There is a bronchodilator order in the chart from a provider indicating to follow the RT Bronchodilator Protocol and there is an “Initiate RT Bronchodilator Protocol” order as well (see protocol at bottom of note).    CXR Findings:  XR CHEST PORTABLE  Result Date: 4/22/2025  No acute process.       The findings from the last RT Protocol Assessment were as follows:  Smoking: Chronic pulmonary disease  Respiratory Pattern: Dyspnea on exertion or RR 21-25 bpm  Breath Sounds: Slightly diminished and/or crackles  Cough: Weak, non-productive  Indication for Bronchodilator Therapy:    Bronchodilator Assessment Score: 9    Aerosolized bronchodilator medication orders have been revised according to the RT Nebulizer Bronchodilator Protocol below.    Respiratory Therapist to perform RT Therapy Protocol Assessment initially then follow the protocol.  Repeat RT Therapy Protocol Assessment PRN for score 0-3 or on second treatment, BID, and PRN for scores above 3.    No Indications - adjust the frequency to every 6 hours PRN wheezing or bronchospasm, if no treatments needed after 48 hours then discontinue using Per Protocol order mode.     If indication present, adjust the RT bronchodilator orders based on the Bronchodilator Assessment Score as indicated below.  If a patient is on this medication at home then do not decrease Frequency below that used at home.    0-3 - enter or revise RT bronchodilator order(s) to equivalent RT Bronchodilator order with Frequency of every 4 hours PRN for wheezing or increased work of breathing using Per Protocol order mode.       4-6 - enter or revise RT Bronchodilator order(s) to two equivalent RT bronchodilator orders with one order with BID Frequency and one order with Frequency of every 4 hours PRN wheezing or increased work of breathing using Per Protocol order mode.         7-10 - enter or revise RT Bronchodilator order(s) to two equivalent RT

## 2025-04-22 NOTE — PROGRESS NOTES
04/22/25 1704   RT Protocol   History Pulmonary Disease 2   Respiratory pattern 2   Breath sounds 2   Cough 3   Bronchodilator Assessment Score 9

## 2025-04-22 NOTE — H&P
HOSPITALISTS HISTORY AND PHYSICAL    4/22/2025 1:45 PM    Patient Information:  STACY LY is a 79 y.o. female [id number]  PCP:  Tarik Hummel APRN - CNP (Tel: [phone] )    Chief complaint:    Chief Complaint   Patient presents with    Leg Swelling     Pt brought in per [facility] from home, pt has multiple complaints.  Pt slid out of her recliner this am trying to stand, pt has been home from rehab x 1 mos, unable to walk, can only stand however she is weak when standing.  Pt reports increased redness to her right lower leg, swelling present, pt states redness is worse, swelling is unchanged.  Pt also reports diarrhea this am.         History of Present Illness:  Stacy Ly is a 79 y.o. female  with PMHx of A-fib, hypertension, ILD, chronic hypoxic respiratory failure 2/2 COPD; 2 L at baseline, CKD stage III and HFpEF who presented with generalized weakness and fatigue.  Patient reported that she was discharged from [place] about a month ago and since then she has not been doing well.  This morning when she tried to stand up from her recliner, she felt so weak; slid down to the ground.  Denied any head trauma, dizziness, loss of consciousness, chest pain, palpitation, SOB, bowel or bladder dysfunction.  Per patient's report, she has chronic BLE swelling& erythema but she has been experiencing worsening right lower extremity erythema, swelling and warmth from past 1 month.  Initial lab work showed WBCs: 21.7K, creatinine: 2.5, lactic acid: 4.2.  HST: 52, proBNP:> 13K.  Procalcitonin: 1.51. CXR negative acute pathology.  CT lumbar and thoracic spine negative for acute fractures.  X-ray pelvis negative for acute fractures      REVIEW OF SYSTEMS:   Detailed 12 point ROS obtained which were negative except what mentioned above in HPI    Past Medical History:   has a past medical history  04/22/2025 10:03 AM    MCHC 31.9 04/22/2025 10:03 AM    RDW 17.4 04/22/2025 10:03 AM     04/22/2025 10:03 AM    MPV 11.4 04/22/2025 10:03 AM     BMP:    Lab Results   Component Value Date/Time     04/22/2025 10:25 AM    K 4.4 04/22/2025 10:25 AM    K 4.3 02/13/2025 04:55 AM     04/22/2025 10:25 AM    CO2 18 04/22/2025 10:25 AM    BUN 55 04/22/2025 10:25 AM    CREATININE 2.5 04/22/2025 10:25 AM    CALCIUM 8.0 04/22/2025 10:25 AM    GFRAA 38 09/20/2022 04:14 AM    GFRAA 45 03/27/2013 01:36 PM    LABGLOM 19 04/22/2025 10:25 AM    LABGLOM 52 05/15/2023 04:35 AM    GLUCOSE 165 04/22/2025 10:25 AM     XR HIP 2-3 VW W PELVIS LEFT   Final Result   No acute fracture dislocation.         CT LUMBAR SPINE WO CONTRAST   Final Result   1. No acute fracture or traumatic malalignment.   2. S shaped scoliosis with multilevel degenerative change of the thoracic and   lumbar spine.   3. Moderate hiatal hernia.         CT THORACIC SPINE WO CONTRAST   Final Result   1. No acute fracture or traumatic malalignment.   2. S shaped scoliosis with multilevel degenerative change of the thoracic and   lumbar spine.   3. Moderate hiatal hernia.         XR CHEST PORTABLE   Final Result   No acute process.             Discussed case  with ED physician    Problem List  Principal Problem:    Sepsis (HCC)  Resolved Problems:    * No resolved hospital problems. *        Assessment/Plan:     RLE cellulitis;CRP, ESR, blood cultures, MRSA nares ordered  ID consulted; started on cefepime and Zyvox    Septic shock 2/2 above;p/w tachycardia, leukocytosis, hypotension, hypothermia lactic acidosis  S/p fluid resuscitation per sepsis protocol.  Started on pressors  Continue antibiotics per ID recs.    Hypotension; likely 2/2 severe.  S/p IV fluid resuscitation.  Transferred to the ICU  Critical care consulted; plan to start on pressors remains hypotensive    Acute on chronic diastolic heart failure  proBNP:> 13k on admission.  Last echo on

## 2025-04-22 NOTE — RT PROTOCOL NOTE
RT Inhaler-Nebulizer Bronchodilator Protocol Note    There is a bronchodilator order in the chart from a provider indicating to follow the RT Bronchodilator Protocol and there is an “Initiate RT Inhaler-Nebulizer Bronchodilator Protocol” order as well (see protocol at bottom of note).    CXR Findings:  XR CHEST PORTABLE  Result Date: 4/22/2025  No acute process.       The findings from the last RT Protocol Assessment were as follows:   History Pulmonary Disease: Chronic pulmonary disease  Respiratory Pattern: Dyspnea on exertion or RR 21-25 bpm  Breath Sounds: Slightly diminished and/or crackles  Cough: Weak, non-productive  Indication for Bronchodilator Therapy:    Bronchodilator Assessment Score: 9    Aerosolized bronchodilator medication orders have been revised according to the RT Inhaler-Nebulizer Bronchodilator Protocol below.    Respiratory Therapist to perform RT Therapy Protocol Assessment initially then follow the protocol.  Repeat RT Therapy Protocol Assessment PRN for score 0-3 or on second treatment, BID, and PRN for scores above 3.    No Indications - adjust the frequency to every 6 hours PRN wheezing or bronchospasm, if no treatments needed after 48 hours then discontinue using Per Protocol order mode.     If indication present, adjust the RT bronchodilator orders based on the Bronchodilator Assessment Score as indicated below.  Use Inhaler orders unless patient has one or more of the following: on home nebulizer, not able to hold breath for 10 seconds, is not alert and oriented, cannot activate and use MDI correctly, or respiratory rate 25 breaths per minute or more, then use the equivalent nebulizer order(s) with same Frequency and PRN reasons based on the score.  If a patient is on this medication at home then do not decrease Frequency below that used at home.    0-3 - enter or revise RT bronchodilator order(s) to equivalent RT Bronchodilator order with Frequency of every 4 hours PRN for wheezing

## 2025-04-22 NOTE — CONSULTS
Head: Normocephalic and atraumatic.      Right Ear: External ear normal. There is no impacted cerumen.      Left Ear: External ear normal. There is no impacted cerumen.      Nose: Nose normal.      Mouth/Throat:      Mouth: Mucous membranes are moist.      Pharynx: Oropharynx is clear. No oropharyngeal exudate.   Eyes:      General: No scleral icterus.        Right eye: No discharge.         Left eye: No discharge.      Conjunctiva/sclera: Conjunctivae normal.      Pupils: Pupils are equal, round, and reactive to light.   Neck:      Thyroid: No thyromegaly.   Cardiovascular:      Rate and Rhythm: Normal rate and regular rhythm.      Heart sounds: Normal heart sounds. No murmur heard.     No friction rub.   Pulmonary:      Effort: No respiratory distress.      Breath sounds: No stridor. No wheezing or rales.   Abdominal:      General: Bowel sounds are normal.      Palpations: Abdomen is soft.      Tenderness: There is no abdominal tenderness. There is no guarding or rebound.   Musculoskeletal:         General: Swelling and tenderness present. No deformity. Normal range of motion.      Cervical back: Normal range of motion and neck supple.      Right lower leg: No edema.      Left lower leg: No edema.   Lymphadenopathy:      Cervical: No cervical adenopathy.   Skin:     General: Skin is warm and dry.      Coloration: Skin is not jaundiced.      Findings: Erythema present. No bruising or rash.      Comments: Severe right leg cellulitis, see picture below   Neurological:      General: No focal deficit present.      Mental Status: She is alert and oriented to person, place, and time. Mental status is at baseline.      Motor: No abnormal muscle tone.   Psychiatric:         Mood and Affect: Mood normal.         Behavior: Behavior normal.           Lines and drains: All vascular access sites are healthy with no local erythema, discharge or tenderness.      Intake and output:     I/O last 3 completed shifts:  In: 1058.1  [I.V.:10.5; IV Piggyback:1047.7]  Out: -     Lab Data:   All available labs were reviewed by me today.     CBC:   Recent Labs     04/22/25  1003   WBC 21.7*   RBC 5.08   HGB 14.9   HCT 46.6      MCV 91.7   MCH 29.3   MCHC 31.9   RDW 17.4*   BANDSPCT 1        BMP:  Recent Labs     04/22/25  1025      K 4.4      CO2 18*   BUN 55*   CREATININE 2.5*   CALCIUM 8.0*   GLUCOSE 165*        Hepatic FunctionPanel:   Lab Results   Component Value Date/Time    ALKPHOS 99 04/22/2025 10:25 AM    ALT 10 04/22/2025 10:25 AM    AST 23 04/22/2025 10:25 AM    BILITOT 0.6 04/22/2025 10:25 AM    BILIDIR 0.2 02/13/2025 04:55 AM    IBILI 0.2 02/13/2025 04:55 AM       CPK: No results found for: \"CKTOTAL\"  ESR:   Lab Results   Component Value Date    SEDRATE 83 (H) 04/22/2025     CRP:   Lab Results   Component Value Date    .0 (H) 04/22/2025         Imaging:    All pertinent images and reports for the current visit were reviewed by me during this visit.  I reviewed the chest x-ray/CT scan/MRI images and independently interpreted the findings and results today.    XR HIP 2-3 VW W PELVIS LEFT   Final Result   No acute fracture dislocation.         CT LUMBAR SPINE WO CONTRAST   Final Result   1. No acute fracture or traumatic malalignment.   2. S shaped scoliosis with multilevel degenerative change of the thoracic and   lumbar spine.   3. Moderate hiatal hernia.         CT THORACIC SPINE WO CONTRAST   Final Result   1. No acute fracture or traumatic malalignment.   2. S shaped scoliosis with multilevel degenerative change of the thoracic and   lumbar spine.   3. Moderate hiatal hernia.         XR CHEST PORTABLE   Final Result   No acute process.             Outside records:    Labs, Microbiology, Radiology and pertinent results from Care everywhere, if available, were reviewed as a part ofthe consultation.      Problem list:       Patient Active Problem List   Diagnosis Code    Atrial fibrillation, persistent (HCC)

## 2025-04-22 NOTE — ED NOTES
Redness noted to bilateral legs, worse on the right lower  Redness noted to bilateral hips her entire coccyx  Small open area to the right gluteal fold, and inner right buttocks, purple in color, nonblanchable.   Pt was incontinent of bowel and bladder  Yazmin care completed, new linens place under pt

## 2025-04-22 NOTE — ED PROVIDER NOTES
In addition to the advanced practice provider, I personally saw Stacy Ly and performed a substantive portion of the visit including all aspects of the medical decision making.    Medical Decision Making  79-year-old female presents the ER with generalized weakness and fatigue.  Patient states that this has been going on for weeks.  She does appear to be tired and falls asleep easily but is also easily aroused.  Patient is not eating and drinking much at home.  Patient did complain of left hip pain and mid lower back pain and left hip pain. Patient was not able to stand from her recliner today and slid down to the ground.  Patient denies loss of consciousness.  Patient denies chest pain shortness of breath.  Patient denies any fever or chills.  She does have peripheral edema and erythema all along the right leg.  Heart is an irregularly irregular rhythm at a rate of 108.  Lungs are clear bilaterally.  Patient does wake up to verbal stimuli.  Temperature is low at 35.9 °C and heart rate is elevated which may be due to to atrial fibrillation or could be due to sepsis.  Patient's white blood cell count is 21.7.  Bicarb is 18 with an anion gap of 17.  Creatinine is elevated 2.5.  pH is 7.242 with a normal pCO2 level at 41.7.  Patient's initial lactic acid is 4.2 and decreased to 2.6 in the ER.  BNP is 13,138.  Procalcitonin is 1.51.  Troponin is initially 52 and dropped to 43.  Patient does have sepsis and I believe the heart rate is responding to the sepsis state.  Chest x-ray did not show any acute findings.  CT of the thoracic spine did not show any acute findings.  CT of the lumbar spine did not show any acute findings.  X-ray of the hip did not show any acute findings.  Patient was started on antibiotics.  This does appear to be coming from cellulitis of the leg.  Condition is critical.  I agree with the assessment and plan of the PA.  Discussed with the hospitalist accepted the admission.        EKG  The  Ekg interpreted by me in the absence of a cardiologist shows.  atrial fibrillation with a rate of 108  Axis is   Normal  QTc is  normal  Intervals and Durations are unremarkable.      No specific ST-T wave changes appreciated.  No evidence of acute ischemia.   No significant change from prior EKG dated 7/8/2024    SEP-1  Is this patient to be included in the SEP-1 Core Measure due to severe sepsis or septic shock?   Yes    Screenings  NIH Stroke Scale  Interval: Baseline  Level of Consciousness (1a): Alert  LOC Questions (1b): Answers both correctly  LOC Commands (1c): Performs both tasks correctly  Best Gaze (2): Normal  Visual (3): No visual loss  Facial Palsy (4): Normal symmetrical movement  Motor Arm, Left (5a): No drift  Motor Arm, Right (5b): No drift  Motor Leg, Left (6a): No drift  Motor Leg, Right (6b): No drift  Limb Ataxia (7): Absent  Sensory (8): Normal  Best Language (9): No aphasia  Dysarthria (10): Normal  Extinction and Inattention (11): No abnormality  Total: 0  [place] Coma Scale  Eye Opening: Spontaneous  Best Verbal Response: Oriented  Best Motor Response: Obeys commands  [place] Coma Scale Score: 15             Patient Referrals:  No follow-up provider specified.    Discharge Medications:  New Prescriptions    No medications on file       FINAL IMPRESSION  1. Cellulitis of skin with lymphangitis    2. Peripheral edema    3. General weakness    4. Difficulty walking    5. Atrial fibrillation with RVR (HCC)    6. Severe sepsis    7. Fall, initial encounter        Blood pressure 100/67, pulse (!) 108, temperature (!) 96.7 °F (35.9 °C), temperature source Oral, resp. rate 22, height 1.702 m (5' 7\"), weight 108.9 kg (240 lb), SpO2 96%, not currently breastfeeding.     I appreciate the RONALD's collaboration on this case.    For further details of Stacy Ly's emergency department encounter, please see documentation by advanced practice provider, Angi Lind.        Darren Carr,   04/22/25

## 2025-04-22 NOTE — PROGRESS NOTES
Patient seen in ED, room 2.  Admission completed with the following exceptions:  Home Medication reconciliation will be/has been completed by pharmacy staff.  Inpatient nurse will need to complete admission process including the:  4 Eyes Assessment, Immunizations, Vaccines, Rights and Responsibilities, Orientation to room, Plan of Care, Education/Learning Assessment and Education, white board, height and weight, pain assessment and head to toe assessment.  Patient is alert and oriented X 4.  Patient lives at home, a single level house, her daughter, and her two children, one of the grand children has a boyfriend that also lives with her and a great grandchild, patient is being admitted for Sepsis.      All patient's and/or family's questions answered.

## 2025-04-23 PROBLEM — E87.8 ELECTROLYTE IMBALANCE: Status: ACTIVE | Noted: 2025-04-23

## 2025-04-23 PROBLEM — E86.1 HYPOTENSION DUE TO HYPOVOLEMIA: Status: ACTIVE | Noted: 2023-05-10

## 2025-04-23 LAB
ANION GAP SERPL CALCULATED.3IONS-SCNC: 14 MMOL/L (ref 3–16)
BASOPHILS # BLD: 0 K/UL (ref 0–0.2)
BASOPHILS NFR BLD: 0 %
BUN SERPL-MCNC: 60 MG/DL (ref 7–20)
C DIFF TOX A+B STL QL IA: NORMAL
CALCIUM SERPL-MCNC: 7.1 MG/DL (ref 8.3–10.6)
CHLORIDE SERPL-SCNC: 105 MMOL/L (ref 99–110)
CO2 SERPL-SCNC: 16 MMOL/L (ref 21–32)
CREAT SERPL-MCNC: 2.1 MG/DL (ref 0.6–1.2)
DEPRECATED RDW RBC AUTO: 17.4 % (ref 12.4–15.4)
EOSINOPHIL # BLD: 0 K/UL (ref 0–0.6)
EOSINOPHIL NFR BLD: 0 %
GFR SERPLBLD CREATININE-BSD FMLA CKD-EPI: 24 ML/MIN/{1.73_M2}
GLUCOSE SERPL-MCNC: 120 MG/DL (ref 70–99)
HCT VFR BLD AUTO: 41.4 % (ref 36–48)
HGB BLD-MCNC: 13.2 G/DL (ref 12–16)
LACTATE BLDV-SCNC: 1.6 MMOL/L (ref 0.4–2)
LYMPHOCYTES # BLD: 0.6 K/UL (ref 1–5.1)
LYMPHOCYTES NFR BLD: 3.9 %
MCH RBC QN AUTO: 29.4 PG (ref 26–34)
MCHC RBC AUTO-ENTMCNC: 32 G/DL (ref 31–36)
MCV RBC AUTO: 91.7 FL (ref 80–100)
MONOCYTES # BLD: 0.6 K/UL (ref 0–1.3)
MONOCYTES NFR BLD: 3.6 %
MRSA DNA SPEC QL NAA+PROBE: NORMAL
NEUTROPHILS # BLD: 15.3 K/UL (ref 1.7–7.7)
NEUTROPHILS NFR BLD: 92.5 %
PLATELET # BLD AUTO: 124 K/UL (ref 135–450)
PMV BLD AUTO: 10.8 FL (ref 5–10.5)
POTASSIUM SERPL-SCNC: 3.7 MMOL/L (ref 3.5–5.1)
RBC # BLD AUTO: 4.51 M/UL (ref 4–5.2)
REPORT: NORMAL
RESP PATH DNA+RNA PNL NPH NAA+NON-PROBE: NORMAL
SODIUM SERPL-SCNC: 135 MMOL/L (ref 136–145)
WBC # BLD AUTO: 16.5 K/UL (ref 4–11)

## 2025-04-23 PROCEDURE — 85025 COMPLETE CBC W/AUTO DIFF WBC: CPT

## 2025-04-23 PROCEDURE — 94640 AIRWAY INHALATION TREATMENT: CPT

## 2025-04-23 PROCEDURE — 6360000002 HC RX W HCPCS: Performed by: INTERNAL MEDICINE

## 2025-04-23 PROCEDURE — 87449 NOS EACH ORGANISM AG IA: CPT

## 2025-04-23 PROCEDURE — 2500000003 HC RX 250 WO HCPCS: Performed by: INTERNAL MEDICINE

## 2025-04-23 PROCEDURE — 94761 N-INVAS EAR/PLS OXIMETRY MLT: CPT

## 2025-04-23 PROCEDURE — 97166 OT EVAL MOD COMPLEX 45 MIN: CPT

## 2025-04-23 PROCEDURE — 80048 BASIC METABOLIC PNL TOTAL CA: CPT

## 2025-04-23 PROCEDURE — 6370000000 HC RX 637 (ALT 250 FOR IP): Performed by: INTERNAL MEDICINE

## 2025-04-23 PROCEDURE — 99291 CRITICAL CARE FIRST HOUR: CPT | Performed by: INTERNAL MEDICINE

## 2025-04-23 PROCEDURE — 2000000000 HC ICU R&B

## 2025-04-23 PROCEDURE — 87324 CLOSTRIDIUM AG IA: CPT

## 2025-04-23 PROCEDURE — 76937 US GUIDE VASCULAR ACCESS: CPT

## 2025-04-23 PROCEDURE — 99223 1ST HOSP IP/OBS HIGH 75: CPT | Performed by: INTERNAL MEDICINE

## 2025-04-23 PROCEDURE — 0202U NFCT DS 22 TRGT SARS-COV-2: CPT

## 2025-04-23 PROCEDURE — 83605 ASSAY OF LACTIC ACID: CPT

## 2025-04-23 PROCEDURE — 97530 THERAPEUTIC ACTIVITIES: CPT

## 2025-04-23 PROCEDURE — 97535 SELF CARE MNGMENT TRAINING: CPT

## 2025-04-23 PROCEDURE — 97162 PT EVAL MOD COMPLEX 30 MIN: CPT

## 2025-04-23 PROCEDURE — 2700000000 HC OXYGEN THERAPY PER DAY

## 2025-04-23 PROCEDURE — 99233 SBSQ HOSP IP/OBS HIGH 50: CPT | Performed by: INTERNAL MEDICINE

## 2025-04-23 RX ORDER — GABAPENTIN 300 MG/1
300 CAPSULE ORAL 3 TIMES DAILY PRN
Status: DISCONTINUED | OUTPATIENT
Start: 2025-04-23 | End: 2025-04-25

## 2025-04-23 RX ADMIN — IPRATROPIUM BROMIDE AND ALBUTEROL SULFATE 1 DOSE: .5; 3 SOLUTION RESPIRATORY (INHALATION) at 13:58

## 2025-04-23 RX ADMIN — APIXABAN 5 MG: 5 TABLET, FILM COATED ORAL at 21:05

## 2025-04-23 RX ADMIN — IPRATROPIUM BROMIDE AND ALBUTEROL SULFATE 1 DOSE: .5; 3 SOLUTION RESPIRATORY (INHALATION) at 19:38

## 2025-04-23 RX ADMIN — CIPROFLOXACIN 400 MG: 400 INJECTION, SOLUTION INTRAVENOUS at 21:22

## 2025-04-23 RX ADMIN — LINEZOLID 600 MG: 2 INJECTION, SOLUTION INTRAVENOUS at 10:23

## 2025-04-23 RX ADMIN — IPRATROPIUM BROMIDE AND ALBUTEROL SULFATE 1 DOSE: .5; 3 SOLUTION RESPIRATORY (INHALATION) at 10:16

## 2025-04-23 RX ADMIN — OSELTAMIVIR PHOSPHATE 30 MG: 30 CAPSULE ORAL at 08:57

## 2025-04-23 RX ADMIN — Medication 10 ML: at 08:52

## 2025-04-23 RX ADMIN — LINEZOLID 600 MG: 2 INJECTION, SOLUTION INTRAVENOUS at 21:18

## 2025-04-23 RX ADMIN — ACETAMINOPHEN 650 MG: 325 TABLET ORAL at 17:31

## 2025-04-23 RX ADMIN — APIXABAN 5 MG: 5 TABLET, FILM COATED ORAL at 08:52

## 2025-04-23 ASSESSMENT — PAIN DESCRIPTION - LOCATION
LOCATION: LEG
LOCATION: LEG

## 2025-04-23 ASSESSMENT — PAIN DESCRIPTION - DESCRIPTORS: DESCRIPTORS: ACHING

## 2025-04-23 ASSESSMENT — PAIN DESCRIPTION - ORIENTATION
ORIENTATION: RIGHT
ORIENTATION: LEFT;LOWER

## 2025-04-23 ASSESSMENT — PAIN SCALES - GENERAL
PAINLEVEL_OUTOF10: 6
PAINLEVEL_OUTOF10: 3

## 2025-04-23 NOTE — PLAN OF CARE
Problem: ABCDS Injury Assessment  Goal: Absence of physical injury  4/22/2025 2139 by Fan Rollins RN  Outcome: Progressing  4/22/2025 1838 by Lien Antonio RN  Outcome: Progressing     Problem: Skin/Tissue Integrity  Goal: Skin integrity remains intact  Description: 1.  Monitor for areas of redness and/or skin breakdown2.  Assess vascular access sites hourly3.  Every 4-6 hours minimum:  Change oxygen saturation probe site4.  Every 4-6 hours:  If on nasal continuous positive airway pressure, respiratory therapy assess nares and determine need for appliance change or resting period  4/22/2025 2139 by Fan oRllins RN  Outcome: Progressing  4/22/2025 1838 by Lien Antonio RN  Outcome: Progressing  Flowsheets (Taken 4/22/2025 1700)  Skin Integrity Remains Intact: Monitor for areas of redness and/or skin breakdown     Problem: Chronic Conditions and Co-morbidities  Goal: Patient's chronic conditions and co-morbidity symptoms are monitored and maintained or improved  4/22/2025 2139 by Fan Rollins RN  Outcome: Progressing  4/22/2025 1838 by Lien Antonio RN  Outcome: Progressing     Problem: Pain  Goal: Verbalizes/displays adequate comfort level or baseline comfort level  4/22/2025 2139 by Fan Rollins RN  Outcome: Progressing  4/22/2025 1838 by Lien Antonio, RN  Outcome: Progressing     Problem: Safety - Adult  Goal: Free from fall injury  4/22/2025 2139 by Fan Rollins RN  Outcome: Progressing  4/22/2025 1838 by Lien Antonio, RN  Outcome: Progressing

## 2025-04-23 NOTE — CARE COORDINATION
Discharge Planning Note:     Writer spoke to patient bedside regarding therapy recommendations of SNF. Writer provided [facility] list and patients preference is [facility] [address]. Writer acknowledged and sent referral via Epic.    Electronically signed by Claudia Fernando RN on 4/23/2025 at 2:23 PM

## 2025-04-23 NOTE — PLAN OF CARE
Problem: Chronic Conditions and Co-morbidities  Goal: Patient's chronic conditions and co-morbidity symptoms are monitored and maintained or improved  4/23/2025 1129 by Nneka Hartley, RN  Outcome: Progressing  Flowsheets (Taken 4/23/2025 0800)  Care Plan - Patient's Chronic Conditions and Co-Morbidity Symptoms are Monitored and Maintained or Improved:   Monitor and assess patient's chronic conditions and comorbid symptoms for stability, deterioration, or improvement   Collaborate with multidisciplinary team to address chronic and comorbid conditions and prevent exacerbation or deterioration   Update acute care plan with appropriate goals if chronic or comorbid symptoms are exacerbated and prevent overall improvement and discharge    Problem: Pain  Goal: Verbalizes/displays adequate comfort level or baseline comfort level  4/22/2025 2139 by Fan Rollins, RN  Outcome: Progressing     Problem: Respiratory - Adult  Goal: Achieves optimal ventilation and oxygenation  Outcome: Progressing  Flowsheets (Taken 4/23/2025 1130)  Achieves optimal ventilation and oxygenation:   Assess for changes in respiratory status   Assess for changes in mentation and behavior   Position to facilitate oxygenation and minimize respiratory effort   Oxygen supplementation based on oxygen saturation or arterial blood gases   Encourage broncho-pulmonary hygiene including cough, deep breathe, incentive spirometry   Respiratory therapy support as indicated   Assess and instruct to report shortness of breath or any respiratory difficulty  Note:   Wean to 2 L NC, home dose*     Problem: Cardiovascular - Adult  Goal: Maintains optimal cardiac output and hemodynamic stability  Flowsheets (Taken 4/23/2025 1130)  Maintains optimal cardiac output and hemodynamic stability:   Monitor blood pressure and heart rate   Assess for signs of decreased cardiac output   Administer vasoactive medications as ordered  Note:   Remains on low dose levophed  Keep

## 2025-04-23 NOTE — PROGRESS NOTES
HOSPITALISTS PROGRESS NOTE    4/23/2025 9:45 AM        Name: Stacy Ly .              Admitted: 4/22/2025  Primary Care Provider: Tarik Hummel APRN - CNP (Tel: [phone])      Brief Course: This 79 y.o. female  with PMHx of A-fib, hypertension, and COPD, chronic hypoxic respiratory failure  on 2 L at baseline, CKD stage III and HFpEF admitted for septic shock 2/2 cellulitis.  JUSTINA on CKD.        Interval history:   Pt seen and examined today   Overnight events noted and interval ancillary notes reviewed.  Febrile with Tmax of 101.1 overnight, WBCs down to 16.5 K, cultures in progress  Remains on pressors for BP support; continue to titrate to keep MAP of 65. Creatinine down to 2.1.  Remains in A-fib with heart rate controlled at 92.  Pt more awake and alert today reported she is feeling little better today.  Reported neck pain but denied any chest pain, SOB, nausea vomiting or abdominal pain      Assessment & Plan:     RLE cellulitis;CRP, ESR, blood cultures, MRSA nares ordered  ID consulted; started on cefepime and Zyvox     Septic shock 2/2 above;p/w tachycardia, leukocytosis, hypotension, hypothermia lactic acidosis  S/p fluid resuscitation per sepsis protocol.  Hold off on further fluids given patient has history of CHF  Continue antibiotics per ID recs.     Acute on chronic diastolic heart failure  proBNP:> 13k on admission.  Last echo on 2/14/25 showed EF of 55 to 60%  Cardiology consulted; Holding diuresis in setting of hypotension  Maintain fluid and salt restriction.  Strict SILVIANO's.  Daily weights.     JUSTINA on CKD stage III; likely secondary to sepsis  Creatinine 2.5 on admission; (baseline 1.3-1.4); trended down to 2.1  Nephrology consulted; s/p IV fluids.  Avoid nephrotoxins and monitor renal function closely     Hx of A-fib; on metoprolol and Eliquis.  Metoprolol held given blood pressure.  Monitor on telemetry    dictation software. Although every effort was made to ensure the accuracy of this automated transcription, some errors in transcription may have occurred inadvertently. If you may need any clarification, please do not hesitate to contact me through EPIC.

## 2025-04-23 NOTE — PROGRESS NOTES
Transferred to [facility] ICU unit, ROOM: 3907    Report give to: Lexus Burgos RN 12:03 PM     Belongings gathered and confirmed by patient and/or family.  Sent during transfer.    Vitals:    04/23/25 1230   BP: 93/68   Pulse: (!) 124   Resp: 21   Temp: 99.7 °F (37.6 °C)   SpO2: 97%       Left the unit at:  1240

## 2025-04-23 NOTE — CONSULTS
[facility]   Electrophysiology Consult    Date: 4/23/2025  Date of admission: 4/22/2025  9:24 AM  Reason for Admission: Cellulitis of skin with lymphangitis [L03.90]  Peripheral edema [R60.0]  Difficulty walking [R26.2]  General weakness [R53.1]  Atrial fibrillation with RVR (HCC) [I48.91]  Fall, initial encounter [W19.XXXA]  Sepsis (HCC) [A41.9]  Severe sepsis [A41.9, R65.20]    Consult Requesting Physician: Myesha Larose MD    -Reason for Consultation: AF, CHF    Chief Complaint   Patient presents with    Leg Swelling     Pt brought in per [facility] from home, pt has multiple complaints.  Pt slid out of her recliner this am trying to stand, pt has been home from rehab x 1 mos, unable to walk, can only stand however she is weak when standing.  Pt reports increased redness to her right lower leg, swelling present, pt states redness is worse, swelling is unchanged.  Pt also reports diarrhea this am.        HISTORY OF PRESENT ILLNESS: History obtained from patient and medical record.     Stacy Ly is a 79 y.o. female with a past medical history of obesity, ILD, chronic respiratory failure, HTN, persistent atrial fibrillation/flutter, CHpEF, and CKD. S/p DCCV (5/15/23)    Pt presented to hospital due to worsened fatigue and weakness. She was found to have lactic acidosis and elevated WBC/procalcitonin. EP consulted for atrial fibrillation/flutter    Denies having chest pain, palpitations, shortness of breath, orthopnea, cough, or dizziness at the time of this visit.    Assessment and Plan:     1.Persistent Atrial Fibrillation/Flutter  - Currently in atrial fibrillation  - Not on beta-blocker or calcium channel blocker due to low blood pressure.  We can consider starting him on one of the agents when her blood pressure allows.  I will give her 1 dose of digoxin.  Overall heart rate is acceptable at this point.  - [id number] score: At least 4; [id number] Vasc score and anticoagulation discussed. High

## 2025-04-23 NOTE — PROGRESS NOTES
leg.  Condition is critical.  I agree with the assessment and plan of the PA.  Discussed with the hospitalist accepted the admission.   Past Medical History:  has a past medical history of Arthritis, Atrial fibrillation and flutter, Hypertension, Infection due to parainfluenza virus 3, Kidney disease, Pneumonia, and Sleep apnea.  Past Surgical History:  has a past surgical history that includes Tubal ligation; Total knee arthroplasty (Bilateral, 12/19/2012); fracture surgery; Colonoscopy (N/A, 11/20/2018); Upper gastrointestinal endoscopy (N/A, 11/20/2018); Cardioversion; Gastric bypass surgery; Larynx surgery; Upper gastrointestinal endoscopy (N/A, 03/31/2022); Colonoscopy (N/A, 03/31/2022); and Upper gastrointestinal endoscopy (N/A, 03/31/2022).    Discharge Recommendations: Stacy Ly scored a 11/24 on the AM-PAC ADL Inpatient form. Current research shows that an AM-PAC score of 17 or less is typically not associated with a discharge to the patient's home setting. Based on the patient's AM-PAC score and their current ADL deficits, it is recommended that the patient have 3-5 sessions per week of Occupational Therapy at d/c to increase the patient's independence.  Please see assessment section for further patient specific details.    If patient discharges prior to next session this note will serve as a discharge summary.  Please see below for the latest assessment towards goals.       DME Required For Discharge: DME to be determined at next level of care, DME to be determined pending patient progress    Precautions/Restrictions: high fall risk, contact precautions  Weight Bearing Restrictions: no restrictions  [] Right Upper Extremity  [] Left Upper Extremity [] Right Lower Extremity  [] Left Lower Extremity     Required Braces/Orthotics: no braces required   [] Right  [] Left  Positional Restrictions:no positional restrictions    Pre-Admission Information    (Went to [place] for a month however is recently  balance, increased pain  Prognosis: good  Clinical Assessment: The patient is a 79 y.o. female who presents below their baseline level of function due to above deficits, associated with Severe sepsis. Typically, pt requires assist with all ADLs inclusing bathing, dressing, and toileting. Pt reports she has been sedentary (has not transferred out of her recliner in over one month) since she discharged from SNF. Pt does not ambulate at baseline. She indicates she has been toileting while sitting at recliner and only stands breifly to change her depends with significant assistance from family at home. Currently, pt is Mod A for supine>sit; Min A x2 for sit<>stand with BUE support; and Max A x2 for sit>supine. Pt requires total assist for all LB ADLs. Continued OT indicated in order to promote return to PLOF    Safety Interventions: patient left in bed, bed alarm in place, and pt left with nurse in room at time of departure.     Plan  Frequency: 3-5 x/per week  Current Treatment Recommendations: strengthening, balance training, functional mobility training, transfer training, gait training, stair training, endurance training, ADL/self-care training, IADL training, and safety education    Goals  Patient Goals: return to home   Short Term Goals:  Time Frame: Discharge  Patient will complete upper body ADL at set up assistance   Patient will complete lower body ADL at maximum assistance, using LB adaptive equipment   Patient will complete toileting at 2 person assistance with max A x2 using LRAD    Patient will complete grooming at set up assistance, in stance for 1 min    Patient will complete functional transfers at 2 person assistance with max A x2 using LRAD      Above goals reviewed on 4/23/2025.  All goals are ongoing at this time unless indicated above.       Therapy Session Time     Individual Group Co-treatment   Time In    1119   Time Out    1231   Minutes    72       Timed Code Treatment Minutes: 57

## 2025-04-23 NOTE — CARE COORDINATION
Case Management Assessment  Initial Evaluation    Date/Time of Evaluation: 4/23/2025 2:10 PM  Assessment Completed by: Claudia Fernando RN    If patient is discharged prior to next notation, then this note serves as note for discharge by case management.    Patient Name: Stacy Ly                   YOB: 1945  Diagnosis: Cellulitis of skin with lymphangitis [L03.90]  Peripheral edema [R60.0]  Difficulty walking [R26.2]  General weakness [R53.1]  Atrial fibrillation with RVR (HCC) [I48.91]  Fall, initial encounter [W19.XXXA]  Sepsis (HCC) [A41.9]  Severe sepsis [A41.9, R65.20]                   Date / Time: 4/22/2025  9:24 AM    Patient Admission Status: Inpatient   Readmission Risk (Low < 19, Mod (19-27), High > 27): Readmission Risk Score: 20    Current PCP: Tarik Hummel, MARILEE - CNP  PCP verified by CM? (P) Yes (last visit 3/28/25)    Chart Reviewed: Yes      History Provided by: Patient  Patient Orientation: Alert and Oriented    Patient Cognition: Alert    Hospitalization in the last 30 days (Readmission):  No    If yes, Readmission Assessment in CM Navigator will be completed.    Advance Directives:      Code Status: Full Code   Patient's Primary Decision Maker is: Legal Next of Kin    Primary Decision Maker (Active): Anisha Ly - Child - [phone]    Discharge Planning:    Patient lives with: (P) Children, Family Members Type of Home: (P) House  Primary Care Giver: Family  Patient Support Systems include: Family Members, Children   Current Financial resources: (P) Medicare  Current community resources: (P) None  Current services prior to admission: (P) Durable Medical Equipment, Oxygen Therapy, Home Care (Prior [facility])            Current DME: (P) Shower Chair, Walker, Cane, Other (Comment), Wheelchair (electric scooter, lift chair)            Type of Home Care services:  (P) Nursing Services, Skilled Therapy    ADLS  Prior functional level: (P) Assistance with the following:,  Cellulitis of skin with lymphangitis [L03.90]  Peripheral edema [R60.0]  Difficulty walking [R26.2]  General weakness [R53.1]  Atrial fibrillation with RVR ([facility]) [I48.91]  Fall, initial encounter [W19.XXXA]  Sepsis (HCC) [A41.9]  Severe sepsis [A41.9, R65.20]    IF APPLICABLE: The Patient and/or patient representative Stacy and her family were provided with a choice of provider and agrees with the discharge plan. Freedom of choice list with basic dialogue that supports the patient's individualized plan of care/goals and shares the quality data associated with the providers was provided to:     Patient Representative Name:       The Patient and/or Patient Representative Agree with the Discharge Plan?      Claudia Fernando RN  Case Management Department  Ph: [phone]

## 2025-04-23 NOTE — PROGRESS NOTES
Nephrology Consult Note                                                                                                                                                                                                                                                                                                                                                               Office : [phone]     Fax :[phone]    Patient's Name: Stacy Ly    Reason for Consult:  JUSTINA on CKD 3  Requesting Physician:  Tarik Hummel, MARILEE - CNP  Chief Complaint:    Chief Complaint   Patient presents with    Leg Swelling     Pt brought in per [facility] from home, pt has multiple complaints.  Pt slid out of her recliner this am trying to stand, pt has been home from rehab x 1 mos, unable to walk, can only stand however she is weak when standing.  Pt reports increased redness to her right lower leg, swelling present, pt states redness is worse, swelling is unchanged.  Pt also reports diarrhea this am.        Assessment/Plan     # JUSTINA on CKD 3  - Baseline Cr ~ 1.5. Creatinine 2.1 today.   - likely 2/2 sepsis  - UPCR with minimal proteinuria  - BNP ~ 13k  - lasix every other day at home   - EF 55-60% from 02/14  - Renal US - Unremarkable 02/14     #Sepsis  - 2/2 cellulitis of R leg   - cultures pending  - abx per primary    #HTN  - now hypotensive requiring pressor support  - monitor     # COPD   - Oxygen dependent   - Acute exacerbation      # A fib  - eliquis and toprol     History of Present Ilness:    Stacy Ly is a 79 y.o. female with pMH of HTN, CHF, COPD, GIANNA, A-fib, and CKD 3 who presents to the emergency department from home with reports of generalized weakness and fatigue.  This has been ongoing for several weeks.  She was released from rehab facility 1 month ago and has been home ever since however has not been thriving.  She states that she was so weak when she went to stand from her recliner today she  slid down to the ground.  She denies any injury, head trauma or loss of consciousness.  She was able to call EMS to the scene.  She does have chronic edema and erythema to bilateral lower legs but states that the erythema in the right leg has gotten much worse and is now traveling up the right leg. She is being admitted for further management. We are consulted for JUSTINA on CKD 3.       Interval hx       Cr better   On Levo   UO decent       Past Medical History:   Diagnosis Date    Arthritis     Atrial fibrillation and flutter     Hypertension     Infection due to parainfluenza virus 3 5/11/2023    Kidney disease     Pt states  \"stage 3\"    Pneumonia     Sleep apnea     no c-pap       Past Surgical History:   Procedure Laterality Date    CARDIOVERSION      COLONOSCOPY N/A 11/20/2018    COLONOSCOPY POLYPECTOMY SNARE/COLD BIOPSY performed by Jairon Gordon MD at [facility] ENDOSCOPY    COLONOSCOPY N/A 03/31/2022    COLONOSCOPY POLYPECTOMY SNARE/COLD BIOPSY performed by Alfred London MD at [facility] ENDOSCOPY    FRACTURE SURGERY      ankle rt has pins and rods, and rt elbow    GASTRIC BYPASS SURGERY      LARYNX SURGERY      TOTAL KNEE ARTHROPLASTY Bilateral 12/19/2012    bilateral knee replacements    TUBAL LIGATION      tubes tied    UPPER GASTROINTESTINAL ENDOSCOPY N/A 11/20/2018    EGD BIOPSY performed by Jairon Gordon MD at [facility] ENDOSCOPY    UPPER GASTROINTESTINAL ENDOSCOPY N/A 03/31/2022    EGD DILATION BALLOON performed by Alfred London MD at [facility] ENDOSCOPY    UPPER GASTROINTESTINAL ENDOSCOPY N/A 03/31/2022    EGD BIOPSY performed by lAfred London MD at [facility] ENDOSCOPY       Family History   Problem Relation Age of Onset    Other Mother         blood clot    Cancer Father         stomach cancer    Stroke Brother         reports that she has never smoked. She has never used smokeless tobacco. She reports that she does not drink alcohol and does not use drugs.    Allergies:  Bee venom, Shellfish-derived

## 2025-04-23 NOTE — PROGRESS NOTES
Peripheral levophed site changed per protocol to Ultrasound guided IV in the left upper Cephalic vein.

## 2025-04-23 NOTE — PROGRESS NOTES
Infectious Diseases   Progress Note      Admission Date: 4/22/2025  Hospital Day: Hospital Day: 2   Attending: Myesha Mendez MD  Date of service: 4/23/2025     Chief complaint/ Reason for consult:     Septic shock tachycardia, tachypnea, hypothermia, hypotension requiring vasopressors  Severe lactic acidosis  Severe right leg cellulitis  Acute kidney injury  Elevated BNP  Recent Influenza A infection    Microbiology:      I have reviewed allavailable micro lab data and cultures    Results       Procedure Component Value Units Date/Time    Respiratory Panel, Molecular, with COVID-19 (Restricted: peds pts or suitable admitted adults) [[id number]] Collected: 04/23/25 1419    Order Status: Sent Specimen: Nasal from Nasopharyngeal Updated: 04/23/25 2040    Clostridium Difficile Toxin/Antigen [[id number]] Collected: 04/23/25 1110    Order Status: Completed Specimen: Stool Updated: 04/23/25 1216     C.diff Toxin/Antigen --     Negative for Clostridium difficile antigen and toxin  Normal Range: Negative      Narrative:      ORDER#: [id number]                          ORDERED BY: MYESHA MENDEZ  SOURCE: Stool                              COLLECTED:  04/23/25 11:10  ANTIBIOTICS AT MANDY.:                      RECEIVED :  04/23/25 11:18  Collect White vial (sterile container)    MRSA DNA Probe, Nasal [[id number]] Collected: 04/22/25 1430    Order Status: Completed Specimen: Nares Updated: 04/23/25 1322     MRSA SCREEN RT-PCR --     Negative  MRSA DNA not detected.  Normal Range: Not detected      Narrative:      ORDER#: [id number]                          ORDERED BY: MYESHA MENDEZ  SOURCE: Nares nares                        COLLECTED:  04/22/25 14:30  ANTIBIOTICS AT MANDY.:                      RECEIVED :  04/22/25 20:30    Culture, Wound (with Gram Stain) [[id number]]     Order Status: Sent Specimen: Leg     Culture, Blood 2 [[id number]] Collected: 04/22/25 1003    Order Status: Completed Specimen: Blood Updated:        CPK: No results found for: \"CKTOTAL\"  ESR:   Lab Results   Component Value Date    SEDRATE 83 (H) 04/22/2025     CRP:   Lab Results   Component Value Date    .0 (H) 04/22/2025           Imaging:    All pertinent images and reports for the current visit were reviewed by me during this visit.  I reviewed the chest x-ray/CT scan/MRI images today and independently interpreted the findings and results today.    XR HIP 2-3 VW W PELVIS LEFT   Final Result   No acute fracture dislocation.         CT LUMBAR SPINE WO CONTRAST   Final Result   1. No acute fracture or traumatic malalignment.   2. S shaped scoliosis with multilevel degenerative change of the thoracic and   lumbar spine.   3. Moderate hiatal hernia.         CT THORACIC SPINE WO CONTRAST   Final Result   1. No acute fracture or traumatic malalignment.   2. S shaped scoliosis with multilevel degenerative change of the thoracic and   lumbar spine.   3. Moderate hiatal hernia.         XR CHEST PORTABLE   Final Result   No acute process.             Medications: All current and past medications were reviewed.     sodium chloride flush  5-40 mL IntraVENous 2 times per day    apixaban  5 mg Oral BID    oseltamivir  30 mg Oral Daily    linezolid  600 mg IntraVENous Q12H    ipratropium 0.5 mg-albuterol 2.5 mg  1 Dose Inhalation TID RT    ciprofloxacin  400 mg IntraVENous Q24H        sodium chloride      norepinephrine 2 mcg/min (04/23/25 1821)       gabapentin, sodium chloride flush, sodium chloride, ondansetron **OR** ondansetron, polyethylene glycol, acetaminophen **OR** acetaminophen, albuterol, diphenhydrAMINE      Problem list:       Patient Active Problem List   Diagnosis Code    Atrial fibrillation, persistent (formerly [facility]) I48.19    Primary hypertension I10    Severe episode of recurrent major depressive disorder, without psychotic features (formerly [facility]) F33.2    Seasonal affective disorder F33.8    Class 2 obesity due to excess calories with body mass index (BMI) of

## 2025-04-23 NOTE — PROGRESS NOTES
Nephrology Progress Note                                                                                                                                                                                                                                                                                                                                                               Office : [phone]     Fax :[phone]    Patient's Name: Stacy Ly    Reason for Consult:  JUSTINA on CKD 3  Requesting Physician:  Tarik Hummel, MARILEE - CNP  Chief Complaint:    Chief Complaint   Patient presents with    Leg Swelling     Pt brought in per [facility] from home, pt has multiple complaints.  Pt slid out of her recliner this am trying to stand, pt has been home from rehab x 1 mos, unable to walk, can only stand however she is weak when standing.  Pt reports increased redness to her right lower leg, swelling present, pt states redness is worse, swelling is unchanged.  Pt also reports diarrhea this am.        Assessment/Plan     # JUSTINA on CKD 3  - Baseline Cr ~ 1.5. Creatinine 2.1 today.   - likely 2/2 sepsis, hypotension  - UPCR with minimal proteinuria  - avoid nephrotoxins  - monitor renal labs      #Sepsis  - 2/2 cellulitis of R leg   - s/p IVF  - cultures pending  - abx per primary  - ID consulted    #CHF  - EF 55-60% from 02/14  - BNP ~ 13k  - lasix every other day at home   - low salt diet  - daily weights    #HTN  - now hypotensive requiring pressor support  - monitor     # COPD   - Oxygen dependent      # A fib  - eliquis and toprol     History of Present Ilness:    Stacy Ly is a 79 y.o. female with pMH of HTN, CHF, COPD, GIANNA, A-fib, and CKD 3 who presents to the emergency department from home with reports of generalized weakness and fatigue.  This has been ongoing for several weeks.  She was released from rehab facility 1 month ago and has been home ever since however has not been thriving.  She states

## 2025-04-23 NOTE — PROGRESS NOTES
[facility] - Inpatient Rehabilitation Department   Phone: [phone]    Physical Therapy    [x] Initial Evaluation            [] Daily Treatment Note         [] Discharge Summary      Patient: Stacy Ly (Goes by Tosin)  : 1945   MRN: [id number]   Date of Service:  2025  Admitting Diagnosis: Severe sepsis  Current Admission Summary: 79-year-old female presents the ER with generalized weakness and fatigue.  Patient states that this has been going on for weeks.  She does appear to be tired and falls asleep easily but is also easily aroused.  Patient is not eating and drinking much at home.  Patient did complain of left hip pain and mid lower back pain and left hip pain. Patient was not able to stand from her recliner today and slid down to the ground.  Patient denies loss of consciousness.  Patient denies chest pain shortness of breath.  Patient denies any fever or chills.  She does have peripheral edema and erythema all along the right leg.  Heart is an irregularly irregular rhythm at a rate of 108.  Lungs are clear bilaterally.  Patient does wake up to verbal stimuli.  Temperature is low at 35.9 °C and heart rate is elevated which may be due to to atrial fibrillation or could be due to sepsis.    Past Medical History:  has a past medical history of Arthritis, Atrial fibrillation and flutter, Hypertension, Infection due to parainfluenza virus 3, Kidney disease, Pneumonia, and Sleep apnea.  Past Surgical History:  has a past surgical history that includes Tubal ligation; Total knee arthroplasty (Bilateral, 2012); fracture surgery; Colonoscopy (N/A, 2018); Upper gastrointestinal endoscopy (N/A, 2018); Cardioversion; Gastric bypass surgery; Larynx surgery; Upper gastrointestinal endoscopy (N/A, 2022); Colonoscopy (N/A, 2022); and Upper gastrointestinal endoscopy (N/A, 2022).  Discharge Recommendations:  Stacy Ly scored a  on the AM-PAC

## 2025-04-23 NOTE — CONSULTS
PULMONARY AND CRITICAL CARE MEDICINE CONSULTATION NOTE    CONSULTING PHYSICIAN: Myesha Larose MD    ADMISSION DATE: 4/22/2025  ADMISSION DIAGNOSIS: Cellulitis of skin with lymphangitis [L03.90]  Peripheral edema [R60.0]  Difficulty walking [R26.2]  General weakness [R53.1]  Atrial fibrillation with RVR (HCC) [I48.91]  Fall, initial encounter [W19.XXXA]  Sepsis (HCC) [A41.9]  Severe sepsis [A41.9, R65.20]    REASON FOR CONSULT:   Chief Complaint   Patient presents with    Leg Swelling     Pt brought in per [facility] from home, pt has multiple complaints.  Pt slid out of her recliner this am trying to stand, pt has been home from rehab x 1 mos, unable to walk, can only stand however she is weak when standing.  Pt reports increased redness to her right lower leg, swelling present, pt states redness is worse, swelling is unchanged.  Pt also reports diarrhea this am.        DATE OF CONSULT: 4/22/2025    HISTORY OF PRESENT ILLNESS: 79 y.o. year old female with a past medical history significant for hypertension, atrial fibrillation, heart failure with preserved ejection fraction, COPD presented to the hospital with fall at home and generalized weakness.  Patient reports that she has been having erythema, redness and weakness in her bilateral lower extremities.  She was trying to get up from her left chair when she slid down and had a fall.  She also reports that she was discharged from [facility] about a month ago and since then she has not been doing very well.  Denied any head trauma, chest pain, chest tightness, shortness of breath, incontinence.  In the ER found to be septic with hypotension.  Acute on chronic kidney injury.   She was initially given IV fluids but then transition to vasopressor support.  Admitted to medical ICU for ongoing care.  Overnight pressor requirements have improved.   Does not use oxygen at home.  Lifelong non-smoker.    At the time of my evaluation patient lying in bed in no

## 2025-04-24 ENCOUNTER — APPOINTMENT (OUTPATIENT)
Dept: GENERAL RADIOLOGY | Age: 80
End: 2025-04-24
Payer: COMMERCIAL

## 2025-04-24 LAB
ALBUMIN SERPL-MCNC: 2.5 G/DL (ref 3.4–5)
ANION GAP SERPL CALCULATED.3IONS-SCNC: 11 MMOL/L (ref 3–16)
BUN SERPL-MCNC: 60 MG/DL (ref 7–20)
CALCIUM SERPL-MCNC: 7.3 MG/DL (ref 8.3–10.6)
CHLORIDE SERPL-SCNC: 108 MMOL/L (ref 99–110)
CO2 SERPL-SCNC: 18 MMOL/L (ref 21–32)
CREAT SERPL-MCNC: 2 MG/DL (ref 0.6–1.2)
DEPRECATED RDW RBC AUTO: 17.5 % (ref 12.4–15.4)
GFR SERPLBLD CREATININE-BSD FMLA CKD-EPI: 25 ML/MIN/{1.73_M2}
GLUCOSE SERPL-MCNC: 93 MG/DL (ref 70–99)
HCT VFR BLD AUTO: 34.4 % (ref 36–48)
HGB BLD-MCNC: 11 G/DL (ref 12–16)
MCH RBC QN AUTO: 28.8 PG (ref 26–34)
MCHC RBC AUTO-ENTMCNC: 32 G/DL (ref 31–36)
MCV RBC AUTO: 90 FL (ref 80–100)
PHOSPHATE SERPL-MCNC: 4 MG/DL (ref 2.5–4.9)
PLATELET # BLD AUTO: 126 K/UL (ref 135–450)
PMV BLD AUTO: 11.2 FL (ref 5–10.5)
POTASSIUM SERPL-SCNC: 3.9 MMOL/L (ref 3.5–5.1)
RBC # BLD AUTO: 3.82 M/UL (ref 4–5.2)
SODIUM SERPL-SCNC: 137 MMOL/L (ref 136–145)
WBC # BLD AUTO: 15.6 K/UL (ref 4–11)

## 2025-04-24 PROCEDURE — 97535 SELF CARE MNGMENT TRAINING: CPT

## 2025-04-24 PROCEDURE — 97530 THERAPEUTIC ACTIVITIES: CPT

## 2025-04-24 PROCEDURE — 80069 RENAL FUNCTION PANEL: CPT

## 2025-04-24 PROCEDURE — 6360000002 HC RX W HCPCS: Performed by: INTERNAL MEDICINE

## 2025-04-24 PROCEDURE — 73620 X-RAY EXAM OF FOOT: CPT

## 2025-04-24 PROCEDURE — 6370000000 HC RX 637 (ALT 250 FOR IP): Performed by: INTERNAL MEDICINE

## 2025-04-24 PROCEDURE — 6370000000 HC RX 637 (ALT 250 FOR IP): Performed by: NURSE PRACTITIONER

## 2025-04-24 PROCEDURE — 85027 COMPLETE CBC AUTOMATED: CPT

## 2025-04-24 PROCEDURE — 2000000000 HC ICU R&B

## 2025-04-24 PROCEDURE — 73600 X-RAY EXAM OF ANKLE: CPT

## 2025-04-24 PROCEDURE — 94640 AIRWAY INHALATION TREATMENT: CPT

## 2025-04-24 PROCEDURE — 36415 COLL VENOUS BLD VENIPUNCTURE: CPT

## 2025-04-24 PROCEDURE — 99233 SBSQ HOSP IP/OBS HIGH 50: CPT | Performed by: INTERNAL MEDICINE

## 2025-04-24 PROCEDURE — 99232 SBSQ HOSP IP/OBS MODERATE 35: CPT | Performed by: NURSE PRACTITIONER

## 2025-04-24 PROCEDURE — 97110 THERAPEUTIC EXERCISES: CPT

## 2025-04-24 PROCEDURE — 2700000000 HC OXYGEN THERAPY PER DAY

## 2025-04-24 PROCEDURE — 94761 N-INVAS EAR/PLS OXIMETRY MLT: CPT

## 2025-04-24 PROCEDURE — 99291 CRITICAL CARE FIRST HOUR: CPT | Performed by: INTERNAL MEDICINE

## 2025-04-24 PROCEDURE — 2500000003 HC RX 250 WO HCPCS: Performed by: INTERNAL MEDICINE

## 2025-04-24 PROCEDURE — 6370000000 HC RX 637 (ALT 250 FOR IP)

## 2025-04-24 RX ORDER — METOPROLOL TARTRATE 25 MG/1
25 TABLET, FILM COATED ORAL 2 TIMES DAILY
Status: DISCONTINUED | OUTPATIENT
Start: 2025-04-24 | End: 2025-05-02 | Stop reason: HOSPADM

## 2025-04-24 RX ADMIN — LINEZOLID 600 MG: 2 INJECTION, SOLUTION INTRAVENOUS at 20:36

## 2025-04-24 RX ADMIN — Medication 10 ML: at 20:29

## 2025-04-24 RX ADMIN — IPRATROPIUM BROMIDE AND ALBUTEROL SULFATE 1 DOSE: .5; 3 SOLUTION RESPIRATORY (INHALATION) at 21:04

## 2025-04-24 RX ADMIN — ACETAMINOPHEN 650 MG: 325 TABLET ORAL at 08:26

## 2025-04-24 RX ADMIN — METOPROLOL TARTRATE 25 MG: 25 TABLET, FILM COATED ORAL at 20:55

## 2025-04-24 RX ADMIN — APIXABAN 5 MG: 5 TABLET, FILM COATED ORAL at 08:22

## 2025-04-24 RX ADMIN — GABAPENTIN 300 MG: 300 CAPSULE ORAL at 20:36

## 2025-04-24 RX ADMIN — LINEZOLID 600 MG: 2 INJECTION, SOLUTION INTRAVENOUS at 08:24

## 2025-04-24 RX ADMIN — IPRATROPIUM BROMIDE AND ALBUTEROL SULFATE 1 DOSE: .5; 3 SOLUTION RESPIRATORY (INHALATION) at 14:25

## 2025-04-24 RX ADMIN — Medication 10 ML: at 08:23

## 2025-04-24 RX ADMIN — CIPROFLOXACIN 400 MG: 400 INJECTION, SOLUTION INTRAVENOUS at 20:26

## 2025-04-24 RX ADMIN — APIXABAN 5 MG: 5 TABLET, FILM COATED ORAL at 20:36

## 2025-04-24 RX ADMIN — IPRATROPIUM BROMIDE AND ALBUTEROL SULFATE 1 DOSE: .5; 3 SOLUTION RESPIRATORY (INHALATION) at 09:09

## 2025-04-24 ASSESSMENT — PAIN DESCRIPTION - ORIENTATION
ORIENTATION: MID

## 2025-04-24 ASSESSMENT — PAIN SCALES - GENERAL
PAINLEVEL_OUTOF10: 4
PAINLEVEL_OUTOF10: 4
PAINLEVEL_OUTOF10: 8
PAINLEVEL_OUTOF10: 3
PAINLEVEL_OUTOF10: 8
PAINLEVEL_OUTOF10: 1

## 2025-04-24 ASSESSMENT — PAIN DESCRIPTION - DESCRIPTORS
DESCRIPTORS: BURNING
DESCRIPTORS: BURNING;ACHING
DESCRIPTORS: ACHING

## 2025-04-24 ASSESSMENT — PAIN DESCRIPTION - LOCATION
LOCATION: LEG
LOCATION: HEAD

## 2025-04-24 NOTE — PROGRESS NOTES
Nephrology Progress Note                                                                                                                                                                                                                                                                                                                                                               Office : [phone]     Fax :[phone]    Patient's Name: Stacy Ly    Reason for Consult:  JUSTINA on CKD 3  Requesting Physician:  Tarik Hummel, MARILEE - CNP  Chief Complaint:    Chief Complaint   Patient presents with    Leg Swelling     Pt brought in per [facility] from home, pt has multiple complaints.  Pt slid out of her recliner this am trying to stand, pt has been home from rehab x 1 mos, unable to walk, can only stand however she is weak when standing.  Pt reports increased redness to her right lower leg, swelling present, pt states redness is worse, swelling is unchanged.  Pt also reports diarrhea this am.        Assessment/Plan     # JUSTINA on CKD 3  - Baseline Cr ~ 1.5. Creatinine 2.0 today.   - likely 2/2 sepsis, hypotension  - UPCR with minimal proteinuria  - avoid nephrotoxins  - monitor renal labs      #Sepsis  - 2/2 cellulitis of R leg   - s/p IVF  - cultures pending  - abx per ID    #CHF  - EF 55-60% from 02/14  - BNP ~ 13k  - lasix every other day at home   - low salt diet  - daily weights    #HTN  - Bps on soft side  - s/p levophed   - monitor     # COPD   - Oxygen dependent      # A fib  - eliquis  - digoxin x 1  - EP on board    History of Present Ilness:    Stacy Ly is a 79 y.o. female with pMH of HTN, CHF, COPD, GIANNA, A-fib, and CKD 3 who presents to the emergency department from home with reports of generalized weakness and fatigue.  This has been ongoing for several weeks.  She was released from rehab facility 1 month ago and has been home ever since however has not been thriving.  She states that she was  other providers  Reviewed clinical lab tests  Reviewed radiology tests  Reviewed other diagnostic tests/interventions    Will be discussed w/  Primary team     Thank you for allowing us to participate in care of Stacy DAYANARA Ly   Feel free to contact me,         Pablo Daniel MD   [facility]  Office : [phone] or through [employer]  Fax :[phone]

## 2025-04-24 NOTE — CARE COORDINATION
VM received from [facility] with [place] no beds and only semi private bed at [place] and pt is in isolation so cannot accept.     CM will need to have pt make more choices. CM called pt's cell phone and no answer at this time.     CM called unit and spoke to  who will let pt's RN know I called as she is with another pt at this time.     Isela Dejesus RN, BSN  [phone]

## 2025-04-24 NOTE — RT PROTOCOL NOTE
RT Inhaler-Nebulizer Bronchodilator Protocol Note    There is a bronchodilator order in the chart from a provider indicating to follow the RT Bronchodilator Protocol and there is an “Initiate RT Inhaler-Nebulizer Bronchodilator Protocol” order as well (see protocol at bottom of note).    CXR Findings:  XR CHEST PORTABLE  Result Date: 4/22/2025  No acute process.       The findings from the last RT Protocol Assessment were as follows:   History Pulmonary Disease: Chronic pulmonary disease  Respiratory Pattern: Regular pattern and RR 12-20 bpm  Breath Sounds: Slightly diminished and/or crackles  Cough: Strong, spontaneous, non-productive  Indication for Bronchodilator Therapy:    Bronchodilator Assessment Score: 4    Aerosolized bronchodilator medication orders have been revised according to the RT Inhaler-Nebulizer Bronchodilator Protocol below.    Respiratory Therapist to perform RT Therapy Protocol Assessment initially then follow the protocol.  Repeat RT Therapy Protocol Assessment PRN for score 0-3 or on second treatment, BID, and PRN for scores above 3.    No Indications - adjust the frequency to every 6 hours PRN wheezing or bronchospasm, if no treatments needed after 48 hours then discontinue using Per Protocol order mode.     If indication present, adjust the RT bronchodilator orders based on the Bronchodilator Assessment Score as indicated below.  Use Inhaler orders unless patient has one or more of the following: on home nebulizer, not able to hold breath for 10 seconds, is not alert and oriented, cannot activate and use MDI correctly, or respiratory rate 25 breaths per minute or more, then use the equivalent nebulizer order(s) with same Frequency and PRN reasons based on the score.  If a patient is on this medication at home then do not decrease Frequency below that used at home.    0-3 - enter or revise RT bronchodilator order(s) to equivalent RT Bronchodilator order with Frequency of every 4 hours PRN

## 2025-04-24 NOTE — PROGRESS NOTES
04/24/25 0332   RT Protocol   History Pulmonary Disease 2   Respiratory pattern 0   Breath sounds 2   Cough 0   Bronchodilator Assessment Score 4

## 2025-04-24 NOTE — PROGRESS NOTES
[facility] - Inpatient Rehabilitation Department   Phone: [phone]    Occupational Therapy    [] Initial Evaluation            [x] Daily Treatment Note         [] Discharge Summary      Patient: Stacy Ly   : 1945   MRN: [id number]   Date of Service:  2025    Admitting Diagnosis:  Severe sepsis (HCC)  Current Admission Summary: 79-year-old female presents the ER with generalized weakness and fatigue.  Patient states that this has been going on for weeks.  She does appear to be tired and falls asleep easily but is also easily aroused.  Patient is not eating and drinking much at home.  Patient did complain of left hip pain and mid lower back pain and left hip pain. Patient was not able to stand from her recliner today and slid down to the ground.  Patient denies loss of consciousness.  Patient denies chest pain shortness of breath.  Patient denies any fever or chills.  She does have peripheral edema and erythema all along the right leg.  Heart is an irregularly irregular rhythm at a rate of 108.  Lungs are clear bilaterally.  Patient does wake up to verbal stimuli.  Temperature is low at 35.9 °C and heart rate is elevated which may be due to to atrial fibrillation or could be due to sepsis.  Patient's white blood cell count is 21.7.  Bicarb is 18 with an anion gap of 17.  Creatinine is elevated 2.5.  pH is 7.242 with a normal pCO2 level at 41.7.  Patient's initial lactic acid is 4.2 and decreased to 2.6 in the ER.  BNP is 13,138.  Procalcitonin is 1.51.  Troponin is initially 52 and dropped to 43.  Patient does have sepsis and I believe the heart rate is responding to the sepsis state.  Chest x-ray did not show any acute findings.  CT of the thoracic spine did not show any acute findings.  CT of the lumbar spine did not show any acute findings.  X-ray of the hip did not show any acute findings.  Patient was started on antibiotics.  This does appear to be coming from cellulitis of  Wrist: WFL  Strength:   (L) Shoulder: -3     (R) Shoulder: -3  (L) Elbow: +3     (R) Elbow: +3  (L) Hand: +3      (R) Hand: +3    Therapist Clinical Decision Making (Complexity): medium complexity  Clinical Presentation: evolving      Subjective  General: Pt supine in bed upon entry. Agreeable to tx.  Pain: 7/10.  Location: BLEs  Pain Interventions: repositioned  and therapy activities modified        Activities of Daily Living  Basic Activities of Daily Living  Grooming: supervision  Grooming Comments: brushing hair, washing face in bed  Comments: Increased time.   Instrumental Activities of Daily Living  No IADL completed on this date.    Functional Mobility  Bed Mobility:  Supine to Sit: stand by assistance  Sit to Supine: moderate assistance  Comments: HOB elevated  Transfers:  Sit to stand transfer:2 person assistance with min + mod.    Stand to sit transfer: contact guard assistance  Stand step transfer: 2 person assistance with min x2   Comments: mod x1 for STS from recliner x3  Functional Mobility  No functional mobility completed on this date secondary to safety concerns. Pt does not ambulate at baseline.  Balance:  Static Sitting Balance: fair (+): maintains balance at SBA/supervision without use of UE support  Dynamic Sitting Balance: fair (+): maintains balance at SBA/supervision without use of UE support  Static Standing Balance: poor (+): requires min (A) to maintain balance  Dynamic Standing Balance: poor: requires mod (A) to maintain balance  Comments:     Other Therapeutic Interventions    Therex:   AP x 10 B  GS x 10 B  QS x 10  Punches x 10 B  Open/Close hands x10 B    Functional Outcomes  AM-PAC Inpatient Daily Activity Raw Score: 11          Cognition  Safety Judgement: decreased awareness of need for assistance  Problem Solving: assistance required to generate solutions, assistance required to implement solutions  Insights: decreased awareness of deficits  Orientation:    alert and oriented x

## 2025-04-24 NOTE — PROGRESS NOTES
Infectious Diseases   Progress Note      Admission Date: 4/22/2025  Hospital Day: Hospital Day: 3   Attending: Dora Contreras MD  Date of service: 4/24/2025     Chief complaint/ Reason for consult:     Septic shock tachycardia, tachypnea, hypothermia, hypotension requiring vasopressors  Severe lactic acidosis  Severe right leg cellulitis  Acute kidney injury  Elevated BNP  Recent Influenza A infection    Microbiology:      I have reviewed allavailable micro lab data and cultures    Results       Procedure Component Value Units Date/Time    Respiratory Panel, Molecular, with COVID-19 (Restricted: peds pts or suitable admitted adults) [[id number]] Collected: 04/23/25 1419    Order Status: Completed Specimen: Nasal from Nasopharyngeal Updated: 04/23/25 2215     Respiratory Panel PCR --     Respiratory Pathogens Panel PCR Result: Not Detected  See additional report for complete Respiratory Pathogens Panel      Narrative:      ORDER#: [id number]                          ORDERED BY: RAYSHAWN GARRETT  SOURCE: Nasopharyngeal                     COLLECTED:  04/23/25 14:19  ANTIBIOTICS AT MANDY.:                      RECEIVED :  04/23/25 20:40    Respiratory Panel Film Array Report [[id number]] Collected: 04/23/25 1419    Order Status: Completed Updated: 04/23/25 2217     Report SEE IMAGE    Clostridium Difficile Toxin/Antigen [[id number]] Collected: 04/23/25 1110    Order Status: Completed Specimen: Stool Updated: 04/23/25 1216     C.diff Toxin/Antigen --     Negative for Clostridium difficile antigen and toxin  Normal Range: Negative      Narrative:      ORDER#: [id number]                          ORDERED BY: URBAN MENDEZ  SOURCE: Stool                              COLLECTED:  04/23/25 11:10  ANTIBIOTICS AT MANDY.:                      RECEIVED :  04/23/25 11:18  Collect White vial (sterile container)    MRSA DNA Probe, Nasal [[id number]] Collected: 04/22/25 1430    Order Status: Completed Specimen: Nares Updated:  PELVIS LEFT   Final Result   No acute fracture dislocation.         CT LUMBAR SPINE WO CONTRAST   Final Result   1. No acute fracture or traumatic malalignment.   2. S shaped scoliosis with multilevel degenerative change of the thoracic and   lumbar spine.   3. Moderate hiatal hernia.         CT THORACIC SPINE WO CONTRAST   Final Result   1. No acute fracture or traumatic malalignment.   2. S shaped scoliosis with multilevel degenerative change of the thoracic and   lumbar spine.   3. Moderate hiatal hernia.         XR CHEST PORTABLE   Final Result   No acute process.         CT TIBIA FIBULA RIGHT WO CONTRAST    (Results Pending)       Medications: All current and past medications were reviewed.     metoprolol tartrate  25 mg Oral BID    sodium chloride flush  5-40 mL IntraVENous 2 times per day    apixaban  5 mg Oral BID    linezolid  600 mg IntraVENous Q12H    ipratropium 0.5 mg-albuterol 2.5 mg  1 Dose Inhalation TID RT    ciprofloxacin  400 mg IntraVENous Q24H        sodium chloride         gabapentin, sodium chloride flush, sodium chloride, ondansetron **OR** ondansetron, polyethylene glycol, acetaminophen **OR** acetaminophen, albuterol, diphenhydrAMINE      Problem list:       Patient Active Problem List   Diagnosis Code    Atrial fibrillation, persistent ([facility]) I48.19    Primary hypertension I10    Severe episode of recurrent major depressive disorder, without psychotic features ([facility]) F33.2    Seasonal affective disorder F33.8    Class 2 obesity due to excess calories with body mass index (BMI) of 37.0 to 37.9 in adult E66.812, E66.09, Z68.37    Cellulitis of skin with lymphangitis L03.90    JUSTINA (acute kidney injury) N17.9    GIANNA (obstructive sleep apnea) G47.33    Weight loss counseling, encounter for Z71.3    Atrial flutter ([facility]) I48.92    Paroxysmal atrial fibrillation (HCC) I48.0    ILD (interstitial lung disease) (Former[facility]) J84.9    Iron deficiency anemia due to chronic blood loss D50.0    Iron deficiency

## 2025-04-24 NOTE — CARE COORDINATION
CM called [facility] with [facility] [phone] and left vm checking on referral.    Isela Dejesus RN, BSN  [phone]

## 2025-04-24 NOTE — PROGRESS NOTES
[facility] - Inpatient Rehabilitation Department   Phone: [phone]    Physical Therapy    [] Initial Evaluation            [x] Daily Treatment Note         [] Discharge Summary      Patient: Stacy Ly (Goes by Tosin)  : 1945   MRN: [id number]   Date of Service:  2025  Admitting Diagnosis: Severe sepsis (HCC)  Current Admission Summary: 79-year-old female presents the ER with generalized weakness and fatigue.  Patient states that this has been going on for weeks.  She does appear to be tired and falls asleep easily but is also easily aroused.  Patient is not eating and drinking much at home.  Patient did complain of left hip pain and mid lower back pain and left hip pain. Patient was not able to stand from her recliner today and slid down to the ground.  Patient denies loss of consciousness.  Patient denies chest pain shortness of breath.  Patient denies any fever or chills.  She does have peripheral edema and erythema all along the right leg.  Heart is an irregularly irregular rhythm at a rate of 108.  Lungs are clear bilaterally.  Patient does wake up to verbal stimuli.  Temperature is low at 35.9 °C and heart rate is elevated which may be due to to atrial fibrillation or could be due to sepsis.    Past Medical History:  has a past medical history of Arthritis, Atrial fibrillation and flutter (HCC), Hypertension, Infection due to parainfluenza virus 3, Kidney disease, Pneumonia, and Sleep apnea.  Past Surgical History:  has a past surgical history that includes Tubal ligation; Total knee arthroplasty (Bilateral, 2012); fracture surgery; Colonoscopy (N/A, 2018); Upper gastrointestinal endoscopy (N/A, 2018); Cardioversion; Gastric bypass surgery; Larynx surgery; Upper gastrointestinal endoscopy (N/A, 2022); Colonoscopy (N/A, 2022); and Upper gastrointestinal endoscopy (N/A, 2022).  Discharge Recommendations:  Stacy Ly scored a 10/24 on the

## 2025-04-24 NOTE — PLAN OF CARE
Problem: ABCDS Injury Assessment  Goal: Absence of physical injury  Outcome: Progressing  Flowsheets (Taken 4/24/2025 0253)  Absence of Physical Injury: Implement safety measures based on patient assessment     Problem: Skin/Tissue Integrity  Goal: Skin integrity remains intact  Description: 1.  Monitor for areas of redness and/or skin breakdown2.  Assess vascular access sites hourly3.  Every 4-6 hours minimum:  Change oxygen saturation probe site4.  Every 4-6 hours:  If on nasal continuous positive airway pressure, respiratory therapy assess nares and determine need for appliance change or resting period  Outcome: Progressing  Flowsheets (Taken 4/24/2025 0253)  Skin Integrity Remains Intact:   Monitor for areas of redness and/or skin breakdown   Turn and reposition as indicated     Problem: Chronic Conditions and Co-morbidities  Goal: Patient's chronic conditions and co-morbidity symptoms are monitored and maintained or improved  Outcome: Progressing  Flowsheets (Taken 4/24/2025 0253)  Care Plan - Patient's Chronic Conditions and Co-Morbidity Symptoms are Monitored and Maintained or Improved:   Monitor and assess patient's chronic conditions and comorbid symptoms for stability, deterioration, or improvement   Collaborate with multidisciplinary team to address chronic and comorbid conditions and prevent exacerbation or deterioration     Problem: Pain  Goal: Verbalizes/displays adequate comfort level or baseline comfort level  Outcome: Progressing  Flowsheets (Taken 4/24/2025 0253)  Verbalizes/displays adequate comfort level or baseline comfort level:   Assess pain using appropriate pain scale   Encourage patient to monitor pain and request assistance   Administer analgesics based on type and severity of pain and evaluate response   Implement non-pharmacological measures as appropriate and evaluate response     Problem: Safety - Adult  Goal: Free from fall injury  Outcome: Progressing  Flowsheets (Taken 4/24/2025  home or other facility with appropriate resources  Outcome: Progressing  Flowsheets (Taken 4/24/2025 0253)  Discharge to home or other facility with appropriate resources:   Identify barriers to discharge with patient and caregiver   Identify discharge learning needs (meds, wound care, etc)   Arrange for needed discharge resources and transportation as appropriate

## 2025-04-24 NOTE — PROGRESS NOTES
PULMONARY AND CRITICAL CARE MEDICINE PROGRESS NOTE    Subjective: Patient lying in bed in no apparent respiratory distress.  Reports that she still feels weak and tired.  Currently on 2 L/min of oxygen supplementation saturating in mid 90s.  Has come off of vasopressors.  Still has redness in bilateral lower extremities right more than left. .  Renal functions slowly stabilizing.    REVIEW OF SYSTEMS:     8 point review of system is negative except that mentioned in the subjective portion.    PAST MEDICAL HISTORY:   Past Medical History:   Diagnosis Date    Arthritis     Atrial fibrillation and flutter (HCC)     Hypertension     Infection due to parainfluenza virus 3 5/11/2023    Kidney disease     Pt states  \"stage 3\"    Pneumonia     Sleep apnea     no c-pap       PAST SURGICAL HISTORY:   Past Surgical History:   Procedure Laterality Date    CARDIOVERSION      COLONOSCOPY N/A 11/20/2018    COLONOSCOPY POLYPECTOMY SNARE/COLD BIOPSY performed by Jairon Gordon MD at [facility] ENDOSCOPY    COLONOSCOPY N/A 03/31/2022    COLONOSCOPY POLYPECTOMY SNARE/COLD BIOPSY performed by Alfred London MD at [facility] ENDOSCOPY    FRACTURE SURGERY      ankle rt has pins and rods, and rt elbow    GASTRIC BYPASS SURGERY      LARYNX SURGERY      TOTAL KNEE ARTHROPLASTY Bilateral 12/19/2012    bilateral knee replacements    TUBAL LIGATION      tubes tied    UPPER GASTROINTESTINAL ENDOSCOPY N/A 11/20/2018    EGD BIOPSY performed by Jairon Gordon MD at [facility] ENDOSCOPY    UPPER GASTROINTESTINAL ENDOSCOPY N/A 03/31/2022    EGD DILATION BALLOON performed by Alfred London MD at [facility] ENDOSCOPY    UPPER GASTROINTESTINAL ENDOSCOPY N/A 03/31/2022    EGD BIOPSY performed by Alfred London MD at [facility] ENDOSCOPY       SOCIAL HISTORY:   Social History     Tobacco Use    Smoking status: Never    Smokeless tobacco: Never   Vaping Use    Vaping status: Never Used   Substance Use Topics    Alcohol use: No    Drug use: No       FAMILY HISTORY:

## 2025-04-24 NOTE — PROGRESS NOTES
HOSPITALISTS PROGRESS NOTE    4/24/2025 12:59 PM        Name: Stacy Ly .              Admitted: 4/22/2025  Primary Care Provider: Tarik Hummel APRN - CNP (Tel: [phone])      Brief Course: This 79 y.o. female  with PMHx of A-fib, hypertension, and COPD, chronic hypoxic respiratory failure  on 2 L at baseline, CKD stage III and HFpEF admitted for septic shock 2/2 cellulitis.  JUSTINA on CKD.    Interval history:   Pt seen and examined today.  Overnight events noted and interval ancillary notes reviewed.  Febrile with Tmax of 99.5 F  WBC's down to 15.6 K  BP stable off pressors.  Creatinine down to 2.0.  Denies any complaints.    Assessment & Plan:     RLE cellulitis;CRP, ESR, blood cultures, MRSA nares ordered  ID consulted; on Cipro and Zyvox.     Septic shock 2/2 above;p/w tachycardia, leukocytosis, hypotension, hypothermia lactic acidosis  S/p fluid resuscitation per sepsis protocol.    Hold off on further fluids given patient has history of CHF  Continue antibiotics per ID recs.     Acute on chronic diastolic heart failure  proBNP:> 13k on admission.  Last echo on 2/14/25 showed EF of 55 to 60%  Cardiology consulted; Holding diuresis in setting of hypotension  Maintain fluid and salt restriction.  Strict SILVIANO's.  Daily weights.     JUSTINA on CKD stage III; likely secondary to sepsis  Creatinine 2.5 on admission; (baseline 1.3-1.4); trended down to 2.1  Nephrology consulted; s/p IV fluids.    Avoid nephrotoxins and monitor renal function closely     Hx of A-fib; on metoprolol and Eliquis.    Metoprolol held given blood pressure.  Monitor on telemetry     Hypertension; currently hypotensive; on pressors     Hx of COPD; not in exacerbation.   Continue home inhalers and as needed breathing treatment     Chronic hypoxic respiratory failure; 2 L at baseline.     Hyperlipidemia; continue statins        DVT PPX: Eliquis  Code:Full

## 2025-04-24 NOTE — PLAN OF CARE
Problem: ABCDS Injury Assessment  Goal: Absence of physical injury  4/24/2025 1443 by Gina Gonzáles RN  Outcome: Progressing  4/24/2025 0253 by Macario Becker RN  Outcome: Progressing  Flowsheets (Taken 4/24/2025 0253)  Absence of Physical Injury: Implement safety measures based on patient assessment     Problem: Skin/Tissue Integrity  Goal: Skin integrity remains intact  Description: 1.  Monitor for areas of redness and/or skin breakdown2.  Assess vascular access sites hourly3.  Every 4-6 hours minimum:  Change oxygen saturation probe site4.  Every 4-6 hours:  If on nasal continuous positive airway pressure, respiratory therapy assess nares and determine need for appliance change or resting period  Recent Flowsheet Documentation  Taken 4/24/2025 0800 by Gina Gonzáles RN  Skin Integrity Remains Intact: Monitor for areas of redness and/or skin breakdown  4/24/2025 0253 by Macario Becker RN  Outcome: Progressing  Flowsheets (Taken 4/24/2025 0253)  Skin Integrity Remains Intact:   Monitor for areas of redness and/or skin breakdown   Turn and reposition as indicated     Problem: Chronic Conditions and Co-morbidities  Goal: Patient's chronic conditions and co-morbidity symptoms are monitored and maintained or improved  4/24/2025 1443 by Gina Gonzáles RN  Outcome: Progressing  Flowsheets (Taken 4/24/2025 0800)  Care Plan - Patient's Chronic Conditions and Co-Morbidity Symptoms are Monitored and Maintained or Improved:   Monitor and assess patient's chronic conditions and comorbid symptoms for stability, deterioration, or improvement   Collaborate with multidisciplinary team to address chronic and comorbid conditions and prevent exacerbation or deterioration   Update acute care plan with appropriate goals if chronic or comorbid symptoms are exacerbated and prevent overall improvement and discharge  4/24/2025 0253 by Macario Becker RN  Outcome: Progressing  Flowsheets (Taken 4/24/2025 0253)  Care Plan -

## 2025-04-24 NOTE — PROGRESS NOTES
[facility]   EP Progress Note     Date: 4/24/2025  Admit Date: 4/22/2025     Reason for consultation: AF, CHF    Chief Complaint:   Chief Complaint   Patient presents with    Leg Swelling     Pt brought in per [facility] from home, pt has multiple complaints.  Pt slid out of her recliner this am trying to stand, pt has been home from rehab x 1 mos, unable to walk, can only stand however she is weak when standing.  Pt reports increased redness to her right lower leg, swelling present, pt states redness is worse, swelling is unchanged.  Pt also reports diarrhea this am.        History of Present Illness: History obtained from patient and medical record.     Stacy Ly is a 79 y.o. female with a past medical history of obesity, ILD, chronic respiratory failure, HTN, persistent atrial fibrillation/flutter, CHpEF, and CKD. S/p DCCV (5/15/23)     Pt presented to hospital due to worsened fatigue and weakness. She was found to have lactic acidosis and elevated WBC/procalcitonin. EP consulted for atrial fibrillation/flutter    Interval Hx: Today, she is being seen for follow up. She is laying in bed. She is feeling better and her leg pain is improving. She remains in rate controlled atrial fibrillation. Her BP is stable off pressors.     Patient seen and examined. Clinical notes reviewed. Telemetry reviewed.  No new complaints today. No major events overnight.   Denies having chest pain, palpitations, shortness of breath, orthopnea/PND, cough, or dizziness at the time of this visit.    Assessment and Plan:     1.Persistent Atrial Fibrillation/Flutter  - Currently in atrial fibrillation  - Start metoprolol 25 mg BID. Ok to hold if SBP <110    - [id number] score: At least 4; [id number] Vasc score and anticoagulation discussed. High risk for stroke and thromboembolism. Anticoagulation is recommended. Risk of bleeding was discussed.  ~ Continue Eliquis 5 mg BID; tolerating well. Monitor for s/s of bleeding

## 2025-04-25 ENCOUNTER — APPOINTMENT (OUTPATIENT)
Dept: CT IMAGING | Age: 80
End: 2025-04-25
Payer: COMMERCIAL

## 2025-04-25 LAB
ALBUMIN SERPL-MCNC: 2.4 G/DL (ref 3.4–5)
ANION GAP SERPL CALCULATED.3IONS-SCNC: 11 MMOL/L (ref 3–16)
BUN SERPL-MCNC: 57 MG/DL (ref 7–20)
CALCIUM SERPL-MCNC: 7.6 MG/DL (ref 8.3–10.6)
CHLORIDE SERPL-SCNC: 111 MMOL/L (ref 99–110)
CK SERPL-CCNC: 118 U/L (ref 26–192)
CO2 SERPL-SCNC: 18 MMOL/L (ref 21–32)
CREAT SERPL-MCNC: 1.7 MG/DL (ref 0.6–1.2)
DEPRECATED RDW RBC AUTO: 17 % (ref 12.4–15.4)
FERRITIN SERPL IA-MCNC: 794 NG/ML (ref 15–150)
FOLATE SERPL-MCNC: 9.22 NG/ML (ref 4.78–24.2)
GFR SERPLBLD CREATININE-BSD FMLA CKD-EPI: 31 ML/MIN/{1.73_M2}
GLUCOSE SERPL-MCNC: 97 MG/DL (ref 70–99)
HCT VFR BLD AUTO: 32 % (ref 36–48)
HEMOCCULT STL QL: ABNORMAL
HGB BLD-MCNC: 10.4 G/DL (ref 12–16)
IRON SATN MFR SERPL: 27 % (ref 15–50)
IRON SERPL-MCNC: 33 UG/DL (ref 37–145)
MAGNESIUM SERPL-MCNC: 1.73 MG/DL (ref 1.8–2.4)
MAGNESIUM SERPL-MCNC: 1.79 MG/DL (ref 1.8–2.4)
MCH RBC QN AUTO: 29.3 PG (ref 26–34)
MCHC RBC AUTO-ENTMCNC: 32.5 G/DL (ref 31–36)
MCV RBC AUTO: 90.4 FL (ref 80–100)
NT-PROBNP SERPL-MCNC: ABNORMAL PG/ML (ref 0–449)
NT-PROBNP SERPL-MCNC: ABNORMAL PG/ML (ref 0–449)
PHOSPHATE SERPL-MCNC: 3.1 MG/DL (ref 2.5–4.9)
PLATELET # BLD AUTO: 129 K/UL (ref 135–450)
PMV BLD AUTO: 10.6 FL (ref 5–10.5)
POTASSIUM SERPL-SCNC: 3.8 MMOL/L (ref 3.5–5.1)
RBC # BLD AUTO: 3.54 M/UL (ref 4–5.2)
SODIUM SERPL-SCNC: 140 MMOL/L (ref 136–145)
TIBC SERPL-MCNC: 124 UG/DL (ref 260–445)
VIT B12 SERPL-MCNC: 329 PG/ML (ref 211–911)
WBC # BLD AUTO: 12.6 K/UL (ref 4–11)

## 2025-04-25 PROCEDURE — 2580000003 HC RX 258: Performed by: INTERNAL MEDICINE

## 2025-04-25 PROCEDURE — 83540 ASSAY OF IRON: CPT

## 2025-04-25 PROCEDURE — 6360000002 HC RX W HCPCS: Performed by: INTERNAL MEDICINE

## 2025-04-25 PROCEDURE — 99291 CRITICAL CARE FIRST HOUR: CPT | Performed by: INTERNAL MEDICINE

## 2025-04-25 PROCEDURE — 6370000000 HC RX 637 (ALT 250 FOR IP): Performed by: INTERNAL MEDICINE

## 2025-04-25 PROCEDURE — 2580000003 HC RX 258: Performed by: PHYSICIAN ASSISTANT

## 2025-04-25 PROCEDURE — 85027 COMPLETE CBC AUTOMATED: CPT

## 2025-04-25 PROCEDURE — 82270 OCCULT BLOOD FECES: CPT

## 2025-04-25 PROCEDURE — 83735 ASSAY OF MAGNESIUM: CPT

## 2025-04-25 PROCEDURE — 83880 ASSAY OF NATRIURETIC PEPTIDE: CPT

## 2025-04-25 PROCEDURE — 6360000002 HC RX W HCPCS: Performed by: PHYSICIAN ASSISTANT

## 2025-04-25 PROCEDURE — 36415 COLL VENOUS BLD VENIPUNCTURE: CPT

## 2025-04-25 PROCEDURE — 82607 VITAMIN B-12: CPT

## 2025-04-25 PROCEDURE — 82746 ASSAY OF FOLIC ACID SERUM: CPT

## 2025-04-25 PROCEDURE — 2500000003 HC RX 250 WO HCPCS: Performed by: INTERNAL MEDICINE

## 2025-04-25 PROCEDURE — 99233 SBSQ HOSP IP/OBS HIGH 50: CPT | Performed by: INTERNAL MEDICINE

## 2025-04-25 PROCEDURE — 83550 IRON BINDING TEST: CPT

## 2025-04-25 PROCEDURE — 2700000000 HC OXYGEN THERAPY PER DAY

## 2025-04-25 PROCEDURE — 94640 AIRWAY INHALATION TREATMENT: CPT

## 2025-04-25 PROCEDURE — 94761 N-INVAS EAR/PLS OXIMETRY MLT: CPT

## 2025-04-25 PROCEDURE — 82550 ASSAY OF CK (CPK): CPT

## 2025-04-25 PROCEDURE — 82728 ASSAY OF FERRITIN: CPT

## 2025-04-25 PROCEDURE — 73700 CT LOWER EXTREMITY W/O DYE: CPT

## 2025-04-25 PROCEDURE — 2000000000 HC ICU R&B

## 2025-04-25 PROCEDURE — 80069 RENAL FUNCTION PANEL: CPT

## 2025-04-25 RX ORDER — LANOLIN ALCOHOL/MO/W.PET/CERES
1000 CREAM (GRAM) TOPICAL DAILY
Status: DISCONTINUED | OUTPATIENT
Start: 2025-04-25 | End: 2025-05-02 | Stop reason: HOSPADM

## 2025-04-25 RX ORDER — FERROUS SULFATE 325(65) MG
325 TABLET ORAL
Status: DISCONTINUED | OUTPATIENT
Start: 2025-04-25 | End: 2025-04-25

## 2025-04-25 RX ORDER — 0.9 % SODIUM CHLORIDE 0.9 %
500 INTRAVENOUS SOLUTION INTRAVENOUS ONCE
Status: COMPLETED | OUTPATIENT
Start: 2025-04-25 | End: 2025-04-25

## 2025-04-25 RX ORDER — GABAPENTIN 300 MG/1
300 CAPSULE ORAL 2 TIMES DAILY
Status: DISCONTINUED | OUTPATIENT
Start: 2025-04-25 | End: 2025-05-02 | Stop reason: HOSPADM

## 2025-04-25 RX ORDER — MIDODRINE HYDROCHLORIDE 5 MG/1
5 TABLET ORAL
Status: DISCONTINUED | OUTPATIENT
Start: 2025-04-25 | End: 2025-04-26

## 2025-04-25 RX ORDER — PANTOPRAZOLE SODIUM 40 MG/10ML
40 INJECTION, POWDER, LYOPHILIZED, FOR SOLUTION INTRAVENOUS 2 TIMES DAILY
Status: DISCONTINUED | OUTPATIENT
Start: 2025-04-25 | End: 2025-04-29

## 2025-04-25 RX ADMIN — Medication 10 ML: at 12:27

## 2025-04-25 RX ADMIN — Medication 10 ML: at 14:55

## 2025-04-25 RX ADMIN — IPRATROPIUM BROMIDE AND ALBUTEROL SULFATE 1 DOSE: .5; 3 SOLUTION RESPIRATORY (INHALATION) at 20:26

## 2025-04-25 RX ADMIN — IPRATROPIUM BROMIDE AND ALBUTEROL SULFATE 1 DOSE: .5; 3 SOLUTION RESPIRATORY (INHALATION) at 16:17

## 2025-04-25 RX ADMIN — SODIUM CHLORIDE 100 MG: 9 INJECTION, SOLUTION INTRAVENOUS at 17:30

## 2025-04-25 RX ADMIN — FERROUS SULFATE TAB 325 MG (65 MG ELEMENTAL FE) 325 MG: 325 (65 FE) TAB at 12:27

## 2025-04-25 RX ADMIN — APIXABAN 5 MG: 5 TABLET, FILM COATED ORAL at 12:24

## 2025-04-25 RX ADMIN — PANTOPRAZOLE SODIUM 40 MG: 40 INJECTION, POWDER, FOR SOLUTION INTRAVENOUS at 14:55

## 2025-04-25 RX ADMIN — PANTOPRAZOLE SODIUM 40 MG: 40 INJECTION, POWDER, FOR SOLUTION INTRAVENOUS at 21:13

## 2025-04-25 RX ADMIN — SODIUM CHLORIDE 500 MG: 9 INJECTION INTRAMUSCULAR; INTRAVENOUS; SUBCUTANEOUS at 14:56

## 2025-04-25 RX ADMIN — Medication 10 ML: at 21:13

## 2025-04-25 RX ADMIN — CYANOCOBALAMIN TAB 1000 MCG 1000 MCG: 1000 TAB at 12:24

## 2025-04-25 RX ADMIN — IPRATROPIUM BROMIDE AND ALBUTEROL SULFATE 1 DOSE: .5; 3 SOLUTION RESPIRATORY (INHALATION) at 09:08

## 2025-04-25 RX ADMIN — MIDODRINE HYDROCHLORIDE 5 MG: 5 TABLET ORAL at 12:24

## 2025-04-25 RX ADMIN — GABAPENTIN 300 MG: 300 CAPSULE ORAL at 21:13

## 2025-04-25 RX ADMIN — GABAPENTIN 300 MG: 300 CAPSULE ORAL at 12:27

## 2025-04-25 RX ADMIN — MIDODRINE HYDROCHLORIDE 5 MG: 5 TABLET ORAL at 17:29

## 2025-04-25 RX ADMIN — SODIUM CHLORIDE 500 ML: 0.9 INJECTION, SOLUTION INTRAVENOUS at 08:26

## 2025-04-25 RX ADMIN — CIPROFLOXACIN 400 MG: 400 INJECTION, SOLUTION INTRAVENOUS at 21:19

## 2025-04-25 ASSESSMENT — PAIN SCALES - GENERAL: PAINLEVEL_OUTOF10: 4

## 2025-04-25 ASSESSMENT — PAIN DESCRIPTION - LOCATION: LOCATION: LEG

## 2025-04-25 NOTE — CONSULTS
This patient was discussed in depth with the medical resident/certified nurse practitioner/physician assistant, and I interviewed and examined the patient, independently to develop the assessment and plan below.  I have performed a substantive portion of the medical decision making.      We were consulted for dark stool that is fecal occult blood positive.  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  She has not had any complications since.  She does not take any NSAIDs and does take regular PPI.  She was admitted with sepsis attributed to cellulitis, after a fall a few days ago.  Her hemoglobin was 13 on arrival, down to 10 today.  She has had some loose stool for the last week, 3 or so times per day.  Yesterday, she had a dark bowel movement without any oral iron.  Today, she had a smaller dark bowel movement.  Apparently was fecal occult blood positive.  BUN is 57 with a creatinine of 1.7.  proBNP is 12,000.  WBC count is now 12.6, down from 21 on arrival.  Folate 9.2, B12 329.  2/25 ferritin was 537, 6% iron sat with TIBC of 246.  Last colonoscopy was 2022 with polyps and 3 yr recall.      Exam:  Abdomen is soft, nontender, nondistended    Assessment:    -Black stool, presumably from bleeding given absence of anything dietary that would have caused this, and it is fecal occult blood positive.  -Acute blood loss anemia, mild.  She is here with sepsis and had IV fluid resuscitation.   -MIRIAM from chronic bleed and malabsorption.  Ferritin is 500 but that could be partly acute phase reactant.  Iron sat is 6% with essentially normal TIBC.  She could probably use some iron.  -History of marginal ulcer with contained perforation.  This was managed medically and endoscopically in 2023  -chronic  anticoagulation.  Last dose eliquis was this morning.      Plan:   -IV protonix bid  -venofer 100mg iv x 3 days.  -agree with b12 supplement  -ok to continue diet for now.    -EGD Monday.    -hold eliquis now based on concern for bleeding    Severo Pritchard MD  [facility]                           Gastroenterology Consult Note        Patient: Stacy Ly  : 1945  Acct#:      Date:  2025      1. Cellulitis of skin with lymphangitis    2. Peripheral edema    3. General weakness    4. Difficulty walking    5. Atrial fibrillation with RVR (HCC)    6. Severe sepsis (HCC)    7. Fall, initial encounter        Subjective:       History of Present Illness  Patient is a 79 y.o.  female admitted with Cellulitis of skin with lymphangitis [L03.90]  Peripheral edema [R60.0]  Difficulty walking [R26.2]  General weakness [R53.1]  Atrial fibrillation with RVR (HCC) [I48.91]  Fall, initial encounter [W19.XXXA]  Sepsis (HCC) [A41.9]  Severe sepsis (HCC) [A41.9, R65.20] who is seen in consult for melena.   H/o afib on Eliquis, CHF. H/o RYGB in . This was complicated by marginal ulcer and fistula to bypassed stomach as well as abscess in . She was seen at [facility] then and treated with axios stent for GJ leak. Stent was later removed after leak healed.     Admitted with right leg cellulitis. Had a dark stool yesterday and today.     Has had loose BMs (0-3 times daily) for one week. Doesn't really look at BMs at home. No abdominal pain. Can have nausea with eating meat but this is not new. No vomiting.     No nsaids. No iron at home.     Past Medical History:   Diagnosis Date    Arthritis     Atrial fibrillation and flutter (HCC)     Hypertension     Infection due to parainfluenza virus 3 2023    Kidney disease     Pt states  \"stage 3\"    Pneumonia     Sleep apnea     no c-pap      Past Surgical History:   Procedure Laterality Date    CARDIOVERSION      COLONOSCOPY N/A 2018    COLONOSCOPY

## 2025-04-25 NOTE — DISCHARGE INSTR - COC
Continuity of Care Form    Patient Name: Stacy Ly   :  1945  MRN:  [id number]    Admit date:  2025  Discharge date:  25    Code Status Order: Full Code   Advance Directives:     Admitting Physician:  Myesha Larose MD  PCP: Tarik Hummel, APRN - CNP    Discharging Nurse: ABDIAS  Discharging Hospital Unit/Room#: ICU-3907/3907-01  Discharging Unit Phone Number: [phone]    Emergency Contact:   Extended Emergency Contact Information  Primary Emergency Contact: Anisha Ly  Address: [address] [place] [address]            [place] [address] [place] [facility]  Home Phone: [phone]  Work Phone: [phone]  Relation: Child    Past Surgical History:  Past Surgical History:   Procedure Laterality Date    CARDIOVERSION      COLONOSCOPY N/A 2018    COLONOSCOPY POLYPECTOMY SNARE/COLD BIOPSY performed by Jairon Gordon MD at [facility] ENDOSCOPY    COLONOSCOPY N/A 2022    COLONOSCOPY POLYPECTOMY SNARE/COLD BIOPSY performed by Alfred London MD at [facility] ENDOSCOPY    FRACTURE SURGERY      ankle rt has pins and rods, and rt elbow    GASTRIC BYPASS SURGERY      LARYNX SURGERY      TOTAL KNEE ARTHROPLASTY Bilateral 2012    bilateral knee replacements    TUBAL LIGATION      tubes tied    UPPER GASTROINTESTINAL ENDOSCOPY N/A 2018    EGD BIOPSY performed by Jairon Gordon MD at [facility] ENDOSCOPY    UPPER GASTROINTESTINAL ENDOSCOPY N/A 2022    EGD DILATION BALLOON performed by Alfred London MD at [facility] ENDOSCOPY    UPPER GASTROINTESTINAL ENDOSCOPY N/A 2022    EGD BIOPSY performed by Alfred London MD at [facility] ENDOSCOPY       Immunization History:   Immunization History   Administered Date(s) Administered    COVID-19, PFIZER Bivalent, DO NOT Dilute, (age 12y+), IM, 30 mcg/0.3 mL 2022    COVID-19, PFIZER GRAY top, DO NOT Dilute, (age 12 y+), IM, 30 mcg/0.3 mL 2022    COVID-19, PFIZER PURPLE top, DILUTE for use, (age 12 y+), 30mcg/0.3mL 2021,  treatment of the diagnosis listed and that she requires Home Care for less 30 days.     Update Admission H&P: No change in H&P    PHYSICIAN SIGNATURE:  Electronically signed by Benson Hooper MD on 5/2/25 at 11:46 AM EDT

## 2025-04-25 NOTE — PROGRESS NOTES
04/24/25 2004   Patient Observation   Patient Observations pt. requested for RT to return later because she is on a phone call

## 2025-04-25 NOTE — PROGRESS NOTES
HEART FAILURE CARE PLAN:    Comorbidities Reviewed: Yes   Patient has a past medical history of Arthritis, Atrial fibrillation and flutter (HCC), Hypertension, Infection due to parainfluenza virus 3, Kidney disease, Pneumonia, and Sleep apnea.     Weights Reviewed: Yes   Admission weight: 108.9 kg (240 lb)   Wt Readings from Last 3 Encounters:   04/24/25 115.3 kg (254 lb 3.1 oz)   03/28/25 112 kg (247 lb)   02/19/25 119.1 kg (262 lb 9.1 oz)     Intake & Output Reviewed: Yes     Intake/Output Summary (Last 24 hours) at 4/24/2025 2055  Last data filed at 4/24/2025 2043  Gross per 24 hour   Intake 1301.09 ml   Output 1575 ml   Net -273.91 ml       ECHOCARDIOGRAM Reviewed: Yes   Patient's Ejection Fraction (EF) is less than 40%    Medications Reviewed: Yes   SCHEDULED HOSPITAL MEDICATIONS:   metoprolol tartrate  25 mg Oral BID    sodium chloride flush  5-40 mL IntraVENous 2 times per day    apixaban  5 mg Oral BID    linezolid  600 mg IntraVENous Q12H    ipratropium 0.5 mg-albuterol 2.5 mg  1 Dose Inhalation TID RT    ciprofloxacin  400 mg IntraVENous Q24H     HOME MEDICATIONS:  Prior to Admission medications    Medication Sig Start Date End Date Taking? Authorizing Provider   alendronate (FOSAMAX) 70 MG tablet Take 1 tablet by mouth every 7 days   Yes Provider, MD Royce   Metoprolol Tartrate 75 MG TABS Take 1 tablet by mouth 2 times daily 3/28/25   Tarik Hummel APRN - CNP   apixaban (ELIQUIS) 5 MG TABS tablet Take 1 tablet by mouth 2 times daily 3/28/25   Tarik Hummel APRN - CNP   amLODIPine (NORVASC) 10 MG tablet Take 1 tablet by mouth daily 3/28/25   Tarik Hummel APRN - CNP   cloNIDine (CATAPRES) 0.2 MG tablet TAKE 1 TABLET TWICE A DAY  Patient taking differently: Once a day 7/22/24   Tarik Hummel APRN - CNP   acetaminophen (TYLENOL) 500 MG tablet Take 2 tablets by mouth 3 times daily for 5 days 7/8/24 7/13/24  Wilmer Mueller MD   diclofenac sodium (VOLTAREN) 1 % GEL Apply 2 g  topically in the morning, at noon, and at bedtime for 5 days Apply generously to right hand 7/8/24 7/13/24  Wilmer Mueller MD   furosemide (LASIX) 20 MG tablet Take 1 tablet by mouth every other day  Patient taking differently: Take 1 tablet by mouth every other day Taking every day 6/10/24   Tarik Hummel, MARILEE Castillo CNP   Cholecalciferol (VITAMIN D3) 1.25 MG (79746 UT) CAPS Take 1 capsule by mouth every 7 days  Patient not taking: Reported on 6/1/2024 12/15/22   Tarik Hummel, APRN - CNP   albuterol sulfate HFA (PROVENTIL HFA) 108 (90 Base) MCG/ACT inhaler Inhale 2 puffs into the lungs every 4 hours as needed for Wheezing 12/15/22   Tarik Hummel, APRN - CNP      Diet Reviewed: Yes   ADULT DIET; Regular; Low Fat/Low Chol/High Fiber/2 gm Na    Goal of Care Reviewed: Yes   Patient and/or Family's stated Goal of Care this Admission: Reduce shortness of breath, increase activity tolerance, better understand heart failure and disease management, be more comfortable, and reduce lower extremity edema prior to discharge.     Electronically signed by Monet Springer RN on 4/24/2025 at 8:55 PM

## 2025-04-25 NOTE — PLAN OF CARE
Problem: ABCDS Injury Assessment  Goal: Absence of physical injury  4/24/2025 2044 by Monet Springer RN  Outcome: Progressing  Flowsheets (Taken 4/24/2025 2044)  Absence of Physical Injury: Implement safety measures based on patient assessment  4/24/2025 1443 by Gina Gonzáles RN  Outcome: Progressing     Problem: Skin/Tissue Integrity  Goal: Skin integrity remains intact  Description: 1.  Monitor for areas of redness and/or skin breakdown2.  Assess vascular access sites hourly3.  Every 4-6 hours minimum:  Change oxygen saturation probe site4.  Every 4-6 hours:  If on nasal continuous positive airway pressure, respiratory therapy assess nares and determine need for appliance change or resting period  Outcome: Progressing  Flowsheets  Taken 4/24/2025 2044 by Mnoet Springer RN  Skin Integrity Remains Intact:   Monitor for areas of redness and/or skin breakdown   Every 4-6 hours minimum:  Change oxygen saturation probe site   Pressure redistribution bed/mattress (bed type)   Turn and reposition as indicated   Monitor skin under medical devices   Assess need for specialty bed   Check visual cues for pain   Assess vascular access sites hourly   Positioning devices   Every 4-6 hours:  If on nasal continuous positive airway pressure, assess nares and determine need for appliance change or resting period  Taken 4/24/2025 0800 by Gina Gonzáles RN  Skin Integrity Remains Intact: Monitor for areas of redness and/or skin breakdown     Problem: Chronic Conditions and Co-morbidities  Goal: Patient's chronic conditions and co-morbidity symptoms are monitored and maintained or improved  4/24/2025 2044 by Monet Springer RN  Outcome: Progressing  Flowsheets (Taken 4/24/2025 2044)  Care Plan - Patient's Chronic Conditions and Co-Morbidity Symptoms are Monitored and Maintained or Improved:   Monitor and assess patient's chronic conditions and comorbid symptoms for stability, deterioration, or improvement   Collaborate with  order or complex needs related to functional status, cognitive ability or social support system  4/24/2025 1443 by Gina Gonzáles, RN  Outcome: Progressing  Flowsheets (Taken 4/24/2025 0800)  Discharge to home or other facility with appropriate resources:   Identify barriers to discharge with patient and caregiver   Arrange for needed discharge resources and transportation as appropriate   Identify discharge learning needs (meds, wound care, etc)   Refer to discharge planning if patient needs post-hospital services based on physician order or complex needs related to functional status, cognitive ability or social support system

## 2025-04-25 NOTE — PROGRESS NOTES
PULMONARY AND CRITICAL CARE MEDICINE PROGRESS NOTE    Subjective: Patient lying in bed in no apparent respiratory distress.  Reports that he slept well last night and feels much more rested.  Did get gabapentin.  This also helped her right leg pain.    Currently on 2 L/min of oxygen supplementation saturating in mid 90s.  Has come off of vasopressors.  Renal functions slowly stabilizing.    REVIEW OF SYSTEMS:     8 point review of system is negative except that mentioned in the subjective portion.    PAST MEDICAL HISTORY:   Past Medical History:   Diagnosis Date    Arthritis     Atrial fibrillation and flutter (HCC)     Hypertension     Infection due to parainfluenza virus 3 5/11/2023    Kidney disease     Pt states  \"stage 3\"    Pneumonia     Sleep apnea     no c-pap       PAST SURGICAL HISTORY:   Past Surgical History:   Procedure Laterality Date    CARDIOVERSION      COLONOSCOPY N/A 11/20/2018    COLONOSCOPY POLYPECTOMY SNARE/COLD BIOPSY performed by Jairon Gordon MD at [facility] ENDOSCOPY    COLONOSCOPY N/A 03/31/2022    COLONOSCOPY POLYPECTOMY SNARE/COLD BIOPSY performed by Alfred London MD at [facility] ENDOSCOPY    FRACTURE SURGERY      ankle rt has pins and rods, and rt elbow    GASTRIC BYPASS SURGERY      LARYNX SURGERY      TOTAL KNEE ARTHROPLASTY Bilateral 12/19/2012    bilateral knee replacements    TUBAL LIGATION      tubes tied    UPPER GASTROINTESTINAL ENDOSCOPY N/A 11/20/2018    EGD BIOPSY performed by Jairon Gordon MD at [facility] ENDOSCOPY    UPPER GASTROINTESTINAL ENDOSCOPY N/A 03/31/2022    EGD DILATION BALLOON performed by Alfred London MD at [facility] ENDOSCOPY    UPPER GASTROINTESTINAL ENDOSCOPY N/A 03/31/2022    EGD BIOPSY performed by Alfred London MD at [facility] ENDOSCOPY       SOCIAL HISTORY:   Social History     Tobacco Use    Smoking status: Never    Smokeless tobacco: Never   Vaping Use    Vaping status: Never Used   Substance Use Topics    Alcohol use: No    Drug use: No       FAMILY

## 2025-04-25 NOTE — PROGRESS NOTES
injury) N17.9    GIANNA (obstructive sleep apnea) G47.33    Weight loss counseling, encounter for Z71.3    Atrial flutter (HCC) I48.92    Paroxysmal atrial fibrillation (HCC) I48.0    ILD (interstitial lung disease) (HCC) J84.9    Iron deficiency anemia due to chronic blood loss D50.0    Iron deficiency anemia, unspecified D50.9    Age-related osteoporosis without current pathological fracture M81.0    Vasovagal syncope R55    CKD (chronic kidney disease) stage 4, GFR 15-29 ml/min (HCC) N18.4    Stage 3b chronic kidney disease (HCC) N18.32    B12 deficiency E53.8    Vitamin D deficiency E55.9    Stage 3 chronic kidney disease (HCC) N18.30    History of ankle surgery Z98.890    Left nephrolithiasis N20.0    Diverticulosis K57.90    History of cholecystectomy Z90.49    History of gastric bypass Z98.84    Hypotension due to hypovolemia E86.1    MRSA nasal colonization Z22.322    Chronic diastolic heart failure (HCC) I50.32    Right arm pain M79.601    Brachial plexopathy G54.0    Influenza A J10.1    Asthmatic bronchitis J45.909    Severe sepsis (HCC) A41.9, R65.20    Chronic atrial fibrillation (HCC) I48.20    Atrial fibrillation with RVR (HCC) I48.91    Difficulty walking R26.2    Fall W19.XXXA    General weakness R53.1    Peripheral edema R60.0    Lactic acidosis E87.20    Septic shock (HCC) A41.9, R65.21    Electrolyte imbalance E87.8       Please note that this chart was generated using Dragon dictation software. Although every effort was made to ensure the accuracy of this automated transcription, some errors in transcription may have occurred inadvertently. If you may need any clarification, please do not hesitate to contact me through EPIC or at the phone number provided below with my electronic signature.  Any pictures or media included in this note were obtained after taking informed verbal consent from the patient and with their approval to include those in the patient's medical record.        Loy Nguyen,  MD, MPH, FACP, FIDSA  4/25/2025, 3:48 PM  Central Office Phone: [phone]  Central Office Fax: [phone]    [facility]   [address], [address] ([address])  [place] [address]  [facility] days:  Tuesday & Thursday AM    [facility]  [address] , [address] (Medical office Building)  [place], [place], [address]  [facility] Clinic days: Wednesday AM

## 2025-04-25 NOTE — PROGRESS NOTES
Nephrology Progress Note                                                                                                                                                                                                                                                                                                                                                               Office : [phone]     Fax :[phone]    Patient's Name: Stacy Ly    Reason for Consult:  JUSTINA on CKD 3  Requesting Physician:  Tarik Hummel, MARILEE - CNP  Chief Complaint:    Chief Complaint   Patient presents with    Leg Swelling     Pt brought in per [facility] from home, pt has multiple complaints.  Pt slid out of her recliner this am trying to stand, pt has been home from rehab x 1 mos, unable to walk, can only stand however she is weak when standing.  Pt reports increased redness to her right lower leg, swelling present, pt states redness is worse, swelling is unchanged.  Pt also reports diarrhea this am.        Assessment/Plan     # JUSTINA on CKD 3  - Baseline Cr ~ 1.5. Creatinine 1.7 today.   - likely 2/2 sepsis, hypotension  - saline bolus x 1 dose and see response   - UPCR with minimal proteinuria  - avoid nephrotoxins  - monitor renal labs      #Sepsis  - 2/2 cellulitis of R leg   - s/p IVF  - cultures pending  - abx per ID    #CHF  - EF 55-60% from 02/14  - BNP ~ 13k  - lasix every other day at home   - low salt diet  - daily weights    #HTN  - Bps on soft side  - s/p levophed   - monitor     # COPD   - Oxygen dependent      # A fib  - eliquis  - EP on board    History of Present Ilness:    Stacy Ly is a 79 y.o. female with pMH of HTN, CHF, COPD, GIANNA, A-fib, and CKD 3 who presents to the emergency department from home with reports of generalized weakness and fatigue.  This has been ongoing for several weeks.  She was released from rehab facility 1 month ago and has been home ever since however has not been

## 2025-04-25 NOTE — PROGRESS NOTES
NAME:  Stacy Ly  YOB: 1945  MEDICAL RECORD NUMBER:  [id number]    Shift Summary:   - levo off   -  black stool overnight   - buttocks red excoriated   - PRN gabapentin given for pain, pt stated \"it helped pain a lot in legs\"    - pt lives with Son/s, mentioned not being able to shower r/t unable to step over the tub   - pt is in need of a podiatrist, or in home dori care for hygiene and foot care     Family updated: No    Rhythm: Afib controlled w/PVCs    Most recent vitals:   Visit Vitals  /61   Pulse 86   Temp 98.6 °F (37 °C)   Resp 14   Ht 1.702 m (5' 7\")   Wt 115.3 kg (254 lb 3.1 oz)   SpO2 100%   BMI 39.81 kg/m²               Respiratory support needed (if any):  - O2 - NC - 2 lpm    Admission weight Weight - Scale: 108.9 kg (240 lb) (04/22/25 0934)    Today's weight    Wt Readings from Last 1 Encounters:   04/24/25 115.3 kg (254 lb 3.1 oz)        Kaplan need assessed each shift: YES -  - continue kaplan r/t - retention/prolonged immobilization   UOP >30ml/hr: YES  Last documented BM (in last 48 hrs):  Patient Vitals for the past 48 hrs:   Last BM (including prior to admit) Stool Occurrence   04/23/25 0800 04/23/25 --   04/24/25 0600 04/24/25 1   04/24/25 0800 04/24/25 --   04/24/25 1200 04/25/25 1   04/24/25 2000 04/24/25 --   04/24/25 2345 -- 1   04/25/25 0000 04/25/25 --                    Lines/Drains reviewed @ bedside.  Peripheral IV 04/22/25 Left;Posterior Hand (Active)   Number of days: 1       Peripheral IV 04/23/25 Left;Anterior;Upper Cephalic (Active)   Number of days: 1       Urinary Catheter 04/22/25 Kaplan-Temperature (Active)   Number of days: 2         Drip rates at handoff:    sodium chloride         Lab Data:   CBC:   Recent Labs     04/24/25  0441 04/25/25  0431   WBC 15.6* 12.6*   HGB 11.0* 10.4*   HCT 34.4* 32.0*   MCV 90.0 90.4   * 129*     BMP:    Recent Labs     04/24/25  0441 04/25/25  0431    140   K 3.9 3.8   CO2 18* 18*   BUN 60* 57*

## 2025-04-25 NOTE — CARE COORDINATION
CM called daughter and she wants CM to see if [facility] can take pt and she said to follow up with pt for some more choices.    VM left for [facility] with [facility] [phone].    Isela Dejesus RN, BSN  [phone]     Updated on 4/25/25 at 11:39 AM     CM spoke to [facility] with [facility]. They may have a bed this weekend but will also need to keep pt in isolation there due to she is taking tamiflu and would be based on symptoms when isolation would be removed.    The barrier to this is :     Pt left SNF on day 20. When pt returns to a SNF (any snf as this is based on insurance and payments) since she would be returning to a facility skilled on DAY 21 - she will have co pay days (Medicare pays 100% for 1-20 days - starting day 20 it is 80% insurance and pt pays 20% ).     The above is also because pt has not had a 60 day break from a facility in order for a new episode to begin.    Pt will have a daily out of pocket expense of $214/day until her insurance deductible of 2,246 is met. So pt will have to pay up front to return to snf skilled - any snf. CM will follow up with pt today and explain this to her.     CM updated RN that I will speak to pt later.    Isela Dejesus RN, BSN  [phone]     Updated at 5:26 PM    CM spoke to patient and daughter on phone at approximately 2 :45 PM and explained the above costs and pt will go [facility] with [facility] at discharge and states she cannot afford to pay co pay days.    Pt will need hc orders and richie completion on discharge.    Pt and daughter agreeable to on line COA referral. Pt will need one made before discharge as CM has not made at this time.     Isela Dejesus RN, BSN  [phone]

## 2025-04-25 NOTE — PROGRESS NOTES
HOSPITALISTS PROGRESS NOTE    4/25/2025 11:09 AM        Name: Stacy Ly .              Admitted: 4/22/2025  Primary Care Provider: Tarik Hummel APRN - CNP (Tel: [phone])      Brief Course: This 79 y.o. female  with PMHx of A-fib, hypertension, and COPD, chronic hypoxic respiratory failure  on 2 L at baseline, CKD stage III and HFpEF admitted for septic shock 2/2 cellulitis.  JUSTINA on CKD.    Interval history:   Pt seen and examined today.  Overnight events noted and interval ancillary notes reviewed.  Febrile with Tmax of 99.4 F  WBC's down to 12.6 K  BP stable off pressors.  Creatinine down to 1.7  Denies any complaints.    Assessment & Plan:     RLE cellulitis:  ID consulted; on IV Cipro and Zyvox.  Follow-up CT of the right tibia and fibula.     Septic shock 2/2 above;p/w tachycardia, leukocytosis, hypotension, hypothermia lactic acidosis  S/p fluid resuscitation per sepsis protocol.    Hold off on further fluids given patient has history of CHF  Continue antibiotics per ID recs.     Acute on chronic diastolic heart failure  proBNP:> 13k on admission.  Last echo on 2/14/25 showed EF of 55 to 60%  Cardiology consulted; Holding diuresis in setting of hypotension  Maintain fluid and salt restriction.  Strict I's and O's.  Daily weights.     JUSTINA on CKD stage III; likely secondary to sepsis  Creatinine 2.5 on admission; (baseline 1.3-1.4); trended down to 1.7.  Nephrology consulted; s/p IV fluids.    Avoid nephrotoxins and monitor renal function closely     Hx of A-fib: Continue Eliquis.  Metoprolol on hold.    History of HTN: BP remains soft off pressors.   Monitor BP with IVF and midodrine.  Hold metoprolol.     Hx of COPD; not in exacerbation.   Continue home inhalers and as needed breathing treatment     Chronic hypoxic respiratory failure; on 2 L/min at baseline.     Hyperlipidemia: continue statin.      Mild thrombocytopenia:  process.         CT TIBIA FIBULA RIGHT WO CONTRAST    (Results Pending)       Problem List  Principal Problem:    Severe sepsis (HCC)  Active Problems:    Class 2 obesity due to excess calories with body mass index (BMI) of 37.0 to 37.9 in adult    Cellulitis of skin with lymphangitis    JUSTINA (acute kidney injury)    Hypotension due to hypovolemia    Influenza A    Chronic atrial fibrillation (HCC)    Atrial fibrillation with RVR (HCC)    Difficulty walking    Fall    General weakness    Peripheral edema    Lactic acidosis    Septic shock (HCC)    Electrolyte imbalance  Resolved Problems:    * No resolved hospital problems. *       Dora Contreras MD   4/25/2025 11:09 AM      Please note that some part of this chart was generated using Dragon dictation software. Although every effort was made to ensure the accuracy of this automated transcription, some errors in transcription may have occurred inadvertently. If you may need any clarification, please do not hesitate to contact me through EPIC.

## 2025-04-26 ENCOUNTER — APPOINTMENT (OUTPATIENT)
Dept: GENERAL RADIOLOGY | Age: 80
End: 2025-04-26
Payer: COMMERCIAL

## 2025-04-26 LAB
ALBUMIN SERPL-MCNC: 2.2 G/DL (ref 3.4–5)
ANION GAP SERPL CALCULATED.3IONS-SCNC: 8 MMOL/L (ref 3–16)
BACTERIA BLD CULT ORG #2: NORMAL
BACTERIA BLD CULT: NORMAL
BUN SERPL-MCNC: 49 MG/DL (ref 7–20)
CALCIUM SERPL-MCNC: 7.9 MG/DL (ref 8.3–10.6)
CHLORIDE SERPL-SCNC: 112 MMOL/L (ref 99–110)
CO2 SERPL-SCNC: 19 MMOL/L (ref 21–32)
CREAT SERPL-MCNC: 1.5 MG/DL (ref 0.6–1.2)
DEPRECATED RDW RBC AUTO: 16.7 % (ref 12.4–15.4)
GFR SERPLBLD CREATININE-BSD FMLA CKD-EPI: 36 ML/MIN/{1.73_M2}
GLUCOSE SERPL-MCNC: 87 MG/DL (ref 70–99)
HCT VFR BLD AUTO: 29.9 % (ref 36–48)
HCT VFR BLD AUTO: 30 % (ref 36–48)
HGB BLD-MCNC: 9.7 G/DL (ref 12–16)
HGB BLD-MCNC: 9.7 G/DL (ref 12–16)
MAGNESIUM SERPL-MCNC: 1.83 MG/DL (ref 1.8–2.4)
MCH RBC QN AUTO: 29.6 PG (ref 26–34)
MCHC RBC AUTO-ENTMCNC: 32.5 G/DL (ref 31–36)
MCV RBC AUTO: 91.2 FL (ref 80–100)
NT-PROBNP SERPL-MCNC: ABNORMAL PG/ML (ref 0–449)
PHOSPHATE SERPL-MCNC: 3.1 MG/DL (ref 2.5–4.9)
PLATELET # BLD AUTO: 134 K/UL (ref 135–450)
PMV BLD AUTO: 9.9 FL (ref 5–10.5)
POTASSIUM SERPL-SCNC: 3.9 MMOL/L (ref 3.5–5.1)
RBC # BLD AUTO: 3.28 M/UL (ref 4–5.2)
SODIUM SERPL-SCNC: 139 MMOL/L (ref 136–145)
WBC # BLD AUTO: 11.4 K/UL (ref 4–11)

## 2025-04-26 PROCEDURE — 36415 COLL VENOUS BLD VENIPUNCTURE: CPT

## 2025-04-26 PROCEDURE — 2580000003 HC RX 258: Performed by: PHYSICIAN ASSISTANT

## 2025-04-26 PROCEDURE — 2580000003 HC RX 258: Performed by: INTERNAL MEDICINE

## 2025-04-26 PROCEDURE — 2500000003 HC RX 250 WO HCPCS: Performed by: INTERNAL MEDICINE

## 2025-04-26 PROCEDURE — 99291 CRITICAL CARE FIRST HOUR: CPT | Performed by: INTERNAL MEDICINE

## 2025-04-26 PROCEDURE — 6360000002 HC RX W HCPCS

## 2025-04-26 PROCEDURE — 36569 INSJ PICC 5 YR+ W/O IMAGING: CPT

## 2025-04-26 PROCEDURE — 94761 N-INVAS EAR/PLS OXIMETRY MLT: CPT

## 2025-04-26 PROCEDURE — C1751 CATH, INF, PER/CENT/MIDLINE: HCPCS

## 2025-04-26 PROCEDURE — 99233 SBSQ HOSP IP/OBS HIGH 50: CPT | Performed by: HOSPITALIST

## 2025-04-26 PROCEDURE — 6370000000 HC RX 637 (ALT 250 FOR IP): Performed by: INTERNAL MEDICINE

## 2025-04-26 PROCEDURE — 2700000000 HC OXYGEN THERAPY PER DAY

## 2025-04-26 PROCEDURE — 6360000002 HC RX W HCPCS: Performed by: INTERNAL MEDICINE

## 2025-04-26 PROCEDURE — 83735 ASSAY OF MAGNESIUM: CPT

## 2025-04-26 PROCEDURE — 2000000000 HC ICU R&B

## 2025-04-26 PROCEDURE — P9047 ALBUMIN (HUMAN), 25%, 50ML: HCPCS | Performed by: INTERNAL MEDICINE

## 2025-04-26 PROCEDURE — 71045 X-RAY EXAM CHEST 1 VIEW: CPT

## 2025-04-26 PROCEDURE — 76937 US GUIDE VASCULAR ACCESS: CPT

## 2025-04-26 PROCEDURE — 85027 COMPLETE CBC AUTOMATED: CPT

## 2025-04-26 PROCEDURE — 85018 HEMOGLOBIN: CPT

## 2025-04-26 PROCEDURE — 83880 ASSAY OF NATRIURETIC PEPTIDE: CPT

## 2025-04-26 PROCEDURE — 6360000002 HC RX W HCPCS: Performed by: PHYSICIAN ASSISTANT

## 2025-04-26 PROCEDURE — 85014 HEMATOCRIT: CPT

## 2025-04-26 PROCEDURE — 80069 RENAL FUNCTION PANEL: CPT

## 2025-04-26 PROCEDURE — 05HY33Z INSERTION OF INFUSION DEVICE INTO UPPER VEIN, PERCUTANEOUS APPROACH: ICD-10-PCS | Performed by: INTERNAL MEDICINE

## 2025-04-26 PROCEDURE — 94640 AIRWAY INHALATION TREATMENT: CPT

## 2025-04-26 RX ORDER — ALBUMIN (HUMAN) 12.5 G/50ML
12.5 SOLUTION INTRAVENOUS EVERY 8 HOURS
Status: COMPLETED | OUTPATIENT
Start: 2025-04-26 | End: 2025-04-27

## 2025-04-26 RX ORDER — LIDOCAINE HYDROCHLORIDE 10 MG/ML
50 INJECTION, SOLUTION EPIDURAL; INFILTRATION; INTRACAUDAL; PERINEURAL ONCE
Status: DISCONTINUED | OUTPATIENT
Start: 2025-04-26 | End: 2025-05-02 | Stop reason: HOSPADM

## 2025-04-26 RX ORDER — SODIUM CHLORIDE 0.9 % (FLUSH) 0.9 %
5-40 SYRINGE (ML) INJECTION EVERY 12 HOURS SCHEDULED
Status: DISCONTINUED | OUTPATIENT
Start: 2025-04-26 | End: 2025-05-02 | Stop reason: HOSPADM

## 2025-04-26 RX ORDER — MAGNESIUM SULFATE 1 G/100ML
1000 INJECTION INTRAVENOUS ONCE
Status: COMPLETED | OUTPATIENT
Start: 2025-04-26 | End: 2025-04-26

## 2025-04-26 RX ORDER — MIDODRINE HYDROCHLORIDE 5 MG/1
10 TABLET ORAL
Status: DISCONTINUED | OUTPATIENT
Start: 2025-04-26 | End: 2025-04-26

## 2025-04-26 RX ORDER — IPRATROPIUM BROMIDE AND ALBUTEROL SULFATE 2.5; .5 MG/3ML; MG/3ML
1 SOLUTION RESPIRATORY (INHALATION)
Status: DISCONTINUED | OUTPATIENT
Start: 2025-04-26 | End: 2025-04-30

## 2025-04-26 RX ORDER — SODIUM CHLORIDE 0.9 % (FLUSH) 0.9 %
5-40 SYRINGE (ML) INJECTION PRN
Status: DISCONTINUED | OUTPATIENT
Start: 2025-04-26 | End: 2025-05-02 | Stop reason: HOSPADM

## 2025-04-26 RX ORDER — MIDODRINE HYDROCHLORIDE 5 MG/1
10 TABLET ORAL
Status: DISCONTINUED | OUTPATIENT
Start: 2025-04-26 | End: 2025-04-28

## 2025-04-26 RX ORDER — SODIUM CHLORIDE 9 MG/ML
INJECTION, SOLUTION INTRAVENOUS PRN
Status: DISCONTINUED | OUTPATIENT
Start: 2025-04-26 | End: 2025-05-02 | Stop reason: HOSPADM

## 2025-04-26 RX ADMIN — MAGNESIUM SULFATE HEPTAHYDRATE 1000 MG: 1 INJECTION, SOLUTION INTRAVENOUS at 01:12

## 2025-04-26 RX ADMIN — Medication 10 ML: at 21:40

## 2025-04-26 RX ADMIN — ALBUMIN (HUMAN) 12.5 G: 0.25 INJECTION, SOLUTION INTRAVENOUS at 09:36

## 2025-04-26 RX ADMIN — MIDODRINE HYDROCHLORIDE 10 MG: 5 TABLET ORAL at 09:30

## 2025-04-26 RX ADMIN — IPRATROPIUM BROMIDE AND ALBUTEROL SULFATE 1 DOSE: .5; 3 SOLUTION RESPIRATORY (INHALATION) at 20:16

## 2025-04-26 RX ADMIN — PANTOPRAZOLE SODIUM 40 MG: 40 INJECTION, POWDER, FOR SOLUTION INTRAVENOUS at 21:36

## 2025-04-26 RX ADMIN — SODIUM CHLORIDE: 0.9 INJECTION, SOLUTION INTRAVENOUS at 21:36

## 2025-04-26 RX ADMIN — PANTOPRAZOLE SODIUM 40 MG: 40 INJECTION, POWDER, FOR SOLUTION INTRAVENOUS at 09:30

## 2025-04-26 RX ADMIN — IPRATROPIUM BROMIDE AND ALBUTEROL SULFATE 1 DOSE: .5; 3 SOLUTION RESPIRATORY (INHALATION) at 08:28

## 2025-04-26 RX ADMIN — CIPROFLOXACIN 400 MG: 400 INJECTION, SOLUTION INTRAVENOUS at 21:37

## 2025-04-26 RX ADMIN — SODIUM CHLORIDE 100 MG: 9 INJECTION, SOLUTION INTRAVENOUS at 15:11

## 2025-04-26 RX ADMIN — CYANOCOBALAMIN TAB 1000 MCG 1000 MCG: 1000 TAB at 09:30

## 2025-04-26 RX ADMIN — ALBUMIN (HUMAN) 12.5 G: 0.25 INJECTION, SOLUTION INTRAVENOUS at 16:24

## 2025-04-26 RX ADMIN — MIDODRINE HYDROCHLORIDE 10 MG: 5 TABLET ORAL at 16:19

## 2025-04-26 RX ADMIN — SODIUM CHLORIDE 500 MG: 9 INJECTION INTRAMUSCULAR; INTRAVENOUS; SUBCUTANEOUS at 13:09

## 2025-04-26 NOTE — PROGRESS NOTES
Nephrology Progress Note                                                                                                                                                                                                                                                                                                                                                               Office : [phone]     Fax :[phone]    Patient's Name: Stacy Ly    Reason for Consult:  JUSTINA on CKD 3  Requesting Physician:  Tarik Hummel, MARILEE - CNP  Chief Complaint:    Chief Complaint   Patient presents with    Leg Swelling     Pt brought in per [facility] from home, pt has multiple complaints.  Pt slid out of her recliner this am trying to stand, pt has been home from rehab x 1 mos, unable to walk, can only stand however she is weak when standing.  Pt reports increased redness to her right lower leg, swelling present, pt states redness is worse, swelling is unchanged.  Pt also reports diarrhea this am.        Assessment/Plan     #Acute Anemia  #Melena   - GI bleed  - GI consulted  - plans for EGD Monday     # JUSTINA on CKD 3  - Baseline Cr ~ 1.5. Creatinine now at baseline.   - likely 2/2 sepsis, hypotension  - UPCR with minimal proteinuria  - avoid nephrotoxins  - monitor renal labs      #Sepsis  - 2/2 cellulitis of R leg   - s/p IVF  - CT neg for osteomyelitis or abscess  - cultures pending  - abx per ID    #CHF  - EF 55-60% from 02/14  - BNP ~ 13k  - lasix every other day at home   - low salt diet  - daily weights    #HTN  - Bps on soft side  - s/p levophed + IV bolus   - midodrine + albumin   - monitor     # COPD   - Oxygen dependent      # A fib  - eliquis on hold d/t concerns for bleed  - EP on board    History of Present Ilness:    Stacy Ly is a 79 y.o. female with pMH of HTN, CHF, COPD, GIANNA, A-fib, and CKD 3 who presents to the emergency department from home with reports of generalized weakness and fatigue.   Stacy Ly   Feel free to contact me,         Huy Viera MD   [facility]  Office : [phone] or through [employer]  Fax :[phone]

## 2025-04-26 NOTE — PROGRESS NOTES
HOSPITALISTS PROGRESS NOTE    4/26/2025 11:37 AM        Name: Stacy Ly .              Admitted: 4/22/2025  Primary Care Provider: Tarik Hummel APRN - CNP (Tel: [phone])      Brief Course: This 79 y.o. female  with PMHx of A-fib, hypertension, and COPD, chronic hypoxic respiratory failure  on 2 L at baseline, CKD stage III and HFpEF admitted for septic shock 2/2 cellulitis.  JUSTINA on CKD.    Interval history:   Pt seen and examined today.  Overnight events noted and interval ancillary notes reviewed.  Afebrile, WBC's down to 11.4 K  Remains hypotensive on midodrine.  Creatinine down to 1.5    Assessment & Plan:     RLE cellulitis:  ID consulted; on IV daptomycin and ciprofloxacin.   CT of the right tibia and fibula negative for osteomyelitis or abscess.     Septic shock 2/2 above;  p/w tachycardia, leukocytosis, hypotension, hypothermia lactic acidosis  S/p fluid resuscitation per sepsis protocol.    Hold off on further fluids given patient has history of CHF  Continue antibiotics per ID recs.     Acute on chronic diastolic heart failure  proBNP:> 13k on admission.  Last echo on 2/14/25 showed EF of 55 to 60%  Cardiology consulted; Holding diuresis in setting of hypotension  Maintain fluid and salt restriction.  Strict I's and O's.  Daily weights.     JUSTINA on CKD stage III; likely secondary to sepsis  Creatinine 2.5 on admission; (baseline 1.3-1.4); trended down to 1.7.  Nephrology consulted; s/p IV fluids.    Avoid nephrotoxins and monitor renal function closely     Hx of A-fib: Eliquis and metoprolol on hold.    History of HTN: Currently hypotensive off pressors.   Monitor BP with IV albumin and midodrine.  Hold metoprolol.     Hx of COPD; not in exacerbation.   Continue home inhalers and as needed breathing treatment     Chronic hypoxic respiratory failure; on 2 L/min at baseline.     Hyperlipidemia: continue statin.      Mild  40.71 kg/m²     Intake/Output Summary (Last 24 hours) at 4/26/2025 1137  Last data filed at 4/26/2025 1009  Gross per 24 hour   Intake 1610.11 ml   Output 1525 ml   Net 85.11 ml      Wt Readings from Last 3 Encounters:   04/26/25 117.9 kg (259 lb 14.8 oz)   03/28/25 112 kg (247 lb)   02/19/25 119.1 kg (262 lb 9.1 oz)       Physical Examination:   General appearance: Chronically ill looking.  Morbidly obese  Lungs: Clear to auscultation, bilaterally without Rales/Wheezes/Rhonchi   Heart: Regular rhythm normal S1/S2 without murmurs  Abdomen: Soft, non-tender, non-distended. Positive bowel sounds all four quadrants.  Extremities: Chronic BLE lymphedema.  RLE erythema improving  Neurologic: CN 2-12 grossly intact. No gross deficit noted      Labs and Tests:  CBC:   Recent Labs     04/24/25  0441 04/25/25  0431 04/26/25  0433   WBC 15.6* 12.6* 11.4*   HGB 11.0* 10.4* 9.7*   * 129* 134*     BMP:    Recent Labs     04/24/25  0441 04/25/25  0431 04/26/25  0434    140 139   K 3.9 3.8 3.9    111* 112*   CO2 18* 18* 19*   BUN 60* 57* 49*   CREATININE 2.0* 1.7* 1.5*   GLUCOSE 93 97 87     Hepatic:   No results for input(s): \"AST\", \"ALT\", \"BILITOT\", \"ALKPHOS\" in the last 72 hours.    Invalid input(s): \"ALB\"    CT TIBIA FIBULA RIGHT WO CONTRAST   Final Result   1. Diffuse anasarca.   2. No focal abscess identified.   3. No obvious osteomyelitis. Consider MRI with and without contrast, or   three-phase bone scan and tagged white blood cell study to further evaluate   as appropriate.         XR FOOT RIGHT (2 VIEWS)   Final Result   1. Nonspecific soft tissue swelling of the right foot.   2. No acute fracture or osseous erosion.            XR ANKLE RIGHT (2 VIEWS)   Final Result   1. Severe posttraumatic tibiotalar joint osteoarthritis.   2. Unchanged pin in the distal fibula extending into the distal tibfib and syndesmosis, and remote fixation of the medial malleolus.   3. Ossific dictation of the distal tib-fib

## 2025-04-26 NOTE — PROGRESS NOTES
Time out: Immediately prior to procedure a \"time out\" was called to verify the correct patient, procedure, equipment, support staff and site/side marked as required.

## 2025-04-26 NOTE — PROGRESS NOTES
Arrived to place PICC line with bedside RN Juan Carlos. Pre-procedure and timeout done with RN, discussed limitations of placement and allergies. Consent confirmed. Vital signs stable. Labs, allergies, medications, and code status reviewed. No contraindications noted.    Procedure explained to pt, including the risk and benefits of the procedure. All questions answered. Pt verbalizes understanding of the procedure and states no more questions.     Pt's basilic, brachial, cephalic are all easily collapsible with no indication for a clot. Vein selected is large enough for catheter. Pt tolerated sterile procedure well, with no difficulty accessing basilic vein, when accessed - blood was free flowing and non-pulsatile. Guidewire, introducer, and catheter went in smoothly.     1455: Picc noted to be in right atrium according to chest xray. Called radiologist suggested pulling back line. And obtain new chest xray. Completed. Chest xray pending.     PICC line being verified with XRAY, please do not use PICC until the impression comes back with the tip within the SVC or Cavo atrial junction. Once placement is confirmed receive orders from MD to use PICC.     If PICC is not within SVC please call [facility] and  will notify the PICC RN that is on call.        Nurses, when PICC is verified:  Please replace all existing IV tubing with new IV tubing prior to using the PICC for current IV infusions.  Please remove any PIVs from PICC arm.  All of the above may be sources of infection or an increase chance of a clot.      Post procedure - reorganized pt table, placed pt in lowest position, with call light and educated on line care. Instructed pt/RN not to use arm for at least 30min to avoid bleeding. Reported off to bedside RN.        [phone]

## 2025-04-26 NOTE — PLAN OF CARE
Problem: ABCDS Injury Assessment  Goal: Absence of physical injury  Outcome: Progressing     Problem: Skin/Tissue Integrity  Goal: Skin integrity remains intact  Description: 1.  Monitor for areas of redness and/or skin breakdown2.  Assess vascular access sites hourly3.  Every 4-6 hours minimum:  Change oxygen saturation probe site4.  Every 4-6 hours:  If on nasal continuous positive airway pressure, respiratory therapy assess nares and determine need for appliance change or resting period  Outcome: Progressing     Problem: Chronic Conditions and Co-morbidities  Goal: Patient's chronic conditions and co-morbidity symptoms are monitored and maintained or improved  Outcome: Progressing     Problem: Pain  Goal: Verbalizes/displays adequate comfort level or baseline comfort level  Outcome: Progressing     Problem: Safety - Adult  Goal: Free from fall injury  Outcome: Progressing     Problem: Respiratory - Adult  Goal: Achieves optimal ventilation and oxygenation  Outcome: Progressing     Problem: Cardiovascular - Adult  Goal: Maintains optimal cardiac output and hemodynamic stability  Outcome: Progressing  Goal: Absence of cardiac dysrhythmias or at baseline  Outcome: Progressing     Problem: Metabolic/Fluid and Electrolytes - Adult  Goal: Electrolytes maintained within normal limits  Outcome: Progressing     Problem: Skin/Tissue Integrity - Adult  Goal: Skin integrity remains intact  Description: 1.  Monitor for areas of redness and/or skin breakdown2.  Assess vascular access sites hourly3.  Every 4-6 hours minimum:  Change oxygen saturation probe site4.  Every 4-6 hours:  If on nasal continuous positive airway pressure, respiratory therapy assess nares and determine need for appliance change or resting period  Outcome: Progressing  Goal: Incisions, wounds, or drain sites healing without S/S of infection  Outcome: Progressing     Problem: Genitourinary - Adult  Goal: Absence of urinary retention  Outcome:

## 2025-04-26 NOTE — PROGRESS NOTES
Assessment  Principal Problem:    Severe sepsis (HCC)  Active Problems:    Class 2 obesity due to excess calories with body mass index (BMI) of 37.0 to 37.9 in adult    Cellulitis of skin with lymphangitis    JUSTINA (acute kidney injury)    Hypotension due to hypovolemia    Influenza A    Chronic atrial fibrillation (HCC)    Atrial fibrillation with RVR (HCC)    Difficulty walking    Fall    General weakness    Peripheral edema    Lactic acidosis    Septic shock (HCC)    Electrolyte imbalance  Resolved Problems:    * No resolved hospital problems. *     Melena  Acute blood loss anemia, mild  MIRIAM from chronic bleed.    History of marginal ulcer at gastrojejunostomy '23.    Chronic anticoagulation.  Last dose eliquis was AM 4/25.      Plan:  Change to CLD  Hold eliquis.   IV protonix bid.    IV iron, today day 2  EGD Monday if she remains stable.    Subjective:  We are following for  melena.  Had several black tarry stools.  No abd pain.      Objective:  Hgb 9.7 today, down slightly    Review of Systems:    Constitutional: Negative for fever, chills, and unexpected weight change.   HENT: Negative for trouble swallowing.    Respiratory: Negative for cough, chest tightness and shortness of breath.    Cardiovascular: Negative for chest pain  Gastrointestinal: see HPI  Musculoskeletal: Negative for unusual arthralgias.   Skin: Negative for rash.     Scheduled Meds:   ipratropium 0.5 mg-albuterol 2.5 mg  1 Dose Inhalation BID RT    albumin human 25%  12.5 g IntraVENous Q8H    midodrine  10 mg Oral TID WC    sodium chloride flush  5-40 mL IntraVENous 2 times per day    lidocaine 1 % injection  50 mg IntraDERmal Once    vitamin B-12  1,000 mcg Oral Daily    [Held by provider] gabapentin  300 mg Oral BID    DAPTOmycin (CUBICIN) 500 mg in sodium chloride (PF) 0.9 % 10 mL IV syringe  6 mg/kg (Adjusted) IntraVENous Q24H    pantoprazole  40 mg IntraVENous BID    iron sucrose (VENOFER) 100 mg in sodium chloride 0.9 % 100 mL

## 2025-04-26 NOTE — PROGRESS NOTES
PULMONARY AND CRITICAL CARE MEDICINE PROGRESS NOTE    Subjective: Patient lying in bed in no apparent respiratory distress.  Overnight had become more lethargic and occasionally hallucinated.   Did get gabapentin.    Currently on 2 L/min of oxygen supplementation saturating in mid 90s.  Has come off of vasopressors.  Renal functions slowly stabilizing.  Evidence of slow GI bleed.    REVIEW OF SYSTEMS:     8 point review of system is negative except that mentioned in the subjective portion.    PAST MEDICAL HISTORY:   Past Medical History:   Diagnosis Date    Arthritis     Atrial fibrillation and flutter (HCC)     Hypertension     Infection due to parainfluenza virus 3 5/11/2023    Kidney disease     Pt states  \"stage 3\"    Pneumonia     Sleep apnea     no c-pap       PAST SURGICAL HISTORY:   Past Surgical History:   Procedure Laterality Date    CARDIOVERSION      COLONOSCOPY N/A 11/20/2018    COLONOSCOPY POLYPECTOMY SNARE/COLD BIOPSY performed by Jairon Gordon MD at [facility] ENDOSCOPY    COLONOSCOPY N/A 03/31/2022    COLONOSCOPY POLYPECTOMY SNARE/COLD BIOPSY performed by Alfred London MD at [facility] ENDOSCOPY    FRACTURE SURGERY      ankle rt has pins and rods, and rt elbow    GASTRIC BYPASS SURGERY      LARYNX SURGERY      TOTAL KNEE ARTHROPLASTY Bilateral 12/19/2012    bilateral knee replacements    TUBAL LIGATION      tubes tied    UPPER GASTROINTESTINAL ENDOSCOPY N/A 11/20/2018    EGD BIOPSY performed by Jairon Gordon MD at [facility] ENDOSCOPY    UPPER GASTROINTESTINAL ENDOSCOPY N/A 03/31/2022    EGD DILATION BALLOON performed by Alfred London MD at [facility] ENDOSCOPY    UPPER GASTROINTESTINAL ENDOSCOPY N/A 03/31/2022    EGD BIOPSY performed by Alfred London MD at [facility] ENDOSCOPY       SOCIAL HISTORY:   Social History     Tobacco Use    Smoking status: Never    Smokeless tobacco: Never   Vaping Use    Vaping status: Never Used   Substance Use Topics    Alcohol use: No    Drug use: No       FAMILY HISTORY:  Results   Component Value Date    .0 (H) 04/22/2025      No results found for: \"BNP\"   Lab Results   Component Value Date    DDIMER >20.00 (H) 02/07/2023      Lab Results   Component Value Date    FERRITIN 794.0 (H) 04/25/2025      Lab Results   Component Value Date    LACTA 1.6 04/23/2025           IMAGING:      CT OF THE ABDOMEN AND PELVIS WITHOUT CONTRAST 5/10/2023 2:08 am     IMPRESSION:  1. Consolidations in the right middle lobe, right lower lobe, and left lower lobe.  Please correlate with clinical symptoms of pneumonia.  2. No acute abnormality in the abdomen or pelvis.  Specifically, no evidence of residual abscess.  3. 1 mm nonobstructing left renal stone.  4. Sigmoid diverticulosis with no evidence of diverticulitis.      IMPRESSION:     Septic shock  Bilateral lower extremity cellulitis  Heart failure with preserved ejection fraction  Acute on chronic kidney injury  Paroxysmal atrial fibrillation on chronic anticoagulation  Obesity    RECOMMENDATION:     Patient has presented to the hospital with fall at home, generalized weakness and bilateral lower extremity cellulitis.  Most likely etiology behind her septic shock  Patient off-and-on on vasopressor support.  May have to restart her on vasopressors again today.  Blood pressure remains soft.  Keep on holding the metoprolol.  Tachycardia is intermittent and sporadic.  Patient to continue with daptomycin and ciprofloxacin.  Antibiotic management as per ID recommendations.  X-ray of the right foot and ankle shows possibility of deep-seated infection.  CT of right tibia/fibula did not show any evidence of osteomyelitis.  No suspicion for pulmonary infection.  Oxygen requirements at baseline of 2 L/min.  Patient has acute on chronic kidney injury most likely due to shock state.  This is slowly improving.  Urine output improving.  Nephrology on board.  Patient to continue getting Lasix every other day.  So far no growth on cultures.  Did have

## 2025-04-27 LAB
ALBUMIN SERPL-MCNC: 3.1 G/DL (ref 3.4–5)
ANION GAP SERPL CALCULATED.3IONS-SCNC: 9 MMOL/L (ref 3–16)
APTT BLD: 32.4 SEC (ref 22.1–36.4)
BUN SERPL-MCNC: 34 MG/DL (ref 7–20)
CALCIUM SERPL-MCNC: 8.1 MG/DL (ref 8.3–10.6)
CHLORIDE SERPL-SCNC: 109 MMOL/L (ref 99–110)
CO2 SERPL-SCNC: 22 MMOL/L (ref 21–32)
CREAT SERPL-MCNC: 1.4 MG/DL (ref 0.6–1.2)
DEPRECATED RDW RBC AUTO: 17.1 % (ref 12.4–15.4)
GFR SERPLBLD CREATININE-BSD FMLA CKD-EPI: 39 ML/MIN/{1.73_M2}
GLUCOSE SERPL-MCNC: 85 MG/DL (ref 70–99)
HCT VFR BLD AUTO: 27.1 % (ref 36–48)
HGB BLD-MCNC: 8.8 G/DL (ref 12–16)
MAGNESIUM SERPL-MCNC: 2.2 MG/DL (ref 1.8–2.4)
MCH RBC QN AUTO: 29.4 PG (ref 26–34)
MCHC RBC AUTO-ENTMCNC: 32.4 G/DL (ref 31–36)
MCV RBC AUTO: 90.5 FL (ref 80–100)
PHOSPHATE SERPL-MCNC: 3.4 MG/DL (ref 2.5–4.9)
PLATELET # BLD AUTO: 145 K/UL (ref 135–450)
PMV BLD AUTO: 9.5 FL (ref 5–10.5)
POTASSIUM SERPL-SCNC: 3.8 MMOL/L (ref 3.5–5.1)
RBC # BLD AUTO: 2.99 M/UL (ref 4–5.2)
SODIUM SERPL-SCNC: 140 MMOL/L (ref 136–145)
WBC # BLD AUTO: 8.1 K/UL (ref 4–11)

## 2025-04-27 PROCEDURE — 94761 N-INVAS EAR/PLS OXIMETRY MLT: CPT

## 2025-04-27 PROCEDURE — P9047 ALBUMIN (HUMAN), 25%, 50ML: HCPCS | Performed by: INTERNAL MEDICINE

## 2025-04-27 PROCEDURE — 83735 ASSAY OF MAGNESIUM: CPT

## 2025-04-27 PROCEDURE — 94640 AIRWAY INHALATION TREATMENT: CPT

## 2025-04-27 PROCEDURE — 36592 COLLECT BLOOD FROM PICC: CPT

## 2025-04-27 PROCEDURE — 6360000002 HC RX W HCPCS: Performed by: INTERNAL MEDICINE

## 2025-04-27 PROCEDURE — 36415 COLL VENOUS BLD VENIPUNCTURE: CPT

## 2025-04-27 PROCEDURE — 6370000000 HC RX 637 (ALT 250 FOR IP): Performed by: INTERNAL MEDICINE

## 2025-04-27 PROCEDURE — 99233 SBSQ HOSP IP/OBS HIGH 50: CPT | Performed by: HOSPITALIST

## 2025-04-27 PROCEDURE — 80069 RENAL FUNCTION PANEL: CPT

## 2025-04-27 PROCEDURE — 2580000003 HC RX 258: Performed by: INTERNAL MEDICINE

## 2025-04-27 PROCEDURE — 85027 COMPLETE CBC AUTOMATED: CPT

## 2025-04-27 PROCEDURE — 2700000000 HC OXYGEN THERAPY PER DAY

## 2025-04-27 PROCEDURE — 6360000002 HC RX W HCPCS: Performed by: PHYSICIAN ASSISTANT

## 2025-04-27 PROCEDURE — 85730 THROMBOPLASTIN TIME PARTIAL: CPT

## 2025-04-27 PROCEDURE — 99291 CRITICAL CARE FIRST HOUR: CPT | Performed by: INTERNAL MEDICINE

## 2025-04-27 PROCEDURE — 2500000003 HC RX 250 WO HCPCS: Performed by: INTERNAL MEDICINE

## 2025-04-27 PROCEDURE — 2000000000 HC ICU R&B

## 2025-04-27 PROCEDURE — 2580000003 HC RX 258: Performed by: PHYSICIAN ASSISTANT

## 2025-04-27 RX ORDER — POTASSIUM CHLORIDE 1500 MG/1
20 TABLET, EXTENDED RELEASE ORAL ONCE
Status: COMPLETED | OUTPATIENT
Start: 2025-04-27 | End: 2025-04-27

## 2025-04-27 RX ORDER — LANOLIN ALCOHOL/MO/W.PET/CERES
400 CREAM (GRAM) TOPICAL DAILY
Status: DISCONTINUED | OUTPATIENT
Start: 2025-04-27 | End: 2025-04-29

## 2025-04-27 RX ADMIN — Medication 10 ML: at 20:48

## 2025-04-27 RX ADMIN — Medication 10 ML: at 11:04

## 2025-04-27 RX ADMIN — SODIUM CHLORIDE 500 MG: 9 INJECTION INTRAMUSCULAR; INTRAVENOUS; SUBCUTANEOUS at 13:47

## 2025-04-27 RX ADMIN — MIDODRINE HYDROCHLORIDE 10 MG: 5 TABLET ORAL at 08:22

## 2025-04-27 RX ADMIN — Medication 400 MG: at 12:10

## 2025-04-27 RX ADMIN — SODIUM CHLORIDE, PRESERVATIVE FREE 10 ML: 5 INJECTION INTRAVENOUS at 08:22

## 2025-04-27 RX ADMIN — PANTOPRAZOLE SODIUM 40 MG: 40 INJECTION, POWDER, FOR SOLUTION INTRAVENOUS at 20:48

## 2025-04-27 RX ADMIN — POTASSIUM CHLORIDE 20 MEQ: 1500 TABLET, EXTENDED RELEASE ORAL at 12:10

## 2025-04-27 RX ADMIN — PANTOPRAZOLE SODIUM 40 MG: 40 INJECTION, POWDER, FOR SOLUTION INTRAVENOUS at 08:22

## 2025-04-27 RX ADMIN — CYANOCOBALAMIN TAB 1000 MCG 1000 MCG: 1000 TAB at 08:23

## 2025-04-27 RX ADMIN — SODIUM CHLORIDE, PRESERVATIVE FREE 10 ML: 5 INJECTION INTRAVENOUS at 13:50

## 2025-04-27 RX ADMIN — MIDODRINE HYDROCHLORIDE 10 MG: 5 TABLET ORAL at 12:10

## 2025-04-27 RX ADMIN — IPRATROPIUM BROMIDE AND ALBUTEROL SULFATE 1 DOSE: .5; 3 SOLUTION RESPIRATORY (INHALATION) at 19:45

## 2025-04-27 RX ADMIN — CIPROFLOXACIN 400 MG: 400 INJECTION, SOLUTION INTRAVENOUS at 20:48

## 2025-04-27 RX ADMIN — IPRATROPIUM BROMIDE AND ALBUTEROL SULFATE 1 DOSE: .5; 3 SOLUTION RESPIRATORY (INHALATION) at 10:20

## 2025-04-27 RX ADMIN — Medication 10 ML: at 11:05

## 2025-04-27 RX ADMIN — SODIUM CHLORIDE 100 MG: 9 INJECTION, SOLUTION INTRAVENOUS at 15:38

## 2025-04-27 RX ADMIN — SODIUM CHLORIDE: 0.9 INJECTION, SOLUTION INTRAVENOUS at 16:03

## 2025-04-27 RX ADMIN — ALBUMIN (HUMAN) 12.5 G: 0.25 INJECTION, SOLUTION INTRAVENOUS at 02:19

## 2025-04-27 RX ADMIN — MIDODRINE HYDROCHLORIDE 10 MG: 5 TABLET ORAL at 17:10

## 2025-04-27 ASSESSMENT — PAIN SCALES - GENERAL
PAINLEVEL_OUTOF10: 0

## 2025-04-27 NOTE — RT PROTOCOL NOTE
RT Inhaler-Nebulizer Bronchodilator Protocol Note    There is a bronchodilator order in the chart from a provider indicating to follow the RT Bronchodilator Protocol and there is an “Initiate RT Inhaler-Nebulizer Bronchodilator Protocol” order as well (see protocol at bottom of note).    CXR Findings:  XR CHEST PORTABLE  Result Date: 4/26/2025  1. Right PICC terminating in the distal SVC.     XR CHEST PORTABLE  Result Date: 4/26/2025  Right-sided PICC terminates in right atrium Bilateral linear pulmonary opacities could represent edema or infection       The findings from the last RT Protocol Assessment were as follows:   History Pulmonary Disease: Chronic pulmonary disease  Respiratory Pattern: Regular pattern and RR 12-20 bpm  Breath Sounds: Slightly diminished and/or crackles  Cough: Strong, spontaneous, non-productive  Indication for Bronchodilator Therapy:    Bronchodilator Assessment Score: 4    Aerosolized bronchodilator medication orders have been revised according to the RT Inhaler-Nebulizer Bronchodilator Protocol below.    Respiratory Therapist to perform RT Therapy Protocol Assessment initially then follow the protocol.  Repeat RT Therapy Protocol Assessment PRN for score 0-3 or on second treatment, BID, and PRN for scores above 3.    No Indications - adjust the frequency to every 6 hours PRN wheezing or bronchospasm, if no treatments needed after 48 hours then discontinue using Per Protocol order mode.     If indication present, adjust the RT bronchodilator orders based on the Bronchodilator Assessment Score as indicated below.  Use Inhaler orders unless patient has one or more of the following: on home nebulizer, not able to hold breath for 10 seconds, is not alert and oriented, cannot activate and use MDI correctly, or respiratory rate 25 breaths per minute or more, then use the equivalent nebulizer order(s) with same Frequency and PRN reasons based on the score.  If a patient is on this medication at

## 2025-04-27 NOTE — PROGRESS NOTES
PULMONARY AND CRITICAL CARE MEDICINE PROGRESS NOTE    Subjective: Patient lying in bed in no apparent respiratory distress.  Feeling better.  Did sleep well overnight.      Currently on 2 L/min of oxygen supplementation saturating in high 90s.  Has come off of vasopressors.  Renal functions slowly stabilizing.  Evidence of slow GI bleed.  H&H remained stable.    REVIEW OF SYSTEMS:     8 point review of system is negative except that mentioned in the subjective portion.    PAST MEDICAL HISTORY:   Past Medical History:   Diagnosis Date    Arthritis     Atrial fibrillation and flutter (HCC)     Hypertension     Infection due to parainfluenza virus 3 5/11/2023    Kidney disease     Pt states  \"stage 3\"    Pneumonia     Sleep apnea     no c-pap       PAST SURGICAL HISTORY:   Past Surgical History:   Procedure Laterality Date    CARDIOVERSION      COLONOSCOPY N/A 11/20/2018    COLONOSCOPY POLYPECTOMY SNARE/COLD BIOPSY performed by Jairon Gordon MD at [facility] ENDOSCOPY    COLONOSCOPY N/A 03/31/2022    COLONOSCOPY POLYPECTOMY SNARE/COLD BIOPSY performed by Alfred London MD at [facility] ENDOSCOPY    FRACTURE SURGERY      ankle rt has pins and rods, and rt elbow    GASTRIC BYPASS SURGERY      LARYNX SURGERY      TOTAL KNEE ARTHROPLASTY Bilateral 12/19/2012    bilateral knee replacements    TUBAL LIGATION      tubes tied    UPPER GASTROINTESTINAL ENDOSCOPY N/A 11/20/2018    EGD BIOPSY performed by Jairon Gordon MD at [facility] ENDOSCOPY    UPPER GASTROINTESTINAL ENDOSCOPY N/A 03/31/2022    EGD DILATION BALLOON performed by Alfred London MD at [facility] ENDOSCOPY    UPPER GASTROINTESTINAL ENDOSCOPY N/A 03/31/2022    EGD BIOPSY performed by Alfred London MD at [facility] ENDOSCOPY       SOCIAL HISTORY:   Social History     Tobacco Use    Smoking status: Never    Smokeless tobacco: Never   Vaping Use    Vaping status: Never Used   Substance Use Topics    Alcohol use: No    Drug use: No       FAMILY HISTORY:   Family History

## 2025-04-27 NOTE — PROGRESS NOTES
Assessment  Principal Problem:    Severe sepsis (HCC)  Active Problems:    Class 2 obesity due to excess calories with body mass index (BMI) of 37.0 to 37.9 in adult    Cellulitis of skin with lymphangitis    JUSTINA (acute kidney injury)    Hypotension due to hypovolemia    Influenza A    Chronic atrial fibrillation (HCC)    Atrial fibrillation with RVR (HCC)    Difficulty walking    Fall    General weakness    Peripheral edema    Lactic acidosis    Septic shock (HCC)    Electrolyte imbalance  Resolved Problems:    * No resolved hospital problems. *     Melena  Acute blood loss anemia, mild  MIRIAM from chronic bleed.    History of marginal ulcer at gastrojejunostomy '23.    Chronic anticoagulation.  Last dose eliquis was AM 4/25.   Septic shock from cellulitis, significantly improved.  Not on pressors    Plan:  Clear liquid diet.  Continue to hold Eliquis.  IV Protonix.  IV iron 100 mg daily, today day 3.  EGD tomorrow with Dr. Gordon.    Subjective:  We are following for melena.  Had one brown bm.  No abd pain, nausea, vomiting.   She had a run of V. tach.    Objective:    BUN 34, creatinine 1.4.  Hemoglobin 8.8, down from 9.7 yesterday.    Review of Systems:    Constitutional: Negative for fever, chills, and unexpected weight change.   HENT: Negative for trouble swallowing.    Respiratory: Negative for cough, chest tightness and shortness of breath.    Cardiovascular: Negative for chest pain  Gastrointestinal: see HPI  Musculoskeletal: Negative for unusual arthralgias.   Skin: Negative for rash.     Scheduled Meds:   ipratropium 0.5 mg-albuterol 2.5 mg  1 Dose Inhalation BID RT    midodrine  10 mg Oral TID WC    sodium chloride flush  5-40 mL IntraVENous 2 times per day    lidocaine 1 % injection  50 mg IntraDERmal Once    vitamin B-12  1,000 mcg Oral Daily    [Held by provider] gabapentin  300 mg Oral BID    DAPTOmycin (CUBICIN) 500 mg in sodium chloride (PF) 0.9 % 10 mL IV syringe  6 mg/kg (Adjusted) IntraVENous  Q24H    pantoprazole  40 mg IntraVENous BID    iron sucrose (VENOFER) 100 mg in sodium chloride 0.9 % 100 mL IVPB  100 mg IntraVENous Q24H    [Held by provider] metoprolol tartrate  25 mg Oral BID    sodium chloride flush  5-40 mL IntraVENous 2 times per day    [Held by provider] apixaban  5 mg Oral BID    ciprofloxacin  400 mg IntraVENous Q24H     Continuous Infusions:   sodium chloride Stopped (04/27/25 0531)    sodium chloride         Vitals:  /64   Pulse 93   Temp 98.1 °F (36.7 °C)   Resp 14   Ht 1.702 m (5' 7\")   Wt 117.5 kg (259 lb 0.7 oz)   SpO2 100%   BMI 40.57 kg/m²     Exam:  General:  comfortable  Heent: There is no scleral icterus.   Respiratory:  The patient's breathing is non-labored with normal chest wall excursion and normal muscle movement.    Abdomen:  The abdomen is nondistended, soft, and nontender.   Neurological:  Gross memory appears intact.  Patient is alert and oriented.    Labs and Imaging:  I reviewed the labs and imaging results from last 24 hours.     Recent Labs     04/25/25  0431 04/26/25  0433 04/26/25  1440 04/27/25  0520   HGB 10.4* 9.7* 9.7* 8.8*   WBC 12.6* 11.4*  --  8.1        BRYCE CANO MD  April 27, 2025

## 2025-04-27 NOTE — PROGRESS NOTES
HOSPITALISTS PROGRESS NOTE    4/27/2025 10:51 AM        Name: Stacy Ly .              Admitted: 4/22/2025  Primary Care Provider: Tarik Hummel APRN - CNP (Tel: [phone])      Brief Course: This 79 y.o. female  with PMHx of A-fib, hypertension, and COPD, chronic hypoxic respiratory failure  on 2 L at baseline, CKD stage III and HFpEF admitted for septic shock 2/2 cellulitis.  JUSTINA on CKD.    Interval history:   Pt seen and examined today.  Overnight events noted and interval ancillary notes reviewed.  Afebrile, leukocytosis resolved.  BP stable on midodrine.  Creatinine down to 1.4.    Assessment & Plan:     RLE cellulitis:  ID consulted; on IV daptomycin and ciprofloxacin.   CT of the right tibia and fibula negative for osteomyelitis or abscess.  Keep right leg Ace wrapped and elevated.     Septic shock 2/2 above;  p/w tachycardia, leukocytosis, hypotension, hypothermia lactic acidosis  S/p fluid resuscitation per sepsis protocol.    Hold off on further fluids given patient has history of CHF  Continue antibiotics per ID recs.  BP improved on midodrine and albumin.     Acute on chronic diastolic heart failure  proBNP:> 13k on admission.  Last echo on 2/14/25 showed EF of 55 to 60%  Cardiology consulted; Holding diuresis in setting of hypotension  Maintain fluid and salt restriction.  Strict I's and O's.  Daily weights.     JUSTINA on CKD stage III; likely secondary to sepsis  Creatinine 2.5 on admission; (baseline 1.3-1.4); trended down to 1.7.  Nephrology consulted; s/p IV fluids.    Avoid nephrotoxins and monitor renal function closely     Hx of A-fib: Eliquis and metoprolol on hold.    Nonsustained V. Tach:  Metoprolol on hold due to tenuous blood pressure.  Keep magnesium and potassium above 2 and 4 respectively.    Hx of COPD; not in exacerbation.   Continue home inhalers and as needed breathing treatment     Chronic hypoxic  do not hesitate to contact me through [facility].

## 2025-04-27 NOTE — PROGRESS NOTES
Patient had approx 50 bpm run of Vtach. Patient asleep in bed at that time. No s/s of distress. See Strip results.

## 2025-04-27 NOTE — PROGRESS NOTES
04/27/25 1246   RT Protocol   History Pulmonary Disease 2   Respiratory pattern 0   Breath sounds 2   Cough 0   Bronchodilator Assessment Score 4

## 2025-04-27 NOTE — PROGRESS NOTES
injection 25 mg, Q6H PRN      Physical exam:     Vitals:  /70   Pulse 87   Temp 98.6 °F (37 °C)   Resp 15   Ht 1.702 m (5' 7\")   Wt 117.5 kg (259 lb 0.7 oz)   SpO2 100%   BMI 40.57 kg/m²   Constitutional:  OAA X3 NAD Yes  Skin: no rash, turgor wnl  Cardiovascular:  S1, S2 without m/r/g  Respiratory: clear on the left, crackles to RLL  Abdomen:  , soft, nt, nd  Ext:  lower extremity edema Yes  Psychiatric: mood and affect flat   Musculoskeletal:  Rom, muscular strength intact    Data:   Labs:  CBC:   Recent Labs     04/25/25  0431 04/26/25  0433 04/26/25  1440 04/27/25  0520   WBC 12.6* 11.4*  --  8.1   HGB 10.4* 9.7* 9.7* 8.8*   * 134*  --  145     BMP:    Recent Labs     04/25/25  0431 04/26/25  0434 04/27/25  0520    139 140   K 3.8 3.9 3.8   * 112* 109   CO2 18* 19* 22   BUN 57* 49* 34*   CREATININE 1.7* 1.5* 1.4*   GLUCOSE 97 87 85     Ca/Mg/Phos:   Recent Labs     04/25/25  0431 04/26/25  0010 04/26/25  0434 04/27/25  0520   CALCIUM 7.6*  --  7.9* 8.1*   MG 1.73*  1.79* 1.83  --  2.20   PHOS 3.1  --  3.1 3.4     Hepatic:   No results for input(s): \"AST\", \"ALT\", \"BILITOT\", \"ALKPHOS\" in the last 72 hours.    Invalid input(s): \"ALB\"    Troponin: No results for input(s): \"TROPONINI\" in the last 72 hours.  BNP: No results for input(s): \"BNP\" in the last 72 hours.  Lipids: No results for input(s): \"CHOL\", \"TRIG\", \"HDL\" in the last 72 hours.    Invalid input(s): \"LDLCALC\", \"LABVLDL\"  ABGs: No results for input(s): \"PHART\", \"PO2ART\", \"ZGT3KHJ\" in the last 72 hours.  INR:   No results for input(s): \"INR\" in the last 72 hours.    UA:  No results for input(s): \"COLORU\", \"CLARITYU\", \"GLUCOSEU\", \"BILIRUBINUR\", \"KETUA\", \"SPECGRAV\", \"BLOODU\", \"PHUR\", \"PROTEINU\", \"UROBILINOGEN\", \"NITRU\", \"LEUKOCYTESUR\", \"URINETYPE\" in the last 72 hours.    Invalid input(s): \"LABMICR\"     Urine Microscopic:   No results for input(s): \"LABCAST\", \"BACTERIA\", \"COMU\", \"HYALCAST\", \"WBCUA\", \"RBCUA\" in the last 72  hours.    Invalid input(s): \"EPIU\"    Urine Culture: No results for input(s): \"LABURIN\" in the last 72 hours.  Urine Chemistry:   No results for input(s): \"CLUR\", \"LABCREA\", \"PROTEINUR\", \"NAUR\" in the last 72 hours.        IMAGING:  XR CHEST PORTABLE   Final Result   1. Right PICC terminating in the distal SVC.         XR CHEST PORTABLE   Final Result   Right-sided PICC terminates in right atrium      Bilateral linear pulmonary opacities could represent edema or infection         CT TIBIA FIBULA RIGHT WO CONTRAST   Final Result   1. Diffuse anasarca.   2. No focal abscess identified.   3. No obvious osteomyelitis. Consider MRI with and without contrast, or   three-phase bone scan and tagged white blood cell study to further evaluate   as appropriate.         XR FOOT RIGHT (2 VIEWS)   Final Result   1. Nonspecific soft tissue swelling of the right foot.   2. No acute fracture or osseous erosion.            XR ANKLE RIGHT (2 VIEWS)   Final Result   1. Severe posttraumatic tibiotalar joint osteoarthritis.   2. Unchanged pin in the distal fibula extending into the distal tibfib and syndesmosis, and remote fixation of the medial malleolus.   3. Ossific dictation of the distal tib-fib articulation and healing of prior distal tibial and fibular fractures.   4. Soft tissue calcifications, likely from prior trauma.            XR HIP 2-3 VW W PELVIS LEFT   Final Result   No acute fracture dislocation.         CT LUMBAR SPINE WO CONTRAST   Final Result   1. No acute fracture or traumatic malalignment.   2. S shaped scoliosis with multilevel degenerative change of the thoracic and   lumbar spine.   3. Moderate hiatal hernia.         CT THORACIC SPINE WO CONTRAST   Final Result   1. No acute fracture or traumatic malalignment.   2. S shaped scoliosis with multilevel degenerative change of the thoracic and   lumbar spine.   3. Moderate hiatal hernia.         XR CHEST PORTABLE   Final Result   No acute process.

## 2025-04-27 NOTE — PLAN OF CARE
Problem: ABCDS Injury Assessment  Goal: Absence of physical injury  4/26/2025 2229 by Ronan Chase, RN  Outcome: Progressing  4/26/2025 1010 by Juan Carlos Olguin RN  Outcome: Progressing     Problem: Skin/Tissue Integrity  Goal: Skin integrity remains intact  Description: 1.  Monitor for areas of redness and/or skin breakdown2.  Assess vascular access sites hourly3.  Every 4-6 hours minimum:  Change oxygen saturation probe site4.  Every 4-6 hours:  If on nasal continuous positive airway pressure, respiratory therapy assess nares and determine need for appliance change or resting period  4/26/2025 2229 by Ronan Chase, RN  Outcome: Progressing  4/26/2025 1010 by Juan Carlos Olguin RN  Outcome: Progressing     Problem: Chronic Conditions and Co-morbidities  Goal: Patient's chronic conditions and co-morbidity symptoms are monitored and maintained or improved  4/26/2025 2229 by Ronan Chase, RN  Outcome: Progressing  4/26/2025 1010 by Juan Carlos Olguin RN  Outcome: Progressing     Problem: Pain  Goal: Verbalizes/displays adequate comfort level or baseline comfort level  4/26/2025 2229 by Ronan Chase, RN  Outcome: Progressing  4/26/2025 1010 by Juan Carlos Olguin, RN  Outcome: Progressing     Problem: Safety - Adult  Goal: Free from fall injury  4/26/2025 1010 by Juan Carlos Olguin, RN  Outcome: Progressing

## 2025-04-28 ENCOUNTER — ANESTHESIA EVENT (OUTPATIENT)
Dept: ENDOSCOPY | Age: 80
End: 2025-04-28
Payer: COMMERCIAL

## 2025-04-28 ENCOUNTER — ANESTHESIA (OUTPATIENT)
Dept: ENDOSCOPY | Age: 80
End: 2025-04-28
Payer: COMMERCIAL

## 2025-04-28 LAB
ALBUMIN SERPL-MCNC: 2.9 G/DL (ref 3.4–5)
ANION GAP SERPL CALCULATED.3IONS-SCNC: 8 MMOL/L (ref 3–16)
BUN SERPL-MCNC: 24 MG/DL (ref 7–20)
CALCIUM SERPL-MCNC: 8.1 MG/DL (ref 8.3–10.6)
CHLORIDE SERPL-SCNC: 110 MMOL/L (ref 99–110)
CO2 SERPL-SCNC: 23 MMOL/L (ref 21–32)
CREAT SERPL-MCNC: 1.2 MG/DL (ref 0.6–1.2)
DEPRECATED RDW RBC AUTO: 17.3 % (ref 12.4–15.4)
GFR SERPLBLD CREATININE-BSD FMLA CKD-EPI: 46 ML/MIN/{1.73_M2}
GLUCOSE SERPL-MCNC: 89 MG/DL (ref 70–99)
HCT VFR BLD AUTO: 28.2 % (ref 36–48)
HGB BLD-MCNC: 9 G/DL (ref 12–16)
MCH RBC QN AUTO: 29.6 PG (ref 26–34)
MCHC RBC AUTO-ENTMCNC: 31.9 G/DL (ref 31–36)
MCV RBC AUTO: 92.7 FL (ref 80–100)
PHOSPHATE SERPL-MCNC: 2.7 MG/DL (ref 2.5–4.9)
PLATELET # BLD AUTO: 165 K/UL (ref 135–450)
PMV BLD AUTO: 9.2 FL (ref 5–10.5)
POTASSIUM SERPL-SCNC: 4.1 MMOL/L (ref 3.5–5.1)
RBC # BLD AUTO: 3.04 M/UL (ref 4–5.2)
SODIUM SERPL-SCNC: 141 MMOL/L (ref 136–145)
WBC # BLD AUTO: 9 K/UL (ref 4–11)

## 2025-04-28 PROCEDURE — 99232 SBSQ HOSP IP/OBS MODERATE 35: CPT | Performed by: INTERNAL MEDICINE

## 2025-04-28 PROCEDURE — 7100000001 HC PACU RECOVERY - ADDTL 15 MIN: Performed by: INTERNAL MEDICINE

## 2025-04-28 PROCEDURE — 2709999900 HC NON-CHARGEABLE SUPPLY: Performed by: INTERNAL MEDICINE

## 2025-04-28 PROCEDURE — 6370000000 HC RX 637 (ALT 250 FOR IP): Performed by: INTERNAL MEDICINE

## 2025-04-28 PROCEDURE — 6360000002 HC RX W HCPCS: Performed by: INTERNAL MEDICINE

## 2025-04-28 PROCEDURE — 2700000000 HC OXYGEN THERAPY PER DAY

## 2025-04-28 PROCEDURE — 2500000003 HC RX 250 WO HCPCS: Performed by: INTERNAL MEDICINE

## 2025-04-28 PROCEDURE — 2580000003 HC RX 258: Performed by: INTERNAL MEDICINE

## 2025-04-28 PROCEDURE — 7100000000 HC PACU RECOVERY - FIRST 15 MIN: Performed by: INTERNAL MEDICINE

## 2025-04-28 PROCEDURE — 6360000002 HC RX W HCPCS: Performed by: NURSE ANESTHETIST, CERTIFIED REGISTERED

## 2025-04-28 PROCEDURE — 2580000003 HC RX 258: Performed by: ANESTHESIOLOGY

## 2025-04-28 PROCEDURE — 88305 TISSUE EXAM BY PATHOLOGIST: CPT

## 2025-04-28 PROCEDURE — 3609012400 HC EGD TRANSORAL BIOPSY SINGLE/MULTIPLE: Performed by: INTERNAL MEDICINE

## 2025-04-28 PROCEDURE — 3700000000 HC ANESTHESIA ATTENDED CARE: Performed by: INTERNAL MEDICINE

## 2025-04-28 PROCEDURE — 3700000001 HC ADD 15 MINUTES (ANESTHESIA): Performed by: INTERNAL MEDICINE

## 2025-04-28 PROCEDURE — 94640 AIRWAY INHALATION TREATMENT: CPT

## 2025-04-28 PROCEDURE — 99233 SBSQ HOSP IP/OBS HIGH 50: CPT | Performed by: INTERNAL MEDICINE

## 2025-04-28 PROCEDURE — 94761 N-INVAS EAR/PLS OXIMETRY MLT: CPT

## 2025-04-28 PROCEDURE — 80069 RENAL FUNCTION PANEL: CPT

## 2025-04-28 PROCEDURE — 0DB68ZX EXCISION OF STOMACH, VIA NATURAL OR ARTIFICIAL OPENING ENDOSCOPIC, DIAGNOSTIC: ICD-10-PCS | Performed by: INTERNAL MEDICINE

## 2025-04-28 PROCEDURE — 2000000000 HC ICU R&B

## 2025-04-28 PROCEDURE — 85027 COMPLETE CBC AUTOMATED: CPT

## 2025-04-28 RX ORDER — SUCRALFATE 1 G/1
1 TABLET ORAL EVERY 12 HOURS SCHEDULED
Status: DISCONTINUED | OUTPATIENT
Start: 2025-04-28 | End: 2025-05-02 | Stop reason: HOSPADM

## 2025-04-28 RX ORDER — MIDODRINE HYDROCHLORIDE 5 MG/1
5 TABLET ORAL
Status: DISCONTINUED | OUTPATIENT
Start: 2025-04-28 | End: 2025-05-02 | Stop reason: HOSPADM

## 2025-04-28 RX ORDER — PROPOFOL 10 MG/ML
INJECTION, EMULSION INTRAVENOUS
Status: DISCONTINUED | OUTPATIENT
Start: 2025-04-28 | End: 2025-04-28 | Stop reason: SDUPTHER

## 2025-04-28 RX ORDER — LIDOCAINE HYDROCHLORIDE 20 MG/ML
INJECTION, SOLUTION EPIDURAL; INFILTRATION; INTRACAUDAL; PERINEURAL
Status: DISCONTINUED | OUTPATIENT
Start: 2025-04-28 | End: 2025-04-28 | Stop reason: SDUPTHER

## 2025-04-28 RX ORDER — SODIUM CHLORIDE 9 MG/ML
INJECTION, SOLUTION INTRAVENOUS CONTINUOUS
Status: DISCONTINUED | OUTPATIENT
Start: 2025-04-28 | End: 2025-04-29

## 2025-04-28 RX ADMIN — Medication 10 ML: at 08:47

## 2025-04-28 RX ADMIN — SODIUM CHLORIDE: 0.9 INJECTION, SOLUTION INTRAVENOUS at 09:40

## 2025-04-28 RX ADMIN — SUCRALFATE 1 G: 1 TABLET ORAL at 20:54

## 2025-04-28 RX ADMIN — IPRATROPIUM BROMIDE AND ALBUTEROL SULFATE 1 DOSE: .5; 3 SOLUTION RESPIRATORY (INHALATION) at 08:34

## 2025-04-28 RX ADMIN — MIDODRINE HYDROCHLORIDE 5 MG: 5 TABLET ORAL at 16:44

## 2025-04-28 RX ADMIN — Medication 400 MG: at 08:46

## 2025-04-28 RX ADMIN — PANTOPRAZOLE SODIUM 40 MG: 40 INJECTION, POWDER, FOR SOLUTION INTRAVENOUS at 08:47

## 2025-04-28 RX ADMIN — PROPOFOL 120 MCG/KG/MIN: 10 INJECTION, EMULSION INTRAVENOUS at 09:53

## 2025-04-28 RX ADMIN — Medication 10 ML: at 20:54

## 2025-04-28 RX ADMIN — PROPOFOL 40 MG: 10 INJECTION, EMULSION INTRAVENOUS at 09:55

## 2025-04-28 RX ADMIN — DIPHENHYDRAMINE HYDROCHLORIDE 25 MG: 50 INJECTION INTRAMUSCULAR; INTRAVENOUS at 20:54

## 2025-04-28 RX ADMIN — IPRATROPIUM BROMIDE AND ALBUTEROL SULFATE 1 DOSE: .5; 3 SOLUTION RESPIRATORY (INHALATION) at 20:05

## 2025-04-28 RX ADMIN — CIPROFLOXACIN 400 MG: 400 INJECTION, SOLUTION INTRAVENOUS at 22:01

## 2025-04-28 RX ADMIN — MIDODRINE HYDROCHLORIDE 5 MG: 5 TABLET ORAL at 13:16

## 2025-04-28 RX ADMIN — SODIUM CHLORIDE 500 MG: 9 INJECTION INTRAMUSCULAR; INTRAVENOUS; SUBCUTANEOUS at 13:24

## 2025-04-28 RX ADMIN — PANTOPRAZOLE SODIUM 40 MG: 40 INJECTION, POWDER, FOR SOLUTION INTRAVENOUS at 20:54

## 2025-04-28 RX ADMIN — MIDODRINE HYDROCHLORIDE 10 MG: 5 TABLET ORAL at 08:46

## 2025-04-28 RX ADMIN — SUCRALFATE 1 G: 1 TABLET ORAL at 13:17

## 2025-04-28 RX ADMIN — LIDOCAINE HYDROCHLORIDE 100 MG: 20 INJECTION, SOLUTION EPIDURAL; INFILTRATION; INTRACAUDAL; PERINEURAL at 09:55

## 2025-04-28 RX ADMIN — CYANOCOBALAMIN TAB 1000 MCG 1000 MCG: 1000 TAB at 08:46

## 2025-04-28 ASSESSMENT — ENCOUNTER SYMPTOMS
SHORTNESS OF BREATH: 0
DIARRHEA: 0
EYE DISCHARGE: 0
RHINORRHEA: 0
EYE REDNESS: 0
COUGH: 0
ABDOMINAL PAIN: 0
WHEEZING: 0
BACK PAIN: 0
SINUS PRESSURE: 0
NAUSEA: 0
SORE THROAT: 0
CONSTIPATION: 0
SINUS PAIN: 0

## 2025-04-28 ASSESSMENT — PAIN SCALES - GENERAL
PAINLEVEL_OUTOF10: 0

## 2025-04-28 ASSESSMENT — PAIN - FUNCTIONAL ASSESSMENT: PAIN_FUNCTIONAL_ASSESSMENT: NONE - DENIES PAIN

## 2025-04-28 NOTE — BRIEF OP NOTE
Brief Postoperative Note      Patient: Stacy Ly  YOB: 1945  MRN: [id number]    Date of Procedure: 4/28/2025    Pre-Op Diagnosis Codes:      * Melena [K92.1]       Procedure(s):  ESOPHAGOGASTRODUODENOSCOPY BIOPSY    Surgeon(s):  Jairon Gordon MD    Anesthesia: Monitor Anesthesia Care    Estimated Blood Loss (mL): Minimal    Complications: None    Specimens:   ID Type Source Tests Collected by Time Destination   A : gastric bx r/o h pylori Tissue Stomach SURGICAL PATHOLOGY Jairon Gordon MD 4/28/2025 0959      Impression:         -  Normal esophagus.         - Three non-bleeding superficial ulcers (7 mm, 12 mm and 18 mm) with            no stigmata of bleeding were found at the GJ anastomosis. Gastric            pouch biopsies obtained to r/o HP infection         -  Normal examined jejunum.         -  No specimens collected.  Recommendation:         - PATHOLOGY RESULTS: will be available in the [employer] portal or            [employer] phone jr within 2 weeks. Please go to            [email or web address]. There are instructions there how to            access your medical records online and how to download the [employer] jr. You can also call the GI office at  to get            your pathology results.         - Absolutely no NSAIDs         - Oral pantoprazole 40 mg 2x/day for 8 weeks and then daily            thereafter.         - Hold Eliquis for 72 hours and may restart afterwards.         - Add Carafate 1 gram 2x/day         - Repeat EGD in 8 weeks to ensure anastomotic ulcer healing.    Electronically signed by Jairon Gordon MD on 4/28/2025 at 12:22 PM

## 2025-04-28 NOTE — PROGRESS NOTES
Nephrology Progress Note                                                                                                                                                                                                                                                                                                                                                               Office : [phone]     Fax :[phone]    Patient's Name: Stacy Ly    Reason for Consult:  JUSTINA on CKD 3  Requesting Physician:  Tarik Hummel, MARILEE - CNP  Chief Complaint:    Chief Complaint   Patient presents with    Leg Swelling     Pt brought in per [facility] from home, pt has multiple complaints.  Pt slid out of her recliner this am trying to stand, pt has been home from rehab x 1 mos, unable to walk, can only stand however she is weak when standing.  Pt reports increased redness to her right lower leg, swelling present, pt states redness is worse, swelling is unchanged.  Pt also reports diarrhea this am.        Assessment/Plan     #Acute Anemia  #Melena   - GI bleed  - PPI  - IV iron replacement  - GI on board  - plans - EGD      # JUSTINA on CKD 3  - Baseline Cr ~ 1.5. Creatinine now at baseline.   - likely 2/2 sepsis, hypotension  - UPCR with minimal proteinuria  - avoid nephrotoxins  - monitor renal labs      #Sepsis  - 2/2 cellulitis of R leg   - s/p IVF  - CT neg for osteomyelitis or abscess  - cultures NGTD  - abx per ID    #CHF  - EF 55-60% from 02/14  - BNP ~ 13k  - lasix every other day at home   - low salt diet  - daily weights    #HTN  - Bps in better control   - s/p levophed + IV bolus   - midodrine + albumin   - monitor     # COPD   - Oxygen dependent      # A fib  - eliquis on hold d/t concerns for bleed  - EP on board    History of Present Ilness:    Stacy Ly is a 79 y.o. female with pMH of HTN, CHF, COPD, GIANNA, A-fib, and CKD 3 who presents to the emergency department from home with reports of generalized  \"EPIU\"    Urine Culture: No results for input(s): \"LABURIN\" in the last 72 hours.  Urine Chemistry:   No results for input(s): \"CLUR\", \"LABCREA\", \"PROTEINUR\", \"NAUR\" in the last 72 hours.        IMAGING:  XR CHEST PORTABLE   Final Result   1. Right PICC terminating in the distal SVC.         XR CHEST PORTABLE   Final Result   Right-sided PICC terminates in right atrium      Bilateral linear pulmonary opacities could represent edema or infection         CT TIBIA FIBULA RIGHT WO CONTRAST   Final Result   1. Diffuse anasarca.   2. No focal abscess identified.   3. No obvious osteomyelitis. Consider MRI with and without contrast, or   three-phase bone scan and tagged white blood cell study to further evaluate   as appropriate.         XR FOOT RIGHT (2 VIEWS)   Final Result   1. Nonspecific soft tissue swelling of the right foot.   2. No acute fracture or osseous erosion.            XR ANKLE RIGHT (2 VIEWS)   Final Result   1. Severe posttraumatic tibiotalar joint osteoarthritis.   2. Unchanged pin in the distal fibula extending into the distal tibfib and syndesmosis, and remote fixation of the medial malleolus.   3. Ossific dictation of the distal tib-fib articulation and healing of prior distal tibial and fibular fractures.   4. Soft tissue calcifications, likely from prior trauma.            XR HIP 2-3 VW W PELVIS LEFT   Final Result   No acute fracture dislocation.         CT LUMBAR SPINE WO CONTRAST   Final Result   1. No acute fracture or traumatic malalignment.   2. S shaped scoliosis with multilevel degenerative change of the thoracic and   lumbar spine.   3. Moderate hiatal hernia.         CT THORACIC SPINE WO CONTRAST   Final Result   1. No acute fracture or traumatic malalignment.   2. S shaped scoliosis with multilevel degenerative change of the thoracic and   lumbar spine.   3. Moderate hiatal hernia.         XR CHEST PORTABLE   Final Result   No acute process.               Medical Decision Making:  The

## 2025-04-28 NOTE — PROGRESS NOTES
HOSPITALISTS PROGRESS NOTE    4/28/2025 12:13 PM        Name: Stacy Ly .              Admitted: 4/22/2025  Primary Care Provider: Tarik Hummel APRN - CNP (Tel: [phone])      Brief Course: This 79 y.o. female  with PMHx of A-fib, hypertension, and COPD, chronic hypoxic respiratory failure  on 2 L at baseline, CKD stage III and HFpEF admitted for septic shock 2/2 cellulitis.  JUSTINA on CKD.    Interval history:   Pt seen and examined today.  Overnight events noted and interval ancillary notes reviewed.  Afebrile, leukocytosis resolved.  BP stable on midodrine.  Creatinine down to 1.2.    Assessment & Plan:     RLE cellulitis:  ID consulted; on IV daptomycin and ciprofloxacin.   CT of the right tibia and fibula negative for osteomyelitis or abscess.  Keep right leg Ace wrapped and elevated.     Septic shock 2/2 above;  p/w tachycardia, leukocytosis, hypotension, hypothermia lactic acidosis  S/p fluid resuscitation per sepsis protocol.    Hold off on further fluids given patient has history of CHF  Continue antibiotics per ID recs.  BP improved on midodrine and albumin.     Acute on chronic diastolic heart failure  proBNP:> 13k on admission.  Last echo on 2/14/25 showed EF of 55 to 60%  Cardiology consulted; Holding diuresis in setting of hypotension  Maintain fluid and salt restriction.  Strict I's and O's.  Daily weights.     JUSTINA on CKD stage III; likely secondary to sepsis  Creatinine 2.5 on admission; (baseline 1.3-1.4); trended down to 1.7.  Nephrology consulted; s/p IV fluids.    Avoid nephrotoxins and monitor renal function closely     Hx of A-fib: Eliquis and metoprolol on hold.    Nonsustained V. Tach:  Metoprolol on hold due to tenuous blood pressure.  Keep magnesium and potassium above 2 and 4 respectively.    Hx of COPD; not in exacerbation.   Continue home inhalers and as needed breathing treatment     Chronic hypoxic

## 2025-04-28 NOTE — PROGRESS NOTES
Infectious Diseases   Progress Note      Admission Date: 4/22/2025  Hospital Day: Hospital Day: 7   Attending: Dora Contreras MD  Date of service: 4/28/2025     Chief complaint/ Reason for consult:     Septic shock tachycardia, tachypnea, hypothermia, hypotension requiring vasopressors  Severe lactic acidosis  Severe right leg cellulitis  Acute kidney injury  Elevated BNP  Recent Influenza A infection    Microbiology:      I have reviewed allavailable micro lab data and cultures    Results       Procedure Component Value Units Date/Time    Occult Blood Stool Immunoassay [[id number]] Collected: 04/25/25 0000    Order Status: Canceled Specimen: Stool     Respiratory Panel, Molecular, with COVID-19 (Restricted: peds pts or suitable admitted adults) [[id number]] Collected: 04/23/25 1419    Order Status: Completed Specimen: Nasal from Nasopharyngeal Updated: 04/23/25 2215     Respiratory Panel PCR --     Respiratory Pathogens Panel PCR Result: Not Detected  See additional report for complete Respiratory Pathogens Panel      Narrative:      ORDER#: [id number]                          ORDERED BY: RAYSHAWN GARRETT  SOURCE: Nasopharyngeal                     COLLECTED:  04/23/25 14:19  ANTIBIOTICS AT MANDY.:                      RECEIVED :  04/23/25 20:40    Respiratory Panel Film Array Report [[id number]] Collected: 04/23/25 1419    Order Status: Completed Updated: 04/23/25 2217     Report SEE IMAGE    Clostridium Difficile Toxin/Antigen [[id number]] Collected: 04/23/25 1110    Order Status: Completed Specimen: Stool Updated: 04/23/25 1216     C.diff Toxin/Antigen --     Negative for Clostridium difficile antigen and toxin  Normal Range: Negative      Narrative:      ORDER#: [id number]                          ORDERED BY: URBAN MENDEZ  SOURCE: Stool                              COLLECTED:  04/23/25 11:10  ANTIBIOTICS AT MANDY.:                      RECEIVED :  04/23/25 11:18  Collect White vial (sterile container)     SPINE WO CONTRAST   Final Result   1. No acute fracture or traumatic malalignment.   2. S shaped scoliosis with multilevel degenerative change of the thoracic and   lumbar spine.   3. Moderate hiatal hernia.         CT THORACIC SPINE WO CONTRAST   Final Result   1. No acute fracture or traumatic malalignment.   2. S shaped scoliosis with multilevel degenerative change of the thoracic and   lumbar spine.   3. Moderate hiatal hernia.         XR CHEST PORTABLE   Final Result   No acute process.             Medications: All current and past medications were reviewed.     sucralfate  1 g Oral 2 times per day    midodrine  5 mg Oral TID WC    magnesium oxide  400 mg Oral Daily    ipratropium 0.5 mg-albuterol 2.5 mg  1 Dose Inhalation BID RT    sodium chloride flush  5-40 mL IntraVENous 2 times per day    lidocaine 1 % injection  50 mg IntraDERmal Once    vitamin B-12  1,000 mcg Oral Daily    [Held by provider] gabapentin  300 mg Oral BID    DAPTOmycin (CUBICIN) 500 mg in sodium chloride (PF) 0.9 % 10 mL IV syringe  6 mg/kg (Adjusted) IntraVENous Q24H    pantoprazole  40 mg IntraVENous BID    [Held by provider] metoprolol tartrate  25 mg Oral BID    sodium chloride flush  5-40 mL IntraVENous 2 times per day    [Held by provider] apixaban  5 mg Oral BID    ciprofloxacin  400 mg IntraVENous Q24H        sodium chloride 25 mL/hr at 04/28/25 0940    sodium chloride 5 mL/hr at 04/28/25 0951    sodium chloride         sodium chloride flush, sodium chloride, sodium chloride flush, sodium chloride, ondansetron **OR** ondansetron, polyethylene glycol, acetaminophen **OR** acetaminophen, albuterol, diphenhydrAMINE      Problem list:       Patient Active Problem List   Diagnosis Code    Atrial fibrillation, persistent ([facility]) I48.19    Primary hypertension I10    Severe episode of recurrent major depressive disorder, without psychotic features ([facility]) F33.2    Seasonal affective disorder F33.8    Class 2 obesity due to excess calories

## 2025-04-28 NOTE — ANESTHESIA POSTPROCEDURE EVALUATION
Department of Anesthesiology  Postprocedure Note    Patient: Stacy Ly  MRN: [id number]  YOB: 1945  Date of evaluation: 4/28/2025    Procedure Summary       Date: 04/28/25 Room / Location: [place] [address] / [facility]    Anesthesia Start: 0951 Anesthesia Stop: 1012    Procedure: ESOPHAGOGASTRODUODENOSCOPY BIOPSY (Abdomen) Diagnosis:       Melena      (Melena [K92.1])    Surgeons: Jairon Gordon MD Responsible Provider: Flaco Ríos MD    Anesthesia Type: Not recorded ASA Status: 3 - Emergent            Anesthesia Type: No value filed.    Carl Phase I: Carl Score: 9    Carl Phase II:      Anesthesia Post Evaluation    Patient location during evaluation: PACU  Patient participation: complete - patient participated  Level of consciousness: awake  Pain score: 2  Airway patency: patent  Cardiovascular status: blood pressure returned to baseline  Respiratory status: acceptable  Hydration status: euvolemic  Pain management: adequate    No notable events documented.

## 2025-04-28 NOTE — ANESTHESIA PRE PROCEDURE
infusion   IntraVENous PRN Myesha Larose MD       • ondansetron (ZOFRAN-ODT) disintegrating tablet 4 mg  4 mg Oral Q8H PRN Myesha Larose MD        Or   • ondansetron (ZOFRAN) injection 4 mg  4 mg IntraVENous Q6H PRN Myesha Larose MD       • polyethylene glycol (GLYCOLAX) packet 17 g  17 g Oral Daily PRN Myesha Larose MD       • acetaminophen (TYLENOL) tablet 650 mg  650 mg Oral Q6H PRN Myesha Larose MD   650 mg at 04/24/25 0826    Or   • acetaminophen (TYLENOL) suppository 650 mg  650 mg Rectal Q6H PRN Myesha Larose MD       • albuterol (PROVENTIL) nebulizer solution 2.5 mg  2.5 mg Nebulization Q6H PRN Myesha Larose MD       • [Held by provider] apixaban (ELIQUIS) tablet 5 mg  5 mg Oral BID Myesha Larose MD   5 mg at 04/25/25 1224   • ciprofloxacin (CIPRO) IVPB 400 mg  400 mg IntraVENous Q24H Loy Nguyen MD   Stopped at 04/27/25 2213   • diphenhydrAMINE (BENADRYL) injection 25 mg  25 mg IntraVENous Q6H PRN Richard Montilla MD   25 mg at 04/22/25 2215       Allergies:    Allergies   Allergen Reactions   • Bee Venom Swelling and Angioedema   • Shellfish-Derived Products Other (See Comments)     Syncope/seizures   • Shrimp Flavor Agent (Non-Screening)      Passes out     • Cephalexin Itching   • Codeine Hives and Other (See Comments)     B/P DROPS PASSES OUT  Passed out   • Fentanyl And Related Hives     Other reaction(s): Dizziness   • Hydromorphone Rash, Hives and Other (See Comments)     Full rash spreading across chest and both arms from IV hydromorphone  Passed out   • Flavoring Agent (Non-Screening)      Pass out   • Vancomycin Rash       Problem List:    Patient Active Problem List   Diagnosis Code   • Atrial fibrillation, persistent (HCC) I48.19   • Primary hypertension I10   • Severe episode of recurrent major depressive disorder, 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

## 2025-04-28 NOTE — PLAN OF CARE
Problem: ABCDS Injury Assessment  Goal: Absence of physical injury  Outcome: Progressing     Problem: Skin/Tissue Integrity  Goal: Skin integrity remains intact  Description: 1.  Monitor for areas of redness and/or skin breakdown2.  Assess vascular access sites hourly3.  Every 4-6 hours minimum:  Change oxygen saturation probe site4.  Every 4-6 hours:  If on nasal continuous positive airway pressure, respiratory therapy assess nares and determine need for appliance change or resting period  Outcome: Progressing     Problem: Chronic Conditions and Co-morbidities  Goal: Patient's chronic conditions and co-morbidity symptoms are monitored and maintained or improved  Outcome: Progressing     Problem: Pain  Goal: Verbalizes/displays adequate comfort level or baseline comfort level  Outcome: Progressing  Flowsheets (Taken 4/27/2025 1400 by Rosa Maria Brody RN)  Verbalizes/displays adequate comfort level or baseline comfort level: Encourage patient to monitor pain and request assistance     Problem: Skin/Tissue Integrity - Adult  Goal: Skin integrity remains intact  Description: 1.  Monitor for areas of redness and/or skin breakdown2.  Assess vascular access sites hourly3.  Every 4-6 hours minimum:  Change oxygen saturation probe site4.  Every 4-6 hours:  If on nasal continuous positive airway pressure, respiratory therapy assess nares and determine need for appliance change or resting period  Outcome: Progressing

## 2025-04-29 LAB
ANION GAP SERPL CALCULATED.3IONS-SCNC: 9 MMOL/L (ref 3–16)
BUN SERPL-MCNC: 19 MG/DL (ref 7–20)
CALCIUM SERPL-MCNC: 8 MG/DL (ref 8.3–10.6)
CHLORIDE SERPL-SCNC: 110 MMOL/L (ref 99–110)
CO2 SERPL-SCNC: 23 MMOL/L (ref 21–32)
CREAT SERPL-MCNC: 1.1 MG/DL (ref 0.6–1.2)
DEPRECATED RDW RBC AUTO: 17.2 % (ref 12.4–15.4)
GFR SERPLBLD CREATININE-BSD FMLA CKD-EPI: 52 ML/MIN/{1.73_M2}
GLUCOSE SERPL-MCNC: 88 MG/DL (ref 70–99)
HCT VFR BLD AUTO: 26.9 % (ref 36–48)
HGB BLD-MCNC: 8.8 G/DL (ref 12–16)
LACTATE BLDV-SCNC: 0.9 MMOL/L (ref 0.4–2)
MAGNESIUM SERPL-MCNC: 1.96 MG/DL (ref 1.8–2.4)
MCH RBC QN AUTO: 29.8 PG (ref 26–34)
MCHC RBC AUTO-ENTMCNC: 32.8 G/DL (ref 31–36)
MCV RBC AUTO: 90.7 FL (ref 80–100)
NT-PROBNP SERPL-MCNC: ABNORMAL PG/ML (ref 0–449)
PLATELET # BLD AUTO: 170 K/UL (ref 135–450)
PMV BLD AUTO: 8.8 FL (ref 5–10.5)
POTASSIUM SERPL-SCNC: 3.6 MMOL/L (ref 3.5–5.1)
RBC # BLD AUTO: 2.97 M/UL (ref 4–5.2)
SODIUM SERPL-SCNC: 142 MMOL/L (ref 136–145)
WBC # BLD AUTO: 9.5 K/UL (ref 4–11)

## 2025-04-29 PROCEDURE — 6360000002 HC RX W HCPCS: Performed by: INTERNAL MEDICINE

## 2025-04-29 PROCEDURE — 94761 N-INVAS EAR/PLS OXIMETRY MLT: CPT

## 2025-04-29 PROCEDURE — 2580000003 HC RX 258: Performed by: INTERNAL MEDICINE

## 2025-04-29 PROCEDURE — 85027 COMPLETE CBC AUTOMATED: CPT

## 2025-04-29 PROCEDURE — 6370000000 HC RX 637 (ALT 250 FOR IP): Performed by: INTERNAL MEDICINE

## 2025-04-29 PROCEDURE — 80048 BASIC METABOLIC PNL TOTAL CA: CPT

## 2025-04-29 PROCEDURE — 99233 SBSQ HOSP IP/OBS HIGH 50: CPT | Performed by: INTERNAL MEDICINE

## 2025-04-29 PROCEDURE — 36415 COLL VENOUS BLD VENIPUNCTURE: CPT

## 2025-04-29 PROCEDURE — 83605 ASSAY OF LACTIC ACID: CPT

## 2025-04-29 PROCEDURE — 94640 AIRWAY INHALATION TREATMENT: CPT

## 2025-04-29 PROCEDURE — 2500000003 HC RX 250 WO HCPCS: Performed by: INTERNAL MEDICINE

## 2025-04-29 PROCEDURE — 83880 ASSAY OF NATRIURETIC PEPTIDE: CPT

## 2025-04-29 PROCEDURE — 83735 ASSAY OF MAGNESIUM: CPT

## 2025-04-29 PROCEDURE — 1200000000 HC SEMI PRIVATE

## 2025-04-29 PROCEDURE — 99232 SBSQ HOSP IP/OBS MODERATE 35: CPT | Performed by: INTERNAL MEDICINE

## 2025-04-29 RX ORDER — PANTOPRAZOLE SODIUM 40 MG/1
40 TABLET, DELAYED RELEASE ORAL
Status: DISCONTINUED | OUTPATIENT
Start: 2025-04-29 | End: 2025-05-02 | Stop reason: HOSPADM

## 2025-04-29 RX ORDER — CIPROFLOXACIN 2 MG/ML
400 INJECTION, SOLUTION INTRAVENOUS EVERY 12 HOURS
Status: DISCONTINUED | OUTPATIENT
Start: 2025-04-29 | End: 2025-05-02 | Stop reason: HOSPADM

## 2025-04-29 RX ORDER — TORSEMIDE 20 MG/1
20 TABLET ORAL DAILY
Status: DISCONTINUED | OUTPATIENT
Start: 2025-04-29 | End: 2025-05-02 | Stop reason: HOSPADM

## 2025-04-29 RX ADMIN — CIPROFLOXACIN 400 MG: 400 INJECTION, SOLUTION INTRAVENOUS at 08:50

## 2025-04-29 RX ADMIN — Medication 400 MG: at 08:35

## 2025-04-29 RX ADMIN — SUCRALFATE 1 G: 1 TABLET ORAL at 08:35

## 2025-04-29 RX ADMIN — SODIUM CHLORIDE: 0.9 INJECTION, SOLUTION INTRAVENOUS at 08:44

## 2025-04-29 RX ADMIN — Medication 10 ML: at 21:10

## 2025-04-29 RX ADMIN — SODIUM CHLORIDE 500 MG: 9 INJECTION INTRAMUSCULAR; INTRAVENOUS; SUBCUTANEOUS at 14:47

## 2025-04-29 RX ADMIN — PANTOPRAZOLE SODIUM 40 MG: 40 INJECTION, POWDER, FOR SOLUTION INTRAVENOUS at 08:35

## 2025-04-29 RX ADMIN — Medication 10 ML: at 08:35

## 2025-04-29 RX ADMIN — SUCRALFATE 1 G: 1 TABLET ORAL at 21:07

## 2025-04-29 RX ADMIN — MIDODRINE HYDROCHLORIDE 5 MG: 5 TABLET ORAL at 19:02

## 2025-04-29 RX ADMIN — IPRATROPIUM BROMIDE AND ALBUTEROL SULFATE 1 DOSE: .5; 3 SOLUTION RESPIRATORY (INHALATION) at 08:30

## 2025-04-29 RX ADMIN — IPRATROPIUM BROMIDE AND ALBUTEROL SULFATE 1 DOSE: .5; 3 SOLUTION RESPIRATORY (INHALATION) at 20:05

## 2025-04-29 RX ADMIN — MIDODRINE HYDROCHLORIDE 5 MG: 5 TABLET ORAL at 14:32

## 2025-04-29 RX ADMIN — PANTOPRAZOLE SODIUM 40 MG: 40 TABLET, DELAYED RELEASE ORAL at 17:14

## 2025-04-29 RX ADMIN — CYANOCOBALAMIN TAB 1000 MCG 1000 MCG: 1000 TAB at 08:35

## 2025-04-29 RX ADMIN — CIPROFLOXACIN 400 MG: 400 INJECTION, SOLUTION INTRAVENOUS at 21:06

## 2025-04-29 RX ADMIN — MIDODRINE HYDROCHLORIDE 5 MG: 5 TABLET ORAL at 08:35

## 2025-04-29 RX ADMIN — TORSEMIDE 20 MG: 20 TABLET ORAL at 14:32

## 2025-04-29 RX ADMIN — ALTEPLASE 1 MG: 2.2 INJECTION, POWDER, LYOPHILIZED, FOR SOLUTION INTRAVENOUS at 12:08

## 2025-04-29 ASSESSMENT — PAIN SCALES - GENERAL
PAINLEVEL_OUTOF10: 0

## 2025-04-29 ASSESSMENT — ENCOUNTER SYMPTOMS
CONSTIPATION: 0
ABDOMINAL PAIN: 0
EYE REDNESS: 0
COUGH: 0
SINUS PRESSURE: 0
RHINORRHEA: 0
SORE THROAT: 0
SINUS PAIN: 0
WHEEZING: 0
NAUSEA: 0
SHORTNESS OF BREATH: 0
EYE DISCHARGE: 0
BACK PAIN: 0
DIARRHEA: 0

## 2025-04-29 NOTE — PROGRESS NOTES
Nephrology Progress Note                                                                                                                                                                                                                                                                                                                                                               Office : [phone]     Fax :[phone]    Patient's Name: Stacy Ly    Reason for Consult:  JUSTINA on CKD 3  Requesting Physician:  Tarik Hummel, MARILEE - CNP  Chief Complaint:    Chief Complaint   Patient presents with    Leg Swelling     Pt brought in per [facility] from home, pt has multiple complaints.  Pt slid out of her recliner this am trying to stand, pt has been home from rehab x 1 mos, unable to walk, can only stand however she is weak when standing.  Pt reports increased redness to her right lower leg, swelling present, pt states redness is worse, swelling is unchanged.  Pt also reports diarrhea this am.        Assessment/Plan     #Acute Anemia  #Melena   - GI bleed  - PPI  - Carafate BID  - IV iron replacement  - GI on board  - EGD yesterday. 3 non-bleeding ulcers found. Path pending.     # JUSTINA on CKD 3  - Baseline Cr ~ 1.5. Creatinine now at baseline.   - likely 2/2 sepsis, hypotension  - UPCR with minimal proteinuria  - avoid nephrotoxins  - monitor renal labs      #Sepsis  - 2/2 cellulitis of R leg   - s/p IVF  - CT neg for osteomyelitis or abscess  - cultures NGTD  - abx per ID    #CHF  - EF 55-60% from 02/14  - BNP ~ 13k  - resume diuretics and see response   - low salt diet  - daily weights    #HTN  - Bps in better control   - s/p levophed + IV bolus   - midodrine + albumin   - monitor     # COPD   - Oxygen dependent      # A fib  - eliquis on hold d/t concerns for bleed  - EP on board    History of Present Ilness:    Stacy Ly is a 79 y.o. female with pMH of HTN, CHF, COPD, GIANNA, A-fib, and CKD 3 who presents

## 2025-04-29 NOTE — PROGRESS NOTES
Gastroenterology Progress Note      Stacy Ly is a 79 y.o. female patient.  1. Cellulitis of skin with lymphangitis    2. Peripheral edema    3. General weakness    4. Difficulty walking    5. Atrial fibrillation with RVR (HCC)    6. Severe sepsis (HCC)    7. Fall, initial encounter    8. Melena        SUBJECTIVE:    She is doing well.  Had 1 formed bowel movement today.  Denies any melena, abdominal pain, nausea, vomiting.      Physical    VITALS:  /83   Pulse (!) 107   Temp 98.3 °F (36.8 °C) (Temporal)   Resp 20   Ht 1.702 m (5' 7\")   Wt 112.3 kg (247 lb 9.2 oz)   SpO2 92%   BMI 38.78 kg/m²   TEMPERATURE:  Current - Temp: 98.3 °F (36.8 °C); Max - Temp  Av.2 °F (36.8 °C)  Min: 97.4 °F (36.3 °C)  Max: 98.9 °F (37.2 °C)    NAD  RRR, Nl s1s2  Lungs CTA Bilaterally, normal effort  Abdomen soft, ND, NT, no HSM, Bowel sounds normal  AAOx3, No asterixis     Data    Data Review:    Recent Labs     25  0520 25  0610 25  0520   WBC 8.1 9.0 9.5   HGB 8.8* 9.0* 8.8*   HCT 27.1* 28.2* 26.9*   MCV 90.5 92.7 90.7    165 170     Recent Labs     25  0520 25  0610 25  0520    141 142   K 3.8 4.1 3.6    110 110   CO2 22 23 23   PHOS 3.4 2.7  --    BUN 34* 24* 19   CREATININE 1.4* 1.2 1.1     No results for input(s): \"AST\", \"ALT\", \"BILIDIR\", \"BILITOT\", \"ALKPHOS\" in the last 72 hours.    Invalid input(s): \"ALB\"  No results for input(s): \"LIPASE\", \"AMYLASE\" in the last 72 hours.  No results for input(s): \"PROTIME\", \"INR\" in the last 72 hours.  Invalid input(s): \"PTT\"          ASSESSMENT / PLAN      She is doing well on regular diet.  Denies any nausea, vomiting.  She had 1 formed bowel movement today.  Denies any melena.     -Continue pantoprazole IV twice daily, to pantoprazole 40 mg oral twice daily at discharge  -Continue Carafate twice daily  -Continue to hold Eliquis  -Avoid all NSAIDs  -Repeat EGD in 8 weeks    Discussed with Dr. Evan Healy

## 2025-04-29 NOTE — PROGRESS NOTES
was reviewed and updated by me today as needed.     Tobacco use:   reports that she has never smoked. She has never used smokeless tobacco.  Alcohol use:   reports no history of alcohol use.  Currently lives in: [place] [address]   reports no history of drug use.     COVID VACCINATION AND LAB RESULT RECORDS:     Internal Administration   First Dose COVID-19, PFIZER PURPLE top, DILUTE for use, (age 12 y+), 30mcg/0.3mL  09/08/2021   Second Dose COVID-19, PFIZER PURPLE top, DILUTE for use, (age 12 y+), 30mcg/0.3mL   10/15/2021       Last COVID Lab SARS-CoV-2 (no units)   Date Value   09/13/2022 Not Detected     SARS-CoV-2 RNA, RT PCR (no units)   Date Value   02/11/2025 NOT DETECTED     SARS-CoV-2, BREANA (no units)   Date Value   10/01/2020 NOT DETECTED     SARS-CoV-2, NAAT (no units)   Date Value   05/10/2023 Not Detected            Assessment:     The patient is a 79 y.o. old female who  has a past medical history of Arthritis, Atrial fibrillation and flutter (HCC), Hypertension, Infection due to parainfluenza virus 3 (5/11/2023), Kidney disease, Pneumonia, and Sleep apnea. with following problems:    Septic shock tachycardia, tachypnea, hypothermia, hypotension requiring vasopressors--this is improving  Severe lactic acidosis-improved  Severe right leg cellulitis--currently on IV daptomycin and ciprofloxacin  Acute kidney injury-has shown improvement  Elevated BNP  Recent Influenza A infection-respiratory viral PCR panel has come back negative  Essential hypertension  Chronic atrial fibrillation-rate is controlled  Obstructive sleep apnea  Obesity Class 2 due to excess calorie intake : Body mass index is 38.88 kg/m².      Discussion:      The patient is afebrile. She is on IV daptomycin and ciprofloxacin.  He  is tolerating antibiotics okay.    Her serum creatinine is 1.1 today.  proBNP is greater than 24,000    White cell count is 9500 today.    She has a hemoglobin of 8.8 and platelet count of 120,000.      Plan:     JUSTINA (acute kidney injury) N17.9    GIANNA (obstructive sleep apnea) G47.33    Weight loss counseling, encounter for Z71.3    Atrial flutter (HCC) I48.92    Paroxysmal atrial fibrillation (HCC) I48.0    ILD (interstitial lung disease) (HCC) J84.9    Iron deficiency anemia due to chronic blood loss D50.0    Iron deficiency anemia, unspecified D50.9    Age-related osteoporosis without current pathological fracture M81.0    Vasovagal syncope R55    CKD (chronic kidney disease) stage 4, GFR 15-29 ml/min (HCC) N18.4    Stage 3b chronic kidney disease (HCC) N18.32    B12 deficiency E53.8    Vitamin D deficiency E55.9    Stage 3 chronic kidney disease (HCC) N18.30    History of ankle surgery Z98.890    Left nephrolithiasis N20.0    Diverticulosis K57.90    History of cholecystectomy Z90.49    History of gastric bypass Z98.84    Hypotension due to hypovolemia E86.1    MRSA nasal colonization Z22.322    Chronic diastolic heart failure (HCC) I50.32    Right arm pain M79.601    Brachial plexopathy G54.0    Influenza A J10.1    Asthmatic bronchitis J45.909    Severe sepsis (HCC) A41.9, R65.20    Chronic atrial fibrillation (HCC) I48.20    Atrial fibrillation with RVR ([facility]) I48.91    Difficulty walking R26.2    Fall W19.XXXA    General weakness R53.1    Peripheral edema R60.0    Lactic acidosis E87.20    Septic shock (HCC) A41.9, R65.21    Electrolyte imbalance E87.8       Please note that this chart was generated using Dragon dictation software. Although every effort was made to ensure the accuracy of this automated transcription, some errors in transcription may have occurred inadvertently. If you may need any clarification, please do not hesitate to contact me through [employer] or at the phone number provided below with my electronic signature.  Any pictures or media included in this note were obtained after taking informed verbal consent from the patient and with their approval to include those in the patient's medical

## 2025-04-29 NOTE — CARE COORDINATION
Discharge Planning Note:  Chart reviewed and pt has possible IV medication needs upon discharge.  CM contacted Ashlee  with [employer] requesting home infusion benefits check.  Ashlee to report back with benefit allowances.         Updates;    [employer] has checked home IV benefits and are as follows:    Patient will pay  $68.00 per week for medication with Supplies included:       Home health provider is  [facility]  ADDRESS:   [address] [place] [address]   PHONE:        [phone]  FAX:              [phone]

## 2025-04-29 NOTE — PROGRESS NOTES
HOSPITALISTS PROGRESS NOTE    4/29/2025 1:24 PM        Name: Stacy Ly .              Admitted: 4/22/2025  Primary Care Provider: Tarik Hummel APRN - ROBBY (Tel: [phone])      Brief Course: This 79 y.o. female  with PMHx of A-fib, hypertension, and COPD, chronic hypoxic respiratory failure  on 2 L at baseline, CKD stage III and HFpEF admitted for septic shock 2/2 cellulitis.  JUSTINA on CKD.    Interval history:   Pt seen and examined today.  Overnight events noted and interval ancillary notes reviewed.  Afebrile, leukocytosis resolved.  BP stable on midodrine.  Creatinine down to 1.1.  No hematemesis, melena, hematochezia.    Assessment & Plan:     RLE cellulitis: Resolving  ID consulted; on IV daptomycin and ciprofloxacin.   CT of the right tibia and fibula negative for osteomyelitis or abscess.  Keep right leg Ace wrapped and elevated.     Septic shock 2/2 above;  p/w tachycardia, leukocytosis, hypotension, hypothermia lactic acidosis  S/p fluid resuscitation per sepsis protocol.    Continue antibiotics per ID recs.  BP improved with midodrine and albumin.     Acute on chronic diastolic heart failure  proBNP:> 13k on admission.  Last echo on 2/14/25 showed EF of 55 to 60%  Cardiology consulted; signed off.  Diuretics resumed with resolution of hypotension.    Maintain fluid and salt restriction.  Strict I's and O's.  Daily weights.     Melanotic stools with MIRIAM:  Hemoccult positive.    Iron saturation 6% 2/12/2025.  Complete course of IV iron.  GI consulted; s/p EGD 4/28/2025 with three non-bleeding superficial ulcers (7 mm, 12 mm and 18 mm) with no stigmata of bleeding were found at the GJ anastomosis. Follow up biopsies to r/o HP infection  - Absolutely no NSAIDs  - Oral pantoprazole 40 mg 2x/day for 8 weeks and then daily thereafter.  - Hold Eliquis for 72 hours and may restart afterwards.  - Add Carafate 1 gram 2x/day  - Repeat  injection 4 mg, Q6H PRN  polyethylene glycol (GLYCOLAX) packet 17 g, Daily PRN  acetaminophen (TYLENOL) tablet 650 mg, Q6H PRN   Or  acetaminophen (TYLENOL) suppository 650 mg, Q6H PRN  albuterol (PROVENTIL) nebulizer solution 2.5 mg, Q6H PRN  [Held by provider] apixaban (ELIQUIS) tablet 5 mg, BID  diphenhydrAMINE (BENADRYL) injection 25 mg, Q6H PRN        Objective:  /83   Pulse (!) 107   Temp 98.3 °F (36.8 °C) (Temporal)   Resp 20   Ht 1.702 m (5' 7\")   Wt 112.3 kg (247 lb 9.2 oz)   SpO2 92%   BMI 38.78 kg/m²     Intake/Output Summary (Last 24 hours) at 4/29/2025 1324  Last data filed at 4/29/2025 1100  Gross per 24 hour   Intake 820 ml   Output 700 ml   Net 120 ml      Wt Readings from Last 3 Encounters:   04/29/25 112.3 kg (247 lb 9.2 oz)   03/28/25 112 kg (247 lb)   02/19/25 119.1 kg (262 lb 9.1 oz)       Physical Examination:   General appearance: Chronically ill looking.  Morbidly obese  Lungs: Clear to auscultation, bilaterally without Rales/Wheezes/Rhonchi   Heart: Regular rhythm normal S1/S2 without murmurs  Abdomen: Soft, non-tender, non-distended. Positive bowel sounds all four quadrants.  Extremities: Chronic BLE lymphedema.  RLE erythema improving  Neurologic: CN 2-12 grossly intact. No gross deficit noted      Labs and Tests:  CBC:   Recent Labs     04/27/25  0520 04/28/25  0610 04/29/25  0520   WBC 8.1 9.0 9.5   HGB 8.8* 9.0* 8.8*    165 170     BMP:    Recent Labs     04/27/25  0520 04/28/25  0610 04/29/25  0520    141 142   K 3.8 4.1 3.6    110 110   CO2 22 23 23   BUN 34* 24* 19   CREATININE 1.4* 1.2 1.1   GLUCOSE 85 89 88     Hepatic:   No results for input(s): \"AST\", \"ALT\", \"BILITOT\", \"ALKPHOS\" in the last 72 hours.    Invalid input(s): \"ALB\"    XR CHEST PORTABLE   Final Result   1. Right PICC terminating in the distal SVC.         XR CHEST PORTABLE   Final Result   Right-sided PICC terminates in right atrium      Bilateral linear pulmonary opacities could

## 2025-04-30 LAB
ALBUMIN SERPL-MCNC: 2.9 G/DL (ref 3.4–5)
ANION GAP SERPL CALCULATED.3IONS-SCNC: 10 MMOL/L (ref 3–16)
BUN SERPL-MCNC: 16 MG/DL (ref 7–20)
CALCIUM SERPL-MCNC: 7.9 MG/DL (ref 8.3–10.6)
CHLORIDE SERPL-SCNC: 108 MMOL/L (ref 99–110)
CO2 SERPL-SCNC: 25 MMOL/L (ref 21–32)
CREAT SERPL-MCNC: 1.1 MG/DL (ref 0.6–1.2)
DEPRECATED RDW RBC AUTO: 17.2 % (ref 12.4–15.4)
GFR SERPLBLD CREATININE-BSD FMLA CKD-EPI: 52 ML/MIN/{1.73_M2}
GLUCOSE BLD-MCNC: 98 MG/DL (ref 70–99)
GLUCOSE SERPL-MCNC: 92 MG/DL (ref 70–99)
HCT VFR BLD AUTO: 27.7 % (ref 36–48)
HGB BLD-MCNC: 8.9 G/DL (ref 12–16)
MCH RBC QN AUTO: 29.4 PG (ref 26–34)
MCHC RBC AUTO-ENTMCNC: 32 G/DL (ref 31–36)
MCV RBC AUTO: 91.9 FL (ref 80–100)
PERFORMED ON: NORMAL
PHOSPHATE SERPL-MCNC: 2.2 MG/DL (ref 2.5–4.9)
PLATELET # BLD AUTO: 222 K/UL (ref 135–450)
PMV BLD AUTO: 8.9 FL (ref 5–10.5)
POTASSIUM SERPL-SCNC: 3.6 MMOL/L (ref 3.5–5.1)
RBC # BLD AUTO: 3.01 M/UL (ref 4–5.2)
SODIUM SERPL-SCNC: 143 MMOL/L (ref 136–145)
WBC # BLD AUTO: 11.6 K/UL (ref 4–11)

## 2025-04-30 PROCEDURE — 99233 SBSQ HOSP IP/OBS HIGH 50: CPT | Performed by: INTERNAL MEDICINE

## 2025-04-30 PROCEDURE — 6370000000 HC RX 637 (ALT 250 FOR IP): Performed by: INTERNAL MEDICINE

## 2025-04-30 PROCEDURE — 2700000000 HC OXYGEN THERAPY PER DAY

## 2025-04-30 PROCEDURE — 2500000003 HC RX 250 WO HCPCS: Performed by: INTERNAL MEDICINE

## 2025-04-30 PROCEDURE — 2580000003 HC RX 258: Performed by: INTERNAL MEDICINE

## 2025-04-30 PROCEDURE — 97110 THERAPEUTIC EXERCISES: CPT

## 2025-04-30 PROCEDURE — 1200000000 HC SEMI PRIVATE

## 2025-04-30 PROCEDURE — 6360000002 HC RX W HCPCS: Performed by: INTERNAL MEDICINE

## 2025-04-30 PROCEDURE — 80069 RENAL FUNCTION PANEL: CPT

## 2025-04-30 PROCEDURE — 97530 THERAPEUTIC ACTIVITIES: CPT

## 2025-04-30 PROCEDURE — 94640 AIRWAY INHALATION TREATMENT: CPT

## 2025-04-30 PROCEDURE — 85027 COMPLETE CBC AUTOMATED: CPT

## 2025-04-30 PROCEDURE — 94761 N-INVAS EAR/PLS OXIMETRY MLT: CPT

## 2025-04-30 RX ORDER — IPRATROPIUM BROMIDE AND ALBUTEROL SULFATE 2.5; .5 MG/3ML; MG/3ML
1 SOLUTION RESPIRATORY (INHALATION) EVERY 4 HOURS PRN
Status: DISCONTINUED | OUTPATIENT
Start: 2025-04-30 | End: 2025-05-02 | Stop reason: HOSPADM

## 2025-04-30 RX ADMIN — CIPROFLOXACIN 400 MG: 400 INJECTION, SOLUTION INTRAVENOUS at 10:29

## 2025-04-30 RX ADMIN — SUCRALFATE 1 G: 1 TABLET ORAL at 21:45

## 2025-04-30 RX ADMIN — PANTOPRAZOLE SODIUM 40 MG: 40 TABLET, DELAYED RELEASE ORAL at 05:26

## 2025-04-30 RX ADMIN — Medication 10 ML: at 10:28

## 2025-04-30 RX ADMIN — PANTOPRAZOLE SODIUM 40 MG: 40 TABLET, DELAYED RELEASE ORAL at 18:37

## 2025-04-30 RX ADMIN — APIXABAN 5 MG: 5 TABLET, FILM COATED ORAL at 21:45

## 2025-04-30 RX ADMIN — METOPROLOL TARTRATE 25 MG: 25 TABLET, FILM COATED ORAL at 21:45

## 2025-04-30 RX ADMIN — Medication 10 ML: at 23:13

## 2025-04-30 RX ADMIN — SUCRALFATE 1 G: 1 TABLET ORAL at 08:24

## 2025-04-30 RX ADMIN — CIPROFLOXACIN 400 MG: 400 INJECTION, SOLUTION INTRAVENOUS at 23:40

## 2025-04-30 RX ADMIN — IRON SUCROSE 200 MG: 20 INJECTION, SOLUTION INTRAVENOUS at 14:42

## 2025-04-30 RX ADMIN — TORSEMIDE 20 MG: 20 TABLET ORAL at 08:24

## 2025-04-30 RX ADMIN — Medication 10 ML: at 23:12

## 2025-04-30 RX ADMIN — IPRATROPIUM BROMIDE AND ALBUTEROL SULFATE 1 DOSE: .5; 3 SOLUTION RESPIRATORY (INHALATION) at 07:56

## 2025-04-30 RX ADMIN — SODIUM CHLORIDE, PRESERVATIVE FREE 10 ML: 5 INJECTION INTRAVENOUS at 10:57

## 2025-04-30 RX ADMIN — CYANOCOBALAMIN TAB 1000 MCG 1000 MCG: 1000 TAB at 08:24

## 2025-04-30 RX ADMIN — POTASSIUM PHOSPHATE, MONOBASIC POTASSIUM PHOSPHATE, DIBASIC 15 MMOL: 224; 236 INJECTION, SOLUTION, CONCENTRATE INTRAVENOUS at 10:57

## 2025-04-30 RX ADMIN — SODIUM CHLORIDE 500 MG: 9 INJECTION INTRAMUSCULAR; INTRAVENOUS; SUBCUTANEOUS at 14:30

## 2025-04-30 ASSESSMENT — ENCOUNTER SYMPTOMS
DIARRHEA: 0
EYE DISCHARGE: 0
NAUSEA: 0
WHEEZING: 0
ABDOMINAL PAIN: 0
RHINORRHEA: 0
BACK PAIN: 0
COUGH: 0
CONSTIPATION: 0
SHORTNESS OF BREATH: 0
SINUS PRESSURE: 0
SORE THROAT: 0
EYE REDNESS: 0
SINUS PAIN: 0

## 2025-04-30 ASSESSMENT — PAIN SCALES - GENERAL
PAINLEVEL_OUTOF10: 0

## 2025-04-30 NOTE — PROGRESS NOTES
HOSPITALISTS PROGRESS NOTE    4/30/2025 3:24 PM        Name: Stacy Ly .              Admitted: 4/22/2025  Primary Care Provider: Tarik Hummel APRN - ROBBY (Tel: [phone])      Brief Course: This 79 y.o. female  with PMHx of A-fib, hypertension, and COPD, chronic hypoxic respiratory failure  on 2 L at baseline, CKD stage III and HFpEF admitted for septic shock 2/2 cellulitis.  JUSTINA on CKD.    Interval history:   Patient is weak and tired.  Cannot afford SNF.  Complaining of R leg swelling.  No CP, SOB, HA or fevers.    Assessment & Plan:     RLE cellulitis: Resolving  ID consulted; on IV daptomycin and ciprofloxacin.   CT of the right tibia and fibula negative for osteomyelitis or abscess.  Keep right leg Ace wrapped and elevated.  Staff to evaluate RLE with ID today     Septic shock 2/2 above;  p/w tachycardia, leukocytosis, hypotension, hypothermia lactic acidosis  S/p fluid resuscitation per sepsis protocol.    Continue IV Cipro and Datpo per ID.  BP improved with midodrine and albumin.     Acute on chronic diastolic heart failure  proBNP:> 13k on admission.  Last echo on 2/14/25 showed EF of 55 to 60%  Cardiology consulted; signed off.  Diuretics resumed with resolution of hypotension.    Maintain fluid and salt restriction.  Strict I's and O's.  Daily weights.     Melanotic stools with MIRIAM:  Hemoccult positive.    Iron saturation 6% 2/12/2025.  Complete course of IV iron.  GI consulted; s/p EGD 4/28/2025 with three non-bleeding superficial ulcers (7 mm, 12 mm and 18 mm) with no stigmata of bleeding were found at the GJ anastomosis. Follow up biopsies to r/o HP infection  - Absolutely no NSAIDs  - Oral pantoprazole 40 mg 2x/day for 8 weeks and then daily thereafter.  - Resume Eliquis on 4/30  - Add Carafate 1 gram 2x/day  - Repeat EGD in 8 weeks to ensure anastomotic ulcer healing.    JUSTINA on CKD stage III due to sepsis;  edema or infection         CT TIBIA FIBULA RIGHT WO CONTRAST   Final Result   1. Diffuse anasarca.   2. No focal abscess identified.   3. No obvious osteomyelitis. Consider MRI with and without contrast, or   three-phase bone scan and tagged white blood cell study to further evaluate   as appropriate.         XR FOOT RIGHT (2 VIEWS)   Final Result   1. Nonspecific soft tissue swelling of the right foot.   2. No acute fracture or osseous erosion.            XR ANKLE RIGHT (2 VIEWS)   Final Result   1. Severe posttraumatic tibiotalar joint osteoarthritis.   2. Unchanged pin in the distal fibula extending into the distal tibfib and syndesmosis, and remote fixation of the medial malleolus.   3. Ossific dictation of the distal tib-fib articulation and healing of prior distal tibial and fibular fractures.   4. Soft tissue calcifications, likely from prior trauma.            XR HIP 2-3 VW W PELVIS LEFT   Final Result   No acute fracture dislocation.         CT LUMBAR SPINE WO CONTRAST   Final Result   1. No acute fracture or traumatic malalignment.   2. S shaped scoliosis with multilevel degenerative change of the thoracic and   lumbar spine.   3. Moderate hiatal hernia.         CT THORACIC SPINE WO CONTRAST   Final Result   1. No acute fracture or traumatic malalignment.   2. S shaped scoliosis with multilevel degenerative change of the thoracic and   lumbar spine.   3. Moderate hiatal hernia.         XR CHEST PORTABLE   Final Result   No acute process.             Problem List  Principal Problem:    Severe sepsis (HCC)  Active Problems:    Class 2 obesity due to excess calories with body mass index (BMI) of 37.0 to 37.9 in adult    Cellulitis of skin with lymphangitis    JUSTINA (acute kidney injury)    Hypotension due to hypovolemia    Influenza A    Chronic atrial fibrillation (HCC)    Atrial fibrillation with RVR (HCC)    Difficulty walking    Fall    General weakness    Peripheral edema    Lactic acidosis    Septic shock

## 2025-04-30 NOTE — PROGRESS NOTES
Nutrition Note    RECOMMENDATIONS  PO Diet: Added SERGE modifier    ASSESSMENT   Pt triggered for LOS assessment. On regular diet with documented meal intakes of greater than 50% throughout admission. Upon visiting, pt reported no issues with appetite or po intake here or prior to current admission. Wt hx in EMR indicates wt fluctuations as expected with hx of CHF. Denied need for CHF diet education as familiar with guidelines. RD will monitor for continued adequate po intake.     Malnutrition Status: No malnutrition     NUTRITION DIAGNOSIS   No nutrition diagnosis at this time     Goals: Maintain adequate nutrition status, prior to discharge     NUTRITION RELATED FINDINGS  Objective: Phos 2.2. LBM 4/30. +1 pitting BLE edema.  Wounds: None    CURRENT NUTRITION THERAPIES  ADULT DIET; Regular       PO Intake: 51-75%, %   PO Supplement Intake:Unable to assess    ANTHROPOMETRICS  Current Height: 170.2 cm (5' 7\")  Current Weight - Scale: 89.3 kg (196 lb 13.9 oz)    Admission weight: 112.6 kg (248 lb 3.8 oz)    EDUCATION  Education/Counseling declined (declined CHF diet education 4/30 as familiar with guidelines)     The patient will be monitored per nutrition standards of care. Consult dietitian if additional nutrition interventions are needed prior to RD reassessment.     Shanti Ochoa, MS, RD, LD    Contact: [phone]

## 2025-04-30 NOTE — PROGRESS NOTES
needed.     Tobacco use:   reports that she has never smoked. She has never used smokeless tobacco.  Alcohol use:   reports no history of alcohol use.  Currently lives in: [place] [id number]   reports no history of drug use.     COVID VACCINATION AND LAB RESULT RECORDS:     Internal Administration   First Dose COVID-19, PFIZER PURPLE top, DILUTE for use, (age 12 y+), 30mcg/0.3mL  09/08/2021   Second Dose COVID-19, PFIZER PURPLE top, DILUTE for use, (age 12 y+), 30mcg/0.3mL   10/15/2021       Last COVID Lab SARS-CoV-2 (no units)   Date Value   09/13/2022 Not Detected     SARS-CoV-2 RNA, RT PCR (no units)   Date Value   02/11/2025 NOT DETECTED     SARS-CoV-2, BREANA (no units)   Date Value   10/01/2020 NOT DETECTED     SARS-CoV-2, NAAT (no units)   Date Value   05/10/2023 Not Detected            Assessment:     The patient is a 79 y.o. old female who  has a past medical history of Arthritis, Atrial fibrillation and flutter (HCC), Hypertension, Infection due to parainfluenza virus 3 (5/11/2023), Kidney disease, Pneumonia, and Sleep apnea. with following problems:    Septic shock tachycardia, tachypnea, hypothermia, hypotension requiring vasopressors--has shown improvement  Severe lactic acidosis-this is improved  Severe right leg cellulitis--currently on IV daptomycin and ciprofloxacin  Acute kidney injury-improved  Elevated BNP  Recent Influenza A infection-respiratory viral PCR panel has come back negative  Essential hypertension  Chronic atrial fibrillation-rate controlled  Obstructive sleep apnea  Obesity Class 2 due to excess calorie intake : Body mass index is 38.88 kg/m².      Discussion:      The patient is afebrile.  She is on IV daptomycin and ciprofloxacin.  White cell count slightly elevated at 11,600 today.  2 sets of blood cultures from April 22 have been negative.    The patient had a CT scan of the right lower leg done without contrast on April 25.  It did not show any focal abscesses or  osteomyelitis.    Serum creatinine is 1.1 today.  Hemoglobin is 8.9 and the platelet count is 222,000 today      Plan:     Diagnostic Workup:    Will order MRI of the right leg with IV contrast to rule out abscess or osteomyelitis  Continue to follow  fever curve, WBC count and blood cultures.  Continue to monitor blood counts, liver and renal function.    Antimicrobials:    Continue IV daptomycin 500 mg every 24 hour for gram-positive coverage  Continue Cipro 400 mg every 12 hours for gram-negative coverage  RN is concerned that the leg appears increasingly swollen to her.  Will get MRI for reassurance  Keep right leg elevated when lying down sitting up  Slow improvement is expected  We will follow up on the culture results and clinical progress and will make further recommendations accordingly.  Continue close vitals monitoring.  Maintain good glycemic control.  Fall precautions.  Aspiration precautions.  Continue to watch for new fever or diarrhea.  DVT prophylaxis.  I reviewed the notes from other physicians and consultants involved in the patient's treatment teams in  detail today  Discussed all above with patient and RN.    Current Isolation:    No active isolations     Drug Monitoring:    Continue monitoring for antibiotic related nephrotoxicity, hepatotoxicity and other toxicities as follows: CBC, CMP, CK  Continue to watch for following: new or worsening fever, new hypotension, hives, lip swelling and redness or purulence at vascular access sites.     I/v access Management:    Continue to monitor i.v access sites for erythema, induration, discharge or tenderness.   As always, continue efforts to minimize tubes/lines/drains as clinically appropriate to reduce chances of line associated infections.    Patient education and counseling:      The patient was educated in detail about the side-effects of various antibiotics and things to watch for like new rashes, lip swelling, severe reaction, worsening diarrhea,

## 2025-04-30 NOTE — PROGRESS NOTES
Patient transferred from ICU, patient alert and oriented, VSS, pt denies pain at the moment, medications given per MAR, pt awake, in bed, call light within reach, safety precautions in place.

## 2025-04-30 NOTE — PROGRESS NOTES
Nephrology Progress Note                                                                                                                                                                                                                                                                                                                                                               Office : [phone]     Fax :[phone]    Patient's Name: Stacy Ly    Reason for Consult:  JUSTINA on CKD 3  Requesting Physician:  Tarik Hummel, MARILEE - CNP  Chief Complaint:    Chief Complaint   Patient presents with    Leg Swelling     Pt brought in per [facility] from home, pt has multiple complaints.  Pt slid out of her recliner this am trying to stand, pt has been home from rehab x 1 mos, unable to walk, can only stand however she is weak when standing.  Pt reports increased redness to her right lower leg, swelling present, pt states redness is worse, swelling is unchanged.  Pt also reports diarrhea this am.        Assessment/Plan     #Acute Anemia  #Melena   - GI bleed  - PPI  - Carafate BID  - IV iron replacement  - GI on board  - EGD 04/28. 3 non-bleeding ulcers found. Path pending.     # JUSTINA on CKD 3  - Baseline Cr ~ 1.5. Creatinine now at baseline.   - likely 2/2 sepsis, hypotension  - UPCR with minimal proteinuria  - avoid nephrotoxins  - monitor renal labs      #Sepsis  - 2/2 cellulitis of R leg   - s/p IVF  - CT neg for osteomyelitis or abscess  - cultures NGTD  - abx per ID    #CHF  - EF 55-60% from 02/14  - BNP ~ 13k--> 24k   - resume torsemide 20 daily  - low salt diet  - daily weights    #HTN  - Bps in better control   - s/p levophed, albumin + IV fluid bolus   - midodrine  - monitor     # COPD   - Oxygen dependent      # A fib  - eliquis on hold   - EP on board    History of Present Ilness:    Stacy Ly is a 79 y.o. female with pMH of HTN, CHF, COPD, GIANNA, A-fib, and CKD 3 who presents to the emergency  solution 2.5 mg, Q6H PRN  [Held by provider] apixaban (ELIQUIS) tablet 5 mg, BID  diphenhydrAMINE (BENADRYL) injection 25 mg, Q6H PRN      Physical exam:     Vitals:  BP (!) 146/90   Pulse 94   Temp 98.7 °F (37.1 °C) (Temporal)   Resp 16   Ht 1.702 m (5' 7\")   Wt 89.3 kg (196 lb 13.9 oz)   SpO2 99%   BMI 30.83 kg/m²   Constitutional:  OAA X3 NAD Yes  Skin: no rash, turgor wnl  Cardiovascular:  S1, S2 without m/r/g  Respiratory: clear on the left, crackles to RLL  Abdomen:  , soft, nt, nd  Ext:  lower extremity edema Yes  Psychiatric: mood and affect flat   Musculoskeletal:  Rom, muscular strength intact    Data:   Labs:  CBC:   Recent Labs     04/28/25  0610 04/29/25  0520 04/30/25  0500   WBC 9.0 9.5 11.6*   HGB 9.0* 8.8* 8.9*    170 222     BMP:    Recent Labs     04/28/25  0610 04/29/25  0520 04/30/25  0500    142 143   K 4.1 3.6 3.6    110 108   CO2 23 23 25   BUN 24* 19 16   CREATININE 1.2 1.1 1.1   GLUCOSE 89 88 92     Ca/Mg/Phos:   Recent Labs     04/28/25  0610 04/29/25  0520 04/30/25  0500   CALCIUM 8.1* 8.0* 7.9*   MG  --  1.96  --    PHOS 2.7  --  2.2*     Hepatic:   No results for input(s): \"AST\", \"ALT\", \"BILITOT\", \"ALKPHOS\" in the last 72 hours.    Invalid input(s): \"ALB\"    Troponin: No results for input(s): \"TROPONINI\" in the last 72 hours.  BNP: No results for input(s): \"BNP\" in the last 72 hours.  Lipids: No results for input(s): \"CHOL\", \"TRIG\", \"HDL\" in the last 72 hours.    Invalid input(s): \"LDLCALC\", \"LABVLDL\"  ABGs: No results for input(s): \"PHART\", \"PO2ART\", \"YQR6MFI\" in the last 72 hours.  INR:   No results for input(s): \"INR\" in the last 72 hours.    UA:  No results for input(s): \"COLORU\", \"CLARITYU\", \"GLUCOSEU\", \"BILIRUBINUR\", \"KETUA\", \"SPECGRAV\", \"BLOODU\", \"PHUR\", \"PROTEINU\", \"UROBILINOGEN\", \"NITRU\", \"LEUKOCYTESUR\", \"URINETYPE\" in the last 72 hours.    Invalid input(s): \"LABMICR\"     Urine Microscopic:   No results for input(s): \"LABCAST\", \"BACTERIA\", \"COMU\",

## 2025-04-30 NOTE — PROGRESS NOTES
04/30/25 1010   RT Protocol   History Pulmonary Disease 2   Respiratory pattern 0   Breath sounds 0   Cough 0   Indications for Bronchodilator Therapy None   Bronchodilator Assessment Score 2

## 2025-04-30 NOTE — PROGRESS NOTES
[facility] - Inpatient Rehabilitation Department   Phone: [phone]    Physical Therapy    [] Initial Evaluation            [x] Daily Treatment Note         [] Discharge Summary      Patient: Stacy Ly (Goes by Tosin)  : 1945   MRN: [id number]   Date of Service:  2025  Admitting Diagnosis: Severe sepsis (HCC)  Current Admission Summary: 79-year-old female presents the ER with generalized weakness and fatigue.  Patient states that this has been going on for weeks.  She does appear to be tired and falls asleep easily but is also easily aroused.  Patient is not eating and drinking much at home.  Patient did complain of left hip pain and mid lower back pain and left hip pain. Patient was not able to stand from her recliner today and slid down to the ground.  Patient denies loss of consciousness.  Patient denies chest pain shortness of breath.  Patient denies any fever or chills.  She does have peripheral edema and erythema all along the right leg.  Heart is an irregularly irregular rhythm at a rate of 108.  Lungs are clear bilaterally.  Patient does wake up to verbal stimuli.  Temperature is low at 35.9 °C and heart rate is elevated which may be due to to atrial fibrillation or could be due to sepsis.    Past Medical History:  has a past medical history of Arthritis, Atrial fibrillation and flutter (HCC), Hypertension, Infection due to parainfluenza virus 3, Kidney disease, Pneumonia, and Sleep apnea.  Past Surgical History:  has a past surgical history that includes Tubal ligation; Total knee arthroplasty (Bilateral, 2012); fracture surgery; Colonoscopy (N/A, 2018); Upper gastrointestinal endoscopy (N/A, 2018); Cardioversion; Gastric bypass surgery; Larynx surgery; Upper gastrointestinal endoscopy (N/A, 2022); Colonoscopy (N/A, 2022); Upper gastrointestinal endoscopy (N/A, 2022); and Upper gastrointestinal endoscopy (N/A, 2025).  Discharge

## 2025-04-30 NOTE — DISCHARGE INSTR - DIET
Heart Failure Nutrition Therapy  This diet will help you feel better and support your heart by reducing symptoms of fluid retention, shortness of breath and swelling.   You should focus on:  Limiting sodium in your diet by reading labels and limiting foods high in sodium.  Limit your daily sodium intake to 2,000 to 3,000 mg per day.  Select foods with 140 mg of sodium or less per serving.  Foods with more than 300 mg of sodium per serving may not fit into a reduced-sodium meal plan.  Check serving sizes. If you eat more than 1 serving, you will get more sodium than the amount listed.   Limiting fluid in your diet.  Ask your doctor how much fluid you can have per day. In general, a fluid restriction of no more than 64 fluid ounces per day is recommended. This is equivalent to 8 cups of 8 ounces each or about 2 liters daily.   Remember foods that are liquid at room temperature such as popsicles, soup, ice cream and Jell-O are fluids.   Checking your weight to make sure you're not retaining too much fluid.  Weigh yourself every morning. If you gain 3 or more pounds in one day or 5 pounds within 1 week, call your doctor.  Foods to choose and avoid:  Avoid processed foods. Eat more fresh foods.  Fresh and frozen fruits and vegetables are good choices.  Choose fresh meats. Avoid processed meats such as castro, sausage and hot dogs.  Do not add salt to your food while cooking or at the table.  Try dry or fresh herbs, pepper, lemon juice, or a sodium-free seasoning blend such as Mrs. Dash to add flavor to food.   Do not use a salt substitute.  Use caution at restaurants  Restaurant foods are high in sodium. Ask for your food to be cooked without salt and request sauces and dressing to come “on the side.”

## 2025-04-30 NOTE — ACP (ADVANCE CARE PLANNING)
Advanced Care Planning Note.    Purpose of Encounter: Advanced care planning in light of R leg cellulitis  Parties In Attendance: Patient  Decisional Capacity: Yes  Subjective: Patient is weak  Objective: Cr 1.1  Goals of Care Determination: Patient wants full support (CPR, vent, surgery, HD, trach, PEG)  Plan:  IV Abx, PT/OT/SLP, ID/Renal/GI consults  Code Status: Full code   Time spent on Advanced care Plannin minutes  Advanced Care Planning Documents: Completed advanced directives on chart, daughter is the POA.    Benson Hooper MD  2025 3:29 PM

## 2025-05-01 ENCOUNTER — APPOINTMENT (OUTPATIENT)
Dept: MRI IMAGING | Age: 80
End: 2025-05-01
Payer: COMMERCIAL

## 2025-05-01 LAB
ALBUMIN SERPL-MCNC: 2.8 G/DL (ref 3.4–5)
ANION GAP SERPL CALCULATED.3IONS-SCNC: 8 MMOL/L (ref 3–16)
BASOPHILS # BLD: 0.1 K/UL (ref 0–0.2)
BASOPHILS NFR BLD: 0.9 %
BUN SERPL-MCNC: 15 MG/DL (ref 7–20)
CALCIUM SERPL-MCNC: 7.4 MG/DL (ref 8.3–10.6)
CHLORIDE SERPL-SCNC: 107 MMOL/L (ref 99–110)
CO2 SERPL-SCNC: 28 MMOL/L (ref 21–32)
CREAT SERPL-MCNC: 1.1 MG/DL (ref 0.6–1.2)
DEPRECATED RDW RBC AUTO: 17.3 % (ref 12.4–15.4)
EOSINOPHIL # BLD: 0.2 K/UL (ref 0–0.6)
EOSINOPHIL NFR BLD: 1.7 %
GFR SERPLBLD CREATININE-BSD FMLA CKD-EPI: 52 ML/MIN/{1.73_M2}
GLUCOSE BLD-MCNC: 89 MG/DL (ref 70–99)
GLUCOSE SERPL-MCNC: 69 MG/DL (ref 70–99)
HCT VFR BLD AUTO: 29.1 % (ref 36–48)
HGB BLD-MCNC: 9.6 G/DL (ref 12–16)
LYMPHOCYTES # BLD: 1.2 K/UL (ref 1–5.1)
LYMPHOCYTES NFR BLD: 9.1 %
MCH RBC QN AUTO: 29.9 PG (ref 26–34)
MCHC RBC AUTO-ENTMCNC: 33 G/DL (ref 31–36)
MCV RBC AUTO: 90.8 FL (ref 80–100)
MONOCYTES # BLD: 1.2 K/UL (ref 0–1.3)
MONOCYTES NFR BLD: 9.1 %
NEUTROPHILS # BLD: 10 K/UL (ref 1.7–7.7)
NEUTROPHILS NFR BLD: 79.2 %
PERFORMED ON: NORMAL
PHOSPHATE SERPL-MCNC: 2.7 MG/DL (ref 2.5–4.9)
PLATELET # BLD AUTO: 247 K/UL (ref 135–450)
PMV BLD AUTO: 9.2 FL (ref 5–10.5)
POTASSIUM SERPL-SCNC: 3.8 MMOL/L (ref 3.5–5.1)
RBC # BLD AUTO: 3.21 M/UL (ref 4–5.2)
SODIUM SERPL-SCNC: 143 MMOL/L (ref 136–145)
WBC # BLD AUTO: 12.7 K/UL (ref 4–11)

## 2025-05-01 PROCEDURE — 6370000000 HC RX 637 (ALT 250 FOR IP): Performed by: INTERNAL MEDICINE

## 2025-05-01 PROCEDURE — 99233 SBSQ HOSP IP/OBS HIGH 50: CPT | Performed by: INTERNAL MEDICINE

## 2025-05-01 PROCEDURE — 85025 COMPLETE CBC W/AUTO DIFF WBC: CPT

## 2025-05-01 PROCEDURE — 1200000000 HC SEMI PRIVATE

## 2025-05-01 PROCEDURE — 2500000003 HC RX 250 WO HCPCS: Performed by: INTERNAL MEDICINE

## 2025-05-01 PROCEDURE — 6360000004 HC RX CONTRAST MEDICATION: Performed by: INTERNAL MEDICINE

## 2025-05-01 PROCEDURE — 99232 SBSQ HOSP IP/OBS MODERATE 35: CPT | Performed by: INTERNAL MEDICINE

## 2025-05-01 PROCEDURE — 2580000003 HC RX 258: Performed by: INTERNAL MEDICINE

## 2025-05-01 PROCEDURE — 80069 RENAL FUNCTION PANEL: CPT

## 2025-05-01 PROCEDURE — 6360000002 HC RX W HCPCS: Performed by: INTERNAL MEDICINE

## 2025-05-01 PROCEDURE — 73720 MRI LWR EXTREMITY W/O&W/DYE: CPT

## 2025-05-01 PROCEDURE — A9577 INJ MULTIHANCE: HCPCS | Performed by: INTERNAL MEDICINE

## 2025-05-01 RX ADMIN — Medication 10 ML: at 09:14

## 2025-05-01 RX ADMIN — PANTOPRAZOLE SODIUM 40 MG: 40 TABLET, DELAYED RELEASE ORAL at 06:26

## 2025-05-01 RX ADMIN — GADOBENATE DIMEGLUMINE 17 ML: 529 INJECTION, SOLUTION INTRAVENOUS at 17:17

## 2025-05-01 RX ADMIN — Medication 10 ML: at 21:03

## 2025-05-01 RX ADMIN — TORSEMIDE 20 MG: 20 TABLET ORAL at 09:14

## 2025-05-01 RX ADMIN — IRON SUCROSE 200 MG: 20 INJECTION, SOLUTION INTRAVENOUS at 13:24

## 2025-05-01 RX ADMIN — CIPROFLOXACIN 400 MG: 400 INJECTION, SOLUTION INTRAVENOUS at 09:15

## 2025-05-01 RX ADMIN — Medication 10 ML: at 21:10

## 2025-05-01 RX ADMIN — APIXABAN 5 MG: 5 TABLET, FILM COATED ORAL at 21:02

## 2025-05-01 RX ADMIN — SODIUM CHLORIDE 500 MG: 9 INJECTION INTRAMUSCULAR; INTRAVENOUS; SUBCUTANEOUS at 13:24

## 2025-05-01 RX ADMIN — CYANOCOBALAMIN TAB 1000 MCG 1000 MCG: 1000 TAB at 09:13

## 2025-05-01 RX ADMIN — SUCRALFATE 1 G: 1 TABLET ORAL at 21:02

## 2025-05-01 RX ADMIN — MIDODRINE HYDROCHLORIDE 5 MG: 5 TABLET ORAL at 09:14

## 2025-05-01 RX ADMIN — METOPROLOL TARTRATE 25 MG: 25 TABLET, FILM COATED ORAL at 21:02

## 2025-05-01 RX ADMIN — PANTOPRAZOLE SODIUM 40 MG: 40 TABLET, DELAYED RELEASE ORAL at 17:11

## 2025-05-01 RX ADMIN — APIXABAN 5 MG: 5 TABLET, FILM COATED ORAL at 09:14

## 2025-05-01 RX ADMIN — CIPROFLOXACIN 400 MG: 400 INJECTION, SOLUTION INTRAVENOUS at 21:09

## 2025-05-01 RX ADMIN — METOPROLOL TARTRATE 25 MG: 25 TABLET, FILM COATED ORAL at 09:14

## 2025-05-01 RX ADMIN — MIDODRINE HYDROCHLORIDE 5 MG: 5 TABLET ORAL at 17:11

## 2025-05-01 RX ADMIN — SUCRALFATE 1 G: 1 TABLET ORAL at 09:14

## 2025-05-01 RX ADMIN — MIDODRINE HYDROCHLORIDE 5 MG: 5 TABLET ORAL at 12:22

## 2025-05-01 ASSESSMENT — ENCOUNTER SYMPTOMS
DIARRHEA: 0
RHINORRHEA: 0
SINUS PRESSURE: 0
NAUSEA: 0
COUGH: 0
BACK PAIN: 0
WHEEZING: 0
SORE THROAT: 0
SHORTNESS OF BREATH: 0
ABDOMINAL PAIN: 0
EYE DISCHARGE: 0
SINUS PAIN: 0
EYE REDNESS: 0
CONSTIPATION: 0

## 2025-05-01 ASSESSMENT — PAIN SCALES - GENERAL
PAINLEVEL_OUTOF10: 0

## 2025-05-01 NOTE — PROGRESS NOTES
Physical/Occupational Therapy  Stacy Ly    PT/OT treatments attempted but the pt refused therapy at this time.  The pt stated she was too worn out from PT/OT yesterday and does not want to be bothered today.  Different therapeutic interventions offered but pt continued to refuse.  MRI of R ankle pending as well.  PT/OT will follow-up with this pt as the schedule allows.  Thanks.  Gayathri Hernandez, PT DPT [id number]  Toshia Wan, OTR/L

## 2025-05-01 NOTE — PROGRESS NOTES
Pt awake in bed watching tv. VSS and medications given. Denies pain or any other needs. Call light in reach and bed alarm engaged.

## 2025-05-01 NOTE — PROGRESS NOTES
DAPTOmycin (CUBICIN) 500 mg in sodium chloride (PF) 0.9 % 10 mL IV syringe (mg) 500 mg Given 05/01/25 1324    ciprofloxacin (CIPRO) IVPB 400 mg (mg) 400 mg New Bag 05/01/25 0915     400 mg New Bag 04/30/25 2340                      Immunization History: All immunization history was reviewed by me today.    Immunization History   Administered Date(s) Administered    COVID-19, PFIZER Bivalent, DO NOT Dilute, (age 12y+), IM, 30 mcg/0.3 mL 09/09/2022    COVID-19, PFIZER GRAY top, DO NOT Dilute, (age 12 y+), IM, 30 mcg/0.3 mL 05/20/2022    COVID-19, PFIZER PURPLE top, DILUTE for use, (age 12 y+), 30mcg/0.3mL 09/08/2021, 10/15/2021    Influenza Virus Vaccine 12/22/2012    Influenza, FLUAD, (age 65 y+), IM, Quadv, 0.5mL 10/27/2020, 10/15/2021, 09/09/2022    Influenza, FLUAD, (age 65 y+), IM, Trivalent PF, 0.5mL 10/21/2019    Influenza, FLUZONE High Dose, (age 65 y+), IM, Trivalent PF, 0.5mL 09/30/2016, 10/01/2017, 10/08/2018    Pneumococcal, PCV-13, PREVNAR 13, (age 6w+), IM, 0.5mL 09/30/2016, 10/01/2017    Pneumococcal, PPSV23, PNEUMOVAX 23, (age 2y+), SC/IM, 0.5mL 12/22/2012, 10/06/2017    Td vaccine (adult) 12/01/2008    Zoster Live (Zostavax) 09/15/2015       Known drug allergies:     All allergies were reviewed and updated    Allergies   Allergen Reactions    Bee Venom Swelling and Angioedema    Shellfish-Derived Products Other (See Comments)     Syncope/seizures    Shrimp Flavor Agent (Non-Screening)      Passes out      Cephalexin Itching    Codeine Hives and Other (See Comments)     B/P DROPS PASSES OUT  Passed out    Fentanyl And Related Hives     Other reaction(s): Dizziness    Hydromorphone Rash, Hives and Other (See Comments)     Full rash spreading across chest and both arms from IV hydromorphone  Passed out    Flavoring Agent (Non-Screening)      Pass out    Vancomycin Rash       Social history:     Social History:  All social andepidemiologic history was reviewed and updated by me today as  kidney disease) stage 4, GFR 15-29 ml/min (HCC) N18.4    Stage 3b chronic kidney disease (HCC) N18.32    B12 deficiency E53.8    Vitamin D deficiency E55.9    Stage 3 chronic kidney disease (HCC) N18.30    History of ankle surgery Z98.890    Left nephrolithiasis N20.0    Diverticulosis K57.90    History of cholecystectomy Z90.49    History of gastric bypass Z98.84    Hypotension due to hypovolemia E86.1    MRSA nasal colonization Z22.322    Chronic diastolic heart failure (HCC) I50.32    Right arm pain M79.601    Brachial plexopathy G54.0    Influenza A J10.1    Asthmatic bronchitis J45.909    Severe sepsis (HCC) A41.9, R65.20    Chronic atrial fibrillation (HCC) I48.20    Atrial fibrillation with RVR (HCC) I48.91    Difficulty walking R26.2    Fall W19.XXXA    General weakness R53.1    Peripheral edema R60.0    Lactic acidosis E87.20    Septic shock (HCC) A41.9, R65.21    Electrolyte imbalance E87.8       Please note that this chart was generated using Dragon dictation software. Although every effort was made to ensure the accuracy of this automated transcription, some errors in transcription may have occurred inadvertently. If you may need any clarification, please do not hesitate to contact me through EPIC or at the phone number provided below with my electronic signature.  Any pictures or media included in this note were obtained after taking informed verbal consent from the patient and with their approval to include those in the patient's medical record.        Loy Nguyen MD, MPH, FACP, FIDSA  5/1/2025, 8:04 PM  Central Office Phone: [phone]  Central Office Fax: [phone]    [facility]   [address], [address] ([address])  [place] [address]  [facility] days:  Tuesday & Thursday AM    [facility]  [address] , [address] (Medical office Building)  [place], [place], [address]  [facility] days: Wednesday AM

## 2025-05-01 NOTE — PLAN OF CARE
Problem: ABCDS Injury Assessment  Goal: Absence of physical injury  Outcome: Progressing     Problem: Skin/Tissue Integrity  Goal: Skin integrity remains intact  Description: 1.  Monitor for areas of redness and/or skin breakdown2.  Assess vascular access sites hourly3.  Every 4-6 hours minimum:  Change oxygen saturation probe site4.  Every 4-6 hours:  If on nasal continuous positive airway pressure, respiratory therapy assess nares and determine need for appliance change or resting period  Outcome: Progressing     Problem: Chronic Conditions and Co-morbidities  Goal: Patient's chronic conditions and co-morbidity symptoms are monitored and maintained or improved  Outcome: Progressing     Problem: Pain  Goal: Verbalizes/displays adequate comfort level or baseline comfort level  Outcome: Progressing     Problem: Safety - Adult  Goal: Free from fall injury  Outcome: Progressing     Problem: Respiratory - Adult  Goal: Achieves optimal ventilation and oxygenation  Outcome: Progressing     Problem: Cardiovascular - Adult  Goal: Maintains optimal cardiac output and hemodynamic stability  Outcome: Progressing  Goal: Absence of cardiac dysrhythmias or at baseline  Outcome: Progressing     Problem: Metabolic/Fluid and Electrolytes - Adult  Goal: Electrolytes maintained within normal limits  Outcome: Progressing     Problem: Discharge Planning  Goal: Discharge to home or other facility with appropriate resources  Outcome: Progressing     Problem: Skin/Tissue Integrity - Adult  Goal: Skin integrity remains intact  Description: 1.  Monitor for areas of redness and/or skin breakdown2.  Assess vascular access sites hourly3.  Every 4-6 hours minimum:  Change oxygen saturation probe site4.  Every 4-6 hours:  If on nasal continuous positive airway pressure, respiratory therapy assess nares and determine need for appliance change or resting period  Outcome: Progressing  Goal: Incisions, wounds, or drain sites healing without S/S of  infection  Outcome: Progressing     Problem: Infection - Adult  Goal: Absence of infection at discharge  Outcome: Progressing     Problem: Hematologic - Adult  Goal: Maintains hematologic stability  Outcome: Progressing

## 2025-05-01 NOTE — PROGRESS NOTES
Pt awake in bed. VSS, assessment complete and medications given. Pt denies any needs. Call light in reach and bed alarm engaged. Pt wakes to voice and is oriented but falls right back to sleep.

## 2025-05-01 NOTE — PROGRESS NOTES
Nephrology Progress Note                                                                                                                                                                                                                                                                                                                                                               Office : [phone]     Fax :[phone]    Patient's Name: Stacy Ly    Reason for Consult:  JUSTINA on CKD 3  Requesting Physician:  Tarik Hummel, MARILEE - CNP  Chief Complaint:    Chief Complaint   Patient presents with    Leg Swelling     Pt brought in per [facility] from home, pt has multiple complaints.  Pt slid out of her recliner this am trying to stand, pt has been home from rehab x 1 mos, unable to walk, can only stand however she is weak when standing.  Pt reports increased redness to her right lower leg, swelling present, pt states redness is worse, swelling is unchanged.  Pt also reports diarrhea this am.        Assessment/Plan     #Acute Anemia  #Melena   - GI bleed  - PPI  - Carafate BID  - IV iron replacement  - GI on board  - EGD 04/28. 3 non-bleeding ulcers found. Path pending.     # JUSTINA on CKD 3  - Baseline Cr ~ 1.5. Creatinine now at baseline.   - likely 2/2 sepsis, hypotension  - torsemide resumed  - UPCR with minimal proteinuria  - avoid nephrotoxins  - monitor renal labs      #Sepsis  - 2/2 cellulitis of R leg   - s/p IVF  - CT neg for osteomyelitis or abscess  - MRI ordered   - cultures NGTD  - abx per ID    #CHF  - EF 55-60% from 02/14  - BNP ~ 13k--> 24k   - torsemide 20 daily resumed  - low salt diet  - daily weights    #HTN  - Bps in better control   - s/p levophed, albumin + IV fluid bolus   - midodrine  - monitor     # COPD   - Oxygen dependent      # A fib  - eliquis on hold   - EP on board    History of Present Ilness:    Stacy Ly is a 79 y.o. female with pMH of HTN, CHF, COPD, GIANNA, A-fib, and CKD 3

## 2025-05-01 NOTE — PROGRESS NOTES
HOSPITALISTS PROGRESS NOTE    5/1/2025 10:20 AM        Name: Stacy Ly .              Admitted: 4/22/2025  Primary Care Provider: Tarik Hummel APRN - ROBBY (Tel: [phone])      Brief Course: This 79 y.o. female  with PMHx of A-fib, hypertension, and COPD, chronic hypoxic respiratory failure  on 2 L at baseline, CKD stage III and HFpEF admitted for septic shock 2/2 cellulitis.  JUSTINA on CKD.    Interval history:   Patient remains weak and tired.  Still with R leg swelling.  No CP, SOB, HA or fevers.    Assessment & Plan:     RLE cellulitis: Resolving  ID consulted; on IV daptomycin and ciprofloxacin.   CT of the right tibia and fibula negative for osteomyelitis or abscess.  Keep right leg Ace wrapped and elevated.  MRI R tib/fib per ID      Septic shock 2/2 above;  p/w tachycardia, leukocytosis, hypotension, hypothermia lactic acidosis  S/p fluid resuscitation per sepsis protocol.    Continue IV Cipro and Datpo per ID.  BP improved with midodrine and albumin.     Acute on chronic diastolic heart failure  proBNP:> 13k on admission.  Last echo on 2/14/25 showed EF of 55 to 60%  Cardiology consulted; signed off.  Torsemide resumed with resolution of hypotension.    Maintain fluid and salt restriction.  Strict I's and O's.  Daily weights.     Melanotic stools with MIRIAM:  Hemoccult positive.    Iron saturation 6% 2/12/2025.  Complete course of IV iron.  GI consulted; s/p EGD 4/28/2025 with three non-bleeding superficial ulcers (7 mm, 12 mm and 18 mm) with no stigmata of bleeding were found at the GJ anastomosis. Follow up biopsies to r/o HP infection  - Absolutely no NSAIDs  - Oral pantoprazole 40 mg 2x/day for 8 weeks and then daily thereafter.  - Resumed Eliquis on 4/30  - Continue Carafate 1 gram 2x/day  - Repeat EGD in 8 weeks to ensure anastomotic ulcer healing.    JUSTINA on CKD stage III due to sepsis; resolved.  Creatinine 2.5 on

## 2025-05-02 VITALS
WEIGHT: 243.8 LBS | DIASTOLIC BLOOD PRESSURE: 64 MMHG | HEART RATE: 77 BPM | OXYGEN SATURATION: 95 % | SYSTOLIC BLOOD PRESSURE: 120 MMHG | RESPIRATION RATE: 18 BRPM | TEMPERATURE: 98.4 F | BODY MASS INDEX: 38.27 KG/M2 | HEIGHT: 67 IN

## 2025-05-02 LAB
ANION GAP SERPL CALCULATED.3IONS-SCNC: 8 MMOL/L (ref 3–16)
BASOPHILS # BLD: 0 K/UL (ref 0–0.2)
BASOPHILS NFR BLD: 0 %
BUN SERPL-MCNC: 17 MG/DL (ref 7–20)
CALCIUM SERPL-MCNC: 7.5 MG/DL (ref 8.3–10.6)
CHLORIDE SERPL-SCNC: 104 MMOL/L (ref 99–110)
CO2 SERPL-SCNC: 30 MMOL/L (ref 21–32)
CREAT SERPL-MCNC: 1.1 MG/DL (ref 0.6–1.2)
DEPRECATED RDW RBC AUTO: 17.3 % (ref 12.4–15.4)
EOSINOPHIL # BLD: 0.1 K/UL (ref 0–0.6)
EOSINOPHIL NFR BLD: 1 %
GFR SERPLBLD CREATININE-BSD FMLA CKD-EPI: 52 ML/MIN/{1.73_M2}
GLUCOSE SERPL-MCNC: 78 MG/DL (ref 70–99)
HCT VFR BLD AUTO: 28 % (ref 36–48)
HGB BLD-MCNC: 9.2 G/DL (ref 12–16)
LYMPHOCYTES # BLD: 1.5 K/UL (ref 1–5.1)
LYMPHOCYTES NFR BLD: 9 %
MACROCYTES BLD QL SMEAR: ABNORMAL
MCH RBC QN AUTO: 30 PG (ref 26–34)
MCHC RBC AUTO-ENTMCNC: 32.8 G/DL (ref 31–36)
MCV RBC AUTO: 91.6 FL (ref 80–100)
MONOCYTES # BLD: 0.9 K/UL (ref 0–1.3)
MONOCYTES NFR BLD: 9 %
NEUTROPHILS # BLD: 8 K/UL (ref 1.7–7.7)
NEUTROPHILS NFR BLD: 76 %
PLATELET # BLD AUTO: 246 K/UL (ref 135–450)
PLATELET BLD QL SMEAR: ADEQUATE
PMV BLD AUTO: 9.1 FL (ref 5–10.5)
POTASSIUM SERPL-SCNC: 3.9 MMOL/L (ref 3.5–5.1)
RBC # BLD AUTO: 3.06 M/UL (ref 4–5.2)
SLIDE REVIEW: ABNORMAL
SODIUM SERPL-SCNC: 142 MMOL/L (ref 136–145)
VARIANT LYMPHS NFR BLD MANUAL: 5 % (ref 0–6)
WBC # BLD AUTO: 10.5 K/UL (ref 4–11)

## 2025-05-02 PROCEDURE — 99232 SBSQ HOSP IP/OBS MODERATE 35: CPT | Performed by: INTERNAL MEDICINE

## 2025-05-02 PROCEDURE — 6370000000 HC RX 637 (ALT 250 FOR IP): Performed by: INTERNAL MEDICINE

## 2025-05-02 PROCEDURE — 80048 BASIC METABOLIC PNL TOTAL CA: CPT

## 2025-05-02 PROCEDURE — 2500000003 HC RX 250 WO HCPCS: Performed by: INTERNAL MEDICINE

## 2025-05-02 PROCEDURE — 94761 N-INVAS EAR/PLS OXIMETRY MLT: CPT

## 2025-05-02 PROCEDURE — 6360000002 HC RX W HCPCS: Performed by: INTERNAL MEDICINE

## 2025-05-02 PROCEDURE — 99233 SBSQ HOSP IP/OBS HIGH 50: CPT | Performed by: INTERNAL MEDICINE

## 2025-05-02 PROCEDURE — 2700000000 HC OXYGEN THERAPY PER DAY

## 2025-05-02 PROCEDURE — 85025 COMPLETE CBC W/AUTO DIFF WBC: CPT

## 2025-05-02 RX ORDER — TORSEMIDE 20 MG/1
20 TABLET ORAL DAILY
Qty: 30 TABLET | Refills: 0 | Status: SHIPPED | OUTPATIENT
Start: 2025-05-03

## 2025-05-02 RX ORDER — PANTOPRAZOLE SODIUM 40 MG/1
40 TABLET, DELAYED RELEASE ORAL
Qty: 60 TABLET | Refills: 0 | Status: SHIPPED | OUTPATIENT
Start: 2025-05-02

## 2025-05-02 RX ORDER — METOPROLOL TARTRATE 25 MG/1
25 TABLET, FILM COATED ORAL 2 TIMES DAILY
Qty: 60 TABLET | Refills: 0 | Status: SHIPPED | OUTPATIENT
Start: 2025-05-02

## 2025-05-02 RX ORDER — LINEZOLID 600 MG/1
600 TABLET, FILM COATED ORAL EVERY 12 HOURS SCHEDULED
Status: DISCONTINUED | OUTPATIENT
Start: 2025-05-02 | End: 2025-05-02 | Stop reason: HOSPADM

## 2025-05-02 RX ORDER — MIDODRINE HYDROCHLORIDE 5 MG/1
5 TABLET ORAL
Qty: 90 TABLET | Refills: 0 | Status: SHIPPED | OUTPATIENT
Start: 2025-05-02

## 2025-05-02 RX ORDER — LINEZOLID 600 MG/1
600 TABLET, FILM COATED ORAL EVERY 12 HOURS SCHEDULED
Qty: 28 TABLET | Refills: 0 | Status: SHIPPED | OUTPATIENT
Start: 2025-05-02 | End: 2025-05-16

## 2025-05-02 RX ORDER — SUCRALFATE 1 G/1
1 TABLET ORAL EVERY 12 HOURS SCHEDULED
Qty: 14 TABLET | Refills: 0 | Status: SHIPPED | OUTPATIENT
Start: 2025-05-02 | End: 2025-05-09

## 2025-05-02 RX ADMIN — LINEZOLID 600 MG: 600 TABLET, FILM COATED ORAL at 14:36

## 2025-05-02 RX ADMIN — PANTOPRAZOLE SODIUM 40 MG: 40 TABLET, DELAYED RELEASE ORAL at 06:55

## 2025-05-02 RX ADMIN — SUCRALFATE 1 G: 1 TABLET ORAL at 08:30

## 2025-05-02 RX ADMIN — Medication 10 ML: at 08:31

## 2025-05-02 RX ADMIN — Medication 10 ML: at 08:32

## 2025-05-02 RX ADMIN — METOPROLOL TARTRATE 25 MG: 25 TABLET, FILM COATED ORAL at 08:29

## 2025-05-02 RX ADMIN — TORSEMIDE 20 MG: 20 TABLET ORAL at 08:30

## 2025-05-02 RX ADMIN — PANTOPRAZOLE SODIUM 40 MG: 40 TABLET, DELAYED RELEASE ORAL at 14:36

## 2025-05-02 RX ADMIN — CYANOCOBALAMIN TAB 1000 MCG 1000 MCG: 1000 TAB at 08:29

## 2025-05-02 RX ADMIN — CIPROFLOXACIN 400 MG: 400 INJECTION, SOLUTION INTRAVENOUS at 08:42

## 2025-05-02 RX ADMIN — APIXABAN 5 MG: 5 TABLET, FILM COATED ORAL at 08:30

## 2025-05-02 ASSESSMENT — ENCOUNTER SYMPTOMS
CONSTIPATION: 0
SINUS PRESSURE: 0
SHORTNESS OF BREATH: 0
NAUSEA: 0
EYE REDNESS: 0
BACK PAIN: 0
SINUS PAIN: 0
WHEEZING: 0
ABDOMINAL PAIN: 0
EYE DISCHARGE: 0
COUGH: 0
SORE THROAT: 0
DIARRHEA: 0
RHINORRHEA: 0

## 2025-05-02 NOTE — CARE COORDINATION
Case Management -  Discharge Note      Patient Name: Stacy Ly                   YOB: 1945  Room: [address] [place] [address]            Readmission Risk (Low < 19, Mod (19-27), High > 27): Readmission Risk Score: 23.1    Current PCP: Tarik Hummel APRN - CNP      (IMM) Important Message from Medicare:    Has pt received appropriate compliance notices before being discharged if required: yes  Compliance doc:  [x] 2nd IMM; [] Code 44 [] Reed  Date Given: 5/2/25 Given By: Griselda    PT AM-PAC: 11 /24  OT AM-PAC: 11 /24    Patient/patient representative has been educated on the benefits of HH as well as the possible risks of declining recommended services. Patient/patient representative has acknowledged the information provided and decided on the following discharge plan. Patient/ patient representative has been provided freedom of choice regarding service provider, supported by basic dialogue that supports the patient's individualized plan of care/goals.    [facility])  [address] [facility] [address]  Phone: [phone]  Fax: [phone]              [facility] agency notified of discharge:  [x] Yes [] No  [] NA    Family notified of discharge:  [] Yes  [x] No  [] NA    Facility notified of discharge:  [] Yes  [] No  [x] NA    Pt is being discharged with Outpt IV Antibiotics  [] Yes [x] No  [] NA  If yes, make sure CHOLO is faxed to [facility] agency, and meds are called in to pharmacy by RN from CHOLO orders only.      Financial    Payor: MEDIGOLD / Plan: MEDIGOLD / Product Type: *No Product type* /     Pharmacy:  Potential assistance Purchasing Medications: No  Meds-to-Beds request: Yes      [facility] [place] - [address] - P [phone] - F [phone]  [facility]  [place] [address]  Phone: [phone] Fax: [phone]    [facility]/pharmacy #[id number] - [place], [place] - [address] - P [phone] - F [phone]  [phone]

## 2025-05-02 NOTE — DISCHARGE SUMMARY
Hospital Medicine Discharge Summary    Patient: Stacy Ly     Gender: female  : 1945   Age: 79 y.o.  MRN: [id number]    Admitting Physician: Myesha Larose MD  Discharge Physician: Benson Hooper MD     Code Status: Full Code ***    Admit Date: 2025   Discharge Date:   ***    Disposition:  {DISPOSITIONS:865783948}    Discharge Diagnoses:    Active Hospital Problems    Diagnosis Date Noted    Electrolyte imbalance [E87.8] 2025    Severe sepsis (HCC) [A41.9, R65.20] 2025    Chronic atrial fibrillation (HCC) [I48.20] 2025    Atrial fibrillation with RVR (HCC) [I48.91] 2025    Difficulty walking [R26.2] 2025    Fall [W19.XXXA] 2025    General weakness [R53.1] 2025    Peripheral edema [R60.0] 2025    Lactic acidosis [E87.20] 2025    Septic shock (HCC) [A41.9, R65.21] 2025    Influenza A [J10.1] 2025    Hypotension due to hypovolemia [E86.1] 05/10/2023    JUSTINA (acute kidney injury) [N17.9]     Cellulitis of skin with lymphangitis [L03.90] 2019    Class 2 obesity due to excess calories with body mass index (BMI) of 37.0 to 37.9 in adult [E66.812, E66.09, Z68.37] 2018       Follow-up appointments:  {Time; 1 week to 1 month:93442}    Outpatient to do list: ***    Condition at Discharge:  { Patient Condition:[id number]}    Hospital Course:   ***    Discharge Medications:   Current Discharge Medication List        START taking these medications    Details   midodrine (PROAMATINE) 5 MG tablet Take 1 tablet by mouth 3 times daily (with meals)  Qty: 90 tablet, Refills: 0      pantoprazole (PROTONIX) 40 MG tablet Take 1 tablet by mouth 2 times daily (before meals)  Qty: 60 tablet, Refills: 0      sucralfate (CARAFATE) 1 GM tablet Take 1 tablet by mouth every 12 hours for 7 days  Qty: 14 tablet, Refills: 0      torsemide (DEMADEX) 20 MG tablet Take 1 tablet by mouth daily  Qty: 30 tablet, Refills: 0      vitamin B-12 1000 MCG  COMPARISON: None. HISTORY ORDERING SYSTEM PROVIDED HISTORY: Rule out abscess/osteomyelitis TECHNOLOGIST PROVIDED HISTORY: Reason for exam:->Rule out abscess/osteomyelitis Reason for Exam: Rule out abscess/osteomyelitis FINDINGS: Bones/joints: Right knee prosthesis, normal alignment and positioning. Postoperative changes of the ankle without obvious complication.  There is ossification across the intraosseous ligament. Soft Tissue: Diffuse anasarca.  Dystrophic calcifications in the subcutaneous fat distally.  Mild diffuse fatty infiltration throughout the calf muscles. No focal abscess identified.  Atherosclerotic changes of the aorta.     1. Diffuse anasarca. 2. No focal abscess identified. 3. No obvious osteomyelitis. Consider MRI with and without contrast, or three-phase bone scan and tagged white blood cell study to further evaluate as appropriate.     XR FOOT RIGHT (2 VIEWS)  Result Date: 4/24/2025  EXAM: 2 VIEW(S) XRAY OF THE RIGHT FOOT 04/24/2025 01:25:22 PM COMPARISON: None available. CLINICAL HISTORY: Cellulitis, swelling, and pain in the right foot. FINDINGS: BONES AND JOINTS: No acute fracture or focal osseous lesion. No joint dislocation. Please see separately dictated ankle radiographs for discussion of those findings. SOFT TISSUES: There is nonspecific soft tissue swelling of the foot.     1. Nonspecific soft tissue swelling of the right foot. 2. No acute fracture or osseous erosion.     XR ANKLE RIGHT (2 VIEWS)  Result Date: 4/24/2025  EXAM: 2 VIEW(S) XRAY OF THE RIGHT ANKLE 04/24/2025 01:24:59 PM CLINICAL HISTORY: Right ankle pain. COMPARISON: 12/20/2022 FINDINGS: BONES: Unchanged pin in the distal fibula extending into the distal tibfib and syndesmosis. Remote fixation of the medial malleolus. Ossific dictation of the distal tib-fib articulation as well as healing of prior distal tibial and fibular fractures. No acute fracture. JOINTS: There is severe posttraumatic tibiotalar joint osteoarthritis.

## 2025-05-02 NOTE — PROGRESS NOTES
Pt and family updated on ETA from transport service. Verbalizes understanding.    Seb Harman RN, BSN

## 2025-05-02 NOTE — PROGRESS NOTES
HOSPITALISTS PROGRESS NOTE    5/2/2025 11:50 AM        Name: Stacy Ly .              Admitted: 4/22/2025  Primary Care Provider: Tarik Hummel APRN - ROBBY (Tel: [phone])      Brief Course: This 79 y.o. female  with PMHx of A-fib, hypertension, and COPD, chronic hypoxic respiratory failure  on 2 L at baseline, CKD stage III and HFpEF admitted for septic shock 2/2 cellulitis.  JUSTINA on CKD.    Interval history:   Patient wants to go home.  Complaining of R leg discomfort.  No CP, SOB, HA or fevers.    Assessment & Plan:     RLE cellulitis: Resolving  ID consulted; on IV daptomycin and ciprofloxacin.   CT of the right tibia and fibula negative for osteomyelitis or abscess.  Keep right leg Ace wrapped and elevated.  MRI R tib/fib noted  Home Abx per ID     Septic shock 2/2 above;  p/w tachycardia, leukocytosis, hypotension, hypothermia lactic acidosis  S/p fluid resuscitation per sepsis protocol.    Continue IV Cipro and Datpo per ID.  BP improved with midodrine and albumin.     Acute on chronic diastolic heart failure  proBNP:> 13k on admission.  Last echo on 2/14/25 showed EF of 55 to 60%  Cardiology consulted; signed off.  Torsemide resumed with resolution of hypotension.    Maintain fluid and salt restriction.  Strict I's and O's.  Daily weights.     Melanotic stools with MIRIAM:  Hemoccult positive.    Iron saturation 6% 2/12/2025.  Complete course of IV iron.  GI consulted; s/p EGD 4/28/2025 with three non-bleeding superficial ulcers (7 mm, 12 mm and 18 mm) with no stigmata of bleeding were found at the GJ anastomosis. Follow up biopsies to r/o HP infection  - Absolutely no NSAIDs  - Oral pantoprazole 40 mg 2x/day for 8 weeks and then daily thereafter.  - Resumed Eliquis on 4/30  - Continue Carafate 1 gram 2x/day  - Repeat EGD in 8 weeks to ensure anastomotic ulcer healing.    JUSTINA on CKD stage III due to sepsis; resolved.  Creatinine  involving the imaged left leg.         XR CHEST PORTABLE   Final Result   1. Right PICC terminating in the distal SVC.         XR CHEST PORTABLE   Final Result   Right-sided PICC terminates in right atrium      Bilateral linear pulmonary opacities could represent edema or infection         CT TIBIA FIBULA RIGHT WO CONTRAST   Final Result   1. Diffuse anasarca.   2. No focal abscess identified.   3. No obvious osteomyelitis. Consider MRI with and without contrast, or   three-phase bone scan and tagged white blood cell study to further evaluate   as appropriate.         XR FOOT RIGHT (2 VIEWS)   Final Result   1. Nonspecific soft tissue swelling of the right foot.   2. No acute fracture or osseous erosion.            XR ANKLE RIGHT (2 VIEWS)   Final Result   1. Severe posttraumatic tibiotalar joint osteoarthritis.   2. Unchanged pin in the distal fibula extending into the distal tibfib and syndesmosis, and remote fixation of the medial malleolus.   3. Ossific dictation of the distal tib-fib articulation and healing of prior distal tibial and fibular fractures.   4. Soft tissue calcifications, likely from prior trauma.            XR HIP 2-3 VW W PELVIS LEFT   Final Result   No acute fracture dislocation.         CT LUMBAR SPINE WO CONTRAST   Final Result   1. No acute fracture or traumatic malalignment.   2. S shaped scoliosis with multilevel degenerative change of the thoracic and   lumbar spine.   3. Moderate hiatal hernia.         CT THORACIC SPINE WO CONTRAST   Final Result   1. No acute fracture or traumatic malalignment.   2. S shaped scoliosis with multilevel degenerative change of the thoracic and   lumbar spine.   3. Moderate hiatal hernia.         XR CHEST PORTABLE   Final Result   No acute process.             Problem List  Principal Problem:    Severe sepsis (HCC)  Active Problems:    Class 2 obesity due to excess calories with body mass index (BMI) of 37.0 to 37.9 in adult    Cellulitis of skin with

## 2025-05-02 NOTE — PROGRESS NOTES
Shift assessment completed. Routine vitals stable. Scheduled medications given. Patient is awake, alert and oriented x4. Respirations are easy and unlabored. Patient does not appear to be in distress, resting comfortably at this time. Call light within reach. Denies needs at this time. Pain 0/10 per patient. RLE remains red and swollen, elevated per orders. PICC remains in place, flushes easily, dressing CDI. Dressing to PICC changed 5/2/25.     Seb Harman RN, BSN

## 2025-05-02 NOTE — PROGRESS NOTES
Daughter notified of patient pickup time 4:30pm via [employer] [facility] [employer]. Daughter states she will be home to help patient into home. No questions at this time. Verbalizes understanding.    Seb Harman RN, BSN

## 2025-05-02 NOTE — PROGRESS NOTES
I certify this patient under my care for Home Health with a face-to-face encounter on 5/2/2025.     I further certify that my clinical findings support that this patient is confined to home due to:  [] Fall Risk   [x] Dyspnea with minimal exertion   [] Confined to wheelchair   [] Angina with minimal exertion  [] Angina at rest   [] Amputation   [] Bowel/Bladder incontinence   [] Contractures  [] CVA/Hemiparalysis/Dysphonia   [] Hearing impairment   [] Legally Blind  [] Mental status impairment   [] Paralysis   [] Speech impairment   [] Other:    Initial Plan of Care: The patient’s Home Care needs include:  [] Administration of Medications   [x] Complex care plan management  [x] PT  [x] OT  [] SLP   [] Teaching/Training  [] Wound Care   [] Observe/Assess   [] NG and Trach Aspiration/Care  [] Catheter Placement/Care   [] Ostomy Care   [] Psychiatric Eval/Therapy  [] Rehabilitation Nursing  [] Tube Feeding  [] Other: _    Due to R leg cellulitis.     Ongoing plan development and review by PCP - Tarik Hummel, APRN - CNP

## 2025-05-02 NOTE — PROGRESS NOTES
Patient alert and oriented, VSS, pt denies pain at the moment, medications given per MAR, head to toe assessment done, pt awake, in bed, call light within reach, safety precautions in place.

## 2025-05-02 NOTE — PROGRESS NOTES
Stacy Ly was evaluated today and a DME order was entered for variable height hospital bed because she requires assistance for positioning needs not possible in an ordinary bed.  Patient needs variability of bed height to perform patient transfers and for eating, bathing, toileting, personal cares, ambulating, and grooming.  Current body Weight - Scale: 110.6 kg (243 lb 12.8 oz).  The need for this equipment was discussed with the patient and she understands and is in agreement.

## 2025-05-02 NOTE — PROGRESS NOTES
Physical/Occupational Therapy            Stacy Ly  PT/OT treatments attempted. The pt refused to participate and stated she does not want to work with PT/OT during her hospital admission.  DC pt from PT/OT caseloads.      Gayathri Hernandez, PT DPT [id number]  Toshia Wan, OTR/L

## 2025-05-02 NOTE — PROGRESS NOTES
Nephrology Progress Note                                                                                                                                                                                                                                                                                                                                                               Office : [phone]     Fax :[phone]    Patient's Name: Stacy Ly    Reason for Consult:  JUSTINA on CKD 3  Requesting Physician:  Tarik Hummel, MARILEE - CNP  Chief Complaint:    Chief Complaint   Patient presents with    Leg Swelling     Pt brought in per [facility] from home, pt has multiple complaints.  Pt slid out of her recliner this am trying to stand, pt has been home from rehab x 1 mos, unable to walk, can only stand however she is weak when standing.  Pt reports increased redness to her right lower leg, swelling present, pt states redness is worse, swelling is unchanged.  Pt also reports diarrhea this am.        Assessment/Plan     #Acute Anemia  #Melena   - GI bleed  - PPI  - Carafate BID  - IV iron replacement  - GI on board  - EGD 04/28. 3 non-bleeding ulcers found. Path pending.     # JUSTINA on CKD 3  - Baseline Cr ~ 1.5. Creatinine now at baseline.   - likely 2/2 sepsis, hypotension  - torsemide resumed  - UPCR with minimal proteinuria  - avoid nephrotoxins  - monitor renal labs      #Sepsis  - 2/2 cellulitis of R leg   - s/p IVF  - CT neg for osteomyelitis or abscess  - MRI obtained- diffuse cellulitis changes  - cultures NGTD  - abx per ID    #CHF  - EF 55-60% from 02/14  - BNP ~ 13k--> 24k   - torsemide 20 daily resumed  - low salt diet  - daily weights    #HTN  - Bps in better control   - s/p levophed, albumin + IV fluid bolus   - midodrine TID  - monitor     # COPD   - Oxygen dependent      # A fib  - eliquis on hold   - EP on board    History of Present Ilness:    Stacy Ly is a 79 y.o. female with pMH of HTN,

## 2025-05-02 NOTE — PROGRESS NOTES
17  18 18   Temp: 98.4 °F (36.9 °C)  98.4 °F (36.9 °C)    TempSrc: Oral  Oral    SpO2: 95%  95%    Weight:  110.6 kg (243 lb 12.8 oz)     Height:           Physical Exam  Vitals and nursing note reviewed.   Constitutional:       Appearance: She is well-developed. She is not diaphoretic.      Comments: The patient was seen earlier today.   HENT:      Head: Normocephalic and atraumatic.      Right Ear: External ear normal. There is no impacted cerumen.      Left Ear: External ear normal. There is no impacted cerumen.      Nose: Nose normal.      Mouth/Throat:      Mouth: Mucous membranes are moist.      Pharynx: Oropharynx is clear. No oropharyngeal exudate.   Eyes:      General: No scleral icterus.        Right eye: No discharge.         Left eye: No discharge.      Conjunctiva/sclera: Conjunctivae normal.      Pupils: Pupils are equal, round, and reactive to light.   Neck:      Thyroid: No thyromegaly.   Cardiovascular:      Rate and Rhythm: Normal rate and regular rhythm.      Heart sounds: Normal heart sounds. No murmur heard.     No friction rub.   Pulmonary:      Effort: No respiratory distress.      Breath sounds: No stridor. No wheezing or rales.   Abdominal:      General: Bowel sounds are normal.      Palpations: Abdomen is soft.      Tenderness: There is no abdominal tenderness. There is no guarding or rebound.   Musculoskeletal:         General: No swelling, tenderness or deformity. Normal range of motion.      Cervical back: Normal range of motion and neck supple.      Right lower leg: Edema present.      Left lower leg: No edema.   Lymphadenopathy:      Cervical: No cervical adenopathy.   Skin:     General: Skin is warm and dry.      Coloration: Skin is not jaundiced.      Findings: Erythema present. No bruising or rash.      Comments: Improving right leg cellulitis   Neurological:      General: No focal deficit present.      Mental Status: She is alert and oriented to person, place, and time. Mental  TID WC    sodium chloride flush  5-40 mL IntraVENous 2 times per day    lidocaine 1 % injection  50 mg IntraDERmal Once    vitamin B-12  1,000 mcg Oral Daily    [Held by provider] gabapentin  300 mg Oral BID    metoprolol tartrate  25 mg Oral BID    sodium chloride flush  5-40 mL IntraVENous 2 times per day    apixaban  5 mg Oral BID        sodium chloride Stopped (04/30/25 1923)    sodium chloride         ipratropium 0.5 mg-albuterol 2.5 mg, sodium chloride flush, sodium chloride, sodium chloride flush, sodium chloride, ondansetron **OR** ondansetron, polyethylene glycol, acetaminophen **OR** acetaminophen, albuterol, diphenhydrAMINE      Problem list:       Patient Active Problem List   Diagnosis Code    Atrial fibrillation, persistent ([facility]) I48.19    Primary hypertension I10    Severe episode of recurrent major depressive disorder, without psychotic features ([facility]) F33.2    Seasonal affective disorder F33.8    Class 2 obesity due to excess calories with body mass index (BMI) of 37.0 to 37.9 in adult E66.812, E66.09, Z68.37    Cellulitis of skin with lymphangitis L03.90    JUSTINA (acute kidney injury) N17.9    GIANNA (obstructive sleep apnea) G47.33    Weight loss counseling, encounter for Z71.3    Atrial flutter ([facility]) I48.92    Paroxysmal atrial fibrillation ([facility]) I48.0    ILD (interstitial lung disease) ([facility]) J84.9    Iron deficiency anemia due to chronic blood loss D50.0    Iron deficiency anemia, unspecified D50.9    Age-related osteoporosis without current pathological fracture M81.0    Vasovagal syncope R55    CKD (chronic kidney disease) stage 4, GFR 15-29 ml/min ([facility]) N18.4    Stage 3b chronic kidney disease (HCC) N18.32    B12 deficiency E53.8    Vitamin D deficiency E55.9    Stage 3 chronic kidney disease (HCC) N18.30    History of ankle surgery Z98.890    Left nephrolithiasis N20.0    Diverticulosis K57.90    History of cholecystectomy Z90.49    History of gastric bypass Z98.84    Hypotension due to

## 2025-05-02 NOTE — PLAN OF CARE
Problem: ABCDS Injury Assessment  Goal: Absence of physical injury  Outcome: Progressing     Problem: Skin/Tissue Integrity  Goal: Skin integrity remains intact  Description: 1.  Monitor for areas of redness and/or skin breakdown2.  Assess vascular access sites hourly3.  Every 4-6 hours minimum:  Change oxygen saturation probe site4.  Every 4-6 hours:  If on nasal continuous positive airway pressure, respiratory therapy assess nares and determine need for appliance change or resting period  Outcome: Progressing     Problem: Chronic Conditions and Co-morbidities  Goal: Patient's chronic conditions and co-morbidity symptoms are monitored and maintained or improved  Outcome: Progressing     Problem: Pain  Goal: Verbalizes/displays adequate comfort level or baseline comfort level  Outcome: Progressing     Problem: Safety - Adult  Goal: Free from fall injury  Outcome: Progressing     Problem: Respiratory - Adult  Goal: Achieves optimal ventilation and oxygenation  Outcome: Progressing     Problem: Cardiovascular - Adult  Goal: Maintains optimal cardiac output and hemodynamic stability  Outcome: Progressing  Goal: Absence of cardiac dysrhythmias or at baseline  Outcome: Progressing     Problem: Metabolic/Fluid and Electrolytes - Adult  Goal: Electrolytes maintained within normal limits  Outcome: Progressing     Problem: Discharge Planning  Goal: Discharge to home or other facility with appropriate resources  Outcome: Progressing     Problem: Skin/Tissue Integrity - Adult  Goal: Skin integrity remains intact  Description: 1.  Monitor for areas of redness and/or skin breakdown2.  Assess vascular access sites hourly3.  Every 4-6 hours minimum:  Change oxygen saturation probe site4.  Every 4-6 hours:  If on nasal continuous positive airway pressure, respiratory therapy assess nares and determine need for appliance change or resting period  Outcome: Progressing  Goal: Incisions, wounds, or drain sites healing without S/S of

## 2025-05-02 NOTE — PROGRESS NOTES
This patient has had a discharge order placed. They are returning home and being picked up by medical transport. Discharge paperwork has been printed, highlighted, and gone over with the patient by this RN. Patient understands teaching and has no further questions at this time. PICC has been removed from [facility] with no complications, tip intact. Telemetry has been removed. Pt has all belongings present.      Seb Harman RN, BSN

## 2025-05-02 NOTE — CARE COORDINATION
05/02/25 1316   IMM Letter   IMM Letter given to Patient/Family/Significant other/Guardian/POA/by: IMM Given   IMM Letter date given: 05/02/25   IMM Letter time given: 1316

## 2025-05-02 NOTE — PROGRESS NOTES
CLINICAL PHARMACY NOTE: MEDS TO BEDS    Total # of Prescriptions Filled: 7   The following medications were delivered to the patient:  Vitamin b - 12 1000mcg tabs  Torsemide 20mg tabs  Midodrine hcl 5mg tabs  Metoprolol tartrate 25mg tabs  Linezolid 600mg tabs  Pantoprazole sodium 40mg tbec  Sucralfate 1gm tabs    Additional Documentation: Seb BAUTISTA approved to deliver medications to patient room=signed  [facility]

## 2025-05-05 ENCOUNTER — TELEPHONE (OUTPATIENT)
Dept: INTERNAL MEDICINE CLINIC | Age: 80
End: 2025-05-05

## 2025-05-05 NOTE — TELEPHONE ENCOUNTER
Care Transitions Initial Follow Up Call    Outreach made within 2 business days of discharge: Yes    Patient: Stacy Ly Patient : 1945   MRN: [id number]  Reason for Admission: Severe Sepsis  Discharge Date: 25       Spoke with: Patient    Discharge department/facility: [facility] Patient Contact:  Was patient able to fill all prescriptions: Yes  Was patient instructed to bring all medications to the follow-up visit: Yes  Is patient taking all medications as directed in the discharge summary? Yes  Does patient understand their discharge instructions: Yes  Does patient have questions or concerns that need addressed prior to 7-14 day follow up office visit: no    Additional needs identified to be addressed with provider  No needs identified             Patient states that she has homecare coming tomorrow. She does not want to schedule at this time.     Follow Up  Future Appointments   Date Time Provider Department Center   2025  2:40 PM Tarik Hummel, APRN - CNP [facility]       Macey Nice MA

## 2025-05-06 ENCOUNTER — TELEPHONE (OUTPATIENT)
Dept: INTERNAL MEDICINE CLINIC | Age: 80
End: 2025-05-06

## 2025-05-06 NOTE — TELEPHONE ENCOUNTER
Meera with [employer] is out with the patient today resuming the patients home care.  Patient is refusing to have OT/PT at this time.  [facility] will be faxing over the plan of care for the patient.

## 2025-05-09 ENCOUNTER — TELEPHONE (OUTPATIENT)
Dept: INTERNAL MEDICINE CLINIC | Age: 80
End: 2025-05-09

## 2025-05-09 DIAGNOSIS — I48.19 ATRIAL FIBRILLATION, PERSISTENT (HCC): ICD-10-CM

## 2025-05-09 DIAGNOSIS — M81.0 AGE-RELATED OSTEOPOROSIS WITHOUT CURRENT PATHOLOGICAL FRACTURE: ICD-10-CM

## 2025-05-09 DIAGNOSIS — N18.4 CKD (CHRONIC KIDNEY DISEASE) STAGE 4, GFR 15-29 ML/MIN (HCC): ICD-10-CM

## 2025-05-09 DIAGNOSIS — I50.32 CHRONIC DIASTOLIC CONGESTIVE HEART FAILURE (HCC): Primary | ICD-10-CM

## 2025-05-09 DIAGNOSIS — J44.9 CHRONIC OBSTRUCTIVE PULMONARY DISEASE, UNSPECIFIED COPD TYPE (HCC): ICD-10-CM

## 2025-05-09 NOTE — TELEPHONE ENCOUNTER
Pt is asking for a prescription for a new wc. She has one now but would like a wide WC. She says she got her current one via [employer] and they said if we fax over a new order for a wide WC, she can exchange her current one for a wide one. The rehab facility she was recently in arranged for her to get the current WC.    She did not have the phone or fax number for the company - see below for info that company via internet search.

## 2025-05-15 RX ORDER — TORSEMIDE 20 MG/1
20 TABLET ORAL DAILY
Qty: 90 TABLET | Refills: 0 | Status: SHIPPED | OUTPATIENT
Start: 2025-05-15

## 2025-05-15 NOTE — TELEPHONE ENCOUNTER
Pt asking for refill of Torsemide which was sent to [facility] pharmacy when she was in the hospital but pt prefers [facility]. Ok for this refill? She declined to come in for a HFU visit per TCM call made on 5/5.
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
## 2025-05-22 PROBLEM — J10.1 INFLUENZA A: Status: RESOLVED | Noted: 2025-02-12 | Resolved: 2025-05-22

## 2025-07-28 ENCOUNTER — TELEPHONE (OUTPATIENT)
Dept: INTERNAL MEDICINE CLINIC | Age: 80
End: 2025-07-28

## 2025-07-28 DIAGNOSIS — I48.92 ATRIAL FIBRILLATION AND FLUTTER (HCC): ICD-10-CM

## 2025-07-28 DIAGNOSIS — I10 ESSENTIAL HYPERTENSION: Primary | ICD-10-CM

## 2025-07-28 DIAGNOSIS — I48.91 ATRIAL FIBRILLATION AND FLUTTER (HCC): ICD-10-CM

## 2025-07-28 RX ORDER — CLONIDINE HYDROCHLORIDE 0.2 MG/1
0.2 TABLET ORAL 2 TIMES DAILY
Qty: 180 TABLET | Refills: 3 | Status: SHIPPED | OUTPATIENT
Start: 2025-07-28

## 2025-07-28 NOTE — TELEPHONE ENCOUNTER
She was taking 0.2 twice a day  This was changed to 0.1 twice a day  This was changed to 0.1 daily  This was then stopped by hospital.      Please confirm exactly what she is taking and what her BP is running.

## 2025-07-28 NOTE — TELEPHONE ENCOUNTER
Patient states that Eliquis is over $400. She has an appointment scheduled with [employer]. She states that they have a program to help seniors with their medications. In the mean time, I told her about the 3 free month supply of Eliquis through [employer]. Medication pended.

## 2025-07-28 NOTE — TELEPHONE ENCOUNTER
0.2mg 2x/day is the dose. She took her BP while she was on the phone, it was 167/100. She states that that's high for her, she hasn't taken her medication today. On average, it runs about 120-130/80.

## 2025-07-28 NOTE — TELEPHONE ENCOUNTER
Received a prescription request from [facility] for Clonidine Hydrochloride 0.2mg tab for 90 day supply. This medication was discontinued in her chart at discharge on 05/02/25. I called patient, she states that she has been taking this medication since she ran out 3-4 days ago. She would like to know if she should continue this medication or not. If she should continue, she will need this filled.

## 2025-08-08 RX ORDER — PANTOPRAZOLE SODIUM 40 MG/1
40 TABLET, DELAYED RELEASE ORAL
Qty: 180 TABLET | Refills: 1 | Status: SHIPPED | OUTPATIENT
Start: 2025-08-08

## 2025-08-08 RX ORDER — MIDODRINE HYDROCHLORIDE 5 MG/1
5 TABLET ORAL
Qty: 270 TABLET | Refills: 1 | Status: SHIPPED | OUTPATIENT
Start: 2025-08-08

## 2025-08-14 ENCOUNTER — APPOINTMENT (OUTPATIENT)
Dept: GENERAL RADIOLOGY | Age: 80
DRG: 291 | End: 2025-08-14
Payer: COMMERCIAL

## 2025-08-14 ENCOUNTER — HOSPITAL ENCOUNTER (INPATIENT)
Age: 80
LOS: 4 days | Discharge: NURSING, REHAB OR LONG-TERM CARE | DRG: 291 | End: 2025-08-19
Attending: EMERGENCY MEDICINE | Admitting: INTERNAL MEDICINE
Payer: COMMERCIAL

## 2025-08-14 DIAGNOSIS — I50.9 ACUTE CONGESTIVE HEART FAILURE, UNSPECIFIED HEART FAILURE TYPE (HCC): Primary | ICD-10-CM

## 2025-08-14 DIAGNOSIS — R06.02 SHORTNESS OF BREATH: ICD-10-CM

## 2025-08-14 LAB
ALBUMIN SERPL-MCNC: 3.5 G/DL (ref 3.4–5)
ALBUMIN/GLOB SERPL: 0.9 {RATIO} (ref 1.1–2.2)
ALP SERPL-CCNC: 105 U/L (ref 40–129)
ALT SERPL-CCNC: 8 U/L (ref 10–40)
ANION GAP SERPL CALCULATED.3IONS-SCNC: 11 MMOL/L (ref 3–16)
AST SERPL-CCNC: 17 U/L (ref 15–37)
BACTERIA URNS QL MICRO: ABNORMAL /HPF
BASE EXCESS BLDV CALC-SCNC: 3.3 MMOL/L (ref -3–3)
BASOPHILS # BLD: 0.1 K/UL (ref 0–0.2)
BASOPHILS NFR BLD: 0.8 %
BILIRUB SERPL-MCNC: 1 MG/DL (ref 0–1)
BILIRUB UR QL STRIP.AUTO: NEGATIVE
BUN SERPL-MCNC: 18 MG/DL (ref 7–20)
CALCIUM SERPL-MCNC: 9.3 MG/DL (ref 8.3–10.6)
CHLORIDE SERPL-SCNC: 104 MMOL/L (ref 99–110)
CLARITY UR: CLEAR
CO2 BLDV-SCNC: 72 MMOL/L
CO2 SERPL-SCNC: 27 MMOL/L (ref 21–32)
COHGB MFR BLDV: 1.3 % (ref 0–1.5)
COLOR UR: YELLOW
CREAT SERPL-MCNC: 1.3 MG/DL (ref 0.6–1.2)
DEPRECATED RDW RBC AUTO: 15.6 % (ref 12.4–15.4)
DO-HGB MFR BLDV: 15 %
EOSINOPHIL # BLD: 0.2 K/UL (ref 0–0.6)
EOSINOPHIL NFR BLD: 3.2 %
EPI CELLS #/AREA URNS AUTO: 1 /HPF (ref 0–5)
GFR SERPLBLD CREATININE-BSD FMLA CKD-EPI: 42 ML/MIN/{1.73_M2}
GLUCOSE SERPL-MCNC: 88 MG/DL (ref 70–99)
GLUCOSE UR STRIP.AUTO-MCNC: NEGATIVE MG/DL
HCO3 BLDV-SCNC: 30.2 MMOL/L (ref 23–29)
HCT VFR BLD AUTO: 39 % (ref 36–48)
HGB BLD-MCNC: 12.8 G/DL (ref 12–16)
HGB UR QL STRIP.AUTO: NEGATIVE
HYALINE CASTS #/AREA URNS AUTO: 2 /LPF (ref 0–8)
HYALINE CASTS: PRESENT
KETONES UR STRIP.AUTO-MCNC: NEGATIVE MG/DL
LEUKOCYTE ESTERASE UR QL STRIP.AUTO: ABNORMAL
LYMPHOCYTES # BLD: 1.6 K/UL (ref 1–5.1)
LYMPHOCYTES NFR BLD: 20.9 %
MCH RBC QN AUTO: 30.2 PG (ref 26–34)
MCHC RBC AUTO-ENTMCNC: 32.9 G/DL (ref 31–36)
MCV RBC AUTO: 91.9 FL (ref 80–100)
METHGB MFR BLDV: 0.5 %
MONOCYTES # BLD: 1 K/UL (ref 0–1.3)
MONOCYTES NFR BLD: 12.6 %
NEUTROPHILS # BLD: 4.9 K/UL (ref 1.7–7.7)
NEUTROPHILS NFR BLD: 62.5 %
NITRITE UR QL STRIP.AUTO: NEGATIVE
NT-PROBNP SERPL-MCNC: 6074 PG/ML (ref 0–449)
O2 CT VFR BLDV CALC: 14 VOL %
O2 THERAPY: ABNORMAL
PCO2 BLDV: 56.2 MMHG (ref 40–50)
PH BLDV: 7.34 [PH] (ref 7.35–7.45)
PH UR STRIP.AUTO: 5 [PH] (ref 5–8)
PLATELET # BLD AUTO: 189 K/UL (ref 135–450)
PMV BLD AUTO: 10.7 FL (ref 5–10.5)
PO2 BLDV: 54.7 MMHG (ref 25–40)
POTASSIUM SERPL-SCNC: 4.4 MMOL/L (ref 3.5–5.1)
PROT SERPL-MCNC: 7.3 G/DL (ref 6.4–8.2)
PROT UR STRIP.AUTO-MCNC: NEGATIVE MG/DL
RBC # BLD AUTO: 4.25 M/UL (ref 4–5.2)
RBC CLUMPS #/AREA URNS AUTO: 0 /HPF (ref 0–4)
SAO2 % BLDV: 85 %
SODIUM SERPL-SCNC: 142 MMOL/L (ref 136–145)
SP GR UR STRIP.AUTO: 1.01 (ref 1–1.03)
TROPONIN, HIGH SENSITIVITY: 25 NG/L (ref 0–14)
TROPONIN, HIGH SENSITIVITY: 27 NG/L (ref 0–14)
UA COMPLETE W REFLEX CULTURE PNL UR: ABNORMAL
UA DIPSTICK W REFLEX MICRO PNL UR: YES
URN SPEC COLLECT METH UR: ABNORMAL
UROBILINOGEN UR STRIP-ACNC: 0.2 E.U./DL
WBC # BLD AUTO: 7.8 K/UL (ref 4–11)
WBC #/AREA URNS AUTO: 1 /HPF (ref 0–5)

## 2025-08-14 PROCEDURE — 83880 ASSAY OF NATRIURETIC PEPTIDE: CPT

## 2025-08-14 PROCEDURE — 93005 ELECTROCARDIOGRAM TRACING: CPT | Performed by: EMERGENCY MEDICINE

## 2025-08-14 PROCEDURE — 80053 COMPREHEN METABOLIC PANEL: CPT

## 2025-08-14 PROCEDURE — 6370000000 HC RX 637 (ALT 250 FOR IP): Performed by: EMERGENCY MEDICINE

## 2025-08-14 PROCEDURE — 81001 URINALYSIS AUTO W/SCOPE: CPT

## 2025-08-14 PROCEDURE — 84484 ASSAY OF TROPONIN QUANT: CPT

## 2025-08-14 PROCEDURE — 85025 COMPLETE CBC W/AUTO DIFF WBC: CPT

## 2025-08-14 PROCEDURE — 99285 EMERGENCY DEPT VISIT HI MDM: CPT

## 2025-08-14 PROCEDURE — 82803 BLOOD GASES ANY COMBINATION: CPT

## 2025-08-14 PROCEDURE — 71045 X-RAY EXAM CHEST 1 VIEW: CPT

## 2025-08-14 RX ORDER — ACETAMINOPHEN 325 MG/1
650 TABLET ORAL ONCE
Status: COMPLETED | OUTPATIENT
Start: 2025-08-14 | End: 2025-08-14

## 2025-08-14 RX ADMIN — ACETAMINOPHEN 650 MG: 325 TABLET ORAL at 21:37

## 2025-08-14 ASSESSMENT — PAIN DESCRIPTION - LOCATION
LOCATION: HEAD
LOCATION: HEAD

## 2025-08-14 ASSESSMENT — PAIN - FUNCTIONAL ASSESSMENT
PAIN_FUNCTIONAL_ASSESSMENT: 0-10
PAIN_FUNCTIONAL_ASSESSMENT: 0-10

## 2025-08-14 ASSESSMENT — PAIN DESCRIPTION - ORIENTATION: ORIENTATION: RIGHT;ANTERIOR

## 2025-08-14 ASSESSMENT — PAIN DESCRIPTION - DESCRIPTORS: DESCRIPTORS: DULL

## 2025-08-14 ASSESSMENT — PAIN SCALES - GENERAL: PAINLEVEL_OUTOF10: 4

## 2025-08-15 PROBLEM — R53.1 GENERALIZED WEAKNESS: Status: ACTIVE | Noted: 2025-08-15

## 2025-08-15 PROBLEM — S81.802A OPEN LEG WOUND, LEFT, INITIAL ENCOUNTER: Status: ACTIVE | Noted: 2025-08-15

## 2025-08-15 PROBLEM — J96.11 CHRONIC RESPIRATORY FAILURE WITH HYPOXIA (HCC): Status: ACTIVE | Noted: 2025-08-15

## 2025-08-15 PROBLEM — R53.81 DEBILITY: Status: ACTIVE | Noted: 2025-08-15

## 2025-08-15 PROBLEM — R06.02 SHORTNESS OF BREATH: Status: ACTIVE | Noted: 2025-02-11

## 2025-08-15 PROBLEM — S81.801A OPEN LEG WOUND, RIGHT, INITIAL ENCOUNTER: Status: ACTIVE | Noted: 2025-08-15

## 2025-08-15 PROBLEM — E66.01 MORBID OBESITY DUE TO EXCESS CALORIES (HCC): Status: ACTIVE | Noted: 2025-08-15

## 2025-08-15 PROBLEM — I50.33 ACUTE ON CHRONIC DIASTOLIC CHF (CONGESTIVE HEART FAILURE) (HCC): Status: ACTIVE | Noted: 2025-08-15

## 2025-08-15 LAB
EKG DIAGNOSIS: NORMAL
EKG Q-T INTERVAL: 378 MS
EKG QRS DURATION: 82 MS
EKG QTC CALCULATION (BAZETT): 439 MS
EKG R AXIS: 6 DEGREES
EKG T AXIS: 46 DEGREES
EKG VENTRICULAR RATE: 81 BPM
FERRITIN SERPL IA-MCNC: 414 NG/ML (ref 15–150)
IRON SATN MFR SERPL: 29 % (ref 15–50)
IRON SERPL-MCNC: 65 UG/DL (ref 37–145)
TIBC SERPL-MCNC: 222 UG/DL (ref 260–445)

## 2025-08-15 PROCEDURE — 1200000000 HC SEMI PRIVATE

## 2025-08-15 PROCEDURE — 92610 EVALUATE SWALLOWING FUNCTION: CPT

## 2025-08-15 PROCEDURE — 6360000002 HC RX W HCPCS: Performed by: INTERNAL MEDICINE

## 2025-08-15 PROCEDURE — 6370000000 HC RX 637 (ALT 250 FOR IP): Performed by: INTERNAL MEDICINE

## 2025-08-15 PROCEDURE — 87077 CULTURE AEROBIC IDENTIFY: CPT

## 2025-08-15 PROCEDURE — 83550 IRON BINDING TEST: CPT

## 2025-08-15 PROCEDURE — 82728 ASSAY OF FERRITIN: CPT

## 2025-08-15 PROCEDURE — 83540 ASSAY OF IRON: CPT

## 2025-08-15 PROCEDURE — 2500000003 HC RX 250 WO HCPCS: Performed by: HOSPITALIST

## 2025-08-15 PROCEDURE — G0378 HOSPITAL OBSERVATION PER HR: HCPCS

## 2025-08-15 PROCEDURE — 87641 MR-STAPH DNA AMP PROBE: CPT

## 2025-08-15 PROCEDURE — 97166 OT EVAL MOD COMPLEX 45 MIN: CPT

## 2025-08-15 PROCEDURE — 36415 COLL VENOUS BLD VENIPUNCTURE: CPT

## 2025-08-15 PROCEDURE — 6370000000 HC RX 637 (ALT 250 FOR IP): Performed by: HOSPITALIST

## 2025-08-15 PROCEDURE — 87205 SMEAR GRAM STAIN: CPT

## 2025-08-15 PROCEDURE — 97530 THERAPEUTIC ACTIVITIES: CPT

## 2025-08-15 PROCEDURE — 87075 CULTR BACTERIA EXCEPT BLOOD: CPT

## 2025-08-15 PROCEDURE — 99223 1ST HOSP IP/OBS HIGH 75: CPT | Performed by: INTERNAL MEDICINE

## 2025-08-15 PROCEDURE — 87040 BLOOD CULTURE FOR BACTERIA: CPT

## 2025-08-15 PROCEDURE — 94760 N-INVAS EAR/PLS OXIMETRY 1: CPT

## 2025-08-15 PROCEDURE — 97162 PT EVAL MOD COMPLEX 30 MIN: CPT

## 2025-08-15 PROCEDURE — 97535 SELF CARE MNGMENT TRAINING: CPT

## 2025-08-15 PROCEDURE — 6360000002 HC RX W HCPCS: Performed by: HOSPITALIST

## 2025-08-15 PROCEDURE — 87070 CULTURE OTHR SPECIMN AEROBIC: CPT

## 2025-08-15 PROCEDURE — 2700000000 HC OXYGEN THERAPY PER DAY

## 2025-08-15 PROCEDURE — 92526 ORAL FUNCTION THERAPY: CPT

## 2025-08-15 PROCEDURE — 93010 ELECTROCARDIOGRAM REPORT: CPT | Performed by: INTERNAL MEDICINE

## 2025-08-15 RX ORDER — ENOXAPARIN SODIUM 100 MG/ML
30 INJECTION SUBCUTANEOUS 2 TIMES DAILY
Status: DISCONTINUED | OUTPATIENT
Start: 2025-08-15 | End: 2025-08-15

## 2025-08-15 RX ORDER — LINEZOLID 2 MG/ML
600 INJECTION, SOLUTION INTRAVENOUS EVERY 12 HOURS
Status: DISCONTINUED | OUTPATIENT
Start: 2025-08-15 | End: 2025-08-19 | Stop reason: HOSPADM

## 2025-08-15 RX ORDER — ACETAMINOPHEN 650 MG/1
650 SUPPOSITORY RECTAL EVERY 6 HOURS PRN
Status: DISCONTINUED | OUTPATIENT
Start: 2025-08-15 | End: 2025-08-19 | Stop reason: HOSPADM

## 2025-08-15 RX ORDER — CLONIDINE HYDROCHLORIDE 0.1 MG/1
0.2 TABLET ORAL 2 TIMES DAILY
Status: DISCONTINUED | OUTPATIENT
Start: 2025-08-15 | End: 2025-08-15

## 2025-08-15 RX ORDER — POTASSIUM CHLORIDE 1500 MG/1
40 TABLET, EXTENDED RELEASE ORAL PRN
Status: DISCONTINUED | OUTPATIENT
Start: 2025-08-15 | End: 2025-08-19 | Stop reason: HOSPADM

## 2025-08-15 RX ORDER — SODIUM CHLORIDE 0.9 % (FLUSH) 0.9 %
5-40 SYRINGE (ML) INJECTION EVERY 12 HOURS SCHEDULED
Status: DISCONTINUED | OUTPATIENT
Start: 2025-08-15 | End: 2025-08-19 | Stop reason: HOSPADM

## 2025-08-15 RX ORDER — SUCRALFATE 1 G/1
1 TABLET ORAL EVERY 12 HOURS SCHEDULED
Status: DISCONTINUED | OUTPATIENT
Start: 2025-08-15 | End: 2025-08-15

## 2025-08-15 RX ORDER — POTASSIUM CHLORIDE 7.45 MG/ML
10 INJECTION INTRAVENOUS PRN
Status: DISCONTINUED | OUTPATIENT
Start: 2025-08-15 | End: 2025-08-19 | Stop reason: HOSPADM

## 2025-08-15 RX ORDER — CLONIDINE HYDROCHLORIDE 0.1 MG/1
0.1 TABLET ORAL 2 TIMES DAILY
Status: DISCONTINUED | OUTPATIENT
Start: 2025-08-15 | End: 2025-08-19 | Stop reason: HOSPADM

## 2025-08-15 RX ORDER — MIDODRINE HYDROCHLORIDE 5 MG/1
5 TABLET ORAL
Status: DISCONTINUED | OUTPATIENT
Start: 2025-08-15 | End: 2025-08-15

## 2025-08-15 RX ORDER — METOPROLOL TARTRATE 25 MG/1
25 TABLET, FILM COATED ORAL 2 TIMES DAILY
Status: DISCONTINUED | OUTPATIENT
Start: 2025-08-15 | End: 2025-08-19 | Stop reason: HOSPADM

## 2025-08-15 RX ORDER — SODIUM CHLORIDE 0.9 % (FLUSH) 0.9 %
5-40 SYRINGE (ML) INJECTION PRN
Status: DISCONTINUED | OUTPATIENT
Start: 2025-08-15 | End: 2025-08-19 | Stop reason: HOSPADM

## 2025-08-15 RX ORDER — LANOLIN ALCOHOL/MO/W.PET/CERES
1000 CREAM (GRAM) TOPICAL DAILY
Status: DISCONTINUED | OUTPATIENT
Start: 2025-08-15 | End: 2025-08-15

## 2025-08-15 RX ORDER — MAGNESIUM SULFATE IN WATER 40 MG/ML
2000 INJECTION, SOLUTION INTRAVENOUS PRN
Status: DISCONTINUED | OUTPATIENT
Start: 2025-08-15 | End: 2025-08-19 | Stop reason: HOSPADM

## 2025-08-15 RX ORDER — ONDANSETRON 4 MG/1
4 TABLET, ORALLY DISINTEGRATING ORAL EVERY 8 HOURS PRN
Status: DISCONTINUED | OUTPATIENT
Start: 2025-08-15 | End: 2025-08-19 | Stop reason: HOSPADM

## 2025-08-15 RX ORDER — ONDANSETRON 2 MG/ML
4 INJECTION INTRAMUSCULAR; INTRAVENOUS EVERY 6 HOURS PRN
Status: DISCONTINUED | OUTPATIENT
Start: 2025-08-15 | End: 2025-08-19 | Stop reason: HOSPADM

## 2025-08-15 RX ORDER — FUROSEMIDE 10 MG/ML
40 INJECTION INTRAMUSCULAR; INTRAVENOUS 2 TIMES DAILY
Status: DISCONTINUED | OUTPATIENT
Start: 2025-08-15 | End: 2025-08-18

## 2025-08-15 RX ORDER — PANTOPRAZOLE SODIUM 40 MG/1
40 TABLET, DELAYED RELEASE ORAL
Status: DISCONTINUED | OUTPATIENT
Start: 2025-08-15 | End: 2025-08-19 | Stop reason: HOSPADM

## 2025-08-15 RX ORDER — SODIUM CHLORIDE 9 MG/ML
INJECTION, SOLUTION INTRAVENOUS PRN
Status: DISCONTINUED | OUTPATIENT
Start: 2025-08-15 | End: 2025-08-19 | Stop reason: HOSPADM

## 2025-08-15 RX ORDER — POLYETHYLENE GLYCOL 3350 17 G/17G
17 POWDER, FOR SOLUTION ORAL DAILY PRN
Status: DISCONTINUED | OUTPATIENT
Start: 2025-08-15 | End: 2025-08-19 | Stop reason: HOSPADM

## 2025-08-15 RX ORDER — ACETAMINOPHEN 325 MG/1
650 TABLET ORAL EVERY 6 HOURS PRN
Status: DISCONTINUED | OUTPATIENT
Start: 2025-08-15 | End: 2025-08-19 | Stop reason: HOSPADM

## 2025-08-15 RX ADMIN — ACETAMINOPHEN 650 MG: 325 TABLET ORAL at 08:01

## 2025-08-15 RX ADMIN — PANTOPRAZOLE SODIUM 40 MG: 40 TABLET, DELAYED RELEASE ORAL at 16:57

## 2025-08-15 RX ADMIN — MIDODRINE HYDROCHLORIDE 5 MG: 5 TABLET ORAL at 16:57

## 2025-08-15 RX ADMIN — METOPROLOL TARTRATE 25 MG: 25 TABLET, FILM COATED ORAL at 10:57

## 2025-08-15 RX ADMIN — CLONIDINE HYDROCHLORIDE 0.1 MG: 0.1 TABLET ORAL at 21:00

## 2025-08-15 RX ADMIN — Medication 10 ML: at 08:02

## 2025-08-15 RX ADMIN — METOPROLOL TARTRATE 25 MG: 25 TABLET, FILM COATED ORAL at 21:00

## 2025-08-15 RX ADMIN — ENOXAPARIN SODIUM 30 MG: 100 INJECTION SUBCUTANEOUS at 08:01

## 2025-08-15 RX ADMIN — MIDODRINE HYDROCHLORIDE 5 MG: 5 TABLET ORAL at 13:57

## 2025-08-15 RX ADMIN — APIXABAN 5 MG: 5 TABLET, FILM COATED ORAL at 21:00

## 2025-08-15 RX ADMIN — CLONIDINE HYDROCHLORIDE 0.2 MG: 0.1 TABLET ORAL at 10:57

## 2025-08-15 RX ADMIN — LINEZOLID 600 MG: 600 INJECTION, SOLUTION INTRAVENOUS at 13:23

## 2025-08-15 RX ADMIN — FUROSEMIDE 40 MG: 10 INJECTION, SOLUTION INTRAMUSCULAR; INTRAVENOUS at 10:57

## 2025-08-15 RX ADMIN — FUROSEMIDE 40 MG: 10 INJECTION, SOLUTION INTRAMUSCULAR; INTRAVENOUS at 16:58

## 2025-08-15 ASSESSMENT — PAIN DESCRIPTION - DESCRIPTORS: DESCRIPTORS: ACHING

## 2025-08-15 ASSESSMENT — PAIN SCALES - GENERAL
PAINLEVEL_OUTOF10: 0
PAINLEVEL_OUTOF10: 4
PAINLEVEL_OUTOF10: 0
PAINLEVEL_OUTOF10: 2
PAINLEVEL_OUTOF10: 0
PAINLEVEL_OUTOF10: 0

## 2025-08-15 ASSESSMENT — PAIN DESCRIPTION - LOCATION: LOCATION: HEAD

## 2025-08-15 ASSESSMENT — PAIN - FUNCTIONAL ASSESSMENT
PAIN_FUNCTIONAL_ASSESSMENT: 0-10
PAIN_FUNCTIONAL_ASSESSMENT: 0-10

## 2025-08-15 ASSESSMENT — PAIN DESCRIPTION - ORIENTATION: ORIENTATION: MID

## 2025-08-16 LAB
ANION GAP SERPL CALCULATED.3IONS-SCNC: 12 MMOL/L (ref 3–16)
BASOPHILS # BLD: 0.1 K/UL (ref 0–0.2)
BASOPHILS NFR BLD: 1 %
BUN SERPL-MCNC: 19 MG/DL (ref 7–20)
CALCIUM SERPL-MCNC: 8.8 MG/DL (ref 8.3–10.6)
CHLORIDE SERPL-SCNC: 100 MMOL/L (ref 99–110)
CO2 SERPL-SCNC: 26 MMOL/L (ref 21–32)
CREAT SERPL-MCNC: 1.3 MG/DL (ref 0.6–1.2)
CRP SERPL-MCNC: <3 MG/L (ref 0–5.1)
DEPRECATED RDW RBC AUTO: 15.2 % (ref 12.4–15.4)
EOSINOPHIL # BLD: 0.3 K/UL (ref 0–0.6)
EOSINOPHIL NFR BLD: 3.8 %
ERYTHROCYTE [SEDIMENTATION RATE] IN BLOOD BY WESTERGREN METHOD: 33 MM/HR (ref 0–30)
GFR SERPLBLD CREATININE-BSD FMLA CKD-EPI: 42 ML/MIN/{1.73_M2}
GLUCOSE SERPL-MCNC: 78 MG/DL (ref 70–99)
HCT VFR BLD AUTO: 38.2 % (ref 36–48)
HGB BLD-MCNC: 12.6 G/DL (ref 12–16)
LYMPHOCYTES # BLD: 1.6 K/UL (ref 1–5.1)
LYMPHOCYTES NFR BLD: 23.8 %
MCH RBC QN AUTO: 30.3 PG (ref 26–34)
MCHC RBC AUTO-ENTMCNC: 32.9 G/DL (ref 31–36)
MCV RBC AUTO: 92.3 FL (ref 80–100)
MONOCYTES # BLD: 0.8 K/UL (ref 0–1.3)
MONOCYTES NFR BLD: 11.7 %
MRSA DNA SPEC QL NAA+PROBE: NORMAL
NEUTROPHILS # BLD: 4 K/UL (ref 1.7–7.7)
NEUTROPHILS NFR BLD: 59.7 %
PLATELET # BLD AUTO: 152 K/UL (ref 135–450)
PMV BLD AUTO: 11.2 FL (ref 5–10.5)
POTASSIUM SERPL-SCNC: 5.1 MMOL/L (ref 3.5–5.1)
PROCALCITONIN SERPL IA-MCNC: 0.03 NG/ML (ref 0–0.15)
RBC # BLD AUTO: 4.14 M/UL (ref 4–5.2)
SODIUM SERPL-SCNC: 138 MMOL/L (ref 136–145)
WBC # BLD AUTO: 6.7 K/UL (ref 4–11)

## 2025-08-16 PROCEDURE — 85025 COMPLETE CBC W/AUTO DIFF WBC: CPT

## 2025-08-16 PROCEDURE — 2700000000 HC OXYGEN THERAPY PER DAY

## 2025-08-16 PROCEDURE — 80048 BASIC METABOLIC PNL TOTAL CA: CPT

## 2025-08-16 PROCEDURE — 36415 COLL VENOUS BLD VENIPUNCTURE: CPT

## 2025-08-16 PROCEDURE — 94760 N-INVAS EAR/PLS OXIMETRY 1: CPT

## 2025-08-16 PROCEDURE — 84145 PROCALCITONIN (PCT): CPT

## 2025-08-16 PROCEDURE — 85652 RBC SED RATE AUTOMATED: CPT

## 2025-08-16 PROCEDURE — 1200000000 HC SEMI PRIVATE

## 2025-08-16 PROCEDURE — 6370000000 HC RX 637 (ALT 250 FOR IP): Performed by: INTERNAL MEDICINE

## 2025-08-16 PROCEDURE — 99233 SBSQ HOSP IP/OBS HIGH 50: CPT | Performed by: NURSE PRACTITIONER

## 2025-08-16 PROCEDURE — 86140 C-REACTIVE PROTEIN: CPT

## 2025-08-16 PROCEDURE — 2500000003 HC RX 250 WO HCPCS: Performed by: HOSPITALIST

## 2025-08-16 PROCEDURE — 6360000002 HC RX W HCPCS: Performed by: INTERNAL MEDICINE

## 2025-08-16 RX ADMIN — METOPROLOL TARTRATE 25 MG: 25 TABLET, FILM COATED ORAL at 09:28

## 2025-08-16 RX ADMIN — CLONIDINE HYDROCHLORIDE 0.1 MG: 0.1 TABLET ORAL at 09:28

## 2025-08-16 RX ADMIN — APIXABAN 5 MG: 5 TABLET, FILM COATED ORAL at 19:55

## 2025-08-16 RX ADMIN — LINEZOLID 600 MG: 600 INJECTION, SOLUTION INTRAVENOUS at 13:44

## 2025-08-16 RX ADMIN — Medication 10 ML: at 09:28

## 2025-08-16 RX ADMIN — SODIUM CHLORIDE, PRESERVATIVE FREE 10 ML: 5 INJECTION INTRAVENOUS at 17:26

## 2025-08-16 RX ADMIN — METOPROLOL TARTRATE 25 MG: 25 TABLET, FILM COATED ORAL at 19:55

## 2025-08-16 RX ADMIN — CLONIDINE HYDROCHLORIDE 0.1 MG: 0.1 TABLET ORAL at 19:55

## 2025-08-16 RX ADMIN — FUROSEMIDE 40 MG: 10 INJECTION, SOLUTION INTRAMUSCULAR; INTRAVENOUS at 09:28

## 2025-08-16 RX ADMIN — PANTOPRAZOLE SODIUM 40 MG: 40 TABLET, DELAYED RELEASE ORAL at 17:26

## 2025-08-16 RX ADMIN — APIXABAN 5 MG: 5 TABLET, FILM COATED ORAL at 09:28

## 2025-08-16 RX ADMIN — PANTOPRAZOLE SODIUM 40 MG: 40 TABLET, DELAYED RELEASE ORAL at 06:09

## 2025-08-16 RX ADMIN — FUROSEMIDE 40 MG: 10 INJECTION, SOLUTION INTRAMUSCULAR; INTRAVENOUS at 17:26

## 2025-08-16 RX ADMIN — LINEZOLID 600 MG: 600 INJECTION, SOLUTION INTRAVENOUS at 02:03

## 2025-08-16 ASSESSMENT — PAIN SCALES - GENERAL: PAINLEVEL_OUTOF10: 0

## 2025-08-17 ENCOUNTER — TELEPHONE (OUTPATIENT)
Dept: CARDIOLOGY CLINIC | Age: 80
End: 2025-08-17

## 2025-08-17 LAB
ANION GAP SERPL CALCULATED.3IONS-SCNC: 9 MMOL/L (ref 3–16)
BASOPHILS # BLD: 0.1 K/UL (ref 0–0.2)
BASOPHILS NFR BLD: 0.6 %
BUN SERPL-MCNC: 21 MG/DL (ref 7–20)
CALCIUM SERPL-MCNC: 8.8 MG/DL (ref 8.3–10.6)
CHLORIDE SERPL-SCNC: 96 MMOL/L (ref 99–110)
CO2 SERPL-SCNC: 30 MMOL/L (ref 21–32)
CREAT SERPL-MCNC: 1.4 MG/DL (ref 0.6–1.2)
DEPRECATED RDW RBC AUTO: 15.4 % (ref 12.4–15.4)
EOSINOPHIL # BLD: 0.2 K/UL (ref 0–0.6)
EOSINOPHIL NFR BLD: 2.8 %
GFR SERPLBLD CREATININE-BSD FMLA CKD-EPI: 39 ML/MIN/{1.73_M2}
GLUCOSE SERPL-MCNC: 102 MG/DL (ref 70–99)
HCT VFR BLD AUTO: 37.7 % (ref 36–48)
HGB BLD-MCNC: 12.5 G/DL (ref 12–16)
LYMPHOCYTES # BLD: 1.4 K/UL (ref 1–5.1)
LYMPHOCYTES NFR BLD: 15.6 %
MCH RBC QN AUTO: 30.7 PG (ref 26–34)
MCHC RBC AUTO-ENTMCNC: 33.1 G/DL (ref 31–36)
MCV RBC AUTO: 92.6 FL (ref 80–100)
MONOCYTES # BLD: 1.1 K/UL (ref 0–1.3)
MONOCYTES NFR BLD: 12.6 %
NEUTROPHILS # BLD: 6 K/UL (ref 1.7–7.7)
NEUTROPHILS NFR BLD: 68.4 %
NT-PROBNP SERPL-MCNC: 5547 PG/ML (ref 0–449)
PLATELET # BLD AUTO: 156 K/UL (ref 135–450)
PMV BLD AUTO: 10.5 FL (ref 5–10.5)
POTASSIUM SERPL-SCNC: 4.1 MMOL/L (ref 3.5–5.1)
RBC # BLD AUTO: 4.07 M/UL (ref 4–5.2)
SODIUM SERPL-SCNC: 135 MMOL/L (ref 136–145)
WBC # BLD AUTO: 8.8 K/UL (ref 4–11)

## 2025-08-17 PROCEDURE — 99233 SBSQ HOSP IP/OBS HIGH 50: CPT | Performed by: NURSE PRACTITIONER

## 2025-08-17 PROCEDURE — 6360000002 HC RX W HCPCS: Performed by: INTERNAL MEDICINE

## 2025-08-17 PROCEDURE — 6370000000 HC RX 637 (ALT 250 FOR IP): Performed by: INTERNAL MEDICINE

## 2025-08-17 PROCEDURE — 83880 ASSAY OF NATRIURETIC PEPTIDE: CPT

## 2025-08-17 PROCEDURE — 2500000003 HC RX 250 WO HCPCS: Performed by: HOSPITALIST

## 2025-08-17 PROCEDURE — 2700000000 HC OXYGEN THERAPY PER DAY

## 2025-08-17 PROCEDURE — 1200000000 HC SEMI PRIVATE

## 2025-08-17 PROCEDURE — 2580000003 HC RX 258: Performed by: HOSPITALIST

## 2025-08-17 PROCEDURE — 94760 N-INVAS EAR/PLS OXIMETRY 1: CPT

## 2025-08-17 PROCEDURE — 36415 COLL VENOUS BLD VENIPUNCTURE: CPT

## 2025-08-17 PROCEDURE — 80048 BASIC METABOLIC PNL TOTAL CA: CPT

## 2025-08-17 PROCEDURE — 85025 COMPLETE CBC W/AUTO DIFF WBC: CPT

## 2025-08-17 RX ADMIN — LINEZOLID 600 MG: 600 INJECTION, SOLUTION INTRAVENOUS at 01:08

## 2025-08-17 RX ADMIN — LINEZOLID 600 MG: 600 INJECTION, SOLUTION INTRAVENOUS at 14:14

## 2025-08-17 RX ADMIN — FUROSEMIDE 40 MG: 10 INJECTION, SOLUTION INTRAMUSCULAR; INTRAVENOUS at 16:33

## 2025-08-17 RX ADMIN — SODIUM CHLORIDE, PRESERVATIVE FREE 10 ML: 5 INJECTION INTRAVENOUS at 16:33

## 2025-08-17 RX ADMIN — PANTOPRAZOLE SODIUM 40 MG: 40 TABLET, DELAYED RELEASE ORAL at 07:13

## 2025-08-17 RX ADMIN — METOPROLOL TARTRATE 25 MG: 25 TABLET, FILM COATED ORAL at 08:59

## 2025-08-17 RX ADMIN — SODIUM CHLORIDE 30 ML: 0.9 INJECTION, SOLUTION INTRAVENOUS at 01:07

## 2025-08-17 RX ADMIN — CLONIDINE HYDROCHLORIDE 0.1 MG: 0.1 TABLET ORAL at 20:02

## 2025-08-17 RX ADMIN — CLONIDINE HYDROCHLORIDE 0.1 MG: 0.1 TABLET ORAL at 08:59

## 2025-08-17 RX ADMIN — Medication 10 ML: at 20:01

## 2025-08-17 RX ADMIN — APIXABAN 5 MG: 5 TABLET, FILM COATED ORAL at 20:02

## 2025-08-17 RX ADMIN — Medication 10 ML: at 08:59

## 2025-08-17 RX ADMIN — FUROSEMIDE 40 MG: 10 INJECTION, SOLUTION INTRAMUSCULAR; INTRAVENOUS at 08:59

## 2025-08-17 RX ADMIN — METOPROLOL TARTRATE 25 MG: 25 TABLET, FILM COATED ORAL at 20:02

## 2025-08-17 RX ADMIN — PANTOPRAZOLE SODIUM 40 MG: 40 TABLET, DELAYED RELEASE ORAL at 16:33

## 2025-08-17 RX ADMIN — APIXABAN 5 MG: 5 TABLET, FILM COATED ORAL at 08:59

## 2025-08-17 ASSESSMENT — PAIN SCALES - GENERAL
PAINLEVEL_OUTOF10: 0

## 2025-08-17 ASSESSMENT — PAIN SCALES - WONG BAKER
WONGBAKER_NUMERICALRESPONSE: NO HURT
WONGBAKER_NUMERICALRESPONSE: NO HURT

## 2025-08-18 PROBLEM — Z74.01 BEDBOUND: Status: ACTIVE | Noted: 2025-08-18

## 2025-08-18 PROBLEM — Z71.89 ENCOUNTER FOR MEDICATION COUNSELING: Status: ACTIVE | Noted: 2025-08-18

## 2025-08-18 LAB
ANION GAP SERPL CALCULATED.3IONS-SCNC: 9 MMOL/L (ref 3–16)
BASOPHILS # BLD: 0 K/UL (ref 0–0.2)
BASOPHILS NFR BLD: 0.4 %
BUN SERPL-MCNC: 23 MG/DL (ref 7–20)
CALCIUM SERPL-MCNC: 8.7 MG/DL (ref 8.3–10.6)
CHLORIDE SERPL-SCNC: 91 MMOL/L (ref 99–110)
CO2 SERPL-SCNC: 35 MMOL/L (ref 21–32)
CREAT SERPL-MCNC: 1.4 MG/DL (ref 0.6–1.2)
DEPRECATED RDW RBC AUTO: 15 % (ref 12.4–15.4)
EOSINOPHIL # BLD: 0.2 K/UL (ref 0–0.6)
EOSINOPHIL NFR BLD: 1.8 %
GFR SERPLBLD CREATININE-BSD FMLA CKD-EPI: 39 ML/MIN/{1.73_M2}
GLUCOSE SERPL-MCNC: 87 MG/DL (ref 70–99)
HCT VFR BLD AUTO: 39.3 % (ref 36–48)
HGB BLD-MCNC: 12.8 G/DL (ref 12–16)
LYMPHOCYTES # BLD: 1.5 K/UL (ref 1–5.1)
LYMPHOCYTES NFR BLD: 15.4 %
MAGNESIUM SERPL-MCNC: 1.46 MG/DL (ref 1.8–2.4)
MCH RBC QN AUTO: 30 PG (ref 26–34)
MCHC RBC AUTO-ENTMCNC: 32.6 G/DL (ref 31–36)
MCV RBC AUTO: 92 FL (ref 80–100)
MONOCYTES # BLD: 1.2 K/UL (ref 0–1.3)
MONOCYTES NFR BLD: 12.6 %
NEUTROPHILS # BLD: 6.8 K/UL (ref 1.7–7.7)
NEUTROPHILS NFR BLD: 69.8 %
PLATELET # BLD AUTO: 151 K/UL (ref 135–450)
PMV BLD AUTO: 10.8 FL (ref 5–10.5)
POTASSIUM SERPL-SCNC: 3.4 MMOL/L (ref 3.5–5.1)
RBC # BLD AUTO: 4.28 M/UL (ref 4–5.2)
SODIUM SERPL-SCNC: 135 MMOL/L (ref 136–145)
WBC # BLD AUTO: 9.7 K/UL (ref 4–11)

## 2025-08-18 PROCEDURE — 6370000000 HC RX 637 (ALT 250 FOR IP): Performed by: INTERNAL MEDICINE

## 2025-08-18 PROCEDURE — 97530 THERAPEUTIC ACTIVITIES: CPT

## 2025-08-18 PROCEDURE — 36415 COLL VENOUS BLD VENIPUNCTURE: CPT

## 2025-08-18 PROCEDURE — 1200000000 HC SEMI PRIVATE

## 2025-08-18 PROCEDURE — 6360000002 HC RX W HCPCS: Performed by: HOSPITALIST

## 2025-08-18 PROCEDURE — 2500000003 HC RX 250 WO HCPCS: Performed by: HOSPITALIST

## 2025-08-18 PROCEDURE — 97535 SELF CARE MNGMENT TRAINING: CPT

## 2025-08-18 PROCEDURE — 99232 SBSQ HOSP IP/OBS MODERATE 35: CPT | Performed by: CLINICAL NURSE SPECIALIST

## 2025-08-18 PROCEDURE — 6360000002 HC RX W HCPCS: Performed by: INTERNAL MEDICINE

## 2025-08-18 PROCEDURE — 2700000000 HC OXYGEN THERAPY PER DAY

## 2025-08-18 PROCEDURE — 83735 ASSAY OF MAGNESIUM: CPT

## 2025-08-18 PROCEDURE — 6370000000 HC RX 637 (ALT 250 FOR IP): Performed by: HOSPITALIST

## 2025-08-18 PROCEDURE — 85025 COMPLETE CBC W/AUTO DIFF WBC: CPT

## 2025-08-18 PROCEDURE — 94760 N-INVAS EAR/PLS OXIMETRY 1: CPT

## 2025-08-18 PROCEDURE — 80048 BASIC METABOLIC PNL TOTAL CA: CPT

## 2025-08-18 PROCEDURE — 99233 SBSQ HOSP IP/OBS HIGH 50: CPT | Performed by: INTERNAL MEDICINE

## 2025-08-18 RX ORDER — DOXYCYCLINE HYCLATE 100 MG
100 TABLET ORAL EVERY 12 HOURS SCHEDULED
Qty: 30 TABLET | Refills: 0 | Status: SHIPPED | OUTPATIENT
Start: 2025-08-18 | End: 2025-09-02

## 2025-08-18 RX ORDER — DOXYCYCLINE HYCLATE 100 MG
100 TABLET ORAL EVERY 12 HOURS SCHEDULED
Status: DISCONTINUED | OUTPATIENT
Start: 2025-08-18 | End: 2025-08-19 | Stop reason: HOSPADM

## 2025-08-18 RX ORDER — POTASSIUM CHLORIDE 1500 MG/1
40 TABLET, EXTENDED RELEASE ORAL
Status: DISPENSED | OUTPATIENT
Start: 2025-08-18 | End: 2025-08-18

## 2025-08-18 RX ORDER — TORSEMIDE 20 MG/1
20 TABLET ORAL DAILY
Status: DISCONTINUED | OUTPATIENT
Start: 2025-08-19 | End: 2025-08-19 | Stop reason: HOSPADM

## 2025-08-18 RX ORDER — TORSEMIDE 20 MG/1
20 TABLET ORAL DAILY
Status: DISCONTINUED | OUTPATIENT
Start: 2025-08-18 | End: 2025-08-18

## 2025-08-18 RX ORDER — MAGNESIUM SULFATE IN WATER 40 MG/ML
2000 INJECTION, SOLUTION INTRAVENOUS ONCE
Status: DISCONTINUED | OUTPATIENT
Start: 2025-08-18 | End: 2025-08-19 | Stop reason: HOSPADM

## 2025-08-18 RX ORDER — DOXYCYCLINE HYCLATE 100 MG
100 TABLET ORAL EVERY 12 HOURS SCHEDULED
Qty: 30 TABLET | Refills: 0 | Status: SHIPPED | OUTPATIENT
Start: 2025-08-18 | End: 2025-08-18

## 2025-08-18 RX ADMIN — CLONIDINE HYDROCHLORIDE 0.1 MG: 0.1 TABLET ORAL at 08:33

## 2025-08-18 RX ADMIN — Medication 10 ML: at 08:34

## 2025-08-18 RX ADMIN — METOPROLOL TARTRATE 25 MG: 25 TABLET, FILM COATED ORAL at 08:33

## 2025-08-18 RX ADMIN — POTASSIUM CHLORIDE 40 MEQ: 1500 TABLET, EXTENDED RELEASE ORAL at 10:59

## 2025-08-18 RX ADMIN — PANTOPRAZOLE SODIUM 40 MG: 40 TABLET, DELAYED RELEASE ORAL at 17:30

## 2025-08-18 RX ADMIN — CLONIDINE HYDROCHLORIDE 0.1 MG: 0.1 TABLET ORAL at 20:28

## 2025-08-18 RX ADMIN — APIXABAN 5 MG: 5 TABLET, FILM COATED ORAL at 08:33

## 2025-08-18 RX ADMIN — LINEZOLID 600 MG: 600 INJECTION, SOLUTION INTRAVENOUS at 00:32

## 2025-08-18 RX ADMIN — DOXYCYCLINE HYCLATE 100 MG: 100 TABLET, COATED ORAL at 20:28

## 2025-08-18 RX ADMIN — METOPROLOL TARTRATE 25 MG: 25 TABLET, FILM COATED ORAL at 20:28

## 2025-08-18 RX ADMIN — APIXABAN 5 MG: 5 TABLET, FILM COATED ORAL at 20:29

## 2025-08-18 RX ADMIN — POTASSIUM CHLORIDE 40 MEQ: 1500 TABLET, EXTENDED RELEASE ORAL at 08:33

## 2025-08-18 RX ADMIN — Medication 10 ML: at 20:29

## 2025-08-18 RX ADMIN — FUROSEMIDE 40 MG: 10 INJECTION, SOLUTION INTRAMUSCULAR; INTRAVENOUS at 08:33

## 2025-08-18 RX ADMIN — MAGNESIUM SULFATE HEPTAHYDRATE 2000 MG: 40 INJECTION, SOLUTION INTRAVENOUS at 08:37

## 2025-08-18 RX ADMIN — PANTOPRAZOLE SODIUM 40 MG: 40 TABLET, DELAYED RELEASE ORAL at 06:07

## 2025-08-18 RX ADMIN — LINEZOLID 600 MG: 600 INJECTION, SOLUTION INTRAVENOUS at 12:19

## 2025-08-18 ASSESSMENT — ENCOUNTER SYMPTOMS
DIARRHEA: 0
SINUS PRESSURE: 0
BACK PAIN: 0
RHINORRHEA: 0
WHEEZING: 0
EYE REDNESS: 0
ABDOMINAL PAIN: 0
SHORTNESS OF BREATH: 0
CONSTIPATION: 0
EYE DISCHARGE: 0
COUGH: 0
SORE THROAT: 0
SINUS PAIN: 0
NAUSEA: 0

## 2025-08-18 ASSESSMENT — PAIN SCALES - GENERAL: PAINLEVEL_OUTOF10: 0

## 2025-08-19 VITALS
BODY MASS INDEX: 37.84 KG/M2 | SYSTOLIC BLOOD PRESSURE: 101 MMHG | RESPIRATION RATE: 20 BRPM | TEMPERATURE: 97.5 F | HEIGHT: 67 IN | WEIGHT: 241.1 LBS | DIASTOLIC BLOOD PRESSURE: 67 MMHG | OXYGEN SATURATION: 96 % | HEART RATE: 67 BPM

## 2025-08-19 LAB
ANION GAP SERPL CALCULATED.3IONS-SCNC: 13 MMOL/L (ref 3–16)
BACTERIA BLD CULT ORG #2: NORMAL
BACTERIA BLD CULT: NORMAL
BASOPHILS # BLD: 0.1 K/UL (ref 0–0.2)
BASOPHILS NFR BLD: 0.8 %
BUN SERPL-MCNC: 31 MG/DL (ref 7–20)
CALCIUM SERPL-MCNC: 8.7 MG/DL (ref 8.3–10.6)
CHLORIDE SERPL-SCNC: 91 MMOL/L (ref 99–110)
CO2 SERPL-SCNC: 30 MMOL/L (ref 21–32)
CREAT SERPL-MCNC: 1.9 MG/DL (ref 0.6–1.2)
DEPRECATED RDW RBC AUTO: 15 % (ref 12.4–15.4)
EOSINOPHIL # BLD: 0.2 K/UL (ref 0–0.6)
EOSINOPHIL NFR BLD: 2.2 %
GFR SERPLBLD CREATININE-BSD FMLA CKD-EPI: 27 ML/MIN/{1.73_M2}
GLUCOSE SERPL-MCNC: 86 MG/DL (ref 70–99)
HCT VFR BLD AUTO: 40 % (ref 36–48)
HGB BLD-MCNC: 13.1 G/DL (ref 12–16)
LYMPHOCYTES # BLD: 1.8 K/UL (ref 1–5.1)
LYMPHOCYTES NFR BLD: 23.8 %
MAGNESIUM SERPL-MCNC: 2.07 MG/DL (ref 1.8–2.4)
MCH RBC QN AUTO: 30.3 PG (ref 26–34)
MCHC RBC AUTO-ENTMCNC: 32.8 G/DL (ref 31–36)
MCV RBC AUTO: 92.6 FL (ref 80–100)
MONOCYTES # BLD: 0.9 K/UL (ref 0–1.3)
MONOCYTES NFR BLD: 11.8 %
NEUTROPHILS # BLD: 4.6 K/UL (ref 1.7–7.7)
NEUTROPHILS NFR BLD: 61.4 %
NT-PROBNP SERPL-MCNC: 5263 PG/ML (ref 0–449)
PLATELET # BLD AUTO: 150 K/UL (ref 135–450)
PMV BLD AUTO: 10.8 FL (ref 5–10.5)
POTASSIUM SERPL-SCNC: 4.5 MMOL/L (ref 3.5–5.1)
RBC # BLD AUTO: 4.32 M/UL (ref 4–5.2)
SODIUM SERPL-SCNC: 134 MMOL/L (ref 136–145)
WBC # BLD AUTO: 7.5 K/UL (ref 4–11)

## 2025-08-19 PROCEDURE — 51798 US URINE CAPACITY MEASURE: CPT

## 2025-08-19 PROCEDURE — 83880 ASSAY OF NATRIURETIC PEPTIDE: CPT

## 2025-08-19 PROCEDURE — 99232 SBSQ HOSP IP/OBS MODERATE 35: CPT | Performed by: CLINICAL NURSE SPECIALIST

## 2025-08-19 PROCEDURE — 2500000003 HC RX 250 WO HCPCS: Performed by: HOSPITALIST

## 2025-08-19 PROCEDURE — 80048 BASIC METABOLIC PNL TOTAL CA: CPT

## 2025-08-19 PROCEDURE — 6370000000 HC RX 637 (ALT 250 FOR IP): Performed by: INTERNAL MEDICINE

## 2025-08-19 PROCEDURE — 6360000002 HC RX W HCPCS: Performed by: INTERNAL MEDICINE

## 2025-08-19 PROCEDURE — 85025 COMPLETE CBC W/AUTO DIFF WBC: CPT

## 2025-08-19 PROCEDURE — 83735 ASSAY OF MAGNESIUM: CPT

## 2025-08-19 PROCEDURE — 36415 COLL VENOUS BLD VENIPUNCTURE: CPT

## 2025-08-19 RX ORDER — MAGNESIUM SULFATE IN WATER 40 MG/ML
2000 INJECTION, SOLUTION INTRAVENOUS ONCE
Status: CANCELLED | OUTPATIENT
Start: 2025-08-19 | End: 2025-08-19

## 2025-08-19 RX ADMIN — METOPROLOL TARTRATE 25 MG: 25 TABLET, FILM COATED ORAL at 10:29

## 2025-08-19 RX ADMIN — LINEZOLID 600 MG: 600 INJECTION, SOLUTION INTRAVENOUS at 01:46

## 2025-08-19 RX ADMIN — DOXYCYCLINE HYCLATE 100 MG: 100 TABLET, COATED ORAL at 10:27

## 2025-08-19 RX ADMIN — CLONIDINE HYDROCHLORIDE 0.1 MG: 0.1 TABLET ORAL at 10:29

## 2025-08-19 RX ADMIN — TORSEMIDE 20 MG: 20 TABLET ORAL at 10:30

## 2025-08-19 RX ADMIN — APIXABAN 5 MG: 5 TABLET, FILM COATED ORAL at 10:29

## 2025-08-19 RX ADMIN — PANTOPRAZOLE SODIUM 40 MG: 40 TABLET, DELAYED RELEASE ORAL at 06:16

## 2025-08-19 RX ADMIN — Medication 10 ML: at 11:21

## 2025-08-19 ASSESSMENT — PAIN SCALES - GENERAL: PAINLEVEL_OUTOF10: 3

## 2025-08-19 ASSESSMENT — PAIN DESCRIPTION - LOCATION: LOCATION: BACK

## 2025-08-19 ASSESSMENT — PAIN DESCRIPTION - ORIENTATION: ORIENTATION: POSTERIOR

## 2025-08-20 LAB
BACTERIA SPEC AEROBE CULT: NORMAL
BACTERIA SPEC ANAEROBE CULT: NORMAL
GRAM STN SPEC: NORMAL

## 2025-08-22 ENCOUNTER — TELEPHONE (OUTPATIENT)
Dept: OTHER | Age: 80
End: 2025-08-22

## (undated) DEVICE — SNARES COLD OVAL 10MM THIN

## (undated) DEVICE — SOLUTION IRRIG 500ML STRL H2O NONPYROGENIC

## (undated) DEVICE — PROCEDURE KIT ENDOSCP CUST

## (undated) DEVICE — MOUTHPIECE ENDOSCP L CTRL OPN AND SIDE PORTS DISP

## (undated) DEVICE — FORCEPS BX L240CM WRK CHN 2.8MM STD CAP W/ NDL MIC MESH

## (undated) DEVICE — TUBING IRRIG COMPATIBLE W ERBE MEDIVATOR PMP HYDR

## (undated) DEVICE — CAP WATER BTL AIR TBNG L 16 IN CO2 TBNG L 48 IN ENDOSCP

## (undated) DEVICE — MEDI-VAC NON-CONDUCTIVE SUCTION TUBING: Brand: CARDINAL HEALTH

## (undated) DEVICE — Device: Brand: DISPOSABLE ELECTROSURGICAL SNARE

## (undated) DEVICE — SPECIMEN TRAP: Brand: ARGYLE

## (undated) DEVICE — BW-412T DISP COMBO CLEANING BRUSH: Brand: SINGLE USE COMBINATION CLEANING BRUSH

## (undated) DEVICE — 60 ML SYRINGE,REGULAR TIP: Brand: MONOJECT

## (undated) DEVICE — AIR/WATER CLEANING ADAPTER FOR OLYMPUS® GI ENDOSCOPE: Brand: BULLDOG®

## (undated) DEVICE — ENDOSCOPIC KIT 6X3/16 FT COLON W/ 1.1 OZ 2 GWN W/O BRSH

## (undated) DEVICE — ESOPHAGEAL BALLOON DILATATION CATHETER: Brand: CRE FIXED WIRE

## (undated) DEVICE — SOLUTION IV IRRIG WATER 500ML POUR BRL ST 2F7113

## (undated) DEVICE — TRAP SPEC RETRV CLR PLAS POLYP IN LN SUCT QUIK CTCH

## (undated) DEVICE — SYRINGE INFL 60ML DISP ALLIANCE II

## (undated) DEVICE — VALVE SUCTION AIR H2O SET ORCA POD + DISP

## (undated) DEVICE — YANKAUER SUCTION INSTRUMENT NO CONTROL VENT, BULB TIP, CLEAR: Brand: YANKAUER

## (undated) DEVICE — GOWN AURORA NONREINF LG: Brand: MEDLINE INDUSTRIES, INC.

## (undated) DEVICE — ELECTRODE PT RET AD L9FT HI MOIST COND ADH HYDRGEL CORDED

## (undated) DEVICE — SINGLE USE AIR/WATER, SUCTION AND BIOPSY VALVES SET: Brand: ORCAPOD™